# Patient Record
Sex: FEMALE | Race: WHITE | Employment: FULL TIME
[De-identification: names, ages, dates, MRNs, and addresses within clinical notes are randomized per-mention and may not be internally consistent; named-entity substitution may affect disease eponyms.]

---

## 2017-01-09 ENCOUNTER — OFFICE VISIT (OUTPATIENT)
Dept: INTERNAL MEDICINE | Facility: CLINIC | Age: 47
End: 2017-01-09

## 2017-01-09 VITALS
BODY MASS INDEX: 33.46 KG/M2 | SYSTOLIC BLOOD PRESSURE: 126 MMHG | WEIGHT: 196 LBS | HEIGHT: 64 IN | DIASTOLIC BLOOD PRESSURE: 78 MMHG

## 2017-01-09 DIAGNOSIS — I72.0 CAROTID ARTERY ANEURYSM (HCC): ICD-10-CM

## 2017-01-09 DIAGNOSIS — R93.7 ABNORMAL X-RAY OF CERVICAL SPINE: ICD-10-CM

## 2017-01-09 DIAGNOSIS — M54.2 NECK PAIN: Primary | ICD-10-CM

## 2017-01-09 PROCEDURE — G8599 NO ASA/ANTIPLAT THER USE RNG: HCPCS | Performed by: INTERNAL MEDICINE

## 2017-01-09 PROCEDURE — G8427 DOCREV CUR MEDS BY ELIG CLIN: HCPCS | Performed by: INTERNAL MEDICINE

## 2017-01-09 PROCEDURE — 4004F PT TOBACCO SCREEN RCVD TLK: CPT | Performed by: INTERNAL MEDICINE

## 2017-01-09 PROCEDURE — 99214 OFFICE O/P EST MOD 30 MIN: CPT | Performed by: INTERNAL MEDICINE

## 2017-01-09 PROCEDURE — G8484 FLU IMMUNIZE NO ADMIN: HCPCS | Performed by: INTERNAL MEDICINE

## 2017-01-09 PROCEDURE — G8419 CALC BMI OUT NRM PARAM NOF/U: HCPCS | Performed by: INTERNAL MEDICINE

## 2017-01-09 RX ORDER — CLOTRIMAZOLE AND BETAMETHASONE DIPROPIONATE 10; .64 MG/G; MG/G
CREAM TOPICAL
Qty: 30 G | Refills: 1 | Status: SHIPPED | OUTPATIENT
Start: 2017-01-09 | End: 2017-02-23 | Stop reason: ALTCHOICE

## 2017-01-09 RX ORDER — TOPIRAMATE 100 MG/1
TABLET, FILM COATED ORAL
Refills: 2 | COMMUNITY
Start: 2016-12-29 | End: 2017-03-20 | Stop reason: DRUGHIGH

## 2017-01-09 RX ORDER — TOPIRAMATE 25 MG/1
25 TABLET ORAL 2 TIMES DAILY
COMMUNITY
Start: 2017-01-09 | End: 2017-03-20 | Stop reason: DRUGHIGH

## 2017-02-03 DIAGNOSIS — M48.02 FORAMINAL STENOSIS OF CERVICAL REGION: Primary | ICD-10-CM

## 2017-02-03 DIAGNOSIS — M50.30 BULGING OF CERVICAL INTERVERTEBRAL DISC: ICD-10-CM

## 2017-02-04 ENCOUNTER — HOSPITAL ENCOUNTER (EMERGENCY)
Age: 47
Discharge: HOME | End: 2017-02-04
Attending: EMERGENCY MEDICINE
Payer: MEDICARE

## 2017-02-04 VITALS
HEIGHT: 64 IN | WEIGHT: 196 LBS | TEMPERATURE: 98.1 F | RESPIRATION RATE: 16 BRPM | BODY MASS INDEX: 33.46 KG/M2 | SYSTOLIC BLOOD PRESSURE: 114 MMHG | OXYGEN SATURATION: 93 % | DIASTOLIC BLOOD PRESSURE: 58 MMHG | HEART RATE: 83 BPM

## 2017-02-04 DIAGNOSIS — M54.2 NECK PAIN: ICD-10-CM

## 2017-02-04 DIAGNOSIS — R51.9 NONINTRACTABLE HEADACHE, UNSPECIFIED CHRONICITY PATTERN, UNSPECIFIED HEADACHE TYPE: Primary | ICD-10-CM

## 2017-02-04 PROCEDURE — 6360000002 HC RX W HCPCS: Performed by: NURSE PRACTITIONER

## 2017-02-04 PROCEDURE — 99283 EMERGENCY DEPT VISIT LOW MDM: CPT

## 2017-02-04 PROCEDURE — 96375 TX/PRO/DX INJ NEW DRUG ADDON: CPT

## 2017-02-04 PROCEDURE — 96374 THER/PROPH/DIAG INJ IV PUSH: CPT

## 2017-02-04 PROCEDURE — 6370000000 HC RX 637 (ALT 250 FOR IP): Performed by: NURSE PRACTITIONER

## 2017-02-04 PROCEDURE — 2580000003 HC RX 258: Performed by: NURSE PRACTITIONER

## 2017-02-04 RX ORDER — 0.9 % SODIUM CHLORIDE 0.9 %
1000 INTRAVENOUS SOLUTION INTRAVENOUS ONCE
Status: COMPLETED | OUTPATIENT
Start: 2017-02-04 | End: 2017-02-04

## 2017-02-04 RX ORDER — FENTANYL CITRATE 50 UG/ML
50 INJECTION, SOLUTION INTRAMUSCULAR; INTRAVENOUS ONCE
Status: COMPLETED | OUTPATIENT
Start: 2017-02-04 | End: 2017-02-04

## 2017-02-04 RX ORDER — KETOROLAC TROMETHAMINE 30 MG/ML
15 INJECTION, SOLUTION INTRAMUSCULAR; INTRAVENOUS ONCE
Status: DISCONTINUED | OUTPATIENT
Start: 2017-02-04 | End: 2017-02-04

## 2017-02-04 RX ORDER — DIPHENHYDRAMINE HYDROCHLORIDE 50 MG/ML
25 INJECTION INTRAMUSCULAR; INTRAVENOUS ONCE
Status: COMPLETED | OUTPATIENT
Start: 2017-02-04 | End: 2017-02-04

## 2017-02-04 RX ORDER — CYCLOBENZAPRINE HCL 10 MG
10 TABLET ORAL 3 TIMES DAILY PRN
Qty: 15 TABLET | Refills: 0 | Status: SHIPPED | OUTPATIENT
Start: 2017-02-04 | End: 2017-03-20 | Stop reason: ALTCHOICE

## 2017-02-04 RX ORDER — METOCLOPRAMIDE HYDROCHLORIDE 5 MG/ML
10 INJECTION INTRAMUSCULAR; INTRAVENOUS ONCE
Status: COMPLETED | OUTPATIENT
Start: 2017-02-04 | End: 2017-02-04

## 2017-02-04 RX ORDER — DIAZEPAM 5 MG/1
5 TABLET ORAL ONCE
Status: COMPLETED | OUTPATIENT
Start: 2017-02-04 | End: 2017-02-04

## 2017-02-04 RX ADMIN — DIAZEPAM 5 MG: 5 TABLET ORAL at 15:58

## 2017-02-04 RX ADMIN — DIPHENHYDRAMINE HYDROCHLORIDE 25 MG: 50 INJECTION, SOLUTION INTRAMUSCULAR; INTRAVENOUS at 16:03

## 2017-02-04 RX ADMIN — FENTANYL CITRATE 50 MCG: 50 INJECTION, SOLUTION INTRAMUSCULAR; INTRAVENOUS at 16:21

## 2017-02-04 RX ADMIN — METOCLOPRAMIDE 10 MG: 5 INJECTION, SOLUTION INTRAMUSCULAR; INTRAVENOUS at 16:00

## 2017-02-04 RX ADMIN — SODIUM CHLORIDE 1000 ML: 9 INJECTION, SOLUTION INTRAVENOUS at 15:43

## 2017-02-04 ASSESSMENT — ENCOUNTER SYMPTOMS
BACK PAIN: 0
COUGH: 0
SHORTNESS OF BREATH: 0
TROUBLE SWALLOWING: 0
NAUSEA: 0
VOMITING: 0

## 2017-02-04 ASSESSMENT — PAIN DESCRIPTION - FREQUENCY: FREQUENCY: CONTINUOUS

## 2017-02-04 ASSESSMENT — PAIN DESCRIPTION - ONSET: ONSET: AWAKENED FROM SLEEP

## 2017-02-04 ASSESSMENT — PAIN DESCRIPTION - DESCRIPTORS: DESCRIPTORS: CONSTANT;THROBBING

## 2017-02-04 ASSESSMENT — PAIN SCALES - GENERAL
PAINLEVEL_OUTOF10: 10
PAINLEVEL_OUTOF10: 0
PAINLEVEL_OUTOF10: 4

## 2017-02-04 ASSESSMENT — PAIN DESCRIPTION - PAIN TYPE: TYPE: ACUTE PAIN

## 2017-02-04 ASSESSMENT — PAIN DESCRIPTION - LOCATION: LOCATION: HEAD;NECK

## 2017-02-06 ENCOUNTER — TELEPHONE (OUTPATIENT)
Dept: INTERNAL MEDICINE | Facility: CLINIC | Age: 47
End: 2017-02-06

## 2017-02-06 DIAGNOSIS — M50.30 BULGING OF CERVICAL INTERVERTEBRAL DISC: ICD-10-CM

## 2017-02-06 DIAGNOSIS — M48.02 FORAMINAL STENOSIS OF CERVICAL REGION: Primary | ICD-10-CM

## 2017-02-09 DIAGNOSIS — E03.9 HYPOTHYROIDISM: ICD-10-CM

## 2017-02-09 RX ORDER — ROPINIROLE 1 MG/1
TABLET, FILM COATED ORAL
Qty: 30 TABLET | Refills: 5 | Status: SHIPPED | OUTPATIENT
Start: 2017-02-09 | End: 2017-08-06 | Stop reason: SDUPTHER

## 2017-02-10 RX ORDER — LEVOTHYROXINE SODIUM 0.12 MG/1
TABLET ORAL
Qty: 60 TABLET | Refills: 11 | Status: SHIPPED | OUTPATIENT
Start: 2017-02-10 | End: 2018-02-13 | Stop reason: SDUPTHER

## 2017-02-13 ENCOUNTER — INITIAL CONSULT (OUTPATIENT)
Dept: NEUROSURGERY | Facility: CLINIC | Age: 47
End: 2017-02-13

## 2017-02-13 VITALS
DIASTOLIC BLOOD PRESSURE: 80 MMHG | HEART RATE: 79 BPM | HEIGHT: 64 IN | SYSTOLIC BLOOD PRESSURE: 114 MMHG | BODY MASS INDEX: 33.8 KG/M2 | WEIGHT: 198 LBS

## 2017-02-13 DIAGNOSIS — M54.12 CERVICAL RADICULOPATHY: Primary | ICD-10-CM

## 2017-02-13 DIAGNOSIS — R51.9 FREQUENT HEADACHES: ICD-10-CM

## 2017-02-13 PROCEDURE — G8484 FLU IMMUNIZE NO ADMIN: HCPCS | Performed by: NEUROLOGICAL SURGERY

## 2017-02-13 PROCEDURE — 99203 OFFICE O/P NEW LOW 30 MIN: CPT | Performed by: NEUROLOGICAL SURGERY

## 2017-02-13 PROCEDURE — 4004F PT TOBACCO SCREEN RCVD TLK: CPT | Performed by: NEUROLOGICAL SURGERY

## 2017-02-13 PROCEDURE — G8417 CALC BMI ABV UP PARAM F/U: HCPCS | Performed by: NEUROLOGICAL SURGERY

## 2017-02-13 PROCEDURE — G8599 NO ASA/ANTIPLAT THER USE RNG: HCPCS | Performed by: NEUROLOGICAL SURGERY

## 2017-02-13 PROCEDURE — G8427 DOCREV CUR MEDS BY ELIG CLIN: HCPCS | Performed by: NEUROLOGICAL SURGERY

## 2017-02-13 RX ORDER — METHYLPREDNISOLONE 4 MG/1
TABLET ORAL
Qty: 1 KIT | Refills: 0 | Status: SHIPPED | OUTPATIENT
Start: 2017-02-13 | End: 2017-02-19

## 2017-02-13 ASSESSMENT — VISUAL ACUITY: VA_NORMAL: 1

## 2017-02-22 ENCOUNTER — HOSPITAL ENCOUNTER (EMERGENCY)
Age: 47
Discharge: HOME | End: 2017-02-22
Attending: EMERGENCY MEDICINE
Payer: MEDICARE

## 2017-02-22 ENCOUNTER — TELEPHONE (OUTPATIENT)
Dept: INTERNAL MEDICINE | Facility: CLINIC | Age: 47
End: 2017-02-22

## 2017-02-22 VITALS
HEIGHT: 64 IN | DIASTOLIC BLOOD PRESSURE: 59 MMHG | BODY MASS INDEX: 31.92 KG/M2 | SYSTOLIC BLOOD PRESSURE: 104 MMHG | WEIGHT: 187 LBS | TEMPERATURE: 97.9 F | RESPIRATION RATE: 16 BRPM | HEART RATE: 75 BPM | OXYGEN SATURATION: 94 %

## 2017-02-22 DIAGNOSIS — M54.2 NECK PAIN: Primary | ICD-10-CM

## 2017-02-22 DIAGNOSIS — R51.9 HEADACHE, UNSPECIFIED HEADACHE TYPE: ICD-10-CM

## 2017-02-22 LAB
ABSOLUTE EOS #: 0.3 K/UL (ref 0–0.4)
ABSOLUTE LYMPH #: 2.7 K/UL (ref 1–4.8)
ABSOLUTE MONO #: 0.5 K/UL (ref 0.1–1.3)
ANION GAP SERPL CALCULATED.3IONS-SCNC: 15 MMOL/L (ref 9–17)
BASOPHILS # BLD: 1 % (ref 0–2)
BASOPHILS ABSOLUTE: 0.1 K/UL (ref 0–0.2)
BUN BLDV-MCNC: 16 MG/DL (ref 6–20)
BUN/CREAT BLD: ABNORMAL (ref 9–20)
CALCIUM SERPL-MCNC: 9.8 MG/DL (ref 8.6–10.4)
CHLORIDE BLD-SCNC: 103 MMOL/L (ref 98–107)
CO2: 20 MMOL/L (ref 20–31)
CREAT SERPL-MCNC: 0.81 MG/DL (ref 0.5–0.9)
DIFFERENTIAL TYPE: ABNORMAL
EOSINOPHILS RELATIVE PERCENT: 4 % (ref 0–4)
GFR AFRICAN AMERICAN: >60 ML/MIN
GFR NON-AFRICAN AMERICAN: >60 ML/MIN
GFR SERPL CREATININE-BSD FRML MDRD: ABNORMAL ML/MIN/{1.73_M2}
GFR SERPL CREATININE-BSD FRML MDRD: ABNORMAL ML/MIN/{1.73_M2}
GLUCOSE BLD-MCNC: 111 MG/DL (ref 70–99)
HCT VFR BLD CALC: 46.6 % (ref 36–46)
HEMOGLOBIN: 15.9 G/DL (ref 12–16)
LYMPHOCYTES # BLD: 31 % (ref 24–44)
MAGNESIUM: 2 MG/DL (ref 1.6–2.6)
MCH RBC QN AUTO: 30.7 PG (ref 26–34)
MCHC RBC AUTO-ENTMCNC: 34.1 G/DL (ref 31–37)
MCV RBC AUTO: 89.9 FL (ref 80–100)
MONOCYTES # BLD: 6 % (ref 1–7)
PDW BLD-RTO: 13 % (ref 11.5–14.9)
PLATELET # BLD: 193 K/UL (ref 150–450)
PLATELET ESTIMATE: ABNORMAL
PMV BLD AUTO: 8.6 FL (ref 6–12)
POTASSIUM SERPL-SCNC: 4.4 MMOL/L (ref 3.7–5.3)
RBC # BLD: 5.18 M/UL (ref 4–5.2)
RBC # BLD: ABNORMAL 10*6/UL
SEG NEUTROPHILS: 58 % (ref 36–66)
SEGMENTED NEUTROPHILS ABSOLUTE COUNT: 5.1 K/UL (ref 1.3–9.1)
SODIUM BLD-SCNC: 138 MMOL/L (ref 135–144)
WBC # BLD: 8.8 K/UL (ref 3.5–11)
WBC # BLD: ABNORMAL 10*3/UL

## 2017-02-22 PROCEDURE — 93005 ELECTROCARDIOGRAM TRACING: CPT

## 2017-02-22 PROCEDURE — 80048 BASIC METABOLIC PNL TOTAL CA: CPT

## 2017-02-22 PROCEDURE — 96375 TX/PRO/DX INJ NEW DRUG ADDON: CPT

## 2017-02-22 PROCEDURE — 36415 COLL VENOUS BLD VENIPUNCTURE: CPT

## 2017-02-22 PROCEDURE — 6360000002 HC RX W HCPCS: Performed by: EMERGENCY MEDICINE

## 2017-02-22 PROCEDURE — 96374 THER/PROPH/DIAG INJ IV PUSH: CPT

## 2017-02-22 PROCEDURE — 99284 EMERGENCY DEPT VISIT MOD MDM: CPT

## 2017-02-22 PROCEDURE — 2580000003 HC RX 258: Performed by: EMERGENCY MEDICINE

## 2017-02-22 PROCEDURE — 83735 ASSAY OF MAGNESIUM: CPT

## 2017-02-22 PROCEDURE — 85025 COMPLETE CBC W/AUTO DIFF WBC: CPT

## 2017-02-22 RX ORDER — DIAZEPAM 5 MG/1
5 TABLET ORAL EVERY 8 HOURS PRN
Qty: 6 TABLET | Refills: 0 | Status: SHIPPED | OUTPATIENT
Start: 2017-02-22 | End: 2017-03-04

## 2017-02-22 RX ORDER — HYDROCODONE BITARTRATE AND ACETAMINOPHEN 5; 325 MG/1; MG/1
1 TABLET ORAL EVERY 6 HOURS PRN
Qty: 10 TABLET | Refills: 0 | Status: SHIPPED | OUTPATIENT
Start: 2017-02-22 | End: 2017-03-01

## 2017-02-22 RX ORDER — FENTANYL CITRATE 50 UG/ML
50 INJECTION, SOLUTION INTRAMUSCULAR; INTRAVENOUS ONCE
Status: COMPLETED | OUTPATIENT
Start: 2017-02-22 | End: 2017-02-22

## 2017-02-22 RX ORDER — DIAZEPAM 5 MG/ML
5 INJECTION, SOLUTION INTRAMUSCULAR; INTRAVENOUS ONCE
Status: COMPLETED | OUTPATIENT
Start: 2017-02-22 | End: 2017-02-22

## 2017-02-22 RX ORDER — 0.9 % SODIUM CHLORIDE 0.9 %
1000 INTRAVENOUS SOLUTION INTRAVENOUS ONCE
Status: COMPLETED | OUTPATIENT
Start: 2017-02-22 | End: 2017-02-22

## 2017-02-22 RX ORDER — SUMATRIPTAN 100 MG/1
100 TABLET, FILM COATED ORAL PRN
COMMUNITY
End: 2017-02-23 | Stop reason: ALTCHOICE

## 2017-02-22 RX ADMIN — DIAZEPAM 5 MG: 5 INJECTION, SOLUTION INTRAMUSCULAR; INTRAVENOUS at 15:09

## 2017-02-22 RX ADMIN — SODIUM CHLORIDE 1000 ML: 9 INJECTION, SOLUTION INTRAVENOUS at 15:09

## 2017-02-22 RX ADMIN — FENTANYL CITRATE 50 MCG: 50 INJECTION INTRAMUSCULAR; INTRAVENOUS at 15:09

## 2017-02-22 ASSESSMENT — PAIN SCALES - GENERAL
PAINLEVEL_OUTOF10: 8
PAINLEVEL_OUTOF10: 10
PAINLEVEL_OUTOF10: 10

## 2017-02-22 ASSESSMENT — ENCOUNTER SYMPTOMS
ABDOMINAL PAIN: 0
GASTROINTESTINAL NEGATIVE: 1
BOWEL INCONTINENCE: 0
COUGH: 0
VISUAL CHANGE: 0
EYES NEGATIVE: 1
RESPIRATORY NEGATIVE: 1
SHORTNESS OF BREATH: 0

## 2017-02-22 ASSESSMENT — PAIN DESCRIPTION - LOCATION
LOCATION: HEAD;NECK
LOCATION: HEAD;NECK

## 2017-02-22 ASSESSMENT — PAIN DESCRIPTION - PAIN TYPE: TYPE: ACUTE PAIN

## 2017-02-23 ENCOUNTER — CARE COORDINATION (OUTPATIENT)
Dept: CARE COORDINATION | Facility: CLINIC | Age: 47
End: 2017-02-23

## 2017-02-23 ENCOUNTER — TELEPHONE (OUTPATIENT)
Dept: NEUROSURGERY | Facility: CLINIC | Age: 47
End: 2017-02-23

## 2017-02-23 RX ORDER — IBUPROFEN 200 MG
600 TABLET ORAL EVERY 6 HOURS PRN
COMMUNITY
End: 2017-03-20 | Stop reason: ALTCHOICE

## 2017-02-24 LAB
EKG ATRIAL RATE: 78 BPM
EKG P AXIS: 52 DEGREES
EKG P-R INTERVAL: 150 MS
EKG Q-T INTERVAL: 396 MS
EKG QRS DURATION: 82 MS
EKG QTC CALCULATION (BAZETT): 451 MS
EKG R AXIS: 30 DEGREES
EKG T AXIS: 39 DEGREES
EKG VENTRICULAR RATE: 78 BPM

## 2017-03-10 DIAGNOSIS — E13.8 OTHER SPECIFIED DIABETES MELLITUS WITH UNSPECIFIED COMPLICATIONS (HCC): ICD-10-CM

## 2017-03-20 ENCOUNTER — HOSPITAL ENCOUNTER (OUTPATIENT)
Dept: PAIN MANAGEMENT | Age: 47
Discharge: HOME | End: 2017-03-20
Payer: MEDICARE

## 2017-03-20 VITALS
DIASTOLIC BLOOD PRESSURE: 72 MMHG | RESPIRATION RATE: 16 BRPM | OXYGEN SATURATION: 96 % | TEMPERATURE: 98.4 F | WEIGHT: 187 LBS | HEART RATE: 81 BPM | HEIGHT: 64 IN | BODY MASS INDEX: 31.92 KG/M2 | SYSTOLIC BLOOD PRESSURE: 113 MMHG

## 2017-03-20 DIAGNOSIS — M48.02 DEGENERATIVE CERVICAL SPINAL STENOSIS: ICD-10-CM

## 2017-03-20 DIAGNOSIS — M54.12 CERVICAL RADICULOPATHY: ICD-10-CM

## 2017-03-20 DIAGNOSIS — M47.812 OSTEOARTHRITIS OF CERVICAL SPINE, UNSPECIFIED SPINAL OSTEOARTHRITIS COMPLICATION STATUS: Primary | ICD-10-CM

## 2017-03-20 DIAGNOSIS — M47.812 ARTHROPATHY OF CERVICAL FACET JOINT: ICD-10-CM

## 2017-03-20 PROCEDURE — 99204 OFFICE O/P NEW MOD 45 MIN: CPT | Performed by: PAIN MEDICINE

## 2017-03-20 PROCEDURE — G0463 HOSPITAL OUTPT CLINIC VISIT: HCPCS

## 2017-03-20 PROCEDURE — 80307 DRUG TEST PRSMV CHEM ANLYZR: CPT

## 2017-03-20 RX ORDER — DIAZEPAM 5 MG/1
5 TABLET ORAL EVERY 8 HOURS PRN
COMMUNITY
End: 2017-05-09

## 2017-03-20 RX ORDER — TOPIRAMATE 50 MG/1
50 TABLET, FILM COATED ORAL 2 TIMES DAILY
COMMUNITY
End: 2017-07-24 | Stop reason: SDUPTHER

## 2017-03-20 RX ORDER — HYDROCODONE BITARTRATE AND ACETAMINOPHEN 5; 325 MG/1; MG/1
1 TABLET ORAL EVERY 6 HOURS PRN
COMMUNITY
End: 2017-05-15 | Stop reason: SDUPTHER

## 2017-03-20 ASSESSMENT — PAIN DESCRIPTION - FREQUENCY: FREQUENCY: CONTINUOUS

## 2017-03-20 ASSESSMENT — ENCOUNTER SYMPTOMS
HEARTBURN: 1
HEMOPTYSIS: 0
COUGH: 0
ABDOMINAL PAIN: 0
EYES NEGATIVE: 1
PHOTOPHOBIA: 0
DIARRHEA: 0
SPUTUM PRODUCTION: 0
BLURRED VISION: 0
CONSTIPATION: 0
VOMITING: 1
NAUSEA: 0
RESPIRATORY NEGATIVE: 1

## 2017-03-20 ASSESSMENT — PAIN DESCRIPTION - ONSET: ONSET: ON-GOING

## 2017-03-20 ASSESSMENT — PAIN DESCRIPTION - PROGRESSION: CLINICAL_PROGRESSION: GRADUALLY WORSENING

## 2017-03-20 ASSESSMENT — PAIN DESCRIPTION - DESCRIPTORS: DESCRIPTORS: CONSTANT;THROBBING;POUNDING

## 2017-03-20 ASSESSMENT — PAIN DESCRIPTION - DIRECTION: RADIATING_TOWARDS: LEFT ARM

## 2017-03-20 ASSESSMENT — PAIN SCALES - GENERAL: PAINLEVEL_OUTOF10: 4

## 2017-03-20 ASSESSMENT — PAIN DESCRIPTION - ORIENTATION: ORIENTATION: UPPER

## 2017-03-20 ASSESSMENT — PAIN DESCRIPTION - PAIN TYPE: TYPE: CHRONIC PAIN

## 2017-03-20 ASSESSMENT — PAIN DESCRIPTION - LOCATION: LOCATION: NECK;ARM;HEAD

## 2017-03-21 ENCOUNTER — HOSPITAL ENCOUNTER (OUTPATIENT)
Dept: PAIN MANAGEMENT | Age: 47
Discharge: HOME | End: 2017-03-21
Payer: MEDICARE

## 2017-03-21 ENCOUNTER — HOSPITAL ENCOUNTER (OUTPATIENT)
Dept: GENERAL RADIOLOGY | Age: 47
Discharge: HOME | End: 2017-03-21
Payer: MEDICARE

## 2017-03-21 VITALS
HEART RATE: 70 BPM | OXYGEN SATURATION: 96 % | DIASTOLIC BLOOD PRESSURE: 68 MMHG | TEMPERATURE: 97.9 F | BODY MASS INDEX: 31.92 KG/M2 | HEIGHT: 64 IN | RESPIRATION RATE: 16 BRPM | SYSTOLIC BLOOD PRESSURE: 105 MMHG | WEIGHT: 187 LBS

## 2017-03-21 DIAGNOSIS — M47.812 OSTEOARTHRITIS OF CERVICAL SPINE, UNSPECIFIED SPINAL OSTEOARTHRITIS COMPLICATION STATUS: Primary | ICD-10-CM

## 2017-03-21 DIAGNOSIS — M47.812 ARTHROPATHY OF CERVICAL FACET JOINT: ICD-10-CM

## 2017-03-21 DIAGNOSIS — M48.02 DEGENERATIVE CERVICAL SPINAL STENOSIS: ICD-10-CM

## 2017-03-21 PROCEDURE — 64492 INJ PARAVERT F JNT C/T 3 LEV: CPT

## 2017-03-21 PROCEDURE — 6360000002 HC RX W HCPCS: Performed by: PAIN MEDICINE

## 2017-03-21 PROCEDURE — 6360000004 HC RX CONTRAST MEDICATION

## 2017-03-21 PROCEDURE — 6360000002 HC RX W HCPCS

## 2017-03-21 PROCEDURE — 64491 INJ PARAVERT F JNT C/T 2 LEV: CPT

## 2017-03-21 PROCEDURE — 64490 INJ PARAVERT F JNT C/T 1 LEV: CPT | Performed by: PAIN MEDICINE

## 2017-03-21 PROCEDURE — 64490 INJ PARAVERT F JNT C/T 1 LEV: CPT

## 2017-03-21 PROCEDURE — 3209999900 FLUORO FOR SURGICAL PROCEDURES

## 2017-03-21 PROCEDURE — 64492 INJ PARAVERT F JNT C/T 3 LEV: CPT | Performed by: PAIN MEDICINE

## 2017-03-21 PROCEDURE — 2500000003 HC RX 250 WO HCPCS

## 2017-03-21 PROCEDURE — 64491 INJ PARAVERT F JNT C/T 2 LEV: CPT | Performed by: PAIN MEDICINE

## 2017-03-21 RX ORDER — MIDAZOLAM HYDROCHLORIDE 1 MG/ML
INJECTION INTRAMUSCULAR; INTRAVENOUS
Status: COMPLETED | OUTPATIENT
Start: 2017-03-21 | End: 2017-03-21

## 2017-03-21 RX ADMIN — MIDAZOLAM 2 MG: 1 INJECTION INTRAMUSCULAR; INTRAVENOUS at 09:31

## 2017-03-21 ASSESSMENT — PAIN - FUNCTIONAL ASSESSMENT
PAIN_FUNCTIONAL_ASSESSMENT: 0-10

## 2017-03-22 ENCOUNTER — TELEPHONE (OUTPATIENT)
Dept: PAIN MANAGEMENT | Age: 47
End: 2017-03-22

## 2017-03-24 LAB
6-ACETYLMORPHINE, UR: NOT DETECTED
7-AMINOCLONAZEPAM, URINE: PRESENT
ALPHA-OH-ALPRAZ, URINE: NOT DETECTED
ALPRAZOLAM, URINE: NOT DETECTED
AMPHETAMINES, URINE: NOT DETECTED
BARBITURATES, URINE: NOT DETECTED
BENZOYLECGONINE, UR: NOT DETECTED
BUPRENORPHINE URINE: NOT DETECTED
CARISOPRODOL, UR: NOT DETECTED
CLONAZEPAM, URINE: NOT DETECTED
CODEINE, URINE: NOT DETECTED
CREATININE URINE: 154.5 MG/DL (ref 20–400)
DIAZEPAM, URINE: NOT DETECTED
DRUGS EXPECTED, UR: NORMAL
EER HI RES INTERP UR: NORMAL
ETHYL GLUCURONIDE UR: NOT DETECTED
FENTANYL URINE: NOT DETECTED
HYDROCODONE, URINE: NOT DETECTED
HYDROMORPHONE, URINE: NOT DETECTED
LORAZEPAM, URINE: NOT DETECTED
MARIJUANA METAB, UR: NOT DETECTED
MDA, UR: NOT DETECTED
MDEA, EVE, UR: NOT DETECTED
MDMA URINE: NOT DETECTED
MEPERIDINE METAB, UR: NOT DETECTED
METHADONE, URINE: NOT DETECTED
METHAMPHETAMINE, URINE: NOT DETECTED
METHYLPHENIDATE: NOT DETECTED
MIDAZOLAM, URINE: NOT DETECTED
MORPHINE URINE: NOT DETECTED
NORBUPRENORPHINE, URINE: NOT DETECTED
NORDIAZEPAM, URINE: NOT DETECTED
NORFENTANYL, URINE: NOT DETECTED
NORHYDROCODONE, URINE: NOT DETECTED
NOROXYCODONE, URINE: NOT DETECTED
NOROXYMORPHONE, URINE: NOT DETECTED
OXAZEPAM, URINE: NOT DETECTED
OXYCODONE URINE: NOT DETECTED
OXYMORPHONE, URINE: NOT DETECTED
PAIN MANAGEMENT DRUG PANEL INTERP, URINE: NORMAL
PAIN MGT DRUG PANEL, HI RES, UR: NORMAL
PCP,URINE: NOT DETECTED
PHENTERMINE, UR: NOT DETECTED
PROPOXYPHENE, URINE: NOT DETECTED
TAPENTADOL, URINE: NOT DETECTED
TAPENTADOL-O-SULFATE, URINE: NOT DETECTED
TEMAZEPAM, URINE: NOT DETECTED
TRAMADOL, URINE: NOT DETECTED
ZOLPIDEM, URINE: NOT DETECTED

## 2017-03-29 ENCOUNTER — HOSPITAL ENCOUNTER (OUTPATIENT)
Dept: NEUROLOGY | Age: 47
Discharge: HOME | End: 2017-03-29
Payer: MEDICARE

## 2017-03-29 DIAGNOSIS — M54.12 CERVICAL RADICULOPATHY: ICD-10-CM

## 2017-03-29 PROCEDURE — 95908 NRV CNDJ TST 3-4 STUDIES: CPT | Performed by: PHYSICAL MEDICINE & REHABILITATION

## 2017-03-29 PROCEDURE — 95886 MUSC TEST DONE W/N TEST COMP: CPT | Performed by: PHYSICAL MEDICINE & REHABILITATION

## 2017-04-01 DIAGNOSIS — G62.9 NEUROPATHY: ICD-10-CM

## 2017-04-04 RX ORDER — GABAPENTIN 300 MG/1
CAPSULE ORAL
Qty: 180 CAPSULE | Refills: 3 | Status: SHIPPED | OUTPATIENT
Start: 2017-04-04 | End: 2017-10-16

## 2017-04-10 DIAGNOSIS — I15.9 SECONDARY HYPERTENSION, UNSPECIFIED: ICD-10-CM

## 2017-04-10 RX ORDER — METOPROLOL TARTRATE 50 MG/1
TABLET, FILM COATED ORAL
Qty: 60 TABLET | Refills: 11 | OUTPATIENT
Start: 2017-04-10

## 2017-04-12 ENCOUNTER — HOSPITAL ENCOUNTER (OUTPATIENT)
Dept: PAIN MANAGEMENT | Age: 47
Discharge: HOME | End: 2017-04-12
Payer: MEDICARE

## 2017-04-12 VITALS
HEART RATE: 74 BPM | WEIGHT: 187 LBS | OXYGEN SATURATION: 98 % | SYSTOLIC BLOOD PRESSURE: 112 MMHG | RESPIRATION RATE: 16 BRPM | HEIGHT: 64 IN | DIASTOLIC BLOOD PRESSURE: 74 MMHG | TEMPERATURE: 98.5 F | BODY MASS INDEX: 31.92 KG/M2

## 2017-04-12 DIAGNOSIS — M47.812 OSTEOARTHRITIS OF CERVICAL SPINE, UNSPECIFIED SPINAL OSTEOARTHRITIS COMPLICATION STATUS: Primary | ICD-10-CM

## 2017-04-12 DIAGNOSIS — M48.02 DEGENERATIVE CERVICAL SPINAL STENOSIS: ICD-10-CM

## 2017-04-12 DIAGNOSIS — M54.12 CERVICAL RADICULOPATHY: ICD-10-CM

## 2017-04-12 DIAGNOSIS — M47.812 ARTHROPATHY OF CERVICAL FACET JOINT: ICD-10-CM

## 2017-04-12 DIAGNOSIS — M79.18 MYOFACIAL MUSCLE PAIN: ICD-10-CM

## 2017-04-12 PROCEDURE — 99213 OFFICE O/P EST LOW 20 MIN: CPT

## 2017-04-12 PROCEDURE — 99214 OFFICE O/P EST MOD 30 MIN: CPT | Performed by: PAIN MEDICINE

## 2017-04-12 RX ORDER — TIZANIDINE 4 MG/1
4 TABLET ORAL NIGHTLY PRN
Qty: 30 TABLET | Refills: 2 | Status: SHIPPED | OUTPATIENT
Start: 2017-04-12 | End: 2017-06-13 | Stop reason: SDUPTHER

## 2017-04-12 RX ORDER — HYDROCODONE BITARTRATE AND ACETAMINOPHEN 5; 325 MG/1; MG/1
1 TABLET ORAL EVERY 8 HOURS PRN
Qty: 30 TABLET | Refills: 0 | Status: SHIPPED | OUTPATIENT
Start: 2017-04-12 | End: 2017-05-16 | Stop reason: ALTCHOICE

## 2017-04-12 RX ORDER — TOPIRAMATE 100 MG/1
TABLET, FILM COATED ORAL
Refills: 2 | COMMUNITY
Start: 2017-04-06 | End: 2017-04-12

## 2017-04-12 RX ORDER — IODINE/SODIUM IODIDE 2 %
TINCTURE TOPICAL
Refills: 0 | COMMUNITY
Start: 2017-01-07 | End: 2017-05-09

## 2017-04-12 ASSESSMENT — PAIN DESCRIPTION - ORIENTATION: ORIENTATION: LEFT

## 2017-04-12 ASSESSMENT — ENCOUNTER SYMPTOMS
COUGH: 0
PHOTOPHOBIA: 0
VOMITING: 0
BLURRED VISION: 0
HEARTBURN: 0
NAUSEA: 0

## 2017-04-12 ASSESSMENT — PAIN DESCRIPTION - PROGRESSION: CLINICAL_PROGRESSION: GRADUALLY IMPROVING

## 2017-04-12 ASSESSMENT — PAIN DESCRIPTION - PAIN TYPE: TYPE: CHRONIC PAIN

## 2017-04-12 ASSESSMENT — PAIN DESCRIPTION - FREQUENCY: FREQUENCY: CONTINUOUS

## 2017-04-12 ASSESSMENT — PAIN DESCRIPTION - LOCATION: LOCATION: NECK;SHOULDER

## 2017-04-12 ASSESSMENT — PAIN DESCRIPTION - DESCRIPTORS: DESCRIPTORS: ACHING;CONSTANT;THROBBING

## 2017-04-12 ASSESSMENT — PAIN SCALES - GENERAL: PAINLEVEL_OUTOF10: 6

## 2017-04-12 ASSESSMENT — PAIN DESCRIPTION - ONSET: ONSET: ON-GOING

## 2017-04-12 ASSESSMENT — PAIN DESCRIPTION - DIRECTION: RADIATING_TOWARDS: LEFT SHOULDER

## 2017-04-13 ASSESSMENT — ENCOUNTER SYMPTOMS: SHORTNESS OF BREATH: 0

## 2017-04-21 ENCOUNTER — OFFICE VISIT (OUTPATIENT)
Dept: OBGYN CLINIC | Age: 47
End: 2017-04-21
Payer: MEDICARE

## 2017-04-21 VITALS
HEART RATE: 68 BPM | DIASTOLIC BLOOD PRESSURE: 74 MMHG | WEIGHT: 200 LBS | SYSTOLIC BLOOD PRESSURE: 113 MMHG | RESPIRATION RATE: 18 BRPM | BODY MASS INDEX: 34.33 KG/M2

## 2017-04-21 DIAGNOSIS — N76.0 ACUTE VAGINITIS: Primary | ICD-10-CM

## 2017-04-21 DIAGNOSIS — R10.2 PELVIC PAIN IN FEMALE: ICD-10-CM

## 2017-04-21 DIAGNOSIS — N95.2 ATROPHIC VAGINITIS: ICD-10-CM

## 2017-04-21 DIAGNOSIS — Z90.710 STATUS POST HYSTERECTOMY: ICD-10-CM

## 2017-04-21 PROCEDURE — 99213 OFFICE O/P EST LOW 20 MIN: CPT | Performed by: SPECIALIST

## 2017-04-21 PROCEDURE — G8427 DOCREV CUR MEDS BY ELIG CLIN: HCPCS | Performed by: SPECIALIST

## 2017-04-21 PROCEDURE — 4004F PT TOBACCO SCREEN RCVD TLK: CPT | Performed by: SPECIALIST

## 2017-04-21 PROCEDURE — G8417 CALC BMI ABV UP PARAM F/U: HCPCS | Performed by: SPECIALIST

## 2017-04-21 PROCEDURE — G8598 ASA/ANTIPLAT THER USED: HCPCS | Performed by: SPECIALIST

## 2017-04-21 RX ORDER — METRONIDAZOLE 500 MG/1
500 TABLET ORAL 2 TIMES DAILY
Qty: 14 TABLET | Refills: 0 | Status: SHIPPED | OUTPATIENT
Start: 2017-04-21 | End: 2017-04-28

## 2017-04-21 RX ORDER — FLUCONAZOLE 100 MG/1
100 TABLET ORAL DAILY
Qty: 7 TABLET | Refills: 0 | Status: SHIPPED | OUTPATIENT
Start: 2017-04-21 | End: 2017-04-28

## 2017-04-21 ASSESSMENT — ENCOUNTER SYMPTOMS
VOMITING: 0
CONSTIPATION: 0
EYE PAIN: 0
ABDOMINAL PAIN: 1
ABDOMINAL DISTENTION: 0
DIARRHEA: 0
COUGH: 0
NAUSEA: 0
APNEA: 0

## 2017-05-04 ENCOUNTER — CARE COORDINATION (OUTPATIENT)
Dept: CARE COORDINATION | Age: 47
End: 2017-05-04

## 2017-05-09 ENCOUNTER — HOSPITAL ENCOUNTER (EMERGENCY)
Age: 47
Discharge: HOME | End: 2017-05-09
Attending: EMERGENCY MEDICINE
Payer: MEDICARE

## 2017-05-09 VITALS
DIASTOLIC BLOOD PRESSURE: 77 MMHG | SYSTOLIC BLOOD PRESSURE: 119 MMHG | HEART RATE: 88 BPM | BODY MASS INDEX: 33.63 KG/M2 | TEMPERATURE: 98.1 F | RESPIRATION RATE: 14 BRPM | HEIGHT: 64 IN | WEIGHT: 197 LBS | OXYGEN SATURATION: 94 %

## 2017-05-09 DIAGNOSIS — G43.819 OTHER MIGRAINE WITHOUT STATUS MIGRAINOSUS, INTRACTABLE: Primary | ICD-10-CM

## 2017-05-09 PROCEDURE — 99283 EMERGENCY DEPT VISIT LOW MDM: CPT

## 2017-05-09 PROCEDURE — 6360000002 HC RX W HCPCS: Performed by: EMERGENCY MEDICINE

## 2017-05-09 PROCEDURE — 2580000003 HC RX 258: Performed by: EMERGENCY MEDICINE

## 2017-05-09 PROCEDURE — 96374 THER/PROPH/DIAG INJ IV PUSH: CPT

## 2017-05-09 PROCEDURE — 96375 TX/PRO/DX INJ NEW DRUG ADDON: CPT

## 2017-05-09 RX ORDER — KETOROLAC TROMETHAMINE 30 MG/ML
30 INJECTION, SOLUTION INTRAMUSCULAR; INTRAVENOUS ONCE
Status: COMPLETED | OUTPATIENT
Start: 2017-05-09 | End: 2017-05-09

## 2017-05-09 RX ORDER — 0.9 % SODIUM CHLORIDE 0.9 %
1000 INTRAVENOUS SOLUTION INTRAVENOUS ONCE
Status: COMPLETED | OUTPATIENT
Start: 2017-05-09 | End: 2017-05-09

## 2017-05-09 RX ORDER — DIPHENHYDRAMINE HYDROCHLORIDE 50 MG/ML
25 INJECTION INTRAMUSCULAR; INTRAVENOUS ONCE
Status: COMPLETED | OUTPATIENT
Start: 2017-05-09 | End: 2017-05-09

## 2017-05-09 RX ORDER — DEXAMETHASONE SODIUM PHOSPHATE 4 MG/ML
10 INJECTION, SOLUTION INTRA-ARTICULAR; INTRALESIONAL; INTRAMUSCULAR; INTRAVENOUS; SOFT TISSUE ONCE
Status: COMPLETED | OUTPATIENT
Start: 2017-05-09 | End: 2017-05-09

## 2017-05-09 RX ADMIN — SODIUM CHLORIDE 1000 ML: 9 INJECTION, SOLUTION INTRAVENOUS at 15:02

## 2017-05-09 RX ADMIN — PROCHLORPERAZINE EDISYLATE 10 MG: 5 INJECTION INTRAMUSCULAR; INTRAVENOUS at 15:10

## 2017-05-09 RX ADMIN — DIPHENHYDRAMINE HYDROCHLORIDE 25 MG: 50 INJECTION, SOLUTION INTRAMUSCULAR; INTRAVENOUS at 15:13

## 2017-05-09 RX ADMIN — DEXAMETHASONE SODIUM PHOSPHATE 10 MG: 4 INJECTION, SOLUTION INTRAMUSCULAR; INTRAVENOUS at 15:03

## 2017-05-09 RX ADMIN — KETOROLAC TROMETHAMINE 30 MG: 30 INJECTION, SOLUTION INTRAMUSCULAR at 15:08

## 2017-05-09 ASSESSMENT — ENCOUNTER SYMPTOMS
BACK PAIN: 0
VOMITING: 1
PHOTOPHOBIA: 1
ABDOMINAL PAIN: 0
COUGH: 0
EYE PAIN: 0
NAUSEA: 1
RHINORRHEA: 0
SHORTNESS OF BREATH: 0
EYE REDNESS: 0

## 2017-05-09 ASSESSMENT — PAIN DESCRIPTION - DESCRIPTORS: DESCRIPTORS: ACHING

## 2017-05-09 ASSESSMENT — PAIN DESCRIPTION - PAIN TYPE: TYPE: ACUTE PAIN

## 2017-05-09 ASSESSMENT — PAIN SCALES - GENERAL
PAINLEVEL_OUTOF10: 6
PAINLEVEL_OUTOF10: 10
PAINLEVEL_OUTOF10: 10

## 2017-05-09 ASSESSMENT — PAIN DESCRIPTION - LOCATION: LOCATION: HEAD

## 2017-05-09 ASSESSMENT — PAIN DESCRIPTION - FREQUENCY: FREQUENCY: CONTINUOUS

## 2017-05-10 ENCOUNTER — CARE COORDINATION (OUTPATIENT)
Dept: CARE COORDINATION | Age: 47
End: 2017-05-10

## 2017-05-11 ENCOUNTER — TELEPHONE (OUTPATIENT)
Dept: PAIN MANAGEMENT | Age: 47
End: 2017-05-11

## 2017-05-15 ENCOUNTER — HOSPITAL ENCOUNTER (OUTPATIENT)
Dept: PAIN MANAGEMENT | Age: 47
Discharge: HOME | End: 2017-05-15
Payer: MEDICARE

## 2017-05-15 VITALS
OXYGEN SATURATION: 96 % | TEMPERATURE: 98.2 F | HEIGHT: 64 IN | WEIGHT: 187 LBS | HEART RATE: 73 BPM | BODY MASS INDEX: 31.92 KG/M2 | RESPIRATION RATE: 15 BRPM | SYSTOLIC BLOOD PRESSURE: 110 MMHG | DIASTOLIC BLOOD PRESSURE: 78 MMHG

## 2017-05-15 DIAGNOSIS — G44.209 TRIGGER POINT WITH TENSION HEADACHE: Primary | ICD-10-CM

## 2017-05-15 DIAGNOSIS — Z51.81 ENCOUNTER FOR MEDICATION MONITORING: ICD-10-CM

## 2017-05-15 DIAGNOSIS — M48.02 STENOSIS, CERVICAL SPINE: ICD-10-CM

## 2017-05-15 DIAGNOSIS — M47.812 ARTHROPATHY OF CERVICAL FACET JOINT: ICD-10-CM

## 2017-05-15 DIAGNOSIS — M47.812 OSTEOARTHRITIS OF CERVICAL SPINE, UNSPECIFIED SPINAL OSTEOARTHRITIS COMPLICATION STATUS: ICD-10-CM

## 2017-05-15 DIAGNOSIS — M48.02 DEGENERATIVE CERVICAL SPINAL STENOSIS: ICD-10-CM

## 2017-05-15 DIAGNOSIS — M79.18 MYOFACIAL MUSCLE PAIN: ICD-10-CM

## 2017-05-15 PROCEDURE — 99213 OFFICE O/P EST LOW 20 MIN: CPT

## 2017-05-15 PROCEDURE — 99214 OFFICE O/P EST MOD 30 MIN: CPT | Performed by: PAIN MEDICINE

## 2017-05-15 RX ORDER — OXYCODONE HYDROCHLORIDE AND ACETAMINOPHEN 5; 325 MG/1; MG/1
1 TABLET ORAL EVERY 8 HOURS PRN
Qty: 30 TABLET | Refills: 0 | Status: SHIPPED | OUTPATIENT
Start: 2017-05-15 | End: 2017-07-26

## 2017-05-15 ASSESSMENT — PAIN DESCRIPTION - ONSET: ONSET: ON-GOING

## 2017-05-15 ASSESSMENT — ENCOUNTER SYMPTOMS
DOUBLE VISION: 0
VOMITING: 1
NAUSEA: 1
BACK PAIN: 1
COUGH: 0
RESPIRATORY NEGATIVE: 1
CONSTIPATION: 0
PHOTOPHOBIA: 1
BLURRED VISION: 0
HEARTBURN: 0
SHORTNESS OF BREATH: 0

## 2017-05-15 ASSESSMENT — PAIN DESCRIPTION - DESCRIPTORS: DESCRIPTORS: CONSTANT;THROBBING;STABBING

## 2017-05-15 ASSESSMENT — PAIN DESCRIPTION - PROGRESSION: CLINICAL_PROGRESSION: GRADUALLY WORSENING

## 2017-05-15 ASSESSMENT — PAIN DESCRIPTION - FREQUENCY: FREQUENCY: CONTINUOUS

## 2017-05-15 ASSESSMENT — PAIN SCALES - GENERAL: PAINLEVEL_OUTOF10: 7

## 2017-05-15 ASSESSMENT — PAIN DESCRIPTION - LOCATION: LOCATION: NECK;HEAD

## 2017-05-15 ASSESSMENT — PAIN DESCRIPTION - PAIN TYPE: TYPE: CHRONIC PAIN

## 2017-05-16 ENCOUNTER — HOSPITAL ENCOUNTER (OUTPATIENT)
Age: 47
Setting detail: SPECIMEN
Discharge: HOME | End: 2017-05-16
Payer: MEDICARE

## 2017-05-16 ENCOUNTER — OFFICE VISIT (OUTPATIENT)
Dept: INTERNAL MEDICINE CLINIC | Age: 47
End: 2017-05-16
Payer: MEDICARE

## 2017-05-16 VITALS
BODY MASS INDEX: 34.49 KG/M2 | HEIGHT: 64 IN | WEIGHT: 202 LBS | DIASTOLIC BLOOD PRESSURE: 76 MMHG | SYSTOLIC BLOOD PRESSURE: 118 MMHG

## 2017-05-16 DIAGNOSIS — E11.9 TYPE 2 DIABETES MELLITUS WITHOUT COMPLICATION, UNSPECIFIED LONG TERM INSULIN USE STATUS: Primary | ICD-10-CM

## 2017-05-16 DIAGNOSIS — G56.03 BILATERAL CARPAL TUNNEL SYNDROME: ICD-10-CM

## 2017-05-16 DIAGNOSIS — R51.9 NONINTRACTABLE HEADACHE, UNSPECIFIED CHRONICITY PATTERN, UNSPECIFIED HEADACHE TYPE: ICD-10-CM

## 2017-05-16 LAB
CREATININE URINE: 179.1 MG/DL (ref 28–217)
HBA1C MFR BLD: 6 %
MICROALBUMIN/CREAT 24H UR: <12 MG/L
MICROALBUMIN/CREAT UR-RTO: 7 MCG/MG CREAT

## 2017-05-16 PROCEDURE — G8417 CALC BMI ABV UP PARAM F/U: HCPCS | Performed by: INTERNAL MEDICINE

## 2017-05-16 PROCEDURE — 3044F HG A1C LEVEL LT 7.0%: CPT | Performed by: INTERNAL MEDICINE

## 2017-05-16 PROCEDURE — G8427 DOCREV CUR MEDS BY ELIG CLIN: HCPCS | Performed by: INTERNAL MEDICINE

## 2017-05-16 PROCEDURE — 99213 OFFICE O/P EST LOW 20 MIN: CPT | Performed by: INTERNAL MEDICINE

## 2017-05-16 PROCEDURE — 4004F PT TOBACCO SCREEN RCVD TLK: CPT | Performed by: INTERNAL MEDICINE

## 2017-05-16 PROCEDURE — G8598 ASA/ANTIPLAT THER USED: HCPCS | Performed by: INTERNAL MEDICINE

## 2017-05-16 PROCEDURE — 83036 HEMOGLOBIN GLYCOSYLATED A1C: CPT | Performed by: INTERNAL MEDICINE

## 2017-05-22 ENCOUNTER — HOSPITAL ENCOUNTER (OUTPATIENT)
Dept: MRI IMAGING | Age: 47
Discharge: HOME | End: 2017-05-22
Payer: MEDICARE

## 2017-05-22 ENCOUNTER — TELEPHONE (OUTPATIENT)
Dept: INTERNAL MEDICINE CLINIC | Age: 47
End: 2017-05-22

## 2017-05-22 DIAGNOSIS — E11.9 TYPE 2 DIABETES MELLITUS WITHOUT COMPLICATION, UNSPECIFIED LONG TERM INSULIN USE STATUS: ICD-10-CM

## 2017-05-22 PROCEDURE — 70551 MRI BRAIN STEM W/O DYE: CPT

## 2017-05-23 ENCOUNTER — HOSPITAL ENCOUNTER (OUTPATIENT)
Dept: PAIN MANAGEMENT | Age: 47
Discharge: HOME | End: 2017-05-23
Payer: MEDICARE

## 2017-05-23 ENCOUNTER — HOSPITAL ENCOUNTER (OUTPATIENT)
Dept: GENERAL RADIOLOGY | Age: 47
Discharge: HOME | End: 2017-05-23
Payer: MEDICARE

## 2017-05-23 VITALS
DIASTOLIC BLOOD PRESSURE: 76 MMHG | BODY MASS INDEX: 34.49 KG/M2 | TEMPERATURE: 97.9 F | OXYGEN SATURATION: 96 % | HEART RATE: 77 BPM | HEIGHT: 64 IN | SYSTOLIC BLOOD PRESSURE: 132 MMHG | RESPIRATION RATE: 18 BRPM | WEIGHT: 202 LBS

## 2017-05-23 DIAGNOSIS — S13.4XXS ATLANTO-AXIAL JOINT SPRAIN, SEQUELA: ICD-10-CM

## 2017-05-23 DIAGNOSIS — S13.4XXS SPRAIN OF ATLANTO-OCCIPITAL JOINT, SEQUELA: ICD-10-CM

## 2017-05-23 DIAGNOSIS — M47.812 OSTEOARTHRITIS OF CERVICAL SPINE, UNSPECIFIED SPINAL OSTEOARTHRITIS COMPLICATION STATUS: Primary | ICD-10-CM

## 2017-05-23 DIAGNOSIS — M47.812 ARTHROPATHY OF CERVICAL FACET JOINT: ICD-10-CM

## 2017-05-23 PROCEDURE — 6360000002 HC RX W HCPCS: Performed by: PAIN MEDICINE

## 2017-05-23 PROCEDURE — 64490 INJ PARAVERT F JNT C/T 1 LEV: CPT | Performed by: PAIN MEDICINE

## 2017-05-23 PROCEDURE — 64492 INJ PARAVERT F JNT C/T 3 LEV: CPT

## 2017-05-23 PROCEDURE — 3209999900 FLUORO FOR SURGICAL PROCEDURES

## 2017-05-23 PROCEDURE — 64491 INJ PARAVERT F JNT C/T 2 LEV: CPT | Performed by: PAIN MEDICINE

## 2017-05-23 PROCEDURE — 64490 INJ PARAVERT F JNT C/T 1 LEV: CPT

## 2017-05-23 PROCEDURE — 64405 NJX AA&/STRD GR OCPL NRV: CPT

## 2017-05-23 PROCEDURE — 64492 INJ PARAVERT F JNT C/T 3 LEV: CPT | Performed by: PAIN MEDICINE

## 2017-05-23 PROCEDURE — 2500000003 HC RX 250 WO HCPCS

## 2017-05-23 PROCEDURE — 64491 INJ PARAVERT F JNT C/T 2 LEV: CPT

## 2017-05-23 PROCEDURE — 6360000004 HC RX CONTRAST MEDICATION

## 2017-05-23 PROCEDURE — 6360000002 HC RX W HCPCS

## 2017-05-23 RX ORDER — FENTANYL CITRATE 50 UG/ML
INJECTION, SOLUTION INTRAMUSCULAR; INTRAVENOUS
Status: COMPLETED | OUTPATIENT
Start: 2017-05-23 | End: 2017-05-23

## 2017-05-23 RX ORDER — MIDAZOLAM HYDROCHLORIDE 1 MG/ML
INJECTION INTRAMUSCULAR; INTRAVENOUS
Status: COMPLETED | OUTPATIENT
Start: 2017-05-23 | End: 2017-05-23

## 2017-05-23 RX ADMIN — FENTANYL CITRATE 50 MCG: 50 INJECTION INTRAMUSCULAR; INTRAVENOUS at 08:32

## 2017-05-23 RX ADMIN — MIDAZOLAM 0.5 MG: 1 INJECTION INTRAMUSCULAR; INTRAVENOUS at 08:49

## 2017-05-23 RX ADMIN — MIDAZOLAM 2 MG: 1 INJECTION INTRAMUSCULAR; INTRAVENOUS at 08:28

## 2017-05-23 ASSESSMENT — PAIN DESCRIPTION - DESCRIPTORS: DESCRIPTORS: CONSTANT;THROBBING;STABBING

## 2017-05-23 ASSESSMENT — PAIN - FUNCTIONAL ASSESSMENT
PAIN_FUNCTIONAL_ASSESSMENT: 0-10

## 2017-06-13 ENCOUNTER — HOSPITAL ENCOUNTER (OUTPATIENT)
Dept: PAIN MANAGEMENT | Age: 47
Discharge: HOME | End: 2017-06-13
Payer: MEDICARE

## 2017-06-13 VITALS
SYSTOLIC BLOOD PRESSURE: 104 MMHG | DIASTOLIC BLOOD PRESSURE: 70 MMHG | HEART RATE: 73 BPM | WEIGHT: 198 LBS | BODY MASS INDEX: 33.8 KG/M2 | TEMPERATURE: 98.5 F | HEIGHT: 64 IN | RESPIRATION RATE: 16 BRPM | OXYGEN SATURATION: 95 %

## 2017-06-13 DIAGNOSIS — S13.4XXS ATLANTO-AXIAL JOINT SPRAIN, SEQUELA: ICD-10-CM

## 2017-06-13 DIAGNOSIS — S13.4XXS SPRAIN OF ATLANTO-OCCIPITAL JOINT, SEQUELA: ICD-10-CM

## 2017-06-13 DIAGNOSIS — G44.209 TRIGGER POINT WITH TENSION HEADACHE: ICD-10-CM

## 2017-06-13 DIAGNOSIS — M54.12 CERVICAL RADICULOPATHY: ICD-10-CM

## 2017-06-13 DIAGNOSIS — Z51.81 ENCOUNTER FOR MEDICATION MONITORING: ICD-10-CM

## 2017-06-13 DIAGNOSIS — M48.02 STENOSIS, CERVICAL SPINE: ICD-10-CM

## 2017-06-13 DIAGNOSIS — M79.18 MYOFACIAL MUSCLE PAIN: ICD-10-CM

## 2017-06-13 DIAGNOSIS — M47.812 OSTEOARTHRITIS OF CERVICAL SPINE, UNSPECIFIED SPINAL OSTEOARTHRITIS COMPLICATION STATUS: Primary | ICD-10-CM

## 2017-06-13 DIAGNOSIS — M47.812 ARTHROPATHY OF CERVICAL FACET JOINT: ICD-10-CM

## 2017-06-13 DIAGNOSIS — M48.02 DEGENERATIVE CERVICAL SPINAL STENOSIS: ICD-10-CM

## 2017-06-13 PROCEDURE — 99213 OFFICE O/P EST LOW 20 MIN: CPT

## 2017-06-13 PROCEDURE — 99213 OFFICE O/P EST LOW 20 MIN: CPT | Performed by: PAIN MEDICINE

## 2017-06-13 RX ORDER — TIZANIDINE 4 MG/1
4 TABLET ORAL EVERY 12 HOURS PRN
Qty: 60 TABLET | Refills: 1 | Status: SHIPPED | OUTPATIENT
Start: 2017-06-13 | End: 2017-09-15 | Stop reason: SDUPTHER

## 2017-06-13 ASSESSMENT — PAIN DESCRIPTION - FREQUENCY: FREQUENCY: INTERMITTENT

## 2017-06-13 ASSESSMENT — PAIN DESCRIPTION - ORIENTATION: ORIENTATION: RIGHT;LEFT;POSTERIOR

## 2017-06-13 ASSESSMENT — PAIN DESCRIPTION - PROGRESSION: CLINICAL_PROGRESSION: GRADUALLY IMPROVING

## 2017-06-13 ASSESSMENT — PAIN DESCRIPTION - PAIN TYPE: TYPE: CHRONIC PAIN

## 2017-06-13 ASSESSMENT — PAIN SCALES - GENERAL: PAINLEVEL_OUTOF10: 0

## 2017-06-13 ASSESSMENT — ENCOUNTER SYMPTOMS
EYES NEGATIVE: 1
HEARTBURN: 1
RESPIRATORY NEGATIVE: 1

## 2017-06-13 ASSESSMENT — PAIN DESCRIPTION - LOCATION: LOCATION: HEAD;NECK;SHOULDER

## 2017-06-13 ASSESSMENT — PAIN DESCRIPTION - ONSET: ONSET: ON-GOING

## 2017-06-30 ENCOUNTER — CARE COORDINATION (OUTPATIENT)
Dept: CARE COORDINATION | Age: 47
End: 2017-06-30

## 2017-07-22 ENCOUNTER — HOSPITAL ENCOUNTER (EMERGENCY)
Age: 47
Discharge: HOME | End: 2017-07-22
Attending: EMERGENCY MEDICINE
Payer: MEDICARE

## 2017-07-22 ENCOUNTER — APPOINTMENT (OUTPATIENT)
Dept: CT IMAGING | Age: 47
End: 2017-07-22
Payer: MEDICARE

## 2017-07-22 VITALS
HEART RATE: 78 BPM | BODY MASS INDEX: 33.63 KG/M2 | RESPIRATION RATE: 16 BRPM | TEMPERATURE: 98.1 F | HEIGHT: 64 IN | OXYGEN SATURATION: 92 % | DIASTOLIC BLOOD PRESSURE: 80 MMHG | SYSTOLIC BLOOD PRESSURE: 117 MMHG | WEIGHT: 197 LBS

## 2017-07-22 DIAGNOSIS — G44.209 TENSION HEADACHE: Primary | ICD-10-CM

## 2017-07-22 DIAGNOSIS — R11.2 NAUSEA AND VOMITING, INTRACTABILITY OF VOMITING NOT SPECIFIED, UNSPECIFIED VOMITING TYPE: ICD-10-CM

## 2017-07-22 DIAGNOSIS — R19.7 DIARRHEA, UNSPECIFIED TYPE: ICD-10-CM

## 2017-07-22 LAB
ABSOLUTE EOS #: 0.2 K/UL (ref 0–0.4)
ABSOLUTE LYMPH #: 2 K/UL (ref 1–4.8)
ABSOLUTE MONO #: 0.5 K/UL (ref 0.1–1.3)
ALBUMIN SERPL-MCNC: 4.3 G/DL (ref 3.5–5.2)
ALBUMIN/GLOBULIN RATIO: ABNORMAL (ref 1–2.5)
ALP BLD-CCNC: 107 U/L (ref 35–104)
ALT SERPL-CCNC: 20 U/L (ref 5–33)
ANION GAP SERPL CALCULATED.3IONS-SCNC: 16 MMOL/L (ref 9–17)
AST SERPL-CCNC: 18 U/L
BASOPHILS # BLD: 1 %
BASOPHILS ABSOLUTE: 0.1 K/UL (ref 0–0.2)
BILIRUB SERPL-MCNC: 0.32 MG/DL (ref 0.3–1.2)
BILIRUBIN DIRECT: 0.09 MG/DL
BILIRUBIN, INDIRECT: 0.23 MG/DL (ref 0–1)
BUN BLDV-MCNC: 15 MG/DL (ref 6–20)
BUN/CREAT BLD: ABNORMAL (ref 9–20)
CALCIUM SERPL-MCNC: 9.1 MG/DL (ref 8.6–10.4)
CHLORIDE BLD-SCNC: 106 MMOL/L (ref 98–107)
CO2: 20 MMOL/L (ref 20–31)
CREAT SERPL-MCNC: 0.66 MG/DL (ref 0.5–0.9)
DIFFERENTIAL TYPE: NORMAL
EOSINOPHILS RELATIVE PERCENT: 2 %
GFR AFRICAN AMERICAN: >60 ML/MIN
GFR NON-AFRICAN AMERICAN: >60 ML/MIN
GFR SERPL CREATININE-BSD FRML MDRD: ABNORMAL ML/MIN/{1.73_M2}
GFR SERPL CREATININE-BSD FRML MDRD: ABNORMAL ML/MIN/{1.73_M2}
GLOBULIN: ABNORMAL G/DL (ref 1.5–3.8)
GLUCOSE BLD-MCNC: 115 MG/DL (ref 70–99)
HCT VFR BLD CALC: 44.6 % (ref 36–46)
HEMOGLOBIN: 15.3 G/DL (ref 12–16)
LIPASE: 28 U/L (ref 13–60)
LYMPHOCYTES # BLD: 26 %
MCH RBC QN AUTO: 31.1 PG (ref 26–34)
MCHC RBC AUTO-ENTMCNC: 34.2 G/DL (ref 31–37)
MCV RBC AUTO: 90.8 FL (ref 80–100)
MONOCYTES # BLD: 7 %
PDW BLD-RTO: 12.9 % (ref 11.5–14.9)
PLATELET # BLD: 170 K/UL (ref 150–450)
PLATELET ESTIMATE: NORMAL
PMV BLD AUTO: 9 FL (ref 6–12)
POTASSIUM SERPL-SCNC: 3.6 MMOL/L (ref 3.7–5.3)
RBC # BLD: 4.91 M/UL (ref 4–5.2)
RBC # BLD: NORMAL 10*6/UL
SEG NEUTROPHILS: 64 %
SEGMENTED NEUTROPHILS ABSOLUTE COUNT: 5 K/UL (ref 1.3–9.1)
SODIUM BLD-SCNC: 142 MMOL/L (ref 135–144)
TOTAL PROTEIN: 7.8 G/DL (ref 6.4–8.3)
WBC # BLD: 7.8 K/UL (ref 3.5–11)
WBC # BLD: NORMAL 10*3/UL

## 2017-07-22 PROCEDURE — 96375 TX/PRO/DX INJ NEW DRUG ADDON: CPT

## 2017-07-22 PROCEDURE — 99284 EMERGENCY DEPT VISIT MOD MDM: CPT

## 2017-07-22 PROCEDURE — 36415 COLL VENOUS BLD VENIPUNCTURE: CPT

## 2017-07-22 PROCEDURE — 85025 COMPLETE CBC W/AUTO DIFF WBC: CPT

## 2017-07-22 PROCEDURE — 83690 ASSAY OF LIPASE: CPT

## 2017-07-22 PROCEDURE — 6360000002 HC RX W HCPCS: Performed by: EMERGENCY MEDICINE

## 2017-07-22 PROCEDURE — 2580000003 HC RX 258: Performed by: EMERGENCY MEDICINE

## 2017-07-22 PROCEDURE — 96372 THER/PROPH/DIAG INJ SC/IM: CPT

## 2017-07-22 PROCEDURE — 80076 HEPATIC FUNCTION PANEL: CPT

## 2017-07-22 PROCEDURE — 70450 CT HEAD/BRAIN W/O DYE: CPT

## 2017-07-22 PROCEDURE — 96374 THER/PROPH/DIAG INJ IV PUSH: CPT

## 2017-07-22 PROCEDURE — 80048 BASIC METABOLIC PNL TOTAL CA: CPT

## 2017-07-22 RX ORDER — 0.9 % SODIUM CHLORIDE 0.9 %
1000 INTRAVENOUS SOLUTION INTRAVENOUS ONCE
Status: COMPLETED | OUTPATIENT
Start: 2017-07-22 | End: 2017-07-22

## 2017-07-22 RX ORDER — ONDANSETRON 2 MG/ML
4 INJECTION INTRAMUSCULAR; INTRAVENOUS ONCE
Status: COMPLETED | OUTPATIENT
Start: 2017-07-22 | End: 2017-07-22

## 2017-07-22 RX ORDER — METOCLOPRAMIDE HYDROCHLORIDE 5 MG/ML
10 INJECTION INTRAMUSCULAR; INTRAVENOUS ONCE
Status: COMPLETED | OUTPATIENT
Start: 2017-07-22 | End: 2017-07-22

## 2017-07-22 RX ORDER — ORPHENADRINE CITRATE 30 MG/ML
60 INJECTION INTRAMUSCULAR; INTRAVENOUS ONCE
Status: COMPLETED | OUTPATIENT
Start: 2017-07-22 | End: 2017-07-22

## 2017-07-22 RX ORDER — DIPHENHYDRAMINE HYDROCHLORIDE 50 MG/ML
50 INJECTION INTRAMUSCULAR; INTRAVENOUS ONCE
Status: COMPLETED | OUTPATIENT
Start: 2017-07-22 | End: 2017-07-22

## 2017-07-22 RX ADMIN — HYDROMORPHONE HYDROCHLORIDE 1 MG: 1 INJECTION, SOLUTION INTRAMUSCULAR; INTRAVENOUS; SUBCUTANEOUS at 03:38

## 2017-07-22 RX ADMIN — METOCLOPRAMIDE 10 MG: 5 INJECTION, SOLUTION INTRAMUSCULAR; INTRAVENOUS at 05:10

## 2017-07-22 RX ADMIN — DIPHENHYDRAMINE HYDROCHLORIDE 50 MG: 50 INJECTION, SOLUTION INTRAMUSCULAR; INTRAVENOUS at 05:10

## 2017-07-22 RX ADMIN — ORPHENADRINE CITRATE 60 MG: 30 INJECTION INTRAMUSCULAR; INTRAVENOUS at 03:31

## 2017-07-22 RX ADMIN — SODIUM CHLORIDE 1000 ML: 9 INJECTION, SOLUTION INTRAVENOUS at 03:31

## 2017-07-22 RX ADMIN — ONDANSETRON 4 MG: 2 INJECTION INTRAMUSCULAR; INTRAVENOUS at 04:06

## 2017-07-22 ASSESSMENT — PAIN SCALES - GENERAL
PAINLEVEL_OUTOF10: 7
PAINLEVEL_OUTOF10: 8
PAINLEVEL_OUTOF10: 8

## 2017-07-22 ASSESSMENT — PAIN DESCRIPTION - PROGRESSION: CLINICAL_PROGRESSION: GRADUALLY IMPROVING

## 2017-07-22 ASSESSMENT — ENCOUNTER SYMPTOMS
COUGH: 0
DIARRHEA: 0
VOMITING: 0
NAUSEA: 0
RHINORRHEA: 0
EYE DISCHARGE: 0
EYE REDNESS: 0
SORE THROAT: 0
COLOR CHANGE: 0
SHORTNESS OF BREATH: 0

## 2017-07-22 ASSESSMENT — PAIN DESCRIPTION - DESCRIPTORS: DESCRIPTORS: THROBBING

## 2017-07-22 ASSESSMENT — PAIN DESCRIPTION - LOCATION: LOCATION: HEAD;BACK;NECK

## 2017-07-22 ASSESSMENT — PAIN DESCRIPTION - FREQUENCY: FREQUENCY: CONTINUOUS

## 2017-07-24 ENCOUNTER — OFFICE VISIT (OUTPATIENT)
Dept: INTERNAL MEDICINE CLINIC | Age: 47
End: 2017-07-24
Payer: MEDICARE

## 2017-07-24 ENCOUNTER — CARE COORDINATION (OUTPATIENT)
Dept: INTERNAL MEDICINE CLINIC | Age: 47
End: 2017-07-24

## 2017-07-24 VITALS
WEIGHT: 199 LBS | BODY MASS INDEX: 33.97 KG/M2 | HEIGHT: 64 IN | SYSTOLIC BLOOD PRESSURE: 130 MMHG | DIASTOLIC BLOOD PRESSURE: 80 MMHG | HEART RATE: 86 BPM

## 2017-07-24 DIAGNOSIS — G44.209 TENSION-TYPE HEADACHE, NOT INTRACTABLE, UNSPECIFIED CHRONICITY PATTERN: Primary | ICD-10-CM

## 2017-07-24 DIAGNOSIS — N39.0 URINARY TRACT INFECTION WITHOUT HEMATURIA, SITE UNSPECIFIED: ICD-10-CM

## 2017-07-24 PROCEDURE — 99213 OFFICE O/P EST LOW 20 MIN: CPT | Performed by: INTERNAL MEDICINE

## 2017-07-24 RX ORDER — TOPIRAMATE 25 MG/1
TABLET ORAL
Refills: 0 | COMMUNITY
Start: 2017-06-15 | End: 2017-08-29

## 2017-07-24 RX ORDER — AMITRIPTYLINE HYDROCHLORIDE 25 MG/1
25 TABLET, FILM COATED ORAL NIGHTLY
Qty: 30 TABLET | Refills: 3 | Status: SHIPPED | OUTPATIENT
Start: 2017-07-24 | End: 2017-07-25

## 2017-07-26 ENCOUNTER — HOSPITAL ENCOUNTER (OUTPATIENT)
Dept: PAIN MANAGEMENT | Age: 47
Discharge: HOME | End: 2017-07-26
Payer: MEDICARE

## 2017-07-26 VITALS
RESPIRATION RATE: 16 BRPM | HEART RATE: 73 BPM | WEIGHT: 197 LBS | SYSTOLIC BLOOD PRESSURE: 133 MMHG | DIASTOLIC BLOOD PRESSURE: 76 MMHG | TEMPERATURE: 98 F | HEIGHT: 64 IN | BODY MASS INDEX: 33.63 KG/M2 | OXYGEN SATURATION: 96 %

## 2017-07-26 DIAGNOSIS — M47.812 OSTEOARTHRITIS OF CERVICAL SPINE, UNSPECIFIED SPINAL OSTEOARTHRITIS COMPLICATION STATUS: Primary | ICD-10-CM

## 2017-07-26 DIAGNOSIS — S13.4XXS ATLANTO-AXIAL JOINT SPRAIN, SEQUELA: ICD-10-CM

## 2017-07-26 DIAGNOSIS — M54.12 CERVICAL RADICULOPATHY: ICD-10-CM

## 2017-07-26 DIAGNOSIS — Z51.81 ENCOUNTER FOR MEDICATION MONITORING: ICD-10-CM

## 2017-07-26 DIAGNOSIS — G44.209 TRIGGER POINT WITH TENSION HEADACHE: ICD-10-CM

## 2017-07-26 DIAGNOSIS — M48.02 STENOSIS, CERVICAL SPINE: ICD-10-CM

## 2017-07-26 DIAGNOSIS — S13.4XXS SPRAIN OF ATLANTO-OCCIPITAL JOINT, SEQUELA: ICD-10-CM

## 2017-07-26 DIAGNOSIS — M47.812 ARTHROPATHY OF CERVICAL FACET JOINT: ICD-10-CM

## 2017-07-26 DIAGNOSIS — M48.02 DEGENERATIVE CERVICAL SPINAL STENOSIS: ICD-10-CM

## 2017-07-26 DIAGNOSIS — M79.18 MYOFACIAL MUSCLE PAIN: ICD-10-CM

## 2017-07-26 PROCEDURE — 99213 OFFICE O/P EST LOW 20 MIN: CPT

## 2017-07-26 PROCEDURE — 99213 OFFICE O/P EST LOW 20 MIN: CPT | Performed by: NURSE PRACTITIONER

## 2017-07-26 PROCEDURE — G0463 HOSPITAL OUTPT CLINIC VISIT: HCPCS

## 2017-07-26 ASSESSMENT — ENCOUNTER SYMPTOMS
CHOKING: 0
SHORTNESS OF BREATH: 0
CHEST TIGHTNESS: 0
DIARRHEA: 0
BLOOD IN STOOL: 0
COLOR CHANGE: 0
NAUSEA: 1
ABDOMINAL DISTENTION: 0
EYE ITCHING: 0
EYE PAIN: 0
EYES NEGATIVE: 1
ABDOMINAL PAIN: 0
COUGH: 0
EYE REDNESS: 0
CONSTIPATION: 0
APNEA: 0
BACK PAIN: 0
EYE DISCHARGE: 0

## 2017-08-03 ENCOUNTER — HOSPITAL ENCOUNTER (EMERGENCY)
Age: 47
Discharge: HOME | End: 2017-08-04
Attending: EMERGENCY MEDICINE
Payer: MEDICARE

## 2017-08-03 ENCOUNTER — APPOINTMENT (OUTPATIENT)
Dept: GENERAL RADIOLOGY | Age: 47
End: 2017-08-03
Payer: MEDICARE

## 2017-08-03 ENCOUNTER — APPOINTMENT (OUTPATIENT)
Dept: CT IMAGING | Age: 47
End: 2017-08-03
Payer: MEDICARE

## 2017-08-03 DIAGNOSIS — R07.9 CHEST PAIN, UNSPECIFIED TYPE: ICD-10-CM

## 2017-08-03 DIAGNOSIS — R10.84 GENERALIZED ABDOMINAL PAIN: Primary | ICD-10-CM

## 2017-08-03 LAB
-: ABNORMAL
ABSOLUTE EOS #: 0.2 K/UL (ref 0–0.4)
ABSOLUTE LYMPH #: 2.4 K/UL (ref 1–4.8)
ABSOLUTE MONO #: 0.4 K/UL (ref 0.1–1.3)
ALBUMIN SERPL-MCNC: 4 G/DL (ref 3.5–5.2)
ALBUMIN/GLOBULIN RATIO: ABNORMAL (ref 1–2.5)
ALP BLD-CCNC: 91 U/L (ref 35–104)
ALT SERPL-CCNC: 11 U/L (ref 5–33)
AMORPHOUS: ABNORMAL
ANION GAP SERPL CALCULATED.3IONS-SCNC: 13 MMOL/L (ref 9–17)
AST SERPL-CCNC: 16 U/L
BACTERIA: ABNORMAL
BASOPHILS # BLD: 1 %
BASOPHILS ABSOLUTE: 0 K/UL (ref 0–0.2)
BILIRUB SERPL-MCNC: 0.26 MG/DL (ref 0.3–1.2)
BILIRUBIN DIRECT: <0.08 MG/DL
BILIRUBIN URINE: NEGATIVE
BILIRUBIN, INDIRECT: ABNORMAL MG/DL (ref 0–1)
BUN BLDV-MCNC: 19 MG/DL (ref 6–20)
BUN/CREAT BLD: ABNORMAL (ref 9–20)
CALCIUM SERPL-MCNC: 9.1 MG/DL (ref 8.6–10.4)
CASTS UA: ABNORMAL /LPF
CHLORIDE BLD-SCNC: 104 MMOL/L (ref 98–107)
CO2: 22 MMOL/L (ref 20–31)
COLOR: YELLOW
COMMENT UA: ABNORMAL
CREAT SERPL-MCNC: 0.64 MG/DL (ref 0.5–0.9)
CRYSTALS, UA: ABNORMAL /HPF
DIFFERENTIAL TYPE: NORMAL
EOSINOPHILS RELATIVE PERCENT: 3 %
EPITHELIAL CELLS UA: ABNORMAL /HPF
GFR AFRICAN AMERICAN: >60 ML/MIN
GFR NON-AFRICAN AMERICAN: >60 ML/MIN
GFR SERPL CREATININE-BSD FRML MDRD: ABNORMAL ML/MIN/{1.73_M2}
GFR SERPL CREATININE-BSD FRML MDRD: ABNORMAL ML/MIN/{1.73_M2}
GLOBULIN: ABNORMAL G/DL (ref 1.5–3.8)
GLUCOSE BLD-MCNC: 100 MG/DL (ref 70–99)
GLUCOSE URINE: NEGATIVE
HCT VFR BLD CALC: 39.5 % (ref 36–46)
HEMOGLOBIN: 13.5 G/DL (ref 12–16)
KETONES, URINE: NEGATIVE
LACTIC ACID: 0.6 MMOL/L (ref 0.5–2.2)
LEUKOCYTE ESTERASE, URINE: NEGATIVE
LIPASE: 47 U/L (ref 13–60)
LYMPHOCYTES # BLD: 36 %
MCH RBC QN AUTO: 31 PG (ref 26–34)
MCHC RBC AUTO-ENTMCNC: 34.1 G/DL (ref 31–37)
MCV RBC AUTO: 90.8 FL (ref 80–100)
MONOCYTES # BLD: 6 %
MUCUS: ABNORMAL
NITRITE, URINE: NEGATIVE
OTHER OBSERVATIONS UA: ABNORMAL
PDW BLD-RTO: 13.1 % (ref 11.5–14.9)
PH UA: 5.5 (ref 5–8)
PLATELET # BLD: 177 K/UL (ref 150–450)
PLATELET ESTIMATE: NORMAL
PMV BLD AUTO: 9 FL (ref 6–12)
POTASSIUM SERPL-SCNC: 4 MMOL/L (ref 3.7–5.3)
PROTEIN UA: NEGATIVE
RBC # BLD: 4.35 M/UL (ref 4–5.2)
RBC # BLD: NORMAL 10*6/UL
RBC UA: ABNORMAL /HPF
RENAL EPITHELIAL, UA: ABNORMAL /HPF
SEG NEUTROPHILS: 54 %
SEGMENTED NEUTROPHILS ABSOLUTE COUNT: 3.6 K/UL (ref 1.3–9.1)
SODIUM BLD-SCNC: 139 MMOL/L (ref 135–144)
SPECIFIC GRAVITY UA: 1.03 (ref 1–1.03)
TOTAL PROTEIN: 7.1 G/DL (ref 6.4–8.3)
TRICHOMONAS: ABNORMAL
TROPONIN INTERP: NORMAL
TROPONIN T: <0.03 NG/ML
TURBIDITY: CLEAR
URINE HGB: ABNORMAL
UROBILINOGEN, URINE: NORMAL
WBC # BLD: 6.7 K/UL (ref 3.5–11)
WBC # BLD: NORMAL 10*3/UL
WBC UA: ABNORMAL /HPF
YEAST: ABNORMAL

## 2017-08-03 PROCEDURE — 96374 THER/PROPH/DIAG INJ IV PUSH: CPT

## 2017-08-03 PROCEDURE — 83690 ASSAY OF LIPASE: CPT

## 2017-08-03 PROCEDURE — 96375 TX/PRO/DX INJ NEW DRUG ADDON: CPT

## 2017-08-03 PROCEDURE — 80076 HEPATIC FUNCTION PANEL: CPT

## 2017-08-03 PROCEDURE — 84484 ASSAY OF TROPONIN QUANT: CPT

## 2017-08-03 PROCEDURE — 93005 ELECTROCARDIOGRAM TRACING: CPT

## 2017-08-03 PROCEDURE — 87086 URINE CULTURE/COLONY COUNT: CPT

## 2017-08-03 PROCEDURE — 2580000003 HC RX 258: Performed by: EMERGENCY MEDICINE

## 2017-08-03 PROCEDURE — 83605 ASSAY OF LACTIC ACID: CPT

## 2017-08-03 PROCEDURE — 71020 XR CHEST STANDARD TWO VW: CPT

## 2017-08-03 PROCEDURE — 85025 COMPLETE CBC W/AUTO DIFF WBC: CPT

## 2017-08-03 PROCEDURE — 6360000002 HC RX W HCPCS: Performed by: EMERGENCY MEDICINE

## 2017-08-03 PROCEDURE — 36415 COLL VENOUS BLD VENIPUNCTURE: CPT

## 2017-08-03 PROCEDURE — 99285 EMERGENCY DEPT VISIT HI MDM: CPT

## 2017-08-03 PROCEDURE — 80048 BASIC METABOLIC PNL TOTAL CA: CPT

## 2017-08-03 PROCEDURE — 81001 URINALYSIS AUTO W/SCOPE: CPT

## 2017-08-03 RX ORDER — 0.9 % SODIUM CHLORIDE 0.9 %
1000 INTRAVENOUS SOLUTION INTRAVENOUS ONCE
Status: COMPLETED | OUTPATIENT
Start: 2017-08-03 | End: 2017-08-04

## 2017-08-03 RX ORDER — SODIUM CHLORIDE 0.9 % (FLUSH) 0.9 %
10 SYRINGE (ML) INJECTION PRN
Status: DISCONTINUED | OUTPATIENT
Start: 2017-08-03 | End: 2017-08-04 | Stop reason: HOSPADM

## 2017-08-03 RX ORDER — FENTANYL CITRATE 50 UG/ML
50 INJECTION, SOLUTION INTRAMUSCULAR; INTRAVENOUS ONCE
Status: COMPLETED | OUTPATIENT
Start: 2017-08-03 | End: 2017-08-03

## 2017-08-03 RX ORDER — ONDANSETRON 2 MG/ML
4 INJECTION INTRAMUSCULAR; INTRAVENOUS ONCE
Status: COMPLETED | OUTPATIENT
Start: 2017-08-03 | End: 2017-08-03

## 2017-08-03 RX ORDER — 0.9 % SODIUM CHLORIDE 0.9 %
100 INTRAVENOUS SOLUTION INTRAVENOUS ONCE
Status: COMPLETED | OUTPATIENT
Start: 2017-08-03 | End: 2017-08-04

## 2017-08-03 RX ADMIN — ONDANSETRON 4 MG: 2 INJECTION INTRAMUSCULAR; INTRAVENOUS at 22:07

## 2017-08-03 RX ADMIN — FENTANYL CITRATE 50 MCG: 50 INJECTION INTRAMUSCULAR; INTRAVENOUS at 22:15

## 2017-08-03 RX ADMIN — SODIUM CHLORIDE 1000 ML: 9 INJECTION, SOLUTION INTRAVENOUS at 22:07

## 2017-08-03 ASSESSMENT — PAIN DESCRIPTION - ORIENTATION: ORIENTATION: LEFT;RIGHT

## 2017-08-03 ASSESSMENT — PAIN SCALES - GENERAL
PAINLEVEL_OUTOF10: 7
PAINLEVEL_OUTOF10: 8

## 2017-08-03 ASSESSMENT — PAIN DESCRIPTION - DESCRIPTORS: DESCRIPTORS: STABBING

## 2017-08-03 ASSESSMENT — PAIN DESCRIPTION - LOCATION: LOCATION: ABDOMEN

## 2017-08-03 ASSESSMENT — PAIN DESCRIPTION - FREQUENCY: FREQUENCY: CONTINUOUS

## 2017-08-04 ENCOUNTER — APPOINTMENT (OUTPATIENT)
Dept: CT IMAGING | Age: 47
End: 2017-08-04
Payer: MEDICARE

## 2017-08-04 VITALS
OXYGEN SATURATION: 95 % | HEART RATE: 71 BPM | TEMPERATURE: 98.1 F | WEIGHT: 197 LBS | SYSTOLIC BLOOD PRESSURE: 123 MMHG | RESPIRATION RATE: 18 BRPM | BODY MASS INDEX: 33.81 KG/M2 | DIASTOLIC BLOOD PRESSURE: 82 MMHG

## 2017-08-04 LAB
CULTURE: NORMAL
CULTURE: NORMAL
Lab: NORMAL
SPECIMEN DESCRIPTION: NORMAL
SPECIMEN DESCRIPTION: NORMAL
STATUS: NORMAL
TROPONIN INTERP: NORMAL
TROPONIN T: <0.03 NG/ML

## 2017-08-04 PROCEDURE — 6360000004 HC RX CONTRAST MEDICATION: Performed by: EMERGENCY MEDICINE

## 2017-08-04 PROCEDURE — 96376 TX/PRO/DX INJ SAME DRUG ADON: CPT

## 2017-08-04 PROCEDURE — 2580000003 HC RX 258: Performed by: EMERGENCY MEDICINE

## 2017-08-04 PROCEDURE — 74177 CT ABD & PELVIS W/CONTRAST: CPT

## 2017-08-04 PROCEDURE — 6360000002 HC RX W HCPCS: Performed by: EMERGENCY MEDICINE

## 2017-08-04 RX ORDER — ONDANSETRON 2 MG/ML
4 INJECTION INTRAMUSCULAR; INTRAVENOUS ONCE
Status: COMPLETED | OUTPATIENT
Start: 2017-08-04 | End: 2017-08-04

## 2017-08-04 RX ORDER — FENTANYL CITRATE 50 UG/ML
50 INJECTION, SOLUTION INTRAMUSCULAR; INTRAVENOUS ONCE
Status: COMPLETED | OUTPATIENT
Start: 2017-08-04 | End: 2017-08-04

## 2017-08-04 RX ADMIN — FENTANYL CITRATE 50 MCG: 50 INJECTION INTRAMUSCULAR; INTRAVENOUS at 00:31

## 2017-08-04 RX ADMIN — SODIUM CHLORIDE 100 ML: 9 INJECTION, SOLUTION INTRAVENOUS at 01:00

## 2017-08-04 RX ADMIN — ONDANSETRON 4 MG: 2 INJECTION INTRAMUSCULAR; INTRAVENOUS at 00:31

## 2017-08-04 RX ADMIN — IOVERSOL 130 ML: 741 INJECTION INTRA-ARTERIAL; INTRAVENOUS at 01:00

## 2017-08-04 RX ADMIN — Medication 10 ML: at 01:00

## 2017-08-04 RX ADMIN — IOHEXOL 50 ML: 240 INJECTION, SOLUTION INTRATHECAL; INTRAVASCULAR; INTRAVENOUS; ORAL at 01:00

## 2017-08-04 ASSESSMENT — PAIN SCALES - GENERAL
PAINLEVEL_OUTOF10: 8
PAINLEVEL_OUTOF10: 2

## 2017-08-04 ASSESSMENT — PAIN DESCRIPTION - PROGRESSION: CLINICAL_PROGRESSION: RAPIDLY IMPROVING

## 2017-08-04 ASSESSMENT — PAIN DESCRIPTION - LOCATION: LOCATION: ABDOMEN

## 2017-08-05 ASSESSMENT — ENCOUNTER SYMPTOMS
BACK PAIN: 0
SHORTNESS OF BREATH: 1
DIARRHEA: 1
ABDOMINAL PAIN: 1
RHINORRHEA: 0
NAUSEA: 1
BLOOD IN STOOL: 0
CHEST TIGHTNESS: 0
VOMITING: 0
CONSTIPATION: 0
SORE THROAT: 0

## 2017-08-07 ENCOUNTER — CARE COORDINATION (OUTPATIENT)
Dept: INTERNAL MEDICINE CLINIC | Age: 47
End: 2017-08-07

## 2017-08-07 ENCOUNTER — CARE COORDINATION (OUTPATIENT)
Dept: CARE COORDINATION | Age: 47
End: 2017-08-07

## 2017-08-07 RX ORDER — ROPINIROLE 1 MG/1
TABLET, FILM COATED ORAL
Qty: 30 TABLET | Refills: 6 | Status: SHIPPED | OUTPATIENT
Start: 2017-08-07 | End: 2018-04-15 | Stop reason: SDUPTHER

## 2017-08-08 ENCOUNTER — TELEPHONE (OUTPATIENT)
Dept: INTERNAL MEDICINE CLINIC | Age: 47
End: 2017-08-08

## 2017-08-09 DIAGNOSIS — R10.84 GENERALIZED ABDOMINAL PAIN: ICD-10-CM

## 2017-08-09 DIAGNOSIS — K92.1 BLOOD IN STOOL: Primary | ICD-10-CM

## 2017-08-14 ENCOUNTER — HOSPITAL ENCOUNTER (OUTPATIENT)
Dept: PAIN MANAGEMENT | Age: 47
Discharge: HOME | End: 2017-08-14
Payer: MEDICARE

## 2017-08-14 ENCOUNTER — HOSPITAL ENCOUNTER (OUTPATIENT)
Dept: GENERAL RADIOLOGY | Age: 47
Discharge: HOME | End: 2017-08-14
Payer: MEDICARE

## 2017-08-14 VITALS
TEMPERATURE: 98.7 F | RESPIRATION RATE: 18 BRPM | HEIGHT: 64 IN | DIASTOLIC BLOOD PRESSURE: 77 MMHG | HEART RATE: 77 BPM | SYSTOLIC BLOOD PRESSURE: 118 MMHG | OXYGEN SATURATION: 98 % | BODY MASS INDEX: 33.63 KG/M2 | WEIGHT: 197 LBS

## 2017-08-14 DIAGNOSIS — S13.4XXS ATLANTO-AXIAL JOINT SPRAIN, SEQUELA: ICD-10-CM

## 2017-08-14 DIAGNOSIS — S13.4XXS SPRAIN OF ATLANTO-OCCIPITAL JOINT, SEQUELA: ICD-10-CM

## 2017-08-14 DIAGNOSIS — M47.812 ARTHROPATHY OF CERVICAL FACET JOINT: ICD-10-CM

## 2017-08-14 DIAGNOSIS — R52 PAIN: ICD-10-CM

## 2017-08-14 DIAGNOSIS — M47.812 OSTEOARTHRITIS OF CERVICAL SPINE, UNSPECIFIED SPINAL OSTEOARTHRITIS COMPLICATION STATUS: Primary | ICD-10-CM

## 2017-08-14 PROCEDURE — 64490 INJ PARAVERT F JNT C/T 1 LEV: CPT | Performed by: PAIN MEDICINE

## 2017-08-14 PROCEDURE — 64491 INJ PARAVERT F JNT C/T 2 LEV: CPT | Performed by: PAIN MEDICINE

## 2017-08-14 PROCEDURE — 6360000002 HC RX W HCPCS

## 2017-08-14 PROCEDURE — 6360000004 HC RX CONTRAST MEDICATION

## 2017-08-14 PROCEDURE — 64492 INJ PARAVERT F JNT C/T 3 LEV: CPT

## 2017-08-14 PROCEDURE — 3209999900 FLUORO FOR SURGICAL PROCEDURES

## 2017-08-14 PROCEDURE — 6360000002 HC RX W HCPCS: Performed by: PAIN MEDICINE

## 2017-08-14 PROCEDURE — 64490 INJ PARAVERT F JNT C/T 1 LEV: CPT

## 2017-08-14 PROCEDURE — 64491 INJ PARAVERT F JNT C/T 2 LEV: CPT

## 2017-08-14 PROCEDURE — 2500000003 HC RX 250 WO HCPCS

## 2017-08-14 PROCEDURE — 64492 INJ PARAVERT F JNT C/T 3 LEV: CPT | Performed by: PAIN MEDICINE

## 2017-08-14 RX ORDER — MIDAZOLAM HYDROCHLORIDE 1 MG/ML
INJECTION INTRAMUSCULAR; INTRAVENOUS
Status: COMPLETED | OUTPATIENT
Start: 2017-08-14 | End: 2017-08-14

## 2017-08-14 RX ADMIN — MIDAZOLAM 2 MG: 1 INJECTION INTRAMUSCULAR; INTRAVENOUS at 08:34

## 2017-08-14 ASSESSMENT — PAIN - FUNCTIONAL ASSESSMENT
PAIN_FUNCTIONAL_ASSESSMENT: 0-10

## 2017-08-14 ASSESSMENT — PAIN DESCRIPTION - DESCRIPTORS: DESCRIPTORS: ACHING;CONSTANT;DULL;JABBING;NAGGING

## 2017-08-16 ENCOUNTER — TELEPHONE (OUTPATIENT)
Dept: PAIN MANAGEMENT | Age: 47
End: 2017-08-16

## 2017-08-23 LAB
EKG ATRIAL RATE: 76 BPM
EKG P AXIS: 70 DEGREES
EKG P-R INTERVAL: 168 MS
EKG Q-T INTERVAL: 412 MS
EKG QRS DURATION: 82 MS
EKG QTC CALCULATION (BAZETT): 463 MS
EKG R AXIS: 65 DEGREES
EKG T AXIS: 68 DEGREES
EKG VENTRICULAR RATE: 76 BPM

## 2017-08-26 ENCOUNTER — HOSPITAL ENCOUNTER (EMERGENCY)
Age: 47
Discharge: HOME | End: 2017-08-26
Attending: EMERGENCY MEDICINE
Payer: MEDICARE

## 2017-08-26 VITALS
HEART RATE: 69 BPM | TEMPERATURE: 97.7 F | HEIGHT: 64 IN | OXYGEN SATURATION: 96 % | WEIGHT: 197 LBS | BODY MASS INDEX: 33.63 KG/M2 | SYSTOLIC BLOOD PRESSURE: 118 MMHG | DIASTOLIC BLOOD PRESSURE: 65 MMHG | RESPIRATION RATE: 16 BRPM

## 2017-08-26 VITALS
HEART RATE: 86 BPM | TEMPERATURE: 98 F | OXYGEN SATURATION: 93 % | HEIGHT: 64 IN | RESPIRATION RATE: 16 BRPM | BODY MASS INDEX: 33.63 KG/M2 | SYSTOLIC BLOOD PRESSURE: 90 MMHG | DIASTOLIC BLOOD PRESSURE: 64 MMHG | WEIGHT: 197 LBS

## 2017-08-26 DIAGNOSIS — M54.2 NECK PAIN: Primary | ICD-10-CM

## 2017-08-26 DIAGNOSIS — G44.209 ACUTE NON INTRACTABLE TENSION-TYPE HEADACHE: Primary | ICD-10-CM

## 2017-08-26 DIAGNOSIS — R51.9 NONINTRACTABLE HEADACHE, UNSPECIFIED CHRONICITY PATTERN, UNSPECIFIED HEADACHE TYPE: ICD-10-CM

## 2017-08-26 PROCEDURE — 99284 EMERGENCY DEPT VISIT MOD MDM: CPT

## 2017-08-26 PROCEDURE — 2580000003 HC RX 258: Performed by: EMERGENCY MEDICINE

## 2017-08-26 PROCEDURE — 96374 THER/PROPH/DIAG INJ IV PUSH: CPT

## 2017-08-26 PROCEDURE — 99283 EMERGENCY DEPT VISIT LOW MDM: CPT

## 2017-08-26 PROCEDURE — 96375 TX/PRO/DX INJ NEW DRUG ADDON: CPT

## 2017-08-26 PROCEDURE — 6360000002 HC RX W HCPCS: Performed by: EMERGENCY MEDICINE

## 2017-08-26 RX ORDER — KETOROLAC TROMETHAMINE 30 MG/ML
30 INJECTION, SOLUTION INTRAMUSCULAR; INTRAVENOUS ONCE
Status: COMPLETED | OUTPATIENT
Start: 2017-08-26 | End: 2017-08-26

## 2017-08-26 RX ORDER — 0.9 % SODIUM CHLORIDE 0.9 %
1000 INTRAVENOUS SOLUTION INTRAVENOUS ONCE
Status: COMPLETED | OUTPATIENT
Start: 2017-08-26 | End: 2017-08-26

## 2017-08-26 RX ORDER — DIPHENHYDRAMINE HYDROCHLORIDE 50 MG/ML
25 INJECTION INTRAMUSCULAR; INTRAVENOUS ONCE
Status: COMPLETED | OUTPATIENT
Start: 2017-08-26 | End: 2017-08-26

## 2017-08-26 RX ORDER — METOCLOPRAMIDE HYDROCHLORIDE 5 MG/ML
10 INJECTION INTRAMUSCULAR; INTRAVENOUS ONCE
Status: COMPLETED | OUTPATIENT
Start: 2017-08-26 | End: 2017-08-26

## 2017-08-26 RX ORDER — ONDANSETRON 2 MG/ML
4 INJECTION INTRAMUSCULAR; INTRAVENOUS ONCE
Status: COMPLETED | OUTPATIENT
Start: 2017-08-26 | End: 2017-08-26

## 2017-08-26 RX ORDER — DEXAMETHASONE SODIUM PHOSPHATE 4 MG/ML
10 INJECTION, SOLUTION INTRA-ARTICULAR; INTRALESIONAL; INTRAMUSCULAR; INTRAVENOUS; SOFT TISSUE ONCE
Status: COMPLETED | OUTPATIENT
Start: 2017-08-26 | End: 2017-08-26

## 2017-08-26 RX ADMIN — HYDROMORPHONE HYDROCHLORIDE 1 MG: 1 INJECTION, SOLUTION INTRAMUSCULAR; INTRAVENOUS; SUBCUTANEOUS at 22:20

## 2017-08-26 RX ADMIN — SODIUM CHLORIDE 1000 ML: 9 INJECTION, SOLUTION INTRAVENOUS at 01:04

## 2017-08-26 RX ADMIN — ONDANSETRON 4 MG: 2 INJECTION INTRAMUSCULAR; INTRAVENOUS at 01:12

## 2017-08-26 RX ADMIN — DEXAMETHASONE SODIUM PHOSPHATE 10 MG: 4 INJECTION, SOLUTION INTRAMUSCULAR; INTRAVENOUS at 20:23

## 2017-08-26 RX ADMIN — METOCLOPRAMIDE 10 MG: 5 INJECTION, SOLUTION INTRAMUSCULAR; INTRAVENOUS at 20:23

## 2017-08-26 RX ADMIN — KETOROLAC TROMETHAMINE 30 MG: 30 INJECTION, SOLUTION INTRAMUSCULAR at 20:23

## 2017-08-26 RX ADMIN — SODIUM CHLORIDE 1000 ML: 9 INJECTION, SOLUTION INTRAVENOUS at 20:29

## 2017-08-26 RX ADMIN — DIPHENHYDRAMINE HYDROCHLORIDE 25 MG: 50 INJECTION, SOLUTION INTRAMUSCULAR; INTRAVENOUS at 20:24

## 2017-08-26 RX ADMIN — HYDROMORPHONE HYDROCHLORIDE 1 MG: 1 INJECTION, SOLUTION INTRAMUSCULAR; INTRAVENOUS; SUBCUTANEOUS at 02:29

## 2017-08-26 RX ADMIN — KETOROLAC TROMETHAMINE 30 MG: 30 INJECTION, SOLUTION INTRAMUSCULAR at 01:12

## 2017-08-26 ASSESSMENT — ENCOUNTER SYMPTOMS
EYE REDNESS: 0
NAUSEA: 0
RHINORRHEA: 0
VOMITING: 1
FACIAL SWELLING: 0
EYE DISCHARGE: 0
COLOR CHANGE: 0
EYE PAIN: 0
VOMITING: 0
WHEEZING: 0
ABDOMINAL PAIN: 0
SHORTNESS OF BREATH: 0
TROUBLE SWALLOWING: 0
SINUS PRESSURE: 0
SORE THROAT: 0
COUGH: 0
BLOOD IN STOOL: 0
EYE PAIN: 0
SORE THROAT: 0
DIARRHEA: 0
SHORTNESS OF BREATH: 0
ABDOMINAL PAIN: 0
CONSTIPATION: 0
COUGH: 0
NAUSEA: 1
CHEST TIGHTNESS: 0
EYE DISCHARGE: 0
DIARRHEA: 0
BACK PAIN: 0

## 2017-08-26 ASSESSMENT — PAIN SCALES - GENERAL
PAINLEVEL_OUTOF10: 6
PAINLEVEL_OUTOF10: 8
PAINLEVEL_OUTOF10: 10
PAINLEVEL_OUTOF10: 10
PAINLEVEL_OUTOF10: 6
PAINLEVEL_OUTOF10: 9
PAINLEVEL_OUTOF10: 10
PAINLEVEL_OUTOF10: 10

## 2017-08-26 ASSESSMENT — PAIN DESCRIPTION - DESCRIPTORS
DESCRIPTORS: ACHING

## 2017-08-26 ASSESSMENT — PAIN DESCRIPTION - LOCATION
LOCATION: HEAD;NECK
LOCATION: HEAD
LOCATION: HEAD;NECK

## 2017-08-26 ASSESSMENT — PAIN DESCRIPTION - FREQUENCY
FREQUENCY: CONTINUOUS

## 2017-08-28 ENCOUNTER — CARE COORDINATION (OUTPATIENT)
Dept: INTERNAL MEDICINE CLINIC | Age: 47
End: 2017-08-28

## 2017-08-29 ENCOUNTER — CARE COORDINATION (OUTPATIENT)
Dept: CARE COORDINATION | Age: 47
End: 2017-08-29

## 2017-08-29 ENCOUNTER — HOSPITAL ENCOUNTER (OUTPATIENT)
Dept: PAIN MANAGEMENT | Age: 47
Discharge: HOME | End: 2017-08-29
Payer: MEDICARE

## 2017-08-29 VITALS
SYSTOLIC BLOOD PRESSURE: 130 MMHG | BODY MASS INDEX: 33.63 KG/M2 | HEART RATE: 68 BPM | HEIGHT: 64 IN | RESPIRATION RATE: 16 BRPM | WEIGHT: 197 LBS | DIASTOLIC BLOOD PRESSURE: 77 MMHG | TEMPERATURE: 98.9 F

## 2017-08-29 DIAGNOSIS — S13.4XXS SPRAIN OF ATLANTO-OCCIPITAL JOINT, SEQUELA: ICD-10-CM

## 2017-08-29 DIAGNOSIS — Z51.81 ENCOUNTER FOR MEDICATION MONITORING: ICD-10-CM

## 2017-08-29 DIAGNOSIS — M54.12 CERVICAL RADICULOPATHY: ICD-10-CM

## 2017-08-29 DIAGNOSIS — S13.4XXS ATLANTO-AXIAL JOINT SPRAIN, SEQUELA: ICD-10-CM

## 2017-08-29 DIAGNOSIS — M48.02 DEGENERATIVE CERVICAL SPINAL STENOSIS: ICD-10-CM

## 2017-08-29 DIAGNOSIS — M47.812 OSTEOARTHRITIS OF CERVICAL SPINE, UNSPECIFIED SPINAL OSTEOARTHRITIS COMPLICATION STATUS: Primary | ICD-10-CM

## 2017-08-29 DIAGNOSIS — G44.209 TRIGGER POINT WITH TENSION HEADACHE: ICD-10-CM

## 2017-08-29 DIAGNOSIS — Z90.722 S/P BILATERAL OOPHORECTOMY: ICD-10-CM

## 2017-08-29 DIAGNOSIS — M47.812 ARTHROPATHY OF CERVICAL FACET JOINT: ICD-10-CM

## 2017-08-29 DIAGNOSIS — M48.02 STENOSIS, CERVICAL SPINE: ICD-10-CM

## 2017-08-29 DIAGNOSIS — M79.18 MYOFACIAL MUSCLE PAIN: ICD-10-CM

## 2017-08-29 PROCEDURE — 99213 OFFICE O/P EST LOW 20 MIN: CPT | Performed by: NURSE PRACTITIONER

## 2017-08-29 PROCEDURE — 99214 OFFICE O/P EST MOD 30 MIN: CPT

## 2017-08-29 RX ORDER — CLONAZEPAM 0.5 MG/1
TABLET ORAL
COMMUNITY
Start: 2017-08-21 | End: 2017-10-03 | Stop reason: SDUPTHER

## 2017-08-29 RX ORDER — METOPROLOL TARTRATE 50 MG/1
50 TABLET, FILM COATED ORAL 2 TIMES DAILY
COMMUNITY
Start: 2017-08-21

## 2017-08-29 RX ORDER — SERTRALINE HYDROCHLORIDE 100 MG/1
TABLET, FILM COATED ORAL
Refills: 2 | COMMUNITY
Start: 2017-08-07 | End: 2019-02-25

## 2017-08-29 RX ORDER — TOPIRAMATE 25 MG/1
TABLET ORAL
Refills: 2 | COMMUNITY
Start: 2017-08-07 | End: 2018-01-02

## 2017-08-29 RX ORDER — OXYCODONE AND ACETAMINOPHEN 7.5; 325 MG/1; MG/1
1 TABLET ORAL EVERY 8 HOURS PRN
Qty: 90 TABLET | Refills: 0 | Status: SHIPPED | OUTPATIENT
Start: 2017-08-31 | End: 2017-09-29 | Stop reason: SDUPTHER

## 2017-08-29 ASSESSMENT — ENCOUNTER SYMPTOMS
GASTROINTESTINAL NEGATIVE: 1
EYES NEGATIVE: 1
RESPIRATORY NEGATIVE: 1

## 2017-09-15 RX ORDER — TIZANIDINE 4 MG/1
4 TABLET ORAL EVERY 12 HOURS PRN
Qty: 60 TABLET | Refills: 1 | Status: SHIPPED | OUTPATIENT
Start: 2017-09-15 | End: 2018-05-02 | Stop reason: SDUPTHER

## 2017-09-29 ENCOUNTER — HOSPITAL ENCOUNTER (EMERGENCY)
Age: 47
Discharge: HOME | End: 2017-09-29
Attending: EMERGENCY MEDICINE
Payer: MEDICARE

## 2017-09-29 ENCOUNTER — APPOINTMENT (OUTPATIENT)
Dept: GENERAL RADIOLOGY | Age: 47
End: 2017-09-29
Payer: MEDICARE

## 2017-09-29 VITALS
RESPIRATION RATE: 16 BRPM | WEIGHT: 187 LBS | BODY MASS INDEX: 32.1 KG/M2 | SYSTOLIC BLOOD PRESSURE: 128 MMHG | DIASTOLIC BLOOD PRESSURE: 54 MMHG | TEMPERATURE: 98.4 F | OXYGEN SATURATION: 93 % | HEART RATE: 70 BPM

## 2017-09-29 DIAGNOSIS — H81.10 BENIGN PAROXYSMAL POSITIONAL VERTIGO, UNSPECIFIED LATERALITY: Primary | ICD-10-CM

## 2017-09-29 LAB
ABSOLUTE EOS #: 0.2 K/UL (ref 0–0.4)
ABSOLUTE LYMPH #: 2.2 K/UL (ref 1–4.8)
ABSOLUTE MONO #: 0.4 K/UL (ref 0.1–1.3)
ANION GAP SERPL CALCULATED.3IONS-SCNC: 14 MMOL/L (ref 9–17)
BASOPHILS # BLD: 1 %
BASOPHILS ABSOLUTE: 0.1 K/UL (ref 0–0.2)
BUN BLDV-MCNC: 15 MG/DL (ref 6–20)
BUN/CREAT BLD: ABNORMAL (ref 9–20)
CALCIUM SERPL-MCNC: 9.4 MG/DL (ref 8.6–10.4)
CHLORIDE BLD-SCNC: 106 MMOL/L (ref 98–107)
CO2: 23 MMOL/L (ref 20–31)
CREAT SERPL-MCNC: 0.72 MG/DL (ref 0.5–0.9)
DIFFERENTIAL TYPE: NORMAL
EOSINOPHILS RELATIVE PERCENT: 2 %
GFR AFRICAN AMERICAN: >60 ML/MIN
GFR NON-AFRICAN AMERICAN: >60 ML/MIN
GFR SERPL CREATININE-BSD FRML MDRD: ABNORMAL ML/MIN/{1.73_M2}
GFR SERPL CREATININE-BSD FRML MDRD: ABNORMAL ML/MIN/{1.73_M2}
GLUCOSE BLD-MCNC: 110 MG/DL (ref 70–99)
HCT VFR BLD CALC: 44 % (ref 36–46)
HEMOGLOBIN: 14.8 G/DL (ref 12–16)
LYMPHOCYTES # BLD: 31 %
MCH RBC QN AUTO: 31.7 PG (ref 26–34)
MCHC RBC AUTO-ENTMCNC: 33.7 G/DL (ref 31–37)
MCV RBC AUTO: 94.1 FL (ref 80–100)
MONOCYTES # BLD: 6 %
PDW BLD-RTO: 13.1 % (ref 11.5–14.9)
PLATELET # BLD: 161 K/UL (ref 150–450)
PLATELET ESTIMATE: NORMAL
PMV BLD AUTO: 8.9 FL (ref 6–12)
POTASSIUM SERPL-SCNC: 4.1 MMOL/L (ref 3.7–5.3)
RBC # BLD: 4.67 M/UL (ref 4–5.2)
RBC # BLD: NORMAL 10*6/UL
SEG NEUTROPHILS: 60 %
SEGMENTED NEUTROPHILS ABSOLUTE COUNT: 4.2 K/UL (ref 1.3–9.1)
SODIUM BLD-SCNC: 143 MMOL/L (ref 135–144)
TROPONIN INTERP: NORMAL
TROPONIN INTERP: NORMAL
TROPONIN T: <0.03 NG/ML
TROPONIN T: <0.03 NG/ML
WBC # BLD: 7 K/UL (ref 3.5–11)
WBC # BLD: NORMAL 10*3/UL

## 2017-09-29 PROCEDURE — 96374 THER/PROPH/DIAG INJ IV PUSH: CPT

## 2017-09-29 PROCEDURE — 84484 ASSAY OF TROPONIN QUANT: CPT

## 2017-09-29 PROCEDURE — 6360000002 HC RX W HCPCS: Performed by: EMERGENCY MEDICINE

## 2017-09-29 PROCEDURE — 36415 COLL VENOUS BLD VENIPUNCTURE: CPT

## 2017-09-29 PROCEDURE — 71020 XR CHEST STANDARD TWO VW: CPT

## 2017-09-29 PROCEDURE — 85025 COMPLETE CBC W/AUTO DIFF WBC: CPT

## 2017-09-29 PROCEDURE — 94762 N-INVAS EAR/PLS OXIMTRY CONT: CPT

## 2017-09-29 PROCEDURE — 99285 EMERGENCY DEPT VISIT HI MDM: CPT

## 2017-09-29 PROCEDURE — 80048 BASIC METABOLIC PNL TOTAL CA: CPT

## 2017-09-29 PROCEDURE — 96375 TX/PRO/DX INJ NEW DRUG ADDON: CPT

## 2017-09-29 PROCEDURE — 93005 ELECTROCARDIOGRAM TRACING: CPT

## 2017-09-29 PROCEDURE — 6370000000 HC RX 637 (ALT 250 FOR IP): Performed by: EMERGENCY MEDICINE

## 2017-09-29 RX ORDER — MECLIZINE HYDROCHLORIDE 25 MG/1
50 TABLET ORAL ONCE
Status: COMPLETED | OUTPATIENT
Start: 2017-09-29 | End: 2017-09-29

## 2017-09-29 RX ORDER — METOCLOPRAMIDE HYDROCHLORIDE 5 MG/ML
10 INJECTION INTRAMUSCULAR; INTRAVENOUS ONCE
Status: COMPLETED | OUTPATIENT
Start: 2017-09-29 | End: 2017-09-29

## 2017-09-29 RX ORDER — LORAZEPAM 2 MG/ML
1 INJECTION INTRAMUSCULAR ONCE
Status: COMPLETED | OUTPATIENT
Start: 2017-09-29 | End: 2017-09-29

## 2017-09-29 RX ORDER — MECLIZINE HYDROCHLORIDE 25 MG/1
50 TABLET ORAL 3 TIMES DAILY PRN
Qty: 21 TABLET | Refills: 0 | Status: SHIPPED | OUTPATIENT
Start: 2017-09-29 | End: 2017-10-09

## 2017-09-29 RX ADMIN — METOCLOPRAMIDE 10 MG: 5 INJECTION, SOLUTION INTRAMUSCULAR; INTRAVENOUS at 17:18

## 2017-09-29 RX ADMIN — MECLIZINE HYDROCHLORIDE 50 MG: 25 TABLET ORAL at 17:16

## 2017-09-29 RX ADMIN — LORAZEPAM 1 MG: 2 INJECTION, SOLUTION INTRAMUSCULAR; INTRAVENOUS at 15:45

## 2017-09-29 ASSESSMENT — PAIN SCALES - GENERAL: PAINLEVEL_OUTOF10: 5

## 2017-09-29 ASSESSMENT — ENCOUNTER SYMPTOMS
VOMITING: 0
COLOR CHANGE: 0
WHEEZING: 0
SHORTNESS OF BREATH: 0
NAUSEA: 0
ABDOMINAL PAIN: 0
COUGH: 0

## 2017-09-29 ASSESSMENT — PAIN DESCRIPTION - LOCATION: LOCATION: CHEST

## 2017-09-29 ASSESSMENT — PAIN SCALES - WONG BAKER: WONGBAKER_NUMERICALRESPONSE: 4

## 2017-09-29 ASSESSMENT — PAIN DESCRIPTION - PAIN TYPE: TYPE: ACUTE PAIN

## 2017-09-29 ASSESSMENT — HEART SCORE: ECG: 0

## 2017-09-29 NOTE — ED AVS SNAPSHOT
After Visit Summary  (Discharge Instructions)    Medication List for Home    Based on the information you provided to us as well as any changes during this visit, the following is your updated medication list.  Compare this with your prescription bottles at home. If you have any questions or concerns, contact your primary care physician's office. Daily Medication List (This medication list can be shared with any Healthcare provider who is helping you manage your medications)      There are NEW medications for you. START taking them after you leave the hospital     meclizine 25 MG tablet   Commonly known as:  ANTIVERT   Take 2 tablets by mouth 3 times daily as needed for Dizziness         ASK your doctor about these medications if you have questions     atorvastatin 40 MG tablet   Commonly known as:  LIPITOR   TK 1 T PO ONCE A DAY       * clonazePAM 0.5 MG tablet   Commonly known as:  KLONOPIN   TK 1 T PO  TID       * clonazePAM 0.5 MG tablet   Commonly known as:  KLONOPIN       gabapentin 300 MG capsule   Commonly known as:  NEURONTIN   TAKE 1 CAPSULE BY MOUTH TWICE DAILY       glucose blood VI test strips strip   Commonly known as:  WILLIAM CONTOUR TEST   1 each by In Vitro route 2 times daily. As needed. Lancets Misc   Testing bid       levothyroxine 125 MCG tablet   Commonly known as:  SYNTHROID   TAKE 1 TABLET BY MOUTH TWICE DAILY       metoprolol tartrate 50 MG tablet   Commonly known as:  LOPRESSOR       NEXIUM 40 MG delayed release capsule   Generic drug:  esomeprazole   TAKE 1 CAPSULE BY MOUTH EVERY MORNING BEFORE BREAKFAST       oxyCODONE-acetaminophen 7.5-325 MG per tablet   Commonly known as:  PERCOCET   Take 1 tablet by mouth every 8 hours as needed for Pain .  Earliest Fill Date: 9/30/17   Start taking on:  9/30/2017       rOPINIRole 1 MG tablet   Commonly known as:  REQUIP   TAKE 1 TABLET BY MOUTH EVERY NIGHT       sertraline 100 MG tablet   Commonly known as:  ZOLOFT TK 2 TS PO QAM       tiZANidine 4 MG tablet   Commonly known as:  ZANAFLEX   Take 1 tablet by mouth every 12 hours as needed (muscle spasm)       topiramate 100 MG tablet   Commonly known as:  TOPAMAX   TK 1 T PO HS       * Notice: This list has 2 medication(s) that are the same as other medications prescribed for you. Read the directions carefully, and ask your doctor or other care provider to review them with you. Where to Get Your Medications      You can get these medications from any pharmacy     Bring a paper prescription for each of these medications     meclizine 25 MG tablet               Allergies as of 2017        Reactions    Morphine Itching    Sulfa Antibiotics Hives    Adhesive Tape Rash      Immunizations as of 2017     No immunizations on file. After Visit Summary    This summary was created for you. Thank you for entrusting your care to us. The following information includes details about your hospital/visit stay along with steps you should take to help with your recovery once you leave the hospital.  In this packet, you will find information about the topics listed below:    · Instructions about your medications including a list of your home medications  · A summary of your hospital visit  · Follow-up appointments once you have left the hospital  · Your care plan at home      You may receive a survey regarding the care you received during your stay. Your input is valuable to us. We encourage you to complete and return your survey in the envelope provided. We hope you will choose us in the future for your healthcare needs.           Patient Information     Patient Name MENDEZ Hughes 1970      Care Provided at:     Name Address Phone       Luke Mccormick [address] [phone]            Your Visit    Here you will find information about your visit, including the reason for your visit. Please take this sheet with you when you visit your doctor or other health care provider in the future. It will help determine the best possible medical care for you at that time. If you have any questions once you leave the hospital, please call the department phone number listed below. Diagnoses this visit     Your diagnosis was BENIGN PAROXYSMAL POSITIONAL VERTIGO, UNSPECIFIED LATERALITY. Visit Information     Date of Visit Department Dept Phone    9/29/2017 [facility] [phone]      You were seen by     You were seen by Andriy Lane MD and Ari Jiang MD.       Follow-up Appointments    Below is a list of your follow-up and future appointments. This may not be a complete list as you may have made appointments directly with providers that we are not aware of or your providers may have made some for you. Please call your providers to confirm appointments. It is important to keep your appointments. Please bring your current insurance card, photo ID, co-pay, and all medication bottles to your appointment. If self-pay, payment is expected at the time of service. Follow-up Information     Follow up with Tino La MD. Schedule an appointment as soon as possible for a visit in 2 days. Specialty:  Internal Medicine    Why:  If symptoms worsen    Contact information:    [facility] [place]  [phone]        Preventive Care        Date Due    HIV screening is recommended for all people regardless of risk factors  aged 15-65 years at least once (lifetime) who have never been HIV tested.  9/20/1986    Pneumococcal Vaccine - Pneumovax for adults aged 19-64 years with: chronic heart disease, chronic lung disease, diabetes mellitus, alcoholism, chronic liver disease, or cigarette smoking. (1 of 1 - PPSV23) 9/20/1990    Diabetic Foot Exam 1/27/2017    Pap Smear 10/13/2017 Children exposed to secondhand smoke are at an increased risk of:  Sudden Infant Death Syndrome (SIDS), acute respiratory infections, inflammation of the middle ear, and severe asthma. Over a longer time, it causes heart disease and lung cancer. There is no safe level of exposure to secondhand smoke. MyChart Signup     Our records indicate that you have an active [employer] account. You can view your After Visit Summary by going to [email or web address] and logging in with your [employer] username and password. If you don't have a [employer] username and password but a parent or guardian has access to your record, the parent or guardian should login with their own C[employer] username and password and access your record to view the After Visit Summary. Additional Information  If you have questions, please contact the physician practice where you receive care. Remember, [employer] is NOT to be used for urgent needs. For medical emergencies, dial 911. For questions regarding your MyChart account call [phone]. If you have a clinical question, please call your doctor's office. View your information online  ? Review your current list of  medications, immunization, and allergies. ? Review your future test results online . ? Review your discharge instructions provided by your caregivers at discharge    Certain functionality such as prescription refills, scheduling appointments or sending messages to your provider are not activated if your provider does not use [employer] in his/her office    For questions regarding your MyChart account call [phone]. If you have a clinical question, please call your doctor's office. The information on all pages of the After Visit Summary has been reviewed with me, the patient and/or responsible adult, by my health care provider(s).  I had the opportunity to ask questions regarding this · Sit up. If this causes vertigo, wait for it to stop. · Repeat the procedure on the other side. · Repeat this 10 times. Do these exercises 2 times a day until the vertigo is gone. When should you call for help? Call 911 anytime you think you may need emergency care. For example, call if:  · You passed out (lost consciousness). · You have symptoms of a stroke. These may include:  ¨ Sudden numbness, tingling, weakness, or loss of movement in your face, arm, or leg, especially on only one side of your body. ¨ Sudden vision changes. ¨ Sudden trouble speaking. ¨ Sudden confusion or trouble understanding simple statements. ¨ Sudden problems with walking or balance. ¨ A sudden, severe headache that is different from past headaches. Call your doctor now or seek immediate medical care if:  · Vertigo occurs with a fever, a headache, or ringing in your ears. · You have new or increased nausea and vomiting. Watch closely for changes in your health, and be sure to contact your doctor if:  · Vertigo gets worse or happens more often. · Vertigo has not gotten better after 2 weeks. Where can you learn more? Go to [email or web address] and sign in to your [employer] account. Enter O859 in the Jibbigo box to learn more about \"Vertigo: Care Instructions. \"     If you do not have an account, please click on the \"Sign Up Now\" link. Current as of: July 29, 2016  Content Version: 11.3  © 8991-3270 [employer]. Care instructions adapted under license by [facility] [employer] [facility] ([facility]).  If you have questions about a medical condition or this instruction, always ask your healthcare professional. [place] [address] any warranty or liability for your use of this information.

## 2017-09-29 NOTE — ED PROVIDER NOTES
tablet TAKE 1 TABLET BY MOUTH EVERY NIGHT 8/7/17  Yes Davis Monaco MD   gabapentin (NEURONTIN) 300 MG capsule TAKE 1 CAPSULE BY MOUTH TWICE DAILY 4/4/17  Yes Lashell Worrell MD   levothyroxine (SYNTHROID) 125 MCG tablet TAKE 1 TABLET BY MOUTH TWICE DAILY 2/10/17  Yes Davis Monaco MD   NEXIUM 40 MG delayed release capsule TAKE 1 CAPSULE BY MOUTH EVERY MORNING BEFORE BREAKFAST 12/29/16  Yes Lashell Worrell MD   atorvastatin (LIPITOR) 40 MG tablet TK 1 T PO ONCE A DAY 10/14/16  Yes Historical Provider, MD   clonazePAM (KLONOPIN) 0.5 MG tablet TK 1 T PO  TID 10/10/16  Yes Historical Provider, MD   glucose blood VI test strips (WILLIAM CONTOUR TEST) strip 1 each by In Vitro route 2 times daily. As needed. 1/29/15  Yes Marissa Espinosa MD   Lancets MISC Testing bid 1/29/15  Yes Marissa Espinosa MD       REVIEW OF SYSTEMS    (2-9 systems for level 4, 10 or more for level 5)      Review of Systems   Constitutional: Negative for chills and fever. Respiratory: Negative for cough, shortness of breath and wheezing. Cardiovascular: Positive for chest pain. Negative for palpitations and leg swelling. Gastrointestinal: Negative for abdominal pain, nausea and vomiting. Skin: Negative for color change and rash. Neurological: Positive for dizziness and headaches.        + Ambulatory difficulty   Psychiatric/Behavioral: Negative for agitation and confusion. PHYSICAL EXAM   (up to 7 for level 4, 8 or more for level 5)      INITIAL VITALS:   /71  Pulse 62  Temp 97.8 °F (36.6 °C) (Oral)   Resp 16  Wt 187 lb (84.8 kg)  LMP 11/01/2010  SpO2 91%  BMI 32.1 kg/m2    Physical Exam   Constitutional: She is oriented to person, place, and time. She appears well-developed and well-nourished. HENT:   Head: Normocephalic and atraumatic. Eyes: Conjunctivae and EOM are normal.   Neck: Normal range of motion. Cardiovascular: Normal rate, regular rhythm and normal heart sounds.   Exam reveals no gallop and no Discharge home  Score 4 - 6 = 20.3%  MACE over next 6 wks = Obs admit  Score 7 - 10 = 72.7%  MACE over next 6 wks = Early invasive Rx    DIAGNOSTIC RESULTS / EMERGENCY DEPARTMENT COURSE / MDM     LABS:  Results for orders placed or performed during the hospital encounter of 09/29/17   CBC Auto Differential   Result Value Ref Range    WBC 7.0 3.5 - 11.0 k/uL    RBC 4.67 4.0 - 5.2 m/uL    Hemoglobin 14.8 12.0 - 16.0 g/dL    Hematocrit 44.0 36 - 46 %    MCV 94.1 80 - 100 fL    MCH 31.7 26 - 34 pg    MCHC 33.7 31 - 37 g/dL    RDW 13.1 11.5 - 14.9 %    Platelets 857 176 - 974 k/uL    MPV 8.9 6.0 - 12.0 fL    Differential Type NOT REPORTED     Seg Neutrophils 60 %    Lymphocytes 31 %    Monocytes 6 %    Eosinophils % 2 %    Basophils 1 %    Segs Absolute 4.20 1.3 - 9.1 k/uL    Absolute Lymph # 2.20 1.0 - 4.8 k/uL    Absolute Mono # 0.40 0.1 - 1.3 k/uL    Absolute Eos # 0.20 0.0 - 0.4 k/uL    Basophils # 0.10 0.0 - 0.2 k/uL    WBC Morphology NOT REPORTED     RBC Morphology NOT REPORTED     Platelet Estimate NOT REPORTED    Basic Metabolic Panel   Result Value Ref Range    Glucose 110 (H) 70 - 99 mg/dL    BUN 15 6 - 20 mg/dL    CREATININE 0.72 0.50 - 0.90 mg/dL    Bun/Cre Ratio NOT REPORTED 9 - 20    Calcium 9.4 8.6 - 10.4 mg/dL    Sodium 143 135 - 144 mmol/L    Potassium 4.1 3.7 - 5.3 mmol/L    Chloride 106 98 - 107 mmol/L    CO2 23 20 - 31 mmol/L    Anion Gap 14 9 - 17 mmol/L    GFR Non-African American >60 >60 mL/min    GFR African American >60 >60 mL/min    GFR Comment          GFR Staging NOT REPORTED    Troponin   Result Value Ref Range    Troponin T <0.03 <0.03 ng/mL    Troponin Interp         Troponin   Result Value Ref Range    Troponin T <0.03 <0.03 ng/mL    Troponin Interp             RADIOLOGY:  Xr Chest Standard (2 Vw)    Result Date: 9/29/2017  EXAMINATION: TWO VIEWS OF THE CHEST 9/29/2017 3:52 pm COMPARISON: None.  HISTORY: ORDERING SYSTEM PROVIDED HISTORY: pressure like left sided chest pain TECHNOLOGIST PROVIDED HISTORY: Reason for exam:->pressure like left sided chest pain Ordering Physician Provided Reason for Exam: cp Acuity: Acute Type of Exam: Initial FINDINGS: The lungs are without acute focal process. There is no effusion or pneumothorax. The cardiomediastinal silhouette is without acute process. The osseous structures are without acute process. No acute process. EKG  EKG Interpretation    Interpreted by me    Rhythm: normal sinus   Rate: normal  Axis: normal  Ectopy: none  Conduction: normal  ST Segments: no acute change  T Waves: no acute change  Q Waves: none    Clinical Impression: no acute changes and normal EKG    All EKG's are interpreted by the Emergency Department Physician who either signs or Co-signs this chart in the absence of a cardiologist.    EMERGENCY DEPARTMENT COURSE:  Patient presented emergency department for evaluation of chest pain, dizziness, and headache. On initial evaluation, vitals are unremarkable. Lungs were clear to auscultation bilaterally. Heart was regular rate and rhythm with no MRG. No focal neuro deficits were noted. HINTS examination was suggestive of left sided peripheral vertigo. We'll treat headache, give Ativan for anxiety, and Antivert for vertigo. We'll also obtain cardiac workup. Cardiac workup was unremarkable. Patient's vertigo improved with meclizine and her headache improved with Reglan. Patient says she has had headaches in the past and this feels similar to previous headaches. Patient was reevaluated after her second troponin drawn and says that she feels back to normal and like to be discharged home. Second troponin was negative. We'll discharge patient home with prescription for meclizine for vertigo. She decided to come back to emergency department if she developed any weakness, numbness, tingling, or any strokelike symptoms. She's also told to come back to emergency department if she developed any further chest pain.   Patient

## 2017-09-29 NOTE — ED NOTES
Pt states she thought she was developing a headache but instead awoke with intolerable vertigo     Cheyenne Candelaria RN  09/29/17 [id number]

## 2017-10-02 ENCOUNTER — CARE COORDINATION (OUTPATIENT)
Dept: CARE COORDINATION | Age: 47
End: 2017-10-02

## 2017-10-03 LAB
EKG ATRIAL RATE: 60 BPM
EKG P AXIS: 50 DEGREES
EKG P-R INTERVAL: 154 MS
EKG Q-T INTERVAL: 452 MS
EKG QRS DURATION: 84 MS
EKG QTC CALCULATION (BAZETT): 452 MS
EKG R AXIS: 19 DEGREES
EKG T AXIS: 49 DEGREES
EKG VENTRICULAR RATE: 60 BPM

## 2017-10-03 NOTE — CARE COORDINATION
Ambulatory Care Coordination Note  10/3/2017  CM Risk Score: 6  Chayo Mortality Risk Score:      ACC: Natalie Zurita RN    Summary Note: Called and spoke to patient who had gone to the ED 9/29/17 for c/o vertigo and was started on antivert. She has not had to take any since then and has had no more dizziness. She said her blood sugars are between 100-120's. Her last A1c was 6.0 down form 6.9. Discussed with her about her number of ED visits this year. She has had 9 visits for neck pain and migraine's. She is following with [facility]. Encouraged her to call the office if during office hours for appointment and that we do have same day appointments. She verbalized understanding. RNCC inquired if she has followed with her neuro and she said the one she use to see does not take her insurance. Encouraged her to get a new referral from pcp. Scheduled f/u appt with pcp for 10/10/17. Diabetes Assessment    Meal Planning:  None   How often do you test your blood sugar?:  No Testing   Do you have barriers with adherence to non-pharmacologic self-management interventions?  (Nutrition/Exercise/Self-Monitoring):  No   Have you ever had to go to the ED for symptoms of low blood sugar?:  No              Lab Results   Component Value Date    LABA1C 6.0 05/16/2017    LABA1C 6.9 (H) 01/26/2015    LABA1C 6.2 (H) 12/26/2013     Lab Results   Component Value Date    LABMICR 7 05/16/2017    [address] 91 10/26/2016    CREATININE 0.72 09/29/2017       Care Coordination Interventions    Program Enrollment:  Complex Care   Referral from Primary Care Provider:  Yes   Suggested Interventions and Community Resources   No Identified Needs   Disease Specific Clinic:  Completed (Comment: [facility].)   Zone Management Tools:  Completed (Comment: DM zone management.)             Goals Addressed             Most Recent     Conditions and Symptoms   On track (10/2/2017)             I will schedule office visits, as Provider, MD   glucose blood VI test strips (WILLIAM CONTOUR TEST) strip 1 each by In Vitro route 2 times daily. As needed. 1/29/15  Yes Rico Jimenez MD   Lancets MISC Testing bid 1/29/15  Yes Rico Jimenez MD       Future Appointments  Date Time Provider Nisha Santos   10/10/2017 10:30 AM Kaur Shell MD [address] will reach out to patient in 2-3 weeks for follow up. As a reminder, RNCC explained the importance of first calling patient's PCP for an appointment instead going the Emergency Dept especially Monday- Friday during office hours for non-emergency concerns/complaints. Writer advised patient to speak to [facility] or [address] [place] [address] to assist them with the issue and attempt to get a same day/sick call appt before they decide to go to the ER. Writer also stressed that they can call [facility] for any help regarding appointments,medications and questions/concerns with their health management, patient verbalized understanding. [facility] contact information given to patient.      Chandan Almeida RN  Ambulatory Care Coordinator

## 2017-10-10 ENCOUNTER — CARE COORDINATION (OUTPATIENT)
Dept: CARE COORDINATION | Age: 47
End: 2017-10-10

## 2017-10-10 ENCOUNTER — OFFICE VISIT (OUTPATIENT)
Dept: INTERNAL MEDICINE CLINIC | Age: 47
End: 2017-10-10
Payer: MEDICARE

## 2017-10-10 VITALS
DIASTOLIC BLOOD PRESSURE: 84 MMHG | HEIGHT: 64 IN | BODY MASS INDEX: 31.92 KG/M2 | WEIGHT: 186.95 LBS | SYSTOLIC BLOOD PRESSURE: 136 MMHG

## 2017-10-10 DIAGNOSIS — G44.229 CHRONIC TENSION-TYPE HEADACHE, NOT INTRACTABLE: Primary | ICD-10-CM

## 2017-10-10 PROCEDURE — G8417 CALC BMI ABV UP PARAM F/U: HCPCS | Performed by: INTERNAL MEDICINE

## 2017-10-10 PROCEDURE — G8598 ASA/ANTIPLAT THER USED: HCPCS | Performed by: INTERNAL MEDICINE

## 2017-10-10 PROCEDURE — G8484 FLU IMMUNIZE NO ADMIN: HCPCS | Performed by: INTERNAL MEDICINE

## 2017-10-10 PROCEDURE — 99212 OFFICE O/P EST SF 10 MIN: CPT | Performed by: INTERNAL MEDICINE

## 2017-10-10 PROCEDURE — 4004F PT TOBACCO SCREEN RCVD TLK: CPT | Performed by: INTERNAL MEDICINE

## 2017-10-10 PROCEDURE — G8427 DOCREV CUR MEDS BY ELIG CLIN: HCPCS | Performed by: INTERNAL MEDICINE

## 2017-10-10 NOTE — PROGRESS NOTES
Subjective:      Patient ID: Linda Neal is a 56 y.o. female. Chronic Disease Visit Information    BP Readings from Last 3 Encounters:   09/29/17 (!) 128/54   08/29/17 130/77   08/26/17 118/65          Hemoglobin A1C (%)   Date Value   05/16/2017 6.0   01/26/2015 6.9 (H)   12/26/2013 6.2 (H)     Microalb/Crt. Ratio (mcg/mg creat)   Date Value   05/16/2017 7     LDL Cholesterol (mg/dL)   Date Value   01/26/2015 147 (H)     LDL Calculated (mg/dL)   Date Value   10/26/2016 91     HDL (mg/dL)   Date Value   10/26/2016 42     BUN (mg/dL)   Date Value   09/29/2017 15     CREATININE (mg/dL)   Date Value   09/29/2017 0.72     Glucose (mg/dL)   Date Value   09/29/2017 110 (H)   04/07/2017 99            Have you changed or started any medications since your last visit including any over-the-counter medicines, vitamins, or herbal medicines? no   Are you having any side effects from any of your medications? -  no  Have you stopped taking any of your medications? Is so, why? -  no    Have you seen any other physician or provider since your last visit? Yes - Records Obtained  Have you had any other diagnostic tests since your last visit? Yes - Records Obtained  Have you been seen in the emergency room and/or had an admission to a hospital since we last saw you? Yes - Records Obtained  Have you had your annual diabetic retinal (eye) exam? No  Have you had your routine dental cleaning in the past 6 months? yes -     Have you activated your [employer] account? If not, what are your barriers?  Yes     Patient Care Team:  Dragan Hudson MD as PCP - General (Internal Medicine)  Neelima Basurto (Inactive) as PCP - Family Medicine  Dragan Hudson MD as PCP - S Attributed Provider  Koki Hood MD as Consulting Physician (Gastroenterology)  Chris Amador MD as Consulting Physician (Obstetrics & Gynecology)  Myles Diaz RN as Care Coordinator         Medical History Review  Past Medical, Family, and Social History reviewed and does contribute to the patient presenting condition    Health Maintenance   Topic Date Due    HIV screen  09/20/1986    Pneumococcal med risk (1 of 1 - PPSV23) 09/20/1990    Diabetic foot exam  01/27/2017    Cervical cancer screen  10/13/2017    DTaP/Tdap/Td vaccine (1 - Tdap) 10/19/2017 (Originally 9/20/1990)    Flu vaccine (1) 10/19/2017 (Originally 9/1/2017)    Lipid screen  10/26/2017    Diabetic hemoglobin A1C test  05/16/2018    Diabetic microalbuminuria test  05/16/2018    Diabetic retinal exam  08/15/2018       HPI    Chief Complaint   Patient presents with    Headache     pt went to [address] [place] [address] ER on 9/29/17 for headaches and dizziness. Here to f/u. Pt ER visit with headache     MRI brain 5/17 negative     Headache   Frontal   Almost daily     Review of Systems  Past Medical History:   Diagnosis Date    Anxiety     Fatty liver     GERD (gastroesophageal reflux disease)     Headache     History of bronchitis     Hyperlipidemia     Hypothyroidism     Kidney stone     LFT elevation     MVP (mitral valve prolapse)     Obesity     Umbilical hernia      Social History   Substance Use Topics    Smoking status: Current Some Day Smoker     Packs/day: 0.50     Years: 20.00     Types: Cigarettes    Smokeless tobacco: Never Used      Comment: 1/2 pack every 2 weeks    Alcohol use No       ROS  [facility] no chest pain no sob no orthopnea  Resp no cough   General no weight loss no fatigue no fever      Objective:   Physical Exam    Blood pressure 136/84, height 5' 4.02\" (1.626 m), weight 186 lb 15.2 oz (84.8 kg), last menstrual period 11/01/2010, not currently breastfeeding.   General Appearance NAD conversant  Neck FROM supple no JVD  Lungs normal effort Clear to auscultation  Cardio regular rhythm no murmur  Abdomen Soft nontender no HSM  Ext no edema no cyanosis no clubbing   Skin no rashes no ulcers  Neuro no focal deficits   Musculoskeletal no spinal tenderness no kyphosis

## 2017-10-10 NOTE — CARE COORDINATION
RNCC met with patient in exam room while her for f/u with pcp after 3 ED visit's for neck pain and headaches. She has been going to the [facility] and has epidurals and state they only help for short time. She has also done PT and experienced more pain from the exercises. So she wants to ask Dr. Anahi Martines about [facility] or [facility]. She wants a new referral to a neuro since hers doesn't take her insurance anymore. Future Appointments  Date Time Provider Nisha Santos   4/10/2018 9:45 AM Lisa Santizo MD [facility] will reach out to patient in 2-3 weeks for follow up. As a reminder, RNCC explained the importance of first calling patient's PCP for an appointment instead going the Emergency Dept especially Monday- Friday during office hours for non-emergency concerns/complaints. Writer advised patient to speak to [facility] or [address] [place] [address] to assist them with the issue and attempt to get a same day/sick call appt before they decide to go to the ER. Writer also stressed that they can call [facility] for any help regarding appointments,medications and questions/concerns with their health management, patient verbalized understanding. [facility] contact information given to patient.      Hermila Jesus RN  Ambulatory Care Coordinator

## 2017-10-16 ENCOUNTER — HOSPITAL ENCOUNTER (EMERGENCY)
Age: 47
Discharge: HOME | End: 2017-10-16
Attending: EMERGENCY MEDICINE
Payer: MEDICARE

## 2017-10-16 ENCOUNTER — TELEPHONE (OUTPATIENT)
Dept: INTERNAL MEDICINE CLINIC | Age: 47
End: 2017-10-16

## 2017-10-16 VITALS
HEIGHT: 64 IN | WEIGHT: 186 LBS | HEART RATE: 72 BPM | DIASTOLIC BLOOD PRESSURE: 80 MMHG | RESPIRATION RATE: 16 BRPM | OXYGEN SATURATION: 95 % | TEMPERATURE: 98.4 F | SYSTOLIC BLOOD PRESSURE: 122 MMHG | BODY MASS INDEX: 31.76 KG/M2

## 2017-10-16 DIAGNOSIS — G25.81 RLS (RESTLESS LEGS SYNDROME): ICD-10-CM

## 2017-10-16 DIAGNOSIS — G62.9 NEUROPATHY: ICD-10-CM

## 2017-10-16 DIAGNOSIS — M54.2 CHRONIC NECK PAIN: Primary | ICD-10-CM

## 2017-10-16 DIAGNOSIS — M47.812 ARTHROPATHY OF CERVICAL FACET JOINT: Primary | ICD-10-CM

## 2017-10-16 DIAGNOSIS — M47.812 OSTEOARTHRITIS OF CERVICAL SPINE, UNSPECIFIED SPINAL OSTEOARTHRITIS COMPLICATION STATUS: ICD-10-CM

## 2017-10-16 DIAGNOSIS — M54.12 CERVICAL RADICULOPATHY: ICD-10-CM

## 2017-10-16 DIAGNOSIS — G89.29 CHRONIC NECK PAIN: Primary | ICD-10-CM

## 2017-10-16 DIAGNOSIS — M48.02 DEGENERATIVE CERVICAL SPINAL STENOSIS: ICD-10-CM

## 2017-10-16 PROCEDURE — 96372 THER/PROPH/DIAG INJ SC/IM: CPT

## 2017-10-16 PROCEDURE — 6360000002 HC RX W HCPCS: Performed by: EMERGENCY MEDICINE

## 2017-10-16 PROCEDURE — 6370000000 HC RX 637 (ALT 250 FOR IP): Performed by: EMERGENCY MEDICINE

## 2017-10-16 PROCEDURE — 99283 EMERGENCY DEPT VISIT LOW MDM: CPT

## 2017-10-16 RX ORDER — KETOROLAC TROMETHAMINE 30 MG/ML
30 INJECTION, SOLUTION INTRAMUSCULAR; INTRAVENOUS ONCE
Status: COMPLETED | OUTPATIENT
Start: 2017-10-16 | End: 2017-10-16

## 2017-10-16 RX ORDER — GABAPENTIN 300 MG/1
300 CAPSULE ORAL 3 TIMES DAILY
Qty: 60 CAPSULE | Refills: 0 | Status: SHIPPED | OUTPATIENT
Start: 2017-10-16 | End: 2018-08-20 | Stop reason: DRUGHIGH

## 2017-10-16 RX ORDER — GABAPENTIN 300 MG/1
300 CAPSULE ORAL ONCE
Status: COMPLETED | OUTPATIENT
Start: 2017-10-16 | End: 2017-10-16

## 2017-10-16 RX ORDER — TIZANIDINE 4 MG/1
4 TABLET ORAL EVERY 6 HOURS PRN
COMMUNITY
End: 2017-10-27 | Stop reason: SDUPTHER

## 2017-10-16 RX ADMIN — GABAPENTIN 300 MG: 300 CAPSULE ORAL at 16:40

## 2017-10-16 RX ADMIN — KETOROLAC TROMETHAMINE 30 MG: 30 INJECTION, SOLUTION INTRAMUSCULAR at 16:40

## 2017-10-16 ASSESSMENT — PAIN SCALES - GENERAL
PAINLEVEL_OUTOF10: 9

## 2017-10-16 ASSESSMENT — ENCOUNTER SYMPTOMS
NAUSEA: 1
SHORTNESS OF BREATH: 0
VOMITING: 1

## 2017-10-16 ASSESSMENT — PAIN DESCRIPTION - PAIN TYPE
TYPE: CHRONIC PAIN
TYPE: CHRONIC PAIN

## 2017-10-16 ASSESSMENT — PAIN DESCRIPTION - LOCATION: LOCATION: HEAD;NECK

## 2017-10-16 NOTE — ED PROVIDER NOTES
[address]  eMERGENCY dEPARTMENT eNCOUnter      Pt Name: Erica Perez  MRN: [id number]  [place] 1970  Date of evaluation: 10/16/17      CHIEF COMPLAINT       Chief Complaint   Patient presents with    Headache     chronic    Neck Pain     chronic    Emesis     HISTORY OF PRESENT ILLNESS   HPI 56 y.o. female presents with complains of neck pain. The patient states that she's been having chronic neck pain and she's been following with pain management. She is been getting facet steroid injections every 3 months, she's been taking Percocet Neurontin and Zanaflex with only mild relief. She states that it's getting to the point that she can't take it anymore. Her pain causes her to feel and nauseated she'll throw up and states that she can't function or take care of her kids because the pain is too severe. She denies any new injury she denies any fevers or chills she denies any numbness or weakness in her arms. Pain is sharp bilateral but worse on the left side and radiates to the back of her head. Review of Systems   Constitutional: Negative for chills and fever. HENT: Negative for congestion. Eyes: Negative for visual disturbance. Respiratory: Negative for shortness of breath. Cardiovascular: Negative for chest pain. Gastrointestinal: Positive for nausea and vomiting. Genitourinary: Negative for dysuria. Musculoskeletal: Positive for myalgias, neck pain and neck stiffness. Negative for joint swelling. Skin: Negative for rash. Neurological: Negative for weakness, numbness and headaches. Hematological: Negative for adenopathy.        PAST MEDICAL HISTORY     Past Medical History:   Diagnosis Date    Anxiety     Fatty liver     GERD (gastroesophageal reflux disease)     Headache     History of bronchitis     Hyperlipidemia     Hypothyroidism     Kidney stone     LFT elevation     MVP (mitral valve prolapse)     Obesity     Umbilical hernia        SURGICAL HISTORY treatment plan, warning precautions for prompt ED return and importance of close OP FU. EMERGENCY DEPARTMENT COURSE:   Vitals:    Vitals:    10/16/17 1509   BP: 122/80   Pulse: 72   Resp: 16   Temp: 98.4 °F (36.9 °C)   TempSrc: Oral   SpO2: 95%   Weight: 186 lb (84.4 kg)   Height: 5' 4\" (1.626 m)       The patient was given the following medications while in the emergency department:  Orders Placed This Encounter   Medications    ketorolac (TORADOL) injection 30 mg    gabapentin (NEURONTIN) capsule 300 mg    gabapentin (NEURONTIN) 300 MG capsule     Sig: Take 1 capsule by mouth 3 times daily     Dispense:  60 capsule     Refill:  0       Procedures     FINAL IMPRESSION      1. Chronic neck pain    2.  Neuropathy [facility])          DISPOSITION/PLAN   DISPOSITION Decision to Discharge    PATIENT REFERRED TO:  Your Pain Specialist    In 2 days      Anuradha Jacques MD  [facility] [address] [facility] [address]  [phone]    In 1 week      [facility] ED  [facility] [address]  [address] [facility]  [phone]    If symptoms worsen      DISCHARGE MEDICATIONS:  Discharge Medication List as of 10/16/2017  4:34 PM            John Herrera MD  Attending Emergency Physician                     John Herrera MD  10/16/17 [id number]

## 2017-10-16 NOTE — TELEPHONE ENCOUNTER
Patient called and would like a referral to [facility] for degenerative cervical spinal stenosis, cervical radiculopathy, sprain of atlanto-occipital joint. Fax to Clark Duffy at [facility] [phone].       Carolyne Couch RN  Ambulatory Care Coordinator

## 2017-10-17 ENCOUNTER — CARE COORDINATION (OUTPATIENT)
Dept: CARE COORDINATION | Age: 47
End: 2017-10-17

## 2017-10-17 NOTE — CARE COORDINATION
Attempted to reach patient for ed follow up, detailed message left with instructions to return call to care coordination at [phone]

## 2017-10-27 ENCOUNTER — HOSPITAL ENCOUNTER (OUTPATIENT)
Dept: PAIN MANAGEMENT | Age: 47
Discharge: HOME | End: 2017-10-27
Payer: MEDICARE

## 2017-10-27 VITALS
RESPIRATION RATE: 16 BRPM | HEIGHT: 64 IN | DIASTOLIC BLOOD PRESSURE: 64 MMHG | HEART RATE: 77 BPM | SYSTOLIC BLOOD PRESSURE: 112 MMHG | WEIGHT: 182 LBS | TEMPERATURE: 98.4 F | BODY MASS INDEX: 31.07 KG/M2 | OXYGEN SATURATION: 95 %

## 2017-10-27 DIAGNOSIS — M54.12 CERVICAL RADICULOPATHY: Primary | ICD-10-CM

## 2017-10-27 DIAGNOSIS — M47.812 ARTHROPATHY OF CERVICAL FACET JOINT: ICD-10-CM

## 2017-10-27 DIAGNOSIS — M48.02 STENOSIS, CERVICAL SPINE: ICD-10-CM

## 2017-10-27 DIAGNOSIS — S13.4XXS ATLANTO-AXIAL JOINT SPRAIN, SEQUELA: ICD-10-CM

## 2017-10-27 DIAGNOSIS — M47.812 OSTEOARTHRITIS OF CERVICAL SPINE, UNSPECIFIED SPINAL OSTEOARTHRITIS COMPLICATION STATUS: ICD-10-CM

## 2017-10-27 DIAGNOSIS — M48.02 DEGENERATIVE CERVICAL SPINAL STENOSIS: ICD-10-CM

## 2017-10-27 DIAGNOSIS — S13.4XXS SPRAIN OF ATLANTO-OCCIPITAL JOINT, SEQUELA: ICD-10-CM

## 2017-10-27 DIAGNOSIS — M79.18 MYOFACIAL MUSCLE PAIN: ICD-10-CM

## 2017-10-27 DIAGNOSIS — Z51.81 ENCOUNTER FOR MEDICATION MONITORING: ICD-10-CM

## 2017-10-27 PROCEDURE — 99213 OFFICE O/P EST LOW 20 MIN: CPT

## 2017-10-27 PROCEDURE — 99214 OFFICE O/P EST MOD 30 MIN: CPT | Performed by: PAIN MEDICINE

## 2017-10-27 RX ORDER — OXYCODONE AND ACETAMINOPHEN 7.5; 325 MG/1; MG/1
1 TABLET ORAL EVERY 8 HOURS PRN
Qty: 90 TABLET | Refills: 0 | Status: CANCELLED | OUTPATIENT
Start: 2017-10-28

## 2017-10-27 RX ORDER — OXYCODONE AND ACETAMINOPHEN 7.5; 325 MG/1; MG/1
1 TABLET ORAL EVERY 8 HOURS PRN
Qty: 90 TABLET | Refills: 0 | Status: SHIPPED | OUTPATIENT
Start: 2017-10-27 | End: 2017-12-12 | Stop reason: SDUPTHER

## 2017-10-27 RX ORDER — TIZANIDINE 4 MG/1
4 TABLET ORAL 2 TIMES DAILY PRN
Qty: 60 TABLET | Refills: 2 | Status: SHIPPED | OUTPATIENT
Start: 2017-10-27 | End: 2018-01-16 | Stop reason: SDUPTHER

## 2017-10-27 ASSESSMENT — ENCOUNTER SYMPTOMS
SHORTNESS OF BREATH: 0
SORE THROAT: 0
RESPIRATORY NEGATIVE: 1
EYES NEGATIVE: 1
GASTROINTESTINAL NEGATIVE: 1

## 2017-10-27 ASSESSMENT — PAIN DESCRIPTION - ONSET: ONSET: ON-GOING

## 2017-10-27 ASSESSMENT — PAIN DESCRIPTION - FREQUENCY: FREQUENCY: CONTINUOUS

## 2017-10-27 ASSESSMENT — PAIN DESCRIPTION - PAIN TYPE: TYPE: CHRONIC PAIN

## 2017-10-27 ASSESSMENT — PAIN DESCRIPTION - PROGRESSION: CLINICAL_PROGRESSION: GRADUALLY WORSENING

## 2017-10-27 ASSESSMENT — PAIN DESCRIPTION - DESCRIPTORS: DESCRIPTORS: ACHING;THROBBING;STABBING

## 2017-10-27 ASSESSMENT — PAIN DESCRIPTION - ORIENTATION: ORIENTATION: RIGHT;LEFT

## 2017-10-27 ASSESSMENT — PAIN SCALES - GENERAL: PAINLEVEL_OUTOF10: 10

## 2017-10-27 ASSESSMENT — PAIN DESCRIPTION - LOCATION: LOCATION: NECK;HEAD

## 2017-10-27 NOTE — PROGRESS NOTES
Throbbing, Stabbing  Pain Frequency: Continuous  Pain Onset: On-going  Clinical Progression: Gradually worsening  Effect of Pain on Daily Activities: pain increases with head movement, lying flat  Patient's Stated Pain Goal:  (pain at 2 with head movement)  Pain Intervention(s): Medication (see eMar), Rest, Repositioned                    ADVERSE MEDICATION EFFECTS:   Constipation: no  Bowel Regimen: No:   Diet: common adult  Appetite:  Not normal  Sedation:  no  Urinary Retention: no    FOCUSED PAIN SCALE:  Highest : 10  Lowest :5  Average: Range-5  When and What  was your last procedure:    Cervical facet joint injection 8-2017  Was your procedure effective:  no    ACTIVITY/SOCIAL/EMOTIONAL:  Sleep Pattern: 4 hours per night. nightime awakenings  Energy Level:  Tired/Fatigued  Currently attending Physical Therapy:  No  Home Exercises: daily home exercises  Mobility: no current mobility problem   Currently seeing a Psychiatrist or Psychologist:  Yes  Emotional Issues: anxiety/ nervousness, depression- denies suicidal thoughts, frustration   Mood: flat affect     ABERRANT BEHAVIORS SINCE LAST VISIT:  Have you ever been treated in another Pain Clinic no  Refills for prescriptions appropriate: yes  Lost rx/pills: no  Taking more medication than prescribed:  no  Are you receiving PAIN medications from  other doctors: no  Last Urine/Serum Drug Screen :3-2017  Was Serum/UDS as anticipated?  negative  Brought pill bottles in :yes   Was Pill count appropriate? :yes   Are currently pregnant? not applicable  Recent ER visits: Yes.  [facility] for neck and head aches x 3             Past Medical History      Diagnosis Date    Anxiety     Fatty liver     GERD (gastroesophageal reflux disease)     Headache     History of bronchitis     Hyperlipidemia     Hypothyroidism     Kidney stone     LFT elevation     MVP (mitral valve prolapse)     Obesity     Umbilical hernia        Surgical History  Past Surgical History: Procedure Laterality Date    APPENDECTOMY       SECTION      2 pfannenstiel, 1 vertical    CHOLECYSTECTOMY      COLONOSCOPY      Dr Frankie Trejo  14    COLONOSCOPY  2014    biopsy & sigmoid spasms, pathology negative    HERNIA REPAIR      x 5    HYSTERECTOMY      2010    CO EXPLORATORY OF ABDOMEN  10/21/2014    Laparotomy-lysis of adhesions, bso     UPPER GASTROINTESTINAL ENDOSCOPY  2010    mild chronic inactive gastritis       Medications  Current Outpatient Prescriptions   Medication Sig Dispense Refill    oxyCODONE-acetaminophen (PERCOCET) 7.5-325 MG per tablet Take 1 tablet by mouth every 8 hours as needed for Pain . 90 tablet 0    tiZANidine (ZANAFLEX) 4 MG tablet Take 1 tablet by mouth 2 times daily as needed (pain) 60 tablet 2    gabapentin (NEURONTIN) 300 MG capsule Take 1 capsule by mouth 3 times daily 60 capsule 0    sertraline (ZOLOFT) 100 MG tablet TK 2 TS PO QAM  2    topiramate (TOPAMAX) 100 MG tablet TK 1 T PO HS  2    metoprolol tartrate (LOPRESSOR) 50 MG tablet Take 50 mg by mouth 2 times daily       rOPINIRole (REQUIP) 1 MG tablet TAKE 1 TABLET BY MOUTH EVERY NIGHT 30 tablet 6    levothyroxine (SYNTHROID) 125 MCG tablet TAKE 1 TABLET BY MOUTH TWICE DAILY 60 tablet 11    NEXIUM 40 MG delayed release capsule TAKE 1 CAPSULE BY MOUTH EVERY MORNING BEFORE BREAKFAST 90 capsule 3    atorvastatin (LIPITOR) 40 MG tablet TK 1 T PO ONCE A DAY  5    clonazePAM (KLONOPIN) 0.5 MG tablet TK 1 T PO  TID  1    glucose blood VI test strips (WILLIAM CONTOUR TEST) strip 1 each by In Vitro route 2 times daily. As needed. 200 each 3    Lancets MISC Testing bid 200 each 3     No current facility-administered medications for this encounter.         Allergies  Morphine; Sulfa antibiotics; and Adhesive tape    Family History  family history includes Breast Cancer in her maternal grandmother; Cancer in her mother; Diabetes in her father; Heart Disease in her father; Bilateral  Palpation:  Spinous process:         mild tenderness   Paraspinal muscles:   Right side--  moderate tenderness                                       Left side ---  moderate tenderness   Trigger point - Absent  Bilateral  Palpable masses:  absent  Movements of the cervical spine as indicated above are: diminished range with pain   Facet loading---  : Right side--    Pain-Moderate                                   Left side----   Pain  Moderate  Cervical traction test :   Right side---:  Negative   Left side-----:   Positive  Spurling's Test   Right side---:  Positive  Left side-----:  Positive            Nurses Notes and Vital Signs reviewed.     DATA  Labs:  3/24/2017 10:52 PM - Tyree Hatfield Incoming Lab Results From [facility]     Component Results     Component Value Ref Range & Units Status Collected Lab   Pain Management Drug Panel Interp, Urine Inconsistent   Final 03/20/2017  1:00 PM [facility]   (NOTE)   ________________________________________________________________   DRUGS EXPECTED:   [address] (HYDROCODONE) [2-3 WEEKS AGO]   VALIUM (DIAZEPAM) [2-3 WEEKS AGO]   ________________________________________________________________   CONSISTENT with medications provided:   NORCO (HYDROCODONE) : based on the absence of hydrocodone and   metabolites   VALIUM (DIAZEPAM) : based on the absence of diazepam and   metabolites   ________________________________________________________________   INCONSISTENT with medications provided:   7-Aminoclonazepam   ________________________________________________________________   Drugs Not Included in this Assay:   Acetaminophen      Imaging:  Radiology Images and Reports reviewed where indicated and necessary  FINDINGS:   BONES/ALIGNMENT: Mild dextroconvex cervicothoracic spinal curvature.       SPINAL CORD: No abnormal cord signal is seen.       SOFT TISSUES: No paraspinal mass identified.       C2-C3: Slight central disc contour prominence to indicate a small disc   protrusion with only minimal central spinal stenosis.       C3-C4: Left paramedian disc contour prominence and associated uncovertebral   joint hypertrophy.       Resulting mild left lateral recess stenosis.  Moderate left foraminal   stenosis.       C4-C5: Disc bulge.  Mild central spinal stenosis.  Mild left foraminal   stenosis.  Slight flattening of the ventral cord contour.       C5-C6: Decreased disc space height.  Diffuse disc bulge or broad-based disc   protrusion more prominent on the left.  Mild central spinal stenosis. Asymmetric mild left lateral recess stenosis.  Mild to moderate left   foraminal stenosis.  Minimal flattening of the ventral cord contour.       C6-C7: Slight disc bulge.  Minimal central spinal stenosis.       C7-T1: There is no significant disc protrusion, spinal canal stenosis or   neural foraminal narrowing.           Impression   1. Disc bulges or disc protrusions with associated mild central spinal   stenosis as detailed above at C2-3 to C6-7 levels, worst at C4-5 and C5-6.   2. Multilevel left-sided foraminal stenosis as detailed above. ASSESSMENT    Danielle Dodd is a 56 y.o. female with     1. Cervical radiculopathy    2. Osteoarthritis of cervical spine, unspecified spinal osteoarthritis complication status    3. Stenosis, cervical spine    4. Sprain of atlanto-occipital joint, sequela    5. Arthropathy of cervical facet joint    6. Atlanto-axial joint sprain, sequela    7. Degenerative cervical spinal stenosis    8. Myofacial muscle pain--triggerpoint lt. trapezius    9.  Encounter for medication monitoring      Patient Active Problem List   Diagnosis    Hyperlipidemia    MVP (mitral valve prolapse)    Anxiety    Hypothyroidism    Allergic rhinitis    Obesity    Abdominal pain    Elevated liver enzymes    GERD (gastroesophageal reflux disease)    Ovarian mass    S/P bilateral oophorectomy & KRUPA 10/21/14    Pain emptying bladder    Urinary urgency    Insomnia    RLS (restless legs syndrome)    Pancreatitis    Eye swelling, left    Diabetes mellitus (HCC)    Type 2 diabetes mellitus without complication (HCC)    Osteoarthritis of cervical spine    Degenerative cervical spinal stenosis    Trigger point with tension headache    Arthropathy of cervical facet joint    Atlanto-axial joint sprain    Cervical radiculopathy    Sprain of atlanto-occipital joint    Encounter for medication monitoring    Myofacial muscle pain       PLAN    We will continue current pain medications  Current medications are being tolerated without any Adverse side effects. Orders Placed This Encounter   Medications    oxyCODONE-acetaminophen (PERCOCET) 7.5-325 MG per tablet     Sig: Take 1 tablet by mouth every 8 hours as needed for Pain . Dispense:  90 tablet     Refill:  0    tiZANidine (ZANAFLEX) 4 MG tablet     Sig: Take 1 tablet by mouth 2 times daily as needed (pain)     Dispense:  60 tablet     Refill:  2     Urine drug screens have been appropriate. No aberrant activity noted. Analgesia is achieved. Activities of daily living are possible because of medications. Safe use of medications explained to patient. Controlled Substances Monitoring:     Attestation: The Prescription Monitoring Report for this patient was reviewed today. (Loyda Negrete MD)  Documentation: Possible medication side effects, risk of tolerance and/or dependence, and alternative treatments discussed., No signs of potential drug abuse or diversion identified., Existing medication contract. (Loyda Negrete MD)    Counselling/Preventive measures for pain  Control:    [x]  Spine strengthening exercises are discussed with patient in detail. [x] Ill effects of being on chronic pain medications such as sleep disturbances, hormonal changes, withdrawal symptoms,  chronic opioid dependence and tolerance were discussed with patient.  I had asked the patient to minimize medication use and utilize patient at length. I also discussed with her the treatment options. Patient reports she is going to see a neurosurgeon at [facility] the next couple of weeks. Would like to wait for the assessment of the neurosurgeon before we plan on any further interventions. She did undergo facet joint injections in August which did not give her any pain relief. She does exhibit some radicular symptoms on the left side. I did discuss with her about cervical epidural steroid injections. We will wait for the surgeons assessment before planning any further treatment. Decision Making Process : Patient's health history and referral records thoroughly reviewed before focused physical examination and discussion with patient. Over 50% of today's visit is spent on examining the patient and counseling. Level of complexity of date to be reviewed is Moderate. The chart date reviewed include the following: Imaging Reports. Summary of Care. Time spent reviewing with patient the below reports:   Medication safety, Treatment options. Level of diagnosis and management options of this case is multiple: involving the following management options: Interventions as needed, medication management as appropriate, future visits, activity modification, heat/ice as needed, Urine drug screen as required. [x]The patient's questions were answered to the best of my abilities. This note was created using voice recognition software. There may be inaccuracies of transcription  that are inadvertently overlooked prior to the signature. There is any questions about the transcription please contact me. Return in  4 weeks  with Italia Chun M.D.  for further plan of treatment.     Electronically signed by Immanuel Romero MD on 10/28/2017 at 4:47 AM

## 2017-10-28 ASSESSMENT — ENCOUNTER SYMPTOMS
NAUSEA: 0
DOUBLE VISION: 0
BLURRED VISION: 0
ORTHOPNEA: 0
PHOTOPHOBIA: 0
CONSTIPATION: 0
COUGH: 0
VOMITING: 0
HEARTBURN: 0

## 2017-12-02 ENCOUNTER — HOSPITAL ENCOUNTER (INPATIENT)
Age: 47
LOS: 2 days | Discharge: HOME | DRG: 391 | End: 2017-12-05
Attending: EMERGENCY MEDICINE | Admitting: INTERNAL MEDICINE
Payer: MEDICARE

## 2017-12-02 ENCOUNTER — APPOINTMENT (OUTPATIENT)
Dept: CT IMAGING | Age: 47
DRG: 391 | End: 2017-12-02
Payer: MEDICARE

## 2017-12-02 DIAGNOSIS — K52.9 COLITIS: Primary | ICD-10-CM

## 2017-12-02 LAB
ABSOLUTE EOS #: 0.1 K/UL (ref 0–0.4)
ABSOLUTE IMMATURE GRANULOCYTE: ABNORMAL K/UL (ref 0–0.3)
ABSOLUTE LYMPH #: 0.6 K/UL (ref 1–4.8)
ABSOLUTE MONO #: 0.4 K/UL (ref 0.1–1.3)
ALBUMIN SERPL-MCNC: 4.1 G/DL (ref 3.5–5.2)
ALBUMIN/GLOBULIN RATIO: NORMAL (ref 1–2.5)
ALP BLD-CCNC: 96 U/L (ref 35–104)
ALT SERPL-CCNC: 18 U/L (ref 5–33)
ANION GAP SERPL CALCULATED.3IONS-SCNC: 18 MMOL/L (ref 9–17)
AST SERPL-CCNC: 20 U/L
BASOPHILS # BLD: 0 % (ref 0–2)
BASOPHILS ABSOLUTE: 0 K/UL (ref 0–0.2)
BILIRUB SERPL-MCNC: 0.45 MG/DL (ref 0.3–1.2)
BILIRUBIN DIRECT: 0.1 MG/DL
BILIRUBIN, INDIRECT: 0.35 MG/DL (ref 0–1)
BUN BLDV-MCNC: 21 MG/DL (ref 6–20)
BUN/CREAT BLD: ABNORMAL (ref 9–20)
CALCIUM SERPL-MCNC: 9.5 MG/DL (ref 8.6–10.4)
CHLORIDE BLD-SCNC: 106 MMOL/L (ref 98–107)
CO2: 17 MMOL/L (ref 20–31)
CREAT SERPL-MCNC: 0.78 MG/DL (ref 0.5–0.9)
DIFFERENTIAL TYPE: ABNORMAL
EOSINOPHILS RELATIVE PERCENT: 2 % (ref 0–4)
GFR AFRICAN AMERICAN: >60 ML/MIN
GFR NON-AFRICAN AMERICAN: >60 ML/MIN
GFR SERPL CREATININE-BSD FRML MDRD: ABNORMAL ML/MIN/{1.73_M2}
GFR SERPL CREATININE-BSD FRML MDRD: ABNORMAL ML/MIN/{1.73_M2}
GLOBULIN: NORMAL G/DL (ref 1.5–3.8)
GLUCOSE BLD-MCNC: 133 MG/DL (ref 70–99)
HCT VFR BLD CALC: 46.3 % (ref 36–46)
HEMOGLOBIN: 16 G/DL (ref 12–16)
IMMATURE GRANULOCYTES: ABNORMAL %
LACTIC ACID: 1.1 MMOL/L (ref 0.5–2.2)
LIPASE: 18 U/L (ref 13–60)
LYMPHOCYTES # BLD: 8 % (ref 24–44)
MCH RBC QN AUTO: 31.5 PG (ref 26–34)
MCHC RBC AUTO-ENTMCNC: 34.5 G/DL (ref 31–37)
MCV RBC AUTO: 91.2 FL (ref 80–100)
MONOCYTES # BLD: 5 % (ref 1–7)
PDW BLD-RTO: 12.8 % (ref 11.5–14.9)
PLATELET # BLD: 144 K/UL (ref 150–450)
PLATELET ESTIMATE: ABNORMAL
PMV BLD AUTO: 8.9 FL (ref 6–12)
POTASSIUM SERPL-SCNC: 3.3 MMOL/L (ref 3.7–5.3)
RBC # BLD: 5.08 M/UL (ref 4–5.2)
RBC # BLD: ABNORMAL 10*6/UL
SEG NEUTROPHILS: 85 % (ref 36–66)
SEGMENTED NEUTROPHILS ABSOLUTE COUNT: 6.2 K/UL (ref 1.3–9.1)
SODIUM BLD-SCNC: 141 MMOL/L (ref 135–144)
TOTAL PROTEIN: 7.3 G/DL (ref 6.4–8.3)
WBC # BLD: 7.4 K/UL (ref 3.5–11)
WBC # BLD: ABNORMAL 10*3/UL

## 2017-12-02 PROCEDURE — 80048 BASIC METABOLIC PNL TOTAL CA: CPT

## 2017-12-02 PROCEDURE — 83690 ASSAY OF LIPASE: CPT

## 2017-12-02 PROCEDURE — 99285 EMERGENCY DEPT VISIT HI MDM: CPT

## 2017-12-02 PROCEDURE — 36415 COLL VENOUS BLD VENIPUNCTURE: CPT

## 2017-12-02 PROCEDURE — 6360000002 HC RX W HCPCS: Performed by: EMERGENCY MEDICINE

## 2017-12-02 PROCEDURE — 83605 ASSAY OF LACTIC ACID: CPT

## 2017-12-02 PROCEDURE — 2580000003 HC RX 258: Performed by: EMERGENCY MEDICINE

## 2017-12-02 PROCEDURE — 74177 CT ABD & PELVIS W/CONTRAST: CPT

## 2017-12-02 PROCEDURE — 81001 URINALYSIS AUTO W/SCOPE: CPT

## 2017-12-02 PROCEDURE — 96375 TX/PRO/DX INJ NEW DRUG ADDON: CPT

## 2017-12-02 PROCEDURE — 80076 HEPATIC FUNCTION PANEL: CPT

## 2017-12-02 PROCEDURE — 85025 COMPLETE CBC W/AUTO DIFF WBC: CPT

## 2017-12-02 PROCEDURE — 96374 THER/PROPH/DIAG INJ IV PUSH: CPT

## 2017-12-02 PROCEDURE — 6360000004 HC RX CONTRAST MEDICATION: Performed by: EMERGENCY MEDICINE

## 2017-12-02 PROCEDURE — 80307 DRUG TEST PRSMV CHEM ANLYZR: CPT

## 2017-12-02 RX ORDER — FENTANYL CITRATE 50 UG/ML
25 INJECTION, SOLUTION INTRAMUSCULAR; INTRAVENOUS ONCE
Status: COMPLETED | OUTPATIENT
Start: 2017-12-03 | End: 2017-12-02

## 2017-12-02 RX ORDER — 0.9 % SODIUM CHLORIDE 0.9 %
1000 INTRAVENOUS SOLUTION INTRAVENOUS ONCE
Status: COMPLETED | OUTPATIENT
Start: 2017-12-02 | End: 2017-12-03

## 2017-12-02 RX ORDER — FENTANYL CITRATE 50 UG/ML
50 INJECTION, SOLUTION INTRAMUSCULAR; INTRAVENOUS ONCE
Status: COMPLETED | OUTPATIENT
Start: 2017-12-02 | End: 2017-12-02

## 2017-12-02 RX ORDER — 0.9 % SODIUM CHLORIDE 0.9 %
100 INTRAVENOUS SOLUTION INTRAVENOUS ONCE
Status: COMPLETED | OUTPATIENT
Start: 2017-12-02 | End: 2017-12-02

## 2017-12-02 RX ORDER — 0.9 % SODIUM CHLORIDE 0.9 %
1000 INTRAVENOUS SOLUTION INTRAVENOUS ONCE
Status: DISCONTINUED | OUTPATIENT
Start: 2017-12-03 | End: 2017-12-05 | Stop reason: HOSPADM

## 2017-12-02 RX ORDER — ONDANSETRON 2 MG/ML
4 INJECTION INTRAMUSCULAR; INTRAVENOUS ONCE
Status: COMPLETED | OUTPATIENT
Start: 2017-12-02 | End: 2017-12-02

## 2017-12-02 RX ORDER — 0.9 % SODIUM CHLORIDE 0.9 %
1000 INTRAVENOUS SOLUTION INTRAVENOUS ONCE
Status: COMPLETED | OUTPATIENT
Start: 2017-12-02 | End: 2017-12-02

## 2017-12-02 RX ORDER — SODIUM CHLORIDE 0.9 % (FLUSH) 0.9 %
10 SYRINGE (ML) INJECTION PRN
Status: DISCONTINUED | OUTPATIENT
Start: 2017-12-02 | End: 2017-12-05 | Stop reason: HOSPADM

## 2017-12-02 RX ADMIN — ONDANSETRON 4 MG: 2 INJECTION INTRAMUSCULAR; INTRAVENOUS at 22:54

## 2017-12-02 RX ADMIN — SODIUM CHLORIDE 1000 ML: 9 INJECTION, SOLUTION INTRAVENOUS at 23:54

## 2017-12-02 RX ADMIN — FENTANYL CITRATE 50 MCG: 50 INJECTION, SOLUTION INTRAMUSCULAR; INTRAVENOUS at 22:54

## 2017-12-02 RX ADMIN — IOPAMIDOL 100 ML: 755 INJECTION, SOLUTION INTRAVENOUS at 23:21

## 2017-12-02 RX ADMIN — SODIUM CHLORIDE 100 ML: 9 INJECTION, SOLUTION INTRAVENOUS at 23:21

## 2017-12-02 RX ADMIN — Medication 10 ML: at 23:21

## 2017-12-02 RX ADMIN — SODIUM CHLORIDE 1000 ML: 9 INJECTION, SOLUTION INTRAVENOUS at 22:54

## 2017-12-02 RX ADMIN — FENTANYL CITRATE 25 MCG: 50 INJECTION, SOLUTION INTRAMUSCULAR; INTRAVENOUS at 23:51

## 2017-12-02 ASSESSMENT — PAIN SCALES - GENERAL
PAINLEVEL_OUTOF10: 10
PAINLEVEL_OUTOF10: 10
PAINLEVEL_OUTOF10: 9

## 2017-12-02 ASSESSMENT — PAIN DESCRIPTION - DESCRIPTORS: DESCRIPTORS: SHARP

## 2017-12-02 ASSESSMENT — PAIN DESCRIPTION - PAIN TYPE: TYPE: ACUTE PAIN

## 2017-12-02 ASSESSMENT — PAIN DESCRIPTION - FREQUENCY: FREQUENCY: INTERMITTENT

## 2017-12-02 ASSESSMENT — PAIN DESCRIPTION - LOCATION: LOCATION: ABDOMEN

## 2017-12-03 PROBLEM — K52.9 COLITIS: Status: ACTIVE | Noted: 2017-12-03

## 2017-12-03 LAB
-: ABNORMAL
ABSOLUTE EOS #: 0.1 K/UL (ref 0–0.4)
ABSOLUTE IMMATURE GRANULOCYTE: ABNORMAL K/UL (ref 0–0.3)
ABSOLUTE LYMPH #: 0.7 K/UL (ref 1–4.8)
ABSOLUTE MONO #: 0.3 K/UL (ref 0.1–1.3)
AMORPHOUS: ABNORMAL
AMPHETAMINE SCREEN URINE: NEGATIVE
ANION GAP SERPL CALCULATED.3IONS-SCNC: 12 MMOL/L (ref 9–17)
BACTERIA: ABNORMAL
BARBITURATE SCREEN URINE: NEGATIVE
BASOPHILS # BLD: 1 % (ref 0–2)
BASOPHILS ABSOLUTE: 0 K/UL (ref 0–0.2)
BENZODIAZEPINE SCREEN, URINE: NEGATIVE
BILIRUBIN URINE: NEGATIVE
BUN BLDV-MCNC: 16 MG/DL (ref 6–20)
BUN/CREAT BLD: ABNORMAL (ref 9–20)
BUPRENORPHINE URINE: ABNORMAL
C DIFFICILE TOXINS, PCR: NORMAL
CALCIUM SERPL-MCNC: 8.2 MG/DL (ref 8.6–10.4)
CANNABINOID SCREEN URINE: NEGATIVE
CASTS UA: ABNORMAL /LPF
CHLORIDE BLD-SCNC: 111 MMOL/L (ref 98–107)
CO2: 20 MMOL/L (ref 20–31)
COCAINE METABOLITE, URINE: NEGATIVE
COLOR: YELLOW
COMMENT UA: ABNORMAL
CREAT SERPL-MCNC: 0.58 MG/DL (ref 0.5–0.9)
CRYSTALS, UA: ABNORMAL /HPF
DIFFERENTIAL TYPE: ABNORMAL
EOSINOPHILS RELATIVE PERCENT: 3 % (ref 0–4)
EPITHELIAL CELLS UA: ABNORMAL /HPF
GFR AFRICAN AMERICAN: >60 ML/MIN
GFR NON-AFRICAN AMERICAN: >60 ML/MIN
GFR SERPL CREATININE-BSD FRML MDRD: ABNORMAL ML/MIN/{1.73_M2}
GFR SERPL CREATININE-BSD FRML MDRD: ABNORMAL ML/MIN/{1.73_M2}
GLUCOSE BLD-MCNC: 97 MG/DL (ref 70–99)
GLUCOSE URINE: NEGATIVE
HCT VFR BLD CALC: 39.1 % (ref 36–46)
HEMOGLOBIN: 13.1 G/DL (ref 12–16)
IMMATURE GRANULOCYTES: ABNORMAL %
KETONES, URINE: NEGATIVE
LACTIC ACID: 0.7 MMOL/L (ref 0.5–2.2)
LEUKOCYTE ESTERASE, URINE: NEGATIVE
LYMPHOCYTES # BLD: 17 % (ref 24–44)
MCH RBC QN AUTO: 30.8 PG (ref 26–34)
MCHC RBC AUTO-ENTMCNC: 33.4 G/DL (ref 31–37)
MCV RBC AUTO: 92.1 FL (ref 80–100)
MDMA URINE: ABNORMAL
METHADONE SCREEN, URINE: NEGATIVE
METHAMPHETAMINE, URINE: ABNORMAL
MONOCYTES # BLD: 8 % (ref 1–7)
MUCUS: ABNORMAL
NITRITE, URINE: NEGATIVE
OPIATES, URINE: NEGATIVE
OTHER OBSERVATIONS UA: ABNORMAL
OXYCODONE SCREEN URINE: POSITIVE
PDW BLD-RTO: 12.4 % (ref 11.5–14.9)
PH UA: 5.5 (ref 5–8)
PHENCYCLIDINE, URINE: NEGATIVE
PLATELET # BLD: 110 K/UL (ref 150–450)
PLATELET ESTIMATE: ABNORMAL
PMV BLD AUTO: 8.3 FL (ref 6–12)
POTASSIUM SERPL-SCNC: 3.8 MMOL/L (ref 3.7–5.3)
PROPOXYPHENE, URINE: ABNORMAL
PROTEIN UA: NEGATIVE
RBC # BLD: 4.25 M/UL (ref 4–5.2)
RBC # BLD: ABNORMAL 10*6/UL
RBC UA: ABNORMAL /HPF
RENAL EPITHELIAL, UA: ABNORMAL /HPF
SEG NEUTROPHILS: 71 % (ref 36–66)
SEGMENTED NEUTROPHILS ABSOLUTE COUNT: 3 K/UL (ref 1.3–9.1)
SODIUM BLD-SCNC: 143 MMOL/L (ref 135–144)
SPECIFIC GRAVITY UA: 1.08 (ref 1–1.03)
SPECIMEN DESCRIPTION: NORMAL
TEST INFORMATION: ABNORMAL
TRICHOMONAS: ABNORMAL
TRICYCLIC ANTIDEPRESSANTS, UR: ABNORMAL
TURBIDITY: CLEAR
URINE HGB: ABNORMAL
UROBILINOGEN, URINE: NORMAL
WBC # BLD: 4.2 K/UL (ref 3.5–11)
WBC # BLD: ABNORMAL 10*3/UL
WBC UA: ABNORMAL /HPF
YEAST: ABNORMAL

## 2017-12-03 PROCEDURE — 2500000003 HC RX 250 WO HCPCS: Performed by: EMERGENCY MEDICINE

## 2017-12-03 PROCEDURE — 85025 COMPLETE CBC W/AUTO DIFF WBC: CPT

## 2017-12-03 PROCEDURE — 6360000002 HC RX W HCPCS: Performed by: INTERNAL MEDICINE

## 2017-12-03 PROCEDURE — 99223 1ST HOSP IP/OBS HIGH 75: CPT | Performed by: INTERNAL MEDICINE

## 2017-12-03 PROCEDURE — 2500000003 HC RX 250 WO HCPCS: Performed by: INTERNAL MEDICINE

## 2017-12-03 PROCEDURE — 96376 TX/PRO/DX INJ SAME DRUG ADON: CPT

## 2017-12-03 PROCEDURE — 1200000000 HC SEMI PRIVATE

## 2017-12-03 PROCEDURE — 2580000003 HC RX 258: Performed by: INTERNAL MEDICINE

## 2017-12-03 PROCEDURE — 83605 ASSAY OF LACTIC ACID: CPT

## 2017-12-03 PROCEDURE — 6370000000 HC RX 637 (ALT 250 FOR IP): Performed by: INTERNAL MEDICINE

## 2017-12-03 PROCEDURE — 99222 1ST HOSP IP/OBS MODERATE 55: CPT | Performed by: INTERNAL MEDICINE

## 2017-12-03 PROCEDURE — 6360000002 HC RX W HCPCS: Performed by: EMERGENCY MEDICINE

## 2017-12-03 PROCEDURE — 36415 COLL VENOUS BLD VENIPUNCTURE: CPT

## 2017-12-03 PROCEDURE — 87493 C DIFF AMPLIFIED PROBE: CPT

## 2017-12-03 PROCEDURE — 80048 BASIC METABOLIC PNL TOTAL CA: CPT

## 2017-12-03 RX ORDER — ONDANSETRON 2 MG/ML
4 INJECTION INTRAMUSCULAR; INTRAVENOUS EVERY 8 HOURS PRN
Status: DISCONTINUED | OUTPATIENT
Start: 2017-12-03 | End: 2017-12-05 | Stop reason: HOSPADM

## 2017-12-03 RX ORDER — HYDROMORPHONE HCL 110MG/55ML
1 PATIENT CONTROLLED ANALGESIA SYRINGE INTRAVENOUS EVERY 4 HOURS PRN
Status: DISCONTINUED | OUTPATIENT
Start: 2017-12-03 | End: 2017-12-05 | Stop reason: HOSPADM

## 2017-12-03 RX ORDER — TIZANIDINE 4 MG/1
4 TABLET ORAL 2 TIMES DAILY PRN
Status: DISCONTINUED | OUTPATIENT
Start: 2017-12-03 | End: 2017-12-05 | Stop reason: HOSPADM

## 2017-12-03 RX ORDER — HYDROMORPHONE HCL 110MG/55ML
1 PATIENT CONTROLLED ANALGESIA SYRINGE INTRAVENOUS ONCE
Status: COMPLETED | OUTPATIENT
Start: 2017-12-03 | End: 2017-12-03

## 2017-12-03 RX ORDER — CIPROFLOXACIN 2 MG/ML
400 INJECTION, SOLUTION INTRAVENOUS ONCE
Status: COMPLETED | OUTPATIENT
Start: 2017-12-03 | End: 2017-12-03

## 2017-12-03 RX ORDER — SERTRALINE HYDROCHLORIDE 100 MG/1
100 TABLET, FILM COATED ORAL DAILY
Status: DISCONTINUED | OUTPATIENT
Start: 2017-12-03 | End: 2017-12-05 | Stop reason: HOSPADM

## 2017-12-03 RX ORDER — POTASSIUM CHLORIDE 7.45 MG/ML
10 INJECTION INTRAVENOUS PRN
Status: DISCONTINUED | OUTPATIENT
Start: 2017-12-03 | End: 2017-12-05 | Stop reason: HOSPADM

## 2017-12-03 RX ORDER — ATORVASTATIN CALCIUM 40 MG/1
40 TABLET, FILM COATED ORAL DAILY
Status: DISCONTINUED | OUTPATIENT
Start: 2017-12-03 | End: 2017-12-05 | Stop reason: HOSPADM

## 2017-12-03 RX ORDER — FENTANYL CITRATE 50 UG/ML
25 INJECTION, SOLUTION INTRAMUSCULAR; INTRAVENOUS
Status: DISCONTINUED | OUTPATIENT
Start: 2017-12-03 | End: 2017-12-05 | Stop reason: HOSPADM

## 2017-12-03 RX ORDER — TOPIRAMATE 25 MG/1
25 TABLET ORAL 2 TIMES DAILY
Status: DISCONTINUED | OUTPATIENT
Start: 2017-12-03 | End: 2017-12-05 | Stop reason: HOSPADM

## 2017-12-03 RX ORDER — GABAPENTIN 300 MG/1
300 CAPSULE ORAL 3 TIMES DAILY
Status: DISCONTINUED | OUTPATIENT
Start: 2017-12-03 | End: 2017-12-05 | Stop reason: HOSPADM

## 2017-12-03 RX ORDER — LEVOTHYROXINE SODIUM 0.12 MG/1
125 TABLET ORAL 2 TIMES DAILY
Status: DISCONTINUED | OUTPATIENT
Start: 2017-12-03 | End: 2017-12-05 | Stop reason: HOSPADM

## 2017-12-03 RX ORDER — SODIUM CHLORIDE 9 MG/ML
INJECTION, SOLUTION INTRAVENOUS CONTINUOUS
Status: DISCONTINUED | OUTPATIENT
Start: 2017-12-03 | End: 2017-12-05 | Stop reason: HOSPADM

## 2017-12-03 RX ORDER — ACETAMINOPHEN 325 MG/1
650 TABLET ORAL EVERY 4 HOURS PRN
Status: DISCONTINUED | OUTPATIENT
Start: 2017-12-03 | End: 2017-12-05 | Stop reason: HOSPADM

## 2017-12-03 RX ORDER — ROPINIROLE 1 MG/1
1 TABLET, FILM COATED ORAL NIGHTLY
Status: DISCONTINUED | OUTPATIENT
Start: 2017-12-03 | End: 2017-12-05 | Stop reason: HOSPADM

## 2017-12-03 RX ORDER — PROMETHAZINE HYDROCHLORIDE 25 MG/ML
12.5 INJECTION, SOLUTION INTRAMUSCULAR; INTRAVENOUS ONCE
Status: COMPLETED | OUTPATIENT
Start: 2017-12-03 | End: 2017-12-03

## 2017-12-03 RX ORDER — SODIUM CHLORIDE 0.9 % (FLUSH) 0.9 %
10 SYRINGE (ML) INJECTION EVERY 12 HOURS SCHEDULED
Status: DISCONTINUED | OUTPATIENT
Start: 2017-12-03 | End: 2017-12-05 | Stop reason: HOSPADM

## 2017-12-03 RX ORDER — METOPROLOL TARTRATE 50 MG/1
50 TABLET, FILM COATED ORAL 2 TIMES DAILY
Status: DISCONTINUED | OUTPATIENT
Start: 2017-12-03 | End: 2017-12-05 | Stop reason: HOSPADM

## 2017-12-03 RX ORDER — PANTOPRAZOLE SODIUM 40 MG/1
40 TABLET, DELAYED RELEASE ORAL
Status: DISCONTINUED | OUTPATIENT
Start: 2017-12-03 | End: 2017-12-05 | Stop reason: HOSPADM

## 2017-12-03 RX ORDER — SODIUM CHLORIDE 0.9 % (FLUSH) 0.9 %
10 SYRINGE (ML) INJECTION PRN
Status: DISCONTINUED | OUTPATIENT
Start: 2017-12-03 | End: 2017-12-05 | Stop reason: HOSPADM

## 2017-12-03 RX ORDER — CIPROFLOXACIN 2 MG/ML
400 INJECTION, SOLUTION INTRAVENOUS EVERY 12 HOURS
Status: DISCONTINUED | OUTPATIENT
Start: 2017-12-03 | End: 2017-12-05

## 2017-12-03 RX ORDER — CLONAZEPAM 0.5 MG/1
0.5 TABLET ORAL 3 TIMES DAILY
Status: DISCONTINUED | OUTPATIENT
Start: 2017-12-03 | End: 2017-12-05 | Stop reason: HOSPADM

## 2017-12-03 RX ORDER — FENTANYL CITRATE 50 UG/ML
50 INJECTION, SOLUTION INTRAMUSCULAR; INTRAVENOUS
Status: DISCONTINUED | OUTPATIENT
Start: 2017-12-03 | End: 2017-12-05 | Stop reason: HOSPADM

## 2017-12-03 RX ADMIN — ATORVASTATIN CALCIUM 40 MG: 40 TABLET, FILM COATED ORAL at 08:36

## 2017-12-03 RX ADMIN — METRONIDAZOLE 500 MG: 500 INJECTION, SOLUTION INTRAVENOUS at 16:22

## 2017-12-03 RX ADMIN — HYDROMORPHONE HYDROCHLORIDE 1 MG: 2 INJECTION, SOLUTION INTRAMUSCULAR; INTRAVENOUS; SUBCUTANEOUS at 20:00

## 2017-12-03 RX ADMIN — HYDROMORPHONE HYDROCHLORIDE 1 MG: 2 INJECTION, SOLUTION INTRAMUSCULAR; INTRAVENOUS; SUBCUTANEOUS at 14:59

## 2017-12-03 RX ADMIN — ROPINIROLE HYDROCHLORIDE 1 MG: 1 TABLET, FILM COATED ORAL at 20:07

## 2017-12-03 RX ADMIN — ONDANSETRON 4 MG: 2 INJECTION INTRAMUSCULAR; INTRAVENOUS at 04:19

## 2017-12-03 RX ADMIN — ENOXAPARIN SODIUM 40 MG: 40 INJECTION SUBCUTANEOUS at 14:59

## 2017-12-03 RX ADMIN — ONDANSETRON 4 MG: 2 INJECTION INTRAMUSCULAR; INTRAVENOUS at 14:06

## 2017-12-03 RX ADMIN — GABAPENTIN 300 MG: 300 CAPSULE ORAL at 20:00

## 2017-12-03 RX ADMIN — CIPROFLOXACIN 400 MG: 2 INJECTION, SOLUTION INTRAVENOUS at 20:02

## 2017-12-03 RX ADMIN — CIPROFLOXACIN 400 MG: 2 INJECTION, SOLUTION INTRAVENOUS at 00:46

## 2017-12-03 RX ADMIN — CIPROFLOXACIN 400 MG: 2 INJECTION, SOLUTION INTRAVENOUS at 08:36

## 2017-12-03 RX ADMIN — LEVOTHYROXINE SODIUM 125 MCG: 125 TABLET ORAL at 20:07

## 2017-12-03 RX ADMIN — FENTANYL CITRATE 50 MCG: 50 INJECTION INTRAMUSCULAR; INTRAVENOUS at 04:19

## 2017-12-03 RX ADMIN — GABAPENTIN 300 MG: 300 CAPSULE ORAL at 08:36

## 2017-12-03 RX ADMIN — SERTRALINE 100 MG: 100 TABLET, FILM COATED ORAL at 08:36

## 2017-12-03 RX ADMIN — PANTOPRAZOLE SODIUM 40 MG: 40 TABLET, DELAYED RELEASE ORAL at 08:36

## 2017-12-03 RX ADMIN — SODIUM CHLORIDE: 9 INJECTION, SOLUTION INTRAVENOUS at 01:57

## 2017-12-03 RX ADMIN — CLONAZEPAM 0.5 MG: 0.5 TABLET ORAL at 20:00

## 2017-12-03 RX ADMIN — METRONIDAZOLE 500 MG: 500 INJECTION, SOLUTION INTRAVENOUS at 10:29

## 2017-12-03 RX ADMIN — TOPIRAMATE 25 MG: 25 TABLET, FILM COATED ORAL at 08:36

## 2017-12-03 RX ADMIN — METOPROLOL TARTRATE 50 MG: 50 TABLET ORAL at 08:36

## 2017-12-03 RX ADMIN — LEVOTHYROXINE SODIUM 125 MCG: 125 TABLET ORAL at 09:41

## 2017-12-03 RX ADMIN — TOPIRAMATE 25 MG: 25 TABLET, FILM COATED ORAL at 20:07

## 2017-12-03 RX ADMIN — CLONAZEPAM 0.5 MG: 0.5 TABLET ORAL at 08:36

## 2017-12-03 RX ADMIN — METRONIDAZOLE 500 MG: 500 INJECTION, SOLUTION INTRAVENOUS at 00:25

## 2017-12-03 RX ADMIN — FENTANYL CITRATE 50 MCG: 50 INJECTION INTRAMUSCULAR; INTRAVENOUS at 08:36

## 2017-12-03 RX ADMIN — HYDROMORPHONE HYDROCHLORIDE 1 MG: 2 INJECTION, SOLUTION INTRAMUSCULAR; INTRAVENOUS; SUBCUTANEOUS at 00:28

## 2017-12-03 RX ADMIN — PROMETHAZINE HYDROCHLORIDE 12.5 MG: 25 INJECTION INTRAMUSCULAR; INTRAVENOUS at 00:46

## 2017-12-03 ASSESSMENT — ENCOUNTER SYMPTOMS
COUGH: 0
VOMITING: 1
DIARRHEA: 1
NAUSEA: 1
SHORTNESS OF BREATH: 0
ABDOMINAL PAIN: 1
SORE THROAT: 0

## 2017-12-03 ASSESSMENT — PAIN SCALES - GENERAL
PAINLEVEL_OUTOF10: 10
PAINLEVEL_OUTOF10: 9
PAINLEVEL_OUTOF10: 8
PAINLEVEL_OUTOF10: 3
PAINLEVEL_OUTOF10: 10
PAINLEVEL_OUTOF10: 5
PAINLEVEL_OUTOF10: 8
PAINLEVEL_OUTOF10: 9
PAINLEVEL_OUTOF10: 7
PAINLEVEL_OUTOF10: 9

## 2017-12-03 ASSESSMENT — PAIN DESCRIPTION - PAIN TYPE: TYPE: ACUTE PAIN

## 2017-12-03 ASSESSMENT — PAIN DESCRIPTION - LOCATION: LOCATION: ABDOMEN

## 2017-12-03 NOTE — ED NOTES
Pt presented to the ED c/o of N/V, diarrhea and abdominal pain. Pt states she ate [employer] chicken for dinner yesterday 12/1/17 around 1900 and began feeling stomach pain around midnight. Pt states her she felt like her stomach was gurgling and cramping and she was experiencing chills. Pt states this morning when she woke up, she started having N/V and diarrhea. Pt states at first her vomit was just undigested food and then it became dry heaves. Pt states her diarrhea is watery. Pt denies blood in vomit and stool. Pt states the abdominal pain has gotten worse, and states she took a percocet for the pain at home. Pt alert and oriented, PMS intact.      Kayla Davidson RN  12/02/17 5340

## 2017-12-03 NOTE — CONSULTS
Gastroenterology Consult Note      Patient: Brooklyn Yousif  : 1970  Acct#:  [id number]     Date:  12/3/2017    Subjective:       History of Present Illness  Patient is a 56 y.o.  female admitted with Colitis [K52.9] who is seen in consult for abd pain     Patient presenting with diffuse abdominal pain, started after eating [employer] dinner, the pain was significant started at midnight, there was a lot of gurgling and cramping, he was associate with some chills associated with some vomiting finally she has nothing to vomiting when she started dry heaving  And became very nauseated, also he was associate with Diarrhea, no blood no black stool, watery stool times  Workup in the emergency room showed colitis on the CAT scan  CBC is normal with a normal hemoglobin  Liver enzymes are normal with normal lipase  Past Medical History:   Diagnosis Date    Anxiety     Fatty liver     GERD (gastroesophageal reflux disease)     Headache     History of bronchitis     Hyperlipidemia     Hypothyroidism     Kidney stone     LFT elevation     MVP (mitral valve prolapse)     Obesity     Umbilical hernia       Past Surgical History:   Procedure Laterality Date    APPENDECTOMY       SECTION      2 pfannenstiel, 1 vertical    CHOLECYSTECTOMY      COLONOSCOPY      Dr Aminata Still  14    COLONOSCOPY  2014    biopsy & sigmoid spasms, pathology negative    HERNIA REPAIR      x 5    HYSTERECTOMY      2010    ND EXPLORATORY OF ABDOMEN  10/21/2014    Laparotomy-lysis of adhesions, bso     UPPER GASTROINTESTINAL ENDOSCOPY  2010    mild chronic inactive gastritis      Past Endoscopic History >5 years ago     Admission Meds  No current facility-administered medications on file prior to encounter.       Current Outpatient Prescriptions on File Prior to Encounter   Medication Sig Dispense Refill    oxyCODONE-acetaminophen (PERCOCET) 7.5-325 MG per tablet Take 1 tablet by mouth every 8 hours as needed for Pain . 90 tablet 0    tiZANidine (ZANAFLEX) 4 MG tablet Take 1 tablet by mouth 2 times daily as needed (pain) 60 tablet 2    gabapentin (NEURONTIN) 300 MG capsule Take 1 capsule by mouth 3 times daily 60 capsule 0    sertraline (ZOLOFT) 100 MG tablet TK 2 TS PO QAM  2    topiramate (TOPAMAX) 25 MG tablet TK 1 T PO BID  2    metoprolol tartrate (LOPRESSOR) 50 MG tablet Take 50 mg by mouth 2 times daily       rOPINIRole (REQUIP) 1 MG tablet TAKE 1 TABLET BY MOUTH EVERY NIGHT 30 tablet 6    levothyroxine (SYNTHROID) 125 MCG tablet TAKE 1 TABLET BY MOUTH TWICE DAILY 60 tablet 11    NEXIUM 40 MG delayed release capsule TAKE 1 CAPSULE BY MOUTH EVERY MORNING BEFORE BREAKFAST 90 capsule 3    atorvastatin (LIPITOR) 40 MG tablet TK 1 T PO ONCE A DAY  5    clonazePAM (KLONOPIN) 0.5 MG tablet TK 1 T PO  TID  1    glucose blood VI test strips (WILLIAM CONTOUR TEST) strip 1 each by In Vitro route 2 times daily. As needed. 200 each 3    Lancets MISC Testing bid 200 each 3       Patient   Does Use ASA, NSAID Yes  Allergies  Allergies   Allergen Reactions    Morphine Itching    Sulfa Antibiotics Hives    Adhesive Tape Rash        Social   Social History   Substance Use Topics    Smoking status: Current Some Day Smoker     Packs/day: 0.50     Years: 20.00     Types: Cigarettes    Smokeless tobacco: Never Used      Comment: 1/2 pack every 2 weeks    Alcohol use No        PSYCH HISTORY:  Depression No  Anxiety No  Suicide No       Family History   Problem Relation Age of Onset    Cancer Mother      vaginal    Heart Disease Father     Diabetes Father     Other Father      colon resection for colon polyps    Breast Cancer Maternal Grandmother     Stroke Maternal Grandfather       No family history of colon cancer, Crohn's disease, or ulcerative colitis.      Review of Systems  Constitutional: negative  Eyes: negative  Ears, nose, mouth, throat, and face:

## 2017-12-03 NOTE — H&P
[facility]    HISTORY AND PHYSICAL EXAMINATION            Date:   12/3/2017  Patient name:  Erica Calvillo  Date of admission:  2017 10:15 PM  MRN:   [id number]  YOB: 1970    CHIEF COMPLAINT:   abdo pain  History Obtained From:  Patient and chart review. HISTORY OF PRESENT ILLNESS:      The patient is a 56 y.o.  female who is admitted to the hospital for  Admitted with abdo pain  Diarrhea  No vomging  For two day  No agravating or releving factors      Past Medical History:   has a past medical history of Anxiety; Fatty liver; GERD (gastroesophageal reflux disease); Headache; History of bronchitis; Hyperlipidemia; Hypothyroidism; Kidney stone; LFT elevation; MVP (mitral valve prolapse); Obesity; and Umbilical hernia. Past Surgical History:   has a past surgical history that includes Upper gastrointestinal endoscopy (2010); hernia repair; Cholecystectomy; Hysterectomy; Appendectomy;  section; Colonoscopy (); Colonoscopy (14); Colonoscopy (2014); and exploratory of abdomen (10/21/2014). Home Medications:    Prior to Admission medications    Medication Sig Start Date End Date Taking? Authorizing Provider   oxyCODONE-acetaminophen (PERCOCET) 7.5-325 MG per tablet Take 1 tablet by mouth every 8 hours as needed for Pain .  10/27/17  Yes Mason Curling, MD   tiZANidine (ZANAFLEX) 4 MG tablet Take 1 tablet by mouth 2 times daily as needed (pain) 10/27/17  Yes Mason Curling, MD   gabapentin (NEURONTIN) 300 MG capsule Take 1 capsule by mouth 3 times daily 10/16/17  Yes Lizette Meredith MD   sertraline (ZOLOFT) 100 MG tablet TK 2 TS PO QAM 17  Yes Historical Provider, MD   topiramate (TOPAMAX) 25 MG tablet TK 1 T PO BID 17  Yes Historical Provider, MD   metoprolol tartrate (LOPRESSOR) 50 MG tablet Take 50 mg by mouth 2 times daily  17  Yes Historical Provider, MD change in muscle strength, numbness/tingling, change in gait, balance, coordination,   · Psychiatric: negative for change in mood, affect, memory, mentation, behavior. · Endocrine: negative for temperature intolerance, excessive thirst, fluid intake, or urination, tremor. · Hematologic/Lymphatic: negative for abnormal bruising or bleeding, blood clots, swollen lymph nodes. · Allergic/Immunologic: negative for nasal congestion, pruritis, hives. PHYSICAL EXAM:    /69   Pulse 72   Temp 98.5 °F (36.9 °C) (Oral)   Resp 16   Ht 5' 4\" (1.626 m)   Wt 199 lb 1.2 oz (90.3 kg)   LMP 11/01/2010   SpO2 95%   BMI 34.17 kg/m²      · General appearance: well nourished  · HEENT: Head: Normocephalic, no lesions, without obvious abnormality. · Lungs: clear to auscultation bilaterally  · Heart: regular rate and rhythm, S1, S2 normal, no murmur, click, rub or gallop  · Abdomen:morbid obese  · abdo pain tender no gaurdin gno ridity  ·   · Extremities: extremities normal, atraumatic, no cyanosis or edema  · Neurological: Gait normal. Reflexes normal and symmetric. Sensation grossly normal  · Skin - no rash, no lump   · Eye no icterus no redness  · Psych-normal affect   · NEURO-no limb weakness  No facial droop  · Lymphatic system-no lymphadenopathy no splenomegaly       DIAGNOSTICS:    Laboratory Testing:  CBC:   Recent Labs      12/03/17   0825   WBC  4.2   HGB  13.1   PLT  110*     BMP:    Recent Labs      12/02/17   2247  12/03/17   0825   NA  141  143   K  3.3*  3.8   CL  106  111*   CO2  17*  20   BUN  21*  16   CREATININE  0.78  0.58   GLUCOSE  133*  97     S. Calcium:  Recent Labs      12/03/17   0825   CALCIUM  8.2*     S. Ionized Calcium:No results for input(s): IONCA in the last 72 hours. S. Magnesium:No results for input(s): MG in the last 72 hours. S. Phosphorus:No results for input(s): PHOS in the last 72 hours. S. Glucose:No results for input(s): POCGLU in the last 72 hours.   Glycosylated hemoglobin A1C: No results for input(s): LABA1C in the last 72 hours. INR: No results for input(s): INR in the last 72 hours. Hepatic functions:   Recent Labs      12/02/17   2247   ALKPHOS  96   ALT  18   AST  20   PROT  7.3   BILITOT  0.45   BILIDIR  0.10   LABALBU  4.1     Pancreatic functions:  Recent Labs      12/03/17   0825   LACTA  0.7     S. Lactic Acid:   Recent Labs      12/03/17   0825   LACTA  0.7     Cardiac enzymes:No results for input(s): CKTOTAL, CKMB, CKMBINDEX, TROPONINI in the last 72 hours. BNP:No results for input(s): BNP in the last 72 hours. Lipid profile: No results for input(s): CHOL, TRIG, HDL, LDLCALC in the last 72 hours. Invalid input(s): LDL  Blood Gases: No results found for: PH, PCO2, PO2, HCO3, O2SAT  Thyroid functions:   Lab Results   Component Value Date    TSH 3.54 04/22/2015        Imaging/Diagonstics:      CXR: No acute cardiopulmonary findings.       ASSESSMENT:    Patient Active Problem List   Diagnosis    Hyperlipidemia    MVP (mitral valve prolapse)    Anxiety    Hypothyroidism    Allergic rhinitis    Obesity    Abdominal pain    Elevated liver enzymes    GERD (gastroesophageal reflux disease)    Ovarian mass    S/P bilateral oophorectomy & KRUPA 10/21/14    Pain emptying bladder    Urinary urgency    Insomnia    RLS (restless legs syndrome)    Pancreatitis    Eye swelling, left    Diabetes mellitus (HCC)    Type 2 diabetes mellitus without complication (HCC)    Osteoarthritis of cervical spine    Degenerative cervical spinal stenosis    Trigger point with tension headache    Arthropathy of cervical facet joint    Atlanto-axial joint sprain    Cervical radiculopathy    Sprain of atlanto-occipital joint    Encounter for medication monitoring    Myofacial muscle pain    Colitis       PLAN:  abdo pain colitis  c diff pending  Stool cx  Iv cipro and flagyl  Pain controll  Iv fulids        MD [facility]  [address]

## 2017-12-03 NOTE — CARE COORDINATION
CASE MANAGEMENT NOTE:    Admission Date:  12/2/2017 Yvette Cano is a 56 y.o.  female    Admitted for : Colitis [K52.9]    Met with:  Patient    PCP:  DR [address]:  [employer]      Current Residence/ Living Arrangements:  independently at home with children in 2 level home, 15 steps             Current Services PTA:  No    Is patient agreeable to VNS: No    Freedom of choice provided: Yes         VNS chosen:  No    DME:  none    Home Oxygen: No    Nebulizer: No    Supplier: N/A    Potential Assistance Needed: No    SNF needed: No    Pharmacy:  [address]       Does Patient want to use MEDS to BEDS? No    Family Members/Caregivers that pt would like involved in their care:    No    If yes, list name here:      Transportation Provider:  Patient and Family                      Discharge Plan:  Return home, pt is here with    Nausea; Emesis; Diarrhea; and Abdominal Pain  Has GI consult             Readmission Risk              Readmission Risk:        12.75       Age 72 or Greater:  0    Admitted from SNF or Requires Paid or Family Care:  0    Currently has CHF,COPD,ARF,CRI,or is on dialysis:  0    Takes more than 5 Prescription Medications:  4    Takes Digoxin,Insulin,Anticoagulants,Narcotics or ASA/Plavix:  [facility] in Past 12 Months:  0    On Disability:  3    Patient Considers own Health:  3.75          Electronically signed by:  Pebbles Zepeda RN on 12/3/2017 at 10:54 AM

## 2017-12-03 NOTE — ED PROVIDER NOTES
[facility]  Emergency Department Encounter  Emergency Medicine Resident     Pt Name: Thomas Rodriguez  MRN: [id number]  [place] 1970  Date of evaluation: 17  PCP:  Eleanor Chavez MD    [address] [place] [address]       Chief Complaint   Patient presents with    Nausea    Emesis    Diarrhea    Abdominal Pain       HISTORY OF PRESENT ILLNESS  (Location/Symptom, Timing/Onset, Context/Setting, Quality, Duration, Modifying Factors, Severity.)      Thomas Rodriguez is a 56 y.o. female who presents with Acute onset of nausea, vomiting, diarrhea and abdominal pain. States that symptoms started after eating [employer] chicken last night. States that upon waking up this morning, she had intractable nausea, vomiting and diarrhea. Cannot quantify how many times she vomited and went to the bathroom. Is unsure if there is any blood in her emesis or diarrhea. States that she hasn't been able to tolerate anything by mouth. Denies any fever, chills, chest pain or shortness of breath. States that her pain is diffuse across her abdomen. Rates a 10 out of 10 in severity. States that it's been constant since onset. Denies any alleviating or exacerbating factors. PAST MEDICAL / SURGICAL / SOCIAL / FAMILY HISTORY      has a past medical history of Anxiety; Fatty liver; GERD (gastroesophageal reflux disease); Headache; History of bronchitis; Hyperlipidemia; Hypothyroidism; Kidney stone; LFT elevation; MVP (mitral valve prolapse); Obesity; and Umbilical hernia. has a past surgical history that includes Upper gastrointestinal endoscopy (2010); hernia repair; Cholecystectomy; Hysterectomy; Appendectomy;  section; Colonoscopy (); Colonoscopy (14); Colonoscopy (2014); and exploratory of abdomen (10/21/2014).     Social History     Social History    Marital status:      Spouse name: N/A    Number of children: N/A    Years of education: N/A     Occupational History    Provider, MD   clonazePAM (KLONOPIN) 0.5 MG tablet TK 1 T PO  TID 10/10/16  Yes Historical Provider, MD   glucose blood VI test strips (WILLIAM CONTOUR TEST) strip 1 each by In Vitro route 2 times daily. As needed. 1/29/15   Albina Alexander MD   Lancets MISC Testing bid 1/29/15   Albina Alexander MD       REVIEW OF SYSTEMS    (2-9 systems for level 4, 10 or more for level 5)      Review of Systems   Constitutional: Negative for chills and fever. HENT: Negative for sore throat. Eyes: Negative for visual disturbance. Respiratory: Negative for cough and shortness of breath. Cardiovascular: Negative for chest pain. Gastrointestinal: Positive for abdominal pain, diarrhea, nausea and vomiting. Genitourinary: Negative for difficulty urinating. Musculoskeletal: Negative for arthralgias and myalgias. Skin: Negative for wound. Neurological: Negative for weakness, numbness and headaches. Psychiatric/Behavioral: Negative for behavioral problems. PHYSICAL EXAM   (up to 7 for level 4, 8 or more for level 5)      INITIAL VITALS:   /69   Pulse 72   Temp 98.5 °F (36.9 °C) (Oral)   Resp 16   Ht 5' 4\" (1.626 m)   Wt 199 lb 1.2 oz (90.3 kg)   LMP 11/01/2010   SpO2 95%   BMI 34.17 kg/m²     Physical Exam   Constitutional: She is oriented to person, place, and time. She appears well-developed and well-nourished. No distress. HENT:   Head: Normocephalic and atraumatic. Mouth/Throat: Oropharynx is clear and moist.   Eyes: EOM are normal. Pupils are equal, round, and reactive to light. Neck: Normal range of motion. Cardiovascular: Regular rhythm. Tachycardia present. Pulmonary/Chest: Effort normal. She has no wheezes. She has no rales. Abdominal: Soft. There is tenderness. There is no rebound and no guarding. Exquisite tenderness to palpation diffusely; patient does demonstrate some voluntary guarding; no rigidity felt   Musculoskeletal: Normal range of motion.    Neurological: She Sodium 141 135 - 144 mmol/L    Potassium 3.3 (L) 3.7 - 5.3 mmol/L    Chloride 106 98 - 107 mmol/L    CO2 17 (L) 20 - 31 mmol/L    Anion Gap 18 (H) 9 - 17 mmol/L    GFR Non-African American >60 >60 mL/min    GFR African American >60 >60 mL/min    GFR Comment          GFR Staging NOT REPORTED    Lactic Acid   Result Value Ref Range    Lactic Acid 1.1 0.5 - 2.2 mmol/L   Hepatic Function Panel   Result Value Ref Range    Alb 4.1 3.5 - 5.2 g/dL    Alkaline Phosphatase 96 35 - 104 U/L    ALT 18 5 - 33 U/L    AST 20 <32 U/L    Total Bilirubin 0.45 0.3 - 1.2 mg/dL    Bilirubin, Direct 0.10 <0.31 mg/dL    Bilirubin, Indirect 0.35 0.00 - 1.00 mg/dL    Total Protein 7.3 6.4 - 8.3 g/dL    Globulin NOT REPORTED 1.5 - 3.8 g/dL    Albumin/Globulin Ratio NOT REPORTED 1.0 - 2.5   Lipase   Result Value Ref Range    Lipase 18 13 - 60 U/L   Urinalysis with Microscopic   Result Value Ref Range    Color, UA YELLOW YEL    Turbidity UA CLEAR CLEAR    Glucose, Ur NEGATIVE NEG    Bilirubin Urine NEGATIVE NEG    Ketones, Urine NEGATIVE NEG    Specific Gravity, UA 1.078 (H) 1.000 - 1.030    Urine Hgb TRACE (A) NEG    pH, UA 5.5 5.0 - 8.0    Protein, UA NEGATIVE NEG    Urobilinogen, Urine Normal NORM    Nitrite, Urine NEGATIVE NEG    Leukocyte Esterase, Urine NEGATIVE NEG    Urinalysis Comments NOT REPORTED     -          WBC, UA 2 TO 5 /HPF    RBC, UA 2 TO 5 /HPF    Casts UA NOT REPORTED /LPF    Crystals UA NOT REPORTED NONE /HPF    Epithelial Cells UA 5 TO 10 /HPF    Renal Epithelial, Urine NOT REPORTED 0 /HPF    Bacteria, UA FEW (A) NONE    Mucus, UA 1+ (A) NONE    Trichomonas, UA NOT REPORTED NONE    Amorphous, UA NOT REPORTED NONE    Other Observations UA NOT REPORTED NREQ    Yeast, UA NOT REPORTED NONE   Urine Drug Screen   Result Value Ref Range    Amphetamine Screen, Ur NEGATIVE NEG    Barbiturate Screen, Ur NEGATIVE NEG    Benzodiazepine Screen, Urine NEGATIVE NEG    Cocaine Metabolite, Urine NEGATIVE NEG    Methadone Screen, Urine NEGATIVE NEG    Opiates, Urine NEGATIVE NEG    Phencyclidine, Urine NEGATIVE NEG    Propoxyphene, Urine NOT REPORTED NEG    Cannabinoid Scrn, Ur NEGATIVE NEG    Oxycodone Screen, Ur POSITIVE (A) NEG    Methamphetamine, Urine NOT REPORTED NEG    Tricyclic Antidepressants, Ur NOT REPORTED NEG    MDMA URINE NOT REPORTED NEG    Buprenorphine Urine NOT REPORTED NEG    Test Information       Assay provides medical screening only.   The absence of expected drug(s) and/or   Lactic Acid   Result Value Ref Range    Lactic Acid 0.7 0.5 - 2.2 mmol/L   CBC with DIFF   Result Value Ref Range    WBC 4.2 3.5 - 11.0 k/uL    RBC 4.25 4.0 - 5.2 m/uL    Hemoglobin 13.1 12.0 - 16.0 g/dL    Hematocrit 39.1 36 - 46 %    MCV 92.1 80 - 100 fL    MCH 30.8 26 - 34 pg    MCHC 33.4 31 - 37 g/dL    RDW 12.4 11.5 - 14.9 %    Platelets 633 (L) 108 - 450 k/uL    MPV 8.3 6.0 - 12.0 fL    Differential Type NOT REPORTED     Immature Granulocytes NOT REPORTED 0 %    Absolute Immature Granulocyte NOT REPORTED 0.00 - 0.30 k/uL    WBC Morphology NOT REPORTED     RBC Morphology NOT REPORTED     Platelet Estimate NOT REPORTED     Seg Neutrophils 71 (H) 36 - 66 %    Lymphocytes 17 (L) 24 - 44 %    Monocytes 8 (H) 1 - 7 %    Eosinophils % 3 0 - 4 %    Basophils 1 0 - 2 %    Segs Absolute 3.00 1.3 - 9.1 k/uL    Absolute Lymph # 0.70 (L) 1.0 - 4.8 k/uL    Absolute Mono # 0.30 0.1 - 1.3 k/uL    Absolute Eos # 0.10 0.0 - 0.4 k/uL    Basophils # 0.00 0.0 - 0.2 k/uL   Basic Metabolic Prof   Result Value Ref Range    Glucose 97 70 - 99 mg/dL    BUN 16 6 - 20 mg/dL    CREATININE 0.58 0.50 - 0.90 mg/dL    Bun/Cre Ratio NOT REPORTED 9 - 20    Calcium 8.2 (L) 8.6 - 10.4 mg/dL    Sodium 143 135 - 144 mmol/L    Potassium 3.8 3.7 - 5.3 mmol/L    Chloride 111 (H) 98 - 107 mmol/L    CO2 20 20 - 31 mmol/L    Anion Gap 12 9 - 17 mmol/L    GFR Non-African American >60 >60 mL/min    GFR African American >60 >60 mL/min    GFR Comment          GFR Staging NOT REPORTED a voice recognition program.  Efforts were made to edit the dictations but occasionally words are mis-transcribed.)         Юлия Brambila MD  Resident  12/03/17 [id number]

## 2017-12-04 ENCOUNTER — CARE COORDINATION (OUTPATIENT)
Dept: CARE COORDINATION | Age: 47
End: 2017-12-04

## 2017-12-04 LAB
ANION GAP SERPL CALCULATED.3IONS-SCNC: 10 MMOL/L (ref 9–17)
BUN BLDV-MCNC: 11 MG/DL (ref 6–20)
BUN/CREAT BLD: ABNORMAL (ref 9–20)
CALCIUM SERPL-MCNC: 8.9 MG/DL (ref 8.6–10.4)
CHLORIDE BLD-SCNC: 112 MMOL/L (ref 98–107)
CO2: 21 MMOL/L (ref 20–31)
CREAT SERPL-MCNC: 0.67 MG/DL (ref 0.5–0.9)
GFR AFRICAN AMERICAN: >60 ML/MIN
GFR NON-AFRICAN AMERICAN: >60 ML/MIN
GFR SERPL CREATININE-BSD FRML MDRD: ABNORMAL ML/MIN/{1.73_M2}
GFR SERPL CREATININE-BSD FRML MDRD: ABNORMAL ML/MIN/{1.73_M2}
GLUCOSE BLD-MCNC: 117 MG/DL (ref 70–99)
POTASSIUM SERPL-SCNC: 4 MMOL/L (ref 3.7–5.3)
SODIUM BLD-SCNC: 143 MMOL/L (ref 135–144)

## 2017-12-04 PROCEDURE — 1200000000 HC SEMI PRIVATE

## 2017-12-04 PROCEDURE — 2500000003 HC RX 250 WO HCPCS: Performed by: INTERNAL MEDICINE

## 2017-12-04 PROCEDURE — 99233 SBSQ HOSP IP/OBS HIGH 50: CPT | Performed by: INTERNAL MEDICINE

## 2017-12-04 PROCEDURE — 6370000000 HC RX 637 (ALT 250 FOR IP): Performed by: INTERNAL MEDICINE

## 2017-12-04 PROCEDURE — 36415 COLL VENOUS BLD VENIPUNCTURE: CPT

## 2017-12-04 PROCEDURE — 2580000003 HC RX 258: Performed by: INTERNAL MEDICINE

## 2017-12-04 PROCEDURE — 80048 BASIC METABOLIC PNL TOTAL CA: CPT

## 2017-12-04 PROCEDURE — 99232 SBSQ HOSP IP/OBS MODERATE 35: CPT | Performed by: INTERNAL MEDICINE

## 2017-12-04 PROCEDURE — 6360000002 HC RX W HCPCS: Performed by: INTERNAL MEDICINE

## 2017-12-04 RX ORDER — METRONIDAZOLE 500 MG/1
500 TABLET ORAL EVERY 8 HOURS SCHEDULED
Status: DISCONTINUED | OUTPATIENT
Start: 2017-12-04 | End: 2017-12-05 | Stop reason: HOSPADM

## 2017-12-04 RX ADMIN — LEVOTHYROXINE SODIUM 125 MCG: 125 TABLET ORAL at 20:26

## 2017-12-04 RX ADMIN — HYDROMORPHONE HYDROCHLORIDE 1 MG: 2 INJECTION, SOLUTION INTRAMUSCULAR; INTRAVENOUS; SUBCUTANEOUS at 11:44

## 2017-12-04 RX ADMIN — LEVOTHYROXINE SODIUM 125 MCG: 125 TABLET ORAL at 10:31

## 2017-12-04 RX ADMIN — CLONAZEPAM 0.5 MG: 0.5 TABLET ORAL at 20:24

## 2017-12-04 RX ADMIN — TOPIRAMATE 25 MG: 25 TABLET, FILM COATED ORAL at 20:26

## 2017-12-04 RX ADMIN — HYDROMORPHONE HYDROCHLORIDE 1 MG: 2 INJECTION, SOLUTION INTRAMUSCULAR; INTRAVENOUS; SUBCUTANEOUS at 01:05

## 2017-12-04 RX ADMIN — GABAPENTIN 300 MG: 300 CAPSULE ORAL at 14:31

## 2017-12-04 RX ADMIN — ENOXAPARIN SODIUM 40 MG: 40 INJECTION SUBCUTANEOUS at 08:41

## 2017-12-04 RX ADMIN — METRONIDAZOLE 500 MG: 500 TABLET ORAL at 15:08

## 2017-12-04 RX ADMIN — HYDROMORPHONE HYDROCHLORIDE 1 MG: 2 INJECTION, SOLUTION INTRAMUSCULAR; INTRAVENOUS; SUBCUTANEOUS at 15:50

## 2017-12-04 RX ADMIN — CLONAZEPAM 0.5 MG: 0.5 TABLET ORAL at 08:41

## 2017-12-04 RX ADMIN — ONDANSETRON 4 MG: 2 INJECTION INTRAMUSCULAR; INTRAVENOUS at 17:48

## 2017-12-04 RX ADMIN — SODIUM CHLORIDE: 9 INJECTION, SOLUTION INTRAVENOUS at 00:48

## 2017-12-04 RX ADMIN — SERTRALINE 100 MG: 100 TABLET, FILM COATED ORAL at 08:41

## 2017-12-04 RX ADMIN — METOPROLOL TARTRATE 50 MG: 50 TABLET ORAL at 08:41

## 2017-12-04 RX ADMIN — ATORVASTATIN CALCIUM 40 MG: 40 TABLET, FILM COATED ORAL at 08:41

## 2017-12-04 RX ADMIN — ROPINIROLE HYDROCHLORIDE 1 MG: 1 TABLET, FILM COATED ORAL at 20:26

## 2017-12-04 RX ADMIN — HYDROMORPHONE HYDROCHLORIDE 1 MG: 2 INJECTION, SOLUTION INTRAMUSCULAR; INTRAVENOUS; SUBCUTANEOUS at 07:02

## 2017-12-04 RX ADMIN — METOPROLOL TARTRATE 50 MG: 50 TABLET ORAL at 20:27

## 2017-12-04 RX ADMIN — GABAPENTIN 300 MG: 300 CAPSULE ORAL at 20:25

## 2017-12-04 RX ADMIN — CIPROFLOXACIN 400 MG: 2 INJECTION, SOLUTION INTRAVENOUS at 20:25

## 2017-12-04 RX ADMIN — CIPROFLOXACIN 400 MG: 2 INJECTION, SOLUTION INTRAVENOUS at 09:04

## 2017-12-04 RX ADMIN — CLONAZEPAM 0.5 MG: 0.5 TABLET ORAL at 14:31

## 2017-12-04 RX ADMIN — METRONIDAZOLE 500 MG: 500 TABLET ORAL at 20:24

## 2017-12-04 RX ADMIN — METRONIDAZOLE 500 MG: 500 INJECTION, SOLUTION INTRAVENOUS at 01:05

## 2017-12-04 RX ADMIN — TIZANIDINE 4 MG: 4 TABLET ORAL at 00:48

## 2017-12-04 RX ADMIN — HYDROMORPHONE HYDROCHLORIDE 1 MG: 2 INJECTION, SOLUTION INTRAMUSCULAR; INTRAVENOUS; SUBCUTANEOUS at 20:23

## 2017-12-04 RX ADMIN — PANTOPRAZOLE SODIUM 40 MG: 40 TABLET, DELAYED RELEASE ORAL at 09:04

## 2017-12-04 RX ADMIN — METRONIDAZOLE 500 MG: 500 INJECTION, SOLUTION INTRAVENOUS at 08:01

## 2017-12-04 RX ADMIN — GABAPENTIN 300 MG: 300 CAPSULE ORAL at 08:41

## 2017-12-04 RX ADMIN — TOPIRAMATE 25 MG: 25 TABLET, FILM COATED ORAL at 10:31

## 2017-12-04 RX ADMIN — SODIUM CHLORIDE: 9 INJECTION, SOLUTION INTRAVENOUS at 17:08

## 2017-12-04 ASSESSMENT — PAIN SCALES - GENERAL
PAINLEVEL_OUTOF10: 7
PAINLEVEL_OUTOF10: 4
PAINLEVEL_OUTOF10: 3
PAINLEVEL_OUTOF10: 8
PAINLEVEL_OUTOF10: 9
PAINLEVEL_OUTOF10: 9
PAINLEVEL_OUTOF10: 5
PAINLEVEL_OUTOF10: 3
PAINLEVEL_OUTOF10: 7

## 2017-12-04 ASSESSMENT — PAIN DESCRIPTION - LOCATION
LOCATION: ABDOMEN
LOCATION: ABDOMEN

## 2017-12-04 ASSESSMENT — PAIN DESCRIPTION - ORIENTATION
ORIENTATION: MID
ORIENTATION: MID

## 2017-12-04 ASSESSMENT — PAIN DESCRIPTION - PAIN TYPE
TYPE: ACUTE PAIN
TYPE: ACUTE PAIN

## 2017-12-04 ASSESSMENT — PAIN DESCRIPTION - DESCRIPTORS: DESCRIPTORS: ACHING

## 2017-12-04 NOTE — PROGRESS NOTES
Gastroenterology Consult Note      Patient: Sharon Pascual  : 1970  Acct#:  [id number]     Date:  2017    Subjective:       History of Present Illness  Patient is a 56 y.o.  female admitted with Colitis [K52.9] who is seen in consult for abd pain     Doing better but not completely  Still with some pain  Tolerating liquid  On cirpo/flagyl   c diff negative   Stool culture negative so far  CBC is normal  Liver enzymes are normal       Past Medical History:   Diagnosis Date    Anxiety     Fatty liver     GERD (gastroesophageal reflux disease)     Headache     History of bronchitis     Hyperlipidemia     Hypothyroidism     Kidney stone     LFT elevation     MVP (mitral valve prolapse)     Obesity     Umbilical hernia       Past Surgical History:   Procedure Laterality Date    APPENDECTOMY       SECTION      2 pfannenstiel, 1 vertical    CHOLECYSTECTOMY      COLONOSCOPY      Dr Frankie Trejo  14    COLONOSCOPY  2014    biopsy & sigmoid spasms, pathology negative    HERNIA REPAIR      x 5    HYSTERECTOMY          [facility] EXPLOR[facility] OF ABDOMEN  10/21/2014    Laparotomy-lysis of adhesions, bso     UPPER GASTROINTESTINAL ENDOSCOPY  2010    mild chronic inactive gastritis      Past Endoscopic History >5 years ago     Admission Meds  No current facility-administered medications on file prior to encounter. Current Outpatient Prescriptions on File Prior to Encounter   Medication Sig Dispense Refill    oxyCODONE-acetaminophen (PERCOCET) 7.5-325 MG per tablet Take 1 tablet by mouth every 8 hours as needed for Pain .  90 tablet 0    tiZANidine (ZANAFLEX) 4 MG tablet Take 1 tablet by mouth 2 times daily as needed (pain) 60 tablet 2    gabapentin (NEURONTIN) 300 MG capsule Take 1 capsule by mouth 3 times daily 60 capsule 0    sertraline (ZOLOFT) 100 MG tablet TK 2 TS PO QAM  2    topiramate (TOPAMAX) 25 MG tablet TK 1 T PO BID  2    metoprolol tartrate (LOPRESSOR) 50 MG tablet Take 50 mg by mouth 2 times daily       rOPINIRole (REQUIP) 1 MG tablet TAKE 1 TABLET BY MOUTH EVERY NIGHT 30 tablet 6    levothyroxine (SYNTHROID) 125 MCG tablet TAKE 1 TABLET BY MOUTH TWICE DAILY 60 tablet 11    NEXIUM 40 MG delayed release capsule TAKE 1 CAPSULE BY MOUTH EVERY MORNING BEFORE BREAKFAST 90 capsule 3    atorvastatin (LIPITOR) 40 MG tablet TK 1 T PO ONCE A DAY  5    clonazePAM (KLONOPIN) 0.5 MG tablet TK 1 T PO  TID  1    glucose blood VI test strips (WILLIAM CONTOUR TEST) strip 1 each by In Vitro route 2 times daily. As needed. 200 each 3    Lancets MISC Testing bid 200 each 3       Patient   Does Use ASA, NSAID Yes  Allergies  Allergies   Allergen Reactions    Morphine Itching    Sulfa Antibiotics Hives    Adhesive Tape Rash        Social   Social History   Substance Use Topics    Smoking status: Current Some Day Smoker     Packs/day: 0.50     Years: 20.00     Types: Cigarettes    Smokeless tobacco: Never Used      Comment: 1/2 pack every 2 weeks    Alcohol use No        PSYCH HISTORY:  Depression No  Anxiety No  Suicide No       Family History   Problem Relation Age of Onset    Cancer Mother      vaginal    Heart Disease Father     Diabetes Father     Other Father      colon resection for colon polyps    Breast Cancer Maternal Grandmother     Stroke Maternal Grandfather       No family history of colon cancer, Crohn's disease, or ulcerative colitis. Review of Systems  Constitutional: negative  Eyes: negative  Ears, nose, mouth, throat, and face: negative  Respiratory: negative  Cardiovascular: negative  Gastrointestinal: negative  Genitourinary:negative  Integument/breast: negative  Hematologic/lymphatic: negative  Musculoskeletal:negative  Endocrine: negative           Physical Exam  Blood pressure (!) 94/57, pulse 70, temperature 98.6 °F (37 °C), temperature source Oral, resp.  rate 20, height 5' 4\" (1.626 m), weight 199 lb 1.2 oz (90.3 kg), last menstrual period 11/01/2010, SpO2 91 %, not currently breastfeeding. General Appearance: alert and oriented to person, place and time, well-developed and well-nourished, in no acute distress  Skin: warm and dry, no rash or erythema  Head: normocephalic and atraumatic  Eyes: pupils equal, round, and reactive to light, extraocular eye movements intact, conjunctivae normal  ENT: hearing grossly normal bilaterally  Neck: neck supple and non tender without mass, no thyromegaly or thyroid nodules, no cervical lymphadenopathy   Pulmonary/Chest: clear to auscultation bilaterally- no wheezes, rales or rhonchi, normal air movement, no respiratory distress  Cardiovascular: normal rate, regular rhythm, normal S1 and S2, no murmurs, rubs, clicks or gallops, distal pulses intact, no carotid bruits  Abdomen: soft, non-tender, non-distended, normal bowel sounds, no masses or organomegaly  Extremities: no cyanosis, clubbing or edema  Musculoskeletal: normal range of motion, no joint swelling, deformity or tenderness  Neurologic: no cranial nerve deficit and muscle strength normal    Data Review:    Recent Labs      12/02/17 2247 12/03/17   0825   WBC  7.4  4.2   HGB  16.0  13.1   HCT  46.3*  39.1   MCV  91.2  92.1   PLT  144*  110*     Recent Labs      12/02/17 2247 12/03/17   0825  12/04/17   0825   NA  141  143  143   K  3.3*  3.8  4.0   CL  106  111*  112*   CO2  17*  20  21   BUN  21*  16  11   CREATININE  0.78  0.58  0.67     Recent Labs      12/02/17 2247   AST  20   ALT  18   BILIDIR  0.10   BILITOT  0.45   ALKPHOS  96     Recent Labs      12/02/17   2247   LIPASE  18     No results for input(s): PROTIME, INR in the last 72 hours. No results for input(s): PTT in the last 72 hours. No results for input(s): OCCULTBLD in the last 72 hours.   CEA:  No results found for: CEA  Ca 125:  No results found for:   Ca 19-9:  No results found for:   Ca 15-3:  No results found for:   AFP:  No components found for: AFAFP  Beta HCG:  No components found for: BHCG  Neuron Specific Enolase:  No results found for: NSE  Imaging Studies:                           All appropriate imaging studies and reports reviewed: Yes  Impression   1. Borderline wall thickening of the sigmoid colon suspicious for mild   colitis.  No acute findings are identified in the abdomen and pelvis.                        Assessment:     Active Problems:    Colitis    Colitis   NSAIDs use       Recommendations:   Avoid NSAIDs   Stool studiesAre negative so far    continue antibiotics Antibiotics, Cipro/Flagyl    continue IV fluids  Advance diet as tolerated will start soft food and see   Colonoscopy in couple 3 weeks after the colitis is cooled down                      Thank you for allowing me to participate in the care of your patient. Please feel free to contact me with any questions or concerns.      Moe Delaney MD

## 2017-12-04 NOTE — CARE COORDINATION
ONGOING DISCHARGE PLAN:    Spoke with patient regarding discharge plan and patient confirms that plan is still home with no discharge needs. Patient declines VNS. C Diff sample came back negative. Patient currently on IV cipro and flagyl, IVF at 100ml/hour, and patient on a bariatric full liquid diet. Will continue to follow for additional discharge needs.     Electronically signed by Filippo Parra RN on 12/4/2017 at 8:52 AM

## 2017-12-04 NOTE — DISCHARGE INSTR - DIET

## 2017-12-04 NOTE — CARE COORDINATION
[facility] attempted to contact patient for Ambulatory Care Coordination, BRANDI with contact info [phone]. Will try outreach to patient again later today or tomorrow.       Lori Goodpasture, RN  Ambulatory Care Coordinator

## 2017-12-04 NOTE — PLAN OF CARE
Problem: Pain:  Goal: Pain level will decrease  Pain level will decrease   Outcome: Met This Shift  Patient's pain has been well controlled throughout the entire shift, please see MAR. Problem: Falls - Risk of  Goal: Absence of falls  Outcome: Met This Shift  The patient remained free from falls this shift, call light within reach, bed in locked and lowest position. Side rails up x2. Continue to monitor closely.

## 2017-12-04 NOTE — PROGRESS NOTES
tablet TAKE 1 TABLET BY MOUTH EVERY NIGHT 8/7/17  Yes Dorie Soler MD   levothyroxine (SYNTHROID) 125 MCG tablet TAKE 1 TABLET BY MOUTH TWICE DAILY 2/10/17  Yes Dorie Soler MD   NEXIUM 40 MG delayed release capsule TAKE 1 CAPSULE BY MOUTH EVERY MORNING BEFORE BREAKFAST 12/29/16  Yes Anika Frances MD   atorvastatin (LIPITOR) 40 MG tablet TK 1 T PO ONCE A DAY 10/14/16  Yes Historical Provider, MD   clonazePAM (KLONOPIN) 0.5 MG tablet TK 1 T PO  TID 10/10/16  Yes Historical Provider, MD   glucose blood VI test strips (WILLIAM CONTOUR TEST) strip 1 each by In Vitro route 2 times daily. As needed. 1/29/15   Eliz Azar MD   Lancets MISC Testing bid 1/29/15   Eliz Azar MD       ALLERGIES      Morphine; Sulfa antibiotics; and Adhesive tape    SOCIAL HISTORY       reports that she has been smoking Cigarettes. She has a 10.00 pack-year smoking history. She has never used smokeless tobacco. She reports that she does not drink alcohol or use drugs. FAMILY HISTORY      family history includes Breast Cancer in her maternal grandmother; Cancer in her mother; Diabetes in her father; Heart Disease in her father; Other in her father; Stroke in her maternal grandfather. REVIEW OF SYSTEMS      · Constitutional: Negative for weight loss  · Eyes: Negative for visual changes, diplopia, scleral icterus. · ENT: Negative for Headaches, hearing loss, vertigo, mouth sores, sore throat. · Cardiovascular: Negative for lightheadedness/orthostatic symptoms ,chest pain, dyspnea on exertion, palpitations or loss of consciousness. · Respiratory: Negative for cough or wheezing, sputum production, hemoptysis, pleuritic pain. · Gastrointestinalpos for nausea/vomiting, change in bowel habits, abdominal pain, dysphagia/appetite loss, hematemesis, blood in stools. · Genitourinary:Negative for change in bladder habits, dysuria, trouble voiding, hematuria.   · Musculoskeletal: Negative for gait disturbance, weakness, MG in the last 72 hours. S. Phosphorus:No results for input(s): PHOS in the last 72 hours. S. Glucose:No results for input(s): POCGLU in the last 72 hours. Glycosylated hemoglobin A1C: No results for input(s): LABA1C in the last 72 hours. INR: No results for input(s): INR in the last 72 hours. Hepatic functions:   Recent Labs      12/02/17   2247   ALKPHOS  96   ALT  18   AST  20   PROT  7.3   BILITOT  0.45   BILIDIR  0.10   LABALBU  4.1     Pancreatic functions:  Recent Labs      12/03/17   0825   LACTA  0.7     S. Lactic Acid:   Recent Labs      12/03/17   0825   LACTA  0.7     Cardiac enzymes:No results for input(s): CKTOTAL, CKMB, CKMBINDEX, TROPONINI in the last 72 hours. BNP:No results for input(s): BNP in the last 72 hours. Lipid profile: No results for input(s): CHOL, TRIG, HDL, LDLCALC in the last 72 hours. Invalid input(s): LDL  Blood Gases: No results found for: PH, PCO2, PO2, HCO3, O2SAT  Thyroid functions:   Lab Results   Component Value Date    TSH 3.54 04/22/2015        Imaging/Diagonstics:      CXR: No acute cardiopulmonary findings.     ASSESSMENT     Patient Active Problem List   Diagnosis    Hyperlipidemia    MVP (mitral valve prolapse)    Anxiety    Hypothyroidism    Allergic rhinitis    Obesity    Abdominal pain    Elevated liver enzymes    GERD (gastroesophageal reflux disease)    Ovarian mass    S/P bilateral oophorectomy & KRUPA 10/21/14    Pain emptying bladder    Urinary urgency    Insomnia    RLS (restless legs syndrome)    Pancreatitis    Eye swelling, left    Diabetes mellitus (HCC)    Type 2 diabetes mellitus without complication (HCC)    Osteoarthritis of cervical spine    Degenerative cervical spinal stenosis    Trigger point with tension headache    Arthropathy of cervical facet joint    Atlanto-axial joint sprain    Cervical radiculopathy    Sprain of atlanto-occipital joint    Encounter for medication monitoring    Myofacial muscle pain   

## 2017-12-05 VITALS
WEIGHT: 199.08 LBS | HEIGHT: 64 IN | HEART RATE: 62 BPM | DIASTOLIC BLOOD PRESSURE: 51 MMHG | OXYGEN SATURATION: 93 % | TEMPERATURE: 98.6 F | SYSTOLIC BLOOD PRESSURE: 89 MMHG | RESPIRATION RATE: 16 BRPM | BODY MASS INDEX: 33.99 KG/M2

## 2017-12-05 LAB
ANION GAP SERPL CALCULATED.3IONS-SCNC: 10 MMOL/L (ref 9–17)
BUN BLDV-MCNC: 8 MG/DL (ref 6–20)
BUN/CREAT BLD: ABNORMAL (ref 9–20)
CALCIUM SERPL-MCNC: 8.7 MG/DL (ref 8.6–10.4)
CHLORIDE BLD-SCNC: 113 MMOL/L (ref 98–107)
CO2: 21 MMOL/L (ref 20–31)
CREAT SERPL-MCNC: 0.64 MG/DL (ref 0.5–0.9)
GFR AFRICAN AMERICAN: >60 ML/MIN
GFR NON-AFRICAN AMERICAN: >60 ML/MIN
GFR SERPL CREATININE-BSD FRML MDRD: ABNORMAL ML/MIN/{1.73_M2}
GFR SERPL CREATININE-BSD FRML MDRD: ABNORMAL ML/MIN/{1.73_M2}
GLUCOSE BLD-MCNC: 108 MG/DL (ref 70–99)
POTASSIUM SERPL-SCNC: 3.7 MMOL/L (ref 3.7–5.3)
SODIUM BLD-SCNC: 144 MMOL/L (ref 135–144)

## 2017-12-05 PROCEDURE — 36415 COLL VENOUS BLD VENIPUNCTURE: CPT

## 2017-12-05 PROCEDURE — 99239 HOSP IP/OBS DSCHRG MGMT >30: CPT | Performed by: INTERNAL MEDICINE

## 2017-12-05 PROCEDURE — 6370000000 HC RX 637 (ALT 250 FOR IP): Performed by: INTERNAL MEDICINE

## 2017-12-05 PROCEDURE — 6360000002 HC RX W HCPCS: Performed by: INTERNAL MEDICINE

## 2017-12-05 PROCEDURE — 80048 BASIC METABOLIC PNL TOTAL CA: CPT

## 2017-12-05 PROCEDURE — 99232 SBSQ HOSP IP/OBS MODERATE 35: CPT | Performed by: INTERNAL MEDICINE

## 2017-12-05 RX ORDER — LACTOBACILLUS RHAMNOSUS GG 10B CELL
1 CAPSULE ORAL 2 TIMES DAILY
Status: DISCONTINUED | OUTPATIENT
Start: 2017-12-05 | End: 2017-12-05 | Stop reason: HOSPADM

## 2017-12-05 RX ORDER — METRONIDAZOLE 500 MG/1
500 TABLET ORAL EVERY 8 HOURS SCHEDULED
Qty: 15 TABLET | Refills: 0 | Status: SHIPPED | OUTPATIENT
Start: 2017-12-05 | End: 2017-12-10

## 2017-12-05 RX ORDER — LACTOBACILLUS RHAMNOSUS GG 10B CELL
1 CAPSULE ORAL 2 TIMES DAILY
Qty: 30 CAPSULE | Refills: 3 | Status: ON HOLD | OUTPATIENT
Start: 2017-12-05 | End: 2018-02-12

## 2017-12-05 RX ORDER — HYDROCODONE BITARTRATE AND ACETAMINOPHEN 5; 325 MG/1; MG/1
1 TABLET ORAL EVERY 8 HOURS PRN
Qty: 10 TABLET | Refills: 0 | Status: CANCELLED | OUTPATIENT
Start: 2017-12-05 | End: 2017-12-12

## 2017-12-05 RX ORDER — CIPROFLOXACIN 500 MG/1
500 TABLET, FILM COATED ORAL EVERY 12 HOURS SCHEDULED
Qty: 10 TABLET | Refills: 0 | Status: SHIPPED | OUTPATIENT
Start: 2017-12-05 | End: 2017-12-10

## 2017-12-05 RX ORDER — CIPROFLOXACIN 500 MG/1
500 TABLET, FILM COATED ORAL EVERY 12 HOURS SCHEDULED
Status: DISCONTINUED | OUTPATIENT
Start: 2017-12-05 | End: 2017-12-05 | Stop reason: HOSPADM

## 2017-12-05 RX ADMIN — SERTRALINE 100 MG: 100 TABLET, FILM COATED ORAL at 08:06

## 2017-12-05 RX ADMIN — ATORVASTATIN CALCIUM 40 MG: 40 TABLET, FILM COATED ORAL at 08:10

## 2017-12-05 RX ADMIN — LEVOTHYROXINE SODIUM 125 MCG: 125 TABLET ORAL at 08:09

## 2017-12-05 RX ADMIN — ACETAMINOPHEN 650 MG: 325 TABLET, FILM COATED ORAL at 13:49

## 2017-12-05 RX ADMIN — METRONIDAZOLE 500 MG: 500 TABLET ORAL at 06:19

## 2017-12-05 RX ADMIN — TIZANIDINE 4 MG: 4 TABLET ORAL at 03:01

## 2017-12-05 RX ADMIN — PANTOPRAZOLE SODIUM 40 MG: 40 TABLET, DELAYED RELEASE ORAL at 06:19

## 2017-12-05 RX ADMIN — ENOXAPARIN SODIUM 40 MG: 40 INJECTION SUBCUTANEOUS at 08:06

## 2017-12-05 RX ADMIN — TOPIRAMATE 25 MG: 25 TABLET, FILM COATED ORAL at 08:09

## 2017-12-05 RX ADMIN — METRONIDAZOLE 500 MG: 500 TABLET ORAL at 13:43

## 2017-12-05 RX ADMIN — HYDROMORPHONE HYDROCHLORIDE 1 MG: 2 INJECTION, SOLUTION INTRAMUSCULAR; INTRAVENOUS; SUBCUTANEOUS at 06:36

## 2017-12-05 RX ADMIN — ONDANSETRON 4 MG: 2 INJECTION INTRAMUSCULAR; INTRAVENOUS at 03:01

## 2017-12-05 RX ADMIN — CLONAZEPAM 0.5 MG: 0.5 TABLET ORAL at 13:43

## 2017-12-05 RX ADMIN — GABAPENTIN 300 MG: 300 CAPSULE ORAL at 08:06

## 2017-12-05 RX ADMIN — HYDROMORPHONE HYDROCHLORIDE 1 MG: 2 INJECTION, SOLUTION INTRAMUSCULAR; INTRAVENOUS; SUBCUTANEOUS at 02:57

## 2017-12-05 RX ADMIN — Medication 1 CAPSULE: at 08:24

## 2017-12-05 RX ADMIN — CIPROFLOXACIN 400 MG: 2 INJECTION, SOLUTION INTRAVENOUS at 08:06

## 2017-12-05 RX ADMIN — GABAPENTIN 300 MG: 300 CAPSULE ORAL at 13:43

## 2017-12-05 RX ADMIN — CLONAZEPAM 0.5 MG: 0.5 TABLET ORAL at 08:06

## 2017-12-05 ASSESSMENT — PAIN DESCRIPTION - PAIN TYPE
TYPE: ACUTE PAIN

## 2017-12-05 ASSESSMENT — PAIN DESCRIPTION - FREQUENCY
FREQUENCY: INTERMITTENT
FREQUENCY: CONTINUOUS
FREQUENCY: CONTINUOUS

## 2017-12-05 ASSESSMENT — PAIN DESCRIPTION - LOCATION
LOCATION: HEAD;THROAT
LOCATION: ABDOMEN
LOCATION: HEAD

## 2017-12-05 ASSESSMENT — PAIN DESCRIPTION - DESCRIPTORS
DESCRIPTORS: ACHING;CRAMPING
DESCRIPTORS: DISCOMFORT
DESCRIPTORS: DISCOMFORT

## 2017-12-05 ASSESSMENT — PAIN SCALES - GENERAL
PAINLEVEL_OUTOF10: 0
PAINLEVEL_OUTOF10: 7
PAINLEVEL_OUTOF10: 0
PAINLEVEL_OUTOF10: 6
PAINLEVEL_OUTOF10: 7
PAINLEVEL_OUTOF10: 5

## 2017-12-05 ASSESSMENT — PAIN DESCRIPTION - ONSET
ONSET: GRADUAL
ONSET: GRADUAL

## 2017-12-05 NOTE — FLOWSHEET NOTE
12/04/17 1919   Encounter Summary   Services provided to: Patient and family together   Referral/Consult From: [facility]   Support System Parent   Continue Visiting (12/4/17)   Complexity of Encounter Low   Length of Encounter 15 minutes   Spiritual Assessment Completed Yes   Routine   Type Initial   Assessment Approachable; Anxious; Hopeful;Coping   Intervention Active listening;Nurtured hope;Prayer;Sustaining presence/ Ministry of presence; Discussed illness/injury and it's impact; Discussed belief system/P[employer]ecostal practices/aiden   Outcome Expressed gratitude;Engaged in conversation;Coping; Hopeful;Receptive

## 2017-12-05 NOTE — PROGRESS NOTES
[address]    PROGRESS NOTE             Date:   2017  Patient name:  Erica Calvillo  Date of admission:  2017 10:15 PM  MRN:   [id number]  YOB: 1970    CHIEF COMPLAINT     History Obtained From:  Patient and chart review. HISTORY OF PRESENT ILLNESS      The patient is a 56 y.o.  female who is admitted to the hospital for collitis     diarrhea bettrer   tolerating full liquids   abd pain better   no fever       still has diffuse abd pain   little better   nausea and diarrhea with po intake  PAST MEDICAL HISTORY       has a past medical history of Anxiety; Fatty liver; GERD (gastroesophageal reflux disease); Headache; History of bronchitis; Hyperlipidemia; Hypothyroidism; Kidney stone; LFT elevation; MVP (mitral valve prolapse); Obesity; and Umbilical hernia. PAST SURGICAL HISTORY       has a past surgical history that includes Upper gastrointestinal endoscopy (2010); hernia repair; Cholecystectomy; Hysterectomy; Appendectomy;  section; Colonoscopy (); Colonoscopy (14); Colonoscopy (2014); and exploratory of abdomen (10/21/2014). HOME MEDICATIONS        Prior to Admission medications    Medication Sig Start Date End Date Taking? Authorizing Provider   oxyCODONE-acetaminophen (PERCOCET) 7.5-325 MG per tablet Take 1 tablet by mouth every 8 hours as needed for Pain .  10/27/17  Yes Mason Curling, MD   tiZANidine (ZANAFLEX) 4 MG tablet Take 1 tablet by mouth 2 times daily as needed (pain) 10/27/17  Yes Mason Curling, MD   gabapentin (NEURONTIN) 300 MG capsule Take 1 capsule by mouth 3 times daily 10/16/17  Yes Lizette Meredith MD   sertraline (ZOLOFT) 100 MG tablet TK 2 TS PO QAM 17  Yes Historical Provider, MD   topiramate (TOPAMAX) 25 MG tablet TK 1 T PO BID 17  Yes Historical Provider, MD   metoprolol tartrate (LOPRESSOR) 50 MG tablet Take 50 mg by mouth 2 times daily  8/21/17  Yes Historical Provider, MD   rOPINIRole (REQUIP) 1 MG tablet TAKE 1 TABLET BY MOUTH EVERY NIGHT 8/7/17  Yes Mauri Ramos MD   levothyroxine (SYNTHROID) 125 MCG tablet TAKE 1 TABLET BY MOUTH TWICE DAILY 2/10/17  Yes Mauri Ramos MD   NEXIUM 40 MG delayed release capsule TAKE 1 CAPSULE BY MOUTH EVERY MORNING BEFORE BREAKFAST 12/29/16  Yes Tino La MD   atorvastatin (LIPITOR) 40 MG tablet TK 1 T PO ONCE A DAY 10/14/16  Yes Historical Provider, MD   clonazePAM (KLONOPIN) 0.5 MG tablet TK 1 T PO  TID 10/10/16  Yes Historical Provider, MD   glucose blood VI test strips (WILLIAM CONTOUR TEST) strip 1 each by In Vitro route 2 times daily. As needed. 1/29/15   Monse Santillan MD   Lancets MISC Testing bid 1/29/15   Monse Santillan MD       ALLERGIES      Morphine; Sulfa antibiotics; and Adhesive tape    SOCIAL HISTORY       reports that she has been smoking Cigarettes. She has a 10.00 pack-year smoking history. She has never used smokeless tobacco. She reports that she does not drink alcohol or use drugs. FAMILY HISTORY      family history includes Breast Cancer in her maternal grandmother; Cancer in her mother; Diabetes in her father; Heart Disease in her father; Other in her father; Stroke in her maternal grandfather. REVIEW OF SYSTEMS      · Constitutional: Negative for weight loss  · Eyes: Negative for visual changes, diplopia, scleral icterus. · ENT: Negative for Headaches, hearing loss, vertigo, mouth sores, sore throat. · Cardiovascular: Negative for lightheadedness/orthostatic symptoms ,chest pain, dyspnea on exertion, palpitations or loss of consciousness. · Respiratory: Negative for cough or wheezing, sputum production, hemoptysis, pleuritic pain. · Gastrointestinalpos for nausea/vomiting, change in bowel habits, abdominal pain, dysphagia/appetite loss, hematemesis, blood in stools.   · Genitourinary:Negative for change in bladder habits, dysuria, trouble voiding, hematuria. · Musculoskeletal: Negative for gait disturbance, weakness, joint complaints. · Integumentary: Negative for rash, pruritis. · Neurological: Negative for headache, dizziness, change in muscle strength, numbness/tingling, change in gait, balance, coordination,   · Psychiatric: negative for change in mood, affect, memory, mentation, behavior. · Endocrine: negative for temperature intolerance, excessive thirst, fluid intake, or urination, tremor. · Hematologic/Lymphatic: negative for abnormal bruising or bleeding, blood clots, swollen lymph nodes. · Allergic/Immunologic: negative for nasal congestion, pruritis, hives. PHYSICAL EXAM      /64   Pulse 64   Temp 98.1 °F (36.7 °C) (Oral)   Resp 16   Ht 5' 4\" (1.626 m)   Wt 199 lb 1.2 oz (90.3 kg)   LMP 11/01/2010   SpO2 94%   BMI 34.17 kg/m²      · General appearance: well nourished  · HEENT: Head: Normocephalic, no lesions, without obvious abnormality. · Lungs: clear to auscultation bilaterally  · Heart: regular rate and rhythm, S1, S2 normal, no murmur, click, rub or gallop  · Abdomen:   ·  diffuse tenderness bowel sounds normal; no masses,  no organomegaly  · Extremities: extremities normal, atraumatic, no cyanosis or edema  · Neurological: Gait normal. Reflexes normal and symmetric. Sensation grossly normal  · Skin - no rash, no lump   · Eye no icterus no redness  · Psych-normal affect   · NEURO-no limb weakness  No facial droop  · Lymphatic system-no lymphadenopathy no splenomegaly     DIAGNOSTICS      Laboratory Testing:  CBC:   Recent Labs      12/03/17   0825   WBC  4.2   HGB  13.1   PLT  110*     BMP:    Recent Labs      12/02/17   2247  12/03/17   0825  12/04/17   0825   NA  141  143  143   K  3.3*  3.8  4.0   CL  106  111*  112*   CO2  17*  20  21   BUN  21*  16  11   CREATININE  0.78  0.58  0.67   GLUCOSE  133*  97  117*     S. Calcium:  Recent Labs      12/04/17   0825   CALCIUM  8.9     S.  Ionized Calcium:No results

## 2017-12-05 NOTE — PROGRESS NOTES
Gastroenterology Consult Note      Patient: Erica Perez  : 1970  Acct#:  [id number]     Date:  2017    Subjective:       History of Present Illness  Patient is a 56 y.o.  female admitted with Colitis [K52.9] who is seen in consult for abd pain      still have some pain but she said is mildly better   stool studies are negative   tolerated soft diet   no fever no chills  On cirpo/flagyl   c diff negative   CBC is normal  Liver enzymes are normal       Past Medical History:   Diagnosis Date    Anxiety     Fatty liver     GERD (gastroesophageal reflux disease)     Headache     History of bronchitis     Hyperlipidemia     Hypothyroidism     Kidney stone     LFT elevation     MVP (mitral valve prolapse)     Obesity     Umbilical hernia       Past Surgical History:   Procedure Laterality Date    APPENDECTOMY       SECTION      2 pfannenstiel, 1 vertical    CHOLECYSTECTOMY      COLONOSCOPY      Dr Sergio Lewis COLONOSCOPY  14    COLONOSCOPY  2014    biopsy & sigmoid spasms, pathology negative    HERNIA REPAIR      x 5    HYSTERECTOMY      2010    RI EXPLORATORY OF ABDOMEN  10/21/2014    Laparotomy-lysis of adhesions, bso     UPPER GASTROINTESTINAL ENDOSCOPY  2010    mild chronic inactive gastritis      Past Endoscopic History >5 years ago     Admission Meds  No current facility-administered medications on file prior to encounter. Current Outpatient Prescriptions on File Prior to Encounter   Medication Sig Dispense Refill    oxyCODONE-acetaminophen (PERCOCET) 7.5-325 MG per tablet Take 1 tablet by mouth every 8 hours as needed for Pain .  90 tablet 0    tiZANidine (ZANAFLEX) 4 MG tablet Take 1 tablet by mouth 2 times daily as needed (pain) 60 tablet 2    gabapentin (NEURONTIN) 300 MG capsule Take 1 capsule by mouth 3 times daily 60 capsule 0    sertraline (ZOLOFT) 100 MG tablet TK 2 TS PO QAM  2    topiramate (TOPAMAX) 25 MG tablet m), weight 199 lb 1.2 oz (90.3 kg), last menstrual period 11/01/2010, SpO2 91 %, not currently breastfeeding. General Appearance: alert and oriented to person, place and time, well-developed and well-nourished, in no acute distress  Skin: warm and dry, no rash or erythema  Head: normocephalic and atraumatic  Eyes: pupils equal, round, and reactive to light, extraocular eye movements intact, conjunctivae normal  ENT: hearing grossly normal bilaterally  Neck: neck supple and non tender without mass, no thyromegaly or thyroid nodules, no cervical lymphadenopathy   Pulmonary/Chest: clear to auscultation bilaterally- no wheezes, rales or rhonchi, normal air movement, no respiratory distress  Cardiovascular: normal rate, regular rhythm, normal S1 and S2, no murmurs, rubs, clicks or gallops, distal pulses intact, no carotid bruits  Abdomen: soft, non-tender, non-distended, normal bowel sounds, no masses or organomegaly  Extremities: no cyanosis, clubbing or edema  Musculoskeletal: normal range of motion, no joint swelling, deformity or tenderness  Neurologic: no cranial nerve deficit and muscle strength normal    Data Review:    Recent Labs      12/02/17 2247  12/03/17   0825   WBC  7.4  4.2   HGB  16.0  13.1   HCT  46.3*  39.1   MCV  91.2  92.1   PLT  144*  110*     Recent Labs      12/03/17   0825  12/04/17   0825  12/05/17   0646   NA  143  143  144   K  3.8  4.0  3.7   CL  111*  112*  113*   CO2  20  21  21   BUN  16  11  8   CREATININE  0.58  0.67  0.64     Recent Labs      12/02/17   2247   AST  20   ALT  18   BILIDIR  0.10   BILITOT  0.45   ALKPHOS  96     Recent Labs      12/02/17   2247   LIPASE  18     No results for input(s): PROTIME, INR in the last 72 hours. No results for input(s): PTT in the last 72 hours. No results for input(s): OCCULTBLD in the last 72 hours.   CEA:  No results found for: CEA  Ca 125:  No results found for:   Ca 19-9:  No results found for:   Ca 15-3:  No results

## 2017-12-05 NOTE — DISCHARGE SUMMARY
(TOPAMAX) 25 MG tablet  TK 1 T PO BID                 DISPOSITION AND FOLLOW-UP     Disposition:m home    Condition: Stable     Diet:  Regular diet     Activity: As tolerated     Follow-up:   with Dragan Hudson MD,    Discharge time spent on pt and paperworki more than [address], [address] [place] [address].    Phone [phone]   Fax: [phone]  Answering Service: [phone]

## 2017-12-06 ENCOUNTER — CARE COORDINATION (OUTPATIENT)
Dept: CASE MANAGEMENT | Age: 47
End: 2017-12-06

## 2017-12-06 ENCOUNTER — TELEPHONE (OUTPATIENT)
Dept: PHARMACY | Facility: CLINIC | Age: 47
End: 2017-12-06

## 2017-12-07 ENCOUNTER — HOSPITAL ENCOUNTER (EMERGENCY)
Age: 47
Discharge: HOME | End: 2017-12-07
Attending: EMERGENCY MEDICINE
Payer: MEDICARE

## 2017-12-07 VITALS
RESPIRATION RATE: 17 BRPM | DIASTOLIC BLOOD PRESSURE: 84 MMHG | WEIGHT: 199 LBS | SYSTOLIC BLOOD PRESSURE: 131 MMHG | OXYGEN SATURATION: 98 % | HEART RATE: 68 BPM | HEIGHT: 64 IN | TEMPERATURE: 98.7 F | BODY MASS INDEX: 33.97 KG/M2

## 2017-12-07 DIAGNOSIS — K52.9 COLITIS: Primary | ICD-10-CM

## 2017-12-07 DIAGNOSIS — R74.01 TRANSAMINITIS: ICD-10-CM

## 2017-12-07 DIAGNOSIS — R11.2 NAUSEA AND VOMITING, INTRACTABILITY OF VOMITING NOT SPECIFIED, UNSPECIFIED VOMITING TYPE: ICD-10-CM

## 2017-12-07 LAB
ABSOLUTE EOS #: 0.2 K/UL (ref 0–0.4)
ABSOLUTE IMMATURE GRANULOCYTE: ABNORMAL K/UL (ref 0–0.3)
ABSOLUTE LYMPH #: 1.7 K/UL (ref 1–4.8)
ABSOLUTE MONO #: 0.4 K/UL (ref 0.1–1.3)
ALBUMIN SERPL-MCNC: 3.7 G/DL (ref 3.5–5.2)
ALBUMIN/GLOBULIN RATIO: ABNORMAL (ref 1–2.5)
ALP BLD-CCNC: 123 U/L (ref 35–104)
ALT SERPL-CCNC: 64 U/L (ref 5–33)
ANION GAP SERPL CALCULATED.3IONS-SCNC: 13 MMOL/L (ref 9–17)
AST SERPL-CCNC: 94 U/L
BASOPHILS # BLD: 1 % (ref 0–2)
BASOPHILS ABSOLUTE: 0 K/UL (ref 0–0.2)
BILIRUB SERPL-MCNC: 0.29 MG/DL (ref 0.3–1.2)
BILIRUBIN URINE: NEGATIVE
BUN BLDV-MCNC: 6 MG/DL (ref 6–20)
BUN/CREAT BLD: ABNORMAL (ref 9–20)
CALCIUM SERPL-MCNC: 8.9 MG/DL (ref 8.6–10.4)
CHLORIDE BLD-SCNC: 111 MMOL/L (ref 98–107)
CO2: 20 MMOL/L (ref 20–31)
COLOR: YELLOW
COMMENT UA: NORMAL
CREAT SERPL-MCNC: 0.52 MG/DL (ref 0.5–0.9)
DIFFERENTIAL TYPE: ABNORMAL
EOSINOPHILS RELATIVE PERCENT: 4 % (ref 0–4)
GFR AFRICAN AMERICAN: >60 ML/MIN
GFR NON-AFRICAN AMERICAN: >60 ML/MIN
GFR SERPL CREATININE-BSD FRML MDRD: ABNORMAL ML/MIN/{1.73_M2}
GFR SERPL CREATININE-BSD FRML MDRD: ABNORMAL ML/MIN/{1.73_M2}
GLUCOSE BLD-MCNC: 102 MG/DL (ref 70–99)
GLUCOSE URINE: NEGATIVE
HCT VFR BLD CALC: 39.6 % (ref 36–46)
HEMOGLOBIN: 13.3 G/DL (ref 12–16)
IMMATURE GRANULOCYTES: ABNORMAL %
KETONES, URINE: NEGATIVE
LEUKOCYTE ESTERASE, URINE: NEGATIVE
LIPASE: 50 U/L (ref 13–60)
LYMPHOCYTES # BLD: 31 % (ref 24–44)
MCH RBC QN AUTO: 30.5 PG (ref 26–34)
MCHC RBC AUTO-ENTMCNC: 33.7 G/DL (ref 31–37)
MCV RBC AUTO: 90.6 FL (ref 80–100)
MONOCYTES # BLD: 7 % (ref 1–7)
NITRITE, URINE: NEGATIVE
PDW BLD-RTO: 12.7 % (ref 11.5–14.9)
PH UA: 7.5 (ref 5–8)
PLATELET # BLD: 139 K/UL (ref 150–450)
PLATELET ESTIMATE: ABNORMAL
PMV BLD AUTO: 8.8 FL (ref 6–12)
POTASSIUM SERPL-SCNC: 3.5 MMOL/L (ref 3.7–5.3)
PROTEIN UA: NEGATIVE
RBC # BLD: 4.37 M/UL (ref 4–5.2)
RBC # BLD: ABNORMAL 10*6/UL
SEG NEUTROPHILS: 57 % (ref 36–66)
SEGMENTED NEUTROPHILS ABSOLUTE COUNT: 3.2 K/UL (ref 1.3–9.1)
SODIUM BLD-SCNC: 144 MMOL/L (ref 135–144)
SPECIFIC GRAVITY UA: 1.01 (ref 1–1.03)
TOTAL PROTEIN: 6.3 G/DL (ref 6.4–8.3)
TURBIDITY: CLEAR
URINE HGB: NEGATIVE
UROBILINOGEN, URINE: NORMAL
WBC # BLD: 5.6 K/UL (ref 3.5–11)
WBC # BLD: ABNORMAL 10*3/UL

## 2017-12-07 PROCEDURE — 80053 COMPREHEN METABOLIC PANEL: CPT

## 2017-12-07 PROCEDURE — 6360000002 HC RX W HCPCS: Performed by: EMERGENCY MEDICINE

## 2017-12-07 PROCEDURE — 96374 THER/PROPH/DIAG INJ IV PUSH: CPT

## 2017-12-07 PROCEDURE — 36415 COLL VENOUS BLD VENIPUNCTURE: CPT

## 2017-12-07 PROCEDURE — 83690 ASSAY OF LIPASE: CPT

## 2017-12-07 PROCEDURE — 85025 COMPLETE CBC W/AUTO DIFF WBC: CPT

## 2017-12-07 PROCEDURE — 99284 EMERGENCY DEPT VISIT MOD MDM: CPT

## 2017-12-07 PROCEDURE — 81003 URINALYSIS AUTO W/O SCOPE: CPT

## 2017-12-07 PROCEDURE — 96375 TX/PRO/DX INJ NEW DRUG ADDON: CPT

## 2017-12-07 PROCEDURE — 2580000003 HC RX 258: Performed by: EMERGENCY MEDICINE

## 2017-12-07 RX ORDER — HYDROMORPHONE HCL 110MG/55ML
1 PATIENT CONTROLLED ANALGESIA SYRINGE INTRAVENOUS ONCE
Status: COMPLETED | OUTPATIENT
Start: 2017-12-07 | End: 2017-12-07

## 2017-12-07 RX ORDER — ONDANSETRON 4 MG/1
4 TABLET, FILM COATED ORAL EVERY 8 HOURS PRN
Qty: 10 TABLET | Refills: 0 | Status: SHIPPED | OUTPATIENT
Start: 2017-12-07 | End: 2018-04-03 | Stop reason: SDUPTHER

## 2017-12-07 RX ORDER — ONDANSETRON 4 MG/1
4 TABLET, FILM COATED ORAL ONCE
Status: COMPLETED | OUTPATIENT
Start: 2017-12-07 | End: 2017-12-07

## 2017-12-07 RX ORDER — ONDANSETRON 2 MG/ML
4 INJECTION INTRAMUSCULAR; INTRAVENOUS ONCE
Status: COMPLETED | OUTPATIENT
Start: 2017-12-07 | End: 2017-12-07

## 2017-12-07 RX ORDER — 0.9 % SODIUM CHLORIDE 0.9 %
1000 INTRAVENOUS SOLUTION INTRAVENOUS ONCE
Status: COMPLETED | OUTPATIENT
Start: 2017-12-07 | End: 2017-12-07

## 2017-12-07 RX ADMIN — ONDANSETRON 4 MG: 2 INJECTION INTRAMUSCULAR; INTRAVENOUS at 16:28

## 2017-12-07 RX ADMIN — ONDANSETRON HYDROCHLORIDE 4 MG: 4 TABLET, FILM COATED ORAL at 18:02

## 2017-12-07 RX ADMIN — SODIUM CHLORIDE 1000 ML: 9 INJECTION, SOLUTION INTRAVENOUS at 16:22

## 2017-12-07 RX ADMIN — HYDROMORPHONE HYDROCHLORIDE 1 MG: 2 INJECTION, SOLUTION INTRAMUSCULAR; INTRAVENOUS; SUBCUTANEOUS at 16:47

## 2017-12-07 ASSESSMENT — PAIN SCALES - GENERAL
PAINLEVEL_OUTOF10: 8
PAINLEVEL_OUTOF10: 0
PAINLEVEL_OUTOF10: 9

## 2017-12-07 ASSESSMENT — PAIN DESCRIPTION - PAIN TYPE: TYPE: ACUTE PAIN

## 2017-12-07 ASSESSMENT — PAIN DESCRIPTION - DESCRIPTORS: DESCRIPTORS: STABBING

## 2017-12-07 ASSESSMENT — PAIN DESCRIPTION - LOCATION: LOCATION: ABDOMEN

## 2017-12-07 NOTE — ED NOTES
Pt states she has no pain , but would like a pain injection prior to d/c because she is scared the pain will return PT states  she has percocet at home from pain management but it dont work .  Dr Joey Arias in to speak with pt      Benita Mirza RN  12/07/17 0092

## 2017-12-07 NOTE — ED PROVIDER NOTES
[address] ED  eMERGENCY dEPARTMENT eNCOUnter   Attending Attestation     Pt Name: Suri De La Cruz  MRN: [id number]  [place] 1970  Date of evaluation: 12/7/17       Suri De La Cruz is a 56 y.o. female who presents with Abdominal Pain; Emesis; and Diarrhea      MDM:   Diffuse abdominal pain. Recent diagnosis colitis. Had to leave a day early from the hospital on antibiotics. Doesn't have any antiemetics at home. We'll give IV fluids and antiemetics check some basic labs and reassess. Reviewed her past medical records. Vitals:   Vitals:    12/07/17 1517   BP: 131/84   Pulse: 68   Resp: 17   Temp: 98.7 °F (37.1 °C)   TempSrc: Oral   SpO2: 98%   Weight: 199 lb (90.3 kg)   Height: 5' 4\" (1.626 m)         I personally evaluated and examined the patient in conjunction with the resident and agree with the assessment, treatment plan, and disposition of the patient as recorded by the resident. I performed a history and physical examination of the patient and discussed management with the resident. I reviewed the residents note and agree with the documented findings and plan of care. Any areas of disagreement are noted on the chart. I was personally present for the key portions of any procedures. I have documented in the chart those procedures where I was not present during the key portions. I have personally reviewed all images and agree with the resident's interpretation. I have reviewed the emergency nurses triage note. I agree with the chief complaint, past medical history, past surgical history, allergies, medications, social and family history as documented unless otherwise noted.     Gabriella Quiroz MD  Attending Emergency Physician            Gabriella Quiroz MD  12/07/17 4178

## 2017-12-07 NOTE — ED PROVIDER NOTES
[address]  Emergency Department Encounter  Emergency Medicine Resident     Pt Name: Lindalou Crigler  MRN: [id number]  [place] 1970  Date of evaluation: 17  PCP:  Rylie Hartmann MD    [address] [place] [address]       Chief Complaint   Patient presents with    Abdominal Pain    Emesis    Diarrhea       HISTORY OF PRESENT ILLNESS  (Location/Symptom, Timing/Onset, Context/Setting, Quality, Duration, Modifying Factors, Severity.)      Lindalou Crigler is a 56 y.o. female who presents with Diffuse abdominal pain for the past 5 days, sharp, moderate intensity, nonradiating. Patient was diagnosed with colitis on  and was admitted to the hospital for nausea and vomiting control, pain control, IV Cipro and Flagyl. Patient states that she had to leave the hospital earlier take care of social issues and was not discharged with any antinausea medicines. Patient states the past 2 days she has had persistent amount of nausea, vomiting, diarrhea. Patient states the pain she feels is diffuse rhonchi abdomen and is the same exact pain that she fell on  when she had a CT abdomen that showed colitis. Patient has a history of abdominal surgeries and bowel obstruction in past.  Patient denies any urinary symptoms. Patient states she feels dehydrated. Patient denies chest pain, shortness of breath, fever, chills, weakness, numbness. PAST MEDICAL / SURGICAL / SOCIAL / FAMILY HISTORY      has a past medical history of Anxiety; Fatty liver; GERD (gastroesophageal reflux disease); Headache; History of bronchitis; Hyperlipidemia; Hypothyroidism; Kidney stone; LFT elevation; MVP (mitral valve prolapse); Obesity; and Umbilical hernia. has a past surgical history that includes Upper gastrointestinal endoscopy (2010); hernia repair; Cholecystectomy; Hysterectomy; Appendectomy;  section; Colonoscopy (); Colonoscopy (14);  Colonoscopy (2014); and exploratory of abdomen (10/21/2014). Social History     Social History    Marital status:      Spouse name: N/A    Number of children: N/A    Years of education: N/A     Occupational History    Homemaker      Social History Main Topics    Smoking status: Current Some Day Smoker     Packs/day: 0.50     Years: 20.00     Types: Cigarettes    Smokeless tobacco: Never Used      Comment: 1/2 pack every 2 weeks    Alcohol use No    Drug use: No    Sexual activity: Not Currently     Other Topics Concern    Not on file     Social History Narrative    No narrative on file       Family History   Problem Relation Age of Onset    Cancer Mother      vaginal    Heart Disease Father     Diabetes Father     Other Father      colon resection for colon polyps    Breast Cancer Maternal Grandmother     Stroke Maternal Grandfather        Allergies:  Morphine; Sulfa antibiotics; and Adhesive tape    Home Medications:  Prior to Admission medications    Medication Sig Start Date End Date Taking? Authorizing Provider   ondansetron (ZOFRAN) 4 MG tablet Take 1 tablet by mouth every 8 hours as needed for Nausea 12/7/17  Yes Alray Cockayne, DO   lactobacillus (CULTURELLE) capsule Take 1 capsule by mouth 2 times daily 12/5/17   Aga Villeda MD   ciprofloxacin (CIPRO) 500 MG tablet Take 1 tablet by mouth every 12 hours for 5 days 12/5/17 12/10/17  Karlos Obregon MD   metroNIDAZOLE (FLAGYL) 500 MG tablet Take 1 tablet by mouth every 8 hours for 5 days 12/5/17 12/10/17  Aga Villeda MD   oxyCODONE-acetaminophen (PERCOCET) 7.5-325 MG per tablet Take 1 tablet by mouth every 8 hours as needed for Pain .  10/27/17   Immanuel Romero MD   tiZANidine (ZANAFLEX) 4 MG tablet Take 1 tablet by mouth 2 times daily as needed (pain) 10/27/17   Immanuel Romero MD   gabapentin (NEURONTIN) 300 MG capsule Take 1 capsule by mouth 3 times daily 10/16/17   Landon Rosado MD   sertraline (ZOLOFT) 100 MG tablet TK 2 TS PO QAM 8/7/17 supple, symmetric   BACK: symmetric   LUNGS: clear to auscultation bilaterally   CARDIOVASCULAR: regular rate and rhythm, no murmurs, rubs or gallops   ABDOMEN: soft, Mild tenderness to palpation left upper quadrant, left lower quadrant, right lower quadrant, non-distended   : deferred     NEUROLOGIC:  MAEx4, no focal sensory or motor deficits   MUSCULOSKELETAL: no clubbing, cyanosis or edema   SKIN: no exposed rash     DIFFERENTIAL  DIAGNOSIS     PLAN (LABS / IMAGING / EKG):  Orders Placed This Encounter   Procedures    CBC Auto Differential    Comprehensive Metabolic Panel    Lipase    UA W/REFLEX CULTURE       MEDICATIONS ORDERED:  Orders Placed This Encounter   Medications    0.9 % sodium chloride bolus    ondansetron (ZOFRAN) injection 4 mg    HYDROmorphone (DILAUDID) injection 1 mg    ondansetron (ZOFRAN) tablet 4 mg    ondansetron (ZOFRAN) 4 MG tablet     Sig: Take 1 tablet by mouth every 8 hours as needed for Nausea     Dispense:  10 tablet     Refill:  0       DDX: Colitis, obstruction, gastroenteritis, gastritis    DIAGNOSTIC RESULTS / EMERGENCY DEPARTMENT COURSE / MDM     LABS:  Results for orders placed or performed during the hospital encounter of 12/07/17   CBC Auto Differential   Result Value Ref Range    WBC 5.6 3.5 - 11.0 k/uL    RBC 4.37 4.0 - 5.2 m/uL    Hemoglobin 13.3 12.0 - 16.0 g/dL    Hematocrit 39.6 36 - 46 %    MCV 90.6 80 - 100 fL    MCH 30.5 26 - 34 pg    MCHC 33.7 31 - 37 g/dL    RDW 12.7 11.5 - 14.9 %    Platelets 282 (L) 272 - 450 k/uL    MPV 8.8 6.0 - 12.0 fL    Differential Type NOT REPORTED     Immature Granulocytes NOT REPORTED 0 %    Absolute Immature Granulocyte NOT REPORTED 0.00 - 0.30 k/uL    WBC Morphology NOT REPORTED     RBC Morphology NOT REPORTED     Platelet Estimate NOT REPORTED     Seg Neutrophils 57 36 - 66 %    Lymphocytes 31 24 - 44 %    Monocytes 7 1 - 7 %    Eosinophils % 4 0 - 4 %    Basophils 1 0 - 2 %    Segs Absolute 3.20 1.3 - 9.1 k/uL    Absolute to as low as reasonably achievable. COMPARISON: 08/04/2017 HISTORY: ORDERING SYSTEM PROVIDED HISTORY: abd pain TECHNOLOGIST PROVIDED HISTORY: IV Only Contrast Ordering Physician Provided Reason for Exam: GENERALIZED ABDOMEN PAIN, VOMITING AND DIARRHEA X1 DAY Acuity: Acute Type of Exam: Initial FINDINGS: Lower Chest:  Visualized portion of the lower chest demonstrates no acute abnormality. Organs: Liver, spleen, pancreas and adrenal glands are within normal limits. Gallbladder is absent. No enlarged lymph nodes identified in the abdomen and pelvis. GI/Bowel: Borderline wall thickening of the sigmoid colon. No bowel obstruction. Appendix is not well seen. No inflammatory changes in the right lower quadrant. No free air. Pelvis: No acute findings in the pelvis. Uterus is absent. Peritoneum/Retroperitoneum: No suspicious renal lesions. No evidence of obstructive uropathy. No abdominal aortic aneurysm. Bones/Soft Tissues: No acute osseous findings identified. 1. Borderline wall thickening of the sigmoid colon suspicious for mild colitis. EKG  None indicated    All EKG's are interpreted by the Emergency Department Physician who either signs or Co-signs this chart in the absence of a cardiologist.    EMERGENCY DEPARTMENT COURSE:  56 y.o. female with diagnosis of colitis. Patient also has chronic pain syndrome and some heavy amount opioids. Patient nausea and vomiting resolved after Zofran. Patient given shot of Dilaudid as well due to abdominal pain greatly improved her symptoms. Patient states she feels well enough to be discharged home. Patient informed of transaminitis and need to follow up with her PCP for reevaluation and blood draw. Patient agrees, understands, given opportunity to ask questions about this diagnosis. Plan to discharge home. Patient is agreeable to treatment plan.     PLAN/MEDS:  Orders Placed This Encounter   Procedures    CBC Auto Differential    Comprehensive Metabolic Panel  Lipase    UA W/REFLEX CULTURE       Orders Placed This Encounter   Medications    0.9 % sodium chloride bolus    ondansetron (ZOFRAN) injection 4 mg    HYDROmorphone (DILAUDID) injection 1 mg    ondansetron (ZOFRAN) tablet 4 mg    ondansetron (ZOFRAN) 4 MG tablet     Sig: Take 1 tablet by mouth every 8 hours as needed for Nausea     Dispense:  10 tablet     Refill:  0         PROCEDURES:  None    CONSULTS:  None    CRITICAL CARE:  None    FINAL IMPRESSION      1. Colitis    2. Transaminitis    3. Nausea and vomiting, intractability of vomiting not specified, unspecified vomiting type          DISPOSITION / PLAN     DISPOSITION Decision to Discharge    PATIENT REFERRED TO:  Todd Farnsworth MD  [facility] [address]  [address] [id number] [phone]    Call in 1 day  for re-evaluation, please have PCP evaluate transaminitis      DISCHARGE MEDICATIONS:  Discharge Medication List as of 12/7/2017  5:29 PM      START taking these medications    Details   ondansetron (ZOFRAN) 4 MG tablet Take 1 tablet by mouth every 8 hours as needed for Nausea, Disp-10 tablet, R-0Print             Pancho Membreno DO  Emergency Medicine Resident    (Please note that portions of this note were completed with a voice recognition program.  Efforts were made to edit the dictations but occasionally words are mis-transcribed.  Whenever words are used in this note in any gender, they shall be construed as though they were used in the gender appropriate to the circumstances; and whenever words are used in this note in the singular or plural form, they shall be construed as though they were used in the form appropriate to the circumstances.))       Pancho Membreno DO  Resident  12/07/17 [id number]

## 2017-12-08 ENCOUNTER — CARE COORDINATION (OUTPATIENT)
Dept: CASE MANAGEMENT | Age: 47
End: 2017-12-08

## 2017-12-08 ENCOUNTER — CARE COORDINATION (OUTPATIENT)
Dept: CARE COORDINATION | Age: 47
End: 2017-12-08

## 2017-12-11 ENCOUNTER — CARE COORDINATION (OUTPATIENT)
Dept: CARE COORDINATION | Age: 47
End: 2017-12-11

## 2017-12-11 NOTE — CARE COORDINATION
Ambulatory Care Coordination Note  12/13/2017  CM Risk Score: 9  Chayo Mortality Risk Score:      ACC: Cole Kenny, RN    Summary Note: RNCC called and spoke to patient who states she is still having some pain in her abdomen. She is taking her medications per order and following a soft diet. Assessment completed and offered support for treatment and medication adherence and management - discussed with patient and provided education of medications and plan of care that she is to follow for her GI issues. RNCC will reach out to patient in 2-3 weeks for follow up. Patient encouraged to contact [facility] for any questions or concerns regarding his health care, medications, or treatment plan, patient verbalized understanding. RNCC instructed patient that any concerns or issues will be addressed with patient's PCP and any new orders or instruction will be relayed to patient via [facility] or Intelligent Apps (mytaxi) once received. [facility] contact information given to patient. Scheduled TCM appt with pcp. Care Coordination Interventions    Program Enrollment:  Complex Care  Referral from Primary Care Provider:  Yes  Suggested Interventions and Community Resources  No Identified Needs  Disease Specific Clinic:  Completed (Comment: [facility].)  Physical Therapy:  Completed  Zone Management Tools:  Completed (Comment: DM zone management.)         Goals Addressed             Most Recent     Conditions and Symptoms   On track (12/11/2017)             I will schedule office visits, as directed by my provider. I will keep my appointment or reschedule if I have to cancel. I will notify my provider of any barriers to my plan of care. I will follow my Zone Management tool to seek urgent or emergent care. I will notify my provider of any symptoms that indicate a worsening of my condition. Barriers: lack of education  Plan for overcoming my barriers: will try to schedule future appointments while in the office.   Confidence: MARTITA BanuelosCZ PAINMGT None   4/10/2018 9:45 AM Tino La MD [address] [facility]

## 2017-12-12 ENCOUNTER — HOSPITAL ENCOUNTER (OUTPATIENT)
Dept: PAIN MANAGEMENT | Age: 47
Discharge: HOME | End: 2017-12-12
Payer: MEDICARE

## 2017-12-12 VITALS
HEIGHT: 64 IN | HEART RATE: 73 BPM | DIASTOLIC BLOOD PRESSURE: 72 MMHG | RESPIRATION RATE: 16 BRPM | WEIGHT: 187 LBS | BODY MASS INDEX: 31.92 KG/M2 | SYSTOLIC BLOOD PRESSURE: 112 MMHG

## 2017-12-12 DIAGNOSIS — M54.12 CERVICAL RADICULOPATHY: ICD-10-CM

## 2017-12-12 DIAGNOSIS — Z51.81 ENCOUNTER FOR MEDICATION MONITORING: ICD-10-CM

## 2017-12-12 DIAGNOSIS — M47.812 ARTHROPATHY OF CERVICAL FACET JOINT: Primary | ICD-10-CM

## 2017-12-12 PROCEDURE — 99213 OFFICE O/P EST LOW 20 MIN: CPT

## 2017-12-12 PROCEDURE — 99213 OFFICE O/P EST LOW 20 MIN: CPT | Performed by: NURSE PRACTITIONER

## 2017-12-12 RX ORDER — OXYCODONE AND ACETAMINOPHEN 7.5; 325 MG/1; MG/1
1 TABLET ORAL EVERY 8 HOURS PRN
Qty: 90 TABLET | Refills: 0 | Status: SHIPPED | OUTPATIENT
Start: 2017-12-18 | End: 2018-01-16 | Stop reason: SDUPTHER

## 2017-12-12 ASSESSMENT — ENCOUNTER SYMPTOMS
CONSTIPATION: 0
BACK PAIN: 1
SHORTNESS OF BREATH: 0
COUGH: 0

## 2017-12-12 NOTE — PROGRESS NOTES
Patient is here today to review medication contract. Chief Complaint:  Neck pain    PMH    Pt c/o neck pain for over 2 years. Pain is causing headaches and numbness and tingling down her left arm to wrist. She has tried PT and steroid injections in the neck from her PCP that did not helped. She had a left cervical facet injeciton in August that only gave about 20% relief for 1 month. She does follow with neurology Dr. Maurilio Sheppard for her headaches and has had ER visits where she receives dilaudid and Toradol with relief. She also had recent hospitalization and ER visit for colitis. She was unable to see neurosurgeon as planned d/t insurance reasons, will try again after the 1st with new insurance. HPI:   Neck Pain    This is a chronic problem. The current episode started more than 1 year ago. The problem occurs constantly. The problem has been gradually worsening. The pain is associated with nothing. The pain is present in the left side, occipital region and midline. The quality of the pain is described as aching, burning and shooting. The pain is at a severity of 5/10. The pain is mild. The symptoms are aggravated by position. Pertinent negatives include no chest pain or fever. She has tried acetaminophen, home exercises, heat, NSAIDs and oral narcotics for the symptoms. The treatment provided mild relief. Patient denies any new neurological symptoms. No bowel or bladder incontinence, no weakness, and no falling. Pill count: last fill oct 27 with 19 tabs left prescription adjusted appropriately    Morphine equivalent dose as reported on OARRS:33.75    Controlled Substances Monitoring:     Attestation: The Prescription Monitoring Report for this patient was reviewed today.  MARTITA Lopez)  Documentation: Possible medication side effects, risk of tolerance and/or dependence, and alternative treatments discussed., Obtaining appropriate analgesic effect of treatment., No signs of potential drug abuse PO BID, Disp: , Rfl: 2    metoprolol tartrate (LOPRESSOR) 50 MG tablet, Take 50 mg by mouth 2 times daily , Disp: , Rfl:     rOPINIRole (REQUIP) 1 MG tablet, TAKE 1 TABLET BY MOUTH EVERY NIGHT, Disp: 30 tablet, Rfl: 6    levothyroxine (SYNTHROID) 125 MCG tablet, TAKE 1 TABLET BY MOUTH TWICE DAILY, Disp: 60 tablet, Rfl: 11    NEXIUM 40 MG delayed release capsule, TAKE 1 CAPSULE BY MOUTH EVERY MORNING BEFORE BREAKFAST, Disp: 90 capsule, Rfl: 3    atorvastatin (LIPITOR) 40 MG tablet, TK 1 T PO ONCE A DAY, Disp: , Rfl: 5    clonazePAM (KLONOPIN) 0.5 MG tablet, TK 1 T PO  TID, Disp: , Rfl: 1    glucose blood VI test strips (WILLIAM CONTOUR TEST) strip, 1 each by In Vitro route 2 times daily. As needed. , Disp: 200 each, Rfl: 3    Lancets MISC, Testing bid, Disp: 200 each, Rfl: 3    Family History   Problem Relation Age of Onset    Cancer Mother      vaginal    Heart Disease Father     Diabetes Father     Other Father      colon resection for colon polyps    Breast Cancer Maternal Grandmother     Stroke Maternal Grandfather        Social History     Social History    Marital status:      Spouse name: N/A    Number of children: N/A    Years of education: N/A     Occupational History    Homemaker      Social History Main Topics    Smoking status: Current Some Day Smoker     Packs/day: 0.50     Years: 20.00     Types: Cigarettes    Smokeless tobacco: Never Used      Comment: 1/2 pack every 2 weeks    Alcohol use No    Drug use: No    Sexual activity: Not Currently     Other Topics Concern    Not on file     Social History Narrative    No narrative on file       Review of Systems:  Review of Systems   Constitution: Negative for chills and fever. Cardiovascular: Negative for chest pain. Respiratory: Negative for cough and shortness of breath. Musculoskeletal: Positive for back pain and neck pain. Gastrointestinal: Negative for constipation.          Physical Exam:  /72   Pulse 73 Resp 16   Ht 5' 4\" (1.626 m)   Wt 187 lb (84.8 kg)   LMP 11/01/2010   BMI 32.10 kg/m²     Physical Exam   Constitutional: She is oriented to person, place, and time and well-developed, well-nourished, and in no distress. Cardiovascular: Normal rate. Pulmonary/Chest: Effort normal.   Musculoskeletal: Normal range of motion. Cervical back: She exhibits tenderness. She exhibits normal range of motion. Back:    Positive Spurlings on left side   Neurological: She is alert and oriented to person, place, and time. Gait normal.   Skin: Skin is warm and dry. Psychiatric: Affect normal.       Record/Diagnostics Review:    Last dau March  and was appropriate    Cervical MRI 2/2017:  FINDINGS:   BONES/ALIGNMENT: Mild dextroconvex cervicothoracic spinal curvature.       SPINAL CORD: No abnormal cord signal is seen.       SOFT TISSUES: No paraspinal mass identified.       C2-C3: Slight central disc contour prominence to indicate a small disc   protrusion with only minimal central spinal stenosis.       C3-C4: Left paramedian disc contour prominence and associated uncovertebral   joint hypertrophy.       Resulting mild left lateral recess stenosis.  Moderate left foraminal   stenosis.       C4-C5: Disc bulge.  Mild central spinal stenosis.  Mild left foraminal   stenosis.  Slight flattening of the ventral cord contour.       C5-C6: Decreased disc space height.  Diffuse disc bulge or broad-based disc   protrusion more prominent on the left.  Mild central spinal stenosis. Asymmetric mild left lateral recess stenosis.  Mild to moderate left   foraminal stenosis.  Minimal flattening of the ventral cord contour.       C6-C7: Slight disc bulge.  Minimal central spinal stenosis.       C7-T1: There is no significant disc protrusion, spinal canal stenosis or   neural foraminal narrowing.           Impression   1.  Disc bulges or disc protrusions with associated mild central spinal   stenosis as detailed above at C2-3 to C6-7 levels, worst at C4-5 and C5-6.   2. Multilevel left-sided foraminal stenosis as detailed above.           Assessment:        Treatment Plan:  DISCUSSION: Treatment options discussed with patient and all questions answered to patient's satisfaction. TREATMENT OPTIONS:     Continue opioid therapy. Script written for [facility] requirements met. Satisfactory pain management plan. Medication helps with personal goals and self care needs. Patient is stable on current regimen of meds and medication is effectively managing pain, we will continue current medications without changes. As always, we encourage daily stretching and strengthening exercises, and recommend minimizing use of pain medications unless patient cannot get through daily activities due to pain.   Pt plans to follow up with NS after the new year  Follow up appointment made for 4 weeks

## 2017-12-16 ENCOUNTER — CARE COORDINATION (OUTPATIENT)
Dept: CASE MANAGEMENT | Age: 47
End: 2017-12-16

## 2017-12-18 ENCOUNTER — CARE COORDINATION (OUTPATIENT)
Dept: CASE MANAGEMENT | Age: 47
End: 2017-12-18

## 2017-12-18 ENCOUNTER — TELEPHONE (OUTPATIENT)
Dept: INTERNAL MEDICINE CLINIC | Age: 47
End: 2017-12-18

## 2017-12-18 NOTE — TELEPHONE ENCOUNTER
Spoke w/ pt regarding no show appt, sanna w/ Dr. Hung Jefferson on Tues 12/19/17. No show ltr mailed.

## 2017-12-21 RX ORDER — ATORVASTATIN CALCIUM 40 MG/1
TABLET, FILM COATED ORAL
Qty: 30 TABLET | Refills: 6 | Status: SHIPPED | OUTPATIENT
Start: 2017-12-21 | End: 2018-04-01 | Stop reason: SDUPTHER

## 2018-01-02 ENCOUNTER — OFFICE VISIT (OUTPATIENT)
Dept: INTERNAL MEDICINE CLINIC | Age: 48
End: 2018-01-02
Payer: COMMERCIAL

## 2018-01-02 VITALS
WEIGHT: 186.95 LBS | DIASTOLIC BLOOD PRESSURE: 76 MMHG | SYSTOLIC BLOOD PRESSURE: 122 MMHG | BODY MASS INDEX: 31.92 KG/M2 | HEIGHT: 64 IN

## 2018-01-02 DIAGNOSIS — E11.9 TYPE 2 DIABETES MELLITUS WITHOUT COMPLICATION, UNSPECIFIED LONG TERM INSULIN USE STATUS: Primary | ICD-10-CM

## 2018-01-02 DIAGNOSIS — Z13.220 SCREENING FOR HYPERLIPIDEMIA: ICD-10-CM

## 2018-01-02 DIAGNOSIS — K21.9 GASTROESOPHAGEAL REFLUX DISEASE WITHOUT ESOPHAGITIS: ICD-10-CM

## 2018-01-02 DIAGNOSIS — K52.9 COLITIS: ICD-10-CM

## 2018-01-02 PROCEDURE — G8427 DOCREV CUR MEDS BY ELIG CLIN: HCPCS | Performed by: INTERNAL MEDICINE

## 2018-01-02 PROCEDURE — 99213 OFFICE O/P EST LOW 20 MIN: CPT | Performed by: INTERNAL MEDICINE

## 2018-01-02 PROCEDURE — G8484 FLU IMMUNIZE NO ADMIN: HCPCS | Performed by: INTERNAL MEDICINE

## 2018-01-02 PROCEDURE — 1111F DSCHRG MED/CURRENT MED MERGE: CPT | Performed by: INTERNAL MEDICINE

## 2018-01-02 PROCEDURE — 4004F PT TOBACCO SCREEN RCVD TLK: CPT | Performed by: INTERNAL MEDICINE

## 2018-01-02 PROCEDURE — G8417 CALC BMI ABV UP PARAM F/U: HCPCS | Performed by: INTERNAL MEDICINE

## 2018-01-02 PROCEDURE — 3046F HEMOGLOBIN A1C LEVEL >9.0%: CPT | Performed by: INTERNAL MEDICINE

## 2018-01-02 RX ORDER — ESOMEPRAZOLE MAGNESIUM 40 MG/1
CAPSULE, DELAYED RELEASE ORAL
Qty: 90 CAPSULE | Refills: 3 | Status: SHIPPED | OUTPATIENT
Start: 2018-01-02 | End: 2018-12-26 | Stop reason: SDUPTHER

## 2018-01-02 RX ORDER — TOPIRAMATE 100 MG/1
1 TABLET, FILM COATED ORAL 2 TIMES DAILY
Refills: 0 | COMMUNITY
Start: 2017-12-17 | End: 2018-04-03 | Stop reason: ALTCHOICE

## 2018-01-02 NOTE — PROGRESS NOTES
Medical, Family, and Social History reviewed and does contribute to the patient presenting condition    Health Maintenance   Topic Date Due    HIV screen  09/20/1986    TSH testing  10/21/2016    Diabetic foot exam  01/27/2017    Cervical cancer screen  10/13/2017    Lipid screen  10/26/2017    DTaP/Tdap/Td vaccine (1 - Tdap) 01/02/2019 (Originally 9/20/1990)    Flu vaccine (1) 01/02/2019 (Originally 9/1/2017)    Pneumococcal med risk (1 of 1 - PPSV23) 01/03/2019 (Originally 9/20/1990)    A1C test (Diabetic or Prediabetic)  05/16/2018    Diabetic microalbuminuria test  05/16/2018    Diabetic retinal exam  08/15/2018       HPI    Chief Complaint   Patient presents with   [address] from [address] 12/2/17 - 12/5/17 - Abd pain, Colitis.  Other     [address] gap     Pt admitted for colitis improved   No more sympts   Wants referral to GI     DM well controlled   Diet controlled hba1c 7     Review of Systems   Past Medical History:   Diagnosis Date    Anxiety     Fatty liver     GERD (gastroesophageal reflux disease)     Headache     History of bronchitis     Hyperlipidemia     Hypothyroidism     Kidney stone     LFT elevation     MVP (mitral valve prolapse)     Obesity     Umbilical hernia      Social History   Substance Use Topics    Smoking status: Current Some Day Smoker     Packs/day: 0.50     Years: 20.00     Types: Cigarettes    Smokeless tobacco: Never Used      Comment: 1/2 pack every 2 weeks    Alcohol use No       ROS  [facility] no chest pain no sob no orthopnea  Resp no cough   General no weight loss no fatigue no fever        Objective:   Physical Exam    Blood pressure 122/76, height 5' 4.02\" (1.626 m), weight 186 lb 15.2 oz (84.8 kg), last menstrual period 11/01/2010, not currently breastfeeding.   General Appearance NAD conversant  Neck FROM supple no JVD  Lungs normal effort Clear to auscultation  Cardio regular rhythm no murmur  Abdomen Soft nontender no HSM  Ext no

## 2018-01-03 ENCOUNTER — CARE COORDINATION (OUTPATIENT)
Dept: CARE COORDINATION | Age: 48
End: 2018-01-03

## 2018-01-09 ENCOUNTER — CARE COORDINATION (OUTPATIENT)
Dept: CARE COORDINATION | Age: 48
End: 2018-01-09

## 2018-01-09 NOTE — CARE COORDINATION
Completed (Comment: DM zone management.)         Goals Addressed             Most Recent     Conditions and Symptoms   On track (1/9/2018)             I will schedule office visits, as directed by my provider. I will keep my appointment or reschedule if I have to cancel. I will notify my provider of any barriers to my plan of care. I will follow my Zone Management tool to seek urgent or emergent care. I will notify my provider of any symptoms that indicate a worsening of my condition. Barriers: lack of education  Plan for overcoming my barriers: will try to schedule future appointments while in the office. Confidence: 6/10  Anticipated Goal Completion Date: 10/20/17              Prior to Admission medications    Medication Sig Start Date End Date Taking? Authorizing Provider   topiramate (TOPAMAX) 100 MG tablet Take 1 tablet by mouth nightly 12/17/17   Historical Provider, MD   esomeprazole (NEXIUM) 40 MG delayed release capsule TAKE 1 CAPSULE BY MOUTH EVERY MORNING BEFORE BREAKFAST 1/2/18   Rosa Jiang MD   atorvastatin (LIPITOR) 40 MG tablet TAKE 1 TABLET BY MOUTH DAILY 12/21/17   Shekhar Dimas MD   oxyCODONE-acetaminophen (PERCOCET) 7.5-325 MG per tablet Take 1 tablet by mouth every 8 hours as needed for Pain .  Earliest Fill Date: 12/18/17 12/18/17   MARTITA Posadas   ondansetron [facility]) 4 MG tablet Take 1 tablet by mouth every 8 hours as needed for Nausea 12/7/17   Roetta Prader, DO   lactobacillus (CULTURELLE) capsule Take 1 capsule by mouth 2 times daily 12/5/17   Claudia Vargas MD   tiZANidine (ZANAFLEX) 4 MG tablet Take 1 tablet by mouth 2 times daily as needed (pain) 10/27/17   Daphney Adams MD   gabapentin (NEURONTIN) 300 MG capsule Take 1 capsule by mouth 3 times daily 10/16/17   Jyoti Dsouza MD   sertraline (ZOLOFT) 100 MG tablet TK 2 TS PO QAM 8/7/17   Historical Provider, MD   metoprolol tartrate (LOPRESSOR) 50 MG tablet Take 50 mg by mouth 2 times daily 8/21/17   Historical Provider, MD   rOPINIRole (REQUIP) 1 MG tablet TAKE 1 TABLET BY MOUTH EVERY NIGHT 8/7/17   Serafin Guerin MD   levothyroxine (SYNTHROID) 125 MCG tablet TAKE 1 TABLET BY MOUTH TWICE DAILY 2/10/17   Serafin Guerin MD   clonazePAM (KLONOPIN) 0.5 MG tablet TK 1 T PO  TID 10/10/16   Historical Provider, MD   glucose blood VI test strips (WILLIAM CONTOUR TEST) strip 1 each by In Vitro route 2 times daily. As needed.  1/29/15   Archana Hudson MD   Lancets MISC Testing bid 1/29/15   Archana Hudson MD       Future Appointments  Date Time Provider Nisha Danielle   1/10/2018 4:00 PM Kendrick Shanks MD [facility]   1/17/2018 9:20 AM MARTITA JoCZ PAINMGT None   4/10/2018 9:45 AM Phuc Jeffries MD [address] [facility]

## 2018-01-16 ENCOUNTER — HOSPITAL ENCOUNTER (OUTPATIENT)
Dept: PAIN MANAGEMENT | Age: 48
Discharge: HOME | End: 2018-01-16
Payer: COMMERCIAL

## 2018-01-16 VITALS
DIASTOLIC BLOOD PRESSURE: 92 MMHG | RESPIRATION RATE: 16 BRPM | HEIGHT: 64 IN | WEIGHT: 186 LBS | BODY MASS INDEX: 31.76 KG/M2 | HEART RATE: 90 BPM | SYSTOLIC BLOOD PRESSURE: 113 MMHG

## 2018-01-16 DIAGNOSIS — M47.812 OSTEOARTHRITIS OF CERVICAL SPINE, UNSPECIFIED SPINAL OSTEOARTHRITIS COMPLICATION STATUS: ICD-10-CM

## 2018-01-16 DIAGNOSIS — M54.12 CERVICAL RADICULOPATHY: ICD-10-CM

## 2018-01-16 DIAGNOSIS — Z51.81 ENCOUNTER FOR MEDICATION MONITORING: Primary | ICD-10-CM

## 2018-01-16 DIAGNOSIS — M48.02 DEGENERATIVE CERVICAL SPINAL STENOSIS: ICD-10-CM

## 2018-01-16 DIAGNOSIS — M47.812 ARTHROPATHY OF CERVICAL FACET JOINT: ICD-10-CM

## 2018-01-16 PROCEDURE — 99213 OFFICE O/P EST LOW 20 MIN: CPT

## 2018-01-16 PROCEDURE — 99214 OFFICE O/P EST MOD 30 MIN: CPT | Performed by: NURSE PRACTITIONER

## 2018-01-16 PROCEDURE — 80307 DRUG TEST PRSMV CHEM ANLYZR: CPT

## 2018-01-16 RX ORDER — TIZANIDINE 4 MG/1
4 TABLET ORAL 2 TIMES DAILY PRN
Qty: 60 TABLET | Refills: 2 | Status: SHIPPED | OUTPATIENT
Start: 2018-01-16 | End: 2018-02-15

## 2018-01-16 RX ORDER — OXYCODONE AND ACETAMINOPHEN 7.5; 325 MG/1; MG/1
1 TABLET ORAL EVERY 8 HOURS PRN
Qty: 90 TABLET | Refills: 0 | Status: SHIPPED | OUTPATIENT
Start: 2018-01-26 | End: 2018-02-23 | Stop reason: SDUPTHER

## 2018-01-16 ASSESSMENT — ENCOUNTER SYMPTOMS
BACK PAIN: 1
SHORTNESS OF BREATH: 0
CONSTIPATION: 0
COUGH: 0

## 2018-01-16 NOTE — PROGRESS NOTES
Patient is here today to review medication contract. Chief Complaint:  Neck pain    PMH    Pt c/o neck pain for over 2 years. Pain is causing headaches and numbness and tingling down her left arm to wrist. She has tried PT and steroid injections in the neck from her PCP that did not helped. She had a left cervical facet injeciton in August that only gave about 20% relief for 1 month. She does follow with neurology Dr. Jm Johnson for her headaches and has had ER visits where she receives dilaudid and Toradol with relief. She was unable to see neurosurgeon as planned d/t insurance reasons, will try again with new insurance. HPI:   Neck Pain    This is a chronic problem. The problem occurs constantly. The problem has been gradually worsening. The pain is associated with nothing. The pain is present in the left side and right side. The quality of the pain is described as aching, burning and cramping. The pain is at a severity of 6/10. Nothing aggravates the symptoms. The pain is same all the time. Stiffness is present all day. Associated symptoms include headaches, numbness, tingling and weakness. Pertinent negatives include no chest pain or fever. She has tried acetaminophen, bed rest, heat, muscle relaxants, NSAIDs and oral narcotics for the symptoms. The treatment provided mild relief. Patient denies any new neurological symptoms. No bowel or bladder incontinence, no weakness, and no falling. Pill count: appropriate pt has 25 extra prescription adjusted appropriately    Morphine equivalent dose as reported onOARRS: 33.75    Controlled Substances Monitoring:     Attestation: The Prescription Monitoring Report for this patient was reviewed today. MARTITA Maciel)  Documentation: Possible medication side effects, risk of tolerance and/or dependence, and alternative treatments discussed. , Random urine drug screen sent today., Obtaining appropriate analgesic effect of treatment., No signs of potential

## 2018-01-20 LAB
6-ACETYLMORPHINE, UR: NOT DETECTED
7-AMINOCLONAZEPAM, URINE: PRESENT
ALPHA-OH-ALPRAZ, URINE: NOT DETECTED
ALPRAZOLAM, URINE: NOT DETECTED
AMPHETAMINES, URINE: NOT DETECTED
BARBITURATES, URINE: NOT DETECTED
BENZOYLECGONINE, UR: NOT DETECTED
BUPRENORPHINE URINE: NOT DETECTED
CARISOPRODOL, UR: NOT DETECTED
CLONAZEPAM, URINE: NOT DETECTED
CODEINE, URINE: NOT DETECTED
CREATININE URINE: 199.8 MG/DL (ref 20–400)
DIAZEPAM, URINE: NOT DETECTED
DRUGS EXPECTED, UR: NORMAL
EER HI RES INTERP UR: NORMAL
ETHYL GLUCURONIDE UR: NOT DETECTED
FENTANYL URINE: NOT DETECTED
HYDROCODONE, URINE: NOT DETECTED
HYDROMORPHONE, URINE: NOT DETECTED
LORAZEPAM, URINE: NOT DETECTED
MARIJUANA METAB, UR: NOT DETECTED
MDA, UR: NOT DETECTED
MDEA, EVE, UR: NOT DETECTED
MDMA URINE: NOT DETECTED
MEPERIDINE METAB, UR: NOT DETECTED
METHADONE, URINE: NOT DETECTED
METHAMPHETAMINE, URINE: NOT DETECTED
METHYLPHENIDATE: NOT DETECTED
MIDAZOLAM, URINE: NOT DETECTED
MORPHINE URINE: NOT DETECTED
NORBUPRENORPHINE, URINE: NOT DETECTED
NORDIAZEPAM, URINE: NOT DETECTED
NORFENTANYL, URINE: NOT DETECTED
NORHYDROCODONE, URINE: NOT DETECTED
NOROXYCODONE, URINE: PRESENT
NOROXYMORPHONE, URINE: NOT DETECTED
OXAZEPAM, URINE: NOT DETECTED
OXYCODONE URINE: NOT DETECTED
OXYMORPHONE, URINE: NOT DETECTED
PAIN MANAGEMENT DRUG PANEL INTERP, URINE: NORMAL
PAIN MGT DRUG PANEL, HI RES, UR: NORMAL
PCP,URINE: NOT DETECTED
PHENTERMINE, UR: NOT DETECTED
PROPOXYPHENE, URINE: NOT DETECTED
TAPENTADOL, URINE: NOT DETECTED
TAPENTADOL-O-SULFATE, URINE: NOT DETECTED
TEMAZEPAM, URINE: NOT DETECTED
TRAMADOL, URINE: NOT DETECTED
ZOLPIDEM, URINE: NOT DETECTED

## 2018-02-09 ENCOUNTER — ANESTHESIA EVENT (OUTPATIENT)
Dept: OPERATING ROOM | Age: 48
End: 2018-02-09
Payer: COMMERCIAL

## 2018-02-12 ENCOUNTER — HOSPITAL ENCOUNTER (OUTPATIENT)
Age: 48
Setting detail: OUTPATIENT SURGERY
Discharge: HOME | End: 2018-02-12
Attending: SURGERY | Admitting: SURGERY
Payer: COMMERCIAL

## 2018-02-12 ENCOUNTER — ANESTHESIA (OUTPATIENT)
Dept: OPERATING ROOM | Age: 48
End: 2018-02-12
Payer: COMMERCIAL

## 2018-02-12 VITALS
WEIGHT: 180 LBS | HEIGHT: 64 IN | OXYGEN SATURATION: 99 % | SYSTOLIC BLOOD PRESSURE: 93 MMHG | BODY MASS INDEX: 30.73 KG/M2 | TEMPERATURE: 98.2 F | HEART RATE: 68 BPM | RESPIRATION RATE: 16 BRPM | DIASTOLIC BLOOD PRESSURE: 74 MMHG

## 2018-02-12 VITALS
RESPIRATION RATE: 19 BRPM | DIASTOLIC BLOOD PRESSURE: 65 MMHG | SYSTOLIC BLOOD PRESSURE: 109 MMHG | OXYGEN SATURATION: 93 %

## 2018-02-12 PROCEDURE — 3609010300 HC COLONOSCOPY W/BIOPSY SINGLE/MULTIPLE: Performed by: SURGERY

## 2018-02-12 PROCEDURE — 2580000003 HC RX 258: Performed by: NURSE ANESTHETIST, CERTIFIED REGISTERED

## 2018-02-12 PROCEDURE — 7100000010 HC PHASE II RECOVERY - FIRST 15 MIN: Performed by: SURGERY

## 2018-02-12 PROCEDURE — 3700000000 HC ANESTHESIA ATTENDED CARE: Performed by: SURGERY

## 2018-02-12 PROCEDURE — 2580000003 HC RX 258: Performed by: ANESTHESIOLOGY

## 2018-02-12 PROCEDURE — 6360000002 HC RX W HCPCS: Performed by: NURSE ANESTHETIST, CERTIFIED REGISTERED

## 2018-02-12 PROCEDURE — 7100000011 HC PHASE II RECOVERY - ADDTL 15 MIN: Performed by: SURGERY

## 2018-02-12 PROCEDURE — 88305 TISSUE EXAM BY PATHOLOGIST: CPT

## 2018-02-12 PROCEDURE — 3700000001 HC ADD 15 MINUTES (ANESTHESIA): Performed by: SURGERY

## 2018-02-12 RX ORDER — SODIUM CHLORIDE, SODIUM LACTATE, POTASSIUM CHLORIDE, CALCIUM CHLORIDE 600; 310; 30; 20 MG/100ML; MG/100ML; MG/100ML; MG/100ML
INJECTION, SOLUTION INTRAVENOUS CONTINUOUS PRN
Status: DISCONTINUED | OUTPATIENT
Start: 2018-02-12 | End: 2018-02-12 | Stop reason: SDUPTHER

## 2018-02-12 RX ORDER — SODIUM CHLORIDE 0.9 % (FLUSH) 0.9 %
10 SYRINGE (ML) INJECTION EVERY 12 HOURS SCHEDULED
Status: DISCONTINUED | OUTPATIENT
Start: 2018-02-12 | End: 2018-02-12 | Stop reason: HOSPADM

## 2018-02-12 RX ORDER — SODIUM CHLORIDE 0.9 % (FLUSH) 0.9 %
10 SYRINGE (ML) INJECTION PRN
Status: DISCONTINUED | OUTPATIENT
Start: 2018-02-12 | End: 2018-02-12 | Stop reason: HOSPADM

## 2018-02-12 RX ORDER — PROPOFOL 10 MG/ML
INJECTION, EMULSION INTRAVENOUS PRN
Status: DISCONTINUED | OUTPATIENT
Start: 2018-02-12 | End: 2018-02-12 | Stop reason: SDUPTHER

## 2018-02-12 RX ORDER — SODIUM CHLORIDE 9 MG/ML
INJECTION, SOLUTION INTRAVENOUS CONTINUOUS
Status: DISCONTINUED | OUTPATIENT
Start: 2018-02-12 | End: 2018-02-12

## 2018-02-12 RX ORDER — MIDAZOLAM HYDROCHLORIDE 1 MG/ML
INJECTION INTRAMUSCULAR; INTRAVENOUS PRN
Status: DISCONTINUED | OUTPATIENT
Start: 2018-02-12 | End: 2018-02-12 | Stop reason: SDUPTHER

## 2018-02-12 RX ORDER — LIDOCAINE HYDROCHLORIDE 10 MG/ML
1 INJECTION, SOLUTION EPIDURAL; INFILTRATION; INTRACAUDAL; PERINEURAL
Status: DISCONTINUED | OUTPATIENT
Start: 2018-02-12 | End: 2018-02-12 | Stop reason: HOSPADM

## 2018-02-12 RX ORDER — ONDANSETRON 2 MG/ML
INJECTION INTRAMUSCULAR; INTRAVENOUS PRN
Status: DISCONTINUED | OUTPATIENT
Start: 2018-02-12 | End: 2018-02-12 | Stop reason: SDUPTHER

## 2018-02-12 RX ORDER — SODIUM CHLORIDE, SODIUM LACTATE, POTASSIUM CHLORIDE, CALCIUM CHLORIDE 600; 310; 30; 20 MG/100ML; MG/100ML; MG/100ML; MG/100ML
INJECTION, SOLUTION INTRAVENOUS CONTINUOUS
Status: DISCONTINUED | OUTPATIENT
Start: 2018-02-12 | End: 2018-02-12 | Stop reason: HOSPADM

## 2018-02-12 RX ADMIN — PROPOFOL 20 MG: 10 INJECTION, EMULSION INTRAVENOUS at 07:41

## 2018-02-12 RX ADMIN — PROPOFOL 20 MG: 10 INJECTION, EMULSION INTRAVENOUS at 07:57

## 2018-02-12 RX ADMIN — PROPOFOL 50 MG: 10 INJECTION, EMULSION INTRAVENOUS at 08:03

## 2018-02-12 RX ADMIN — PROPOFOL 50 MG: 10 INJECTION, EMULSION INTRAVENOUS at 07:45

## 2018-02-12 RX ADMIN — ONDANSETRON 4 MG: 2 INJECTION, SOLUTION INTRAMUSCULAR; INTRAVENOUS at 07:31

## 2018-02-12 RX ADMIN — PROPOFOL 40 MG: 10 INJECTION, EMULSION INTRAVENOUS at 07:36

## 2018-02-12 RX ADMIN — PROPOFOL 20 MG: 10 INJECTION, EMULSION INTRAVENOUS at 07:59

## 2018-02-12 RX ADMIN — PROPOFOL 50 MG: 10 INJECTION, EMULSION INTRAVENOUS at 07:50

## 2018-02-12 RX ADMIN — PROPOFOL 120 MG: 10 INJECTION, EMULSION INTRAVENOUS at 07:32

## 2018-02-12 RX ADMIN — SODIUM CHLORIDE, POTASSIUM CHLORIDE, SODIUM LACTATE AND CALCIUM CHLORIDE: 600; 310; 30; 20 INJECTION, SOLUTION INTRAVENOUS at 06:33

## 2018-02-12 RX ADMIN — PROPOFOL 50 MG: 10 INJECTION, EMULSION INTRAVENOUS at 07:53

## 2018-02-12 RX ADMIN — PROPOFOL 20 MG: 10 INJECTION, EMULSION INTRAVENOUS at 08:01

## 2018-02-12 RX ADMIN — PROPOFOL 50 MG: 10 INJECTION, EMULSION INTRAVENOUS at 07:55

## 2018-02-12 RX ADMIN — PROPOFOL 20 MG: 10 INJECTION, EMULSION INTRAVENOUS at 07:34

## 2018-02-12 RX ADMIN — SODIUM CHLORIDE, POTASSIUM CHLORIDE, SODIUM LACTATE AND CALCIUM CHLORIDE: 600; 310; 30; 20 INJECTION, SOLUTION INTRAVENOUS at 07:28

## 2018-02-12 RX ADMIN — PROPOFOL 20 MG: 10 INJECTION, EMULSION INTRAVENOUS at 07:43

## 2018-02-12 RX ADMIN — PROPOFOL 20 MG: 10 INJECTION, EMULSION INTRAVENOUS at 07:38

## 2018-02-12 RX ADMIN — MIDAZOLAM HYDROCHLORIDE 2 MG: 1 INJECTION, SOLUTION INTRAMUSCULAR; INTRAVENOUS at 07:31

## 2018-02-12 RX ADMIN — PROPOFOL 50 MG: 10 INJECTION, EMULSION INTRAVENOUS at 07:47

## 2018-02-12 ASSESSMENT — PAIN DESCRIPTION - FREQUENCY
FREQUENCY: INTERMITTENT
FREQUENCY: INTERMITTENT

## 2018-02-12 ASSESSMENT — PULMONARY FUNCTION TESTS
PIF_VALUE: 0

## 2018-02-12 ASSESSMENT — PAIN DESCRIPTION - DESCRIPTORS
DESCRIPTORS: STABBING
DESCRIPTORS: CRAMPING
DESCRIPTORS: CRAMPING

## 2018-02-12 ASSESSMENT — PAIN DESCRIPTION - LOCATION: LOCATION: ABDOMEN

## 2018-02-12 ASSESSMENT — PAIN - FUNCTIONAL ASSESSMENT: PAIN_FUNCTIONAL_ASSESSMENT: 0-10

## 2018-02-12 ASSESSMENT — PAIN DESCRIPTION - PAIN TYPE: TYPE: ACUTE PAIN

## 2018-02-12 NOTE — ANESTHESIA PRE PROCEDURE
  SECTION      2 pfannenstiel, 1 vertical    CHOLECYSTECTOMY      COLONOSCOPY  2009    Dr Mike Tenorio  14    COLONOSCOPY  2014    biopsy & sigmoid spasms, pathology negative    HERNIA REPAIR      x 5    HYSTERECTOMY      2010    MI EXPLORATORY OF ABDOMEN  10/21/2014    Laparotomy-lysis of adhesions, bso     UPPER GASTROINTESTINAL ENDOSCOPY  2010    mild chronic inactive gastritis       Social History:    Social History   Substance Use Topics    Smoking status: Current Some Day Smoker     Packs/day: 0.50     Years: 20.00     Types: Cigarettes    Smokeless tobacco: Never Used      Comment: 1/2 pack every 2 weeks    Alcohol use No                                Ready to quit: Not Answered  Counseling given: Not Answered      Vital Signs (Current):   Vitals:    18 0619 18 0619   BP:  104/74   Pulse:  86   Resp:  16   Temp:  98.2 °F (36.8 °C)   TempSrc:  Oral   SpO2:  94%   Weight: 180 lb (81.6 kg)    Height: 5' 4\" (1.626 m)                                               BP Readings from Last 3 Encounters:   18 104/74   18 (!) 113/92   18 122/76       NPO Status: Time of last liquid consumption: 2330                        Time of last solid consumption:                         Date of last liquid consumption: 18                        Date of last solid food consumption: 18    BMI:   Wt Readings from Last 3 Encounters:   18 180 lb (81.6 kg)   18 186 lb (84.4 kg)   18 186 lb 15.2 oz (84.8 kg)     Body mass index is 30.9 kg/m².     CBC:   Lab Results   Component Value Date    WBC 5.6 2017    RBC 4.37 2017    RBC 4.40 2012    HGB 13.3 2017    HCT 39.6 2017    MCV 90.6 2017    RDW 12.7 2017     2017     2012       CMP:   Lab Results   Component Value Date     2017    K 3.5 2017     2017    CO2 20 2017    BUN 6 12/07/2017    CREATININE 0.52 12/07/2017    GFRAA >60 12/07/2017    LABGLOM >60 12/07/2017    GLUCOSE 102 12/07/2017    GLUCOSE 99 04/07/2017    PROT 6.3 12/07/2017    PROT 7.0 04/07/2017    CALCIUM 8.9 12/07/2017    BILITOT 0.29 12/07/2017    ALKPHOS 123 12/07/2017    AST 94 12/07/2017    ALT 64 12/07/2017       POC Tests: No results for input(s): POCGLU, POCNA, POCK, POCCL, POCBUN, POCHEMO, POCHCT in the last 72 hours. Coags:   Lab Results   Component Value Date    PROTIME 10.0 01/17/2015    INR 1.0 01/17/2015    APTT 22.9 01/17/2015       HCG (If Applicable):   Lab Results   Component Value Date    PREGTESTUR NEGATIVE 05/18/2012        ABGs: No results found for: PHART, PO2ART, OXE2LYT, KFG4VYB, BEART, H3AMWTCO     Type & Screen (If Applicable):  No results found for: [facility]    Anesthesia Evaluation  Patient summary reviewed and Nursing notes reviewed no history of anesthetic complications:   Airway: Mallampati: II  TM distance: >3 FB   Neck ROM: full  Mouth opening: > = 3 FB Dental:    (+) upper dentures and lower dentures      Pulmonary:normal exam        (-) COPD and asthma                           Cardiovascular:  Exercise tolerance: no interval change,   (+) hyperlipidemia    (-) pacemaker, hypertension, past MI and CAD      Rhythm: regular  Rate: normal           Beta Blocker:  Not on Beta Blocker         Neuro/Psych:   (+) headaches:,    (-) seizures, TIA and CVA           GI/Hepatic/Renal:   (+) GERD:,           Endo/Other:    (+) Type II DM, , hypothyroidism::., .                 Abdominal:           Vascular:                                        Anesthesia Plan      MAC and general     ASA 3       Induction: intravenous. Anesthetic plan and risks discussed with patient. Plan discussed with CRNA.     Attending anesthesiologist reviewed and agrees with Pre Eval content              Richard Moore DO   2/12/2018

## 2018-02-12 NOTE — H&P
GI History and Physical    Pt Name: Adriana Runner  MRN: [id number]  YOB: 1970  Date of evaluation: 2018  Primary Care Physician: Catrachita Rojo MD    SUBJECTIVE:   History of Chief Complaint: This is Adriana Runner a 56 y.o. female who presents today for a colonoscopy by Dr Leila López for diarrhea and abdominal pain. The patient completed the prep as directed until watery clear yellow. Bowel movements occur more frequently than before  No family history of colon cancer or polyps. Previous colonoscopy 14. The patient continues to have constant abdominal pain that is intermittently sharp in the right upper side and accompanied by nausea She is taking some of her son's Zofran Denies history of ulcers, hiatal hernia, acid reflux/GERD, bloody tarry stools, constipation, fever, chills, night sweats, or unexplained weight loss. Past Medical History    has a past medical history of Anxiety; Fatty liver; GERD (gastroesophageal reflux disease); Headache; History of bronchitis; Hyperlipidemia; Hypothyroidism; Kidney stone; LFT elevation; MVP (mitral valve prolapse); Obesity; and Umbilical hernia. Past Surgical History   has a past surgical history that includes Upper gastrointestinal endoscopy (2010); hernia repair; Cholecystectomy; Hysterectomy; Appendectomy;  section; Colonoscopy (); Colonoscopy (14); Colonoscopy (2014); and exploratory of abdomen (10/21/2014). Medications  Prior to Admission medications    Medication Sig Start Date End Date Taking? Authorizing Provider   oxyCODONE-acetaminophen (PERCOCET) 7.5-325 MG per tablet Take 1 tablet by mouth every 8 hours as needed for Pain for up to 30 days.  18 Yes MARTITA Christianson   topiramate (TOPAMAX) 100 MG tablet Take 1 tablet by mouth nightly 17  Yes Historical Provider, MD   esomeprazole ([facility]) 40 MG delayed release capsule TAKE 1 CAPSULE BY MOUTH EVERY MORNING BEFORE BREAKFAST 18  Yes Nyla Villalba MD   atorvastatin (LIPITOR) 40 MG tablet TAKE 1 TABLET BY MOUTH DAILY 12/21/17  Yes Laquita Claudio MD   ondansetron (ZOFRAN) 4 MG tablet Take 1 tablet by mouth every 8 hours as needed for Nausea 12/7/17  Yes Kurt Hunter DO   gabapentin (NEURONTIN) 300 MG capsule Take 1 capsule by mouth 3 times daily 10/16/17  Yes Serg Belcher MD   sertraline (ZOLOFT) 100 MG tablet TK 2 TS PO QAM 8/7/17  Yes Historical Provider, MD   metoprolol tartrate (LOPRESSOR) 50 MG tablet Take 50 mg by mouth 2 times daily  8/21/17  Yes Historical Provider, MD   rOPINIRole (REQUIP) 1 MG tablet TAKE 1 TABLET BY MOUTH EVERY NIGHT 8/7/17  Yes Yared Avendano MD   clonazePAM (KLONOPIN) 0.5 MG tablet TK 1 T PO  TID 10/10/16  Yes Historical Provider, MD   tiZANidine (ZANAFLEX) 4 MG tablet Take 1 tablet by mouth 2 times daily as needed (pain) 1/16/18 2/15/18  MARTITA Lay   levothyroxine (SYNTHROID) 125 MCG tablet TAKE 1 TABLET BY MOUTH TWICE DAILY 2/10/17   Yared Avendano MD     Allergies  is allergic to morphine; sulfa antibiotics; and adhesive tape. Family History  family history includes Breast Cancer in her maternal grandmother; Cancer in her mother; Diabetes in her father; Heart Disease in her father; Other in her father; Stroke in her maternal grandfather. Social History   reports that she has been smoking Cigarettes. She has a 10.00 pack-year smoking history. She has never used smokeless tobacco.   reports that she does not drink alcohol. reports that she does not use drugs. ROS: Pertinent findings in the HPI above. A comprehensive review of systems was positive for Cervical spine pain  Under the care of pain management for injections. Last [facility] 12/2017 with moderate relief. She denies exertional chest pain, dyspnea and neurologic symptoms or behavioral/psych issues. Patient's last menstrual period was 11/01/2010.   [id number]      OBJECTIVE:   VITALS:  height is 5' 4\" (1.626 m) and weight is 180

## 2018-02-13 ENCOUNTER — CARE COORDINATION (OUTPATIENT)
Dept: CARE COORDINATION | Age: 48
End: 2018-02-13

## 2018-02-13 DIAGNOSIS — E03.9 HYPOTHYROIDISM: ICD-10-CM

## 2018-02-13 LAB — SURGICAL PATHOLOGY REPORT: NORMAL

## 2018-02-13 RX ORDER — LEVOTHYROXINE SODIUM 0.12 MG/1
TABLET ORAL
Qty: 60 TABLET | Refills: 0 | Status: SHIPPED | OUTPATIENT
Start: 2018-02-13 | End: 2018-03-20 | Stop reason: SDUPTHER

## 2018-02-15 NOTE — CARE COORDINATION
Attempted to contact patient today. Left voice message requesting patient call this CC Nurse at [phone].

## 2018-02-21 ENCOUNTER — CARE COORDINATION (OUTPATIENT)
Dept: CARE COORDINATION | Age: 48
End: 2018-02-21

## 2018-02-23 ENCOUNTER — HOSPITAL ENCOUNTER (OUTPATIENT)
Dept: PAIN MANAGEMENT | Age: 48
Discharge: HOME | End: 2018-02-23
Payer: MEDICAID

## 2018-02-23 DIAGNOSIS — M47.812 OSTEOARTHRITIS OF CERVICAL SPINE, UNSPECIFIED SPINAL OSTEOARTHRITIS COMPLICATION STATUS: ICD-10-CM

## 2018-02-23 DIAGNOSIS — M48.02 DEGENERATIVE CERVICAL SPINAL STENOSIS: ICD-10-CM

## 2018-02-23 DIAGNOSIS — Z51.81 ENCOUNTER FOR MEDICATION MONITORING: ICD-10-CM

## 2018-02-23 RX ORDER — OXYCODONE AND ACETAMINOPHEN 7.5; 325 MG/1; MG/1
1 TABLET ORAL EVERY 8 HOURS PRN
Qty: 90 TABLET | Refills: 0 | Status: SHIPPED | OUTPATIENT
Start: 2018-02-25 | End: 2018-03-27

## 2018-03-02 ENCOUNTER — CARE COORDINATION (OUTPATIENT)
Dept: CARE COORDINATION | Age: 48
End: 2018-03-02

## 2018-03-02 NOTE — CARE COORDINATION
Ambulatory Care Coordination Note  3/2/2018  CM Risk Score: 5  Chayo Mortality Risk Score:      ACC: Shirin Kelly RN    Summary Note:  Phone call to patient. Patient states she did follow up with the surgeon regarding her pain after the surgery. Patient was diagnosed with UTI and treated with Cipro. Symptoms are now resolved. Patient states she has noticed her hands and arms are going \"numb\" on a daily basis. Patient does have Degenerative disc disease, but this is a new symptom. Patient goes to pain management and has procedures every 3 months to decrease the pain. 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 This nurse instructed patient to make appointment with Dr. Marielena Schaefer to evaluate the new symptoms. Patient agrees to call for an appointment today. Patient goes to [facility] for counseling and prescriptions for Clonipin and Zoloft. Patient missed appointment and states it is getting harder to go to all of her medical appointments. Patient would like Dr. Marielena Schaefer to start prescribing the medications, as he prescribed them in the past before she decided to go to [facility]. Patient states she will discuss with Dr. Marielena Schaefer at appointment. Patient states she has enough medication left for 1-2 weeks. Patient reports she is testing blood sugars several days per week when she wakes up and they range . Lab Results   Component Value Date    LABA1C 6.0 05/16/2017     Lab Results   Component Value Date     01/26/2015         CC plan: Will follow up with patient in 1-2 weeks to assess new symptoms of numbness. Diabetes Assessment    Meal Planning:  None   How often do you test your blood sugar?:  No Testing   Do you have barriers with adherence to non-pharmacologic self-management interventions?  (Nutrition/Exercise/Self-Monitoring):  No   Have you ever had to go to the ED for symptoms of low blood sugar?:

## 2018-03-06 ENCOUNTER — OFFICE VISIT (OUTPATIENT)
Dept: INTERNAL MEDICINE CLINIC | Age: 48
End: 2018-03-06
Payer: COMMERCIAL

## 2018-03-06 ENCOUNTER — CARE COORDINATION (OUTPATIENT)
Dept: CARE COORDINATION | Age: 48
End: 2018-03-06

## 2018-03-06 VITALS
DIASTOLIC BLOOD PRESSURE: 66 MMHG | BODY MASS INDEX: 31.41 KG/M2 | SYSTOLIC BLOOD PRESSURE: 114 MMHG | HEIGHT: 64 IN | WEIGHT: 184 LBS

## 2018-03-06 DIAGNOSIS — E03.9 HYPOTHYROIDISM, UNSPECIFIED TYPE: ICD-10-CM

## 2018-03-06 DIAGNOSIS — J06.9 ACUTE URI: ICD-10-CM

## 2018-03-06 DIAGNOSIS — E78.5 HYPERLIPIDEMIA, UNSPECIFIED HYPERLIPIDEMIA TYPE: Primary | ICD-10-CM

## 2018-03-06 PROCEDURE — 99213 OFFICE O/P EST LOW 20 MIN: CPT | Performed by: INTERNAL MEDICINE

## 2018-03-06 PROCEDURE — G8484 FLU IMMUNIZE NO ADMIN: HCPCS | Performed by: INTERNAL MEDICINE

## 2018-03-06 PROCEDURE — G8427 DOCREV CUR MEDS BY ELIG CLIN: HCPCS | Performed by: INTERNAL MEDICINE

## 2018-03-06 PROCEDURE — G8417 CALC BMI ABV UP PARAM F/U: HCPCS | Performed by: INTERNAL MEDICINE

## 2018-03-06 PROCEDURE — 4004F PT TOBACCO SCREEN RCVD TLK: CPT | Performed by: INTERNAL MEDICINE

## 2018-03-06 RX ORDER — AZITHROMYCIN 250 MG/1
TABLET, FILM COATED ORAL
Qty: 1 PACKET | Refills: 0 | Status: SHIPPED | OUTPATIENT
Start: 2018-03-06 | End: 2018-04-03 | Stop reason: ALTCHOICE

## 2018-03-06 RX ORDER — TOPIRAMATE 25 MG/1
25 TABLET ORAL 2 TIMES DAILY
COMMUNITY
Start: 2018-02-27

## 2018-03-06 RX ORDER — BENZONATATE 100 MG/1
100 CAPSULE ORAL 3 TIMES DAILY PRN
Qty: 21 CAPSULE | Refills: 0 | Status: SHIPPED | OUTPATIENT
Start: 2018-03-06 | End: 2018-03-13

## 2018-03-06 ASSESSMENT — PATIENT HEALTH QUESTIONNAIRE - PHQ9
SUM OF ALL RESPONSES TO PHQ QUESTIONS 1-9: 0
2. FEELING DOWN, DEPRESSED OR HOPELESS: 0
1. LITTLE INTEREST OR PLEASURE IN DOING THINGS: 0
SUM OF ALL RESPONSES TO PHQ9 QUESTIONS 1 & 2: 0

## 2018-03-06 NOTE — CARE COORDINATION
Met with patient briefly before her follow up with PCP. Introduced self and care coordination. Patient agreed to follow up calls.

## 2018-03-06 NOTE — PROGRESS NOTES
the patient presenting condition    Health Maintenance   Topic Date Due    HIV screen  09/20/1986    TSH testing  10/21/2016    Diabetic foot exam  01/27/2017    Cervical cancer screen  10/13/2017    Lipid screen  10/26/2017    DTaP/Tdap/Td vaccine (1 - Tdap) 01/02/2019 (Originally 9/20/1990)    Flu vaccine (1) 01/02/2019 (Originally 9/1/2017)    Pneumococcal med risk (1 of 1 - PPSV23) 01/03/2019 (Originally 9/20/1990)    A1C test (Diabetic or Prediabetic)  05/16/2018    Diabetic microalbuminuria test  05/16/2018    Diabetic retinal exam  08/15/2018       HPI    Chief Complaint   Patient presents with    Numbness     both hands x 2 weeks     Pharyngitis     head and nasal congestion, body aches x 2 days.  Discuss Medications     pt would like to have all meds prescribed by you. States she does not want to go to [facility] any longer. Her last visit there was in September, 2017. Wants her Zoloft and Clonazepam prescribed by pcp.           HPI   1) Location/Symptom cough   2) Timing/Onset: 3 days   3) Context/Setting: sorethroat   4) Quality:sharp pain  5) Duration:cont    6) Modifying Factors: body pains   7) Severity: moderate           Review of Systems  Past Medical History:   Diagnosis Date    Anxiety     Fatty liver     GERD (gastroesophageal reflux disease)     Headache     History of bronchitis     Hyperlipidemia     Hypothyroidism     Kidney stone     LFT elevation     MVP (mitral valve prolapse)     Obesity     Umbilical hernia      Social History   Substance Use Topics    Smoking status: Current Some Day Smoker     Packs/day: 0.50     Years: 20.00     Types: Cigarettes    Smokeless tobacco: Never Used      Comment: 1/2 pack every 2 weeks    Alcohol use No       ROS  [facility] no chest pain no sob no orthopnea  Resp no cough   General no weight loss no fatigue no fever      Objective:   Physical Exam    Blood pressure 114/66, height 5' 4.02\" (1.626 m), weight 184 lb (83.5 kg), last

## 2018-03-07 ENCOUNTER — TELEPHONE (OUTPATIENT)
Dept: INTERNAL MEDICINE CLINIC | Age: 48
End: 2018-03-07

## 2018-03-07 NOTE — TELEPHONE ENCOUNTER
Pharmacy called & stated Dr. Quang Rodriguez rx'd azithromycin (ZITHROMAX) 250 MG tablet, however pt is on Digoxin and taking zithromax will increase digoxin levels. Is there a different atbx Dr. Quang Rodriguez wants to rx instead?     Allergies   Allergen Reactions    Morphine Itching    Sulfa Antibiotics Hives    Adhesive Tape Rash

## 2018-03-08 ENCOUNTER — HOSPITAL ENCOUNTER (EMERGENCY)
Age: 48
Discharge: HOME | End: 2018-03-09
Attending: EMERGENCY MEDICINE
Payer: MEDICARE

## 2018-03-08 ENCOUNTER — APPOINTMENT (OUTPATIENT)
Dept: CT IMAGING | Age: 48
End: 2018-03-08
Payer: MEDICARE

## 2018-03-08 DIAGNOSIS — R10.9 ABDOMINAL PAIN, UNSPECIFIED ABDOMINAL LOCATION: Primary | ICD-10-CM

## 2018-03-08 DIAGNOSIS — R11.0 NAUSEA: ICD-10-CM

## 2018-03-08 LAB
-: ABNORMAL
ABSOLUTE EOS #: 0.3 K/UL (ref 0–0.4)
ABSOLUTE IMMATURE GRANULOCYTE: NORMAL K/UL (ref 0–0.3)
ABSOLUTE LYMPH #: 3 K/UL (ref 1–4.8)
ABSOLUTE MONO #: 0.5 K/UL (ref 0.1–1.3)
ALBUMIN SERPL-MCNC: 4.1 G/DL (ref 3.5–5.2)
ALBUMIN/GLOBULIN RATIO: ABNORMAL (ref 1–2.5)
ALP BLD-CCNC: 111 U/L (ref 35–104)
ALT SERPL-CCNC: 14 U/L (ref 5–33)
AMORPHOUS: ABNORMAL
ANION GAP SERPL CALCULATED.3IONS-SCNC: 12 MMOL/L (ref 9–17)
AST SERPL-CCNC: 14 U/L
BACTERIA: ABNORMAL
BASOPHILS # BLD: 1 % (ref 0–2)
BASOPHILS ABSOLUTE: 0.1 K/UL (ref 0–0.2)
BILIRUB SERPL-MCNC: 0.16 MG/DL (ref 0.3–1.2)
BILIRUBIN URINE: NEGATIVE
BUN BLDV-MCNC: 20 MG/DL (ref 6–20)
BUN/CREAT BLD: ABNORMAL (ref 9–20)
CALCIUM SERPL-MCNC: 9.1 MG/DL (ref 8.6–10.4)
CASTS UA: ABNORMAL /LPF
CHLORIDE BLD-SCNC: 107 MMOL/L (ref 98–107)
CO2: 22 MMOL/L (ref 20–31)
COLOR: YELLOW
COMMENT UA: ABNORMAL
CREAT SERPL-MCNC: 0.63 MG/DL (ref 0.5–0.9)
CRYSTALS, UA: ABNORMAL /HPF
DIFFERENTIAL TYPE: NORMAL
EOSINOPHILS RELATIVE PERCENT: 4 % (ref 0–4)
EPITHELIAL CELLS UA: ABNORMAL /HPF
GFR AFRICAN AMERICAN: >60 ML/MIN
GFR NON-AFRICAN AMERICAN: >60 ML/MIN
GFR SERPL CREATININE-BSD FRML MDRD: ABNORMAL ML/MIN/{1.73_M2}
GFR SERPL CREATININE-BSD FRML MDRD: ABNORMAL ML/MIN/{1.73_M2}
GLUCOSE BLD-MCNC: 97 MG/DL (ref 70–99)
GLUCOSE URINE: NEGATIVE
HCT VFR BLD CALC: 42.8 % (ref 36–46)
HEMOGLOBIN: 14.8 G/DL (ref 12–16)
IMMATURE GRANULOCYTES: NORMAL %
KETONES, URINE: NEGATIVE
LACTIC ACID: 1 MMOL/L (ref 0.5–2.2)
LEUKOCYTE ESTERASE, URINE: ABNORMAL
LIPASE: 52 U/L (ref 13–60)
LYMPHOCYTES # BLD: 39 % (ref 24–44)
MCH RBC QN AUTO: 31.5 PG (ref 26–34)
MCHC RBC AUTO-ENTMCNC: 34.5 G/DL (ref 31–37)
MCV RBC AUTO: 91.3 FL (ref 80–100)
MONOCYTES # BLD: 7 % (ref 1–7)
MUCUS: ABNORMAL
NITRITE, URINE: NEGATIVE
NRBC AUTOMATED: NORMAL PER 100 WBC
OTHER OBSERVATIONS UA: ABNORMAL
PDW BLD-RTO: 12.4 % (ref 11.5–14.9)
PH UA: 6.5 (ref 5–8)
PLATELET # BLD: 176 K/UL (ref 150–450)
PLATELET ESTIMATE: NORMAL
PMV BLD AUTO: 8.9 FL (ref 6–12)
POTASSIUM SERPL-SCNC: 3.7 MMOL/L (ref 3.7–5.3)
PROTEIN UA: NEGATIVE
RBC # BLD: 4.69 M/UL (ref 4–5.2)
RBC # BLD: NORMAL 10*6/UL
RBC UA: ABNORMAL /HPF
RENAL EPITHELIAL, UA: ABNORMAL /HPF
SEG NEUTROPHILS: 49 % (ref 36–66)
SEGMENTED NEUTROPHILS ABSOLUTE COUNT: 3.7 K/UL (ref 1.3–9.1)
SODIUM BLD-SCNC: 141 MMOL/L (ref 135–144)
SPECIFIC GRAVITY UA: 1.03 (ref 1–1.03)
TOTAL PROTEIN: 7 G/DL (ref 6.4–8.3)
TRICHOMONAS: ABNORMAL
TURBIDITY: ABNORMAL
URINE HGB: NEGATIVE
UROBILINOGEN, URINE: NORMAL
WBC # BLD: 7.5 K/UL (ref 3.5–11)
WBC # BLD: NORMAL 10*3/UL
WBC UA: ABNORMAL /HPF
YEAST: ABNORMAL

## 2018-03-08 PROCEDURE — 2580000003 HC RX 258: Performed by: EMERGENCY MEDICINE

## 2018-03-08 PROCEDURE — 6360000004 HC RX CONTRAST MEDICATION: Performed by: EMERGENCY MEDICINE

## 2018-03-08 PROCEDURE — 87086 URINE CULTURE/COLONY COUNT: CPT

## 2018-03-08 PROCEDURE — 74177 CT ABD & PELVIS W/CONTRAST: CPT

## 2018-03-08 PROCEDURE — 83690 ASSAY OF LIPASE: CPT

## 2018-03-08 PROCEDURE — 96374 THER/PROPH/DIAG INJ IV PUSH: CPT

## 2018-03-08 PROCEDURE — 83605 ASSAY OF LACTIC ACID: CPT

## 2018-03-08 PROCEDURE — 81001 URINALYSIS AUTO W/SCOPE: CPT

## 2018-03-08 PROCEDURE — 80053 COMPREHEN METABOLIC PANEL: CPT

## 2018-03-08 PROCEDURE — 85025 COMPLETE CBC W/AUTO DIFF WBC: CPT

## 2018-03-08 PROCEDURE — 99284 EMERGENCY DEPT VISIT MOD MDM: CPT

## 2018-03-08 PROCEDURE — 96375 TX/PRO/DX INJ NEW DRUG ADDON: CPT

## 2018-03-08 PROCEDURE — 6360000002 HC RX W HCPCS: Performed by: EMERGENCY MEDICINE

## 2018-03-08 PROCEDURE — 36415 COLL VENOUS BLD VENIPUNCTURE: CPT

## 2018-03-08 RX ORDER — SODIUM CHLORIDE 0.9 % (FLUSH) 0.9 %
10 SYRINGE (ML) INJECTION PRN
Status: DISCONTINUED | OUTPATIENT
Start: 2018-03-08 | End: 2018-03-09 | Stop reason: HOSPADM

## 2018-03-08 RX ORDER — 0.9 % SODIUM CHLORIDE 0.9 %
1000 INTRAVENOUS SOLUTION INTRAVENOUS ONCE
Status: COMPLETED | OUTPATIENT
Start: 2018-03-08 | End: 2018-03-08

## 2018-03-08 RX ORDER — 0.9 % SODIUM CHLORIDE 0.9 %
100 INTRAVENOUS SOLUTION INTRAVENOUS ONCE
Status: COMPLETED | OUTPATIENT
Start: 2018-03-08 | End: 2018-03-08

## 2018-03-08 RX ORDER — ONDANSETRON 2 MG/ML
4 INJECTION INTRAMUSCULAR; INTRAVENOUS ONCE
Status: COMPLETED | OUTPATIENT
Start: 2018-03-08 | End: 2018-03-08

## 2018-03-08 RX ORDER — FENTANYL CITRATE 50 UG/ML
100 INJECTION, SOLUTION INTRAMUSCULAR; INTRAVENOUS ONCE
Status: COMPLETED | OUTPATIENT
Start: 2018-03-08 | End: 2018-03-08

## 2018-03-08 RX ADMIN — FENTANYL CITRATE 100 MCG: 50 INJECTION INTRAMUSCULAR; INTRAVENOUS at 22:23

## 2018-03-08 RX ADMIN — Medication 10 ML: at 23:30

## 2018-03-08 RX ADMIN — IOPAMIDOL 100 ML: 755 INJECTION, SOLUTION INTRAVENOUS at 23:30

## 2018-03-08 RX ADMIN — SODIUM CHLORIDE 1000 ML: 9 INJECTION, SOLUTION INTRAVENOUS at 22:22

## 2018-03-08 RX ADMIN — ONDANSETRON 4 MG: 2 INJECTION INTRAMUSCULAR; INTRAVENOUS at 22:22

## 2018-03-08 RX ADMIN — SODIUM CHLORIDE 100 ML: 9 INJECTION, SOLUTION INTRAVENOUS at 23:29

## 2018-03-08 ASSESSMENT — PAIN DESCRIPTION - PAIN TYPE: TYPE: ACUTE PAIN

## 2018-03-08 ASSESSMENT — PAIN DESCRIPTION - DESCRIPTORS: DESCRIPTORS: STABBING

## 2018-03-08 ASSESSMENT — PAIN SCALES - GENERAL
PAINLEVEL_OUTOF10: 8
PAINLEVEL_OUTOF10: 4

## 2018-03-08 ASSESSMENT — PAIN DESCRIPTION - FREQUENCY: FREQUENCY: CONTINUOUS

## 2018-03-08 ASSESSMENT — PAIN DESCRIPTION - LOCATION: LOCATION: ABDOMEN

## 2018-03-09 VITALS
HEART RATE: 88 BPM | SYSTOLIC BLOOD PRESSURE: 111 MMHG | WEIGHT: 180 LBS | RESPIRATION RATE: 16 BRPM | OXYGEN SATURATION: 95 % | HEIGHT: 64 IN | DIASTOLIC BLOOD PRESSURE: 62 MMHG | TEMPERATURE: 97.9 F | BODY MASS INDEX: 30.73 KG/M2

## 2018-03-09 LAB
CULTURE: NORMAL
CULTURE: NORMAL
Lab: NORMAL
SPECIMEN DESCRIPTION: NORMAL
SPECIMEN DESCRIPTION: NORMAL
STATUS: NORMAL

## 2018-03-09 RX ORDER — ONDANSETRON 4 MG/1
4 TABLET, ORALLY DISINTEGRATING ORAL EVERY 8 HOURS PRN
Qty: 5 TABLET | Refills: 0 | Status: SHIPPED | OUTPATIENT
Start: 2018-03-09 | End: 2018-06-25 | Stop reason: ALTCHOICE

## 2018-03-09 ASSESSMENT — ENCOUNTER SYMPTOMS
ABDOMINAL PAIN: 1
COUGH: 0
CONSTIPATION: 0
VOMITING: 1
DIARRHEA: 1
RHINORRHEA: 0
SHORTNESS OF BREATH: 0
NAUSEA: 1
EYE PAIN: 0

## 2018-03-09 NOTE — ED PROVIDER NOTES
[address]  Emergency Department Encounter  Emergency Medicine Resident     Pt Name: Adriana Runner  MRN: [id number]  [place] 1970  Date of evaluation: 3/8/18  PCP:  Catrachita Rojo MD    [address] [place] [address]       Chief Complaint   Patient presents with    Abdominal Pain    Diarrhea    Emesis       HISTORY OF PRESENT ILLNESS  (Location/Symptom, Timing/Onset, Context/Setting, Quality, Duration, Modifying Factors, Severity.)      Adriana Runner is a 56 y.o. female who presents with abdominal pain, diarrhea, and multiple episodes of nonbloody emesis. Patient states that symptoms started last night with some abdominal pain before going to bed. Patient localizes abdominal pain to mainly epigastric region and left side of her abdomen. Patient states the pain is sharp and constant. Patient states that pain better when laying flat thing up. Patient admits to multiple episodes of nonbloody diarrhea as well as emesis. Patient was started on Zithromax 2 days ago by her PCP for what she states was a head cold\". Patient does have history of colitis and was admitted late last year. Patient colonoscopy done last month which she's states was told was normal.  Patient denies any chest pain, shortness of breath, or difficulty urinating. Patient states that in total she has had at least 6 episodes of nonbloody emesis and diarrhea today. Patient states she has not eaten anything all day and has been able tolerate only small amounts of water. PAST MEDICAL / SURGICAL / SOCIAL / FAMILY HISTORY      has a past medical history of Anxiety; Fatty liver; GERD (gastroesophageal reflux disease); Headache; History of bronchitis; Hyperlipidemia; Hypothyroidism; Kidney stone; LFT elevation; MVP (mitral valve prolapse); Obesity; and Umbilical hernia. has a past surgical history that includes Upper gastrointestinal endoscopy (2010); hernia repair; Cholecystectomy; Hysterectomy; Appendectomy;   section; Colonoscopy (2009); Colonoscopy (4/23/14); Colonoscopy (04/23/2014); pr exploratory of abdomen (10/21/2014); and Colonoscopy (N/A, 2/12/2018). Social History     Social History    Marital status:      Spouse name: N/A    Number of children: N/A    Years of education: N/A     Occupational History    Homemaker      Social History Main Topics    Smoking status: Current Some Day Smoker     Packs/day: 0.50     Years: 20.00     Types: Cigarettes    Smokeless tobacco: Never Used      Comment: 1/2 pack every 2 weeks    Alcohol use No    Drug use: No    Sexual activity: Not Currently     Other Topics Concern    Not on file     Social History Narrative    No narrative on file       Family History   Problem Relation Age of Onset    Cancer Mother      vaginal    Heart Disease Father     Diabetes Father     Other Father      colon resection for colon polyps    Breast Cancer Maternal Grandmother     Stroke Maternal Grandfather        Allergies:  Morphine; Sulfa antibiotics; and Adhesive tape    Home Medications:  Prior to Admission medications    Medication Sig Start Date End Date Taking? Authorizing Provider   ondansetron (ZOFRAN ODT) 4 MG disintegrating tablet Take 1 tablet by mouth every 8 hours as needed for Nausea 3/9/18  Yes Jone Mulligan DO   topiramate (TOPAMAX) 25 MG tablet 25 mg 2 times daily  2/27/18  Yes Historical Provider, MD   azithromycin (ZITHROMAX) 250 MG tablet Take 2 tabs (500 mg) on Day 1, and take 1 tab (250 mg) on days 2 through 5. 3/6/18  Yes Marie Farnsworth MD   benzonatate (TESSALON PERLES) 100 MG capsule Take 1 capsule by mouth 3 times daily as needed for Cough 3/6/18 3/13/18 Yes Marie Farnsworth MD   oxyCODONE-acetaminophen (PERCOCET) 7.5-325 MG per tablet Take 1 tablet by mouth every 8 hours as needed for Pain for up to 30 days.  Earliest Fill Date: 2/25/18 2/25/18 3/27/18 Yes MARTITA Combs   levothyroxine (SYNTHROID) 125 MCG tablet TAKE 1 TABLET BY MOUTH Ht 5' 4\" (1.626 m)   Wt 180 lb (81.6 kg)   LMP 11/01/2010   SpO2 95%   BMI 30.90 kg/m²     Physical Exam   Constitutional: She is oriented to person, place, and time. She appears well-developed and well-nourished. Mild distress due to pain   HENT:   Head: Normocephalic and atraumatic. Mouth/Throat: Oropharynx is clear and moist.   Eyes: Conjunctivae and EOM are normal.   Neck: Neck supple. Cardiovascular: Normal rate, regular rhythm, normal heart sounds and intact distal pulses. Exam reveals no gallop and no friction rub. No murmur heard. Pulmonary/Chest: Effort normal and breath sounds normal. No respiratory distress. She has no wheezes. She has no rales. Abdominal: Soft. Bowel sounds are normal. She exhibits no distension. There is tenderness. There is guarding. There is no rigidity, no rebound and no tenderness at McBurney's point. Left upper quadrant and left lower quadrant very  tender to palpation. Patient with guarding in these areas as well. No right upper quadrant pain. Negative McBurney's. Musculoskeletal: She exhibits no edema, tenderness or deformity. Neurological: She is alert and oriented to person, place, and time. Skin: Skin is warm and dry. No rash noted. She is not diaphoretic.        DIFFERENTIAL  DIAGNOSIS     PLAN (LABS / IMAGING / EKG):  Orders Placed This Encounter   Procedures    Urine culture clean catch    CT ABDOMEN PELVIS W IV CONTRAST    CBC Auto Differential    Comprehensive Metabolic Panel    LIPASE    Lactic Acid    UA W/REFLEX CULTURE    Microscopic Urinalysis    Insert peripheral IV       MEDICATIONS ORDERED:  Orders Placed This Encounter   Medications    fentaNYL (SUBLIMAZE) injection 100 mcg    ondansetron (ZOFRAN) injection 4 mg    0.9 % sodium chloride bolus    iopamidol (ISOVUE-370) 76 % injection 100 mL    0.9 % sodium chloride bolus    sodium chloride flush 0.9 % injection 10 mL    ondansetron (ZOFRAN ODT) 4 MG disintegrating mL/min    GFR African American >60 >60 mL/min    GFR Comment          GFR Staging NOT REPORTED    LIPASE   Result Value Ref Range    Lipase 52 13 - 60 U/L   Lactic Acid   Result Value Ref Range    Lactic Acid 1.0 0.5 - 2.2 mmol/L   UA W/REFLEX CULTURE   Result Value Ref Range    Color, UA YELLOW YEL    Turbidity UA CLOUDY (A) CLEAR    Glucose, Ur NEGATIVE NEG    Bilirubin Urine NEGATIVE NEG    Ketones, Urine NEGATIVE NEG    Specific [place], UA 1.028 1.000 - 1.030    Urine Hgb NEGATIVE NEG    pH, UA 6.5 5.0 - 8.0    Protein, UA NEGATIVE NEG    Urobilinogen, Urine Normal NORM    Nitrite, Urine NEGATIVE NEG    Leukocyte Esterase, Urine SMALL (A) NEG    Urinalysis Comments NOT REPORTED    Microscopic Urinalysis   Result Value Ref Range    -          WBC, UA 2 TO 5 /HPF    RBC, UA 2 TO 5 /HPF    Casts UA NOT REPORTED /LPF    Crystals UA NOT REPORTED NONE /HPF    Epithelial Cells UA 10 TO 20 /HPF    Renal Epithelial, Urine NOT REPORTED 0 /HPF    Bacteria, UA FEW (A) NONE    Mucus, UA NOT REPORTED NONE    Trichomonas, UA NOT REPORTED NONE    Amorphous, UA NOT REPORTED NONE    Other Observations UA NOT REPORTED NREQ    Yeast, UA NOT REPORTED NONE       IMPRESSION: Recent evaluated by myself and attending physician. Patient appears in mild distress due to pain. Vital signs normal vital arrival.  Patient afebrile. Patient tender to palpation epigastric region and left upper quadrant with some guarding. No rigidity or rebound tenderness. Negative Sharp's and McBurney's point. Patient with abdominal scar consistent with previous surgeries. Heart rate normal for regular rhythm. Lungs clear to auscultation bilaterally. Patient appears well-hydrated. We'll get abdominal labs, urinalysis, and CT abdomen pelvis is patient was very tender to palpation epigastric region.     RADIOLOGY:  Ct Abdomen Pelvis W Iv Contrast    Result Date: 3/8/2018  EXAMINATION: CT OF THE ABDOMEN AND PELVIS WITH CONTRAST 3/8/2018 11:31 pm TECHNIQUE: CT of the abdomen and pelvis was performed with the administration of intravenous contrast. Multiplanar reformatted images are provided for review. Dose modulation, iterative reconstruction, and/or weight based adjustment of the mA/kV was utilized to reduce the radiation dose to as low as reasonably achievable. COMPARISON: 12/02/2017 HISTORY: ORDERING SYSTEM PROVIDED HISTORY: abdominal pain TECHNOLOGIST PROVIDED HISTORY: IV contrast only Ordering Physician Provided Reason for Exam: R SIDED ABDOMINAL PAIN X1  DAY Acuity: Acute Type of Exam: Initial FINDINGS: Lower Chest: Mild chronic lingular atelectasis. There is minimal dependent atelectasis. The heart size is normal. Organs: The liver, spleen, pancreas, adrenal glands and kidneys are normal. The gallbladder is surgically absent. GI/Bowel: There is evidence of a midline ventral hernia repair with mesh. There are several small bowel loops directly adjacent to this hernia repair. No evidence of bowel obstruction. The appendix is surgically absent. Mild sigmoid colon diverticulosis. Pelvis: Urinary bladder is empty and therefore not well visualized. The uterus is surgically absent. Peritoneum/Retroperitoneum: There is no adenopathy, free air or free fluid. Bones/Soft Tissues: No acute bone or soft tissue abnormality. Mild sigmoid colon diverticulosis. No evidence of diverticulitis. Evidence of prior mesh ventral hernia repair. There are several small bowel loops directly adjacent to this hernia repair and possibly adhesed with no evidence of bowel obstruction. EKG  None    All EKG's are interpreted by the Emergency Department Physician who either signs or Co-signs this chart in the absence of a cardiologist.    EMERGENCY DEPARTMENT COURSE:  Labs reviewed. No significant abnormalities. CT abdomen pelvis negative for obstruction. This shows some adhesions with no signs of obstruction.   Did also show diverticulosis but no signs of DO  03/09/18 [id number]

## 2018-03-09 NOTE — ED PROVIDER NOTES
[address]  eMERGENCY dEPARTMENT eNCOUnter   Attending Attestation     Pt Name: Kp Larry  MRN: [id number]  [place] 1970  Date of evaluation: 3/8/18       Kp Larry is a 56 y.o. female who presents with Abdominal Pain; Diarrhea; and Emesis      History:   Patient's been having left-sided abdominal pain since last night. Patient states associated with nausea vomiting and diarrhea. Patient states she has a history colitis and this feels very similar. Patient denies radiation of the pain. Patient denies fevers. Exam: Vitals:   Vitals:    03/08/18 2155   BP: 136/88   Pulse: 92   Resp: 16   Temp: 97.9 °F (36.6 °C)   TempSrc: Oral   SpO2: 97%   Weight: 180 lb (81.6 kg)   Height: 5' 4\" (1.626 m)     Abdomen is soft nondistended with severe tenderness to palpation epigastrium left upper quadrant and rebound on the left lower quadrant. I performed a history and physical examination of the patient and discussed management with the resident. I reviewed the residents note and agree with the documented findings and plan of care. Any areas of disagreement are noted on the chart. I was personally present for the key portions of any procedures. I have documented in the chart those procedures where I was not present during the key portions. I have personally reviewed all images and agree with the resident's interpretation. I have reviewed the emergency nurses triage note. I agree with the chief complaint, past medical history, past surgical history, allergies, medications, social and family history as documented unless otherwise noted below. Documentation of the HPI, Physical Exam and Medical Decision Making performed by medical students or scribes is based on my personal performance of the HPI, PE and MDM. I personally evaluated and examined the patient in conjunction with the APC and agree with the assessment, treatment plan, and disposition of the patient as recorded by the APC.  Additional

## 2018-03-09 NOTE — ED NOTES
Pt presents in ED c/o abdominal pain, nausea, vomiting, and diarrhea. Pt reported she woke up with excruciating pain last night and reported she has had multiple episodes of emesis throughout the day. Pt reported she has had loss of appetite but has been trying to eat ice chips throughout the day. Pt reported she has had cholecystectomy, appendectomy, hernia repairs, and a hysterectomy. Pt is eupneic, A&OX4, and PWD. Call light in reach.          Giselle Roldan RN  03/08/18 9130

## 2018-03-19 ENCOUNTER — CARE COORDINATION (OUTPATIENT)
Dept: CARE COORDINATION | Age: 48
End: 2018-03-19

## 2018-03-19 NOTE — CARE COORDINATION
10/10/16   Historical Provider, MD       Future Appointments  Date Time Provider Nisha Santos   4/10/2018 9:45 AM Skyler Vega MD [facility]

## 2018-03-20 DIAGNOSIS — E03.9 HYPOTHYROIDISM: ICD-10-CM

## 2018-03-20 RX ORDER — LEVOTHYROXINE SODIUM 0.12 MG/1
TABLET ORAL
Qty: 60 TABLET | Refills: 11 | Status: SHIPPED | OUTPATIENT
Start: 2018-03-20 | End: 2019-03-17 | Stop reason: SDUPTHER

## 2018-03-26 ENCOUNTER — TELEPHONE (OUTPATIENT)
Dept: PAIN MANAGEMENT | Age: 48
End: 2018-03-26

## 2018-03-26 ENCOUNTER — CARE COORDINATION (OUTPATIENT)
Dept: CARE COORDINATION | Age: 48
End: 2018-03-26

## 2018-04-03 ENCOUNTER — HOSPITAL ENCOUNTER (OUTPATIENT)
Dept: PAIN MANAGEMENT | Age: 48
Discharge: HOME | End: 2018-04-03
Payer: COMMERCIAL

## 2018-04-03 VITALS
TEMPERATURE: 98.3 F | OXYGEN SATURATION: 97 % | BODY MASS INDEX: 30.73 KG/M2 | RESPIRATION RATE: 16 BRPM | HEIGHT: 64 IN | DIASTOLIC BLOOD PRESSURE: 65 MMHG | HEART RATE: 69 BPM | WEIGHT: 180 LBS | SYSTOLIC BLOOD PRESSURE: 126 MMHG

## 2018-04-03 DIAGNOSIS — M47.812 OSTEOARTHRITIS OF CERVICAL SPINE, UNSPECIFIED SPINAL OSTEOARTHRITIS COMPLICATION STATUS: Primary | ICD-10-CM

## 2018-04-03 DIAGNOSIS — S13.4XXS ATLANTO-AXIAL JOINT SPRAIN, SEQUELA: ICD-10-CM

## 2018-04-03 DIAGNOSIS — Z51.81 ENCOUNTER FOR MEDICATION MONITORING: ICD-10-CM

## 2018-04-03 DIAGNOSIS — M48.02 STENOSIS, CERVICAL SPINE: ICD-10-CM

## 2018-04-03 DIAGNOSIS — M47.812 ARTHROPATHY OF CERVICAL FACET JOINT: ICD-10-CM

## 2018-04-03 DIAGNOSIS — S13.4XXS SPRAIN OF ATLANTO-OCCIPITAL JOINT, SEQUELA: ICD-10-CM

## 2018-04-03 DIAGNOSIS — M48.02 DEGENERATIVE CERVICAL SPINAL STENOSIS: ICD-10-CM

## 2018-04-03 DIAGNOSIS — M54.12 CERVICAL RADICULOPATHY: ICD-10-CM

## 2018-04-03 PROCEDURE — 99213 OFFICE O/P EST LOW 20 MIN: CPT

## 2018-04-03 PROCEDURE — 99213 OFFICE O/P EST LOW 20 MIN: CPT | Performed by: NURSE PRACTITIONER

## 2018-04-03 RX ORDER — OXYCODONE AND ACETAMINOPHEN 10; 325 MG/1; MG/1
1 TABLET ORAL 2 TIMES DAILY PRN
Qty: 60 TABLET | Refills: 0 | Status: SHIPPED | OUTPATIENT
Start: 2018-04-03 | End: 2018-05-02 | Stop reason: SDUPTHER

## 2018-04-03 RX ORDER — ATORVASTATIN CALCIUM 40 MG/1
TABLET, FILM COATED ORAL
Qty: 90 TABLET | Refills: 3 | Status: SHIPPED | OUTPATIENT
Start: 2018-04-03 | End: 2019-05-01 | Stop reason: SDUPTHER

## 2018-04-03 RX ORDER — TIZANIDINE 4 MG/1
TABLET ORAL
Refills: 2 | COMMUNITY
Start: 2018-03-19 | End: 2018-05-22 | Stop reason: SDUPTHER

## 2018-04-03 RX ORDER — OXYCODONE AND ACETAMINOPHEN 7.5; 325 MG/1; MG/1
1 TABLET ORAL 2 TIMES DAILY PRN
Qty: 60 TABLET | Refills: 0 | Status: SHIPPED | OUTPATIENT
Start: 2018-04-08 | End: 2018-04-03

## 2018-04-03 ASSESSMENT — ENCOUNTER SYMPTOMS
NAUSEA: 1
RESPIRATORY NEGATIVE: 1

## 2018-04-08 ENCOUNTER — HOSPITAL ENCOUNTER (EMERGENCY)
Age: 48
Discharge: HOME | End: 2018-04-08
Attending: EMERGENCY MEDICINE
Payer: COMMERCIAL

## 2018-04-08 ENCOUNTER — APPOINTMENT (OUTPATIENT)
Dept: CT IMAGING | Age: 48
End: 2018-04-08
Payer: COMMERCIAL

## 2018-04-08 ENCOUNTER — APPOINTMENT (OUTPATIENT)
Dept: GENERAL RADIOLOGY | Age: 48
End: 2018-04-08
Payer: COMMERCIAL

## 2018-04-08 VITALS
SYSTOLIC BLOOD PRESSURE: 112 MMHG | RESPIRATION RATE: 14 BRPM | BODY MASS INDEX: 30.73 KG/M2 | TEMPERATURE: 98 F | HEIGHT: 64 IN | HEART RATE: 78 BPM | WEIGHT: 180 LBS | DIASTOLIC BLOOD PRESSURE: 65 MMHG | OXYGEN SATURATION: 95 %

## 2018-04-08 DIAGNOSIS — R51.9 HEADACHE, UNSPECIFIED HEADACHE TYPE: ICD-10-CM

## 2018-04-08 DIAGNOSIS — R07.9 CHEST PAIN, UNSPECIFIED TYPE: ICD-10-CM

## 2018-04-08 DIAGNOSIS — G89.29 CHRONIC NECK PAIN: Primary | ICD-10-CM

## 2018-04-08 DIAGNOSIS — M54.2 CHRONIC NECK PAIN: Primary | ICD-10-CM

## 2018-04-08 DIAGNOSIS — F17.200 SMOKING ADDICTION: ICD-10-CM

## 2018-04-08 LAB
ABSOLUTE EOS #: 0.3 K/UL (ref 0–0.4)
ABSOLUTE IMMATURE GRANULOCYTE: NORMAL K/UL (ref 0–0.3)
ABSOLUTE LYMPH #: 3 K/UL (ref 1–4.8)
ABSOLUTE MONO #: 0.5 K/UL (ref 0.1–1.3)
ALBUMIN SERPL-MCNC: 4.1 G/DL (ref 3.5–5.2)
ALBUMIN/GLOBULIN RATIO: ABNORMAL (ref 1–2.5)
ALP BLD-CCNC: 102 U/L (ref 35–104)
ALT SERPL-CCNC: 14 U/L (ref 5–33)
ANION GAP SERPL CALCULATED.3IONS-SCNC: 12 MMOL/L (ref 9–17)
AST SERPL-CCNC: 15 U/L
BASOPHILS # BLD: 1 % (ref 0–2)
BASOPHILS ABSOLUTE: 0.1 K/UL (ref 0–0.2)
BILIRUB SERPL-MCNC: 0.23 MG/DL (ref 0.3–1.2)
BILIRUBIN URINE: NEGATIVE
BUN BLDV-MCNC: 17 MG/DL (ref 6–20)
BUN/CREAT BLD: ABNORMAL (ref 9–20)
CALCIUM SERPL-MCNC: 9 MG/DL (ref 8.6–10.4)
CHLORIDE BLD-SCNC: 107 MMOL/L (ref 98–107)
CO2: 19 MMOL/L (ref 20–31)
COLOR: YELLOW
COMMENT UA: NORMAL
CREAT SERPL-MCNC: 0.61 MG/DL (ref 0.5–0.9)
DIFFERENTIAL TYPE: NORMAL
EKG ATRIAL RATE: 72 BPM
EKG P AXIS: 52 DEGREES
EKG P-R INTERVAL: 142 MS
EKG Q-T INTERVAL: 422 MS
EKG QRS DURATION: 82 MS
EKG QTC CALCULATION (BAZETT): 462 MS
EKG R AXIS: 28 DEGREES
EKG T AXIS: 49 DEGREES
EKG VENTRICULAR RATE: 72 BPM
EOSINOPHILS RELATIVE PERCENT: 3 % (ref 0–4)
GFR AFRICAN AMERICAN: >60 ML/MIN
GFR NON-AFRICAN AMERICAN: >60 ML/MIN
GFR SERPL CREATININE-BSD FRML MDRD: ABNORMAL ML/MIN/{1.73_M2}
GFR SERPL CREATININE-BSD FRML MDRD: ABNORMAL ML/MIN/{1.73_M2}
GLUCOSE BLD-MCNC: 110 MG/DL (ref 70–99)
GLUCOSE URINE: NEGATIVE
HCT VFR BLD CALC: 41.7 % (ref 36–46)
HEMOGLOBIN: 14.1 G/DL (ref 12–16)
IMMATURE GRANULOCYTES: NORMAL %
KETONES, URINE: NEGATIVE
LEUKOCYTE ESTERASE, URINE: NEGATIVE
LIPASE: 42 U/L (ref 13–60)
LYMPHOCYTES # BLD: 37 % (ref 24–44)
MCH RBC QN AUTO: 30.7 PG (ref 26–34)
MCHC RBC AUTO-ENTMCNC: 33.9 G/DL (ref 31–37)
MCV RBC AUTO: 90.5 FL (ref 80–100)
MONOCYTES # BLD: 6 % (ref 1–7)
NITRITE, URINE: NEGATIVE
NRBC AUTOMATED: NORMAL PER 100 WBC
PDW BLD-RTO: 12.3 % (ref 11.5–14.9)
PH UA: 7 (ref 5–8)
PLATELET # BLD: 175 K/UL (ref 150–450)
PLATELET ESTIMATE: NORMAL
PMV BLD AUTO: 8.7 FL (ref 6–12)
POTASSIUM SERPL-SCNC: 4 MMOL/L (ref 3.7–5.3)
PROTEIN UA: NEGATIVE
RBC # BLD: 4.61 M/UL (ref 4–5.2)
RBC # BLD: NORMAL 10*6/UL
SEG NEUTROPHILS: 53 % (ref 36–66)
SEGMENTED NEUTROPHILS ABSOLUTE COUNT: 4.2 K/UL (ref 1.3–9.1)
SODIUM BLD-SCNC: 138 MMOL/L (ref 135–144)
SPECIFIC GRAVITY UA: 1.02 (ref 1–1.03)
TOTAL PROTEIN: 7 G/DL (ref 6.4–8.3)
TROPONIN INTERP: NORMAL
TROPONIN INTERP: NORMAL
TROPONIN T: <0.03 NG/ML
TROPONIN T: <0.03 NG/ML
TURBIDITY: CLEAR
URINE HGB: NEGATIVE
UROBILINOGEN, URINE: NORMAL
WBC # BLD: 8.1 K/UL (ref 3.5–11)
WBC # BLD: NORMAL 10*3/UL

## 2018-04-08 PROCEDURE — 93005 ELECTROCARDIOGRAM TRACING: CPT

## 2018-04-08 PROCEDURE — 70498 CT ANGIOGRAPHY NECK: CPT

## 2018-04-08 PROCEDURE — 6360000004 HC RX CONTRAST MEDICATION: Performed by: EMERGENCY MEDICINE

## 2018-04-08 PROCEDURE — 71046 X-RAY EXAM CHEST 2 VIEWS: CPT

## 2018-04-08 PROCEDURE — 99285 EMERGENCY DEPT VISIT HI MDM: CPT

## 2018-04-08 PROCEDURE — 70496 CT ANGIOGRAPHY HEAD: CPT

## 2018-04-08 PROCEDURE — 80053 COMPREHEN METABOLIC PANEL: CPT

## 2018-04-08 PROCEDURE — 96374 THER/PROPH/DIAG INJ IV PUSH: CPT

## 2018-04-08 PROCEDURE — 81003 URINALYSIS AUTO W/O SCOPE: CPT

## 2018-04-08 PROCEDURE — 96375 TX/PRO/DX INJ NEW DRUG ADDON: CPT

## 2018-04-08 PROCEDURE — 6360000002 HC RX W HCPCS: Performed by: EMERGENCY MEDICINE

## 2018-04-08 PROCEDURE — 85025 COMPLETE CBC W/AUTO DIFF WBC: CPT

## 2018-04-08 PROCEDURE — 36415 COLL VENOUS BLD VENIPUNCTURE: CPT

## 2018-04-08 PROCEDURE — 2580000003 HC RX 258: Performed by: EMERGENCY MEDICINE

## 2018-04-08 PROCEDURE — 83690 ASSAY OF LIPASE: CPT

## 2018-04-08 PROCEDURE — 84484 ASSAY OF TROPONIN QUANT: CPT

## 2018-04-08 PROCEDURE — 70450 CT HEAD/BRAIN W/O DYE: CPT

## 2018-04-08 RX ORDER — SODIUM CHLORIDE 0.9 % (FLUSH) 0.9 %
10 SYRINGE (ML) INJECTION PRN
Status: DISCONTINUED | OUTPATIENT
Start: 2018-04-08 | End: 2018-04-08 | Stop reason: HOSPADM

## 2018-04-08 RX ORDER — 0.9 % SODIUM CHLORIDE 0.9 %
1000 INTRAVENOUS SOLUTION INTRAVENOUS ONCE
Status: COMPLETED | OUTPATIENT
Start: 2018-04-08 | End: 2018-04-08

## 2018-04-08 RX ORDER — PROMETHAZINE HYDROCHLORIDE 25 MG/ML
12.5 INJECTION, SOLUTION INTRAMUSCULAR; INTRAVENOUS ONCE
Status: DISCONTINUED | OUTPATIENT
Start: 2018-04-08 | End: 2018-04-08

## 2018-04-08 RX ORDER — DIPHENHYDRAMINE HYDROCHLORIDE 50 MG/ML
25 INJECTION INTRAMUSCULAR; INTRAVENOUS ONCE
Status: COMPLETED | OUTPATIENT
Start: 2018-04-08 | End: 2018-04-08

## 2018-04-08 RX ORDER — KETOROLAC TROMETHAMINE 30 MG/ML
30 INJECTION, SOLUTION INTRAMUSCULAR; INTRAVENOUS ONCE
Status: COMPLETED | OUTPATIENT
Start: 2018-04-08 | End: 2018-04-08

## 2018-04-08 RX ORDER — 0.9 % SODIUM CHLORIDE 0.9 %
100 INTRAVENOUS SOLUTION INTRAVENOUS ONCE
Status: COMPLETED | OUTPATIENT
Start: 2018-04-08 | End: 2018-04-08

## 2018-04-08 RX ORDER — FENTANYL CITRATE 50 UG/ML
75 INJECTION, SOLUTION INTRAMUSCULAR; INTRAVENOUS ONCE
Status: COMPLETED | OUTPATIENT
Start: 2018-04-08 | End: 2018-04-08

## 2018-04-08 RX ORDER — NAPROXEN SODIUM 550 MG/1
550 TABLET ORAL 2 TIMES DAILY WITH MEALS
Qty: 30 TABLET | Refills: 0 | Status: SHIPPED | OUTPATIENT
Start: 2018-04-08 | End: 2018-06-25 | Stop reason: ALTCHOICE

## 2018-04-08 RX ORDER — DEXAMETHASONE SODIUM PHOSPHATE 4 MG/ML
10 INJECTION, SOLUTION INTRA-ARTICULAR; INTRALESIONAL; INTRAMUSCULAR; INTRAVENOUS; SOFT TISSUE ONCE
Status: COMPLETED | OUTPATIENT
Start: 2018-04-08 | End: 2018-04-08

## 2018-04-08 RX ORDER — PROMETHAZINE HYDROCHLORIDE 25 MG/ML
25 INJECTION, SOLUTION INTRAMUSCULAR; INTRAVENOUS ONCE
Status: COMPLETED | OUTPATIENT
Start: 2018-04-08 | End: 2018-04-08

## 2018-04-08 RX ADMIN — FENTANYL CITRATE 75 MCG: 50 INJECTION INTRAMUSCULAR; INTRAVENOUS at 18:23

## 2018-04-08 RX ADMIN — DIPHENHYDRAMINE HYDROCHLORIDE 25 MG: 50 INJECTION, SOLUTION INTRAMUSCULAR; INTRAVENOUS at 16:59

## 2018-04-08 RX ADMIN — DEXAMETHASONE SODIUM PHOSPHATE 10 MG: 4 INJECTION, SOLUTION INTRAMUSCULAR; INTRAVENOUS at 16:59

## 2018-04-08 RX ADMIN — IOPAMIDOL 100 ML: 755 INJECTION, SOLUTION INTRAVENOUS at 17:38

## 2018-04-08 RX ADMIN — KETOROLAC TROMETHAMINE 30 MG: 30 INJECTION, SOLUTION INTRAMUSCULAR at 19:30

## 2018-04-08 RX ADMIN — SODIUM CHLORIDE 100 ML: 9 INJECTION, SOLUTION INTRAVENOUS at 17:38

## 2018-04-08 RX ADMIN — Medication 10 ML: at 17:38

## 2018-04-08 RX ADMIN — SODIUM CHLORIDE 1000 ML: 9 INJECTION, SOLUTION INTRAVENOUS at 16:59

## 2018-04-08 RX ADMIN — PROMETHAZINE HYDROCHLORIDE 25 MG: 25 INJECTION INTRAMUSCULAR; INTRAVENOUS at 17:06

## 2018-04-08 ASSESSMENT — PAIN DESCRIPTION - LOCATION: LOCATION: HEAD

## 2018-04-08 ASSESSMENT — VISUAL ACUITY
OS: 20/30
OU: 20/25
OD: 20/40

## 2018-04-08 ASSESSMENT — PAIN DESCRIPTION - PAIN TYPE: TYPE: ACUTE PAIN

## 2018-04-08 ASSESSMENT — ENCOUNTER SYMPTOMS
NAUSEA: 0
DIARRHEA: 0
COUGH: 0
BACK PAIN: 0
SORE THROAT: 0
EYE PAIN: 0
SHORTNESS OF BREATH: 0
ABDOMINAL PAIN: 0
VOMITING: 0

## 2018-04-08 ASSESSMENT — PAIN DESCRIPTION - PROGRESSION: CLINICAL_PROGRESSION: GRADUALLY IMPROVING

## 2018-04-08 ASSESSMENT — PAIN SCALES - GENERAL
PAINLEVEL_OUTOF10: 5
PAINLEVEL_OUTOF10: 10
PAINLEVEL_OUTOF10: 8

## 2018-04-11 ENCOUNTER — CARE COORDINATION (OUTPATIENT)
Dept: CARE COORDINATION | Age: 48
End: 2018-04-11

## 2018-04-18 RX ORDER — ROPINIROLE 1 MG/1
TABLET, FILM COATED ORAL
Qty: 30 TABLET | Refills: 0 | Status: SHIPPED | OUTPATIENT
Start: 2018-04-18 | End: 2018-04-18 | Stop reason: SDUPTHER

## 2018-04-19 DIAGNOSIS — G62.9 NEUROPATHY: ICD-10-CM

## 2018-04-19 RX ORDER — GABAPENTIN 300 MG/1
CAPSULE ORAL
Qty: 180 CAPSULE | Refills: 0 | OUTPATIENT
Start: 2018-04-19 | End: 2018-05-19

## 2018-04-20 RX ORDER — ROPINIROLE 1 MG/1
TABLET, FILM COATED ORAL
Qty: 90 TABLET | Refills: 1 | Status: SHIPPED | OUTPATIENT
Start: 2018-04-20 | End: 2018-05-22 | Stop reason: SDUPTHER

## 2018-04-27 ENCOUNTER — CARE COORDINATION (OUTPATIENT)
Dept: CARE COORDINATION | Age: 48
End: 2018-04-27

## 2018-05-02 ENCOUNTER — HOSPITAL ENCOUNTER (OUTPATIENT)
Dept: PAIN MANAGEMENT | Age: 48
Discharge: HOME | End: 2018-05-02
Payer: MEDICARE

## 2018-05-02 VITALS
OXYGEN SATURATION: 96 % | HEIGHT: 64 IN | BODY MASS INDEX: 31.41 KG/M2 | SYSTOLIC BLOOD PRESSURE: 114 MMHG | RESPIRATION RATE: 15 BRPM | HEART RATE: 97 BPM | TEMPERATURE: 98.3 F | WEIGHT: 184 LBS | DIASTOLIC BLOOD PRESSURE: 70 MMHG

## 2018-05-02 DIAGNOSIS — S13.4XXS ATLANTO-AXIAL JOINT SPRAIN, SEQUELA: ICD-10-CM

## 2018-05-02 DIAGNOSIS — M47.812 OSTEOARTHRITIS OF CERVICAL SPINE, UNSPECIFIED SPINAL OSTEOARTHRITIS COMPLICATION STATUS: ICD-10-CM

## 2018-05-02 DIAGNOSIS — M48.02 DEGENERATIVE CERVICAL SPINAL STENOSIS: ICD-10-CM

## 2018-05-02 DIAGNOSIS — Z51.81 ENCOUNTER FOR MEDICATION MONITORING: ICD-10-CM

## 2018-05-02 DIAGNOSIS — M47.812 ARTHROPATHY OF CERVICAL FACET JOINT: ICD-10-CM

## 2018-05-02 DIAGNOSIS — M54.12 CERVICAL RADICULOPATHY: Primary | ICD-10-CM

## 2018-05-02 DIAGNOSIS — M48.02 STENOSIS, CERVICAL SPINE: ICD-10-CM

## 2018-05-02 PROCEDURE — 99213 OFFICE O/P EST LOW 20 MIN: CPT

## 2018-05-02 PROCEDURE — 99214 OFFICE O/P EST MOD 30 MIN: CPT | Performed by: PAIN MEDICINE

## 2018-05-02 RX ORDER — OXYCODONE AND ACETAMINOPHEN 10; 325 MG/1; MG/1
1 TABLET ORAL 2 TIMES DAILY PRN
Qty: 60 TABLET | Refills: 0 | Status: SHIPPED | OUTPATIENT
Start: 2018-05-12 | End: 2018-06-13 | Stop reason: SDUPTHER

## 2018-05-02 RX ORDER — SODIUM CHLORIDE, SODIUM LACTATE, POTASSIUM CHLORIDE, CALCIUM CHLORIDE 600; 310; 30; 20 MG/100ML; MG/100ML; MG/100ML; MG/100ML
75 INJECTION, SOLUTION INTRAVENOUS CONTINUOUS
Status: CANCELLED | OUTPATIENT
Start: 2018-05-02

## 2018-05-02 RX ORDER — TIZANIDINE 4 MG/1
4 TABLET ORAL EVERY 12 HOURS PRN
Qty: 60 TABLET | Refills: 1 | Status: SHIPPED | OUTPATIENT
Start: 2018-05-02 | End: 2018-06-01

## 2018-05-02 ASSESSMENT — ENCOUNTER SYMPTOMS
RESPIRATORY NEGATIVE: 1
HEARTBURN: 0
SINUS PAIN: 0
EYES NEGATIVE: 1
PHOTOPHOBIA: 0
GASTROINTESTINAL NEGATIVE: 1
NAUSEA: 0
SORE THROAT: 0
WHEEZING: 0
COUGH: 0
BLURRED VISION: 0
VOMITING: 0

## 2018-05-02 ASSESSMENT — PAIN DESCRIPTION - LOCATION: LOCATION: NECK;SHOULDER;ARM;HAND

## 2018-05-02 ASSESSMENT — PAIN SCALES - GENERAL: PAINLEVEL_OUTOF10: 6

## 2018-05-02 ASSESSMENT — PAIN DESCRIPTION - ONSET: ONSET: ON-GOING

## 2018-05-02 ASSESSMENT — PAIN DESCRIPTION - FREQUENCY: FREQUENCY: CONTINUOUS

## 2018-05-02 ASSESSMENT — PAIN DESCRIPTION - PROGRESSION: CLINICAL_PROGRESSION: GRADUALLY WORSENING

## 2018-05-02 ASSESSMENT — PAIN DESCRIPTION - ORIENTATION: ORIENTATION: LEFT;RIGHT

## 2018-05-11 ENCOUNTER — CARE COORDINATION (OUTPATIENT)
Dept: CARE COORDINATION | Age: 48
End: 2018-05-11

## 2018-05-22 ENCOUNTER — HOSPITAL ENCOUNTER (OUTPATIENT)
Dept: GENERAL RADIOLOGY | Age: 48
Discharge: HOME | End: 2018-05-24
Payer: MEDICARE

## 2018-05-22 ENCOUNTER — HOSPITAL ENCOUNTER (OUTPATIENT)
Dept: PAIN MANAGEMENT | Age: 48
Discharge: HOME | End: 2018-05-22
Payer: MEDICARE

## 2018-05-22 VITALS
WEIGHT: 184 LBS | TEMPERATURE: 98.3 F | DIASTOLIC BLOOD PRESSURE: 57 MMHG | SYSTOLIC BLOOD PRESSURE: 97 MMHG | BODY MASS INDEX: 31.41 KG/M2 | OXYGEN SATURATION: 98 % | RESPIRATION RATE: 18 BRPM | HEIGHT: 64 IN | HEART RATE: 76 BPM

## 2018-05-22 DIAGNOSIS — M54.12 CERVICAL RADICULOPATHY: ICD-10-CM

## 2018-05-22 DIAGNOSIS — M47.812 OSTEOARTHRITIS OF CERVICAL SPINE, UNSPECIFIED SPINAL OSTEOARTHRITIS COMPLICATION STATUS: ICD-10-CM

## 2018-05-22 DIAGNOSIS — M48.02 STENOSIS, CERVICAL SPINE: ICD-10-CM

## 2018-05-22 DIAGNOSIS — M54.12 CERVICAL RADICULOPATHY: Primary | ICD-10-CM

## 2018-05-22 DIAGNOSIS — M48.02 DEGENERATIVE CERVICAL SPINAL STENOSIS: ICD-10-CM

## 2018-05-22 PROCEDURE — 62321 NJX INTERLAMINAR CRV/THRC: CPT | Performed by: PAIN MEDICINE

## 2018-05-22 PROCEDURE — 6360000002 HC RX W HCPCS

## 2018-05-22 PROCEDURE — 6360000002 HC RX W HCPCS: Performed by: PAIN MEDICINE

## 2018-05-22 PROCEDURE — 6360000004 HC RX CONTRAST MEDICATION

## 2018-05-22 PROCEDURE — 3209999900 FLUORO FOR SURGICAL PROCEDURES

## 2018-05-22 PROCEDURE — 62325 NJX INTERLAMINAR CRV/THRC: CPT

## 2018-05-22 PROCEDURE — 2580000003 HC RX 258: Performed by: PAIN MEDICINE

## 2018-05-22 RX ORDER — SODIUM CHLORIDE, SODIUM LACTATE, POTASSIUM CHLORIDE, CALCIUM CHLORIDE 600; 310; 30; 20 MG/100ML; MG/100ML; MG/100ML; MG/100ML
75 INJECTION, SOLUTION INTRAVENOUS CONTINUOUS
Status: DISCONTINUED | OUTPATIENT
Start: 2018-05-22 | End: 2018-05-23 | Stop reason: HOSPADM

## 2018-05-22 RX ORDER — MIDAZOLAM HYDROCHLORIDE 1 MG/ML
INJECTION INTRAMUSCULAR; INTRAVENOUS
Status: COMPLETED | OUTPATIENT
Start: 2018-05-22 | End: 2018-05-22

## 2018-05-22 RX ADMIN — SODIUM CHLORIDE, POTASSIUM CHLORIDE, SODIUM LACTATE AND CALCIUM CHLORIDE 75 ML/HR: 600; 310; 30; 20 INJECTION, SOLUTION INTRAVENOUS at 09:04

## 2018-05-22 RX ADMIN — MIDAZOLAM 2 MG: 1 INJECTION INTRAMUSCULAR; INTRAVENOUS at 09:15

## 2018-05-22 ASSESSMENT — PAIN SCALES - GENERAL
PAINLEVEL_OUTOF10: 10

## 2018-05-22 ASSESSMENT — PAIN DESCRIPTION - ORIENTATION: ORIENTATION: RIGHT;LEFT

## 2018-05-22 ASSESSMENT — PAIN DESCRIPTION - PROGRESSION: CLINICAL_PROGRESSION: GRADUALLY WORSENING

## 2018-05-22 ASSESSMENT — PAIN DESCRIPTION - PAIN TYPE: TYPE: CHRONIC PAIN

## 2018-05-22 ASSESSMENT — PAIN DESCRIPTION - FREQUENCY: FREQUENCY: CONTINUOUS

## 2018-05-22 ASSESSMENT — PAIN DESCRIPTION - ONSET: ONSET: ON-GOING

## 2018-05-23 RX ORDER — ROPINIROLE 1 MG/1
1 TABLET, FILM COATED ORAL NIGHTLY
Qty: 90 TABLET | Refills: 1 | Status: SHIPPED | OUTPATIENT
Start: 2018-05-23 | End: 2018-05-23 | Stop reason: SDUPTHER

## 2018-05-23 RX ORDER — ROPINIROLE 1 MG/1
TABLET, FILM COATED ORAL
Qty: 30 TABLET | Refills: 11 | Status: SHIPPED | OUTPATIENT
Start: 2018-05-23 | End: 2018-11-21

## 2018-06-13 ENCOUNTER — HOSPITAL ENCOUNTER (OUTPATIENT)
Dept: PAIN MANAGEMENT | Age: 48
Discharge: HOME | End: 2018-06-13
Payer: MEDICARE

## 2018-06-13 VITALS
TEMPERATURE: 98.4 F | RESPIRATION RATE: 16 BRPM | HEIGHT: 64 IN | HEART RATE: 71 BPM | WEIGHT: 184 LBS | OXYGEN SATURATION: 97 % | BODY MASS INDEX: 31.41 KG/M2 | DIASTOLIC BLOOD PRESSURE: 78 MMHG | SYSTOLIC BLOOD PRESSURE: 113 MMHG

## 2018-06-13 DIAGNOSIS — M48.02 STENOSIS, CERVICAL SPINE: ICD-10-CM

## 2018-06-13 DIAGNOSIS — S13.4XXS SPRAIN OF ATLANTO-OCCIPITAL JOINT, SEQUELA: ICD-10-CM

## 2018-06-13 DIAGNOSIS — M48.02 DEGENERATIVE CERVICAL SPINAL STENOSIS: ICD-10-CM

## 2018-06-13 DIAGNOSIS — M54.12 CERVICAL RADICULOPATHY: Primary | ICD-10-CM

## 2018-06-13 DIAGNOSIS — M79.18 MYOFACIAL MUSCLE PAIN: ICD-10-CM

## 2018-06-13 DIAGNOSIS — S13.4XXS ATLANTO-AXIAL JOINT SPRAIN, SEQUELA: ICD-10-CM

## 2018-06-13 DIAGNOSIS — M47.812 OSTEOARTHRITIS OF CERVICAL SPINE, UNSPECIFIED SPINAL OSTEOARTHRITIS COMPLICATION STATUS: ICD-10-CM

## 2018-06-13 DIAGNOSIS — Z90.722 S/P BILATERAL OOPHORECTOMY: ICD-10-CM

## 2018-06-13 DIAGNOSIS — M47.812 ARTHROPATHY OF CERVICAL FACET JOINT: ICD-10-CM

## 2018-06-13 DIAGNOSIS — Z51.81 ENCOUNTER FOR MEDICATION MONITORING: ICD-10-CM

## 2018-06-13 PROCEDURE — 99213 OFFICE O/P EST LOW 20 MIN: CPT | Performed by: NURSE PRACTITIONER

## 2018-06-13 PROCEDURE — 99213 OFFICE O/P EST LOW 20 MIN: CPT

## 2018-06-13 RX ORDER — OXYCODONE AND ACETAMINOPHEN 10; 325 MG/1; MG/1
1 TABLET ORAL SEE ADMIN INSTRUCTIONS
Qty: 70 TABLET | Refills: 0 | Status: SHIPPED | OUTPATIENT
Start: 2018-06-15 | End: 2018-07-11 | Stop reason: SDUPTHER

## 2018-06-13 RX ORDER — TIZANIDINE 4 MG/1
TABLET ORAL
Refills: 1 | COMMUNITY
Start: 2018-06-03 | End: 2018-07-11 | Stop reason: SDUPTHER

## 2018-06-13 ASSESSMENT — ENCOUNTER SYMPTOMS
RESPIRATORY NEGATIVE: 1
NAUSEA: 1

## 2018-06-14 ENCOUNTER — CARE COORDINATION (OUTPATIENT)
Dept: CARE COORDINATION | Age: 48
End: 2018-06-14

## 2018-06-25 ENCOUNTER — HOSPITAL ENCOUNTER (EMERGENCY)
Age: 48
Discharge: HOME | End: 2018-06-25
Attending: EMERGENCY MEDICINE
Payer: MEDICARE

## 2018-06-25 ENCOUNTER — APPOINTMENT (OUTPATIENT)
Dept: CT IMAGING | Age: 48
End: 2018-06-25
Payer: MEDICARE

## 2018-06-25 VITALS
TEMPERATURE: 98.3 F | HEIGHT: 64 IN | BODY MASS INDEX: 31.07 KG/M2 | SYSTOLIC BLOOD PRESSURE: 106 MMHG | WEIGHT: 182 LBS | RESPIRATION RATE: 18 BRPM | OXYGEN SATURATION: 93 % | DIASTOLIC BLOOD PRESSURE: 58 MMHG | HEART RATE: 82 BPM

## 2018-06-25 DIAGNOSIS — M54.2 NECK PAIN: ICD-10-CM

## 2018-06-25 DIAGNOSIS — S09.90XA CLOSED HEAD INJURY, INITIAL ENCOUNTER: Primary | ICD-10-CM

## 2018-06-25 DIAGNOSIS — S06.0X0A CONCUSSION WITHOUT LOSS OF CONSCIOUSNESS, INITIAL ENCOUNTER: ICD-10-CM

## 2018-06-25 PROCEDURE — 99283 EMERGENCY DEPT VISIT LOW MDM: CPT

## 2018-06-25 PROCEDURE — 96374 THER/PROPH/DIAG INJ IV PUSH: CPT

## 2018-06-25 PROCEDURE — 70450 CT HEAD/BRAIN W/O DYE: CPT

## 2018-06-25 PROCEDURE — 72125 CT NECK SPINE W/O DYE: CPT

## 2018-06-25 PROCEDURE — 6360000002 HC RX W HCPCS: Performed by: EMERGENCY MEDICINE

## 2018-06-25 RX ORDER — KETOROLAC TROMETHAMINE 30 MG/ML
60 INJECTION, SOLUTION INTRAMUSCULAR; INTRAVENOUS ONCE
Status: COMPLETED | OUTPATIENT
Start: 2018-06-25 | End: 2018-06-25

## 2018-06-25 RX ORDER — IBUPROFEN 600 MG/1
600 TABLET ORAL EVERY 6 HOURS PRN
Qty: 30 TABLET | Refills: 0 | Status: SHIPPED | OUTPATIENT
Start: 2018-06-25 | End: 2018-07-02 | Stop reason: SDUPTHER

## 2018-06-25 RX ADMIN — KETOROLAC TROMETHAMINE 60 MG: 30 INJECTION, SOLUTION INTRAMUSCULAR at 14:56

## 2018-06-25 ASSESSMENT — PAIN DESCRIPTION - LOCATION: LOCATION: HEAD

## 2018-06-25 ASSESSMENT — ENCOUNTER SYMPTOMS
EYE PAIN: 0
EYE REDNESS: 0
COUGH: 0
SHORTNESS OF BREATH: 0
BACK PAIN: 0
RHINORRHEA: 0
VOMITING: 0
NAUSEA: 0
ABDOMINAL PAIN: 0

## 2018-06-25 ASSESSMENT — PAIN DESCRIPTION - PAIN TYPE: TYPE: ACUTE PAIN

## 2018-06-25 ASSESSMENT — PAIN DESCRIPTION - DESCRIPTORS: DESCRIPTORS: THROBBING

## 2018-06-25 ASSESSMENT — PAIN SCALES - GENERAL
PAINLEVEL_OUTOF10: 10
PAINLEVEL_OUTOF10: 10

## 2018-07-03 RX ORDER — IBUPROFEN 600 MG/1
600 TABLET ORAL EVERY 6 HOURS PRN
Qty: 30 TABLET | Refills: 0 | Status: SHIPPED | OUTPATIENT
Start: 2018-07-03 | End: 2018-07-07 | Stop reason: SDUPTHER

## 2018-07-10 RX ORDER — IBUPROFEN 600 MG/1
600 TABLET ORAL EVERY 6 HOURS PRN
Qty: 30 TABLET | Refills: 0 | Status: SHIPPED | OUTPATIENT
Start: 2018-07-10 | End: 2018-07-17 | Stop reason: SDUPTHER

## 2018-07-11 ENCOUNTER — HOSPITAL ENCOUNTER (OUTPATIENT)
Dept: PAIN MANAGEMENT | Age: 48
Discharge: HOME | End: 2018-07-11
Payer: MEDICARE

## 2018-07-11 VITALS
RESPIRATION RATE: 16 BRPM | WEIGHT: 182 LBS | OXYGEN SATURATION: 98 % | SYSTOLIC BLOOD PRESSURE: 110 MMHG | TEMPERATURE: 98 F | HEIGHT: 64 IN | DIASTOLIC BLOOD PRESSURE: 55 MMHG | HEART RATE: 73 BPM | BODY MASS INDEX: 31.07 KG/M2

## 2018-07-11 DIAGNOSIS — M48.02 DEGENERATIVE CERVICAL SPINAL STENOSIS: ICD-10-CM

## 2018-07-11 DIAGNOSIS — M79.18 MYOFACIAL MUSCLE PAIN: ICD-10-CM

## 2018-07-11 DIAGNOSIS — M54.12 CERVICAL RADICULOPATHY: Primary | ICD-10-CM

## 2018-07-11 DIAGNOSIS — M47.812 ARTHROPATHY OF CERVICAL FACET JOINT: ICD-10-CM

## 2018-07-11 DIAGNOSIS — S13.4XXS SPRAIN OF ATLANTO-OCCIPITAL JOINT, SEQUELA: ICD-10-CM

## 2018-07-11 DIAGNOSIS — M48.02 STENOSIS, CERVICAL SPINE: ICD-10-CM

## 2018-07-11 DIAGNOSIS — M47.812 OSTEOARTHRITIS OF CERVICAL SPINE, UNSPECIFIED SPINAL OSTEOARTHRITIS COMPLICATION STATUS: ICD-10-CM

## 2018-07-11 DIAGNOSIS — S13.4XXS ATLANTO-AXIAL JOINT SPRAIN, SEQUELA: ICD-10-CM

## 2018-07-11 DIAGNOSIS — Z51.81 ENCOUNTER FOR MEDICATION MONITORING: ICD-10-CM

## 2018-07-11 PROCEDURE — 99213 OFFICE O/P EST LOW 20 MIN: CPT

## 2018-07-11 PROCEDURE — 99213 OFFICE O/P EST LOW 20 MIN: CPT | Performed by: NURSE PRACTITIONER

## 2018-07-11 RX ORDER — OXYCODONE AND ACETAMINOPHEN 10; 325 MG/1; MG/1
1 TABLET ORAL SEE ADMIN INSTRUCTIONS
Qty: 70 TABLET | Refills: 0 | Status: SHIPPED | OUTPATIENT
Start: 2018-07-15 | End: 2018-08-20 | Stop reason: SDUPTHER

## 2018-07-11 RX ORDER — TIZANIDINE 4 MG/1
TABLET ORAL
Qty: 60 TABLET | Refills: 1 | Status: SHIPPED | OUTPATIENT
Start: 2018-07-11 | End: 2018-08-20 | Stop reason: SDUPTHER

## 2018-07-11 ASSESSMENT — ENCOUNTER SYMPTOMS
NAUSEA: 1
RESPIRATORY NEGATIVE: 1

## 2018-07-11 NOTE — PROGRESS NOTES
CNP)  Review of OARRS does not show any aberrant prescription behavior. Medication is helping the patient stay active. Patient denies any side effects and reports adequate analgesia. No sign of misuse/abuse. When was the last UDS:   1-16-18          Was the UDS appropriate:yes      Record/Diagnostics Review:      As above, I did review the imaging    1/22/2018  1:31 PM - Tyree Hatfield Incoming Lab Results From [facility]     Component Results     Component Value Ref Range & Units Status Collected Lab   Pain Management Drug Panel Interp, Urine Inconsistent   Final 01/16/2018  1:45 PM [facility]   (NOTE)   ________________________________________________________________   DRUGS EXPECTED:   PERCOCET (OXYCODONE) [1/14/18]   ________________________________________________________________   CONSISTENT with medications provided:   PERCOCET (OXYCODONE) : based on noroxycodone   ________________________________________________________________   INCONSISTENT with medications provided:   7-Aminoclonazepam   ________________________________________________________________   Drugs Not Included in this Assay:   Acetaminophen   ________________________________________________________________   INTERPRETIVE INFORMATION: Pain Mgt Chaudhary, Mass Spec/EMIT, Ur,                            Interp   Interpretation depends on accuracy and completeness of patient   medication information submitted by client.     6-Acetylmorphine, Ur Not Detected   Final 01/16/2018  1:45 PM [facility]   7-Aminoclonazepam, Urine Present   Final 01/16/2018  1:45 PM [facility]   Alpha-OH-Alpraz, Urine Not Detected   Final 01/16/2018  1:45 PM [facility]   Alprazolam, Urine Not Detected   Final 01/16/2018  1:45 PM [facility]   Amphetamines, urine Not Detected   Final 01/16/2018  1:45 PM [facility]   Barbiturates, Ur Not Detected   Final 01/16/2018  1:45 PM [facility]   Benzoylecgonine, Ur Not Detected   Final 01/16/2018  1:45 PM [facility]   Buprenorphine Urine Not Detected   Final 01/16/2018  1:45 PM [facility] Smokeless tobacco: Never Used      Comment: 1/2 pack every month    Alcohol use No    Drug use: No    Sexual activity: Not Currently     Other Topics Concern    Not on file     Social History Narrative    No narrative on file       Review of Systems:  Review of Systems   Constitution: Positive for weakness. HENT: Negative. Eyes:        Glasses   Cardiovascular: Negative. Respiratory: Negative. Skin: Negative. Musculoskeletal: Positive for neck pain. Gastrointestinal: Positive for nausea. Nausea with headaches   Genitourinary: Negative. Neurological: Positive for dizziness, headaches and numbness. Psychiatric/Behavioral: Negative. Physical Exam:  BP (!) 110/55   Pulse 73   Temp 98 °F (36.7 °C) (Oral)   Resp 16   Ht 5' 4\" (1.626 m)   Wt 182 lb (82.6 kg)   LMP 11/01/2010   SpO2 98%   BMI 31.24 kg/m²     Physical Exam   Constitutional: She is oriented to person, place, and time. HENT:   Head: Normocephalic. Tenderness occipital area   Eyes: EOM are normal. Pupils are equal, round, and reactive to light. Neck: Neck supple. Pulmonary/Chest: Effort normal.   Musculoskeletal:        Cervical back: She exhibits decreased range of motion and tenderness. Back:    Neurological: She is alert and oriented to person, place, and time. Gait normal.   Reflex Scores:       Tricep reflexes are 2+ on the right side and 2+ on the left side. Bicep reflexes are 2+ on the left side. Brachioradialis reflexes are 2+ on the right side and 2+ on the left side. Skin: Skin is warm, dry and intact.    Psychiatric: Affect and judgment normal.         Assessment:    Problem List Items Addressed This Visit     Osteoarthritis of cervical spine    Relevant Medications    oxyCODONE-acetaminophen (PERCOCET)  MG per tablet (Start on 7/15/2018)    tiZANidine (ZANAFLEX) 4 MG tablet    Stenosis, cervical spine    Relevant Medications    oxyCODONE-acetaminophen (PERCOCET)

## 2018-07-13 ENCOUNTER — CARE COORDINATION (OUTPATIENT)
Dept: CARE COORDINATION | Age: 48
End: 2018-07-13

## 2018-07-13 NOTE — CARE COORDINATION
Attempting to reach patient for a follow up care coordination call regarding any needs, questions or concerns.   Mayte Membreno, [address]  Unable to accept messages

## 2018-07-17 RX ORDER — IBUPROFEN 600 MG/1
600 TABLET ORAL EVERY 6 HOURS PRN
Qty: 30 TABLET | Refills: 0 | Status: SHIPPED | OUTPATIENT
Start: 2018-07-17 | End: 2018-07-22 | Stop reason: SDUPTHER

## 2018-07-24 RX ORDER — IBUPROFEN 600 MG/1
600 TABLET ORAL EVERY 6 HOURS PRN
Qty: 30 TABLET | Refills: 0 | Status: SHIPPED | OUTPATIENT
Start: 2018-07-24 | End: 2018-07-28 | Stop reason: SDUPTHER

## 2018-07-26 ENCOUNTER — APPOINTMENT (OUTPATIENT)
Dept: GENERAL RADIOLOGY | Age: 48
End: 2018-07-26
Payer: MEDICARE

## 2018-07-26 ENCOUNTER — APPOINTMENT (OUTPATIENT)
Dept: CT IMAGING | Age: 48
End: 2018-07-26
Payer: MEDICARE

## 2018-07-26 ENCOUNTER — HOSPITAL ENCOUNTER (EMERGENCY)
Age: 48
Discharge: HOME | End: 2018-07-26
Attending: EMERGENCY MEDICINE
Payer: MEDICARE

## 2018-07-26 VITALS
SYSTOLIC BLOOD PRESSURE: 108 MMHG | BODY MASS INDEX: 31.07 KG/M2 | DIASTOLIC BLOOD PRESSURE: 65 MMHG | TEMPERATURE: 98 F | OXYGEN SATURATION: 93 % | RESPIRATION RATE: 18 BRPM | HEIGHT: 64 IN | HEART RATE: 72 BPM | WEIGHT: 182 LBS

## 2018-07-26 DIAGNOSIS — R07.9 CHEST PAIN, UNSPECIFIED TYPE: Primary | ICD-10-CM

## 2018-07-26 DIAGNOSIS — R10.84 GENERALIZED ABDOMINAL PAIN: ICD-10-CM

## 2018-07-26 LAB
-: ABNORMAL
ABSOLUTE EOS #: 0.2 K/UL (ref 0–0.4)
ABSOLUTE IMMATURE GRANULOCYTE: NORMAL K/UL (ref 0–0.3)
ABSOLUTE LYMPH #: 2.5 K/UL (ref 1–4.8)
ABSOLUTE MONO #: 0.4 K/UL (ref 0.1–1.3)
ALBUMIN SERPL-MCNC: 4.1 G/DL (ref 3.5–5.2)
ALBUMIN/GLOBULIN RATIO: ABNORMAL (ref 1–2.5)
ALP BLD-CCNC: 106 U/L (ref 35–104)
ALT SERPL-CCNC: 12 U/L (ref 5–33)
AMORPHOUS: ABNORMAL
ANION GAP SERPL CALCULATED.3IONS-SCNC: 13 MMOL/L (ref 9–17)
AST SERPL-CCNC: 13 U/L
BACTERIA: ABNORMAL
BASOPHILS # BLD: 1 % (ref 0–2)
BASOPHILS ABSOLUTE: 0.1 K/UL (ref 0–0.2)
BILIRUB SERPL-MCNC: 0.26 MG/DL (ref 0.3–1.2)
BILIRUBIN URINE: ABNORMAL
BUN BLDV-MCNC: 15 MG/DL (ref 6–20)
BUN/CREAT BLD: ABNORMAL (ref 9–20)
CALCIUM SERPL-MCNC: 9.3 MG/DL (ref 8.6–10.4)
CASTS UA: ABNORMAL /LPF
CHLORIDE BLD-SCNC: 106 MMOL/L (ref 98–107)
CO2: 21 MMOL/L (ref 20–31)
COLOR: ABNORMAL
COMMENT UA: ABNORMAL
CREAT SERPL-MCNC: 0.74 MG/DL (ref 0.5–0.9)
CRYSTALS, UA: ABNORMAL /HPF
DIFFERENTIAL TYPE: NORMAL
EKG ATRIAL RATE: 69 BPM
EKG P AXIS: 51 DEGREES
EKG P-R INTERVAL: 146 MS
EKG Q-T INTERVAL: 428 MS
EKG QRS DURATION: 84 MS
EKG QTC CALCULATION (BAZETT): 458 MS
EKG R AXIS: 62 DEGREES
EKG T AXIS: 55 DEGREES
EKG VENTRICULAR RATE: 69 BPM
EOSINOPHILS RELATIVE PERCENT: 2 % (ref 0–4)
EPITHELIAL CELLS UA: ABNORMAL /HPF
GFR AFRICAN AMERICAN: >60 ML/MIN
GFR NON-AFRICAN AMERICAN: >60 ML/MIN
GFR SERPL CREATININE-BSD FRML MDRD: ABNORMAL ML/MIN/{1.73_M2}
GFR SERPL CREATININE-BSD FRML MDRD: ABNORMAL ML/MIN/{1.73_M2}
GLUCOSE BLD-MCNC: 108 MG/DL (ref 70–99)
GLUCOSE URINE: NEGATIVE
HCT VFR BLD CALC: 42.8 % (ref 36–46)
HEMOGLOBIN: 14.6 G/DL (ref 12–16)
IMMATURE GRANULOCYTES: NORMAL %
KETONES, URINE: ABNORMAL
LEUKOCYTE ESTERASE, URINE: ABNORMAL
LIPASE: 34 U/L (ref 13–60)
LYMPHOCYTES # BLD: 32 % (ref 24–44)
MCH RBC QN AUTO: 30.9 PG (ref 26–34)
MCHC RBC AUTO-ENTMCNC: 34 G/DL (ref 31–37)
MCV RBC AUTO: 90.8 FL (ref 80–100)
MONOCYTES # BLD: 6 % (ref 1–7)
MUCUS: ABNORMAL
MYOGLOBIN: <21 NG/ML (ref 25–58)
NITRITE, URINE: NEGATIVE
NRBC AUTOMATED: NORMAL PER 100 WBC
OTHER OBSERVATIONS UA: ABNORMAL
PDW BLD-RTO: 12.5 % (ref 11.5–14.9)
PH UA: 5.5 (ref 5–8)
PLATELET # BLD: 180 K/UL (ref 150–450)
PLATELET ESTIMATE: NORMAL
PMV BLD AUTO: 8.7 FL (ref 6–12)
POTASSIUM SERPL-SCNC: 4.3 MMOL/L (ref 3.7–5.3)
PROTEIN UA: NEGATIVE
RBC # BLD: 4.72 M/UL (ref 4–5.2)
RBC # BLD: NORMAL 10*6/UL
RBC UA: ABNORMAL /HPF
RENAL EPITHELIAL, UA: ABNORMAL /HPF
SEG NEUTROPHILS: 59 % (ref 36–66)
SEGMENTED NEUTROPHILS ABSOLUTE COUNT: 4.6 K/UL (ref 1.3–9.1)
SODIUM BLD-SCNC: 140 MMOL/L (ref 135–144)
SPECIFIC GRAVITY UA: 1.03 (ref 1–1.03)
TOTAL PROTEIN: 7.3 G/DL (ref 6.4–8.3)
TRICHOMONAS: ABNORMAL
TROPONIN INTERP: ABNORMAL
TROPONIN T: <0.03 NG/ML
TURBIDITY: ABNORMAL
URINE HGB: NEGATIVE
UROBILINOGEN, URINE: NORMAL
WBC # BLD: 7.9 K/UL (ref 3.5–11)
WBC # BLD: NORMAL 10*3/UL
WBC UA: ABNORMAL /HPF
YEAST: ABNORMAL

## 2018-07-26 PROCEDURE — 83690 ASSAY OF LIPASE: CPT

## 2018-07-26 PROCEDURE — 93005 ELECTROCARDIOGRAM TRACING: CPT

## 2018-07-26 PROCEDURE — 2580000003 HC RX 258: Performed by: EMERGENCY MEDICINE

## 2018-07-26 PROCEDURE — 83874 ASSAY OF MYOGLOBIN: CPT

## 2018-07-26 PROCEDURE — 74177 CT ABD & PELVIS W/CONTRAST: CPT

## 2018-07-26 PROCEDURE — 96375 TX/PRO/DX INJ NEW DRUG ADDON: CPT

## 2018-07-26 PROCEDURE — 81001 URINALYSIS AUTO W/SCOPE: CPT

## 2018-07-26 PROCEDURE — 80053 COMPREHEN METABOLIC PANEL: CPT

## 2018-07-26 PROCEDURE — 84484 ASSAY OF TROPONIN QUANT: CPT

## 2018-07-26 PROCEDURE — 99285 EMERGENCY DEPT VISIT HI MDM: CPT

## 2018-07-26 PROCEDURE — 71046 X-RAY EXAM CHEST 2 VIEWS: CPT

## 2018-07-26 PROCEDURE — 96374 THER/PROPH/DIAG INJ IV PUSH: CPT

## 2018-07-26 PROCEDURE — 36415 COLL VENOUS BLD VENIPUNCTURE: CPT

## 2018-07-26 PROCEDURE — 6360000002 HC RX W HCPCS: Performed by: EMERGENCY MEDICINE

## 2018-07-26 PROCEDURE — 6360000004 HC RX CONTRAST MEDICATION: Performed by: EMERGENCY MEDICINE

## 2018-07-26 PROCEDURE — 85025 COMPLETE CBC W/AUTO DIFF WBC: CPT

## 2018-07-26 RX ORDER — ONDANSETRON 2 MG/ML
4 INJECTION INTRAMUSCULAR; INTRAVENOUS ONCE
Status: COMPLETED | OUTPATIENT
Start: 2018-07-26 | End: 2018-07-26

## 2018-07-26 RX ORDER — SODIUM CHLORIDE 0.9 % (FLUSH) 0.9 %
10 SYRINGE (ML) INJECTION PRN
Status: DISCONTINUED | OUTPATIENT
Start: 2018-07-26 | End: 2018-07-26 | Stop reason: HOSPADM

## 2018-07-26 RX ORDER — KETOROLAC TROMETHAMINE 30 MG/ML
30 INJECTION, SOLUTION INTRAMUSCULAR; INTRAVENOUS ONCE
Status: COMPLETED | OUTPATIENT
Start: 2018-07-26 | End: 2018-07-26

## 2018-07-26 RX ORDER — 0.9 % SODIUM CHLORIDE 0.9 %
80 INTRAVENOUS SOLUTION INTRAVENOUS ONCE
Status: COMPLETED | OUTPATIENT
Start: 2018-07-26 | End: 2018-07-26

## 2018-07-26 RX ADMIN — IOPAMIDOL 75 ML: 755 INJECTION, SOLUTION INTRAVENOUS at 13:43

## 2018-07-26 RX ADMIN — KETOROLAC TROMETHAMINE 30 MG: 30 INJECTION, SOLUTION INTRAMUSCULAR at 12:43

## 2018-07-26 RX ADMIN — Medication 10 ML: at 13:43

## 2018-07-26 RX ADMIN — SODIUM CHLORIDE 80 ML: 9 INJECTION, SOLUTION INTRAVENOUS at 13:41

## 2018-07-26 RX ADMIN — ONDANSETRON 4 MG: 2 INJECTION INTRAMUSCULAR; INTRAVENOUS at 12:49

## 2018-07-26 ASSESSMENT — ENCOUNTER SYMPTOMS
COUGH: 0
EYE PAIN: 0
SHORTNESS OF BREATH: 0
RHINORRHEA: 0
NAUSEA: 1
VOMITING: 0
BACK PAIN: 0
EYE REDNESS: 0
ABDOMINAL PAIN: 1

## 2018-07-26 ASSESSMENT — PAIN SCALES - GENERAL
PAINLEVEL_OUTOF10: 5
PAINLEVEL_OUTOF10: 8

## 2018-07-26 NOTE — ED PROVIDER NOTES
 COLONOSCOPY      Dr Ozzie Barkley  14    COLONOSCOPY  2014    biopsy & sigmoid spasms, pathology negative    COLONOSCOPY N/A 2018    COLONOSCOPY WITH BIOPSY performed by Preeti George MD at [facility] 84      x 5    HYSTERECTOMY          [place] EXPLORATORY OF ABDOMEN  10/21/2014    Laparotomy-lysis of adhesions, bso     UPPER GASTROINTESTINAL ENDOSCOPY  2010    mild chronic inactive gastritis       CURRENT MEDICATIONS       Previous Medications    ATORVASTATIN (LIPITOR) 40 MG TABLET    TAKE 1 TABLET BY MOUTH DAILY    CLONAZEPAM (KLONOPIN) 0.5 MG TABLET    TK 1 T PO  TID prn    ESOMEPRAZOLE (NEXIUM) 40 MG DELAYED RELEASE CAPSULE    TAKE 1 CAPSULE BY MOUTH EVERY MORNING BEFORE BREAKFAST    GABAPENTIN (NEURONTIN) 300 MG CAPSULE    Take 1 capsule by mouth 3 times daily    IBUPROFEN (ADVIL;MOTRIN) 600 MG TABLET    TAKE 1 TABLET BY MOUTH EVERY 6 HOURS AS NEEDED FOR PAIN    LEVOTHYROXINE (SYNTHROID) 125 MCG TABLET    TAKE 1 TABLET BY MOUTH TWICE DAILY    METOPROLOL TARTRATE (LOPRESSOR) 50 MG TABLET    Take 50 mg by mouth 2 times daily     OXYCODONE-ACETAMINOPHEN (PERCOCET)  MG PER TABLET    Take 1 tablet by mouth See Admin Instructions for 30 days. 1 tab 2-3 times a day as needed.     ROPINIROLE (REQUIP) 1 MG TABLET    TAKE 1 TABLET BY MOUTH EVERY NIGHT    SERTRALINE (ZOLOFT) 100 MG TABLET    once daily    TIZANIDINE (ZANAFLEX) 4 MG TABLET    TK 1 T PO Q 12 H PRF MUSCLE SPASM    TOPIRAMATE (TOPAMAX) 25 MG TABLET    25 mg 2 times daily        ALLERGIES     Morphine; Sulfa antibiotics; and Adhesive tape    FAMILY HISTORY       Family Status   Relation Status    Mother     Father Alive    MGM     MGF     PGM     PGF       Family History   Problem Relation Age of Onset    Cancer Mother         vaginal    Heart Disease Father     Diabetes Father     Other Father         colon resection for colon polyps    Breast LIPASE   ICTOTEST, URINE           EMERGENCY DEPARTMENT COURSE:   Vitals:    Vitals:    07/26/18 1153 07/26/18 1300   BP: (!) 107/59 (!) 103/57   Pulse: 71 80   Resp: 14 20   Temp: 98 °F (36.7 °C)    TempSrc: Oral    SpO2: 96%    Weight: 182 lb (82.6 kg)    Height: 5' 4\" (1.626 m)      -------------------------  BP: (!) 103/57, Temp: 98 °F (36.7 °C), Pulse: 80, Resp: 20      FINAL IMPRESSION      1. Chest pain, unspecified type    2.  Generalized abdominal pain          DISPOSITION/PLAN    DISPOSITION Decision To Discharge 07/26/2018 02:06:28 PM      PATIENT REFERRED TO:  Shoaib Sibley MD  [address] [place] [address]  [phone]    Call in 1 day        DISCHARGE MEDICATIONS:  New Prescriptions    No medications on file       (Please note that portions of this note were completed with a voice recognition program.  Efforts were made to edit the dictations but occasionally words are mis-transcribed.)    Kathy Ugalde MD, [facility], [facility]  Attending Emergency Physician                     Kathy Ugalde MD  07/26/18 3985

## 2018-07-27 ENCOUNTER — HOSPITAL ENCOUNTER (EMERGENCY)
Age: 48
Discharge: HOME | End: 2018-07-27
Attending: EMERGENCY MEDICINE
Payer: MEDICARE

## 2018-07-27 ENCOUNTER — APPOINTMENT (OUTPATIENT)
Dept: CT IMAGING | Age: 48
End: 2018-07-27
Payer: MEDICARE

## 2018-07-27 VITALS
TEMPERATURE: 97.6 F | OXYGEN SATURATION: 95 % | SYSTOLIC BLOOD PRESSURE: 95 MMHG | RESPIRATION RATE: 18 BRPM | HEART RATE: 66 BPM | WEIGHT: 182 LBS | DIASTOLIC BLOOD PRESSURE: 54 MMHG | BODY MASS INDEX: 31.07 KG/M2 | HEIGHT: 64 IN

## 2018-07-27 DIAGNOSIS — R10.84 GENERALIZED ABDOMINAL PAIN: Primary | ICD-10-CM

## 2018-07-27 LAB
ABSOLUTE EOS #: 0.2 K/UL (ref 0–0.4)
ABSOLUTE IMMATURE GRANULOCYTE: NORMAL K/UL (ref 0–0.3)
ABSOLUTE LYMPH #: 2.9 K/UL (ref 1–4.8)
ABSOLUTE MONO #: 0.4 K/UL (ref 0.1–1.3)
ALBUMIN SERPL-MCNC: 3.8 G/DL (ref 3.5–5.2)
ALBUMIN/GLOBULIN RATIO: ABNORMAL (ref 1–2.5)
ALP BLD-CCNC: 91 U/L (ref 35–104)
ALT SERPL-CCNC: 12 U/L (ref 5–33)
AMYLASE: 99 U/L (ref 28–100)
ANION GAP SERPL CALCULATED.3IONS-SCNC: 14 MMOL/L (ref 9–17)
AST SERPL-CCNC: 13 U/L
BASOPHILS # BLD: 1 % (ref 0–2)
BASOPHILS ABSOLUTE: 0.1 K/UL (ref 0–0.2)
BILIRUB SERPL-MCNC: 0.29 MG/DL (ref 0.3–1.2)
BUN BLDV-MCNC: 20 MG/DL (ref 6–20)
BUN/CREAT BLD: ABNORMAL (ref 9–20)
CALCIUM SERPL-MCNC: 9.3 MG/DL (ref 8.6–10.4)
CHLORIDE BLD-SCNC: 104 MMOL/L (ref 98–107)
CO2: 22 MMOL/L (ref 20–31)
CREAT SERPL-MCNC: 0.94 MG/DL (ref 0.5–0.9)
DIFFERENTIAL TYPE: NORMAL
EOSINOPHILS RELATIVE PERCENT: 3 % (ref 0–4)
GFR AFRICAN AMERICAN: >60 ML/MIN
GFR NON-AFRICAN AMERICAN: >60 ML/MIN
GFR SERPL CREATININE-BSD FRML MDRD: ABNORMAL ML/MIN/{1.73_M2}
GFR SERPL CREATININE-BSD FRML MDRD: ABNORMAL ML/MIN/{1.73_M2}
GLUCOSE BLD-MCNC: 118 MG/DL (ref 70–99)
HCT VFR BLD CALC: 41.1 % (ref 36–46)
HEMOGLOBIN: 14.4 G/DL (ref 12–16)
IMMATURE GRANULOCYTES: NORMAL %
LIPASE: 35 U/L (ref 13–60)
LYMPHOCYTES # BLD: 39 % (ref 24–44)
MCH RBC QN AUTO: 31.5 PG (ref 26–34)
MCHC RBC AUTO-ENTMCNC: 35 G/DL (ref 31–37)
MCV RBC AUTO: 90 FL (ref 80–100)
MONOCYTES # BLD: 6 % (ref 1–7)
NRBC AUTOMATED: NORMAL PER 100 WBC
PDW BLD-RTO: 13.1 % (ref 11.5–14.9)
PLATELET # BLD: 173 K/UL (ref 150–450)
PLATELET ESTIMATE: NORMAL
PMV BLD AUTO: 9.4 FL (ref 6–12)
POTASSIUM SERPL-SCNC: 4.1 MMOL/L (ref 3.7–5.3)
RBC # BLD: 4.57 M/UL (ref 4–5.2)
RBC # BLD: NORMAL 10*6/UL
SEG NEUTROPHILS: 51 % (ref 36–66)
SEGMENTED NEUTROPHILS ABSOLUTE COUNT: 3.9 K/UL (ref 1.3–9.1)
SODIUM BLD-SCNC: 140 MMOL/L (ref 135–144)
TOTAL PROTEIN: 6.6 G/DL (ref 6.4–8.3)
WBC # BLD: 7.5 K/UL (ref 3.5–11)
WBC # BLD: NORMAL 10*3/UL

## 2018-07-27 PROCEDURE — 6360000002 HC RX W HCPCS: Performed by: EMERGENCY MEDICINE

## 2018-07-27 PROCEDURE — 96376 TX/PRO/DX INJ SAME DRUG ADON: CPT

## 2018-07-27 PROCEDURE — 36415 COLL VENOUS BLD VENIPUNCTURE: CPT

## 2018-07-27 PROCEDURE — 82150 ASSAY OF AMYLASE: CPT

## 2018-07-27 PROCEDURE — 6360000004 HC RX CONTRAST MEDICATION: Performed by: EMERGENCY MEDICINE

## 2018-07-27 PROCEDURE — 83690 ASSAY OF LIPASE: CPT

## 2018-07-27 PROCEDURE — 74174 CTA ABD&PLVS W/CONTRAST: CPT

## 2018-07-27 PROCEDURE — 96375 TX/PRO/DX INJ NEW DRUG ADDON: CPT

## 2018-07-27 PROCEDURE — 80053 COMPREHEN METABOLIC PANEL: CPT

## 2018-07-27 PROCEDURE — 2580000003 HC RX 258: Performed by: EMERGENCY MEDICINE

## 2018-07-27 PROCEDURE — 99284 EMERGENCY DEPT VISIT MOD MDM: CPT

## 2018-07-27 PROCEDURE — 96374 THER/PROPH/DIAG INJ IV PUSH: CPT

## 2018-07-27 PROCEDURE — 85025 COMPLETE CBC W/AUTO DIFF WBC: CPT

## 2018-07-27 RX ORDER — SODIUM CHLORIDE 0.9 % (FLUSH) 0.9 %
10 SYRINGE (ML) INJECTION PRN
Status: DISCONTINUED | OUTPATIENT
Start: 2018-07-27 | End: 2018-07-27 | Stop reason: HOSPADM

## 2018-07-27 RX ORDER — ONDANSETRON 4 MG/1
4 TABLET, ORALLY DISINTEGRATING ORAL EVERY 8 HOURS PRN
Qty: 20 TABLET | Refills: 0 | Status: SHIPPED | OUTPATIENT
Start: 2018-07-27 | End: 2019-03-11

## 2018-07-27 RX ORDER — FENTANYL CITRATE 50 UG/ML
50 INJECTION, SOLUTION INTRAMUSCULAR; INTRAVENOUS ONCE
Status: COMPLETED | OUTPATIENT
Start: 2018-07-27 | End: 2018-07-27

## 2018-07-27 RX ORDER — 0.9 % SODIUM CHLORIDE 0.9 %
80 INTRAVENOUS SOLUTION INTRAVENOUS ONCE
Status: COMPLETED | OUTPATIENT
Start: 2018-07-27 | End: 2018-07-27

## 2018-07-27 RX ORDER — ONDANSETRON 2 MG/ML
4 INJECTION INTRAMUSCULAR; INTRAVENOUS ONCE
Status: COMPLETED | OUTPATIENT
Start: 2018-07-27 | End: 2018-07-27

## 2018-07-27 RX ADMIN — FENTANYL CITRATE 50 MCG: 50 INJECTION, SOLUTION INTRAMUSCULAR; INTRAVENOUS at 05:39

## 2018-07-27 RX ADMIN — ONDANSETRON 4 MG: 2 INJECTION, SOLUTION INTRAMUSCULAR; INTRAVENOUS at 02:33

## 2018-07-27 RX ADMIN — IOPAMIDOL 100 ML: 755 INJECTION, SOLUTION INTRAVENOUS at 05:04

## 2018-07-27 RX ADMIN — FENTANYL CITRATE 50 MCG: 50 INJECTION, SOLUTION INTRAMUSCULAR; INTRAVENOUS at 02:36

## 2018-07-27 RX ADMIN — Medication 10 ML: at 05:04

## 2018-07-27 RX ADMIN — SODIUM CHLORIDE 80 ML: 9 INJECTION, SOLUTION INTRAVENOUS at 05:04

## 2018-07-27 RX ADMIN — ONDANSETRON 4 MG: 2 INJECTION INTRAMUSCULAR; INTRAVENOUS at 05:37

## 2018-07-27 ASSESSMENT — PAIN DESCRIPTION - LOCATION
LOCATION: ABDOMEN
LOCATION: ABDOMEN

## 2018-07-27 ASSESSMENT — PAIN SCALES - GENERAL
PAINLEVEL_OUTOF10: 8
PAINLEVEL_OUTOF10: 10
PAINLEVEL_OUTOF10: 4
PAINLEVEL_OUTOF10: 10
PAINLEVEL_OUTOF10: 8

## 2018-07-27 ASSESSMENT — PAIN DESCRIPTION - ORIENTATION
ORIENTATION: MID
ORIENTATION: RIGHT;LEFT

## 2018-07-27 ASSESSMENT — PAIN DESCRIPTION - PAIN TYPE
TYPE: ACUTE PAIN

## 2018-07-27 ASSESSMENT — PAIN DESCRIPTION - DESCRIPTORS: DESCRIPTORS: CONSTANT

## 2018-07-27 NOTE — ED PROVIDER NOTES
[facility] ED  [place] [address]  Phone: [phone]        Pt Name: Andrew Polk  MRN: [id number]  [place] 1970  Date of evaluation: 7/27/18      CHIEF COMPLAINT       Chief Complaint   Patient presents with    Abdominal Pain       HISTORY OF PRESENT ILLNESS  (Location/Symptom, Timing/Onset, Context/Setting, Quality, Duration, Modifying Factors, Severity.)    Andrew Polk is a 56 y.o. female who presents Complaining of diffuse upper abdominal pain. She relates it is more on the left side than anywhere else. She does some she was here couple days ago for the same symptoms and did have picture done as well as some labs. She was told that it didn't look like there is anything acute. But she was also told to return for similar symptoms or worsening of symptoms. She relates that she does go to pain management and is taking the medications they've given her but nothing seems to be helping this particular pain. She tells me that she does feel nauseous with it. She has had no fever or chills. She does relate that she feels better when she curls her legs up towards her chest.  Otherwise nothing really helps the pain. She doesn't think of anything in particular that she's eaten that would flare the symptoms. She denies any urinary complaints. She denies any constipation or diarrhea. REVIEW OF SYSTEMS    (2-9 systems for level 4, 10 or more for level 5)     Review of Systems   Constitutional: Negative for activity change, appetite change, chills, fatigue and fever. HENT: Negative for congestion, ear pain and sore throat. Eyes: Negative for pain, discharge and redness. Respiratory: Negative for cough, shortness of breath, wheezing and stridor. Cardiovascular: Negative for chest pain. Gastrointestinal: Positive for abdominal pain and nausea. Negative for constipation, diarrhea and vomiting. Genitourinary: Negative for decreased urine volume and difficulty urinating. BREAKFAST, Disp-90 capsule, R-3Normal      gabapentin (NEURONTIN) 300 MG capsule Take 1 capsule by mouth 3 times daily, Disp-60 capsule, R-0Print      sertraline (ZOLOFT) 100 MG tablet once daily, R-2Historical Med      metoprolol tartrate (LOPRESSOR) 50 MG tablet Take 50 mg by mouth 2 times daily Historical Med      clonazePAM (KLONOPIN) 0.5 MG tablet TK 1 T PO  TID prn, R-1Historical Med             ALLERGIES     is allergic to morphine; sulfa antibiotics; and adhesive tape. FAMILY HISTORY     indicated that her mother is . She indicated that her father is alive. She indicated that her maternal grandmother is . She indicated that her maternal grandfather is . She indicated that her paternal grandmother is . She indicated that her paternal grandfather is . family history includes Breast Cancer in her maternal grandmother; Cancer in her mother; Diabetes in her father; Heart Disease in her father; Other in her father; Stroke in her maternal grandfather. SOCIAL HISTORY      reports that she has been smoking Cigarettes. She has a 10.00 pack-year smoking history. She has never used smokeless tobacco. She reports that she does not drink alcohol or use drugs. PHYSICAL EXAM    (up to 7 for level 4, 8 or more for level 5)   INITIAL VITALS:  height is 5' 4\" (1.626 m) and weight is 82.6 kg (182 lb). Her oral temperature is 97.6 °F (36.4 °C). Her blood pressure is 95/54 (abnormal) and her pulse is 66. Her respiration is 18 and oxygen saturation is 95%. Physical Exam   Constitutional: She is oriented to person, place, and time. She appears well-developed and well-nourished. She appears distressed. HENT:   Head: Normocephalic and atraumatic. Right Ear: External ear normal.   Left Ear: External ear normal.   Nose: Nose normal.   Eyes: Conjunctivae and EOM are normal. Pupils are equal, round, and reactive to light. Right eye exhibits no discharge.  Left eye exhibits no discharge. Neck: Normal range of motion. Neck supple. Cardiovascular: Normal rate, regular rhythm and normal heart sounds. No murmur heard. Pulmonary/Chest: Effort normal and breath sounds normal. No respiratory distress. She has no wheezes. She has no rales. Abdominal: Soft. Bowel sounds are normal. She exhibits no distension and no mass. There is tenderness. There is no rebound and no guarding. Musculoskeletal: Normal range of motion. She exhibits no edema or tenderness. Neurological: She is alert and oriented to person, place, and time. No cranial nerve deficit. She exhibits normal muscle tone. Skin: Skin is warm. No rash noted. She is not diaphoretic. Psychiatric: She has a normal mood and affect. Her behavior is normal.       DIFFERENTIAL DIAGNOSIS/ MDM:     I did discuss with her that I would go ahead and get some lab work again today and give her something for pain and nausea. I will also go ahead and get a CTA to rule out mesenteric ischemia since she did just have a regular CT 2 days ago that was negative. She is comfortable with this plan of care verbalizes understanding. I have answered any questions she has at this time.     DIAGNOSTIC RESULTS     EKG: All EKG's are interpreted by the Emergency Department Physician who either signs or Co-signs this chart in the absence of a cardiologist.    None    RADIOLOGY:   Non-plain film images such as CT, Ultrasound and MRI are read by the radiologist. Marilia Shanks radiographic images are visualized and the radiologist interpretations are reviewed as follows:     Interpretation per the Radiologist below, if available at the time of this note:    Xr Chest Standard (2 Vw)    Result Date: 7/26/2018  EXAMINATION: TWO VIEWS OF THE CHEST 7/26/2018 11:53 am COMPARISON: 04/08/2018 HISTORY: ORDERING SYSTEM PROVIDED HISTORY: Chest Pain TECHNOLOGIST PROVIDED HISTORY: Reason for exam:->Chest Pain Ordering Physician Provided Reason for Exam: Mid chest pain with anterior abdominal wall. No bowel obstruction is evident. Hysterectomy and cholecystectomy. Cta Abdomen Pelvis W Contrast    Result Date: 7/27/2018  EXAMINATION: CTA OF THE ABDOMEN AND PELVIS WITH CONTRAST 7/27/2018 5:06 am: TECHNIQUE: CTA of the abdomen and pelvis was performed with the administration of intravenous contrast. Multiplanar reformatted images are provided for review. MIP images are provided for review. Dose modulation, iterative reconstruction, and/or weight based adjustment of the mA/kV was utilized to reduce the radiation dose to as low as reasonably achievable. 3D reconstructed images were performed on a separate workstation and provided for review. COMPARISON: 07/26/2018 HISTORY: ORDERING SYSTEM PROVIDED HISTORY: abdominal pain TECHNOLOGIST PROVIDED HISTORY: Ordering Physician Provided Reason for Exam: MID ABD PAIN Acuity: Acute Type of Exam: Subsequent/Follow-up FINDINGS: CTA ABDOMEN: There are atelectatic changes in the lung bases. The thoracic aorta is normal in caliber and enhancement. The major branches are widely patent. Arterial phase enhanced parenchymal attenuation pattern of the liver, spleen, adrenal glands, pancreas and kidneys is unremarkable. The bowel is normal in caliber throughout. Again seen is midline abdominal wall hernia repair with immediately adjacent loops of bowel and protruding knuckle of bowel without evidence for resulting obstruction. CTA PELVIS: Iliac vasculature is widely patent. Pelvic organs are unremarkable. Normal abdominal iliac vasculature. As described on recent prior study, there are bowel loops immediately adjacent or adherent to the midline where there has been hernia repair but there is no evidence of obstruction.      LABS:  Results for orders placed or performed during the hospital encounter of 07/27/18   CBC Auto Differential   Result Value Ref Range    WBC 7.5 3.5 - 11.0 k/uL    RBC 4.57 4.0 - 5.2 m/uL    Hemoglobin 14.4 12.0 - 16.0 g/dL

## 2018-07-27 NOTE — LETTER
[facility] ED  [place] [address]  Phone: [phone]               July 27, 2018    Patient: Jun Garcia   YOB: 1970   Date of Visit: 7/27/2018       To Whom It May Concern:    Jocelyne Scruggs was seen and treated in our emergency department on 7/27/2018. She may return to work on 7/28/2018.       Sincerely,       Richard Zurita RN         Signature:__________________________________

## 2018-07-28 ASSESSMENT — ENCOUNTER SYMPTOMS
EYE PAIN: 0
SORE THROAT: 0
NAUSEA: 1
COLOR CHANGE: 0
VOMITING: 0
ABDOMINAL PAIN: 1
COUGH: 0
DIARRHEA: 0
EYE REDNESS: 0
EYE DISCHARGE: 0
WHEEZING: 0
CONSTIPATION: 0
SHORTNESS OF BREATH: 0
STRIDOR: 0

## 2018-07-30 ENCOUNTER — CARE COORDINATION (OUTPATIENT)
Dept: CARE COORDINATION | Age: 48
End: 2018-07-30

## 2018-07-31 DIAGNOSIS — G62.9 NEUROPATHY: ICD-10-CM

## 2018-07-31 RX ORDER — ATORVASTATIN CALCIUM 40 MG/1
TABLET, FILM COATED ORAL
Qty: 30 TABLET | Refills: 11 | Status: SHIPPED | OUTPATIENT
Start: 2018-07-31 | End: 2018-08-20 | Stop reason: SDUPTHER

## 2018-07-31 RX ORDER — IBUPROFEN 600 MG/1
600 TABLET ORAL EVERY 6 HOURS PRN
Qty: 30 TABLET | Refills: 1 | Status: SHIPPED | OUTPATIENT
Start: 2018-07-31 | End: 2018-08-14 | Stop reason: SDUPTHER

## 2018-08-03 RX ORDER — GABAPENTIN 300 MG/1
CAPSULE ORAL
Qty: 180 CAPSULE | Refills: 0 | Status: SHIPPED | OUTPATIENT
Start: 2018-08-03 | End: 2018-08-20 | Stop reason: DRUGHIGH

## 2018-08-13 ENCOUNTER — CARE COORDINATION (OUTPATIENT)
Dept: CARE COORDINATION | Age: 48
End: 2018-08-13

## 2018-08-13 NOTE — CARE COORDINATION
Attempting to reach patient for a follow up care coordination call regarding any needs, questions or concerns. Patient went to ED on 7.26.18 and 7.27.18 for abdominal pain would like to follow up on any needs and schedule a follow up with PCP.    VM full, could not leave a message

## 2018-08-15 RX ORDER — IBUPROFEN 600 MG/1
600 TABLET ORAL EVERY 6 HOURS PRN
Qty: 30 TABLET | Refills: 0 | Status: SHIPPED | OUTPATIENT
Start: 2018-08-15 | End: 2018-08-19 | Stop reason: SDUPTHER

## 2018-08-15 RX ORDER — NAPROXEN SODIUM 550 MG/1
TABLET ORAL
Qty: 180 TABLET | Refills: 0 | Status: SHIPPED | OUTPATIENT
Start: 2018-08-15 | End: 2018-08-20 | Stop reason: ALTCHOICE

## 2018-08-20 ENCOUNTER — HOSPITAL ENCOUNTER (OUTPATIENT)
Dept: PAIN MANAGEMENT | Age: 48
Discharge: HOME | End: 2018-08-20
Payer: MEDICARE

## 2018-08-20 DIAGNOSIS — S13.4XXS ATLANTO-AXIAL JOINT SPRAIN, SEQUELA: ICD-10-CM

## 2018-08-20 DIAGNOSIS — M48.02 STENOSIS, CERVICAL SPINE: ICD-10-CM

## 2018-08-20 DIAGNOSIS — Z51.81 ENCOUNTER FOR MEDICATION MONITORING: Primary | ICD-10-CM

## 2018-08-20 DIAGNOSIS — M47.812 OSTEOARTHRITIS OF CERVICAL SPINE, UNSPECIFIED SPINAL OSTEOARTHRITIS COMPLICATION STATUS: ICD-10-CM

## 2018-08-20 DIAGNOSIS — M48.02 DEGENERATIVE CERVICAL SPINAL STENOSIS: ICD-10-CM

## 2018-08-20 DIAGNOSIS — M54.12 CERVICAL RADICULOPATHY: ICD-10-CM

## 2018-08-20 DIAGNOSIS — M47.812 ARTHROPATHY OF CERVICAL FACET JOINT: ICD-10-CM

## 2018-08-20 PROCEDURE — 99213 OFFICE O/P EST LOW 20 MIN: CPT | Performed by: NURSE PRACTITIONER

## 2018-08-20 PROCEDURE — 99213 OFFICE O/P EST LOW 20 MIN: CPT

## 2018-08-20 RX ORDER — OXYCODONE AND ACETAMINOPHEN 10; 325 MG/1; MG/1
1 TABLET ORAL SEE ADMIN INSTRUCTIONS
Qty: 70 TABLET | Refills: 0 | Status: SHIPPED | OUTPATIENT
Start: 2018-08-25 | End: 2018-09-18 | Stop reason: SDUPTHER

## 2018-08-20 RX ORDER — TIZANIDINE 4 MG/1
TABLET ORAL
Qty: 60 TABLET | Refills: 1 | Status: SHIPPED | OUTPATIENT
Start: 2018-08-25 | End: 2018-09-25

## 2018-08-20 RX ORDER — GABAPENTIN 300 MG/1
300 CAPSULE ORAL NIGHTLY
COMMUNITY
End: 2019-04-02

## 2018-08-20 RX ORDER — IBUPROFEN 600 MG/1
600 TABLET ORAL EVERY 6 HOURS PRN
Qty: 30 TABLET | Refills: 0 | Status: SHIPPED | OUTPATIENT
Start: 2018-08-20 | End: 2018-08-29 | Stop reason: SDUPTHER

## 2018-08-20 ASSESSMENT — ENCOUNTER SYMPTOMS
CONSTIPATION: 0
SHORTNESS OF BREATH: 0
COUGH: 0

## 2018-08-20 NOTE — PROGRESS NOTES
 Kidney stone     LFT elevation     MVP (mitral valve prolapse)     Obesity     Umbilical hernia        Past Surgical History:   Procedure Laterality Date    APPENDECTOMY       SECTION      2 pfannenstiel, 1 vertical    CHOLECYSTECTOMY      COLONOSCOPY      Dr Christopher Loera  14    COLONOSCOPY  2014    biopsy & sigmoid spasms, pathology negative    COLONOSCOPY N/A 2018    COLONOSCOPY WITH BIOPSY [facility] by Zuhair Stoner MD [facility] 84      x 5    HYSTERECTOMY          [place] EXPLORATORY OF ABDOMEN  10/21/2014    Laparotomy-lysis of adhesions, bso     UPPER GASTROINTESTINAL ENDOSCOPY  2010    mild chronic inactive gastritis       Allergies   Allergen Reactions    Morphine Itching    Sulfa Antibiotics Hives    Adhesive Tape Rash         Current Outpatient Prescriptions:     gabapentin (NEURONTIN) 300 MG capsule, Take 300 mg by mouth nightly. ., Disp: , Rfl:     ibuprofen (ADVIL;MOTRIN) 600 MG tablet, TAKE 1 TABLET BY MOUTH EVERY 6 HOURS AS NEEDED FOR PAIN, Disp: 30 tablet, Rfl: 0    ondansetron (ZOFRAN ODT) 4 MG disintegrating tablet, Take 1 tablet by mouth every 8 hours as needed for Nausea, Disp: 20 tablet, Rfl: 0    tiZANidine (ZANAFLEX) 4 MG tablet, TK 1 T PO Q 12 H PRF MUSCLE SPASM, Disp: 60 tablet, Rfl: 1    rOPINIRole (REQUIP) 1 MG tablet, TAKE 1 TABLET BY MOUTH EVERY NIGHT, Disp: 30 tablet, Rfl: 11    atorvastatin (LIPITOR) 40 MG tablet, TAKE 1 TABLET BY MOUTH DAILY, Disp: 90 tablet, Rfl: 3    levothyroxine (SYNTHROID) 125 MCG tablet, TAKE 1 TABLET BY MOUTH TWICE DAILY, Disp: 60 tablet, Rfl: 11    topiramate (TOPAMAX) 25 MG tablet, 25 mg 2 times daily , Disp: , Rfl:     esomeprazole (NEXIUM) 40 MG delayed release capsule, TAKE 1 CAPSULE BY MOUTH EVERY MORNING BEFORE BREAKFAST, Disp: 90 capsule, Rfl: 3    sertraline (ZOLOFT) 100 MG tablet, once daily, Disp: , Rfl: 2    metoprolol tartrate (LOPRESSOR) 50 MG tablet, Take Contract requirements met. Continue opioid therapy.  Script written for percocet  She would like Dr. Garrett Pineda to review her MRI with her at next visit  Follow up appointment made for 4 weeks

## 2018-08-30 RX ORDER — IBUPROFEN 600 MG/1
600 TABLET ORAL EVERY 6 HOURS PRN
Qty: 30 TABLET | Refills: 0 | Status: SHIPPED | OUTPATIENT
Start: 2018-08-30 | End: 2018-09-03 | Stop reason: SDUPTHER

## 2018-09-05 RX ORDER — IBUPROFEN 600 MG/1
600 TABLET ORAL EVERY 6 HOURS PRN
Qty: 30 TABLET | Refills: 0 | Status: SHIPPED | OUTPATIENT
Start: 2018-09-05 | End: 2018-09-26

## 2018-09-11 ENCOUNTER — CARE COORDINATION (OUTPATIENT)
Dept: CARE COORDINATION | Age: 48
End: 2018-09-11

## 2018-09-18 ENCOUNTER — HOSPITAL ENCOUNTER (OUTPATIENT)
Dept: PAIN MANAGEMENT | Age: 48
Discharge: HOME | End: 2018-09-18
Payer: MEDICARE

## 2018-09-18 VITALS
BODY MASS INDEX: 31.07 KG/M2 | HEIGHT: 64 IN | HEART RATE: 91 BPM | TEMPERATURE: 98.1 F | WEIGHT: 182 LBS | SYSTOLIC BLOOD PRESSURE: 107 MMHG | RESPIRATION RATE: 16 BRPM | OXYGEN SATURATION: 98 % | DIASTOLIC BLOOD PRESSURE: 67 MMHG

## 2018-09-18 DIAGNOSIS — M47.812 OSTEOARTHRITIS OF CERVICAL SPINE, UNSPECIFIED SPINAL OSTEOARTHRITIS COMPLICATION STATUS: ICD-10-CM

## 2018-09-18 DIAGNOSIS — S13.4XXS ATLANTO-AXIAL JOINT SPRAIN, SEQUELA: ICD-10-CM

## 2018-09-18 DIAGNOSIS — M47.812 ARTHROPATHY OF CERVICAL FACET JOINT: ICD-10-CM

## 2018-09-18 DIAGNOSIS — M47.812 SPONDYLOSIS OF CERVICAL REGION WITHOUT MYELOPATHY OR RADICULOPATHY: Primary | ICD-10-CM

## 2018-09-18 DIAGNOSIS — M48.02 DEGENERATIVE CERVICAL SPINAL STENOSIS: ICD-10-CM

## 2018-09-18 DIAGNOSIS — Z51.81 ENCOUNTER FOR MEDICATION MONITORING: ICD-10-CM

## 2018-09-18 DIAGNOSIS — M54.12 CERVICAL RADICULOPATHY: ICD-10-CM

## 2018-09-18 PROCEDURE — 99213 OFFICE O/P EST LOW 20 MIN: CPT

## 2018-09-18 PROCEDURE — 99214 OFFICE O/P EST MOD 30 MIN: CPT | Performed by: PAIN MEDICINE

## 2018-09-18 RX ORDER — OXYCODONE AND ACETAMINOPHEN 10; 325 MG/1; MG/1
1 TABLET ORAL EVERY 8 HOURS PRN
Qty: 90 TABLET | Refills: 0 | Status: SHIPPED | OUTPATIENT
Start: 2018-09-18 | End: 2018-10-16 | Stop reason: SDUPTHER

## 2018-09-18 RX ORDER — HYDROXYZINE PAMOATE 25 MG/1
25 CAPSULE ORAL 4 TIMES DAILY PRN
Qty: 60 CAPSULE | Refills: 0 | Status: SHIPPED | OUTPATIENT
Start: 2018-09-18 | End: 2018-10-03

## 2018-09-18 ASSESSMENT — PAIN SCALES - GENERAL: PAINLEVEL_OUTOF10: 7

## 2018-09-18 ASSESSMENT — ENCOUNTER SYMPTOMS
RESPIRATORY NEGATIVE: 1
GASTROINTESTINAL NEGATIVE: 1
EYES NEGATIVE: 1

## 2018-09-18 ASSESSMENT — PAIN DESCRIPTION - PAIN TYPE: TYPE: CHRONIC PAIN

## 2018-09-18 ASSESSMENT — PAIN DESCRIPTION - ONSET: ONSET: ON-GOING

## 2018-09-18 ASSESSMENT — PAIN DESCRIPTION - LOCATION: LOCATION: NECK;SHOULDER;ARM;HAND

## 2018-09-18 ASSESSMENT — PAIN DESCRIPTION - FREQUENCY: FREQUENCY: CONTINUOUS

## 2018-09-18 ASSESSMENT — PAIN DESCRIPTION - PROGRESSION: CLINICAL_PROGRESSION: GRADUALLY WORSENING

## 2018-09-18 ASSESSMENT — PAIN DESCRIPTION - ORIENTATION: ORIENTATION: RIGHT;LEFT;UPPER

## 2018-09-18 NOTE — PROGRESS NOTES
present in the midline, left side and right side (Pain is worse on the left side). The quality of the pain is described as aching (Throbbing). The pain is at a severity of 7/10 (7-10). The pain is severe. The symptoms are aggravated by bending (Neck movements). The pain is same all the time. Stiffness is present in the morning. Associated symptoms include headaches, numbness, tingling and weakness. Pertinent negatives include no chest pain, fever or photophobia. Associated symptoms comments: Especially in the left hand. Patient relates current medications are helping the pain. Patient reports taking pain medications as prescribed, denies obtaining medications from different sources and denies use of illegal drugs. Patient denies side effects from medications like nausea, vomiting, constipation or drowsiness. Patient reports current activities of daily living ar possible due to medications and would like to continue them. OARRS compliant?  yes  Concern for prescription abuse?no    Current Pain Assessment  Pain Assessment  Pain Assessment: 0-10  Pain Level: 7  Pain Type: Chronic pain  Pain Location: Neck, Shoulder, Arm, Hand  Pain Orientation: Right, Left, Upper  Pain Radiating Towards: down both arms into hands  Pain Descriptors: Aching, Constant, Numbness, Radiating, Throbbing  Pain Frequency: Continuous  Pain Onset: On-going  Clinical Progression: Gradually worsening  Effect of Pain on Daily Activities: Pain increases with head movement and lying on left side  Patient's Stated Pain Goal: 2 (To have pain at 2 with head movement and lying on left side)  Pain Intervention(s): Medication (see eMar), Rest, Repositioned, Heat applied, Environmental changes                    ADVERSE MEDICATION EFFECTS:   Constipation: no  Bowel Regimen: No:   Diet: common adult  Appetite:  ok  Sedation:  no  Urinary Retention: no    FOCUSED PAIN SCALE:  Highest : 10  Lowest :7  Average: Range-7  When and What  was your last procedure:  18 MELVIN   Was your procedure effective:  Yes but minimal    ACTIVITY/SOCIAL/EMOTIONAL:  Sleep Pattern: 5 hours per night. nightime awakenings  Energy Level:  Tired/Fatigued  Currently attending Physical Therapy:  Starting PT tomorrow  Home Exercises: intermittently none and -  Mobility: no current mobility problem   Currently seeing a Psychiatrist or Psychologist:  No  Emotional Issues: normal   Mood: appropriate     ABERRANT BEHAVIORS SINCE LAST VISIT:  Have you ever been treated in another Pain Clinic not applicable  Refills for prescriptions appropriate: yes  Lost rx/pills: no  Taking more medication than prescribed:  no  Are you receiving PAIN medications from  other doctors: no  Last Urine/Serum Drug Screen :18  Was Serum/UDS as anticipated?   yes  Brought pill bottles in :yes   Was Pill count appropriate? :yes   Are currently pregnant? not applicable  Recent ER visits: Yes 2018 fell and hit head             Past Medical History      Diagnosis Date    Anxiety     CAD (coronary artery disease)     Mitral valve prolapse    Fatty liver     GERD (gastroesophageal reflux disease)     Headache     History of bronchitis     Hyperlipidemia     Hypothyroidism     Kidney stone     LFT elevation     MVP (mitral valve prolapse)     Obesity     Umbilical hernia        Surgical History  Past Surgical History:   Procedure Laterality Date    APPENDECTOMY       SECTION      2 pfannenstiel, 1 vertical    CHOLECYSTECTOMY      COLONOSCOPY      Dr Scot Garcia  14    COLONOSCOPY  2014    biopsy & sigmoid spasms, pathology negative    COLONOSCOPY N/A 2018    COLONOSCOPY WITH BIOPSY performed by Maninder Maher MD at [facility] 84      x 5    HYSTERECTOMY      2010    AR EXPLORATORY OF ABDOMEN  10/21/2014    Laparotomy-lysis of adhesions, bso     UPPER GASTROINTESTINAL ENDOSCOPY  2010    mild chronic inactive gastritis 20. 00     Types: Cigarettes    Smokeless tobacco: Never Used      Comment: 1/2 pack every month    Alcohol use No    Drug use: No    Sexual activity: Not Currently     Other Topics Concern    None     Social History Narrative    None      reports that she does not use drugs. REVIEW OF SYSTEMS:  Review of Systems   Constitutional: Negative for chills and fever. Both hands   HENT: Negative. Negative for hearing loss and nosebleeds. Eyes: Negative. Negative for blurred vision and photophobia. Respiratory: Negative. Negative for cough and shortness of breath. Cardiovascular: Negative for chest pain, palpitations and leg swelling. Gastrointestinal: Negative. Negative for constipation, heartburn, nausea and vomiting. Genitourinary: Negative. Negative for dysuria and frequency. Musculoskeletal: Positive for neck pain. Skin: Negative. Negative for itching and rash. Neurological: Positive for tingling, weakness, numbness and headaches. Endo/Heme/Allergies: Negative. Psychiatric/Behavioral: Negative for depression and suicidal ideas. The patient has insomnia. GENERAL PHYSICAL EXAM:  Vitals: /67   Pulse 91   Temp 98.1 °F (36.7 °C) (Oral)   Resp 16   Ht 5' 4\" (1.626 m)   Wt 182 lb (82.6 kg)   LMP 11/01/2010   SpO2 98%   BMI 31.24 kg/m² , Body mass index is 31.24 kg/m². Physical Exam   Constitutional: She is oriented to person, place, and time. She appears well-developed and well-nourished. HENT:   Head: Normocephalic and atraumatic. Eyes: Pupils are equal, round, and reactive to light. Conjunctivae and EOM are normal.   Neck: Neck supple. No tracheal deviation present. No thyromegaly present. Cardiovascular: Normal rate and regular rhythm. Pulmonary/Chest: Effort normal. No respiratory distress. Abdominal: She exhibits no distension. Musculoskeletal: Normal range of motion.    Neurological: She is alert and oriented to person, place, and time. She has normal strength. She displays no atrophy, no tremor and normal reflexes. No cranial nerve deficit or sensory deficit. She exhibits normal muscle tone. She displays a negative Romberg sign. Coordination normal.   Reflex Scores:       Tricep reflexes are 1+ on the right side and 1+ on the left side. Bicep reflexes are 1+ on the right side and 1+ on the left side. Brachioradialis reflexes are 1+ on the right side and 1+ on the left side. Skin: Skin is warm and dry. Psychiatric: She has a normal mood and affect. Her speech is normal and behavior is normal. Judgment and thought content normal.    Back Exam     Tenderness   The patient is experiencing tenderness in the cervical.    Range of Motion   Back extension: Painful and restricted. Back flexion: Painful and restricted. Back lateral bend right: Painful and restricted. Back lateral bend left: Painful and restricted. Back rotation right: Painful and restricted. Back rotation left: Painful and restricted. Other   Erythema: no back redness  Scars: absent    Comments:  Examination of the cervical spine revealed a loss of cervical lordosis. The spinous processes are in the midline. There is slight increase in the thoracic kyphosis. Palpation revealed paraspinal tenderness especially over the facet joints. It is worse on the left compared to the right. There is a spasm in the paraspinal muscles. Facet loading is positive bilaterally worse on the left compared to the right. Spurling's maneuver produced pain bilaterally. Cervical traction test is positive especially on the left side            Nurses Notes and Vital Signs reviewed.     DATA  Labs:  Benzodiazepine Screen, Urine   Date Value Ref Range Status   12/03/2017 NEGATIVE NEG Final     Comment:           (Positive cutoff 200 ng/mL)                      Imaging:  Radiology Images and Reports reviewed where indicated and necessary     COMPARISON: Prior cervical spine MRI We discussed the same at today's visit and have not been to implement it, as the patient's pain is not under control with current medications. [x]  Patient is counseled about  ill effects of smoking for 10 minutes -Patient is strongly urged to quit smoking   Following health benefits were discussed:  People who stop smoking greatly reduce their risk for disease and premature death. Lowered risk for lung cancer and many other types of cancer. Reduced risk for coronary heart disease, stroke, and peripheral vascular disease. Reduced coronary heart disease risk within 1 to 2 years of quitting. Reduced respiratory symptoms, such as coughing, wheezing, and shortness of breath. The rate of decline in lung function is slower among people who quit smoking than among those who continue to smoke. Reduced risk of developing chronic obstructive pulmonary disease (COPD), one of the leading causes of death in the [place]. Reduced risk for infertility in women of reproductive age. Women who stop smoking during pregnancy also reduce their risk of having a low birth weight baby. Reduced risk of bone and joint damage. Methods to Quit Smoking  The majority of cigarette smokers quit without using evidence-based   Counseling and medication are both effective for treating tobacco dependence, and using them together is more effective than using either one alone. Patient is advised to follow up with his physician for further management. Patient is counseled on importance of exercise and,core strengthening. Some  specific exercises to strengthen the abdominal muscles and low back muscles Were discussed. Also aquatic (water) physical therapy and benefits were explained to patient. including \" Water supports the body and minimizes the effect of gravity, making it easier for patients to start an exercise program.\"   The following important principles were discussed with patient:  1.  Limit Bed Rest--Studies show that people

## 2018-09-19 ASSESSMENT — ENCOUNTER SYMPTOMS
NAUSEA: 0
HEARTBURN: 0
PHOTOPHOBIA: 0
BLURRED VISION: 0
SHORTNESS OF BREATH: 0
COUGH: 0
CONSTIPATION: 0
VOMITING: 0

## 2018-09-25 RX ORDER — TIZANIDINE 4 MG/1
TABLET ORAL
Qty: 60 TABLET | Refills: 0 | Status: SHIPPED | OUTPATIENT
Start: 2018-09-25 | End: 2018-12-12 | Stop reason: SDUPTHER

## 2018-09-26 ENCOUNTER — HOSPITAL ENCOUNTER (EMERGENCY)
Age: 48
Discharge: HOME | End: 2018-09-26
Attending: EMERGENCY MEDICINE
Payer: MEDICARE

## 2018-09-26 ENCOUNTER — APPOINTMENT (OUTPATIENT)
Dept: GENERAL RADIOLOGY | Age: 48
End: 2018-09-26
Payer: MEDICARE

## 2018-09-26 VITALS
HEIGHT: 64 IN | WEIGHT: 172 LBS | OXYGEN SATURATION: 95 % | BODY MASS INDEX: 29.37 KG/M2 | TEMPERATURE: 99.1 F | RESPIRATION RATE: 16 BRPM | SYSTOLIC BLOOD PRESSURE: 114 MMHG | DIASTOLIC BLOOD PRESSURE: 71 MMHG | HEART RATE: 78 BPM

## 2018-09-26 DIAGNOSIS — J06.9 ACUTE UPPER RESPIRATORY INFECTION: Primary | ICD-10-CM

## 2018-09-26 DIAGNOSIS — N39.0 URINARY TRACT INFECTION WITHOUT HEMATURIA, SITE UNSPECIFIED: ICD-10-CM

## 2018-09-26 LAB
-: ABNORMAL
ABSOLUTE EOS #: 0.2 K/UL (ref 0–0.4)
ABSOLUTE IMMATURE GRANULOCYTE: NORMAL K/UL (ref 0–0.3)
ABSOLUTE LYMPH #: 2.2 K/UL (ref 1–4.8)
ABSOLUTE MONO #: 0.4 K/UL (ref 0.1–1.3)
ALBUMIN SERPL-MCNC: 4 G/DL (ref 3.5–5.2)
ALBUMIN/GLOBULIN RATIO: ABNORMAL (ref 1–2.5)
ALP BLD-CCNC: 91 U/L (ref 35–104)
ALT SERPL-CCNC: 14 U/L (ref 5–33)
AMORPHOUS: ABNORMAL
ANION GAP SERPL CALCULATED.3IONS-SCNC: 13 MMOL/L (ref 9–17)
AST SERPL-CCNC: 15 U/L
BACTERIA: ABNORMAL
BASOPHILS # BLD: 1 % (ref 0–2)
BASOPHILS ABSOLUTE: 0.1 K/UL (ref 0–0.2)
BILIRUB SERPL-MCNC: 0.28 MG/DL (ref 0.3–1.2)
BILIRUBIN URINE: NEGATIVE
BUN BLDV-MCNC: 15 MG/DL (ref 6–20)
BUN/CREAT BLD: ABNORMAL (ref 9–20)
C-REACTIVE PROTEIN: 0.5 MG/L (ref 0–5)
CALCIUM SERPL-MCNC: 9.5 MG/DL (ref 8.6–10.4)
CASTS UA: ABNORMAL /LPF
CHLORIDE BLD-SCNC: 107 MMOL/L (ref 98–107)
CO2: 21 MMOL/L (ref 20–31)
COLOR: YELLOW
COMMENT UA: ABNORMAL
CREAT SERPL-MCNC: 0.73 MG/DL (ref 0.5–0.9)
CRYSTALS, UA: ABNORMAL /HPF
DIFFERENTIAL TYPE: NORMAL
EOSINOPHILS RELATIVE PERCENT: 2 % (ref 0–4)
EPITHELIAL CELLS UA: ABNORMAL /HPF
GFR AFRICAN AMERICAN: >60 ML/MIN
GFR NON-AFRICAN AMERICAN: >60 ML/MIN
GFR SERPL CREATININE-BSD FRML MDRD: ABNORMAL ML/MIN/{1.73_M2}
GFR SERPL CREATININE-BSD FRML MDRD: ABNORMAL ML/MIN/{1.73_M2}
GLUCOSE BLD-MCNC: 106 MG/DL (ref 70–99)
GLUCOSE URINE: NEGATIVE
HCT VFR BLD CALC: 43.6 % (ref 36–46)
HEMOGLOBIN: 14.8 G/DL (ref 12–16)
IMMATURE GRANULOCYTES: NORMAL %
KETONES, URINE: NEGATIVE
LEUKOCYTE ESTERASE, URINE: ABNORMAL
LYMPHOCYTES # BLD: 32 % (ref 24–44)
MCH RBC QN AUTO: 30.7 PG (ref 26–34)
MCHC RBC AUTO-ENTMCNC: 33.9 G/DL (ref 31–37)
MCV RBC AUTO: 90.6 FL (ref 80–100)
MONOCYTES # BLD: 6 % (ref 1–7)
MUCUS: ABNORMAL
NITRITE, URINE: NEGATIVE
NRBC AUTOMATED: NORMAL PER 100 WBC
OTHER OBSERVATIONS UA: ABNORMAL
PDW BLD-RTO: 12.4 % (ref 11.5–14.9)
PH UA: 6 (ref 5–8)
PLATELET # BLD: 161 K/UL (ref 150–450)
PLATELET ESTIMATE: NORMAL
PMV BLD AUTO: 8 FL (ref 6–12)
POTASSIUM SERPL-SCNC: 4.3 MMOL/L (ref 3.7–5.3)
PROTEIN UA: NEGATIVE
RBC # BLD: 4.82 M/UL (ref 4–5.2)
RBC # BLD: NORMAL 10*6/UL
RBC UA: ABNORMAL /HPF
RENAL EPITHELIAL, UA: ABNORMAL /HPF
SEG NEUTROPHILS: 59 % (ref 36–66)
SEGMENTED NEUTROPHILS ABSOLUTE COUNT: 4 K/UL (ref 1.3–9.1)
SODIUM BLD-SCNC: 141 MMOL/L (ref 135–144)
SPECIFIC GRAVITY UA: 1.02 (ref 1–1.03)
TOTAL PROTEIN: 7.1 G/DL (ref 6.4–8.3)
TRICHOMONAS: ABNORMAL
TURBIDITY: ABNORMAL
URINE HGB: NEGATIVE
UROBILINOGEN, URINE: NORMAL
WBC # BLD: 6.8 K/UL (ref 3.5–11)
WBC # BLD: NORMAL 10*3/UL
WBC UA: ABNORMAL /HPF
YEAST: ABNORMAL

## 2018-09-26 PROCEDURE — 71046 X-RAY EXAM CHEST 2 VIEWS: CPT

## 2018-09-26 PROCEDURE — 2580000003 HC RX 258: Performed by: EMERGENCY MEDICINE

## 2018-09-26 PROCEDURE — 80053 COMPREHEN METABOLIC PANEL: CPT

## 2018-09-26 PROCEDURE — 36415 COLL VENOUS BLD VENIPUNCTURE: CPT

## 2018-09-26 PROCEDURE — 86140 C-REACTIVE PROTEIN: CPT

## 2018-09-26 PROCEDURE — 87086 URINE CULTURE/COLONY COUNT: CPT

## 2018-09-26 PROCEDURE — 81001 URINALYSIS AUTO W/SCOPE: CPT

## 2018-09-26 PROCEDURE — 99284 EMERGENCY DEPT VISIT MOD MDM: CPT

## 2018-09-26 PROCEDURE — 85025 COMPLETE CBC W/AUTO DIFF WBC: CPT

## 2018-09-26 PROCEDURE — 96374 THER/PROPH/DIAG INJ IV PUSH: CPT

## 2018-09-26 PROCEDURE — 6370000000 HC RX 637 (ALT 250 FOR IP): Performed by: EMERGENCY MEDICINE

## 2018-09-26 PROCEDURE — 6360000002 HC RX W HCPCS: Performed by: EMERGENCY MEDICINE

## 2018-09-26 RX ORDER — IBUPROFEN 600 MG/1
600 TABLET ORAL EVERY 6 HOURS PRN
Qty: 30 TABLET | Refills: 0 | Status: SHIPPED | OUTPATIENT
Start: 2018-09-26 | End: 2019-01-03

## 2018-09-26 RX ORDER — KETOROLAC TROMETHAMINE 30 MG/ML
30 INJECTION, SOLUTION INTRAMUSCULAR; INTRAVENOUS ONCE
Status: COMPLETED | OUTPATIENT
Start: 2018-09-26 | End: 2018-09-26

## 2018-09-26 RX ORDER — CEPHALEXIN 250 MG/1
500 CAPSULE ORAL ONCE
Status: COMPLETED | OUTPATIENT
Start: 2018-09-26 | End: 2018-09-26

## 2018-09-26 RX ORDER — 0.9 % SODIUM CHLORIDE 0.9 %
1000 INTRAVENOUS SOLUTION INTRAVENOUS ONCE
Status: COMPLETED | OUTPATIENT
Start: 2018-09-26 | End: 2018-09-26

## 2018-09-26 RX ORDER — CEPHALEXIN 500 MG/1
500 CAPSULE ORAL 3 TIMES DAILY
Qty: 21 CAPSULE | Refills: 0 | Status: SHIPPED | OUTPATIENT
Start: 2018-09-26 | End: 2018-11-14 | Stop reason: ALTCHOICE

## 2018-09-26 RX ADMIN — KETOROLAC TROMETHAMINE 30 MG: 30 INJECTION, SOLUTION INTRAMUSCULAR at 12:59

## 2018-09-26 RX ADMIN — CEPHALEXIN 500 MG: 250 CAPSULE ORAL at 14:25

## 2018-09-26 RX ADMIN — SODIUM CHLORIDE 1000 ML: 9 INJECTION, SOLUTION INTRAVENOUS at 12:59

## 2018-09-26 ASSESSMENT — PAIN DESCRIPTION - DESCRIPTORS: DESCRIPTORS: SHARP

## 2018-09-26 ASSESSMENT — ENCOUNTER SYMPTOMS
VOMITING: 0
EYE REDNESS: 0
ABDOMINAL PAIN: 0
RHINORRHEA: 1
NAUSEA: 1
EYE PAIN: 0
SHORTNESS OF BREATH: 0
COUGH: 1
BACK PAIN: 0

## 2018-09-26 ASSESSMENT — PAIN DESCRIPTION - LOCATION: LOCATION: HEAD;NECK

## 2018-09-26 ASSESSMENT — PAIN SCALES - GENERAL
PAINLEVEL_OUTOF10: 8
PAINLEVEL_OUTOF10: 8

## 2018-09-26 ASSESSMENT — PAIN DESCRIPTION - PAIN TYPE: TYPE: ACUTE PAIN

## 2018-09-26 NOTE — ED PROVIDER NOTES
[address]  eMERGENCY dEPARTMENT eNCOUnter      Pt Name: Kaiden Del Toro  MRN: [id number]  [place] 1970  Date of evaluation: 9/26/2018  PCP:    Anna Malik MD      CHIEF COMPLAINT       Chief Complaint   Patient presents with    Fever    Emesis    Nausea    Rash         HISTORY OF PRESENT ILLNESS      Kaiden Del Toro is a 53 y.o. female who presents For evaluation for not feeling well. Patient states started 4 days ago. Started with a cough and congestion and fevers. No shortness of breath. Cough is nonproductive. She is a headache and neck pain as well. Some nausea without vomiting. No abdominal pain. No urine changes. She denies over-the-counter medication including her prescribed medications including ibuprofen and Percocet without significant relief. Family family states she's been acting fine effusion      REVIEW OF SYSTEMS         Review of Systems   Constitutional: Positive for chills, fatigue and fever. HENT: Positive for congestion and rhinorrhea. Eyes: Negative for pain and redness. Respiratory: Positive for cough. Negative for shortness of breath. Cardiovascular: Negative for chest pain and palpitations. Gastrointestinal: Positive for nausea. Negative for abdominal pain and vomiting. Genitourinary: Negative for dysuria, flank pain and urgency. Musculoskeletal: Positive for neck pain. Negative for back pain, myalgias and neck stiffness. Skin: Negative for rash. Neurological: Negative for light-headedness and headaches. Hematological: Does not bruise/bleed easily.          PAST MEDICAL HISTORY     Past Medical History:   Diagnosis Date    Anxiety     CAD (coronary artery disease)     Mitral valve prolapse    Fatty liver     GERD (gastroesophageal reflux disease)     Headache     History of bronchitis     Hyperlipidemia     Hypothyroidism     Kidney stone     LFT elevation     MVP (mitral valve prolapse)     Obesity     Umbilical hernia        SURGICAL HISTORY       Past Surgical History:   Procedure Laterality Date    APPENDECTOMY       SECTION      2 pfannenstiel, 1 vertical    CHOLECYSTECTOMY      COLONOSCOPY      Dr Christiano Garza  14    COLONOSCOPY  2014    biopsy & sigmoid spasms, pathology negative    COLONOSCOPY N/A 2018    COLONOSCOPY WITH BIOPSY performed by Sarah Reed MD at [facility] 84      x 5    HYSTERECTOMY          ID EXPLORATORY OF ABDOMEN  10/21/2014    Laparotomy-lysis of adhesions, bso     UPPER GASTROINTESTINAL ENDOSCOPY  2010    mild chronic inactive gastritis       CURRENT MEDICATIONS       Previous Medications    ATORVASTATIN (LIPITOR) 40 MG TABLET    TAKE 1 TABLET BY MOUTH DAILY    CLONAZEPAM (KLONOPIN) 0.5 MG TABLET    TK 1 T PO  TID prn    ESOMEPRAZOLE (NEXIUM) 40 MG DELAYED RELEASE CAPSULE    TAKE 1 CAPSULE BY MOUTH EVERY MORNING BEFORE BREAKFAST    GABAPENTIN (NEURONTIN) 300 MG CAPSULE    Take 300 mg by mouth nightly. Swetha Quick HYDROXYZINE (VISTARIL) 25 MG CAPSULE    Take 1 capsule by mouth 4 times daily as needed for Itching    LEVOTHYROXINE (SYNTHROID) 125 MCG TABLET    TAKE 1 TABLET BY MOUTH TWICE DAILY    METOPROLOL TARTRATE (LOPRESSOR) 50 MG TABLET    Take 50 mg by mouth 2 times daily     ONDANSETRON (ZOFRAN ODT) 4 MG DISINTEGRATING TABLET    Take 1 tablet by mouth every 8 hours as needed for Nausea    OXYCODONE-ACETAMINOPHEN (PERCOCET)  MG PER TABLET    Take 1 tablet by mouth every 8 hours as needed for Pain for up to 30 days. 1 tab 2-3 times a day as needed.  Earliest Fill Date: 18    ROPINIROLE (REQUIP) 1 MG TABLET    TAKE 1 TABLET BY MOUTH EVERY NIGHT    SERTRALINE (ZOLOFT) 100 MG TABLET    once daily    TIZANIDINE (ZANAFLEX) 4 MG TABLET    TAKE 1 TABLET BY MOUTH EVERY 12 HOURS AS NEEDED FOR MUSCLE SPASM    TOPIRAMATE (TOPAMAX) 25 MG TABLET    25 mg 2 times daily        ALLERGIES     Morphine; Sulfa antibiotics; and Adhesive tape    FAMILY HISTORY       Family Status   Relation Status    Mother     Father Alive    MGM     MGF     PGM     PGF       Family History   Problem Relation Age of Onset    Cancer Mother         vaginal    Heart Disease Father     Diabetes Father     Other Father         colon resection for colon polyps    Breast Cancer Maternal Grandmother     Stroke Maternal Grandfather        SOCIAL HISTORY       Social History     Social History    Marital status:      Spouse name: N/A    Number of children: N/A    Years of education: N/A     Occupational History    Homemaker      Social History Main Topics    Smoking status: Current Some Day Smoker     Packs/day: 0.50     Years: 20.00     Types: Cigarettes    Smokeless tobacco: Never Used      Comment: 1/2 pack every month    Alcohol use No    Drug use: No    Sexual activity: Not Currently     Other Topics Concern    None     Social History Narrative    None       PHYSICAL EXAM     INITIAL VITALS:  height is 5' 4\" (1.626 m) and weight is 172 lb (78 kg). Her oral temperature is 99.1 °F (37.3 °C). Her blood pressure is 114/71 and her pulse is 78. Her respiration is 16 and oxygen saturation is 95%. Physical Exam   Constitutional: She is oriented to person, place, and time. She appears well-developed and well-nourished. HENT:   Head: Normocephalic and atraumatic. Nose: Nose normal.   Mouth/Throat: Oropharynx is clear and moist.   Eyes: Pupils are equal, round, and reactive to light. Conjunctivae and EOM are normal.   Neck: Normal range of motion. Neck supple. Bilateral trapezius tenderness   Cardiovascular: Normal rate, regular rhythm, normal heart sounds and intact distal pulses. Pulmonary/Chest: Effort normal and breath sounds normal.   Abdominal: Soft. Bowel sounds are normal.   Musculoskeletal: Normal range of motion. Neurological: She is alert and oriented to person, place, and time. Skin: Skin is warm and dry. Nursing note and vitals reviewed. DIFFERENTIAL DIAGNOSIS/ MDM:     Patient here with cough congestion like overall likely a viral process. Patient with stable vital signs. Workup pending    1469: Patient continues stable condition. Workup thus far shows a mild UTI however I think the overlying issue is a URI. Vital signs and labs are stable. Patient stable for discharge with instructions and medications given    DIAGNOSTIC RESULTS       RADIOLOGY:   I directly visualized the following  images and reviewed the radiologist interpretations:  XR CHEST STANDARD (2 VW)   Final Result   Stable negative chest.                LABS:  Labs Reviewed   COMPREHENSIVE METABOLIC PANEL - Abnormal; Notable for the following:        Result Value    Glucose 106 (*)     Total Bilirubin 0.28 (*)     All other components within normal limits   URINALYSIS - Abnormal; Notable for the following:     Turbidity UA CLOUDY (*)     Leukocyte Esterase, Urine SMALL (*)     All other components within normal limits   MICROSCOPIC URINALYSIS - Abnormal; Notable for the following:     Bacteria, UA MODERATE (*)     All other components within normal limits   URINE CULTURE   URINE CULTURE CLEAN CATCH   CBC WITH AUTO DIFFERENTIAL   C-REACTIVE PROTEIN           EMERGENCY DEPARTMENT COURSE:   Vitals:    Vitals:    09/26/18 1138 09/26/18 1139   BP: 114/71    Pulse: (!) 40 78   Resp: 16    Temp: 99.1 °F (37.3 °C)    TempSrc: Oral    SpO2: 95%    Weight: 172 lb (78 kg)    Height: 5' 4\" (1.626 m)      -------------------------  BP: 114/71, Temp: 99.1 °F (37.3 °C), Pulse: 78, Resp: 16      FINAL IMPRESSION      1. Acute upper respiratory infection    2.  Urinary tract infection without hematuria, site unspecified          DISPOSITION/PLAN    DISPOSITION Decision To Discharge 09/26/2018 02:18:10 PM      PAT[facility]:  [facility]  [address] [facility] [address] [phone]    Call in 1

## 2018-09-27 ENCOUNTER — CARE COORDINATION (OUTPATIENT)
Dept: CARE COORDINATION | Age: 48
End: 2018-09-27

## 2018-09-27 LAB
CULTURE: NORMAL
Lab: NORMAL
SPECIMEN DESCRIPTION: NORMAL
STATUS: NORMAL

## 2018-09-27 NOTE — CARE COORDINATION
Attempting to reach patient for a follow up care coordination call. Left detailed message for the patient to return my call with any questions or concerns.   Follow up call from ED visit on 9.26.18 for AUREA

## 2018-10-02 ENCOUNTER — HOSPITAL ENCOUNTER (OUTPATIENT)
Dept: PAIN MANAGEMENT | Age: 48
Discharge: HOME | End: 2018-10-02
Payer: MEDICARE

## 2018-10-02 ENCOUNTER — HOSPITAL ENCOUNTER (OUTPATIENT)
Dept: GENERAL RADIOLOGY | Age: 48
Discharge: HOME | End: 2018-10-04
Payer: MEDICARE

## 2018-10-02 VITALS
TEMPERATURE: 98.3 F | BODY MASS INDEX: 29.37 KG/M2 | DIASTOLIC BLOOD PRESSURE: 73 MMHG | HEART RATE: 67 BPM | HEIGHT: 64 IN | RESPIRATION RATE: 16 BRPM | WEIGHT: 172 LBS | SYSTOLIC BLOOD PRESSURE: 115 MMHG | OXYGEN SATURATION: 96 %

## 2018-10-02 DIAGNOSIS — M47.812 OSTEOARTHRITIS OF CERVICAL SPINE, UNSPECIFIED SPINAL OSTEOARTHRITIS COMPLICATION STATUS: ICD-10-CM

## 2018-10-02 DIAGNOSIS — M47.812 SPONDYLOSIS OF CERVICAL REGION WITHOUT MYELOPATHY OR RADICULOPATHY: Primary | ICD-10-CM

## 2018-10-02 DIAGNOSIS — M47.812 ARTHROPATHY OF CERVICAL FACET JOINT: ICD-10-CM

## 2018-10-02 DIAGNOSIS — M48.02 DEGENERATIVE CERVICAL SPINAL STENOSIS: ICD-10-CM

## 2018-10-02 DIAGNOSIS — M47.812 SPONDYLOSIS OF CERVICAL REGION WITHOUT MYELOPATHY OR RADICULOPATHY: ICD-10-CM

## 2018-10-02 PROCEDURE — 64490 INJ PARAVERT F JNT C/T 1 LEV: CPT

## 2018-10-02 PROCEDURE — 3209999900 FLUORO FOR SURGICAL PROCEDURES

## 2018-10-02 PROCEDURE — 6360000002 HC RX W HCPCS

## 2018-10-02 PROCEDURE — 6360000004 HC RX CONTRAST MEDICATION

## 2018-10-02 PROCEDURE — 64492 INJ PARAVERT F JNT C/T 3 LEV: CPT | Performed by: PAIN MEDICINE

## 2018-10-02 PROCEDURE — 64490 INJ PARAVERT F JNT C/T 1 LEV: CPT | Performed by: PAIN MEDICINE

## 2018-10-02 PROCEDURE — 6360000002 HC RX W HCPCS: Performed by: PAIN MEDICINE

## 2018-10-02 PROCEDURE — 2500000003 HC RX 250 WO HCPCS

## 2018-10-02 PROCEDURE — 64491 INJ PARAVERT F JNT C/T 2 LEV: CPT | Performed by: PAIN MEDICINE

## 2018-10-02 PROCEDURE — 64492 INJ PARAVERT F JNT C/T 3 LEV: CPT

## 2018-10-02 PROCEDURE — 64491 INJ PARAVERT F JNT C/T 2 LEV: CPT

## 2018-10-02 RX ORDER — FENTANYL CITRATE 50 UG/ML
INJECTION, SOLUTION INTRAMUSCULAR; INTRAVENOUS
Status: COMPLETED | OUTPATIENT
Start: 2018-10-02 | End: 2018-10-02

## 2018-10-02 RX ORDER — MIDAZOLAM HYDROCHLORIDE 1 MG/ML
INJECTION INTRAMUSCULAR; INTRAVENOUS
Status: COMPLETED | OUTPATIENT
Start: 2018-10-02 | End: 2018-10-02

## 2018-10-02 RX ADMIN — FENTANYL CITRATE 50 MCG: 50 INJECTION, SOLUTION INTRAMUSCULAR; INTRAVENOUS at 09:57

## 2018-10-02 RX ADMIN — MIDAZOLAM 2 MG: 1 INJECTION INTRAMUSCULAR; INTRAVENOUS at 09:50

## 2018-10-02 ASSESSMENT — PAIN DESCRIPTION - PROGRESSION: CLINICAL_PROGRESSION: GRADUALLY WORSENING

## 2018-10-02 ASSESSMENT — PAIN DESCRIPTION - LOCATION: LOCATION: NECK

## 2018-10-02 ASSESSMENT — PAIN - FUNCTIONAL ASSESSMENT
PAIN_FUNCTIONAL_ASSESSMENT: 0-10
PAIN_FUNCTIONAL_ASSESSMENT: 0-10

## 2018-10-02 ASSESSMENT — PAIN DESCRIPTION - ORIENTATION: ORIENTATION: LEFT

## 2018-10-02 ASSESSMENT — PAIN DESCRIPTION - DESCRIPTORS: DESCRIPTORS: SHARP

## 2018-10-02 ASSESSMENT — PAIN DESCRIPTION - ONSET: ONSET: ON-GOING

## 2018-10-02 ASSESSMENT — PAIN DESCRIPTION - FREQUENCY: FREQUENCY: CONTINUOUS

## 2018-10-02 ASSESSMENT — PAIN SCALES - GENERAL: PAINLEVEL_OUTOF10: 7

## 2018-10-02 ASSESSMENT — PAIN DESCRIPTION - PAIN TYPE: TYPE: CHRONIC PAIN

## 2018-10-02 NOTE — PROCEDURES
Pre-Procedure Note    Patient Name: Oswaldo Neumann   YOB: 1970  Room/Bed: Room/bed info not found  Medical Record Number: [id number]  Date: 10/2/2018       Indication:    1. Spondylosis of cervical region without myelopathy or radiculopathy    2. Osteoarthritis of cervical spine, unspecified spinal osteoarthritis complication status    3. Arthropathy of cervical facet joint    4. Degenerative cervical spinal stenosis        Consent: On file. Vital Signs:   Vitals:    10/02/18 1020   BP: 111/77   Pulse: 67   Resp: 12   Temp:    SpO2: 95%       Past Medical History:   has a past medical history of Anxiety; CAD (coronary artery disease); Fatty liver; GERD (gastroesophageal reflux disease); Headache; History of bronchitis; Hyperlipidemia; Hypothyroidism; Kidney stone; LFT elevation; MVP (mitral valve prolapse); Obesity; and Umbilical hernia. Past Surgical History:   has a past surgical history that includes Upper gastrointestinal endoscopy (2010); hernia repair; Cholecystectomy; Hysterectomy; Appendectomy;  section; Colonoscopy (); Colonoscopy (14); Colonoscopy (2014); pr exploratory of abdomen (10/21/2014); and Colonoscopy (N/A, 2018). Pre-Sedation Documentation and Exam:   Vital signs have been reviewed (see flow sheet for vitals). Mallampati Airway Assessment:  normal    ASA Classification:  Class 3 - A patient with severe systemic disease that limits activity but is not incapacitating    Sedation/ Anesthesia Plan:   intravenous sedation  as needed. Medications Planned:   midazolam (Versed) / Fentanyl  Intravenously  as needed. Patient is an appropriate candidate for plan of sedation: yes  Patient's History and Physical examination was reviewed and there is no change. Electronically signed by Ashtyn Encinas MD on 10/2/2018 at 10:21 AM    Preoperative Diagnosis:    1. Degenerative cervical disc disease. 2.  Cervical spondylosis.   3.  Facet joint

## 2018-10-02 NOTE — PROGRESS NOTES
Discharge criteria met. Patient alert and oriented x3  Post procedure dressing dry and intact. Sensory and motor function intact as per pre-procedure. Instructions and follow up reviewed with pt at patient at discharge.   Patient discharged ambulatory @ 10:37am    Patient discharged per wheelchair accompanied by sister

## 2018-10-03 ENCOUNTER — CARE COORDINATION (OUTPATIENT)
Dept: CARE COORDINATION | Age: 48
End: 2018-10-03

## 2018-10-16 ENCOUNTER — HOSPITAL ENCOUNTER (OUTPATIENT)
Dept: PAIN MANAGEMENT | Age: 48
Discharge: HOME | End: 2018-10-16
Payer: MEDICARE

## 2018-10-16 VITALS
TEMPERATURE: 99.1 F | HEART RATE: 72 BPM | OXYGEN SATURATION: 95 % | SYSTOLIC BLOOD PRESSURE: 114 MMHG | DIASTOLIC BLOOD PRESSURE: 75 MMHG | RESPIRATION RATE: 16 BRPM

## 2018-10-16 DIAGNOSIS — M54.12 CERVICAL RADICULOPATHY: ICD-10-CM

## 2018-10-16 DIAGNOSIS — S13.4XXS ATLANTO-AXIAL JOINT SPRAIN, SEQUELA: ICD-10-CM

## 2018-10-16 DIAGNOSIS — S13.4XXS SPRAIN OF ATLANTO-OCCIPITAL JOINT, SEQUELA: ICD-10-CM

## 2018-10-16 DIAGNOSIS — M48.02 DEGENERATIVE CERVICAL SPINAL STENOSIS: ICD-10-CM

## 2018-10-16 DIAGNOSIS — M47.812 ARTHROPATHY OF CERVICAL FACET JOINT: ICD-10-CM

## 2018-10-16 DIAGNOSIS — M48.02 STENOSIS, CERVICAL SPINE: ICD-10-CM

## 2018-10-16 DIAGNOSIS — M47.812 SPONDYLOSIS OF CERVICAL REGION WITHOUT MYELOPATHY OR RADICULOPATHY: ICD-10-CM

## 2018-10-16 DIAGNOSIS — M47.812 OSTEOARTHRITIS OF CERVICAL SPINE, UNSPECIFIED SPINAL OSTEOARTHRITIS COMPLICATION STATUS: Primary | ICD-10-CM

## 2018-10-16 DIAGNOSIS — Z51.81 ENCOUNTER FOR MEDICATION MONITORING: ICD-10-CM

## 2018-10-16 DIAGNOSIS — G44.209 TRIGGER POINT WITH TENSION HEADACHE: ICD-10-CM

## 2018-10-16 PROCEDURE — 99213 OFFICE O/P EST LOW 20 MIN: CPT | Performed by: NURSE PRACTITIONER

## 2018-10-16 PROCEDURE — 99213 OFFICE O/P EST LOW 20 MIN: CPT

## 2018-10-16 RX ORDER — OXYCODONE AND ACETAMINOPHEN 10; 325 MG/1; MG/1
1 TABLET ORAL EVERY 8 HOURS PRN
Qty: 90 TABLET | Refills: 0 | Status: SHIPPED | OUTPATIENT
Start: 2018-10-18 | End: 2018-11-14 | Stop reason: SDUPTHER

## 2018-10-16 ASSESSMENT — ENCOUNTER SYMPTOMS
COUGH: 1
SORE THROAT: 1
GASTROINTESTINAL NEGATIVE: 1
SHORTNESS OF BREATH: 1
EYES NEGATIVE: 1

## 2018-10-16 NOTE — PROGRESS NOTES
Diazepam, Urine Not Detected   Final 01/16/2018  1:45 PM [facility]   Ethyl Glucuronide Ur Not Detected   Final 01/16/2018  1:45 PM [facility]   Fentanyl, Ur Not Detected   Final 01/16/2018  1:45 PM [facility]   Hydrocodone, Urine Not Detected   Final 01/16/2018  1:45 PM [facility]   Hydromorphone, Urine Not Detected   Final 01/16/2018  1:45 PM [facility]   Lorazepam, Urine Not Detected   Final 01/16/2018  1:45 PM [facility]   Marijuana Metab, Ur Not Detected   Final 01/16/2018  1:45 PM [facility]   MDEA, AISHA, Ur Not Detected   Final 01/16/2018  1:45 PM [facility]   MDMA, Urine Not Detected   Final 01/16/2018  1:45 PM [facility]   Meperidine Metab, Ur Not Detected   Final 01/16/2018  1:45 PM [facility]   Methadone, Urine Not Detected   Final 01/16/2018  1:45 PM [facility]   Methamphetamine, Urine Not Detected   Final 01/16/2018  1:45 PM [facility]   Methylphenidate Not Detected   Final 01/16/2018  1:45 PM [facility]   Midazolam, Urine Not Detected   Final 01/16/2018  1:45 PM [facility]   Morphine Urine Not Detected   Final 01/16/2018  1:45 PM [facility]   Norbuprenorphine, Urine Not Detected   Final 01/16/2018  1:45 PM [facility]   Nordiazepam, Urine Not Detected   Final 01/16/2018  1:45 PM [facility]   Norfentanyl, Urine Not Detected   Final 01/16/2018  1:45 PM [facility]   NORHYDROCODONE, URINE Not Detected   Final 01/16/2018  1:45 PM [facility]   Noroxycodone, Urine Present   Final 01/16/2018  1:45 PM [facility]   NOROXYMORPHONE, URINE Not Detected   Final 01/16/2018  1:45 PM [facility]   Oxazepam, Urine Not Detected   Final 01/16/2018  1:45 PM [facility]   Oxycodone Urine Not Detected   Final 01/16/2018  1:45 PM [facility]   Oxymorphone, Urine Not Detected   Final 01/16/2018  1:45 PM [facility]   PCP, Urine Not Detected   Final 01/16/2018  1:45 PM [facility]   Phentermine, Ur Not Detected   Final 01/16/2018  1:45 PM [facility]   Propoxyphene, Urine Not Detected   Final 01/16/2018  1:45 PM [facility]   Tapentadol-O-Sulfate, Urine Not Detected   Final 01/16/2018  1:45 PM [facility]   Tapentadol, Urine Not Detected   Final 01/16/2018  1:45 PM [facility]   Temazepam, Urine Not Note   Final 2018  1:45 PM [facility]   (NOTE)   Access [facility] Enhanced Report using either link below:   -Direct access: [email or web address]   -Enter Username, Password: [email or web address]   Username: [email or web address]   Password: [email or web address]   Performed by [address] [phone]   www. Chanell Kwon MD, Lab. Director   Performed at [address] [place] [address]  [address] [phone]    Testing Performed By           Past Medical History:   Diagnosis Date    Anxiety     CAD (coronary artery disease)     Mitral valve prolapse    Fatty liver     GERD (gastroesophageal reflux disease)     Headache     History of bronchitis     Hyperlipidemia     Hypothyroidism     Kidney stone     LFT elevation     MVP (mitral valve prolapse)     Obesity     Umbilical hernia        Past Surgical History:   Procedure Laterality Date    APPENDECTOMY       SECTION      2 pfannenstiel, 1 vertical    CHOLECYSTECTOMY      COLONOSCOPY      Dr Annabelle Talbot COLONOSCOPY  14    COLONOSCOPY  2014    biopsy & sigmoid spasms, pathology negative    COLONOSCOPY N/A 2018    COLONOSCOPY WITH BIOPSY performed by Sagar Glasgow MD at [facility] 84      x 5    HYSTERECTOMY          OR EXPLORATORY OF ABDOMEN  10/21/2014    Laparotomy-lysis of adhesions, bso     UPPER GASTROINTESTINAL ENDOSCOPY  2010    mild chronic inactive gastritis       Allergies   Allergen Reactions    Morphine Itching    Sulfa Antibiotics Hives    Adhesive Tape Rash         Current Outpatient Prescriptions:     [START ON 10/18/2018] oxyCODONE-acetaminophen (PERCOCET)  MG per tablet, Take 1 tablet by mouth every 8 hours as needed for Pain for up to 30 days. 1 tab 2-3 times a day as needed. , Disp: 90 tablet, Rfl: 0    cephALEXin (KEFLEX) 500 MG capsule, Take 1 capsule by mouth 3 times daily, Disp: 21

## 2018-10-25 ENCOUNTER — CARE COORDINATION (OUTPATIENT)
Dept: CARE COORDINATION | Age: 48
End: 2018-10-25

## 2018-10-31 DIAGNOSIS — G62.9 NEUROPATHY: ICD-10-CM

## 2018-11-01 RX ORDER — GABAPENTIN 300 MG/1
CAPSULE ORAL
Qty: 180 CAPSULE | Refills: 0 | Status: SHIPPED | OUTPATIENT
Start: 2018-11-01 | End: 2018-11-14 | Stop reason: SDUPTHER

## 2018-11-14 ENCOUNTER — HOSPITAL ENCOUNTER (OUTPATIENT)
Dept: PAIN MANAGEMENT | Age: 48
Discharge: HOME | End: 2018-11-14
Payer: MEDICARE

## 2018-11-14 VITALS
HEART RATE: 77 BPM | OXYGEN SATURATION: 97 % | BODY MASS INDEX: 29.37 KG/M2 | TEMPERATURE: 98.2 F | DIASTOLIC BLOOD PRESSURE: 72 MMHG | SYSTOLIC BLOOD PRESSURE: 113 MMHG | HEIGHT: 64 IN | WEIGHT: 172 LBS | RESPIRATION RATE: 16 BRPM

## 2018-11-14 DIAGNOSIS — S13.4XXS ATLANTO-AXIAL JOINT SPRAIN, SEQUELA: Primary | ICD-10-CM

## 2018-11-14 DIAGNOSIS — M47.812 OSTEOARTHRITIS OF CERVICAL SPINE, UNSPECIFIED SPINAL OSTEOARTHRITIS COMPLICATION STATUS: ICD-10-CM

## 2018-11-14 DIAGNOSIS — M48.02 STENOSIS, CERVICAL SPINE: ICD-10-CM

## 2018-11-14 DIAGNOSIS — M47.812 SPONDYLOSIS OF CERVICAL REGION WITHOUT MYELOPATHY OR RADICULOPATHY: ICD-10-CM

## 2018-11-14 DIAGNOSIS — Z51.81 ENCOUNTER FOR MEDICATION MONITORING: ICD-10-CM

## 2018-11-14 DIAGNOSIS — M48.02 DEGENERATIVE CERVICAL SPINAL STENOSIS: ICD-10-CM

## 2018-11-14 DIAGNOSIS — M47.812 ARTHROPATHY OF CERVICAL FACET JOINT: ICD-10-CM

## 2018-11-14 DIAGNOSIS — S13.4XXS SPRAIN OF ATLANTO-OCCIPITAL JOINT, SEQUELA: ICD-10-CM

## 2018-11-14 DIAGNOSIS — M54.12 CERVICAL RADICULOPATHY: ICD-10-CM

## 2018-11-14 PROCEDURE — 99213 OFFICE O/P EST LOW 20 MIN: CPT | Performed by: NURSE PRACTITIONER

## 2018-11-14 PROCEDURE — 99213 OFFICE O/P EST LOW 20 MIN: CPT

## 2018-11-14 RX ORDER — OXYCODONE AND ACETAMINOPHEN 10; 325 MG/1; MG/1
1 TABLET ORAL EVERY 8 HOURS PRN
Qty: 90 TABLET | Refills: 0 | Status: SHIPPED | OUTPATIENT
Start: 2018-11-17 | End: 2018-12-12 | Stop reason: SDUPTHER

## 2018-11-14 ASSESSMENT — ENCOUNTER SYMPTOMS
GASTROINTESTINAL NEGATIVE: 1
RESPIRATORY NEGATIVE: 1

## 2018-11-14 NOTE — PROGRESS NOTES
[facility]   Phentermine, Ur Not Detected    Final 01/16/2018  1:45 PM [facility]   Propoxyphene, Urine Not Detected    Final 01/16/2018  1:45 PM [facility]   Tapentadol-O-Sulfate, Urine Not Detected    Final 01/16/2018  1:45 PM [facility]   Tapentadol, Urine Not Detected    Final 01/16/2018  1:45 PM [facility]   Temazepam, Urine Not Detected    Final 01/16/2018  1:45 PM [facility]   Tramadol, Urine Not Detected    Final 01/16/2018  1:45 PM [facility]   Zolpidem, Urine Not Detected    Final 01/16/2018  1:45 PM [facility]   Drugs Expected, Ur     Final 01/16/2018  1:45  [facility] [facility]   PERCOCET [id number] BEDTIME    Creatinine, Ur 199.8  20.0 - 400.0 mg/dL Final 01/16/2018  1:45 PM [facility]   Pain Mgt Drug Panel, Hi Res, Ur See Below    Final 01/16/2018  1:45 PM [facility]   (NOTE)   Methodology: Qualitative Enzyme Immunoassay and Qualitative Liquid   Chromatography-Time of Flight-Mass Spectrometry or Tandem Mass   Spectrometry, Quantitative Spectrophotometry   The absence of expected drug(s) and/or drug metabolite(s) may   indicate non-compliance, inappropriate timing of specimen   collection relative to drug administration, poor drug absorption,   diluted/adulterated urine, or limitations of testing. The   concentration must be greater than or equal to the cutoff to be   reported as present.  If specific drug concentrations are   required, contact the laboratory within two weeks of specimen   collection to request quantification by a second analytical   technique. Interpretive questions should be directed to the   laboratory. Results based on immunoassay detection that do not match clinical   expectations should be   interpreted with caution. Confirmatory testing by mass   spectrometry for immunoassay-based results is available, if   ordered within two weeks of specimen collection. Additional   charges apply. For medical purposes only; not valid for forensic use.    This test was developed and its performance characteristics   determined by [facility] Prescriptions:     [START ON 11/17/2018] oxyCODONE-acetaminophen (PERCOCET)  MG per tablet, Take 1 tablet by mouth every 8 hours as needed for Pain for up to 30 days. 1 tab 2-3 times a day as needed. , Disp: 90 tablet, Rfl: 0    gabapentin (NEURONTIN) 300 MG capsule, Take 300 mg by mouth nightly. ., Disp: , Rfl:     rOPINIRole (REQUIP) 1 MG tablet, TAKE 1 TABLET BY MOUTH EVERY NIGHT, Disp: 30 tablet, Rfl: 11    atorvastatin (LIPITOR) 40 MG tablet, TAKE 1 TABLET BY MOUTH DAILY, Disp: 90 tablet, Rfl: 3    levothyroxine (SYNTHROID) 125 MCG tablet, TAKE 1 TABLET BY MOUTH TWICE DAILY, Disp: 60 tablet, Rfl: 11    topiramate (TOPAMAX) 25 MG tablet, 25 mg 2 times daily , Disp: , Rfl:     esomeprazole (NEXIUM) 40 MG delayed release capsule, TAKE 1 CAPSULE BY MOUTH EVERY MORNING BEFORE BREAKFAST, Disp: 90 capsule, Rfl: 3    sertraline (ZOLOFT) 100 MG tablet, once daily, Disp: , Rfl: 2    metoprolol tartrate (LOPRESSOR) 50 MG tablet, Take 50 mg by mouth 2 times daily , Disp: , Rfl:     clonazePAM (KLONOPIN) 0.5 MG tablet, TK 1 T PO  TID prn, Disp: , Rfl: 1    ibuprofen (ADVIL;MOTRIN) 600 MG tablet, Take 1 tablet by mouth every 6 hours as needed for Pain, Disp: 30 tablet, Rfl: 0    tiZANidine (ZANAFLEX) 4 MG tablet, TAKE 1 TABLET BY MOUTH EVERY 12 HOURS AS NEEDED FOR MUSCLE SPASM, Disp: 60 tablet, Rfl: 0    ondansetron (ZOFRAN ODT) 4 MG disintegrating tablet, Take 1 tablet by mouth every 8 hours as needed for Nausea, Disp: 20 tablet, Rfl: 0    Family History   Problem Relation Age of Onset    Cancer Mother         vaginal    Heart Disease Father     Diabetes Father     Other Father         colon resection for colon polyps    Breast Cancer Maternal Grandmother     Stroke Maternal Grandfather        Social History     Social History    Marital status:      Spouse name: N/A    Number of children: N/A    Years of education: N/A     Occupational History    Homemaker      Social History Main Topics  Smoking status: Current Some Day Smoker     Packs/day: 0.50     Years: 20.00     Types: Cigarettes    Smokeless tobacco: Never Used      Comment: 1/2 pack every month    Alcohol use No    Drug use: No    Sexual activity: Not Currently     Other Topics Concern    Not on file     Social History Narrative    No narrative on file       Review of Systems:  Review of Systems   Constitution: Positive for weakness. HENT: Negative. Eyes:        Glasses   Cardiovascular: Negative. Respiratory: Negative. Endocrine: Negative. Hematologic/Lymphatic: Negative. Skin: Negative. Musculoskeletal: Positive for neck pain. Gastrointestinal: Negative. Genitourinary: Negative. Neurological: Positive for headaches and numbness. Psychiatric/Behavioral: Positive for depression. Anaisonkevin         Physical Exam:  /72   Pulse 77   Temp 98.2 °F (36.8 °C) (Oral)   Resp 16   Ht 5' 4\" (1.626 m)   Wt 172 lb (78 kg)   LMP 11/01/2010   SpO2 97%   BMI 29.52 kg/m²     Physical Exam   Constitutional: She appears well-developed. HENT:   Head: Normocephalic. Neck: Neck supple. Pulmonary/Chest: Effort normal.   Musculoskeletal:        Cervical back: She exhibits decreased range of motion and tenderness. Tender cervical paraspinal muscles   Neurological: She is alert. Reflex Scores:       Tricep reflexes are 1+ on the right side and 1+ on the left side. Bicep reflexes are 1+ on the right side and 1+ on the left side. Brachioradialis reflexes are 1+ on the right side and 1+ on the left side. Left hand grasp weaker than left   Skin: Skin is warm, dry and intact. Psychiatric: Her speech is normal and behavior is normal. Judgment and thought content normal. Cognition and memory are normal. She exhibits a depressed mood.          Assessment:        Problem List Items Addressed This Visit     Osteoarthritis of cervical spine    Relevant Medications discussions to decrease pain medications as tolerated. Counseled patient on effects their pain medication and /or their medical condition mayhave on their  ability to drive or operate machinery. Instructed not to drive or operate machinery if drowsy     I also discussed with the patient regarding the dangers of combining narcotic pain medication with tranquilizers, alcohol or illegal drugs or taking the medication any way other thanprescribed. The dangers were discussed  including respiratory depression and death. Patient was told to tell  all  physicians regarding the medications he is getting from pain clinic. Patient is warned not to take any unprescribed medications over-the-countermedications that can depress breathing . Patient is advised to talk to the pharmacist or physicians if planning to take any over-the-counter medications before  takeing them. Patient is strongly advised to avoidtranquilizers or  relaxants, illegal drugs  or any medications that can depress breathing  Patient is also advised to tell us if there is any changes in their medications from other physicians.         TREATMENT OPTIONS:     Return in 4 weeks  Medication Agreement Requirements Met  Continue Opioid therapy  Script written for percocet  Follow up appointment made

## 2018-11-19 ENCOUNTER — TELEPHONE (OUTPATIENT)
Dept: INTERNAL MEDICINE CLINIC | Age: 48
End: 2018-11-19

## 2018-11-19 DIAGNOSIS — N63.0 BREAST LUMP: Primary | ICD-10-CM

## 2018-11-19 DIAGNOSIS — N64.9 DISORDER OF BREAST: ICD-10-CM

## 2018-11-21 RX ORDER — ROPINIROLE 1 MG/1
1 TABLET, FILM COATED ORAL NIGHTLY
Qty: 90 TABLET | Refills: 0 | Status: SHIPPED | OUTPATIENT
Start: 2018-11-21 | End: 2019-07-24

## 2018-12-03 ENCOUNTER — CARE COORDINATION (OUTPATIENT)
Dept: CARE COORDINATION | Age: 48
End: 2018-12-03

## 2018-12-11 ENCOUNTER — APPOINTMENT (OUTPATIENT)
Dept: GENERAL RADIOLOGY | Age: 48
End: 2018-12-11
Payer: MEDICARE

## 2018-12-11 ENCOUNTER — HOSPITAL ENCOUNTER (OUTPATIENT)
Age: 48
Setting detail: OBSERVATION
Discharge: LEFT BEFORE DISCHARGE | End: 2018-12-11
Attending: EMERGENCY MEDICINE
Payer: MEDICARE

## 2018-12-11 VITALS
HEART RATE: 80 BPM | DIASTOLIC BLOOD PRESSURE: 67 MMHG | SYSTOLIC BLOOD PRESSURE: 103 MMHG | TEMPERATURE: 97.9 F | BODY MASS INDEX: 30.39 KG/M2 | WEIGHT: 178 LBS | RESPIRATION RATE: 18 BRPM | OXYGEN SATURATION: 95 % | HEIGHT: 64 IN

## 2018-12-11 DIAGNOSIS — R07.9 CHEST PAIN, UNSPECIFIED TYPE: Primary | ICD-10-CM

## 2018-12-11 DIAGNOSIS — R51.9 NONINTRACTABLE HEADACHE, UNSPECIFIED CHRONICITY PATTERN, UNSPECIFIED HEADACHE TYPE: ICD-10-CM

## 2018-12-11 LAB
ABSOLUTE EOS #: 0.2 K/UL (ref 0–0.4)
ABSOLUTE IMMATURE GRANULOCYTE: NORMAL K/UL (ref 0–0.3)
ABSOLUTE LYMPH #: 2.2 K/UL (ref 1–4.8)
ABSOLUTE MONO #: 0.4 K/UL (ref 0.1–1.3)
ALBUMIN SERPL-MCNC: 4.3 G/DL (ref 3.5–5.2)
ALBUMIN/GLOBULIN RATIO: ABNORMAL (ref 1–2.5)
ALP BLD-CCNC: 94 U/L (ref 35–104)
ALT SERPL-CCNC: 13 U/L (ref 5–33)
ANION GAP SERPL CALCULATED.3IONS-SCNC: 10 MMOL/L (ref 9–17)
AST SERPL-CCNC: 13 U/L
BASOPHILS # BLD: 1 % (ref 0–2)
BASOPHILS ABSOLUTE: 0.1 K/UL (ref 0–0.2)
BILIRUB SERPL-MCNC: 0.32 MG/DL (ref 0.3–1.2)
BUN BLDV-MCNC: 17 MG/DL (ref 6–20)
BUN/CREAT BLD: ABNORMAL (ref 9–20)
CALCIUM SERPL-MCNC: 9.5 MG/DL (ref 8.6–10.4)
CHLORIDE BLD-SCNC: 111 MMOL/L (ref 98–107)
CO2: 21 MMOL/L (ref 20–31)
CREAT SERPL-MCNC: 0.55 MG/DL (ref 0.5–0.9)
DIFFERENTIAL TYPE: NORMAL
EKG ATRIAL RATE: 76 BPM
EKG P AXIS: 61 DEGREES
EKG P-R INTERVAL: 156 MS
EKG Q-T INTERVAL: 400 MS
EKG QRS DURATION: 80 MS
EKG QTC CALCULATION (BAZETT): 450 MS
EKG R AXIS: 48 DEGREES
EKG T AXIS: 57 DEGREES
EKG VENTRICULAR RATE: 76 BPM
EOSINOPHILS RELATIVE PERCENT: 3 % (ref 0–4)
GFR AFRICAN AMERICAN: >60 ML/MIN
GFR NON-AFRICAN AMERICAN: >60 ML/MIN
GFR SERPL CREATININE-BSD FRML MDRD: ABNORMAL ML/MIN/{1.73_M2}
GFR SERPL CREATININE-BSD FRML MDRD: ABNORMAL ML/MIN/{1.73_M2}
GLUCOSE BLD-MCNC: 113 MG/DL (ref 70–99)
HCT VFR BLD CALC: 42.8 % (ref 36–46)
HEMOGLOBIN: 14.8 G/DL (ref 12–16)
IMMATURE GRANULOCYTES: NORMAL %
LIPASE: 25 U/L (ref 13–60)
LYMPHOCYTES # BLD: 30 % (ref 24–44)
MCH RBC QN AUTO: 31.5 PG (ref 26–34)
MCHC RBC AUTO-ENTMCNC: 34.6 G/DL (ref 31–37)
MCV RBC AUTO: 91.2 FL (ref 80–100)
MONOCYTES # BLD: 6 % (ref 1–7)
NRBC AUTOMATED: NORMAL PER 100 WBC
PDW BLD-RTO: 13.4 % (ref 11.5–14.9)
PLATELET # BLD: 161 K/UL (ref 150–450)
PLATELET ESTIMATE: NORMAL
PMV BLD AUTO: 8.6 FL (ref 6–12)
POTASSIUM SERPL-SCNC: 4.1 MMOL/L (ref 3.7–5.3)
RBC # BLD: 4.69 M/UL (ref 4–5.2)
RBC # BLD: NORMAL 10*6/UL
SEG NEUTROPHILS: 60 % (ref 36–66)
SEGMENTED NEUTROPHILS ABSOLUTE COUNT: 4.2 K/UL (ref 1.3–9.1)
SODIUM BLD-SCNC: 142 MMOL/L (ref 135–144)
TOTAL PROTEIN: 7.2 G/DL (ref 6.4–8.3)
TROPONIN INTERP: NORMAL
TROPONIN INTERP: NORMAL
TROPONIN T: <0.03 NG/ML
TROPONIN T: <0.03 NG/ML
WBC # BLD: 7.1 K/UL (ref 3.5–11)
WBC # BLD: NORMAL 10*3/UL

## 2018-12-11 PROCEDURE — 84484 ASSAY OF TROPONIN QUANT: CPT

## 2018-12-11 PROCEDURE — 96375 TX/PRO/DX INJ NEW DRUG ADDON: CPT

## 2018-12-11 PROCEDURE — 93005 ELECTROCARDIOGRAM TRACING: CPT

## 2018-12-11 PROCEDURE — 71046 X-RAY EXAM CHEST 2 VIEWS: CPT

## 2018-12-11 PROCEDURE — 83690 ASSAY OF LIPASE: CPT

## 2018-12-11 PROCEDURE — 36415 COLL VENOUS BLD VENIPUNCTURE: CPT

## 2018-12-11 PROCEDURE — 99285 EMERGENCY DEPT VISIT HI MDM: CPT

## 2018-12-11 PROCEDURE — 6360000002 HC RX W HCPCS: Performed by: EMERGENCY MEDICINE

## 2018-12-11 PROCEDURE — 96376 TX/PRO/DX INJ SAME DRUG ADON: CPT

## 2018-12-11 PROCEDURE — 6370000000 HC RX 637 (ALT 250 FOR IP): Performed by: EMERGENCY MEDICINE

## 2018-12-11 PROCEDURE — 80053 COMPREHEN METABOLIC PANEL: CPT

## 2018-12-11 PROCEDURE — G0378 HOSPITAL OBSERVATION PER HR: HCPCS

## 2018-12-11 PROCEDURE — 85025 COMPLETE CBC W/AUTO DIFF WBC: CPT

## 2018-12-11 PROCEDURE — 96374 THER/PROPH/DIAG INJ IV PUSH: CPT

## 2018-12-11 RX ORDER — ASPIRIN 81 MG/1
324 TABLET, CHEWABLE ORAL ONCE
Status: COMPLETED | OUTPATIENT
Start: 2018-12-11 | End: 2018-12-11

## 2018-12-11 RX ORDER — SODIUM CHLORIDE 0.9 % (FLUSH) 0.9 %
10 SYRINGE (ML) INJECTION EVERY 12 HOURS SCHEDULED
Status: CANCELLED | OUTPATIENT
Start: 2018-12-11

## 2018-12-11 RX ORDER — ACETAMINOPHEN 325 MG/1
650 TABLET ORAL EVERY 4 HOURS PRN
Status: CANCELLED | OUTPATIENT
Start: 2018-12-11

## 2018-12-11 RX ORDER — DIPHENHYDRAMINE HYDROCHLORIDE 50 MG/ML
12.5 INJECTION INTRAMUSCULAR; INTRAVENOUS ONCE
Status: COMPLETED | OUTPATIENT
Start: 2018-12-11 | End: 2018-12-11

## 2018-12-11 RX ORDER — SODIUM CHLORIDE 0.9 % (FLUSH) 0.9 %
10 SYRINGE (ML) INJECTION PRN
Status: CANCELLED | OUTPATIENT
Start: 2018-12-11

## 2018-12-11 RX ORDER — ACETAMINOPHEN 325 MG/1
650 TABLET ORAL ONCE
Status: COMPLETED | OUTPATIENT
Start: 2018-12-11 | End: 2018-12-11

## 2018-12-11 RX ORDER — ONDANSETRON 2 MG/ML
4 INJECTION INTRAMUSCULAR; INTRAVENOUS ONCE
Status: COMPLETED | OUTPATIENT
Start: 2018-12-11 | End: 2018-12-11

## 2018-12-11 RX ADMIN — DIPHENHYDRAMINE HYDROCHLORIDE 12.5 MG: 50 INJECTION, SOLUTION INTRAMUSCULAR; INTRAVENOUS at 18:33

## 2018-12-11 RX ADMIN — PROCHLORPERAZINE EDISYLATE 10 MG: 5 INJECTION INTRAMUSCULAR; INTRAVENOUS at 18:33

## 2018-12-11 RX ADMIN — ASPIRIN 81 MG 324 MG: 81 TABLET ORAL at 18:33

## 2018-12-11 RX ADMIN — ACETAMINOPHEN 650 MG: 325 TABLET, FILM COATED ORAL at 20:23

## 2018-12-11 RX ADMIN — DIPHENHYDRAMINE HYDROCHLORIDE 12.5 MG: 50 INJECTION, SOLUTION INTRAMUSCULAR; INTRAVENOUS at 18:58

## 2018-12-11 RX ADMIN — ONDANSETRON 4 MG: 2 INJECTION INTRAMUSCULAR; INTRAVENOUS at 18:33

## 2018-12-11 ASSESSMENT — ENCOUNTER SYMPTOMS
SHORTNESS OF BREATH: 1
COUGH: 0
CHEST TIGHTNESS: 0
DIARRHEA: 0
ABDOMINAL PAIN: 1
VOMITING: 0
PHOTOPHOBIA: 0
NAUSEA: 1
BACK PAIN: 0

## 2018-12-11 ASSESSMENT — PAIN SCALES - GENERAL
PAINLEVEL_OUTOF10: 6
PAINLEVEL_OUTOF10: 8
PAINLEVEL_OUTOF10: 2
PAINLEVEL_OUTOF10: 8

## 2018-12-11 ASSESSMENT — PAIN DESCRIPTION - LOCATION: LOCATION: CHEST;ABDOMEN

## 2018-12-11 ASSESSMENT — HEART SCORE: ECG: 1

## 2018-12-11 NOTE — LETTER
[facility] ED  [address] [id number]  Phone: [phone]    Patient: Bakari English  YOB: 1970  Date: 12/11/2018 Time: 8:30 PM    Leaving the [address] [place] [address] Advice    Chart #:[id number]    This will certify that I, the undersigned,    ______________________________________________________________________    A patient in the above named medical center, having requested discharge and removal from the medical center against the advice of my attending physician(s), hereby release the Emergency Department, its physicians, officers and employees, severally and individually, from any and all liability of any nature whatsoever for any injury or harm or complication of any kind that may result directly or indirectly, by reason of my terminating my stay as a patient from [facility], and hereby waive any and all rights of action I may now have or later acquire as a result of my voluntary departure from [facility] and the termination of my stay as a patient therein. This release is made with the full knowledge of the danger that may result from the action which I am taking.       Date:_______________________                         ___________________________                                                                                    Patient/Legal Representative    Witness:        ____________________________                          ___________________________  Nurse                                                                        Physician

## 2018-12-11 NOTE — ED PROVIDER NOTES
Fatty liver; GERD (gastroesophageal reflux disease); Headache; History of bronchitis; Hyperlipidemia; Hypothyroidism; Kidney stone; LFT elevation; MVP (mitral valve prolapse); Obesity; and Umbilical hernia. has a past surgical history that includes Upper gastrointestinal endoscopy (2010); hernia repair; Cholecystectomy; Hysterectomy; Appendectomy;  section; Colonoscopy (); Colonoscopy (14); Colonoscopy (2014); pr exploratory of abdomen (10/21/2014); and Colonoscopy (N/A, 2018). Social History     Social History    Marital status:      Spouse name: N/A    Number of children: N/A    Years of education: N/A     Occupational History    Homemaker      Social History Main Topics    Smoking status: Current Some Day Smoker     Packs/day: 0.50     Years: 20.00     Types: Cigarettes    Smokeless tobacco: Never Used      Comment: 1/2 pack every month    Alcohol use No    Drug use: No    Sexual activity: Not Currently     Other Topics Concern    Not on file     Social History Narrative    No narrative on file       Patient advised to stop smoking or to avoid tobacco use. Family History   Problem Relation Age of Onset   [facility] Cancer Mother         vaginal    Heart Disease Father     Diabetes Father     Other Father         colon resection for colon polyps    Breast Cancer Maternal Grandmother     Stroke Maternal Grandfather         Allergies:  Morphine; Sulfa antibiotics; and Adhesive tape    HomeMedications:  Prior to Admission medications    Medication Sig Start Date End Date Taking? Authorizing Provider   rOPINIRole (REQUIP) 1 MG tablet TAKE 1 TABLET BY MOUTH NIGHTLY 18   Latrice Velasco MD   oxyCODONE-acetaminophen (PERCOCET)  MG per tablet Take 1 tablet by mouth every 8 hours as needed for Pain for up to 30 days. 1 tab 2-3 times a day as needed.  18  [place] Persons, APRN - CNP   ibuprofen (ADVIL;MOTRIN) 600 MG tablet Take 1 tablet by Standard (2 Vw)    Result Date: 12/11/2018  EXAMINATION: TWO VIEWS OF THE CHEST 12/11/2018 6:42 pm COMPARISON: Chest radiographs 09/26/2018, 07/26/2018 HISTORY: ORDERING SYSTEM PROVIDED HISTORY: durga blevins TECHNOLOGIST PROVIDED HISTORY: durga blevins Ordering Physician Provided Reason for Exam: chest pain Acuity: Acute Type of Exam: Initial FINDINGS: No pneumothorax, pleural effusion or focal airspace consolidation. Punctate calcified granulomas present. Normal heart size and mediastinal contours. Intact skeleton. No acute cardiopulmonary findings. EKG  EKG Interpretation    Interpreted by emergency department physician    Rhythm: normal sinus   Rate: normal  Axis: normal  Ectopy: none  Conduction: , QRS 80,   ST Segments: no acute change  T Waves: no acute change  Q Waves: nonspecific    Clinical Impression: non-specific EKG with no acute changes when compared with previous from 7/26/2018    Nicky Wyman      All EKG's are interpreted by the [facility] Physician who either signs or Co-signs this chart in the absence of a cardiologist.    [address] [place] [address]:  Patient seen and evaluated by myself and attending. 73-year-old female with a history of cardiac disease presenting with chest pain, headache and abdominal pain. Patient's EKG unchanged from previous, troponin negative, chest x-ray stable. Toward values at Baseline. Neuro exam nonfocal.  Patient admitted to internal medicine for chest pain rule out. Prior to being admitted the patient spoke with my attending and stated that she wished to leave A. She stated that she would follow-up with her cardiologist and PCP as soon as possible. Please see attending note for details regarding this discussion. Discussed results and plan with patient and patient isagreeable with plan. Patient understood and agreed. Patient had no further questions.     PROCEDURES:  None    CONSULTS:  IP CONSULT TO INTERNAL MEDICINE      FINAL

## 2018-12-12 ENCOUNTER — HOSPITAL ENCOUNTER (OUTPATIENT)
Dept: PAIN MANAGEMENT | Age: 48
Discharge: HOME | End: 2018-12-12
Payer: MEDICARE

## 2018-12-12 VITALS
DIASTOLIC BLOOD PRESSURE: 73 MMHG | HEIGHT: 64 IN | HEART RATE: 87 BPM | OXYGEN SATURATION: 97 % | SYSTOLIC BLOOD PRESSURE: 112 MMHG | WEIGHT: 178 LBS | RESPIRATION RATE: 16 BRPM | TEMPERATURE: 98.7 F | BODY MASS INDEX: 30.39 KG/M2

## 2018-12-12 DIAGNOSIS — M47.812 OSTEOARTHRITIS OF CERVICAL SPINE, UNSPECIFIED SPINAL OSTEOARTHRITIS COMPLICATION STATUS: ICD-10-CM

## 2018-12-12 DIAGNOSIS — Z51.81 ENCOUNTER FOR MEDICATION MONITORING: ICD-10-CM

## 2018-12-12 DIAGNOSIS — M48.02 DEGENERATIVE CERVICAL SPINAL STENOSIS: ICD-10-CM

## 2018-12-12 DIAGNOSIS — S13.4XXS ATLANTO-AXIAL JOINT SPRAIN, SEQUELA: Primary | ICD-10-CM

## 2018-12-12 DIAGNOSIS — M47.812 SPONDYLOSIS OF CERVICAL REGION WITHOUT MYELOPATHY OR RADICULOPATHY: ICD-10-CM

## 2018-12-12 DIAGNOSIS — M48.02 STENOSIS, CERVICAL SPINE: ICD-10-CM

## 2018-12-12 DIAGNOSIS — S13.4XXS SPRAIN OF ATLANTO-OCCIPITAL JOINT, SEQUELA: ICD-10-CM

## 2018-12-12 DIAGNOSIS — M47.812 ARTHROPATHY OF CERVICAL FACET JOINT: ICD-10-CM

## 2018-12-12 DIAGNOSIS — M54.12 CERVICAL RADICULOPATHY: ICD-10-CM

## 2018-12-12 PROCEDURE — 99213 OFFICE O/P EST LOW 20 MIN: CPT | Performed by: NURSE PRACTITIONER

## 2018-12-12 PROCEDURE — 99213 OFFICE O/P EST LOW 20 MIN: CPT

## 2018-12-12 RX ORDER — TIZANIDINE 4 MG/1
TABLET ORAL
Qty: 60 TABLET | Refills: 0 | Status: SHIPPED | OUTPATIENT
Start: 2018-12-12 | End: 2019-01-29 | Stop reason: SDUPTHER

## 2018-12-12 RX ORDER — OXYCODONE AND ACETAMINOPHEN 10; 325 MG/1; MG/1
1 TABLET ORAL EVERY 8 HOURS PRN
Qty: 90 TABLET | Refills: 0 | Status: SHIPPED | OUTPATIENT
Start: 2018-12-17 | End: 2019-01-16

## 2018-12-12 ASSESSMENT — ENCOUNTER SYMPTOMS
GASTROINTESTINAL NEGATIVE: 1
RESPIRATORY NEGATIVE: 1

## 2018-12-12 NOTE — PROGRESS NOTES
01/16/2018  1:45 PM [facility]   Oxycodone Urine Not Detected    Final 01/16/2018  1:45 PM [facility]   Oxymorphone, Urine Not Detected    Final 01/16/2018  1:45 PM [facility]   PCP, Urine Not Detected    Final 01/16/2018  1:45 PM [facility]   Phentermine, Ur Not Detected    Final 01/16/2018  1:45 PM [facility]   Propoxyphene, Urine Not Detected    Final 01/16/2018  1:45 PM [facility]   Tapentadol-O-Sulfate, Urine Not Detected    Final 01/16/2018  1:45 PM [facility]   Tapentadol, Urine Not Detected    Final 01/16/2018  1:45 PM [facility]   Temazepam, Urine Not Detected    Final 01/16/2018  1:45 PM [facility]   Tramadol, Urine Not Detected    Final 01/16/2018  1:45 PM [facility]   Zolpidem, Urine Not Detected    Final 01/16/2018  1:45 PM [facility]   Drugs Expected, Ur     Final 01/16/2018  1:45  [address] [facility]   PERCOCET [id number] BEDTIME    Creatinine, Ur 199.8  20.0 - 400.0 mg/dL Final 01/16/2018  1:45 PM [facility]   Pain Mgt Drug Panel, Hi Res, Ur See Below    Final 01/16/2018  1:45 PM [facility]   (NOTE)   Methodology: Qualitative Enzyme Immunoassay and Qualitative Liquid   Chromatography-Time of Flight-Mass Spectrometry or Tandem Mass   Spectrometry, Quantitative Spectrophotometry   The absence of expected drug(s) and/or drug metabolite(s) may   indicate non-compliance, inappropriate timing of specimen   collection relative to drug administration, poor drug absorption,   diluted/adulterated urine, or limitations of testing. The   concentration must be greater than or equal to the cutoff to be   reported as present.  If specific drug concentrations are   required, contact the laboratory within two weeks of specimen   collection to request quantification by a second analytical   technique. Interpretive questions should be directed to the   laboratory. Results based on immunoassay detection that do not match clinical   expectations should be   interpreted with caution.  Confirmatory testing by mass   spectrometry for immunoassay-based results is available, if   ordered Grandmother     Stroke Maternal Grandfather        Social History     Social History    Marital status:      Spouse name: N/A    Number of children: N/A    Years of education: N/A     Occupational History    Homemaker      Social History Main Topics    Smoking status: Current Some Day Smoker     Packs/day: 0.50     Years: 20.00     Types: Cigarettes    Smokeless tobacco: Never Used      Comment: 1/2 pack every month    Alcohol use No    Drug use: No    Sexual activity: Not Currently     Other Topics Concern    Not on file     Social History Narrative    No narrative on file       Review of Systems:  Review of Systems   Constitution: Positive for weakness. HENT: Negative. Eyes:        Glasses   Cardiovascular: Positive for chest pain. Chest pain 12-11-18 seen in ED   Respiratory: Negative. Endocrine: Negative. Skin: Negative. Musculoskeletal: Positive for joint pain and neck pain. Shoulders   Gastrointestinal: Negative. Genitourinary: Negative. Neurological: Positive for headaches and numbness. Psychiatric/Behavioral: Positive for depression. Managing         Physical Exam:  /73   Pulse 87   Temp 98.7 °F (37.1 °C) (Oral)   Resp 16   Ht 5' 4\" (1.626 m)   Wt 178 lb (80.7 kg)   LMP 11/01/2010   SpO2 97%   BMI 30.55 kg/m²     Physical Exam   Constitutional: She is oriented to person, place, and time. She appears well-developed. HENT:   Head: Normocephalic. Neck: Neck supple. Pulmonary/Chest: Effort normal.   Musculoskeletal:        Cervical back: She exhibits decreased range of motion and tenderness. Back:    Tender left cervical facet joints   Neurological: She is alert and oriented to person, place, and time. A sensory deficit is present. Reflex Scores:       Tricep reflexes are 2+ on the right side and 2+ on the left side. Bicep reflexes are 2+ on the right side and 2+ on the left side.        Brachioradialis reflexes are

## 2018-12-13 ENCOUNTER — TELEPHONE (OUTPATIENT)
Dept: INTERNAL MEDICINE CLINIC | Age: 48
End: 2018-12-13

## 2018-12-13 ENCOUNTER — CARE COORDINATION (OUTPATIENT)
Dept: CARE COORDINATION | Age: 48
End: 2018-12-13

## 2018-12-19 ENCOUNTER — CARE COORDINATION (OUTPATIENT)
Dept: CARE COORDINATION | Age: 48
End: 2018-12-19

## 2018-12-26 DIAGNOSIS — K21.9 GASTROESOPHAGEAL REFLUX DISEASE WITHOUT ESOPHAGITIS: ICD-10-CM

## 2018-12-28 ENCOUNTER — TELEPHONE (OUTPATIENT)
Dept: INTERNAL MEDICINE CLINIC | Age: 48
End: 2018-12-28

## 2018-12-28 RX ORDER — ESOMEPRAZOLE MAGNESIUM 40 MG/1
CAPSULE, DELAYED RELEASE ORAL
Qty: 90 CAPSULE | Refills: 3 | Status: SHIPPED | OUTPATIENT
Start: 2018-12-28 | End: 2020-03-23

## 2019-01-03 ENCOUNTER — OFFICE VISIT (OUTPATIENT)
Dept: INTERNAL MEDICINE CLINIC | Age: 49
End: 2019-01-03
Payer: MEDICARE

## 2019-01-03 VITALS
BODY MASS INDEX: 32.1 KG/M2 | DIASTOLIC BLOOD PRESSURE: 80 MMHG | HEIGHT: 64 IN | SYSTOLIC BLOOD PRESSURE: 120 MMHG | WEIGHT: 188 LBS

## 2019-01-03 DIAGNOSIS — Z13.220 SCREENING FOR HYPERLIPIDEMIA: Primary | ICD-10-CM

## 2019-01-03 DIAGNOSIS — E11.9 TYPE 2 DIABETES MELLITUS WITHOUT COMPLICATION, UNSPECIFIED WHETHER LONG TERM INSULIN USE (HCC): ICD-10-CM

## 2019-01-03 DIAGNOSIS — K21.9 GASTROESOPHAGEAL REFLUX DISEASE WITHOUT ESOPHAGITIS: ICD-10-CM

## 2019-01-03 DIAGNOSIS — R10.84 GENERALIZED ABDOMINAL PAIN: ICD-10-CM

## 2019-01-03 DIAGNOSIS — E78.5 HYPERLIPIDEMIA, UNSPECIFIED HYPERLIPIDEMIA TYPE: ICD-10-CM

## 2019-01-03 PROCEDURE — 99214 OFFICE O/P EST MOD 30 MIN: CPT | Performed by: INTERNAL MEDICINE

## 2019-01-03 ASSESSMENT — ENCOUNTER SYMPTOMS
RHINORRHEA: 0
SHORTNESS OF BREATH: 0
ANAL BLEEDING: 0
SINUS PRESSURE: 0
WHEEZING: 0
COLOR CHANGE: 0
CHOKING: 0
CONSTIPATION: 0
EYE ITCHING: 0
TROUBLE SWALLOWING: 0
COUGH: 0
ABDOMINAL PAIN: 1
RECTAL PAIN: 0
BACK PAIN: 0
DIARRHEA: 0
PHOTOPHOBIA: 0
EYE REDNESS: 0
STRIDOR: 0
APNEA: 0
SORE THROAT: 0
FACIAL SWELLING: 0
BLOOD IN STOOL: 0
EYE DISCHARGE: 0
ABDOMINAL DISTENTION: 0
EYE PAIN: 0
CHEST TIGHTNESS: 0
VOMITING: 0
NAUSEA: 0
VOICE CHANGE: 0

## 2019-01-08 ENCOUNTER — HOSPITAL ENCOUNTER (OUTPATIENT)
Dept: GENERAL RADIOLOGY | Age: 49
Discharge: HOME | End: 2019-01-10
Payer: MEDICARE

## 2019-01-08 ENCOUNTER — HOSPITAL ENCOUNTER (OUTPATIENT)
Dept: PAIN MANAGEMENT | Age: 49
Discharge: HOME | End: 2019-01-08
Payer: MEDICARE

## 2019-01-08 VITALS
DIASTOLIC BLOOD PRESSURE: 76 MMHG | BODY MASS INDEX: 32.1 KG/M2 | HEART RATE: 75 BPM | HEIGHT: 64 IN | RESPIRATION RATE: 20 BRPM | SYSTOLIC BLOOD PRESSURE: 101 MMHG | OXYGEN SATURATION: 97 % | WEIGHT: 188 LBS | TEMPERATURE: 97.7 F

## 2019-01-08 DIAGNOSIS — M48.02 DEGENERATIVE CERVICAL SPINAL STENOSIS: ICD-10-CM

## 2019-01-08 DIAGNOSIS — M47.812 SPONDYLOSIS OF CERVICAL REGION WITHOUT MYELOPATHY OR RADICULOPATHY: Primary | ICD-10-CM

## 2019-01-08 DIAGNOSIS — M47.812 ARTHROPATHY OF CERVICAL FACET JOINT: ICD-10-CM

## 2019-01-08 DIAGNOSIS — M47.812 OSTEOARTHRITIS OF CERVICAL SPINE, UNSPECIFIED SPINAL OSTEOARTHRITIS COMPLICATION STATUS: ICD-10-CM

## 2019-01-08 DIAGNOSIS — M47.812 SPONDYLOSIS OF CERVICAL REGION WITHOUT MYELOPATHY OR RADICULOPATHY: ICD-10-CM

## 2019-01-08 PROCEDURE — 6360000002 HC RX W HCPCS: Performed by: PAIN MEDICINE

## 2019-01-08 PROCEDURE — 64490 INJ PARAVERT F JNT C/T 1 LEV: CPT | Performed by: PAIN MEDICINE

## 2019-01-08 PROCEDURE — 6360000004 HC RX CONTRAST MEDICATION

## 2019-01-08 PROCEDURE — 3209999900 FLUORO FOR SURGICAL PROCEDURES

## 2019-01-08 PROCEDURE — 64491 INJ PARAVERT F JNT C/T 2 LEV: CPT

## 2019-01-08 PROCEDURE — 99152 MOD SED SAME PHYS/QHP 5/>YRS: CPT | Performed by: PAIN MEDICINE

## 2019-01-08 PROCEDURE — 64492 INJ PARAVERT F JNT C/T 3 LEV: CPT | Performed by: PAIN MEDICINE

## 2019-01-08 PROCEDURE — 6360000002 HC RX W HCPCS

## 2019-01-08 PROCEDURE — 64491 INJ PARAVERT F JNT C/T 2 LEV: CPT | Performed by: PAIN MEDICINE

## 2019-01-08 PROCEDURE — 2500000003 HC RX 250 WO HCPCS

## 2019-01-08 PROCEDURE — 64492 INJ PARAVERT F JNT C/T 3 LEV: CPT

## 2019-01-08 PROCEDURE — 64490 INJ PARAVERT F JNT C/T 1 LEV: CPT

## 2019-01-08 RX ORDER — FENTANYL CITRATE 50 UG/ML
INJECTION, SOLUTION INTRAMUSCULAR; INTRAVENOUS
Status: COMPLETED | OUTPATIENT
Start: 2019-01-08 | End: 2019-01-08

## 2019-01-08 RX ORDER — MIDAZOLAM HYDROCHLORIDE 1 MG/ML
INJECTION INTRAMUSCULAR; INTRAVENOUS
Status: COMPLETED | OUTPATIENT
Start: 2019-01-08 | End: 2019-01-08

## 2019-01-08 RX ADMIN — FENTANYL CITRATE 50 MCG: 50 INJECTION, SOLUTION INTRAMUSCULAR; INTRAVENOUS at 08:23

## 2019-01-08 RX ADMIN — MIDAZOLAM 2 MG: 1 INJECTION INTRAMUSCULAR; INTRAVENOUS at 08:15

## 2019-01-08 ASSESSMENT — PAIN DESCRIPTION - PAIN TYPE: TYPE: CHRONIC PAIN

## 2019-01-08 ASSESSMENT — PAIN DESCRIPTION - LOCATION: LOCATION: NECK

## 2019-01-08 ASSESSMENT — PAIN DESCRIPTION - FREQUENCY: FREQUENCY: INTERMITTENT

## 2019-01-08 ASSESSMENT — PAIN DESCRIPTION - PROGRESSION: CLINICAL_PROGRESSION: NOT CHANGED

## 2019-01-08 ASSESSMENT — PAIN DESCRIPTION - ONSET: ONSET: ON-GOING

## 2019-01-08 ASSESSMENT — PAIN SCALES - GENERAL: PAINLEVEL_OUTOF10: 5

## 2019-01-08 ASSESSMENT — PAIN - FUNCTIONAL ASSESSMENT
PAIN_FUNCTIONAL_ASSESSMENT: 0-10
PAIN_FUNCTIONAL_ASSESSMENT: 0-10

## 2019-01-08 ASSESSMENT — PAIN DESCRIPTION - DIRECTION: RADIATING_TOWARDS: LEFT SHOULDER AND ARM

## 2019-01-08 ASSESSMENT — PAIN DESCRIPTION - ORIENTATION: ORIENTATION: LEFT

## 2019-01-08 ASSESSMENT — PAIN DESCRIPTION - DESCRIPTORS: DESCRIPTORS: SHARP

## 2019-01-09 ENCOUNTER — TELEPHONE (OUTPATIENT)
Dept: PAIN MANAGEMENT | Age: 49
End: 2019-01-09

## 2019-01-10 ENCOUNTER — TELEPHONE (OUTPATIENT)
Dept: INTERNAL MEDICINE CLINIC | Age: 49
End: 2019-01-10

## 2019-01-12 ENCOUNTER — HOSPITAL ENCOUNTER (OUTPATIENT)
Age: 49
Setting detail: SPECIMEN
Discharge: HOME | End: 2019-01-12
Payer: MEDICARE

## 2019-01-12 ENCOUNTER — HOSPITAL ENCOUNTER (OUTPATIENT)
Dept: MRI IMAGING | Age: 49
Discharge: HOME | End: 2019-01-14
Payer: MEDICARE

## 2019-01-12 DIAGNOSIS — Z13.220 SCREENING FOR HYPERLIPIDEMIA: ICD-10-CM

## 2019-01-12 DIAGNOSIS — R10.84 GENERALIZED ABDOMINAL PAIN: ICD-10-CM

## 2019-01-12 LAB
CHOLESTEROL/HDL RATIO: 3.4
CHOLESTEROL: 158 MG/DL
CREATININE URINE: 136.1 MG/DL (ref 28–217)
HDLC SERPL-MCNC: 47 MG/DL
LDL CHOLESTEROL: 84 MG/DL (ref 0–130)
MICROALBUMIN/CREAT 24H UR: 25 MG/L
MICROALBUMIN/CREAT UR-RTO: 18 MCG/MG CREAT
TRIGL SERPL-MCNC: 133 MG/DL
VLDLC SERPL CALC-MCNC: NORMAL MG/DL (ref 1–30)

## 2019-01-12 PROCEDURE — 80061 LIPID PANEL: CPT

## 2019-01-12 PROCEDURE — 6360000004 HC RX CONTRAST MEDICATION: Performed by: INTERNAL MEDICINE

## 2019-01-12 PROCEDURE — C8902 MRA W/O FOL W/CONT, ABD: HCPCS

## 2019-01-12 PROCEDURE — 36415 COLL VENOUS BLD VENIPUNCTURE: CPT

## 2019-01-12 PROCEDURE — 83036 HEMOGLOBIN GLYCOSYLATED A1C: CPT

## 2019-01-12 PROCEDURE — A9579 GAD-BASE MR CONTRAST NOS,1ML: HCPCS | Performed by: INTERNAL MEDICINE

## 2019-01-12 PROCEDURE — 82043 UR ALBUMIN QUANTITATIVE: CPT

## 2019-01-12 PROCEDURE — 82570 ASSAY OF URINE CREATININE: CPT

## 2019-01-12 PROCEDURE — 2580000003 HC RX 258: Performed by: INTERNAL MEDICINE

## 2019-01-12 RX ORDER — 0.9 % SODIUM CHLORIDE 0.9 %
50 INTRAVENOUS SOLUTION INTRAVENOUS ONCE
Status: COMPLETED | OUTPATIENT
Start: 2019-01-12 | End: 2019-01-12

## 2019-01-12 RX ORDER — SODIUM CHLORIDE 0.9 % (FLUSH) 0.9 %
10 SYRINGE (ML) INJECTION PRN
Status: DISCONTINUED | OUTPATIENT
Start: 2019-01-12 | End: 2019-01-15 | Stop reason: HOSPADM

## 2019-01-12 RX ADMIN — GADOTERIDOL 20 ML: 279.3 INJECTION, SOLUTION INTRAVENOUS at 09:23

## 2019-01-12 RX ADMIN — Medication 10 ML: at 09:23

## 2019-01-12 RX ADMIN — SODIUM CHLORIDE 50 ML: 9 INJECTION, SOLUTION INTRAVENOUS at 09:23

## 2019-01-13 LAB
ESTIMATED AVERAGE GLUCOSE: 114 MG/DL
HBA1C MFR BLD: 5.6 % (ref 4–6)

## 2019-01-15 ENCOUNTER — CARE COORDINATION (OUTPATIENT)
Dept: CARE COORDINATION | Age: 49
End: 2019-01-15

## 2019-01-21 ENCOUNTER — TELEPHONE (OUTPATIENT)
Dept: INTERNAL MEDICINE CLINIC | Age: 49
End: 2019-01-21

## 2019-01-21 DIAGNOSIS — S13.4XXS ATLANTO-AXIAL JOINT SPRAIN, SEQUELA: ICD-10-CM

## 2019-01-21 DIAGNOSIS — M47.812 OSTEOARTHRITIS OF CERVICAL SPINE, UNSPECIFIED SPINAL OSTEOARTHRITIS COMPLICATION STATUS: ICD-10-CM

## 2019-01-21 DIAGNOSIS — M47.812 ARTHROPATHY OF CERVICAL FACET JOINT: ICD-10-CM

## 2019-01-21 DIAGNOSIS — Z51.81 ENCOUNTER FOR MEDICATION MONITORING: ICD-10-CM

## 2019-01-21 DIAGNOSIS — M48.02 DEGENERATIVE CERVICAL SPINAL STENOSIS: ICD-10-CM

## 2019-01-21 DIAGNOSIS — M54.12 CERVICAL RADICULOPATHY: ICD-10-CM

## 2019-01-21 RX ORDER — OXYCODONE AND ACETAMINOPHEN 10; 325 MG/1; MG/1
1 TABLET ORAL EVERY 8 HOURS PRN
Qty: 90 TABLET | Refills: 0 | Status: SHIPPED | OUTPATIENT
Start: 2019-01-21 | End: 2019-01-29 | Stop reason: SDUPTHER

## 2019-01-29 ENCOUNTER — HOSPITAL ENCOUNTER (OUTPATIENT)
Dept: PAIN MANAGEMENT | Age: 49
Discharge: HOME | End: 2019-01-29
Payer: MEDICARE

## 2019-01-29 DIAGNOSIS — M54.12 CERVICAL RADICULOPATHY: ICD-10-CM

## 2019-01-29 DIAGNOSIS — M47.812 ARTHROPATHY OF CERVICAL FACET JOINT: ICD-10-CM

## 2019-01-29 DIAGNOSIS — M48.02 DEGENERATIVE CERVICAL SPINAL STENOSIS: ICD-10-CM

## 2019-01-29 DIAGNOSIS — M47.812 OSTEOARTHRITIS OF CERVICAL SPINE, UNSPECIFIED SPINAL OSTEOARTHRITIS COMPLICATION STATUS: ICD-10-CM

## 2019-01-29 DIAGNOSIS — S13.4XXS ATLANTO-AXIAL JOINT SPRAIN, SEQUELA: ICD-10-CM

## 2019-01-29 DIAGNOSIS — M48.02 STENOSIS, CERVICAL SPINE: Primary | ICD-10-CM

## 2019-01-29 DIAGNOSIS — Z51.81 ENCOUNTER FOR MEDICATION MONITORING: ICD-10-CM

## 2019-01-29 PROCEDURE — 99213 OFFICE O/P EST LOW 20 MIN: CPT | Performed by: NURSE PRACTITIONER

## 2019-01-29 PROCEDURE — 99213 OFFICE O/P EST LOW 20 MIN: CPT

## 2019-01-29 RX ORDER — OXYCODONE AND ACETAMINOPHEN 10; 325 MG/1; MG/1
1 TABLET ORAL EVERY 8 HOURS PRN
Qty: 90 TABLET | Refills: 0 | Status: SHIPPED | OUTPATIENT
Start: 2019-01-29 | End: 2019-02-25 | Stop reason: SDUPTHER

## 2019-01-29 RX ORDER — TIZANIDINE 4 MG/1
TABLET ORAL
Qty: 60 TABLET | Refills: 0 | Status: SHIPPED | OUTPATIENT
Start: 2019-01-29 | End: 2019-02-25 | Stop reason: SDUPTHER

## 2019-01-29 ASSESSMENT — ENCOUNTER SYMPTOMS
SHORTNESS OF BREATH: 0
COUGH: 0
CONSTIPATION: 0

## 2019-01-31 DIAGNOSIS — G62.9 NEUROPATHY: ICD-10-CM

## 2019-01-31 RX ORDER — GABAPENTIN 300 MG/1
CAPSULE ORAL
Qty: 180 CAPSULE | Refills: 0 | Status: SHIPPED | OUTPATIENT
Start: 2019-01-31 | End: 2019-02-25 | Stop reason: SDUPTHER

## 2019-02-18 ENCOUNTER — CARE COORDINATION (OUTPATIENT)
Dept: CARE COORDINATION | Age: 49
End: 2019-02-18

## 2019-02-25 ENCOUNTER — HOSPITAL ENCOUNTER (OUTPATIENT)
Dept: PAIN MANAGEMENT | Age: 49
Discharge: HOME | End: 2019-02-25
Payer: MEDICARE

## 2019-02-25 ENCOUNTER — HOSPITAL ENCOUNTER (EMERGENCY)
Age: 49
Discharge: HOME | End: 2019-02-25
Attending: EMERGENCY MEDICINE
Payer: MEDICARE

## 2019-02-25 VITALS
RESPIRATION RATE: 16 BRPM | WEIGHT: 188 LBS | SYSTOLIC BLOOD PRESSURE: 111 MMHG | DIASTOLIC BLOOD PRESSURE: 74 MMHG | BODY MASS INDEX: 32.1 KG/M2 | HEART RATE: 86 BPM | OXYGEN SATURATION: 96 % | HEIGHT: 64 IN

## 2019-02-25 VITALS
RESPIRATION RATE: 16 BRPM | SYSTOLIC BLOOD PRESSURE: 110 MMHG | HEIGHT: 64 IN | WEIGHT: 178 LBS | BODY MASS INDEX: 30.39 KG/M2 | TEMPERATURE: 98.5 F | OXYGEN SATURATION: 90 % | DIASTOLIC BLOOD PRESSURE: 61 MMHG | HEART RATE: 85 BPM

## 2019-02-25 DIAGNOSIS — G44.209 TRIGGER POINT WITH TENSION HEADACHE: ICD-10-CM

## 2019-02-25 DIAGNOSIS — S13.4XXS SPRAIN OF ATLANTO-OCCIPITAL JOINT, SEQUELA: ICD-10-CM

## 2019-02-25 DIAGNOSIS — M47.812 ARTHROPATHY OF CERVICAL FACET JOINT: ICD-10-CM

## 2019-02-25 DIAGNOSIS — R19.7 NAUSEA VOMITING AND DIARRHEA: Primary | ICD-10-CM

## 2019-02-25 DIAGNOSIS — S13.4XXS ATLANTO-AXIAL JOINT SPRAIN, SEQUELA: Primary | ICD-10-CM

## 2019-02-25 DIAGNOSIS — M47.812 OSTEOARTHRITIS OF CERVICAL SPINE, UNSPECIFIED SPINAL OSTEOARTHRITIS COMPLICATION STATUS: ICD-10-CM

## 2019-02-25 DIAGNOSIS — M48.02 STENOSIS, CERVICAL SPINE: ICD-10-CM

## 2019-02-25 DIAGNOSIS — R11.2 NAUSEA VOMITING AND DIARRHEA: Primary | ICD-10-CM

## 2019-02-25 DIAGNOSIS — Z90.722 S/P BILATERAL OOPHORECTOMY: ICD-10-CM

## 2019-02-25 DIAGNOSIS — M54.2 NECK PAIN: ICD-10-CM

## 2019-02-25 DIAGNOSIS — M48.02 DEGENERATIVE CERVICAL SPINAL STENOSIS: ICD-10-CM

## 2019-02-25 DIAGNOSIS — M47.812 SPONDYLOSIS OF CERVICAL REGION WITHOUT MYELOPATHY OR RADICULOPATHY: ICD-10-CM

## 2019-02-25 DIAGNOSIS — M54.12 CERVICAL RADICULOPATHY: ICD-10-CM

## 2019-02-25 DIAGNOSIS — Z51.81 ENCOUNTER FOR MEDICATION MONITORING: ICD-10-CM

## 2019-02-25 LAB
-: ABNORMAL
AMORPHOUS: ABNORMAL
AMPHETAMINE SCREEN URINE: NEGATIVE
ANION GAP SERPL CALCULATED.3IONS-SCNC: 13 MMOL/L (ref 9–17)
BACTERIA: ABNORMAL
BARBITURATE SCREEN URINE: NEGATIVE
BENZODIAZEPINE SCREEN, URINE: POSITIVE
BILIRUBIN URINE: NEGATIVE
BUN BLDV-MCNC: 22 MG/DL (ref 6–20)
BUN/CREAT BLD: ABNORMAL (ref 9–20)
BUPRENORPHINE URINE: ABNORMAL
C DIFF AG + TOXIN: NORMAL
CALCIUM SERPL-MCNC: 9.3 MG/DL (ref 8.6–10.4)
CANNABINOID SCREEN URINE: NEGATIVE
CASTS UA: ABNORMAL /LPF
CHLORIDE BLD-SCNC: 108 MMOL/L (ref 98–107)
CO2: 20 MMOL/L (ref 20–31)
COCAINE METABOLITE, URINE: NEGATIVE
COLOR: YELLOW
COMMENT UA: ABNORMAL
CREAT SERPL-MCNC: 0.61 MG/DL (ref 0.5–0.9)
CRYSTALS, UA: ABNORMAL /HPF
EPITHELIAL CELLS UA: ABNORMAL /HPF
GFR AFRICAN AMERICAN: >60 ML/MIN
GFR NON-AFRICAN AMERICAN: >60 ML/MIN
GFR SERPL CREATININE-BSD FRML MDRD: ABNORMAL ML/MIN/{1.73_M2}
GFR SERPL CREATININE-BSD FRML MDRD: ABNORMAL ML/MIN/{1.73_M2}
GLUCOSE BLD-MCNC: 95 MG/DL (ref 70–99)
GLUCOSE URINE: NEGATIVE
HCT VFR BLD CALC: 44.2 % (ref 36–46)
HEMOGLOBIN: 14.9 G/DL (ref 12–16)
KETONES, URINE: NEGATIVE
LEUKOCYTE ESTERASE, URINE: ABNORMAL
LIPASE: 38 U/L (ref 13–60)
MCH RBC QN AUTO: 30.7 PG (ref 26–34)
MCHC RBC AUTO-ENTMCNC: 33.7 G/DL (ref 31–37)
MCV RBC AUTO: 91.2 FL (ref 80–100)
MDMA URINE: ABNORMAL
METHADONE SCREEN, URINE: NEGATIVE
METHAMPHETAMINE, URINE: ABNORMAL
MUCUS: ABNORMAL
NITRITE, URINE: NEGATIVE
NRBC AUTOMATED: NORMAL PER 100 WBC
OPIATES, URINE: NEGATIVE
OTHER OBSERVATIONS UA: ABNORMAL
OXYCODONE SCREEN URINE: NEGATIVE
PDW BLD-RTO: 12.8 % (ref 11.5–14.9)
PH UA: 5 (ref 5–8)
PHENCYCLIDINE, URINE: NEGATIVE
PLATELET # BLD: 173 K/UL (ref 150–450)
PMV BLD AUTO: 8.7 FL (ref 6–12)
POTASSIUM SERPL-SCNC: 3.9 MMOL/L (ref 3.7–5.3)
PROPOXYPHENE, URINE: ABNORMAL
PROTEIN UA: NEGATIVE
RBC # BLD: 4.85 M/UL (ref 4–5.2)
RBC UA: ABNORMAL /HPF
RENAL EPITHELIAL, UA: ABNORMAL /HPF
SODIUM BLD-SCNC: 141 MMOL/L (ref 135–144)
SPECIFIC GRAVITY UA: 1.02 (ref 1–1.03)
SPECIMEN DESCRIPTION: NORMAL
TEST INFORMATION: ABNORMAL
TRICHOMONAS: ABNORMAL
TRICYCLIC ANTIDEPRESSANTS, UR: ABNORMAL
TURBIDITY: ABNORMAL
URINE HGB: ABNORMAL
UROBILINOGEN, URINE: NORMAL
WBC # BLD: 9.3 K/UL (ref 3.5–11)
WBC UA: ABNORMAL /HPF
YEAST: ABNORMAL

## 2019-02-25 PROCEDURE — 87449 NOS EACH ORGANISM AG IA: CPT

## 2019-02-25 PROCEDURE — 2580000003 HC RX 258: Performed by: EMERGENCY MEDICINE

## 2019-02-25 PROCEDURE — 99213 OFFICE O/P EST LOW 20 MIN: CPT | Performed by: NURSE PRACTITIONER

## 2019-02-25 PROCEDURE — 87505 NFCT AGENT DETECTION GI: CPT

## 2019-02-25 PROCEDURE — 85027 COMPLETE CBC AUTOMATED: CPT

## 2019-02-25 PROCEDURE — 6360000002 HC RX W HCPCS: Performed by: EMERGENCY MEDICINE

## 2019-02-25 PROCEDURE — 83690 ASSAY OF LIPASE: CPT

## 2019-02-25 PROCEDURE — 99283 EMERGENCY DEPT VISIT LOW MDM: CPT

## 2019-02-25 PROCEDURE — 87086 URINE CULTURE/COLONY COUNT: CPT

## 2019-02-25 PROCEDURE — 36415 COLL VENOUS BLD VENIPUNCTURE: CPT

## 2019-02-25 PROCEDURE — 80307 DRUG TEST PRSMV CHEM ANLYZR: CPT

## 2019-02-25 PROCEDURE — 96372 THER/PROPH/DIAG INJ SC/IM: CPT

## 2019-02-25 PROCEDURE — 80048 BASIC METABOLIC PNL TOTAL CA: CPT

## 2019-02-25 PROCEDURE — 96375 TX/PRO/DX INJ NEW DRUG ADDON: CPT

## 2019-02-25 PROCEDURE — 96374 THER/PROPH/DIAG INJ IV PUSH: CPT

## 2019-02-25 PROCEDURE — 99213 OFFICE O/P EST LOW 20 MIN: CPT

## 2019-02-25 PROCEDURE — 87324 CLOSTRIDIUM AG IA: CPT

## 2019-02-25 PROCEDURE — 81001 URINALYSIS AUTO W/SCOPE: CPT

## 2019-02-25 RX ORDER — ONDANSETRON 2 MG/ML
4 INJECTION INTRAMUSCULAR; INTRAVENOUS ONCE
Status: COMPLETED | OUTPATIENT
Start: 2019-02-25 | End: 2019-02-25

## 2019-02-25 RX ORDER — CLONAZEPAM 0.5 MG/1
0.5 TABLET ORAL 3 TIMES DAILY PRN
Status: ON HOLD | COMMUNITY
End: 2020-12-28 | Stop reason: SDUPTHER

## 2019-02-25 RX ORDER — MORPHINE SULFATE 4 MG/ML
4 INJECTION, SOLUTION INTRAMUSCULAR; INTRAVENOUS ONCE
Status: COMPLETED | OUTPATIENT
Start: 2019-02-25 | End: 2019-02-25

## 2019-02-25 RX ORDER — 0.9 % SODIUM CHLORIDE 0.9 %
1000 INTRAVENOUS SOLUTION INTRAVENOUS ONCE
Status: COMPLETED | OUTPATIENT
Start: 2019-02-25 | End: 2019-02-25

## 2019-02-25 RX ORDER — TIZANIDINE 4 MG/1
TABLET ORAL
Qty: 60 TABLET | Refills: 0 | Status: SHIPPED | OUTPATIENT
Start: 2019-02-28 | End: 2019-03-30 | Stop reason: SDUPTHER

## 2019-02-25 RX ORDER — SERTRALINE HYDROCHLORIDE 100 MG/1
100 TABLET, FILM COATED ORAL DAILY
COMMUNITY
End: 2021-09-09 | Stop reason: SDUPTHER

## 2019-02-25 RX ORDER — OXYCODONE AND ACETAMINOPHEN 10; 325 MG/1; MG/1
1 TABLET ORAL EVERY 8 HOURS PRN
Qty: 90 TABLET | Refills: 0 | Status: SHIPPED | OUTPATIENT
Start: 2019-03-03 | End: 2019-04-02 | Stop reason: SDUPTHER

## 2019-02-25 RX ORDER — LORAZEPAM 2 MG/ML
1 INJECTION INTRAMUSCULAR ONCE
Status: COMPLETED | OUTPATIENT
Start: 2019-02-25 | End: 2019-02-25

## 2019-02-25 RX ORDER — DIPHENHYDRAMINE HYDROCHLORIDE 50 MG/ML
50 INJECTION INTRAMUSCULAR; INTRAVENOUS ONCE
Status: COMPLETED | OUTPATIENT
Start: 2019-02-25 | End: 2019-02-25

## 2019-02-25 RX ADMIN — SODIUM CHLORIDE 1000 ML: 9 INJECTION, SOLUTION INTRAVENOUS at 18:00

## 2019-02-25 RX ADMIN — DIPHENHYDRAMINE HYDROCHLORIDE 50 MG: 50 INJECTION INTRAMUSCULAR; INTRAVENOUS at 19:37

## 2019-02-25 RX ADMIN — MORPHINE SULFATE 4 MG: 4 INJECTION, SOLUTION INTRAMUSCULAR; INTRAVENOUS at 19:37

## 2019-02-25 RX ADMIN — LORAZEPAM 1 MG: 2 INJECTION INTRAMUSCULAR; INTRAVENOUS at 19:37

## 2019-02-25 RX ADMIN — ONDANSETRON 4 MG: 2 INJECTION INTRAMUSCULAR; INTRAVENOUS at 19:36

## 2019-02-25 ASSESSMENT — ENCOUNTER SYMPTOMS
EYES NEGATIVE: 1
DIARRHEA: 0
SINUS PRESSURE: 0
SORE THROAT: 0
NAUSEA: 0
CHEST TIGHTNESS: 0
FACIAL SWELLING: 0
COLOR CHANGE: 0
NAUSEA: 1
COUGH: 0
SHORTNESS OF BREATH: 0
VOMITING: 0
CONSTIPATION: 0

## 2019-02-25 ASSESSMENT — PAIN DESCRIPTION - FREQUENCY: FREQUENCY: CONTINUOUS

## 2019-02-25 ASSESSMENT — PAIN DESCRIPTION - LOCATION
LOCATION_2: ABDOMEN
LOCATION: NECK

## 2019-02-25 ASSESSMENT — PAIN DESCRIPTION - INTENSITY: RATING_2: 7

## 2019-02-25 ASSESSMENT — PAIN SCALES - GENERAL
PAINLEVEL_OUTOF10: 10
PAINLEVEL_OUTOF10: 7
PAINLEVEL_OUTOF10: 9

## 2019-02-25 ASSESSMENT — PAIN DESCRIPTION - ONSET
ONSET: ON-GOING
ONSET_2: ON-GOING

## 2019-02-25 ASSESSMENT — PAIN DESCRIPTION - DESCRIPTORS: DESCRIPTORS_2: ACHING;SHARP;SORE

## 2019-02-25 ASSESSMENT — PAIN DESCRIPTION - PROGRESSION
CLINICAL_PROGRESSION_2: GRADUALLY WORSENING
CLINICAL_PROGRESSION: GRADUALLY WORSENING

## 2019-02-25 ASSESSMENT — PAIN DESCRIPTION - PAIN TYPE: TYPE: ACUTE PAIN

## 2019-02-25 ASSESSMENT — PAIN DESCRIPTION - DURATION: DURATION_2: CONTINUOUS

## 2019-02-25 ASSESSMENT — PAIN DESCRIPTION - ORIENTATION: ORIENTATION: RIGHT

## 2019-02-26 ENCOUNTER — TELEPHONE (OUTPATIENT)
Dept: INTERNAL MEDICINE CLINIC | Age: 49
End: 2019-02-26

## 2019-02-26 ENCOUNTER — CARE COORDINATION (OUTPATIENT)
Dept: CARE COORDINATION | Age: 49
End: 2019-02-26

## 2019-02-26 LAB
CAMPYLOBACTER PCR: NORMAL
CULTURE: NORMAL
E COLI ENTEROTOXIGENIC PCR: NORMAL
Lab: NORMAL
PLESIOMONAS SHIGELLOIDES PCR: NORMAL
SALMONELLA PCR: NORMAL
SHIGATOXIN GENE PCR: NORMAL
SHIGELLA SP PCR: NORMAL
SPECIMEN DESCRIPTION: NORMAL
SPECIMEN DESCRIPTION: NORMAL
VIBRIO PCR: NORMAL
YERSINIA ENTEROCOLITICA PCR: NORMAL

## 2019-03-05 ENCOUNTER — HOSPITAL ENCOUNTER (EMERGENCY)
Age: 49
Discharge: HOME | End: 2019-03-05
Attending: EMERGENCY MEDICINE
Payer: MEDICARE

## 2019-03-05 VITALS
HEART RATE: 92 BPM | SYSTOLIC BLOOD PRESSURE: 114 MMHG | TEMPERATURE: 99.6 F | OXYGEN SATURATION: 94 % | RESPIRATION RATE: 16 BRPM | WEIGHT: 177 LBS | BODY MASS INDEX: 30.22 KG/M2 | DIASTOLIC BLOOD PRESSURE: 65 MMHG | HEIGHT: 64 IN

## 2019-03-05 DIAGNOSIS — R11.2 NON-INTRACTABLE VOMITING WITH NAUSEA, UNSPECIFIED VOMITING TYPE: Primary | ICD-10-CM

## 2019-03-05 DIAGNOSIS — M54.2 NECK PAIN: ICD-10-CM

## 2019-03-05 LAB
ABSOLUTE EOS #: 0.2 K/UL (ref 0–0.4)
ABSOLUTE IMMATURE GRANULOCYTE: ABNORMAL K/UL (ref 0–0.3)
ABSOLUTE LYMPH #: 2.2 K/UL (ref 1–4.8)
ABSOLUTE MONO #: 0.7 K/UL (ref 0.1–1.3)
ANION GAP SERPL CALCULATED.3IONS-SCNC: 10 MMOL/L (ref 9–17)
BASOPHILS # BLD: 1 % (ref 0–2)
BASOPHILS ABSOLUTE: 0.1 K/UL (ref 0–0.2)
BILIRUBIN URINE: NEGATIVE
BUN BLDV-MCNC: 19 MG/DL (ref 6–20)
BUN/CREAT BLD: ABNORMAL (ref 9–20)
CALCIUM SERPL-MCNC: 9.1 MG/DL (ref 8.6–10.4)
CHLORIDE BLD-SCNC: 109 MMOL/L (ref 98–107)
CO2: 20 MMOL/L (ref 20–31)
COLOR: YELLOW
COMMENT UA: NORMAL
CREAT SERPL-MCNC: 0.63 MG/DL (ref 0.5–0.9)
DIFFERENTIAL TYPE: ABNORMAL
EOSINOPHILS RELATIVE PERCENT: 2 % (ref 0–4)
GFR AFRICAN AMERICAN: >60 ML/MIN
GFR NON-AFRICAN AMERICAN: >60 ML/MIN
GFR SERPL CREATININE-BSD FRML MDRD: ABNORMAL ML/MIN/{1.73_M2}
GFR SERPL CREATININE-BSD FRML MDRD: ABNORMAL ML/MIN/{1.73_M2}
GLUCOSE BLD-MCNC: 124 MG/DL (ref 70–99)
GLUCOSE URINE: NEGATIVE
HCT VFR BLD CALC: 41.6 % (ref 36–46)
HEMOGLOBIN: 14.1 G/DL (ref 12–16)
IMMATURE GRANULOCYTES: ABNORMAL %
KETONES, URINE: NEGATIVE
LEUKOCYTE ESTERASE, URINE: NEGATIVE
LYMPHOCYTES # BLD: 23 % (ref 24–44)
MCH RBC QN AUTO: 30.9 PG (ref 26–34)
MCHC RBC AUTO-ENTMCNC: 33.8 G/DL (ref 31–37)
MCV RBC AUTO: 91.4 FL (ref 80–100)
MONOCYTES # BLD: 7 % (ref 1–7)
NITRITE, URINE: NEGATIVE
NRBC AUTOMATED: ABNORMAL PER 100 WBC
PDW BLD-RTO: 13.2 % (ref 11.5–14.9)
PH UA: 5.5 (ref 5–8)
PLATELET # BLD: 158 K/UL (ref 150–450)
PLATELET ESTIMATE: ABNORMAL
PMV BLD AUTO: 8.6 FL (ref 6–12)
POTASSIUM SERPL-SCNC: 3.6 MMOL/L (ref 3.7–5.3)
PROTEIN UA: NEGATIVE
RBC # BLD: 4.55 M/UL (ref 4–5.2)
RBC # BLD: ABNORMAL 10*6/UL
SEG NEUTROPHILS: 67 % (ref 36–66)
SEGMENTED NEUTROPHILS ABSOLUTE COUNT: 6.4 K/UL (ref 1.3–9.1)
SODIUM BLD-SCNC: 139 MMOL/L (ref 135–144)
SPECIFIC GRAVITY UA: 1.02 (ref 1–1.03)
TURBIDITY: CLEAR
URINE HGB: NEGATIVE
UROBILINOGEN, URINE: NORMAL
WBC # BLD: 9.5 K/UL (ref 3.5–11)
WBC # BLD: ABNORMAL 10*3/UL

## 2019-03-05 PROCEDURE — 2580000003 HC RX 258: Performed by: STUDENT IN AN ORGANIZED HEALTH CARE EDUCATION/TRAINING PROGRAM

## 2019-03-05 PROCEDURE — 85025 COMPLETE CBC W/AUTO DIFF WBC: CPT

## 2019-03-05 PROCEDURE — 80048 BASIC METABOLIC PNL TOTAL CA: CPT

## 2019-03-05 PROCEDURE — 6360000002 HC RX W HCPCS: Performed by: STUDENT IN AN ORGANIZED HEALTH CARE EDUCATION/TRAINING PROGRAM

## 2019-03-05 PROCEDURE — 96374 THER/PROPH/DIAG INJ IV PUSH: CPT

## 2019-03-05 PROCEDURE — 6370000000 HC RX 637 (ALT 250 FOR IP): Performed by: STUDENT IN AN ORGANIZED HEALTH CARE EDUCATION/TRAINING PROGRAM

## 2019-03-05 PROCEDURE — 81003 URINALYSIS AUTO W/O SCOPE: CPT

## 2019-03-05 PROCEDURE — 99283 EMERGENCY DEPT VISIT LOW MDM: CPT

## 2019-03-05 PROCEDURE — 96375 TX/PRO/DX INJ NEW DRUG ADDON: CPT

## 2019-03-05 PROCEDURE — 36415 COLL VENOUS BLD VENIPUNCTURE: CPT

## 2019-03-05 RX ORDER — MORPHINE SULFATE 4 MG/ML
4 INJECTION, SOLUTION INTRAMUSCULAR; INTRAVENOUS ONCE
Status: COMPLETED | OUTPATIENT
Start: 2019-03-05 | End: 2019-03-05

## 2019-03-05 RX ORDER — 0.9 % SODIUM CHLORIDE 0.9 %
1000 INTRAVENOUS SOLUTION INTRAVENOUS ONCE
Status: COMPLETED | OUTPATIENT
Start: 2019-03-05 | End: 2019-03-05

## 2019-03-05 RX ORDER — DIPHENHYDRAMINE HYDROCHLORIDE 50 MG/ML
25 INJECTION INTRAMUSCULAR; INTRAVENOUS ONCE
Status: COMPLETED | OUTPATIENT
Start: 2019-03-05 | End: 2019-03-05

## 2019-03-05 RX ORDER — ONDANSETRON 2 MG/ML
4 INJECTION INTRAMUSCULAR; INTRAVENOUS ONCE
Status: COMPLETED | OUTPATIENT
Start: 2019-03-05 | End: 2019-03-05

## 2019-03-05 RX ORDER — DIAZEPAM 5 MG/1
5 TABLET ORAL ONCE
Status: COMPLETED | OUTPATIENT
Start: 2019-03-05 | End: 2019-03-05

## 2019-03-05 RX ADMIN — ONDANSETRON 4 MG: 2 INJECTION INTRAMUSCULAR; INTRAVENOUS at 19:04

## 2019-03-05 RX ADMIN — DIPHENHYDRAMINE HYDROCHLORIDE 25 MG: 50 INJECTION INTRAMUSCULAR; INTRAVENOUS at 19:03

## 2019-03-05 RX ADMIN — DIAZEPAM 5 MG: 5 TABLET ORAL at 19:03

## 2019-03-05 RX ADMIN — MORPHINE SULFATE 4 MG: 4 INJECTION INTRAVENOUS at 19:04

## 2019-03-05 RX ADMIN — SODIUM CHLORIDE 1000 ML: 9 INJECTION, SOLUTION INTRAVENOUS at 19:02

## 2019-03-05 ASSESSMENT — PAIN SCALES - GENERAL
PAINLEVEL_OUTOF10: 8
PAINLEVEL_OUTOF10: 9

## 2019-03-05 ASSESSMENT — ENCOUNTER SYMPTOMS
RHINORRHEA: 0
ABDOMINAL PAIN: 0
EYE REDNESS: 0
VOMITING: 0
COUGH: 0
DIARRHEA: 0
SHORTNESS OF BREATH: 0
NAUSEA: 0
BACK PAIN: 1
EYE ITCHING: 0

## 2019-03-06 ENCOUNTER — CARE COORDINATION (OUTPATIENT)
Dept: CARE COORDINATION | Age: 49
End: 2019-03-06

## 2019-03-07 ENCOUNTER — TELEPHONE (OUTPATIENT)
Dept: INTERNAL MEDICINE CLINIC | Age: 49
End: 2019-03-07

## 2019-03-11 ENCOUNTER — HOSPITAL ENCOUNTER (OUTPATIENT)
Dept: GENERAL RADIOLOGY | Age: 49
Discharge: HOME | End: 2019-03-13
Payer: MEDICARE

## 2019-03-11 ENCOUNTER — HOSPITAL ENCOUNTER (OUTPATIENT)
Dept: PAIN MANAGEMENT | Age: 49
Discharge: HOME | End: 2019-03-11
Payer: MEDICARE

## 2019-03-11 VITALS
HEIGHT: 64 IN | HEART RATE: 69 BPM | BODY MASS INDEX: 30.22 KG/M2 | RESPIRATION RATE: 16 BRPM | OXYGEN SATURATION: 95 % | WEIGHT: 177 LBS | DIASTOLIC BLOOD PRESSURE: 81 MMHG | TEMPERATURE: 97.7 F | SYSTOLIC BLOOD PRESSURE: 128 MMHG

## 2019-03-11 DIAGNOSIS — M47.812 ARTHROPATHY OF CERVICAL FACET JOINT: ICD-10-CM

## 2019-03-11 DIAGNOSIS — M48.02 DEGENERATIVE CERVICAL SPINAL STENOSIS: ICD-10-CM

## 2019-03-11 DIAGNOSIS — M47.812 OSTEOARTHRITIS OF CERVICAL SPINE, UNSPECIFIED SPINAL OSTEOARTHRITIS COMPLICATION STATUS: Primary | ICD-10-CM

## 2019-03-11 DIAGNOSIS — R52 PAIN: ICD-10-CM

## 2019-03-11 DIAGNOSIS — Z51.81 ENCOUNTER FOR MEDICATION MONITORING: ICD-10-CM

## 2019-03-11 DIAGNOSIS — M54.12 CERVICAL RADICULOPATHY: ICD-10-CM

## 2019-03-11 DIAGNOSIS — S13.4XXS ATLANTO-AXIAL JOINT SPRAIN, SEQUELA: ICD-10-CM

## 2019-03-11 PROCEDURE — 2500000003 HC RX 250 WO HCPCS

## 2019-03-11 PROCEDURE — 64490 INJ PARAVERT F JNT C/T 1 LEV: CPT

## 2019-03-11 PROCEDURE — 64491 INJ PARAVERT F JNT C/T 2 LEV: CPT

## 2019-03-11 PROCEDURE — 64492 INJ PARAVERT F JNT C/T 3 LEV: CPT

## 2019-03-11 PROCEDURE — 6360000002 HC RX W HCPCS

## 2019-03-11 PROCEDURE — 6360000002 HC RX W HCPCS: Performed by: ANESTHESIOLOGY

## 2019-03-11 PROCEDURE — 64492 INJ PARAVERT F JNT C/T 3 LEV: CPT | Performed by: ANESTHESIOLOGY

## 2019-03-11 PROCEDURE — 64491 INJ PARAVERT F JNT C/T 2 LEV: CPT | Performed by: ANESTHESIOLOGY

## 2019-03-11 PROCEDURE — 3209999900 FLUORO FOR SURGICAL PROCEDURES

## 2019-03-11 PROCEDURE — 64490 INJ PARAVERT F JNT C/T 1 LEV: CPT | Performed by: ANESTHESIOLOGY

## 2019-03-11 PROCEDURE — 99152 MOD SED SAME PHYS/QHP 5/>YRS: CPT | Performed by: ANESTHESIOLOGY

## 2019-03-11 RX ORDER — MIDAZOLAM HYDROCHLORIDE 1 MG/ML
INJECTION INTRAMUSCULAR; INTRAVENOUS
Status: COMPLETED | OUTPATIENT
Start: 2019-03-11 | End: 2019-03-11

## 2019-03-11 RX ORDER — FENTANYL CITRATE 50 UG/ML
INJECTION, SOLUTION INTRAMUSCULAR; INTRAVENOUS
Status: COMPLETED | OUTPATIENT
Start: 2019-03-11 | End: 2019-03-11

## 2019-03-11 RX ORDER — OXYCODONE AND ACETAMINOPHEN 10; 325 MG/1; MG/1
1 TABLET ORAL EVERY 8 HOURS PRN
Qty: 90 TABLET | Refills: 0 | Status: CANCELLED | OUTPATIENT
Start: 2019-03-11 | End: 2019-04-10

## 2019-03-11 RX ADMIN — MIDAZOLAM 2 MG: 1 INJECTION INTRAMUSCULAR; INTRAVENOUS at 08:11

## 2019-03-11 RX ADMIN — FENTANYL CITRATE 100 MCG: 50 INJECTION INTRAMUSCULAR; INTRAVENOUS at 08:12

## 2019-03-11 ASSESSMENT — PAIN - FUNCTIONAL ASSESSMENT
PAIN_FUNCTIONAL_ASSESSMENT: 0-10
PAIN_FUNCTIONAL_ASSESSMENT: PREVENTS OR INTERFERES SOME ACTIVE ACTIVITIES AND ADLS

## 2019-03-11 ASSESSMENT — PAIN DESCRIPTION - ORIENTATION: ORIENTATION: RIGHT;LEFT

## 2019-03-11 ASSESSMENT — PAIN DESCRIPTION - PROGRESSION: CLINICAL_PROGRESSION: GRADUALLY WORSENING

## 2019-03-11 ASSESSMENT — PAIN DESCRIPTION - DESCRIPTORS: DESCRIPTORS: ACHING;SHARP;SHOOTING;SPASM

## 2019-03-11 ASSESSMENT — PAIN DESCRIPTION - FREQUENCY: FREQUENCY: CONTINUOUS

## 2019-03-11 ASSESSMENT — PAIN DESCRIPTION - ONSET: ONSET: ON-GOING

## 2019-03-11 ASSESSMENT — PAIN DESCRIPTION - PAIN TYPE: TYPE: CHRONIC PAIN

## 2019-03-11 ASSESSMENT — PAIN SCALES - GENERAL: PAINLEVEL_OUTOF10: 8

## 2019-03-11 ASSESSMENT — PAIN DESCRIPTION - LOCATION: LOCATION: NECK

## 2019-03-13 ENCOUNTER — CARE COORDINATION (OUTPATIENT)
Dept: CARE COORDINATION | Age: 49
End: 2019-03-13

## 2019-03-13 ENCOUNTER — HOSPITAL ENCOUNTER (EMERGENCY)
Age: 49
Discharge: HOME | End: 2019-03-13
Attending: EMERGENCY MEDICINE
Payer: MEDICARE

## 2019-03-13 VITALS
HEART RATE: 87 BPM | SYSTOLIC BLOOD PRESSURE: 121 MMHG | RESPIRATION RATE: 16 BRPM | OXYGEN SATURATION: 97 % | DIASTOLIC BLOOD PRESSURE: 74 MMHG | WEIGHT: 177 LBS | BODY MASS INDEX: 30.22 KG/M2 | HEIGHT: 64 IN | TEMPERATURE: 98.5 F

## 2019-03-13 DIAGNOSIS — M62.838 TRAPEZIUS MUSCLE SPASM: Primary | ICD-10-CM

## 2019-03-13 DIAGNOSIS — G44.209 TENSION HEADACHE: ICD-10-CM

## 2019-03-13 PROCEDURE — 96375 TX/PRO/DX INJ NEW DRUG ADDON: CPT

## 2019-03-13 PROCEDURE — 99283 EMERGENCY DEPT VISIT LOW MDM: CPT

## 2019-03-13 PROCEDURE — 96374 THER/PROPH/DIAG INJ IV PUSH: CPT

## 2019-03-13 PROCEDURE — 6360000002 HC RX W HCPCS: Performed by: EMERGENCY MEDICINE

## 2019-03-13 RX ORDER — LORAZEPAM 2 MG/ML
1 INJECTION INTRAMUSCULAR ONCE
Status: COMPLETED | OUTPATIENT
Start: 2019-03-13 | End: 2019-03-13

## 2019-03-13 RX ORDER — DIAZEPAM 5 MG/ML
5 INJECTION, SOLUTION INTRAMUSCULAR; INTRAVENOUS ONCE
Status: DISCONTINUED | OUTPATIENT
Start: 2019-03-13 | End: 2019-03-13

## 2019-03-13 RX ORDER — METOCLOPRAMIDE HYDROCHLORIDE 5 MG/ML
10 INJECTION INTRAMUSCULAR; INTRAVENOUS ONCE
Status: COMPLETED | OUTPATIENT
Start: 2019-03-13 | End: 2019-03-13

## 2019-03-13 RX ORDER — KETOROLAC TROMETHAMINE 30 MG/ML
30 INJECTION, SOLUTION INTRAMUSCULAR; INTRAVENOUS ONCE
Status: COMPLETED | OUTPATIENT
Start: 2019-03-13 | End: 2019-03-13

## 2019-03-13 RX ADMIN — KETOROLAC TROMETHAMINE 30 MG: 30 INJECTION, SOLUTION INTRAMUSCULAR; INTRAVENOUS at 22:06

## 2019-03-13 RX ADMIN — LORAZEPAM 1 MG: 2 INJECTION INTRAMUSCULAR; INTRAVENOUS at 22:07

## 2019-03-13 RX ADMIN — METOCLOPRAMIDE 10 MG: 5 INJECTION, SOLUTION INTRAMUSCULAR; INTRAVENOUS at 22:06

## 2019-03-13 ASSESSMENT — PAIN SCALES - GENERAL
PAINLEVEL_OUTOF10: 8
PAINLEVEL_OUTOF10: 8

## 2019-03-13 ASSESSMENT — PAIN DESCRIPTION - ORIENTATION: ORIENTATION: RIGHT

## 2019-03-13 ASSESSMENT — PAIN DESCRIPTION - DIRECTION: RADIATING_TOWARDS: JAW

## 2019-03-13 ASSESSMENT — PAIN DESCRIPTION - PAIN TYPE: TYPE: ACUTE PAIN

## 2019-03-13 ASSESSMENT — PAIN DESCRIPTION - LOCATION: LOCATION: NECK

## 2019-03-17 DIAGNOSIS — E03.9 HYPOTHYROIDISM: ICD-10-CM

## 2019-03-19 RX ORDER — LEVOTHYROXINE SODIUM 0.12 MG/1
TABLET ORAL
Qty: 60 TABLET | Refills: 3 | Status: SHIPPED | OUTPATIENT
Start: 2019-03-19 | End: 2019-07-19 | Stop reason: SDUPTHER

## 2019-04-01 RX ORDER — TIZANIDINE 4 MG/1
TABLET ORAL
Qty: 60 TABLET | Refills: 0 | Status: SHIPPED | OUTPATIENT
Start: 2019-04-01 | End: 2019-04-30 | Stop reason: SDUPTHER

## 2019-04-02 ENCOUNTER — HOSPITAL ENCOUNTER (OUTPATIENT)
Dept: PAIN MANAGEMENT | Age: 49
Discharge: HOME | End: 2019-04-02
Payer: MEDICARE

## 2019-04-02 VITALS
RESPIRATION RATE: 16 BRPM | DIASTOLIC BLOOD PRESSURE: 70 MMHG | OXYGEN SATURATION: 96 % | WEIGHT: 177 LBS | HEIGHT: 64 IN | BODY MASS INDEX: 30.22 KG/M2 | TEMPERATURE: 98 F | SYSTOLIC BLOOD PRESSURE: 120 MMHG | HEART RATE: 77 BPM

## 2019-04-02 DIAGNOSIS — S13.4XXS SPRAIN OF ATLANTO-OCCIPITAL JOINT, SEQUELA: ICD-10-CM

## 2019-04-02 DIAGNOSIS — M48.02 STENOSIS, CERVICAL SPINE: Primary | ICD-10-CM

## 2019-04-02 DIAGNOSIS — Z51.81 ENCOUNTER FOR MEDICATION MONITORING: ICD-10-CM

## 2019-04-02 DIAGNOSIS — M47.812 OSTEOARTHRITIS OF CERVICAL SPINE, UNSPECIFIED SPINAL OSTEOARTHRITIS COMPLICATION STATUS: ICD-10-CM

## 2019-04-02 DIAGNOSIS — M54.12 CERVICAL RADICULOPATHY: ICD-10-CM

## 2019-04-02 DIAGNOSIS — M47.812 ARTHROPATHY OF CERVICAL FACET JOINT: ICD-10-CM

## 2019-04-02 DIAGNOSIS — M47.812 SPONDYLOSIS OF CERVICAL REGION WITHOUT MYELOPATHY OR RADICULOPATHY: ICD-10-CM

## 2019-04-02 DIAGNOSIS — K52.9 COLITIS: ICD-10-CM

## 2019-04-02 DIAGNOSIS — M48.02 DEGENERATIVE CERVICAL SPINAL STENOSIS: ICD-10-CM

## 2019-04-02 DIAGNOSIS — S13.4XXS ATLANTO-AXIAL JOINT SPRAIN, SEQUELA: ICD-10-CM

## 2019-04-02 PROCEDURE — 99213 OFFICE O/P EST LOW 20 MIN: CPT | Performed by: NURSE PRACTITIONER

## 2019-04-02 PROCEDURE — 99213 OFFICE O/P EST LOW 20 MIN: CPT

## 2019-04-02 RX ORDER — PREGABALIN 50 MG/1
50 CAPSULE ORAL 3 TIMES DAILY
Qty: 90 CAPSULE | Refills: 0 | Status: SHIPPED | OUTPATIENT
Start: 2019-04-02 | End: 2019-05-30

## 2019-04-02 RX ORDER — OXYCODONE AND ACETAMINOPHEN 10; 325 MG/1; MG/1
1 TABLET ORAL EVERY 8 HOURS PRN
Qty: 100 TABLET | Refills: 0 | Status: SHIPPED | OUTPATIENT
Start: 2019-04-02 | End: 2019-04-30 | Stop reason: SDUPTHER

## 2019-04-02 ASSESSMENT — ENCOUNTER SYMPTOMS
GASTROINTESTINAL NEGATIVE: 1
RESPIRATORY NEGATIVE: 1

## 2019-04-02 NOTE — PROGRESS NOTES
[facility] 89 PROGRESS NOTE      Patient here today to review Medication Agreement    Chief Complaint: neck pain      HPI: She c/o neck pain , she has more numbness and pain left arm  She still has headaches along with the neck pain. She had   Cervical facet joint injections at the levels of C2-3, C3-4, C4-5, C5-6, C6-7, C7-T1 on the Left side on 1/8/19 and reports 60% relief. She is not sleeping well due tot he pain. She had 1 ED visit for headache, neck pain. Neck Pain    This is a chronic problem. The current episode started more than 1 year ago. The problem occurs constantly. The problem has been gradually worsening. The pain is associated with nothing. The pain is present in the left side (down left arm). Quality: sharp. The pain is at a severity of 9/10. The pain is severe. Exacerbated by: laying on sides. The pain is same all the time. Stiffness is present all day. Associated symptoms include headaches, numbness and weakness. Associated symptoms comments: Left hand. She has tried heat, oral narcotics and muscle relaxants (pillow for neck) for the symptoms. The treatment provided mild relief. Patient denies any new neurological symptoms. No bowel or bladder incontinence, no weakness, and no falling. Treatment goals:  Functional status: reduce pain by 50%    Aberrancy:   Any alcoholic beverages   no    Any illegal drugs no        Analgesia:pain 9      Adverse  Effects :BM daily    ADL;s :not active      Pill count: appropriate    Morphine equivalent dose as reported on OARRS:45  Attestation: The Prescription Monitoring Report for this patient was reviewed today. (MARTITA Castellon CNP)  Chronic Pain Routine Monitoring: Obtaining appropriate analgesic effect of treatment., No signs of potential drug abuse or diversion identified: otherwise, see note documentation MARTITA Castellon - CNP)  Review ofOARRS does not show any aberrant prescription behavior.  Medication is helping the patient stay active. Patient denies any side effects and reports adequate analgesia. No sign of misuse/abuse. When was thelast UDS:   1-16-18          Was the UDS appropriate:yes        Record/Diagnostics Review:       As above, I did review the imaging     1/22/2018  1:31 PM - Tyree Hatfield Incoming Lab Results From [facility]      Component Results      Component Value Ref Range & Units Status Collected Lab   Pain Management Drug Panel Interp, Urine Inconsistent    Final 01/16/2018  1:45 PM BILL   (NOTE)   ________________________________________________________________   DRUGS EXPECTED:   PERCOCET (OXYCODONE) [1/14/18]   ________________________________________________________________   CONSISTENT with medications provided:   PERCOCET (OXYCODONE) : based on noroxycodone   ________________________________________________________________   INCONSISTENT with medications provided:   7-Aminoclonazepam   ________________________________________________________________   Drugs Not Included in this Assay:   Acetaminophen   ________________________________________________________________   INTERPRETIVE INFORMATION: Pain Mgt Chaudhary, Mass Spec/EMIT, Ur,                            Interp   Interpretation depends on accuracy and completeness of patient   medication information submitted by client.     6-Acetylmorphine, Ur Not Detected    Final 01/16/2018  1:45 PM [facility]   7-Aminoclonazepam, Urine Present    Final 01/16/2018  1:45 PM [facility]   Alpha-OH-Alpraz, Urine Not Detected    Final 01/16/2018  1:45 PM [facility]   Alprazolam, Urine Not Detected    Final 01/16/2018  1:45 PM [facility]   Amphetamines, urine Not Detected    Final 01/16/2018  1:45 PM [facility]   Barbiturates, Ur Not Detected    Final 01/16/2018  1:45 PM [facility]   Benzoylecgonine, Ur Not Detected    Final 01/16/2018  1:45 PM [facility]   Buprenorphine Urine Not Detected    Final 01/16/2018  1:45 PM [facility]   Carisoprodol, Ur Not Detected    Final 01/16/2018  1:45 PM [facility]   (NOTE) The carisoprodol immunoassay has cross-reactivity to carisoprodol   and meprobamate.     Clonazepam, Urine Not Detected    Final 01/16/2018  1:45 PM [facility]   Codeine, Urine Not Detected    Final 01/16/2018  1:45 PM [facility]   MDA, Ur Not Detected    Final 01/16/2018  1:45 PM [facility]   Diazepam, Urine Not Detected    Final 01/16/2018  1:45 PM [facility]   Ethyl Glucuronide Ur Not Detected    Final 01/16/2018  1:45 PM [facility]   Fentanyl, Ur Not Detected    Final 01/16/2018  1:45 PM [facility]   Hydrocodone, Urine Not Detected    Final 01/16/2018  1:45 PM [facility]   Hydromorphone, Urine Not Detected    Final 01/16/2018  1:45 PM [facility]   Lorazepam, Urine Not Detected    Final 01/16/2018  1:45 PM [facility]   Marijuana Metab, Ur Not Detected    Final 01/16/2018  1:45 PM [facility]   MDEA, AISHA, Ur Not Detected    Final 01/16/2018  1:45 PM [facility]   MDMA, Urine Not Detected    Final 01/16/2018  1:45 PM [facility]   Meperidine Metab, Ur Not Detected    Final 01/16/2018  1:45 PM [facility]   Methadone, Urine Not Detected    Final 01/16/2018  1:45 PM [facility]   Methamphetamine, Urine Not Detected    Final 01/16/2018  1:45 PM [facility]   Methylphenidate Not Detected    Final 01/16/2018  1:45 PM [facility]   Midazolam, Urine Not Detected    Final 01/16/2018  1:45 PM [facility]   Morphine Urine Not Detected    Final 01/16/2018  1:45 PM [facility]   Norbuprenorphine, Urine Not Detected    Final 01/16/2018  1:45 PM [facility]   Nordiazepam, Urine Not Detected    Final 01/16/2018  1:45 PM [facility]   Norfentanyl, Urine Not Detected    Final 01/16/2018  1:45 PM [facility]   NORHYDROCODONE, URINE Not Detected    Final 01/16/2018  1:45 PM [facility]   Noroxycodone, Urine Present    Final 01/16/2018  1:45 PM [facility]   NOROXYMORPHONE, URINE Not Detected    Final 01/16/2018  1:45 PM [facility]   Oxazepam, Urine Not Detected    Final 01/16/2018  1:45 PM [facility]   Oxycodone Urine Not Detected    Final 01/16/2018  1:45 PM [facility]   Oxymorphone, Urine Not Detected    Final 01/16/2018  1:45 PM [facility]   PCP, Urine Not Detected    Final 01/16/2018  1:45 PM [facility] Phentermine, Ur Not Detected    Final 01/16/2018  1:45 PM [facility]   Propoxyphene, Urine Not Detected    Final 01/16/2018  1:45 PM [facility]   Tapentadol-O-Sulfate, Urine Not Detected    Final 01/16/2018  1:45 PM [facility]   Tapentadol, Urine Not Detected    Final 01/16/2018  1:45 PM [facility]   Temazepam, Urine Not Detected    Final 01/16/2018  1:45 PM [facility]   Tramadol, Urine Not Detected    Final 01/16/2018  1:45 PM [facility]   Zolpidem, Urine Not Detected    Final 01/16/2018  1:45 PM [facility]   Drugs Expected, Ur     Final 01/16/2018  1:45  [facility]   PERCOCET [id number] BEDTIME    Creatinine, Ur 199.8  20.0 - 400.0 mg/dL Final 01/16/2018  1:45 PM [facility]   Pain Mgt Drug Panel, Hi Res, Ur See Below    Final 01/16/2018  1:45 PM [facility]   (NOTE)   Methodology: Qualitative Enzyme Immunoassay and Qualitative Liquid   Chromatography-Time of Flight-Mass Spectrometry or Tandem Mass   Spectrometry, Quantitative Spectrophotometry   The absence of expected drug(s) and/or drug metabolite(s) may   indicate non-compliance, inappropriate timing of specimen   collection relative to drug administration, poor drug absorption,   diluted/adulterated urine, or limitations of testing. The   concentration must be greater than or equal to the cutoff to be   reported as present.  If specific drug concentrations are   required, contact the laboratory within two weeks of specimen   collection to request quantification by a second analytical   technique. Interpretive questions should be directed to the   laboratory. Results based on immunoassay detection that do not match clinical   expectations should be   interpreted with caution. Confirmatory testing by mass   spectrometry for immunoassay-based results is available, if   ordered within two weeks of specimen collection. Additional   charges apply. For medical purposes only; not valid for forensic use. This test was developed and its performance characteristics   determined by [employer]. Current Outpatient Medications:     oxyCODONE-acetaminophen (PERCOCET)  MG per tablet, Take 1 tablet by mouth every 8 hours as needed for Pain for up to 30 days. May occasionally take an extra tab  For severe pain, Disp: 100 tablet, Rfl: 0    pregabalin (LYRICA) 50 MG capsule, Take 1 capsule by mouth 3 times daily for 30 days. , Disp: 90 capsule, Rfl: 0    levothyroxine (SYNTHROID) 125 MCG tablet, TAKE 1 TABLET BY MOUTH TWICE DAILY, Disp: 60 tablet, Rfl: 3    sertraline (ZOLOFT) 100 MG tablet, Take 100 mg by mouth daily, Disp: , Rfl:     rOPINIRole (REQUIP) 1 MG tablet, TAKE 1 TABLET BY MOUTH NIGHTLY, Disp: 90 tablet, Rfl: 0    atorvastatin (LIPITOR) 40 MG tablet, TAKE 1 TABLET BY MOUTH DAILY, Disp: 90 tablet, Rfl: 3    topiramate (TOPAMAX) 25 MG tablet, 25 mg 2 times daily , Disp: , Rfl:     metoprolol tartrate (LOPRESSOR) 50 MG tablet, Take 50 mg by mouth 2 times daily , Disp: , Rfl:     tiZANidine (ZANAFLEX) 4 MG tablet, TAKE 1 TABLET BY MOUTH EVERY 12 HOURS AS NEEDED FOR MUSCLE SPASMS, Disp: 60 tablet, Rfl: 0    Ibuprofen (ADVIL PO), Take by mouth, Disp: , Rfl:     clonazePAM (KLONOPIN) 0.5 MG tablet, Take 0.5 mg by mouth 3 times daily as needed. ., Disp: , Rfl:     esomeprazole (NEXIUM) 40 MG delayed release capsule, TAKE 1 CAPSULE BY MOUTH EVERY MORNING BEFORE BREAKFAST, Disp: 90 capsule, Rfl: 3    Family History   Problem Relation Age of Onset    Cancer Mother         vaginal    Heart Disease Father     Diabetes Father     Other Father         colon resection for colon polyps    Breast Cancer Maternal Grandmother     Stroke Maternal Grandfather        Social History     Socioeconomic History    Marital status:      Spouse name: Not on file    Number of children: Not on file    Years of education: Not on file    Highest education level: Not on file   Occupational History    Occupation: Homemaker   Social Needs    Financial resource strain: Not on file   Pending sale to [facility] insecurity:     Worry: Not on file     Inability: Not on file    Transportation needs:     Medical: Not on file     Non-medical: Not on file   Tobacco Use    Smoking status: Current Some Day Smoker     Packs/day: 0.50     Years: 20.00     Pack years: 10.00     Types: Cigarettes    Smokeless tobacco: Never Used    Tobacco comment: 1/2 pack every month   Substance and Sexual Activity    Alcohol use: No     Alcohol/week: 0.0 oz    Drug use: No    Sexual activity: Not Currently   Lifestyle    Physical activity:     Days per week: Not on file     Minutes per session: Not on file    Stress: Not on file   Relationships    Social connections:     Talks on phone: Not on file     Gets together: Not on file     Attends Church service: Not on file     Active member of club or organization: Not on file     Attends meetings of clubs or organizations: Not on file     Relationship status: Not on file    Intimate partner violence:     Fear of current or ex partner: Not on file     Emotionally abused: Not on file     Physically abused: Not on file     Forced sexual activity: Not on file   Other Topics Concern    Not on file   Social History Narrative    Not on file       Review of Systems:  Review of Systems   Constitution: Negative. HENT: Negative. Eyes:        Glasses   Cardiovascular: Negative. Respiratory: Negative. Endocrine: Negative. Skin: Negative. Musculoskeletal: Positive for neck pain. Gastrointestinal: Negative. Genitourinary: Negative. Neurological: Positive for headaches, numbness and weakness. Psychiatric/Behavioral: Positive for depression. Managing         Physical Exam:  /70   Pulse 77   Temp 98 °F (36.7 °C) (Tympanic)   Resp 16   Ht 5' 4\" (1.626 m)   Wt 177 lb (80.3 kg)   LMP 11/01/2010   SpO2 96%   BMI 30.38 kg/m²     Physical Exam   HENT:   Head: Normocephalic. Neck: Neck supple.    Pulmonary/Chest: Effort normal.   Musculoskeletal:        Cervical back: She exhibits decreased range of motion and tenderness. Back:    Tender over cervical facet joints on the left   Neurological: She is alert. A sensory deficit is present. Reflex Scores:       Tricep reflexes are 2+ on the right side and 2+ on the left side. Bicep reflexes are 2+ on the right side and 2+ on the left side. Brachioradialis reflexes are 2+ on the right side and 2+ on the left side. Left hand grasp weaker than right   Skin:        Psychiatric: Her speech is normal and behavior is normal. Judgment and thought content normal. Cognition and memory are normal. She exhibits a depressed mood.          Assessment:    Problem List Items Addressed This Visit     Stenosis, cervical spine - Primary    Relevant Medications    oxyCODONE-acetaminophen (PERCOCET)  MG per tablet    pregabalin (LYRICA) 50 MG capsule    Sprain of atlanto-occipital joint, sequela    Osteoarthritis of cervical spine (Chronic)    Relevant Medications    oxyCODONE-acetaminophen (PERCOCET)  MG per tablet    pregabalin (LYRICA) 50 MG capsule    Encounter for medication monitoring    Relevant Medications    oxyCODONE-acetaminophen (PERCOCET)  MG per tablet    Colitis    Cervical radiculopathy    Relevant Medications    oxyCODONE-acetaminophen (PERCOCET)  MG per tablet    pregabalin (LYRICA) 50 MG capsule    Atlanto-axial joint sprain, sequela    Relevant Medications    oxyCODONE-acetaminophen (PERCOCET)  MG per tablet    Arthropathy of cervical facet joint    Relevant Medications    oxyCODONE-acetaminophen (PERCOCET)  MG per tablet    Other Relevant Orders    MA INJ DX/THER AGNT PARAVERT FACET JOINT, CERV/THORAC, 1ST LEVEL      Other Visit Diagnoses     Degenerative cervical spinal stenosis        Relevant Medications    oxyCODONE-acetaminophen (PERCOCET)  MG per tablet    pregabalin (LYRICA) 50 MG capsule              Treatment Plan:  DISCUSSION: Treatment options discussed withpatient and all questions answered to patient's satisfaction. Possible side effects, risk of tolerance and or dependence and alternative treatments discussed    Obtaining appropriate analgesic effect of treatment   No signs of potential drug abuse or diversion identified    [x] Ill effects of being on chronic pain medications such as sleep disturbances, respiratory depression, hormonal changes, withdrawal symptoms, chronic opioid dependence and tolerance as well as risk of taking opioids with Benzodiazepines and taking opioids along with alcohol,  werediscussed with patient. I had asked the patient to minimize medication use and utilize pain medications only for uncontrolled rest pain or pain with exertional activities. I advised patient not to self-escalate painmedications without consulting with us. At each of patient's future visits we will try to taper pain medications, while adjusting the adjunct medications, and re-evaluating for Physical Therapy to improve spinal andjoint strength. We will continue to have discussions to decrease pain medications as tolerated. Counseled patient on effects their pain medication and /or their medical condition mayhave on their  ability to drive or operate machinery. Instructed not to drive or operate machinery if drowsy     I also discussed with the patient regarding the dangers of combining narcotic pain medication with tranquilizers, alcohol or illegal drugs or taking the medication any way other than prescribed. The dangers were discussed  including respiratory depression and death. Patient was told to tell  all  physicians regarding the medications he is getting from pain clinic. Patient is warned not to take any unprescribed medications over-the-countermedications that can depress breathing . Patient is advised to talk to the pharmacist or physicians if planning to take any over-the-counter medications before  takeing them.  Patient is strongly advised to avoid tranquilizers or  relaxants, illegal drugs  or any medications that can depress breathing  Patient is also advised to tell us if there is any changes in their medications from other physicians.     Will discontinue gabapentin as it is not helping and start on lyrica 50 mg tid,  Will increase percocet so she can have 10 extra tabs a month for when she has flare ups  She is trying to avoid cervicals urgery    Will schedule for left cervical facet injection  C2-T1as pain has increased, pain a 9, last injection 1/2019 she obtained 60% relief    TREATMENT OPTIONS:     Return ASAP  Left cervical facet injection  Medication Agreement Requirements Met  Continue Opioid therapy  Script written for percocet, lyrica  Follow up appointment made

## 2019-04-22 ENCOUNTER — HOSPITAL ENCOUNTER (OUTPATIENT)
Dept: PAIN MANAGEMENT | Age: 49
Discharge: HOME | End: 2019-04-22
Payer: MEDICARE

## 2019-04-22 DIAGNOSIS — M47.812 OSTEOARTHRITIS OF CERVICAL SPINE, UNSPECIFIED SPINAL OSTEOARTHRITIS COMPLICATION STATUS: ICD-10-CM

## 2019-04-22 DIAGNOSIS — M47.812 ARTHROPATHY OF CERVICAL FACET JOINT: ICD-10-CM

## 2019-04-22 DIAGNOSIS — S13.4XXS ATLANTO-AXIAL JOINT SPRAIN, SEQUELA: ICD-10-CM

## 2019-04-22 DIAGNOSIS — M48.02 DEGENERATIVE CERVICAL SPINAL STENOSIS: ICD-10-CM

## 2019-04-22 DIAGNOSIS — S13.4XXS SPRAIN OF ATLANTO-OCCIPITAL JOINT, SEQUELA: ICD-10-CM

## 2019-04-22 DIAGNOSIS — M47.812 SPONDYLOSIS OF CERVICAL REGION WITHOUT MYELOPATHY OR RADICULOPATHY: Primary | ICD-10-CM

## 2019-04-30 ENCOUNTER — HOSPITAL ENCOUNTER (OUTPATIENT)
Dept: GENERAL RADIOLOGY | Age: 49
Discharge: HOME | End: 2019-05-02
Payer: MEDICARE

## 2019-04-30 ENCOUNTER — HOSPITAL ENCOUNTER (OUTPATIENT)
Dept: PAIN MANAGEMENT | Age: 49
Discharge: HOME | End: 2019-04-30
Payer: MEDICARE

## 2019-04-30 VITALS
OXYGEN SATURATION: 99 % | RESPIRATION RATE: 18 BRPM | TEMPERATURE: 98.6 F | HEART RATE: 74 BPM | BODY MASS INDEX: 30.22 KG/M2 | SYSTOLIC BLOOD PRESSURE: 101 MMHG | HEIGHT: 64 IN | DIASTOLIC BLOOD PRESSURE: 69 MMHG | WEIGHT: 177 LBS

## 2019-04-30 DIAGNOSIS — M47.812 SPONDYLOSIS OF CERVICAL REGION WITHOUT MYELOPATHY OR RADICULOPATHY: Primary | ICD-10-CM

## 2019-04-30 DIAGNOSIS — M48.02 DEGENERATIVE CERVICAL SPINAL STENOSIS: ICD-10-CM

## 2019-04-30 DIAGNOSIS — M47.812 ARTHROPATHY OF CERVICAL FACET JOINT: ICD-10-CM

## 2019-04-30 DIAGNOSIS — M47.812 SPONDYLOSIS OF CERVICAL REGION WITHOUT MYELOPATHY OR RADICULOPATHY: ICD-10-CM

## 2019-04-30 DIAGNOSIS — S13.4XXS SPRAIN OF ATLANTO-OCCIPITAL JOINT, SEQUELA: ICD-10-CM

## 2019-04-30 DIAGNOSIS — M54.12 CERVICAL RADICULOPATHY: ICD-10-CM

## 2019-04-30 DIAGNOSIS — Z51.81 ENCOUNTER FOR MEDICATION MONITORING: ICD-10-CM

## 2019-04-30 DIAGNOSIS — M47.812 OSTEOARTHRITIS OF CERVICAL SPINE, UNSPECIFIED SPINAL OSTEOARTHRITIS COMPLICATION STATUS: ICD-10-CM

## 2019-04-30 DIAGNOSIS — S13.4XXS ATLANTO-AXIAL JOINT SPRAIN, SEQUELA: ICD-10-CM

## 2019-04-30 PROCEDURE — 64491 INJ PARAVERT F JNT C/T 2 LEV: CPT

## 2019-04-30 PROCEDURE — 64490 INJ PARAVERT F JNT C/T 1 LEV: CPT | Performed by: PAIN MEDICINE

## 2019-04-30 PROCEDURE — 6360000004 HC RX CONTRAST MEDICATION: Performed by: PAIN MEDICINE

## 2019-04-30 PROCEDURE — 64492 INJ PARAVERT F JNT C/T 3 LEV: CPT

## 2019-04-30 PROCEDURE — 64490 INJ PARAVERT F JNT C/T 1 LEV: CPT

## 2019-04-30 PROCEDURE — 99152 MOD SED SAME PHYS/QHP 5/>YRS: CPT | Performed by: PAIN MEDICINE

## 2019-04-30 PROCEDURE — 3209999900 FLUORO FOR SURGICAL PROCEDURES

## 2019-04-30 PROCEDURE — 2500000003 HC RX 250 WO HCPCS: Performed by: PAIN MEDICINE

## 2019-04-30 PROCEDURE — 64491 INJ PARAVERT F JNT C/T 2 LEV: CPT | Performed by: PAIN MEDICINE

## 2019-04-30 PROCEDURE — 64492 INJ PARAVERT F JNT C/T 3 LEV: CPT | Performed by: PAIN MEDICINE

## 2019-04-30 PROCEDURE — 6360000002 HC RX W HCPCS: Performed by: PAIN MEDICINE

## 2019-04-30 RX ORDER — FENTANYL CITRATE 50 UG/ML
INJECTION, SOLUTION INTRAMUSCULAR; INTRAVENOUS
Status: COMPLETED | OUTPATIENT
Start: 2019-04-30 | End: 2019-04-30

## 2019-04-30 RX ORDER — OXYCODONE AND ACETAMINOPHEN 10; 325 MG/1; MG/1
1 TABLET ORAL EVERY 8 HOURS PRN
Qty: 90 TABLET | Refills: 0 | Status: SHIPPED | OUTPATIENT
Start: 2019-05-03 | End: 2019-06-11 | Stop reason: SDUPTHER

## 2019-04-30 RX ORDER — MIDAZOLAM HYDROCHLORIDE 1 MG/ML
INJECTION INTRAMUSCULAR; INTRAVENOUS
Status: COMPLETED | OUTPATIENT
Start: 2019-04-30 | End: 2019-04-30

## 2019-04-30 RX ORDER — TIZANIDINE 4 MG/1
TABLET ORAL
Qty: 60 TABLET | Refills: 3 | Status: SHIPPED | OUTPATIENT
Start: 2019-04-30 | End: 2019-08-27 | Stop reason: SDUPTHER

## 2019-04-30 RX ORDER — BUPIVACAINE HYDROCHLORIDE 5 MG/ML
INJECTION, SOLUTION EPIDURAL; INTRACAUDAL
Status: COMPLETED | OUTPATIENT
Start: 2019-04-30 | End: 2019-04-30

## 2019-04-30 RX ORDER — TRIAMCINOLONE ACETONIDE 40 MG/ML
INJECTION, SUSPENSION INTRA-ARTICULAR; INTRAMUSCULAR
Status: COMPLETED | OUTPATIENT
Start: 2019-04-30 | End: 2019-04-30

## 2019-04-30 RX ADMIN — MIDAZOLAM 2 MG: 1 INJECTION INTRAMUSCULAR; INTRAVENOUS at 08:15

## 2019-04-30 RX ADMIN — BUPIVACAINE HYDROCHLORIDE 12 ML: 5 INJECTION, SOLUTION EPIDURAL; INTRACAUDAL; PERINEURAL at 08:34

## 2019-04-30 RX ADMIN — TRIAMCINOLONE ACETONIDE 80 MG: 40 INJECTION, SUSPENSION INTRA-ARTICULAR; INTRAMUSCULAR at 08:34

## 2019-04-30 RX ADMIN — Medication 25 MCG: at 08:24

## 2019-04-30 RX ADMIN — Medication 50 MCG: at 08:20

## 2019-04-30 RX ADMIN — IOHEXOL 3 ML: 180 INJECTION INTRAVENOUS at 08:33

## 2019-04-30 ASSESSMENT — PAIN DESCRIPTION - LOCATION: LOCATION: NECK;SHOULDER

## 2019-04-30 ASSESSMENT — PAIN DESCRIPTION - FREQUENCY: FREQUENCY: CONTINUOUS

## 2019-04-30 ASSESSMENT — PAIN DESCRIPTION - ONSET: ONSET: ON-GOING

## 2019-04-30 ASSESSMENT — PAIN DESCRIPTION - ORIENTATION: ORIENTATION: LEFT;RIGHT

## 2019-04-30 ASSESSMENT — PAIN DESCRIPTION - PAIN TYPE: TYPE: CHRONIC PAIN

## 2019-04-30 ASSESSMENT — PAIN DESCRIPTION - PROGRESSION: CLINICAL_PROGRESSION: GRADUALLY WORSENING

## 2019-04-30 ASSESSMENT — PAIN - FUNCTIONAL ASSESSMENT
PAIN_FUNCTIONAL_ASSESSMENT: PREVENTS OR INTERFERES WITH MANY ACTIVE NOT PASSIVE ACTIVITIES
PAIN_FUNCTIONAL_ASSESSMENT: 0-10

## 2019-04-30 ASSESSMENT — PAIN SCALES - GENERAL: PAINLEVEL_OUTOF10: 8

## 2019-04-30 NOTE — PROCEDURES
Pre-Procedure Note    Patient Name: Ferdinand Correia   YOB: 1970  Room/Bed: Room/bed info not found  Medical Record Number: [id number]  Date: 2019       Indication:    1. Spondylosis of cervical region without myelopathy or radiculopathy    2. Atlanto-axial joint sprain, sequela    3. Sprain of atlanto-occipital joint, sequela    4. Osteoarthritis of cervical spine, unspecified spinal osteoarthritis complication status    5. Arthropathy of cervical facet joint    6. Degenerative cervical spinal stenosis    7. Encounter for medication monitoring    8. Cervical radiculopathy        Consent: On file. Vital Signs:   Vitals:    19 0854   BP: 101/69   Pulse: 74   Resp: 18   Temp:    SpO2: 99%       Past Medical History:   has a past medical history of Anxiety, CAD (coronary artery disease), Fatty liver, GERD (gastroesophageal reflux disease), Headache, History of bronchitis, Hyperlipidemia, Hypothyroidism, Kidney stone, LFT elevation, MVP (mitral valve prolapse), Obesity, and Umbilical hernia. Past Surgical History:   has a past surgical history that includes Upper gastrointestinal endoscopy (2010); hernia repair; Cholecystectomy; Hysterectomy; Appendectomy;  section; Colonoscopy (); Colonoscopy (14); Colonoscopy (2014); pr exploratory of abdomen (10/21/2014); and Colonoscopy (N/A, 2018). Pre-Sedation Documentation and Exam:   Vital signs have been reviewed (see flow sheet for vitals). Mallampati Airway Assessment:  normal    ASA Classification:  Class 3 - A patient with severe systemic disease that limits activity but is not incapacitating    Sedation/ Anesthesia Plan:   intravenous sedation  as needed. Medications Planned:   midazolam (Versed) / Fentanyl  Intravenously  as needed. Patient is an appropriate candidate for plan of sedation: yes  Patient's History and Physical examination was reviewed and there is no change.   Electronically signed by Bernard Nunez MD on 4/30/2019 at 9:01 AM    Preoperative Diagnosis:    1. Degenerative cervical disc disease. 2.  Cervical spondylosis. 3.  Facet joint arthropathy. Postoperative Diagnosis:   1. Degenerative cervical disc disease. 2.  Cervical spondylosis. 3.  Facet joint arthropathy. Procedure Performed:  Cervical facet joint injections at the levels of C2-3, C3-4, C4-5, C5-6, C6-7, C7-T1 on the Left side with fluoroscopic guidance, with IV sedation    Indication for the Procedure: The patient had a history of chronic cervical pain that is not responding well to the conservative treatment. Patient's pain is mostly axial in nature. Pain is interfering with the activities of daily living. Physical examination revealed facet tenderness and facet loading is positive. We decided to try cervical facet joint injection for diagnostic as well as for therapeutic purposes. The procedure and its risks were discussed with the patient and an informed consent was obtained. .Current Pain Assessment  Pain Assessment  Pain Assessment: 0-10  Pain Level: 0  Patient's Stated Pain Goal: 4  Pain Type: Chronic pain  Pain Location: Neck, Shoulder  Pain Orientation: Left, Right  Pain Radiating Towards: Neck pain to back of head, arms bilat. upper back  Pain Descriptors: Aching, Sharp, Shooting, Spasm, Numbness  Pain Frequency: Continuous  Pain Onset: On-going  Clinical Progression: Gradually worsening  Effect of Pain on Daily Activities: pain increases w/head movement & lifting  Functional Pain Assessment: Prevents or interferes with many active not passive activities  Non-Pharmaceutical Pain Intervention(s): Heat applied, Repositioned  Response to Pain Intervention: (1/2019 left cervical facet helped 60%)  POSS Score (Patient Ctrl Analgesia): 1   Procedure:  After starting an IV, the patient was sedated with 2 mg of Midazolam and 75 mcg of Fentanyl intravenously by the RN under my direct supervision.  Patient's vital signs including BP, EKG and SaO2 were monitored by RN and they remained stable during the procedure. A meaningful communication was kept up with the patient throughout   the procedure. The patient is placed in prone position. Skin over the back was prepped and draped in sterile manner. Under fluoroscopy the facet joints were identified and palpated, and the following joints were found to be tender:  C2-3, C3-4, C4-5, C5-6, C6-7, C7-T1 on Left side. Hence we decided to inject these joints in the following way. Using fluoroscopy the facet joints were identified and by adjusting angle of the fluoroscopy the view of the joint space was optimized. The skin and deep tissues over the joint space were anesthetized with 1mL of 0.5% Marcaine. The #22-gauge, 3-1/2 inch spinal needle was introduced through the skin wheal under fluoroscopic guidance such that the tip of the needle lies in the joint space. This was confirmed by injecting about 0.5 mL of Omnipaque-180 through the needle and observing the spread of the contrast along the joint space. Then after negative aspiration a mixture of 0.5% Marcaine with triamcinolone was injected into the joint space. This was done at the levels of C2-3, C3-4, C4-5, C5-6, C6-7, C7-T1 on the Left side. A total of 80 milligrams of triamcinolone and 6 mL of 0.5% Marcaine was used and was divided in equal amounts among the joints. After removing the needles Band-Aids were placed over the needle insertion sites. Patient's vital signs remained stable and tolerated the procedure well. Patient was discharged home in stable condition and will be followed in the pain clinic in the next few weeks for further planning.   Electronically signed by Brian Cisse MD on 4/30/2019 at 9:01 AM

## 2019-04-30 NOTE — PROGRESS NOTES
Discharge criteria met. Patient alert and oriented x3  Post procedure dressing dry and intact. Sensory and motor function intact as per pre-procedure. Instructions and follow up reviewed with pt at patient at discharge. Patient discharged by wheelchair with volunteer and father @09:10am/ Steady gait. Percocet script given to pt.

## 2019-05-01 ENCOUNTER — TELEPHONE (OUTPATIENT)
Dept: PAIN MANAGEMENT | Age: 49
End: 2019-05-01

## 2019-05-01 RX ORDER — ATORVASTATIN CALCIUM 40 MG/1
40 TABLET, FILM COATED ORAL DAILY
Qty: 90 TABLET | Refills: 3 | Status: SHIPPED | OUTPATIENT
Start: 2019-05-01 | End: 2020-05-04

## 2019-05-01 NOTE — TELEPHONE ENCOUNTER
Medication: atorvastatin   Last visit: 1/3/2019  Next visit: 5/2/2019  Last refill: 4/3/18  Pharmacy: [facility]

## 2019-05-07 DIAGNOSIS — G62.9 NEUROPATHY: ICD-10-CM

## 2019-05-08 RX ORDER — GABAPENTIN 300 MG/1
300 CAPSULE ORAL 2 TIMES DAILY
Qty: 180 CAPSULE | Refills: 0 | Status: SHIPPED | OUTPATIENT
Start: 2019-05-08 | End: 2019-08-07 | Stop reason: SDUPTHER

## 2019-05-12 ENCOUNTER — HOSPITAL ENCOUNTER (EMERGENCY)
Age: 49
Discharge: HOME | End: 2019-05-12
Attending: EMERGENCY MEDICINE
Payer: MEDICARE

## 2019-05-12 ENCOUNTER — APPOINTMENT (OUTPATIENT)
Dept: CT IMAGING | Age: 49
End: 2019-05-12
Payer: MEDICARE

## 2019-05-12 VITALS
RESPIRATION RATE: 16 BRPM | WEIGHT: 187 LBS | BODY MASS INDEX: 31.92 KG/M2 | OXYGEN SATURATION: 94 % | HEIGHT: 64 IN | TEMPERATURE: 97.6 F | DIASTOLIC BLOOD PRESSURE: 67 MMHG | SYSTOLIC BLOOD PRESSURE: 118 MMHG | HEART RATE: 87 BPM

## 2019-05-12 DIAGNOSIS — K11.20 PAROTIDITIS: Primary | ICD-10-CM

## 2019-05-12 LAB
ABSOLUTE EOS #: 0.2 K/UL (ref 0–0.4)
ABSOLUTE IMMATURE GRANULOCYTE: ABNORMAL K/UL (ref 0–0.3)
ABSOLUTE LYMPH #: 3.2 K/UL (ref 1–4.8)
ABSOLUTE MONO #: 0.6 K/UL (ref 0.1–1.3)
ANION GAP SERPL CALCULATED.3IONS-SCNC: 15 MMOL/L (ref 9–17)
BASOPHILS # BLD: 1 % (ref 0–2)
BASOPHILS ABSOLUTE: 0.1 K/UL (ref 0–0.2)
BUN BLDV-MCNC: 21 MG/DL (ref 6–20)
BUN/CREAT BLD: ABNORMAL (ref 9–20)
CALCIUM SERPL-MCNC: 9.4 MG/DL (ref 8.6–10.4)
CHLORIDE BLD-SCNC: 110 MMOL/L (ref 98–107)
CO2: 19 MMOL/L (ref 20–31)
CREAT SERPL-MCNC: 0.86 MG/DL (ref 0.5–0.9)
DIFFERENTIAL TYPE: ABNORMAL
EOSINOPHILS RELATIVE PERCENT: 2 % (ref 0–4)
GFR AFRICAN AMERICAN: >60 ML/MIN
GFR NON-AFRICAN AMERICAN: >60 ML/MIN
GFR SERPL CREATININE-BSD FRML MDRD: ABNORMAL ML/MIN/{1.73_M2}
GFR SERPL CREATININE-BSD FRML MDRD: ABNORMAL ML/MIN/{1.73_M2}
GLUCOSE BLD-MCNC: 122 MG/DL (ref 70–99)
HCT VFR BLD CALC: 47 % (ref 36–46)
HEMOGLOBIN: 15.6 G/DL (ref 12–16)
IMMATURE GRANULOCYTES: ABNORMAL %
LYMPHOCYTES # BLD: 29 % (ref 24–44)
MCH RBC QN AUTO: 30.7 PG (ref 26–34)
MCHC RBC AUTO-ENTMCNC: 33.1 G/DL (ref 31–37)
MCV RBC AUTO: 92.7 FL (ref 80–100)
MONOCYTES # BLD: 5 % (ref 1–7)
NRBC AUTOMATED: ABNORMAL PER 100 WBC
PDW BLD-RTO: 13.2 % (ref 11.5–14.9)
PLATELET # BLD: 193 K/UL (ref 150–450)
PLATELET ESTIMATE: ABNORMAL
PMV BLD AUTO: 8.6 FL (ref 6–12)
POTASSIUM SERPL-SCNC: 3.7 MMOL/L (ref 3.7–5.3)
RBC # BLD: 5.06 M/UL (ref 4–5.2)
RBC # BLD: ABNORMAL 10*6/UL
SEG NEUTROPHILS: 63 % (ref 36–66)
SEGMENTED NEUTROPHILS ABSOLUTE COUNT: 7.1 K/UL (ref 1.3–9.1)
SODIUM BLD-SCNC: 144 MMOL/L (ref 135–144)
WBC # BLD: 11.1 K/UL (ref 3.5–11)
WBC # BLD: ABNORMAL 10*3/UL

## 2019-05-12 PROCEDURE — 85025 COMPLETE CBC W/AUTO DIFF WBC: CPT

## 2019-05-12 PROCEDURE — 99284 EMERGENCY DEPT VISIT MOD MDM: CPT

## 2019-05-12 PROCEDURE — 6360000004 HC RX CONTRAST MEDICATION: Performed by: PHYSICIAN ASSISTANT

## 2019-05-12 PROCEDURE — 6360000002 HC RX W HCPCS: Performed by: PHYSICIAN ASSISTANT

## 2019-05-12 PROCEDURE — 96374 THER/PROPH/DIAG INJ IV PUSH: CPT

## 2019-05-12 PROCEDURE — 80048 BASIC METABOLIC PNL TOTAL CA: CPT

## 2019-05-12 PROCEDURE — 6370000000 HC RX 637 (ALT 250 FOR IP): Performed by: PHYSICIAN ASSISTANT

## 2019-05-12 PROCEDURE — 2580000003 HC RX 258: Performed by: PHYSICIAN ASSISTANT

## 2019-05-12 PROCEDURE — 36415 COLL VENOUS BLD VENIPUNCTURE: CPT

## 2019-05-12 PROCEDURE — 70491 CT SOFT TISSUE NECK W/DYE: CPT

## 2019-05-12 RX ORDER — 0.9 % SODIUM CHLORIDE 0.9 %
80 INTRAVENOUS SOLUTION INTRAVENOUS ONCE
Status: COMPLETED | OUTPATIENT
Start: 2019-05-12 | End: 2019-05-12

## 2019-05-12 RX ORDER — SODIUM CHLORIDE 0.9 % (FLUSH) 0.9 %
10 SYRINGE (ML) INJECTION PRN
Status: DISCONTINUED | OUTPATIENT
Start: 2019-05-12 | End: 2019-05-13 | Stop reason: HOSPADM

## 2019-05-12 RX ORDER — CEPHALEXIN 500 MG/1
500 CAPSULE ORAL 4 TIMES DAILY
Qty: 28 CAPSULE | Refills: 0 | Status: SHIPPED | OUTPATIENT
Start: 2019-05-12 | End: 2019-05-19

## 2019-05-12 RX ORDER — ONDANSETRON 4 MG/1
4 TABLET, ORALLY DISINTEGRATING ORAL ONCE
Status: COMPLETED | OUTPATIENT
Start: 2019-05-12 | End: 2019-05-12

## 2019-05-12 RX ORDER — HYDROCODONE BITARTRATE AND ACETAMINOPHEN 5; 325 MG/1; MG/1
1 TABLET ORAL EVERY 6 HOURS PRN
Qty: 10 TABLET | Refills: 0 | Status: SHIPPED | OUTPATIENT
Start: 2019-05-12 | End: 2019-06-11 | Stop reason: SDUPTHER

## 2019-05-12 RX ADMIN — HYDROMORPHONE HYDROCHLORIDE 0.5 MG: 1 INJECTION, SOLUTION INTRAMUSCULAR; INTRAVENOUS; SUBCUTANEOUS at 20:50

## 2019-05-12 RX ADMIN — Medication 10 ML: at 21:34

## 2019-05-12 RX ADMIN — IOVERSOL 75 ML: 741 INJECTION INTRA-ARTERIAL; INTRAVENOUS at 21:34

## 2019-05-12 RX ADMIN — ONDANSETRON 4 MG: 4 TABLET, ORALLY DISINTEGRATING ORAL at 22:18

## 2019-05-12 RX ADMIN — SODIUM CHLORIDE 80 ML: 9 INJECTION, SOLUTION INTRAVENOUS at 21:34

## 2019-05-12 ASSESSMENT — PAIN SCALES - GENERAL
PAINLEVEL_OUTOF10: 10
PAINLEVEL_OUTOF10: 10

## 2019-05-12 ASSESSMENT — PAIN DESCRIPTION - LOCATION: LOCATION: NECK

## 2019-05-12 ASSESSMENT — PAIN DESCRIPTION - ORIENTATION: ORIENTATION: RIGHT

## 2019-05-12 ASSESSMENT — PAIN DESCRIPTION - PAIN TYPE: TYPE: ACUTE PAIN

## 2019-05-12 ASSESSMENT — PAIN DESCRIPTION - DESCRIPTORS: DESCRIPTORS: TENDER

## 2019-05-12 NOTE — LETTER
[facility] ED  [address] [id number]  Phone: [phone]         May 12, 2019     Patient: Dariana Arboleda   YOB: 1970   Date of Visit: 5/12/2019       To Whom It May Concern:    Philip Juarez was seen and treated in our emergency department on 5/12/2019. The following work duties are recommended.     She may return to work on 5/15/19    Treatment and work recommendations given by the emergency department are initial emergency measures only, and follow up should be arranged as soon as possible      Sincerely,    Brandon Lundberg RN        Signature:__________________________________

## 2019-05-13 NOTE — ED PROVIDER NOTES
eMERGENCY dEPARTMENT eNCOUnter   Independent Attestation     Pt Name: Nisha Betts  MRN: [id number]  [place] 1970  Date of evaluation: 5/13/19     Nisha Betts is a 53 y.o. female with CC: Lymphadenopathy (neck)      Based on the medical record the care appears appropriate. I was personally available for consultation in the Emergency Department.     Lori Montague MD  Attending Emergency Physician                    Sara Yin MD  05/13/19 [id number]

## 2019-05-13 NOTE — ED PROVIDER NOTES
[address] [facility] ED  eMERGENCY dEPARTMENT eNCOUnter      Pt Name: John Song  MRN: [id number]  [place] 1970  Date of evaluation: 2019  Provider: BERTRAM Hines    CHIEF COMPLAINT       Chief Complaint   Patient presents with    Lymphadenopathy     neck           HISTORY OF PRESENT ILLNESS  (Location/Symptom, Timing/Onset, Context/Setting, Quality, Duration, Modifying Factors, Severity.)   John Song is a 53 y.o. female who presents to the emergency department complain Winter the right side of her face and cheek. Patient states he has a history of several gland stones but had surgery for this in the past approximately 10 years ago. She states that she recently developed swelling to the right area that reminded her of the swelling that she used to get when she had salivary gland stones. Denies any fevers or chills or recent illness. Denies any trouble breathing       Nursing Notes were reviewed. REVIEW OF SYSTEMS    (2-9 systems for level 4, 10 or more for level 5)     Review of Systems   Right cheek swelling    Except as noted above the remainder of the review of systems was reviewed and negative.        PAST MEDICAL HISTORY     Past Medical History:   Diagnosis Date    Anxiety     CAD (coronary artery disease)     Mitral valve prolapse    Fatty liver     GERD (gastroesophageal reflux disease)     Headache     History of bronchitis     Hyperlipidemia     Hypothyroidism     Kidney stone     LFT elevation     MVP (mitral valve prolapse)     Obesity     Umbilical hernia      None otherwise stated in nurses notes    SURGICAL HISTORY       Past Surgical History:   Procedure Laterality Date    APPENDECTOMY       SECTION      2 pfannenstiel, 1 vertical    CHOLECYSTECTOMY      COLONOSCOPY      Dr Kamla Larson COLONOSCOPY  14    COLONOSCOPY  2014    biopsy & sigmoid spasms, pathology negative    COLONOSCOPY N/A 2018    COLONOSCOPY WITH BIOPSY performed by Bob Villegas MD at [facility] [address]      x 5    HYSTERECTOMY      2010    [place] EXPLORATORY OF ABDOMEN  10/21/2014    Laparotomy-lysis of adhesions, bso     UPPER GASTROINTESTINAL ENDOSCOPY  2/17/2010    mild chronic inactive gastritis     None otherwise stated in nurses notes    [address]       Discharge Medication List as of 5/12/2019 10:16 PM      CONTINUE these medications which have NOT CHANGED    Details   gabapentin (NEURONTIN) 300 MG capsule Take 1 capsule by mouth 2 times daily for 28 days. , Disp-180 capsule, R-0Normal      atorvastatin (LIPITOR) 40 MG tablet Take 1 tablet by mouth daily, Disp-90 tablet, R-3**Patient requests 90 days supply**Normal      oxyCODONE-acetaminophen (PERCOCET)  MG per tablet Take 1 tablet by mouth every 8 hours as needed for Pain for up to 30 days. May occasionally take an extra tab  For severe pain, Disp-90 tablet, R-0Print      tiZANidine (ZANAFLEX) 4 MG tablet TAKE 1 TABLET BY MOUTH EVERY 12 HOURS AS NEEDED FOR MUSCLE SPASMS, Disp-60 tablet, R-3Normal      pregabalin (LYRICA) 50 MG capsule Take 1 capsule by mouth 3 times daily for 30 days. , Disp-90 capsule, R-0Normal      levothyroxine (SYNTHROID) 125 MCG tablet TAKE 1 TABLET BY MOUTH TWICE DAILY, Disp-60 tablet, R-3Normal      Ibuprofen (ADVIL PO) Take by mouthHistorical Med      clonazePAM (KLONOPIN) 0.5 MG tablet Take 0.5 mg by mouth 3 times daily as needed. Paul Zamudio Historical Med      sertraline (ZOLOFT) 100 MG tablet Take 100 mg by mouth dailyHistorical Med      esomeprazole (NEXIUM) 40 MG delayed release capsule TAKE 1 CAPSULE BY MOUTH EVERY MORNING BEFORE BREAKFAST, Disp-90 capsule, R-3Normal      rOPINIRole (REQUIP) 1 MG tablet TAKE 1 TABLET BY MOUTH NIGHTLY, Disp-90 tablet, R-0Normal      topiramate (TOPAMAX) 25 MG tablet 25 mg 2 times daily Historical Med      metoprolol tartrate (LOPRESSOR) 50 MG tablet Take 50 mg by mouth 2 times daily Historical Med             ALLERGIES Compazine [prochlorperazine]; Morphine; Sulfa antibiotics; and Adhesive tape    FAMILY HISTORY           Problem Relation Age of Onset    Cancer Mother         vaginal    Heart Disease Father     Diabetes Father     Other Father         colon resection for colon polyps    Breast Cancer Maternal Grandmother     Stroke Maternal Grandfather      Family Status   Relation Name Status    Mother      Father  Alive    MGM      MGF      PGM      PGF        None otherwise stated in nurses notes    SOCIAL HISTORY      reports that she has been smoking cigarettes. She has a 10.00 pack-year smoking history. She has never used smokeless tobacco. She reports that she does not drink alcohol or use drugs. lives at home with others     PHYSICAL EXAM    (up to 7 for level 4, 8 or more for level 5)     ED Triage Vitals [19]   BP Temp Temp Source Pulse Resp SpO2 Height Weight   134/84 97.6 °F (36.4 °C) Oral 80 15 95 % 5' 4\" (1.626 m) 187 lb (84.8 kg)       Physical Exam   Nursing note and vitals reviewed. Constitutional: Oriented to person, place, and time and well-developed, well-nourished. Head: Normocephalic and atraumatic. Ear: External ears normal.   Nose: Nose normal and midline. Eyes: Conjunctivae and EOM are normal. Pupils are equal, round, and reactive to light. Neck: Normal range of motion. Neck supple. Throat: Posterior pharynx is without erythema or exudates, airway is patent, no swelling  Cardiovascular: Normal rate, regular rhythm, normal heart sounds and intact distal pulses. Pulmonary/Chest: Effort normal and breath sounds normal. No respiratory distress. No wheezes. No rales. No chest tenderness. Abdominal: Soft. Bowel sounds are normal. No distension and no mass. There is no tenderness. There is no rebound and no guarding. Musculoskeletal: Normal range of motion. Neurological: Alert and oriented to person, place, and time.  GCS score is 15.   Skin: Skin is warm and dry. No rash noted. No erythema. No pallor. Psychiatric: Mood, memory, affect and judgment normal.           DIAGNOSTIC RESULTS     EKG: All EKG's are interpreted by the Emergency Department Physician who either signs or Co-signs this chart in the absence of a cardiologist.        RADIOLOGY:   All plain film, CT, MRI, and formal ultrasound images (except ED bedside ultrasound) are read by the radiologist  CT SOFT TISSUE NECK W CONTRAST   Final Result   Very slight soft tissue edema in the inferior right parotid space. No   evidence of sialolith. Ct Soft Tissue Neck W Contrast    Result Date: 5/12/2019  EXAMINATION: CT OF THE NECK SOFT TISSUE WITH CONTRAST  5/12/2019 TECHNIQUE: CT of the neck was performed with the administration of intravenous contrast. Multiplanar reformatted images are provided for review. Dose modulation, iterative reconstruction, and/or weight based adjustment of the mA/kV was utilized to reduce the radiation dose to as low as reasonably achievable. COMPARISON: Cervical spine CT 06/25/2018, CT soft tissue neck 02/10/2012 HISTORY: ORDERING SYSTEM PROVIDED HISTORY: right side facial swelling. hx of salvary gland stones. TECHNOLOGIST PROVIDED HISTORY: Ordering Physician Provided Reason for Exam: right side facial swelling. hx of salvary gland stones. Acuity: Acute Type of Exam: Initial FINDINGS: PHARYNX/LARYNX:  The palatine tonsils are normal in appearance. The tongue is normal in appearance. The valleculae, epiglottis, aryepiglottic folds and pyriform sinuses appear unremarkable. The true and false vocal cords are normal in appearance. No mass or abscess is seen. SALIVARY GLANDS/THYROID:  The parotid and submandibular glands appear unremarkable. No sialolith visualized. Thyroid gland is absent. LYMPH NODES:  No cervical or supraclavicular lymphadenopathy is seen.  SOFT TISSUES:  There is slight soft tissue edema inferior to the right parotid gland and slight thickening of the platysma muscle. No fluid collection. BRAIN/ORBITS/SINUSES:  The visualized portion of the intracranial contents appear unremarkable. The visualized portion of the orbits, paranasal sinuses and mastoid air cells demonstrate no acute abnormality. LUNG APICES/SUPERIOR MEDIASTINUM:  No focal consolidation is seen within the visualized lung apices. No superior mediastinal lymphadenopathy or mass. The visualized portion of the trachea appears unremarkable. BONES:  No aggressive appearing lytic or blastic bony lesion. Very slight soft tissue edema in the inferior right parotid space. No evidence of sialolith. Fluoro For Surgical Procedures    Result Date: 4/30/2019  Radiology exam is complete. No Radiologist dictation. Please follow up with ordering provider. LABS:  Labs Reviewed   CBC WITH AUTO DIFFERENTIAL - Abnormal; Notable for the following components:       Result Value    WBC 11.1 (*)     Hematocrit 47.0 (*)     All other components within normal limits   BASIC METABOLIC PANEL - Abnormal; Notable for the following components:    Glucose 122 (*)     BUN 21 (*)     Chloride 110 (*)     CO2 19 (*)     All other components within normal limits       All other labs were within normal range or not returned as of this dictation. EMERGENCY DEPARTMENT COURSE and DIFFERENTIAL DIAGNOSIS/MDM:   Vitals:    Vitals:    05/12/19 2036 05/12/19 2226   BP: 134/84 118/67   Pulse: 80 87   Resp: 15 16   Temp: 97.6 °F (36.4 °C)    TempSrc: Oral    SpO2: 95% 94%   Weight: 187 lb (84.8 kg)    Height: 5' 4\" (1.626 m)        Will give antibiotics, instructed on warm compresses and following up with ENT provided.   Patient instructed to return to the emergency room if symptoms worsen, return, or any other concern right away which is agreed by the patient    ED MEDS:  Orders Placed This Encounter   Medications    HYDROmorphone (DILAUDID) injection 0.5 mg    ioversol (OPTIRAY) 74 % medications    Details   cephALEXin (KEFLEX) 500 MG capsule Take 1 capsule by mouth 4 times daily for 7 days, Disp-28 capsule, R-0Print      HYDROcodone-acetaminophen (NORCO) 5-325 MG per tablet Take 1 tablet by mouth every 6 hours as needed for Pain for up to 3 days. , Disp-10 tablet, R-0Print             Follow-up:  Chiqui Lion MD  [address]  [phone]    Schedule an appointment as soon as possible for a visit       [facility] ED  [address]  [address] [facility]  [phone]    For worsening symptoms, or any other concern        Final Impression:   1.  Parotiditis               (Please note that portions of this note were completed with a voice recognition program.  Efforts were made to edit the dictations but occasionally words are mis-transcribed.)      (Please note that portions of this note were completed with a voice recognition program.  Efforts were made to edit the dictations but occasionally words are mis-transcribed.)    BERTRAM Robert PA  05/13/19 0011

## 2019-05-27 ENCOUNTER — APPOINTMENT (OUTPATIENT)
Dept: GENERAL RADIOLOGY | Age: 49
End: 2019-05-27
Payer: MEDICARE

## 2019-05-27 ENCOUNTER — HOSPITAL ENCOUNTER (EMERGENCY)
Age: 49
Discharge: HOME | End: 2019-05-27
Attending: EMERGENCY MEDICINE
Payer: MEDICARE

## 2019-05-27 VITALS
WEIGHT: 180 LBS | SYSTOLIC BLOOD PRESSURE: 112 MMHG | BODY MASS INDEX: 30.73 KG/M2 | TEMPERATURE: 98.1 F | HEIGHT: 64 IN | RESPIRATION RATE: 18 BRPM | DIASTOLIC BLOOD PRESSURE: 69 MMHG | OXYGEN SATURATION: 98 % | HEART RATE: 72 BPM

## 2019-05-27 DIAGNOSIS — J40 BRONCHITIS: Primary | ICD-10-CM

## 2019-05-27 LAB
DIRECT EXAM: NORMAL
DIRECT EXAM: NORMAL
Lab: NORMAL
Lab: NORMAL
SPECIMEN DESCRIPTION: NORMAL
SPECIMEN DESCRIPTION: NORMAL

## 2019-05-27 PROCEDURE — 6360000002 HC RX W HCPCS: Performed by: PHYSICIAN ASSISTANT

## 2019-05-27 PROCEDURE — 87804 INFLUENZA ASSAY W/OPTIC: CPT

## 2019-05-27 PROCEDURE — 96372 THER/PROPH/DIAG INJ SC/IM: CPT

## 2019-05-27 PROCEDURE — 99283 EMERGENCY DEPT VISIT LOW MDM: CPT

## 2019-05-27 PROCEDURE — 87880 STREP A ASSAY W/OPTIC: CPT

## 2019-05-27 PROCEDURE — 71046 X-RAY EXAM CHEST 2 VIEWS: CPT

## 2019-05-27 RX ORDER — ORPHENADRINE CITRATE 30 MG/ML
60 INJECTION INTRAMUSCULAR; INTRAVENOUS ONCE
Status: COMPLETED | OUTPATIENT
Start: 2019-05-27 | End: 2019-05-27

## 2019-05-27 RX ORDER — BENZONATATE 100 MG/1
100 CAPSULE ORAL 3 TIMES DAILY PRN
Qty: 30 CAPSULE | Refills: 0 | Status: SHIPPED | OUTPATIENT
Start: 2019-05-27 | End: 2019-05-30

## 2019-05-27 RX ORDER — AZITHROMYCIN 250 MG/1
TABLET, FILM COATED ORAL
Qty: 1 PACKET | Refills: 0 | Status: SHIPPED | OUTPATIENT
Start: 2019-05-27 | End: 2019-06-03

## 2019-05-27 RX ORDER — PREDNISONE 20 MG/1
20 TABLET ORAL DAILY
Qty: 5 TABLET | Refills: 0 | Status: SHIPPED | OUTPATIENT
Start: 2019-05-27 | End: 2019-06-01

## 2019-05-27 RX ORDER — KETOROLAC TROMETHAMINE 30 MG/ML
30 INJECTION, SOLUTION INTRAMUSCULAR; INTRAVENOUS ONCE
Status: COMPLETED | OUTPATIENT
Start: 2019-05-27 | End: 2019-05-27

## 2019-05-27 RX ADMIN — ORPHENADRINE CITRATE 60 MG: 30 INJECTION INTRAMUSCULAR; INTRAVENOUS at 10:51

## 2019-05-27 RX ADMIN — KETOROLAC TROMETHAMINE 30 MG: 30 INJECTION, SOLUTION INTRAMUSCULAR; INTRAVENOUS at 10:50

## 2019-05-27 ASSESSMENT — PAIN DESCRIPTION - DESCRIPTORS: DESCRIPTORS: TIGHTNESS

## 2019-05-27 ASSESSMENT — ENCOUNTER SYMPTOMS
COUGH: 1
WHEEZING: 0
RHINORRHEA: 1
SORE THROAT: 1

## 2019-05-27 ASSESSMENT — PAIN SCALES - GENERAL: PAINLEVEL_OUTOF10: 6

## 2019-05-27 ASSESSMENT — PAIN DESCRIPTION - LOCATION: LOCATION: CHEST

## 2019-05-27 NOTE — ED PROVIDER NOTES
[address]  eMERGENCY dEPARTMENT eNCOUnter   Independent Attestation     Pt Name: Jennifer Langford  MRN: [id number]  [place] 1970  Date of evaluation: 5/27/19       Jennifer Langford is a 53 y.o. female who presents with Cough; Fever; and Pharyngitis        Based on the medical record, the care appears appropriate. I was personally available for consultation in the Emergency Department.     Jane Bowman MD  Attending Emergency  Physician                  Jane Bowman MD  05/27/19 1287

## 2019-05-27 NOTE — ED PROVIDER NOTES
COLONOSCOPY  04/23/2014    biopsy & sigmoid spasms, pathology negative    COLONOSCOPY N/A 2/12/2018    COLONOSCOPY WITH BIOPSY performed by Erwin Olmstead MD at [facility]      x 5    HYSTERECTOMY      2010    [place] EXPLORATORY OF ABDOMEN  10/21/2014    Laparotomy-lysis of adhesions, bso     UPPER GASTROINTESTINAL ENDOSCOPY  2/17/2010    mild chronic inactive gastritis       CURRENT MEDICATIONS       Discharge Medication List as of 5/27/2019 11:41 AM      CONTINUE these medications which have NOT CHANGED    Details   gabapentin (NEURONTIN) 300 MG capsule Take 1 capsule by mouth 2 times daily for 28 days. , Disp-180 capsule, R-0Normal      atorvastatin (LIPITOR) 40 MG tablet Take 1 tablet by mouth daily, Disp-90 tablet, R-3**Patient requests 90 days supply**Normal      oxyCODONE-acetaminophen (PERCOCET)  MG per tablet Take 1 tablet by mouth every 8 hours as needed for Pain for up to 30 days. May occasionally take an extra tab  For severe pain, Disp-90 tablet, R-0Print      tiZANidine (ZANAFLEX) 4 MG tablet TAKE 1 TABLET BY MOUTH EVERY 12 HOURS AS NEEDED FOR MUSCLE SPASMS, Disp-60 tablet, R-3Normal      pregabalin (LYRICA) 50 MG capsule Take 1 capsule by mouth 3 times daily for 30 days. , Disp-90 capsule, R-0Normal      levothyroxine (SYNTHROID) 125 MCG tablet TAKE 1 TABLET BY MOUTH TWICE DAILY, Disp-60 tablet, R-3Normal      Ibuprofen (ADVIL PO) Take by mouthHistorical Med      clonazePAM (KLONOPIN) 0.5 MG tablet Take 0.5 mg by mouth 3 times daily as needed. Ara Villasenor Historical Med      sertraline (ZOLOFT) 100 MG tablet Take 100 mg by mouth dailyHistorical Med      esomeprazole (NEXIUM) 40 MG delayed release capsule TAKE 1 CAPSULE BY MOUTH EVERY MORNING BEFORE BREAKFAST, Disp-90 capsule, R-3Normal      rOPINIRole (REQUIP) 1 MG tablet TAKE 1 TABLET BY MOUTH NIGHTLY, Disp-90 tablet, R-0Normal      topiramate (TOPAMAX) 25 MG tablet 25 mg 2 times daily Historical Med      metoprolol tartrate (LOPRESSOR) 50 approx one week. Had coworker dxd with pneumonia. Rapid strep and influenza are negative. Reexamined patient she is resting comfortably on the stretcher is drinking a [employer] a piece of chocolate cake she is in no distress. Updated on weight for chest x-ray results. Discussed all results STING with patient. Increase fluids cool mist med fire room at night we'll treat for bronchitis with low-dose oral steroid Zithromax and something for the cough. Recommend close follow-up with her primary care provider in one to 2 days for recheck. Continue current home medications return for any worsening of symptoms any other concerns. DIAGNOSTIC RESULTS     EKG: All EKG's are interpreted by the Emergency Department Physician who either signs or Co-signs this chart in the absence of acardiologist.        RADIOLOGY:Allplain film, CT, MRI, and formal ultrasound images (except ED bedside ultrasound) are read by the radiologist and the images and interpretations are directly viewed by the emergency physician. LABS:All lab results were reviewed by myself, and all abnormals are listed below. Labs Reviewed   STREP SCREEN GROUP A THROAT   RAPID INFLUENZA A/B ANTIGENS         EMERGENCY DEPARTMENT COURSE:   Vitals:    Vitals:    05/27/19 1012   BP: 112/69   Pulse: 72   Resp: 18   Temp: 98.1 °F (36.7 °C)   TempSrc: Oral   SpO2: 98%   Weight: 180 lb (81.6 kg)   Height: 5' 4\" (1.626 m)       The patient was given the following medications while in the emergency department:  Orders Placed This Encounter   Medications    ketorolac (TORADOL) injection 30 mg    orphenadrine (NORFLEX) injection 60 mg    azithromycin (ZITHROMAX) 250 MG tablet     Sig: Take 2 tablets (500 mg) on Day 1, followed by 1 tablet (250 mg) once daily on Days 2 through 5.      Dispense:  1 packet     Refill:  0    predniSONE (DELTASONE) 20 MG tablet     Sig: Take 1 tablet by mouth daily for 5 days     Dispense:  5 tablet     Refill:  0    benzonatate (TESSALON PERLES) 100 MG capsule     Sig: Take 1 capsule by mouth 3 times daily as needed for Cough     Dispense:  30 capsule     Refill:  0       -------------------------      CRITICAL CARE    CONSULTS:  None    PROCEDURES:  Procedures    FINAL IMPRESSION      1.  Bronchitis          DISPOSITION/PLAN   DISPOSITION Decision To Discharge 05/27/2019 11:39:58 AM      PATIENT REFERREDTO:  Hussein Salazar MD  [place] [phone]    Schedule an appointment as soon as possible for a visit in 2 days      [facility] [place]  [facility] [address]  [address] [facility] [id number] [phone]    If symptoms worsen      DISCHARGEMEDICATIONS:  Discharge Medication List as of 5/27/2019 11:41 AM      START taking these medications    Details   azithromycin (ZITHROMAX) 250 MG tablet Take 2 tablets (500 mg) on Day 1, followed by 1 tablet (250 mg) once daily on Days 2 through 5., Disp-1 packet, R-0Print      predniSONE (DELTASONE) 20 MG tablet Take 1 tablet by mouth daily for 5 days, Disp-5 tablet, R-0Print      benzonatate (TESSALON PERLES) 100 MG capsule Take 1 capsule by mouth 3 times daily as needed for Cough, Disp-30 capsule, R-0Print             (Please note that portions of this note were completed with a voice recognition program.  Efforts were made to edit thedictations but occasionally words are mis-transcribed.)    MARIJA Bello PA-C  05/27/19 7386

## 2019-05-27 NOTE — LETTER
[facility] ED  [address] [id number]  Phone: [phone]             May 27, 2019    Patient: Toni Thompson   YOB: 1970   Date of Visit: 5/27/2019       To Whom It May Concern:    Kimberley Thurman was seen and treated in our emergency department on 5/27/2019. Please excuse off work 05/27/2019 and 05/28/2019. She may return to work 05/29/2019.       Sincerely,             Signature:__________________________________

## 2019-05-28 RX ORDER — ROPINIROLE 1 MG/1
TABLET, FILM COATED ORAL
Qty: 30 TABLET | Refills: 2 | Status: SHIPPED | OUTPATIENT
Start: 2019-05-28 | End: 2019-05-30 | Stop reason: SDUPTHER

## 2019-05-30 ENCOUNTER — OFFICE VISIT (OUTPATIENT)
Dept: INTERNAL MEDICINE CLINIC | Age: 49
End: 2019-05-30
Payer: MEDICARE

## 2019-05-30 VITALS
TEMPERATURE: 98.5 F | SYSTOLIC BLOOD PRESSURE: 110 MMHG | BODY MASS INDEX: 31.76 KG/M2 | OXYGEN SATURATION: 96 % | HEART RATE: 77 BPM | WEIGHT: 186 LBS | DIASTOLIC BLOOD PRESSURE: 78 MMHG | HEIGHT: 64 IN

## 2019-05-30 DIAGNOSIS — R06.2 WHEEZING: ICD-10-CM

## 2019-05-30 DIAGNOSIS — J40 BRONCHITIS: ICD-10-CM

## 2019-05-30 DIAGNOSIS — R05.9 COUGH: Primary | ICD-10-CM

## 2019-05-30 DIAGNOSIS — F17.200 CURRENT SMOKER: ICD-10-CM

## 2019-05-30 PROCEDURE — 99213 OFFICE O/P EST LOW 20 MIN: CPT | Performed by: NURSE PRACTITIONER

## 2019-05-30 RX ORDER — GUAIFENESIN/DEXTROMETHORPHAN 100-10MG/5
5 SYRUP ORAL 4 TIMES DAILY PRN
Qty: 240 ML | Refills: 1 | Status: CANCELLED | OUTPATIENT
Start: 2019-05-30 | End: 2019-06-09

## 2019-05-30 RX ORDER — ALBUTEROL SULFATE 90 UG/1
2 AEROSOL, METERED RESPIRATORY (INHALATION) EVERY 4 HOURS PRN
Qty: 1 INHALER | Refills: 0 | Status: SHIPPED | OUTPATIENT
Start: 2019-05-30 | End: 2019-05-30 | Stop reason: SDUPTHER

## 2019-05-30 RX ORDER — GUAIFENESIN AND CODEINE PHOSPHATE 100; 10 MG/5ML; MG/5ML
5-10 SOLUTION ORAL 4 TIMES DAILY PRN
Qty: 120 ML | Refills: 0 | Status: SHIPPED | OUTPATIENT
Start: 2019-05-30 | End: 2019-06-02

## 2019-05-30 ASSESSMENT — ENCOUNTER SYMPTOMS
EYE PAIN: 0
BACK PAIN: 1
SHORTNESS OF BREATH: 1
COUGH: 1
RHINORRHEA: 0
EYE DISCHARGE: 0
SORE THROAT: 1
CHEST TIGHTNESS: 1

## 2019-05-30 ASSESSMENT — PATIENT HEALTH QUESTIONNAIRE - PHQ9
2. FEELING DOWN, DEPRESSED OR HOPELESS: 0
SUM OF ALL RESPONSES TO PHQ QUESTIONS 1-9: 0
SUM OF ALL RESPONSES TO PHQ QUESTIONS 1-9: 0
1. LITTLE INTEREST OR PLEASURE IN DOING THINGS: 0
SUM OF ALL RESPONSES TO PHQ9 QUESTIONS 1 & 2: 0

## 2019-05-30 NOTE — LETTER
[facility]  [address] [place] [address]  Phone: [phone]  Fax: MARTITA Park CNP        May 30, 2019     Patient: Juan Pablo Guidry   YOB: 1970   Date of Visit: 5/30/2019       To Whom it May Concern:    Marii Cheema was seen in my clinic on 5/30/2019. She may return to work on 6/3/19. If you have any questions or concerns, please don't hesitate to call.     Sincerely,           MARTITA Greene CNP

## 2019-05-30 NOTE — PROGRESS NOTES
Visit Information    Have you changed or started any medications since your last visit including any over-the-counter medicines, vitamins, or herbal medicines? no   Have you stopped taking any of your medications? Is so, why? -  no  Are you having any side effects from any of your medications? - no    Have you seen any other physician or provider since your last visit? yes   Have you had any other diagnostic tests since your last visit? yes - chest xray   Have you been seen in the emergency room and/or had an admission in a hospital since we last saw you?  yes - [facility]   Have you had your routine dental cleaning in the past 6 months?  no     Do you have an active MyChart account? If no, what is the barrier?   Yes    Patient Care Team:  Alexey Martinez MD as PCP - General (Internal Medicine)  Magalie Lozano MD as Consulting Physician (Gastroenterology)  Zulay Moulton MD as Consulting Physician (Obstetrics & Gynecology)  Lona Mills MD as Consulting Physician (Gastroenterology)    Medical History Review  Past Medical, Family, and Social History reviewed and does contribute to the patient presenting condition    Health Maintenance   Topic Date Due    Pneumococcal 0-64 years Vaccine (1 of 1 - PPSV23) 09/20/1977    HIV screen  09/20/1986    Hepatitis B Vaccine (1 of 3 - Risk 3-dose series) 09/20/1990    DTaP/Tdap/Td vaccine (1 - Tdap) 09/20/1990    TSH testing  10/21/2016    Diabetic foot exam  01/27/2017    Cervical cancer screen  10/13/2017    Diabetic retinal exam  08/15/2018    Flu vaccine (Season Ended) 09/01/2019    A1C test (Diabetic or Prediabetic)  01/12/2020    Diabetic microalbuminuria test  01/12/2020    Lipid screen  01/12/2020           [facility]  [place] [id number] [facility]  [address] [address]  Dept: [phone]  Dept Fax: [phone]    Office Progress/Follow Up Note  Date of patient's visit: 5/30/2019   Patient's Name:  Librado King  Date of Birth: 1970            Patient Care Team:  Saúl Joseph MD as PCP - General (Internal Medicine)  Rodrick Fuentes MD as Consulting Physician (Gastroenterology)  Fabrizio Gregory MD as Consulting Physician (Obstetrics & Gynecology)  Ailyn Quiros MD as Consulting Physician (Gastroenterology)    REASON FOR VISIT: Cough (ER f/u, pt still coughing up thick yellow/green phlegm per pt. sx present for 2 weeks. )        HISTORY OF PRESENT ILLNESS:      History was obtained from the patient. Venessa Sanchez is a 53 y.o. is here for a follow up from  ER visit for bronchitis. Chest x-ray was negative. She is completing a course of oral steroids, Z-Omari, and was also given Tessalon Perles. She reports that she is still feeling poorly, with cough productive of yellow-green sputum, fatigue, irritated throat, and congestion. She has tried Robitussin cough syrup and Tessalon Perles, and neither are helping with the cough. She sees pain management for chronic back pain, hasn't order for Percocet, which she takes as needed, and states that she does not need it every day. She is a current smoker, trying to cut back, and states that she hasn't been smoking much since her illness began.     Patient Active Problem List   Diagnosis    Hyperlipidemia    MVP (mitral valve prolapse)    Anxiety    Hypothyroidism    Allergic rhinitis    Obesity    Abdominal pain    Elevated liver enzymes    GERD (gastroesophageal reflux disease)    Ovarian mass    S/P bilateral oophorectomy & KRUPA 10/21/14    Pain emptying bladder    Urinary urgency    Insomnia    RLS (restless legs syndrome)    Pancreatitis    Eye swelling, left    Type 2 diabetes mellitus without complication (HCC)    Osteoarthritis of cervical spine    Stenosis, cervical spine    Trigger point with tension headache    Arthropathy of cervical facet joint    Atlanto-axial joint sprain, sequela    Cervical radiculopathy    Sprain of atlanto-occipital joint, sequela    Encounter for medication monitoring    Myofascial muscle pain    Colitis    Chest pain       Allergies   Allergen Reactions    Compazine [Prochlorperazine]      Jittery, restless    Morphine Itching    Sulfa Antibiotics Hives    Adhesive Tape Rash         MEDICATIONS:     Current Outpatient Medications   Medication Sig Dispense Refill    albuterol sulfate  (90 Base) MCG/ACT inhaler Inhale 2 puffs into the lungs every 4 hours as needed for Wheezing or Shortness of Breath 1 Inhaler 0    guaiFENesin-codeine (CHERATUSSIN AC) 100-10 MG/5ML syrup Take 5-10 mLs by mouth 4 times daily as needed for Cough for up to 3 days. 120 mL 0    azithromycin (ZITHROMAX) 250 MG tablet Take 2 tablets (500 mg) on Day 1, followed by 1 tablet (250 mg) once daily on Days 2 through 5. 1 packet 0    predniSONE (DELTASONE) 20 MG tablet Take 1 tablet by mouth daily for 5 days 5 tablet 0    gabapentin (NEURONTIN) 300 MG capsule Take 1 capsule by mouth 2 times daily for 28 days. 180 capsule 0    atorvastatin (LIPITOR) 40 MG tablet Take 1 tablet by mouth daily 90 tablet 3    oxyCODONE-acetaminophen (PERCOCET)  MG per tablet Take 1 tablet by mouth every 8 hours as needed for Pain for up to 30 days. May occasionally take an extra tab  For severe pain 90 tablet 0    tiZANidine (ZANAFLEX) 4 MG tablet TAKE 1 TABLET BY MOUTH EVERY 12 HOURS AS NEEDED FOR MUSCLE SPASMS 60 tablet 3    levothyroxine (SYNTHROID) 125 MCG tablet TAKE 1 TABLET BY MOUTH TWICE DAILY 60 tablet 3    clonazePAM (KLONOPIN) 0.5 MG tablet Take 0.5 mg by mouth 3 times daily as needed. Candie Naomi sertraline (ZOLOFT) 100 MG tablet Take 100 mg by mouth daily      esomeprazole (NEXIUM) 40 MG delayed release capsule TAKE 1 CAPSULE BY MOUTH EVERY MORNING BEFORE BREAKFAST 90 capsule 3    rOPINIRole (REQUIP) 1 MG tablet TAKE 1 TABLET BY MOUTH NIGHTLY 90 tablet 0    topiramate (TOPAMAX) 25 MG tablet 25 mg 2 times daily       metoprolol tartrate (LOPRESSOR) 50 MG tablet Take 50 mg by mouth 2 times daily        No current facility-administered medications for this visit. SOCIAL HISTORY    Reviewed and updated. Miky Cooper  reports that she has been smoking cigarettes. She has a 10.00 pack-year smoking history. She has never used smokeless tobacco.    FAMILY HISTORY:    Reviewed and updated. family history includes Breast Cancer in her maternal grandmother; Cancer in her mother; Diabetes in her father; Heart Disease in her father; Other in her father; Stroke in her maternal grandfather. REVIEW OF SYSTEMS:      Review of Systems   Constitutional: Positive for chills, fatigue and fever. HENT: Positive for congestion and sore throat. Negative for ear pain and rhinorrhea. Eyes: Negative for pain and discharge. Respiratory: Positive for cough, chest tightness and shortness of breath. Cardiovascular: Negative for chest pain and palpitations. Gastrointestinal:        No new GI symptoms   Musculoskeletal: Positive for back pain and myalgias. Psychiatric/Behavioral: Positive for sleep disturbance. The patient is nervous/anxious. PHYSICAL EXAM:      Vitals:    05/30/19 1156   BP: 110/78   Pulse: 77   Temp: 98.5 °F (36.9 °C)   TempSrc: Oral   SpO2: 96%   Weight: 186 lb (84.4 kg)   Height: 5' 4.02\" (1.626 m)     BP Readings from Last 3 Encounters:   05/30/19 110/78   05/27/19 112/69   05/12/19 118/67        Physical Exam   Constitutional: She appears well-developed and well-nourished. Non-toxic appearance. HENT:   Head: Normocephalic and atraumatic. Right Ear: Tympanic membrane and external ear normal.   Left Ear: Tympanic membrane and external ear normal.   Nose: Mucosal edema present. No rhinorrhea or sinus tenderness. Right sinus exhibits no maxillary sinus tenderness and no frontal sinus tenderness. Left sinus exhibits no maxillary sinus tenderness and no frontal sinus tenderness.    Mouth/Throat: Posterior oropharyngeal erythema present. No oropharyngeal exudate or posterior oropharyngeal edema. Eyes: Conjunctivae are normal. Right eye exhibits no discharge. Left eye exhibits no discharge. Neck: Normal range of motion. Cardiovascular: Normal rate, regular rhythm and normal heart sounds. No murmur heard. Pulmonary/Chest: Effort normal and breath sounds normal. She has no wheezes. She has no rales. Abdominal: Soft. Bowel sounds are normal. There is no tenderness. Lymphadenopathy:     She has no cervical adenopathy. Psychiatric: She has a normal mood and affect. Her behavior is normal.        LABORATORY FINDINGS:    CBC:   Lab Results   Component Value Date    WBC 11.1 05/12/2019    HGB 15.6 05/12/2019     05/12/2019     06/05/2012     BMP:   Lab Results   Component Value Date     05/12/2019    K 3.7 05/12/2019     05/12/2019    CO2 19 05/12/2019    BUN 21 05/12/2019    CREATININE 0.86 05/12/2019    GLUCOSE 122 05/12/2019    GLUCOSE 119 12/18/2018     HEMOGLOBIN A1C:   Lab Results   Component Value Date    LABA1C 5.6 01/12/2019     FASTING LIPID PANEL:   Lab Results   Component Value Date    CHOL 158 01/12/2019    HDL 47 01/12/2019    LDLCHOLESTEROL 84 01/12/2019    TRIG 133 01/12/2019       ASSESSMENT AND PLAN:      Visit Diagnoses and Associated Orders     Cough    -  Primary    Uncontrolled with robitussin, tessalon perle. Switch to cheratussin AC-patient is not to take Percocet while using cough syrup.    guaiFENesin-codeine (CHERATUSSIN AC) 100-10 MG/5ML syrup [06223]           Bronchitis        Unchanged, complete Zpack, oral steroid, continue rest, fluids. Call if symptoms persist or do not resolve. Wheezing        Add Albuterol prn, stop smoking.     albuterol sulfate  (90 Base) MCG/ACT inhaler [[id number]]           Current smoker        Stressed importance of complete smoking cessation                FOLLOW UP AND INSTRUCTIONS:     Return if symptoms worsen or fail to improve. · Regina Milan received counseling on the following healthy behaviors: nutrition, medication adherence and tobacco cessation. · Discussed use, benefit, and side effects of prescribed medications. Barriers to medication compliance addressed. All patient questions answered. Pt voiced understanding. · Patient given educational materials - see patient instructions    MARTITA Keyes - CNP    5/30/2019, 12:56 PM    Please note that this chart was generated using voice recognition Dragon dictation software. Although every effort was made to ensure the accuracy of this automatedtranscription, some errors in transcription may have occurred.

## 2019-05-30 NOTE — PATIENT INSTRUCTIONS
good.  When should you call for help? Call 911 anytime you think you may need emergency care. For example, call if:    · You have severe trouble breathing.    Call your doctor now or seek immediate medical care if:    · You have new or worse trouble breathing.     · You cough up dark brown or bloody mucus (sputum).     · You have a new or higher fever.     · You have a new rash.    Watch closely for changes in your health, and be sure to contact your doctor if:    · You cough more deeply or more often, especially if you notice more mucus or a change in the color of your mucus.     · You are not getting better as expected. Where can you learn more? Go to [email or web address]. org and sign in to your [employer] account. Enter H333 in the Advanced Personalized Diagnostics box to learn more about \"Bronchitis: Care Instructions. \"     If you do not have an account, please click on the \"Sign Up Now\" link. Current as of: September 5, 2018  Content Version: 12.0  © 0651-8372 Healthwise, Incorporated. Care instructions adapted under license by [facility] ([facility]). If you have questions about a medical condition or this instruction, always ask your healthcare professional. [place] [address] any warranty or liability for your use of this information.

## 2019-06-02 ENCOUNTER — APPOINTMENT (OUTPATIENT)
Dept: GENERAL RADIOLOGY | Age: 49
End: 2019-06-02
Payer: MEDICARE

## 2019-06-02 ENCOUNTER — HOSPITAL ENCOUNTER (EMERGENCY)
Age: 49
Discharge: HOME | End: 2019-06-02
Attending: EMERGENCY MEDICINE
Payer: MEDICARE

## 2019-06-02 ENCOUNTER — APPOINTMENT (OUTPATIENT)
Dept: CT IMAGING | Age: 49
End: 2019-06-02
Payer: MEDICARE

## 2019-06-02 VITALS
HEIGHT: 64 IN | OXYGEN SATURATION: 97 % | SYSTOLIC BLOOD PRESSURE: 116 MMHG | DIASTOLIC BLOOD PRESSURE: 76 MMHG | RESPIRATION RATE: 15 BRPM | WEIGHT: 187 LBS | HEART RATE: 89 BPM | BODY MASS INDEX: 31.92 KG/M2 | TEMPERATURE: 98.1 F

## 2019-06-02 DIAGNOSIS — K43.9 VENTRAL HERNIA WITHOUT OBSTRUCTION OR GANGRENE: ICD-10-CM

## 2019-06-02 DIAGNOSIS — R10.9 ABDOMINAL PAIN, UNSPECIFIED ABDOMINAL LOCATION: Primary | ICD-10-CM

## 2019-06-02 LAB
ABSOLUTE EOS #: 0.2 K/UL (ref 0–0.4)
ABSOLUTE IMMATURE GRANULOCYTE: NORMAL K/UL (ref 0–0.3)
ABSOLUTE LYMPH #: 2 K/UL (ref 1–4.8)
ABSOLUTE MONO #: 0.5 K/UL (ref 0.1–1.3)
ALBUMIN SERPL-MCNC: 3.8 G/DL (ref 3.5–5.2)
ALBUMIN/GLOBULIN RATIO: ABNORMAL (ref 1–2.5)
ALP BLD-CCNC: 100 U/L (ref 35–104)
ALT SERPL-CCNC: 14 U/L (ref 5–33)
ANION GAP SERPL CALCULATED.3IONS-SCNC: 12 MMOL/L (ref 9–17)
AST SERPL-CCNC: 15 U/L
BASOPHILS # BLD: 1 % (ref 0–2)
BASOPHILS ABSOLUTE: 0 K/UL (ref 0–0.2)
BILIRUB SERPL-MCNC: 0.2 MG/DL (ref 0.3–1.2)
BUN BLDV-MCNC: 20 MG/DL (ref 6–20)
BUN/CREAT BLD: ABNORMAL (ref 9–20)
CALCIUM SERPL-MCNC: 9 MG/DL (ref 8.6–10.4)
CHLORIDE BLD-SCNC: 112 MMOL/L (ref 98–107)
CO2: 18 MMOL/L (ref 20–31)
CREAT SERPL-MCNC: 0.63 MG/DL (ref 0.5–0.9)
DIFFERENTIAL TYPE: NORMAL
EOSINOPHILS RELATIVE PERCENT: 2 % (ref 0–4)
GFR AFRICAN AMERICAN: >60 ML/MIN
GFR NON-AFRICAN AMERICAN: >60 ML/MIN
GFR SERPL CREATININE-BSD FRML MDRD: ABNORMAL ML/MIN/{1.73_M2}
GFR SERPL CREATININE-BSD FRML MDRD: ABNORMAL ML/MIN/{1.73_M2}
GLUCOSE BLD-MCNC: 106 MG/DL (ref 70–99)
HCT VFR BLD CALC: 44 % (ref 36–46)
HEMOGLOBIN: 14.6 G/DL (ref 12–16)
IMMATURE GRANULOCYTES: NORMAL %
LIPASE: 30 U/L (ref 13–60)
LYMPHOCYTES # BLD: 24 % (ref 24–44)
MCH RBC QN AUTO: 30.7 PG (ref 26–34)
MCHC RBC AUTO-ENTMCNC: 33.2 G/DL (ref 31–37)
MCV RBC AUTO: 92.5 FL (ref 80–100)
MONOCYTES # BLD: 7 % (ref 1–7)
NRBC AUTOMATED: NORMAL PER 100 WBC
PDW BLD-RTO: 12.7 % (ref 11.5–14.9)
PLATELET # BLD: 151 K/UL (ref 150–450)
PLATELET ESTIMATE: NORMAL
PMV BLD AUTO: 8.1 FL (ref 6–12)
POTASSIUM SERPL-SCNC: 4.1 MMOL/L (ref 3.7–5.3)
RBC # BLD: 4.76 M/UL (ref 4–5.2)
RBC # BLD: NORMAL 10*6/UL
SEG NEUTROPHILS: 66 % (ref 36–66)
SEGMENTED NEUTROPHILS ABSOLUTE COUNT: 5.4 K/UL (ref 1.3–9.1)
SODIUM BLD-SCNC: 142 MMOL/L (ref 135–144)
TOTAL PROTEIN: 6.9 G/DL (ref 6.4–8.3)
WBC # BLD: 8.2 K/UL (ref 3.5–11)
WBC # BLD: NORMAL 10*3/UL

## 2019-06-02 PROCEDURE — 83690 ASSAY OF LIPASE: CPT

## 2019-06-02 PROCEDURE — 6360000002 HC RX W HCPCS: Performed by: EMERGENCY MEDICINE

## 2019-06-02 PROCEDURE — 74177 CT ABD & PELVIS W/CONTRAST: CPT

## 2019-06-02 PROCEDURE — 99284 EMERGENCY DEPT VISIT MOD MDM: CPT

## 2019-06-02 PROCEDURE — 36415 COLL VENOUS BLD VENIPUNCTURE: CPT

## 2019-06-02 PROCEDURE — 6360000004 HC RX CONTRAST MEDICATION: Performed by: EMERGENCY MEDICINE

## 2019-06-02 PROCEDURE — 2580000003 HC RX 258: Performed by: EMERGENCY MEDICINE

## 2019-06-02 PROCEDURE — 85025 COMPLETE CBC W/AUTO DIFF WBC: CPT

## 2019-06-02 PROCEDURE — 71045 X-RAY EXAM CHEST 1 VIEW: CPT

## 2019-06-02 PROCEDURE — 96375 TX/PRO/DX INJ NEW DRUG ADDON: CPT

## 2019-06-02 PROCEDURE — 80053 COMPREHEN METABOLIC PANEL: CPT

## 2019-06-02 PROCEDURE — 96374 THER/PROPH/DIAG INJ IV PUSH: CPT

## 2019-06-02 RX ORDER — 0.9 % SODIUM CHLORIDE 0.9 %
1000 INTRAVENOUS SOLUTION INTRAVENOUS ONCE
Status: COMPLETED | OUTPATIENT
Start: 2019-06-02 | End: 2019-06-02

## 2019-06-02 RX ORDER — 0.9 % SODIUM CHLORIDE 0.9 %
80 INTRAVENOUS SOLUTION INTRAVENOUS ONCE
Status: COMPLETED | OUTPATIENT
Start: 2019-06-02 | End: 2019-06-02

## 2019-06-02 RX ORDER — SODIUM CHLORIDE 0.9 % (FLUSH) 0.9 %
10 SYRINGE (ML) INJECTION PRN
Status: DISCONTINUED | OUTPATIENT
Start: 2019-06-02 | End: 2019-06-02 | Stop reason: HOSPADM

## 2019-06-02 RX ORDER — KETOROLAC TROMETHAMINE 30 MG/ML
30 INJECTION, SOLUTION INTRAMUSCULAR; INTRAVENOUS ONCE
Status: COMPLETED | OUTPATIENT
Start: 2019-06-02 | End: 2019-06-02

## 2019-06-02 RX ORDER — FENTANYL CITRATE 50 UG/ML
50 INJECTION, SOLUTION INTRAMUSCULAR; INTRAVENOUS ONCE
Status: DISCONTINUED | OUTPATIENT
Start: 2019-06-02 | End: 2019-06-02 | Stop reason: HOSPADM

## 2019-06-02 RX ORDER — ONDANSETRON 2 MG/ML
4 INJECTION INTRAMUSCULAR; INTRAVENOUS ONCE
Status: COMPLETED | OUTPATIENT
Start: 2019-06-02 | End: 2019-06-02

## 2019-06-02 RX ADMIN — Medication 10 ML: at 12:21

## 2019-06-02 RX ADMIN — SODIUM CHLORIDE 80 ML: 9 INJECTION, SOLUTION INTRAVENOUS at 12:20

## 2019-06-02 RX ADMIN — ONDANSETRON 4 MG: 2 INJECTION INTRAMUSCULAR; INTRAVENOUS at 11:43

## 2019-06-02 RX ADMIN — SODIUM CHLORIDE 1000 ML: 9 INJECTION, SOLUTION INTRAVENOUS at 11:42

## 2019-06-02 RX ADMIN — KETOROLAC TROMETHAMINE 30 MG: 30 INJECTION, SOLUTION INTRAMUSCULAR; INTRAVENOUS at 11:44

## 2019-06-02 RX ADMIN — IOVERSOL 75 ML: 741 INJECTION INTRA-ARTERIAL; INTRAVENOUS at 12:20

## 2019-06-02 ASSESSMENT — ENCOUNTER SYMPTOMS
COUGH: 1
ABDOMINAL PAIN: 1
DIARRHEA: 1
EYES NEGATIVE: 1
VOMITING: 1
NAUSEA: 1
BACK PAIN: 0

## 2019-06-02 ASSESSMENT — PAIN SCALES - GENERAL
PAINLEVEL_OUTOF10: 8
PAINLEVEL_OUTOF10: 8
PAINLEVEL_OUTOF10: 3

## 2019-06-02 ASSESSMENT — PAIN DESCRIPTION - LOCATION: LOCATION: ABDOMEN

## 2019-06-02 ASSESSMENT — PAIN DESCRIPTION - PAIN TYPE: TYPE: ACUTE PAIN

## 2019-06-02 NOTE — ED PROVIDER NOTES
EMERGENCY DEPARTMENT ENCOUNTER    Pt Name: Jasmin Vickers  MRN: [id number]  [place] 1970  Date of evaluation: 6/2/19  CHIEF COMPLAINT       Chief Complaint   Patient presents with    Abdominal Pain     x 3 days    Nausea    Emesis    Diarrhea     HISTORY OF PRESENT ILLNESS   The pt presents for evaluation of abd pain, vomiting diarrhea and cough. The cough has been ongoing, but n/v/d started about 3 days ago. The pain is epigastric. The pain is sharp, worse with coughing or vomiting. Pt denies any chest pain or shortness of breath. No fever. The history is provided by the patient. Abdominal Pain   Pain location:  Epigastric  Pain quality: sharp    Pain radiates to:  Does not radiate  Pain severity:  Moderate  Onset quality:  Gradual  Duration:  3 days  Timing:  Constant  Progression:  Worsening  Chronicity:  New  Relieved by:  Nothing  Worsened by:  Nothing  Ineffective treatments:  None tried  Associated symptoms: cough, diarrhea, nausea and vomiting    Associated symptoms: no chest pain, no chills and no fever        REVIEW OF SYSTEMS     Review of Systems   Constitutional: Negative. Negative for chills and fever. HENT: Positive for congestion. Eyes: Negative. Respiratory: Positive for cough. Cardiovascular: Negative. Negative for chest pain. Gastrointestinal: Positive for abdominal pain, diarrhea, nausea and vomiting. Genitourinary: Negative. Musculoskeletal: Negative. Negative for back pain. Skin: Negative. Negative for rash. Neurological: Negative. Negative for headaches. All other systems reviewed and are negative.     PASTMEDICAL HISTORY     Past Medical History:   Diagnosis Date    Anxiety     CAD (coronary artery disease)     Mitral valve prolapse    Fatty liver     GERD (gastroesophageal reflux disease)     Headache     History of bronchitis     Hyperlipidemia     Hypothyroidism     Kidney stone     LFT elevation     MVP (mitral valve prolapse)     Obesity     Umbilical hernia      SURGICAL HISTORY       Past Surgical History:   Procedure Laterality Date    APPENDECTOMY       SECTION      2 pfannenstiel, 1 vertical    CHOLECYSTECTOMY      COLONOSCOPY      Dr Nassar Mis  14    COLONOSCOPY  2014    biopsy & sigmoid spasms, pathology negative    COLONOSCOPY N/A 2018    COLONOSCOPY WITH BIOPSY performed by Noe Shay MD at [address]      x 5    HYSTERECTOMY          OK EXPLORATORY OF ABDOMEN  10/21/2014    Laparotomy-lysis of adhesions, bso     UPPER GASTROINTESTINAL ENDOSCOPY  2010    mild chronic inactive gastritis     CURRENT MEDICATIONS       Discharge Medication List as of 2019  1:03 PM      CONTINUE these medications which have NOT CHANGED    Details   albuterol sulfate  (90 Base) MCG/ACT inhaler Inhale 2 puffs into the lungs every 4 hours as needed for Wheezing or Shortness of Breath, Disp-1 Inhaler, R-0Normal      guaiFENesin-codeine (CHERATUSSIN AC) 100-10 MG/5ML syrup Take 5-10 mLs by mouth 4 times daily as needed for Cough for up to 3 days. , Disp-120 mL, R-0Normal      azithromycin (ZITHROMAX) 250 MG tablet Take 2 tablets (500 mg) on Day 1, followed by 1 tablet (250 mg) once daily on Days 2 through 5., Disp-1 packet, R-0Print      gabapentin (NEURONTIN) 300 MG capsule Take 1 capsule by mouth 2 times daily for 28 days. , Disp-180 capsule, R-0Normal      atorvastatin (LIPITOR) 40 MG tablet Take 1 tablet by mouth daily, Disp-90 tablet, R-3**Patient requests 90 days supply**Normal      oxyCODONE-acetaminophen (PERCOCET)  MG per tablet Take 1 tablet by mouth every 8 hours as needed for Pain for up to 30 days.  May occasionally take an extra tab  For severe pain, Disp-90 tablet, R-0Print      tiZANidine (ZANAFLEX) 4 MG tablet TAKE 1 TABLET BY MOUTH EVERY 12 HOURS AS NEEDED FOR MUSCLE SPASMS, Disp-60 tablet, R-3Normal      levothyroxine (SYNTHROID) 125 MCG tablet TAKE 1 TABLET BY MOUTH TWICE DAILY, Disp-60 tablet, R-3Normal      clonazePAM (KLONOPIN) 0.5 MG tablet Take 0.5 mg by mouth 3 times daily as needed. Apple Margarita Historical Med      sertraline (ZOLOFT) 100 MG tablet Take 100 mg by mouth dailyHistorical Med      esomeprazole (NEXIUM) 40 MG delayed release capsule TAKE 1 CAPSULE BY MOUTH EVERY MORNING BEFORE BREAKFAST, Disp-90 capsule, R-3Normal      rOPINIRole (REQUIP) 1 MG tablet TAKE 1 TABLET BY MOUTH NIGHTLY, Disp-90 tablet, R-0Normal      topiramate (TOPAMAX) 25 MG tablet 25 mg 2 times daily Historical Med      metoprolol tartrate (LOPRESSOR) 50 MG tablet Take 50 mg by mouth 2 times daily Historical Med           ALLERGIES     is allergic to compazine [prochlorperazine]; morphine; sulfa antibiotics; and adhesive tape. FAMILY HISTORY     indicated that her mother is . She indicated that her father is alive. She indicated that her maternal grandmother is . She indicated that her maternal grandfather is . She indicated that her paternal grandmother is . She indicated that her paternal grandfather is . SOCIAL HISTORY       Social History     Tobacco Use    Smoking status: Current Some Day Smoker     Packs/day: 0.50     Years: 20.00     Pack years: 10.00     Types: Cigarettes    Smokeless tobacco: Never Used    Tobacco comment: 1/2 pack every month   Substance Use Topics    Alcohol use: No     Alcohol/week: 0.0 oz    Drug use: No     PHYSICAL EXAM     INITIAL VITALS: /76   Pulse 89   Temp 98.1 °F (36.7 °C)   Resp 15   Ht 5' 4\" (1.626 m)   Wt 187 lb (84.8 kg)   LMP 2010   SpO2 97%   BMI 32.10 kg/m²    Physical Exam   Constitutional: She is oriented to person, place, and time. No distress. HENT:   Head: Normocephalic and atraumatic. Eyes: Conjunctivae are normal.   Neck: Normal range of motion. Neck supple. Cardiovascular: Normal rate, regular rhythm and normal heart sounds.    No murmur

## 2019-06-03 ENCOUNTER — OFFICE VISIT (OUTPATIENT)
Dept: INTERNAL MEDICINE CLINIC | Age: 49
End: 2019-06-03
Payer: MEDICARE

## 2019-06-03 ENCOUNTER — TELEPHONE (OUTPATIENT)
Dept: INTERNAL MEDICINE CLINIC | Age: 49
End: 2019-06-03

## 2019-06-03 VITALS
WEIGHT: 189 LBS | TEMPERATURE: 98.2 F | BODY MASS INDEX: 32.27 KG/M2 | OXYGEN SATURATION: 97 % | DIASTOLIC BLOOD PRESSURE: 82 MMHG | HEART RATE: 70 BPM | HEIGHT: 64 IN | SYSTOLIC BLOOD PRESSURE: 110 MMHG

## 2019-06-03 DIAGNOSIS — R11.2 NON-INTRACTABLE VOMITING WITH NAUSEA, UNSPECIFIED VOMITING TYPE: ICD-10-CM

## 2019-06-03 DIAGNOSIS — R10.13 EPIGASTRIC PAIN: ICD-10-CM

## 2019-06-03 DIAGNOSIS — K21.9 GASTROESOPHAGEAL REFLUX DISEASE WITHOUT ESOPHAGITIS: ICD-10-CM

## 2019-06-03 DIAGNOSIS — K43.9 VENTRAL HERNIA WITHOUT OBSTRUCTION OR GANGRENE: ICD-10-CM

## 2019-06-03 DIAGNOSIS — R10.13 EPIGASTRIC PAIN: Primary | ICD-10-CM

## 2019-06-03 PROCEDURE — 99214 OFFICE O/P EST MOD 30 MIN: CPT | Performed by: PHYSICIAN ASSISTANT

## 2019-06-03 RX ORDER — RANITIDINE 150 MG/1
TABLET ORAL
Qty: 180 TABLET | Refills: 0
Start: 2019-06-03 | End: 2019-06-11

## 2019-06-03 RX ORDER — ALBUTEROL SULFATE 90 UG/1
2 AEROSOL, METERED RESPIRATORY (INHALATION) EVERY 4 HOURS PRN
Qty: 90 G | Refills: 0 | Status: SHIPPED | OUTPATIENT
Start: 2019-06-03 | End: 2019-06-12 | Stop reason: SDUPTHER

## 2019-06-03 RX ORDER — RANITIDINE 150 MG/1
TABLET ORAL
Qty: 180 TABLET | Refills: 0 | Status: SHIPPED | OUTPATIENT
Start: 2019-06-03 | End: 2019-06-03 | Stop reason: SDUPTHER

## 2019-06-03 RX ORDER — RANITIDINE 150 MG/1
150 TABLET ORAL 2 TIMES DAILY PRN
Qty: 60 TABLET | Refills: 0 | Status: SHIPPED | OUTPATIENT
Start: 2019-06-03 | End: 2019-06-03 | Stop reason: SDUPTHER

## 2019-06-03 RX ORDER — ONDANSETRON 4 MG/1
4 TABLET, FILM COATED ORAL DAILY PRN
Qty: 30 TABLET | Refills: 0 | Status: SHIPPED | OUTPATIENT
Start: 2019-06-03 | End: 2019-12-04

## 2019-06-03 ASSESSMENT — ENCOUNTER SYMPTOMS
SORE THROAT: 0
EYE PAIN: 0
BACK PAIN: 0
VOICE CHANGE: 0
CONSTIPATION: 1
CHEST TIGHTNESS: 0
EYE ITCHING: 0
COLOR CHANGE: 0
EYE DISCHARGE: 0
SHORTNESS OF BREATH: 0
SINUS PAIN: 0
NAUSEA: 1
RHINORRHEA: 0
COUGH: 0
TROUBLE SWALLOWING: 0
ABDOMINAL PAIN: 1
BLOOD IN STOOL: 0
DIARRHEA: 1
VOMITING: 1

## 2019-06-03 NOTE — PROGRESS NOTES
Visit Information    Have you changed or started any medications since your last visit including any over-the-counter medicines, vitamins, or herbal medicines? no   Are you having any side effects from any of your medications? -  no  Have you stopped taking any of your medications? Is so, why? -  no    Have you seen any other physician or provider since your last visit? No  Have you had any other diagnostic tests since your last visit? No  Have you been seen in the emergency room and/or had an admission to a hospital since we last saw you? No  Have you had your routine dental cleaning in the past 6 months? no    Have you activated your [employer] account? If not, what are your barriers?  Yes     Patient Care Team:  Triny Cramer MD as PCP - General (Internal Medicine)  Triny Cramer MD as PCP [facility]  Myron Dey MD as Consulting Physician (Gastroenterology)  Al Watt MD as Consulting Physician (Obstetrics & Gynecology)  Haylee Rowland MD as Consulting Physician (Gastroenterology)    Medical History Review  Past Medical, Family, and Social History reviewed and does not contribute to the patient presenting condition    Health Maintenance   Topic Date Due    Pneumococcal 0-64 years Vaccine (1 of 1 - PPSV23) 09/20/1977    HIV screen  09/20/1986    Hepatitis B Vaccine (1 of 3 - Risk 3-dose series) 09/20/1990    DTaP/Tdap/Td vaccine (1 - Tdap) 09/20/1990    TSH testing  10/21/2016    Diabetic foot exam  01/27/2017    Cervical cancer screen  10/13/2017    Diabetic retinal exam  08/15/2018    Flu vaccine (Season Ended) 09/01/2019    A1C test (Diabetic or Prediabetic)  01/12/2020    Diabetic microalbuminuria test  01/12/2020    Lipid screen  01/12/2020     Chief Complaint   Patient presents with    Follow[facility]Up from [facility] ED for abdominal pain still very sore and vomiting

## 2019-06-03 NOTE — PROGRESS NOTES
[facility] [address] [facility]  [address] [facility]  [address] [address]  Dept: [phone]  Dept Fax: [phone]    OfficeProgress/Follow Up Note  Date of patient's visit: 6/3/2019  Patient's Name:  Tanya Barnett YOB: 1970            Patient Care Team:  Clinton Downs MD as PCP - General (Internal Medicine)  Clinton Downs MD as PCP - [facility]  Ami Solorio MD as Consulting Physician (Gastroenterology)  Lisa Andrea MD as Consulting Physician (Obstetrics & Gynecology)  Iris Valadez MD as Consulting Physician (Gastroenterology)    REASON FOR VISIT:  Post hospital/ED visit    HISTORY OF PRESENT ILLNESS:      Chief Complaint   Patient presents with    Follow-Up from [facility] ED for abdominal pain still very sore and vomiting     History was obtained from the patient. Tanya Barnett is a 53 y.o. female here today for above. Emergency room follow up. Was seen at [facility] 6/2/19 for abdominal pain. Symptoms have been present for the past three days. Pain is located in epigastrium, described as sharp no radiation. Pain is worse with coughing, worse with lifting. Associated nausea and vomiting, alternating constipation/diarrhea. No blood per rectum or dark stools. No fever or chills. Workup with normal liver enzyme, lipase normal, hgb and wbc normal.  CT abdomen and pelvis with tiny superior abdominal ventral hernia fat containing without strangulation.     Pmhx of GERD, on Nexium. Occasional NSAID use.      Health Maintenance Due   Topic Date Due    Pneumococcal 0-64 years Vaccine (1 of 1 - PPSV23) 09/20/1977    HIV screen  09/20/1986    Hepatitis B Vaccine (1 of 3 - Risk 3-dose series) 09/20/1990    DTaP/Tdap/Td vaccine (1 - Tdap) 09/20/1990    TSH testing  10/21/2016    Diabetic foot exam  01/27/2017    Cervical cancer screen  10/13/2017    Diabetic retinal exam  08/15/2018     MEDICATIONS:      Current Outpatient Medications   Medication Sig Dispense Refill    ondansetron (ZOFRAN) 4 MG tablet Take 1 tablet by mouth daily as needed for Nausea or Vomiting 30 tablet 0    ranitidine (ZANTAC) 150 MG tablet TAKE 1 TABLET BY MOUTH TWICE DAILY AS NEEDED FOR HEARTBURN 180 tablet 0    gabapentin (NEURONTIN) 300 MG capsule Take 1 capsule by mouth 2 times daily for 28 days. 180 capsule 0    atorvastatin (LIPITOR) 40 MG tablet Take 1 tablet by mouth daily 90 tablet 3    tiZANidine (ZANAFLEX) 4 MG tablet TAKE 1 TABLET BY MOUTH EVERY 12 HOURS AS NEEDED FOR MUSCLE SPASMS 60 tablet 3    levothyroxine (SYNTHROID) 125 MCG tablet TAKE 1 TABLET BY MOUTH TWICE DAILY 60 tablet 3    clonazePAM (KLONOPIN) 0.5 MG tablet Take 0.5 mg by mouth 3 times daily as needed. Jose Ross sertraline (ZOLOFT) 100 MG tablet Take 100 mg by mouth daily      esomeprazole (NEXIUM) 40 MG delayed release capsule TAKE 1 CAPSULE BY MOUTH EVERY MORNING BEFORE BREAKFAST 90 capsule 3    rOPINIRole (REQUIP) 1 MG tablet TAKE 1 TABLET BY MOUTH NIGHTLY 90 tablet 0    topiramate (TOPAMAX) 25 MG tablet 25 mg 2 times daily       metoprolol tartrate (LOPRESSOR) 50 MG tablet Take 50 mg by mouth 2 times daily       albuterol sulfate  (90 Base) MCG/ACT inhaler INHALE 2 PUFFS INTO THE LUNGS EVERY 4 HOURS AS NEEDED FOR WHEEZING OR SHORTNESS OF BREATH 90 g 0     No current facility-administered medications for this visit. ALLERGIES:      Allergies   Allergen Reactions    Compazine [Prochlorperazine]      Jittery, restless    Morphine Itching    Sulfa Antibiotics Hives    Adhesive Tape Rash       SOCIAL HISTORY    Reviewed and no change from previous record. Edwardo Skiff  reports that she has been smoking cigarettes. She has a 10.00 pack-year smoking history. She has never used smokeless tobacco.    FAMILY HISTORY:    Reviewed and No change from previous visit    REVIEW OF SYSTEMS:    Review of Systems   Constitutional: Negative for chills, fatigue and fever.    HENT: Negative for congestion, ear discharge, ear pain, hearing loss, rhinorrhea, sinus pain, sore throat, trouble swallowing and voice change. Eyes: Negative for pain, discharge, itching and visual disturbance. Respiratory: Negative for cough, chest tightness and shortness of breath. Cardiovascular: Negative for chest pain, palpitations and leg swelling. Gastrointestinal: Positive for abdominal pain, constipation, diarrhea, nausea and vomiting. Negative for blood in stool. Genitourinary: Negative for difficulty urinating, dysuria, flank pain, frequency, hematuria and urgency. Musculoskeletal: Negative for arthralgias, back pain, neck pain and neck stiffness. Skin: Negative for color change, pallor and rash. Neurological: Negative for dizziness, weakness, light-headedness, numbness and headaches. Hematological: Negative for adenopathy.      PHYSICAL EXAM:      Vitals:    06/03/19 1351   BP: 110/82   Pulse: 70   Temp: 98.2 °F (36.8 °C)   TempSrc: Oral   SpO2: 97%   Weight: 189 lb (85.7 kg)   Height: 5' 4.02\" (1.626 m)     BP Readings from Last 3 Encounters:   06/03/19 110/82   06/02/19 116/76   05/30/19 110/78      General - alert, well appearing, and in no distress  Skin - normal coloration and turgor, no rashes  Eyes - pupils equal and reactive, extraocular eye movements intact  Mouth - mucous membranes are moist  Neck - supple, no significant adenopathy, no palpable masses   Lymphatics - no palpable lymphadenopathy, no hepatosplenomegaly  Chest - clear to auscultation, no wheezes, rales or rhonchi, symmetric air entry  Heart - normal rate, rhythm is regular, no murmurs, rubs, clicks or gallops  Abdomen - inspection unremarkable, normoactive bowel sounds x 4, abdomen is non distended, soft on palpation, reports localized tenderness to epigastrium, no guarding or rigidity, no palpable masses or organomegaly appreciated   Back - full range of motion, no tenderness, palpable spasm or pain on DOSE:   Estimated biologic radiation dose for this procedure:768.56 mGy/cm2.       Impression   1.  Tiny superior abdominal ventral hernia fat containing without   strangulation.  More inferiorly, there is a ventral hernia hernia repair with   adherent bowel loops, unchanged from prior study without acute findings.       2.  No bowel obstruction, urinary obstruction, or appendicitis.       3.  Colonic diverticulosis without acute diverticulitis.         LABORATORY FINDINGS:    CBC:  Lab Results   Component Value Date    WBC 8.2 06/02/2019    HGB 14.6 06/02/2019     06/02/2019     06/05/2012     BMP:    Lab Results   Component Value Date     06/02/2019    K 4.1 06/02/2019     06/02/2019    CO2 18 06/02/2019    BUN 20 06/02/2019    CREATININE 0.63 06/02/2019    GLUCOSE 106 06/02/2019    GLUCOSE 119 12/18/2018     HEMOGLOBIN A1C:   Lab Results   Component Value Date    LABA1C 5.6 01/12/2019     FASTING LIPID PANEL:  Lab Results   Component Value Date    CHOL 158 01/12/2019    HDL 47 01/12/2019    TRIG 133 01/12/2019     ASSESSMENT AND PLAN:      1. Epigastric pain  - unspecified etiology, workup fairly unremarkable, continue ppi, will add h2  - low suspicion pain originating from tiny hernia, will get general surg consult if worsening or no improvement  - ranitidine (ZANTAC) 150 MG tablet; take 1 tab BID prn for heartburn  Dispense: 180 tablet; Refill: 0    2. Ventral hernia without obstruction or gangrene  - low suspicion pain originating from tiny hernia, will get general surg consult if worsening or no improvement  - ranitidine (ZANTAC) 150 MG tablet; take 1 tab BID prn for heartburn  Dispense: 180 tablet; Refill: 0    3. Gastroesophageal reflux disease without esophagitis  - ranitidine (ZANTAC) 150 MG tablet; take 1 tab BID prn for heartburn,  Dispense: 180 tablet; Refill: 0    4. Non-intractable vomiting with nausea, unspecified vomiting type  - ondansetron (ZOFRAN) 4 MG tablet;  Take 1 tablet by mouth daily as needed for Nausea or Vomiting  Dispense: 30 tablet; Refill: 0    FOLLOW UP AND INSTRUCTIONS:   Return in about 2 weeks (around 6/17/2019), or earlier if symptoms worsening. Patient educated on signs and symptoms which require more emergent evaluation and treatment, instructed to present to emergency room should these develop, patient understands and agrees with plan. Chuck Mirlaineywayne received counseling on the following healthy behaviors: nutrition, exercise and medication adherence    Discussed use, benefit, and side effects of prescribed medications. Barriers to medication compliance addressed. All patient questions answered. Patient voiced understanding. Patient given educational materials - see patient instructions    Moses Emanuel PA-C   [facility]  6/3/2019, 5:05 PM    Please note that this chart was generated using voice recognition Dragon dictation software. Although everyeffort was made to ensure the accuracy of this automated transcription, some errors in transcription may have occurred.

## 2019-06-11 ENCOUNTER — HOSPITAL ENCOUNTER (OUTPATIENT)
Dept: PAIN MANAGEMENT | Age: 49
Discharge: HOME | End: 2019-06-11
Payer: MEDICARE

## 2019-06-11 VITALS
RESPIRATION RATE: 16 BRPM | HEART RATE: 78 BPM | BODY MASS INDEX: 32.27 KG/M2 | WEIGHT: 189 LBS | DIASTOLIC BLOOD PRESSURE: 74 MMHG | HEIGHT: 64 IN | OXYGEN SATURATION: 96 % | TEMPERATURE: 98.3 F | SYSTOLIC BLOOD PRESSURE: 111 MMHG

## 2019-06-11 DIAGNOSIS — Z51.81 ENCOUNTER FOR MEDICATION MONITORING: ICD-10-CM

## 2019-06-11 DIAGNOSIS — M54.12 CERVICAL RADICULOPATHY: ICD-10-CM

## 2019-06-11 DIAGNOSIS — M48.02 DEGENERATIVE CERVICAL SPINAL STENOSIS: ICD-10-CM

## 2019-06-11 DIAGNOSIS — K11.20 PAROTIDITIS: ICD-10-CM

## 2019-06-11 DIAGNOSIS — M47.812 ARTHROPATHY OF CERVICAL FACET JOINT: ICD-10-CM

## 2019-06-11 DIAGNOSIS — M48.02 STENOSIS, CERVICAL SPINE: Primary | ICD-10-CM

## 2019-06-11 DIAGNOSIS — S13.4XXS ATLANTO-AXIAL JOINT SPRAIN, SEQUELA: ICD-10-CM

## 2019-06-11 DIAGNOSIS — M47.812 OSTEOARTHRITIS OF CERVICAL SPINE, UNSPECIFIED SPINAL OSTEOARTHRITIS COMPLICATION STATUS: ICD-10-CM

## 2019-06-11 PROCEDURE — 99213 OFFICE O/P EST LOW 20 MIN: CPT

## 2019-06-11 PROCEDURE — 99213 OFFICE O/P EST LOW 20 MIN: CPT | Performed by: NURSE PRACTITIONER

## 2019-06-11 RX ORDER — OXYCODONE AND ACETAMINOPHEN 10; 325 MG/1; MG/1
1 TABLET ORAL EVERY 8 HOURS PRN
Qty: 90 TABLET | Refills: 0 | Status: SHIPPED | OUTPATIENT
Start: 2019-06-11 | End: 2019-07-09 | Stop reason: SDUPTHER

## 2019-06-11 RX ORDER — HYDROCODONE BITARTRATE AND ACETAMINOPHEN 5; 325 MG/1; MG/1
1 TABLET ORAL EVERY 6 HOURS PRN
Qty: 10 TABLET | Refills: 0 | Status: SHIPPED | OUTPATIENT
Start: 2019-06-11 | End: 2019-06-11

## 2019-06-11 ASSESSMENT — ENCOUNTER SYMPTOMS
EYES NEGATIVE: 1
ABDOMINAL PAIN: 1
SHORTNESS OF BREATH: 1
COUGH: 1

## 2019-06-11 NOTE — PROGRESS NOTES
UDS:   1-16-18          Was the UDS appropriate:yes        Record/Diagnostics Review:       As above, I did review the imaging     1/22/2018  1:31 PM - Tyree Hatfield Incoming Lab Results From [facility]      Component Results      Component Value Ref Range & Units Status Collected Lab   Pain Management Drug Panel Interp, Urine Inconsistent    Final 01/16/2018  1:45 PM [facility]   (NOTE)   ________________________________________________________________   DRUGS EXPECTED:   PERCOCET (OXYCODONE) [1/14/18]   ________________________________________________________________   CONSISTENT with medications provided:   PERCOCET (OXYCODONE) : based on noroxycodone   ________________________________________________________________   INCONSISTENT with medications provided:   7-Aminoclonazepam   ________________________________________________________________   Drugs Not Included in this Assay:   Acetaminophen   ________________________________________________________________   INTERPRETIVE INFORMATION: Pain Mgt Chaudhary, Mass Spec/EMIT, Ur,                            Interp   Interpretation depends on accuracy and completeness of patient   medication information submitted by client. 6-Acetylmorphine, Ur Not Detected    Final 01/16/2018  1:45 PM [facility]   7-Aminoclonazepam, Urine Present    Final 01/16/2018  1:45 PM [facility]   Alpha-OH-Alpraz, Urine Not Detected    Final 01/16/2018  1:45 PM [facility]   Alprazolam, Urine Not Detected    Final 01/16/2018  1:45 PM [facility]   Amphetamines, urine Not Detected    Final 01/16/2018  1:45 PM [facility]   Barbiturates, Ur Not Detected    Final 01/16/2018  1:45 PM [facility]   Benzoylecgonine, Ur Not Detected    Final 01/16/2018  1:45 PM [facility]   Buprenorphine Urine Not Detected    Final 01/16/2018  1:45 PM [facility]   Carisoprodol, Ur Not Detected    Final 01/16/2018  1:45 PM [facility]   (NOTE)   The carisoprodol immunoassay has cross-reactivity to carisoprodol   and meprobamate.     Clonazepam, Urine Not Detected    Final 01/16/2018  1:45 PM [facility]   Codeine, Urine Not Detected    Final 01/16/2018  1:45 PM [facility]   MDA, Ur Not Detected    Final 01/16/2018  1:45 PM [facility]   Diazepam, Urine Not Detected    Final 01/16/2018  1:45 PM [facility]   Ethyl Glucuronide Ur Not Detected    Final 01/16/2018  1:45 PM [facility]   Fentanyl, Ur Not Detected    Final 01/16/2018  1:45 PM [facility]   Hydrocodone, Urine Not Detected    Final 01/16/2018  1:45 PM [facility]   Hydromorphone, Urine Not Detected    Final 01/16/2018  1:45 PM [facility]   Lorazepam, Urine Not Detected    Final 01/16/2018  1:45 PM [facility]   Marijuana Metab, Ur Not Detected    Final 01/16/2018  1:45 PM [facility]   MDEA, AISHA, Ur Not Detected    Final 01/16/2018  1:45 PM [facility]   MDMA, Urine Not Detected    Final 01/16/2018  1:45 PM [facility]   Meperidine Metab, Ur Not Detected    Final 01/16/2018  1:45 PM [facility]   Methadone, Urine Not Detected    Final 01/16/2018  1:45 PM [facility]   Methamphetamine, Urine Not Detected    Final 01/16/2018  1:45 PM [facility]   Methylphenidate Not Detected    Final 01/16/2018  1:45 PM [facility]   Midazolam, Urine Not Detected    Final 01/16/2018  1:45 PM [facility]   Morphine Urine Not Detected    Final 01/16/2018  1:45 PM [facility]   Norbuprenorphine, Urine Not Detected    Final 01/16/2018  1:45 PM [facility]   Nordiazepam, Urine Not Detected    Final 01/16/2018  1:45 PM [facility]   Norfentanyl, Urine Not Detected    Final 01/16/2018  1:45 PM [facility]   NORHYDROCODONE, URINE Not Detected    Final 01/16/2018  1:45 PM [facility]   Noroxycodone, Urine Present    Final 01/16/2018  1:45 PM [facility]   NOROXYMORPHONE, URINE Not Detected    Final 01/16/2018  1:45 PM [facility]   Oxazepam, Urine Not Detected    Final 01/16/2018  1:45 PM [facility]   Oxycodone Urine Not Detected    Final 01/16/2018  1:45 PM [facility]   Oxymorphone, Urine Not Detected    Final 01/16/2018  1:45 PM [facility]   PCP, Urine Not Detected    Final 01/16/2018  1:45 PM [facility]   Phentermine, Ur Not Detected    Final 01/16/2018  1:45 PM [facility]   Propoxyphene, Urine Not Detected    Final 01/16/2018  1:45 PM [facility] Tapentadol-O-Sulfate, Urine Not Detected    Final 01/16/2018  1:45 PM [facility]   Tapentadol, Urine Not Detected    Final 01/16/2018  1:45 PM [facility]   Temazepam, Urine Not Detected    Final 01/16/2018  1:45 PM [facility]   Tramadol, Urine Not Detected    Final 01/16/2018  1:45 PM [facility]   Zolpidem, Urine Not Detected    Final 01/16/2018  1:45 PM [facility]   Drugs Expected, Ur     Final 01/16/2018  1:45 PM [facility] [id number] BEDTIME    Creatinine, Ur 199.8  20.0 - 400.0 mg/dL Final 01/16/2018  1:45 PM [facility]   Pain Mgt Drug Panel, Hi Res, Ur See Below    Final 01/16/2018  1:45 PM [facility]   (NOTE)   Methodology: Qualitative Enzyme Immunoassay and Qualitative Liquid   Chromatography-Time of Flight-Mass Spectrometry or Tandem Mass   Spectrometry, Quantitative Spectrophotometry   The absence of expected drug(s) and/or drug metabolite(s) may   indicate non-compliance, inappropriate timing of specimen   collection relative to drug administration, poor drug absorption,   diluted/adulterated urine, or limitations of testing. The   concentration must be greater than or equal to the cutoff to be   reported as present.  If specific drug concentrations are   required, contact the laboratory within two weeks of specimen   collection to request quantification by a second analytical   technique. Interpretive questions should be directed to the   laboratory. Results based on immunoassay detection that do not match clinical   expectations should be   interpreted with caution. Confirmatory testing by mass   spectrometry for immunoassay-based results is available, if   ordered within two weeks of specimen collection. Additional   charges apply. For medical purposes only; not valid for forensic use. This test was developed and its performance characteristics   determined by Allie Dawn. The U.S.  Food and Drug   Administration has not approved or cleared this test; however, FDA   clearance or approval is not currently oxyCODONE-acetaminophen (PERCOCET)  MG per tablet, Take 1 tablet by mouth every 8 hours as needed for Pain for up to 30 days. May occasionally take an extra tab  For severe pain, Disp: 90 tablet, Rfl: 0    gabapentin (NEURONTIN) 300 MG capsule, Take 1 capsule by mouth 2 times daily for 28 days. , Disp: 180 capsule, Rfl: 0    atorvastatin (LIPITOR) 40 MG tablet, Take 1 tablet by mouth daily, Disp: 90 tablet, Rfl: 3    levothyroxine (SYNTHROID) 125 MCG tablet, TAKE 1 TABLET BY MOUTH TWICE DAILY, Disp: 60 tablet, Rfl: 3    sertraline (ZOLOFT) 100 MG tablet, Take 100 mg by mouth daily, Disp: , Rfl:     esomeprazole (NEXIUM) 40 MG delayed release capsule, TAKE 1 CAPSULE BY MOUTH EVERY MORNING BEFORE BREAKFAST, Disp: 90 capsule, Rfl: 3    rOPINIRole (REQUIP) 1 MG tablet, TAKE 1 TABLET BY MOUTH NIGHTLY, Disp: 90 tablet, Rfl: 0    topiramate (TOPAMAX) 25 MG tablet, 25 mg 2 times daily , Disp: , Rfl:     metoprolol tartrate (LOPRESSOR) 50 MG tablet, Take 50 mg by mouth 2 times daily , Disp: , Rfl:     albuterol sulfate  (90 Base) MCG/ACT inhaler, INHALE 2 PUFFS INTO THE LUNGS EVERY 4 HOURS AS NEEDED FOR WHEEZING OR SHORTNESS OF BREATH, Disp: 90 g, Rfl: 0    ondansetron (ZOFRAN) 4 MG tablet, Take 1 tablet by mouth daily as needed for Nausea or Vomiting, Disp: 30 tablet, Rfl: 0    tiZANidine (ZANAFLEX) 4 MG tablet, TAKE 1 TABLET BY MOUTH EVERY 12 HOURS AS NEEDED FOR MUSCLE SPASMS, Disp: 60 tablet, Rfl: 3    clonazePAM (KLONOPIN) 0.5 MG tablet, Take 0.5 mg by mouth 3 times daily as needed. ., Disp: , Rfl:     Family History   Problem Relation Age of Onset   [facility] Cancer Mother         vaginal    Heart Disease Father     Diabetes Father     Other Father         colon resection for colon polyps    Breast Cancer Maternal Grandmother     Stroke Maternal Grandfather        Social History     Socioeconomic History    Marital status:      Spouse name: Not on file    Number of children: Not on file    their medications from other physicians.         TREATMENT OPTIONS:     Return in 4 weeks  Medication Agreement Requirements Met  Continue Opioid therapy  Script written for percocet  Follow up appointment made

## 2019-06-12 DIAGNOSIS — R06.2 WHEEZING: ICD-10-CM

## 2019-06-12 RX ORDER — ALBUTEROL SULFATE 90 UG/1
2 AEROSOL, METERED RESPIRATORY (INHALATION) EVERY 4 HOURS PRN
Qty: 42.5 G | Refills: 0 | Status: SHIPPED | OUTPATIENT
Start: 2019-06-12 | End: 2019-07-09 | Stop reason: SDUPTHER

## 2019-06-12 NOTE — TELEPHONE ENCOUNTER
Health Maintenance   Topic Date Due    Pneumococcal 0-64 years Vaccine (1 of 1 - PPSV23) 09/20/1977    HIV screen  09/20/1986    Hepatitis B Vaccine (1 of 3 - Risk 3-dose series) 09/20/1990    DTaP/Tdap/Td vaccine (1 - Tdap) 09/20/1990    TSH testing  10/21/2016    Diabetic foot exam  01/27/2017    Cervical cancer screen  10/13/2017    Diabetic retinal exam  08/15/2018    Flu vaccine (Season Ended) 09/01/2019    A1C test (Diabetic or Prediabetic)  01/12/2020    Diabetic microalbuminuria test  01/12/2020    Lipid screen  01/12/2020             (applicable per patient's age: Cancer Screenings, Depression Screening, Fall Risk Screening, Immunizations)    Hemoglobin A1C (%)   Date Value   01/12/2019 5.6   05/16/2017 6.0   01/26/2015 6.9 (H)     Microalb/Crt.  Ratio (mcg/mg creat)   Date Value   01/12/2019 18     LDL Cholesterol (mg/dL)   Date Value   01/12/2019 84     LDL Calculated (mg/dL)   Date Value   10/26/2016 91     AST (IU/L)   Date Value   06/03/2019 13     ALT (IU/L)   Date Value   06/03/2019 12     BUN (mg/dL)   Date Value   06/03/2019 19      (goal A1C is < 7)   (goal LDL is <100) need 30-50% reduction from baseline     BP Readings from Last 3 Encounters:   06/11/19 111/74   06/03/19 110/82   06/02/19 116/76    (goal /80)      All Future Testing planned in [facility]:  Lab Frequency Next Occurrence       Next Visit Date:  Future Appointments   Date Time Provider Nisha Santos   7/9/2019 10:40 AM Primo De León, APRN - CNP [facility] None            Patient Active Problem List:     Hyperlipidemia     MVP (mitral valve prolapse)     Anxiety     Hypothyroidism     Allergic rhinitis     Obesity     Abdominal pain     Elevated liver enzymes     GERD (gastroesophageal reflux disease)     Ovarian mass     S/P bilateral oophorectomy & KRUPA 10/21/14     Pain emptying bladder     Urinary urgency     Insomnia     RLS (restless legs syndrome)     Pancreatitis     Eye swelling, left     Type 2 diabetes mellitus without complication (HCC)     Osteoarthritis of cervical spine     Stenosis, cervical spine     Trigger point with tension headache     Arthropathy of cervical facet joint     Atlanto-axial joint sprain, sequela     Cervical radiculopathy     Sprain of atlanto-occipital joint, sequela     Encounter for medication monitoring     Myofascial muscle pain     Colitis     Chest pain

## 2019-07-06 ENCOUNTER — APPOINTMENT (OUTPATIENT)
Dept: CT IMAGING | Age: 49
End: 2019-07-06
Payer: COMMERCIAL

## 2019-07-06 ENCOUNTER — HOSPITAL ENCOUNTER (EMERGENCY)
Age: 49
Discharge: HOME | End: 2019-07-06
Attending: EMERGENCY MEDICINE
Payer: COMMERCIAL

## 2019-07-06 ENCOUNTER — APPOINTMENT (OUTPATIENT)
Dept: GENERAL RADIOLOGY | Age: 49
End: 2019-07-06
Payer: COMMERCIAL

## 2019-07-06 VITALS
RESPIRATION RATE: 16 BRPM | OXYGEN SATURATION: 97 % | TEMPERATURE: 98.4 F | WEIGHT: 187 LBS | BODY MASS INDEX: 31.92 KG/M2 | SYSTOLIC BLOOD PRESSURE: 127 MMHG | DIASTOLIC BLOOD PRESSURE: 73 MMHG | HEART RATE: 78 BPM | HEIGHT: 64 IN

## 2019-07-06 DIAGNOSIS — R07.89 CHEST WALL PAIN: Primary | ICD-10-CM

## 2019-07-06 LAB
ABSOLUTE EOS #: 0.2 K/UL (ref 0–0.4)
ABSOLUTE IMMATURE GRANULOCYTE: NORMAL K/UL (ref 0–0.3)
ABSOLUTE LYMPH #: 2.4 K/UL (ref 1–4.8)
ABSOLUTE MONO #: 0.5 K/UL (ref 0.1–1.3)
ALBUMIN SERPL-MCNC: 4 G/DL (ref 3.5–5.2)
ALBUMIN/GLOBULIN RATIO: ABNORMAL (ref 1–2.5)
ALP BLD-CCNC: 94 U/L (ref 35–104)
ALT SERPL-CCNC: 12 U/L (ref 5–33)
ANION GAP SERPL CALCULATED.3IONS-SCNC: 13 MMOL/L (ref 9–17)
AST SERPL-CCNC: 13 U/L
BASOPHILS # BLD: 1 % (ref 0–2)
BASOPHILS ABSOLUTE: 0.1 K/UL (ref 0–0.2)
BILIRUB SERPL-MCNC: 0.36 MG/DL (ref 0.3–1.2)
BUN BLDV-MCNC: 15 MG/DL (ref 6–20)
BUN/CREAT BLD: ABNORMAL (ref 9–20)
CALCIUM SERPL-MCNC: 9.4 MG/DL (ref 8.6–10.4)
CHLORIDE BLD-SCNC: 106 MMOL/L (ref 98–107)
CO2: 21 MMOL/L (ref 20–31)
CREAT SERPL-MCNC: 0.66 MG/DL (ref 0.5–0.9)
D-DIMER QUANTITATIVE: 0.87 MG/L FEU (ref 0–0.59)
DIFFERENTIAL TYPE: NORMAL
EOSINOPHILS RELATIVE PERCENT: 2 % (ref 0–4)
GFR AFRICAN AMERICAN: >60 ML/MIN
GFR NON-AFRICAN AMERICAN: >60 ML/MIN
GFR SERPL CREATININE-BSD FRML MDRD: ABNORMAL ML/MIN/{1.73_M2}
GFR SERPL CREATININE-BSD FRML MDRD: ABNORMAL ML/MIN/{1.73_M2}
GLUCOSE BLD-MCNC: 112 MG/DL (ref 70–99)
HCT VFR BLD CALC: 43.7 % (ref 36–46)
HEMOGLOBIN: 14.8 G/DL (ref 12–16)
IMMATURE GRANULOCYTES: NORMAL %
LYMPHOCYTES # BLD: 28 % (ref 24–44)
MCH RBC QN AUTO: 30.9 PG (ref 26–34)
MCHC RBC AUTO-ENTMCNC: 33.9 G/DL (ref 31–37)
MCV RBC AUTO: 91.2 FL (ref 80–100)
MONOCYTES # BLD: 6 % (ref 1–7)
NRBC AUTOMATED: NORMAL PER 100 WBC
PDW BLD-RTO: 12.9 % (ref 11.5–14.9)
PLATELET # BLD: 174 K/UL (ref 150–450)
PLATELET ESTIMATE: NORMAL
PMV BLD AUTO: 8.8 FL (ref 6–12)
POTASSIUM SERPL-SCNC: 3.7 MMOL/L (ref 3.7–5.3)
RBC # BLD: 4.79 M/UL (ref 4–5.2)
RBC # BLD: NORMAL 10*6/UL
SEG NEUTROPHILS: 63 % (ref 36–66)
SEGMENTED NEUTROPHILS ABSOLUTE COUNT: 5.6 K/UL (ref 1.3–9.1)
SODIUM BLD-SCNC: 140 MMOL/L (ref 135–144)
THYROXINE, FREE: 1.81 NG/DL (ref 0.93–1.7)
TOTAL PROTEIN: 7 G/DL (ref 6.4–8.3)
TROPONIN INTERP: NORMAL
TROPONIN INTERP: NORMAL
TROPONIN T: NORMAL NG/ML
TROPONIN T: NORMAL NG/ML
TROPONIN, HIGH SENSITIVITY: 10 NG/L (ref 0–14)
TROPONIN, HIGH SENSITIVITY: <6 NG/L (ref 0–14)
TSH SERPL DL<=0.05 MIU/L-ACNC: <0.01 MIU/L (ref 0.3–5)
WBC # BLD: 8.8 K/UL (ref 3.5–11)
WBC # BLD: NORMAL 10*3/UL

## 2019-07-06 PROCEDURE — 99285 EMERGENCY DEPT VISIT HI MDM: CPT

## 2019-07-06 PROCEDURE — 6360000002 HC RX W HCPCS: Performed by: EMERGENCY MEDICINE

## 2019-07-06 PROCEDURE — 84484 ASSAY OF TROPONIN QUANT: CPT

## 2019-07-06 PROCEDURE — 71046 X-RAY EXAM CHEST 2 VIEWS: CPT

## 2019-07-06 PROCEDURE — 85379 FIBRIN DEGRADATION QUANT: CPT

## 2019-07-06 PROCEDURE — 80053 COMPREHEN METABOLIC PANEL: CPT

## 2019-07-06 PROCEDURE — 2580000003 HC RX 258: Performed by: EMERGENCY MEDICINE

## 2019-07-06 PROCEDURE — 84443 ASSAY THYROID STIM HORMONE: CPT

## 2019-07-06 PROCEDURE — 96375 TX/PRO/DX INJ NEW DRUG ADDON: CPT

## 2019-07-06 PROCEDURE — 96374 THER/PROPH/DIAG INJ IV PUSH: CPT

## 2019-07-06 PROCEDURE — 6360000004 HC RX CONTRAST MEDICATION: Performed by: EMERGENCY MEDICINE

## 2019-07-06 PROCEDURE — 71260 CT THORAX DX C+: CPT

## 2019-07-06 PROCEDURE — 85025 COMPLETE CBC W/AUTO DIFF WBC: CPT

## 2019-07-06 PROCEDURE — 36415 COLL VENOUS BLD VENIPUNCTURE: CPT

## 2019-07-06 PROCEDURE — 84439 ASSAY OF FREE THYROXINE: CPT

## 2019-07-06 RX ORDER — SODIUM CHLORIDE 0.9 % (FLUSH) 0.9 %
10 SYRINGE (ML) INJECTION PRN
Status: DISCONTINUED | OUTPATIENT
Start: 2019-07-06 | End: 2019-07-06 | Stop reason: HOSPADM

## 2019-07-06 RX ORDER — 0.9 % SODIUM CHLORIDE 0.9 %
80 INTRAVENOUS SOLUTION INTRAVENOUS ONCE
Status: COMPLETED | OUTPATIENT
Start: 2019-07-06 | End: 2019-07-06

## 2019-07-06 RX ORDER — ORPHENADRINE CITRATE 30 MG/ML
60 INJECTION INTRAMUSCULAR; INTRAVENOUS ONCE
Status: COMPLETED | OUTPATIENT
Start: 2019-07-06 | End: 2019-07-06

## 2019-07-06 RX ORDER — KETOROLAC TROMETHAMINE 30 MG/ML
30 INJECTION, SOLUTION INTRAMUSCULAR; INTRAVENOUS ONCE
Status: COMPLETED | OUTPATIENT
Start: 2019-07-06 | End: 2019-07-06

## 2019-07-06 RX ORDER — ONDANSETRON 2 MG/ML
INJECTION INTRAMUSCULAR; INTRAVENOUS
Status: DISCONTINUED
Start: 2019-07-06 | End: 2019-07-06 | Stop reason: HOSPADM

## 2019-07-06 RX ORDER — ONDANSETRON 2 MG/ML
4 INJECTION INTRAMUSCULAR; INTRAVENOUS ONCE
Status: COMPLETED | OUTPATIENT
Start: 2019-07-06 | End: 2019-07-06

## 2019-07-06 RX ORDER — FENTANYL CITRATE 50 UG/ML
50 INJECTION, SOLUTION INTRAMUSCULAR; INTRAVENOUS ONCE
Status: COMPLETED | OUTPATIENT
Start: 2019-07-06 | End: 2019-07-06

## 2019-07-06 RX ADMIN — IOVERSOL 75 ML: 741 INJECTION INTRA-ARTERIAL; INTRAVENOUS at 15:12

## 2019-07-06 RX ADMIN — SODIUM CHLORIDE 80 ML: 9 INJECTION, SOLUTION INTRAVENOUS at 15:12

## 2019-07-06 RX ADMIN — FENTANYL CITRATE 50 MCG: 50 INJECTION, SOLUTION INTRAMUSCULAR; INTRAVENOUS at 14:05

## 2019-07-06 RX ADMIN — KETOROLAC TROMETHAMINE 30 MG: 30 INJECTION, SOLUTION INTRAMUSCULAR; INTRAVENOUS at 13:40

## 2019-07-06 RX ADMIN — ORPHENADRINE CITRATE 60 MG: 30 INJECTION INTRAMUSCULAR; INTRAVENOUS at 13:41

## 2019-07-06 RX ADMIN — Medication 10 ML: at 15:12

## 2019-07-06 RX ADMIN — ONDANSETRON 4 MG: 2 INJECTION INTRAMUSCULAR; INTRAVENOUS at 13:39

## 2019-07-06 ASSESSMENT — ENCOUNTER SYMPTOMS
WHEEZING: 0
NAUSEA: 0
COLOR CHANGE: 0
SINUS PAIN: 0
COUGH: 1
VOMITING: 0
BACK PAIN: 0
ABDOMINAL PAIN: 0
DIARRHEA: 0
RHINORRHEA: 0
SINUS PRESSURE: 0
CONSTIPATION: 0
SHORTNESS OF BREATH: 1
EYE REDNESS: 0

## 2019-07-06 ASSESSMENT — PAIN DESCRIPTION - DESCRIPTORS: DESCRIPTORS: TIGHTNESS;PRESSURE

## 2019-07-06 ASSESSMENT — PAIN DESCRIPTION - LOCATION: LOCATION: CHEST

## 2019-07-06 ASSESSMENT — PAIN DESCRIPTION - FREQUENCY: FREQUENCY: CONTINUOUS

## 2019-07-06 ASSESSMENT — PAIN SCALES - GENERAL
PAINLEVEL_OUTOF10: 9
PAINLEVEL_OUTOF10: 8
PAINLEVEL_OUTOF10: 9

## 2019-07-06 ASSESSMENT — HEART SCORE: ECG: 0

## 2019-07-06 ASSESSMENT — PAIN DESCRIPTION - PAIN TYPE: TYPE: ACUTE PAIN

## 2019-07-06 ASSESSMENT — PAIN DESCRIPTION - ORIENTATION: ORIENTATION: MID

## 2019-07-06 NOTE — ED PROVIDER NOTES
[facility] ED  Emergency Department Encounter  Emergency Medicine Attending Note     Pt Name: Doroteo Villegas  MRN: [id number]  [place] 1970   Date of evaluation: 7/6/2019  PCP:  Kyle Paul MD    [address] [place] [address]       Chief Complaint   Patient presents with    Chest Pain    Shortness of Breath     cant catch breath    Cough       HISTORY OF PRESENT ILLNESS  (Location/Symptom, Timing/Onset, Context/Setting, Quality, Duration, Modifying Factors, Severity.)      Doroteo Villegas is a 53 y.o. female who presents with chest pain shortness of breath. Patient says that for several days she has had a sensation in the middle of her chest where it is uncomfortable to push on her chest wall, and she feels like she cannot catch her breath but sometimes she takes a deep breath and it is painful. Also has had a nonproductive cough. States that she has a history of chronic neck pain and her neck pain has been worsening secondary to the coughing. It has caused her to have a mild headache. The neck pain and headache are not new for her. No history of asthma or COPD. She is reported history of coronary disease, but no stents placed. States that she had some narrowing. Have a history of hyperlipidemia. No hypertension. She had no fever or chills. No history of DVTs or PEs. No recent travel or immobilization. No recent hospitalization. Patient also states that she has been having some black tarry stools. She had 2 episodes of this yesterday and the diarrhea. But is now improved. No abdominal pain. PAST MEDICAL / SURGICAL / SOCIAL / FAMILY HISTORY     Past Medical History:  has a past medical history of Anxiety, CAD (coronary artery disease), Fatty liver, GERD (gastroesophageal reflux disease), Headache, History of bronchitis, Hyperlipidemia, Hypothyroidism, Kidney stone, LFT elevation, MVP (mitral valve prolapse), Obesity, and Umbilical hernia.      Past Surgical History:  has a past surgical history that includes Upper gastrointestinal endoscopy (2010); hernia repair; Cholecystectomy; Hysterectomy; Appendectomy;  section; Colonoscopy (); Colonoscopy (14); Colonoscopy (2014); pr exploratory of abdomen (10/21/2014); and Colonoscopy (N/A, 2018). Allergies:  Compazine [prochlorperazine]; Morphine; Sulfa antibiotics; and Adhesive tape     Home Meds:   Prior to Visit Medications    Medication Sig Taking? Authorizing Provider   albuterol sulfate  (90 Base) MCG/ACT inhaler INHALE 2 PUFFS INTO THE LUNGS EVERY 4 HOURS AS NEEDED FOR WHEEZING OR SHORTNESS OF BREATH  MARTITA Tong - CNP   albuterol sulfate  (90 Base) MCG/ACT inhaler INHALE 2 PUFFS INTO THE LUNGS EVERY 4 HOURS AS NEEDED FOR WHEEZING OR SHORTNESS OF BREATH  Mary Silver MD   guaiFENesin-Codeine (VIRTUSSIN A/C PO) Take by mouth prn  Historical Provider, MD   oxyCODONE-acetaminophen (PERCOCET)  MG per tablet Take 1 tablet by mouth every 8 hours as needed for Pain for up to 30 days. May occasionally take an extra tab  For severe pain  MARTITA Henderson CNP   ondansetron (ZOFRAN) 4 MG tablet Take 1 tablet by mouth daily as needed for Nausea or Vomiting  Kyle Flores PA-C   gabapentin (NEURONTIN) 300 MG capsule Take 1 capsule by mouth 2 times daily for 28 days. Mary Silver MD   atorvastatin (LIPITOR) 40 MG tablet Take 1 tablet by mouth daily  Mary Silver MD   tiZANidine (ZANAFLEX) 4 MG tablet TAKE 1 TABLET BY MOUTH EVERY 12 HOURS AS NEEDED FOR MUSCLE SPASMS  Geneva Lennon MD   levothyroxine (SYNTHROID) 125 MCG tablet TAKE 1 TABLET BY MOUTH TWICE DAILY  Mary Silver MD   clonazePAM (KLONOPIN) 0.5 MG tablet Take 0.5 mg by mouth 3 times daily as needed. Kinza Taylor   Historical Provider, MD   sertraline (ZOLOFT) 100 MG tablet Take 100 mg by mouth daily  Historical Provider, MD   esomeprazole (NEXIUM) 40 MG delayed release capsule TAKE 1 CAPSULE BY MOUTH Ale Canada MD   rOPINIRole (REQUIP) 1 MG tablet TAKE 1 TABLET BY MOUTH NIGHTLY  Amauri Fuentes MD   topiramate (TOPAMAX) 25 MG tablet 25 mg 2 times daily   Historical Provider, MD   metoprolol tartrate (LOPRESSOR) 50 MG tablet Take 50 mg by mouth 2 times daily   Historical Provider, MD     Please note that medications prescribed at discharge will auto-populate into this medication list when note is refreshed. Please look at prescription date andprescriber to clarify. Family History:family history includes Breast Cancer in her maternal grandmother; Cancer in her mother; Diabetes in her father; Heart Disease in her father; Other in her father; Stroke in her maternal grandfather. Social History: She reports that she has been smoking cigarettes. She has a 10.00 pack-year smoking history. She has never used smokeless tobacco. She reports that she does not drink alcohol or use drugs. She reports that she does not currently engage in sexual activity. REVIEW OF SYSTEMS    (2-9 systems for level 4, 10 or more for level 5)      Review of Systems   Constitutional: Negative for chills, diaphoresis, fatigue and fever. HENT: Negative for congestion, rhinorrhea, sinus pressure and sinus pain. Eyes: Negative for redness and visual disturbance. Respiratory: Positive for cough and shortness of breath. Negative for wheezing. Cardiovascular: Positive for chest pain. Negative for palpitations and leg swelling. Gastrointestinal: Negative for abdominal pain, constipation, diarrhea, nausea and vomiting. Genitourinary: Negative for difficulty urinating, dysuria, frequency and hematuria. Musculoskeletal: Positive for neck pain. Negative for arthralgias, back pain, myalgias and neck stiffness. Skin: Negative for color change, pallor and rash. Neurological: Positive for headaches. Negative for dizziness, syncope, weakness, light-headedness and numbness. Hematological: Negative for adenopathy. Does not bruise/bleed easily. PHYSICAL EXAM   (up to 7 for level 4, 8 or more for level 5)      Initial Vitals   ED Triage Vitals [07/06/19 1251]   BP Temp Temp Source Pulse Resp SpO2 Height Weight   135/88 98.4 °F (36.9 °C) Oral 76 17 96 % 5' 4\" (1.626 m) 187 lb (84.8 kg)       Physical Exam   Constitutional: She is oriented to person, place, and time. She appears well-developed and well-nourished. No distress. HENT:   Head: Normocephalic and atraumatic. Mouth/Throat: Oropharynx is clear and moist.   Eyes: Conjunctivae and EOM are normal. No scleral icterus. Neck: Normal range of motion. Neck supple. Cardiovascular: Normal rate, regular rhythm and normal heart sounds. Exam reveals no gallop and no friction rub. No murmur heard. Pulmonary/Chest: Effort normal and breath sounds normal. No respiratory distress. She has no wheezes. She has no rales. She exhibits tenderness (midsternal ). Abdominal: Soft. She exhibits no distension. There is no tenderness. There is no rebound and no guarding. Genitourinary:   Genitourinary Comments: Exam performed with nursing chaperone in the room. There is no external or internal hemorrhoids. No anal fissures. No gross blood. Guaiac negative   Musculoskeletal: Normal range of motion. She exhibits no deformity. Neurological: She is alert and oriented to person, place, and time. Coordination normal.   Skin: Skin is warm. No rash noted. She is not diaphoretic. No erythema. No pallor. Psychiatric: She has a normal mood and affect. Her behavior is normal. Judgment and thought content normal.   Nursing note and vitals reviewed. DIFFERENTIAL DIAGNOSIS/IMPRESSION     DDX: musculoskeletal pain, costochondritis, ACS, PE, pnuemonia     Impression: 53 y.o. female who presents with chest pain for several days. And cough. Patient also has chronic neck pain that is acutely worsened secondary to the cough.   Patient has normal in caliber. Mediastinum: No evidence of mediastinal lymphadenopathy. The heart and pericardium demonstrate no acute abnormality. There is no acute abnormality of the thoracic aorta. Lungs/pleura: Mild collected along the posterior aspect of both lungs. No focal consolidation or pulmonary edema. No evidence of pleural effusion or pneumothorax. Upper Abdomen: Limited images of the upper abdomen are unremarkable. Soft Tissues/Bones: No acute bone or soft tissue abnormality. No evidence of pulmonary embolism. Mild atelectasis along the posterior aspect of both lungs. BEDSIDE ULTRASOUND:  Not clinically indicated at this time. ED COURSE      ED Medication Orders (From admission, onward)    Start Ordered     Status Ordering Provider    07/06/19 1515 07/06/19 1511  0.9 % sodium chloride bolus  ONCE      Last MAR action:  Stopped - by MINA CARDENAS on 07/06/19 at 1454 [facility] [address]    07/06/19 1511 07/06/19 1511  ioversol (OPTIRAY) 74 % injection 75 mL  IMG ONCE PRN      Last MAR action:  Given - by MINA CARDENAS on 07/06/19 at 1512 [facility] EST[place] JONN    07/06/19 1415 07/06/19 1402  fentaNYL (SUBLIMAZE) injection 50 mcg  ONCE      Last MAR action:  Given - by Nasima Rogel on 07/06/19 at 111 [facility] ESTELLE JONN    07/06/19 1345 07/06/19 1336  ondansetron (ZOFRAN) injection 4 mg  ONCE      Last MAR action:  [facility]    07/06/19 1330 07/06/19 1319  ketorolac (TORADOL) injection 30 mg  ONCE      Last MAR action:  Given - by Nasima Rogel on 07/06/19 at 1340 [facility] EST[place]LE E    07/06/19 1330 07/06/19 1319  orphenadrine (NORFLEX) injection 60 mg  ONCE      Last MAR action:  Given - by Nasima Rogel on 07/06/19 at 1800 [facility]          EMERGENCYDEPARTMENT COURSE:  ED Course as of Jul 06 2052   Sat Jul 06, 2019   1402 D-dimer positive. Will get CT scan of the chest.  Patient still having significant neck pain.   We will give one-time

## 2019-07-08 ENCOUNTER — TELEPHONE (OUTPATIENT)
Dept: INTERNAL MEDICINE CLINIC | Age: 49
End: 2019-07-08

## 2019-07-09 ENCOUNTER — HOSPITAL ENCOUNTER (OUTPATIENT)
Dept: PAIN MANAGEMENT | Age: 49
Discharge: HOME | End: 2019-07-09
Payer: COMMERCIAL

## 2019-07-09 VITALS
WEIGHT: 187 LBS | TEMPERATURE: 98.6 F | RESPIRATION RATE: 16 BRPM | BODY MASS INDEX: 31.92 KG/M2 | HEIGHT: 64 IN | OXYGEN SATURATION: 96 % | HEART RATE: 72 BPM | DIASTOLIC BLOOD PRESSURE: 79 MMHG | SYSTOLIC BLOOD PRESSURE: 124 MMHG

## 2019-07-09 DIAGNOSIS — Z51.81 ENCOUNTER FOR MEDICATION MONITORING: ICD-10-CM

## 2019-07-09 DIAGNOSIS — S13.4XXS SPRAIN OF ATLANTO-OCCIPITAL JOINT, SEQUELA: ICD-10-CM

## 2019-07-09 DIAGNOSIS — M48.02 DEGENERATIVE CERVICAL SPINAL STENOSIS: ICD-10-CM

## 2019-07-09 DIAGNOSIS — M54.12 CERVICAL RADICULOPATHY: ICD-10-CM

## 2019-07-09 DIAGNOSIS — M47.812 ARTHROPATHY OF CERVICAL FACET JOINT: ICD-10-CM

## 2019-07-09 DIAGNOSIS — S13.4XXS ATLANTO-AXIAL JOINT SPRAIN, SEQUELA: ICD-10-CM

## 2019-07-09 DIAGNOSIS — M47.812 OSTEOARTHRITIS OF CERVICAL SPINE, UNSPECIFIED SPINAL OSTEOARTHRITIS COMPLICATION STATUS: ICD-10-CM

## 2019-07-09 DIAGNOSIS — M48.02 STENOSIS, CERVICAL SPINE: Primary | ICD-10-CM

## 2019-07-09 LAB
EKG ATRIAL RATE: 85 BPM
EKG P AXIS: 45 DEGREES
EKG P-R INTERVAL: 150 MS
EKG Q-T INTERVAL: 404 MS
EKG QRS DURATION: 76 MS
EKG QTC CALCULATION (BAZETT): 480 MS
EKG R AXIS: 30 DEGREES
EKG T AXIS: 51 DEGREES
EKG VENTRICULAR RATE: 85 BPM

## 2019-07-09 PROCEDURE — 80307 DRUG TEST PRSMV CHEM ANLYZR: CPT

## 2019-07-09 PROCEDURE — 99213 OFFICE O/P EST LOW 20 MIN: CPT

## 2019-07-09 PROCEDURE — 99213 OFFICE O/P EST LOW 20 MIN: CPT | Performed by: NURSE PRACTITIONER

## 2019-07-09 RX ORDER — OXYCODONE AND ACETAMINOPHEN 10; 325 MG/1; MG/1
1 TABLET ORAL EVERY 8 HOURS PRN
Qty: 90 TABLET | Refills: 0 | Status: SHIPPED | OUTPATIENT
Start: 2019-07-11 | End: 2019-08-10

## 2019-07-09 ASSESSMENT — ENCOUNTER SYMPTOMS
RESPIRATORY NEGATIVE: 1
NAUSEA: 1

## 2019-07-09 NOTE — PROGRESS NOTES
MARTITA Banuelos - CNP)  Review ofOARRS does not show any aberrant prescription behavior. Medication is helping the patient stay active. Patient denies any side effects and reports adequate analgesia. No sign of misuse/abuse.               When was thelast UDS:   1-16-18          Was the UDS appropriate:yes        Record/Diagnostics Review:       As above, I did review the imaging     1/22/2018  1:31 PM - Liu, Stephypn Incoming Lab Results From [facility]      Component Results      Component Value Ref Range & Units Status Collected Lab   Pain Management Drug Panel Interp, Urine Inconsistent    Final 01/16/2018  1:45 PM [facility]   (NOTE)   ________________________________________________________________   DRUGS EXPECTED:   PERCOCET (OXYCODONE) [1/14/18]   ________________________________________________________________   CONSISTENT with medications provided:   PERCOCET (OXYCODONE) : based on noroxycodone   ________________________________________________________________   INCONSISTENT with medications provided:   7-Aminoclonazepam   ________________________________________________________________   Drugs Not Included in this Assay:   Acetaminophen   ________________________________________________________________   INTERPRETIVE INFORMATION: Pain Mgt Chaudhary, Mass Spec/EMIT, Ur,                            Interp   Interpretation depends on accuracy and completeness of patient   medication information submitted by client.     6-Acetylmorphine, Ur Not Detected    Final 01/16/2018  1:45 PM [facility]   7-Aminoclonazepam, Urine Present    Final 01/16/2018  1:45 PM [facility]   Alpha-OH-Alpraz, Urine Not Detected    Final 01/16/2018  1:45 PM [facility]   Alprazolam, Urine Not Detected    Final 01/16/2018  1:45 PM [facility]   Amphetamines, urine Not Detected    Final 01/16/2018  1:45 PM [facility]   Barbiturates, Ur Not Detected    Final 01/16/2018  1:45 PM [facility]   Benzoylecgonine, Ur Not Detected    Final 01/16/2018  1:45 PM [facility]   Buprenorphine Urine Not Detected    Final 01/16/2018  1:45 PM [facility]   Carisoprodol, Ur Not Detected    Final 01/16/2018  1:45 PM [facility]   (NOTE)   The carisoprodol immunoassay has cross-reactivity to carisoprodol   and meprobamate.     Clonazepam, Urine Not Detected    Final 01/16/2018  1:45 PM [facility]   Codeine, Urine Not Detected    Final 01/16/2018  1:45 PM [facility]   MDA, Ur Not Detected    Final 01/16/2018  1:45 PM [facility]   Diazepam, Urine Not Detected    Final 01/16/2018  1:45 PM [facility]   Ethyl Glucuronide Ur Not Detected    Final 01/16/2018  1:45 PM [facility]   Fentanyl, Ur Not Detected    Final 01/16/2018  1:45 PM [facility]   Hydrocodone, Urine Not Detected    Final 01/16/2018  1:45 PM [facility]   Hydromorphone, Urine Not Detected    Final 01/16/2018  1:45 PM [facility]   Lorazepam, Urine Not Detected    Final 01/16/2018  1:45 PM [facility]   Marijuana Metab, Ur Not Detected    Final 01/16/2018  1:45 PM [facility]   MDEA, AISHA, Ur Not Detected    Final 01/16/2018  1:45 PM [facility]   MDMA, Urine Not Detected    Final 01/16/2018  1:45 PM [facility]   Meperidine Metab, Ur Not Detected    Final 01/16/2018  1:45 PM [facility]   Methadone, Urine Not Detected    Final 01/16/2018  1:45 PM [facility]   Methamphetamine, Urine Not Detected    Final 01/16/2018  1:45 PM [facility]   Methylphenidate Not Detected    Final 01/16/2018  1:45 PM [facility]   Midazolam, Urine Not Detected    Final 01/16/2018  1:45 PM [facility]   Morphine Urine Not Detected    Final 01/16/2018  1:45 PM [facility]   Norbuprenorphine, Urine Not Detected    Final 01/16/2018  1:45 PM [facility]   Nordiazepam, Urine Not Detected    Final 01/16/2018  1:45 PM [facility]   Norfentanyl, Urine Not Detected    Final 01/16/2018  1:45 PM [facility]   NORHYDROCODONE, URINE Not Detected    Final 01/16/2018  1:45 PM [facility]   Noroxycodone, Urine Present    Final 01/16/2018  1:45 PM [facility]   NOROXYMORPHONE, URINE Not Detected    Final 01/16/2018  1:45 PM [facility]   Oxazepam, Urine Not Detected    Final 01/16/2018  1:45 PM [facility]   Oxycodone Urine Not Detected    Final 01/16/2018  1:45 PM [facility]   Oxymorphone, Urine Not Detected    Final 01/16/2018  1:45 PM [facility]   PCP, Urine Not Detected    Final 01/16/2018  1:45 PM [facility]   Phentermine, Ur Not Detected    Final 01/16/2018  1:45 PM [facility]   Propoxyphene, Urine Not Detected    Final 01/16/2018  1:45 PM [facility]   Tapentadol-O-Sulfate, Urine Not Detected    Final 01/16/2018  1:45 PM [facility]   Tapentadol, Urine Not Detected    Final 01/16/2018  1:45 PM [facility]   Temazepam, Urine Not Detected    Final 01/16/2018  1:45 PM [facility]   Tramadol, Urine Not Detected    Final 01/16/2018  1:45 PM [facility]   Zolpidem, Urine Not Detected    Final 01/16/2018  1:45 PM [facility]   Drugs Expected, Ur     Final 01/16/2018  1:45  [facility] [address] [facility]   PERCOCET [id number] BEDTIME    Creatinine, Ur 199.8  20.0 - 400.0 mg/dL Final 01/16/2018  1:45 PM [facility]   Pain Mgt Drug Panel, Hi Res, Ur See Below    Final 01/16/2018  1:45 PM [facility]   (NOTE)   Methodology: Qualitative Enzyme Immunoassay and Qualitative Liquid   Chromatography-Time of Flight-Mass Spectrometry or Tandem Mass   Spectrometry, Quantitative Spectrophotometry   The absence of expected drug(s) and/or drug metabolite(s) may   indicate non-compliance, inappropriate timing of specimen   collection relative to drug administration, poor drug absorption,   diluted/adulterated urine, or limitations of testing. The   concentration must be greater than or equal to the cutoff to be   reported as present.  If specific drug concentrations are   required, contact the laboratory within two weeks of specimen   collection to request quantification by a second analytical   technique. Interpretive questions should be directed to the   laboratory. Results based on immunoassay detection that do not match clinical   expectations should be   interpreted with caution. Confirmatory testing by mass   spectrometry for immunoassay-based results is available, if   ordered within two weeks of specimen collection. Additional   charges apply.    For medical purposes only; not valid for Positive for nausea. Genitourinary: Negative. Neurological: Positive for headaches and numbness. Psychiatric/Behavioral: Negative. Physical Exam:  /79   Pulse 72   Temp 98.6 °F (37 °C) (Oral)   Resp 16   Ht 5' 4\" (1.626 m)   Wt 187 lb (84.8 kg)   LMP 11/01/2010   SpO2 96%   BMI 32.10 kg/m²     Physical Exam   Constitutional: She appears well-developed. HENT:   Head:       Neck: Neck supple. Pulmonary/Chest: Effort normal.   Musculoskeletal:        Cervical back: She exhibits decreased range of motion and tenderness. Tender left cervical facet joints   Neurological: She is alert. She has normal strength. Reflex Scores:       Tricep reflexes are 2+ on the right side and 2+ on the left side. Bicep reflexes are 2+ on the right side and 2+ on the left side. Brachioradialis reflexes are 2+ on the right side and 2+ on the left side. Skin: Skin is warm, dry and intact. Psychiatric: Her speech is normal and behavior is normal. Judgment and thought content normal. Cognition and memory are normal. She exhibits a depressed mood.          Assessment:    Problem List Items Addressed This Visit     Sprain of atlanto-occipital joint, sequela    Osteoarthritis of cervical spine (Chronic)    Relevant Medications    oxyCODONE-acetaminophen (PERCOCET)  MG per tablet (Start on 7/11/2019)    Encounter for medication monitoring    Relevant Medications    oxyCODONE-acetaminophen (PERCOCET)  MG per tablet (Start on 7/11/2019)    Degenerative cervical spinal stenosis - Primary    Relevant Medications    oxyCODONE-acetaminophen (PERCOCET)  MG per tablet (Start on 7/11/2019)    Cervical radiculopathy    Relevant Medications    oxyCODONE-acetaminophen (PERCOCET)  MG per tablet (Start on 7/11/2019)    Atlanto-axial joint sprain, sequela    Relevant Medications    oxyCODONE-acetaminophen (PERCOCET)  MG per tablet (Start on 7/11/2019)    Arthropathy of cervical

## 2019-07-12 LAB
6-ACETYLMORPHINE, UR: NOT DETECTED
7-AMINOCLONAZEPAM, URINE: PRESENT
ALPHA-OH-ALPRAZ, URINE: NOT DETECTED
ALPRAZOLAM, URINE: NOT DETECTED
AMPHETAMINES, URINE: NOT DETECTED
BARBITURATES, URINE: NOT DETECTED
BENZOYLECGONINE, UR: NOT DETECTED
BUPRENORPHINE URINE: NOT DETECTED
CARISOPRODOL, UR: NOT DETECTED
CLONAZEPAM, URINE: NOT DETECTED
CODEINE, URINE: NOT DETECTED
CREATININE URINE: 278.1 MG/DL (ref 20–400)
DIAZEPAM, URINE: NOT DETECTED
DRUGS EXPECTED, UR: NORMAL
EER HI RES INTERP UR: NORMAL
ETHYL GLUCURONIDE UR: NOT DETECTED
FENTANYL URINE: NOT DETECTED
HYDROCODONE, URINE: NOT DETECTED
HYDROMORPHONE, URINE: NOT DETECTED
LORAZEPAM, URINE: NOT DETECTED
MARIJUANA METAB, UR: NOT DETECTED
MDA, UR: NOT DETECTED
MDEA, EVE, UR: NOT DETECTED
MDMA URINE: NOT DETECTED
MEPERIDINE METAB, UR: NOT DETECTED
METHADONE, URINE: NOT DETECTED
METHAMPHETAMINE, URINE: NOT DETECTED
METHYLPHENIDATE: NOT DETECTED
MIDAZOLAM, URINE: NOT DETECTED
MORPHINE URINE: NOT DETECTED
NORBUPRENORPHINE, URINE: NOT DETECTED
NORDIAZEPAM, URINE: NOT DETECTED
NORFENTANYL, URINE: PRESENT
NORHYDROCODONE, URINE: NOT DETECTED
NOROXYCODONE, URINE: NOT DETECTED
NOROXYMORPHONE, URINE: NOT DETECTED
OXAZEPAM, URINE: NOT DETECTED
OXYCODONE URINE: NOT DETECTED
OXYMORPHONE, URINE: NOT DETECTED
PAIN MANAGEMENT DRUG PANEL INTERP, URINE: NORMAL
PAIN MGT DRUG PANEL, HI RES, UR: NORMAL
PCP,URINE: NOT DETECTED
PHENTERMINE, UR: NOT DETECTED
PROPOXYPHENE, URINE: NOT DETECTED
TAPENTADOL, URINE: NOT DETECTED
TAPENTADOL-O-SULFATE, URINE: NOT DETECTED
TEMAZEPAM, URINE: NOT DETECTED
TRAMADOL, URINE: NOT DETECTED
ZOLPIDEM, URINE: NOT DETECTED

## 2019-07-19 DIAGNOSIS — E03.9 HYPOTHYROIDISM: ICD-10-CM

## 2019-07-19 RX ORDER — LEVOTHYROXINE SODIUM 0.12 MG/1
TABLET ORAL
Qty: 60 TABLET | Refills: 6 | Status: SHIPPED | OUTPATIENT
Start: 2019-07-19 | End: 2019-07-24

## 2019-07-24 ENCOUNTER — OFFICE VISIT (OUTPATIENT)
Dept: INTERNAL MEDICINE CLINIC | Age: 49
End: 2019-07-24
Payer: COMMERCIAL

## 2019-07-24 VITALS
SYSTOLIC BLOOD PRESSURE: 128 MMHG | BODY MASS INDEX: 31.58 KG/M2 | WEIGHT: 185 LBS | DIASTOLIC BLOOD PRESSURE: 76 MMHG | HEIGHT: 64 IN

## 2019-07-24 DIAGNOSIS — E03.9 HYPOTHYROIDISM, UNSPECIFIED TYPE: ICD-10-CM

## 2019-07-24 DIAGNOSIS — E03.9 HYPOTHYROIDISM: ICD-10-CM

## 2019-07-24 DIAGNOSIS — E11.9 TYPE 2 DIABETES MELLITUS WITHOUT COMPLICATION, UNSPECIFIED WHETHER LONG TERM INSULIN USE (HCC): Primary | ICD-10-CM

## 2019-07-24 LAB — HBA1C MFR BLD: 5.6 %

## 2019-07-24 PROCEDURE — 83036 HEMOGLOBIN GLYCOSYLATED A1C: CPT | Performed by: INTERNAL MEDICINE

## 2019-07-24 PROCEDURE — 99213 OFFICE O/P EST LOW 20 MIN: CPT | Performed by: INTERNAL MEDICINE

## 2019-07-24 RX ORDER — NICOTINE 21 MG/24HR
1 PATCH, TRANSDERMAL 24 HOURS TRANSDERMAL EVERY 24 HOURS
Qty: 30 PATCH | Refills: 3 | Status: SHIPPED | OUTPATIENT
Start: 2019-07-24 | End: 2021-02-23

## 2019-07-24 RX ORDER — ROPINIROLE 2 MG/1
2 TABLET, FILM COATED ORAL NIGHTLY
Qty: 30 TABLET | Refills: 1 | Status: SHIPPED | OUTPATIENT
Start: 2019-07-24 | End: 2019-07-24 | Stop reason: SDUPTHER

## 2019-07-24 RX ORDER — LEVOTHYROXINE SODIUM 0.2 MG/1
TABLET ORAL
Qty: 30 TABLET | Refills: 0 | Status: SHIPPED | OUTPATIENT
Start: 2019-07-24 | End: 2019-07-24 | Stop reason: SDUPTHER

## 2019-07-24 RX ORDER — ROPINIROLE 2 MG/1
TABLET, FILM COATED ORAL
Qty: 90 TABLET | Refills: 1 | Status: SHIPPED | OUTPATIENT
Start: 2019-07-24 | End: 2020-02-20

## 2019-07-24 RX ORDER — LEVOTHYROXINE SODIUM 0.2 MG/1
TABLET ORAL
Qty: 90 TABLET | Refills: 3 | Status: SHIPPED | OUTPATIENT
Start: 2019-07-24 | End: 2019-08-13

## 2019-07-24 NOTE — PROGRESS NOTES
Subjective:      Patient ID: Harvey Rodriguez is a 53 y.o. female. Chronic Disease Visit Information    BP Readings from Last 3 Encounters:   08/13/19 139/84   07/24/19 128/76   07/09/19 124/79          Hemoglobin A1C (%)   Date Value   07/24/2019 5.6   01/12/2019 5.6   05/16/2017 6.0     Microalb/Crt. Ratio (mcg/mg creat)   Date Value   01/12/2019 18     LDL Cholesterol (mg/dL)   Date Value   01/12/2019 84     LDL Calculated (mg/dL)   Date Value   10/26/2016 91     HDL (mg/dL)   Date Value   01/12/2019 47     BUN (mg/dL)   Date Value   07/06/2019 15     CREATININE (mg/dL)   Date Value   07/06/2019 0.66     Glucose (mg/dL)   Date Value   07/06/2019 112 (H)   06/03/2019 107 (H)            Have you changed or started any medications since your last visit including any over-the-counter medicines, vitamins, or herbal medicines? no   Are you having any side effects from any of your medications? -  no  Have you stopped taking any of your medications? Is so, why? -  no    Have you seen any other physician or provider since your last visit? Yes - Records Obtained  Have you had any other diagnostic tests since your last visit? Yes - Records Obtained  Have you been seen in the emergency room and/or had an admission to a hospital since we last saw you? Yes - Records Obtained  Have you had your annual diabetic retinal (eye) exam? No  Have you had your routine dental cleaning in the past 6 months? yes -     Have you activated your [employer] account? If not, what are your barriers?  Yes     Patient Care Team:  Brennan Juarez MD as PCP - General (Internal Medicine)  Brennan Juarez MD as PCP - [facility] Provider  Jody Caballero MD as Consulting Physician (Gastroenterology)  Paula Romero MD as Consulting Physician (Obstetrics & Gynecology)  Katrin Abebe MD as Consulting Physician (Gastroenterology)     Chief Complaint   Patient presents with    Neck Pain     pt went to [facility] ED on 7/6/19 for eval. pt Alcohol use: No     Alcohol/week: 0.0 standard drinks       ROS  CVS no chest pain no sob no orthopnea  Resp no cough   General no weight loss no fatigue no fever      Blood pressure 128/76, height 5' 4.02\" (1.626 m), weight 185 lb (83.9 kg), last menstrual period 11/01/2010, not currently breastfeeding. General Appearance NAD conversant  Neck FROM supple no JVD  Lungs normal effort Clear to auscultation  Cardio regular rhythm no murmur  Abdomen Soft nontender no HSM  Ext no edema no cyanosis no clubbing   Skin no rashes no ulcers  Neuro no focal deficits   Musculoskeletal no spinal tenderness no kyphosis     Objective:   Physical Exam    Assessment:       Diagnosis Orders   1. Type 2 diabetes mellitus without complication, unspecified whether long term insulin use (HCC)  Glycosylated HgB    POCT glycosylated hemoglobin (Hb A1C)   2.  Hypothyroidism  DISCONTINUED: levothyroxine (SYNTHROID) 200 MCG tablet           Plan:    DM hba1c is at goal hba1c is 5.6 hypothyroid tsh is less than .01 dose decreased to levothyroxine 200 mcg HTN well controlled           Emilee Marlow MD

## 2019-07-29 DIAGNOSIS — R06.2 WHEEZING: ICD-10-CM

## 2019-07-29 RX ORDER — ALBUTEROL SULFATE 90 UG/1
2 AEROSOL, METERED RESPIRATORY (INHALATION) EVERY 4 HOURS PRN
Qty: 25.5 G | Refills: 3 | Status: SHIPPED | OUTPATIENT
Start: 2019-07-29 | End: 2019-07-29 | Stop reason: SDUPTHER

## 2019-07-30 RX ORDER — ALBUTEROL SULFATE 90 UG/1
2 AEROSOL, METERED RESPIRATORY (INHALATION) EVERY 4 HOURS PRN
Qty: 42.5 G | Refills: 3 | Status: SHIPPED | OUTPATIENT
Start: 2019-07-30 | End: 2019-08-13 | Stop reason: SDUPTHER

## 2019-08-07 DIAGNOSIS — G62.9 NEUROPATHY: ICD-10-CM

## 2019-08-07 RX ORDER — GABAPENTIN 300 MG/1
CAPSULE ORAL
Qty: 180 CAPSULE | Refills: 0 | Status: SHIPPED | OUTPATIENT
Start: 2019-08-07 | End: 2019-11-08 | Stop reason: SDUPTHER

## 2019-08-13 ENCOUNTER — HOSPITAL ENCOUNTER (OUTPATIENT)
Dept: PAIN MANAGEMENT | Age: 49
Discharge: HOME | End: 2019-08-13
Payer: COMMERCIAL

## 2019-08-13 VITALS
DIASTOLIC BLOOD PRESSURE: 84 MMHG | SYSTOLIC BLOOD PRESSURE: 139 MMHG | WEIGHT: 185 LBS | HEIGHT: 64 IN | RESPIRATION RATE: 16 BRPM | BODY MASS INDEX: 31.58 KG/M2 | OXYGEN SATURATION: 96 % | HEART RATE: 71 BPM | TEMPERATURE: 98.7 F

## 2019-08-13 DIAGNOSIS — M47.22 OSTEOARTHRITIS OF SPINE WITH RADICULOPATHY, CERVICAL REGION: Chronic | ICD-10-CM

## 2019-08-13 DIAGNOSIS — S13.4XXS ATLANTO-AXIAL JOINT SPRAIN, SEQUELA: ICD-10-CM

## 2019-08-13 DIAGNOSIS — M47.812 OSTEOARTHRITIS OF CERVICAL SPINE, UNSPECIFIED SPINAL OSTEOARTHRITIS COMPLICATION STATUS: ICD-10-CM

## 2019-08-13 DIAGNOSIS — M54.12 CERVICAL RADICULOPATHY: ICD-10-CM

## 2019-08-13 DIAGNOSIS — S13.4XXS SPRAIN OF ATLANTO-OCCIPITAL JOINT, SEQUELA: ICD-10-CM

## 2019-08-13 DIAGNOSIS — M47.812 ARTHROPATHY OF CERVICAL FACET JOINT: ICD-10-CM

## 2019-08-13 DIAGNOSIS — M48.02 DEGENERATIVE CERVICAL SPINAL STENOSIS: Primary | ICD-10-CM

## 2019-08-13 DIAGNOSIS — Z51.81 ENCOUNTER FOR MEDICATION MONITORING: ICD-10-CM

## 2019-08-13 PROCEDURE — 99213 OFFICE O/P EST LOW 20 MIN: CPT | Performed by: NURSE PRACTITIONER

## 2019-08-13 PROCEDURE — 99214 OFFICE O/P EST MOD 30 MIN: CPT

## 2019-08-13 RX ORDER — OXYCODONE AND ACETAMINOPHEN 10; 325 MG/1; MG/1
1 TABLET ORAL EVERY 8 HOURS PRN
Qty: 45 TABLET | Refills: 0 | Status: SHIPPED | OUTPATIENT
Start: 2019-08-13 | End: 2019-08-26 | Stop reason: SDUPTHER

## 2019-08-13 RX ORDER — LEVOTHYROXINE SODIUM 0.2 MG/1
TABLET ORAL
COMMUNITY
End: 2019-10-08

## 2019-08-13 ASSESSMENT — ENCOUNTER SYMPTOMS
GASTROINTESTINAL NEGATIVE: 1
RESPIRATORY NEGATIVE: 1

## 2019-08-13 NOTE — PROGRESS NOTES
drug abuse or diversion identified., Obtaining appropriate analgesic effect of treatment., Assessed functional status. Kelsie Gillis, APRN - CNP)  Review of[place]ARRS does not show any aberrant prescription behavior. Medication is helping the patient stay active. Patient denies any side effects and reports adequate analgesia. No sign of misuse/abuse. When was thelast UDS:    7-9-19         Was the UDS appropriate: she had gotten fentanyl IV in ED on 7-6-19      Record/Diagnostics Review:      As above, I did review the imaging    7/12/2019  1:28 PM - Liu, Stephypn Incoming Lab Results From [email or web address]     Component Value Ref Range & Units Status Collected Lab   Pain Management Drug Panel Interp, Urine Inconsistent   Final 07/09/2019 11:00 AM [facility]   (NOTE)   ________________________________________________________________   DRUGS EXPECTED:   OXYCODONE [7/7/19]   ________________________________________________________________   CONSISTENT with medications provided:   OXYCODONE : based on the absence of oxycodone and metabolites   ________________________________________________________________   INCONSISTENT with medications provided:   Norfentanyl   7-Aminoclonazepam   ________________________________________________________________   INTERPRETIVE INFORMATION: Pain Mgt Chaudhary, Mass Spec/EMIT, Ur,                            Interp   Interpretation depends on accuracy and completeness of patient   medication information submitted by client.     6-Acetylmorphine, Ur Not Detected   Final 07/09/2019 11:00 AM [facility]   7-Aminoclonazepam, Urine Present   Final 07/09/2019 11:00 AM [facility]   Alpha-OH-Alpraz, Urine Not Detected   Final 07/09/2019 11:00 AM [facility]   Alprazolam, Urine Not Detected   Final 07/09/2019 11:00 AM [facility]   Amphetamines, urine Not Detected   Final 07/09/2019 11:00 AM [facility]   Barbiturates, Ur Not Detected   Final 07/09/2019 11:00 AM [facility]   Benzoylecgonine, Ur Not Detected   Final 07/09/2019 11:00 AM [facility]   Buprenorphine Urine Not Detected   Final 07/09/2019 11:00 AM [facility]   Carisoprodol, Ur Not Detected   Final 07/09/2019 11:00 AM [facility]   (NOTE)   The carisoprodol immunoassay has cross-reactivity to carisoprodol   and meprobamate.     Clonazepam, Urine Not Detected   Final 07/09/2019 11:00 AM [facility]   Codeine, Urine Not Detected   Final 07/09/2019 11:00 AM [facility]   MDA, Ur Not Detected   Final 07/09/2019 11:00 AM [facility]   Diazepam, Urine Not Detected   Final 07/09/2019 11:00 AM [facility]   Ethyl Glucuronide Ur Not Detected   Final 07/09/2019 11:00 AM [facility]   Fentanyl, Ur Not Detected   Final 07/09/2019 11:00 AM [facility]   Hydrocodone, Urine Not Detected   Final 07/09/2019 11:00 AM [facility]   Hydromorphone, Urine Not Detected   Final 07/09/2019 11:00 AM [facility]   Lorazepam, Urine Not Detected   Final 07/09/2019 11:00 AM [facility]   Marijuana Metab, Ur Not Detected   Final 07/09/2019 11:00 AM [facility]   MDEA, AISHA, Ur Not Detected   Final 07/09/2019 11:00 AM [facility]   MDMA, Urine Not Detected   Final 07/09/2019 11:00 AM [facility]   Meperidine Metab, Ur Not Detected   Final 07/09/2019 11:00 AM [facility]   Methadone, Urine Not Detected   Final 07/09/2019 11:00 AM [facility]   Methamphetamine, Urine Not Detected   Final 07/09/2019 11:00 AM [facility]   Methylphenidate Not Detected   Final 07/09/2019 11:00 AM [facility]   Midazolam, Urine Not Detected   Final 07/09/2019 11:00 AM [facility]   Morphine Urine Not Detected   Final 07/09/2019 11:00 AM [facility]   Norbuprenorphine, Urine Not Detected   Final 07/09/2019 11:00 AM [facility]   Nordiazepam, Urine Not Detected   Final 07/09/2019 11:00 AM [facility]   Norfentanyl, Urine Present   Final 07/09/2019 11:00 AM [facility]   NORHYDROCODONE, URINE Not Detected   Final 07/09/2019 11:00 AM [facility]   Noroxycodone, Urine Not Detected   Final 07/09/2019 11:00 AM [facility]   NOROXYMORPHONE, URINE Not Detected   Final 07/09/2019 11:00 AM [facility]   Oxazepam, Urine Not Detected   Final 07/09/2019 11:00 AM [facility]   Oxycodone Urine Not Detected   Final 07/09/2019 11:00 AM [facility]   Oxymorphone, Urine Not Outpatient Medications:     oxyCODONE ER (XTAMPZA ER) 9 MG C12A, Take 9 mg by mouth every 12 hours for 15 days. , Disp: 30 each, Rfl: 0    oxyCODONE-acetaminophen (PERCOCET)  MG per tablet, Take 1 tablet by mouth every 8 hours as needed for Pain for up to 15 days. 1 tab 2-3 times a day as needed, Disp: 45 tablet, Rfl: 0    gabapentin (NEURONTIN) 300 MG capsule, TAKE 1 CAPSULE BY MOUTH TWICE DAILY, Disp: 180 capsule, Rfl: 0    nicotine (NICODERM CQ) 14 MG/24HR, Place 1 patch onto the skin every 24 hours, Disp: 30 patch, Rfl: 3    rOPINIRole (REQUIP) 2 MG tablet, TAKE 1 TABLET BY MOUTH EVERY NIGHT, Disp: 90 tablet, Rfl: 1    atorvastatin (LIPITOR) 40 MG tablet, Take 1 tablet by mouth daily, Disp: 90 tablet, Rfl: 3    sertraline (ZOLOFT) 100 MG tablet, Take 100 mg by mouth daily, Disp: , Rfl:     esomeprazole (NEXIUM) 40 MG delayed release capsule, TAKE 1 CAPSULE BY MOUTH EVERY MORNING BEFORE BREAKFAST, Disp: 90 capsule, Rfl: 3    topiramate (TOPAMAX) 25 MG tablet, 25 mg 2 times daily , Disp: , Rfl:     metoprolol tartrate (LOPRESSOR) 50 MG tablet, Take 50 mg by mouth 2 times daily , Disp: , Rfl:     levothyroxine (SYNTHROID) 200 MCG tablet, levothyroxine 200 mcg tablet, Disp: , Rfl:     albuterol sulfate  (90 Base) MCG/ACT inhaler, INHALE 2 PUFFS INTO THE LUNGS EVERY 4 HOURS AS NEEDED FOR WHEEZING OR SHORTNESS OF BREATH, Disp: 18 g, Rfl: 3    ondansetron (ZOFRAN) 4 MG tablet, Take 1 tablet by mouth daily as needed for Nausea or Vomiting, Disp: 30 tablet, Rfl: 0    tiZANidine (ZANAFLEX) 4 MG tablet, TAKE 1 TABLET BY MOUTH EVERY 12 HOURS AS NEEDED FOR MUSCLE SPASMS, Disp: 60 tablet, Rfl: 3    clonazePAM (KLONOPIN) 0.5 MG tablet, Take 0.5 mg by mouth 3 times daily as needed. ., Disp: , Rfl:     Family History   Problem Relation Age of Onset   [facility] Cancer Mother         vaginal    Heart Disease Father     Diabetes Father     Other Father         colon resection for colon polyps    Breast Cancer Maternal Grandmother     Stroke Maternal Grandfather        Social History     Socioeconomic History    Marital status:      Spouse name: Not on file    Number of children: Not on file    Years of education: Not on file    Highest education level: Not on file   Occupational History    Occupation: Homemaker   Social Needs    Financial resource strain: Not on file    Food insecurity:     Worry: Not on file     Inability: Not on file    Transportation needs:     Medical: Not on file     Non-medical: Not on file   Tobacco Use    Smoking status: Current Some Day Smoker     Packs/day: 0.50     Years: 20.00     Pack years: 10.00     Types: Cigarettes    Smokeless tobacco: Never Used    Tobacco comment: 1/2 pack every month   Substance and Sexual Activity    Alcohol use: No     Alcohol/week: 0.0 standard drinks    Drug use: No    Sexual activity: Not Currently   Lifestyle    Physical activity:     Days per week: Not on file     Minutes per session: Not on file    Stress: Not on file   Relationships    Social connections:     Talks on phone: Not on file     Gets together: Not on file     Attends Protestant service: Not on file     Active member of club or organization: Not on file     Attends meetings of clubs or organizations: Not on file     Relationship status: Not on file    Intimate partner violence:     Fear of current or ex partner: Not on file     Emotionally abused: Not on file     Physically abused: Not on file     Forced sexual activity: Not on file   Other Topics Concern    Not on file   Social History Narrative    Not on file       Review of Systems:  Review of Systems   Constitution: Negative. Eyes:        Glasses   Cardiovascular: Negative. Respiratory: Negative. Endocrine: Negative. Skin: Negative. Musculoskeletal: Positive for joint pain and neck pain. Shoulder   Gastrointestinal: Negative. Genitourinary: Negative.     Neurological: Positive for headaches,

## 2019-08-21 DIAGNOSIS — M47.812 ARTHROPATHY OF CERVICAL FACET JOINT: ICD-10-CM

## 2019-08-21 DIAGNOSIS — S13.4XXS ATLANTO-AXIAL JOINT SPRAIN, SEQUELA: ICD-10-CM

## 2019-08-21 DIAGNOSIS — M54.12 CERVICAL RADICULOPATHY: ICD-10-CM

## 2019-08-21 DIAGNOSIS — M47.812 OSTEOARTHRITIS OF CERVICAL SPINE, UNSPECIFIED SPINAL OSTEOARTHRITIS COMPLICATION STATUS: ICD-10-CM

## 2019-08-21 DIAGNOSIS — Z51.81 ENCOUNTER FOR MEDICATION MONITORING: ICD-10-CM

## 2019-08-21 DIAGNOSIS — M48.02 DEGENERATIVE CERVICAL SPINAL STENOSIS: ICD-10-CM

## 2019-08-22 ENCOUNTER — TELEPHONE (OUTPATIENT)
Dept: PAIN MANAGEMENT | Age: 49
End: 2019-08-22

## 2019-08-26 ENCOUNTER — HOSPITAL ENCOUNTER (OUTPATIENT)
Dept: PAIN MANAGEMENT | Age: 49
Discharge: HOME | End: 2019-08-26
Payer: COMMERCIAL

## 2019-08-26 VITALS
DIASTOLIC BLOOD PRESSURE: 64 MMHG | WEIGHT: 185 LBS | BODY MASS INDEX: 31.58 KG/M2 | SYSTOLIC BLOOD PRESSURE: 123 MMHG | HEIGHT: 64 IN | OXYGEN SATURATION: 96 % | TEMPERATURE: 98.4 F | RESPIRATION RATE: 16 BRPM | HEART RATE: 69 BPM

## 2019-08-26 DIAGNOSIS — S13.4XXS SPRAIN OF ATLANTO-OCCIPITAL JOINT, SEQUELA: ICD-10-CM

## 2019-08-26 DIAGNOSIS — M47.22 OSTEOARTHRITIS OF SPINE WITH RADICULOPATHY, CERVICAL REGION: ICD-10-CM

## 2019-08-26 DIAGNOSIS — Z51.81 ENCOUNTER FOR MEDICATION MONITORING: ICD-10-CM

## 2019-08-26 DIAGNOSIS — S13.4XXS ATLANTO-AXIAL JOINT SPRAIN, SEQUELA: ICD-10-CM

## 2019-08-26 DIAGNOSIS — M47.812 ARTHROPATHY OF CERVICAL FACET JOINT: ICD-10-CM

## 2019-08-26 DIAGNOSIS — M47.812 OSTEOARTHRITIS OF CERVICAL SPINE, UNSPECIFIED SPINAL OSTEOARTHRITIS COMPLICATION STATUS: ICD-10-CM

## 2019-08-26 DIAGNOSIS — M54.12 CERVICAL RADICULOPATHY: ICD-10-CM

## 2019-08-26 DIAGNOSIS — M48.02 DEGENERATIVE CERVICAL SPINAL STENOSIS: Primary | ICD-10-CM

## 2019-08-26 PROCEDURE — 99213 OFFICE O/P EST LOW 20 MIN: CPT

## 2019-08-26 PROCEDURE — 99213 OFFICE O/P EST LOW 20 MIN: CPT | Performed by: NURSE PRACTITIONER

## 2019-08-26 RX ORDER — OXYCODONE AND ACETAMINOPHEN 10; 325 MG/1; MG/1
1 TABLET ORAL EVERY 8 HOURS PRN
Qty: 90 TABLET | Refills: 0 | Status: SHIPPED | OUTPATIENT
Start: 2019-08-28 | End: 2019-10-08 | Stop reason: SDUPTHER

## 2019-08-26 RX ORDER — MORPHINE SULFATE 15 MG/1
15 TABLET, FILM COATED, EXTENDED RELEASE ORAL EVERY 12 HOURS
Qty: 14 TABLET | Refills: 0 | Status: SHIPPED | OUTPATIENT
Start: 2019-08-26 | End: 2019-09-10 | Stop reason: SDUPTHER

## 2019-08-26 ASSESSMENT — ENCOUNTER SYMPTOMS
RESPIRATORY NEGATIVE: 1
NAUSEA: 1

## 2019-08-26 NOTE — PROGRESS NOTES
11:00 AM [facility]   Oxazepam, Urine Not Detected   Final 07/09/2019 11:00 AM [facility]   Oxycodone Urine Not Detected   Final 07/09/2019 11:00 AM [facility]   Oxymorphone, Urine Not Detected   Final 07/09/2019 11:00 AM [facility]   PCP, Urine Not Detected   Final 07/09/2019 11:00 AM [facility]   Phentermine, Ur Not Detected   Final 07/09/2019 11:00 AM [facility]   Propoxyphene, Urine Not Detected   Final 07/09/2019 11:00 AM [facility]   Tapentadol-O-Sulfate, Urine Not Detected   Final 07/09/2019 11:00 AM [facility]   Tapentadol, Urine Not Detected   Final 07/09/2019 11:00 AM [facility]   Temazepam, Urine Not Detected   Final 07/09/2019 11:00 AM [facility]   Tramadol, Urine Not Detected   Final 07/09/2019 11:00 AM [facility]   Zolpidem, Urine Not Detected   Final 07/09/2019 11:00 AM [facility]   Drugs Expected, Ur   Final 07/09/2019 11:00  [address] [facility]   OXYCODONE ON 7/7/19    Creatinine, Ur 278.1  20.0 - 400.0 mg/dL Final 07/09/2019 11:00 AM [facility]   Pain Mgt Drug Panel, Hi Res, Ur See Below   Final 07/09/2019 11:00 AM [facility]   (NOTE)   Methodology: Qualitative Enzyme Immunoassay and Qualitative Liquid   Chromatography-Time of Flight-Mass Spectrometry or Tandem Mass   Spectrometry, Quantitative Spectrophotometry   The absence of expected drug(s) and/or drug metabolite(s) may   indicate non-compliance, inappropriate timing of specimen   collection relative to drug administration, poor drug absorption,   diluted/adulterated urine, or limitations of testing. The   concentration must be greater than or equal to the cutoff to be   reported as present.  If specific drug concentrations are   required, contact the laboratory within two weeks of specimen   collection to request quantification by a second analytical   technique. Interpretive questions should be directed to the   laboratory. Results based on immunoassay detection that do not match clinical   expectations should be   interpreted with caution.  Confirmatory testing by mass   spectrometry for immunoassay-based

## 2019-08-27 RX ORDER — TIZANIDINE 4 MG/1
TABLET ORAL
Qty: 60 TABLET | Refills: 1 | Status: SHIPPED | OUTPATIENT
Start: 2019-08-28 | End: 2019-11-05 | Stop reason: SDUPTHER

## 2019-09-05 DIAGNOSIS — R10.13 EPIGASTRIC PAIN: ICD-10-CM

## 2019-09-05 RX ORDER — RANITIDINE 150 MG/1
TABLET ORAL
Qty: 180 TABLET | Refills: 0 | Status: SHIPPED | OUTPATIENT
Start: 2019-09-05 | End: 2019-10-08

## 2019-09-10 DIAGNOSIS — S13.4XXS SPRAIN OF ATLANTO-OCCIPITAL JOINT, SEQUELA: ICD-10-CM

## 2019-09-10 DIAGNOSIS — M47.22 OSTEOARTHRITIS OF SPINE WITH RADICULOPATHY, CERVICAL REGION: ICD-10-CM

## 2019-09-10 DIAGNOSIS — M47.812 ARTHROPATHY OF CERVICAL FACET JOINT: ICD-10-CM

## 2019-09-10 DIAGNOSIS — M48.02 DEGENERATIVE CERVICAL SPINAL STENOSIS: ICD-10-CM

## 2019-09-10 RX ORDER — MORPHINE SULFATE 15 MG/1
15 TABLET, FILM COATED, EXTENDED RELEASE ORAL EVERY 12 HOURS
Qty: 32 TABLET | Refills: 0 | Status: SHIPPED | OUTPATIENT
Start: 2019-09-10 | End: 2019-10-08 | Stop reason: SDUPTHER

## 2019-09-29 ENCOUNTER — HOSPITAL ENCOUNTER (EMERGENCY)
Age: 49
Discharge: HOME | End: 2019-09-30
Attending: EMERGENCY MEDICINE
Payer: COMMERCIAL

## 2019-09-29 ENCOUNTER — APPOINTMENT (OUTPATIENT)
Dept: GENERAL RADIOLOGY | Age: 49
End: 2019-09-29
Payer: COMMERCIAL

## 2019-09-29 VITALS
SYSTOLIC BLOOD PRESSURE: 135 MMHG | DIASTOLIC BLOOD PRESSURE: 60 MMHG | HEART RATE: 86 BPM | OXYGEN SATURATION: 99 % | BODY MASS INDEX: 31.07 KG/M2 | WEIGHT: 182 LBS | HEIGHT: 64 IN | RESPIRATION RATE: 17 BRPM | TEMPERATURE: 99.5 F

## 2019-09-29 DIAGNOSIS — R06.2 WHEEZING: ICD-10-CM

## 2019-09-29 DIAGNOSIS — G43.001 MIGRAINE WITHOUT AURA AND WITH STATUS MIGRAINOSUS, NOT INTRACTABLE: ICD-10-CM

## 2019-09-29 DIAGNOSIS — R05.9 COUGH: Primary | ICD-10-CM

## 2019-09-29 LAB
ABSOLUTE EOS #: 0.2 K/UL (ref 0–0.4)
ABSOLUTE IMMATURE GRANULOCYTE: ABNORMAL K/UL (ref 0–0.3)
ABSOLUTE LYMPH #: 1.7 K/UL (ref 1–4.8)
ABSOLUTE MONO #: 0.6 K/UL (ref 0.1–1.3)
BASOPHILS # BLD: 1 % (ref 0–2)
BASOPHILS ABSOLUTE: 0.1 K/UL (ref 0–0.2)
DIFFERENTIAL TYPE: ABNORMAL
EOSINOPHILS RELATIVE PERCENT: 3 % (ref 0–4)
HCT VFR BLD CALC: 43.1 % (ref 36–46)
HEMOGLOBIN: 14.5 G/DL (ref 12–16)
IMMATURE GRANULOCYTES: ABNORMAL %
LYMPHOCYTES # BLD: 27 % (ref 24–44)
MCH RBC QN AUTO: 30.4 PG (ref 26–34)
MCHC RBC AUTO-ENTMCNC: 33.7 G/DL (ref 31–37)
MCV RBC AUTO: 90.2 FL (ref 80–100)
MONOCYTES # BLD: 9 % (ref 1–7)
NRBC AUTOMATED: ABNORMAL PER 100 WBC
PDW BLD-RTO: 12.7 % (ref 11.5–14.9)
PLATELET # BLD: 151 K/UL (ref 150–450)
PLATELET ESTIMATE: ABNORMAL
PMV BLD AUTO: 8.7 FL (ref 6–12)
RBC # BLD: 4.77 M/UL (ref 4–5.2)
RBC # BLD: ABNORMAL 10*6/UL
SEG NEUTROPHILS: 60 % (ref 36–66)
SEGMENTED NEUTROPHILS ABSOLUTE COUNT: 3.7 K/UL (ref 1.3–9.1)
WBC # BLD: 6.2 K/UL (ref 3.5–11)
WBC # BLD: ABNORMAL 10*3/UL

## 2019-09-29 PROCEDURE — 2580000003 HC RX 258: Performed by: STUDENT IN AN ORGANIZED HEALTH CARE EDUCATION/TRAINING PROGRAM

## 2019-09-29 PROCEDURE — 94640 AIRWAY INHALATION TREATMENT: CPT

## 2019-09-29 PROCEDURE — 80048 BASIC METABOLIC PNL TOTAL CA: CPT

## 2019-09-29 PROCEDURE — 71046 X-RAY EXAM CHEST 2 VIEWS: CPT

## 2019-09-29 PROCEDURE — 96375 TX/PRO/DX INJ NEW DRUG ADDON: CPT

## 2019-09-29 PROCEDURE — 6360000002 HC RX W HCPCS: Performed by: STUDENT IN AN ORGANIZED HEALTH CARE EDUCATION/TRAINING PROGRAM

## 2019-09-29 PROCEDURE — 36415 COLL VENOUS BLD VENIPUNCTURE: CPT

## 2019-09-29 PROCEDURE — 6370000000 HC RX 637 (ALT 250 FOR IP): Performed by: STUDENT IN AN ORGANIZED HEALTH CARE EDUCATION/TRAINING PROGRAM

## 2019-09-29 PROCEDURE — 6370000000 HC RX 637 (ALT 250 FOR IP)

## 2019-09-29 PROCEDURE — 99283 EMERGENCY DEPT VISIT LOW MDM: CPT

## 2019-09-29 PROCEDURE — 96374 THER/PROPH/DIAG INJ IV PUSH: CPT

## 2019-09-29 PROCEDURE — 85025 COMPLETE CBC W/AUTO DIFF WBC: CPT

## 2019-09-29 RX ORDER — DIPHENHYDRAMINE HYDROCHLORIDE 50 MG/ML
25 INJECTION INTRAMUSCULAR; INTRAVENOUS ONCE
Status: COMPLETED | OUTPATIENT
Start: 2019-09-29 | End: 2019-09-29

## 2019-09-29 RX ORDER — PROMETHAZINE HYDROCHLORIDE 25 MG/ML
12.5 INJECTION, SOLUTION INTRAMUSCULAR; INTRAVENOUS ONCE
Status: DISCONTINUED | OUTPATIENT
Start: 2019-09-29 | End: 2019-09-29 | Stop reason: SDUPTHER

## 2019-09-29 RX ORDER — METHYLPREDNISOLONE SODIUM SUCCINATE 125 MG/2ML
125 INJECTION, POWDER, LYOPHILIZED, FOR SOLUTION INTRAMUSCULAR; INTRAVENOUS ONCE
Status: COMPLETED | OUTPATIENT
Start: 2019-09-29 | End: 2019-09-29

## 2019-09-29 RX ORDER — ALBUTEROL SULFATE 90 UG/1
2 AEROSOL, METERED RESPIRATORY (INHALATION)
Status: DISCONTINUED | OUTPATIENT
Start: 2019-09-29 | End: 2019-09-30 | Stop reason: HOSPADM

## 2019-09-29 RX ORDER — ALBUTEROL SULFATE 2.5 MG/3ML
5 SOLUTION RESPIRATORY (INHALATION)
Status: DISCONTINUED | OUTPATIENT
Start: 2019-09-29 | End: 2019-09-30 | Stop reason: HOSPADM

## 2019-09-29 RX ORDER — IPRATROPIUM BROMIDE AND ALBUTEROL SULFATE 2.5; .5 MG/3ML; MG/3ML
1 SOLUTION RESPIRATORY (INHALATION)
Status: DISCONTINUED | OUTPATIENT
Start: 2019-09-30 | End: 2019-09-30 | Stop reason: HOSPADM

## 2019-09-29 RX ORDER — IPRATROPIUM BROMIDE AND ALBUTEROL SULFATE 2.5; .5 MG/3ML; MG/3ML
SOLUTION RESPIRATORY (INHALATION)
Status: COMPLETED
Start: 2019-09-29 | End: 2019-09-29

## 2019-09-29 RX ADMIN — IPRATROPIUM BROMIDE AND ALBUTEROL SULFATE 1 AMPULE: .5; 3 SOLUTION RESPIRATORY (INHALATION) at 23:36

## 2019-09-29 RX ADMIN — METHYLPREDNISOLONE SODIUM SUCCINATE 125 MG: 125 INJECTION, POWDER, FOR SOLUTION INTRAMUSCULAR; INTRAVENOUS at 23:36

## 2019-09-29 RX ADMIN — IPRATROPIUM BROMIDE AND ALBUTEROL SULFATE 1 AMPULE: .5; 3 SOLUTION RESPIRATORY (INHALATION) at 23:42

## 2019-09-29 RX ADMIN — IPRATROPIUM BROMIDE AND ALBUTEROL SULFATE 3 ML: .5; 3 SOLUTION RESPIRATORY (INHALATION) at 23:30

## 2019-09-29 RX ADMIN — PROMETHAZINE HYDROCHLORIDE 12.5 MG: 25 INJECTION INTRAMUSCULAR; INTRAVENOUS at 23:36

## 2019-09-29 RX ADMIN — Medication 1 LOZENGE: at 23:58

## 2019-09-29 RX ADMIN — DIPHENHYDRAMINE HYDROCHLORIDE 25 MG: 50 INJECTION, SOLUTION INTRAMUSCULAR; INTRAVENOUS at 23:36

## 2019-09-29 RX ADMIN — IPRATROPIUM BROMIDE AND ALBUTEROL SULFATE 1 AMPULE: .5; 3 SOLUTION RESPIRATORY (INHALATION) at 23:41

## 2019-09-29 ASSESSMENT — PAIN SCALES - GENERAL: PAINLEVEL_OUTOF10: 8

## 2019-09-30 ENCOUNTER — OFFICE VISIT (OUTPATIENT)
Dept: INTERNAL MEDICINE CLINIC | Age: 49
End: 2019-09-30
Payer: COMMERCIAL

## 2019-09-30 ENCOUNTER — TELEPHONE (OUTPATIENT)
Dept: INTERNAL MEDICINE CLINIC | Age: 49
End: 2019-09-30

## 2019-09-30 VITALS
HEIGHT: 64 IN | DIASTOLIC BLOOD PRESSURE: 76 MMHG | BODY MASS INDEX: 31.09 KG/M2 | SYSTOLIC BLOOD PRESSURE: 126 MMHG | WEIGHT: 182.1 LBS

## 2019-09-30 DIAGNOSIS — J20.9 ACUTE BRONCHITIS, UNSPECIFIED ORGANISM: Primary | ICD-10-CM

## 2019-09-30 LAB
ANION GAP SERPL CALCULATED.3IONS-SCNC: 13 MMOL/L (ref 9–17)
BUN BLDV-MCNC: 12 MG/DL (ref 6–20)
BUN/CREAT BLD: ABNORMAL (ref 9–20)
CALCIUM SERPL-MCNC: 9.2 MG/DL (ref 8.6–10.4)
CHLORIDE BLD-SCNC: 110 MMOL/L (ref 98–107)
CO2: 19 MMOL/L (ref 20–31)
CREAT SERPL-MCNC: 0.74 MG/DL (ref 0.5–0.9)
GFR AFRICAN AMERICAN: >60 ML/MIN
GFR NON-AFRICAN AMERICAN: >60 ML/MIN
GFR SERPL CREATININE-BSD FRML MDRD: ABNORMAL ML/MIN/{1.73_M2}
GFR SERPL CREATININE-BSD FRML MDRD: ABNORMAL ML/MIN/{1.73_M2}
GLUCOSE BLD-MCNC: 99 MG/DL (ref 70–99)
POTASSIUM SERPL-SCNC: 3.6 MMOL/L (ref 3.7–5.3)
SODIUM BLD-SCNC: 142 MMOL/L (ref 135–144)

## 2019-09-30 PROCEDURE — 6370000000 HC RX 637 (ALT 250 FOR IP): Performed by: STUDENT IN AN ORGANIZED HEALTH CARE EDUCATION/TRAINING PROGRAM

## 2019-09-30 PROCEDURE — 99214 OFFICE O/P EST MOD 30 MIN: CPT | Performed by: INTERNAL MEDICINE

## 2019-09-30 PROCEDURE — 96376 TX/PRO/DX INJ SAME DRUG ADON: CPT

## 2019-09-30 PROCEDURE — 6360000002 HC RX W HCPCS: Performed by: STUDENT IN AN ORGANIZED HEALTH CARE EDUCATION/TRAINING PROGRAM

## 2019-09-30 RX ORDER — IPRATROPIUM BROMIDE AND ALBUTEROL SULFATE 2.5; .5 MG/3ML; MG/3ML
1 SOLUTION RESPIRATORY (INHALATION) EVERY 4 HOURS
Qty: 360 ML | Refills: 1 | Status: SHIPPED | OUTPATIENT
Start: 2019-09-30 | End: 2019-09-30 | Stop reason: SDUPTHER

## 2019-09-30 RX ORDER — DIPHENHYDRAMINE HYDROCHLORIDE 50 MG/ML
12.5 INJECTION INTRAMUSCULAR; INTRAVENOUS ONCE
Status: COMPLETED | OUTPATIENT
Start: 2019-09-30 | End: 2019-09-30

## 2019-09-30 RX ORDER — AZITHROMYCIN 250 MG/1
500 TABLET, FILM COATED ORAL ONCE
Status: COMPLETED | OUTPATIENT
Start: 2019-09-30 | End: 2019-09-30

## 2019-09-30 RX ORDER — PREDNISONE 10 MG/1
TABLET ORAL
Qty: 20 TABLET | Refills: 0 | Status: SHIPPED | OUTPATIENT
Start: 2019-09-30 | End: 2019-10-08 | Stop reason: ALTCHOICE

## 2019-09-30 RX ORDER — ALBUTEROL SULFATE 90 UG/1
1-2 AEROSOL, METERED RESPIRATORY (INHALATION) EVERY 4 HOURS PRN
Qty: 1 INHALER | Refills: 0 | Status: SHIPPED | OUTPATIENT
Start: 2019-09-30 | End: 2020-05-04

## 2019-09-30 RX ORDER — LEVOTHYROXINE SODIUM 0.12 MG/1
TABLET ORAL
Refills: 6 | COMMUNITY
Start: 2019-09-19 | End: 2020-07-30

## 2019-09-30 RX ORDER — AZITHROMYCIN 250 MG/1
TABLET, FILM COATED ORAL
Qty: 1 PACKET | Refills: 0 | Status: SHIPPED | OUTPATIENT
Start: 2019-09-30 | End: 2019-10-04 | Stop reason: ALTCHOICE

## 2019-09-30 RX ORDER — GUAIFENESIN 600 MG/1
600 TABLET, EXTENDED RELEASE ORAL 2 TIMES DAILY
Qty: 30 TABLET | Refills: 0 | Status: SHIPPED | OUTPATIENT
Start: 2019-09-30 | End: 2019-10-15

## 2019-09-30 RX ADMIN — DIPHENHYDRAMINE HYDROCHLORIDE 12.5 MG: 50 INJECTION, SOLUTION INTRAMUSCULAR; INTRAVENOUS at 00:22

## 2019-09-30 RX ADMIN — AZITHROMYCIN 500 MG: 250 TABLET, FILM COATED ORAL at 00:22

## 2019-09-30 ASSESSMENT — ENCOUNTER SYMPTOMS
STRIDOR: 0
SHORTNESS OF BREATH: 0
CHEST TIGHTNESS: 1
NAUSEA: 0
COUGH: 1
APNEA: 0
FACIAL SWELLING: 0
WHEEZING: 1
ANAL BLEEDING: 0
CHOKING: 0
ABDOMINAL PAIN: 0

## 2019-10-01 ENCOUNTER — TELEPHONE (OUTPATIENT)
Dept: INTERNAL MEDICINE CLINIC | Age: 49
End: 2019-10-01

## 2019-10-01 RX ORDER — IPRATROPIUM BROMIDE AND ALBUTEROL SULFATE 2.5; .5 MG/3ML; MG/3ML
SOLUTION RESPIRATORY (INHALATION)
Qty: 1620 ML | Refills: 1 | Status: SHIPPED | OUTPATIENT
Start: 2019-10-01 | End: 2019-11-06 | Stop reason: SDUPTHER

## 2019-10-02 ENCOUNTER — TELEPHONE (OUTPATIENT)
Dept: INTERNAL MEDICINE CLINIC | Age: 49
End: 2019-10-02

## 2019-10-02 DIAGNOSIS — R05.9 COUGH: ICD-10-CM

## 2019-10-02 DIAGNOSIS — J40 BRONCHITIS: ICD-10-CM

## 2019-10-02 DIAGNOSIS — R06.2 WHEEZING: Primary | ICD-10-CM

## 2019-10-04 ENCOUNTER — OFFICE VISIT (OUTPATIENT)
Dept: INTERNAL MEDICINE CLINIC | Age: 49
End: 2019-10-04
Payer: COMMERCIAL

## 2019-10-04 ENCOUNTER — HOSPITAL ENCOUNTER (OUTPATIENT)
Dept: GENERAL RADIOLOGY | Facility: CLINIC | Age: 49
Discharge: HOME | End: 2019-10-06
Payer: COMMERCIAL

## 2019-10-04 ENCOUNTER — TELEPHONE (OUTPATIENT)
Dept: INTERNAL MEDICINE CLINIC | Age: 49
End: 2019-10-04

## 2019-10-04 ENCOUNTER — HOSPITAL ENCOUNTER (OUTPATIENT)
Facility: CLINIC | Age: 49
Discharge: HOME | End: 2019-10-06
Payer: COMMERCIAL

## 2019-10-04 VITALS
OXYGEN SATURATION: 95 % | TEMPERATURE: 99.1 F | HEART RATE: 76 BPM | BODY MASS INDEX: 31.92 KG/M2 | DIASTOLIC BLOOD PRESSURE: 63 MMHG | SYSTOLIC BLOOD PRESSURE: 100 MMHG | WEIGHT: 187 LBS | HEIGHT: 64 IN

## 2019-10-04 DIAGNOSIS — J40 BRONCHITIS: Primary | ICD-10-CM

## 2019-10-04 DIAGNOSIS — J40 BRONCHITIS: ICD-10-CM

## 2019-10-04 PROCEDURE — 99213 OFFICE O/P EST LOW 20 MIN: CPT | Performed by: INTERNAL MEDICINE

## 2019-10-04 PROCEDURE — 71046 X-RAY EXAM CHEST 2 VIEWS: CPT

## 2019-10-04 RX ORDER — LEVOFLOXACIN 500 MG/1
500 TABLET, FILM COATED ORAL DAILY
Qty: 7 TABLET | Refills: 0 | Status: SHIPPED | OUTPATIENT
Start: 2019-10-04 | End: 2019-10-11

## 2019-10-04 RX ORDER — GUAIFENESIN AND CODEINE PHOSPHATE 100; 10 MG/5ML; MG/5ML
5 SOLUTION ORAL 2 TIMES DAILY PRN
Qty: 1 BOTTLE | Refills: 0 | Status: SHIPPED | OUTPATIENT
Start: 2019-10-04 | End: 2019-10-09 | Stop reason: ALTCHOICE

## 2019-10-08 ENCOUNTER — HOSPITAL ENCOUNTER (OUTPATIENT)
Dept: PAIN MANAGEMENT | Age: 49
Discharge: HOME | End: 2019-10-08
Payer: COMMERCIAL

## 2019-10-08 VITALS
DIASTOLIC BLOOD PRESSURE: 79 MMHG | HEART RATE: 77 BPM | OXYGEN SATURATION: 97 % | WEIGHT: 182 LBS | BODY MASS INDEX: 31.07 KG/M2 | SYSTOLIC BLOOD PRESSURE: 116 MMHG | HEIGHT: 64 IN | RESPIRATION RATE: 16 BRPM | TEMPERATURE: 98.1 F

## 2019-10-08 DIAGNOSIS — M54.12 CERVICAL RADICULOPATHY: ICD-10-CM

## 2019-10-08 DIAGNOSIS — M48.02 DEGENERATIVE CERVICAL SPINAL STENOSIS: Primary | ICD-10-CM

## 2019-10-08 DIAGNOSIS — M47.812 OSTEOARTHRITIS OF CERVICAL SPINE, UNSPECIFIED SPINAL OSTEOARTHRITIS COMPLICATION STATUS: ICD-10-CM

## 2019-10-08 DIAGNOSIS — S13.4XXS ATLANTO-AXIAL JOINT SPRAIN, SEQUELA: ICD-10-CM

## 2019-10-08 DIAGNOSIS — Z51.81 ENCOUNTER FOR MEDICATION MONITORING: ICD-10-CM

## 2019-10-08 DIAGNOSIS — M47.22 OSTEOARTHRITIS OF SPINE WITH RADICULOPATHY, CERVICAL REGION: ICD-10-CM

## 2019-10-08 DIAGNOSIS — S13.4XXS SPRAIN OF ATLANTO-OCCIPITAL JOINT, SEQUELA: ICD-10-CM

## 2019-10-08 DIAGNOSIS — M47.812 ARTHROPATHY OF CERVICAL FACET JOINT: ICD-10-CM

## 2019-10-08 PROCEDURE — 99213 OFFICE O/P EST LOW 20 MIN: CPT

## 2019-10-08 PROCEDURE — 99213 OFFICE O/P EST LOW 20 MIN: CPT | Performed by: NURSE PRACTITIONER

## 2019-10-08 RX ORDER — MORPHINE SULFATE 15 MG/1
15 TABLET, FILM COATED, EXTENDED RELEASE ORAL NIGHTLY
Qty: 30 TABLET | Refills: 0 | Status: SHIPPED | OUTPATIENT
Start: 2019-10-08 | End: 2019-11-05 | Stop reason: SDUPTHER

## 2019-10-08 RX ORDER — OXYCODONE AND ACETAMINOPHEN 10; 325 MG/1; MG/1
1 TABLET ORAL EVERY 8 HOURS PRN
Qty: 90 TABLET | Refills: 0 | Status: SHIPPED | OUTPATIENT
Start: 2019-10-08 | End: 2019-11-05 | Stop reason: SDUPTHER

## 2019-10-08 ASSESSMENT — ENCOUNTER SYMPTOMS
GASTROINTESTINAL NEGATIVE: 1
COUGH: 1
SHORTNESS OF BREATH: 1

## 2019-10-09 ENCOUNTER — OFFICE VISIT (OUTPATIENT)
Dept: INTERNAL MEDICINE CLINIC | Age: 49
End: 2019-10-09
Payer: COMMERCIAL

## 2019-10-09 VITALS
HEART RATE: 68 BPM | TEMPERATURE: 98.3 F | OXYGEN SATURATION: 95 % | WEIGHT: 186 LBS | HEIGHT: 64 IN | SYSTOLIC BLOOD PRESSURE: 102 MMHG | BODY MASS INDEX: 31.76 KG/M2 | DIASTOLIC BLOOD PRESSURE: 65 MMHG

## 2019-10-09 DIAGNOSIS — J40 BRONCHITIS: Primary | ICD-10-CM

## 2019-10-09 PROCEDURE — 99213 OFFICE O/P EST LOW 20 MIN: CPT | Performed by: PHYSICIAN ASSISTANT

## 2019-10-09 RX ORDER — PREDNISONE 20 MG/1
20 TABLET ORAL 2 TIMES DAILY
Qty: 10 TABLET | Refills: 0 | Status: SHIPPED | OUTPATIENT
Start: 2019-10-09 | End: 2019-10-14

## 2019-10-09 ASSESSMENT — ENCOUNTER SYMPTOMS
WHEEZING: 0
EYE REDNESS: 0
COLOR CHANGE: 0
SHORTNESS OF BREATH: 0
SORE THROAT: 0
CHEST TIGHTNESS: 0
COUGH: 1
NAUSEA: 0
EYE PAIN: 0
SINUS PRESSURE: 0
ABDOMINAL PAIN: 0
VOMITING: 0

## 2019-10-10 ASSESSMENT — ENCOUNTER SYMPTOMS
ABDOMINAL PAIN: 0
ABDOMINAL DISTENTION: 0
EYE DISCHARGE: 0
CHEST TIGHTNESS: 0
EYE PAIN: 0
RHINORRHEA: 1
CHOKING: 0
APNEA: 0
DIARRHEA: 0
SHORTNESS OF BREATH: 0
COUGH: 1
BLOOD IN STOOL: 0
BACK PAIN: 0
EYE ITCHING: 0
WHEEZING: 1
CONSTIPATION: 0
COLOR CHANGE: 0
EYE REDNESS: 0

## 2019-11-05 ENCOUNTER — HOSPITAL ENCOUNTER (OUTPATIENT)
Dept: GENERAL RADIOLOGY | Age: 49
Discharge: HOME | End: 2019-11-07
Payer: COMMERCIAL

## 2019-11-05 ENCOUNTER — HOSPITAL ENCOUNTER (OUTPATIENT)
Dept: PAIN MANAGEMENT | Age: 49
Discharge: HOME | End: 2019-11-05
Payer: COMMERCIAL

## 2019-11-05 VITALS
SYSTOLIC BLOOD PRESSURE: 130 MMHG | DIASTOLIC BLOOD PRESSURE: 76 MMHG | HEART RATE: 88 BPM | WEIGHT: 186 LBS | BODY MASS INDEX: 31.76 KG/M2 | OXYGEN SATURATION: 96 % | HEIGHT: 64 IN | RESPIRATION RATE: 18 BRPM | TEMPERATURE: 98.3 F

## 2019-11-05 DIAGNOSIS — M47.812 OSTEOARTHRITIS OF CERVICAL SPINE, UNSPECIFIED SPINAL OSTEOARTHRITIS COMPLICATION STATUS: ICD-10-CM

## 2019-11-05 DIAGNOSIS — M47.812 ARTHROPATHY OF CERVICAL FACET JOINT: Primary | ICD-10-CM

## 2019-11-05 DIAGNOSIS — M48.02 DEGENERATIVE CERVICAL SPINAL STENOSIS: ICD-10-CM

## 2019-11-05 DIAGNOSIS — M47.812 ARTHROPATHY OF CERVICAL FACET JOINT: ICD-10-CM

## 2019-11-05 DIAGNOSIS — Z51.81 ENCOUNTER FOR MEDICATION MONITORING: ICD-10-CM

## 2019-11-05 DIAGNOSIS — S13.4XXS ATLANTO-AXIAL JOINT SPRAIN, SEQUELA: ICD-10-CM

## 2019-11-05 DIAGNOSIS — M47.22 OSTEOARTHRITIS OF SPINE WITH RADICULOPATHY, CERVICAL REGION: ICD-10-CM

## 2019-11-05 DIAGNOSIS — M54.12 CERVICAL RADICULOPATHY: ICD-10-CM

## 2019-11-05 DIAGNOSIS — S13.4XXS SPRAIN OF ATLANTO-OCCIPITAL JOINT, SEQUELA: ICD-10-CM

## 2019-11-05 PROCEDURE — 64490 INJ PARAVERT F JNT C/T 1 LEV: CPT | Performed by: PAIN MEDICINE

## 2019-11-05 PROCEDURE — 2500000003 HC RX 250 WO HCPCS: Performed by: PAIN MEDICINE

## 2019-11-05 PROCEDURE — 64491 INJ PARAVERT F JNT C/T 2 LEV: CPT

## 2019-11-05 PROCEDURE — 64492 INJ PARAVERT F JNT C/T 3 LEV: CPT | Performed by: PAIN MEDICINE

## 2019-11-05 PROCEDURE — 6360000004 HC RX CONTRAST MEDICATION: Performed by: PAIN MEDICINE

## 2019-11-05 PROCEDURE — 64492 INJ PARAVERT F JNT C/T 3 LEV: CPT

## 2019-11-05 PROCEDURE — 64491 INJ PARAVERT F JNT C/T 2 LEV: CPT | Performed by: PAIN MEDICINE

## 2019-11-05 PROCEDURE — 6360000002 HC RX W HCPCS: Performed by: PAIN MEDICINE

## 2019-11-05 PROCEDURE — 3209999900 FLUORO FOR SURGICAL PROCEDURES

## 2019-11-05 PROCEDURE — 64490 INJ PARAVERT F JNT C/T 1 LEV: CPT

## 2019-11-05 RX ORDER — TRIAMCINOLONE ACETONIDE 40 MG/ML
INJECTION, SUSPENSION INTRA-ARTICULAR; INTRAMUSCULAR
Status: COMPLETED | OUTPATIENT
Start: 2019-11-05 | End: 2019-11-05

## 2019-11-05 RX ORDER — MORPHINE SULFATE 15 MG/1
15 TABLET, FILM COATED, EXTENDED RELEASE ORAL NIGHTLY
Qty: 30 TABLET | Refills: 0 | Status: SHIPPED | OUTPATIENT
Start: 2019-11-08 | End: 2019-12-04 | Stop reason: SDUPTHER

## 2019-11-05 RX ORDER — FENTANYL CITRATE 50 UG/ML
INJECTION, SOLUTION INTRAMUSCULAR; INTRAVENOUS
Status: COMPLETED | OUTPATIENT
Start: 2019-11-05 | End: 2019-11-05

## 2019-11-05 RX ORDER — TIZANIDINE 4 MG/1
4 TABLET ORAL EVERY 8 HOURS PRN
Qty: 90 TABLET | Refills: 3 | Status: SHIPPED | OUTPATIENT
Start: 2019-11-05 | End: 2020-03-12 | Stop reason: SDUPTHER

## 2019-11-05 RX ORDER — BUPIVACAINE HYDROCHLORIDE 5 MG/ML
INJECTION, SOLUTION EPIDURAL; INTRACAUDAL
Status: COMPLETED | OUTPATIENT
Start: 2019-11-05 | End: 2019-11-05

## 2019-11-05 RX ORDER — MIDAZOLAM HYDROCHLORIDE 1 MG/ML
INJECTION INTRAMUSCULAR; INTRAVENOUS
Status: COMPLETED | OUTPATIENT
Start: 2019-11-05 | End: 2019-11-05

## 2019-11-05 RX ORDER — OXYCODONE AND ACETAMINOPHEN 10; 325 MG/1; MG/1
1 TABLET ORAL EVERY 8 HOURS PRN
Qty: 90 TABLET | Refills: 0 | Status: SHIPPED | OUTPATIENT
Start: 2019-11-08 | End: 2019-12-04 | Stop reason: SDUPTHER

## 2019-11-05 RX ADMIN — TRIAMCINOLONE ACETONIDE 80 MG: 40 INJECTION, SUSPENSION INTRA-ARTICULAR; INTRAMUSCULAR at 09:45

## 2019-11-05 RX ADMIN — BUPIVACAINE HYDROCHLORIDE 12 ML: 5 INJECTION, SOLUTION EPIDURAL; INTRACAUDAL; PERINEURAL at 09:45

## 2019-11-05 RX ADMIN — Medication 50 MCG: at 09:29

## 2019-11-05 RX ADMIN — Medication 25 MCG: at 09:33

## 2019-11-05 RX ADMIN — MIDAZOLAM 2 MG: 1 INJECTION INTRAMUSCULAR; INTRAVENOUS at 09:28

## 2019-11-05 RX ADMIN — IOHEXOL 3 ML: 180 INJECTION INTRAVENOUS at 09:44

## 2019-11-05 RX ADMIN — Medication 25 MCG: at 09:40

## 2019-11-05 ASSESSMENT — PAIN DESCRIPTION - ONSET: ONSET: ON-GOING

## 2019-11-05 ASSESSMENT — PAIN SCALES - GENERAL
PAINLEVEL_OUTOF10: 5
PAINLEVEL_OUTOF10: 5
PAINLEVEL_OUTOF10: 7

## 2019-11-05 ASSESSMENT — PAIN DESCRIPTION - LOCATION: LOCATION: SHOULDER;NECK

## 2019-11-05 ASSESSMENT — PAIN DESCRIPTION - ORIENTATION: ORIENTATION: RIGHT;LEFT

## 2019-11-05 ASSESSMENT — PAIN DESCRIPTION - FREQUENCY: FREQUENCY: CONTINUOUS

## 2019-11-05 ASSESSMENT — PAIN DESCRIPTION - PAIN TYPE: TYPE: CHRONIC PAIN

## 2019-11-05 ASSESSMENT — PAIN - FUNCTIONAL ASSESSMENT: PAIN_FUNCTIONAL_ASSESSMENT: PREVENTS OR INTERFERES SOME ACTIVE ACTIVITIES AND ADLS

## 2019-11-05 ASSESSMENT — PAIN DESCRIPTION - PROGRESSION: CLINICAL_PROGRESSION: GRADUALLY WORSENING

## 2019-11-06 RX ORDER — IPRATROPIUM BROMIDE AND ALBUTEROL SULFATE 2.5; .5 MG/3ML; MG/3ML
1 SOLUTION RESPIRATORY (INHALATION) EVERY 4 HOURS PRN
Qty: 1620 ML | Refills: 0 | Status: SHIPPED | OUTPATIENT
Start: 2019-11-06 | End: 2020-02-07

## 2019-11-08 DIAGNOSIS — G62.9 NEUROPATHY: ICD-10-CM

## 2019-11-08 RX ORDER — GABAPENTIN 300 MG/1
300 CAPSULE ORAL 2 TIMES DAILY
Qty: 60 CAPSULE | Refills: 0 | Status: SHIPPED | OUTPATIENT
Start: 2019-11-08 | End: 2019-12-05 | Stop reason: SDUPTHER

## 2019-11-22 RX ORDER — ROPINIROLE 1 MG/1
TABLET, FILM COATED ORAL
Qty: 30 TABLET | Refills: 0 | Status: SHIPPED | OUTPATIENT
Start: 2019-11-22 | End: 2019-11-22 | Stop reason: SDUPTHER

## 2019-11-22 RX ORDER — ROPINIROLE 1 MG/1
TABLET, FILM COATED ORAL
Qty: 30 TABLET | Refills: 0 | Status: SHIPPED | OUTPATIENT
Start: 2019-11-22 | End: 2019-12-04

## 2019-11-28 ENCOUNTER — HOSPITAL ENCOUNTER (EMERGENCY)
Age: 49
Discharge: HOME | End: 2019-11-29
Attending: EMERGENCY MEDICINE
Payer: COMMERCIAL

## 2019-11-28 ENCOUNTER — APPOINTMENT (OUTPATIENT)
Dept: CT IMAGING | Age: 49
End: 2019-11-28
Payer: COMMERCIAL

## 2019-11-28 VITALS
WEIGHT: 182 LBS | BODY MASS INDEX: 31.07 KG/M2 | HEIGHT: 64 IN | RESPIRATION RATE: 16 BRPM | DIASTOLIC BLOOD PRESSURE: 68 MMHG | OXYGEN SATURATION: 96 % | SYSTOLIC BLOOD PRESSURE: 111 MMHG | HEART RATE: 81 BPM

## 2019-11-28 DIAGNOSIS — R10.32 LEFT LOWER QUADRANT ABDOMINAL PAIN: Primary | ICD-10-CM

## 2019-11-28 LAB
-: NORMAL
ABSOLUTE EOS #: 0.3 K/UL (ref 0–0.4)
ABSOLUTE IMMATURE GRANULOCYTE: NORMAL K/UL (ref 0–0.3)
ABSOLUTE LYMPH #: 2.8 K/UL (ref 1–4.8)
ABSOLUTE MONO #: 0.5 K/UL (ref 0.1–1.3)
ALBUMIN SERPL-MCNC: 4.1 G/DL (ref 3.5–5.2)
ALBUMIN/GLOBULIN RATIO: ABNORMAL (ref 1–2.5)
ALP BLD-CCNC: 110 U/L (ref 35–104)
ALT SERPL-CCNC: 13 U/L (ref 5–33)
AMORPHOUS: NORMAL
ANION GAP SERPL CALCULATED.3IONS-SCNC: 14 MMOL/L (ref 9–17)
AST SERPL-CCNC: 14 U/L
BACTERIA: NORMAL
BASOPHILS # BLD: 1 % (ref 0–2)
BASOPHILS ABSOLUTE: 0.1 K/UL (ref 0–0.2)
BILIRUB SERPL-MCNC: 0.16 MG/DL (ref 0.3–1.2)
BILIRUBIN URINE: ABNORMAL
BUN BLDV-MCNC: 24 MG/DL (ref 6–20)
BUN/CREAT BLD: ABNORMAL (ref 9–20)
CALCIUM SERPL-MCNC: 9.1 MG/DL (ref 8.6–10.4)
CASTS UA: NORMAL /LPF
CHLORIDE BLD-SCNC: 106 MMOL/L (ref 98–107)
CO2: 20 MMOL/L (ref 20–31)
COLOR: YELLOW
COMMENT UA: ABNORMAL
CREAT SERPL-MCNC: 1.2 MG/DL (ref 0.5–0.9)
CRYSTALS, UA: NORMAL /HPF
DIFFERENTIAL TYPE: NORMAL
EOSINOPHILS RELATIVE PERCENT: 4 % (ref 0–4)
EPITHELIAL CELLS UA: NORMAL /HPF
GFR AFRICAN AMERICAN: 58 ML/MIN
GFR NON-AFRICAN AMERICAN: 48 ML/MIN
GFR SERPL CREATININE-BSD FRML MDRD: ABNORMAL ML/MIN/{1.73_M2}
GFR SERPL CREATININE-BSD FRML MDRD: ABNORMAL ML/MIN/{1.73_M2}
GLUCOSE BLD-MCNC: 120 MG/DL (ref 70–99)
GLUCOSE URINE: NEGATIVE
HCT VFR BLD CALC: 42.3 % (ref 36–46)
HEMOGLOBIN: 14.8 G/DL (ref 12–16)
IMMATURE GRANULOCYTES: NORMAL %
KETONES, URINE: NEGATIVE
LEUKOCYTE ESTERASE, URINE: NEGATIVE
LYMPHOCYTES # BLD: 35 % (ref 24–44)
MCH RBC QN AUTO: 32.1 PG (ref 26–34)
MCHC RBC AUTO-ENTMCNC: 35 G/DL (ref 31–37)
MCV RBC AUTO: 91.7 FL (ref 80–100)
MONOCYTES # BLD: 6 % (ref 1–7)
MUCUS: NORMAL
NITRITE, URINE: NEGATIVE
NRBC AUTOMATED: NORMAL PER 100 WBC
OTHER OBSERVATIONS UA: NORMAL
PDW BLD-RTO: 13.7 % (ref 11.5–14.9)
PH UA: 5.5 (ref 5–8)
PLATELET # BLD: 157 K/UL (ref 150–450)
PLATELET ESTIMATE: NORMAL
PMV BLD AUTO: 8.5 FL (ref 6–12)
POTASSIUM SERPL-SCNC: 3.9 MMOL/L (ref 3.7–5.3)
PROTEIN UA: NEGATIVE
RBC # BLD: 4.61 M/UL (ref 4–5.2)
RBC # BLD: NORMAL 10*6/UL
RBC UA: NORMAL /HPF
RENAL EPITHELIAL, UA: NORMAL /HPF
SEG NEUTROPHILS: 54 % (ref 36–66)
SEGMENTED NEUTROPHILS ABSOLUTE COUNT: 4.4 K/UL (ref 1.3–9.1)
SODIUM BLD-SCNC: 140 MMOL/L (ref 135–144)
SPECIFIC GRAVITY UA: 1.03 (ref 1–1.03)
TOTAL PROTEIN: 7.1 G/DL (ref 6.4–8.3)
TRICHOMONAS: NORMAL
TURBIDITY: CLEAR
URINE HGB: ABNORMAL
UROBILINOGEN, URINE: NORMAL
WBC # BLD: 8.1 K/UL (ref 3.5–11)
WBC # BLD: NORMAL 10*3/UL
WBC UA: NORMAL /HPF
YEAST: NORMAL

## 2019-11-28 PROCEDURE — 6360000002 HC RX W HCPCS: Performed by: EMERGENCY MEDICINE

## 2019-11-28 PROCEDURE — 6360000004 HC RX CONTRAST MEDICATION: Performed by: EMERGENCY MEDICINE

## 2019-11-28 PROCEDURE — 99284 EMERGENCY DEPT VISIT MOD MDM: CPT

## 2019-11-28 PROCEDURE — 81001 URINALYSIS AUTO W/SCOPE: CPT

## 2019-11-28 PROCEDURE — 74177 CT ABD & PELVIS W/CONTRAST: CPT

## 2019-11-28 PROCEDURE — 85025 COMPLETE CBC W/AUTO DIFF WBC: CPT

## 2019-11-28 PROCEDURE — 80053 COMPREHEN METABOLIC PANEL: CPT

## 2019-11-28 PROCEDURE — 36415 COLL VENOUS BLD VENIPUNCTURE: CPT

## 2019-11-28 PROCEDURE — 96374 THER/PROPH/DIAG INJ IV PUSH: CPT

## 2019-11-28 PROCEDURE — 2580000003 HC RX 258: Performed by: EMERGENCY MEDICINE

## 2019-11-28 RX ORDER — FENTANYL CITRATE 50 UG/ML
100 INJECTION, SOLUTION INTRAMUSCULAR; INTRAVENOUS ONCE
Status: COMPLETED | OUTPATIENT
Start: 2019-11-28 | End: 2019-11-28

## 2019-11-28 RX ORDER — 0.9 % SODIUM CHLORIDE 0.9 %
1000 INTRAVENOUS SOLUTION INTRAVENOUS ONCE
Status: COMPLETED | OUTPATIENT
Start: 2019-11-28 | End: 2019-11-29

## 2019-11-28 RX ORDER — SODIUM CHLORIDE 0.9 % (FLUSH) 0.9 %
10 SYRINGE (ML) INJECTION PRN
Status: DISCONTINUED | OUTPATIENT
Start: 2019-11-28 | End: 2019-11-29 | Stop reason: HOSPADM

## 2019-11-28 RX ORDER — DICYCLOMINE HYDROCHLORIDE 10 MG/1
10 CAPSULE ORAL EVERY 6 HOURS PRN
Qty: 20 CAPSULE | Refills: 0 | Status: SHIPPED | OUTPATIENT
Start: 2019-11-28 | End: 2020-01-14

## 2019-11-28 RX ORDER — ONDANSETRON 4 MG/1
4 TABLET, ORALLY DISINTEGRATING ORAL EVERY 8 HOURS PRN
Qty: 20 TABLET | Refills: 0 | Status: SHIPPED | OUTPATIENT
Start: 2019-11-28 | End: 2020-03-04

## 2019-11-28 RX ORDER — 0.9 % SODIUM CHLORIDE 0.9 %
80 INTRAVENOUS SOLUTION INTRAVENOUS ONCE
Status: COMPLETED | OUTPATIENT
Start: 2019-11-28 | End: 2019-11-28

## 2019-11-28 RX ADMIN — Medication 10 ML: at 23:43

## 2019-11-28 RX ADMIN — SODIUM CHLORIDE 1000 ML: 9 INJECTION, SOLUTION INTRAVENOUS at 22:55

## 2019-11-28 RX ADMIN — IOPAMIDOL 75 ML: 755 INJECTION, SOLUTION INTRAVENOUS at 23:43

## 2019-11-28 RX ADMIN — FENTANYL CITRATE 100 MCG: 50 INJECTION, SOLUTION INTRAMUSCULAR; INTRAVENOUS at 22:55

## 2019-11-28 RX ADMIN — SODIUM CHLORIDE 80 ML: 9 INJECTION, SOLUTION INTRAVENOUS at 23:39

## 2019-11-28 ASSESSMENT — PAIN SCALES - GENERAL
PAINLEVEL_OUTOF10: 8
PAINLEVEL_OUTOF10: 8

## 2019-11-28 ASSESSMENT — ENCOUNTER SYMPTOMS
COUGH: 0
VOMITING: 0
BACK PAIN: 0
NAUSEA: 1
ABDOMINAL PAIN: 1
DIARRHEA: 1
CONSTIPATION: 1

## 2019-11-28 ASSESSMENT — PAIN DESCRIPTION - LOCATION: LOCATION: ABDOMEN;BACK

## 2019-11-29 ENCOUNTER — TELEPHONE (OUTPATIENT)
Dept: INTERNAL MEDICINE CLINIC | Age: 49
End: 2019-11-29

## 2019-11-29 PROCEDURE — 6370000000 HC RX 637 (ALT 250 FOR IP): Performed by: EMERGENCY MEDICINE

## 2019-11-29 RX ORDER — DIPHENHYDRAMINE HCL 25 MG
50 TABLET ORAL ONCE
Status: COMPLETED | OUTPATIENT
Start: 2019-11-29 | End: 2019-11-29

## 2019-11-29 RX ADMIN — DIPHENHYDRAMINE HCL 50 MG: 25 TABLET ORAL at 00:26

## 2019-11-29 ASSESSMENT — PAIN SCALES - GENERAL: PAINLEVEL_OUTOF10: 0

## 2019-11-29 ASSESSMENT — PAIN SCALES - WONG BAKER: WONGBAKER_NUMERICALRESPONSE: 0

## 2019-12-03 ENCOUNTER — TELEPHONE (OUTPATIENT)
Dept: INTERNAL MEDICINE CLINIC | Age: 49
End: 2019-12-03

## 2019-12-04 ENCOUNTER — TELEPHONE (OUTPATIENT)
Dept: PAIN MANAGEMENT | Age: 49
End: 2019-12-04

## 2019-12-04 ENCOUNTER — HOSPITAL ENCOUNTER (OUTPATIENT)
Dept: PAIN MANAGEMENT | Age: 49
Discharge: HOME | End: 2019-12-04
Payer: COMMERCIAL

## 2019-12-04 VITALS
DIASTOLIC BLOOD PRESSURE: 79 MMHG | SYSTOLIC BLOOD PRESSURE: 118 MMHG | BODY MASS INDEX: 31.07 KG/M2 | WEIGHT: 182 LBS | OXYGEN SATURATION: 95 % | RESPIRATION RATE: 16 BRPM | HEIGHT: 64 IN | HEART RATE: 72 BPM

## 2019-12-04 DIAGNOSIS — M79.18 MYOFASCIAL MUSCLE PAIN: ICD-10-CM

## 2019-12-04 DIAGNOSIS — Z51.81 ENCOUNTER FOR MEDICATION MONITORING: ICD-10-CM

## 2019-12-04 DIAGNOSIS — M47.812 ARTHROPATHY OF CERVICAL FACET JOINT: ICD-10-CM

## 2019-12-04 DIAGNOSIS — K52.9 COLITIS: ICD-10-CM

## 2019-12-04 DIAGNOSIS — S13.4XXS ATLANTO-AXIAL JOINT SPRAIN, SEQUELA: ICD-10-CM

## 2019-12-04 DIAGNOSIS — M47.812 OSTEOARTHRITIS OF CERVICAL SPINE, UNSPECIFIED SPINAL OSTEOARTHRITIS COMPLICATION STATUS: ICD-10-CM

## 2019-12-04 DIAGNOSIS — M47.812 OSTEOARTHRITIS OF CERVICAL SPINE, UNSPECIFIED SPINAL OSTEOARTHRITIS COMPLICATION STATUS: Primary | Chronic | ICD-10-CM

## 2019-12-04 DIAGNOSIS — S13.4XXS SPRAIN OF ATLANTO-OCCIPITAL JOINT, SEQUELA: ICD-10-CM

## 2019-12-04 DIAGNOSIS — G44.209 TRIGGER POINT WITH TENSION HEADACHE: ICD-10-CM

## 2019-12-04 DIAGNOSIS — M47.22 OSTEOARTHRITIS OF SPINE WITH RADICULOPATHY, CERVICAL REGION: ICD-10-CM

## 2019-12-04 DIAGNOSIS — M54.12 CERVICAL RADICULOPATHY: ICD-10-CM

## 2019-12-04 DIAGNOSIS — M48.02 DEGENERATIVE CERVICAL SPINAL STENOSIS: ICD-10-CM

## 2019-12-04 PROCEDURE — 99213 OFFICE O/P EST LOW 20 MIN: CPT | Performed by: NURSE PRACTITIONER

## 2019-12-04 PROCEDURE — 99213 OFFICE O/P EST LOW 20 MIN: CPT

## 2019-12-04 RX ORDER — MORPHINE SULFATE 15 MG/1
15 TABLET, FILM COATED, EXTENDED RELEASE ORAL NIGHTLY
Qty: 30 TABLET | Refills: 0 | Status: SHIPPED | OUTPATIENT
Start: 2019-12-08 | End: 2020-01-07

## 2019-12-04 RX ORDER — KETOROLAC TROMETHAMINE 10 MG/1
10 TABLET, FILM COATED ORAL EVERY 6 HOURS PRN
Qty: 20 TABLET | Refills: 0 | Status: SHIPPED | OUTPATIENT
Start: 2019-12-04 | End: 2020-01-14

## 2019-12-04 RX ORDER — OXYCODONE AND ACETAMINOPHEN 10; 325 MG/1; MG/1
1 TABLET ORAL EVERY 8 HOURS PRN
Qty: 100 TABLET | Refills: 0 | Status: SHIPPED | OUTPATIENT
Start: 2019-12-08 | End: 2020-01-07

## 2019-12-04 ASSESSMENT — ENCOUNTER SYMPTOMS
VOMITING: 1
NAUSEA: 1
RESPIRATORY NEGATIVE: 1

## 2019-12-05 DIAGNOSIS — G62.9 NEUROPATHY: ICD-10-CM

## 2019-12-05 RX ORDER — GABAPENTIN 300 MG/1
CAPSULE ORAL
Qty: 60 CAPSULE | Refills: 0 | Status: SHIPPED | OUTPATIENT
Start: 2019-12-05 | End: 2020-08-10

## 2019-12-26 RX ORDER — ROPINIROLE 1 MG/1
TABLET, FILM COATED ORAL
Qty: 30 TABLET | Refills: 0 | Status: SHIPPED | OUTPATIENT
Start: 2019-12-26 | End: 2020-01-14

## 2020-01-06 ENCOUNTER — HOSPITAL ENCOUNTER (OUTPATIENT)
Dept: PAIN MANAGEMENT | Age: 50
Discharge: HOME | End: 2020-01-06

## 2020-01-06 NOTE — DISCHARGE INSTR - COC
E66.9    Abdominal pain R10.9    Elevated liver enzymes R74.8    GERD (gastroesophageal reflux disease) K21.9    Ovarian mass N83.8    S/P bilateral oophorectomy & KRUPA 10/21/14 Z90.722    Pain emptying bladder R30.9    Urinary urgency R39.15    Insomnia G47.00    RLS (restless legs syndrome) G25.81    Pancreatitis K85.90    Eye swelling, left H57.89    Type 2 diabetes mellitus without complication (HCC) I18.8    Osteoarthritis of cervical spine M47.812    Degenerative cervical spinal stenosis M48.02    Trigger point with tension headache G44.209    Arthropathy of cervical facet joint M47.812    Atlanto-axial joint sprain, sequela S13. 4XXS    Cervical radiculopathy M54.12    Sprain of atlanto-occipital joint, sequela S13. 4XXS    Encounter for medication monitoring Z51.81    Myofascial muscle pain M79.18    Colitis K52.9    Chest pain R07.9       Isolation/Infection:   Isolation          No Isolation        Patient Infection Status     None to display          Nurse Assessment:  Last Vital Signs: LMP 2010     Last documented pain score (0-10 scale):    Last Weight:   Wt Readings from Last 1 Encounters:   19 182 lb (82.6 kg)     Mental Status:  {IP PT MENTAL STATUS:63749}    IV Access:  { [facility] IV ACCESS:775304773}    Nursing Mobility/ADLs:  Walking   {CHP DME JXWO:192732422}  Transfer  {CHP DME ONWR:107553935}  Bathing  {P DME YGOY:050423937}  Dressing  {P DME ZCEZ:376824323}  Toileting  {P DME GQZE:166245465}  Feeding  {P DME YYMR:000285859}  Med Admin  {P DME UEAV:646079947}  Med Delivery   { [facility] MED Delivery:363376221}    Wound Care Documentation and Therapy:        Elimination:  Continence:   · Bowel: {YES / EW:22415}  · Bladder: {YES / H}  Urinary Catheter: {Urinary Catheter:730891702}   Colostomy/Ileostomy/Ileal Conduit: {YES / MY:29899}       Date of Last BM: ***  No intake or output data in the 24 hours ending 20 0649  No intake/output data recorded.     Safety Concerns:     [address] [facility] Safety Concerns:760970743}    Impairments/Disabilities:      [address] [facility] Impairments/Disabilities:628349988}    Nutrition Therapy:  Current Nutrition Therapy:   [address] [facility] Diet List:304873982}    Routes of Feeding: {CHP DME Other Feedings:423144644}  Liquids: {Slp liquid thickness:22510}  Daily Fluid Restriction: {CHP DME Yes amt example:663994672}  Last Modified Barium Swallow with Video (Video Swallowing Test): {Done Not Done SHAY:120676404}    Treatments at the Time of Hospital Discharge:   Respiratory Treatments: ***  Oxygen Therapy:  {Therapy; copd oxygen:65672}  Ventilator:    {[facility] Vent TWEN:550555060}    Rehab Therapies: {THERAPEUTIC INTERVENTION:4660084865}  Weight Bearing Status/Restrictions: [address]  Weight Bearin}  Other Medical Equipment (for information only, NOT a DME order):  {EQUIPMENT:778925844}  Other Treatments: ***    Patient's personal belongings (please select all that are sent with patient):  {P DME Belongings:720240343}    RN SIGNATURE:  {Esignature:714437023}    CASE MANAGEMENT/SOCIAL WORK SECTION    Inpatient Status Date: ***    Readmission Risk Assessment Score:  Readmission Risk              Risk of Unplanned Readmission:        0           Discharging to Facility/ Agency   · Name:   · Address:  · Phone:  · Fax:    Dialysis Facility (if applicable)   · Name:  · Address:  · Dialysis Schedule:  · Phone:  · Fax:    / signature: {Esignature:994679596}    PHYSICIAN SECTION    Prognosis: {Prognosis:3011134069}    Condition at Discharge: [address] Patient Condition:943264998}    Rehab Potential (if transferring to Rehab): {Prognosis:3132035772}    Recommended Labs or Other Treatments After Discharge: ***    Physician Certification: I certify the above information and transfer of Macarena Mace  is necessary for the continuing treatment of the diagnosis listed and that she requires {Admit to Appropriate Level of Care:96757} for

## 2020-01-14 ENCOUNTER — HOSPITAL ENCOUNTER (OUTPATIENT)
Dept: PAIN MANAGEMENT | Age: 50
Discharge: HOME | End: 2020-01-14
Payer: COMMERCIAL

## 2020-01-14 VITALS
OXYGEN SATURATION: 95 % | RESPIRATION RATE: 20 BRPM | BODY MASS INDEX: 31.07 KG/M2 | HEART RATE: 80 BPM | SYSTOLIC BLOOD PRESSURE: 120 MMHG | DIASTOLIC BLOOD PRESSURE: 77 MMHG | HEIGHT: 64 IN | TEMPERATURE: 98.5 F | WEIGHT: 182 LBS

## 2020-01-14 PROCEDURE — 99213 OFFICE O/P EST LOW 20 MIN: CPT | Performed by: NURSE PRACTITIONER

## 2020-01-14 PROCEDURE — 99213 OFFICE O/P EST LOW 20 MIN: CPT

## 2020-01-14 RX ORDER — IBUPROFEN 200 MG
200 TABLET ORAL PRN
COMMUNITY
End: 2020-02-28 | Stop reason: ALTCHOICE

## 2020-01-14 RX ORDER — MORPHINE SULFATE 15 MG/1
15 TABLET, FILM COATED, EXTENDED RELEASE ORAL NIGHTLY
Qty: 30 TABLET | Refills: 0 | Status: SHIPPED | OUTPATIENT
Start: 2020-01-21 | End: 2020-02-11 | Stop reason: SDUPTHER

## 2020-01-14 RX ORDER — OXYCODONE AND ACETAMINOPHEN 10; 325 MG/1; MG/1
1 TABLET ORAL EVERY 8 HOURS PRN
Qty: 90 TABLET | Refills: 0 | Status: SHIPPED | OUTPATIENT
Start: 2020-01-14 | End: 2020-02-11 | Stop reason: SDUPTHER

## 2020-01-14 ASSESSMENT — ENCOUNTER SYMPTOMS: NAUSEA: 1

## 2020-01-14 NOTE — PROGRESS NOTES
[facility] PROGRESS NOTE      Patient here today to review Medication Agreement    Chief Complaint:  Neck pain    HPI: She c/o neck pain which has not changed. She states it is difficult to turn her neck to the left. She had left cervical facet injection 11-5-2019  C2-T1 with no relief. She had a cervical facet injection left side 4/2019 with 70% relief. She had cervical epidural steroid injection C5, C6 on 5-22-18. She obtained 50% relief. The pain is left sided radiating to left shoulder, She has numbness left arm and hand and weakness. She still has headaches which is fairly constant. She is waiting approval to start PT. Sweta Hazard She is sleeping better. She has had no ED visits. Neck Pain    This is a chronic problem. The current episode started more than 1 year ago. The problem occurs constantly. The problem has been unchanged. The pain is associated with nothing. The pain is present in the left side (left side). Quality: sharp. The pain is at a severity of 5/10. The pain is moderate. Exacerbated by: turning neck to the left, heavy lifting. Associated symptoms include headaches, numbness and weakness. Associated symptoms comments: Numbness and weakness left arm and hand. She has tried heat (pillow for neck, ) for the symptoms. The treatment provided mild relief. Patient denies any new neurological symptoms. No bowel or bladder incontinence, no weakness, and no falling. Treatment goals:  Functional status:  Reduce pain 30%        Aberrancy:   Any alcoholic beverages   no    Any illegal drugs  no         Analgesia:pain  5     Adverse  Effects : none  BM daily     ADL;s : activity limited      Pill count: appropriate    Morphine equivalent dose as reported on [place]:  65  Periodic Controlled Substance Monitoring: Possible medication side effects, risk of tolerance/dependence & alternative treatments discussed., No signs of potential drug abuse or diversion identified. , Assessed functional status., Obtaining appropriate analgesic effect of treatment. Eliz Cardona, APRN - CNP)  Review of[place] does not show any aberrant prescription behavior. Medication is helping the patient stay active. Patient denies any side effects and reports adequate analgesia. No sign of misuse/abuse. When was thelast UDS:   7-9-2019            Was the UDS appropriate:      Record/Diagnostics Review:      As above, I did review the imaging    7/12/2019  1:28 PM - Liu, [facility] Incoming Lab Results From [facility]     Component Value Ref Range & Units Status Collected Lab   Pain Management Drug Panel Interp, Urine Inconsistent   Final 07/09/2019 11:00 AM [facility]   (NOTE)   ________________________________________________________________   DRUGS EXPECTED:   OXYCODONE [7/7/19]   ________________________________________________________________   CONSISTENT with medications provided:   OXYCODONE : based on the absence of oxycodone and metabolites   ________________________________________________________________   INCONSISTENT with medications provided:   Norfentanyl   7-Aminoclonazepam   ________________________________________________________________   INTERPRETIVE INFORMATION: Pain Mgt Chaudhary, [place] Spec/EMIT, Ur,                            Interp   Interpretation depends on accuracy and completeness of patient   medication information submitted by client.     6-Acetylmorphine, Ur Not Detected   Final 07/09/2019 11:00 AM [facility]   7-Aminoclonazepam, Urine Present   Final 07/09/2019 11:00 AM [facility]   Alpha-OH-Alpraz, Urine Not Detected   Final 07/09/2019 11:00 AM [facility]   Alprazolam, Urine Not Detected   Final 07/09/2019 11:00 AM [facility]   Amphetamines, urine Not Detected   Final 07/09/2019 11:00 AM [facility]   Barbiturates, Ur Not Detected   Final 07/09/2019 11:00 AM [facility]   Benzoylecgonine, Ur Not Detected   Final 07/09/2019 11:00 AM [facility]   Buprenorphine Urine Not Detected   Final 07/09/2019 11:00 AM [facility]   Carisoprodol, Ur Not Detected Rfl:     [START ON 1/21/2020] morphine (MS CONTIN) 15 MG extended release tablet, Take 1 tablet by mouth nightly for 30 days. , Disp: 30 tablet, Rfl: 0    oxyCODONE-acetaminophen (PERCOCET)  MG per tablet, Take 1 tablet by mouth every 8 hours as needed for Pain for up to 30 days. , Disp: 90 tablet, Rfl: 0    gabapentin (NEURONTIN) 300 MG capsule, TAKE 1 CAPSULE BY MOUTH TWICE DAILY.  CONTACT DOCTOR OFFICE FOR FURTHER REFILLS, Disp: 60 capsule, Rfl: 0    levothyroxine (SYNTHROID) 125 MCG tablet, TK 1 T PO BID, Disp: , Rfl: 6    nicotine (NICODERM CQ) 14 MG/24HR, Place 1 patch onto the skin every 24 hours, Disp: 30 patch, Rfl: 3    rOPINIRole (REQUIP) 2 MG tablet, TAKE 1 TABLET BY MOUTH EVERY NIGHT, Disp: 90 tablet, Rfl: 1    atorvastatin (LIPITOR) 40 MG tablet, Take 1 tablet by mouth daily, Disp: 90 tablet, Rfl: 3    sertraline (ZOLOFT) 100 MG tablet, Take 100 mg by mouth daily, Disp: , Rfl:     esomeprazole (NEXIUM) 40 MG delayed release capsule, TAKE 1 CAPSULE BY MOUTH EVERY MORNING BEFORE BREAKFAST, Disp: 90 capsule, Rfl: 3    topiramate (TOPAMAX) 25 MG tablet, 25 mg 2 times daily , Disp: , Rfl:     metoprolol tartrate (LOPRESSOR) 50 MG tablet, Take 50 mg by mouth 2 times daily , Disp: , Rfl:     ondansetron (ZOFRAN ODT) 4 MG disintegrating tablet, Take 1 tablet by mouth every 8 hours as needed for Nausea, Disp: 20 tablet, Rfl: 0    ipratropium-albuterol (DUONEB) 0.5-2.5 (3) MG/3ML SOLN nebulizer solution, Inhale 3 mLs into the lungs every 4 hours as needed for Shortness of Breath Patient must contact office for any further refills, Disp: 1620 mL, Rfl: 0    tiZANidine (ZANAFLEX) 4 MG tablet, Take 1 tablet by mouth every 8 hours as needed (spasms), Disp: 90 tablet, Rfl: 3    albuterol sulfate HFA (PROVENTIL HFA) 108 (90 Base) MCG/ACT inhaler, Inhale 1-2 puffs into the lungs every 4 hours as needed for Wheezing or Shortness of Breath (Space out to every 6 hours as symptoms improve) Space out to every 6 hours as symptoms improve., Disp: 1 Inhaler, Rfl: 0    clonazePAM (KLONOPIN) 0.5 MG tablet, Take 0.5 mg by mouth 3 times daily as needed. ., Disp: , Rfl:     Family History   Problem Relation Age of Onset   Sukh Francisco Cancer Mother         vaginal    Heart Disease Father     Diabetes Father     Other Father         colon resection for colon polyps    Breast Cancer Maternal Grandmother     Stroke Maternal Grandfather        Social History     Socioeconomic History    Marital status:      Spouse name: Not on file    Number of children: Not on file    Years of education: Not on file    Highest education level: Not on file   Occupational History    Occupation: Homemaker   Social Needs    Financial resource strain: Not on file    Food insecurity:     Worry: Not on file     Inability: Not on file    Transportation needs:     Medical: Not on file     Non-medical: Not on file   Tobacco Use    Smoking status: Former Smoker     Packs/day: 0.50     Years: 20.00     Pack years: 10.00     Types: Cigarettes    Smokeless tobacco: Never Used    Tobacco comment: quit 8/2019 occasionl cigarette on nicoderm 1/2020   Substance and Sexual Activity    Alcohol use: No     Alcohol/week: 0.0 standard drinks    Drug use: No    Sexual activity: Not Currently   Lifestyle    Physical activity:     Days per week: Not on file     Minutes per session: Not on file    Stress: Not on file   Relationships    Social connections:     Talks on phone: Not on file     Gets together: Not on file     Attends Gnosticist service: Not on file     Active member of club or organization: Not on file     Attends meetings of clubs or organizations: Not on file     Relationship status: Not on file    Intimate partner violence:     Fear of current or ex partner: Not on file     Emotionally abused: Not on file     Physically abused: Not on file     Forced sexual activity: Not on file   Other Topics Concern    Not on file   Social History 1/21/2020)    oxyCODONE-acetaminophen (PERCOCET)  MG per tablet    Myofascial muscle pain    Encounter for medication monitoring    Relevant Medications    oxyCODONE-acetaminophen (PERCOCET)  MG per tablet    Degenerative cervical spinal stenosis - Primary    Relevant Medications    morphine (MS CONTIN) 15 MG extended release tablet (Start on 1/21/2020)    oxyCODONE-acetaminophen (PERCOCET)  MG per tablet    Cervical radiculopathy    Relevant Medications    oxyCODONE-acetaminophen (PERCOCET)  MG per tablet    Atlanto-axial joint sprain, sequela    Relevant Medications    oxyCODONE-acetaminophen (PERCOCET)  MG per tablet    Arthropathy of cervical facet joint    Relevant Medications    morphine (MS CONTIN) 15 MG extended release tablet (Start on 1/21/2020)    oxyCODONE-acetaminophen (PERCOCET)  MG per tablet            Treatment Plan:  DISCUSSION: Treatment options discussed withpatient and all questions answered to patient's satisfaction. Possible side effects, risk of tolerance and or dependence and alternative treatments discussed    Obtaining appropriate analgesic effect of treatment   No signs of potential drug abuse or diversion identified    [x] Ill effects of being on chronic pain medications such as sleep disturbances, respiratory depression, hormonal changes, withdrawal symptoms, chronic opioid dependence and tolerance as well as risk of taking opioids with Benzodiazepines and taking opioids along with alcohol,  werediscussed with patient. I had asked the patient to minimize medication use and utilize pain medications only for uncontrolled rest pain or pain with exertional activities. I advised patient not to self-escalate painmedications without consulting with us. At each of patient's future visits we will try to taper pain medications, while adjusting the adjunct medications, and re-evaluating for Physical Therapy to improve spinal andjoint strength.  We will continue to have discussions to decrease pain medications as tolerated. Counseled patient on effects their pain medication and /or their medical condition mayhave on their  ability to drive or operate machinery. Instructed not to drive or operate machinery if drowsy     I also discussed with the patient regarding the dangers of combining narcotic pain medication with tranquilizers, alcohol or illegal drugs or taking the medication any way other than prescribed. The dangers were discussed  including respiratory depression and death. Patient was told to tell  all  physicians regarding the medications he is getting from pain clinic. Patient is warned not to take any unprescribed medications over-the-countermedications that can depress breathing . Patient is advised to talk to the pharmacist or physicians if planning to take any over-the-counter medications before  takeing them. Patient is strongly advised to avoid tranquilizers or  relaxants, illegal drugs  or any medications that can depress breathing  Patient is also advised to tell us if there is any changes in their medications from other physicians. 1. Discussed starting PT and if no relief, if she wants to see neurosurgeon, we can give her a referral  2.  Discussed trying aspercreme with lidocaine for skin sensitiivity neck    TREATMENT OPTIONS:     Return in 4 weeks  Medication Agreement Requirements Met  Continue Opioid therapy  Script written for  Ms contintamar  Follow up appointment made

## 2020-01-14 NOTE — DISCHARGE INSTR - COC
{GREATER/LESS:253202786} 30 days.      Update Admission H&P: {CHP DME Changes in AKSGY:009644140}    PHYSICIAN SIGNATURE:  {Esignature:[id number]}

## 2020-02-07 RX ORDER — IPRATROPIUM BROMIDE AND ALBUTEROL SULFATE 2.5; .5 MG/3ML; MG/3ML
SOLUTION RESPIRATORY (INHALATION)
Qty: 3 ML | Refills: 3 | Status: SHIPPED | OUTPATIENT
Start: 2020-02-07 | End: 2020-03-02

## 2020-02-07 RX ORDER — IPRATROPIUM BROMIDE AND ALBUTEROL SULFATE 2.5; .5 MG/3ML; MG/3ML
SOLUTION RESPIRATORY (INHALATION)
Qty: 1 VIAL | Refills: 3 | Status: SHIPPED | OUTPATIENT
Start: 2020-02-07 | End: 2020-02-07

## 2020-02-11 ENCOUNTER — HOSPITAL ENCOUNTER (OUTPATIENT)
Dept: PAIN MANAGEMENT | Age: 50
Discharge: HOME | End: 2020-02-11
Payer: COMMERCIAL

## 2020-02-11 VITALS
RESPIRATION RATE: 18 BRPM | BODY MASS INDEX: 31.07 KG/M2 | HEIGHT: 64 IN | WEIGHT: 182 LBS | DIASTOLIC BLOOD PRESSURE: 79 MMHG | SYSTOLIC BLOOD PRESSURE: 119 MMHG | HEART RATE: 66 BPM

## 2020-02-11 PROCEDURE — 99214 OFFICE O/P EST MOD 30 MIN: CPT

## 2020-02-11 PROCEDURE — 99213 OFFICE O/P EST LOW 20 MIN: CPT | Performed by: NURSE PRACTITIONER

## 2020-02-11 RX ORDER — OXYCODONE AND ACETAMINOPHEN 10; 325 MG/1; MG/1
1 TABLET ORAL EVERY 8 HOURS PRN
Qty: 90 TABLET | Refills: 0 | Status: SHIPPED | OUTPATIENT
Start: 2020-02-14 | End: 2020-03-12 | Stop reason: SDUPTHER

## 2020-02-11 RX ORDER — MORPHINE SULFATE 15 MG/1
15 TABLET, FILM COATED, EXTENDED RELEASE ORAL NIGHTLY
Qty: 30 TABLET | Refills: 0 | Status: SHIPPED | OUTPATIENT
Start: 2020-02-20 | End: 2020-03-12 | Stop reason: SDUPTHER

## 2020-02-11 ASSESSMENT — ENCOUNTER SYMPTOMS
SHORTNESS OF BREATH: 0
BACK PAIN: 1
CONSTIPATION: 0
COUGH: 0

## 2020-02-11 NOTE — PROGRESS NOTES
Patient is here today to review medication contract. Chief Complaint:  Neck pain    PMH     Pt c/o neck pain for over 2 years. Pain is causing headaches and numbness and tingling down her left arm to wrist. She has tried PT and steroid injections in the neck from her PCP that did not helped. She had a left cervical facet injection in Nov that actually worsened her pain. Pt waiting to start PT - had to send appeal.  plan is if there is no relief give NS consult, recent fall due left leg \"going numb, fall asleep and gave out\" lasted about 5 min. HPI:     Neck Pain    This is a chronic problem. The current episode started more than 1 year ago. The problem occurs intermittently. The problem has been gradually worsening. The pain is associated with nothing. The pain is present in the left side, midline and right side (left arm numbness to fingers). The pain is at a severity of 5/10. Nothing aggravates the symptoms. Stiffness is present all day. Associated symptoms include headaches and numbness. Pertinent negatives include no chest pain or fever. She has tried acetaminophen and oral narcotics for the symptoms. The treatment provided mild relief. Patient denies any new neurological symptoms. No bowel or bladder incontinence, no weakness, and no falling. Pill count: appropriate 1 extra day of percocet prescription adjusted appropriately    Morphine equivalent: 63    Periodic Controlled Substance Monitoring: Possible medication side effects, risk of tolerance/dependence & alternative treatments discussed., No signs of potential drug abuse or diversion identified. , Assessed functional status., Obtaining appropriate analgesic effect of treatment.  Lee Davidson, MARTITA - CNP)      Past Medical History:   Diagnosis Date    Anxiety     CAD (coronary artery disease)     Mitral valve prolapse    Fatty liver     GERD (gastroesophageal reflux disease)     Headache     History of bronchitis     Hyperlipidemia Cigarettes    Smokeless tobacco: Never Used    Tobacco comment: quit 8/2019 occasionl cigarette on nicoderm 1/2020   Substance and Sexual Activity    Alcohol use: No     Alcohol/week: 0.0 standard drinks    Drug use: No    Sexual activity: Not Currently   Lifestyle    Physical activity:     Days per week: Not on file     Minutes per session: Not on file    Stress: Not on file   Relationships    Social connections:     Talks on phone: Not on file     Gets together: Not on file     Attends Adventism service: Not on file     Active member of club or organization: Not on file     Attends meetings of clubs or organizations: Not on file     Relationship status: Not on file    Intimate partner violence:     Fear of current or ex partner: Not on file     Emotionally abused: Not on file     Physically abused: Not on file     Forced sexual activity: Not on file   Other Topics Concern    Not on file   Social History Narrative    Not on file       Review of Systems:  Review of Systems   Constitution: Negative for chills and fever. Cardiovascular: Negative for chest pain. Respiratory: Negative for cough and shortness of breath. Musculoskeletal: Positive for arthritis, back pain, neck pain and stiffness. Gastrointestinal: Negative for constipation. Neurological: Positive for headaches and numbness. Physical Exam:  /79   Pulse 66   Resp 18   Ht 5' 4\" (1.626 m)   Wt 182 lb (82.6 kg)   LMP 11/01/2010   BMI 31.24 kg/m²     Physical Exam  Cardiovascular:      Rate and Rhythm: Normal rate. Pulmonary:      Effort: Pulmonary effort is normal.   Musculoskeletal:      Cervical back: She exhibits decreased range of motion and tenderness. Skin:     General: Skin is warm and dry. Neurological:      Mental Status: She is alert and oriented to person, place, and time.          Record/Diagnostics Review:    Last mychalu July  and was appropriate     Assessment:  Problem List Items Addressed This Visit Osteoarthritis of cervical spine (Chronic)    Degenerative cervical spinal stenosis - Primary    Cervical radiculopathy    Encounter for medication monitoring             Treatment Plan:  Patient relates current medications are helping the pain. Patient reports taking pain medications as prescribed, denies obtaining medications from different sources and denies use of illegal drugs. Patient denies side effects from medications like nausea, vomiting, constipation or drowsiness. Patient reports current activities of daily living are possible due to medications and would like to continue them. As always, we encourage daily stretching and strengthening exercises, and recommend minimizing use of pain medications unless patient cannot get through daily activities due to pain. Contract requirements met. Continue opioid therapy.  Script written for [facility] percocet  Pt waiting to start PT  If no relief or worsening of s/sx consider  MRI to further evaluate pathology and guide treatment plan  Follow up appointment made for 4 weeks

## 2020-02-20 RX ORDER — ROPINIROLE 2 MG/1
TABLET, FILM COATED ORAL
Qty: 90 TABLET | Refills: 1 | Status: SHIPPED | OUTPATIENT
Start: 2020-02-20 | End: 2020-08-14 | Stop reason: SDUPTHER

## 2020-02-28 ENCOUNTER — HOSPITAL ENCOUNTER (EMERGENCY)
Age: 50
Discharge: HOME | End: 2020-02-28
Attending: EMERGENCY MEDICINE
Payer: COMMERCIAL

## 2020-02-28 VITALS
TEMPERATURE: 98.5 F | BODY MASS INDEX: 31.16 KG/M2 | HEART RATE: 64 BPM | RESPIRATION RATE: 16 BRPM | SYSTOLIC BLOOD PRESSURE: 114 MMHG | OXYGEN SATURATION: 95 % | WEIGHT: 187 LBS | DIASTOLIC BLOOD PRESSURE: 71 MMHG | HEIGHT: 65 IN

## 2020-02-28 PROCEDURE — 99283 EMERGENCY DEPT VISIT LOW MDM: CPT

## 2020-02-28 PROCEDURE — 6370000000 HC RX 637 (ALT 250 FOR IP): Performed by: PHYSICIAN ASSISTANT

## 2020-02-28 RX ORDER — IBUPROFEN 800 MG/1
800 TABLET ORAL ONCE
Status: COMPLETED | OUTPATIENT
Start: 2020-02-28 | End: 2020-02-28

## 2020-02-28 RX ORDER — IBUPROFEN 600 MG/1
600 TABLET ORAL EVERY 6 HOURS PRN
Qty: 28 TABLET | Refills: 0 | Status: SHIPPED | OUTPATIENT
Start: 2020-02-28 | End: 2020-12-26

## 2020-02-28 RX ORDER — CYCLOBENZAPRINE HCL 10 MG
10 TABLET ORAL ONCE
Status: COMPLETED | OUTPATIENT
Start: 2020-02-28 | End: 2020-02-28

## 2020-02-28 RX ORDER — CYCLOBENZAPRINE HCL 10 MG
10 TABLET ORAL 3 TIMES DAILY PRN
Qty: 21 TABLET | Refills: 0 | Status: SHIPPED | OUTPATIENT
Start: 2020-02-28 | End: 2020-03-06

## 2020-02-28 RX ADMIN — CYCLOBENZAPRINE 10 MG: 10 TABLET, FILM COATED ORAL at 21:31

## 2020-02-28 RX ADMIN — IBUPROFEN 800 MG: 800 TABLET, FILM COATED ORAL at 21:31

## 2020-02-28 ASSESSMENT — PAIN SCALES - GENERAL
PAINLEVEL_OUTOF10: 7
PAINLEVEL_OUTOF10: 7
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
## 2020-03-02 RX ORDER — IPRATROPIUM BROMIDE AND ALBUTEROL SULFATE 2.5; .5 MG/3ML; MG/3ML
SOLUTION RESPIRATORY (INHALATION)
Qty: 90 ML | Refills: 0 | Status: ON HOLD | OUTPATIENT
Start: 2020-03-02 | End: 2020-12-27

## 2020-03-04 ENCOUNTER — APPOINTMENT (OUTPATIENT)
Dept: CT IMAGING | Age: 50
End: 2020-03-04
Payer: COMMERCIAL

## 2020-03-04 ENCOUNTER — HOSPITAL ENCOUNTER (EMERGENCY)
Age: 50
Discharge: HOME | End: 2020-03-04
Attending: EMERGENCY MEDICINE
Payer: COMMERCIAL

## 2020-03-04 VITALS
BODY MASS INDEX: 31.92 KG/M2 | TEMPERATURE: 98.6 F | WEIGHT: 187 LBS | HEART RATE: 86 BPM | SYSTOLIC BLOOD PRESSURE: 111 MMHG | RESPIRATION RATE: 15 BRPM | HEIGHT: 64 IN | DIASTOLIC BLOOD PRESSURE: 73 MMHG | OXYGEN SATURATION: 93 %

## 2020-03-04 LAB
ABSOLUTE EOS #: 0.2 K/UL (ref 0–0.4)
ABSOLUTE IMMATURE GRANULOCYTE: ABNORMAL K/UL (ref 0–0.3)
ABSOLUTE LYMPH #: 1.9 K/UL (ref 1–4.8)
ABSOLUTE MONO #: 0.5 K/UL (ref 0.1–1.3)
ALBUMIN SERPL-MCNC: 3.9 G/DL (ref 3.5–5.2)
ALBUMIN/GLOBULIN RATIO: ABNORMAL (ref 1–2.5)
ALP BLD-CCNC: 114 U/L (ref 35–104)
ALT SERPL-CCNC: 36 U/L (ref 5–33)
ANION GAP SERPL CALCULATED.3IONS-SCNC: 13 MMOL/L (ref 9–17)
AST SERPL-CCNC: 29 U/L
BASOPHILS # BLD: 1 % (ref 0–2)
BASOPHILS ABSOLUTE: 0 K/UL (ref 0–0.2)
BILIRUB SERPL-MCNC: 0.27 MG/DL (ref 0.3–1.2)
BUN BLDV-MCNC: 17 MG/DL (ref 6–20)
BUN/CREAT BLD: ABNORMAL (ref 9–20)
CALCIUM SERPL-MCNC: 8.9 MG/DL (ref 8.6–10.4)
CHLORIDE BLD-SCNC: 107 MMOL/L (ref 98–107)
CO2: 19 MMOL/L (ref 20–31)
CREAT SERPL-MCNC: 0.71 MG/DL (ref 0.5–0.9)
DIFFERENTIAL TYPE: ABNORMAL
EOSINOPHILS RELATIVE PERCENT: 4 % (ref 0–4)
GFR AFRICAN AMERICAN: >60 ML/MIN
GFR NON-AFRICAN AMERICAN: >60 ML/MIN
GFR SERPL CREATININE-BSD FRML MDRD: ABNORMAL ML/MIN/{1.73_M2}
GFR SERPL CREATININE-BSD FRML MDRD: ABNORMAL ML/MIN/{1.73_M2}
GLUCOSE BLD-MCNC: 107 MG/DL (ref 70–99)
HCT VFR BLD CALC: 41.4 % (ref 36–46)
HEMOGLOBIN: 13.9 G/DL (ref 12–16)
IMMATURE GRANULOCYTES: ABNORMAL %
LIPASE: 66 U/L (ref 13–60)
LYMPHOCYTES # BLD: 37 % (ref 24–44)
MCH RBC QN AUTO: 30.5 PG (ref 26–34)
MCHC RBC AUTO-ENTMCNC: 33.6 G/DL (ref 31–37)
MCV RBC AUTO: 90.8 FL (ref 80–100)
MONOCYTES # BLD: 10 % (ref 1–7)
NRBC AUTOMATED: ABNORMAL PER 100 WBC
PDW BLD-RTO: 12.8 % (ref 11.5–14.9)
PLATELET # BLD: 147 K/UL (ref 150–450)
PLATELET ESTIMATE: ABNORMAL
PMV BLD AUTO: 8.7 FL (ref 6–12)
POTASSIUM SERPL-SCNC: 3.4 MMOL/L (ref 3.7–5.3)
RBC # BLD: 4.56 M/UL (ref 4–5.2)
RBC # BLD: ABNORMAL 10*6/UL
SEG NEUTROPHILS: 48 % (ref 36–66)
SEGMENTED NEUTROPHILS ABSOLUTE COUNT: 2.5 K/UL (ref 1.3–9.1)
SODIUM BLD-SCNC: 139 MMOL/L (ref 135–144)
TOTAL PROTEIN: 6.5 G/DL (ref 6.4–8.3)
WBC # BLD: 5.1 K/UL (ref 3.5–11)
WBC # BLD: ABNORMAL 10*3/UL

## 2020-03-04 PROCEDURE — 83690 ASSAY OF LIPASE: CPT

## 2020-03-04 PROCEDURE — 36415 COLL VENOUS BLD VENIPUNCTURE: CPT

## 2020-03-04 PROCEDURE — 80053 COMPREHEN METABOLIC PANEL: CPT

## 2020-03-04 PROCEDURE — 6360000004 HC RX CONTRAST MEDICATION: Performed by: EMERGENCY MEDICINE

## 2020-03-04 PROCEDURE — 96375 TX/PRO/DX INJ NEW DRUG ADDON: CPT

## 2020-03-04 PROCEDURE — 2580000003 HC RX 258: Performed by: EMERGENCY MEDICINE

## 2020-03-04 PROCEDURE — 85025 COMPLETE CBC W/AUTO DIFF WBC: CPT

## 2020-03-04 PROCEDURE — 96374 THER/PROPH/DIAG INJ IV PUSH: CPT

## 2020-03-04 PROCEDURE — 74177 CT ABD & PELVIS W/CONTRAST: CPT

## 2020-03-04 PROCEDURE — 99284 EMERGENCY DEPT VISIT MOD MDM: CPT

## 2020-03-04 PROCEDURE — 6360000002 HC RX W HCPCS: Performed by: EMERGENCY MEDICINE

## 2020-03-04 RX ORDER — ONDANSETRON 4 MG/1
4 TABLET, ORALLY DISINTEGRATING ORAL EVERY 8 HOURS PRN
Qty: 10 TABLET | Refills: 0 | Status: SHIPPED | OUTPATIENT
Start: 2020-03-04 | End: 2021-11-27

## 2020-03-04 RX ORDER — ONDANSETRON 2 MG/ML
4 INJECTION INTRAMUSCULAR; INTRAVENOUS ONCE
Status: COMPLETED | OUTPATIENT
Start: 2020-03-04 | End: 2020-03-04

## 2020-03-04 RX ORDER — SODIUM CHLORIDE 0.9 % (FLUSH) 0.9 %
10 SYRINGE (ML) INJECTION PRN
Status: DISCONTINUED | OUTPATIENT
Start: 2020-03-04 | End: 2020-03-04 | Stop reason: HOSPADM

## 2020-03-04 RX ORDER — 0.9 % SODIUM CHLORIDE 0.9 %
80 INTRAVENOUS SOLUTION INTRAVENOUS ONCE
Status: COMPLETED | OUTPATIENT
Start: 2020-03-04 | End: 2020-03-04

## 2020-03-04 RX ORDER — 0.9 % SODIUM CHLORIDE 0.9 %
1000 INTRAVENOUS SOLUTION INTRAVENOUS ONCE
Status: COMPLETED | OUTPATIENT
Start: 2020-03-04 | End: 2020-03-04

## 2020-03-04 RX ORDER — MORPHINE SULFATE 4 MG/ML
4 INJECTION, SOLUTION INTRAMUSCULAR; INTRAVENOUS ONCE
Status: COMPLETED | OUTPATIENT
Start: 2020-03-04 | End: 2020-03-04

## 2020-03-04 RX ADMIN — ONDANSETRON 4 MG: 2 INJECTION INTRAMUSCULAR; INTRAVENOUS at 19:07

## 2020-03-04 RX ADMIN — SODIUM CHLORIDE 80 ML: 9 INJECTION, SOLUTION INTRAVENOUS at 20:00

## 2020-03-04 RX ADMIN — IOPAMIDOL 75 ML: 755 INJECTION, SOLUTION INTRAVENOUS at 20:00

## 2020-03-04 RX ADMIN — MORPHINE SULFATE 4 MG: 4 INJECTION, SOLUTION INTRAMUSCULAR; INTRAVENOUS at 19:24

## 2020-03-04 RX ADMIN — SODIUM CHLORIDE 1000 ML: 9 INJECTION, SOLUTION INTRAVENOUS at 19:07

## 2020-03-04 RX ADMIN — Medication 10 ML: at 20:01

## 2020-03-04 ASSESSMENT — PAIN SCALES - GENERAL
PAINLEVEL_OUTOF10: 7
PAINLEVEL_OUTOF10: 3
PAINLEVEL_OUTOF10: 5

## 2020-03-04 ASSESSMENT — PAIN DESCRIPTION - LOCATION: LOCATION: ABDOMEN

## 2020-03-04 ASSESSMENT — ENCOUNTER SYMPTOMS
NAUSEA: 1
RESPIRATORY NEGATIVE: 1
EYES NEGATIVE: 1
VOMITING: 1
DIARRHEA: 1
ABDOMINAL PAIN: 1
COUGH: 0
BACK PAIN: 0
SHORTNESS OF BREATH: 0

## 2020-03-04 ASSESSMENT — PAIN DESCRIPTION - PAIN TYPE: TYPE: ACUTE PAIN

## 2020-03-05 ENCOUNTER — TELEPHONE (OUTPATIENT)
Dept: INTERNAL MEDICINE CLINIC | Age: 50
End: 2020-03-05
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
## 2020-03-12 ENCOUNTER — HOSPITAL ENCOUNTER (OUTPATIENT)
Dept: PAIN MANAGEMENT | Age: 50
Discharge: HOME | End: 2020-03-12
Payer: COMMERCIAL

## 2020-03-12 VITALS
OXYGEN SATURATION: 95 % | BODY MASS INDEX: 31.92 KG/M2 | WEIGHT: 187 LBS | DIASTOLIC BLOOD PRESSURE: 74 MMHG | HEART RATE: 67 BPM | RESPIRATION RATE: 16 BRPM | HEIGHT: 64 IN | SYSTOLIC BLOOD PRESSURE: 122 MMHG | TEMPERATURE: 98.4 F

## 2020-03-12 PROCEDURE — 99213 OFFICE O/P EST LOW 20 MIN: CPT | Performed by: NURSE PRACTITIONER

## 2020-03-12 PROCEDURE — 99214 OFFICE O/P EST MOD 30 MIN: CPT

## 2020-03-12 RX ORDER — OXYCODONE AND ACETAMINOPHEN 10; 325 MG/1; MG/1
1 TABLET ORAL EVERY 6 HOURS PRN
Qty: 120 TABLET | Refills: 0 | Status: SHIPPED | OUTPATIENT
Start: 2020-03-14 | End: 2020-04-09 | Stop reason: SDUPTHER

## 2020-03-12 RX ORDER — TIZANIDINE 4 MG/1
4 TABLET ORAL EVERY 8 HOURS PRN
Qty: 90 TABLET | Refills: 0 | Status: SHIPPED | OUTPATIENT
Start: 2020-03-12 | End: 2020-04-08 | Stop reason: SDUPTHER

## 2020-03-12 RX ORDER — MORPHINE SULFATE 15 MG/1
15 TABLET, FILM COATED, EXTENDED RELEASE ORAL NIGHTLY
Qty: 30 TABLET | Refills: 0 | Status: SHIPPED | OUTPATIENT
Start: 2020-03-21 | End: 2020-04-09 | Stop reason: SDUPTHER

## 2020-03-12 ASSESSMENT — ENCOUNTER SYMPTOMS
GASTROINTESTINAL NEGATIVE: 1
RESPIRATORY NEGATIVE: 1

## 2020-03-12 NOTE — PROGRESS NOTES
Assessed functional status., Obtaining appropriate analgesic effect of treatment. Alejandra Stockton, MARTITA - CNP)  Review ofOARRS does not show any aberrant prescription behavior. Medication is helping the patient stay active. Patient denies any side effects and reports adequate analgesia. No sign of misuse/abuse. When was thelast UDS:   7-9-2019         Was the UDS appropriate:  yes  Record/Diagnostics Review:      As above, I did review the imaging    When was thelast UDS: 7-9-2019 Was the UDS appropriate:   Record/Diagnostics Review:   As above, I did review the imaging   7/12/2019 1:28 PM - Liu, M[facility]pn Incoming Lab Results From [facility]   Component Value Ref Range & Units Status Collected Lab   Pain Management Drug Panel Interp, Urine Inconsistent   Final 07/09/2019 11:00 AM [facility]   (NOTE)   ________________________________________________________________   DRUGS EXPECTED:   OXYCODONE [7/7/19]   ________________________________________________________________   CONSISTENT with medications provided:   OXYCODONE : based on the absence of oxycodone and metabolites   ________________________________________________________________   INCONSISTENT with medications provided:   Norfentanyl   7-Aminoclonazepam   ________________________________________________________________   INTERPRETIVE INFORMATION: Pain Mgt Chaudhary, Mass Spec/EMIT, Ur,   Interp   Interpretation depends on accuracy and completeness of patient   medication information submitted by client.     6-Acetylmorphine, Ur Not Detected   Final 07/09/2019 11:00 AM [facility]   7-Aminoclonazepam, Urine Present   Final 07/09/2019 11:00 AM [facility]   Alpha-OH-Alpraz, Urine Not Detected   Final 07/09/2019 11:00 AM [facility]   Alprazolam, Urine Not Detected   Final 07/09/2019 11:00 AM [facility]   Amphetamines, urine Not Detected   Final 07/09/2019 11:00 AM [facility]   Barbiturates, Ur Not Detected   Final 07/09/2019 11:00 AM [facility]   Benzoylecgonine, Ur Not Detected   Final 07/09/2019 11:00 AM [facility] Buprenorphine Urine Not Detected   Final 07/09/2019 11:00 AM [facility]   Carisoprodol, Ur Not Detected   Final 07/09/2019 11:00 AM [facility]   (NOTE)   The carisoprodol immunoassay has cross-reactivity to carisoprodol   and meprobamate.     Clonazepam, Urine Not Detected   Final 07/09/2019 11:00 AM [facility]   Codeine, Urine Not Detected   Final 07/09/2019 11:00 AM [facility]   MDA, Ur Not Detected   Final 07/09/2019 11:00 AM [facility]   Diazepam, Urine Not Detected   Final 07/09/2019 11:00 AM [facility]   Ethyl Glucuronide Ur Not Detected   Final 07/09/2019 11:00 AM [facility]   Fentanyl, Ur Not Detected   Final 07/09/2019 11:00 AM [facility]   Hydrocodone, Urine Not Detected   Final 07/09/2019 11:00 AM [facility]   Hydromorphone, Urine Not Detected   Final 07/09/2019 11:00 AM [facility]   Lorazepam, Urine Not Detected   Final 07/09/2019 11:00 AM [facility]   Marijuana Metab, Ur Not Detected   Final 07/09/2019 11:00 AM [facility]   MDEA, AISHA, Ur Not Detected   Final 07/09/2019 11:00 AM [facility]   MDMA, Urine Not Detected   Final 07/09/2019 11:00 AM [facility]   Meperidine Metab, Ur Not Detected   Final 07/09/2019 11:00 AM [facility]   Methadone, Urine Not Detected   Final 07/09/2019 11:00 AM [facility]   Methamphetamine, Urine Not Detected   Final 07/09/2019 11:00 AM [facility]   Methylphenidate Not Detected   Final 07/09/2019 11:00 AM [facility]   Midazolam, Urine Not Detected   Final 07/09/2019 11:00 AM [facility]   Morphine Urine Not Detected   Final 07/09/2019 11:00 AM [facility]   Norbuprenorphine, Urine Not Detected   Final 07/09/2019 11:00 AM [facility]   Nordiazepam, Urine Not Detected   Final 07/09/2019 11:00 AM [facility]   Norfentanyl, Urine Present   Final 07/09/2019 11:00 AM [facility]   NORHYDROCODONE, URINE Not Detected   Final 07/09/2019 11:00 AM [facility]   Noroxycodone, Urine Not Detected   Final 07/09/2019 11:00 AM [facility]   NOROXYMORPHONE, URINE Not Detected   Final 07/09/2019 11:00 AM [facility]   Oxazepam, Urine Not Detected   Final 07/09/2019 11:00 AM [facility]   Oxycodone Urine Not Detected   Final 07/09/2019 11:00 AM [facility]   Oxymorphone, Urine Not Detected   Final 07/09/2019 11:00 AM [facility]   PCP, Urine Not Detected   Final 07/09/2019 11:00 AM [facility]   Phentermine, Ur Not Detected   Final 07/09/2019 11:00 AM [facility]   Propoxyphene, Urine Not Detected   Final 07/09/2019 11:00 AM [facility]   Tapentadol-O-Sulfate, Urine Not Detected   Final 07/09/2019 11:00 AM [facility]   Tapentadol, Urine Not Detected   Final 07/09/2019 11:00 AM [facility]   Temazepam, Urine Not Detected   Final 07/09/2019 11:00 AM [facility]   Tramadol, Urine Not Detected   Final 07/09/2019 11:00 AM [facility]   Zolpidem, Urine Not Detected   Final 07/09/2019 11:00 AM [facility]   Drugs Expected, Ur   Final 07/09/2019 11:00  [address] [facility]   OXYCODONE ON 7/7/19    Creatinine, Ur 278.1  20.0 - 400.0 mg/dL Final 07/09/2019 11:00 AM [facility]   Pain Mgt Drug Panel, Hi Res, Ur See Below   Final 07/09/2019 11:00 AM [facility]   (NOTE)   Methodology: Qualitative Enzyme Immunoassay and Qualitative Liquid   Chromatography-Time of Flight-Mass Spectrometry or Tandem Mass   Spectrometry, Quantitative Spectrophotometry   The absence of expected drug(s) and/or drug metabolite(s) may   indicate non-compliance, inappropriate timing of specimen   collection relative to drug administration, poor drug absorption,   diluted/adulterated urine, or limitations of testing. The   concentration must be greater than or equal to the cutoff to be   reported as present. If specific drug concentrations are   required, contact the laboratory within two weeks of specimen   collection to request quantification by a second analytical   technique. Interpretive questions should be directed to the   laboratory. Results based on immunoassay detection that do not match clinical   expectations should be   interpreted with caution. Confirmatory testing by mass   spectrometry for immunoassay-based results is available, if   ordered within two weeks of specimen collection. Additional   charges apply. For medical purposes only; not valid for forensic use. Medications:     [START ON 3/21/2020] morphine (MS CONTIN) 15 MG extended release tablet, Take 1 tablet by mouth nightly for 30 days. , Disp: 30 tablet, Rfl: 0    [START ON 3/14/2020] oxyCODONE-acetaminophen (PERCOCET)  MG per tablet, Take 1 tablet by mouth every 6 hours as needed for Pain for up to 30 days. , Disp: 120 tablet, Rfl: 0    tiZANidine (ZANAFLEX) 4 MG tablet, Take 1 tablet by mouth every 8 hours as needed (spasms), Disp: 90 tablet, Rfl: 0    rOPINIRole (REQUIP) 2 MG tablet, TAKE 1 TABLET BY MOUTH EVERY NIGHT, Disp: 90 tablet, Rfl: 1    gabapentin (NEURONTIN) 300 MG capsule, TAKE 1 CAPSULE BY MOUTH TWICE DAILY.  CONTACT DOCTOR OFFICE FOR FURTHER REFILLS, Disp: 60 capsule, Rfl: 0    levothyroxine (SYNTHROID) 125 MCG tablet, TK 1 T PO BID, Disp: , Rfl: 6    nicotine (NICODERM CQ) 14 MG/24HR, Place 1 patch onto the skin every 24 hours, Disp: 30 patch, Rfl: 3    sertraline (ZOLOFT) 100 MG tablet, Take 100 mg by mouth daily, Disp: , Rfl:     esomeprazole (NEXIUM) 40 MG delayed release capsule, TAKE 1 CAPSULE BY MOUTH EVERY MORNING BEFORE BREAKFAST, Disp: 90 capsule, Rfl: 3    topiramate (TOPAMAX) 25 MG tablet, 25 mg 2 times daily , Disp: , Rfl:     metoprolol tartrate (LOPRESSOR) 50 MG tablet, Take 50 mg by mouth 2 times daily , Disp: , Rfl:     ondansetron (ZOFRAN ODT) 4 MG disintegrating tablet, Take 1 tablet by mouth every 8 hours as needed for Nausea or Vomiting, Disp: 10 tablet, Rfl: 0    ipratropium-albuterol (DUONEB) 0.5-2.5 (3) MG/3ML SOLN nebulizer solution, INHALE CONTENTS OF 1 VIAL(3ML) VIA NEBULIZER EVERY 4 HOURS AS NEEDED FOR SHORTNESS OF BREATH, Disp: 90 mL, Rfl: 0    ibuprofen (ADVIL;MOTRIN) 600 MG tablet, Take 1 tablet by mouth every 6 hours as needed for Pain, Disp: 28 tablet, Rfl: 0    albuterol sulfate HFA (PROVENTIL HFA) 108 (90 Base) MCG/ACT inhaler, Inhale 1-2 puffs into the lungs every 4 hours as needed for Wheezing or Shortness of Breath (Space out to every 6 hours as symptoms improve) Space out to every 6 hours as symptoms improve., Disp: 1 Inhaler, Rfl: 0    atorvastatin (LIPITOR) 40 MG tablet, Take 1 tablet by mouth daily, Disp: 90 tablet, Rfl: 3    clonazePAM (KLONOPIN) 0.5 MG tablet, Take 0.5 mg by mouth 3 times daily as needed. ., Disp: , Rfl:     Family History   Problem Relation Age of Onset   [facility] Cancer Mother         vaginal    Heart Disease Father     Diabetes Father     Other Father         colon resection for colon polyps    Breast Cancer Maternal Grandmother     Stroke Maternal Grandfather        Social History     Socioeconomic History    Marital status:      Spouse name: Not on file    Number of children: Not on file    Years of education: Not on file    Highest education level: Not on file   Occupational History    Occupation: Homemaker   Social Needs    Financial resource strain: Not on file    Food insecurity     Worry: Not on file     Inability: Not on file   [employer] needs     Medical: Not on file     Non-medical: Not on file   Tobacco Use    Smoking status: Former Smoker     Packs/day: 0.50     Years: 20.00     Pack years: 10.00     Types: Cigarettes    Smokeless tobacco: Never Used    Tobacco comment: quit 8/2019 occasionl cigarette on nicoderm 1/2020   Substance and Sexual Activity    Alcohol use: No     Alcohol/week: 0.0 standard drinks    Drug use: No    Sexual activity: Not Currently   Lifestyle    Physical activity     Days per week: Not on file     Minutes per session: Not on file    Stress: Not on file   Relationships    Social connections     Talks on phone: Not on file     Gets together: Not on file     Attends Orthodox service: Not on file     Active member of club or organization: Not on file     Attends meetings of clubs or organizations: Not on file     Relationship status: Not on file    Intimate partner violence     Fear of current or ex partner: Not on file     Emotionally abused: Not on file Physically abused: Not on file     Forced sexual activity: Not on file   Other Topics Concern    Not on file   Social History Narrative    Not on file       Travel Screen    Have you been in contact with someone who was sick      Yes ----------   no -x---------    unable to assess ---------    Do you have any of the following symptoms:      Abdominal pain ------  Bruising or bleeding ------ Cough  ----    Joint pain -------- Muscle pain -------  Rash  ------    Vomiting -------- Weakness  -------  None of these _____    Diarrhea -------  Red eye -------   Unable to assess ----------      Traveled outside the [address] [place] [address],[address] in the last month   yes---- no---x-      Location-----------------------------------  start  date  ----------- -----  end date  -        Review of Systems:  Review of Systems   Constitution: Negative. HENT: Negative. Eyes:        Glasses   Cardiovascular: Negative. Respiratory: Negative. Endocrine: Negative. Hematologic/Lymphatic: Negative. Skin: Negative. Musculoskeletal: Positive for joint pain and neck pain. Shoulders   Gastrointestinal: Negative. Genitourinary: Negative. Neurological: Positive for headaches, numbness and weakness. Nausea with headaches   Psychiatric/Behavioral: Positive for depression. Managing         Physical Exam:  /74   Pulse 67   Temp 98.4 °F (36.9 °C) (Oral)   Resp 16   Ht 5' 4\" (1.626 m)   Wt 187 lb (84.8 kg)   LMP 11/01/2010   SpO2 95%   BMI 32.10 kg/m²     Physical Exam  HENT:      Head: Normocephalic. Pulmonary:      Effort: Pulmonary effort is normal.   Musculoskeletal:      Cervical back: She exhibits decreased range of motion and tenderness. Skin:     General: Skin is warm and dry. Neurological:      Mental Status: She is alert. Sensory: Sensation is intact. Motor: Weakness present.       Deep Tendon Reflexes:      Reflex Scores:       Tricep reflexes are 2+ on the right side and 2+ on the left side. Bicep reflexes are 2+ on the right side and 2+ on the left side. Brachioradialis reflexes are 2+ on the right side and 2+ on the left side. Comments: Left handd grasp weaker than right   Psychiatric:         Attention and Perception: Attention normal.         Mood and Affect: Mood is depressed. Speech: Speech normal.         Behavior: Behavior normal.         Thought Content:  Thought content normal.         Cognition and Memory: Cognition normal.         Judgment: Judgment normal.           Assessment:      Problem List Items Addressed This Visit     Sprain of atlanto-occipital joint, sequela - Primary    Osteoarthritis of cervical spine (Chronic)    Relevant Medications    morphine (MS CONTIN) 15 MG extended release tablet (Start on 3/21/2020)    oxyCODONE-acetaminophen (PERCOCET)  MG per tablet (Start on 3/14/2020)    tiZANidine (ZANAFLEX) 4 MG tablet    Other Relevant Orders    Julita Roque DO, Neurosurgery, [facility]    Encounter for medication monitoring    Relevant Medications    oxyCODONE-acetaminophen (PERCOCET)  MG per tablet (Start on 3/14/2020)    Degenerative cervical spinal stenosis    Relevant Medications    morphine (MS CONTIN) 15 MG extended release tablet (Start on 3/21/2020)    oxyCODONE-acetaminophen (PERCOCET)  MG per tablet (Start on 3/14/2020)    Other Relevant Orders    Julita Roque DO, Neurosurgery, [facility]    MRI CERVICAL SPINE WO CONTRAST    Cervical radiculopathy    Relevant Medications    oxyCODONE-acetaminophen (PERCOCET)  MG per tablet (Start on 3/14/2020)    tiZANidine (ZANAFLEX) 4 MG tablet    Other Relevant Orders    MRI CERVICAL SPINE WO CONTRAST    Arthropathy of cervical facet joint    Relevant Medications    morphine (MS CONTIN) 15 MG extended release tablet (Start on 3/21/2020)    oxyCODONE-acetaminophen (PERCOCET)  MG per tablet (Start on 3/14/2020)    Other Relevant Orders    MRI CERVICAL SPINE WO CONTRAST            Treatment Plan:  DISCUSSION: Treatment options discussed withpatient and all questions answered to patient's satisfaction. Possible side effects, risk of tolerance and or dependence and alternative treatments discussed    Obtaining appropriate analgesic effect of treatment   No signs of potential drug abuse or diversion identified    [x] Ill effects of being on chronic pain medications such as sleep disturbances, respiratory depression, hormonal changes, withdrawal symptoms, chronic opioid dependence and tolerance as well as risk of taking opioids with Benzodiazepines and taking opioids along with alcohol,  werediscussed with patient. I had asked the patient to minimize medication use and utilize pain medications only for uncontrolled rest pain or pain with exertional activities. I advised patient not to self-escalate painmedications without consulting with us. At each of patient's future visits we will try to taper pain medications, while adjusting the adjunct medications, and re-evaluating for Physical Therapy to improve spinal andjoint strength. We will continue to have discussions to decrease pain medications as tolerated. Counseled patient on effects their pain medication and /or their medical condition mayhave on their  ability to drive or operate machinery. Instructed not to drive or operate machinery if drowsy     I also discussed with the patient regarding the dangers of combining narcotic pain medication with tranquilizers, alcohol or illegal drugs or taking the medication any way other than prescribed. The dangers were discussed  including respiratory depression and death. Patient was told to tell  all  physicians regarding the medications he is getting from pain clinic. Patient is warned not to take any unprescribed medications over-the-countermedications that can depress breathing .  Patient is advised to talk to the pharmacist or physicians if planning to

## 2020-03-23 RX ORDER — ESOMEPRAZOLE MAGNESIUM 40 MG/1
CAPSULE, DELAYED RELEASE ORAL
Qty: 90 CAPSULE | Refills: 2 | Status: SHIPPED | OUTPATIENT
Start: 2020-03-23 | End: 2020-12-18

## 2020-04-08 RX ORDER — TIZANIDINE 4 MG/1
4 TABLET ORAL EVERY 8 HOURS PRN
Qty: 90 TABLET | Refills: 0 | Status: SHIPPED | OUTPATIENT
Start: 2020-04-11 | End: 2020-05-10 | Stop reason: SDUPTHER

## 2020-04-09 RX ORDER — MORPHINE SULFATE 15 MG/1
15 TABLET, FILM COATED, EXTENDED RELEASE ORAL NIGHTLY
Qty: 30 TABLET | Refills: 0 | Status: SHIPPED | OUTPATIENT
Start: 2020-04-22 | End: 2020-05-13 | Stop reason: SDUPTHER

## 2020-04-09 RX ORDER — OXYCODONE AND ACETAMINOPHEN 10; 325 MG/1; MG/1
1 TABLET ORAL EVERY 6 HOURS PRN
Qty: 120 TABLET | Refills: 0 | Status: SHIPPED | OUTPATIENT
Start: 2020-04-13 | End: 2020-05-13 | Stop reason: SDUPTHER

## 2020-04-20 ENCOUNTER — APPOINTMENT (OUTPATIENT)
Dept: CT IMAGING | Age: 50
End: 2020-04-20
Payer: COMMERCIAL

## 2020-04-20 ENCOUNTER — HOSPITAL ENCOUNTER (EMERGENCY)
Age: 50
Discharge: HOME | End: 2020-04-20
Attending: EMERGENCY MEDICINE
Payer: COMMERCIAL

## 2020-04-20 VITALS
DIASTOLIC BLOOD PRESSURE: 78 MMHG | HEART RATE: 76 BPM | RESPIRATION RATE: 18 BRPM | TEMPERATURE: 98.8 F | SYSTOLIC BLOOD PRESSURE: 121 MMHG | BODY MASS INDEX: 31.92 KG/M2 | HEIGHT: 64 IN | WEIGHT: 187 LBS | OXYGEN SATURATION: 94 %

## 2020-04-20 LAB
-: ABNORMAL
ABSOLUTE EOS #: 0.2 K/UL (ref 0–0.4)
ABSOLUTE IMMATURE GRANULOCYTE: NORMAL K/UL (ref 0–0.3)
ABSOLUTE LYMPH #: 2.4 K/UL (ref 1–4.8)
ABSOLUTE MONO #: 0.5 K/UL (ref 0.1–1.3)
ALBUMIN SERPL-MCNC: 4.2 G/DL (ref 3.5–5.2)
ALBUMIN/GLOBULIN RATIO: ABNORMAL (ref 1–2.5)
ALP BLD-CCNC: 95 U/L (ref 35–104)
ALT SERPL-CCNC: 13 U/L (ref 5–33)
AMORPHOUS: ABNORMAL
ANION GAP SERPL CALCULATED.3IONS-SCNC: 12 MMOL/L (ref 9–17)
AST SERPL-CCNC: 16 U/L
BACTERIA: ABNORMAL
BASOPHILS # BLD: 1 % (ref 0–2)
BASOPHILS ABSOLUTE: 0.1 K/UL (ref 0–0.2)
BILIRUB SERPL-MCNC: 0.31 MG/DL (ref 0.3–1.2)
BILIRUBIN URINE: ABNORMAL
BUN BLDV-MCNC: 19 MG/DL (ref 6–20)
BUN/CREAT BLD: ABNORMAL (ref 9–20)
C-REACTIVE PROTEIN: 1.7 MG/L (ref 0–5)
CALCIUM SERPL-MCNC: 9.3 MG/DL (ref 8.6–10.4)
CASTS UA: ABNORMAL /LPF
CHLORIDE BLD-SCNC: 108 MMOL/L (ref 98–107)
CO2: 18 MMOL/L (ref 20–31)
COLOR: ABNORMAL
COMMENT UA: ABNORMAL
CREAT SERPL-MCNC: 0.74 MG/DL (ref 0.5–0.9)
CRYSTALS, UA: ABNORMAL /HPF
DIFFERENTIAL TYPE: NORMAL
EOSINOPHILS RELATIVE PERCENT: 2 % (ref 0–4)
EPITHELIAL CELLS UA: ABNORMAL /HPF
GFR AFRICAN AMERICAN: >60 ML/MIN
GFR NON-AFRICAN AMERICAN: >60 ML/MIN
GFR SERPL CREATININE-BSD FRML MDRD: ABNORMAL ML/MIN/{1.73_M2}
GFR SERPL CREATININE-BSD FRML MDRD: ABNORMAL ML/MIN/{1.73_M2}
GLUCOSE BLD-MCNC: 111 MG/DL (ref 70–99)
GLUCOSE URINE: NEGATIVE
HCT VFR BLD CALC: 44.3 % (ref 36–46)
HEMOGLOBIN: 14.8 G/DL (ref 12–16)
IMMATURE GRANULOCYTES: NORMAL %
KETONES, URINE: NEGATIVE
LACTATE DEHYDROGENASE: 177 U/L (ref 135–214)
LACTIC ACID, WHOLE BLOOD: NORMAL MMOL/L (ref 0.7–2.1)
LACTIC ACID: 1 MMOL/L (ref 0.5–2.2)
LEUKOCYTE ESTERASE, URINE: NEGATIVE
LIPASE: 30 U/L (ref 13–60)
LYMPHOCYTES # BLD: 33 % (ref 24–44)
MAGNESIUM: 2.3 MG/DL (ref 1.6–2.6)
MCH RBC QN AUTO: 30.9 PG (ref 26–34)
MCHC RBC AUTO-ENTMCNC: 33.4 G/DL (ref 31–37)
MCV RBC AUTO: 92.5 FL (ref 80–100)
MONOCYTES # BLD: 7 % (ref 1–7)
MUCUS: ABNORMAL
NITRITE, URINE: NEGATIVE
NRBC AUTOMATED: NORMAL PER 100 WBC
OTHER OBSERVATIONS UA: ABNORMAL
PDW BLD-RTO: 13.2 % (ref 11.5–14.9)
PH UA: 5.5 (ref 5–8)
PLATELET # BLD: 177 K/UL (ref 150–450)
PLATELET ESTIMATE: NORMAL
PMV BLD AUTO: 8.2 FL (ref 6–12)
POTASSIUM SERPL-SCNC: 4 MMOL/L (ref 3.7–5.3)
PROTEIN UA: NEGATIVE
RBC # BLD: 4.79 M/UL (ref 4–5.2)
RBC # BLD: NORMAL 10*6/UL
RBC UA: ABNORMAL /HPF
RENAL EPITHELIAL, UA: ABNORMAL /HPF
SEG NEUTROPHILS: 57 % (ref 36–66)
SEGMENTED NEUTROPHILS ABSOLUTE COUNT: 4.3 K/UL (ref 1.3–9.1)
SODIUM BLD-SCNC: 138 MMOL/L (ref 135–144)
SPECIFIC GRAVITY UA: 1.03 (ref 1–1.03)
TOTAL PROTEIN: 7.1 G/DL (ref 6.4–8.3)
TRICHOMONAS: ABNORMAL
TURBIDITY: CLEAR
URINE HGB: NEGATIVE
UROBILINOGEN, URINE: NORMAL
WBC # BLD: 7.4 K/UL (ref 3.5–11)
WBC # BLD: NORMAL 10*3/UL
WBC UA: ABNORMAL /HPF
YEAST: ABNORMAL

## 2020-04-20 PROCEDURE — 2580000003 HC RX 258: Performed by: EMERGENCY MEDICINE

## 2020-04-20 PROCEDURE — 36415 COLL VENOUS BLD VENIPUNCTURE: CPT

## 2020-04-20 PROCEDURE — 96374 THER/PROPH/DIAG INJ IV PUSH: CPT

## 2020-04-20 PROCEDURE — 6360000002 HC RX W HCPCS: Performed by: EMERGENCY MEDICINE

## 2020-04-20 PROCEDURE — 85025 COMPLETE CBC W/AUTO DIFF WBC: CPT

## 2020-04-20 PROCEDURE — 86140 C-REACTIVE PROTEIN: CPT

## 2020-04-20 PROCEDURE — 81001 URINALYSIS AUTO W/SCOPE: CPT

## 2020-04-20 PROCEDURE — 83615 LACTATE (LD) (LDH) ENZYME: CPT

## 2020-04-20 PROCEDURE — 83605 ASSAY OF LACTIC ACID: CPT

## 2020-04-20 PROCEDURE — 83690 ASSAY OF LIPASE: CPT

## 2020-04-20 PROCEDURE — 6360000004 HC RX CONTRAST MEDICATION: Performed by: EMERGENCY MEDICINE

## 2020-04-20 PROCEDURE — 99285 EMERGENCY DEPT VISIT HI MDM: CPT

## 2020-04-20 PROCEDURE — 80053 COMPREHEN METABOLIC PANEL: CPT

## 2020-04-20 PROCEDURE — 83735 ASSAY OF MAGNESIUM: CPT

## 2020-04-20 PROCEDURE — 74177 CT ABD & PELVIS W/CONTRAST: CPT

## 2020-04-20 RX ORDER — SODIUM CHLORIDE 0.9 % (FLUSH) 0.9 %
10 SYRINGE (ML) INJECTION ONCE
Status: COMPLETED | OUTPATIENT
Start: 2020-04-20 | End: 2020-04-20

## 2020-04-20 RX ORDER — 0.9 % SODIUM CHLORIDE 0.9 %
80 INTRAVENOUS SOLUTION INTRAVENOUS ONCE
Status: COMPLETED | OUTPATIENT
Start: 2020-04-20 | End: 2020-04-20

## 2020-04-20 RX ORDER — MORPHINE SULFATE 4 MG/ML
4 INJECTION, SOLUTION INTRAMUSCULAR; INTRAVENOUS ONCE
Status: COMPLETED | OUTPATIENT
Start: 2020-04-20 | End: 2020-04-20

## 2020-04-20 RX ORDER — 0.9 % SODIUM CHLORIDE 0.9 %
1000 INTRAVENOUS SOLUTION INTRAVENOUS ONCE
Status: COMPLETED | OUTPATIENT
Start: 2020-04-20 | End: 2020-04-20

## 2020-04-20 RX ADMIN — Medication 10 ML: at 21:06

## 2020-04-20 RX ADMIN — SODIUM CHLORIDE 1000 ML: 9 INJECTION, SOLUTION INTRAVENOUS at 20:02

## 2020-04-20 RX ADMIN — SODIUM CHLORIDE 80 ML: 9 INJECTION, SOLUTION INTRAVENOUS at 21:06

## 2020-04-20 RX ADMIN — IOVERSOL 75 ML: 741 INJECTION INTRA-ARTERIAL; INTRAVENOUS at 21:06

## 2020-04-20 RX ADMIN — MORPHINE SULFATE 4 MG: 4 INJECTION, SOLUTION INTRAMUSCULAR; INTRAVENOUS at 20:02

## 2020-04-20 ASSESSMENT — ENCOUNTER SYMPTOMS
NAUSEA: 1
COUGH: 0
BACK PAIN: 0
CONSTIPATION: 0
BLOOD IN STOOL: 1
RECTAL PAIN: 1
COLOR CHANGE: 0
ABDOMINAL DISTENTION: 1
SORE THROAT: 0
ABDOMINAL PAIN: 0
DIARRHEA: 0
TROUBLE SWALLOWING: 0
VOMITING: 1
SHORTNESS OF BREATH: 0

## 2020-04-20 ASSESSMENT — PAIN SCALES - GENERAL
PAINLEVEL_OUTOF10: 9
PAINLEVEL_OUTOF10: 9

## 2020-04-21 NOTE — ED PROVIDER NOTES
[address] ED  eMERGENCY dEPARTMENT eNCOUnter    Pt Name: Nakul Schaefer  MRN: [id number]  YOB: 1970  Date of evaluation:4/20/20  PCP: MARTITA Suarez CNP    CHIEF COMPLAINT       Chief Complaint   Patient presents with    Abdominal Pain    Rectal Bleeding    Emesis    Chills       HISTORY OF PRESENT ILLNESS    Nakul Schaefer is a 51 y.o. female who presents with a chief complaint of abdominal pain and rectal pain. Patient states for the past week she is been having difficulty passing bowel movements. States she has been straining very hard. She reports several episodes of nausea and vomiting as well. No fevers. No cough. No chest pain or difficulty breathing. She thought she noticed some bright red blood in her stool initially as well. She does not know if she has any hemorrhoids. Symptoms are acute. Symptoms are moderate. Nothing make symptoms better or worse. Patient has no other complaints at this time. REVIEW OF SYSTEMS       Review of Systems   Constitutional: Negative for chills, fatigue and fever. HENT: Negative for congestion, ear pain, sore throat and trouble swallowing. Eyes: Negative for visual disturbance. Respiratory: Negative for cough and shortness of breath. Cardiovascular: Negative for chest pain, palpitations and leg swelling. Gastrointestinal: Positive for abdominal distention, blood in stool, nausea, rectal pain and vomiting. Negative for abdominal pain, constipation and diarrhea. Genitourinary: Negative for dysuria and flank pain. Musculoskeletal: Negative for arthralgias, back pain, myalgias and neck pain. Skin: Negative for color change, rash and wound. Neurological: Negative for dizziness, weakness, light-headedness, numbness and headaches. Psychiatric/Behavioral: Negative for confusion. All other systems reviewed and are negative. Negativein 10 essential Systems except as mentioned above and in the HPI.         PAST MEDICAL ondansetron (ZOFRAN ODT) 4 MG disintegrating tablet Take 1 tablet by mouth every 8 hours as needed for Nausea or Vomiting  Qty: 10 tablet, Refills: 0      ipratropium-albuterol (DUONEB) 0.5-2.5 (3) MG/3ML SOLN nebulizer solution INHALE CONTENTS OF 1 VIAL(3ML) VIA NEBULIZER EVERY 4 HOURS AS NEEDED FOR SHORTNESS OF BREATH  Qty: 90 mL, Refills: 0      ibuprofen (ADVIL;MOTRIN) 600 MG tablet Take 1 tablet by mouth every 6 hours as needed for Pain  Qty: 28 tablet, Refills: 0      rOPINIRole (REQUIP) 2 MG tablet TAKE 1 TABLET BY MOUTH EVERY NIGHT  Qty: 90 tablet, Refills: 1      gabapentin (NEURONTIN) 300 MG capsule TAKE 1 CAPSULE BY MOUTH TWICE DAILY. CONTACT DOCTOR OFFICE FOR FURTHER REFILLS  Qty: 60 capsule, Refills: 0    Associated Diagnoses: Neuropathy      albuterol sulfate HFA (PROVENTIL HFA) 108 (90 Base) MCG/ACT inhaler Inhale 1-2 puffs into the lungs every 4 hours as needed for Wheezing or Shortness of Breath (Space out to every 6 hours as symptoms improve) Space out to every 6 hours as symptoms improve. Qty: 1 Inhaler, Refills: 0      levothyroxine (SYNTHROID) 125 MCG tablet TK 1 T PO BID  Refills: 6    Associated Diagnoses: Acute bronchitis, unspecified organism      nicotine (NICODERM CQ) 14 MG/24HR Place 1 patch onto the skin every 24 hours  Qty: 30 patch, Refills: 3      atorvastatin (LIPITOR) 40 MG tablet Take 1 tablet by mouth daily  Qty: 90 tablet, Refills: 3    Comments: **Patient requests 90 days supply**      clonazePAM (KLONOPIN) 0.5 MG tablet Take 0.5 mg by mouth 3 times daily as needed. .      sertraline (ZOLOFT) 100 MG tablet Take 100 mg by mouth daily      topiramate (TOPAMAX) 25 MG tablet 25 mg 2 times daily       metoprolol tartrate (LOPRESSOR) 50 MG tablet Take 50 mg by mouth 2 times daily              ALLERGIES     is allergic to compazine [prochlorperazine]; sulfa antibiotics; and adhesive tape. FAMILY HISTORY     She indicated that her mother is .  She indicated that her father is alive. She indicated that her maternal grandmother is . She indicated that her maternal grandfather is . She indicated that her paternal grandmother is . She indicated that her paternal grandfather is . family history includes Breast Cancer in her maternal grandmother; Cancer in her mother; Diabetes in her father; Heart Disease in her father; Other in her father; Stroke in her maternal grandfather. SOCIAL HISTORY      reports that she has quit smoking. Her smoking use included cigarettes. She has a 10.00 pack-year smoking history. She has never used smokeless tobacco. She reports that she does not drink alcohol or use drugs. PHYSICAL EXAM     INITIAL VITALS:  height is 5' 4\" (1.626 m) and weight is 187 lb (84.8 kg). Her oral temperature is 98.8 °F (37.1 °C). Her blood pressure is 121/78 and her pulse is 76. Her respiration is 18 and oxygen saturation is 94%. Physical Exam  Vitals signs and nursing note reviewed. Constitutional:       General: She is not in acute distress. HENT:      Head: Normocephalic and atraumatic. Eyes:      Conjunctiva/sclera: Conjunctivae normal.      Pupils: Pupils are equal, round, and reactive to light. Neck:      Musculoskeletal: Neck supple. Cardiovascular:      Rate and Rhythm: Normal rate and regular rhythm. Heart sounds: Normal heart sounds. No murmur. Pulmonary:      Effort: Pulmonary effort is normal. No respiratory distress. Breath sounds: Normal breath sounds. Abdominal:      General: Bowel sounds are normal. There is no distension. Palpations: Abdomen is soft. Tenderness: There is abdominal tenderness. Genitourinary:     Rectum: Guaiac result negative. Tenderness present. No mass, anal fissure, external hemorrhoid or internal hemorrhoid. Musculoskeletal:         General: No tenderness. Lymphadenopathy:      Cervical: No cervical adenopathy. Skin:     General: Skin is warm and dry.

## 2020-05-04 RX ORDER — ALBUTEROL SULFATE 90 UG/1
AEROSOL, METERED RESPIRATORY (INHALATION)
Qty: 25.5 G | Refills: 0 | Status: SHIPPED | OUTPATIENT
Start: 2020-05-04 | End: 2020-07-13

## 2020-05-04 RX ORDER — ATORVASTATIN CALCIUM 40 MG/1
TABLET, FILM COATED ORAL
Qty: 90 TABLET | Refills: 3 | Status: ON HOLD | OUTPATIENT
Start: 2020-05-04 | End: 2020-12-28 | Stop reason: HOSPADM

## 2020-05-10 DIAGNOSIS — M54.12 CERVICAL RADICULOPATHY: ICD-10-CM

## 2020-05-10 DIAGNOSIS — M47.812 ARTHROPATHY OF CERVICAL FACET JOINT: ICD-10-CM

## 2020-05-10 DIAGNOSIS — M48.02 DEGENERATIVE CERVICAL SPINAL STENOSIS: ICD-10-CM

## 2020-05-10 DIAGNOSIS — Z51.81 ENCOUNTER FOR MEDICATION MONITORING: ICD-10-CM

## 2020-05-10 RX ORDER — TIZANIDINE 4 MG/1
4 TABLET ORAL EVERY 8 HOURS PRN
Qty: 90 TABLET | Refills: 0 | Status: SHIPPED | OUTPATIENT
Start: 2020-05-10 | End: 2020-06-17 | Stop reason: SDUPTHER

## 2020-05-13 ENCOUNTER — HOSPITAL ENCOUNTER (OUTPATIENT)
Dept: PAIN MANAGEMENT | Age: 50
Discharge: HOME | End: 2020-05-13
Payer: COMMERCIAL

## 2020-05-13 PROCEDURE — 99213 OFFICE O/P EST LOW 20 MIN: CPT | Performed by: NURSE PRACTITIONER

## 2020-05-13 PROCEDURE — 99213 OFFICE O/P EST LOW 20 MIN: CPT

## 2020-05-13 RX ORDER — OXYCODONE AND ACETAMINOPHEN 10; 325 MG/1; MG/1
1 TABLET ORAL EVERY 6 HOURS PRN
Qty: 120 TABLET | Refills: 0 | Status: SHIPPED | OUTPATIENT
Start: 2020-05-13 | End: 2020-06-11 | Stop reason: SDUPTHER

## 2020-05-13 RX ORDER — MORPHINE SULFATE 15 MG/1
15 TABLET, FILM COATED, EXTENDED RELEASE ORAL NIGHTLY
Qty: 30 TABLET | Refills: 0 | Status: SHIPPED | OUTPATIENT
Start: 2020-05-25 | End: 2020-06-11 | Stop reason: SDUPTHER

## 2020-05-13 ASSESSMENT — ENCOUNTER SYMPTOMS
NAUSEA: 1
RESPIRATORY NEGATIVE: 1

## 2020-05-13 NOTE — PROGRESS NOTES
Laboratories. The U.S. Food and Drug   Administration has not approved or cleared this test; however, FDA   clearance or approval is not currently required for clinical use. The results are not intended to be used as the sole means for   clinical diagnosis or patient management decisions. EER Hi Res Interp Ur See Note   Final 2019 11:00 AM BILL   (NOTE)   Access [facility] Enhanced Report using either link below:   -Direct access: [email or web address]   -Enter Username, Password: [email or web address]   Username: [email or web address]   Password: [email or web address]   Performed by [place] [address], [address] [facility] [phone]   www. Le Collins MD, Lab.  Director          Past Medical History:   Diagnosis Date    Anxiety     CAD (coronary artery disease)     Mitral valve prolapse    Fatty liver     GERD (gastroesophageal reflux disease)     Headache     History of bronchitis     Hyperlipidemia     Hypothyroidism     Kidney stone     LFT elevation     MVP (mitral valve prolapse)     Obesity     Umbilical hernia        Past Surgical History:   Procedure Laterality Date    APPENDECTOMY       SECTION      2 pfannenstiel, 1 vertical    CHOLECYSTECTOMY      COLONOSCOPY      Dr Arpan Barros COLONOSCOPY  14    COLONOSCOPY  2014    biopsy & sigmoid spasms, pathology negative    COLONOSCOPY N/A 2018    COLONOSCOPY WITH BIOPSY performed by Franky Figueredo MD at [address] [place] [address]      x 5    HYSTERECTOMY          [place] EXPLORATORY OF ABDOMEN  10/21/2014    Laparotomy-lysis of adhesions, bso     UPPER GASTROINTESTINAL ENDOSCOPY  2010    mild chronic inactive gastritis       Allergies   Allergen Reactions    Compazine [Prochlorperazine]      Jittery, restless    Sulfa Antibiotics Hives    Adhesive Tape Rash         Current Outpatient Medications:     atorvastatin (LIPITOR) 40 MG tablet, TAKE 1 TABLET BY MOUTH EVERY DAY, mouth 3 times daily as needed. ., Disp: , Rfl:     Family History   Problem Relation Age of Onset   [facility] Mother         vaginal    Heart Disease Father     Diabetes Father     Other Father         colon resection for colon polyps    Breast Cancer Maternal Grandmother     Stroke Maternal Grandfather        Social History     Socioeconomic History    Marital status:      Spouse name: Not on file    Number of children: Not on file    Years of education: Not on file    Highest education level: Not on file   Occupational History    Occupation: Homemaker   Social Needs    Financial resource strain: Not on file    Food insecurity     Worry: Not on file     Inability: Not on file   [employer] needs     Medical: Not on file     Non-medical: Not on file   Tobacco Use    Smoking status: Former Smoker     Packs/day: 0.25     Years: 20.00     Pack years: 5.00     Types: Cigarettes    Smokeless tobacco: Never Used    Tobacco comment: quit 8/2019 occasionl cigarette on nicoderm 1/2020   Substance and Sexual Activity    Alcohol use: No     Alcohol/week: 0.0 standard drinks    Drug use: No    Sexual activity: Not Currently   Lifestyle    Physical activity     Days per week: Not on file     Minutes per session: Not on file    Stress: Not on file   Relationships    Social connections     Talks on phone: Not on file     Gets together: Not on file     Attends Latter day service: Not on file     Active member of club or organization: Not on file     Attends meetings of clubs or organizations: Not on file     Relationship status: Not on file    Intimate partner violence     Fear of current or ex partner: Not on file     Emotionally abused: Not on file     Physically abused: Not on file     Forced sexual activity: Not on file   Other Topics Concern    Not on file   Social History Narrative    Not on file         Review of Systems:  Review of Systems   Constitution: Negative. HENT: Negative. painmedications without consulting with us. At each of patient's future visits we will try to taper pain medications, while adjusting the adjunct medications, and re-evaluating for Physical Therapy to improve spinal andjoint strength. We will continue to have discussions to decrease pain medications as tolerated. Counseled patient on effects their pain medication and /or their medical condition mayhave on their  ability to drive or operate machinery. Instructed not to drive or operate machinery if drowsy     I also discussed with the patient regarding the dangers of combining narcotic pain medication with tranquilizers, alcohol or illegal drugs or taking the medication any way other than prescribed. The dangers were discussed  including respiratory depression and death. Patient was told to tell  all  physicians regarding the medications he is getting from pain clinic. Patient is warned not to take any unprescribed medications over-the-countermedications that can depress breathing . Patient is advised to talk to the pharmacist or physicians if planning to take any over-the-counter medications before  takeing them. Patient is strongly advised to avoid tranquilizers or  relaxants, illegal drugs  or any medications that can depress breathing  Patient is also advised to tell us if there is any changes in their medications from other physicians.     Location:  patient  at home      , provider working from [facility]  Pain clinic    TREATMENT OPTIONS:       Medication Agreement Requirements Met  Continue Opioid therapy  Script written for  Ms contin,  Percocet  Follow up appointment made

## 2020-06-07 ASSESSMENT — ENCOUNTER SYMPTOMS: PHOTOPHOBIA: 0

## 2020-06-07 NOTE — PROGRESS NOTES
Remi Halsted is a 51 y.o. female evaluated on 6/11/2020. Modality of virtual service provided -via  telephone   Consent:  Patient and/or health care decision maker is aware that that patient may receive a bill for this telephone service, depending on one's insurance coverage, and has provided verbal consent to proceed: Yes    Patient identification was verified at the start of the visit: Yes    Chief complaint: Remi Halsted is 51 y.o.,  female, with chief complaint of neck pain  . Patient has different dates on her medications     Patient is a 60-year-old female with a chief complaint of pain involving the cervical region of longstanding duration. She had undergone cervical facet joint injections with about 85% improvement in the pain in November 2019. Patient reports her pain is getting worse again. She is also complaining of tingling and numbness involving the left arm. She reports her physician ordered an MRI of the cervical spine which she is going to have next week. .  She reports was seen by neurosurgeon in the past.  Patient continues to smoke. Neck Pain    This is a chronic problem. The current episode started more than 1 year ago. The problem occurs constantly. The problem has been gradually worsening. Associated with: Had a recent fall which made her pain worse. The pain is present in the midline, left side and right side (Pain is worse on the left side). The quality of the pain is described as aching (Throbbing). The pain is at a severity of 8/10 (7-10). The pain is severe. The symptoms are aggravated by bending (Neck movements). The pain is same all the time. Stiffness is present in the morning. Associated symptoms include headaches, numbness, tingling and weakness. Pertinent negatives include no chest pain, fever or photophobia. Associated symptoms comments: Especially in the left hand.       Alleviating factors:heat and rest   Lifestyle changes experienced with pain: Wakes from sleep  Mood changes,none. Physical therapy did not help the pain. Are you under psychological counseling at present: No  Goals for treatment include:  Decrease in pain  Enjoy daily and recreational activities, return to previous status. Patient relates current medications are helping the pain. Patient reports taking pain medications as prescribed, denies obtaining medications from different sources and denies use of illegal drugs. Patient denies side effects from medications like nausea, vomiting, constipation or drowsiness. Patient reports current activities of daily living ar possible due to medications and would like to continue them.       ADVERSE MEDICATION EFFECTS:   Nausea and vomiting: no   Constipation: no-Undercontrol-: yes  Dizziness/drowsy/sleepy--no  Urinary Retention: no    ACTIVITY/SOCIAL/EMOTIONAL:  Sleep Pattern: 6 hours per night. nightime awakenings  Home Exercises: - none  Activity:decreased  Emotional Issues: normal.   Currently seeing a Psychiatrist or Psychologist:  No      ABERRANT BEHAVIORS SINCE LAST VISIT  Lost rx/pills:------------------------------------------ no  Taking  medication as prescribed: ----------- yes  Urine Drug Screen ---------------------------------  yes  Recent ER visits: -------------------------------------No  Pill count is appropriate: ---------------------------not applicable   Refills for prescriptions appropriate:---------- yes      Past Medical History:   Diagnosis Date    Anxiety     CAD (coronary artery disease)     Mitral valve prolapse    Fatty liver     GERD (gastroesophageal reflux disease)     Headache     History of bronchitis     Hyperlipidemia     Hypothyroidism     Kidney stone     LFT elevation     MVP (mitral valve prolapse)     Obesity     Umbilical hernia        Past Surgical History:   Procedure Laterality Date    APPENDECTOMY       SECTION      2 pfannenstiel, 1 vertical    CHOLECYSTECTOMY      COLONOSCOPY   Physically abused: None     Forced sexual activity: None   Other Topics Concern    None   Social History Narrative    None       Allergies   Allergen Reactions    Compazine [Prochlorperazine]      Jittery, restless    Sulfa Antibiotics Hives    Adhesive Tape Rash       Current Outpatient Medications on File Prior to Encounter   Medication Sig Dispense Refill    tiZANidine (ZANAFLEX) 4 MG tablet Take 1 tablet by mouth every 8 hours as needed (spasms) 90 tablet 0    albuterol sulfate  (90 Base) MCG/ACT inhaler INHALE 2 PUFFS INTO THE LUNGS EVERY 4 HOURS AS NEEDED FOR WHEEZING OR SHORTNESS OF BREATH 25.5 g 0    atorvastatin (LIPITOR) 40 MG tablet TAKE 1 TABLET BY MOUTH EVERY DAY 90 tablet 3    esomeprazole (NEXIUM) 40 MG delayed release capsule TAKE 1 CAPSULE BY MOUTH EVERY MORNING BEFORE BREAKFAST 90 capsule 2    ondansetron (ZOFRAN ODT) 4 MG disintegrating tablet Take 1 tablet by mouth every 8 hours as needed for Nausea or Vomiting 10 tablet 0    ipratropium-albuterol (DUONEB) 0.5-2.5 (3) MG/3ML SOLN nebulizer solution INHALE CONTENTS OF 1 VIAL(3ML) VIA NEBULIZER EVERY 4 HOURS AS NEEDED FOR SHORTNESS OF BREATH 90 mL 0    ibuprofen (ADVIL;MOTRIN) 600 MG tablet Take 1 tablet by mouth every 6 hours as needed for Pain 28 tablet 0    rOPINIRole (REQUIP) 2 MG tablet TAKE 1 TABLET BY MOUTH EVERY NIGHT 90 tablet 1    gabapentin (NEURONTIN) 300 MG capsule TAKE 1 CAPSULE BY MOUTH TWICE DAILY. CONTACT DOCTOR OFFICE FOR FURTHER REFILLS 60 capsule 0    levothyroxine (SYNTHROID) 125 MCG tablet TK 1 T PO BID  6    nicotine (NICODERM CQ) 14 MG/24HR Place 1 patch onto the skin every 24 hours 30 patch 3    clonazePAM (KLONOPIN) 0.5 MG tablet Take 0.5 mg by mouth 3 times daily as needed. Vicente Hughes sertraline (ZOLOFT) 100 MG tablet Take 100 mg by mouth daily      topiramate (TOPAMAX) 25 MG tablet 25 mg 2 times daily       metoprolol tartrate (LOPRESSOR) 50 MG tablet Take 50 mg by mouth 2 times daily        No current facility-administered medications on file prior to encounter. Review of Systems   Constitutional: Negative for activity change, appetite change and fever. HENT: Negative for congestion and ear pain. Eyes: Negative for photophobia and pain. Respiratory: Negative for cough, choking and shortness of breath. Cardiovascular: Negative for chest pain and palpitations. Gastrointestinal: Negative for abdominal pain, constipation, nausea and vomiting. Endocrine: Negative. Negative for cold intolerance and polyuria. Genitourinary: Negative for dysuria and hematuria. Musculoskeletal: Positive for neck pain and neck stiffness. Negative for back pain. Skin: Negative for pallor and rash. Allergic/Immunologic: Negative. Neurological: Positive for tingling, weakness, numbness and headaches. Hematological: Does not bruise/bleed easily. Psychiatric/Behavioral: Negative for self-injury, sleep disturbance and suicidal ideas. The patient is not nervous/anxious. Physical Exam  Skin:         Neurological:      Mental Status: She is alert and oriented to person, place, and time. Psychiatric:         Mood and Affect: Mood normal.         Thought Content:  Thought content normal.          DATA:  LAB.:  4/20/2020  8:07 PM - Liu, pn Incoming Lab Results From [facility]     Component Value Ref Range & Units Status Collected Lab   WBC 7.4  3.5 - 11.0 k/uL Final 04/20/2020  7:55  [facility]   RBC 4.79  4.0 - 5.2 m/uL Final 04/20/2020  7:55  [facility]   Hemoglobin 14.8  12.0 - 16.0 g/dL Final 04/20/2020  7:55  [facility]   Hematocrit 44.3  36 - 46 % Final 04/20/2020  7:55  [facility]   MCV 92.5  80 - 100 fL Final 04/20/2020  7:55  [facility]   [facility] 30.9  26 - 34 pg Final 04/20/2020  7:55  [facility]   MCHC 33.4  31 - 37 g/dL Final 04/20/2020  7:55  [facility]   RDW 13.2  11.5 - 14.9 % Final 04/20/2020  7:55  [facility]   Platelets 911  908 - 450 k/uL Final 04/20/2020  7:55  [facility]   MPV 8.2  6.0 - 12.0 fL Final 04/20/2020  7:55  [facility]   [facility] Automated NOT REPORTED  per 100 WBC Final 04/20/2020  7:55  [facility]   Differential Type NOT REPORTED   Final 04/20/2020  7:55  [facility]   Seg Neutrophils 57  36 - 66 % Final 04/20/2020  7:55  [facility]   Lymphocytes 33  24 - 44 % Final 04/20/2020  7:55  [facility]   Monocytes 7  1 - 7 % Final 04/20/2020  7:55  [facility]   Eosinophils % 2  0 - 4 % Final 04/20/2020  7:55  [facility]   Basophils 1  0 - 2 % Final 04/20/2020  7:55  [facility]   Immature Granulocytes NOT REPORTED  0 % Final 04/20/2020  7:55  [facility]   Segs Absolute 4.30  1.3 - 9.1 k/uL Final 04/20/2020  7:55  [facility]   Absolute Lymph # 2.40  1.0 - 4.8 k/uL Final 04/20/2020  7:55  [facility]   Absolute Mono # 0.50  0.1 - 1.3 k/uL Final 04/20/2020  7:55  [facility]   Absolute Eos # 0.20  0.0 - 0.4 k/uL Final 04/20/2020  7:55  [facility]   Basophils Absolute 0.10  0.0 - 0.2 k/uL Final 04/20/2020  7:55  [facility]   Absolute Immature Granulocyte NOT REPORTED  0.00 - 0.30 k/uL Final 04/20/2020  7:55  [facility]   WBC Morphology NOT REPORTED   Final 04/20/2020  7:55  [facility]   [facility] Morphology NOT REPORTED   Final 04/20/2020  7:55  [facility]   Platelet Estimate NOT REPORTED   Final 04/20/2020  7:55  [facility]       X-Ray reports:  MRI CERVICAL SPINE WO CONTRAST  7/18/2018 11:35 AM EDT   SIGNS AND SYMPTOMS: M54.12 Radiculopathy, cervical region I10 TECHNOLOGIST COMMENTS: pt c/o headaches and neck pain hx DDD   QUESTION FOR THE RADIOLOGIST: ,  , ========== , Ordering Provider - Shara Hernandez MD , Rendering Provider - Shara Hernandez MD , ==========   PROTOCOL: The following pulse sequences were utilized when imaging the cervical spine: sagittal T2, sagittal T1, sagittal STIR, axial T2, and axial T2* disc. COMPARISON: Prior cervical spine MRI from outside institution dated February 2017   FINDINGS: The bones of the cervical spine are in anatomic alignment with straightening and loss of the normal lordosis possibly from muscle spasm. There is preservation of vertebral body heights and intervertebral disc spaces revealed mild disc desiccation at C3-4, C4-5 and C5-6 with minimal loss of height. The marrow signals within normal limits. The cord is normal in signal. No epidural or paraspinous fluid collection is appreciated. The visualized paraspinous soft tissues are within normal limits. The prevertebral soft tissues are within normal limits. 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 At C2-C3: There is a normal disc, central canal, and neural foramen. At C3-C4: Minimal central spondylotic disc bulge with eccentric component extending to the subarticular zone on the left side with effacement of the anterior thecal sac but no significant canal stenosis or cord compression. There is mild left neural foramen narrowing. At C4-C5: Small central spondylotic disc bulge effacing the anterior thecal sac but not associated with significant canal stenosis or cord compression. Neural foramina appear unremarkable. At C5-C6: Broad based central disc bulge effacing the anterior thecal sac with left subarticular component leading to moderate left neural foramen narrowing. No significant canal stenosis or cord compression. At C6-C7: There is a normal disc, central canal, and neural foramen. of Care. Time spent reviewing with patient the below reports:   Medication safety, Treatment options. Level of diagnosis and management options of this case is multiple: involving the following management options: Interventions as needed, medication management as appropriate, future visits, activity modification, heat/ice as needed, Urine drug screen as required. [x]The patient's questions were answered to the best of my abilities. This note was created using voice recognition software. There may be inaccuracies of transcription  that are inadvertently overlooked prior to the signature. There is any questions about the transcription please contact me. Return in  2-4 weeks  with physician   for further plan of treatment. Due to the COVID-19 pandemic and the appropriate interventions by Keith Duran, our non-urgent pain management patients will not be seen in the office at this time for their protection and the protection of our staff. To offer continuity of care, their prescriptions will be escribed this month after a careful chart review and review of their OARRS report  Pursuant to the emergency declaration under the [address]  and [place] [address] waiver authority and the [employer] Act, this Virtual Visit was conducted, with patient's consent, to reduce the patient's risk of exposure to COVID-19 and provide continuity of care for an established patient. Services were provided through a video synchronous discussion virtually to substitute for in-person appointment. \"  Documentation:  I communicated with the patient and/or health care decision maker about plan of care  Details of this discussion including any medical advice provided:   Total Time: minutes: 21-30 minutes    I affirm this is a Patient Initiated Episode with an Established Patient who has not had a related appointment within my department in the past 7 days or scheduled within the next 24 hours.     Electronically signed by Peter Puente MD on 6/12/2020 at 5:19 AM

## 2020-06-09 ENCOUNTER — TELEMEDICINE (OUTPATIENT)
Dept: INTERNAL MEDICINE CLINIC | Age: 50
End: 2020-06-09
Payer: COMMERCIAL

## 2020-06-09 PROCEDURE — 99214 OFFICE O/P EST MOD 30 MIN: CPT | Performed by: NURSE PRACTITIONER

## 2020-06-09 ASSESSMENT — ENCOUNTER SYMPTOMS
COUGH: 1
VOMITING: 0
BACK PAIN: 1
ANAL BLEEDING: 0
DIARRHEA: 1
NAUSEA: 1
ABDOMINAL PAIN: 1
CONSTIPATION: 0
SHORTNESS OF BREATH: 0
WHEEZING: 0

## 2020-06-09 NOTE — PROGRESS NOTES
(KLONOPIN) 0.5 MG tablet Take 0.5 mg by mouth 3 times daily as needed. Mariangel Andrew   Historical Provider, MD   sertraline (ZOLOFT) 100 MG tablet Take 100 mg by mouth daily  Historical Provider, MD   topiramate (TOPAMAX) 25 MG tablet 25 mg 2 times daily   Historical Provider, MD   metoprolol tartrate (LOPRESSOR) 50 MG tablet Take 50 mg by mouth 2 times daily   Historical Provider, MD       Social History     Tobacco Use    Smoking status: Former Smoker     Packs/day: 0.25     Years: 20.00     Pack years: 5.00     Types: Cigarettes    Smokeless tobacco: Never Used    Tobacco comment: quit 2019 occasionl cigarette on nicoderm 2020   Substance Use Topics    Alcohol use: No     Alcohol/week: 0.0 standard drinks    Drug use: No        Allergies   Allergen Reactions    Compazine [Prochlorperazine]      Jittery, restless    Sulfa Antibiotics Hives    Adhesive Tape Rash   ,   Past Medical History:   Diagnosis Date    Anxiety     CAD (coronary artery disease)     Mitral valve prolapse    Fatty liver     GERD (gastroesophageal reflux disease)     Headache     History of bronchitis     Hyperlipidemia     Hypothyroidism     Kidney stone     LFT elevation     MVP (mitral valve prolapse)     Obesity     Umbilical hernia    ,   Past Surgical History:   Procedure Laterality Date    APPENDECTOMY       SECTION      2 pfannenstiel, 1 vertical    CHOLECYSTECTOMY      COLONOSCOPY      Dr Blanca Chowdhury COLONOSCOPY  14    COLONOSCOPY  2014    biopsy & sigmoid spasms, pathology negative    COLONOSCOPY N/A 2018    COLONOSCOPY WITH BIOPSY performed by Titi Luna MD at [facility]      x 5    HYSTERECTOMY          [place] EXPLORATORY OF ABDOMEN  10/21/2014    Laparotomy-lysis of adhesions, bso     UPPER GASTROINTESTINAL ENDOSCOPY  2010    mild chronic inactive gastritis   ,   Social History     Tobacco Use    Smoking status: Former Smoker     Packs/day: 0.25     Years: Diagnoses and Associated Orders     Diarrhea, unspecified type    -  Primary    Check labs, maintain hydration, Imodium prn. Consider GI referral.    Comprehensive Metabolic Panel [[id number] Custom]   - Future Order         Hyperlipidemia, unspecified hyperlipidemia type        Lipid Panel [[id number] Custom]   - Future Order         Hypothyroidism, unspecified type        Check TSH, on Synthroid. TSH [[id number] Custom]   - Future Order         Type 2 diabetes mellitus without complication, without long-term current use of insulin (HCC)        Diet controlled, due for A1C    CBC [[id number] Custom]   - Future Order    Hemoglobin A1C [[id number] Custom]   - Future Order    Microalbumin, Ur [id number] Custom]      Urinalysis [[id number] Custom]   - Future Order         Restless legs        Check labs, mag. On requip. Maintain hydration, regular exercise. Magnesium [id number] Custom]   - Future Order               Return in about 1 week (around 6/16/2020) for diarrhea, review labs. Michele Tang is a 51 y.o. female being evaluated by a Virtual Visit (video visit) encounter to address concerns as mentioned above. A caregiver was present when appropriate. Due to this being a TeleHealth encounter (During DNWXG-08 public health emergency), evaluation of the following organ systems was limited: Vitals/Constitutional/EENT/Resp/CV/GI//MS/Neuro/Skin/Heme-Lymph-Imm. Pursuant to the emergency declaration under the [address] [place] [address], [address] [place] [address] authority and the [employer] Act, this Virtual Visit was conducted with patient's (and/or legal guardian's) consent, to reduce the patient's risk of exposure to COVID-19 and provide necessary medical care. The patient (and/or legal guardian) has also been advised to contact this office for worsening conditions or problems, and seek emergency medical treatment and/or call 911 if deemed necessary.      Patient identification was

## 2020-06-11 ENCOUNTER — HOSPITAL ENCOUNTER (OUTPATIENT)
Dept: PAIN MANAGEMENT | Age: 50
Discharge: HOME | End: 2020-06-11
Payer: COMMERCIAL

## 2020-06-11 PROCEDURE — 99443 PR PHYS/QHP TELEPHONE EVALUATION 21-30 MIN: CPT | Performed by: PAIN MEDICINE

## 2020-06-11 PROCEDURE — 99213 OFFICE O/P EST LOW 20 MIN: CPT

## 2020-06-11 RX ORDER — OXYCODONE AND ACETAMINOPHEN 10; 325 MG/1; MG/1
1 TABLET ORAL EVERY 6 HOURS PRN
Qty: 120 TABLET | Refills: 0 | Status: SHIPPED | OUTPATIENT
Start: 2020-06-13 | End: 2020-07-08 | Stop reason: SDUPTHER

## 2020-06-11 RX ORDER — MORPHINE SULFATE 15 MG/1
15 TABLET, FILM COATED, EXTENDED RELEASE ORAL NIGHTLY
Qty: 30 TABLET | Refills: 0 | Status: SHIPPED | OUTPATIENT
Start: 2020-06-27 | End: 2020-07-27

## 2020-06-12 ENCOUNTER — HOSPITAL ENCOUNTER (OUTPATIENT)
Dept: MRI IMAGING | Age: 50
Discharge: HOME | End: 2020-06-14
Payer: COMMERCIAL

## 2020-06-12 PROCEDURE — 72141 MRI NECK SPINE W/O DYE: CPT

## 2020-06-12 ASSESSMENT — ENCOUNTER SYMPTOMS
VOMITING: 0
ABDOMINAL PAIN: 0
CHOKING: 0
ALLERGIC/IMMUNOLOGIC NEGATIVE: 1
CONSTIPATION: 0
COUGH: 0
SHORTNESS OF BREATH: 0
NAUSEA: 0
BACK PAIN: 0
EYE PAIN: 0

## 2020-06-17 RX ORDER — TIZANIDINE 4 MG/1
4 TABLET ORAL EVERY 8 HOURS PRN
Qty: 90 TABLET | Refills: 3 | Status: SHIPPED | OUTPATIENT
Start: 2020-06-17 | End: 2020-10-09 | Stop reason: SDUPTHER

## 2020-07-08 ENCOUNTER — HOSPITAL ENCOUNTER (OUTPATIENT)
Dept: PAIN MANAGEMENT | Age: 50
Discharge: HOME | End: 2020-07-08
Payer: COMMERCIAL

## 2020-07-08 PROCEDURE — 99213 OFFICE O/P EST LOW 20 MIN: CPT

## 2020-07-08 PROCEDURE — 99214 OFFICE O/P EST MOD 30 MIN: CPT | Performed by: NURSE PRACTITIONER

## 2020-07-08 RX ORDER — OXYCODONE AND ACETAMINOPHEN 10; 325 MG/1; MG/1
1 TABLET ORAL EVERY 6 HOURS PRN
Qty: 120 TABLET | Refills: 0 | Status: SHIPPED | OUTPATIENT
Start: 2020-07-13 | End: 2020-08-10 | Stop reason: SDUPTHER

## 2020-07-08 ASSESSMENT — ENCOUNTER SYMPTOMS
SHORTNESS OF BREATH: 0
COUGH: 0
CONSTIPATION: 0

## 2020-07-08 NOTE — PROGRESS NOTES
Patient completed a video visit today to review medication contract. Chief Complaint: neck pain    PMH:  Patient complains of left-sided neck pain that radiates down the left arm to fingers. MRI with Mild multilevel spinal canal stenosis and mild-to-moderate neural foraminal narrowing from C3-4 to C5-6. She has never had neck surgery. She had PT a year ago and continues to do exercises at home with benefit. She would like another cervical facet injection but insurance is denying due to less than 80% relief with first injection. She is on a high dose of opiates - percocet 10 mg QID and MSER 15 mg that she takes PRN. She is requesting pain medication \"to help me sleep\". MED of 75 is discussed and risks of respiratory depression and death with higher doses or additional opiates is explained. Discussed a POC to find alternative therapies for the pain such as acupuncture and injections. She is agreeable to trying this. She will see NS next month for surgical opinion. Neck Pain    This is a chronic problem. The current episode started more than 1 year ago. The problem occurs constantly. The problem has been unchanged. The pain is present in the left side (into left shoulder and down arm - numbness in left arm). The quality of the pain is described as stabbing and shooting. The pain is at a severity of 6/10. The pain is moderate. The symptoms are aggravated by position and twisting (laying on her sides). Associated symptoms include numbness and weakness. Pertinent negatives include no chest pain or fever. Associated symptoms comments: Weakened  on left per patinet. She has tried oral narcotics, heat, bed rest and home exercises for the symptoms. The treatment provided mild relief. Patient denies any new neurological symptoms. No bowel or bladder incontinence, no weakness, and no falling.     Pill count: appropriate MSER due 7/29 percocet due 7/13 - takes the morphine PRN    Morphine equivalent: EVERY 4 HOURS AS NEEDED FOR WHEEZING OR SHORTNESS OF BREATH, Disp: 25.5 g, Rfl: 0    atorvastatin (LIPITOR) 40 MG tablet, TAKE 1 TABLET BY MOUTH EVERY DAY, Disp: 90 tablet, Rfl: 3    esomeprazole (NEXIUM) 40 MG delayed release capsule, TAKE 1 CAPSULE BY MOUTH EVERY MORNING BEFORE BREAKFAST, Disp: 90 capsule, Rfl: 2    ondansetron (ZOFRAN ODT) 4 MG disintegrating tablet, Take 1 tablet by mouth every 8 hours as needed for Nausea or Vomiting, Disp: 10 tablet, Rfl: 0    ipratropium-albuterol (DUONEB) 0.5-2.5 (3) MG/3ML SOLN nebulizer solution, INHALE CONTENTS OF 1 VIAL(3ML) VIA NEBULIZER EVERY 4 HOURS AS NEEDED FOR SHORTNESS OF BREATH, Disp: 90 mL, Rfl: 0    ibuprofen (ADVIL;MOTRIN) 600 MG tablet, Take 1 tablet by mouth every 6 hours as needed for Pain, Disp: 28 tablet, Rfl: 0    rOPINIRole (REQUIP) 2 MG tablet, TAKE 1 TABLET BY MOUTH EVERY NIGHT, Disp: 90 tablet, Rfl: 1    gabapentin (NEURONTIN) 300 MG capsule, TAKE 1 CAPSULE BY MOUTH TWICE DAILY. CONTACT DOCTOR OFFICE FOR FURTHER REFILLS, Disp: 60 capsule, Rfl: 0    levothyroxine (SYNTHROID) 125 MCG tablet, TK 1 T PO BID, Disp: , Rfl: 6    nicotine (NICODERM CQ) 14 MG/24HR, Place 1 patch onto the skin every 24 hours, Disp: 30 patch, Rfl: 3    clonazePAM (KLONOPIN) 0.5 MG tablet, Take 0.5 mg by mouth 3 times daily as needed. ., Disp: , Rfl:     sertraline (ZOLOFT) 100 MG tablet, Take 100 mg by mouth daily, Disp: , Rfl:     topiramate (TOPAMAX) 25 MG tablet, 25 mg 2 times daily , Disp: , Rfl:     metoprolol tartrate (LOPRESSOR) 50 MG tablet, Take 50 mg by mouth 2 times daily , Disp: , Rfl:     Family History   Problem Relation Age of Onset    Cancer Mother         vaginal    Heart Disease Father     Diabetes Father     Other Father         colon resection for colon polyps    Breast Cancer Maternal Grandmother     Stroke Maternal Grandfather        Social History     Socioeconomic History    Marital status:      Spouse name: Not on file    Number of children: Not on file    Years of education: Not on file    Highest education level: Not on file   Occupational History    Occupation: Homemaker   Social Needs    Financial resource strain: Not on file    Food insecurity     Worry: Not on file     Inability: Not on file   [employer] needs     Medical: Not on file     Non-medical: Not on file   Tobacco Use    Smoking status: Former Smoker     Packs/day: 0.25     Years: 20.00     Pack years: 5.00     Types: Cigarettes    Smokeless tobacco: Never Used    Tobacco comment: quit 8/2019 occasionl cigarette on nicoderm 1/2020   Substance and Sexual Activity    Alcohol use: No     Alcohol/week: 0.0 standard drinks    Drug use: No    Sexual activity: Not Currently   Lifestyle    Physical activity     Days per week: Not on file     Minutes per session: Not on file    Stress: Not on file   Relationships    Social connections     Talks on phone: Not on file     Gets together: Not on file     Attends Church service: Not on file     Active member of club or organization: Not on file     Attends meetings of clubs or organizations: Not on file     Relationship status: Not on file    Intimate partner violence     Fear of current or ex partner: Not on file     Emotionally abused: Not on file     Physically abused: Not on file     Forced sexual activity: Not on file   Other Topics Concern    Not on file   Social History Narrative    Not on file       Review of Systems:  Review of Systems   Constitution: Negative for chills and fever. Cardiovascular: Negative for chest pain and palpitations. Respiratory: Negative for cough and shortness of breath. Musculoskeletal: Positive for neck pain. Gastrointestinal: Negative for constipation. Neurological: Positive for numbness and weakness. Negative for disturbances in coordination and loss of balance. Physical Exam:  [facility] 11/01/2010     Physical Exam  HENT:      Head: Normocephalic. Pulmonary:      Effort: Pulmonary effort is normal.   Neurological:      Mental Status: She is alert. Psychiatric:         Mood and Affect: Mood normal.         Behavior: Behavior normal.         Record/Diagnostics Review:    Last mychal 7/2019 and was appropriate     Assessment:  Problem List Items Addressed This Visit     Degenerative cervical spinal stenosis    Relevant Medications    oxyCODONE-acetaminophen (PERCOCET)  MG per tablet (Start on 7/13/2020)    Other Relevant Orders    Pain Management Drug Screen    Arthropathy of cervical facet joint    Relevant Medications    oxyCODONE-acetaminophen (PERCOCET)  MG per tablet (Start on 7/13/2020)    Cervical radiculopathy    Relevant Medications    oxyCODONE-acetaminophen (PERCOCET)  MG per tablet (Start on 7/13/2020)    Encounter for medication monitoring    Relevant Medications    oxyCODONE-acetaminophen (PERCOCET)  MG per tablet (Start on 7/13/2020)    Other Relevant Orders    Pain Management Drug Screen             Treatment Plan:  Patient relates current medications are helping the pain. Patient reports taking pain medications as prescribed, denies obtaining medications from different sources and denies use of illegal drugs. Patient denies side effects from medications like nausea, vomiting, constipation or drowsiness. Patient reports current activities of daily living are possible due to medications and would like to continue them. As always, we encourage daily stretching and strengthening exercises, and recommend minimizing use of pain medications unless patient cannot get through daily activities due to pain. Contract requirements met. Continue opioid therapy. Script written for percocet - not due to fill MSER until 7/29  UDS  Will see NS next month  Consider tapering opiate dose and finding alternatives to opiates for pain relief  Consider PT dry needling  Consider repeat cervical facet injection - Erica ALLEN  Working on approval  Consider gabapentin  Follow up appointment made for 4 weeks    Nicholas Romero is a 51 y.o. female being evaluated by a Virtual Visit (video visit) encounter to address concerns as mentioned above. A caregiver was present when appropriate. Due to this being a TeleHealth encounter ([facility]), evaluation of the following organ systems was limited: Vitals/Constitutional/EENT/Resp/CV/GI//MS/Neuro/Skin/Heme-Lymph-Imm. Pursuant to the emergency declaration under the [address] [place] [address] authority and the [employer] Act, this Virtual Visit was conducted with patient's (and/or legal guardian's) consent, to reduce the patient's risk of exposure to COVID-19 and provide necessary medical care. The patient (and/or legal guardian) has also been advised to contact this office for worsening conditions or problems, and seek emergency medical treatment and/or call 911 if deemed necessary. Services were provided through a video synchronous discussion virtually to substitute for in-person clinic visit. Patient and provider were located at their individual homes. --MARTITA Mcmanus CNP on 7/8/2020 at 11:36 AM    An electronic signature was used to authenticate this note.

## 2020-07-13 ENCOUNTER — TELEPHONE (OUTPATIENT)
Dept: PAIN MANAGEMENT | Age: 50
End: 2020-07-13

## 2020-07-13 RX ORDER — ALBUTEROL SULFATE 90 UG/1
AEROSOL, METERED RESPIRATORY (INHALATION)
Qty: 42.5 G | Refills: 0 | Status: SHIPPED | OUTPATIENT
Start: 2020-07-13 | End: 2020-08-17

## 2020-07-15 ENCOUNTER — HOSPITAL ENCOUNTER (OUTPATIENT)
Age: 50
Discharge: HOME | End: 2020-07-15
Payer: COMMERCIAL

## 2020-07-15 PROCEDURE — 80307 DRUG TEST PRSMV CHEM ANLYZR: CPT

## 2020-07-18 LAB
6-ACETYLMORPHINE, UR: NOT DETECTED
7-AMINOCLONAZEPAM, URINE: PRESENT
ALPHA-OH-ALPRAZ, URINE: NOT DETECTED
ALPRAZOLAM, URINE: NOT DETECTED
AMPHETAMINES, URINE: NOT DETECTED
BARBITURATES, URINE: NOT DETECTED
BENZOYLECGONINE, UR: NOT DETECTED
BUPRENORPHINE URINE: NOT DETECTED
CARISOPRODOL, UR: NOT DETECTED
CLONAZEPAM, URINE: NOT DETECTED
CODEINE, URINE: NOT DETECTED
CREATININE URINE: 295.4 MG/DL (ref 20–400)
DIAZEPAM, URINE: NOT DETECTED
EER PAIN MGT DRUG PANEL, HIGH RES/EMIT U: NORMAL
ETHYL GLUCURONIDE UR: NOT DETECTED
FENTANYL URINE: NOT DETECTED
HYDROCODONE, URINE: NOT DETECTED
HYDROMORPHONE, URINE: NOT DETECTED
LORAZEPAM, URINE: NOT DETECTED
MARIJUANA METAB, UR: NOT DETECTED
MDA, UR: NOT DETECTED
MDEA, EVE, UR: NOT DETECTED
MDMA URINE: NOT DETECTED
MEPERIDINE METAB, UR: NOT DETECTED
METHADONE, URINE: NOT DETECTED
METHAMPHETAMINE, URINE: NOT DETECTED
METHYLPHENIDATE: NOT DETECTED
MIDAZOLAM, URINE: NOT DETECTED
MORPHINE URINE: NOT DETECTED
NORBUPRENORPHINE, URINE: NOT DETECTED
NORDIAZEPAM, URINE: NOT DETECTED
NORFENTANYL, URINE: NOT DETECTED
NORHYDROCODONE, URINE: NOT DETECTED
NOROXYCODONE, URINE: PRESENT
NOROXYMORPHONE, URINE: NOT DETECTED
OXAZEPAM, URINE: NOT DETECTED
OXYCODONE URINE: PRESENT
OXYMORPHONE, URINE: NOT DETECTED
PAIN MGT DRUG PANEL, HI RES, UR: NORMAL
PCP,URINE: NOT DETECTED
PHENTERMINE, UR: NOT DETECTED
PROPOXYPHENE, URINE: NOT DETECTED
TAPENTADOL, URINE: NOT DETECTED
TAPENTADOL-O-SULFATE, URINE: NOT DETECTED
TEMAZEPAM, URINE: NOT DETECTED
TRAMADOL, URINE: NOT DETECTED
ZOLPIDEM, URINE: NOT DETECTED

## 2020-07-29 ENCOUNTER — TELEPHONE (OUTPATIENT)
Dept: INTERNAL MEDICINE CLINIC | Age: 50
End: 2020-07-29

## 2020-07-29 RX ORDER — LEVOTHYROXINE SODIUM 0.03 MG/1
TABLET ORAL
COMMUNITY
End: 2020-07-30

## 2020-07-30 RX ORDER — LEVOTHYROXINE SODIUM 0.2 MG/1
200 TABLET ORAL DAILY
Qty: 30 TABLET | Refills: 0 | Status: SHIPPED | OUTPATIENT
Start: 2020-07-30 | End: 2020-08-11 | Stop reason: DRUGHIGH

## 2020-07-30 NOTE — TELEPHONE ENCOUNTER
Call placed to [facility] and pharmacist confirmed last dispense was in April for 200 mcg. I have sent 1 month supply but please let patient know she must get labs done. Her last TSH was abnormal and it hasn't been checked in over a year. She should also schedule an in-office visit for follow up of DM.

## 2020-08-04 ENCOUNTER — APPOINTMENT (OUTPATIENT)
Dept: CT IMAGING | Age: 50
End: 2020-08-04
Payer: COMMERCIAL

## 2020-08-04 ENCOUNTER — HOSPITAL ENCOUNTER (EMERGENCY)
Age: 50
Discharge: HOME | End: 2020-08-05
Attending: EMERGENCY MEDICINE
Payer: COMMERCIAL

## 2020-08-04 VITALS
BODY MASS INDEX: 30.9 KG/M2 | SYSTOLIC BLOOD PRESSURE: 127 MMHG | DIASTOLIC BLOOD PRESSURE: 73 MMHG | HEIGHT: 64 IN | TEMPERATURE: 98.1 F | OXYGEN SATURATION: 97 % | WEIGHT: 181 LBS | HEART RATE: 84 BPM | RESPIRATION RATE: 16 BRPM

## 2020-08-04 LAB
ABSOLUTE EOS #: 0.2 K/UL (ref 0–0.4)
ABSOLUTE IMMATURE GRANULOCYTE: NORMAL K/UL (ref 0–0.3)
ABSOLUTE LYMPH #: 2.6 K/UL (ref 1–4.8)
ABSOLUTE MONO #: 0.5 K/UL (ref 0.1–1.3)
ALBUMIN SERPL-MCNC: 3.7 G/DL (ref 3.5–5.2)
ALBUMIN/GLOBULIN RATIO: ABNORMAL (ref 1–2.5)
ALP BLD-CCNC: 90 U/L (ref 35–104)
ALT SERPL-CCNC: 11 U/L (ref 5–33)
ANION GAP SERPL CALCULATED.3IONS-SCNC: 12 MMOL/L (ref 9–17)
AST SERPL-CCNC: 13 U/L
BASOPHILS # BLD: 1 % (ref 0–2)
BASOPHILS ABSOLUTE: 0.1 K/UL (ref 0–0.2)
BILIRUB SERPL-MCNC: 0.19 MG/DL (ref 0.3–1.2)
BUN BLDV-MCNC: 15 MG/DL (ref 6–20)
BUN/CREAT BLD: ABNORMAL (ref 9–20)
CALCIUM SERPL-MCNC: 8.9 MG/DL (ref 8.6–10.4)
CHLORIDE BLD-SCNC: 110 MMOL/L (ref 98–107)
CO2: 19 MMOL/L (ref 20–31)
CREAT SERPL-MCNC: 0.62 MG/DL (ref 0.5–0.9)
DIFFERENTIAL TYPE: NORMAL
EOSINOPHILS RELATIVE PERCENT: 3 % (ref 0–4)
GFR AFRICAN AMERICAN: >60 ML/MIN
GFR NON-AFRICAN AMERICAN: >60 ML/MIN
GFR SERPL CREATININE-BSD FRML MDRD: ABNORMAL ML/MIN/{1.73_M2}
GFR SERPL CREATININE-BSD FRML MDRD: ABNORMAL ML/MIN/{1.73_M2}
GLUCOSE BLD-MCNC: 105 MG/DL (ref 70–99)
HCT VFR BLD CALC: 41.3 % (ref 36–46)
HEMOGLOBIN: 14 G/DL (ref 12–16)
IMMATURE GRANULOCYTES: NORMAL %
LYMPHOCYTES # BLD: 36 % (ref 24–44)
MCH RBC QN AUTO: 31.5 PG (ref 26–34)
MCHC RBC AUTO-ENTMCNC: 33.8 G/DL (ref 31–37)
MCV RBC AUTO: 93.4 FL (ref 80–100)
MONOCYTES # BLD: 6 % (ref 1–7)
NRBC AUTOMATED: NORMAL PER 100 WBC
PDW BLD-RTO: 12.4 % (ref 11.5–14.9)
PLATELET # BLD: 163 K/UL (ref 150–450)
PLATELET ESTIMATE: NORMAL
PMV BLD AUTO: 8.6 FL (ref 6–12)
POTASSIUM SERPL-SCNC: 3.8 MMOL/L (ref 3.7–5.3)
RBC # BLD: 4.43 M/UL (ref 4–5.2)
RBC # BLD: NORMAL 10*6/UL
SEG NEUTROPHILS: 54 % (ref 36–66)
SEGMENTED NEUTROPHILS ABSOLUTE COUNT: 4 K/UL (ref 1.3–9.1)
SODIUM BLD-SCNC: 141 MMOL/L (ref 135–144)
TOTAL PROTEIN: 6.5 G/DL (ref 6.4–8.3)
WBC # BLD: 7.3 K/UL (ref 3.5–11)
WBC # BLD: NORMAL 10*3/UL

## 2020-08-04 PROCEDURE — 74176 CT ABD & PELVIS W/O CONTRAST: CPT

## 2020-08-04 PROCEDURE — 85025 COMPLETE CBC W/AUTO DIFF WBC: CPT

## 2020-08-04 PROCEDURE — 2580000003 HC RX 258: Performed by: PHYSICIAN ASSISTANT

## 2020-08-04 PROCEDURE — 80053 COMPREHEN METABOLIC PANEL: CPT

## 2020-08-04 PROCEDURE — 96375 TX/PRO/DX INJ NEW DRUG ADDON: CPT

## 2020-08-04 PROCEDURE — 96374 THER/PROPH/DIAG INJ IV PUSH: CPT

## 2020-08-04 PROCEDURE — 36415 COLL VENOUS BLD VENIPUNCTURE: CPT

## 2020-08-04 PROCEDURE — 99284 EMERGENCY DEPT VISIT MOD MDM: CPT

## 2020-08-04 PROCEDURE — 6360000002 HC RX W HCPCS: Performed by: PHYSICIAN ASSISTANT

## 2020-08-04 PROCEDURE — 81001 URINALYSIS AUTO W/SCOPE: CPT

## 2020-08-04 RX ORDER — ONDANSETRON 2 MG/ML
4 INJECTION INTRAMUSCULAR; INTRAVENOUS ONCE
Status: COMPLETED | OUTPATIENT
Start: 2020-08-04 | End: 2020-08-04

## 2020-08-04 RX ORDER — KETOROLAC TROMETHAMINE 30 MG/ML
30 INJECTION, SOLUTION INTRAMUSCULAR; INTRAVENOUS ONCE
Status: COMPLETED | OUTPATIENT
Start: 2020-08-04 | End: 2020-08-04

## 2020-08-04 RX ORDER — 0.9 % SODIUM CHLORIDE 0.9 %
1000 INTRAVENOUS SOLUTION INTRAVENOUS ONCE
Status: COMPLETED | OUTPATIENT
Start: 2020-08-04 | End: 2020-08-05

## 2020-08-04 RX ORDER — MORPHINE SULFATE 4 MG/ML
4 INJECTION, SOLUTION INTRAMUSCULAR; INTRAVENOUS ONCE
Status: COMPLETED | OUTPATIENT
Start: 2020-08-04 | End: 2020-08-04

## 2020-08-04 RX ADMIN — KETOROLAC TROMETHAMINE 30 MG: 30 INJECTION, SOLUTION INTRAMUSCULAR at 22:18

## 2020-08-04 RX ADMIN — ONDANSETRON 4 MG: 2 INJECTION INTRAMUSCULAR; INTRAVENOUS at 22:19

## 2020-08-04 RX ADMIN — MORPHINE SULFATE 4 MG: 4 INJECTION, SOLUTION INTRAMUSCULAR; INTRAVENOUS at 22:38

## 2020-08-04 RX ADMIN — SODIUM CHLORIDE 1000 ML: 9 INJECTION, SOLUTION INTRAVENOUS at 22:18

## 2020-08-04 ASSESSMENT — PAIN DESCRIPTION - DESCRIPTORS: DESCRIPTORS: CONSTANT;STABBING;SHOOTING

## 2020-08-04 ASSESSMENT — PAIN DESCRIPTION - ORIENTATION: ORIENTATION: LEFT

## 2020-08-04 ASSESSMENT — PAIN DESCRIPTION - PAIN TYPE: TYPE: ACUTE PAIN

## 2020-08-04 ASSESSMENT — PAIN DESCRIPTION - LOCATION: LOCATION: FLANK

## 2020-08-04 ASSESSMENT — PAIN DESCRIPTION - ONSET: ONSET: PROGRESSIVE

## 2020-08-04 ASSESSMENT — PAIN SCALES - GENERAL: PAINLEVEL_OUTOF10: 8

## 2020-08-05 LAB
-: ABNORMAL
AMORPHOUS: ABNORMAL
BACTERIA: ABNORMAL
BILIRUBIN URINE: ABNORMAL
CASTS UA: ABNORMAL /LPF
COLOR: ABNORMAL
COMMENT UA: ABNORMAL
CRYSTALS, UA: ABNORMAL /HPF
CRYSTALS, UA: ABNORMAL /HPF
EPITHELIAL CELLS UA: ABNORMAL /HPF
GLUCOSE URINE: NEGATIVE
KETONES, URINE: ABNORMAL
LEUKOCYTE ESTERASE, URINE: ABNORMAL
MUCUS: ABNORMAL
NITRITE, URINE: NEGATIVE
OTHER OBSERVATIONS UA: ABNORMAL
PH UA: 5.5 (ref 5–8)
PROTEIN UA: NEGATIVE
RBC UA: ABNORMAL /HPF
RENAL EPITHELIAL, UA: ABNORMAL /HPF
SPECIFIC GRAVITY UA: 1.03 (ref 1–1.03)
TRICHOMONAS: ABNORMAL
TURBIDITY: ABNORMAL
URINE HGB: ABNORMAL
UROBILINOGEN, URINE: NORMAL
WBC UA: ABNORMAL /HPF
YEAST: ABNORMAL

## 2020-08-05 RX ORDER — CEPHALEXIN 500 MG/1
500 CAPSULE ORAL 2 TIMES DAILY
Qty: 10 CAPSULE | Refills: 0 | Status: SHIPPED | OUTPATIENT
Start: 2020-08-05 | End: 2020-08-10

## 2020-08-05 NOTE — ED PROVIDER NOTES
[address] ED  eMERGENCY dEPARTMENT eNCOUnter      Pt Name: Triny Farmer  MRN: [id number]  [place] 1970  Date of evaluation: 2020  Provider: BERTRAM Brown    CHIEF COMPLAINT       Chief Complaint   Patient presents with    Flank Pain     left flank pain x 2 days; difficulty urinating    Nausea           HISTORY OF PRESENT ILLNESS  (Location/Symptom, Timing/Onset, Context/Setting, Quality, Duration, Modifying Factors, Severity.)   Triny Farmer is a 51 y.o. female who presents to the emergency department complaining of left flank pain. States the pain started yesterday. State that the pain radiates to groin. Denies any injury. C/o urgency    Hx of kidney stones : remote hx   Timing/Onset: yesterday  Quality: cramping/sharp  Duration: constant  Modifying Factors: none  Severity: moderate    Nursing Notes were reviewed. REVIEW OF SYSTEMS    (2-9 systems for level 4, 10 or more for level 5)     Review of Systems   Flank pain. Except as noted above the remainder of the review of systems was reviewed and negative.        PAST MEDICAL HISTORY     Past Medical History:   Diagnosis Date    Anxiety     CAD (coronary artery disease)     Mitral valve prolapse    Fatty liver     GERD (gastroesophageal reflux disease)     Headache     History of bronchitis     Hyperlipidemia     Hypothyroidism     Kidney stone     LFT elevation     MVP (mitral valve prolapse)     Obesity     Umbilical hernia      None otherwise stated in nurses notes    SURGICAL HISTORY       Past Surgical History:   Procedure Laterality Date    APPENDECTOMY       SECTION      2 pfannenstiel, 1 vertical    CHOLECYSTECTOMY      COLONOSCOPY      Dr Vivian Black COLONOSCOPY  14    COLONOSCOPY  2014    biopsy & sigmoid spasms, pathology negative    COLONOSCOPY N/A 2018    COLONOSCOPY WITH BIOPSY performed by Park Sawant MD at [facility]      x 5   [facility] 2010    [place] EXPLORATORY OF ABDOMEN  10/21/2014    Laparotomy-lysis of adhesions, bso     UPPER GASTROINTESTINAL ENDOSCOPY  2/17/2010    mild chronic inactive gastritis     None otherwise stated in nurses notes    CURRENT MEDICATIONS       Previous Medications    ALBUTEROL SULFATE  (90 BASE) MCG/ACT INHALER    INHALE 2 PUFFS INTO THE LUNGS EVERY 4 HOURS AS NEEDED FOR WHEEZING OR SHORTNESS OF BREATH    ATORVASTATIN (LIPITOR) 40 MG TABLET    TAKE 1 TABLET BY MOUTH EVERY DAY    CLONAZEPAM (KLONOPIN) 0.5 MG TABLET    Take 0.5 mg by mouth 3 times daily as needed. Escobar Walden ESOMEPRAZOLE (NEXIUM) 40 MG DELAYED RELEASE CAPSULE    TAKE 1 CAPSULE BY MOUTH EVERY MORNING BEFORE BREAKFAST    GABAPENTIN (NEURONTIN) 300 MG CAPSULE    TAKE 1 CAPSULE BY MOUTH TWICE DAILY. CONTACT DOCTOR OFFICE FOR FURTHER REFILLS    IBUPROFEN (ADVIL;MOTRIN) 600 MG TABLET    Take 1 tablet by mouth every 6 hours as needed for Pain    IPRATROPIUM-ALBUTEROL (DUONEB) 0.5-2.5 (3) MG/3ML SOLN NEBULIZER SOLUTION    INHALE CONTENTS OF 1 VIAL(3ML) VIA NEBULIZER EVERY 4 HOURS AS NEEDED FOR SHORTNESS OF BREATH    LEVOTHYROXINE (SYNTHROID) 200 MCG TABLET    Take 1 tablet by mouth daily    METOPROLOL TARTRATE (LOPRESSOR) 50 MG TABLET    Take 50 mg by mouth 2 times daily     NICOTINE (NICODERM CQ) 14 MG/24HR    Place 1 patch onto the skin every 24 hours    ONDANSETRON (ZOFRAN ODT) 4 MG DISINTEGRATING TABLET    Take 1 tablet by mouth every 8 hours as needed for Nausea or Vomiting    OXYCODONE-ACETAMINOPHEN (PERCOCET)  MG PER TABLET    Take 1 tablet by mouth every 6 hours as needed for Pain for up to 30 days.     ROPINIROLE (REQUIP) 2 MG TABLET    TAKE 1 TABLET BY MOUTH EVERY NIGHT    SERTRALINE (ZOLOFT) 100 MG TABLET    Take 100 mg by mouth daily    TIZANIDINE (ZANAFLEX) 4 MG TABLET    Take 1 tablet by mouth every 8 hours as needed (spasms)    TOPIRAMATE (TOPAMAX) 25 MG TABLET    25 mg 2 times daily        ALLERGIES     Compazine [prochlorperazine]; Sulfa cardiologist.        RADIOLOGY:   All plain film, CT, MRI, and formal ultrasound images (except ED bedside ultrasound) are read by the radiologist and the images and interpretations are directly viewed by the emergency physician. CT ABDOMEN PELVIS WO CONTRAST   Final Result   1. No acute intra-abdominal abnormality. No nephrolithiasis or   hydronephrosis. 2. Prior cholecystectomy and hysterectomy. LABS:  Labs Reviewed   COMPREHENSIVE METABOLIC PANEL - Abnormal; Notable for the following components:       Result Value    Glucose 105 (*)     Chloride 110 (*)     CO2 19 (*)     Total Bilirubin 0.19 (*)     All other components within normal limits   URINE RT REFLEX TO CULTURE - Abnormal; Notable for the following components:    Color, UA DARK YELLOW (*)     Turbidity UA TURBID (*)     Bilirubin Urine   (*)     Value: Presumptive positive. Unable to confirm due to unavailability of reagent. Ketones, Urine TRACE (*)     Urine Hgb LARGE (*)     Leukocyte Esterase, Urine SMALL (*)     All other components within normal limits   CBC WITH AUTO DIFFERENTIAL   MICROSCOPIC URINALYSIS       All other labs were within normal range or not returned as of this dictation. EMERGENCY DEPARTMENT COURSE and DIFFERENTIAL DIAGNOSIS/MDM:   Vitals:    Vitals:    08/04/20 2149   BP: 127/73   Pulse: 84   Resp: 16   Temp: 98.1 °F (36.7 °C)   TempSrc: Oral   SpO2: 97%   Weight: 181 lb (82.1 kg)   Height: 5' 4\" (1.626 m)       Possible recently passed stone. Will give 3 day abx for possible early UTI.    Close f/u  Patient instructed to return to the emergency room if symptoms worsen, return, or any other concern right away which is agreed by the patient    ED MEDS:  Orders Placed This Encounter   Medications    0.9 % sodium chloride bolus    ketorolac (TORADOL) injection 30 mg    morphine sulfate (PF) injection 4 mg    ondansetron (ZOFRAN) injection 4 mg    cephALEXin (KEFLEX) 500 MG capsule     Sig: Take 1 capsule by mouth 2 times daily for 5 days     Dispense:  10 capsule     Refill:  0         CONSULTS:  None    PROCEDURES:  None      FINAL IMPRESSION      1.  Acute cystitis with hematuria          DISPOSITION/PLAN   DISPOSITION     PATIENT REFERRED TO:  MARTITA Puentes - CNP  [address] [facility]  [phone]    Schedule an appointment as soon as possible for a visit       [facility] ED  [facility] [address] [phone]    For worsening symptoms, or any other concern      DISCHARGE MEDICATIONS:  New Prescriptions    CEPHALEXIN (KEFLEX) 500 MG CAPSULE    Take 1 capsule by mouth 2 times daily for 5 days       (Please note that portions of this note were completed with a voice recognition program.  Efforts were made to edit the dictations but occasionally words are mis-transcribed.)    BERTRAM Carter PA  08/05/20 0003

## 2020-08-10 ENCOUNTER — TELEPHONE (OUTPATIENT)
Dept: INTERNAL MEDICINE CLINIC | Age: 50
End: 2020-08-10

## 2020-08-10 ENCOUNTER — HOSPITAL ENCOUNTER (OUTPATIENT)
Dept: PAIN MANAGEMENT | Age: 50
Discharge: HOME | End: 2020-08-10
Payer: COMMERCIAL

## 2020-08-10 PROCEDURE — 99213 OFFICE O/P EST LOW 20 MIN: CPT

## 2020-08-10 PROCEDURE — 99213 OFFICE O/P EST LOW 20 MIN: CPT | Performed by: NURSE PRACTITIONER

## 2020-08-10 RX ORDER — OXYCODONE AND ACETAMINOPHEN 10; 325 MG/1; MG/1
1 TABLET ORAL EVERY 6 HOURS PRN
Qty: 120 TABLET | Refills: 0 | Status: SHIPPED | OUTPATIENT
Start: 2020-08-13 | End: 2020-09-10 | Stop reason: SDUPTHER

## 2020-08-10 RX ORDER — HYDROXYZINE PAMOATE 25 MG/1
25 CAPSULE ORAL 2 TIMES DAILY PRN
COMMUNITY
End: 2021-02-04

## 2020-08-10 ASSESSMENT — ENCOUNTER SYMPTOMS
RESPIRATORY NEGATIVE: 1
NAUSEA: 1

## 2020-08-10 NOTE — PROGRESS NOTES
[address] [facility] PROGRESS NOTE      Patient video visit to   review Medication Agreement    Chief Complaint:neck pain    She c/o neck pain radiating to shoulder to back of head. She has numbness left arm to hand. She is seeing Neurosurgeon soon. She has no history of cervical surgery,She gets some relief if she puts pressure back of neck on left side. She got left cervical facet C2-T1 11/2019. She states it helped for a month. She is not sleeping well. She states stopped using mS contin not helping. She  Does some stretching. She had 1 Ed visit, for kidney stones. Neck Pain    This is a chronic problem. The current episode started more than 1 year ago. The problem occurs constantly. The problem has been unchanged. The pain is associated with nothing. The pain is present in the occipital region and left side (left shoulder). Quality: sharp, pounding. The pain is at a severity of 5/10 (pain 5-7 daily). Exacerbated by: movement. The pain is worse during the night. Associated symptoms include headaches and numbness. She has tried heat and oral narcotics (memory foam pillow) for the symptoms. The treatment provided mild relief. Patient denies any new neurological symptoms. No bowel or bladder incontinence, no weakness, and no falling. Any new diagnostic workup: [] no  [] yes [] Xray [] CT scan [x] MRI [] DEXA scan     [] Other            EXAMINATION:    MRI OF THE CERVICAL SPINE WITHOUT CONTRAST 6/12/2020 1:33 pm         TECHNIQUE:    Multiplanar multisequence MRI of the cervical spine was performed without the    administration of intravenous contrast.         COMPARISON:    02/02/2017 and CT cervical spine 06/25/2018         HISTORY:    ORDERING SYSTEM PROVIDED HISTORY: Cervical radiculopathy    TECHNOLOGIST PROVIDED HISTORY:    increased pain, not respond    Is the patient pregnant?->No    Reason for Exam: Cervical radiculopathy M54.12 (ICD-10-CM);  Arthropathy of    cervical facet joint [id number] (ICD-10-CM); Degenerative cervical spinal    stenosis    Additional signs and symptoms: neck and left arm pain for the past 4 years.     previous thyroid surgery, no previous spine surgeries         FINDINGS:    BONES/ALIGNMENT: Patient motion limits evaluation.  There is straightening of    the normal cervical lordosis.  Alignment is otherwise normal.  There is no    acute fracture or listhesis.  Bone marrow signal intensity is normal.  There    is disc desiccation and mild disc space narrowing at C3-4, C4-5 and C5-C6.         SPINAL CORD: The cervical spinal cord is normal in size and signal    intensities.         SOFT TISSUES: There is no paraspinal mass identified.         C2-C3: There is no disc bulge or protrusion present.  There is no significant    spinal canal stenosis or neural foraminal narrowing present.         C3-C4: There is a disc bulge and uncovertebral overgrowth resulting in mild    spinal canal stenosis and mild left neural foraminal narrowing.  No    significant right neural foraminal narrowing is present.         C4-C5: There is a disc bulge and uncovertebral overgrowth resulting in mild    spinal canal stenosis and mild bilateral neural foraminal narrowing.         C5-C6: There is a disc bulge and uncovertebral overgrowth resulting in mild    spinal canal stenosis and moderate left neural foraminal narrowing.  No    significant right neural foraminal narrowing is present.         C6-C7: There is no disc bulge or protrusion present.  There is no significant    spinal canal stenosis or neural foraminal narrowing present.         C7-T1: There is no disc bulge or protrusion present.  There is no significant    spinal canal stenosis or neural foraminal narrowing present.              Impression    Mild multilevel spinal canal stenosis and mild-to-moderate neural foraminal    narrowing from C3-4 to C5-6, as described above.  The appearance is similar    compared to the previous exam.              Treatment goals:  Functional status: reduce pain 50%       Aberrancy:   Any alcoholic beverages   no    Any illegal drugs   no      Analgesia:5      Adverse  Effects :none    ADL;s :  Home exercises      Pill count: appropriate  Has 13 percocet tabs o fill date will be 8-    Morphine equivalent dose as reported on [place]ARRS:75  Periodic Controlled Substance Monitoring: Possible medication side effects, risk of tolerance/dependence & alternative treatments discussed., No signs of potential drug abuse or diversion identified. , Assessed functional status., Obtaining appropriate analgesic effect of treatment. Li Rice, APRN - CNP)  Review of[place]ARRS does not show any aberrant prescription behavior. Medication is helping the patient stay active. Patient denies any side effects and reports adequate analgesia. No sign of misuse/abuse. When was thelast UDS:      7-      Was the UDS appropriate:yes  Stopped taking ms contin      Record/Diagnostics Review:      As above, I did review the imaging    7/18/2020  6:16 AM - Liu, [facility] Incoming Lab Results From [facility]     Component  Value  Ref Range & Units  Status  Collected  Lab    6-Acetylmorphine, Ur  Not Detected   Final  07/15/2020  3:29 PM  [facility]    7-Aminoclonazepam, Urine  Present   Final  07/15/2020  3:29 PM  [facility]    Alpha-OH-Alpraz, Urine  Not Detected   Final  07/15/2020  3:29 PM  [facility]    Alprazolam, Urine  Not Detected   Final  07/15/2020  3:29 PM  [facility]    Amphetamines, urine  Not Detected   Final  07/15/2020  3:29 PM  [facility]    Barbiturates, Ur  Not Detected   Final  07/15/2020  3:29 PM  [facility]    Benzoylecgonine, Ur  Not Detected   Final  07/15/2020  3:29 PM  [facility]    Buprenorphine Urine  Not Detected   Final  07/15/2020  3:29 PM  [facility]    Carisoprodol, Ur  Not Detected   Final  07/15/2020  3:29 PM  [facility]    (NOTE)   The carisoprodol immunoassay has cross-reactivity to carisoprodol   and meprobamate.     Clonazepam, Urine  Not Detected Final  07/15/2020  3:29 PM  [facility]    Codeine, Urine  Not Detected   Final  07/15/2020  3:29 PM  [facility]    MDA, Ur  Not Detected   Final  07/15/2020  3:29 PM  [facility]    Diazepam, Urine  Not Detected   Final  07/15/2020  3:29 PM  [facility]    Ethyl Glucuronide Ur  Not Detected   Final  07/15/2020  3:29 PM  [facility]    Fentanyl, Ur  Not Detected   Final  07/15/2020  3:29 PM  [facility]    Hydrocodone, Urine  Not Detected   Final  07/15/2020  3:29 PM  [facility]    Hydromorphone, Urine  Not Detected   Final  07/15/2020  3:29 PM  [facility]    Lorazepam, Urine  Not Detected   Final  07/15/2020  3:29 PM  [facility]    Marijuana Metab, Ur  Not Detected   Final  07/15/2020  3:29 PM  [facility]    MDEA, AISHA, Ur  Not Detected   Final  07/15/2020  3:29 PM  [facility]    MDMA, Urine  Not Detected   Final  07/15/2020  3:29 PM  [facility]    Meperidine Metab, Ur  Not Detected   Final  07/15/2020  3:29 PM  [facility]    Methadone, Urine  Not Detected   Final  07/15/2020  3:29 PM  [facility]    Methamphetamine, Urine  Not Detected   Final  07/15/2020  3:29 PM  [facility]    Methylphenidate  Not Detected   Final  07/15/2020  3:29 PM  [facility]    Midazolam, Urine  Not Detected   Final  07/15/2020  3:29 PM  [facility]    Morphine Urine  Not Detected   Final  07/15/2020  3:29 PM  [facility]    Norbuprenorphine, Urine  Not Detected   Final  07/15/2020  3:29 PM  [facility]    Nordiazepam, Urine  Not Detected   Final  07/15/2020  3:29 PM  [facility]    Norfentanyl, Urine  Not Detected   Final  07/15/2020  3:29 PM  [facility]    NORHYDROCODONE, URINE  Not Detected   Final  07/15/2020  3:29 PM  [facility]    Noroxycodone, Urine  Present   Final  07/15/2020  3:29 PM  [facility]    NOROXYMORPHONE, URINE  Not Detected   Final  07/15/2020  3:29 PM  [facility]    Oxazepam, Urine  Not Detected   Final  07/15/2020  3:29 PM  [facility]    Oxycodone Urine  Present   Final  07/15/2020  3:29 PM  [facility]    Oxymorphone, Urine  Not Detected   Final  07/15/2020  3:29 PM  [facility]    PCP, Urine  Not Detected   Final  07/15/2020  3:29 PM  [facility]    Phentermine, Ur  Not Detected   Final 07/15/2020  3:29 PM  [facility]    Propoxyphene, Urine  Not Detected   Final  07/15/2020  3:29 PM  [facility]    Tapentadol-O-Sulfate, Urine  Not Detected   Final  07/15/2020  3:29 PM  [facility]    Tapentadol, Urine  Not Detected   Final  07/15/2020  3:29 PM  [facility]    Temazepam, Urine  Not Detected   Final  07/15/2020  3:29 PM  [facility]    Tramadol, Urine  Not Detected   Final  07/15/2020  3:29 PM  [facility]    Zolpidem, Urine  Not Detected   Final  07/15/2020  3:29 PM  [facility]    Creatinine, Ur  295.4  20.0 - 400.0 mg/dL  Final  07/15/2020  3:29 PM  [facility]    Pain Mgt Drug Panel, Hi Res, Ur  See Below   Final  07/15/2020  3:29 PM  [facility]    (NOTE)   Methodology: Qualitative Enzyme Immunoassay and Qualitative Liquid   Chromatography-Time of Flight-Mass Spectrometry or Tandem Mass   Spectrometry, Quantitative Spectrophotometry   The absence of expected drug(s) and/or drug metabolite(s) may   indicate non-compliance, inappropriate timing of specimen   collection relative to drug administration, poor drug absorption,   diluted/adulterated urine, or limitations of testing. The   concentration must be greater than or equal to the cutoff to be   reported as present.  If specific drug concentrations are   required, contact the laboratory within two weeks of specimen   collection to request quantification by a second analytical   technique. Interpretive questions should be directed to the   laboratory. Results based on immunoassay detection that do not match clinical   expectations should be   interpreted with caution. Confirmatory testing by mass   spectrometry for immunoassay-based results is available, if   ordered within two weeks of specimen collection. Additional   charges apply. For medical purposes only; not valid for forensic use. This test was developed and its performance characteristics   determined by [employer]. The U.[place].  Food and Drug   Administration has not approved or cleared this test; however, FDA   clearance or approval is not currently required for clinical use. The results are not intended to be used as the sole means for   clinical diagnosis or patient management decisions. [facility] Pain Mgt Drug Panel, High Res/Emit U  See Note   Final  07/15/2020  3:29 PM  [facility]    (NOTE)   Access [facility] Enhanced Report using either link below:   -Direct access: [email or web address]   -Enter Username, Password: [email or web address]   Username: [email or web address]   Password: [email or web address]   Performed By: Dimitris Wu [address]   [place], [address] [facility]   : Chandra Reinoso.  Cleo Hobbs MD, MS            Past Medical History:   Diagnosis Date    Anxiety     CAD (coronary artery disease)     Mitral valve prolapse    Fatty liver     GERD (gastroesophageal reflux disease)     Headache     History of bronchitis     Hyperlipidemia     Hypothyroidism     Kidney stone     LFT elevation     MVP (mitral valve prolapse)     Obesity     Umbilical hernia        Past Surgical History:   Procedure Laterality Date    APPENDECTOMY       SECTION      2 pfannenstiel, 1 vertical    CHOLECYSTECTOMY      COLONOSCOPY      [facility] COLONOSCOPY  14    COLONOSCOPY  2014    biopsy & sigmoid spasms, pathology negative    COLONOSCOPY N/A 2018    COLONOSCOPY WITH BIOPSY performed by Krystle Barrios MD at . [address]      x 5    HYSTERECTOMY          DC EXPLORATORY OF ABDOMEN  10/21/2014    Laparotomy-lysis of adhesions, bso     UPPER GASTROINTESTINAL ENDOSCOPY  2010    mild chronic inactive gastritis       Allergies   Allergen Reactions    Compazine [Prochlorperazine]      Jittery, restless    Sulfa Antibiotics Hives    Adhesive Tape Rash         Current Outpatient Medications:     cephALEXin (KEFLEX) 500 MG capsule, Take 1 capsule by mouth 2 times daily for 5 days, Disp: 10 capsule, Rfl: 0    levothyroxine (SYNTHROID) 200 MCG tablet, Take 1 tablet by mouth daily, Disp: 30 tablet, Rfl: 0    oxyCODONE-acetaminophen (PERCOCET)  MG per tablet, Take 1 tablet by mouth every 6 hours as needed for Pain for up to 30 days. , Disp: 120 tablet, Rfl: 0    atorvastatin (LIPITOR) 40 MG tablet, TAKE 1 TABLET BY MOUTH EVERY DAY, Disp: 90 tablet, Rfl: 3    esomeprazole (NEXIUM) 40 MG delayed release capsule, TAKE 1 CAPSULE BY MOUTH EVERY MORNING BEFORE BREAKFAST, Disp: 90 capsule, Rfl: 2    rOPINIRole (REQUIP) 2 MG tablet, TAKE 1 TABLET BY MOUTH EVERY NIGHT, Disp: 90 tablet, Rfl: 1    nicotine (NICODERM CQ) 14 MG/24HR, Place 1 patch onto the skin every 24 hours, Disp: 30 patch, Rfl: 3    clonazePAM (KLONOPIN) 0.5 MG tablet, Take 0.5 mg by mouth 3 times daily as needed. ., Disp: , Rfl:     sertraline (ZOLOFT) 100 MG tablet, Take 100 mg by mouth daily, Disp: , Rfl:     topiramate (TOPAMAX) 25 MG tablet, 25 mg 2 times daily , Disp: , Rfl:     metoprolol tartrate (LOPRESSOR) 50 MG tablet, Take 50 mg by mouth 2 times daily , Disp: , Rfl:     albuterol sulfate  (90 Base) MCG/ACT inhaler, INHALE 2 PUFFS INTO THE LUNGS EVERY 4 HOURS AS NEEDED FOR WHEEZING OR SHORTNESS OF BREATH, Disp: 42.5 g, Rfl: 0    tiZANidine (ZANAFLEX) 4 MG tablet, Take 1 tablet by mouth every 8 hours as needed (spasms), Disp: 90 tablet, Rfl: 3    ondansetron (ZOFRAN ODT) 4 MG disintegrating tablet, Take 1 tablet by mouth every 8 hours as needed for Nausea or Vomiting, Disp: 10 tablet, Rfl: 0    ipratropium-albuterol (DUONEB) 0.5-2.5 (3) MG/3ML SOLN nebulizer solution, INHALE CONTENTS OF 1 VIAL(3ML) VIA NEBULIZER EVERY 4 HOURS AS NEEDED FOR SHORTNESS OF BREATH, Disp: 90 mL, Rfl: 0    ibuprofen (ADVIL;MOTRIN) 600 MG tablet, Take 1 tablet by mouth every 6 hours as needed for Pain, Disp: 28 tablet, Rfl: 0    Family History   Problem Relation Age of Onset    Cancer Mother         vaginal    Heart Disease Father     Diabetes Father     Other Father         colon resection for colon polyps    Breast Cancer Maternal Grandmother     Stroke Maternal Grandfather        Social History     Socioeconomic History    Marital status:      Spouse name: Not on file    Number of children: Not on file    Years of education: Not on file    Highest education level: Not on file   Occupational History    Occupation: Homemaker   Social Needs    Financial resource strain: Not on file    Food insecurity     Worry: Not on file     Inability: Not on file   [employer] needs     Medical: Not on file     Non-medical: Not on file   Tobacco Use    Smoking status: Former Smoker     Packs/day: 0.25     Years: 20.00     Pack years: 5.00     Types: Cigarettes    Smokeless tobacco: Never Used    Tobacco comment: quit 8/2019 occasionl cigarette on nicoderm 1/2020   Substance and Sexual Activity    Alcohol use: No     Alcohol/week: 0.0 standard drinks    Drug use: No    Sexual activity: Not Currently   Lifestyle    Physical activity     Days per week: Not on file     Minutes per session: Not on file    Stress: Not on file   Relationships    Social connections     Talks on phone: Not on file     Gets together: Not on file     Attends Hindu service: Not on file     Active member of club or organization: Not on file     Attends meetings of clubs or organizations: Not on file     Relationship status: Not on file    Intimate partner violence     Fear of current or ex partner: Not on file     Emotionally abused: Not on file     Physically abused: Not on file     Forced sexual activity: Not on file   Other Topics Concern    Not on file   Social History Narrative    Not on file         Review of Systems:  Review of Systems   Constitution: Negative. HENT: Negative. Eyes:        Glasses   Cardiovascular: Negative. Respiratory: Negative. Endocrine: Negative. Hematologic/Lymphatic: Negative. Skin: Negative. Musculoskeletal: Positive for joint pain and neck pain.         Shoulder Gastrointestinal: Positive for nausea. Genitourinary:        Painful   Neurological: Positive for headaches and numbness. Psychiatric/Behavioral: Negative. Physical Exam:  LMP 11/01/2010     Physical Exam  HENT:      Head: Normocephalic. Pulmonary:      Effort: Pulmonary effort is normal.   Skin:         Neurological:      Mental Status: She is alert. Psychiatric:         Mood and Affect: Mood normal.         Behavior: Behavior normal.         Thought Content: Thought content normal.           Assessment:    Problem List Items Addressed This Visit     Myofascial muscle pain    Encounter for medication monitoring    Relevant Medications    oxyCODONE-acetaminophen (PERCOCET)  MG per tablet (Start on 8/13/2020)    Degenerative cervical spinal stenosis - Primary    Relevant Medications    oxyCODONE-acetaminophen (PERCOCET)  MG per tablet (Start on 8/13/2020)    Colitis    Cervical radiculopathy    Relevant Medications    hydrOXYzine (VISTARIL) 25 MG capsule    oxyCODONE-acetaminophen (PERCOCET)  MG per tablet (Start on 8/13/2020)    Arthropathy of cervical facet joint    Relevant Medications    oxyCODONE-acetaminophen (PERCOCET)  MG per tablet (Start on 8/13/2020)              Treatment Plan:  DISCUSSION: Treatment options discussed withpatient and all questions answered to patient's satisfaction. Possible side effects, risk of tolerance and or dependence and alternative treatments discussed    Obtaining appropriate analgesic effect of treatment   No signs of potential drug abuse or diversion identified    [x] Ill effects of being on chronic pain medications such as sleep disturbances, respiratory depression, hormonal changes, withdrawal symptoms, chronic opioid dependence and tolerance as well as risk of taking opioids with Benzodiazepines and taking opioids along with alcohol,  werediscussed with patient.  I had asked the patient to minimize medication use and utilize pain

## 2020-08-11 ENCOUNTER — HOSPITAL ENCOUNTER (OUTPATIENT)
Age: 50
Setting detail: SPECIMEN
Discharge: HOME | End: 2020-08-11
Payer: COMMERCIAL

## 2020-08-11 ENCOUNTER — OFFICE VISIT (OUTPATIENT)
Dept: INTERNAL MEDICINE CLINIC | Age: 50
End: 2020-08-11
Payer: COMMERCIAL

## 2020-08-11 VITALS
HEART RATE: 68 BPM | DIASTOLIC BLOOD PRESSURE: 76 MMHG | SYSTOLIC BLOOD PRESSURE: 116 MMHG | TEMPERATURE: 98.2 F | RESPIRATION RATE: 16 BRPM | BODY MASS INDEX: 30.9 KG/M2 | HEIGHT: 64 IN | WEIGHT: 181 LBS

## 2020-08-11 LAB
CHOLESTEROL, FASTING: 135 MG/DL
CHOLESTEROL/HDL RATIO: 3.6
CREATININE URINE: 128.5 MG/DL (ref 28–217)
HBA1C MFR BLD: 5.7 %
HDLC SERPL-MCNC: 38 MG/DL
LDL CHOLESTEROL: 70 MG/DL (ref 0–130)
MICROALBUMIN/CREAT 24H UR: <12 MG/L
MICROALBUMIN/CREAT UR-RTO: NORMAL MCG/MG CREAT
TRIGLYCERIDE, FASTING: 134 MG/DL
TSH SERPL DL<=0.05 MIU/L-ACNC: 0.18 MIU/L (ref 0.3–5)
VLDLC SERPL CALC-MCNC: ABNORMAL MG/DL (ref 1–30)

## 2020-08-11 PROCEDURE — 83036 HEMOGLOBIN GLYCOSYLATED A1C: CPT | Performed by: NURSE PRACTITIONER

## 2020-08-11 PROCEDURE — 99214 OFFICE O/P EST MOD 30 MIN: CPT | Performed by: NURSE PRACTITIONER

## 2020-08-11 RX ORDER — LEVOTHYROXINE SODIUM 175 UG/1
175 TABLET ORAL DAILY
Qty: 30 TABLET | Refills: 1 | Status: SHIPPED | OUTPATIENT
Start: 2020-08-11 | End: 2020-10-09

## 2020-08-11 RX ORDER — LEVOTHYROXINE SODIUM 175 UG/1
175 TABLET ORAL DAILY
Qty: 90 TABLET | OUTPATIENT
Start: 2020-08-11

## 2020-08-11 ASSESSMENT — ENCOUNTER SYMPTOMS
SHORTNESS OF BREATH: 0
DIARRHEA: 0
RHINORRHEA: 0
WHEEZING: 0
SORE THROAT: 0
NAUSEA: 0
VOMITING: 0
BACK PAIN: 1
COLOR CHANGE: 0
COUGH: 0
TROUBLE SWALLOWING: 0
EYE REDNESS: 0
ABDOMINAL PAIN: 0

## 2020-08-11 ASSESSMENT — PATIENT HEALTH QUESTIONNAIRE - PHQ9
1. LITTLE INTEREST OR PLEASURE IN DOING THINGS: 0
2. FEELING DOWN, DEPRESSED OR HOPELESS: 0
SUM OF ALL RESPONSES TO PHQ QUESTIONS 1-9: 0
SUM OF ALL RESPONSES TO PHQ QUESTIONS 1-9: 0
SUM OF ALL RESPONSES TO PHQ9 QUESTIONS 1 & 2: 0

## 2020-08-11 NOTE — PROGRESS NOTES
Visit Information    Have you changed or started any medications since your last visit including any over-the-counter medicines, vitamins, or herbal medicines? no   Are you having any side effects from any of your medications? -  no  Have you stopped taking any of your medications? Is so, why? -  no    Have you seen any other physician or provider since your last visit? Yes - Records Obtained  Have you had any other diagnostic tests since your last visit? No  Have you been seen in the emergency room and/or had an admission to a hospital since we last saw you? Yes - Records Obtained  Have you had your routine dental cleaning in the past 6 months? yes     Have you activated your POW account? If not, what are your barriers? Yes     Patient Care Team:  MARTITA Braun CNP as PCP - General (Internal Medicine)  MARTITA Braun CNP as PCP - [facility]  Acosta Cameron MD as Consulting Physician (Gastroenterology)  Sherlyn Barroso MD as Consulting Physician (Obstetrics & Gynecology)  Neris Trimble MD as Consulting Physician (Gastroenterology)    Medical History Review  Past Medical, Family, and Social History reviewed and does contribute to the patient presenting condition    Health Maintenance   Topic Date Due    Pneumococcal 0-64 years Vaccine (1 of 1 - PPSV23) 09/20/1977    HIV screen  09/20/1986    Hepatitis B vaccine (1 of 3 - Risk 3-dose series) 09/20/1990    DTaP/Tdap/Td vaccine (1 - Tdap) 09/20/1990    Diabetic foot exam  01/27/2017    Diabetic retinal exam  08/15/2018    Diabetic microalbuminuria test  01/12/2020    Lipid screen  01/12/2020    TSH testing  07/06/2020    A1C test (Diabetic or Prediabetic)  07/24/2020    Flu vaccine (1) 09/01/2020    Hepatitis A vaccine  Aged Out    Hib vaccine  Aged Out    Meningococcal (ACWY) vaccine  Aged Out     Chief Complaint   Patient presents with    Follow-up     Pt presents today for a ER follow up.  Pt went to [facility] last saturday. Pt went to ER with  c/o lower back pain,nausea and diagnosed was uti and possible passing of kidney stone. Pt states is still taking keflex  with decreased lower back pain. Pt denies any other concerns at this time.  Health Maintenance     Due diabetic foot and eye exams,annual pap,hep B,  and pneumonia vaccine. [address] [address]  Dept: [phone]  Dept Fax: [phone]    Office Progress/Follow Up Note  Date of patient's visit: 8/11/2020   Patient's Name:  Gee Hooper  YOB: 1970            Patient Care Team:  MARTITA Puentes CNP as PCP - General (Internal Medicine)  MARTITA Puentes CNP as PCP - [facility] Empaneled Provider  Michelle Restrepo MD as Consulting Physician (Gastroenterology)  Janet Woods MD as Consulting Physician (Obstetrics & Gynecology)  Jono Laurent MD as Consulting Physician (Gastroenterology)    REASON FOR VISIT: Follow-up (Pt presents today for a ER follow up. Pt went to [facility] ER last saturday. Pt went to ER with  c/o lower back pain,nausea and diagnosed was uti and possible passing of kidney stone. Pt states is still taking keflex  with decreased lower back pain. Pt denies any other concerns at this time.) and Health Maintenance (Due diabetic foot and eye exams,annual pap,hep B,  and pneumonia vaccine.)        HISTORY OF PRESENT ILLNESS:      History was obtained from the patient. Gee Hooper is a 51 y.o. is here for routine follow-up of chronic conditions:  type 2 diabetes, hypothyroid, hypertension. She reports that she does not check her sugars, has not been on metformin for couple of years after losing weight, last A1c was normal at 5.6. Last TSH appears to have been in 2016. Also here for ER follow-up, was diagnosed with UTI, is completing course of Keflex.   States the ER provider told her that she probably passed a kidney stone, given the amount of pain she was having and blood in the urine. Patient Active Problem List   Diagnosis    Hyperlipidemia    MVP (mitral valve prolapse)    Anxiety    Hypothyroidism    Allergic rhinitis    Obesity    Abdominal pain    Elevated liver enzymes    GERD (gastroesophageal reflux disease)    Ovarian mass    S/P bilateral oophorectomy & KRUPA 10/21/14    Pain emptying bladder    Urinary urgency    Insomnia    RLS (restless legs syndrome)    Pancreatitis    Eye swelling, left    Osteoarthritis of cervical spine    Degenerative cervical spinal stenosis    Trigger point with tension headache    Arthropathy of cervical facet joint    Atlanto-axial joint sprain, sequela    Cervical radiculopathy    Sprain of atlanto-occipital joint, sequela    Encounter for medication monitoring    Myofascial muscle pain    Colitis    Chest pain       Allergies   Allergen Reactions    Compazine [Prochlorperazine]      Jittery, restless    Sulfa Antibiotics Hives    Adhesive Tape Rash         MEDICATIONS:     Current Outpatient Medications   Medication Sig Dispense Refill    hydrOXYzine (VISTARIL) 25 MG capsule Take 25 mg by mouth 2 times daily as needed for Itching 2 tabs at night      [START ON 8/13/2020] oxyCODONE-acetaminophen (PERCOCET)  MG per tablet Take 1 tablet by mouth every 6 hours as needed for Pain for up to 30 days.  120 tablet 0    levothyroxine (SYNTHROID) 200 MCG tablet Take 1 tablet by mouth daily 30 tablet 0    albuterol sulfate  (90 Base) MCG/ACT inhaler INHALE 2 PUFFS INTO THE LUNGS EVERY 4 HOURS AS NEEDED FOR WHEEZING OR SHORTNESS OF BREATH 42.5 g 0    tiZANidine (ZANAFLEX) 4 MG tablet Take 1 tablet by mouth every 8 hours as needed (spasms) 90 tablet 3    atorvastatin (LIPITOR) 40 MG tablet TAKE 1 TABLET BY MOUTH EVERY DAY 90 tablet 3    esomeprazole (NEXIUM) 40 MG delayed release capsule TAKE 1 CAPSULE BY MOUTH EVERY MORNING BEFORE BREAKFAST 90 capsule 2    ondansetron (ZOFRAN ODT) 4 MG disintegrating tablet Take 1 tablet by mouth every 8 hours as needed for Nausea or Vomiting 10 tablet 0    ipratropium-albuterol (DUONEB) 0.5-2.5 (3) MG/3ML SOLN nebulizer solution INHALE CONTENTS OF 1 VIAL(3ML) VIA NEBULIZER EVERY 4 HOURS AS NEEDED FOR SHORTNESS OF BREATH 90 mL 0    rOPINIRole (REQUIP) 2 MG tablet TAKE 1 TABLET BY MOUTH EVERY NIGHT 90 tablet 1    clonazePAM (KLONOPIN) 0.5 MG tablet Take 0.5 mg by mouth 3 times daily as needed. Leon Kurt sertraline (ZOLOFT) 100 MG tablet Take 100 mg by mouth daily      topiramate (TOPAMAX) 25 MG tablet 25 mg 2 times daily       metoprolol tartrate (LOPRESSOR) 50 MG tablet Take 50 mg by mouth 2 times daily       ibuprofen (ADVIL;MOTRIN) 600 MG tablet Take 1 tablet by mouth every 6 hours as needed for Pain 28 tablet 0    nicotine (NICODERM CQ) 14 MG/24HR Place 1 patch onto the skin every 24 hours 30 patch 3     No current facility-administered medications for this visit. SOCIAL HISTORY    Reviewed and updated. Burton Pak  reports that she has quit smoking. Her smoking use included cigarettes. She has a 5.00 pack-year smoking history. She has never used smokeless tobacco.    FAMILY HISTORY:    Reviewed and updated. family history includes Breast Cancer in her maternal grandmother; Cancer in her mother; Diabetes in her father; Heart Disease in her father; Other in her father; Stroke in her maternal grandfather. REVIEW OF SYSTEMS:      Review of Systems   Constitutional: Positive for fatigue. Negative for chills and fever. HENT: Negative for congestion, ear pain, rhinorrhea, sore throat and trouble swallowing. Eyes: Negative for redness and visual disturbance. Respiratory: Negative for cough, shortness of breath and wheezing. Cardiovascular: Negative for chest pain, palpitations and leg swelling. Gastrointestinal: Negative for abdominal pain, diarrhea, nausea and vomiting.         States she has had discharge from her tenderness. Comments: No erythema, no abrasion or injury to umbilicus, no drainage or odor   Musculoskeletal:      Thoracic back: She exhibits tenderness. Lumbar back: She exhibits tenderness. Right foot: Normal range of motion. No deformity. Left foot: Normal range of motion. No deformity. Feet:      Right foot:      Protective Sensation: 6 sites tested. 6 sites sensed. Skin integrity: No ulcer, blister, callus, dry skin or fissure. Toenail Condition: Right toenails are normal.      Left foot:      Protective Sensation: 6 sites tested. 6 sites sensed. Skin integrity: No ulcer, blister, callus, dry skin or fissure. Toenail Condition: Left toenails are normal.   Skin:     General: Skin is warm and dry. Findings: No erythema. Neurological:      Mental Status: She is alert. Mental status is at baseline. Gait: Gait normal.   Psychiatric:         Mood and Affect: Mood normal.         Behavior: Behavior normal.          LABORATORY FINDINGS:    CBC:   Lab Results   Component Value Date    WBC 7.3 08/04/2020    HGB 14.0 08/04/2020     08/04/2020     06/05/2012     BMP:   Lab Results   Component Value Date     08/04/2020    K 3.8 08/04/2020     08/04/2020    CO2 19 08/04/2020    BUN 15 08/04/2020    CREATININE 0.62 08/04/2020    GLUCOSE 105 08/04/2020    GLUCOSE 107 06/03/2019     HEMOGLOBIN A1C:   Lab Results   Component Value Date    LABA1C 5.7 08/11/2020     FASTING LIPID PANEL:   Lab Results   Component Value Date    CHOL 158 01/12/2019    HDL 47 01/12/2019    LDLCHOLESTEROL 84 01/12/2019    TRIG 133 01/12/2019       ASSESSMENT AND PLAN:      Visit Diagnoses and Associated Orders     Prediabetes    -  Primary    Stable, A1C 5.7, continue healthy diet and regular exercise.           Type 2 diabetes mellitus without complication, without long-term current use of insulin ([address]        Patient with history of type 2 DM, not on any diabetic meds for several years, current A1C 5.7    POCT glycosylated hemoglobin (Hb A1C) [13168 Custom]       DIABETES FOOT EXAM [HM7 Custom]           Hypothyroidism, unspecified type        Due for TSH, patient to go get labs after visit. On Synthroid         Hypertension goal BP (blood pressure) < 130/80        Controlled, continue toprol, DASH diet         Acute cystitis with hematuria        Completing course of Keflex, symptoms improved                FOLLOW UP AND INSTRUCTIONS:     Return in about 3 months (around 11/11/2020) for chronic conditions, or sooner if needed. · Nora Nataliia received counseling on the following healthy behaviors: nutrition, exercise, medication adherence and tobacco cessation  · Refuses pneumovac    · Discussed use, benefit, and side effects of prescribed medications. Barriers to medication compliance addressed. All patient questions answered. Pt voiced understanding. · Patient to get previously ordered labs done today after visit. March Quivers, MARTITA - FELICE    8/11/2020, 11:58 AM    Please note that this chart was generated using voice recognition Dragon dictation software. Although every effort was made to ensure the accuracy of this automatedtranscription, some errors in transcription may have occurred.

## 2020-08-12 ENCOUNTER — TELEPHONE (OUTPATIENT)
Dept: INTERNAL MEDICINE CLINIC | Age: 50
End: 2020-08-12

## 2020-08-14 RX ORDER — ROPINIROLE 2 MG/1
TABLET, FILM COATED ORAL
Qty: 90 TABLET | Refills: 3 | Status: SHIPPED | OUTPATIENT
Start: 2020-08-14 | End: 2021-07-29

## 2020-08-14 NOTE — TELEPHONE ENCOUNTER
Medication: Ropinirole  Last visit: 8/11/20  Next visit: 11/17/2020  Last refill: 2/20/20  Pharmacy: [id number]

## 2020-09-10 ENCOUNTER — HOSPITAL ENCOUNTER (OUTPATIENT)
Dept: PAIN MANAGEMENT | Age: 50
Discharge: HOME | End: 2020-09-10
Payer: COMMERCIAL

## 2020-09-10 PROCEDURE — 99213 OFFICE O/P EST LOW 20 MIN: CPT

## 2020-09-10 PROCEDURE — 99213 OFFICE O/P EST LOW 20 MIN: CPT | Performed by: NURSE PRACTITIONER

## 2020-09-10 RX ORDER — OXYCODONE AND ACETAMINOPHEN 10; 325 MG/1; MG/1
1 TABLET ORAL EVERY 6 HOURS PRN
Qty: 120 TABLET | Refills: 0 | Status: SHIPPED | OUTPATIENT
Start: 2020-09-12 | End: 2020-10-09 | Stop reason: SDUPTHER

## 2020-09-10 ASSESSMENT — ENCOUNTER SYMPTOMS: NAUSEA: 1

## 2020-09-10 NOTE — PROGRESS NOTES
[address] [facility] PROGRESS NOTE      Patient  Video visit to  review Medication Agreement    Chief Complaint:  Neck pain    She c/o pain left side of neck, into her left shoulder. The pain  Is unchanged. She is seeing Neurosurgeon  on 9-. She had left cervical facet injection C2-T1 on  which helped for 1 month. She has headaches every day. Her sleep is interrupted due to pain. She is working from home. She states she is doing neck exercises. She is on n icoderm patch and only occasionally has a cigarette. No ED visits. Neck Pain    This is a chronic problem. The problem occurs constantly. The problem has been unchanged. The pain is associated with nothing. The pain is present in the left side (left side, left shoulkder, back of head). The pain is at a severity of 7/10. The pain is moderate. Exacerbated by: turning head. Associated symptoms include headaches, numbness and weakness. Associated symptoms comments: Left arm and hand. She has tried heat for the symptoms. The treatment provided mild relief. Patient denies any new neurological symptoms. No bowel or bladder incontinence, no weakness, and no falling. Any new diagnostic workup: [x] no  [] yes [] Xray [] CT scan [] MRI [] DEXA scan     [] Other          Narrative    EXAMINATION:    MRI OF THE CERVICAL SPINE WITHOUT CONTRAST 6/12/2020 1:33 pm         TECHNIQUE:    Multiplanar multisequence MRI of the cervical spine was performed without the    administration of intravenous contrast.         COMPARISON:    02/02/2017 and CT cervical spine 06/25/2018         HISTORY:    ORDERING SYSTEM PROVIDED HISTORY: Cervical radiculopathy    TECHNOLOGIST PROVIDED HISTORY:    increased pain, not respond    Is the patient pregnant?->No    Reason for Exam: Cervical radiculopathy M54.12 (ICD-10-CM); Arthropathy of    cervical facet joint M47.812 (ICD-10-CM);  Degenerative cervical spinal    stenosis    Additional signs and symptoms: neck and left arm pain for the past 4 years.     previous thyroid surgery, no previous spine surgeries         FINDINGS:    BONES/ALIGNMENT: Patient motion limits evaluation.  There is straightening of    the normal cervical lordosis.  Alignment is otherwise normal.  There is no    acute fracture or listhesis.  Bone marrow signal intensity is normal.  There    is disc desiccation and mild disc space narrowing at C3-4, C4-5 and C5-C6.         SPINAL CORD: The cervical spinal cord is normal in size and signal    intensities.         SOFT TISSUES: There is no paraspinal mass identified.         C2-C3: There is no disc bulge or protrusion present.  There is no significant    spinal canal stenosis or neural foraminal narrowing present.         C3-C4: There is a disc bulge and uncovertebral overgrowth resulting in mild    spinal canal stenosis and mild left neural foraminal narrowing.  No    significant right neural foraminal narrowing is present.         C4-C5: There is a disc bulge and uncovertebral overgrowth resulting in mild    spinal canal stenosis and mild bilateral neural foraminal narrowing.         C5-C6: There is a disc bulge and uncovertebral overgrowth resulting in mild    spinal canal stenosis and moderate left neural foraminal narrowing.  No    significant right neural foraminal narrowing is present.         C6-C7: There is no disc bulge or protrusion present.  There is no significant    spinal canal stenosis or neural foraminal narrowing present.         C7-T1: There is no disc bulge or protrusion present.  There is no significant    spinal canal stenosis or neural foraminal narrowing present.              Impression    Mild multilevel spinal canal stenosis and mild-to-moderate neural foraminal    narrowing from C3-4 to C5-6, as described above.  The appearance is similar    compared to the previous exam.                Treatment goals:  Functional status: reduce pain by 50%      Aberrancy:   Any alcoholic beverages       Any illegal drugs         Analgesia:7    Adverse  Effects :none    ADL;s :neck exercises        Pill count: appropriate fill date 9-    Morphine equivalent dose as reported on [place]ARRS:60  Periodic Controlled Substance Monitoring: Possible medication side effects, risk of tolerance/dependence & alternative treatments discussed., No signs of potential drug abuse or diversion identified. , Assessed functional status., Obtaining appropriate analgesic effect of treatment. Haley Virgenegroom, APRN - CNP)  Review of[place] does not show any aberrant prescription behavior. Medication is helping the patient stay active. Patient denies any side effects and reports adequate analgesia. No sign of misuse/abuse. When was thelast UDS:   7-          Was the UDS yes appropriate:      Record/Diagnostics Review:      As above, I did review the imaging    7/18/2020  6:16 AM - Tyree Hatfield Incoming Lab Results From [facility]     Component  Value  Ref Range & Units  Status  Collected  Lab    6-Acetylmorphine, Ur  Not Detected   Final  07/15/2020  3:29 PM  [facility]    7-Aminoclonazepam, Urine  Present   Final  07/15/2020  3:29 PM  [facility]    Alpha-OH-Alpraz, Urine  Not Detected   Final  07/15/2020  3:29 PM  [facility]    Alprazolam, Urine  Not Detected   Final  07/15/2020  3:29 PM  [facility]    Amphetamines, urine  Not Detected   Final  07/15/2020  3:29 PM  [facility]    Barbiturates, Ur  Not Detected   Final  07/15/2020  3:29 PM  [facility]    Benzoylecgonine, Ur  Not Detected   Final  07/15/2020  3:29 PM  [facility]    Buprenorphine Urine  Not Detected   Final  07/15/2020  3:29 PM  [facility]    Carisoprodol, Ur  Not Detected   Final  07/15/2020  3:29 PM  [facility]    (NOTE)   The carisoprodol immunoassay has cross-reactivity to carisoprodol   and meprobamate.     Clonazepam, Urine  Not Detected   Final  07/15/2020  3:29 PM  [facility]    Codeine, Urine  Not Detected   Final  07/15/2020  3:29 PM  [facility]    MDA, Ur  Not Detected   Final  07/15/2020  3:29 PM  [facility] Diazepam, Urine  Not Detected   Final  07/15/2020  3:29 PM  [facility]    Ethyl Glucuronide Ur  Not Detected   Final  07/15/2020  3:29 PM  [facility]    Fentanyl, Ur  Not Detected   Final  07/15/2020  3:29 PM  [facility]    Hydrocodone, Urine  Not Detected   Final  07/15/2020  3:29 PM  [facility]    Hydromorphone, Urine  Not Detected   Final  07/15/2020  3:29 PM  [facility]    Lorazepam, Urine  Not Detected   Final  07/15/2020  3:29 PM  [facility]    Marijuana Metab, Ur  Not Detected   Final  07/15/2020  3:29 PM  [facility]    MDEA, AISHA, Ur  Not Detected   Final  07/15/2020  3:29 PM  [facility]    MDMA, Urine  Not Detected   Final  07/15/2020  3:29 PM  [facility]    Meperidine Metab, Ur  Not Detected   Final  07/15/2020  3:29 PM  [facility]    Methadone, Urine  Not Detected   Final  07/15/2020  3:29 PM  [facility]    Methamphetamine, Urine  Not Detected   Final  07/15/2020  3:29 PM  [facility]    Methylphenidate  Not Detected   Final  07/15/2020  3:29 PM  [facility]    Midazolam, Urine  Not Detected   Final  07/15/2020  3:29 PM  [facility]    Morphine Urine  Not Detected   Final  07/15/2020  3:29 PM  [facility]    Norbuprenorphine, Urine  Not Detected   Final  07/15/2020  3:29 PM  [facility]    Nordiazepam, Urine  Not Detected   Final  07/15/2020  3:29 PM  [facility]    Norfentanyl, Urine  Not Detected   Final  07/15/2020  3:29 PM  [facility]    NORHYDROCODONE, URINE  Not Detected   Final  07/15/2020  3:29 PM  [facility]    Noroxycodone, Urine  Present   Final  07/15/2020  3:29 PM  [facility]    NOROXYMORPHONE, URINE  Not Detected   Final  07/15/2020  3:29 PM  [facility]    Oxazepam, Urine  Not Detected   Final  07/15/2020  3:29 PM  [facility]    Oxycodone Urine  Present   Final  07/15/2020  3:29 PM  [facility]    Oxymorphone, Urine  Not Detected   Final  07/15/2020  3:29 PM  [facility]    PCP, Urine  Not Detected   Final  07/15/2020  3:29 PM  [facility]    Phentermine, Ur  Not Detected   Final  07/15/2020  3:29 PM  [facility]    Propoxyphene, Urine  Not Detected   Final  07/15/2020  3:29 PM  [facility]    Tapentadol-O-Sulfate, Urine  Not Detected   Final  07/15/2020  3:29 PM  [facility]    Tapentadol, Urine  Not Detected   Final  07/15/2020  3:29 PM  [facility]    Temazepam, Urine  Not Detected   Final  07/15/2020  3:29 PM  [facility]    Tramadol, Urine  Not Detected   Final  07/15/2020  3:29 PM  [facility]    Zolpidem, Urine  Not Detected   Final  07/15/2020  3:29 PM  [facility]    Creatinine, Ur  295.4  20.0 - 400.0 mg/dL  Final  07/15/2020  3:29 PM  [facility]    Pain Mgt Drug Panel, Hi Res, Ur  See Below   Final  07/15/2020  3:29 PM  [facility]    (NOTE)   Methodology: Qualitative Enzyme Immunoassay and Qualitative Liquid   Chromatography-Time of Flight-Mass Spectrometry or Tandem Mass   Spectrometry, Quantitative Spectrophotometry   The absence of expected drug(s) and/or drug metabolite(s) may   indicate non-compliance, inappropriate timing of specimen   collection relative to drug administration, poor drug absorption,   diluted/adulterated urine, or limitations of testing. The   concentration must be greater than or equal to the cutoff to be   reported as present.  If specific drug concentrations are   required, contact the laboratory within two weeks of specimen   collection to request quantification by a second analytical   technique. Interpretive questions should be directed to the   laboratory. Results based on immunoassay detection that do not match clinical   expectations should be   interpreted with caution. Confirmatory testing by mass   spectrometry for immunoassay-based results is available, if   ordered within two weeks of specimen collection. Additional   charges apply. For medical purposes only; not valid for forensic use.    This test was developed and its performance characteristics            Past Medical History:   Diagnosis Date    Anxiety     CAD (coronary artery disease)     Mitral valve prolapse    Fatty liver     GERD (gastroesophageal reflux disease)     Headache     History of bronchitis     Hyperlipidemia     Hypothyroidism     Kidney stone     LFT elevation     MVP (mitral valve BY MOUTH INTO THE LUNGS EVERY 4 HOURS AS NEEDED FOR WHEEZING OR SHORTNESS OF BREATH, Disp: 1 Inhaler, Rfl: 3    hydrOXYzine (VISTARIL) 25 MG capsule, Take 25 mg by mouth 2 times daily as needed for Itching 2 tabs at night, Disp: , Rfl:     tiZANidine (ZANAFLEX) 4 MG tablet, Take 1 tablet by mouth every 8 hours as needed (spasms), Disp: 90 tablet, Rfl: 3    ondansetron (ZOFRAN ODT) 4 MG disintegrating tablet, Take 1 tablet by mouth every 8 hours as needed for Nausea or Vomiting, Disp: 10 tablet, Rfl: 0    ipratropium-albuterol (DUONEB) 0.5-2.5 (3) MG/3ML SOLN nebulizer solution, INHALE CONTENTS OF 1 VIAL(3ML) VIA NEBULIZER EVERY 4 HOURS AS NEEDED FOR SHORTNESS OF BREATH, Disp: 90 mL, Rfl: 0    ibuprofen (ADVIL;MOTRIN) 600 MG tablet, Take 1 tablet by mouth every 6 hours as needed for Pain, Disp: 28 tablet, Rfl: 0    clonazePAM (KLONOPIN) 0.5 MG tablet, Take 0.5 mg by mouth 3 times daily as needed. ., Disp: , Rfl:     Family History   Problem Relation Age of Onset   [facility] Cancer Mother         vaginal    Heart Disease Father     Diabetes Father     Other Father         colon resection for colon polyps    Breast Cancer Maternal Grandmother     Stroke Maternal Grandfather        Social History     Socioeconomic History    Marital status:      Spouse name: Not on file    Number of children: Not on file    Years of education: Not on file    Highest education level: Not on file   Occupational History    Occupation: Homemaker   Social Needs    Financial resource strain: Not on file    Food insecurity     Worry: Not on file     Inability: Not on file   [employer] needs     Medical: Not on file     Non-medical: Not on file   Tobacco Use    Smoking status: Former Smoker     Packs/day: 0.25     Years: 20.00     Pack years: 5.00     Types: Cigarettes    Smokeless tobacco: Never Used    Tobacco comment: quit 8/2019 occasionl cigarette on nicoderm 1/2020   Substance and Sexual Activity    Alcohol use: No     Alcohol/week: 0.0 standard drinks    Drug use: No    Sexual activity: Not Currently   Lifestyle    Physical activity     Days per week: Not on file     Minutes per session: Not on file    Stress: Not on file   Relationships    Social connections     Talks on phone: Not on file     Gets together: Not on file     Attends Church service: Not on file     Active member of club or organization: Not on file     Attends meetings of clubs or organizations: Not on file     Relationship status: Not on file    Intimate partner violence     Fear of current or ex partner: Not on file     Emotionally abused: Not on file     Physically abused: Not on file     Forced sexual activity: Not on file   Other Topics Concern    Not on file   Social History Narrative    Not on file         Review of Systems:  Review of Systems   Constitution: Negative. HENT: Negative. Eyes:        Glasses   Cardiovascular: Negative. Respiratory:        Smokes   Endocrine: Negative. Hematologic/Lymphatic: Negative. Skin: Negative. Musculoskeletal: Positive for neck pain. Gastrointestinal: Positive for nausea. Genitourinary: Negative. Neurological: Positive for headaches, numbness and weakness. Psychiatric/Behavioral: Negative. Physical Exam:  [facility] 11/01/2010     Physical Exam  HENT:      Head: Normocephalic. Pulmonary:      Effort: Pulmonary effort is normal.   Skin:         Neurological:      Mental Status: She is alert and oriented to person, place, and time. Psychiatric:         Mood and Affect: Mood normal.         Behavior: Behavior normal.         Thought Content:  Thought content normal.           Assessment:    Problem List Items Addressed This Visit     Sprain of atlanto-occipital joint, sequela - Primary    Degenerative cervical spinal stenosis    Cervical radiculopathy    Atlanto-axial joint sprain, sequela    Arthropathy of cervical facet joint            Treatment Plan:  DISCUSSION: Treatment options discussed withpatient and all questions answered to patient's satisfaction. Possible side effects, risk of tolerance and or dependence and alternative treatments discussed    Obtaining appropriate analgesic effect of treatment   No signs of potential drug abuse or diversion identified    [x] Ill effects of being on chronic pain medications such as sleep disturbances, respiratory depression, hormonal changes, withdrawal symptoms, chronic opioid dependence and tolerance as well as risk of taking opioids with Benzodiazepines and taking opioids along with alcohol,  werediscussed with patient. I had asked the patient to minimize medication use and utilize pain medications only for uncontrolled rest pain or pain with exertional activities. I advised patient not to self-escalate painmedications without consulting with us. At each of patient's future visits we will try to taper pain medications, while adjusting the adjunct medications, and re-evaluating for Physical Therapy to improve spinal andjoint strength. We will continue to have discussions to decrease pain medications as tolerated. Counseled patient on effects their pain medication and /or their medical condition mayhave on their  ability to drive or operate machinery. Instructed not to drive or operate machinery if drowsy     I also discussed with the patient regarding the dangers of combining narcotic pain medication with tranquilizers, alcohol or illegal drugs or taking the medication any way other than prescribed. The dangers were discussed  including respiratory depression and death. Patient was told to tell  all  physicians regarding the medications he is getting from pain clinic. Patient is warned not to take any unprescribed medications over-the-countermedications that can depress breathing . Patient is advised to talk to the pharmacist or physicians if planning to take any over-the-counter medications before  takeing them.  Patient

## 2020-10-06 ENCOUNTER — OFFICE VISIT (OUTPATIENT)
Dept: NEUROSURGERY | Age: 50
End: 2020-10-06
Payer: COMMERCIAL

## 2020-10-06 VITALS
SYSTOLIC BLOOD PRESSURE: 115 MMHG | HEIGHT: 64 IN | WEIGHT: 181 LBS | DIASTOLIC BLOOD PRESSURE: 73 MMHG | BODY MASS INDEX: 30.9 KG/M2 | OXYGEN SATURATION: 95 % | TEMPERATURE: 97.7 F | HEART RATE: 70 BPM

## 2020-10-06 PROCEDURE — 99203 OFFICE O/P NEW LOW 30 MIN: CPT | Performed by: NURSE PRACTITIONER

## 2020-10-06 NOTE — PROGRESS NOTES
Skylar Romero  [facility] [place] [facility] [address]  [id number]  Dept: [phone]    Patient:  Jazmine Calvert  YOB: 1970  Date: 10/6/20    The patient is a 53 y.o. female who presents today for consult of the following problems:     Chief Complaint   Patient presents with    New Patient     Degenerative Cervical spinal stenosis Osteoarthritis of spine radiculopathy cervical region           HPI:     Jazmine Calvert is a 53 y.o. female on whom neurosurgical consultation was requested by Mary Grace Nam CNP for management of neck pain and arm symptoms. Patient has had neck pain for the last 4 years. Pain is 5/10 on average. Primarily located to the posterior aspect of her head, with radiation into her head. Will have occasional radiation from her left side neck, down front of arm and into all fingers of left hand. Does report decreased  strength of left hand at times. Intermittent numbness and tingling of all fingers on left hand. Minimal to no symptoms in right hand. Does not notice any gait instability. No saddle anesthesia. No loss of bowel or bladder function. Has completed several rounds of multimodal physical therapy most recently approximately 1 year ago. Has also been following with pain management, has received numerous rounds of injections, predominantly left-sided facet blocks, reports varying responses. Nocturnal Awakening: No  Reason: headaches    MYELOPATHY    Frequently dropping things: No  Difficulty with buttoning clothes, using zipper, putting on watch OR jewelry: No  Changes in handwriting: No  Numbness or tingling: Yes    LIMITATIONS    Pain significantly limiting on a daily basis   Daily pharmacologic pain control include: percocet; tizanidine  Neck : Arm pain: all neck pain    MANAGEMENT     Prior Surgery: No  Prior to 1yr ago: In the last year:    Physical Therapy: Yes   Chiropractic Interventions: No   Injections:  Yes tablet TAKE 1 TABLET BY MOUTH EVERY NIGHT 90 tablet 3    levothyroxine (SYNTHROID) 175 MCG tablet Take 1 tablet by mouth daily 30 tablet 1    tiZANidine (ZANAFLEX) 4 MG tablet Take 1 tablet by mouth every 8 hours as needed (spasms) 90 tablet 3    atorvastatin (LIPITOR) 40 MG tablet TAKE 1 TABLET BY MOUTH EVERY DAY 90 tablet 3    esomeprazole (NEXIUM) 40 MG delayed release capsule TAKE 1 CAPSULE BY MOUTH EVERY MORNING BEFORE BREAKFAST 90 capsule 2    ondansetron (ZOFRAN ODT) 4 MG disintegrating tablet Take 1 tablet by mouth every 8 hours as needed for Nausea or Vomiting 10 tablet 0    ibuprofen (ADVIL;MOTRIN) 600 MG tablet Take 1 tablet by mouth every 6 hours as needed for Pain 28 tablet 0    clonazePAM (KLONOPIN) 0.5 MG tablet Take 0.5 mg by mouth 3 times daily as needed. Edwar Urbano sertraline (ZOLOFT) 100 MG tablet Take 100 mg by mouth daily      topiramate (TOPAMAX) 25 MG tablet 25 mg 2 times daily       metoprolol tartrate (LOPRESSOR) 50 MG tablet Take 50 mg by mouth 2 times daily       hydrOXYzine (VISTARIL) 25 MG capsule Take 25 mg by mouth 2 times daily as needed for Itching 2 tabs at night      ipratropium-albuterol (DUONEB) 0.5-2.5 (3) MG/3ML SOLN nebulizer solution INHALE CONTENTS OF 1 VIAL(3ML) VIA NEBULIZER EVERY 4 HOURS AS NEEDED FOR SHORTNESS OF BREATH (Patient not taking: Reported on 10/6/2020) 90 mL 0    nicotine (NICODERM CQ) 14 MG/24HR Place 1 patch onto the skin every 24 hours 30 patch 3     No current facility-administered medications on file prior to visit.       Social History     Tobacco Use    Smoking status: Former Smoker     Packs/day: 0.25     Years: 20.00     Pack years: 5.00     Types: Cigarettes    Smokeless tobacco: Never Used    Tobacco comment: quit 8/2019 occasionl cigarette on nicoderm 1/2020   Substance Use Topics    Alcohol use: No     Alcohol/week: 0.0 standard drinks    Drug use: No       Allergies   Allergen Reactions    Compazine [Prochlorperazine]      Ari restless    Sulfa Antibiotics Hives    Adhesive Tape Rash       Review of Systems  Constitutional: Negative for activity change and appetite change. HENT: Negative for ear pain and facial swelling. Eyes: Negative for discharge and itching. Respiratory: Negative for choking and chest tightness. Cardiovascular: Negative for chest pain and leg swelling. Gastrointestinal: Negative for nausea and abdominal pain. Endocrine: Negative for cold intolerance and heat intolerance. Genitourinary: Negative for frequency and flank pain. Musculoskeletal: Negative for myalgias and joint swelling. Skin: Negative for rash and wound. Allergic/Immunologic: Negative for environmental allergies and food allergies. Hematological: Negative for adenopathy. Does not bruise/bleed easily. Psychiatric/Behavioral: Negative for self-injury. The patient is not nervous/anxious. Physical Exam:      /73 (Site: Left Upper Arm, Position: Sitting, Cuff Size: Medium Adult)   Pulse 70   Temp 97.7 °F (36.5 °C) (Temporal)   Ht 5' 4\" (1.626 m)   Wt 181 lb (82.1 kg)   LMP 11/01/2010   SpO2 95%   BMI 31.07 kg/m²   Estimated body mass index is 31.07 kg/m² as calculated from the following:    Height as of this encounter: 5' 4\" (1.626 m). Weight as of this encounter: 181 lb (82.1 kg). General:  Loretta Alexander is a 53y.o. year old female who appears her stated age. HEENT: Normocephalic atraumatic. Neck supple. Chest: regular rate; pulses equal  Abdomen: Soft nontender nondistended.    Ext: DP and PT pulses 2+, good cap refill  Neuro    Mentation  Appropriate affect  Registration intact  Orientation intact  3 item recall intact  Judgement intact to situation    Cranial Nerves:   Pupils equal and reactive to light  Extraocular motion intact  Face and shrug symmetric  Tongue midline  No dysarthria  v1-3 sensation symmetric, masseter tone symmetric  Hearing symmetric and intact    Sensation: Intact  Ring finger split: Negative    Motor  L deltoid 5/5; R deltoid 5/5  L biceps 5/5; R biceps 5/5  L triceps 5/5; R triceps 5/5  L wrist extension 5/5; R wrist extension 5/5  L intrinsics 5/5; R intrinsics 5/5     L iliopsoas 5/5 , R iliopsoas 5/5  L quadriceps 5/5; R quadriceps 5/5  L Dorsiflexion 5/5; R dorsiflexion 5/5  L Plantarflexion 5/5; R plantarflexion 5/5  L EHL 5/5; R EHL 5/5    Reflexes  L Brachioradialis 2+/4; R brachioradialis 2+/4  L Biceps 2+/4; R Biceps 2+/4  L Triceps 2+/4; R Triceps 2+/4  L Patellar 2+/4: R Patellar 2+/4  L Achilles 2+/4; R Achilles 2+/4    hoffmans L: neg  hoffmans R: neg  Clonus L: neg  Clonus R: neg  Babinski L: neg  Babinski R: neg    Spurlings: No  Lhermittes: No    Tinels at elbow: No  Tinels at wrist: No  Phalens: No  Ok sign: No  Froments: No  Benedictine Hand: No    Atrophy of thenar: No  Atrophy of hypothenar: No    Studies Review:     MRI cervical spine 6/12/2020:  FINDINGS:    BONES/ALIGNMENT: Patient motion limits evaluation.  There is straightening of    the normal cervical lordosis.  Alignment is otherwise normal.  There is no    acute fracture or listhesis.  Bone marrow signal intensity is normal.  There    is disc desiccation and mild disc space narrowing at C3-4, C4-5 and C5-C6.         SPINAL CORD: The cervical spinal cord is normal in size and signal    intensities.         SOFT TISSUES: There is no paraspinal mass identified.         C2-C3: There is no disc bulge or protrusion present.  There is no significant    spinal canal stenosis or neural foraminal narrowing present.         C3-C4: There is a disc bulge and uncovertebral overgrowth resulting in mild    spinal canal stenosis and mild left neural foraminal narrowing.  No    significant right neural foraminal narrowing is present.         C4-C5: There is a disc bulge and uncovertebral overgrowth resulting in mild    spinal canal stenosis and mild bilateral neural foraminal narrowing.         C5-C6: There is a disc bulge and uncovertebral overgrowth resulting in mild    spinal canal stenosis and moderate left neural foraminal narrowing.  No    significant right neural foraminal narrowing is present.         C6-C7: There is no disc bulge or protrusion present.  There is no significant    spinal canal stenosis or neural foraminal narrowing present.         C7-T1: There is no disc bulge or protrusion present.  There is no significant    spinal canal stenosis or neural foraminal narrowing present. Assessment and Plan:      1. Cervical spondylosis    2. Recurrent occipital headache          Plan: Patient presents with approximately 4-year history of occipital headaches, neck pain and intermittent numbness and tingling to left hand. Has completed multimodal physical therapy. Has been following with pain specialist, has received multiple rounds of left-sided facet blocks with varying degrees of improvement. Did recently complete MRI that shows mild multilevel central stenosis and varying degrees of bilateral neural foraminal narrowing. Patient does have left upper extremity involvement, however it does not follow clear dermatomal pattern. MRI of limited quality due to artifact. Will obtain flexion-extension imaging as well as scoliosis x-rays as she does have some curvature on MRI. The dominance of symptoms are in the form of occipital headaches, may benefit from neurology evaluation and possibly occipital blocks to see if this improves symptoms. We will see the patient back in approximately 2 to 3 weeks after obtaining x-rays. Does also have older MRIs that we will obtain for comparison. Followup: Return in about 3 weeks (around 10/27/2020), or if symptoms worsen or fail to improve. Prescriptions Ordered:  No orders of the defined types were placed in this encounter.        Orders Placed:  Orders Placed This Encounter   Procedures    XR SPINE ENTIRE (2-3 VIEWS)     Standing Status:   Future     Standing Expiration Date:   10/6/2021     Order Specific Question:   Reason for exam:     Answer:   scoliosis    XR CERVICAL SPINE FLEXION AND EXTENSION     Standing Status:   Future     Standing Expiration Date:   1/4/2021     Order Specific Question:   Reason for exam:     Answer:   evaluate for instability        Electronically signed by MARTITA Vale CNP on 10/6/2020 at 9:35 AM    Please note that this chart was generated using voice recognition Dragon dictation software. Although every effort was made to ensure the accuracy of this automated transcription, some errors in transcription may have occurred.

## 2020-10-09 ENCOUNTER — HOSPITAL ENCOUNTER (OUTPATIENT)
Dept: PAIN MANAGEMENT | Age: 50
Discharge: HOME | End: 2020-10-09
Payer: COMMERCIAL

## 2020-10-09 PROCEDURE — 99213 OFFICE O/P EST LOW 20 MIN: CPT | Performed by: NURSE PRACTITIONER

## 2020-10-09 PROCEDURE — 99213 OFFICE O/P EST LOW 20 MIN: CPT

## 2020-10-09 RX ORDER — OXYCODONE AND ACETAMINOPHEN 10; 325 MG/1; MG/1
1 TABLET ORAL EVERY 6 HOURS PRN
Qty: 120 TABLET | Refills: 0 | Status: SHIPPED | OUTPATIENT
Start: 2020-10-12 | End: 2020-11-10 | Stop reason: SDUPTHER

## 2020-10-09 RX ORDER — TIZANIDINE 4 MG/1
4 TABLET ORAL EVERY 8 HOURS PRN
Qty: 90 TABLET | Refills: 1 | Status: SHIPPED | OUTPATIENT
Start: 2020-10-09 | End: 2020-12-09

## 2020-10-09 ASSESSMENT — ENCOUNTER SYMPTOMS
GASTROINTESTINAL NEGATIVE: 1
RESPIRATORY NEGATIVE: 1

## 2020-10-09 NOTE — PROGRESS NOTES
[address] [facility] PROGRESS NOTE      Patient video visit to review Medication Agreement    Chief Complaint:  Neck pain    She c/o neck pain radiating down left arm. Her pain has increased. She c/o headaches occipital area, she wakes up a lot more. During the night due to headaches. She saw Neurosurgeon and x-rays were ordered. She has follow up appt. She had left cervical facet injection C2-T1  with relief for 1 month. .She has no history of cervical surgery. She works outside the home. She does some walking. No Ed visits. Neck Pain    This is a chronic problem. The current episode started more than 1 month ago. The problem has been gradually worsening. The pain is present in the midline (left armm, back of head). Quality: pounding. The pain is at a severity of 6/10. The pain is moderate. Exacerbated by: movement. The pain is worse during the night. Associated symptoms include headaches and numbness. Associated symptoms comments: Numbness and weakness left arm. She has tried heat, home exercises and oral narcotics for the symptoms. The treatment provided mild relief. Patient denies any new neurological symptoms. No bowel or bladder incontinence, no weakness, and no falling. Any new diagnostic workup: [x] no  [] yes [] Xray [] CT scan [] MRI [] DEXA scan     [] Other                    Treatment goals:  Functional status: reduce pain 30%      Aberrancy:   Any alcoholic beverages    no   Any illegal drugs   no      Analgesia:6    Adverse  Effects : none    ADL;s :exercises        Pill count: appropriate fill date 10- has # 11 percocet tabs left    Morphine equivalent dose as reported on OARRS:60  Periodic Controlled Substance Monitoring: Possible medication side effects, risk of tolerance/dependence & alternative treatments discussed., No signs of potential drug abuse or diversion identified. , Assessed functional status., Obtaining appropriate analgesic effect of treatment. Delbert Palacio, MARTITA - CNP)  Review of[place]ARRS does not show any aberrant prescription behavior. Medication is helping the patient stay active. Patient denies any side effects and reports adequate analgesia. No sign of misuse/abuse. When was thelast UDS:  7-           Was the UDS appropriate:yes no metabolites  7/18/2020  6:16 AM - Liu, Mhpn Incoming Lab Results From [facility]     Component  Value  Ref Range & Units  Status  Collected  Lab    6-Acetylmorphine, Ur  Not Detected   Final  07/15/2020  3:29 PM  [facility]    7-Aminoclonazepam, Urine  Present   Final  07/15/2020  3:29 PM  [facility]    Alpha-OH-Alpraz, Urine  Not Detected   Final  07/15/2020  3:29 PM  [facility]    Alprazolam, Urine  Not Detected   Final  07/15/2020  3:29 PM  [facility]    Amphetamines, urine  Not Detected   Final  07/15/2020  3:29 PM  [facility]    Barbiturates, Ur  Not Detected   Final  07/15/2020  3:29 PM  [facility]    Benzoylecgonine, Ur  Not Detected   Final  07/15/2020  3:29 PM  [facility]    Buprenorphine Urine  Not Detected   Final  07/15/2020  3:29 PM  [facility]    Carisoprodol, Ur  Not Detected   Final  07/15/2020  3:29 PM  [facility]    (NOTE)   The carisoprodol immunoassay has cross-reactivity to carisoprodol   and meprobamate.     Clonazepam, Urine  Not Detected   Final  07/15/2020  3:29 PM  [facility]    Codeine, Urine  Not Detected   Final  07/15/2020  3:29 PM  [facility]    MDA, Ur  Not Detected   Final  07/15/2020  3:29 PM  [facility]    Diazepam, Urine  Not Detected   Final  07/15/2020  3:29 PM  [facility]    Ethyl Glucuronide Ur  Not Detected   Final  07/15/2020  3:29 PM  [facility]    Fentanyl, Ur  Not Detected   Final  07/15/2020  3:29 PM  [facility]    Hydrocodone, Urine  Not Detected   Final  07/15/2020  3:29 PM  [facility]    Hydromorphone, Urine  Not Detected   Final  07/15/2020  3:29 PM  [facility]    Lorazepam, Urine  Not Detected   Final  07/15/2020  3:29 PM  [facility]    Marijuana Metab, Ur  Not Detected   Final  07/15/2020  3:29 PM  [facility]    MDEA, AISHA, Ur  Not Detected   Final  07/15/2020  3:29 PM  [facility] MDMA, Urine  Not Detected   Final  07/15/2020  3:29 PM  [facility]    Meperidine Metab, Ur  Not Detected   Final  07/15/2020  3:29 PM  [facility]    Methadone, Urine  Not Detected   Final  07/15/2020  3:29 PM  [facility]    Methamphetamine, Urine  Not Detected   Final  07/15/2020  3:29 PM  [facility]    Methylphenidate  Not Detected   Final  07/15/2020  3:29 PM  [facility]    Midazolam, Urine  Not Detected   Final  07/15/2020  3:29 PM  [facility]    Morphine Urine  Not Detected   Final  07/15/2020  3:29 PM  [facility]    Norbuprenorphine, Urine  Not Detected   Final  07/15/2020  3:29 PM  [facility]    Nordiazepam, Urine  Not Detected   Final  07/15/2020  3:29 PM  [facility]    Norfentanyl, Urine  Not Detected   Final  07/15/2020  3:29 PM  [facility]    NORHYDROCODONE, URINE  Not Detected   Final  07/15/2020  3:29 PM  [facility]    Noroxycodone, Urine  Present   Final  07/15/2020  3:29 PM  [facility]    NOROXYMORPHONE, URINE  Not Detected   Final  07/15/2020  3:29 PM  [facility]    Oxazepam, Urine  Not Detected   Final  07/15/2020  3:29 PM  [facility]    Oxycodone Urine  Present   Final  07/15/2020  3:29 PM  [facility]    Oxymorphone, Urine  Not Detected   Final  07/15/2020  3:29 PM  [facility]    PCP, Urine  Not Detected   Final  07/15/2020  3:29 PM  [facility]    Phentermine, Ur  Not Detected   Final  07/15/2020  3:29 PM  [facility]    Propoxyphene, Urine  Not Detected   Final  07/15/2020  3:29 PM  [facility]    Tapentadol-O-Sulfate, Urine  Not Detected   Final  07/15/2020  3:29 PM  [facility]    Tapentadol, Urine  Not Detected   Final  07/15/2020  3:29 PM  [facility]    Temazepam, Urine  Not Detected   Final  07/15/2020  3:29 PM  [facility]    Tramadol, Urine  Not Detected   Final  07/15/2020  3:29 PM  [facility]    Zolpidem, Urine  Not Detected   Final  07/15/2020  3:29 PM  [facility]    Creatinine, Ur  295.4  20.0 - 400.0 mg/dL  Final  07/15/2020  3:29 PM  [facility]    Pain Mgt Drug Panel, Hi Res, Ur  See Below   Final  07/15/2020  3:29 PM  [facility]    (NOTE)   Methodology: Qualitative Enzyme Immunoassay and Qualitative Liquid   Chromatography-Time of Flight-Mass Spectrometry or Tandem Mass   Spectrometry, Quantitative Spectrophotometry   The absence of expected drug(s) and/or drug metabolite(s) may   indicate non-compliance, inappropriate timing of specimen   collection relative to drug administration, poor drug absorption,   diluted/adulterated urine, or limitations of testing. The   concentration must be greater than or equal to the cutoff to be   reported as present.  If specific drug concentrations are   required, contact the laboratory within two weeks of specimen   collection to request quantification by a second analytical   technique. Interpretive questions should be directed to the   laboratory. Results based on immunoassay detection that do not match clinical   expectations should be   interpreted with caution. Confirmatory testing by mass   spectrometry for immunoassay-based results is available, if   ordered within two weeks of specimen collection. Additional   charges apply. For medical purposes only; not valid for forensic use. This test was developed and its performance characteristics   determined by [facility]. The U.S. Food and Drug   Administration has not approved or cleared this test; however, FDA   clearance or approval is not currently required for clinical use. The results are not intended to be used as the sole means for   clinical diagnosis or patient management decisions. [facility] Pain Mgt Drug Panel, High Res/Emit U  See Note   Final  07/15/2020  3:29 PM  [facility]    (NOTE)   Access [facility] Enhanced Report using either link below:   -Direct access: [email or web address]   -Enter Username, Password: [email or web address]   Username:  [email or web address]   Password: [email or web address]   Performed By: [facility]   [address]              Past Medical History:   Diagnosis Date    Anxiety     CAD (coronary artery disease)     Mitral valve prolapse    Fatty liver     GERD (gastroesophageal reflux disease)     Headache     History of bronchitis     Hyperlipidemia     Hypothyroidism     Kidney stone     LFT elevation     MVP (mitral valve prolapse)     Obesity     [facility] [id number]    Umbilical hernia        Past Surgical History:   Procedure Laterality Date    APPENDECTOMY       SECTION      2 pfannenstiel, 1 vertical    CHOLECYSTECTOMY      COLONOSCOPY      Dr Waldemar Noonan  14    COLONOSCOPY  2014    biopsy & sigmoid spasms, pathology negative    COLONOSCOPY N/A 2018    COLONOSCOPY WITH BIOPSY performed by Dana Feldman MD at . [address]      x 5    HYSTERECTOMY, TOTAL ABDOMINAL          [place] EXPLORATORY OF ABDOMEN  10/21/2014    Laparotomy-lysis of adhesions, bso     UPPER GASTROINTESTINAL ENDOSCOPY  2010    mild chronic inactive gastritis       Allergies   Allergen Reactions    Compazine [Prochlorperazine]      Jittery, restless    Sulfa Antibiotics Hives    Adhesive Tape Rash         Current Outpatient Medications:     oxyCODONE-acetaminophen (PERCOCET)  MG per tablet, Take 1 tablet by mouth every 6 hours as needed for Pain for up to 30 days. , Disp: 120 tablet, Rfl: 0    rOPINIRole (REQUIP) 2 MG tablet, TAKE 1 TABLET BY MOUTH EVERY NIGHT, Disp: 90 tablet, Rfl: 3    levothyroxine (SYNTHROID) 175 MCG tablet, Take 1 tablet by mouth daily, Disp: 30 tablet, Rfl: 1    esomeprazole (NEXIUM) 40 MG delayed release capsule, TAKE 1 CAPSULE BY MOUTH EVERY MORNING BEFORE BREAKFAST, Disp: 90 capsule, Rfl: 2    nicotine (NICODERM CQ) 14 MG/24HR, Place 1 patch onto the skin every 24 hours, Disp: 30 patch, Rfl: 3    sertraline (ZOLOFT) 100 MG tablet, Take 100 mg by mouth daily, Disp: , Rfl:     topiramate (TOPAMAX) 25 MG tablet, 25 mg 2 times daily , Disp: , Rfl:     metoprolol tartrate (LOPRESSOR) 50 MG tablet, Take 50 mg by mouth 2 times daily , Disp: , Rfl:     albuterol sulfate  (90 Base) MCG/ACT inhaler, INHALE 2 PUFFS BY MOUTH INTO THE LUNGS EVERY 4 HOURS AS NEEDED FOR WHEEZING OR SHORTNESS OF BREATH, Disp: 1 Inhaler, Rfl: 3    hydrOXYzine (VISTARIL) 25 MG capsule, Take 25 mg by mouth 2 times daily as needed for Itching 2 tabs at night, Disp: , Rfl:     tiZANidine (ZANAFLEX) 4 MG tablet, Take 1 tablet by mouth every 8 hours as needed (spasms), Disp: 90 tablet, Rfl: 3    atorvastatin (LIPITOR) 40 MG tablet, TAKE 1 TABLET BY MOUTH EVERY DAY, Disp: 90 tablet, Rfl: 3    ondansetron (ZOFRAN ODT) 4 MG disintegrating tablet, Take 1 tablet by mouth every 8 hours as needed for Nausea or Vomiting, Disp: 10 tablet, Rfl: 0    ipratropium-albuterol (DUONEB) 0.5-2.5 (3) MG/3ML SOLN nebulizer solution, INHALE CONTENTS OF 1 VIAL(3ML) VIA NEBULIZER EVERY 4 HOURS AS NEEDED FOR SHORTNESS OF BREATH (Patient not taking: Reported on 10/6/2020), Disp: 90 mL, Rfl: 0    ibuprofen (ADVIL;MOTRIN) 600 MG tablet, Take 1 tablet by mouth every 6 hours as needed for Pain, Disp: 28 tablet, Rfl: 0    clonazePAM (KLONOPIN) 0.5 MG tablet, Take 0.5 mg by mouth 3 times daily as needed. ., Disp: , Rfl:     Family History   Problem Relation Age of Onset   [facility] Cancer Mother         vaginal    Heart Disease Father     Diabetes Father     Other Father         colon resection for colon polyps    Breast Cancer Maternal Grandmother     Stroke Maternal Grandfather        Social History     Socioeconomic History    Marital status:      Spouse name: Not on file    Number of children: Not on file    Years of education: Not on file    Highest education level: Not on file   Occupational History    Occupation: Homemaker   Social Needs    Financial resource strain: Not on file    Food insecurity     Worry: Not on file     Inability: Not on file   [employer] needs     Medical: Not on file     Non-medical: Not on file   Tobacco Use    Smoking status: Former Smoker     Packs/day: 0.25     Years: 20.00     Pack years: 5.00     Types: Cigarettes    Smokeless tobacco: Never Used    Tobacco comment: quit 8/2019 occasionl cigarette on nicoderm 1/2020   Substance and Sexual Activity    Alcohol use: No     Alcohol/week: 0.0 standard drinks    Drug use: No    Sexual activity: Not Currently   Lifestyle    Physical activity     Days per week: Not on file     Minutes per session: Not on file    Stress: Not on file   Relationships    Social connections     Talks on phone: Not on file     Gets together: Not on file     Attends Church service: Not on file     Active member of club or organization: Not on file     Attends meetings of clubs or organizations: Not on file     Relationship status: Not on file    Intimate partner violence     Fear of current or ex partner: Not on file     Emotionally abused: Not on file     Physically abused: Not on file     Forced sexual activity: Not on file   Other Topics Concern    Not on file   Social History Narrative    Not on file         Review of Systems:  Review of Systems   Constitution: Negative. HENT: Negative. Eyes:        Glasses   Cardiovascular: Negative. Respiratory: Negative. Endocrine: Negative. Hematologic/Lymphatic: Negative. Skin: Negative. Musculoskeletal: Positive for neck pain. Gastrointestinal: Negative. Genitourinary: Negative. Neurological: Positive for headaches and numbness. Psychiatric/Behavioral: Positive for depression. Managing goes to Bush[facility] Curry         Physical Exam:  LMP 11/01/2010     Physical Exam  HENT:      Head: Normocephalic. Pulmonary:      Effort: Pulmonary effort is normal.   Skin:         Neurological:      Mental Status: She is alert and oriented to person, place, and time.    Psychiatric:         Mood and Affect: Mood normal.           Assessment:    Problem List Items Addressed This Visit     Osteoarthritis of cervical spine (Chronic)    Relevant Medications    oxyCODONE-acetaminophen (PERCOCET)  MG per tablet (Start on 10/12/2020)    tiZANidine (ZANAFLEX) 4 MG tablet    Other Relevant Orders    DRUG SCREEN, PAIN    Encounter for medication monitoring - Primary    Relevant Medications    oxyCODONE-acetaminophen (PERCOCET)  MG per tablet (Start on 10/12/2020)    Other Relevant Orders    DRUG SCREEN, PAIN    Degenerative cervical spinal stenosis    Relevant Medications    oxyCODONE-acetaminophen (PERCOCET)  MG per tablet (Start on 10/12/2020)    Cervical radiculopathy    Relevant Medications    oxyCODONE-acetaminophen (PERCOCET)  MG per tablet (Start on 10/12/2020)    tiZANidine (ZANAFLEX) 4 MG tablet    Other Relevant Orders    DRUG SCREEN, PAIN    Arthropathy of cervical facet joint    Relevant Medications    oxyCODONE-acetaminophen (PERCOCET)  MG per tablet (Start on 10/12/2020)              Treatment Plan:  DISCUSSION: Treatment options discussed withpatient and all questions answered to patient's satisfaction. Possible side effects, risk of tolerance and or dependence and alternative treatments discussed    Obtaining appropriate analgesic effect of treatment   No signs of potential drug abuse or diversion identified    [x] Ill effects of being on chronic pain medications such as sleep disturbances, respiratory depression, hormonal changes, withdrawal symptoms, chronic opioid dependence and tolerance as well as risk of taking opioids with Benzodiazepines and taking opioids along with alcohol,  werediscussed with patient. I had asked the patient to minimize medication use and utilize pain medications only for uncontrolled rest pain or pain with exertional activities. I advised patient not to self-escalate painmedications without consulting with us. At each of patient's future visits we will try to taper pain medications, while adjusting the adjunct medications, and re-evaluating for Physical Therapy to improve spinal andjoint strength.  We will continue to have discussions to decrease pain medications as tolerated. Counseled patient on effects their pain medication and /or their medical condition mayhave on their  ability to drive or operate machinery. Instructed not to drive or operate machinery if drowsy     I also discussed with the patient regarding the dangers of combining narcotic pain medication with tranquilizers, alcohol or illegal drugs or taking the medication any way other than prescribed. The dangers were discussed  including respiratory depression and death. Patient was told to tell  all  physicians regarding the medications he is getting from pain clinic. Patient is warned not to take any unprescribed medications over-the-countermedications that can depress breathing . Patient is advised to talk to the pharmacist or physicians if planning to take any over-the-counter medications before  takeing them. Patient is strongly advised to avoid tranquilizers or  relaxants, illegal drugs  or any medications that can depress breathing  Patient is also advised to tell us if there is any changes in their medications from other physicians. 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  Discussed occipital nerve block for her c/o headaches      TREATMENT OPTIONS:       Medication Agreement Requirements Met  Continue Opioid therapy  Script written for perocet  Follow up appointment made

## 2020-10-22 ENCOUNTER — APPOINTMENT (OUTPATIENT)
Dept: CT IMAGING | Age: 50
End: 2020-10-22
Payer: COMMERCIAL

## 2020-10-22 ENCOUNTER — HOSPITAL ENCOUNTER (EMERGENCY)
Age: 50
Discharge: HOME | End: 2020-10-22
Attending: STUDENT IN AN ORGANIZED HEALTH CARE EDUCATION/TRAINING PROGRAM
Payer: COMMERCIAL

## 2020-10-22 VITALS
BODY MASS INDEX: 30.39 KG/M2 | RESPIRATION RATE: 16 BRPM | HEIGHT: 64 IN | OXYGEN SATURATION: 95 % | HEART RATE: 71 BPM | DIASTOLIC BLOOD PRESSURE: 86 MMHG | TEMPERATURE: 99.3 F | SYSTOLIC BLOOD PRESSURE: 107 MMHG | WEIGHT: 178 LBS

## 2020-10-22 LAB
-: ABNORMAL
ABSOLUTE EOS #: 0.2 K/UL (ref 0–0.4)
ABSOLUTE IMMATURE GRANULOCYTE: NORMAL K/UL (ref 0–0.3)
ABSOLUTE LYMPH #: 2.6 K/UL (ref 1–4.8)
ABSOLUTE MONO #: 0.5 K/UL (ref 0.1–1.3)
ALBUMIN SERPL-MCNC: 4.1 G/DL (ref 3.5–5.2)
ALBUMIN/GLOBULIN RATIO: ABNORMAL (ref 1–2.5)
ALP BLD-CCNC: 98 U/L (ref 35–104)
ALT SERPL-CCNC: 11 U/L (ref 5–33)
AMORPHOUS: ABNORMAL
ANION GAP SERPL CALCULATED.3IONS-SCNC: 11 MMOL/L (ref 9–17)
AST SERPL-CCNC: 14 U/L
BACTERIA: ABNORMAL
BASOPHILS # BLD: 1 % (ref 0–2)
BASOPHILS ABSOLUTE: 0.1 K/UL (ref 0–0.2)
BILIRUB SERPL-MCNC: 0.22 MG/DL (ref 0.3–1.2)
BILIRUBIN URINE: NEGATIVE
BUN BLDV-MCNC: 17 MG/DL (ref 6–20)
BUN/CREAT BLD: ABNORMAL (ref 9–20)
CALCIUM SERPL-MCNC: 9.3 MG/DL (ref 8.6–10.4)
CASTS UA: ABNORMAL /LPF
CHLORIDE BLD-SCNC: 108 MMOL/L (ref 98–107)
CO2: 21 MMOL/L (ref 20–31)
COLOR: YELLOW
COMMENT UA: ABNORMAL
CREAT SERPL-MCNC: 0.91 MG/DL (ref 0.5–0.9)
CRYSTALS, UA: ABNORMAL /HPF
DIFFERENTIAL TYPE: NORMAL
EOSINOPHILS RELATIVE PERCENT: 2 % (ref 0–4)
EPITHELIAL CELLS UA: ABNORMAL /HPF
GFR AFRICAN AMERICAN: >60 ML/MIN
GFR NON-AFRICAN AMERICAN: >60 ML/MIN
GFR SERPL CREATININE-BSD FRML MDRD: ABNORMAL ML/MIN/{1.73_M2}
GFR SERPL CREATININE-BSD FRML MDRD: ABNORMAL ML/MIN/{1.73_M2}
GLUCOSE BLD-MCNC: 106 MG/DL (ref 70–99)
GLUCOSE URINE: NEGATIVE
HCT VFR BLD CALC: 43.2 % (ref 36–46)
HEMOGLOBIN: 14.6 G/DL (ref 12–16)
IMMATURE GRANULOCYTES: NORMAL %
KETONES, URINE: NEGATIVE
LACTIC ACID: 1 MMOL/L (ref 0.5–2.2)
LEUKOCYTE ESTERASE, URINE: NEGATIVE
LIPASE: 26 U/L (ref 13–60)
LYMPHOCYTES # BLD: 32 % (ref 24–44)
MCH RBC QN AUTO: 31.4 PG (ref 26–34)
MCHC RBC AUTO-ENTMCNC: 33.7 G/DL (ref 31–37)
MCV RBC AUTO: 93.1 FL (ref 80–100)
MONOCYTES # BLD: 6 % (ref 1–7)
MUCUS: ABNORMAL
NITRITE, URINE: NEGATIVE
NRBC AUTOMATED: NORMAL PER 100 WBC
OTHER OBSERVATIONS UA: ABNORMAL
PDW BLD-RTO: 12.8 % (ref 11.5–14.9)
PH UA: 5.5 (ref 5–8)
PLATELET # BLD: 165 K/UL (ref 150–450)
PLATELET ESTIMATE: NORMAL
PMV BLD AUTO: 8.2 FL (ref 6–12)
POTASSIUM SERPL-SCNC: 4.2 MMOL/L (ref 3.7–5.3)
PROTEIN UA: NEGATIVE
RBC # BLD: 4.64 M/UL (ref 4–5.2)
RBC # BLD: NORMAL 10*6/UL
RBC UA: ABNORMAL /HPF
RENAL EPITHELIAL, UA: ABNORMAL /HPF
SEG NEUTROPHILS: 59 % (ref 36–66)
SEGMENTED NEUTROPHILS ABSOLUTE COUNT: 4.8 K/UL (ref 1.3–9.1)
SODIUM BLD-SCNC: 140 MMOL/L (ref 135–144)
SPECIFIC GRAVITY UA: 1.02 (ref 1–1.03)
TOTAL PROTEIN: 6.9 G/DL (ref 6.4–8.3)
TRICHOMONAS: ABNORMAL
TURBIDITY: ABNORMAL
URINE HGB: ABNORMAL
UROBILINOGEN, URINE: NORMAL
WBC # BLD: 8.1 K/UL (ref 3.5–11)
WBC # BLD: NORMAL 10*3/UL
WBC UA: ABNORMAL /HPF
YEAST: ABNORMAL

## 2020-10-22 PROCEDURE — 80053 COMPREHEN METABOLIC PANEL: CPT

## 2020-10-22 PROCEDURE — 6360000002 HC RX W HCPCS: Performed by: PHYSICIAN ASSISTANT

## 2020-10-22 PROCEDURE — 2580000003 HC RX 258: Performed by: PHYSICIAN ASSISTANT

## 2020-10-22 PROCEDURE — 83605 ASSAY OF LACTIC ACID: CPT

## 2020-10-22 PROCEDURE — 85025 COMPLETE CBC W/AUTO DIFF WBC: CPT

## 2020-10-22 PROCEDURE — 96374 THER/PROPH/DIAG INJ IV PUSH: CPT

## 2020-10-22 PROCEDURE — 6360000004 HC RX CONTRAST MEDICATION: Performed by: PHYSICIAN ASSISTANT

## 2020-10-22 PROCEDURE — 36415 COLL VENOUS BLD VENIPUNCTURE: CPT

## 2020-10-22 PROCEDURE — 81001 URINALYSIS AUTO W/SCOPE: CPT

## 2020-10-22 PROCEDURE — 99284 EMERGENCY DEPT VISIT MOD MDM: CPT

## 2020-10-22 PROCEDURE — 74177 CT ABD & PELVIS W/CONTRAST: CPT

## 2020-10-22 PROCEDURE — 96375 TX/PRO/DX INJ NEW DRUG ADDON: CPT

## 2020-10-22 PROCEDURE — 83690 ASSAY OF LIPASE: CPT

## 2020-10-22 RX ORDER — ONDANSETRON 4 MG/1
4 TABLET, ORALLY DISINTEGRATING ORAL 3 TIMES DAILY PRN
Qty: 12 TABLET | Refills: 0 | Status: SHIPPED | OUTPATIENT
Start: 2020-10-22 | End: 2020-11-10 | Stop reason: SDUPTHER

## 2020-10-22 RX ORDER — 0.9 % SODIUM CHLORIDE 0.9 %
80 INTRAVENOUS SOLUTION INTRAVENOUS ONCE
Status: COMPLETED | OUTPATIENT
Start: 2020-10-22 | End: 2020-10-22

## 2020-10-22 RX ORDER — ONDANSETRON 2 MG/ML
4 INJECTION INTRAMUSCULAR; INTRAVENOUS ONCE
Status: COMPLETED | OUTPATIENT
Start: 2020-10-22 | End: 2020-10-22

## 2020-10-22 RX ORDER — SODIUM CHLORIDE 0.9 % (FLUSH) 0.9 %
10 SYRINGE (ML) INJECTION PRN
Status: DISCONTINUED | OUTPATIENT
Start: 2020-10-22 | End: 2020-10-22 | Stop reason: HOSPADM

## 2020-10-22 RX ORDER — MORPHINE SULFATE 4 MG/ML
4 INJECTION, SOLUTION INTRAMUSCULAR; INTRAVENOUS ONCE
Status: COMPLETED | OUTPATIENT
Start: 2020-10-22 | End: 2020-10-22

## 2020-10-22 RX ORDER — KETOROLAC TROMETHAMINE 30 MG/ML
30 INJECTION, SOLUTION INTRAMUSCULAR; INTRAVENOUS ONCE
Status: COMPLETED | OUTPATIENT
Start: 2020-10-22 | End: 2020-10-22

## 2020-10-22 RX ORDER — 0.9 % SODIUM CHLORIDE 0.9 %
1000 INTRAVENOUS SOLUTION INTRAVENOUS ONCE
Status: COMPLETED | OUTPATIENT
Start: 2020-10-22 | End: 2020-10-22

## 2020-10-22 RX ADMIN — SODIUM CHLORIDE 1000 ML: 9 INJECTION, SOLUTION INTRAVENOUS at 11:24

## 2020-10-22 RX ADMIN — Medication 10 ML: at 12:10

## 2020-10-22 RX ADMIN — IOVERSOL 75 ML: 741 INJECTION INTRA-ARTERIAL; INTRAVENOUS at 12:10

## 2020-10-22 RX ADMIN — MORPHINE SULFATE 4 MG: 4 INJECTION, SOLUTION INTRAMUSCULAR; INTRAVENOUS at 11:24

## 2020-10-22 RX ADMIN — ONDANSETRON 4 MG: 2 INJECTION INTRAMUSCULAR; INTRAVENOUS at 11:24

## 2020-10-22 RX ADMIN — KETOROLAC TROMETHAMINE 30 MG: 30 INJECTION, SOLUTION INTRAMUSCULAR; INTRAVENOUS at 12:24

## 2020-10-22 RX ADMIN — SODIUM CHLORIDE 80 ML: 9 INJECTION, SOLUTION INTRAVENOUS at 12:10

## 2020-10-22 ASSESSMENT — PAIN SCALES - GENERAL
PAINLEVEL_OUTOF10: 8

## 2020-10-22 NOTE — ED NOTES
Mode of arrival (squad #, walk in, police, etc) : Pt. Walked into ED        Chief complaint(s): Abdominal pain/ back pain        Arrival Note (brief scenario, treatment PTA, etc).: Pt. Arrived to ED alert and oriented times 4 pt. States she has had back pain and abdominal pain for a few days now. Pt. States the pain is so bad it is making her nauseated. Pt. Skin is pink warm and dry. Respirations are 18 and unlabored. Pt. Is resting on cot in room. No signs of distress at this time. C= \"Have you ever felt that you should Cut down on your drinking? \"  No  A= \"Have people Annoyed you by criticizing your drinking? \"  No  G= \"Have you ever felt bad or Guilty about your drinking? \"  No  E= \"Have you ever had a drink as an Eye-opener first thing in the morning to steady your nerves or to help a hangover? \"  No      Deferred []      Reason for deferring: N/A    *If yes to two or more: probable alcohol abuse. Rangel Burrell RN  10/22/20 [id number]

## 2020-10-22 NOTE — ED PROVIDER NOTES
[address]  eMERGENCY dEPARTMENT eNCOUnter      Pt Name: Rani Burkett  MRN: [id number]  [place] 1970  Date of evaluation: 10/22/2020  Provider: Vincenzo Escalera PA-C    CHIEF COMPLAINT       Chief Complaint   Patient presents with    Abdominal Pain    Back Pain    Emesis     since last night            HISTORY OF PRESENT ILLNESS  (Location/Symptom, Timing/Onset, Context/Setting, Quality, Duration, Modifying Factors, Severity.)   Rani Burkett is a 53 y.o. female who presents to the emergency department presents with complaints of te pain, back pain, emesis. Patient states symptoms have been ongoing for a week and a half. Patient reports emesis began last night. Patient states pain started in her left lower quadrant and is radiating to her flank area. Patient states she has a pressure in her lower back. Reports pain is constant but will fluctuate in severity. Admits to associated nausea, vomiting. Denies any fever, chills, chest pain, shortness of breath, diarrhea, constipation, bloody stools, dysuria, hematuria, urgency, frequency, numbness, tingling. Patient does take Percocet at home for neck back pain. States the medication has not been helping. Patient has had a full hysterectomy. She did not drive here. No other complaints. Nursing Notes were reviewed. REVIEW OF SYSTEMS    (2-9 systems for level 4, 10 or more for level 5)     Review of Systems   Abd pain  Back pain  Nausea  Emesis        Except as noted above the remainder of the review of systems was reviewed and negative.        PAST MEDICAL HISTORY     Past Medical History:   Diagnosis Date    Anxiety     CAD (coronary artery disease)     Mitral valve prolapse    Fatty liver     GERD (gastroesophageal reflux disease)     Headache     History of bronchitis     Hyperlipidemia     Hypothyroidism     Kidney stone     LFT elevation     MVP (mitral valve prolapse)     Obesity     [facility]     Umbilical hernia      None otherwise stated in nurses notes    SURGICAL HISTORY       Past Surgical History:   Procedure Laterality Date    APPENDECTOMY       SECTION      2 pfannenstiel, 1 vertical    CHOLECYSTECTOMY      COLONOSCOPY      Dr Angel Lloyd  14    COLONOSCOPY  2014    biopsy & sigmoid spasms, pathology negative    COLONOSCOPY N/A 2018    COLONOSCOPY WITH BIOPSY performed by Daniel Salazar MD at [facility] 84      x 5    HYSTERECTOMY, TOTAL ABDOMINAL          [place] EXPLORATORY OF ABDOMEN  10/21/2014    Laparotomy-lysis of adhesions, bso     UPPER GASTROINTESTINAL ENDOSCOPY  2010    mild chronic inactive gastritis     None otherwise stated in nurses notes    CURRENT MEDICATIONS       Previous Medications    ALBUTEROL SULFATE  (90 BASE) MCG/ACT INHALER    INHALE 2 PUFFS BY MOUTH INTO THE LUNGS EVERY 4 HOURS AS NEEDED FOR WHEEZING OR SHORTNESS OF BREATH    ATORVASTATIN (LIPITOR) 40 MG TABLET    TAKE 1 TABLET BY MOUTH EVERY DAY    CLONAZEPAM (KLONOPIN) 0.5 MG TABLET    Take 0.5 mg by mouth 3 times daily as needed. Arabella Calabrese     ESOMEPRAZOLE (NEXIUM) 40 MG DELAYED RELEASE CAPSULE    TAKE 1 CAPSULE BY MOUTH EVERY MORNING BEFORE BREAKFAST    HYDROXYZINE (VISTARIL) 25 MG CAPSULE    Take 25 mg by mouth 2 times daily as needed for Itching 2 tabs at night    IBUPROFEN (ADVIL;MOTRIN) 600 MG TABLET    Take 1 tablet by mouth every 6 hours as needed for Pain    IPRATROPIUM-ALBUTEROL (DUONEB) 0.5-2.5 (3) MG/3ML SOLN NEBULIZER SOLUTION    INHALE CONTENTS OF 1 VIAL(3ML) VIA NEBULIZER EVERY 4 HOURS AS NEEDED FOR SHORTNESS OF BREATH    LEVOTHYROXINE (SYNTHROID) 175 MCG TABLET    TAKE 1 TABLET BY MOUTH DAILY    METOPROLOL TARTRATE (LOPRESSOR) 50 MG TABLET    Take 50 mg by mouth 2 times daily     NICOTINE (NICODERM CQ) 14 MG/24HR    Place 1 patch onto the skin every 24 hours    ONDANSETRON (ZOFRAN ODT) 4 MG Total Bilirubin 0.22 (*)     All other components within normal limits   MICROSCOPIC URINALYSIS - Abnormal; Notable for the following components:    Bacteria, UA FEW (*)     Mucus, UA 2+ (*)     All other components within normal limits   CBC WITH AUTO DIFFERENTIAL   LIPASE   LACTIC ACID       All other labs were within normal range or not returned as of this dictation. EMERGENCY DEPARTMENT COURSE and DIFFERENTIAL DIAGNOSIS/MDM:   Vitals:    Vitals:    10/22/20 1045 10/22/20 1230   BP: 107/86    Pulse: 71    Resp: 18 16   Temp: 99.3 °F (37.4 °C)    TempSrc: Oral    SpO2: 95%    Weight: 178 lb (80.7 kg)    Height: 5' 4\" (1.626 m)          Patient instructed to return to the emergency room if symptoms worsen, return, or any other concern right away which is agreed by the patient    ED MEDS:  Orders Placed This Encounter   Medications    0.9 % sodium chloride bolus    ondansetron (ZOFRAN) injection 4 mg    morphine sulfate (PF) injection 4 mg    ioversol (OPTIRAY) 74 % injection 75 mL    sodium chloride flush 0.9 % injection 10 mL    0.9 % sodium chloride bolus    ketorolac (TORADOL) injection 30 mg    ondansetron (ZOFRAN-ODT) 4 MG disintegrating tablet     Sig: Place 1 tablet under the tongue 3 times daily as needed for Nausea or Vomiting     Dispense:  12 tablet     Refill:  0         CONSULTS:  None    PROCEDURES:  None      FINAL IMPRESSION      1. Abdominal pain, left lower quadrant    2. Left flank pain    3. Non-intractable vomiting with nausea, unspecified vomiting type    4.  Hematuria, unspecified type          DISPOSITION/PLAN   DISPOSITION Decision To Discharge    PATIENT REFERRED TO:      Schedule an appointment as soon as possible for a visit      MARTITA Del Toro - CNP  [address] ED  [place] [address]  [phone]          DISCHARGE MEDICATIONS:  New Prescriptions    ONDANSETRON (ZOFRAN-ODT) 4 MG DISINTEGRATING TABLET    Place 1 tablet under the tongue 3 times daily as needed for Nausea or Vomiting         Summation      Patient Course:      Left lower quadrant abdominal pain and left flank pain x1-1/2 weeks. Pain is pretty constant, fluctuates in intensity. Patient reports associated emesis that began yesterday. She denies any fevers, chest pain, shortness of breath. No urinary symptoms. No change in bowel movements. No bloody stools. Exam patient has pain in the left lower quadrant and left flank. Abdomen is soft, nondistended. Bowel sounds are normal.    Patient has had a full hysterectomy, appendectomy, cholecystectomy. Suspect possible kidney stone. We will get urinalysis, basic labs, lactic, CT abdomen pelvis with contrast.  Will provide fluids, Zofran, morphine. Urinalysis shows small hemoglobin. There is no signs of UTI. No leukocytosis on CBC. Lactic is 1. CT scan shows no acute intra-abdominal pathology. There is no perforation, obstruction, abscess formation, diverticulitis. At this time I do suspect possible small kidney stone. Patient pain has improved with medication. No emesis while in the department. She is well-appearing. Vitals are stable. We will discharge patient home with Zofran. She does take Percocet at home. Recommend close follow-up with PCP. If symptoms change or worsen, she develops fever, chest pain, difficulty urinating or having a bowel movement, bloody stools she should return to the ED immediately. Patient is agreeable with discharge. Discussed results and plan with the pt. They expressed appropriate understanding. Pt given close follow up, supportive care instructions and strict return instructions at the bedside.         ED Medications administered this visit:    Medications   sodium chloride flush 0.9 % injection 10 mL (10 mLs Intravenous Given 10/22/20 1210)   0.9 % sodium chloride bolus (1,000 mLs Intravenous New Bag 10/22/20 1124)

## 2020-10-29 ENCOUNTER — TELEPHONE (OUTPATIENT)
Dept: NEUROSURGERY | Age: 50
End: 2020-10-29

## 2020-10-29 NOTE — LETTER
[facility] NEUROSURGERY   [address] [address]  Phone: [phone]  Fax: [phone]       10/29/2020    Jamesifeanyi Lubin [address] [place] [address]. [address]  [address]    Dear Rani Burkett,    You recently missed a scheduled appointment with Amy Villanueva CNP on 10/29/2020. Your health and follow-up medical care are important to us. Please call our office as soon as possible so that we may reschedule your appointment. If you have already rescheduled your appointment, please disregard this letter. This is the first scheduled appointment that you have missed within the last twelve months. If you miss 2 more appointment, you may be dismissed from the practice. For future appointments that you are unable to keep, please call the office at [phone] at least 24 hours in advance to cancel and reschedule.      Sincerely,      ISIS Aguilar

## 2020-11-02 ENCOUNTER — TELEPHONE (OUTPATIENT)
Dept: NEUROSURGERY | Age: 50
End: 2020-11-02

## 2020-11-02 NOTE — LETTER
[facility] NEUROSURGERY   [facility] [address]  Phone: [phone]  Fax: [phone]       11/2/2020    Jamesifeanyi Lubin [address] [place] [address]. [facility] [address]    Dear Brian Dela Cruz,    You recently missed a scheduled appointment with Clyde Green CNP on 10/29/2020  Your health and follow-up medical care are important to us. Please call our office as soon as possible so that we may reschedule your appointment. If you have already rescheduled your appointment, please disregard this letter. This is the first scheduled appointment that you have missed within the last twelve months. If you miss 2 more appointment, you may be dismissed from the practice. For future appointments that you are unable to keep, please call the office at [phone] at least 24 hours in advance to cancel and reschedule.      Sincerely,      ISIS Emmanuel

## 2020-11-08 ENCOUNTER — APPOINTMENT (OUTPATIENT)
Dept: GENERAL RADIOLOGY | Age: 50
End: 2020-11-08
Payer: COMMERCIAL

## 2020-11-08 ENCOUNTER — HOSPITAL ENCOUNTER (EMERGENCY)
Age: 50
Discharge: HOME | End: 2020-11-08
Attending: STUDENT IN AN ORGANIZED HEALTH CARE EDUCATION/TRAINING PROGRAM
Payer: COMMERCIAL

## 2020-11-08 VITALS
WEIGHT: 175 LBS | BODY MASS INDEX: 29.88 KG/M2 | RESPIRATION RATE: 18 BRPM | HEIGHT: 64 IN | HEART RATE: 71 BPM | OXYGEN SATURATION: 95 % | SYSTOLIC BLOOD PRESSURE: 113 MMHG | DIASTOLIC BLOOD PRESSURE: 79 MMHG | TEMPERATURE: 98.5 F

## 2020-11-08 LAB
ABSOLUTE EOS #: 0.3 K/UL (ref 0–0.4)
ABSOLUTE IMMATURE GRANULOCYTE: NORMAL K/UL (ref 0–0.3)
ABSOLUTE LYMPH #: 2.3 K/UL (ref 1–4.8)
ABSOLUTE MONO #: 0.6 K/UL (ref 0.1–1.3)
ALBUMIN SERPL-MCNC: 4.2 G/DL (ref 3.5–5.2)
ALBUMIN/GLOBULIN RATIO: ABNORMAL (ref 1–2.5)
ALP BLD-CCNC: 92 U/L (ref 35–104)
ALT SERPL-CCNC: 11 U/L (ref 5–33)
ANION GAP SERPL CALCULATED.3IONS-SCNC: 11 MMOL/L (ref 9–17)
AST SERPL-CCNC: 15 U/L
BASOPHILS # BLD: 1 % (ref 0–2)
BASOPHILS ABSOLUTE: 0.1 K/UL (ref 0–0.2)
BILIRUB SERPL-MCNC: 0.22 MG/DL (ref 0.3–1.2)
BNP INTERPRETATION: NORMAL
BUN BLDV-MCNC: 17 MG/DL (ref 6–20)
BUN/CREAT BLD: ABNORMAL (ref 9–20)
CALCIUM SERPL-MCNC: 9.5 MG/DL (ref 8.6–10.4)
CHLORIDE BLD-SCNC: 107 MMOL/L (ref 98–107)
CO2: 22 MMOL/L (ref 20–31)
CREAT SERPL-MCNC: 0.67 MG/DL (ref 0.5–0.9)
DIFFERENTIAL TYPE: NORMAL
EOSINOPHILS RELATIVE PERCENT: 3 % (ref 0–4)
GFR AFRICAN AMERICAN: >60 ML/MIN
GFR NON-AFRICAN AMERICAN: >60 ML/MIN
GFR SERPL CREATININE-BSD FRML MDRD: ABNORMAL ML/MIN/{1.73_M2}
GFR SERPL CREATININE-BSD FRML MDRD: ABNORMAL ML/MIN/{1.73_M2}
GLUCOSE BLD-MCNC: 100 MG/DL (ref 70–99)
HCT VFR BLD CALC: 42.7 % (ref 36–46)
HEMOGLOBIN: 14.6 G/DL (ref 12–16)
IMMATURE GRANULOCYTES: NORMAL %
LIPASE: 44 U/L (ref 13–60)
LYMPHOCYTES # BLD: 25 % (ref 24–44)
MCH RBC QN AUTO: 31.7 PG (ref 26–34)
MCHC RBC AUTO-ENTMCNC: 34.3 G/DL (ref 31–37)
MCV RBC AUTO: 92.4 FL (ref 80–100)
MONOCYTES # BLD: 6 % (ref 1–7)
NRBC AUTOMATED: NORMAL PER 100 WBC
PDW BLD-RTO: 13 % (ref 11.5–14.9)
PLATELET # BLD: 156 K/UL (ref 150–450)
PLATELET ESTIMATE: NORMAL
PMV BLD AUTO: 8.8 FL (ref 6–12)
POTASSIUM SERPL-SCNC: 4.2 MMOL/L (ref 3.7–5.3)
PRO-BNP: 90 PG/ML
RBC # BLD: 4.62 M/UL (ref 4–5.2)
RBC # BLD: NORMAL 10*6/UL
SEG NEUTROPHILS: 65 % (ref 36–66)
SEGMENTED NEUTROPHILS ABSOLUTE COUNT: 6.3 K/UL (ref 1.3–9.1)
SODIUM BLD-SCNC: 140 MMOL/L (ref 135–144)
TOTAL PROTEIN: 7 G/DL (ref 6.4–8.3)
TROPONIN INTERP: NORMAL
TROPONIN INTERP: NORMAL
TROPONIN T: NORMAL NG/ML
TROPONIN T: NORMAL NG/ML
TROPONIN, HIGH SENSITIVITY: <6 NG/L (ref 0–14)
TROPONIN, HIGH SENSITIVITY: <6 NG/L (ref 0–14)
WBC # BLD: 9.5 K/UL (ref 3.5–11)
WBC # BLD: NORMAL 10*3/UL

## 2020-11-08 PROCEDURE — 99285 EMERGENCY DEPT VISIT HI MDM: CPT

## 2020-11-08 PROCEDURE — 80053 COMPREHEN METABOLIC PANEL: CPT

## 2020-11-08 PROCEDURE — 6370000000 HC RX 637 (ALT 250 FOR IP): Performed by: STUDENT IN AN ORGANIZED HEALTH CARE EDUCATION/TRAINING PROGRAM

## 2020-11-08 PROCEDURE — 85025 COMPLETE CBC W/AUTO DIFF WBC: CPT

## 2020-11-08 PROCEDURE — 93005 ELECTROCARDIOGRAM TRACING: CPT | Performed by: STUDENT IN AN ORGANIZED HEALTH CARE EDUCATION/TRAINING PROGRAM

## 2020-11-08 PROCEDURE — 83880 ASSAY OF NATRIURETIC PEPTIDE: CPT

## 2020-11-08 PROCEDURE — 71045 X-RAY EXAM CHEST 1 VIEW: CPT

## 2020-11-08 PROCEDURE — 6360000002 HC RX W HCPCS: Performed by: STUDENT IN AN ORGANIZED HEALTH CARE EDUCATION/TRAINING PROGRAM

## 2020-11-08 PROCEDURE — 83690 ASSAY OF LIPASE: CPT

## 2020-11-08 PROCEDURE — 36415 COLL VENOUS BLD VENIPUNCTURE: CPT

## 2020-11-08 PROCEDURE — 96374 THER/PROPH/DIAG INJ IV PUSH: CPT

## 2020-11-08 PROCEDURE — 84484 ASSAY OF TROPONIN QUANT: CPT

## 2020-11-08 RX ORDER — LIDOCAINE HYDROCHLORIDE 20 MG/ML
15 SOLUTION OROPHARYNGEAL ONCE
Status: COMPLETED | OUTPATIENT
Start: 2020-11-08 | End: 2020-11-08

## 2020-11-08 RX ORDER — MAGNESIUM HYDROXIDE/ALUMINUM HYDROXICE/SIMETHICONE 120; 1200; 1200 MG/30ML; MG/30ML; MG/30ML
30 SUSPENSION ORAL ONCE
Status: COMPLETED | OUTPATIENT
Start: 2020-11-08 | End: 2020-11-08

## 2020-11-08 RX ORDER — NITROGLYCERIN 0.4 MG/1
0.4 TABLET SUBLINGUAL EVERY 5 MIN PRN
Status: DISCONTINUED | OUTPATIENT
Start: 2020-11-08 | End: 2020-11-08 | Stop reason: HOSPADM

## 2020-11-08 RX ORDER — MORPHINE SULFATE 4 MG/ML
4 INJECTION, SOLUTION INTRAMUSCULAR; INTRAVENOUS ONCE
Status: COMPLETED | OUTPATIENT
Start: 2020-11-08 | End: 2020-11-08

## 2020-11-08 RX ADMIN — MORPHINE SULFATE 4 MG: 4 INJECTION, SOLUTION INTRAMUSCULAR; INTRAVENOUS at 17:02

## 2020-11-08 RX ADMIN — LIDOCAINE HYDROCHLORIDE 15 ML: 20 SOLUTION ORAL; TOPICAL at 18:28

## 2020-11-08 RX ADMIN — ALUMINUM HYDROXIDE, MAGNESIUM HYDROXIDE, AND SIMETHICONE 30 ML: 200; 200; 20 SUSPENSION ORAL at 18:28

## 2020-11-08 RX ADMIN — NITROGLYCERIN 0.4 MG: 0.4 TABLET SUBLINGUAL at 17:01

## 2020-11-08 ASSESSMENT — PAIN DESCRIPTION - LOCATION: LOCATION: CHEST

## 2020-11-08 ASSESSMENT — ENCOUNTER SYMPTOMS
ABDOMINAL PAIN: 0
SHORTNESS OF BREATH: 0
VOMITING: 0
COLOR CHANGE: 0
DIARRHEA: 0
FACIAL SWELLING: 0
PHOTOPHOBIA: 0
EYE ITCHING: 0
NAUSEA: 0
RHINORRHEA: 0
COUGH: 0

## 2020-11-08 ASSESSMENT — PAIN DESCRIPTION - PAIN TYPE: TYPE: ACUTE PAIN

## 2020-11-08 ASSESSMENT — PAIN DESCRIPTION - ONSET: ONSET: SUDDEN

## 2020-11-08 ASSESSMENT — PAIN SCALES - GENERAL
PAINLEVEL_OUTOF10: 8
PAINLEVEL_OUTOF10: 8

## 2020-11-08 ASSESSMENT — PAIN DESCRIPTION - ORIENTATION: ORIENTATION: ANTERIOR

## 2020-11-08 ASSESSMENT — PAIN DESCRIPTION - DESCRIPTORS: DESCRIPTORS: BURNING

## 2020-11-08 NOTE — ED PROVIDER NOTES
EMERGENCY DEPARTMENT ENCOUNTER    Pt Name: Samantha Pickard  MRN: [id number]  [place] 1970  Date of evaluation: 11/8/20  CHIEF COMPLAINT       Chief Complaint   Patient presents with    Chest Pain     sudden onset of \"burning\" chest pain that \"shoots\" into her jaw. HISTORY OF PRESENT ILLNESS   HPI  50-year-old female history of CAD, GERD, hyperlipidemia, tobacco smoking, diabetes, obesity presents with chief complaint of chest pain. Patient states she woke up around 730 this morning with a sharp substernal chest pressure. This pain has been present since onset. She tried taking some antacids with no relief. Described as moderate in severity. No other associated symptoms. Pain does radiate up to her right neck. No shortness of breath. No cough or fever. No nausea or diaphoresis. No history of similar symptoms. No other recent illness. REVIEW OF SYSTEMS     Review of Systems   Constitutional: Negative for chills and fatigue. HENT: Negative for facial swelling, postnasal drip and rhinorrhea. Eyes: Negative for photophobia and itching. Respiratory: Negative for cough and shortness of breath. Cardiovascular: Positive for chest pain. Negative for leg swelling. Gastrointestinal: Negative for abdominal pain, diarrhea, nausea and vomiting. Genitourinary: Negative for dysuria, flank pain and hematuria. Musculoskeletal: Negative for arthralgias and joint swelling. Skin: Negative for color change and rash. Neurological: Negative for dizziness, numbness and headaches.      PASTMEDICAL HISTORY     Past Medical History:   Diagnosis Date    Anxiety     CAD (coronary artery disease)     Mitral valve prolapse    Fatty liver     GERD (gastroesophageal reflux disease)     Headache     History of bronchitis     Hyperlipidemia     Hypothyroidism     Kidney stone     LFT elevation     MVP (mitral valve prolapse)     Obesity     Type 2 diabetes mellitus without complication ([facility]     Umbilical hernia      Past Problem List  Patient Active Problem List   Diagnosis Code    Hyperlipidemia E78.5    MVP (mitral valve prolapse) I34.1    Anxiety F41.9    Hypothyroidism E03.9    Allergic rhinitis J30.9    Obesity E66.9    Abdominal pain R10.9    Elevated liver enzymes R74.8    GERD (gastroesophageal reflux disease) K21.9    Ovarian mass N83.8    S/P bilateral oophorectomy & KRUPA 10/21/14 Z90.722    Pain emptying bladder R30.9    Urinary urgency R39.15    Insomnia G47.00    RLS (restless legs syndrome) G25.81    Pancreatitis K85.90    Eye swelling, left H57.89    Osteoarthritis of cervical spine M47.812    Degenerative cervical spinal stenosis M48.02    Trigger point with tension headache G44.209    Arthropathy of cervical facet joint M47.812    Atlanto-axial joint sprain, sequela S13. 4XXS    Cervical radiculopathy M54.12    Sprain of atlanto-occipital joint, sequela S13. 4XXS    Encounter for medication monitoring Z51.81    Myofascial muscle pain M79.18    Colitis K52.9    Chest pain R07.9     SURGICAL HISTORY       Past Surgical History:   Procedure Laterality Date    APPENDECTOMY       SECTION      2 pfannenstiel, 1 vertical    CHOLECYSTECTOMY      COLONOSCOPY      Dr Scot Arvizu  14    COLONOSCOPY  2014    biopsy & sigmoid spasms, pathology negative    COLONOSCOPY N/A 2018    COLONOSCOPY WITH BIOPSY performed by John Colón MD at [facility] 84      x 5    HYSTERECTOMY, TOTAL ABDOMINAL          [place] EXPLORATORY OF ABDOMEN  10/21/2014    Laparotomy-lysis of adhesions, bso     UPPER GASTROINTESTINAL ENDOSCOPY  2010    mild chronic inactive gastritis     CURRENT MEDICATIONS       Discharge Medication List as of 2020  7:17 PM      CONTINUE these medications which have NOT CHANGED    Details   !! ondansetron (ZOFRAN-ODT) 4 MG disintegrating tablet Place 1 tablet under the tongue 3 times daily as needed for Nausea or Vomiting, Disp-12 tablet,R-0Print      levothyroxine (SYNTHROID) 175 MCG tablet TAKE 1 TABLET BY MOUTH DAILY, Disp-30 tablet,R-0Normal      oxyCODONE-acetaminophen (PERCOCET)  MG per tablet Take 1 tablet by mouth every 6 hours as needed for Pain for up to 30 days. , Disp-120 tablet,R-0Normal      tiZANidine (ZANAFLEX) 4 MG tablet Take 1 tablet by mouth every 8 hours as needed (spasms), Disp-90 tablet,R-1Normal      albuterol sulfate  (90 Base) MCG/ACT inhaler INHALE 2 PUFFS BY MOUTH INTO THE LUNGS EVERY 4 HOURS AS NEEDED FOR WHEEZING OR SHORTNESS OF BREATH, Disp-1 Inhaler,R-3Normal      rOPINIRole (REQUIP) 2 MG tablet TAKE 1 TABLET BY MOUTH EVERY NIGHT, Disp-90 tablet,R-3Normal      hydrOXYzine (VISTARIL) 25 MG capsule Take 25 mg by mouth 2 times daily as needed for Itching 2 tabs at nightHistorical Med      atorvastatin (LIPITOR) 40 MG tablet TAKE 1 TABLET BY MOUTH EVERY DAY, Disp-90 tablet, R-3Normal      esomeprazole (NEXIUM) 40 MG delayed release capsule TAKE 1 CAPSULE BY MOUTH EVERY MORNING BEFORE BREAKFAST, Disp-90 capsule, R-2Normal      !! ondansetron (ZOFRAN ODT) 4 MG disintegrating tablet Take 1 tablet by mouth every 8 hours as needed for Nausea or Vomiting, Disp-10 tablet, R-0Print      ipratropium-albuterol (DUONEB) 0.5-2.5 (3) MG/3ML SOLN nebulizer solution INHALE CONTENTS OF 1 VIAL(3ML) VIA NEBULIZER EVERY 4 HOURS AS NEEDED FOR SHORTNESS OF BREATH, Disp-90 mL, R-0Normal      ibuprofen (ADVIL;MOTRIN) 600 MG tablet Take 1 tablet by mouth every 6 hours as needed for Pain, Disp-28 tablet,R-0Print      nicotine (NICODERM CQ) 14 MG/24HR Place 1 patch onto the skin every 24 hours, Disp-30 patch,R-3Normal      clonazePAM (KLONOPIN) 0.5 MG tablet Take 0.5 mg by mouth 3 times daily as needed. Felicia Reva Historical Med      sertraline (ZOLOFT) 100 MG tablet Take 100 mg by mouth dailyHistorical Med      topiramate (TOPAMAX) 25 MG tablet 25 mg 2 times daily Historical Med metoprolol tartrate (LOPRESSOR) 50 MG tablet Take 50 mg by mouth 2 times daily Historical Med       !! - Potential duplicate medications found. Please discuss with provider. ALLERGIES     is allergic to compazine [prochlorperazine]; sulfa antibiotics; and adhesive tape. FAMILY HISTORY     She indicated that her mother is . She indicated that her father is alive. She indicated that her maternal grandmother is . She indicated that her maternal grandfather is . She indicated that her paternal grandmother is . She indicated that her paternal grandfather is . SOCIAL HISTORY       Social History     Tobacco Use    Smoking status: Former Smoker     Packs/day: 0.25     Years: 33.00     Pack years: 8.25     Types: Cigarettes    Smokeless tobacco: Never Used    Tobacco comment: pt smoked 1 ppd x 33 years, now 0.25 ppd   Substance Use Topics    Alcohol use: No     Alcohol/week: 0.0 standard drinks    Drug use: No     PHYSICAL EXAM     INITIAL VITALS: /79   Pulse 71   Temp 98.5 °F (36.9 °C) (Oral)   Resp 18   Ht 5' 4\" (1.626 m)   Wt 175 lb (79.4 kg)   LMP 2010   SpO2 95%   BMI 30.04 kg/m²    Physical Exam  Constitutional:       Appearance: She is normal weight. HENT:      Head: Normocephalic and atraumatic. Eyes:      Extraocular Movements: Extraocular movements intact. Pupils: Pupils are equal, round, and reactive to light. Neck:      Musculoskeletal: Normal range of motion and neck supple. Cardiovascular:      Rate and Rhythm: Normal rate and regular rhythm. Pulmonary:      Effort: Pulmonary effort is normal.      Breath sounds: Normal breath sounds. Abdominal:      General: Abdomen is flat. There is no distension. Palpations: There is no mass. Musculoskeletal: Normal range of motion. General: No swelling. Skin:     General: Skin is warm and dry. Neurological:      General: No focal deficit present.       Mental Status: She is alert. Mental status is at baseline. MEDICAL DECISION MAKIN-year-old female presents for evaluation of chest pain. She notes a remote history of nonobstructive CAD but I do not see any information in our EMR on cardiac cath. States her last cardiac cath was about 9 years ago. Will need a cardiac work-up. PERC negative. Troponins negative x2. Pain improved with GI cocktail. Patient was a borderline heart score at 3. Discussed possible admission with patient but she did not want to be admitted to the hospital.  She states that she does have a cardiologist she follows with and will call him tomorrow to establish plan for outpatient follow-up. Advised to return with any worsening symptoms. Patient agreeable. DisCharge. CRITICAL CARE:       PROCEDURES:    Procedures    DIAGNOSTIC RESULTS   EKG:All EKG's are interpreted by the Emergency Department Physician who either signs or Co-signs this chart in the absence of a cardiologist.    Sinus rhythm rate of 66 normal axis normal intervals no concerning ST or T wave changes    RADIOLOGY:All plain film, CT, MRI, and formal ultrasound images (except ED bedside ultrasound) are read by the radiologist, see reports below, unless otherwisenoted in MDM or here. XR CHEST PORTABLE   Final Result   Negative portable chest.           LABS: All lab results were reviewed by myself, and all abnormals are listed below.   Labs Reviewed   COMPREHENSIVE METABOLIC PANEL W/ REFLEX TO MG FOR LOW K - Abnormal; Notable for the following components:       Result Value    Glucose 100 (*)     Total Bilirubin 0.22 (*)     All other components within normal limits   CBC WITH AUTO DIFFERENTIAL   LIPASE   TROPONIN   BRAIN NATRIURETIC PEPTIDE   TROPONIN   URINALYSIS       EMERGENCY DEPARTMENTCOURSE:         Vitals:    Vitals:    20 1730 20 1745 20 1800 20 1815   BP: 114/75 94/75 110/80 113/79   Pulse: 68 70 69 71   Resp: 16 19 19 18 Temp:       TempSrc:       SpO2: 96% 95% 95% 95%   Weight:       Height:           The patient was given the following medications while in the emergency department:  Orders Placed This Encounter   Medications    nitroGLYCERIN (NITROSTAT) SL tablet 0.4 mg    morphine sulfate (PF) injection 4 mg    lidocaine viscous hcl (XYLOCAINE) 2 % solution 15 mL    aluminum & magnesium hydroxide-simethicone (MAALOX) 200-200-20 MG/5ML suspension 30 mL     CONSULTS:  None    FINAL IMPRESSION      1.  Chest pain, unspecified type          DISPOSITION/PLAN   DISPOSITION Decision To Discharge 11/08/2020 07:17:22 PM      PATIENT REFERRED TO:  MARTITA Smart CNP  [place] [id number]  [phone]    Call in 1 day      DISCHARGE MEDICATIONS:  Discharge Medication List as of 11/8/2020  7:17 PM        Kelby Dejesus MD  Attending Emergency Physician                    Kelby Dejesus MD  11/08/20 7530

## 2020-11-09 LAB
EKG ATRIAL RATE: 66 BPM
EKG P AXIS: 60 DEGREES
EKG P-R INTERVAL: 142 MS
EKG Q-T INTERVAL: 426 MS
EKG QRS DURATION: 82 MS
EKG QTC CALCULATION (BAZETT): 446 MS
EKG R AXIS: 34 DEGREES
EKG T AXIS: 58 DEGREES
EKG VENTRICULAR RATE: 66 BPM

## 2020-11-09 RX ORDER — LEVOTHYROXINE SODIUM 175 UG/1
175 TABLET ORAL DAILY
Qty: 30 TABLET | Refills: 0 | Status: SHIPPED | OUTPATIENT
Start: 2020-11-09 | End: 2020-11-11

## 2020-11-10 ENCOUNTER — HOSPITAL ENCOUNTER (OUTPATIENT)
Dept: PAIN MANAGEMENT | Age: 50
Discharge: HOME | End: 2020-11-10
Payer: COMMERCIAL

## 2020-11-10 PROCEDURE — 99213 OFFICE O/P EST LOW 20 MIN: CPT

## 2020-11-10 PROCEDURE — 99213 OFFICE O/P EST LOW 20 MIN: CPT | Performed by: NURSE PRACTITIONER

## 2020-11-10 RX ORDER — OXYCODONE AND ACETAMINOPHEN 10; 325 MG/1; MG/1
1 TABLET ORAL EVERY 6 HOURS PRN
Qty: 120 TABLET | Refills: 0 | Status: SHIPPED | OUTPATIENT
Start: 2020-11-11 | End: 2020-12-09 | Stop reason: SDUPTHER

## 2020-11-10 ASSESSMENT — ENCOUNTER SYMPTOMS
RESPIRATORY NEGATIVE: 1
GASTROINTESTINAL NEGATIVE: 1

## 2020-11-10 NOTE — PROGRESS NOTES
[address] [facility] PROGRESS NOTE      Patient video visit to   review Medication Agreement    Chief Complaint: neck pain      She c/o neck pain which she states is intense. She has headaches associated with the  Neck pain. She saw Neurosurgeon and x-rays were ordered. and she has follow up appt. She had left cervical facet injection C2-T1 11/2019 with 1 month relief. She does not sleep well. She is working from home. She had 1 ED visit for chest pain and has appt with her Cardiologist.She states had pneumonia vaccine but does not get the flu vaccine. Neck Pain    This is a chronic problem. The problem occurs constantly. The problem has been unchanged. The pain is present in the midline (back of head,left shoulder and arm). The pain is at a severity of 7/10. The pain is moderate. Exacerbated by: holding something in left hand for awhile, movement of neck to the left. The pain is same all the time. Associated symptoms include chest pain, headaches, numbness and tingling. Associated symptoms comments: Numbness and tingling left hand. She has tried heat (putting pressure on left side occipital area) for the symptoms. Patient denies any new neurological symptoms. No bowel or bladder incontinence, no weakness, and no falling. Any new diagnostic workup: [x] no  [] yes [] Xray [] CT scan [x] MRI [] DEXA scan     [] Other            EXAMINATION:    MRI OF THE CERVICAL SPINE WITHOUT CONTRAST 6/12/2020 1:33 pm         TECHNIQUE:    Multiplanar multisequence MRI of the cervical spine was performed without the    administration of intravenous contrast.         COMPARISON:    02/02/2017 and CT cervical spine 06/25/2018         HISTORY:    ORDERING SYSTEM PROVIDED HISTORY: Cervical radiculopathy    TECHNOLOGIST PROVIDED HISTORY:    increased pain, not respond    Is the patient pregnant?->No    Reason for Exam: Cervical radiculopathy M54.12 (ICD-10-CM);  Arthropathy of    cervical facet joint M47.812 (ICD-10-CM); Degenerative cervical spinal    stenosis    Additional signs and symptoms: neck and left arm pain for the past 4 years.     previous thyroid surgery, no previous spine surgeries         FINDINGS:    BONES/ALIGNMENT: Patient motion limits evaluation.  There is straightening of    the normal cervical lordosis.  Alignment is otherwise normal.  There is no    acute fracture or listhesis.  Bone marrow signal intensity is normal.  There    is disc desiccation and mild disc space narrowing at C3-4, C4-5 and C5-C6.         SPINAL CORD: The cervical spinal cord is normal in size and signal    intensities.         SOFT TISSUES: There is no paraspinal mass identified.         C2-C3: There is no disc bulge or protrusion present.  There is no significant    spinal canal stenosis or neural foraminal narrowing present.         C3-C4: There is a disc bulge and uncovertebral overgrowth resulting in mild    spinal canal stenosis and mild left neural foraminal narrowing.  No    significant right neural foraminal narrowing is present.         C4-C5: There is a disc bulge and uncovertebral overgrowth resulting in mild    spinal canal stenosis and mild bilateral neural foraminal narrowing.         C5-C6: There is a disc bulge and uncovertebral overgrowth resulting in mild    spinal canal stenosis and moderate left neural foraminal narrowing.  No    significant right neural foraminal narrowing is present.         C6-C7: There is no disc bulge or protrusion present.  There is no significant    spinal canal stenosis or neural foraminal narrowing present.         C7-T1: There is no disc bulge or protrusion present.  There is no significant    spinal canal stenosis or neural foraminal narrowing present.              Impression    Mild multilevel spinal canal stenosis and mild-to-moderate neural foraminal    narrowing from C3-4 to C5-6, as described above.  The appearance is similar    compared to the previous exam.           Treatment goals:  Functional status: reduce pain by 50%    Aberrancy:   Any alcoholic beverages no      Any illegal drugs  no       Analgesia:7      Adverse  Effects :    ADL;s :neck stretching, working from home        Pill count: appropriate fill 11-    Morphine equivalent dose as reported on OARRS: 60  Periodic Controlled Substance Monitoring: Possible medication side effects, risk of tolerance/dependence & alternative treatments discussed., No signs of potential drug abuse or diversion identified. , Assessed functional status., Obtaining appropriate analgesic effect of treatment. MARTITA Rivera - CNP)  Review ofOARRS does not show any aberrant prescription behavior. Medication is helping the patient stay active. Patient denies any side effects and reports adequate analgesia. No sign of misuse/abuse. When was thelast UDS:    7-         Was the UDS appropriate:yes      Record/Diagnostics Review:      As above, I did review the imaging    7/18/2020  6:16 AM - [facility] Incoming Lab Results From [employer]     Component  Value  Ref Range & Units  Status  Collected  Lab    6-Acetylmorphine, Ur  Not Detected   Final  07/15/2020  3:29 PM  [facility]    7-Aminoclonazepam, Urine  Present   Final  07/15/2020  3:29 PM  [facility]    Alpha-OH-Alpraz, Urine  Not Detected   Final  07/15/2020  3:29 PM  [facility]    Alprazolam, Urine  Not Detected   Final  07/15/2020  3:29 PM  [facility]    Amphetamines, urine  Not Detected   Final  07/15/2020  3:29 PM  [facility]    Barbiturates, Ur  Not Detected   Final  07/15/2020  3:29 PM  [facility]    Benzoylecgonine, Ur  Not Detected   Final  07/15/2020  3:29 PM  [facility]    Buprenorphine Urine  Not Detected   Final  07/15/2020  3:29 PM  [facility]    Carisoprodol, Ur  Not Detected   Final  07/15/2020  3:29 PM  [facility]    (NOTE)   The carisoprodol immunoassay has cross-reactivity to carisoprodol   and meprobamate.     Clonazepam, Urine  Not Detected   Final  07/15/2020  3:29 PM  [facility]    Codeine, Urine Not Detected   Final  07/15/2020  3:29 PM  [facility]    MDA, Ur  Not Detected   Final  07/15/2020  3:29 PM  [facility]    Diazepam, Urine  Not Detected   Final  07/15/2020  3:29 PM  [facility]    Ethyl Glucuronide Ur  Not Detected   Final  07/15/2020  3:29 PM  [facility]    Fentanyl, Ur  Not Detected   Final  07/15/2020  3:29 PM  [facility]    Hydrocodone, Urine  Not Detected   Final  07/15/2020  3:29 PM  [facility]    Hydromorphone, Urine  Not Detected   Final  07/15/2020  3:29 PM  [facility]    Lorazepam, Urine  Not Detected   Final  07/15/2020  3:29 PM  [facility]    Marijuana Metab, Ur  Not Detected   Final  07/15/2020  3:29 PM  [facility]    MDEA, AISHA, Ur  Not Detected   Final  07/15/2020  3:29 PM  [facility]    MDMA, Urine  Not Detected   Final  07/15/2020  3:29 PM  [facility]    Meperidine Metab, Ur  Not Detected   Final  07/15/2020  3:29 PM  [facility]    Methadone, Urine  Not Detected   Final  07/15/2020  3:29 PM  [facility]    Methamphetamine, Urine  Not Detected   Final  07/15/2020  3:29 PM  [facility]    Methylphenidate  Not Detected   Final  07/15/2020  3:29 PM  [facility]    Midazolam, Urine  Not Detected   Final  07/15/2020  3:29 PM  [facility]    Morphine Urine  Not Detected   Final  07/15/2020  3:29 PM  [facility]    Norbuprenorphine, Urine  Not Detected   Final  07/15/2020  3:29 PM  [facility]    Nordiazepam, Urine  Not Detected   Final  07/15/2020  3:29 PM  [facility]    Norfentanyl, Urine  Not Detected   Final  07/15/2020  3:29 PM  [facility]    NORHYDROCODONE, URINE  Not Detected   Final  07/15/2020  3:29 PM  [facility]    Noroxycodone, Urine  Present   Final  07/15/2020  3:29 PM  [facility]    NOROXYMORPHONE, URINE  Not Detected   Final  07/15/2020  3:29 PM  [facility]    Oxazepam, Urine  Not Detected   Final  07/15/2020  3:29 PM  [facility]    Oxycodone Urine  Present   Final  07/15/2020  3:29 PM  [facility]    Oxymorphone, Urine  Not Detected   Final  07/15/2020  3:29 PM  [facility]    PCP, Urine  Not Detected   Final  07/15/2020  3:29 PM  [facility]    Phentermine, Ur  Not Detected   Final  07/15/2020  3:29 PM  [facility]    Propoxyphene, Urine  Not Detected   Final  07/15/2020  3:29 PM  [facility]    Tapentadol-O-Sulfate, Urine  Not Detected   Final  07/15/2020  3:29 PM  [facility]    Tapentadol, Urine  Not Detected   Final  07/15/2020  3:29 PM  [facility]    Temazepam, Urine  Not Detected   Final  07/15/2020  3:29 PM  [facility]    Tramadol, Urine  Not Detected   Final  07/15/2020  3:29 PM  [facility]    Zolpidem, Urine  Not Detected   Final  07/15/2020  3:29 PM  [facility]    Creatinine, Ur  295.4  20.0 - 400.0 mg/dL  Final  07/15/2020  3:29 PM  [facility]    Pain Mgt Drug Panel, Hi Res, Ur  See Below   Final  07/15/2020  3:29 PM  [facility]    (NOTE)   Methodology: Qualitative Enzyme Immunoassay and Qualitative Liquid   Chromatography-Time of Flight-Mass Spectrometry or Tandem Mass   Spectrometry, Quantitative Spectrophotometry   The absence of expected drug(s) and/or drug metabolite(s) may   indicate non-compliance, inappropriate timing of specimen   collection relative to drug administration, poor drug absorption,   diluted/adulterated urine, or limitations of testing. The   concentration must be greater than or equal to the cutoff to be   reported as present.  If specific drug concentrations are   required, contact the laboratory within two weeks of specimen   collection to request quantification by a second analytical   technique. Interpretive questions should be directed to the   laboratory. Results based on immunoassay detection that do not match clinical   expectations should be   interpreted with caution. Confirmatory testing by mass   spectrometry for immunoassay-based results is available, if   ordered within two weeks of specimen collection. Additional   charges apply. For medical purposes only; not valid for forensic use. This test was developed and its performance characteristics   determined by [employer]. The U.S. Food and Drug   Administration has not approved or cleared this test; however, FDA   clearance or approval is not currently required for clinical use.    The results are not intended to be used as the sole means for   clinical diagnosis or patient management decisions. [facility] Pain Mgt Drug Panel, High Res/Emit U  See Note   Final  07/15/2020  3:29 PM  [facility]    (NOTE)   Access [facility] Enhanced Report using either link below:   -Direct access: [email or web address]   -Enter Username, Password: [email or web address]   Username: [email or web address]   Password: [email or web address]   Performed By: Dimitris Wu [address]   [place], [address] [facility]   : Gustavo Law.  Sandra Carlisle MD, Lu[address] Ramirez [address]    Testing Performed By     [facility] Morales  Name  Director  Address  Valid Date Range    [address]   [facility] [place] [address]  17 [address]33-Present    Lab and Collection     Pain Management Drug Screen - 7/15/2020   Result History     Pain Management Drug Screen on 2020    Result Information     Status: Final result (Collected: 7/15/2020 15:29)  Provider Status: Reviewed    All Reviewers List     MARTITA Santoyo CNP on  54:33    Click to Print Result    View [employer] Info     Pain Management Drug Screen (Order #[id number])           Past Medical History:   Diagnosis Date    Anxiety     CAD (coronary artery disease)     Mitral valve prolapse    Fatty liver     GERD (gastroesophageal reflux disease)     Headache     History of bronchitis     Hyperlipidemia     Hypothyroidism     Kidney stone     LFT elevation     MVP (mitral valve prolapse)     Obesity     Type 2 diabetes mellitus without complication ([facility] [address]     Umbilical hernia        Past Surgical History:   Procedure Laterality Date    APPENDECTOMY       SECTION      2 pfannenstiel, 1 vertical    CHOLECYSTECTOMY      COLONOSCOPY      Dr Meagan Gustafson  14    COLONOSCOPY  2014    biopsy & sigmoid spasms, pathology negative    COLONOSCOPY N/A 2018    COLONOSCOPY WITH BIOPSY performed by Lu Barber Ova MD Darrion at [facility]      x 5    HYSTERECTOMY, TOTAL ABDOMINAL      2010    OR EXPLORATORY OF ABDOMEN  10/21/2014    Laparotomy-lysis of adhesions, bso     UPPER GASTROINTESTINAL ENDOSCOPY  2/17/2010    mild chronic inactive gastritis       Allergies   Allergen Reactions    Compazine [Prochlorperazine]      Jittery, restless    Sulfa Antibiotics Hives    Adhesive Tape Rash         Current Outpatient Medications:     levothyroxine (SYNTHROID) 175 MCG tablet, TAKE 1 TABLET BY MOUTH DAILY, Disp: 30 tablet, Rfl: 0    oxyCODONE-acetaminophen (PERCOCET)  MG per tablet, Take 1 tablet by mouth every 6 hours as needed for Pain for up to 30 days. , Disp: 120 tablet, Rfl: 0    rOPINIRole (REQUIP) 2 MG tablet, TAKE 1 TABLET BY MOUTH EVERY NIGHT, Disp: 90 tablet, Rfl: 3    atorvastatin (LIPITOR) 40 MG tablet, TAKE 1 TABLET BY MOUTH EVERY DAY, Disp: 90 tablet, Rfl: 3    esomeprazole (NEXIUM) 40 MG delayed release capsule, TAKE 1 CAPSULE BY MOUTH EVERY MORNING BEFORE BREAKFAST, Disp: 90 capsule, Rfl: 2    nicotine (NICODERM CQ) 14 MG/24HR, Place 1 patch onto the skin every 24 hours, Disp: 30 patch, Rfl: 3    sertraline (ZOLOFT) 100 MG tablet, Take 100 mg by mouth daily, Disp: , Rfl:     topiramate (TOPAMAX) 25 MG tablet, 25 mg 2 times daily , Disp: , Rfl:     metoprolol tartrate (LOPRESSOR) 50 MG tablet, Take 50 mg by mouth 2 times daily , Disp: , Rfl:     tiZANidine (ZANAFLEX) 4 MG tablet, Take 1 tablet by mouth every 8 hours as needed (spasms), Disp: 90 tablet, Rfl: 1    albuterol sulfate  (90 Base) MCG/ACT inhaler, INHALE 2 PUFFS BY MOUTH INTO THE LUNGS EVERY 4 HOURS AS NEEDED FOR WHEEZING OR SHORTNESS OF BREATH, Disp: 1 Inhaler, Rfl: 3    hydrOXYzine (VISTARIL) 25 MG capsule, Take 25 mg by mouth 2 times daily as needed for Itching 2 tabs at night, Disp: , Rfl:     ondansetron (ZOFRAN ODT) 4 MG disintegrating tablet, Take 1 tablet by mouth every 8 hours as needed for Nausea or Vomiting, Disp: 10 tablet, Rfl: 0    ipratropium-albuterol (DUONEB) 0.5-2.5 (3) MG/3ML SOLN nebulizer solution, INHALE CONTENTS OF 1 VIAL(3ML) VIA NEBULIZER EVERY 4 HOURS AS NEEDED FOR SHORTNESS OF BREATH, Disp: 90 mL, Rfl: 0    ibuprofen (ADVIL;MOTRIN) 600 MG tablet, Take 1 tablet by mouth every 6 hours as needed for Pain, Disp: 28 tablet, Rfl: 0    clonazePAM (KLONOPIN) 0.5 MG tablet, Take 0.5 mg by mouth 3 times daily as needed. ., Disp: , Rfl:     Family History   Problem Relation Age of Onset   [facility] Cancer Mother         vaginal    Heart Disease Father     Diabetes Father     Other Father         colon resection for colon polyps    Breast Cancer Maternal Grandmother     Stroke Maternal Grandfather        Social History     Socioeconomic History    Marital status:      Spouse name: Not on file    Number of children: Not on file    Years of education: Not on file    Highest education level: Not on file   Occupational History    Occupation: Homemaker   Social Needs    Financial resource strain: Not on file    Food insecurity     Worry: Not on file     Inability: Not on file    Transportation needs     Medical: Not on file     Non-medical: Not on file   Tobacco Use    Smoking status: Former Smoker     Packs/day: 0.25     Years: 33.00     Pack years: 8.25     Types: Cigarettes    Smokeless tobacco: Never Used    Tobacco comment: quit on nicoderm patch   Substance and Sexual Activity    Alcohol use: No     Alcohol/week: 0.0 standard drinks    Drug use: No    Sexual activity: Not Currently   Lifestyle    Physical activity     Days per week: Not on file     Minutes per session: Not on file    Stress: Not on file   Relationships    Social connections     Talks on phone: Not on file     Gets together: Not on file     Attends Shinto service: Not on file     Active member of club or organization: Not on file     Attends meetings of clubs or organizations: Not on file     Relationship status: Not on file    Intimate partner violence     Fear of current or ex partner: Not on file     Emotionally abused: Not on file     Physically abused: Not on file     Forced sexual activity: Not on file   Other Topics Concern    Not on file   Social History Narrative    Not on file         Review of Systems:  Review of Systems   Constitution: Negative. HENT: Negative. Eyes:        Glasses   Cardiovascular: Positive for chest pain. Respiratory: Negative. Endocrine: Negative. Hematologic/Lymphatic: Negative. Skin: Negative. Musculoskeletal: Positive for joint pain and neck pain. Gastrointestinal: Negative. Genitourinary: Negative. Neurological: Positive for headaches, numbness and tingling. Psychiatric/Behavioral: The patient is nervous/anxious. Physical Exam:  [facility] 11/01/2010     Physical Exam  HENT:      Head: Normocephalic. Pulmonary:      Effort: Pulmonary effort is normal.   Skin:         Neurological:      Mental Status: She is alert and oriented to person, place, and time. Psychiatric:         Mood and Affect: Mood normal.         Behavior: Behavior normal.         Thought Content: Thought content normal.           Assessment:            Treatment Plan:  DISCUSSION: Treatment options discussed withpatient and all questions answered to patient's satisfaction. Possible side effects, risk of tolerance and or dependence and alternative treatments discussed    Obtaining appropriate analgesic effect of treatment   No signs of potential drug abuse or diversion identified    [x] Ill effects of being on chronic pain medications such as sleep disturbances, respiratory depression, hormonal changes, withdrawal symptoms, chronic opioid dependence and tolerance as well as risk of taking opioids with Benzodiazepines and taking opioids along with alcohol,  werediscussed with patient.  I had asked the patient to minimize medication use and utilize pain medications only for uncontrolled rest pain or pain with exertional activities. I advised patient not to self-escalate painmedications without consulting with us. At each of patient's future visits we will try to taper pain medications, while adjusting the adjunct medications, and re-evaluating for Physical Therapy to improve spinal andjoint strength. We will continue to have discussions to decrease pain medications as tolerated. Counseled patient on effects their pain medication and /or their medical condition mayhave on their  ability to drive or operate machinery. Instructed not to drive or operate machinery if drowsy     I also discussed with the patient regarding the dangers of combining narcotic pain medication with tranquilizers, alcohol or illegal drugs or taking the medication any way other than prescribed. The dangers were discussed  including respiratory depression and death. Patient was told to tell  all  physicians regarding the medications he is getting from pain clinic. Patient is warned not to take any unprescribed medications over-the-countermedications that can depress breathing . Patient is advised to talk to the pharmacist or physicians if planning to take any over-the-counter medications before  takeing them. Patient is strongly advised to avoid tranquilizers or  relaxants, illegal drugs  or any medications that can depress breathing  Patient is also advised to tell us if there is any changes in their medications from other physicians.     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

## 2020-11-11 RX ORDER — LEVOTHYROXINE SODIUM 175 UG/1
175 TABLET ORAL DAILY
Qty: 90 TABLET | Refills: 3 | Status: SHIPPED | OUTPATIENT
Start: 2020-11-11 | End: 2020-12-08

## 2020-12-07 ASSESSMENT — ENCOUNTER SYMPTOMS
CONSTIPATION: 0
VOMITING: 0
ABDOMINAL PAIN: 0
NAUSEA: 0
SHORTNESS OF BREATH: 0
PHOTOPHOBIA: 0
COUGH: 0
BACK PAIN: 0
CHOKING: 0
EYE PAIN: 0
ALLERGIC/IMMUNOLOGIC NEGATIVE: 1

## 2020-12-07 NOTE — PROGRESS NOTES
Eryn Cohn is a 53 y.o. female evaluated on 12/9/2020. Modality of virtual service provided -via Video+audio   Consent:  Patient and/or health care decision maker is aware that that patient may receive a bill for this telephone service, depending on one's insurance coverage, and has provided verbal consent to proceed: Yes    Patient identification was verified at the start of the visit: Yes    Chief complaint: Eryn Cohn is 53 y.o.,  female, with   chief complaint of neck pain . Patient is complaining of pain involving the cervical region. She reports her pain is getting worse and her head is getting too heavy for the neck to support. She reports she is being [facility] neuro[facility] department for possible surgery. She reports she has getting further testing. Patient reports medications are helping the pain. She previously had undergone cervical facet joint injections with some relief in the pain but the pain has since then returned. Neck Pain    This is a chronic problem. The current episode started more than 1 year ago. The problem occurs constantly. The problem has been gradually worsening. Associated with: Had a recent fall which made her pain worse. The pain is present in the midline, left side and right side (Pain is worse on the left side). The quality of the pain is described as aching (Throbbing in the back of the head). The pain is at a severity of 8/10 (5-10). The pain is severe. The symptoms are aggravated by bending and twisting (Neck movements/turning to the left). The pain is same all the time. Stiffness is present in the morning and all day. Associated symptoms include headaches, numbness, tingling and weakness. Pertinent negatives include no chest pain, fever or photophobia. Associated symptoms comments: Especially in the left hand.       Alleviating factors:heat pressure over the base of the head at the point  Lifestyle changes experienced with pain: Wakes from sleep, Prevents or limits ADLs, Increases w/activity, Increases w/prolonged sitting/standing/walking  Mood changes,none  Patient currently employed. Physical therapy did not help the pain. Are you under psychological counseling at present: Yes  Goals for treatment include:  Decrease in pain  Enjoy daily and recreational activities, return to previous status. Patient relates current medications are helping the pain. Patient reports taking pain medications as prescribed, denies obtaining medications from different sources and denies use of illegal drugs. Patient denies side effects from medications like nausea, vomiting, constipation or drowsiness. Patient reports current activities of daily living ar possible due to medications and would like to continue them. ACTIVITY/SOCIAL/EMOTIONAL:  Sleep Pattern: 5 hours per night. nightime awakenings  Home Exercises: daily Neck stretches and strengthening  Activity:not significantly changed  Emotional Issues: anxiety/ nervousness.    Currently seeing a Psychiatrist or Psychologist:  Yes     ADVERSE MEDICATION EFFECTS:   Nausea and vomiting: no   Constipation: no-Undercontrol-: yes  Dizziness/drowsy/sleepy--no  Urinary Retention: no    ABERRANT BEHAVIORS SINCE LAST VISIT  Lost rx/pills:------------------------------------------ no  Taking  medication as prescribed: ----------- yes  Urine Drug Screen ---------------------------------  yes  Recent ER visits: -------------------------------------Yes  Pill count is appropriate: ---------------------------yes   Refills for prescriptions appropriate:---------- yes      Past Medical History:   Diagnosis Date    Anxiety     CAD (coronary artery disease)     Mitral valve prolapse    Fatty liver     GERD (gastroesophageal reflux disease)     Headache     History of bronchitis     Hyperlipidemia     Hypothyroidism     Kidney stone     LFT elevation     MVP (mitral valve prolapse)     Obesity     [id number]    Umbilical hernia        Past Surgical History:   Procedure Laterality Date    APPENDECTOMY       SECTION      2 pfannenstiel, 1 vertical    CHOLECYSTECTOMY      COLONOSCOPY      Dr Scot Arvizu  14    COLONOSCOPY  2014    biopsy & sigmoid spasms, pathology negative    COLONOSCOPY N/A 2018    COLONOSCOPY WITH BIOPSY performed by John Colón MD at [facility] [address]      x 5    HYSTERECTOMY, TOTAL ABDOMINAL          NE EXPLORATORY OF ABDOMEN  10/21/2014    Laparotomy-lysis of adhesions, bso     UPPER GASTROINTESTINAL ENDOSCOPY  2010    mild chronic inactive gastritis       Family History   Problem Relation Age of Onset   Luis Medal Cancer Mother         vaginal    Heart Disease Father     Diabetes Father     Other Father         colon resection for colon polyps    Breast Cancer Maternal Grandmother     Stroke Maternal Grandfather        Social History     Socioeconomic History    Marital status:      Spouse name: None    Number of children: None    Years of education: None    Highest education level: None   Occupational History    Occupation: Homemaker   Social Needs    Financial resource strain: None    Food insecurity     Worry: None     Inability: None    Transportation needs     Medical: None     Non-medical: None   Tobacco Use    Smoking status: Former Smoker     Packs/day: 0.25     Years: 33.00     Pack years: 8.25     Types: Cigarettes    Smokeless tobacco: Never Used    Tobacco comment: quit on nicoderm patch   Substance and Sexual Activity    Alcohol use: No     Alcohol/week: 0.0 standard drinks    Drug use: No    Sexual activity: Not Currently   Lifestyle    Physical activity     Days per week: None     Minutes per session: None    Stress: None   Relationships    Social connections     Talks on phone: None     Gets together: None     Attends [employer] service: None Active member of club or organization: None     Attends meetings of clubs or organizations: None     Relationship status: None    Intimate partner violence     Fear of current or ex partner: None     Emotionally abused: None     Physically abused: None     Forced sexual activity: None   Other Topics Concern    None   Social History Narrative    None       Allergies   Allergen Reactions    Compazine [Prochlorperazine]      Jittery, restless    Sulfa Antibiotics Hives    Adhesive Tape Rash       Current Outpatient Medications on File Prior to Encounter   Medication Sig Dispense Refill    tiZANidine (ZANAFLEX) 4 MG tablet TAKE 1 TABLET BY MOUTH EVERY 8 HOURS AS NEEDED FOR SPASMS 90 tablet 1    levothyroxine (SYNTHROID) 175 MCG tablet TAKE 1 TABLET BY MOUTH DAILY 90 tablet 0    mirtazapine (REMERON) 15 MG tablet Take 15 mg by mouth daily      albuterol sulfate  (90 Base) MCG/ACT inhaler INHALE 2 PUFFS BY MOUTH INTO THE LUNGS EVERY 4 HOURS AS NEEDED FOR WHEEZING OR SHORTNESS OF BREATH 1 Inhaler 3    rOPINIRole (REQUIP) 2 MG tablet TAKE 1 TABLET BY MOUTH EVERY NIGHT 90 tablet 3    hydrOXYzine (VISTARIL) 25 MG capsule Take 25 mg by mouth 2 times daily as needed for Itching 2 tabs at night      atorvastatin (LIPITOR) 40 MG tablet TAKE 1 TABLET BY MOUTH EVERY DAY 90 tablet 3    esomeprazole (NEXIUM) 40 MG delayed release capsule TAKE 1 CAPSULE BY MOUTH EVERY MORNING BEFORE BREAKFAST 90 capsule 2    ondansetron (ZOFRAN ODT) 4 MG disintegrating tablet Take 1 tablet by mouth every 8 hours as needed for Nausea or Vomiting 10 tablet 0    ipratropium-albuterol (DUONEB) 0.5-2.5 (3) MG/3ML SOLN nebulizer solution INHALE CONTENTS OF 1 VIAL(3ML) VIA NEBULIZER EVERY 4 HOURS AS NEEDED FOR SHORTNESS OF BREATH 90 mL 0    ibuprofen (ADVIL;MOTRIN) 600 MG tablet Take 1 tablet by mouth every 6 hours as needed for Pain 28 tablet 0    nicotine (NICODERM CQ) 14 MG/24HR Place 1 patch onto the skin every 24 hours 30 patch 3    clonazePAM (KLONOPIN) 0.5 MG tablet Take 0.5 mg by mouth 3 times daily as needed. Louanna Sonny sertraline (ZOLOFT) 100 MG tablet Take 100 mg by mouth daily      topiramate (TOPAMAX) 25 MG tablet 25 mg 2 times daily       metoprolol tartrate (LOPRESSOR) 50 MG tablet Take 50 mg by mouth 2 times daily        No current facility-administered medications on file prior to encounter. Review of Systems   Constitutional: Negative for activity change, appetite change and fever. HENT: Negative for congestion, ear pain and sinus pain. Eyes: Negative for photophobia, pain and visual disturbance. Respiratory: Negative for cough, choking and shortness of breath. Cardiovascular: Negative for chest pain and palpitations. Gastrointestinal: Negative for abdominal pain, constipation, nausea and vomiting. Endocrine: Negative. Negative for cold intolerance and polyuria. Genitourinary: Negative for dysuria and hematuria. Musculoskeletal: Positive for neck pain and neck stiffness. Negative for back pain. Skin: Negative for pallor and rash. Allergic/Immunologic: Negative. Negative for immunocompromised state. Neurological: Positive for tingling, weakness, numbness and headaches. Hematological: Does not bruise/bleed easily. Psychiatric/Behavioral: Negative for self-injury, sleep disturbance and suicidal ideas. The patient is not nervous/anxious. Physical Exam  Skin:         Neurological:      Mental Status: She is alert and oriented to person, place, and time.    Psychiatric:         Mood and Affect: Mood normal.          DATA:  LAB.:  7/12/2019  1:28 PM - Liu, [facility] Incoming Lab Results From [facility]     Component  Value  Ref Range & Units  Status  Collected  Lab    Pain Management Drug Panel Interp, Urine  Inconsistent   Final  07/09/2019 11:00 AM  [facility]    (NOTE)   ________________________________________________________________   DRUGS EXPECTED:   OXYCODONE [7/7/19] ________________________________________________________________   CONSISTENT with medications provided:   OXYCODONE : based on the absence of oxycodone and metabolites   ________________________________________________________________   INCONSISTENT with medications provided:   Norfentanyl   7-Aminoclonazepam   ________________________________________________________________   11/8/2020  3:13 PM - Liu, [facility] Incoming Lab Results From [facility]     Component  Value  Ref Range & Units  Status  Collected  Lab    Glucose  100High    70 - 99 mg/dL  Final  11/08/2020  2:25 PM  [address] Lab    BUN  17  6 - 20 mg/dL  Final  11/08/2020  2:25 PM  [address] Lab    CREATININE  0.67  0.50 - 0.90 mg/dL  Final  11/08/2020  2:25 PM  [address] Lab    Bun/Cre Ratio  NOT REPORTED  9 - 20  Final  11/08/2020  2:25 PM  [address] Lab    Calcium  9.5  8.6 - 10.4 mg/dL  Final  11/08/2020  2:25 PM  [address] Lab    Sodium  140  135 - 144 mmol/L  Final  11/08/2020  2:25 PM  [address] Lab    Potassium  4.2  3.7 - 5.3 mmol/L  Final  11/08/2020  2:25 PM  [address] Lab    Chloride  107  98 - 107 mmol/L  Final  11/08/2020  2:25 PM  [address] Lab    CO2  22  20 - 31 mmol/L  Final  11/08/2020  2:25 PM  [address] Lab    Anion Gap  11  9 - 17 mmol/L  Final  11/08/2020  2:25 PM  [address] Lab    Alkaline Phosphatase  92  35 - 104 U/L  Final  11/08/2020  2:25 PM  [address] Lab    ALT  11  5 - 33 U/L  Final  11/08/2020  2:25 PM  [address] Lab    AST  15  <32 U/L  Final  11/08/2020  2:25 PM  [address] Lab    Total Bilirubin  0.22Low    0.3 - 1.2 mg/dL  Final  11/08/2020  2:25 PM  [address] Lab    Total Protein  7.0  6.4 - 8.3 g/dL  Final  11/08/2020  2:25 PM  [address] Lab    Alb  4.2  3.5 - 5.2 g/dL  Final  11/08/2020  2:25 PM  - [facility]    Albumin/Globulin Ratio  NOT REPORTED  1.0 - 2.5  Final  11/08/2020  2:25 PM  [address] Lab    GFR Non-African American  >60  >60 mL/min  Final  11/08/2020  2:25 PM  [address] Lab    GFR African American  >60  >60 mL/min  Final  11/08/2020  2:25 PM  [address] Lab    GFR Comment         Final            X-Ray reports:  MRI OF THE CERVICAL SPINE WITHOUT CONTRAST 6/12/2020 1:33 pm        TECHNIQUE:    Multiplanar multisequence MRI of the cervical spine was performed without the    administration of intravenous contrast.        COMPARISON:    02/02/2017 and CT cervical spine 06/25/2018        HISTORY:    ORDERING SYSTEM PROVIDED HISTORY: Cervical radiculopathy    TECHNOLOGIST PROVIDED HISTORY:    increased pain, not respond    Is the patient pregnant?->No    Reason for Exam: Cervical radiculopathy M54.12 (ICD-10-CM); Arthropathy of    cervical facet joint M47.812 (ICD-10-CM); Degenerative cervical spinal    stenosis    Additional signs and symptoms: neck and left arm pain for the past 4 years. previous thyroid surgery, no previous spine surgeries        FINDINGS:    BONES/ALIGNMENT: Patient motion limits evaluation. There is straightening of    the normal cervical lordosis. Alignment is otherwise normal.  There is no    acute fracture or listhesis. Bone marrow signal intensity is normal.  There    is disc desiccation and mild disc space narrowing at C3-4, C4-5 and C5-C6. SPINAL CORD: The cervical spinal cord is normal in size and signal    intensities. SOFT TISSUES: There is no paraspinal mass identified. C2-C3: There is no disc bulge or protrusion present. There is no significant    spinal canal stenosis or neural foraminal narrowing present. C3-C4: There is a disc bulge and uncovertebral overgrowth resulting in mild    spinal canal stenosis and mild left neural foraminal narrowing.   No    significant right neural foraminal narrowing is present. C4-C5: There is a disc bulge and uncovertebral overgrowth resulting in mild    spinal canal stenosis and mild bilateral neural foraminal narrowing. C5-C6: There is a disc bulge and uncovertebral overgrowth resulting in mild    spinal canal stenosis and moderate left neural foraminal narrowing. No    significant right neural foraminal narrowing is present. C6-C7: There is no disc bulge or protrusion present. There is no significant    spinal canal stenosis or neural foraminal narrowing present. C7-T1: There is no disc bulge or protrusion present. There is no significant    spinal canal stenosis or neural foraminal narrowing present. Impression    Mild multilevel spinal canal stenosis and mild-to-moderate neural foraminal    narrowing from C3-4 to C5-6, as described above. The appearance is similar    compared to the previous exam.      MRI CERVICAL SPINE WO CONTRAST  7/18/2018 11:35 AM EDT   SIGNS AND SYMPTOMS: M54.12 Radiculopathy, cervical region I10   TECHNOLOGIST COMMENTS: pt c/o headaches and neck pain hx DDD   QUESTION FOR THE RADIOLOGIST: ,  , ========== , Ordering Provider - Trang Stanton MD , Rendering Provider - Trang Stanton MD , ==========   PROTOCOL: The following pulse sequences were utilized when imaging the cervical spine: sagittal T2, sagittal T1, sagittal STIR, axial T2, and axial T2* disc. COMPARISON: Prior cervical spine MRI from outside institution dated February 2017   FINDINGS: The bones of the cervical spine are in anatomic alignment with straightening and loss of the normal lordosis possibly from muscle spasm. There is preservation of vertebral body heights and intervertebral disc spaces revealed mild disc desiccation at C3-4, C4-5 and C5-6 with minimal loss of height. The marrow signals within normal limits. The cord is normal in signal. No epidural or paraspinous fluid collection is appreciated.  The visualized paraspinous soft tissues are within normal limits. The prevertebral soft tissues are within normal limits. 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 At C2-C3: There is a normal disc, central canal, and neural foramen. At C3-C4: Minimal central spondylotic disc bulge with eccentric component extending to the subarticular zone on the left side with effacement of the anterior thecal sac but no significant canal stenosis or cord compression. There is mild left neural foramen narrowing. At C4-C5: Small central spondylotic disc bulge effacing the anterior thecal sac but not associated with significant canal stenosis or cord compression. Neural foramina appear unremarkable. At C5-C6: Broad based central disc bulge effacing the anterior thecal sac with left subarticular component leading to moderate left neural foramen narrowing. No significant canal stenosis or cord compression. At C6-C7: There is a normal disc, central canal, and neural foramen. At C7-T1: There is a normal disc, central canal, and neural foramen. 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 Straightening and loss of the normal lordosis likely from muscle spasm with mild cervical spondylosis seen from C3 to C6 level as described above   Electronically signed by:Viet Rodarte. Transcribed by: Tarun, User Resident: Electronically Signed by: Nicolas Varner @ 07/19/2018 09   SPINAL CORD: No abnormal cord signal is seen. SOFT TISSUES: No paraspinal mass identified. C2-C3: Slight central disc contour prominence to indicate a small disc   protrusion with only minimal central spinal stenosis. C3-C4: Left paramedian disc contour prominence and associated uncovertebral   joint hypertrophy. Resulting mild left lateral recess stenosis.   Moderate left foraminal   stenosis. C4-C5: Disc bulge. Mild central spinal stenosis. Mild left foraminal   stenosis. Slight flattening of the ventral cord contour. C5-C6: Decreased disc space height. Diffuse disc bulge or broad-based disc   protrusion more prominent on the left. Mild central spinal stenosis. Asymmetric mild left lateral recess stenosis. Mild to moderate left   foraminal stenosis. Minimal flattening of the ventral cord contour. C6-C7: Slight disc bulge. Minimal central spinal stenosis. C7-T1: There is no significant disc protrusion, spinal canal stenosis or   neural foraminal narrowing. Impression   1. Disc bulges or disc protrusions with associated mild central spinal   stenosis as detailed above at C2-3 to C6-7 levels, worst at C4-5 and C5-6.   2. Multilevel left-sided foraminal stenosis as detailed above. Clinical  impression:  1. Degenerative cervical spinal stenosis    2. Osteoarthritis of cervical spine, unspecified spinal osteoarthritis complication status    3. Arthropathy of cervical facet joint    4. Atlanto-axial joint sprain, sequela    5. Cervical radiculopathy    6. Sprain of atlanto-occipital joint, sequela    7. Myofascial muscle pain    8. Encounter for medication monitoring        Plan of care: We will continue current pain medications  Current medications are being tolerated without any Adverse side effects. Orders Placed This Encounter   Medications    oxyCODONE-acetaminophen (PERCOCET)  MG per tablet     Sig: Take 1 tablet by mouth every 6 hours as needed for Pain for up to 30 days. Dispense:  120 tablet     Refill:  0     Reduce doses taken as pain becomes manageable     Urine drug screens have been appropriate. No aberrant activity noted. Analgesia is achieved. Activities of daily living are possible because of medications. Safe use of medications explained to patient.     PDMP Monitoring:    Last PDMP Sachin Webster as Reviewed [facility]):  Review User Review Instant Review Result   Sherine Kumar 12/7/2020  5:07 AM Reviewed PDMP [1]     Counselling/Preventive measures for pain  Control:    [x]  Spine strengthening exercises are discussed with patient in detail. [x] Ill effects of being on chronic pain medications such as sleep disturbances, hormonal changes, withdrawal symptoms,  chronic opioid dependence and tolerance were discussed with patient. I had asked the patient to minimize medication use and utilize pain medications only for uncontrolled rest pain or pain with exertional activities. I advised patient not to self escalate pain medications without consulting with us. At each of patient's future visits we will try to taper pain medications, while adjusting the adjunct medications, and re-evaluating for Physical Therapy to improve spinal and joint strength. We will continue to have discussions to decrease pain medications as tolerated. I also discussed with the patient regarding the dangers of combining narcotic pain medication with tranquilizers, alcohol or illegal drugs or taking the medication any other than prescribed. The dangers including the respiratory depression and death. Patient was told to tell  to all  physicians regarding the medications he is getting from pain clinic. Patient is warned not to take any unprescribed medications over-the-counter medications that can depress breathing . Patient is advised to talk to the pharmacist or physicians if planning to take any over-the-counter medications before  takeing them. Patient is strongly advised to avoid tranquilizers or  Relaxants for any medications that can depress breathing or recreational drugs. Patient is also advised to tell us if there is any changes in his medications from other physicians. We discussed the same at today's visit and have not been to implement it, as the patient's pain is not under control with current medications.      Orders Placed This Encounter   Procedures    DRUG SCREEN, PAIN     Standing Status:   Future     Standing Expiration Date:   12/9/2021   Urine Screen was ordered and patient is advised to get it done within 24 hours at [facility]  Patient is being followed by neurosurgeon and will evaluate for their evaluation before planning any further interventions. Decision Making Process : Patient's health history and referral records thoroughly reviewed before focused physical examination and discussion with patient. I have spent 20 mins. Over 50% of today's visit is spent on examining the patient and counseling and coordinating the care. Level of complexity of date to be reviewed is Moderate. The chart date reviewed include the following: Imaging Reports. Summary of Care. Time spent reviewing with patient the below reports:   Medication safety, Treatment options. Level of diagnosis and management options of this case is multiple: involving the following management options: Interventions as needed, medication management as appropriate, future visits, activity modification, heat/ice as needed, Urine drug screen as required. [x]The patient's questions were answered to the best of my abilities. This note was created using voice recognition software. There may be inaccuracies of transcription  that are inadvertently overlooked prior to the signature. There is any questions about the transcription please contact me. Return in  4 weeks  with physician / CNP  for further plan of treatment. Due to the COVID-19 pandemic and the appropriate interventions by Keith Duran, our non-urgent pain management patients will not be seen in the office at this time for their protection and the protection of our staff.  To offer continuity of care, their prescriptions will be escribed this month after a careful chart review and review of their OARRS report  Pursuant to the emergency declaration under the [facility] [address] waiver authority and the [employer] Act, this Virtual Visit was conducted, with patient's consent, to reduce the patient's risk of exposure to COVID-19 and provide continuity of care for an established patient. Services were provided through a video synchronous discussion virtually to substitute for in-person appointment. \"  Documentation:  I communicated with the patient and/or health care decision maker about plan of care  Details of this discussion including any medical advice provided: Total Time: minutes: 21-30 minutes    I affirm this is a Patient Initiated Episode with an Established Patient who has not had a related appointment within my department in the past 7 days or scheduled within the next 24 hours.     Electronically signed by Robin Rao MD on 12/9/2020 at 8:36 AM

## 2020-12-08 RX ORDER — MIRTAZAPINE 15 MG/1
15 TABLET, FILM COATED ORAL DAILY
COMMUNITY
Start: 2020-11-10 | End: 2021-09-08

## 2020-12-08 RX ORDER — LEVOTHYROXINE SODIUM 175 UG/1
175 TABLET ORAL DAILY
Qty: 90 TABLET | Refills: 3 | Status: CANCELLED | OUTPATIENT
Start: 2020-12-08

## 2020-12-09 ENCOUNTER — HOSPITAL ENCOUNTER (OUTPATIENT)
Dept: PAIN MANAGEMENT | Age: 50
Discharge: HOME | End: 2020-12-09
Payer: COMMERCIAL

## 2020-12-09 PROCEDURE — 99213 OFFICE O/P EST LOW 20 MIN: CPT

## 2020-12-09 PROCEDURE — 99214 OFFICE O/P EST MOD 30 MIN: CPT | Performed by: PAIN MEDICINE

## 2020-12-09 RX ORDER — TIZANIDINE 4 MG/1
TABLET ORAL
Qty: 90 TABLET | Refills: 1 | Status: SHIPPED | OUTPATIENT
Start: 2020-12-09 | End: 2021-04-16 | Stop reason: SDUPTHER

## 2020-12-09 RX ORDER — OXYCODONE AND ACETAMINOPHEN 10; 325 MG/1; MG/1
1 TABLET ORAL EVERY 6 HOURS PRN
Qty: 120 TABLET | Refills: 0 | Status: SHIPPED | OUTPATIENT
Start: 2020-12-09 | End: 2021-01-08 | Stop reason: SDUPTHER

## 2020-12-09 ASSESSMENT — ENCOUNTER SYMPTOMS: SINUS PAIN: 0

## 2020-12-26 ENCOUNTER — HOSPITAL ENCOUNTER (EMERGENCY)
Age: 50
Discharge: HOME | End: 2020-12-26
Attending: EMERGENCY MEDICINE
Payer: COMMERCIAL

## 2020-12-26 VITALS
BODY MASS INDEX: 30.04 KG/M2 | OXYGEN SATURATION: 94 % | TEMPERATURE: 97.6 F | SYSTOLIC BLOOD PRESSURE: 108 MMHG | DIASTOLIC BLOOD PRESSURE: 79 MMHG | WEIGHT: 175 LBS | HEART RATE: 65 BPM

## 2020-12-26 LAB
ABSOLUTE EOS #: 0.23 K/UL (ref 0–0.44)
ABSOLUTE IMMATURE GRANULOCYTE: <0.03 K/UL (ref 0–0.3)
ABSOLUTE LYMPH #: 2.46 K/UL (ref 1.1–3.7)
ABSOLUTE MONO #: 0.48 K/UL (ref 0.1–1.2)
ANION GAP SERPL CALCULATED.3IONS-SCNC: 14 MMOL/L (ref 9–17)
BASOPHILS # BLD: 1 % (ref 0–2)
BASOPHILS ABSOLUTE: 0.06 K/UL (ref 0–0.2)
BNP INTERPRETATION: NORMAL
BUN BLDV-MCNC: 18 MG/DL (ref 6–20)
BUN/CREAT BLD: ABNORMAL (ref 9–20)
CALCIUM SERPL-MCNC: 9 MG/DL (ref 8.6–10.4)
CHLORIDE BLD-SCNC: 110 MMOL/L (ref 98–107)
CO2: 18 MMOL/L (ref 20–31)
CREAT SERPL-MCNC: 0.69 MG/DL (ref 0.5–0.9)
DIFFERENTIAL TYPE: NORMAL
EOSINOPHILS RELATIVE PERCENT: 3 % (ref 1–4)
GFR AFRICAN AMERICAN: >60 ML/MIN
GFR NON-AFRICAN AMERICAN: >60 ML/MIN
GFR SERPL CREATININE-BSD FRML MDRD: ABNORMAL ML/MIN/{1.73_M2}
GFR SERPL CREATININE-BSD FRML MDRD: ABNORMAL ML/MIN/{1.73_M2}
GLUCOSE BLD-MCNC: 116 MG/DL (ref 70–99)
HCT VFR BLD CALC: 43.2 % (ref 36.3–47.1)
HEMOGLOBIN: 14.1 G/DL (ref 11.9–15.1)
IMMATURE GRANULOCYTES: 0 %
LYMPHOCYTES # BLD: 34 % (ref 24–43)
MAGNESIUM: 2 MG/DL (ref 1.6–2.6)
MCH RBC QN AUTO: 31 PG (ref 25.2–33.5)
MCHC RBC AUTO-ENTMCNC: 32.6 G/DL (ref 28.4–34.8)
MCV RBC AUTO: 94.9 FL (ref 82.6–102.9)
MONOCYTES # BLD: 7 % (ref 3–12)
NRBC AUTOMATED: 0 PER 100 WBC
PDW BLD-RTO: 12.4 % (ref 11.8–14.4)
PLATELET # BLD: 169 K/UL (ref 138–453)
PLATELET ESTIMATE: NORMAL
PMV BLD AUTO: 10.5 FL (ref 8.1–13.5)
POTASSIUM SERPL-SCNC: 3.9 MMOL/L (ref 3.7–5.3)
PRO-BNP: 121 PG/ML
RBC # BLD: 4.55 M/UL (ref 3.95–5.11)
RBC # BLD: NORMAL 10*6/UL
SEG NEUTROPHILS: 55 % (ref 36–65)
SEGMENTED NEUTROPHILS ABSOLUTE COUNT: 4.03 K/UL (ref 1.5–8.1)
SODIUM BLD-SCNC: 142 MMOL/L (ref 135–144)
TROPONIN INTERP: NORMAL
TROPONIN T: NORMAL NG/ML
TROPONIN, HIGH SENSITIVITY: <6 NG/L (ref 0–14)
WBC # BLD: 7.3 K/UL (ref 3.5–11.3)
WBC # BLD: NORMAL 10*3/UL

## 2020-12-26 PROCEDURE — 99284 EMERGENCY DEPT VISIT MOD MDM: CPT

## 2020-12-26 PROCEDURE — 85025 COMPLETE CBC W/AUTO DIFF WBC: CPT

## 2020-12-26 PROCEDURE — 83880 ASSAY OF NATRIURETIC PEPTIDE: CPT

## 2020-12-26 PROCEDURE — 80048 BASIC METABOLIC PNL TOTAL CA: CPT

## 2020-12-26 PROCEDURE — 83735 ASSAY OF MAGNESIUM: CPT

## 2020-12-26 PROCEDURE — 84484 ASSAY OF TROPONIN QUANT: CPT

## 2020-12-26 PROCEDURE — 93005 ELECTROCARDIOGRAM TRACING: CPT

## 2020-12-26 ASSESSMENT — ENCOUNTER SYMPTOMS
EYES NEGATIVE: 1
GASTROINTESTINAL NEGATIVE: 1
SHORTNESS OF BREATH: 0
CHEST TIGHTNESS: 0
COUGH: 0
APNEA: 0
WHEEZING: 0

## 2020-12-26 ASSESSMENT — PAIN SCALES - GENERAL: PAINLEVEL_OUTOF10: 6

## 2020-12-26 ASSESSMENT — PAIN DESCRIPTION - DESCRIPTORS: DESCRIPTORS: HEADACHE

## 2020-12-26 NOTE — ED PROVIDER NOTES
[facility] ED     Emergency Department     Faculty Attestation    I performed a history and physical examination of the patient and discussed management with the resident. I reviewed the residents note and agree with the documented findings and plan of care. Any areas of disagreement are noted on the chart. I was personally present for the key portions of any procedures. I have documented in the chart those procedures where I was not present during the key portions. I have reviewed the emergency nurses triage note. I agree with the chief complaint, past medical history, past surgical history, allergies, medications, social and family history as documented unless otherwise noted below. For Physician Assistant/ Nurse Practitioner cases/documentation I have personally evaluated this patient and have completed at least one if not all key elements of the E/M (history, physical exam, and MDM). Additional findings are as noted. This patient was evaluated in the Emergency Department for symptoms described in the history of present illness. He/she was evaluated in the context of the global COVID-19 pandemic, which necessitated consideration that the patient might be at risk for infection with the SARS-CoV-2 virus that causes COVID-19. Institutional protocols and algorithms that pertain to the evaluation of patients at risk for COVID-19 are in a state of rapid change based on information released by regulatory bodies including the CDC and federal and state organizations. These policies and algorithms were followed during the patient's care in the ED. Patient here with intermittent palpitations for the last 3 days. History of palpitations states these are more severe. No shortness of breath with this mild lightheadedness but no syncope. No nausea vomiting. Is not painful just on she notices it. However given 3 days wanted to be checked out. On exam well-appearing nontoxic.   Strong for extremity pulses. Chest nontender abdomen soft nontender no respiratory distress speaking full sentences. Will check EKG, labs, possible discharge    EKG interpretation: Sinus rhythm 74. Normal intervals normal axis no acute ST or T changes. Normal EKG    Critical Care     none    Zane Schmitz MD, Elysia Lopez  Attending Emergency  Physician             Zane Schmitz MD  12/26/20 1128    5:40 PM EST  I spoke with Dr. Marguerite Barrios at this time, reviewed patient's complaint and workup, agrees with discharge and will have patient call office on Monday. I reviewed findings with patient she is agreeable discharge voiced understanding reasons return.        Zane Schmitz MD  12/26/20 9566

## 2020-12-26 NOTE — ED PROVIDER NOTES
[facility] ED  Emergency Department Encounter  Emergency Medicine Resident     Pt Name: Loretta Alexander  FF  [place] 1970  Date of evaluation: 20  PCP:  MARTITA Robison CNP    CHIEF COMPLAINT       Chief Complaint   Patient presents with    Palpitations       HISTORY OF PRESENT ILLNESS  (Location/Symptom, Timing/Onset, Context/Setting, Quality, Duration, ModifyingFactors, Severity.)      Loretta Alexander is a 53 y.o. female with PMH of coronary artery disease, hyperlipidemia, hypothyroidism and palpitations presents emerge department for 3 days of intermittent chest palpitations and a \"nitro\" headache. Patient states that she is been experiencing symptoms for longer than 3 days within the last 3 days have been greatly increasing. Patient denies any other symptoms and states that her \"palpitations\" are intermittent. Patient denies any sick contacts, chest pain, shortness of breath, changes in her mental status, abdominal pain, difficulty ambulation or any other constitutional symptoms. PAST MEDICAL / SURGICAL / SOCIAL /FAMILY HISTORY      has a past medical history of Anxiety, CAD (coronary artery disease), Fatty liver, GERD (gastroesophageal reflux disease), Headache, History of bronchitis, Hyperlipidemia, Hypothyroidism, Kidney stone, LFT elevation, MVP (mitral valve prolapse), Obesity, Type 2 diabetes mellitus without complication ([address] and Umbilical hernia. No other pertinent PMH on review with patient/guardian. has a past surgical history that includes Upper gastrointestinal endoscopy (2010); hernia repair; Cholecystectomy; Appendectomy;  section; Colonoscopy (); Colonoscopy (14); Colonoscopy (2014); pr exploratory of abdomen (10/21/2014); Colonoscopy (N/A, 2018); and Hysterectomy, total abdominal.  No other pertinent PSH on review with patient/guardian.   Social History     Socioeconomic History    Marital status:      Spouse name: Not on file    Number of children: Not on file    Years of education: Not on file    Highest education level: Not on file   Occupational History    Occupation: Homemaker   Social Needs    Financial resource strain: Not on file    Food insecurity     Worry: Not on file     Inability: Not on file   [employer] needs     Medical: Not on file     Non-medical: Not on file   Tobacco Use    Smoking status: Former Smoker     Packs/day: 0.25     Years: 33.00     Pack years: 8.25     Types: Cigarettes    Smokeless tobacco: Never Used    Tobacco comment: quit on nicoderm patch   Substance and Sexual Activity    Alcohol use: No     Alcohol/week: 0.0 standard drinks    Drug use: No    Sexual activity: Not Currently   Lifestyle    Physical activity     Days per week: Not on file     Minutes per session: Not on file    Stress: Not on file   Relationships    Social connections     Talks on phone: Not on file     Gets together: Not on file     Attends Zoroastrianism service: Not on file     Active member of club or organization: Not on file     Attends meetings of clubs or organizations: Not on file     Relationship status: Not on file    Intimate partner violence     Fear of current or ex partner: Not on file     Emotionally abused: Not on file     Physically abused: Not on file     Forced sexual activity: Not on file   Other Topics Concern    Not on file   Social History Narrative    Not on file       I counseled the patient against using tobacco products. Family History   Problem Relation Age of Onset   Maria M Me Cancer Mother         vaginal    Heart Disease Father     Diabetes Father     Other Father         colon resection for colon polyps    Breast Cancer Maternal Grandmother     Stroke Maternal Grandfather      No other pertinent FamHx on review with patient/guardian.     Allergies:  Compazine [prochlorperazine], Sulfa antibiotics, and Adhesive tape    Home Medications:  Prior to Admission medications    Medication Sig Start Date End Date Taking? Authorizing Provider   oxyCODONE-acetaminophen (PERCOCET)  MG per tablet Take 1 tablet by mouth every 6 hours as needed for Pain for up to 30 days. 12/9/20 1/8/21 Yes Mckinley Santillan MD   esomeprazole ([address]) 40 MG delayed release capsule TAKE 1 CAPSULE BY MOUTH EVERY MORNING BEFORE BREAKFAST 12/18/20   MARTITA Norwood CNP   tiZANidine (ZANAFLEX) 4 MG tablet TAKE 1 TABLET BY MOUTH EVERY 8 HOURS AS NEEDED FOR SPASMS 12/9/20   Mckinley Santillan MD   levothyroxine (SYNTHROID) 175 MCG tablet TAKE 1 TABLET BY MOUTH DAILY 12/8/20   MARTITA Norwood CNP   mirtazapine (REMERON) 15 MG tablet Take 15 mg by mouth daily 11/10/20   Historical Provider, MD   albuterol sulfate  (90 Base) MCG/ACT inhaler INHALE 2 PUFFS BY MOUTH INTO THE LUNGS EVERY 4 HOURS AS NEEDED FOR WHEEZING OR SHORTNESS OF BREATH 8/17/20   MARTITA Norwood CNP   rOPINIRole (REQUIP) 2 MG tablet TAKE 1 TABLET BY MOUTH EVERY NIGHT 8/14/20   MARTITA Norwood CNP   hydrOXYzine (VISTARIL) 25 MG capsule Take 25 mg by mouth 2 times daily as needed for Itching 2 tabs at night    Historical Provider, MD   atorvastatin (LIPITOR) 40 MG tablet TAKE 1 TABLET BY MOUTH EVERY DAY 5/4/20   Corby Gonzalez PA-C   ondansetron (ZOFRAN ODT) 4 MG disintegrating tablet Take 1 tablet by mouth every 8 hours as needed for Nausea or Vomiting 3/4/20   Madie Crowe MD   ipratropium-albuterol (DUONEB) 0.5-2.5 (3) MG/3ML SOLN nebulizer solution INHALE CONTENTS OF 1 VIAL(3ML) VIA NEBULIZER EVERY 4 HOURS AS NEEDED FOR SHORTNESS OF BREATH 3/2/20   Edie Crow MD   nicotine (NICODERM CQ) 14 MG/24HR Place 1 patch onto the skin every 24 hours 7/24/19 11/10/20  Corazon Dominguez MD   clonazePAM (KLONOPIN) 0.5 MG tablet Take 0.5 mg by mouth 3 times daily as needed. Cali Alcocer     Historical Provider, MD   sertraline (ZOLOFT) 100 MG tablet Take 100 mg by mouth daily    Historical Provider, MD   topiramate (TOPAMAX) 25 MG tablet 25 mg 2 times daily  2/27/18   Historical Provider, MD   metoprolol tartrate (LOPRESSOR) 50 MG tablet Take 50 mg by mouth 2 times daily  8/21/17   Historical Provider, MD       REVIEW OF SYSTEMS    (2-9 systems for level 4, 10 ormore for level 5)      Review of Systems   Constitutional: Negative for activity change, appetite change, diaphoresis, fatigue and fever. HENT: Negative. Eyes: Negative. Respiratory: Negative for apnea, cough, chest tightness, shortness of breath and wheezing. Cardiovascular: Positive for palpitations. Negative for chest pain and leg swelling. Gastrointestinal: Negative. Genitourinary: Negative. Musculoskeletal: Negative. Skin: Negative. Neurological: Negative. Psychiatric/Behavioral: Negative. PHYSICAL EXAM   (up to 7 for level 4, 8 or more for level 5)      INITIAL VITALS:   BP (!) 118/57   Pulse 62   Temp 97.6 °F (36.4 °C) (Temporal)   Wt 175 lb (79.4 kg)   LMP 11/01/2010   SpO2 94%   BMI 30.04 kg/m²     Physical Exam  Constitutional:       Appearance: She is normal weight. HENT:      Head: Normocephalic and atraumatic. Nose: Nose normal.      Mouth/Throat:      Mouth: Mucous membranes are moist.      Pharynx: Oropharynx is clear. Eyes:      Extraocular Movements: Extraocular movements intact. Pupils: Pupils are equal, round, and reactive to light. Neck:      Musculoskeletal: Normal range of motion and neck supple. Cardiovascular:      Rate and Rhythm: Normal rate and regular rhythm. Pulses: Normal pulses. Heart sounds: Normal heart sounds. Pulmonary:      Effort: Pulmonary effort is normal.      Breath sounds: Normal breath sounds. Abdominal:      General: Abdomen is flat. Palpations: Abdomen is soft. Musculoskeletal: Normal range of motion. Skin:     General: Skin is warm and dry. Capillary Refill: Capillary refill takes less than 2 seconds. Neurological:      General: No focal deficit present. Mental Status: She is alert. Mental status is at baseline. Psychiatric:         Mood and Affect: Mood normal.         DIFFERENTIAL  DIAGNOSIS     PLAN (LABS / IMAGING / EKG):  Orders Placed This Encounter   Procedures    CBC Auto Differential    BASIC METABOLIC PANEL    MAGNESIUM    Troponin    Brain Natriuretic Peptide    Inpatient consult to Cardiology    EKG 12 Lead       MEDICATIONS ORDERED:  No orders of the defined types were placed in this encounter.           DIAGNOSTIC RESULTS / EMERGENCY DEPARTMENT COURSE / MDM     LABS:  Results for orders placed or performed during the hospital encounter of 12/26/20   CBC Auto Differential   Result Value Ref Range    WBC 7.3 3.5 - 11.3 k/uL    RBC 4.55 3.95 - 5.11 m/uL    Hemoglobin 14.1 11.9 - 15.1 g/dL    Hematocrit 43.2 36.3 - 47.1 %    MCV 94.9 82.6 - 102.9 fL    MCH 31.0 25.2 - 33.5 pg    MCHC 32.6 28.4 - 34.8 g/dL    RDW 12.4 11.8 - 14.4 %    Platelets 437 232 - 575 k/uL    MPV 10.5 8.1 - 13.5 fL    NRBC Automated 0.0 0.0 per 100 WBC    Differential Type NOT REPORTED     Seg Neutrophils 55 36 - 65 %    Lymphocytes 34 24 - 43 %    Monocytes 7 3 - 12 %    Eosinophils % 3 1 - 4 %    Basophils 1 0 - 2 %    Immature Granulocytes 0 0 %    Segs Absolute 4.03 1.50 - 8.10 k/uL    Absolute Lymph # 2.46 1.10 - 3.70 k/uL    Absolute Mono # 0.48 0.10 - 1.20 k/uL    Absolute Eos # 0.23 0.00 - 0.44 k/uL    Basophils Absolute 0.06 0.00 - 0.20 k/uL    Absolute Immature Granulocyte <0.03 0.00 - 0.30 k/uL    WBC Morphology NOT REPORTED     RBC Morphology NOT REPORTED     Platelet Estimate NOT REPORTED    BASIC METABOLIC PANEL   Result Value Ref Range    Glucose 116 (H) 70 - 99 mg/dL    BUN 18 6 - 20 mg/dL    CREATININE 0.69 0.50 - 0.90 mg/dL    Bun/Cre Ratio NOT REPORTED 9 - 20    Calcium 9.0 8.6 - 10.4 mg/dL    Sodium 142 135 - 144 mmol/L    Potassium 3.9 3.7 - 5.3 mmol/L    Chloride 110 (H) 98 - 107 mmol/L    CO2 18 (L) 20 - 31 mmol/L    Anion Gap 14 9 - 17 mmol/L    GFR Non-African American >60 >60 mL/min    GFR African American >60 >60 mL/min    GFR Comment          GFR Staging NOT REPORTED    MAGNESIUM   Result Value Ref Range    Magnesium 2.0 1.6 - 2.6 mg/dL   Troponin   Result Value Ref Range    Troponin, High Sensitivity <6 0 - 14 ng/L    Troponin T NOT REPORTED <0.03 ng/mL    Troponin Interp NOT REPORTED    Brain Natriuretic Peptide   Result Value Ref Range    Pro- <300 pg/mL    BNP Interpretation Pro-BNP Reference Range:          IMPRESSION/MDM/ED COURSE:  53 y.o. female presented with irregular heart rate/palpitations. Patient had a cardiac work-up which was nonconcerning for any irregularities. Patient did state that she had an event of her palpitations while on the monitor which no alarms were set off. EKG was unremarkable and showed no acute changes from her last EKG. Lab work-up negative for any acute findings. Consult with Dr. Claude Spearing cardiologist whom the patient sees. After conversation with Dr. Rebecca Jesus patient was told that she can follow-up in his office in 3 days. Patient was grateful for the work-up and requested afterwards be discharged           Patient/Guardian requesting discharge. Patient/Guardian was given written and verbal instructions prior to discharge. Patient/Guardian understood and agreed. Patient/Guardian had no further questions. RADIOLOGY:  No orders to display         EKG  Normal sinus rhythm  No axis elections  Normal EKG    All EKG's are interpreted by the Emergency Department Physician who either signs or Co-signs this chart in the absence of a cardiologist.        CONSULTS:  [address]      1.  Palpitations          DISPOSITION / PLAN     DISPOSITION Decision To Discharge 12/26/2020 05:41:36 PM      PATIENT REFERREDTO:  Huang Lara MD  [facility], [address] [facility]  [phone]    In 3 days        DISCHARGE MEDICATIONS:  New Prescriptions    No medications on file       Lopez Romero MD  PGY 1  Resident Physician Emergency Medicine  12/26/20 5:45 PM        (Please note that portions of this note were completed with a voice recognition program.Efforts were made to edit the dictations but occasionally words are mis-transcribed.)        Lopez Romero MD  Resident  12/26/20 4311

## 2020-12-26 NOTE — ED NOTES
Patient to ED accompanied by her daughter, she reports that she has been experiencing \"extra beats\" from her heart for the last two to three days. The patient states that she also has a headache that is similar in feeling to when she has taken nitro in the past.  The patient denies Cp or SOB, states she take metoprolol for irregular heart beat for approx twenty years and sees a cardiologist.  The patient is alert and oriented x4, placed in gown and on full cardiac monitor upon arrival.  Dr Ling Turk at bedside.      Jacob Harris RN  12/26/20 2194

## 2020-12-27 ENCOUNTER — APPOINTMENT (OUTPATIENT)
Dept: CT IMAGING | Age: 50
End: 2020-12-27
Payer: COMMERCIAL

## 2020-12-27 ENCOUNTER — HOSPITAL ENCOUNTER (OUTPATIENT)
Age: 50
Setting detail: OBSERVATION
Discharge: HOME | End: 2020-12-28
Attending: EMERGENCY MEDICINE | Admitting: INTERNAL MEDICINE
Payer: COMMERCIAL

## 2020-12-27 PROBLEM — I20.0 UNSTABLE ANGINA (HCC): Status: ACTIVE | Noted: 2020-12-27

## 2020-12-27 LAB
ABSOLUTE EOS #: 0.2 K/UL (ref 0–0.4)
ABSOLUTE IMMATURE GRANULOCYTE: NORMAL K/UL (ref 0–0.3)
ABSOLUTE LYMPH #: 3 K/UL (ref 1–4.8)
ABSOLUTE MONO #: 0.5 K/UL (ref 0.1–1.3)
ANION GAP SERPL CALCULATED.3IONS-SCNC: 9 MMOL/L (ref 9–17)
BASOPHILS # BLD: 1 % (ref 0–2)
BASOPHILS ABSOLUTE: 0.1 K/UL (ref 0–0.2)
BUN BLDV-MCNC: 22 MG/DL (ref 6–20)
BUN/CREAT BLD: ABNORMAL (ref 9–20)
CALCIUM SERPL-MCNC: 9.4 MG/DL (ref 8.6–10.4)
CHLORIDE BLD-SCNC: 108 MMOL/L (ref 98–107)
CO2: 24 MMOL/L (ref 20–31)
CREAT SERPL-MCNC: 0.8 MG/DL (ref 0.5–0.9)
DIFFERENTIAL TYPE: NORMAL
EOSINOPHILS RELATIVE PERCENT: 3 % (ref 0–4)
GFR AFRICAN AMERICAN: >60 ML/MIN
GFR NON-AFRICAN AMERICAN: >60 ML/MIN
GFR SERPL CREATININE-BSD FRML MDRD: ABNORMAL ML/MIN/{1.73_M2}
GFR SERPL CREATININE-BSD FRML MDRD: ABNORMAL ML/MIN/{1.73_M2}
GLUCOSE BLD-MCNC: 109 MG/DL (ref 70–99)
HCT VFR BLD CALC: 43.6 % (ref 36–46)
HEMOGLOBIN: 14.3 G/DL (ref 12–16)
IMMATURE GRANULOCYTES: NORMAL %
LYMPHOCYTES # BLD: 37 % (ref 24–44)
MCH RBC QN AUTO: 31.1 PG (ref 26–34)
MCHC RBC AUTO-ENTMCNC: 32.9 G/DL (ref 31–37)
MCV RBC AUTO: 94.4 FL (ref 80–100)
MONOCYTES # BLD: 7 % (ref 1–7)
MYOGLOBIN: <21 NG/ML (ref 25–58)
MYOGLOBIN: <21 NG/ML (ref 25–58)
NRBC AUTOMATED: NORMAL PER 100 WBC
PDW BLD-RTO: 13.2 % (ref 11.5–14.9)
PLATELET # BLD: 173 K/UL (ref 150–450)
PLATELET ESTIMATE: NORMAL
PMV BLD AUTO: 8.5 FL (ref 6–12)
POTASSIUM SERPL-SCNC: 4.2 MMOL/L (ref 3.7–5.3)
RBC # BLD: 4.61 M/UL (ref 4–5.2)
RBC # BLD: NORMAL 10*6/UL
SEG NEUTROPHILS: 52 % (ref 36–66)
SEGMENTED NEUTROPHILS ABSOLUTE COUNT: 4.2 K/UL (ref 1.3–9.1)
SODIUM BLD-SCNC: 141 MMOL/L (ref 135–144)
TROPONIN INTERP: ABNORMAL
TROPONIN INTERP: ABNORMAL
TROPONIN T: ABNORMAL NG/ML
TROPONIN T: ABNORMAL NG/ML
TROPONIN, HIGH SENSITIVITY: <6 NG/L (ref 0–14)
TROPONIN, HIGH SENSITIVITY: <6 NG/L (ref 0–14)
WBC # BLD: 8 K/UL (ref 3.5–11)
WBC # BLD: NORMAL 10*3/UL

## 2020-12-27 PROCEDURE — 80048 BASIC METABOLIC PNL TOTAL CA: CPT

## 2020-12-27 PROCEDURE — 6370000000 HC RX 637 (ALT 250 FOR IP): Performed by: STUDENT IN AN ORGANIZED HEALTH CARE EDUCATION/TRAINING PROGRAM

## 2020-12-27 PROCEDURE — G0378 HOSPITAL OBSERVATION PER HR: HCPCS

## 2020-12-27 PROCEDURE — 6360000002 HC RX W HCPCS: Performed by: STUDENT IN AN ORGANIZED HEALTH CARE EDUCATION/TRAINING PROGRAM

## 2020-12-27 PROCEDURE — 85025 COMPLETE CBC W/AUTO DIFF WBC: CPT

## 2020-12-27 PROCEDURE — 70450 CT HEAD/BRAIN W/O DYE: CPT

## 2020-12-27 PROCEDURE — 99285 EMERGENCY DEPT VISIT HI MDM: CPT

## 2020-12-27 PROCEDURE — 93005 ELECTROCARDIOGRAM TRACING: CPT

## 2020-12-27 PROCEDURE — 84484 ASSAY OF TROPONIN QUANT: CPT

## 2020-12-27 PROCEDURE — 99223 1ST HOSP IP/OBS HIGH 75: CPT | Performed by: INTERNAL MEDICINE

## 2020-12-27 PROCEDURE — 36415 COLL VENOUS BLD VENIPUNCTURE: CPT

## 2020-12-27 PROCEDURE — 83874 ASSAY OF MYOGLOBIN: CPT

## 2020-12-27 PROCEDURE — 96372 THER/PROPH/DIAG INJ SC/IM: CPT

## 2020-12-27 PROCEDURE — 6370000000 HC RX 637 (ALT 250 FOR IP): Performed by: EMERGENCY MEDICINE

## 2020-12-27 PROCEDURE — 2580000003 HC RX 258: Performed by: STUDENT IN AN ORGANIZED HEALTH CARE EDUCATION/TRAINING PROGRAM

## 2020-12-27 RX ORDER — ACETAMINOPHEN 325 MG/1
650 TABLET ORAL EVERY 6 HOURS PRN
Status: DISCONTINUED | OUTPATIENT
Start: 2020-12-27 | End: 2020-12-28 | Stop reason: HOSPADM

## 2020-12-27 RX ORDER — ATORVASTATIN CALCIUM 40 MG/1
40 TABLET, FILM COATED ORAL DAILY
Status: DISCONTINUED | OUTPATIENT
Start: 2020-12-27 | End: 2020-12-28 | Stop reason: HOSPADM

## 2020-12-27 RX ORDER — MIRTAZAPINE 15 MG/1
15 TABLET, FILM COATED ORAL DAILY
Status: DISCONTINUED | OUTPATIENT
Start: 2020-12-27 | End: 2020-12-28 | Stop reason: HOSPADM

## 2020-12-27 RX ORDER — SODIUM CHLORIDE 0.9 % (FLUSH) 0.9 %
10 SYRINGE (ML) INJECTION PRN
Status: DISCONTINUED | OUTPATIENT
Start: 2020-12-27 | End: 2020-12-28 | Stop reason: HOSPADM

## 2020-12-27 RX ORDER — TIZANIDINE 4 MG/1
4 TABLET ORAL EVERY 8 HOURS PRN
Status: DISCONTINUED | OUTPATIENT
Start: 2020-12-27 | End: 2020-12-28 | Stop reason: HOSPADM

## 2020-12-27 RX ORDER — ASPIRIN 81 MG/1
81 TABLET, CHEWABLE ORAL DAILY
Status: DISCONTINUED | OUTPATIENT
Start: 2020-12-28 | End: 2020-12-28 | Stop reason: HOSPADM

## 2020-12-27 RX ORDER — POLYETHYLENE GLYCOL 3350 17 G/17G
17 POWDER, FOR SOLUTION ORAL DAILY PRN
Status: DISCONTINUED | OUTPATIENT
Start: 2020-12-27 | End: 2020-12-28 | Stop reason: HOSPADM

## 2020-12-27 RX ORDER — SODIUM CHLORIDE 0.9 % (FLUSH) 0.9 %
10 SYRINGE (ML) INJECTION EVERY 12 HOURS SCHEDULED
Status: DISCONTINUED | OUTPATIENT
Start: 2020-12-27 | End: 2020-12-28 | Stop reason: HOSPADM

## 2020-12-27 RX ORDER — OXYCODONE HYDROCHLORIDE AND ACETAMINOPHEN 5; 325 MG/1; MG/1
2 TABLET ORAL NIGHTLY PRN
Status: DISCONTINUED | OUTPATIENT
Start: 2020-12-27 | End: 2020-12-28 | Stop reason: HOSPADM

## 2020-12-27 RX ORDER — CLONAZEPAM 1 MG/1
0.5 TABLET ORAL 2 TIMES DAILY PRN
Status: DISCONTINUED | OUTPATIENT
Start: 2020-12-27 | End: 2020-12-28 | Stop reason: HOSPADM

## 2020-12-27 RX ORDER — ASPIRIN 81 MG/1
324 TABLET, CHEWABLE ORAL ONCE
Status: COMPLETED | OUTPATIENT
Start: 2020-12-27 | End: 2020-12-27

## 2020-12-27 RX ORDER — PANTOPRAZOLE SODIUM 40 MG/1
40 TABLET, DELAYED RELEASE ORAL ONCE
Status: DISCONTINUED | OUTPATIENT
Start: 2020-12-27 | End: 2020-12-28 | Stop reason: HOSPADM

## 2020-12-27 RX ORDER — METOPROLOL TARTRATE 50 MG/1
50 TABLET, FILM COATED ORAL 2 TIMES DAILY
Status: DISCONTINUED | OUTPATIENT
Start: 2020-12-27 | End: 2020-12-28 | Stop reason: HOSPADM

## 2020-12-27 RX ORDER — PANTOPRAZOLE SODIUM 40 MG/1
40 TABLET, DELAYED RELEASE ORAL
Refills: 1 | Status: DISCONTINUED | OUTPATIENT
Start: 2020-12-28 | End: 2020-12-28 | Stop reason: HOSPADM

## 2020-12-27 RX ORDER — SERTRALINE HYDROCHLORIDE 100 MG/1
100 TABLET, FILM COATED ORAL DAILY
Status: DISCONTINUED | OUTPATIENT
Start: 2020-12-27 | End: 2020-12-28 | Stop reason: HOSPADM

## 2020-12-27 RX ORDER — LEVOTHYROXINE SODIUM 0.05 MG/1
175 TABLET ORAL DAILY
Status: DISCONTINUED | OUTPATIENT
Start: 2020-12-27 | End: 2020-12-28 | Stop reason: HOSPADM

## 2020-12-27 RX ORDER — ACETAMINOPHEN 650 MG/1
650 SUPPOSITORY RECTAL EVERY 6 HOURS PRN
Status: DISCONTINUED | OUTPATIENT
Start: 2020-12-27 | End: 2020-12-28 | Stop reason: HOSPADM

## 2020-12-27 RX ORDER — NITROGLYCERIN 0.4 MG/1
0.4 TABLET SUBLINGUAL EVERY 5 MIN PRN
Status: DISCONTINUED | OUTPATIENT
Start: 2020-12-27 | End: 2020-12-28 | Stop reason: HOSPADM

## 2020-12-27 RX ADMIN — ACETAMINOPHEN 650 MG: 325 TABLET, FILM COATED ORAL at 18:56

## 2020-12-27 RX ADMIN — ENOXAPARIN SODIUM 40 MG: 40 INJECTION SUBCUTANEOUS at 21:03

## 2020-12-27 RX ADMIN — OXYCODONE HYDROCHLORIDE AND ACETAMINOPHEN 2 TABLET: 5; 325 TABLET ORAL at 21:03

## 2020-12-27 RX ADMIN — ASPIRIN 324 MG: 81 TABLET, CHEWABLE ORAL at 15:10

## 2020-12-27 RX ADMIN — NITROGLYCERIN 0.4 MG: 0.4 TABLET SUBLINGUAL at 15:11

## 2020-12-27 RX ADMIN — Medication 10 ML: at 21:06

## 2020-12-27 ASSESSMENT — ENCOUNTER SYMPTOMS
DIARRHEA: 0
COUGH: 0
FACIAL SWELLING: 0
EYE DISCHARGE: 0
SHORTNESS OF BREATH: 1
COLOR CHANGE: 0
SINUS PRESSURE: 0
SORE THROAT: 0
VOMITING: 0
NAUSEA: 0
ABDOMINAL PAIN: 1
CHEST TIGHTNESS: 0
TROUBLE SWALLOWING: 0
RHINORRHEA: 0
EYE REDNESS: 0
BACK PAIN: 0
EYE PAIN: 0
ABDOMINAL PAIN: 0
CONSTIPATION: 0
CHEST TIGHTNESS: 1
BLOOD IN STOOL: 0
CHOKING: 0
BACK PAIN: 1
WHEEZING: 0
ABDOMINAL DISTENTION: 0

## 2020-12-27 ASSESSMENT — PAIN DESCRIPTION - LOCATION: LOCATION: CHEST

## 2020-12-27 ASSESSMENT — PAIN DESCRIPTION - FREQUENCY: FREQUENCY: INTERMITTENT

## 2020-12-27 ASSESSMENT — PAIN DESCRIPTION - PROGRESSION
CLINICAL_PROGRESSION: NOT CHANGED

## 2020-12-27 ASSESSMENT — PAIN DESCRIPTION - PAIN TYPE: TYPE: ACUTE PAIN

## 2020-12-27 ASSESSMENT — PAIN DESCRIPTION - DESCRIPTORS: DESCRIPTORS: PRESSURE

## 2020-12-27 ASSESSMENT — PAIN SCALES - GENERAL
PAINLEVEL_OUTOF10: 4
PAINLEVEL_OUTOF10: 7
PAINLEVEL_OUTOF10: 0
PAINLEVEL_OUTOF10: 6
PAINLEVEL_OUTOF10: 6

## 2020-12-27 NOTE — ED PROVIDER NOTES
Musculoskeletal: Negative for arthralgias, back pain, gait problem, joint swelling, myalgias and neck pain. Skin: Negative for color change, pallor, rash and wound. Neurological: Positive for headaches. Negative for dizziness, tremors, seizures, syncope, speech difficulty, weakness and numbness. Psychiatric/Behavioral: Negative for confusion, decreased concentration, hallucinations, self-injury, sleep disturbance and suicidal ideas. PAST MEDICAL HISTORY     Past Medical History:   Diagnosis Date    Anxiety     CAD (coronary artery disease)     Mitral valve prolapse    Fatty liver     GERD (gastroesophageal reflux disease)     Headache     History of bronchitis     Hyperlipidemia     Hypothyroidism     Kidney stone     LFT elevation     MVP (mitral valve prolapse)     Obesity     Type 2 diabetes mellitus without complication ([facility] [address]    Umbilical hernia        SURGICAL HISTORY       Past Surgical History:   Procedure Laterality Date    APPENDECTOMY       SECTION      2 pfannenstiel, 1 vertical    CHOLECYSTECTOMY      COLONOSCOPY      Dr Barber Has COLONOSCOPY  14    COLONOSCOPY  2014    biopsy & sigmoid spasms, pathology negative    COLONOSCOPY N/A 2018    COLONOSCOPY WITH BIOPSY performed by Marcin Hernandez MD at [address] [place] [address] ([facility])      x 5    HYSTERECTOMY, TOTAL ABDOMINAL          NV EXPLORATORY OF ABDOMEN  10/21/2014    Laparotomy-lysis of adhesions, bso     UPPER GASTROINTESTINAL ENDOSCOPY  2010    mild chronic inactive gastritis       CURRENT MEDICATIONS       Previous Medications    ALBUTEROL SULFATE  (90 BASE) MCG/ACT INHALER    INHALE 2 PUFFS BY MOUTH INTO THE LUNGS EVERY 4 HOURS AS NEEDED FOR WHEEZING OR SHORTNESS OF BREATH    ATORVASTATIN (LIPITOR) 40 MG TABLET    TAKE 1 TABLET BY MOUTH EVERY DAY    CLONAZEPAM (KLONOPIN) 0.5 MG TABLET    Take 0.5 mg by mouth 3 times daily as needed. Jess Chung     ESOMEPRAZOLE (NEXIUM) 40 Head: Normocephalic and atraumatic. Eyes:      General: No scleral icterus. Right eye: No discharge. Left eye: No discharge. Conjunctiva/sclera: Conjunctivae normal.      Pupils: Pupils are equal, round, and reactive to light. Cardiovascular:      Rate and Rhythm: Normal rate and regular rhythm. Heart sounds: Normal heart sounds. No murmur. No friction rub. No gallop. Pulmonary:      Effort: Pulmonary effort is normal. No respiratory distress. Breath sounds: Normal breath sounds. No wheezing or rales. Chest:      Chest wall: No tenderness. Abdominal:      General: Bowel sounds are normal. There is no distension. Palpations: Abdomen is soft. There is no mass. Tenderness: There is no abdominal tenderness. There is no guarding or rebound. Musculoskeletal: Normal range of motion. General: No tenderness. Skin:     General: Skin is warm and dry. Coloration: Skin is not pale. Findings: No erythema or rash. Neurological:      Mental Status: She is alert and oriented to person, place, and time. Cranial Nerves: No cranial nerve deficit. Sensory: No sensory deficit. Motor: No abnormal muscle tone. Coordination: Coordination normal.      Deep Tendon Reflexes: Reflexes normal.   Psychiatric:         Behavior: Behavior normal.         Thought Content: Thought content normal.         Judgment: Judgment normal.         MEDICAL DECISION MAKING:     I am concerned about this patient's chest pain after the palpitations that she does need to get a work-up. I think this could be cardiac in nature and therefore thinking she should be admitted since she has had no recent stress test or cardiac cath.   We will discuss it with her cardiologist.    DIAGNOSTIC RESULTS     EKG: All EKG's are interpreted by the Emergency Department Physician who either signs or Co-signs this chart in the absence of a cardiologist.    EKG Interpretation    Interpreted by me    Rhythm: normal sinus   Rate: normal  Axis: normal  Ectopy: none  Conduction: normal  ST Segments: no acute change  T Waves: no acute change  Q Waves: none    Clinical Impression: no acute changes and normal EKG    RADIOLOGY:All plain film, CT, MRI, and formal ultrasound images (except ED bedside ultrasound)are read by the radiologist and interpretations are directly viewed by the emergency physician. No results found. LABS: All lab results were reviewed bymyself, and all abnormals are listed below. Labs Reviewed   BASIC METABOLIC PANEL - Abnormal; Notable for the following components:       Result Value    Glucose 109 (*)     BUN 22 (*)     Chloride 108 (*)     All other components within normal limits   TROP/MYOGLOBIN - Abnormal; Notable for the following components:    Myoglobin <21 (*)     All other components within normal limits   CBC WITH AUTO DIFFERENTIAL   TROP/MYOGLOBIN         EMERGENCY DEPARTMENT COURSE:   Vitals:    Vitals:    12/27/20 1436 12/27/20 1503 12/27/20 1512 12/27/20 1524   BP: 99/76 99/83 105/70 93/62   Pulse: 79 72     Resp: 20 18     Temp: 97.8 °F (36.6 °C)      TempSrc: Oral      SpO2: 96% 97%         The patient was given the following medications while in the emergency department:     Orders Placed This Encounter   Medications    aspirin chewable tablet 324 mg    nitroGLYCERIN (NITROSTAT) SL tablet 0.4 mg       -------------------------  3:50 PM EST  Patient was reevaluated still having some chest pain but the nitroglycerin dropped her blood pressure so we will go to hold for now. Spoke with Dr. Kelly Rushing for the PCP who agrees to admit the patient and request cardiology consultation. I spoke with Dr. Skylar Parra who is the patient's cardiology who does not want anything given at this time but is okay with her being admitted for further work-up.     CRITICAL CARE:   None    CONSULTS:  IP CONSULT TO PRIMARY CARE PROVIDER  IP CONSULT TO CARDIOLOGY    PROCEDURES:  None    FINAL IMPRESSION      1. Chest pain, unspecified type          DISPOSITION/PLAN   DISPOSITION Admitted 12/27/2020 03:40:28 PM      PATIENT REFERRED TO:  No follow-up provider specified.     DISCHARGE MEDICATIONS:  New Prescriptions    No medications on file       (Please note that portions of this note were completed with a voice recognition program.  Efforts were made to edit the dictations but occasionally words are mis-transcribed.)    Parks Duverney, MD  Attending Terrence Taveras MD  12/27/20 9583

## 2020-12-27 NOTE — H&P
Headache     History of bronchitis     Hyperlipidemia     Hypothyroidism     Kidney stone     LFT elevation     MVP (mitral valve prolapse)     Obesity     [facility] 3470    Umbilical hernia         Past SurgicalHistory:     Past Surgical History:   Procedure Laterality Date    APPENDECTOMY       SECTION      2 pfannenstiel, 1 vertical    CHOLECYSTECTOMY      COLONOSCOPY      Dr Zoraida Peña  14    COLONOSCOPY  2014    biopsy & sigmoid spasms, pathology negative    COLONOSCOPY N/A 2018    COLONOSCOPY WITH BIOPSY performed by Abraham Armando MD at [facility] 84      x 5    HYSTERECTOMY, TOTAL ABDOMINAL          UT EXPLORATORY OF ABDOMEN  10/21/2014    Laparotomy-lysis of adhesions, bso     UPPER GASTROINTESTINAL ENDOSCOPY  2010    mild chronic inactive gastritis        Medications Prior to Admission:        Prior to Admission medications    Medication Sig Start Date End Date Taking? Authorizing Provider   esomeprazole (NEXIUM) 40 MG delayed release capsule TAKE 1 CAPSULE BY MOUTH EVERY MORNING BEFORE BREAKFAST 20   MARTITA Rae - CNP   tiZANidine (ZANAFLEX) 4 MG tablet TAKE 1 TABLET BY MOUTH EVERY 8 HOURS AS NEEDED FOR SPASMS 20   Holly Aaron MD   oxyCODONE-acetaminophen (PERCOCET)  MG per tablet Take 1 tablet by mouth every 6 hours as needed for Pain for up to 30 days.  20  Holly Aaron MD   levothyroxine (SYNTHROID) 175 MCG tablet TAKE 1 TABLET BY MOUTH DAILY 20   MARTITA Rae - CNP   mirtazapine (REMERON) 15 MG tablet Take 15 mg by mouth daily 11/10/20   Historical Provider, MD   rOPINIRole (REQUIP) 2 MG tablet TAKE 1 TABLET BY MOUTH EVERY NIGHT 20   MARTITA Rae - CNP   hydrOXYzine (VISTARIL) 25 MG capsule Take 25 mg by mouth 2 times daily as needed for Itching 2 tabs at night    Historical Provider, MD Positive for arthralgias and back pain. Skin: Negative. Neurological: Positive for headaches. Negative for weakness and light-headedness. Psychiatric/Behavioral: Negative. Physical Exam:   /73   Pulse 61   Temp 98.6 °F (37 °C)   Resp 16   LMP 2010   SpO2 97%   Temp (24hrs), Av.2 °F (36.8 °C), Min:97.8 °F (36.6 °C), Max:98.6 °F (37 °C)    No results for input(s): POCGLU in the last 72 hours. No intake or output data in the 24 hours ending 206    Physical Exam  Vitals signs and nursing note reviewed. Constitutional:       General: She is not in acute distress. Appearance: Normal appearance. She is not ill-appearing. HENT:      Head: Atraumatic. Eyes:      Conjunctiva/sclera: Conjunctivae normal.   Cardiovascular:      Rate and Rhythm: Normal rate and regular rhythm. Pulses: Normal pulses. Heart sounds: Normal heart sounds. Pulmonary:      Effort: Pulmonary effort is normal. No respiratory distress. Breath sounds: Normal breath sounds. Abdominal:      General: Abdomen is flat. Bowel sounds are normal. There is no distension. Palpations: Abdomen is soft. Tenderness: There is abdominal tenderness. Skin:     General: Skin is warm and dry. Capillary Refill: Capillary refill takes less than 2 seconds. Coloration: Skin is not pale. Neurological:      Mental Status: She is alert and oriented to person, place, and time.    Psychiatric:         Mood and Affect: Mood normal.         Investigations:     Laboratory Testing:  Recent Results (from the past 24 hour(s))   Basic Metabolic Panel    Collection Time: 20  2:35 PM   Result Value Ref Range    Glucose 109 (H) 70 - 99 mg/dL    BUN 22 (H) 6 - 20 mg/dL    CREATININE 0.80 0.50 - 0.90 mg/dL    Bun/Cre Ratio NOT REPORTED 9 - 20    Calcium 9.4 8.6 - 10.4 mg/dL    Sodium 141 135 - 144 mmol/L    Potassium 4.2 3.7 - 5.3 mmol/L    Chloride 108 (H) 98 - 107 mmol/L    CO2 24 20 - 31 mmol/L    Anion Gap 9 9 - 17 mmol/L    GFR Non-African American >60 >60 mL/min    GFR African American >60 >60 mL/min    GFR Comment          GFR Staging NOT REPORTED    CBC Auto Differential    Collection Time: 12/27/20  2:35 PM   Result Value Ref Range    WBC 8.0 3.5 - 11.0 k/uL    RBC 4.61 4.0 - 5.2 m/uL    Hemoglobin 14.3 12.0 - 16.0 g/dL    Hematocrit 43.6 36 - 46 %    MCV 94.4 80 - 100 fL    MCH 31.1 26 - 34 pg    MCHC 32.9 31 - 37 g/dL    RDW 13.2 11.5 - 14.9 %    Platelets 765 755 - 241 k/uL    MPV 8.5 6.0 - 12.0 fL    NRBC Automated NOT REPORTED per 100 WBC    Differential Type NOT REPORTED     Seg Neutrophils 52 36 - 66 %    Lymphocytes 37 24 - 44 %    Monocytes 7 1 - 7 %    Eosinophils % 3 0 - 4 %    Basophils 1 0 - 2 %    Immature Granulocytes NOT REPORTED 0 %    Segs Absolute 4.20 1.3 - 9.1 k/uL    Absolute Lymph # 3.00 1.0 - 4.8 k/uL    Absolute Mono # 0.50 0.1 - 1.3 k/uL    Absolute Eos # 0.20 0.0 - 0.4 k/uL    Basophils Absolute 0.10 0.0 - 0.2 k/uL    Absolute Immature Granulocyte NOT REPORTED 0.00 - 0.30 k/uL    WBC Morphology NOT REPORTED     RBC Morphology NOT REPORTED     Platelet Estimate NOT REPORTED    TROP/MYOGLOBIN    Collection Time: 12/27/20  2:35 PM   Result Value Ref Range    Troponin, High Sensitivity <6 0 - 14 ng/L    Troponin T NOT REPORTED <0.03 ng/mL    Troponin Interp NOT REPORTED     Myoglobin <21 (L) 25 - 58 ng/mL   TROP/MYOGLOBIN    Collection Time: 12/27/20  4:40 PM   Result Value Ref Range    Troponin, High Sensitivity <6 0 - 14 ng/L    Troponin T NOT REPORTED <0.03 ng/mL    Troponin Interp NOT REPORTED     Myoglobin <21 (L) 25 - 58 ng/mL   EKG 12 Lead    Collection Time: 12/27/20  4:49 PM   Result Value Ref Range    Ventricular Rate 60 BPM    Atrial Rate 60 BPM    P-R Interval 172 ms    QRS Duration 84 ms    Q-T Interval 468 ms    QTc Calculation (Bazett) 468 ms    P Axis 36 degrees    R Axis 28 degrees    T Axis 42 degrees       Imaging/Diagnostics:  No results

## 2020-12-27 NOTE — PROGRESS NOTES
Patient seen and examined in emergency room  Discussed with family medicine resident  Complaining of intermittent palpitation, going on since 23rd of this month  Noticed chest pressure starting today morning at 9, associated with pain in left arm  Pain is intermittent nature  Also complaining of headache  EKG is normal sinus rhythm, troponins are negative  Patient follows with cardiology Dr. Shanae Jerez  Requesting cardiology consult  CT head without contrast of head  Patient has hypothyroidism, last TSH was low, as per patient her Synthroid was reduced per primary care physician  We will repeat TSH  Refusing consumption of excessive caffeine, energy drinks  Telemetry bed  May need Holter monitoring  Full H&P to follow

## 2020-12-28 ENCOUNTER — APPOINTMENT (OUTPATIENT)
Dept: NUCLEAR MEDICINE | Age: 50
End: 2020-12-28
Payer: COMMERCIAL

## 2020-12-28 VITALS
BODY MASS INDEX: 32.82 KG/M2 | RESPIRATION RATE: 17 BRPM | DIASTOLIC BLOOD PRESSURE: 72 MMHG | WEIGHT: 178.35 LBS | OXYGEN SATURATION: 96 % | HEIGHT: 62 IN | TEMPERATURE: 98.1 F | HEART RATE: 65 BPM | SYSTOLIC BLOOD PRESSURE: 114 MMHG

## 2020-12-28 LAB
ABSOLUTE EOS #: 0.06 K/UL (ref 0–0.4)
ABSOLUTE IMMATURE GRANULOCYTE: ABNORMAL K/UL (ref 0–0.3)
ABSOLUTE LYMPH #: 1.83 K/UL (ref 1–4.8)
ABSOLUTE MONO #: 0.49 K/UL (ref 0.1–1.3)
ANION GAP SERPL CALCULATED.3IONS-SCNC: 9 MMOL/L (ref 9–17)
BASOPHILS # BLD: 0 % (ref 0–2)
BASOPHILS ABSOLUTE: 0 K/UL (ref 0–0.2)
BUN BLDV-MCNC: 23 MG/DL (ref 6–20)
BUN/CREAT BLD: ABNORMAL (ref 9–20)
CALCIUM SERPL-MCNC: 9.2 MG/DL (ref 8.6–10.4)
CHLORIDE BLD-SCNC: 109 MMOL/L (ref 98–107)
CO2: 22 MMOL/L (ref 20–31)
CREAT SERPL-MCNC: 0.88 MG/DL (ref 0.5–0.9)
DIFFERENTIAL TYPE: ABNORMAL
EOSINOPHILS RELATIVE PERCENT: 1 % (ref 0–4)
GFR AFRICAN AMERICAN: >60 ML/MIN
GFR NON-AFRICAN AMERICAN: >60 ML/MIN
GFR SERPL CREATININE-BSD FRML MDRD: ABNORMAL ML/MIN/{1.73_M2}
GFR SERPL CREATININE-BSD FRML MDRD: ABNORMAL ML/MIN/{1.73_M2}
GLUCOSE BLD-MCNC: 94 MG/DL (ref 70–99)
HCT VFR BLD CALC: 39.3 % (ref 36–46)
HEMOGLOBIN: 13.1 G/DL (ref 12–16)
IMMATURE GRANULOCYTES: ABNORMAL %
LIPASE: 27 U/L (ref 13–60)
LV EF: 68 %
LVEF MODALITY: NORMAL
LYMPHOCYTES # BLD: 30 % (ref 24–44)
MAGNESIUM: 2.2 MG/DL (ref 1.6–2.6)
MCH RBC QN AUTO: 31.3 PG (ref 26–34)
MCHC RBC AUTO-ENTMCNC: 33.3 G/DL (ref 31–37)
MCV RBC AUTO: 94.1 FL (ref 80–100)
MONOCYTES # BLD: 8 % (ref 1–7)
MORPHOLOGY: NORMAL
NRBC AUTOMATED: ABNORMAL PER 100 WBC
PDW BLD-RTO: 12.8 % (ref 11.5–14.9)
PHOSPHORUS: 4.7 MG/DL (ref 2.6–4.5)
PLATELET # BLD: 139 K/UL (ref 150–450)
PLATELET ESTIMATE: ABNORMAL
PMV BLD AUTO: 7.9 FL (ref 6–12)
POTASSIUM SERPL-SCNC: 4.2 MMOL/L (ref 3.7–5.3)
RBC # BLD: 4.18 M/UL (ref 4–5.2)
RBC # BLD: ABNORMAL 10*6/UL
SEG NEUTROPHILS: 61 % (ref 36–66)
SEGMENTED NEUTROPHILS ABSOLUTE COUNT: 3.72 K/UL (ref 1.3–9.1)
SODIUM BLD-SCNC: 140 MMOL/L (ref 135–144)
TSH SERPL DL<=0.05 MIU/L-ACNC: 0.55 MIU/L (ref 0.3–5)
WBC # BLD: 6.1 K/UL (ref 3.5–11)
WBC # BLD: ABNORMAL 10*3/UL

## 2020-12-28 PROCEDURE — 84100 ASSAY OF PHOSPHORUS: CPT

## 2020-12-28 PROCEDURE — 2580000003 HC RX 258: Performed by: STUDENT IN AN ORGANIZED HEALTH CARE EDUCATION/TRAINING PROGRAM

## 2020-12-28 PROCEDURE — 84443 ASSAY THYROID STIM HORMONE: CPT

## 2020-12-28 PROCEDURE — 83735 ASSAY OF MAGNESIUM: CPT

## 2020-12-28 PROCEDURE — 80048 BASIC METABOLIC PNL TOTAL CA: CPT

## 2020-12-28 PROCEDURE — 85025 COMPLETE CBC W/AUTO DIFF WBC: CPT

## 2020-12-28 PROCEDURE — 6370000000 HC RX 637 (ALT 250 FOR IP): Performed by: STUDENT IN AN ORGANIZED HEALTH CARE EDUCATION/TRAINING PROGRAM

## 2020-12-28 PROCEDURE — 3430000000 HC RX DIAGNOSTIC RADIOPHARMACEUTICAL: Performed by: STUDENT IN AN ORGANIZED HEALTH CARE EDUCATION/TRAINING PROGRAM

## 2020-12-28 PROCEDURE — 96372 THER/PROPH/DIAG INJ SC/IM: CPT

## 2020-12-28 PROCEDURE — 6360000002 HC RX W HCPCS: Performed by: STUDENT IN AN ORGANIZED HEALTH CARE EDUCATION/TRAINING PROGRAM

## 2020-12-28 PROCEDURE — 78452 HT MUSCLE IMAGE SPECT MULT: CPT

## 2020-12-28 PROCEDURE — G0378 HOSPITAL OBSERVATION PER HR: HCPCS

## 2020-12-28 PROCEDURE — 83690 ASSAY OF LIPASE: CPT

## 2020-12-28 PROCEDURE — 36415 COLL VENOUS BLD VENIPUNCTURE: CPT

## 2020-12-28 PROCEDURE — 99239 HOSP IP/OBS DSCHRG MGMT >30: CPT | Performed by: INTERNAL MEDICINE

## 2020-12-28 PROCEDURE — 93017 CV STRESS TEST TRACING ONLY: CPT

## 2020-12-28 PROCEDURE — A9500 TC99M SESTAMIBI: HCPCS | Performed by: STUDENT IN AN ORGANIZED HEALTH CARE EDUCATION/TRAINING PROGRAM

## 2020-12-28 RX ORDER — AMINOPHYLLINE DIHYDRATE 25 MG/ML
50 INJECTION, SOLUTION INTRAVENOUS PRN
Status: ACTIVE | OUTPATIENT
Start: 2020-12-28 | End: 2020-12-28

## 2020-12-28 RX ORDER — ATROPINE SULFATE 0.1 MG/ML
0.5 INJECTION INTRAVENOUS EVERY 5 MIN PRN
Status: ACTIVE | OUTPATIENT
Start: 2020-12-28 | End: 2020-12-28

## 2020-12-28 RX ORDER — SODIUM CHLORIDE 9 MG/ML
500 INJECTION, SOLUTION INTRAVENOUS CONTINUOUS PRN
Status: ACTIVE | OUTPATIENT
Start: 2020-12-28 | End: 2020-12-28

## 2020-12-28 RX ORDER — SODIUM CHLORIDE 0.9 % (FLUSH) 0.9 %
10 SYRINGE (ML) INJECTION PRN
Status: DISCONTINUED | OUTPATIENT
Start: 2020-12-28 | End: 2020-12-28 | Stop reason: SDUPTHER

## 2020-12-28 RX ORDER — ALBUTEROL SULFATE 2.5 MG/3ML
2.5 SOLUTION RESPIRATORY (INHALATION) EVERY 6 HOURS PRN
Status: DISCONTINUED | OUTPATIENT
Start: 2020-12-28 | End: 2020-12-28 | Stop reason: HOSPADM

## 2020-12-28 RX ORDER — CLONAZEPAM 0.5 MG/1
0.5 TABLET ORAL 2 TIMES DAILY PRN
Qty: 60 TABLET | Refills: 1 | Status: SHIPPED | OUTPATIENT
Start: 2020-12-28 | End: 2022-02-18

## 2020-12-28 RX ORDER — NITROGLYCERIN 0.4 MG/1
0.4 TABLET SUBLINGUAL EVERY 5 MIN PRN
Status: DISCONTINUED | OUTPATIENT
Start: 2020-12-28 | End: 2020-12-28 | Stop reason: SDUPTHER

## 2020-12-28 RX ORDER — IBUPROFEN 600 MG/1
600 TABLET ORAL ONCE
Status: COMPLETED | OUTPATIENT
Start: 2020-12-28 | End: 2020-12-28

## 2020-12-28 RX ORDER — METOPROLOL TARTRATE 5 MG/5ML
5 INJECTION INTRAVENOUS EVERY 5 MIN PRN
Status: ACTIVE | OUTPATIENT
Start: 2020-12-28 | End: 2020-12-28

## 2020-12-28 RX ADMIN — ASPIRIN 81 MG: 81 TABLET, CHEWABLE ORAL at 14:02

## 2020-12-28 RX ADMIN — TETRAKIS(2-METHOXYISOBUTYLISOCYANIDE)COPPER(I) TETRAFLUOROBORATE 9.6 MILLICURIE: 1 INJECTION, POWDER, LYOPHILIZED, FOR SOLUTION INTRAVENOUS at 07:19

## 2020-12-28 RX ADMIN — Medication 10 ML: at 08:50

## 2020-12-28 RX ADMIN — TETRAKIS(2-METHOXYISOBUTYLISOCYANIDE)COPPER(I) TETRAFLUOROBORATE 37.9 MILLICURIE: 1 INJECTION, POWDER, LYOPHILIZED, FOR SOLUTION INTRAVENOUS at 10:07

## 2020-12-28 RX ADMIN — Medication 10 ML: at 07:19

## 2020-12-28 RX ADMIN — ENOXAPARIN SODIUM 40 MG: 40 INJECTION SUBCUTANEOUS at 14:04

## 2020-12-28 RX ADMIN — IBUPROFEN 600 MG: 600 TABLET, FILM COATED ORAL at 14:06

## 2020-12-28 RX ADMIN — Medication 10 ML: at 08:00

## 2020-12-28 RX ADMIN — ATORVASTATIN CALCIUM 40 MG: 40 TABLET, FILM COATED ORAL at 14:02

## 2020-12-28 ASSESSMENT — PAIN SCALES - GENERAL
PAINLEVEL_OUTOF10: 0
PAINLEVEL_OUTOF10: 3
PAINLEVEL_OUTOF10: 5

## 2020-12-28 ASSESSMENT — PAIN DESCRIPTION - PROGRESSION

## 2020-12-28 ASSESSMENT — PAIN DESCRIPTION - LOCATION: LOCATION: CHEST

## 2020-12-28 NOTE — FLOWSHEET NOTE
Writer spoke with Dr. Claudette Salle in regards to cardiac stress test results. Stated that patient is good to go home and follow up in office in a couple of weeks.

## 2020-12-28 NOTE — PROGRESS NOTES
Physical Therapy  DATE: 2020    NAME: Raina Frye  MRN: [id number]   : 1970    Patient not seen this date for Physical Therapy due to:  [] Blood transfusion in progress  [] Hemodialysis  [] Patient Declined  [] Spine Precautions   [] Strict Bedrest  [] Surgery/ Procedure  [] Testing      [x] Other 2020 at 857- pt out of room at stress test per nurse. Will check status in p.m. [] PT is being discontinued at this time. Patient independent. No further needs. [] PT is being discontinued at this time due to declining physical/ medical status. Therapy is not appropriate at this time.     Francia Fraser, PT

## 2020-12-28 NOTE — PLAN OF CARE
Problem: Falls - Risk of:  Goal: Will remain free from falls  Description: Will remain free from falls  12/28/2020 1545 by Jacob Poe RN  Outcome: Completed  12/28/2020 0511 by Ofelia House RN  Outcome: Met This Shift  Goal: Absence of physical injury  Description: Absence of physical injury  12/28/2020 1545 by Jacob Poe RN  Outcome: Completed  12/28/2020 0511 by Ofelia House RN  Outcome: Met This Shift     Problem: Pain:  Goal: Pain level will decrease  Description: Pain level will decrease  Outcome: Completed  Goal: Control of acute pain  Description: Control of acute pain  Outcome: Completed  Goal: Control of chronic pain  Description: Control of chronic pain  Outcome: Completed

## 2020-12-28 NOTE — PROGRESS NOTES
[address] [facility]    PROGRESS NOTE             12/28/2020    10:42 AM    Name:   Vishal Urena  MRN:     [id number]     Acct:      [de-identified]   Room:   210/2107-01   Day:  0  Admit Date:  12/27/2020  2:32 PM    PCP:  MARTITA Elias CNP  Code Status:  Full Code    Subjective:     C/C:   Chief Complaint   Patient presents with    Chest Pain    Palpitations    Arm Pain     left arm     Interval History Status: Could not assess. Patient off the floor for cardiac stress test  Will assess her when she is back to her room  No acute or worsening complaints overnight as per nurse    Review of Systems:     Review of Systems   Unable to perform ROS: Other   Patient TU for cardiac stress test      Medications: Allergies:     Allergies   Allergen Reactions    Compazine [Prochlorperazine]      Jittery, restless    Sulfa Antibiotics Hives    Adhesive Tape Rash       Current Meds:   Scheduled Meds:    sodium chloride flush  10 mL Intravenous 2 times per day    enoxaparin  40 mg Subcutaneous Daily    atorvastatin  40 mg Oral Daily    pantoprazole  40 mg Oral QAM AC    levothyroxine  175 mcg Oral Daily    [Held by provider] metoprolol tartrate  50 mg Oral BID    mirtazapine  15 mg Oral Daily    sertraline  100 mg Oral Daily    pantoprazole  40 mg Oral Once    aspirin  81 mg Oral Daily    influenza virus vaccine  0.5 mL Intramuscular Prior to discharge     Continuous Infusions:    sodium chloride       PRN Meds: sodium chloride flush, sodium chloride, atropine, nitroGLYCERIN, metoprolol, aminophylline, albuterol, sodium chloride flush, nitroGLYCERIN, sodium chloride flush, polyethylene glycol, acetaminophen **OR** acetaminophen, clonazePAM, oxyCODONE-acetaminophen, tiZANidine, regadenoson    Data:     Past Medical History:   has a past medical history of Anxiety, CAD (coronary artery disease), Fatty liver, GERD (gastroesophageal reflux disease), Headache, History of bronchitis, Hyperlipidemia, Hypothyroidism, Kidney stone, LFT elevation, MVP (mitral valve prolapse), Obesity, Type 2 diabetes mellitus without complication ([address] and Umbilical hernia. Social History:   reports that she has quit smoking. Her smoking use included cigarettes. She has a 8.25 pack-year smoking history. She has never used smokeless tobacco. She reports that she does not drink alcohol or use drugs. Family History:   Family History   Problem Relation Age of Onset   [place] Cancer Mother         vaginal    Heart Disease Father     Diabetes Father     Other Father         colon resection for colon polyps    Breast Cancer Maternal Grandmother     Stroke Maternal Grandfather        Vitals:  BP (!) 141/73   Pulse 66   Temp 98.4 °F (36.9 °C) (Oral)   Resp 18   Ht 5' 2\" (1.575 m)   Wt 178 lb 5.6 oz (80.9 kg)   LMP 2010   SpO2 93%   BMI 32.62 kg/m²   Temp (24hrs), Av.3 °F (36.8 °C), Min:97.8 °F (36.6 °C), Max:98.6 °F (37 °C)    No results for input(s): POCGLU in the last 72 hours. I/O(24Hr): No intake or output data in the 24 hours ending 20 1042    Labs:    [unfilled]    Lab Results   Component Value Date/Time    SPECIAL NOT REPORTED 2019 10:30 AM    SPECIAL NOT REPORTED 2019 10:30 AM     Lab Results   Component Value Date/Time    CULTURE NO SIGNIFICANT GROWTH 2019 07:30 PM       [unfilled]    Radiology:    Ct Head Wo Contrast    Result Date: 2020  EXAMINATION: CT OF THE HEAD WITHOUT CONTRAST  2020 5:40 pm TECHNIQUE: CT of the head was performed without the administration of intravenous contrast. Dose modulation, iterative reconstruction, and/or weight based adjustment of the mA/kV was utilized to reduce the radiation dose to as low as reasonably achievable.  COMPARISON: 2018 HISTORY: ORDERING SYSTEM PROVIDED HISTORY: HA TECHNOLOGIST PROVIDED HISTORY: POOLE Is the patient pregnant?->No Reason for Exam: headache Acuity: Acute Type of Exam: Initial FINDINGS: BRAIN/VENTRICLES: There is no acute intracranial hemorrhage, mass effect or midline shift. No abnormal extra-axial fluid collection. The gray-white differentiation is maintained without evidence of an acute infarct. There is no evidence of hydrocephalus. Vascular calcifications. ORBITS: The visualized portion of the orbits demonstrate no acute abnormality. SINUSES: Mild ethmoid sinus mucosal thickening. SOFT TISSUES/SKULL:  No acute abnormality of the visualized skull or soft tissues. No acute intracranial abnormality. Physical Examination:        Physical Exam  Vitals signs and nursing note reviewed. Physical exam could not be performed because patient not in the room. Off the floor for cardiac stress testing.       Assessment:        Primary Problem  Unstable angina [facility])    Active Hospital Problems    Diagnosis Date Noted    Unstable angina ([facility] [I20.0] 12/27/2020    Osteoarthritis of cervical spine [M47.812]     GERD (gastroesophageal reflux disease) [K21.9] 04/17/2014    Hyperlipidemia [E78.5]     Anxiety [F41.9]     Hypothyroidism [E03.9]        Plan:        Unstable angina  EKG NSR, repeat EKGs also normal, no ischemic changes, troponin normal  We will continue aspirin 81 mg for now  Cardiology consulted  Nitroglycerin/acetaminophen for chest pain  Phosphorus, magnesium, TSH, lipase are all normal  Patient is undergoing cardiac stress testing this morning     Hyperlipidemia   Lipitor 40 mg daily     Hypothyroidism  Synthroid 175 MCG daily     GERD   Protonix 40 mg daily a.m.     Anxiety  Klonopin 0.5 mg twice daily as needed  Zoloft 100 mg daily         Musculoskeletal pain: Percocet nightly as needed for severe pain   DVT prophylaxis: Lovenox 40 mg daily  Prophylaxis: Protonix 40 daily    Plan will be discussed with the attending, Dr Homa Cerna MD  PGY I Family Medicine Resident  12/28/2020 10:42 AM     Attestation and add on       I have discussed the care of Lissy Meadows , including pertinent history and exam findings,      12/28/20    with the resident. I have seen and examined the patient and the key elements of all parts of the encounter have been performed by me . I agree with the assessment, plan and orders as documented by the resident. Principal Problem:    Unstable angina (HCC)  Active Problems:    Hyperlipidemia    Anxiety    Hypothyroidism    GERD (gastroesophageal reflux disease)    Osteoarthritis of cervical spine  Resolved Problems:    * No resolved hospital problems. *        stress test inprogrwess     ---- ;        Medications: Allergies: Allergies   Allergen Reactions    Compazine [Prochlorperazine]      Jittery, restless    Sulfa Antibiotics Hives    Adhesive Tape Rash       Current Meds:   Scheduled Meds:    sodium chloride flush  10 mL Intravenous 2 times per day    enoxaparin  40 mg Subcutaneous Daily    atorvastatin  40 mg Oral Daily    pantoprazole  40 mg Oral QAM AC    levothyroxine  175 mcg Oral Daily    metoprolol tartrate  50 mg Oral BID    mirtazapine  15 mg Oral Daily    sertraline  100 mg Oral Daily    pantoprazole  40 mg Oral Once    aspirin  81 mg Oral Daily    influenza virus vaccine  0.5 mL Intramuscular Prior to discharge     Continuous Infusions:   PRN Meds: albuterol, nitroGLYCERIN, sodium chloride flush, polyethylene glycol, acetaminophen **OR** acetaminophen, clonazePAM, oxyCODONE-acetaminophen, tiZANidine, regadenoson        [facility] [address], [address] [place] [address].    Phone [phone]   Fax: [phone]  Answering Service: [phone]

## 2020-12-28 NOTE — PROGRESS NOTES
[address]   OCCUPATIONAL THERAPY MISSED TREATMENT NOTE   INPATIENT   Date: 20  Patient Name: Leyla Meyers       Room:   MRN: [id number]   Account #: [de-identified]    : 1970  (53 y.o.)  Gender: female                 REASON FOR MISSED TREATMENT:  Patient unable to participate   -   Testing - Pt at stress test at 932. Will re-attempt as able.      Joyce Fuentes, OT

## 2020-12-28 NOTE — DISCHARGE SUMMARY
results, he recommended he will see the patient outpatient. Patient has history of GERD, takes Pepcid 40 mg daily. Has history of pancreatitis. Has anxiety with palpitations, takes metoprolol for it. Also has history of cervical spinal degenerative changes, takes Percocet for it. PPIs did not relieve the pain, lipase, TSH also negative. Significant therapeutic interventions: NM cardiac stress testing, CT head    Significant Diagnostic Studies:   Labs / Micro:  CBC:   Lab Results   Component Value Date    WBC 6.1 12/28/2020    RBC 4.18 12/28/2020    RBC 4.36 06/03/2019    HGB 13.1 12/28/2020    HCT 39.3 12/28/2020    MCV 94.1 12/28/2020    MCH 31.3 12/28/2020    MCHC 33.3 12/28/2020    RDW 12.8 12/28/2020     12/28/2020     06/05/2012     BMP:    Lab Results   Component Value Date    GLUCOSE 94 12/28/2020    GLUCOSE 107 06/03/2019     12/28/2020    K 4.2 12/28/2020     12/28/2020    CO2 22 12/28/2020    ANIONGAP 9 12/28/2020    BUN 23 12/28/2020    CREATININE 0.88 12/28/2020    BUNCRER NOT REPORTED 12/28/2020    CALCIUM 9.2 12/28/2020    LABGLOM >60 12/28/2020    GFRAA >60 12/28/2020    GFR      12/28/2020    GFR NOT REPORTED 12/28/2020     TSH:    Lab Results   Component Value Date    TSH 0.55 12/28/2020     Radiology:    Ct Head Wo Contrast    Result Date: 12/27/2020  EXAMINATION: CT OF THE HEAD WITHOUT CONTRAST  12/27/2020 5:40 pm TECHNIQUE: CT of the head was performed without the administration of intravenous contrast. Dose modulation, iterative reconstruction, and/or weight based adjustment of the mA/kV was utilized to reduce the radiation dose to as low as reasonably achievable. COMPARISON: 06/25/2018 HISTORY: ORDERING SYSTEM PROVIDED HISTORY: HA TECHNOLOGIST PROVIDED HISTORY: POOLE Is the patient pregnant?->No Reason for Exam: headache Acuity: Acute Type of Exam: Initial FINDINGS: BRAIN/VENTRICLES: There is no acute intracranial hemorrhage, mass effect or midline shift.   No abnormal extra-axial fluid collection. The gray-white differentiation is maintained without evidence of an acute infarct. There is no evidence of hydrocephalus. Vascular calcifications. ORBITS: The visualized portion of the orbits demonstrate no acute abnormality. SINUSES: Mild ethmoid sinus mucosal thickening. SOFT TISSUES/SKULL:  No acute abnormality of the visualized skull or soft tissues. No acute intracranial abnormality. Nm Cardiac Stress Test Nuclear Imaging    Result Date: 12/28/2020  EXAMINATION: MYOCARDIAL PERFUSION IMAGING 12/28/2020 7:20 am TECHNIQUE: For the rest study, 9.6 mCi of Tc 99 labeled sestamibi were injected. SPECT images were acquired. Under cardiology supervision, 0.4mg [facility] was infused. After pharmacologic stress, 37.9 mCi of Tc 99 labeled sestamibi were injected. SPECT images with ECG gating were acquired. COMPARISON: 08/21/2013 HISTORY: ORDERING SYSTEM PROVIDED HISTORY: Chest pain TECHNOLOGIST PROVIDED HISTORY: Reason for Exam: Chest pain Procedure Type->Rx CHest pain Is the patient pregnant?->No Reason for Exam: chest pain Acuity: Unknown Type of Exam: Unknown FINDINGS: Images interpreted utilizing [employer] system and [employer]. There is normal distribution of radiotracer throughout the myocardium without evidence for a significant reversible or fixed perfusion defect. Perfusion scores are visually adjusted to account for artifact. Summed stress score:  0 Summed rest score:  0 Summed reversibility score:  0 Function: End diastolic volume:  71DM Left ventricular ejection fraction:  68% Wall motion abnormalities:  None. TID score:  1.08 (scores greater than 1.39 are considered elevated for Lexiscan stress with Tc99m)     1. No discrete perfusion abnormality to suggest myocardial ischemia/infarction. 2. No wall motion abnormality. Calculated ejection fraction of 68%.  3. Risk stratification: Low risk Notes concerning risk stratification: Risk stratification incorporates both clinical history and some testing results. Final risk determination is the responsibility of the ordering provider as other patient information and test results may increase or decrease the risk assessment reported for this examination. Risk stratification criteria are adapted from \"Noninvasive Risk Stratification\" criteria from Reina Melo. Al, ACC/AATS/AHA/ASE/ASNC/SCAI/SCCT/STS 2017 Appropriate Use Criteria For Coronary Revascularization in Patients With Stable Ischemic Heart Disease [facility] Volume 69, Issue 17, May 2017 High risk (>3% annual death or MI) 1. Severe resting LV dysfunction (LVEF <35%) not readily explained by non coronary causes 2. Resting perfusion abnormalities greater than 10% of the myocardium in patients without prior history or evidence of MI3. Stress-induced perfusion abnormalities encumbering greater than or equal to 10% myocardium or stress segmental scores indicating multiple vascular territories with abnormalities 4. Stress-induced LV dilatation (TID ratio greater than 1.19 for exercise and greater than 1.39 for regadenoson) Intermediate risk (1% to 3% annual death or MI) 1. Mild/moderate resting LV dysfunction (LVEF 35% to 49%) not readily explained by non coronary causes. 2. Resting perfusion abnormalities in 5%-9.9% of the myocardium in patients without a history or prior evidence of MI 3. Stress-induced perfusion abnormality encumbering 5%-9.9% of the myocardium or stress segmental scores indicating 1 vascular territory with abnormalities but without LV dilation 4. Small wall motion abnormality involving 1-2 segments and only 1 coronary bed. Low Risk (Less than 1% annual death or MI) 1. Normal or small myocardial perfusion defect at rest or with stress encumbering less than 5% of the myocardium.          Consultations:    Consults:     Final Specialist Recommendations/Findings:   IP CONSULT TO PRIMARY CARE PROVIDER  IP CONSULT TO CARDIOLOGY  IP CONSULT TO SOCIAL WORK The patient was seen and examined on day of discharge and this discharge summary is in conjunction with any daily progress note from day of discharge. Discharge plan:       Disposition: Home    Physician Follow Up:     Luisa Fortune MD  [facility], [address] [place] [address] [address] [facility]  [phone]    In 2 weeks  For follow up with your cardiologist     Bal Shelley, APRN - CNP  [address] [place] [address]  [phone]    Schedule an appointment as soon as possible for a visit in 2 weeks  For hospital follow up        Requiring Further Evaluation/Follow Up POST HOSPITALIZATION/Incidental Findings:     Diet: regular diet    Activity: As tolerated    Instructions to Patient:     Discharge Medications:      Medication List      CHANGE how you take these medications    clonazePAM 0.5 MG tablet  Commonly known as: KLONOPIN  Take 1 tablet by mouth 2 times daily as needed for Anxiety for up to 30 days. What changed:   · when to take this  · reasons to take this        CONTINUE taking these medications    esomeprazole 40 MG delayed release capsule  Commonly known as: NEXIUM  TAKE 1 CAPSULE BY MOUTH EVERY MORNING BEFORE BREAKFAST     levothyroxine 175 MCG tablet  Commonly known as: SYNTHROID  TAKE 1 TABLET BY MOUTH DAILY     metoprolol tartrate 50 MG tablet  Commonly known as: LOPRESSOR     mirtazapine 15 MG tablet  Commonly known as: REMERON     nicotine 14 MG/24HR  Commonly known as: Nicoderm CQ  Place 1 patch onto the skin every 24 hours     ondansetron 4 MG disintegrating tablet  Commonly known as: Zofran ODT  Take 1 tablet by mouth every 8 hours as needed for Nausea or Vomiting     oxyCODONE-acetaminophen  MG per tablet  Commonly known as: Percocet  Take 1 tablet by mouth every 6 hours as needed for Pain for up to 30 days.      rOPINIRole 2 MG tablet  Commonly known as: REQUIP  TAKE 1 TABLET BY MOUTH EVERY NIGHT     sertraline 100 MG tablet  Commonly known as: ZOLOFT     tiZANidine 4 MG tablet  Commonly known as: ZANAFLEX  TAKE 1 TABLET BY MOUTH EVERY 8 HOURS AS NEEDED FOR SPASMS     topiramate 25 MG tablet  Commonly known as: TOPAMAX     Vistaril 25 MG capsule  Generic drug: hydrOXYzine        STOP taking these medications    albuterol sulfate  (90 Base) MCG/ACT inhaler     atorvastatin 40 MG tablet  Commonly known as: LIPITOR     ipratropium-albuterol 0.5-2.5 (3) MG/3ML Soln nebulizer solution  Commonly known as: DUONEB           Where to Get Your Medications      You can get these medications from any pharmacy    Bring a paper prescription for each of these medications  · clonazePAM 0.5 MG tablet         Time Spent on discharge is  32 mins in patient examination, evaluation, counseling as well as medication reconciliation, prescriptions for required medications, discharge plan and follow up. Electronically signed by   Vandy Burkitt, MD  12/28/2020  5:59 PM      Thank you Dr. Carito Mathias, MARTITA - CNP for the opportunity to be involved in this patient's care. Attending Physician Statement  I have reviewed and edited the discharge summary of  Courtney Soler AS NEEDED  ,   including pertinent history and exam findings. I have personally seen the patient and participated in discharge planning and evaluation . I have reviewed the key elements of all parts of the discharge summary . I agree with the information and plans as documented by the resident. Time spent on discharge planning ;          [] less than 30 minutes . [x]   more  than 30 minutes . Electronically signed by Jessika Daley MD on 12/29/2020 .

## 2020-12-28 NOTE — FLOWSHEET NOTE
Discharge instructions reviewed with patient at this time, all questions answered. IV removed, all belongings with patient. Taken downstairs to meet ride via wheelchair.

## 2020-12-31 NOTE — PROCEDURES
[address]                    [address] [facility]                              CARDIAC STRESS TEST    PATIENT NAME: Masha Wilhelm                      :        1970  MED REC NO:   [id number]                              ROOM:       2107  ACCOUNT NO:   [de-identified]                           ADMIT DATE: 2020  PROVIDER:     Sejal Cornejo    DATE OF STUDY:  2020    ORDERING PROVIDER:  Chema Barrios MD    INTERPRETING PHYSICIAN:  KALPESH Longo MD    LEXISCAN STRESS TEST    INDICATION:  CHEST PAIN    INTERPRETATION:  Resting heart rate:  66 bpm.  Resting blood pressure:  141/73 mmhg. Resting EKG:  Normal sinus rhythm at 66 bpm.  Stress heart response:  Normal response. Blood pressure response:  Appropriate. Stress EKGs:  No changes seen. Mild chest pressure during LEXISCAN. No ischemic Ekg changes. IMPRESSION:  NEGATIVE LEXISCAN STRESS TEST. THE NUCLEAR SCAN TO FOLLOW.       Heber Villasenor    D: 2020 9:16:00       T: 2020 9:19:54     AS/KISHORE  Job#: [id number]     Doc#: Unknown    CC:    (Retain this field even if not dictated or not decipherable)

## 2021-01-02 LAB
EKG ATRIAL RATE: 60 BPM
EKG ATRIAL RATE: 70 BPM
EKG P AXIS: 36 DEGREES
EKG P AXIS: 57 DEGREES
EKG P-R INTERVAL: 160 MS
EKG P-R INTERVAL: 172 MS
EKG Q-T INTERVAL: 424 MS
EKG Q-T INTERVAL: 468 MS
EKG QRS DURATION: 82 MS
EKG QRS DURATION: 84 MS
EKG QTC CALCULATION (BAZETT): 457 MS
EKG QTC CALCULATION (BAZETT): 468 MS
EKG R AXIS: 28 DEGREES
EKG R AXIS: 37 DEGREES
EKG T AXIS: 42 DEGREES
EKG T AXIS: 54 DEGREES
EKG VENTRICULAR RATE: 60 BPM
EKG VENTRICULAR RATE: 70 BPM

## 2021-01-04 LAB
EKG ATRIAL RATE: 74 BPM
EKG P AXIS: 61 DEGREES
EKG P-R INTERVAL: 148 MS
EKG Q-T INTERVAL: 414 MS
EKG QRS DURATION: 84 MS
EKG QTC CALCULATION (BAZETT): 459 MS
EKG R AXIS: 25 DEGREES
EKG T AXIS: 44 DEGREES
EKG VENTRICULAR RATE: 74 BPM

## 2021-01-08 ENCOUNTER — HOSPITAL ENCOUNTER (OUTPATIENT)
Dept: PAIN MANAGEMENT | Age: 51
Discharge: HOME | End: 2021-01-08
Payer: COMMERCIAL

## 2021-01-08 DIAGNOSIS — Z51.81 ENCOUNTER FOR MEDICATION MONITORING: ICD-10-CM

## 2021-01-08 DIAGNOSIS — M54.12 CERVICAL RADICULOPATHY: ICD-10-CM

## 2021-01-08 DIAGNOSIS — M47.812 ARTHROPATHY OF CERVICAL FACET JOINT: ICD-10-CM

## 2021-01-08 DIAGNOSIS — M48.02 DEGENERATIVE CERVICAL SPINAL STENOSIS: ICD-10-CM

## 2021-01-08 PROCEDURE — 99213 OFFICE O/P EST LOW 20 MIN: CPT | Performed by: NURSE PRACTITIONER

## 2021-01-08 PROCEDURE — 99213 OFFICE O/P EST LOW 20 MIN: CPT

## 2021-01-08 RX ORDER — OXYCODONE AND ACETAMINOPHEN 10; 325 MG/1; MG/1
1 TABLET ORAL EVERY 6 HOURS PRN
Qty: 120 TABLET | Refills: 0 | Status: SHIPPED | OUTPATIENT
Start: 2021-01-08 | End: 2021-02-04 | Stop reason: SDUPTHER

## 2021-01-08 ASSESSMENT — ENCOUNTER SYMPTOMS
CONSTIPATION: 0
COUGH: 0
SHORTNESS OF BREATH: 0

## 2021-01-08 NOTE — PROGRESS NOTES
Patient completed a video visit to review medication contract. Chief Complaint: neck pain    [facility]  Patient complains of left-sided neck pain that radiates down the left arm to fingers. MRI with Mild multilevel spinal canal stenosis and mild-to-moderate neural foraminal narrowing from C3-4 to C5-6. She has never had neck surgery. She had PT a year ago and continues to do exercises at home with benefit. She would like another cervical facet injection but insurance is denying due to less than 80% relief with first injection. She is on a high dose of opiates - percocet 10 mg QID and MSER 15 mg that she takes PRN. She is requesting pain medication \"to help me sleep\". MED of 75 is discussed and risks of respiratory depression and death with higher doses or additional opiates is explained. Discussed a POC to find alternative therapies for the pain such as acupuncture and injections. She will see NS at [facility] for possible surgery. Neck Pain   This is a chronic problem. The current episode started more than 1 year ago. The problem occurs constantly. The problem has been unchanged. The pain is present in the left side, midline, right side and occipital region. The pain is at a severity of 7/10. The pain is moderate. The symptoms are aggravated by position and twisting. Associated symptoms include headaches. Pertinent negatives include no chest pain or fever. She has tried bed rest and oral narcotics for the symptoms. The treatment provided moderate relief. Patient denies any new neurological symptoms. No bowel or bladder incontinence, no weakness, and no falling.     Pill count: appropriate due today    Morphine equivalent: 60 Periodic Controlled Substance Monitoring: Possible medication side effects, risk of tolerance/dependence & alternative treatments discussed., No signs of potential drug abuse or diversion identified., Obtaining appropriate analgesic effect of treatment. Luis Posada, APRN - CNP)      Past Medical History:   Diagnosis Date    Anxiety     CAD (coronary artery disease)     Mitral valve prolapse    Fatty liver     GERD (gastroesophageal reflux disease)     Headache     History of bronchitis     Hyperlipidemia     Hypothyroidism     Kidney stone     LFT elevation     MVP (mitral valve prolapse)     Obesity     Type 2 diabetes mellitus without complication ([facility] [address]     Umbilical hernia        Past Surgical History:   Procedure Laterality Date    APPENDECTOMY       SECTION      2 pfannenstiel, 1 vertical    CHOLECYSTECTOMY      COLONOSCOPY      Dr Eva Pena COLONOSCOPY  14    COLONOSCOPY  2014    biopsy & sigmoid spasms, pathology negative    COLONOSCOPY N/A 2018    COLONOSCOPY WITH BIOPSY performed by Myra Diego MD at [address] [place] [address] ([facility])      x 5    HYSTERECTOMY, TOTAL ABDOMINAL          [place] EXPLORATORY OF ABDOMEN  10/21/2014    Laparotomy-lysis of adhesions, bso     UPPER GASTROINTESTINAL ENDOSCOPY  2010    mild chronic inactive gastritis       Allergies   Allergen Reactions    Compazine [Prochlorperazine]      Jittery, restless    Sulfa Antibiotics Hives    Adhesive Tape Rash         Current Outpatient Medications:     clonazePAM (KLONOPIN) 0.5 MG tablet, Take 1 tablet by mouth 2 times daily as needed for Anxiety for up to 30 days. , Disp: 60 tablet, Rfl: 1    esomeprazole (NEXIUM) 40 MG delayed release capsule, TAKE 1 CAPSULE BY MOUTH EVERY MORNING BEFORE BREAKFAST, Disp: 90 capsule, Rfl: 1    tiZANidine (ZANAFLEX) 4 MG tablet, TAKE 1 TABLET BY MOUTH EVERY 8 HOURS AS NEEDED FOR SPASMS, Disp: 90 tablet, Rfl: 1   oxyCODONE-acetaminophen (PERCOCET)  MG per tablet, Take 1 tablet by mouth every 6 hours as needed for Pain for up to 30 days. , Disp: 120 tablet, Rfl: 0    levothyroxine (SYNTHROID) 175 MCG tablet, TAKE 1 TABLET BY MOUTH DAILY, Disp: 90 tablet, Rfl: 0    mirtazapine (REMERON) 15 MG tablet, Take 15 mg by mouth daily, Disp: , Rfl:     rOPINIRole (REQUIP) 2 MG tablet, TAKE 1 TABLET BY MOUTH EVERY NIGHT, Disp: 90 tablet, Rfl: 3    hydrOXYzine (VISTARIL) 25 MG capsule, Take 25 mg by mouth 2 times daily as needed for Itching 2 tabs at night, Disp: , Rfl:     ondansetron (ZOFRAN ODT) 4 MG disintegrating tablet, Take 1 tablet by mouth every 8 hours as needed for Nausea or Vomiting, Disp: 10 tablet, Rfl: 0    nicotine (NICODERM CQ) 14 MG/24HR, Place 1 patch onto the skin every 24 hours, Disp: 30 patch, Rfl: 3    sertraline (ZOLOFT) 100 MG tablet, Take 100 mg by mouth daily, Disp: , Rfl:     topiramate (TOPAMAX) 25 MG tablet, 25 mg 2 times daily , Disp: , Rfl:     metoprolol tartrate (LOPRESSOR) 50 MG tablet, Take 50 mg by mouth 2 times daily , Disp: , Rfl:     Family History   Problem Relation Age of Onset    Cancer Mother         vaginal    Heart Disease Father     Diabetes Father     Other Father         colon resection for colon polyps    Breast Cancer Maternal Grandmother     Stroke Maternal Grandfather        Social History     Socioeconomic History    Marital status:      Spouse name: Not on file    Number of children: Not on file    Years of education: Not on file    Highest education level: Not on file   Occupational History    Occupation: Homemaker   Social Needs    Financial resource strain: Not on file    Food insecurity     Worry: Not on file     Inability: Not on file   [employer] needs     Medical: Not on file     Non-medical: Not on file   Tobacco Use    Smoking status: Former Smoker     Packs/day: 0.25     Years: 33.00     Pack years: 8.25     Types: Cigarettes  Smokeless tobacco: Never Used    Tobacco comment: quit on nicoderm patch   Substance and Sexual Activity    Alcohol use: No     Alcohol/week: 0.0 standard drinks    Drug use: No    Sexual activity: Not Currently   Lifestyle    Physical activity     Days per week: Not on file     Minutes per session: Not on file    Stress: Not on file   Relationships    Social connections     Talks on phone: Not on file     Gets together: Not on file     Attends Yazidi service: Not on file     Active member of club or organization: Not on file     Attends meetings of clubs or organizations: Not on file     Relationship status: Not on file    Intimate partner violence     Fear of current or ex partner: Not on file     Emotionally abused: Not on file     Physically abused: Not on file     Forced sexual activity: Not on file   Other Topics Concern    Not on file   Social History Narrative    Not on file       Review of Systems:  Review of Systems   Constitution: Negative for chills and fever. Cardiovascular: Negative for chest pain and palpitations. Respiratory: Negative for cough and shortness of breath. Musculoskeletal: Positive for neck pain. Gastrointestinal: Negative for constipation. Neurological: Positive for headaches. Negative for disturbances in coordination and loss of balance. Physical Exam:  [facility] 11/01/2010     Physical Exam  HENT:      Head: Normocephalic. Pulmonary:      Effort: Pulmonary effort is normal.   Neurological:      Mental Status: She is alert.    Psychiatric:         Mood and Affect: Mood normal.         Behavior: Behavior normal.         Record/Diagnostics Review:    Last mychal 7/2020 and was appropriate     Assessment:  Problem List Items Addressed This Visit     Degenerative cervical spinal stenosis    Relevant Medications    oxyCODONE-acetaminophen (PERCOCET)  MG per tablet    Arthropathy of cervical facet joint    Relevant Medications Renuka Zuniga is a 53 y.o. female being evaluated by a Virtual Visit (video visit) encounter to address concerns as mentioned above. A caregiver was present when appropriate. Due to this being a TeleHealth encounter (During [facility]-25 public health emergency), evaluation of the following organ systems was limited: Vitals/Constitutional/EENT/Resp/CV/GI//MS/Neuro/Skin/Heme-Lymph-Imm. Pursuant to the emergency declaration under the [address] [place] [address] and the [employer] and [employer] Act, this Virtual Visit was conducted with patient's (and/or legal guardian's) consent, to reduce the patient's risk of exposure to COVID-19 and provide necessary medical care. The patient (and/or legal guardian) has also been advised to contact this office for worsening conditions or problems, and seek emergency medical treatment and/or call 911 if deemed necessary. Patient identification was verified at the start of the visit: Yes    Total time spent for this encounter: Not billed by time    Services were provided through a video synchronous discussion virtually to substitute for in-person clinic visit. Patient and provider were located at their individual homes. --MARTITA Sagastume CNP on 1/8/2021 at 9:47 AM    An electronic signature was used to authenticate this note.

## 2021-02-03 ENCOUNTER — HOSPITAL ENCOUNTER (OUTPATIENT)
Age: 51
Discharge: HOME | End: 2021-02-03
Payer: COMMERCIAL

## 2021-02-03 DIAGNOSIS — M48.02 DEGENERATIVE CERVICAL SPINAL STENOSIS: ICD-10-CM

## 2021-02-03 DIAGNOSIS — Z51.81 ENCOUNTER FOR MEDICATION MONITORING: ICD-10-CM

## 2021-02-03 PROCEDURE — 80307 DRUG TEST PRSMV CHEM ANLYZR: CPT

## 2021-02-04 ENCOUNTER — TELEPHONE (OUTPATIENT)
Dept: PAIN MANAGEMENT | Age: 51
End: 2021-02-04

## 2021-02-04 ENCOUNTER — HOSPITAL ENCOUNTER (OUTPATIENT)
Dept: PAIN MANAGEMENT | Age: 51
Discharge: HOME | End: 2021-02-04
Payer: COMMERCIAL

## 2021-02-04 DIAGNOSIS — M79.18 MYOFASCIAL MUSCLE PAIN: Primary | ICD-10-CM

## 2021-02-04 DIAGNOSIS — M47.812 ARTHROPATHY OF CERVICAL FACET JOINT: ICD-10-CM

## 2021-02-04 DIAGNOSIS — M54.12 CERVICAL RADICULOPATHY: ICD-10-CM

## 2021-02-04 DIAGNOSIS — Z51.81 ENCOUNTER FOR MEDICATION MONITORING: ICD-10-CM

## 2021-02-04 DIAGNOSIS — M48.02 DEGENERATIVE CERVICAL SPINAL STENOSIS: ICD-10-CM

## 2021-02-04 PROCEDURE — 99213 OFFICE O/P EST LOW 20 MIN: CPT | Performed by: NURSE PRACTITIONER

## 2021-02-04 PROCEDURE — 99213 OFFICE O/P EST LOW 20 MIN: CPT

## 2021-02-04 RX ORDER — OXYCODONE AND ACETAMINOPHEN 10; 325 MG/1; MG/1
1 TABLET ORAL EVERY 6 HOURS PRN
Qty: 120 TABLET | Refills: 0 | Status: SHIPPED | OUTPATIENT
Start: 2021-02-07 | End: 2021-03-05 | Stop reason: SDUPTHER

## 2021-02-04 RX ORDER — GABAPENTIN 600 MG/1
TABLET ORAL
COMMUNITY
End: 2021-02-04

## 2021-02-04 RX ORDER — CLONAZEPAM 1 MG/1
TABLET ORAL
COMMUNITY
Start: 2021-01-23 | End: 2021-02-04

## 2021-02-04 RX ORDER — ALBUTEROL SULFATE 90 UG/1
AEROSOL, METERED RESPIRATORY (INHALATION)
COMMUNITY
Start: 2021-01-30 | End: 2021-04-23

## 2021-02-04 ASSESSMENT — ENCOUNTER SYMPTOMS
GASTROINTESTINAL NEGATIVE: 1
RESPIRATORY NEGATIVE: 1

## 2021-02-04 NOTE — PROGRESS NOTES
[address] [address] PROGRESS NOTE      Patient  completed [x]  video visit   []   phone call:      Minutes :   [x]    to  review Medication Agreement    []  Follow up after procedure   [x]  Discuss treatment options    Location:  Provider:  working from    [x]    home    []   [facility] - KADEEM MONTEJO , patient at  home   Chief Complaint:  Neck pain    She c/o neck pain radiating to left shoulder, The pain has increased,She c/o numbness in her left hand, which started 2 weeks ago. She states saw Neurosurgeon  Who did not recommend surgery at this time. She would like to get injection again. She had left cervical  Facet injection C2-T1 in . Her sleep is off and on. No Ed visits. Neck Pain   This is a chronic problem. The problem occurs constantly. The problem has been gradually worsening. The pain is associated with nothing. The pain is present in the midline (left shoulder). Quality: sharp. The pain is at a severity of 5/10. The pain is moderate. Exacerbated by: turning neck  The pain is same all the time. Associated symptoms include headaches, numbness and weakness. Associated symptoms comments: Left hand. She has tried heat and oral narcotics for the symptoms.          Treatment goals:  Functional status: reduce pain 30%       Aberrancy:   Any alcoholic beverages     no       Any illegal drugs   no      Analgesia:    5                 Adverse  Effects :none      ADL;s :stretching  Data:    When was thelast UDS:    7-         Was the UDS appropriate:yes      Record/Diagnostics Review:      As above, I did review the imaging         7/18/2020  6:16 AM - Liu, Mhpn Incoming Lab Results From [facility] [employer]    Component Value Ref Range & Units Status Collected Lab   6-Acetylmorphine, Ur Not Detected   Final 07/15/2020  3:29 PM [facility]   7-Aminoclonazepam, Urine Present   Final 07/15/2020  3:29 PM [facility]   Alpha-OH-Alpraz, Urine Not Detected   Final 07/15/2020  3:29 PM [facility] Alprazolam, Urine Not Detected   Final 07/15/2020  3:29 PM [facility]   Amphetamines, urine Not Detected   Final 07/15/2020  3:29 PM [facility]   Barbiturates, Ur Not Detected   Final 07/15/2020  3:29 PM [facility]   Benzoylecgonine, Ur Not Detected   Final 07/15/2020  3:29 PM [facility]   Buprenorphine Urine Not Detected   Final 07/15/2020  3:29 PM [facility]   Carisoprodol, Ur Not Detected   Final 07/15/2020  3:29 PM [facility]   (NOTE)   The carisoprodol immunoassay has cross-reactivity to carisoprodol   and meprobamate.     Clonazepam, Urine Not Detected   Final 07/15/2020  3:29 PM [facility]   Codeine, Urine Not Detected   Final 07/15/2020  3:29 PM [facility]   MDA, Ur Not Detected   Final 07/15/2020  3:29 PM [facility]   Diazepam, Urine Not Detected   Final 07/15/2020  3:29 PM [facility]   Ethyl Glucuronide Ur Not Detected   Final 07/15/2020  3:29 PM [facility]   Fentanyl, Ur Not Detected   Final 07/15/2020  3:29 PM [facility]   Hydrocodone, Urine Not Detected   Final 07/15/2020  3:29 PM [facility]   Hydromorphone, Urine Not Detected   Final 07/15/2020  3:29 PM [facility]   Lorazepam, Urine Not Detected   Final 07/15/2020  3:29 PM [facility]   Marijuana Metab, Ur Not Detected   Final 07/15/2020  3:29 PM [facility]   MDEA, AISHA, Ur Not Detected   Final 07/15/2020  3:29 PM [facility]   MDMA, Urine Not Detected   Final 07/15/2020  3:29 PM [facility]   Meperidine Metab, Ur Not Detected   Final 07/15/2020  3:29 PM [facility]   Methadone, Urine Not Detected   Final 07/15/2020  3:29 PM [facility]   Methamphetamine, Urine Not Detected   Final 07/15/2020  3:29 PM [facility]   Methylphenidate Not Detected   Final 07/15/2020  3:29 PM [facility]   Midazolam, Urine Not Detected   Final 07/15/2020  3:29 PM [facility]   Morphine Urine Not Detected   Final 07/15/2020  3:29 PM [facility]   Norbuprenorphine, Urine Not Detected   Final 07/15/2020  3:29 PM [facility]   Nordiazepam, Urine Not Detected   Final 07/15/2020  3:29 PM [facility]   Norfentanyl, Urine Not Detected   Final 07/15/2020  3:29 PM [facility]   NORHYDROCODONE, URINE Not Detected   Final 07/15/2020  3:29 PM [facility] Noroxycodone, Urine Present   Final 07/15/2020  3:29 PM [facility]   NOROXYMORPHONE, URINE Not Detected   Final 07/15/2020  3:29 PM [facility]   Oxazepam, Urine Not Detected   Final 07/15/2020  3:29 PM [facility]   Oxycodone Urine Present   Final 07/15/2020  3:29 PM [facility]   Oxymorphone, Urine Not Detected   Final 07/15/2020  3:29 PM [facility]   PCP, Urine Not Detected   Final 07/15/2020  3:29 PM [facility]   Phentermine, Ur Not Detected   Final 07/15/2020  3:29 PM [facility]   Propoxyphene, Urine Not Detected   Final 07/15/2020  3:29 PM [facility]   Tapentadol-O-Sulfate, Urine Not Detected   Final 07/15/2020  3:29 PM [facility]   Tapentadol, Urine Not Detected   Final 07/15/2020  3:29 PM [facility]   Temazepam, Urine Not Detected   Final 07/15/2020  3:29 PM [facility]   Tramadol, Urine Not Detected   Final 07/15/2020  3:29 PM [facility]   Zolpidem, Urine Not Detected   Final 07/15/2020  3:29 PM [facility]   Creatinine, Ur 295.4  20.0 - 400.0 mg/dL Final 07/15/2020  3:29 PM [facility]   Pain Mgt Drug Panel, Hi Res, Ur See Below   Final 07/15/2020  3:29 PM [facility]   (NOTE)   Methodology: Qualitative Enzyme Immunoassay and Qualitative Liquid   Chromatography-Time of Flight-Mass Spectrometry or Tandem Mass   Spectrometry, Quantitative Spectrophotometry   The absence of expected drug(s) and/or drug metabolite(s) may   indicate non-compliance, inappropriate timing of specimen   collection relative to drug administration, poor drug absorption,   diluted/adulterated urine, or limitations of testing. The   concentration must be greater than or equal to the cutoff to be   reported as present.  If specific drug concentrations are   required, contact the laboratory within two weeks of specimen   collection to request quantification by a second analytical   technique. Interpretive questions should be directed to the   laboratory. Results based on immunoassay detection that do not match clinical   expectations should be   interpreted with caution.  Confirmatory testing by mass spectrometry for immunoassay-based results is available, if   ordered within two weeks of specimen collection. Additional   charges apply. For medical purposes only; not valid for forensic use. This test was developed and its performance characteristics   determined by [facility]. The U.S. Food and Drug   Administration has not approved or cleared this test; however, FDA   clearance or approval is not currently required for clinical use. The results are not intended to be used as the sole means for   clinical diagnosis or patient management decisions. [facility] Pain Mgt Drug Panel, High Res/Emit U See Note   Final 07/15/2020  3:29 PM [facility]   (NOTE)   Access [facility] Enhanced Report using either link below:   -Direct access: [email or web address]   -Enter Username, Password: [email or web address]   Username: [email or web address]   Password: [email or web address]   Performed By: [facility] Robles[address]   : Shanika Crews. Kinjal Chiu MD, MS    Testing Performed By                Pill count: appropriate  fill date :2-72021    Morphine equivalent dose as reported on OARRS:60  Periodic Controlled Substance Monitoring: Possible medication side effects, risk of tolerance/dependence & alternative treatments discussed., No signs of potential drug abuse or diversion identified. , Assessed functional status., Obtaining appropriate analgesic effect of treatment. Kulwinder Tineo, APRN - CNP)  Review of[place]ARRS does not show any aberrant prescription behavior. Medication is helping the patient stay active. Patient denies any side effects and reports adequate analgesia. No sign of misuse/abuse.             Past Medical History:   Diagnosis Date    Anxiety     CAD (coronary artery disease)     Mitral valve prolapse    Fatty liver     GERD (gastroesophageal reflux disease)     Headache     History of bronchitis     Hyperlipidemia     Hypothyroidism     Kidney stone  LFT elevation     MVP (mitral valve prolapse)     Obesity     Type 2 diabetes mellitus without complication ([facility] [address]     Umbilical hernia        Past Surgical History:   Procedure Laterality Date    APPENDECTOMY       SECTION      2 pfannenstiel, 1 vertical    CHOLECYSTECTOMY      COLONOSCOPY      Dr Román Orosco  14    COLONOSCOPY  2014    biopsy & sigmoid spasms, pathology negative    COLONOSCOPY N/A 2018    COLONOSCOPY WITH BIOPSY performed by Axel Munoz MD at [facility]      x 5    HYSTERECTOMY, TOTAL ABDOMINAL          FL EXPLORATORY OF ABDOMEN  10/21/2014    Laparotomy-lysis of adhesions, bso     UPPER GASTROINTESTINAL ENDOSCOPY  2010    mild chronic inactive gastritis       Allergies   Allergen Reactions    Compazine [Prochlorperazine]      Jittery, restless    Sulfa Antibiotics Hives    Adhesive Tape Rash         Current Outpatient Medications:     oxyCODONE-acetaminophen (PERCOCET)  MG per tablet, Take 1 tablet by mouth every 6 hours as needed for Pain for up to 30 days. , Disp: 120 tablet, Rfl: 0    clonazePAM (KLONOPIN) 0.5 MG tablet, Take 1 tablet by mouth 2 times daily as needed for Anxiety for up to 30 days. , Disp: 60 tablet, Rfl: 1    esomeprazole (NEXIUM) 40 MG delayed release capsule, TAKE 1 CAPSULE BY MOUTH EVERY MORNING BEFORE BREAKFAST, Disp: 90 capsule, Rfl: 1    levothyroxine (SYNTHROID) 175 MCG tablet, TAKE 1 TABLET BY MOUTH DAILY, Disp: 90 tablet, Rfl: 0    mirtazapine (REMERON) 15 MG tablet, Take 15 mg by mouth daily, Disp: , Rfl:     rOPINIRole (REQUIP) 2 MG tablet, TAKE 1 TABLET BY MOUTH EVERY NIGHT, Disp: 90 tablet, Rfl: 3    nicotine (NICODERM CQ) 14 MG/24HR, Place 1 patch onto the skin every 24 hours, Disp: 30 patch, Rfl: 3    sertraline (ZOLOFT) 100 MG tablet, Take 100 mg by mouth daily, Disp: , Rfl:     topiramate (TOPAMAX) 25 MG tablet, 25 mg 2 times daily , Disp: , Rfl:   metoprolol tartrate (LOPRESSOR) 50 MG tablet, Take 50 mg by mouth 2 times daily , Disp: , Rfl:     albuterol sulfate  (90 Base) MCG/ACT inhaler, INHALE 2 PUFFS INTO THE LUNGS EVERY 4 HOURS AS NEEDED FOR SHORTNESS OF BREATH, Disp: , Rfl:     tiZANidine (ZANAFLEX) 4 MG tablet, TAKE 1 TABLET BY MOUTH EVERY 8 HOURS AS NEEDED FOR SPASMS, Disp: 90 tablet, Rfl: 1    ondansetron (ZOFRAN ODT) 4 MG disintegrating tablet, Take 1 tablet by mouth every 8 hours as needed for Nausea or Vomiting, Disp: 10 tablet, Rfl: 0    Family History   Problem Relation Age of Onset    Cancer Mother         vaginal    Heart Disease Father     Diabetes Father     Other Father         colon resection for colon polyps    Breast Cancer Maternal Grandmother     Stroke Maternal Grandfather        Social History     Socioeconomic History    Marital status:      Spouse name: Not on file    Number of children: Not on file    Years of education: Not on file    Highest education level: Not on file   Occupational History    Occupation: Homemaker   Social Needs    Financial resource strain: Not on file    Food insecurity     Worry: Not on file     Inability: Not on file    Transportation needs     Medical: Not on file     Non-medical: Not on file   Tobacco Use    Smoking status: Former Smoker     Packs/day: 0.25     Years: 33.00     Pack years: 8.25     Types: Cigarettes    Smokeless tobacco: Never Used    Tobacco comment: quit on nicoderm patch   Substance and Sexual Activity    Alcohol use: No     Alcohol/week: 0.0 standard drinks    Drug use: No    Sexual activity: Not Currently   Lifestyle    Physical activity     Days per week: Not on file     Minutes per session: Not on file    Stress: Not on file   Relationships    Social connections     Talks on phone: Not on file     Gets together: Not on file     Attends Restorationism service: Not on file     Active member of club or organization: Not on file Attends meetings of clubs or organizations: Not on file     Relationship status: Not on file    Intimate partner violence     Fear of current or ex partner: Not on file     Emotionally abused: Not on file     Physically abused: Not on file     Forced sexual activity: Not on file   Other Topics Concern    Not on file   Social History Narrative    Not on file         Review of Systems:  Review of Systems   Constitution: Negative. HENT: Negative. Eyes:        Glasses   Cardiovascular: Negative. Respiratory: Negative. Endocrine: Negative. Hematologic/Lymphatic: Negative. Skin: Negative. Musculoskeletal: Positive for joint pain and neck pain. Gastrointestinal: Negative. Genitourinary: Negative. Neurological: Positive for headaches, numbness and weakness. Psychiatric/Behavioral: Positive for depression. Managing         Physical Exam:  [facility] 11/01/2010     Physical Exam  HENT:      Head: Normocephalic. Pulmonary:      Effort: Pulmonary effort is normal.   Skin:         Neurological:      Mental Status: She is alert and oriented to person, place, and time. Psychiatric:         Mood and Affect: Mood normal.         Thought Content: Thought content normal.           Assessment:      Problem List Items Addressed This Visit     Myofascial muscle pain - Primary    Encounter for medication monitoring    Degenerative cervical spinal stenosis    Cervical radiculopathy            Treatment Plan:  DISCUSSION: Treatment options discussed withpatient and all questions answered to patient's satisfaction.      Possible side effects, risk of tolerance and or dependence and alternative treatments discussed    Obtaining appropriate analgesic effect of treatment   No signs of potential drug abuse or diversion identified [x] Ill effects of being on chronic pain medications such as sleep disturbances, respiratory depression, hormonal changes, withdrawal symptoms, chronic opioid dependence and tolerance as well as risk of taking opioids with Benzodiazepines and taking opioids along with alcohol,  werediscussed with patient. I had asked the patient to minimize medication use and utilize pain medications only for uncontrolled rest pain or pain with exertional activities. I advised patient not to self-escalate painmedications without consulting with us. At each of patient's future visits we will try to taper pain medications, while adjusting the adjunct medications, and re-evaluating for Physical Therapy to improve spinal andjoint strength. We will continue to have discussions to decrease pain medications as tolerated. Counseled patient on effects their pain medication and /or their medical condition mayhave on their  ability to drive or operate machinery. Instructed not to drive or operate machinery if drowsy     I also discussed with the patient regarding the dangers of combining narcotic pain medication with tranquilizers, alcohol or illegal drugs or taking the medication any way other than prescribed. The dangers were discussed  including respiratory depression and death. Patient was told to tell  all  physicians regarding the medications he is getting from pain clinic. Patient is warned not to take any unprescribed medications over-the-countermedications that can depress breathing . Patient is advised to talk to the pharmacist or physicians if planning to take any over-the-counter medications before  takeing them. Patient is strongly advised to avoid tranquilizers or  relaxants, illegal drugs  or any medications that can depress breathing  Patient is also advised to tell us if there is any changes in their medications from other physicians. The patient was counseled about the risks of tati Covid-19 during their procedure period and any recovery window from their procedure. The patient was made aware that tati Covid-19 may worsen their prognosis for recovering from their procedure and lend to a higher morbidity and/or mortality risk. All material risks, benefits, and reasonable alternatives including postponing the procedure were discussed. The patient (DOES   wish) to proceed with their procedure at this time.     1.schedule left cervical facet injection C2-T1, pain axial, last injection , pain is increasing, limited ROM neck  Given pre-procedure instructions    TREATMENT OPTIONS:     Left cervical facet injection  Medication Agreement Requirements Met  Continue Opioid therapy  Script written for  Percocet  Follow up appointment made

## 2021-02-04 NOTE — TELEPHONE ENCOUNTER
Patient called for an injection appointment. Cervical Facet Injection        Chart reviewed along with medication list. Pre procedure instructions given. Covid-19 screening questions asked. Information given where to be dropped off, a person to remain in the car and which door to enter in. Temp must be taken. Patient advised to avoid vaccines 2 weeks prior to Injection. Patient acknowledges an understanding.

## 2021-02-07 LAB
6-ACETYLMORPHINE, UR: NOT DETECTED
7-AMINOCLONAZEPAM, URINE: PRESENT
ALPHA-OH-ALPRAZ, URINE: NOT DETECTED
ALPRAZOLAM, URINE: NOT DETECTED
AMPHETAMINES, URINE: NOT DETECTED
BARBITURATES, URINE: NOT DETECTED
BENZOYLECGONINE, UR: NOT DETECTED
BUPRENORPHINE URINE: NOT DETECTED
CARISOPRODOL, UR: NOT DETECTED
CLONAZEPAM, URINE: NOT DETECTED
CODEINE, URINE: NOT DETECTED
CREATININE URINE: 204.3 MG/DL (ref 20–400)
DIAZEPAM, URINE: NOT DETECTED
DRUGS EXPECTED, UR: NORMAL
EER HI RES INTERP UR: NORMAL
ETHYL GLUCURONIDE UR: NOT DETECTED
FENTANYL URINE: NOT DETECTED
HYDROCODONE, URINE: NOT DETECTED
HYDROMORPHONE, URINE: NOT DETECTED
LORAZEPAM, URINE: NOT DETECTED
MARIJUANA METAB, UR: NOT DETECTED
MDA, UR: NOT DETECTED
MDEA, EVE, UR: NOT DETECTED
MDMA URINE: NOT DETECTED
MEPERIDINE METAB, UR: NOT DETECTED
METHADONE, URINE: NOT DETECTED
METHAMPHETAMINE, URINE: NOT DETECTED
METHYLPHENIDATE: NOT DETECTED
MIDAZOLAM, URINE: NOT DETECTED
MORPHINE URINE: NOT DETECTED
NORBUPRENORPHINE, URINE: NOT DETECTED
NORDIAZEPAM, URINE: NOT DETECTED
NORFENTANYL, URINE: NOT DETECTED
NORHYDROCODONE, URINE: NOT DETECTED
NOROXYCODONE, URINE: PRESENT
NOROXYMORPHONE, URINE: NOT DETECTED
OXAZEPAM, URINE: NOT DETECTED
OXYCODONE URINE: PRESENT
OXYMORPHONE, URINE: NOT DETECTED
PAIN MANAGEMENT DRUG PANEL INTERP, URINE: NORMAL
PAIN MGT DRUG PANEL, HI RES, UR: NORMAL
PCP,URINE: NOT DETECTED
PHENTERMINE, UR: NOT DETECTED
PROPOXYPHENE, URINE: NOT DETECTED
TAPENTADOL, URINE: NOT DETECTED
TAPENTADOL-O-SULFATE, URINE: NOT DETECTED
TEMAZEPAM, URINE: NOT DETECTED
TRAMADOL, URINE: NOT DETECTED
ZOLPIDEM, URINE: NOT DETECTED

## 2021-02-08 ENCOUNTER — CLINICAL DOCUMENTATION (OUTPATIENT)
Dept: PAIN MANAGEMENT | Age: 51
End: 2021-02-08

## 2021-02-12 ENCOUNTER — TELEPHONE (OUTPATIENT)
Dept: PAIN MANAGEMENT | Age: 51
End: 2021-02-12

## 2021-02-12 NOTE — TELEPHONE ENCOUNTER
I called the patient and advised per precert, injection scheduled for the 18th has been denied by her insurance. Reasoning \"lack of medical necessity\". Patient states the numbness is getting worse and is travelling down her arm. I looked in her chart and saw an xray of the c~spine was ordered in Oct 2020. I suggested she may want to go and get that done. I am not sure who ordered the xray (us or another MD). I told her the orders are usually good in the system x 6 months or more. I also suggested pending the result of the xray; she may want to see a neurologist.  Patient states she will get the xray done and will keep her appointment on the 5th with this office.

## 2021-02-13 ENCOUNTER — APPOINTMENT (OUTPATIENT)
Dept: CT IMAGING | Age: 51
End: 2021-02-13
Payer: COMMERCIAL

## 2021-02-13 ENCOUNTER — HOSPITAL ENCOUNTER (EMERGENCY)
Age: 51
Discharge: HOME | End: 2021-02-13
Attending: EMERGENCY MEDICINE
Payer: COMMERCIAL

## 2021-02-13 VITALS
WEIGHT: 175 LBS | HEART RATE: 102 BPM | OXYGEN SATURATION: 90 % | TEMPERATURE: 98.4 F | DIASTOLIC BLOOD PRESSURE: 71 MMHG | RESPIRATION RATE: 17 BRPM | HEIGHT: 64 IN | BODY MASS INDEX: 29.88 KG/M2 | SYSTOLIC BLOOD PRESSURE: 104 MMHG

## 2021-02-13 DIAGNOSIS — M54.12 CERVICAL RADICULOPATHY: Primary | ICD-10-CM

## 2021-02-13 PROCEDURE — 6360000002 HC RX W HCPCS: Performed by: EMERGENCY MEDICINE

## 2021-02-13 PROCEDURE — 99285 EMERGENCY DEPT VISIT HI MDM: CPT

## 2021-02-13 PROCEDURE — 6370000000 HC RX 637 (ALT 250 FOR IP): Performed by: EMERGENCY MEDICINE

## 2021-02-13 PROCEDURE — 72125 CT NECK SPINE W/O DYE: CPT

## 2021-02-13 PROCEDURE — 96374 THER/PROPH/DIAG INJ IV PUSH: CPT

## 2021-02-13 RX ORDER — PREDNISONE 10 MG/1
TABLET ORAL
Qty: 20 TABLET | Refills: 0 | Status: SHIPPED | OUTPATIENT
Start: 2021-02-14 | End: 2021-02-18

## 2021-02-13 RX ORDER — METHOCARBAMOL 500 MG/1
1000 TABLET, FILM COATED ORAL ONCE
Status: COMPLETED | OUTPATIENT
Start: 2021-02-13 | End: 2021-02-13

## 2021-02-13 RX ORDER — MORPHINE SULFATE 10 MG/ML
6 INJECTION, SOLUTION INTRAMUSCULAR; INTRAVENOUS ONCE
Status: COMPLETED | OUTPATIENT
Start: 2021-02-13 | End: 2021-02-13

## 2021-02-13 RX ADMIN — MORPHINE SULFATE 6 MG: 10 INJECTION, SOLUTION INTRAMUSCULAR; INTRAVENOUS at 21:30

## 2021-02-13 RX ADMIN — METHOCARBAMOL 1000 MG: 500 TABLET ORAL at 21:34

## 2021-02-13 RX ADMIN — PREDNISONE 50 MG: 20 TABLET ORAL at 21:34

## 2021-02-13 ASSESSMENT — PAIN SCALES - GENERAL
PAINLEVEL_OUTOF10: 10
PAINLEVEL_OUTOF10: 10

## 2021-02-13 ASSESSMENT — PAIN DESCRIPTION - ORIENTATION: ORIENTATION: LEFT

## 2021-02-13 ASSESSMENT — PAIN DESCRIPTION - PAIN TYPE: TYPE: ACUTE PAIN

## 2021-02-14 ASSESSMENT — ENCOUNTER SYMPTOMS
COUGH: 0
ABDOMINAL PAIN: 0

## 2021-02-14 NOTE — ED PROVIDER NOTES
[address]  EMERGENCY DEPARTMENT ENCOUNTER      Pt Name: Russel Taylor  MRN: [id number]  [place] 1970  Date of evaluation: 21      CHIEF COMPLAINT     Arm pain    HISTORY OF PRESENT ILLNESS   HPI 53 y.o. female presents with c/o left arm pain. Symptoms started 1 week ago. Pain is described as sharp in character, severe in severity (rating it 10 / 10). The pain is located primarily in the left trapezius muscle with radiation down her arm. The pain has been constant in course. The patient tried percocet, zanaflex, and klonopin prior to arrival with no relief of symptoms. The patient has a history of degenerative disease in her cervical spine. Last MRI was in 2020  Showing Mild multilevel spinal canal stenosis and mild-to-moderate neural foraminal  narrowing from C3-4 to C5-6, as described above.  The appearance is similarcompared to the previous exam.    REVIEW OF SYSTEMS       Review of Systems   Constitutional: Negative for fever. HENT: Negative for congestion. Eyes: Negative for visual disturbance. Respiratory: Negative for cough. Cardiovascular: Negative for chest pain. Gastrointestinal: Negative for abdominal pain. Musculoskeletal: Positive for neck pain. Skin: Negative for rash. Neurological: Positive for numbness. Negative for weakness and headaches.        PAST MEDICAL HISTORY     Past Medical History:   Diagnosis Date    Anxiety     CAD (coronary artery disease)     Mitral valve prolapse    Fatty liver     GERD (gastroesophageal reflux disease)     Headache     History of bronchitis     Hyperlipidemia     Hypothyroidism     Kidney stone     LFT elevation     MVP (mitral valve prolapse)     Obesity     Type 2 diabetes mellitus without complication ([facility] [address]     Umbilical hernia        SURGICAL HISTORY       Past Surgical History:   Procedure Laterality Date    APPENDECTOMY       SECTION      2 pfannenstiel, 1 vertical    CHOLECYSTECTOMY      COLONOSCOPY  2009    Dr Sam Mansfield  4/23/14    COLONOSCOPY  04/23/2014    biopsy & sigmoid spasms, pathology negative    COLONOSCOPY N/A 2/12/2018    COLONOSCOPY WITH BIOPSY performed by Tip Bowman MD at [facility]      x 5    HYSTERECTOMY, TOTAL ABDOMINAL      2010    [place] EXPLORATORY OF ABDOMEN  10/21/2014    Laparotomy-lysis of adhesions, bso     UPPER GASTROINTESTINAL ENDOSCOPY  2/17/2010    mild chronic inactive gastritis       CURRENT MEDICATIONS       Discharge Medication List as of 2/13/2021 10:16 PM      CONTINUE these medications which have NOT CHANGED    Details   albuterol sulfate  (90 Base) MCG/ACT inhaler INHALE 2 PUFFS INTO THE LUNGS EVERY 4 HOURS AS NEEDED FOR SHORTNESS OF BREATHHistorical Med      oxyCODONE-acetaminophen (PERCOCET)  MG per tablet Take 1 tablet by mouth every 6 hours as needed for Pain for up to 30 days. , Disp-120 tablet, R-0Normal      clonazePAM (KLONOPIN) 0.5 MG tablet Take 1 tablet by mouth 2 times daily as needed for Anxiety for up to 30 days. , Disp-60 tablet, R-1Normal      esomeprazole (NEXIUM) 40 MG delayed release capsule TAKE 1 CAPSULE BY MOUTH EVERY MORNING BEFORE BREAKFAST, Disp-90 capsule, R-1Normal      tiZANidine (ZANAFLEX) 4 MG tablet TAKE 1 TABLET BY MOUTH EVERY 8 HOURS AS NEEDED FOR SPASMS, Disp-90 tablet,R-1Normal      levothyroxine (SYNTHROID) 175 MCG tablet TAKE 1 TABLET BY MOUTH DAILY, Disp-90 tablet,R-0**Patient requests 90 days supply**Normal      mirtazapine (REMERON) 15 MG tablet Take 15 mg by mouth dailyHistorical Med      rOPINIRole (REQUIP) 2 MG tablet TAKE 1 TABLET BY MOUTH EVERY NIGHT, Disp-90 tablet,R-3Normal      ondansetron (ZOFRAN ODT) 4 MG disintegrating tablet Take 1 tablet by mouth every 8 hours as needed for Nausea or Vomiting, Disp-10 tablet, R-0Print      nicotine (NICODERM CQ) 14 MG/24HR Place 1 patch onto the skin every 24 hours, Disp-30 patch, R-3Normal      sertraline (ZOLOFT) 100 MG tablet Take 100 mg by mouth dailyHistorical Med      topiramate (TOPAMAX) 25 MG tablet 25 mg 2 times daily Historical Med      metoprolol tartrate (LOPRESSOR) 50 MG tablet Take 50 mg by mouth 2 times daily Historical Med             ALLERGIES     is allergic to compazine [prochlorperazine]; sulfa antibiotics; and adhesive tape. FAMILY HISTORY     She indicated that her mother is . She indicated that her father is alive. She indicated that her maternal grandmother is . She indicated that her maternal grandfather is . She indicated that her paternal grandmother is . She indicated that her paternal grandfather is . SOCIAL HISTORY      reports that she has quit smoking. Her smoking use included cigarettes. She has a 8.25 pack-year smoking history. She has never used smokeless tobacco. She reports that she does not drink alcohol or use drugs. PHYSICAL EXAM     INITIAL VITALS: /71   Pulse 102   Temp 98.4 °F (36.9 °C) (Oral)   Resp 17   Ht 5' 4\" (1.626 m)   Wt 175 lb (79.4 kg)   LMP 2010   SpO2 90%   BMI 30.04 kg/m²   Gen: Appears uncomfortabl  Head: Normocephalic, atraumatic  Eye: Pupils equal round reactive to light, no conjunctivitis  ENT: MMM  Neck: L. Trapezius TTP. No midline ttp  Heart: Regular rate and rhythm no murmurs  Lungs: Clear to auscultation bilaterally, no respiratory distress  Chest wall: No crepitus, no tenderness palpation  Abdomen: Soft, nontender, nondistended, with no peritoneal signs  Neurologic: Patient is alert and oriented x3, sensation intact over the median, radial and ulnar dermatomes. Extremities: no edema, pt has appropriate  strength, appropriate finger opposition, appropriate flex/ext at wrist  She has full ROM at elbow, but strength testing limited secondary to pain in her upper arm. ROM at shoulder limited secondary to pain. Radial pulse strong bilaterally.   Capillary refill normal.   Skin: No rash, erythema or warmth. MEDICAL DECISION MAKING:     MDM  53 y.o. female presenting with arm pain, clinically most likely a cervical radiculopathy. Strength and sensation intact. We'll obtain a ct scan to look for any acute abnormalities. Providing symptomatic treatment. Emergency Department course: On reassessment pt is feeling much better. Ct scan demonstrates degenerative changes, but no acute abnormalities. D/w pt the results, treatment plan, warning precautions for prompt ED return and importance of close OP FU, she verbalizes understanding and agrees with the treatment plan. DIAGNOSTIC RESULTS   RADIOLOGY:All plain film, CT, MRI, and formal ultrasound images (except ED bedside ultrasound) are read by the radiologist and the images and interpretations are directly viewed by the emergency physician. CT CERVICAL SPINE WO CONTRAST   Final Result   Mild cervical spondylosis and degenerative disc disease. Evidence of paracervical spasm. No acute bony abnormalities are noted      . EMERGENCY DEPARTMENT COURSE:   Vitals:    Vitals:    02/13/21 2020 02/13/21 2145 02/13/21 2200   BP: 118/68 131/75 104/71   Pulse: 102     Resp: 17     Temp: 98.4 °F (36.9 °C)     TempSrc: Oral     SpO2: 98% 93% 90%   Weight: 175 lb (79.4 kg)     Height: 5' 4\" (1.626 m)         The patient was given the following medications while in the emergency department:  Orders Placed This Encounter   Medications    predniSONE (DELTASONE) tablet 50 mg    morphine (PF) injection 6 mg    methocarbamol (ROBAXIN) tablet 1,000 mg    predniSONE (DELTASONE) 10 MG tablet     Sig: Take 4 tablets by mouth once daily for 5 days     Dispense:  20 tablet     Refill:  0     -------------------------  CRITICAL CARE:   CONSULTS: None  PROCEDURES: Procedures     FINAL IMPRESSION      1.  Cervical radiculopathy          DISPOSITION/PLAN   DISPOSITION Decision To Discharge 02/13/2021 10:14:56 PM      PATIENT REFERRED TO:  Yuko Martinez, APRN - CNP  [address] [address] [phone]    In 2 days      [facility] ED  [facility] [address]  [address] [facility]  [phone]    If symptoms worsen      DISCHARGE MEDICATIONS:  Discharge Medication List as of 2/13/2021 10:16 PM      START taking these medications    Details   predniSONE (DELTASONE) 10 MG tablet Take 4 tablets by mouth once daily for 5 days, Disp-20 tablet, R-0Print               Shukri Lopez MD  Attending Emergency Physician                     Shukri Lopez MD  02/14/21 [id number]

## 2021-02-14 NOTE — ED TRIAGE NOTES
Mode of arrival (squad #, walk in, police, etc) : Walk In        Chief complaint(s): Arm pain        Arrival Note (brief scenario, treatment PTA, etc). : Pt arrives to ED c/o left arm pain that has been ongoing for the last week. Patient denies any injury or trauma. Patient states that the pain has progressively been getting worse. Patient states that today the pain was much worse and she has numbness and tingling. C= \"Have you ever felt that you should Cut down on your drinking? \"  No  A= \"Have people Annoyed you by criticizing your drinking? \"  No  G= \"Have you ever felt bad or Guilty about your drinking? \"  No  E= \"Have you ever had a drink as an Eye-opener first thing in the morning to steady your nerves or to help a hangover? \"  No      Deferred []      Reason for deferring: N/A    *If yes to two or more: probable alcohol abuse. *

## 2021-02-23 ENCOUNTER — OFFICE VISIT (OUTPATIENT)
Dept: INTERNAL MEDICINE CLINIC | Age: 51
End: 2021-02-23
Payer: COMMERCIAL

## 2021-02-23 ENCOUNTER — HOSPITAL ENCOUNTER (OUTPATIENT)
Dept: GENERAL RADIOLOGY | Facility: CLINIC | Age: 51
Discharge: HOME | End: 2021-02-25
Payer: COMMERCIAL

## 2021-02-23 ENCOUNTER — HOSPITAL ENCOUNTER (OUTPATIENT)
Facility: CLINIC | Age: 51
Discharge: HOME | End: 2021-02-25
Payer: COMMERCIAL

## 2021-02-23 VITALS
OXYGEN SATURATION: 98 % | TEMPERATURE: 97 F | SYSTOLIC BLOOD PRESSURE: 128 MMHG | DIASTOLIC BLOOD PRESSURE: 82 MMHG | HEART RATE: 75 BPM | BODY MASS INDEX: 29.18 KG/M2 | WEIGHT: 170 LBS

## 2021-02-23 DIAGNOSIS — M47.812 CERVICAL SPONDYLOSIS: ICD-10-CM

## 2021-02-23 DIAGNOSIS — G89.29 CHRONIC NECK PAIN: ICD-10-CM

## 2021-02-23 DIAGNOSIS — I20.0 UNSTABLE ANGINA (HCC): ICD-10-CM

## 2021-02-23 DIAGNOSIS — E03.9 HYPOTHYROIDISM, UNSPECIFIED TYPE: Primary | ICD-10-CM

## 2021-02-23 DIAGNOSIS — R63.0 POOR APPETITE: ICD-10-CM

## 2021-02-23 DIAGNOSIS — M79.602 LEFT ARM PAIN: ICD-10-CM

## 2021-02-23 DIAGNOSIS — M54.2 CHRONIC NECK PAIN: ICD-10-CM

## 2021-02-23 DIAGNOSIS — K52.9 CHRONIC DIARRHEA: ICD-10-CM

## 2021-02-23 DIAGNOSIS — E11.9 TYPE 2 DIABETES MELLITUS WITHOUT COMPLICATION, WITHOUT LONG-TERM CURRENT USE OF INSULIN (HCC): ICD-10-CM

## 2021-02-23 PROCEDURE — 72040 X-RAY EXAM NECK SPINE 2-3 VW: CPT

## 2021-02-23 PROCEDURE — 72082 X-RAY EXAM ENTIRE SPI 2/3 VW: CPT

## 2021-02-23 PROCEDURE — 99214 OFFICE O/P EST MOD 30 MIN: CPT | Performed by: NURSE PRACTITIONER

## 2021-02-23 ASSESSMENT — ENCOUNTER SYMPTOMS
ABDOMINAL PAIN: 1
VOMITING: 1
DIARRHEA: 1
COUGH: 0
WHEEZING: 0
SHORTNESS OF BREATH: 0
NAUSEA: 1

## 2021-02-23 ASSESSMENT — PATIENT HEALTH QUESTIONNAIRE - PHQ9
SUM OF ALL RESPONSES TO PHQ QUESTIONS 1-9: 0
SUM OF ALL RESPONSES TO PHQ9 QUESTIONS 1 & 2: 0
1. LITTLE INTEREST OR PLEASURE IN DOING THINGS: 0

## 2021-02-23 NOTE — PROGRESS NOTES
Visit Information    Have you changed or started any medications since your last visit including any over-the-counter medicines, vitamins, or herbal medicines? no   Are you having any side effects from any of your medications? -  no  Have you stopped taking any of your medications? Is so, why? -  no    Have you seen any other physician or provider since your last visit? Yes - Records Obtained  Have you had any other diagnostic tests since your last visit? Yes - Records Obtained  Have you been seen in the emergency room and/or had an admission to a hospital since we last saw you? Yes - Records Obtained  Have you had your routine dental cleaning in the past 6 months? yes -     Have you activated your [employer] account? If not, what are your barriers?  Yes     Patient Care Team:  MARTITA Smyth CNP as PCP - General (Internal Medicine)  MARTITA Smyth CNP as PCP - [facility]  Jose Barton MD as Consulting Physician (Gastroenterology)  Vale Jauregui MD as Consulting Physician (Obstetrics & Gynecology)  Chely Del Angel MD as Consulting Physician (Gastroenterology)    Medical History Review  Past Medical, Family, and Social History reviewed and does contribute to the patient presenting condition    Health Maintenance   Topic Date Due    Pneumococcal 0-64 years Vaccine (1 of 1 - PPSV23) Never done    HIV screen  Never done    Hepatitis B vaccine (1 of 3 - Risk 3-dose series) Never done    DTaP/Tdap/Td vaccine (1 - Tdap) Never done    Diabetic retinal exam  08/15/2018    Flu vaccine (1) Never done    Diabetic foot exam  08/11/2021    A1C test (Diabetic or Prediabetic)  08/11/2021    Diabetic microalbuminuria test  08/11/2021    Lipid screen  08/11/2021    TSH testing  12/28/2021    Hepatitis C screen  Completed    Hepatitis A vaccine  Aged Out    Hib vaccine  Aged Out    Meningococcal (ACWY) vaccine  Aged Out         [address] [place] [address]  Dept: [phone]  Dept Fax: [phone]    Office Progress/Follow Up Note  Date of patient's visit: 2/23/2021   Patient's Name:  Jeanne Favre  YOB: 1970            Patient Care Team:  MARTITA Doty CNP as PCP - General (Internal Medicine)  MARTITA Doty CNP as PCP - [facility] Empaneled Provider  Martha Dunne MD as Consulting Physician (Gastroenterology)  Edie Ceron MD as Consulting Physician (Obstetrics & Gynecology)  Chris Lorenzo MD as Consulting Physician (Gastroenterology)    REASON FOR VISIT: Hypothyroidism and Arm Pain        HISTORY OF PRESENT ILLNESS:      History was obtained from the patient. Jeanne Favre is a 53 y.o. is here for multiple medical complaints, including follow-up of hypothyroid, type 2 diabetes, chronic neck pain. She was recently in the ER for cervical radiculopathy/arm pain, numbness, weakness. She follows with pain management, had imaging ordered through neurosurgeon and has follow up appt in a couple days. DM- diet controlled, last A1c 5.7  Diet-not hungry, eats once a day  Also complains of abd pain, crampy, better with BM, with nausea and vomiting, has been going on for a while. Also has chronic diarrhea, poor appetite, with weight loss.     Patient Active Problem List   Diagnosis    Hyperlipidemia    MVP (mitral valve prolapse)    Anxiety    Hypothyroidism    Allergic rhinitis    Obesity    Abdominal pain    Elevated liver enzymes    GERD (gastroesophageal reflux disease)    Ovarian mass    S/P bilateral oophorectomy & KRUPA 10/21/14    Pain emptying bladder    Urinary urgency    Insomnia    RLS (restless legs syndrome)    Pancreatitis    Eye swelling, left    Osteoarthritis of cervical spine    Degenerative cervical spinal stenosis    Trigger point with tension headache    Arthropathy of cervical facet joint    Atlanto-axial joint sprain, sequela    Cervical radiculopathy    Sprain of atlanto-occipital joint, sequela    Encounter for medication monitoring    Myofascial muscle pain    Colitis    Chest pain    Unstable angina (HCC)       Allergies   Allergen Reactions    Compazine [Prochlorperazine]      Jittery, restless    Sulfa Antibiotics Hives    Adhesive Tape Rash         MEDICATIONS:     Current Outpatient Medications   Medication Sig Dispense Refill    albuterol sulfate  (90 Base) MCG/ACT inhaler INHALE 2 PUFFS INTO THE LUNGS EVERY 4 HOURS AS NEEDED FOR SHORTNESS OF BREATH      esomeprazole (NEXIUM) 40 MG delayed release capsule TAKE 1 CAPSULE BY MOUTH EVERY MORNING BEFORE BREAKFAST 90 capsule 1    tiZANidine (ZANAFLEX) 4 MG tablet TAKE 1 TABLET BY MOUTH EVERY 8 HOURS AS NEEDED FOR SPASMS 90 tablet 1    mirtazapine (REMERON) 15 MG tablet Take 15 mg by mouth daily      rOPINIRole (REQUIP) 2 MG tablet TAKE 1 TABLET BY MOUTH EVERY NIGHT 90 tablet 3    ondansetron (ZOFRAN ODT) 4 MG disintegrating tablet Take 1 tablet by mouth every 8 hours as needed for Nausea or Vomiting 10 tablet 0    sertraline (ZOLOFT) 100 MG tablet Take 100 mg by mouth daily      topiramate (TOPAMAX) 25 MG tablet 25 mg 2 times daily       metoprolol tartrate (LOPRESSOR) 50 MG tablet Take 50 mg by mouth 2 times daily       levothyroxine (SYNTHROID) 175 MCG tablet TAKE 1 TABLET BY MOUTH DAILY 90 tablet 1    [START ON 3/9/2021] oxyCODONE-acetaminophen (PERCOCET)  MG per tablet Take 1 tablet by mouth every 6 hours as needed for Pain for up to 30 days. 120 tablet 0    oxyCODONE (OXYCONTIN) 10 MG extended release tablet Take 1 tablet by mouth every 12 hours for 30 days. 60 each 0    ondansetron (ZOFRAN) 4 MG tablet Take 1 tablet by mouth every 8 hours as needed for Nausea or Vomiting 20 tablet 0    lidocaine (LIDODERM) 5 % Place 1 patch onto the skin daily 12 hours on, 12 hours off. 10 patch 0    methylPREDNISolone (MEDROL, MARIANNE,) 4 MG tablet Take by mouth.  1 kit 0    gabapentin (NEURONTIN) 300 MG capsule Take 1 capsule by mouth 3 times daily for 30 days. 90 capsule 0    clonazePAM (KLONOPIN) 0.5 MG tablet Take 1 tablet by mouth 2 times daily as needed for Anxiety for up to 30 days. 60 tablet 1     No current facility-administered medications for this visit. SOCIAL HISTORY    Reviewed and updated. Gualberto Salazar  reports that she has quit smoking. Her smoking use included cigarettes. She has a 8.25 pack-year smoking history. She has never used smokeless tobacco.    FAMILY HISTORY:    Reviewed and updated. family history includes Breast Cancer in her maternal grandmother; Cancer in her mother; Diabetes in her father; Heart Disease in her father; Other in her father; Stroke in her maternal grandfather. REVIEW OF SYSTEMS:      Review of Systems   Constitutional: Positive for appetite change (1 year ago), fatigue and unexpected weight change. Negative for chills and fever. HENT: Negative for trouble swallowing. Respiratory: Negative for cough, shortness of breath and wheezing. Cardiovascular: Negative for chest pain, palpitations and leg swelling. Gastrointestinal: Positive for abdominal pain, diarrhea, nausea and vomiting (2-3 week). Reports small amount of bright red blood in vomit  Dark stools   Endocrine: Negative for cold intolerance and heat intolerance. Losing hair   Genitourinary: Negative for difficulty urinating, dysuria and hematuria. Musculoskeletal: Positive for arthralgias and neck pain. Negative for joint swelling. Neurological: Positive for weakness and numbness. Psychiatric/Behavioral: The patient is nervous/anxious. PHYSICAL EXAM:      Vitals:    02/23/21 1350   BP: 128/82   Site: Right Upper Arm   Pulse: 75   Temp: 97 °F (36.1 °C)   SpO2: 98%   Weight: 170 lb (77.1 kg)     BP Readings from Last 3 Encounters:   03/04/21 138/89   02/25/21 109/77   02/23/21 128/82        Physical Exam  Constitutional:       General: She is not in acute distress. Appearance: Normal appearance. She is well-developed. HENT:      Head: Normocephalic and atraumatic. Right Ear: External ear normal.      Left Ear: External ear normal.      Nose: Nose normal.   Eyes:      Conjunctiva/sclera: Conjunctivae normal.   Neck:      Musculoskeletal: Neck supple. Decreased range of motion. Pain with movement present. Thyroid: No thyromegaly. Cardiovascular:      Rate and Rhythm: Normal rate and regular rhythm. Pulses: Normal pulses. Heart sounds: Normal heart sounds. No murmur. Pulmonary:      Effort: Pulmonary effort is normal.      Breath sounds: Normal breath sounds. No wheezing. Abdominal:      General: There is no distension. Palpations: Abdomen is soft. There is no mass. Tenderness: There is generalized abdominal tenderness. Lymphadenopathy:      Cervical: No cervical adenopathy. Skin:     Findings: No erythema or rash. Neurological:      Mental Status: She is alert. Mental status is at baseline. Coordination: Coordination normal.      Gait: Gait normal.   Psychiatric:         Mood and Affect: Mood is depressed.          Behavior: Behavior normal.          LABORATORY FINDINGS:    CBC:   Lab Results   Component Value Date    WBC 8.3 03/04/2021    HGB 14.7 03/04/2021     03/04/2021     06/05/2012     BMP:   Lab Results   Component Value Date     03/04/2021    K 3.9 03/04/2021     03/04/2021    CO2 21 03/04/2021    BUN 16 03/04/2021    CREATININE 0.68 03/04/2021    GLUCOSE 100 03/04/2021    GLUCOSE 107 06/03/2019     HEMOGLOBIN A1C:   Lab Results   Component Value Date    LABA1C 5.7 08/11/2020     FASTING LIPID PANEL:   Lab Results   Component Value Date    CHOL 158 01/12/2019    HDL 38 (L) 08/11/2020    LDLCHOLESTEROL 70 08/11/2020    TRIG 133 01/12/2019     Lab Results   Component Value Date    TSH 0.55 12/28/2020         ASSESSMENT AND PLAN:      Visit Diagnoses and Associated Orders     Hypothyroidism, unspecified type    -  Primary    Last TSH within limits, continue synthroid         Unstable angina (HCC)        No chest pain, patient to follow-up with cardiology         Type 2 diabetes mellitus without complication, without long-term current use of insulin (HCC)        last A1C 5.7, diet controlled         Poor appetite        Persistent, weight trending down, refer to GI for further eval    Julita Frank MD, Gastroenterology, Kelsey Pina MD, Gastroenterology, [place] [[id number] [employer]]           Left arm pain        Refer for PT, follow-up with neurosurgeon as scheduled    [address] [employer]]           Chronic neck pain        Refer for PT. Patient follows with pain clinic    [facility]]                  FOLLOW UP AND INSTRUCTIONS:     Return in about 1 month (around 3/23/2021) for routine follow up, or sooner if needed. · Justin Pacheco received counseling on the following healthy behaviors: nutrition, exercise and medication adherence    · Discussed use, benefit, and side effects of prescribed medications. Barriers to medication compliance addressed. All patient questions answered. Pt voiced understanding. MARTITA Lee CNP    3/5/2021, 1:56 PM    Please note that this chart was generated using voice recognition Dragon dictation software. Although every effort was made to ensure the accuracy of this automatedtranscription, some errors in transcription may have occurred.

## 2021-02-25 ENCOUNTER — OFFICE VISIT (OUTPATIENT)
Dept: NEUROSURGERY | Age: 51
End: 2021-02-25
Payer: COMMERCIAL

## 2021-02-25 VITALS
HEART RATE: 73 BPM | WEIGHT: 171 LBS | DIASTOLIC BLOOD PRESSURE: 77 MMHG | SYSTOLIC BLOOD PRESSURE: 109 MMHG | BODY MASS INDEX: 29.35 KG/M2

## 2021-02-25 DIAGNOSIS — R29.898 WEAKNESS OF LEFT UPPER EXTREMITY: ICD-10-CM

## 2021-02-25 DIAGNOSIS — M54.12 CERVICAL RADICULOPATHY: Primary | ICD-10-CM

## 2021-02-25 PROCEDURE — 99214 OFFICE O/P EST MOD 30 MIN: CPT | Performed by: NURSE PRACTITIONER

## 2021-02-25 RX ORDER — GABAPENTIN 300 MG/1
300 CAPSULE ORAL 3 TIMES DAILY
Qty: 90 CAPSULE | Refills: 0 | Status: ON HOLD | OUTPATIENT
Start: 2021-02-25 | End: 2021-03-12 | Stop reason: SDUPTHER

## 2021-02-25 RX ORDER — PREDNISONE 20 MG/1
TABLET ORAL
Qty: 30 TABLET | Refills: 0 | Status: SHIPPED | OUTPATIENT
Start: 2021-02-25 | End: 2021-03-05 | Stop reason: ALTCHOICE

## 2021-02-25 NOTE — PROGRESS NOTES
Skylar Romero  [facility] [address] [address]  Dept: [phone]    Patient:  Jihan Benjamin  YOB: 1970  Date: 10/6/20    The patient is a 53 y.o. female who presents today for consult of the following problems:     Chief Complaint   Patient presents with    Neck Pain     Cervical spondylosis    Results     XR Spine and Cerv-2/23 and CT Cerv-2/13         HPI:     Jihan Benjamin is a 53 y.o. female on whom neurosurgical consultation was requested by Burt Logan CNP for management of neck pain and arm symptoms. Patient has had neck pain for the last 4 years. Pain is 5/10 on average. Primarily located to the posterior aspect of her head, with radiation into her head. Will have occasional radiation from her left side neck, down front of arm and into all fingers of left hand. Does report decreased  strength of left hand at times. Intermittent numbness and tingling of all fingers on left hand. Minimal to no symptoms in right hand. Does not notice any gait instability. No saddle anesthesia. No loss of bowel or bladder function. Has completed several rounds of multimodal physical therapy most recently approximately 1 year ago. Has also been following with pain management, has received numerous rounds of injections, predominantly left-sided facet blocks, reports varying responses. A couple of weeks ago, developed numbness and tingling to left hand, subsequently developed sharp pain radiating down posterior/lateral shooting pain to left arm. Pain is worsened with sitting down unsupported. Lying down somewhat helps, but only when she finds the right position. Can't be on left side. Pain never fully subsided. Pain is progressing. Pain is 10/10, this is worse than any pain that she has had in her neck or arm before.   States that it is interfering with her ability to use her left arm, does feel that she has significant weakness, in both arm and hand. Reports that it is interfering with sleep. Has not found any significant palliating factors. Was evaluated in the emergency department, was treated with 5-day course of prednisone, muscle relaxer. Has not had a significant improvement in symptoms. Did have a CT scan complete at that time. Nocturnal Awakening: Yes  Reason: Left-sided neck/arm pain    MYELOPATHY    Frequently dropping things: No  Difficulty with buttoning clothes, using zipper, putting on watch OR jewelry: No  Changes in handwriting: No  Numbness or tingling: Yes    LIMITATIONS    Pain significantly limiting on a daily basis   Daily pharmacologic pain control include: percocet; tizanidine  Neck : Arm pain: all neck pain    MANAGEMENT     Prior Surgery: No  Prior to 1yr ago: In the last year:    Physical Therapy: Yes   Chiropractic Interventions: No   Injections: Yes  Improvement: Varying    Injections/response:   Repeated C2-T1 facet blocks, reports approximately 60% improvement following on average  Cervical epidural injection denies improvement.     History:     Past Medical History:   Diagnosis Date    Anxiety     CAD (coronary artery disease)     Mitral valve prolapse    Fatty liver     GERD (gastroesophageal reflux disease)     Headache     History of bronchitis     Hyperlipidemia     Hypothyroidism     Kidney stone     LFT elevation     MVP (mitral valve prolapse)     Obesity     [facility]     Umbilical hernia      Past Surgical History:   Procedure Laterality Date    APPENDECTOMY       SECTION      2 pfannenstiel, 1 vertical    CHOLECYSTECTOMY      COLONOSCOPY      Dr Lee Linder  14    COLONOSCOPY  2014    biopsy & sigmoid spasms, pathology negative    COLONOSCOPY N/A 2018    COLONOSCOPY WITH BIOPSY performed by Mickey Valera MD at [facility]      x 5    HYSTERECTOMY, TOTAL ABDOMINAL 2010    MA EXPLORATORY OF ABDOMEN  10/21/2014    Laparotomy-lysis of adhesions, bso     UPPER GASTROINTESTINAL ENDOSCOPY  2/17/2010    mild chronic inactive gastritis     Family History   Problem Relation Age of Onset    Cancer Mother         vaginal    Heart Disease Father     Diabetes Father     Other Father         colon resection for colon polyps    Breast Cancer Maternal Grandmother     Stroke Maternal Grandfather      Current Outpatient Medications on File Prior to Visit   Medication Sig Dispense Refill    albuterol sulfate  (90 Base) MCG/ACT inhaler INHALE 2 PUFFS INTO THE LUNGS EVERY 4 HOURS AS NEEDED FOR SHORTNESS OF BREATH      oxyCODONE-acetaminophen (PERCOCET)  MG per tablet Take 1 tablet by mouth every 6 hours as needed for Pain for up to 30 days. 120 tablet 0    clonazePAM (KLONOPIN) 0.5 MG tablet Take 1 tablet by mouth 2 times daily as needed for Anxiety for up to 30 days. 60 tablet 1    esomeprazole (NEXIUM) 40 MG delayed release capsule TAKE 1 CAPSULE BY MOUTH EVERY MORNING BEFORE BREAKFAST 90 capsule 1    tiZANidine (ZANAFLEX) 4 MG tablet TAKE 1 TABLET BY MOUTH EVERY 8 HOURS AS NEEDED FOR SPASMS 90 tablet 1    levothyroxine (SYNTHROID) 175 MCG tablet TAKE 1 TABLET BY MOUTH DAILY 90 tablet 0    mirtazapine (REMERON) 15 MG tablet Take 15 mg by mouth daily      rOPINIRole (REQUIP) 2 MG tablet TAKE 1 TABLET BY MOUTH EVERY NIGHT 90 tablet 3    ondansetron (ZOFRAN ODT) 4 MG disintegrating tablet Take 1 tablet by mouth every 8 hours as needed for Nausea or Vomiting 10 tablet 0    sertraline (ZOLOFT) 100 MG tablet Take 100 mg by mouth daily      topiramate (TOPAMAX) 25 MG tablet 25 mg 2 times daily       metoprolol tartrate (LOPRESSOR) 50 MG tablet Take 50 mg by mouth 2 times daily        No current facility-administered medications on file prior to visit.       Social History     Tobacco Use    Smoking status: Former Smoker     Packs/day: 0.25     Years: 33.00     Pack equal and reactive to light  Extraocular motion intact  Face and shrug symmetric  Tongue midline  No dysarthria  v1-3 sensation symmetric, masseter tone symmetric  Hearing symmetric and intact    Sensation: Intact  Ring finger split: Negative    Motor  L deltoid 4/5; R deltoid 5/5  L biceps 4/5; R biceps 5/5  L triceps 4/5; R triceps 5/5  L wrist extension 5/5; R wrist extension 5/5  L intrinsics 4/5; R intrinsics 5/5     L iliopsoas 5/5 , R iliopsoas 5/5  L quadriceps 5/5; R quadriceps 5/5  L Dorsiflexion 5/5; R dorsiflexion 5/5  L Plantarflexion 5/5; R plantarflexion 5/5  L EHL 5/5; R EHL 5/5    Reflexes  L Brachioradialis 2+/4; R brachioradialis 2+/4  L Biceps 3+/4; R Biceps 2+/4  L Triceps 2+/4; R Triceps 2+/4  L Patellar 2+/4: R Patellar 2+/4  L Achilles 2+/4; R Achilles 2+/4    hoffmans L: neg  hoffmans R: neg  Clonus L: neg  Clonus R: neg  Babinski L: neg  Babinski R: neg    Spurlings: Yes  Lhermittes: Yes    Tinels at elbow: No  Tinels at wrist: No  Phalens: No  Ok sign: No  Froments: No  Benedictine Hand: No    Atrophy of thenar: No  Atrophy of hypothenar: No    Studies Review:     CT cervical spine 2/13/2021 (images reviewed with patient): FINDINGS:   The cervical spine demonstrates normalmineralization with straightening of   the  cervical lordosis. There is no evidence of fracture or subluxation. There is mild loss of disc height with eburnation of the vertebral endplates   at the V3-8, C4-5, C5-6levels.  There are small marginal osteophytes at   multiple levels. The central canal is grossly patent. The pedicles and   posterior elements are intact. The prevertebral and paravertebral soft   tissues are unremarkable. The atlanto-dens interval and dens are intact. The   visualized lung apices are clear. X-ray cervical spine 2/23/2021 (images reviewed with patient): FINDINGS:   Scoliosis: 12 thoracic and 5 lumbar vertebra are noted, well maintained in   height without acute fracture or subluxation.  Mild levo scoliotic curvature   of 2.4 degrees is noted T5 to bottom of T10.  Mild dextroscoliotic curvature   of 4.9 T11 through L4.       Minimal to mild degenerative and degenerative disc changes are present in the   spine.  No acute fracture or subluxation.       Examination of the cervical spine reveals evidence of mild facet changes. There appears to be calcification of the left carotid vessel.  Mild   dextroscoliotic curvature cervical spine is evident.  Degrees is noted from   T11 through L4.  Sacral base plane un leveling cannot be accurately assessed. Left iliac crest is out of the field of view.       Cervical spine: 1 mm grade 1 anterolisthesis C2 upon C3 is noted on flexion,   not replicated on extension and not seen neutral view.  No acute fracture or   prevertebral soft tissue swelling.         X-ray scoliosis series 2/23/2021 (images reviewed with patient): FINDINGS:   Scoliosis: 12 thoracic and 5 lumbar vertebra are noted, well maintained in   height without acute fracture or subluxation.  Mild levo scoliotic curvature   of 2.4 degrees is noted T5 to bottom of T10.  Mild dextroscoliotic curvature   of 4.9 T11 through L4.       Minimal to mild degenerative and degenerative disc changes are present in the   spine.  No acute fracture or subluxation.       Examination of the cervical spine reveals evidence of mild facet changes. There appears to be calcification of the left carotid vessel.  Mild   dextroscoliotic curvature cervical spine is evident.  Degrees is noted from   T11 through L4.  Sacral base plane un leveling cannot be accurately assessed. Left iliac crest is out of the field of view.       Cervical spine: 1 mm grade 1 anterolisthesis C2 upon C3 is noted on flexion,   not replicated on extension and not seen neutral view.  No acute fracture or   prevertebral soft tissue swelling.         Assessment and Plan:      1. Cervical radiculopathy    2.  Weakness of left upper extremity Plan: Patient presents with approximately 4-year history of occipital headaches, neck pain and intermittent numbness and tingling to left hand. Patient with acute worsening of symptoms approximately 2 weeks ago, developed significant severe, stabbing pain to the left side of her neck radiating down left arm in a C6 distribution. Pain is constant, significantly interfering with her ability to complete daily activities. Seems to be progressing, reports associated weakness to her arm and hand. At this point I recommend that we obtain an updated MRI to evaluate for any critical foraminal stenosis. We will also provide prednisone taper as well as a trial of gabapentin to help manage pain in the interim. We will see the patient back in 2 to 3 weeks after updated imaging. Followup: Return in about 3 weeks (around 3/18/2021), or if symptoms worsen or fail to improve. Prescriptions Ordered:  Orders Placed This Encounter   Medications    predniSONE (DELTASONE) 20 MG tablet     Sig: Take 3 tablets PO daily for first 5 days, then take 2 tablets PO daily for next 5 days, then take 1 tablet PO daily for last 5 days     Dispense:  30 tablet     Refill:  0    gabapentin (NEURONTIN) 300 MG capsule     Sig: Take 1 capsule by mouth 3 times daily for 30 days. Dispense:  90 capsule     Refill:  0        Orders Placed:  Orders Placed This Encounter   Procedures    MRI CERVICAL SPINE WO CONTRAST     Standing Status:   Future     Standing Expiration Date:   2/25/2022     Order Specific Question:   Reason for exam:     Answer:   evaluate for stenosis        Electronically signed by MARTITA Duque CNP on 2/25/2021 at 1:28 PM    Please note that this chart was generated using voice recognition Dragon dictation software. Although every effort was made to ensure the accuracy of this automated transcription, some errors in transcription may have occurred.

## 2021-03-04 ENCOUNTER — HOSPITAL ENCOUNTER (EMERGENCY)
Age: 51
Discharge: HOME | DRG: 175 | End: 2021-03-04
Attending: EMERGENCY MEDICINE
Payer: COMMERCIAL

## 2021-03-04 ENCOUNTER — HOSPITAL ENCOUNTER (OUTPATIENT)
Dept: MRI IMAGING | Facility: CLINIC | Age: 51
Discharge: HOME | DRG: 175 | End: 2021-03-06
Payer: COMMERCIAL

## 2021-03-04 ENCOUNTER — APPOINTMENT (OUTPATIENT)
Dept: CT IMAGING | Age: 51
DRG: 175 | End: 2021-03-04
Payer: COMMERCIAL

## 2021-03-04 VITALS
SYSTOLIC BLOOD PRESSURE: 138 MMHG | DIASTOLIC BLOOD PRESSURE: 89 MMHG | BODY MASS INDEX: 28.17 KG/M2 | RESPIRATION RATE: 12 BRPM | TEMPERATURE: 97.2 F | HEIGHT: 64 IN | OXYGEN SATURATION: 96 % | WEIGHT: 165 LBS | HEART RATE: 73 BPM

## 2021-03-04 DIAGNOSIS — R29.898 WEAKNESS OF LEFT UPPER EXTREMITY: ICD-10-CM

## 2021-03-04 DIAGNOSIS — M54.12 CERVICAL RADICULOPATHY: ICD-10-CM

## 2021-03-04 DIAGNOSIS — R11.2 NON-INTRACTABLE VOMITING WITH NAUSEA, UNSPECIFIED VOMITING TYPE: ICD-10-CM

## 2021-03-04 DIAGNOSIS — R19.7 DIARRHEA, UNSPECIFIED TYPE: ICD-10-CM

## 2021-03-04 DIAGNOSIS — M47.22 OSTEOARTHRITIS OF SPINE WITH RADICULOPATHY, CERVICAL REGION: ICD-10-CM

## 2021-03-04 DIAGNOSIS — M79.602 ARM PAIN, LEFT: Primary | ICD-10-CM

## 2021-03-04 LAB
-: ABNORMAL
ABSOLUTE EOS #: 0.2 K/UL (ref 0–0.4)
ABSOLUTE IMMATURE GRANULOCYTE: NORMAL K/UL (ref 0–0.3)
ABSOLUTE LYMPH #: 3.1 K/UL (ref 1–4.8)
ABSOLUTE MONO #: 0.5 K/UL (ref 0.1–1.3)
ALBUMIN SERPL-MCNC: 4.3 G/DL (ref 3.5–5.2)
ALBUMIN/GLOBULIN RATIO: ABNORMAL (ref 1–2.5)
ALP BLD-CCNC: 107 U/L (ref 35–104)
ALT SERPL-CCNC: 10 U/L (ref 5–33)
AMORPHOUS: ABNORMAL
ANION GAP SERPL CALCULATED.3IONS-SCNC: 11 MMOL/L (ref 9–17)
AST SERPL-CCNC: 12 U/L
BACTERIA: ABNORMAL
BASOPHILS # BLD: 1 % (ref 0–2)
BASOPHILS ABSOLUTE: 0 K/UL (ref 0–0.2)
BILIRUB SERPL-MCNC: 0.21 MG/DL (ref 0.3–1.2)
BILIRUBIN URINE: NEGATIVE
BUN BLDV-MCNC: 16 MG/DL (ref 6–20)
BUN/CREAT BLD: ABNORMAL (ref 9–20)
CALCIUM SERPL-MCNC: 9.1 MG/DL (ref 8.6–10.4)
CASTS UA: ABNORMAL /LPF
CHLORIDE BLD-SCNC: 109 MMOL/L (ref 98–107)
CO2: 21 MMOL/L (ref 20–31)
COLOR: YELLOW
COMMENT UA: ABNORMAL
CREAT SERPL-MCNC: 0.68 MG/DL (ref 0.5–0.9)
CRYSTALS, UA: ABNORMAL /HPF
DIFFERENTIAL TYPE: NORMAL
EOSINOPHILS RELATIVE PERCENT: 3 % (ref 0–4)
EPITHELIAL CELLS UA: ABNORMAL /HPF
GFR AFRICAN AMERICAN: >60 ML/MIN
GFR NON-AFRICAN AMERICAN: >60 ML/MIN
GFR SERPL CREATININE-BSD FRML MDRD: ABNORMAL ML/MIN/{1.73_M2}
GFR SERPL CREATININE-BSD FRML MDRD: ABNORMAL ML/MIN/{1.73_M2}
GLUCOSE BLD-MCNC: 100 MG/DL (ref 70–99)
GLUCOSE URINE: NEGATIVE
HCT VFR BLD CALC: 43.4 % (ref 36–46)
HEMOGLOBIN: 14.7 G/DL (ref 12–16)
IMMATURE GRANULOCYTES: NORMAL %
KETONES, URINE: NEGATIVE
LACTIC ACID: 1.1 MMOL/L (ref 0.5–2.2)
LEUKOCYTE ESTERASE, URINE: NEGATIVE
LIPASE: 50 U/L (ref 13–60)
LYMPHOCYTES # BLD: 38 % (ref 24–44)
MAGNESIUM: 2.2 MG/DL (ref 1.6–2.6)
MCH RBC QN AUTO: 31.6 PG (ref 26–34)
MCHC RBC AUTO-ENTMCNC: 33.9 G/DL (ref 31–37)
MCV RBC AUTO: 93.2 FL (ref 80–100)
MONOCYTES # BLD: 6 % (ref 1–7)
MUCUS: ABNORMAL
NITRITE, URINE: NEGATIVE
NRBC AUTOMATED: NORMAL PER 100 WBC
OTHER OBSERVATIONS UA: ABNORMAL
PDW BLD-RTO: 12.5 % (ref 11.5–14.9)
PH UA: 5.5 (ref 5–8)
PLATELET # BLD: 174 K/UL (ref 150–450)
PLATELET ESTIMATE: NORMAL
PMV BLD AUTO: 8.3 FL (ref 6–12)
POTASSIUM SERPL-SCNC: 3.9 MMOL/L (ref 3.7–5.3)
PROTEIN UA: NEGATIVE
RBC # BLD: 4.66 M/UL (ref 4–5.2)
RBC # BLD: NORMAL 10*6/UL
RBC UA: ABNORMAL /HPF
RENAL EPITHELIAL, UA: ABNORMAL /HPF
SEG NEUTROPHILS: 52 % (ref 36–66)
SEGMENTED NEUTROPHILS ABSOLUTE COUNT: 4.4 K/UL (ref 1.3–9.1)
SODIUM BLD-SCNC: 141 MMOL/L (ref 135–144)
SPECIFIC GRAVITY UA: 1.03 (ref 1–1.03)
TOTAL PROTEIN: 7.3 G/DL (ref 6.4–8.3)
TRICHOMONAS: ABNORMAL
TROPONIN INTERP: NORMAL
TROPONIN T: NORMAL NG/ML
TROPONIN, HIGH SENSITIVITY: <6 NG/L (ref 0–14)
TURBIDITY: CLEAR
URINE HGB: ABNORMAL
UROBILINOGEN, URINE: NORMAL
WBC # BLD: 8.3 K/UL (ref 3.5–11)
WBC # BLD: NORMAL 10*3/UL
WBC UA: ABNORMAL /HPF
YEAST: ABNORMAL

## 2021-03-04 PROCEDURE — 81001 URINALYSIS AUTO W/SCOPE: CPT

## 2021-03-04 PROCEDURE — 99284 EMERGENCY DEPT VISIT MOD MDM: CPT

## 2021-03-04 PROCEDURE — 87086 URINE CULTURE/COLONY COUNT: CPT

## 2021-03-04 PROCEDURE — 84484 ASSAY OF TROPONIN QUANT: CPT

## 2021-03-04 PROCEDURE — 72125 CT NECK SPINE W/O DYE: CPT

## 2021-03-04 PROCEDURE — 93005 ELECTROCARDIOGRAM TRACING: CPT | Performed by: STUDENT IN AN ORGANIZED HEALTH CARE EDUCATION/TRAINING PROGRAM

## 2021-03-04 PROCEDURE — 96374 THER/PROPH/DIAG INJ IV PUSH: CPT

## 2021-03-04 PROCEDURE — 6370000000 HC RX 637 (ALT 250 FOR IP): Performed by: STUDENT IN AN ORGANIZED HEALTH CARE EDUCATION/TRAINING PROGRAM

## 2021-03-04 PROCEDURE — 96375 TX/PRO/DX INJ NEW DRUG ADDON: CPT

## 2021-03-04 PROCEDURE — 6370000000 HC RX 637 (ALT 250 FOR IP): Performed by: EMERGENCY MEDICINE

## 2021-03-04 PROCEDURE — 36415 COLL VENOUS BLD VENIPUNCTURE: CPT

## 2021-03-04 PROCEDURE — 85025 COMPLETE CBC W/AUTO DIFF WBC: CPT

## 2021-03-04 PROCEDURE — 83605 ASSAY OF LACTIC ACID: CPT

## 2021-03-04 PROCEDURE — 83690 ASSAY OF LIPASE: CPT

## 2021-03-04 PROCEDURE — 6360000002 HC RX W HCPCS: Performed by: EMERGENCY MEDICINE

## 2021-03-04 PROCEDURE — 80053 COMPREHEN METABOLIC PANEL: CPT

## 2021-03-04 PROCEDURE — 6360000002 HC RX W HCPCS: Performed by: STUDENT IN AN ORGANIZED HEALTH CARE EDUCATION/TRAINING PROGRAM

## 2021-03-04 PROCEDURE — 83735 ASSAY OF MAGNESIUM: CPT

## 2021-03-04 PROCEDURE — 72141 MRI NECK SPINE W/O DYE: CPT

## 2021-03-04 RX ORDER — LIDOCAINE 4 G/G
1 PATCH TOPICAL ONCE
Status: DISCONTINUED | OUTPATIENT
Start: 2021-03-04 | End: 2021-03-04 | Stop reason: HOSPADM

## 2021-03-04 RX ORDER — LIDOCAINE 50 MG/G
1 PATCH TOPICAL DAILY
Qty: 10 PATCH | Refills: 0 | Status: SHIPPED | OUTPATIENT
Start: 2021-03-04 | End: 2022-02-18 | Stop reason: CLARIF

## 2021-03-04 RX ORDER — ONDANSETRON 2 MG/ML
4 INJECTION INTRAMUSCULAR; INTRAVENOUS ONCE
Status: COMPLETED | OUTPATIENT
Start: 2021-03-04 | End: 2021-03-04

## 2021-03-04 RX ORDER — METHYLPREDNISOLONE 4 MG/1
TABLET ORAL
Qty: 1 KIT | Refills: 0 | Status: ON HOLD | OUTPATIENT
Start: 2021-03-04 | End: 2021-03-12 | Stop reason: HOSPADM

## 2021-03-04 RX ORDER — ONDANSETRON 4 MG/1
4 TABLET, FILM COATED ORAL EVERY 8 HOURS PRN
Qty: 20 TABLET | Refills: 0 | Status: SHIPPED | OUTPATIENT
Start: 2021-03-04 | End: 2021-03-16 | Stop reason: SDUPTHER

## 2021-03-04 RX ORDER — KETOROLAC TROMETHAMINE 30 MG/ML
15 INJECTION, SOLUTION INTRAMUSCULAR; INTRAVENOUS ONCE
Status: COMPLETED | OUTPATIENT
Start: 2021-03-04 | End: 2021-03-04

## 2021-03-04 RX ORDER — CYCLOBENZAPRINE HCL 10 MG
10 TABLET ORAL ONCE
Status: COMPLETED | OUTPATIENT
Start: 2021-03-04 | End: 2021-03-04

## 2021-03-04 RX ADMIN — ONDANSETRON 4 MG: 2 INJECTION INTRAMUSCULAR; INTRAVENOUS at 18:05

## 2021-03-04 RX ADMIN — HYDROMORPHONE HYDROCHLORIDE 1 MG: 1 INJECTION, SOLUTION INTRAMUSCULAR; INTRAVENOUS; SUBCUTANEOUS at 20:09

## 2021-03-04 RX ADMIN — KETOROLAC TROMETHAMINE 15 MG: 30 INJECTION, SOLUTION INTRAMUSCULAR; INTRAVENOUS at 20:10

## 2021-03-04 RX ADMIN — CYCLOBENZAPRINE 10 MG: 10 TABLET, FILM COATED ORAL at 18:59

## 2021-03-04 ASSESSMENT — PAIN SCALES - GENERAL: PAINLEVEL_OUTOF10: 8

## 2021-03-04 ASSESSMENT — ENCOUNTER SYMPTOMS
DIARRHEA: 1
EYE REDNESS: 0
SORE THROAT: 0
ABDOMINAL PAIN: 0
NAUSEA: 1
PHOTOPHOBIA: 0
VOMITING: 1
SHORTNESS OF BREATH: 0

## 2021-03-04 ASSESSMENT — PAIN DESCRIPTION - PAIN TYPE: TYPE: ACUTE PAIN;CHRONIC PAIN

## 2021-03-04 ASSESSMENT — PAIN DESCRIPTION - LOCATION: LOCATION: SHOULDER

## 2021-03-04 NOTE — ED NOTES
Mode of arrival (squad #, walk in, police, etc) : walk in         Chief complaint(s): shoulder/arm pain and nausea/vomiting        Arrival Note (brief scenario, treatment PTA, etc). : patient states she has been dealing with left shoulder/arm pain for the past month. Patient states she has a hx of degenerative disc disease which she sees pain management for. Patient states she was supposed to have injections through pain management, but her insurance rejected the coverage. Patient states the left shoulder/arm pain has worsened recently. Patient states she also began experiencing nausea and vomiting due to the pain about 3 days ago. Patient is alert and oriented x3.         C= \"Have you ever felt that you should Cut down on your drinking? \"  No  A= \"Have people Annoyed you by criticizing your drinking? \"  No  G= \"Have you ever felt bad or Guilty about your drinking? \"  No  E= \"Have you ever had a drink as an Eye-opener first thing in the morning to steady your nerves or to help a hangover? \"  No      Deferred []      Reason for deferring: N/A    *If yes to two or more: probable alcohol abuse. Francisco Meredith RN  03/04/21 [id number]

## 2021-03-05 ENCOUNTER — HOSPITAL ENCOUNTER (OUTPATIENT)
Dept: PAIN MANAGEMENT | Age: 51
Discharge: HOME | End: 2021-03-05
Payer: COMMERCIAL

## 2021-03-05 ENCOUNTER — TELEPHONE (OUTPATIENT)
Dept: INTERNAL MEDICINE CLINIC | Age: 51
End: 2021-03-05

## 2021-03-05 DIAGNOSIS — M54.12 CERVICAL RADICULOPATHY: ICD-10-CM

## 2021-03-05 DIAGNOSIS — Z51.81 ENCOUNTER FOR MEDICATION MONITORING: ICD-10-CM

## 2021-03-05 DIAGNOSIS — M47.812 ARTHROPATHY OF CERVICAL FACET JOINT: ICD-10-CM

## 2021-03-05 DIAGNOSIS — M79.18 MYOFASCIAL MUSCLE PAIN: Primary | ICD-10-CM

## 2021-03-05 DIAGNOSIS — E03.9 HYPOTHYROIDISM, UNSPECIFIED TYPE: ICD-10-CM

## 2021-03-05 DIAGNOSIS — M48.02 DEGENERATIVE CERVICAL SPINAL STENOSIS: ICD-10-CM

## 2021-03-05 LAB
CULTURE: NO GROWTH
EKG ATRIAL RATE: 85 BPM
EKG P AXIS: 42 DEGREES
EKG P-R INTERVAL: 158 MS
EKG Q-T INTERVAL: 418 MS
EKG QRS DURATION: 86 MS
EKG QTC CALCULATION (BAZETT): 497 MS
EKG R AXIS: 46 DEGREES
EKG T AXIS: 53 DEGREES
EKG VENTRICULAR RATE: 85 BPM
Lab: NORMAL
SPECIMEN DESCRIPTION: NORMAL

## 2021-03-05 PROCEDURE — 99213 OFFICE O/P EST LOW 20 MIN: CPT | Performed by: NURSE PRACTITIONER

## 2021-03-05 PROCEDURE — 99213 OFFICE O/P EST LOW 20 MIN: CPT

## 2021-03-05 RX ORDER — LEVOTHYROXINE SODIUM 175 UG/1
175 TABLET ORAL DAILY
Qty: 90 TABLET | Refills: 1 | Status: SHIPPED | OUTPATIENT
Start: 2021-03-05 | End: 2021-08-31

## 2021-03-05 RX ORDER — OXYCODONE AND ACETAMINOPHEN 10; 325 MG/1; MG/1
1 TABLET ORAL EVERY 6 HOURS PRN
Qty: 120 TABLET | Refills: 0 | Status: SHIPPED | OUTPATIENT
Start: 2021-03-09 | End: 2021-04-07 | Stop reason: SDUPTHER

## 2021-03-05 RX ORDER — OXYCODONE HCL 10 MG/1
10 TABLET, FILM COATED, EXTENDED RELEASE ORAL EVERY 12 HOURS
Qty: 60 EACH | Refills: 0 | Status: SHIPPED | OUTPATIENT
Start: 2021-03-05 | End: 2021-04-07 | Stop reason: SDUPTHER

## 2021-03-05 ASSESSMENT — ENCOUNTER SYMPTOMS
GASTROINTESTINAL NEGATIVE: 1
TROUBLE SWALLOWING: 0
RESPIRATORY NEGATIVE: 1

## 2021-03-05 NOTE — TELEPHONE ENCOUNTER
Please see message from [facility] regarding the EKG that patient had done at the ED.      Please advise

## 2021-03-05 NOTE — PROGRESS NOTES
[facility] 89 PROGRESS NOTE      Patient  completed [x]  video visit   []   phone call:      Minutes :       [x]    to  review Medication Agreement    []  Follow up after procedure   []  Discuss treatment options      Location:  Provider:  working from    [x]    home    []   [facility] - KADEEM MONTEJO ,   patient at  home       Chief Complaint:  Neck pain    She c/o neck pain radiating down left arm. Pain has  Increased, she had 2 ED visits. She was given script for  lidoderm patch and  Medrol dose joi. by the ED, She  had MRI Cervical  and CT cervical spine. She has appt in 2 weeks at her neurosurgeon\"s office. She is not sleeping well and not able to be active. She states can hardly function. Cervical facet injection denied by Insurance co. She hadcervical epidural steroid injection C5, C6 on 5-22-18. She obtained 50% relief     Neck Pain   This is a chronic problem. The problem has been gradually worsening. The pain is present in the left side (left arm). Quality: sharp, like glass shards going through arm. The pain is at a severity of 9/10. The pain is severe. Exacerbated by: sitting up without support. Associated symptoms include headaches, numbness and weakness. She has tried heat and neck support (laying down) for the symptoms.        Treatment goals:  Functional status: reduce by 60%       Aberrancy:   Any alcoholic beverages    no        Any illegal drugs   no      Analgesia:      9               Adverse  Effects :none      ADL;s :activity dec reased      Data:    When was thelast UDS:    2-3-2021        Was the UDS appropriate:yes      Record/Diagnostics Review:      As above, I did review the imaging   2/7/2021  7:53 PM - Liu, Tyree Incoming Lab Results From [facility]    Component Value Ref Range & Units Status Collected Lab   Pain Management Drug Panel Interp, Urine Inconsistent   Final 02/03/2021  3:51 PM [facility]   (NOTE)   ________________________________________________________________   DRUGS EXPECTED:   NO DRUGS EXPECTED   ________________________________________________________________   INCONSISTENT with medications provided:   Oxycodone   Noroxycodone   7-Aminoclonazepam   ________________________________________________________________   INTERPRETIVE INFORMATION: Targeted drug profile Interp   Interpretation depends on accuracy and completeness of patient   medication information submitted by client. EXAMINATION:   CT OF THE CERVICAL SPINE WITHOUT CONTRAST 3/4/2021 5:19 pm       TECHNIQUE:   CT of the cervical spine was performed without the administration of   intravenous contrast. Multiplanar reformatted images are provided for review. Dose modulation, iterative reconstruction, and/or weight based adjustment of   the mA/kV was utilized to reduce the radiation dose to as low as reasonably   achievable.       COMPARISON:   February 13, 2021.       HISTORY:   ORDERING SYSTEM PROVIDED HISTORY: radicular pain left arm   TECHNOLOGIST PROVIDED HISTORY:   radicular pain left arm   Decision Support Exception->Emergency Medical Condition (MA)   Is the patient pregnant?->No   Reason for Exam: radicular pain left arm for three weeks. patient states   limited ROM and pain starting in neck.  no injuries   Acuity: Unknown   Type of Exam: Unknown       FINDINGS:   Bone mineralization is normal.  The vertebral bodies and posterior elements   appear intact and appropriately aligned without acute fracture or   subluxation.  Vertebral body stature is maintained throughout. Jarold Marts is   straightening of the normal cervical lordosis.  Mild-to-moderate cervical   degenerative disc disease is present throughout. Jarold Marts is moderate bony   neural foraminal narrowing on the left at C3-C4 and C5-C6.  No significant   uncovertebral joint hypertrophic change or additional bony neural foraminal   narrowing.  No paraspinal soft tissue abnormality.       The visualized lung apices are grossly clear.           Impression Mild to moderate multilevel cervical degenerative disc disease with moderate   bony neural foraminal narrowing on the left at C3-C4 and C5-C6.  No acute   fracture or subluxation.  Straightening of the normal cervical lordosis which   may be related to muscle spasm or position in collar.                       EXAMINATION:   MRI OF THE CERVICAL SPINE WITHOUT CONTRAST 3/4/2021 2:53 pm       TECHNIQUE:   Multiplanar multisequence MRI of the cervical spine was performed without the   administration of intravenous contrast.       COMPARISON:   06/12/2020       HISTORY:   ORDERING SYSTEM PROVIDED HISTORY: Cervical radiculopathy   TECHNOLOGIST PROVIDED HISTORY:   evaluate for stenosis   Is the patient pregnant?->No   Reason for Exam: pt stated left shoulder arm pain weakness numbness x 1 mth   Acuity: Acute   Type of Exam: Initial   Additional signs and symptoms: pt stated left shoulder arm pain weakness   numbness x 1 mth, NKI       FINDINGS:   BONES/ALIGNMENT: There is normal alignment of the spine. The vertebral body   heights are maintained. The bone marrow signal appears unremarkable.  There   is minimal degenerative disc disease with disc space narrowing and   osteophytes at C3-4, C4-5, C5-6 and C6-7.       SPINAL CORD:  No abnormal signal is identified within the spinal cord.       SOFT TISSUES: No paraspinal mass identified.       C2-C3: There is minimum disc protrusion of 2 mm centrally. The thecal sac and   neural foramina are intact.       C3-C4: There is disc protrusion osteophyte toward the left measuring 3-4 mm. The thecal sac is not stenotic.  There is mild narrowing of the left neural   foramen.  The right neural foramen is intact.       C4-C5: There is disc protrusion osteophyte measuring 2-3 mm.  The thecal sac   is mildly stenotic measuring 9.5 mm. Disc and/or osteophytes result in mild   narrowing of the neural foramina bilaterally.  The left neural foramen is   narrowed greater than right.     negative    COLONOSCOPY N/A 2/12/2018    COLONOSCOPY WITH BIOPSY performed by Axel Munoz MD at [facility]      x 5    HYSTERECTOMY, TOTAL ABDOMINAL      2010    IA EXPLORATORY OF ABDOMEN  10/21/2014    Laparotomy-lysis of adhesions, bso     UPPER GASTROINTESTINAL ENDOSCOPY  2/17/2010    mild chronic inactive gastritis       Allergies   Allergen Reactions    Compazine [Prochlorperazine]      Jittery, restless    Sulfa Antibiotics Hives    Adhesive Tape Rash         Current Outpatient Medications:     gabapentin (NEURONTIN) 300 MG capsule, Take 1 capsule by mouth 3 times daily for 30 days. , Disp: 90 capsule, Rfl: 0    oxyCODONE-acetaminophen (PERCOCET)  MG per tablet, Take 1 tablet by mouth every 6 hours as needed for Pain for up to 30 days. , Disp: 120 tablet, Rfl: 0    esomeprazole (NEXIUM) 40 MG delayed release capsule, TAKE 1 CAPSULE BY MOUTH EVERY MORNING BEFORE BREAKFAST, Disp: 90 capsule, Rfl: 1    levothyroxine (SYNTHROID) 175 MCG tablet, TAKE 1 TABLET BY MOUTH DAILY, Disp: 90 tablet, Rfl: 0    mirtazapine (REMERON) 15 MG tablet, Take 15 mg by mouth daily, Disp: , Rfl:     sertraline (ZOLOFT) 100 MG tablet, Take 100 mg by mouth daily, Disp: , Rfl:     topiramate (TOPAMAX) 25 MG tablet, 25 mg 2 times daily , Disp: , Rfl:     metoprolol tartrate (LOPRESSOR) 50 MG tablet, Take 50 mg by mouth 2 times daily , Disp: , Rfl:     ondansetron (ZOFRAN) 4 MG tablet, Take 1 tablet by mouth every 8 hours as needed for Nausea or Vomiting, Disp: 20 tablet, Rfl: 0    lidocaine (LIDODERM) 5 %, Place 1 patch onto the skin daily 12 hours on, 12 hours off., Disp: 10 patch, Rfl: 0    methylPREDNISolone (MEDROL, MARIANNE,) 4 MG tablet, Take by mouth., Disp: 1 kit, Rfl: 0    albuterol sulfate  (90 Base) MCG/ACT inhaler, INHALE 2 PUFFS INTO THE LUNGS EVERY 4 HOURS AS NEEDED FOR SHORTNESS OF BREATH, Disp: , Rfl:     clonazePAM (KLONOPIN) 0.5 MG tablet, Take 1 tablet by mouth 2 times daily as needed for Anxiety for up to 30 days. , Disp: 60 tablet, Rfl: 1    tiZANidine (ZANAFLEX) 4 MG tablet, TAKE 1 TABLET BY MOUTH EVERY 8 HOURS AS NEEDED FOR SPASMS, Disp: 90 tablet, Rfl: 1    rOPINIRole (REQUIP) 2 MG tablet, TAKE 1 TABLET BY MOUTH EVERY NIGHT, Disp: 90 tablet, Rfl: 3    ondansetron (ZOFRAN ODT) 4 MG disintegrating tablet, Take 1 tablet by mouth every 8 hours as needed for Nausea or Vomiting, Disp: 10 tablet, Rfl: 0    Family History   Problem Relation Age of Onset    Cancer Mother         vaginal    Heart Disease Father     Diabetes Father     Other Father         colon resection for colon polyps    Breast Cancer Maternal Grandmother     Stroke Maternal Grandfather        Social History     Socioeconomic History    Marital status:      Spouse name: Not on file    Number of children: Not on file    Years of education: Not on file    Highest education level: Not on file   Occupational History    Occupation: Homemaker   Social Needs    Financial resource strain: Not on file    Food insecurity     Worry: Not on file     Inability: Not on file    Transportation needs     Medical: Not on file     Non-medical: Not on file   Tobacco Use    Smoking status: Former Smoker     Packs/day: 0.25     Years: 33.00     Pack years: 8.25     Types: Cigarettes    Smokeless tobacco: Never Used    Tobacco comment: quit on nicoderm patch   Substance and Sexual Activity    Alcohol use: No     Alcohol/week: 0.0 standard drinks    Drug use: No    Sexual activity: Not Currently   Lifestyle    Physical activity     Days per week: Not on file     Minutes per session: Not on file    Stress: Not on file   Relationships    Social connections     Talks on phone: Not on file     Gets together: Not on file     Attends Temple service: Not on file     Active member of club or organization: Not on file     Attends meetings of clubs or organizations: Not on file     Relationship status: Not on file   Natacha Beth Intimate partner violence     Fear of current or ex partner: Not on file     Emotionally abused: Not on file     Physically abused: Not on file     Forced sexual activity: Not on file   Other Topics Concern    Not on file   Social History Narrative    Not on file         Review of Systems:  Review of Systems   Constitution: Negative. HENT: Negative. Eyes:        Glasses   Cardiovascular: Negative. Respiratory: Negative. Endocrine: Negative. Hematologic/Lymphatic: Negative. Skin: Negative. Musculoskeletal: Positive for joint pain and neck pain. Gastrointestinal: Negative. Nausea and vomiting with pain   Neurological: Positive for headaches, numbness and weakness. Psychiatric/Behavioral: Positive for depression. Managing         Physical Exam:  [facility] 11/01/2010     Physical Exam  Skin:         Neurological:      Mental Status: She is alert and oriented to person, place, and time. Psychiatric:         Behavior: Behavior normal.         Thought Content:  Thought content normal.           Assessment:    Problem List Items Addressed This Visit     Myofascial muscle pain - Primary    Encounter for medication monitoring    Relevant Medications    oxyCODONE-acetaminophen (PERCOCET)  MG per tablet (Start on 3/9/2021)    Degenerative cervical spinal stenosis    Relevant Medications    oxyCODONE-acetaminophen (PERCOCET)  MG per tablet (Start on 3/9/2021)    oxyCODONE (OXYCONTIN) 10 MG extended release tablet    Cervical radiculopathy    Relevant Medications    oxyCODONE-acetaminophen (PERCOCET)  MG per tablet (Start on 3/9/2021)    oxyCODONE (OXYCONTIN) 10 MG extended release tablet    Arthropathy of cervical facet joint    Relevant Medications    oxyCODONE-acetaminophen (PERCOCET)  MG per tablet (Start on 3/9/2021)    oxyCODONE (OXYCONTIN) 10 MG extended release tablet              Treatment Plan:  DISCUSSION: Treatment options discussed withpatient and all questions answered to patient's satisfaction. Possible side effects, risk of tolerance and or dependence and alternative treatments discussed    Obtaining appropriate analgesic effect of treatment   No signs of potential drug abuse or diversion identified    [x] Ill effects of being on chronic pain medications such as sleep disturbances, respiratory depression, hormonal changes, withdrawal symptoms, chronic opioid dependence and tolerance as well as risk of taking opioids with Benzodiazepines and taking opioids along with alcohol,  werediscussed with patient. I had asked the patient to minimize medication use and utilize pain medications only for uncontrolled rest pain or pain with exertional activities. I advised patient not to self-escalate painmedications without consulting with us. At each of patient's future visits we will try to taper pain medications, while adjusting the adjunct medications, and re-evaluating for Physical Therapy to improve spinal andjoint strength. We will continue to have discussions to decrease pain medications as tolerated. Counseled patient on effects their pain medication and /or their medical condition mayhave on their  ability to drive or operate machinery. Instructed not to drive or operate machinery if drowsy     I also discussed with the patient regarding the dangers of combining narcotic pain medication with tranquilizers, alcohol or illegal drugs or taking the medication any way other than prescribed. The dangers were discussed  including respiratory depression and death. Patient was told to tell  all  physicians regarding the medications he is getting from pain clinic. Patient is warned not to take any unprescribed medications over-the-countermedications that can depress breathing . Patient is advised to talk to the pharmacist or physicians if planning to take any over-the-counter medications before  takeing them.  Patient is strongly advised to avoid tranquilizers or relaxants, illegal drugs  or any medications that can depress breathing  Patient is also advised to tell us if there is any changes in their medications from other physicians. 1, she has had 2-3 ED visits for neck and arm pain, she states can hardly function, she had cervical MRI and CT scan yesterday, pain not controlled, sleep is  poor, will add long acting opioid oxycontin 10 mg every 12 hours to better control pain, she has follow up visit with Neurosurgeon    2.  Advised her to call her neurosurgeon\"s office to see if her appt could be moved up and discuss MRI results      TREATMENT OPTIONS:       Medication Agreement Requirements Met  Continue Opioid therapy  Script written for  Percocet, oxycontin  Follow up appointment made

## 2021-03-05 NOTE — TELEPHONE ENCOUNTER
Patient called and stated that she was in [facility] ED on 03- for they did a EKG, pt was discharged before know results and showed up on mychart as abnormal and she would like someone to call her, [phone] thank you

## 2021-03-05 NOTE — TELEPHONE ENCOUNTER
Per ER note:   Sinus Rhythm with occasional unifocal PVCs  normal axis normal R wave progression appropriate precordial T wave axis nonspecific ECG. So she is in a normal rhythm, with occasional PVCs (extra beats.) She should follow up with her cardiologist Dr Zhao Egan.

## 2021-03-05 NOTE — ED PROVIDER NOTES
EMERGENCY DEPARTMENT ENCOUNTER   ATTENDING ATTESTATION     Pt Name: Sherrell Chris  MRN: [id number]  [place] 1970  Date of evaluation: 3/4/21       Sherrell Chris is a 53 y.o. female who presents with Emesis, Shoulder Pain, and Diarrhea      MDM:   Severe left side neck pain radiating to the left shoulder painand  radiating to left arm  Left trapezius tenderness  Hx of severe DJD on neck  Intractable pain  Goes to pain clinic    rx lidoderm, medrol dose pack, zofran  She has pain meds and muscle relaxants at home  Discussed with patient anticipatory guidance, discharge instructions, follow up her pain specialist Dr Alena Bird tomorrow  Vitals:   Vitals:    03/04/21 1634   BP: 138/89   Pulse: 73   Resp: 12   Temp: 97.2 °F (36.2 °C)   TempSrc: Temporal   SpO2: 96%   Weight: 165 lb (74.8 kg)   Height: 5' 4\" (1.626 m)         I personally evaluated and examined the patient in conjunction with the resident and agree with the assessment, treatment plan, and disposition of the patient as recorded by the resident. I performed a history and physical examination of the patient and discussed management with the resident. I reviewed the residents note and agree with the documented findings and plan of care. Any areas of disagreement are noted on the chart. I was personally present for the key portions of any procedures. I have documented in the chart those procedures where I was not present during the key portions. I have personally reviewed all images and agree with the resident's interpretation. I have reviewed the emergency nurses triage note. I agree with the chief complaint, past medical history, past surgical history, allergies, medications, social and family history as documented unless otherwise noted.     Pérez Londono MD  Attending Emergency Physician            Pérez Londono MD  03/04/21 Speedy Mariano

## 2021-03-05 NOTE — ED NOTES
Report given to Lionel Reyes from ED. Report method in person   The following was reviewed with receiving RN:   Current vital signs:  /89   Pulse 73   Temp 97.2 °F (36.2 °C) (Temporal)   Resp 12   Ht 5' 4\" (1.626 m)   Wt 165 lb (74.8 kg)   LMP 11/01/2010   SpO2 96%   BMI 28.32 kg/m²                MEWS Score: 0     Any medication or safety alerts were reviewed. Any pending diagnostics and notifications were also reviewed, as well as any safety concerns or issues, abnormal labs, abnormal imaging, and abnormal assessment findings. Questions were answered.             Antony Callahan, ARDEN  03/04/21 [id number]

## 2021-03-07 ENCOUNTER — APPOINTMENT (OUTPATIENT)
Dept: GENERAL RADIOLOGY | Age: 51
DRG: 175 | End: 2021-03-07
Payer: COMMERCIAL

## 2021-03-07 ENCOUNTER — APPOINTMENT (OUTPATIENT)
Dept: CT IMAGING | Age: 51
DRG: 175 | End: 2021-03-07
Payer: COMMERCIAL

## 2021-03-07 ENCOUNTER — HOSPITAL ENCOUNTER (INPATIENT)
Age: 51
LOS: 5 days | Discharge: HOME | DRG: 175 | End: 2021-03-12
Attending: EMERGENCY MEDICINE | Admitting: INTERNAL MEDICINE
Payer: COMMERCIAL

## 2021-03-07 DIAGNOSIS — I26.99 PULMONARY EMBOLISM ON RIGHT (HCC): ICD-10-CM

## 2021-03-07 DIAGNOSIS — M54.12 CERVICAL RADICULOPATHY: ICD-10-CM

## 2021-03-07 DIAGNOSIS — K92.0 HEMATEMESIS WITH NAUSEA: ICD-10-CM

## 2021-03-07 DIAGNOSIS — R29.898 WEAKNESS OF LEFT UPPER EXTREMITY: ICD-10-CM

## 2021-03-07 DIAGNOSIS — I26.99 ACUTE PULMONARY EMBOLISM, UNSPECIFIED PULMONARY EMBOLISM TYPE, UNSPECIFIED WHETHER ACUTE COR PULMONALE PRESENT (HCC): Primary | ICD-10-CM

## 2021-03-07 DIAGNOSIS — I26.99 OTHER ACUTE PULMONARY EMBOLISM, UNSPECIFIED WHETHER ACUTE COR PULMONALE PRESENT (HCC): ICD-10-CM

## 2021-03-07 DIAGNOSIS — J96.01 ACUTE RESPIRATORY FAILURE WITH HYPOXIA (HCC): ICD-10-CM

## 2021-03-07 LAB
-: ABNORMAL
ABSOLUTE EOS #: 0.2 K/UL (ref 0–0.4)
ABSOLUTE IMMATURE GRANULOCYTE: ABNORMAL K/UL (ref 0–0.3)
ABSOLUTE LYMPH #: 1.7 K/UL (ref 1–4.8)
ABSOLUTE MONO #: 0.8 K/UL (ref 0.1–1.3)
ALBUMIN SERPL-MCNC: 3.9 G/DL (ref 3.5–5.2)
ALBUMIN/GLOBULIN RATIO: ABNORMAL (ref 1–2.5)
ALP BLD-CCNC: 103 U/L (ref 35–104)
ALT SERPL-CCNC: 9 U/L (ref 5–33)
AMORPHOUS: ABNORMAL
ANION GAP SERPL CALCULATED.3IONS-SCNC: 9 MMOL/L (ref 9–17)
ANTI-XA UNFRAC HEPARIN: 0.43 IU/L (ref 0.3–0.7)
ANTI-XA UNFRAC HEPARIN: >1.1 IU/L (ref 0.3–0.7)
AST SERPL-CCNC: 14 U/L
BACTERIA: ABNORMAL
BASOPHILS # BLD: 1 % (ref 0–2)
BASOPHILS ABSOLUTE: 0.1 K/UL (ref 0–0.2)
BILIRUB SERPL-MCNC: 0.34 MG/DL (ref 0.3–1.2)
BILIRUBIN URINE: NEGATIVE
BUN BLDV-MCNC: 20 MG/DL (ref 6–20)
BUN/CREAT BLD: ABNORMAL (ref 9–20)
CALCIUM SERPL-MCNC: 9.1 MG/DL (ref 8.6–10.4)
CASTS UA: ABNORMAL /LPF
CHLORIDE BLD-SCNC: 108 MMOL/L (ref 98–107)
CO2: 22 MMOL/L (ref 20–31)
COLOR: YELLOW
COMMENT UA: ABNORMAL
CREAT SERPL-MCNC: 0.7 MG/DL (ref 0.5–0.9)
CRYSTALS, UA: ABNORMAL /HPF
DIFFERENTIAL TYPE: ABNORMAL
EOSINOPHILS RELATIVE PERCENT: 2 % (ref 0–4)
EPITHELIAL CELLS UA: ABNORMAL /HPF
GFR AFRICAN AMERICAN: >60 ML/MIN
GFR NON-AFRICAN AMERICAN: >60 ML/MIN
GFR SERPL CREATININE-BSD FRML MDRD: ABNORMAL ML/MIN/{1.73_M2}
GFR SERPL CREATININE-BSD FRML MDRD: ABNORMAL ML/MIN/{1.73_M2}
GLUCOSE BLD-MCNC: 113 MG/DL (ref 70–99)
GLUCOSE URINE: NEGATIVE
HCT VFR BLD CALC: 40.7 % (ref 36–46)
HCT VFR BLD CALC: 43.7 % (ref 36–46)
HEMOGLOBIN: 13.7 G/DL (ref 12–16)
HEMOGLOBIN: 14.5 G/DL (ref 12–16)
IMMATURE GRANULOCYTES: ABNORMAL %
INR BLD: 1
INR BLD: 1.1
KETONES, URINE: NEGATIVE
LEUKOCYTE ESTERASE, URINE: NEGATIVE
LIPASE: 23 U/L (ref 13–60)
LYMPHOCYTES # BLD: 20 % (ref 24–44)
MCH RBC QN AUTO: 31.1 PG (ref 26–34)
MCH RBC QN AUTO: 31.4 PG (ref 26–34)
MCHC RBC AUTO-ENTMCNC: 33.2 G/DL (ref 31–37)
MCHC RBC AUTO-ENTMCNC: 33.6 G/DL (ref 31–37)
MCV RBC AUTO: 93.5 FL (ref 80–100)
MCV RBC AUTO: 93.7 FL (ref 80–100)
MONOCYTES # BLD: 9 % (ref 1–7)
MUCUS: ABNORMAL
NITRITE, URINE: NEGATIVE
NRBC AUTOMATED: ABNORMAL PER 100 WBC
NRBC AUTOMATED: ABNORMAL PER 100 WBC
OTHER OBSERVATIONS UA: ABNORMAL
PARTIAL THROMBOPLASTIN TIME: 31.7 SEC (ref 24–36)
PDW BLD-RTO: 12.5 % (ref 11.5–14.9)
PDW BLD-RTO: 12.5 % (ref 11.5–14.9)
PH UA: 5 (ref 5–8)
PLATELET # BLD: 150 K/UL (ref 150–450)
PLATELET # BLD: 169 K/UL (ref 150–450)
PLATELET ESTIMATE: ABNORMAL
PMV BLD AUTO: 7.9 FL (ref 6–12)
PMV BLD AUTO: 7.9 FL (ref 6–12)
POTASSIUM SERPL-SCNC: 3.9 MMOL/L (ref 3.7–5.3)
PROTEIN UA: NEGATIVE
PROTHROMBIN TIME: 13 SEC (ref 11.8–14.6)
PROTHROMBIN TIME: 14.2 SEC (ref 11.8–14.6)
RBC # BLD: 4.36 M/UL (ref 4–5.2)
RBC # BLD: 4.66 M/UL (ref 4–5.2)
RBC # BLD: ABNORMAL 10*6/UL
RBC UA: ABNORMAL /HPF
RENAL EPITHELIAL, UA: ABNORMAL /HPF
SEG NEUTROPHILS: 68 % (ref 36–66)
SEGMENTED NEUTROPHILS ABSOLUTE COUNT: 5.7 K/UL (ref 1.3–9.1)
SODIUM BLD-SCNC: 139 MMOL/L (ref 135–144)
SPECIFIC GRAVITY UA: >1.03 (ref 1–1.03)
TOTAL PROTEIN: 7.1 G/DL (ref 6.4–8.3)
TRICHOMONAS: ABNORMAL
TROPONIN INTERP: NORMAL
TROPONIN INTERP: NORMAL
TROPONIN T: NORMAL NG/ML
TROPONIN T: NORMAL NG/ML
TROPONIN, HIGH SENSITIVITY: <6 NG/L (ref 0–14)
TROPONIN, HIGH SENSITIVITY: <6 NG/L (ref 0–14)
TSH SERPL DL<=0.05 MIU/L-ACNC: 0.69 MIU/L (ref 0.3–5)
TURBIDITY: CLEAR
URINE HGB: ABNORMAL
UROBILINOGEN, URINE: NORMAL
WBC # BLD: 11.7 K/UL (ref 3.5–11)
WBC # BLD: 8.4 K/UL (ref 3.5–11)
WBC # BLD: ABNORMAL 10*3/UL
WBC UA: ABNORMAL /HPF
YEAST: ABNORMAL

## 2021-03-07 PROCEDURE — 71045 X-RAY EXAM CHEST 1 VIEW: CPT

## 2021-03-07 PROCEDURE — 85730 THROMBOPLASTIN TIME PARTIAL: CPT

## 2021-03-07 PROCEDURE — 85027 COMPLETE CBC AUTOMATED: CPT

## 2021-03-07 PROCEDURE — 99285 EMERGENCY DEPT VISIT HI MDM: CPT

## 2021-03-07 PROCEDURE — 86334 IMMUNOFIX E-PHORESIS SERUM: CPT

## 2021-03-07 PROCEDURE — 85025 COMPLETE CBC W/AUTO DIFF WBC: CPT

## 2021-03-07 PROCEDURE — 85610 PROTHROMBIN TIME: CPT

## 2021-03-07 PROCEDURE — 80053 COMPREHEN METABOLIC PANEL: CPT

## 2021-03-07 PROCEDURE — 6370000000 HC RX 637 (ALT 250 FOR IP): Performed by: EMERGENCY MEDICINE

## 2021-03-07 PROCEDURE — 84155 ASSAY OF PROTEIN SERUM: CPT

## 2021-03-07 PROCEDURE — 94761 N-INVAS EAR/PLS OXIMETRY MLT: CPT

## 2021-03-07 PROCEDURE — 71260 CT THORAX DX C+: CPT

## 2021-03-07 PROCEDURE — 6360000002 HC RX W HCPCS: Performed by: STUDENT IN AN ORGANIZED HEALTH CARE EDUCATION/TRAINING PROGRAM

## 2021-03-07 PROCEDURE — 85520 HEPARIN ASSAY: CPT

## 2021-03-07 PROCEDURE — 93005 ELECTROCARDIOGRAM TRACING: CPT | Performed by: EMERGENCY MEDICINE

## 2021-03-07 PROCEDURE — 6370000000 HC RX 637 (ALT 250 FOR IP): Performed by: STUDENT IN AN ORGANIZED HEALTH CARE EDUCATION/TRAINING PROGRAM

## 2021-03-07 PROCEDURE — 2580000003 HC RX 258: Performed by: STUDENT IN AN ORGANIZED HEALTH CARE EDUCATION/TRAINING PROGRAM

## 2021-03-07 PROCEDURE — 81001 URINALYSIS AUTO W/SCOPE: CPT

## 2021-03-07 PROCEDURE — 99223 1ST HOSP IP/OBS HIGH 75: CPT | Performed by: INTERNAL MEDICINE

## 2021-03-07 PROCEDURE — 84165 PROTEIN E-PHORESIS SERUM: CPT

## 2021-03-07 PROCEDURE — 6360000002 HC RX W HCPCS: Performed by: EMERGENCY MEDICINE

## 2021-03-07 PROCEDURE — 2060000000 HC ICU INTERMEDIATE R&B

## 2021-03-07 PROCEDURE — 6360000004 HC RX CONTRAST MEDICATION: Performed by: EMERGENCY MEDICINE

## 2021-03-07 PROCEDURE — 36415 COLL VENOUS BLD VENIPUNCTURE: CPT

## 2021-03-07 PROCEDURE — 84443 ASSAY THYROID STIM HORMONE: CPT

## 2021-03-07 PROCEDURE — 96374 THER/PROPH/DIAG INJ IV PUSH: CPT

## 2021-03-07 PROCEDURE — 83690 ASSAY OF LIPASE: CPT

## 2021-03-07 PROCEDURE — 2580000003 HC RX 258: Performed by: EMERGENCY MEDICINE

## 2021-03-07 PROCEDURE — 96375 TX/PRO/DX INJ NEW DRUG ADDON: CPT

## 2021-03-07 PROCEDURE — 84484 ASSAY OF TROPONIN QUANT: CPT

## 2021-03-07 RX ORDER — LORAZEPAM 0.5 MG/1
0.5 TABLET ORAL EVERY 6 HOURS PRN
Status: DISCONTINUED | OUTPATIENT
Start: 2021-03-07 | End: 2021-03-12 | Stop reason: HOSPADM

## 2021-03-07 RX ORDER — HEPARIN SODIUM 1000 [USP'U]/ML
40 INJECTION, SOLUTION INTRAVENOUS; SUBCUTANEOUS PRN
Status: DISCONTINUED | OUTPATIENT
Start: 2021-03-07 | End: 2021-03-07

## 2021-03-07 RX ORDER — MORPHINE SULFATE 4 MG/ML
4 INJECTION, SOLUTION INTRAMUSCULAR; INTRAVENOUS ONCE
Status: COMPLETED | OUTPATIENT
Start: 2021-03-07 | End: 2021-03-07

## 2021-03-07 RX ORDER — LEVOTHYROXINE SODIUM 175 UG/1
1 TABLET ORAL DAILY
Status: DISCONTINUED | OUTPATIENT
Start: 2021-03-07 | End: 2021-03-07

## 2021-03-07 RX ORDER — ACETAMINOPHEN 650 MG/1
650 SUPPOSITORY RECTAL EVERY 6 HOURS PRN
Status: DISCONTINUED | OUTPATIENT
Start: 2021-03-07 | End: 2021-03-12 | Stop reason: HOSPADM

## 2021-03-07 RX ORDER — HEPARIN SODIUM 10000 [USP'U]/100ML
5-30 INJECTION, SOLUTION INTRAVENOUS CONTINUOUS
Status: DISCONTINUED | OUTPATIENT
Start: 2021-03-07 | End: 2021-03-08

## 2021-03-07 RX ORDER — HEPARIN SODIUM 1000 [USP'U]/ML
80 INJECTION, SOLUTION INTRAVENOUS; SUBCUTANEOUS PRN
Status: DISCONTINUED | OUTPATIENT
Start: 2021-03-07 | End: 2021-03-08 | Stop reason: SDUPTHER

## 2021-03-07 RX ORDER — 0.9 % SODIUM CHLORIDE 0.9 %
80 INTRAVENOUS SOLUTION INTRAVENOUS ONCE
Status: COMPLETED | OUTPATIENT
Start: 2021-03-07 | End: 2021-03-07

## 2021-03-07 RX ORDER — ASPIRIN 81 MG/1
324 TABLET, CHEWABLE ORAL ONCE
Status: COMPLETED | OUTPATIENT
Start: 2021-03-07 | End: 2021-03-07

## 2021-03-07 RX ORDER — ONDANSETRON 2 MG/ML
4 INJECTION INTRAMUSCULAR; INTRAVENOUS EVERY 6 HOURS PRN
Status: DISCONTINUED | OUTPATIENT
Start: 2021-03-07 | End: 2021-03-12 | Stop reason: HOSPADM

## 2021-03-07 RX ORDER — GABAPENTIN 300 MG/1
300 CAPSULE ORAL 3 TIMES DAILY
Status: DISCONTINUED | OUTPATIENT
Start: 2021-03-07 | End: 2021-03-11

## 2021-03-07 RX ORDER — POLYETHYLENE GLYCOL 3350 17 G/17G
17 POWDER, FOR SOLUTION ORAL DAILY PRN
Status: DISCONTINUED | OUTPATIENT
Start: 2021-03-07 | End: 2021-03-12 | Stop reason: HOSPADM

## 2021-03-07 RX ORDER — SODIUM CHLORIDE 0.9 % (FLUSH) 0.9 %
10 SYRINGE (ML) INJECTION PRN
Status: DISCONTINUED | OUTPATIENT
Start: 2021-03-07 | End: 2021-03-12 | Stop reason: HOSPADM

## 2021-03-07 RX ORDER — PROMETHAZINE HYDROCHLORIDE 25 MG/1
12.5 TABLET ORAL EVERY 6 HOURS PRN
Status: DISCONTINUED | OUTPATIENT
Start: 2021-03-07 | End: 2021-03-12 | Stop reason: HOSPADM

## 2021-03-07 RX ORDER — HEPARIN SODIUM 1000 [USP'U]/ML
80 INJECTION, SOLUTION INTRAVENOUS; SUBCUTANEOUS ONCE
Status: COMPLETED | OUTPATIENT
Start: 2021-03-07 | End: 2021-03-07

## 2021-03-07 RX ORDER — MORPHINE SULFATE 4 MG/ML
4 INJECTION, SOLUTION INTRAMUSCULAR; INTRAVENOUS
Status: DISCONTINUED | OUTPATIENT
Start: 2021-03-07 | End: 2021-03-12 | Stop reason: HOSPADM

## 2021-03-07 RX ORDER — ONDANSETRON 2 MG/ML
4 INJECTION INTRAMUSCULAR; INTRAVENOUS ONCE
Status: COMPLETED | OUTPATIENT
Start: 2021-03-07 | End: 2021-03-07

## 2021-03-07 RX ORDER — SODIUM CHLORIDE 0.9 % (FLUSH) 0.9 %
10 SYRINGE (ML) INJECTION EVERY 12 HOURS SCHEDULED
Status: DISCONTINUED | OUTPATIENT
Start: 2021-03-07 | End: 2021-03-12 | Stop reason: HOSPADM

## 2021-03-07 RX ORDER — HEPARIN SODIUM 10000 [USP'U]/100ML
5-30 INJECTION, SOLUTION INTRAVENOUS CONTINUOUS
Status: DISCONTINUED | OUTPATIENT
Start: 2021-03-07 | End: 2021-03-07

## 2021-03-07 RX ORDER — SERTRALINE HYDROCHLORIDE 100 MG/1
100 TABLET, FILM COATED ORAL DAILY
Status: DISCONTINUED | OUTPATIENT
Start: 2021-03-07 | End: 2021-03-12 | Stop reason: HOSPADM

## 2021-03-07 RX ORDER — HEPARIN SODIUM 1000 [USP'U]/ML
80 INJECTION, SOLUTION INTRAVENOUS; SUBCUTANEOUS ONCE
Status: DISCONTINUED | OUTPATIENT
Start: 2021-03-07 | End: 2021-03-07

## 2021-03-07 RX ORDER — MORPHINE SULFATE 2 MG/ML
2 INJECTION, SOLUTION INTRAMUSCULAR; INTRAVENOUS
Status: DISCONTINUED | OUTPATIENT
Start: 2021-03-07 | End: 2021-03-12 | Stop reason: HOSPADM

## 2021-03-07 RX ORDER — POTASSIUM CHLORIDE 7.45 MG/ML
10 INJECTION INTRAVENOUS PRN
Status: DISCONTINUED | OUTPATIENT
Start: 2021-03-07 | End: 2021-03-12 | Stop reason: HOSPADM

## 2021-03-07 RX ORDER — TIZANIDINE 4 MG/1
4 TABLET ORAL EVERY 8 HOURS PRN
Status: DISCONTINUED | OUTPATIENT
Start: 2021-03-07 | End: 2021-03-12 | Stop reason: HOSPADM

## 2021-03-07 RX ORDER — SODIUM CHLORIDE 9 MG/ML
INJECTION, SOLUTION INTRAVENOUS CONTINUOUS
Status: DISCONTINUED | OUTPATIENT
Start: 2021-03-07 | End: 2021-03-09

## 2021-03-07 RX ORDER — HEPARIN SODIUM 1000 [USP'U]/ML
40 INJECTION, SOLUTION INTRAVENOUS; SUBCUTANEOUS PRN
Status: DISCONTINUED | OUTPATIENT
Start: 2021-03-07 | End: 2021-03-08 | Stop reason: SDUPTHER

## 2021-03-07 RX ORDER — ROPINIROLE 1 MG/1
2 TABLET, FILM COATED ORAL NIGHTLY
Status: DISCONTINUED | OUTPATIENT
Start: 2021-03-07 | End: 2021-03-12 | Stop reason: HOSPADM

## 2021-03-07 RX ORDER — MIRTAZAPINE 15 MG/1
15 TABLET, FILM COATED ORAL DAILY
Status: DISCONTINUED | OUTPATIENT
Start: 2021-03-07 | End: 2021-03-12 | Stop reason: HOSPADM

## 2021-03-07 RX ORDER — HEPARIN SODIUM 1000 [USP'U]/ML
80 INJECTION, SOLUTION INTRAVENOUS; SUBCUTANEOUS PRN
Status: DISCONTINUED | OUTPATIENT
Start: 2021-03-07 | End: 2021-03-07

## 2021-03-07 RX ORDER — METOPROLOL TARTRATE 50 MG/1
50 TABLET, FILM COATED ORAL 2 TIMES DAILY
Status: DISCONTINUED | OUTPATIENT
Start: 2021-03-07 | End: 2021-03-12 | Stop reason: HOSPADM

## 2021-03-07 RX ORDER — POTASSIUM CHLORIDE 20 MEQ/1
40 TABLET, EXTENDED RELEASE ORAL PRN
Status: DISCONTINUED | OUTPATIENT
Start: 2021-03-07 | End: 2021-03-12 | Stop reason: HOSPADM

## 2021-03-07 RX ORDER — TOPIRAMATE 25 MG/1
25 TABLET ORAL 2 TIMES DAILY
Status: DISCONTINUED | OUTPATIENT
Start: 2021-03-07 | End: 2021-03-12 | Stop reason: HOSPADM

## 2021-03-07 RX ORDER — ACETAMINOPHEN 325 MG/1
650 TABLET ORAL EVERY 6 HOURS PRN
Status: DISCONTINUED | OUTPATIENT
Start: 2021-03-07 | End: 2021-03-12 | Stop reason: HOSPADM

## 2021-03-07 RX ADMIN — MORPHINE SULFATE 4 MG: 4 INJECTION, SOLUTION INTRAMUSCULAR; INTRAVENOUS at 11:16

## 2021-03-07 RX ADMIN — GABAPENTIN 300 MG: 300 CAPSULE ORAL at 20:32

## 2021-03-07 RX ADMIN — HYDROMORPHONE HYDROCHLORIDE 1 MG: 1 INJECTION, SOLUTION INTRAMUSCULAR; INTRAVENOUS; SUBCUTANEOUS at 18:24

## 2021-03-07 RX ADMIN — ROPINIROLE HYDROCHLORIDE 2 MG: 1 TABLET, FILM COATED ORAL at 20:32

## 2021-03-07 RX ADMIN — SODIUM CHLORIDE: 9 INJECTION, SOLUTION INTRAVENOUS at 16:30

## 2021-03-07 RX ADMIN — HEPARIN SODIUM 5980 UNITS: 1000 INJECTION, SOLUTION INTRAVENOUS; SUBCUTANEOUS at 14:55

## 2021-03-07 RX ADMIN — ONDANSETRON 4 MG: 2 INJECTION INTRAMUSCULAR; INTRAVENOUS at 11:15

## 2021-03-07 RX ADMIN — MORPHINE SULFATE 4 MG: 4 INJECTION, SOLUTION INTRAMUSCULAR; INTRAVENOUS at 15:48

## 2021-03-07 RX ADMIN — Medication 10 ML: at 11:56

## 2021-03-07 RX ADMIN — METOPROLOL TARTRATE 50 MG: 50 TABLET, FILM COATED ORAL at 20:32

## 2021-03-07 RX ADMIN — ASPIRIN 324 MG: 81 TABLET, CHEWABLE ORAL at 11:15

## 2021-03-07 RX ADMIN — HYDROMORPHONE HYDROCHLORIDE 1 MG: 1 INJECTION, SOLUTION INTRAMUSCULAR; INTRAVENOUS; SUBCUTANEOUS at 22:54

## 2021-03-07 RX ADMIN — HEPARIN SODIUM 18 UNITS/KG/HR: 10000 INJECTION, SOLUTION INTRAVENOUS at 14:55

## 2021-03-07 RX ADMIN — TOPIRAMATE 25 MG: 25 TABLET, FILM COATED ORAL at 20:32

## 2021-03-07 RX ADMIN — SODIUM CHLORIDE 80 ML: 9 INJECTION, SOLUTION INTRAVENOUS at 11:56

## 2021-03-07 RX ADMIN — HYDROMORPHONE HYDROCHLORIDE 1 MG: 1 INJECTION, SOLUTION INTRAMUSCULAR; INTRAVENOUS; SUBCUTANEOUS at 20:32

## 2021-03-07 RX ADMIN — IOPAMIDOL 75 ML: 755 INJECTION, SOLUTION INTRAVENOUS at 11:56

## 2021-03-07 RX ADMIN — ONDANSETRON 4 MG: 2 INJECTION INTRAMUSCULAR; INTRAVENOUS at 20:12

## 2021-03-07 ASSESSMENT — ENCOUNTER SYMPTOMS
CONSTIPATION: 0
BACK PAIN: 0
EYE PAIN: 0
COLOR CHANGE: 0
BLOOD IN STOOL: 0
COUGH: 1
DIARRHEA: 0
VOMITING: 0
ABDOMINAL PAIN: 0
SHORTNESS OF BREATH: 1
NAUSEA: 0

## 2021-03-07 ASSESSMENT — PAIN SCALES - GENERAL
PAINLEVEL_OUTOF10: 8
PAINLEVEL_OUTOF10: 10
PAINLEVEL_OUTOF10: 9
PAINLEVEL_OUTOF10: 5

## 2021-03-07 ASSESSMENT — PAIN DESCRIPTION - ORIENTATION: ORIENTATION: LEFT

## 2021-03-07 ASSESSMENT — PAIN DESCRIPTION - FREQUENCY: FREQUENCY: CONTINUOUS

## 2021-03-07 ASSESSMENT — PAIN DESCRIPTION - PAIN TYPE: TYPE: ACUTE PAIN

## 2021-03-07 ASSESSMENT — PAIN DESCRIPTION - ONSET: ONSET: SUDDEN

## 2021-03-07 ASSESSMENT — PAIN DESCRIPTION - DESCRIPTORS: DESCRIPTORS: TIGHTNESS

## 2021-03-07 NOTE — PLAN OF CARE
Problem: Infection:  Goal: Will remain free from infection  Description: Will remain free from infection  Outcome: Ongoing  Note: Patient had no signs or symptoms of infection during this shift. Problem: Pain:  Goal: Patient's pain/discomfort is manageable  Description: Patient's pain/discomfort is manageable  Outcome: Ongoing  Note: Patient given pain medication as ordered. Problem: Skin Integrity:  Goal: Skin integrity will stabilize  Description: Skin integrity will stabilize  Outcome: Ongoing  Note: No new alterations in skin integrity.

## 2021-03-07 NOTE — H&P
[address]      [address] [facility]     HISTORY AND PHYSICAL EXAMINATION            Date:   3/7/2021  Patient name:  Geremias Calle  Date of admission:  3/7/2021 10:31 AM  MRN:   [id number]  Account:  [de-identified]  YOB: 1970  PCP:    MARTITA Lee CNP  Room:   D/D  Code Status:    Prior    Chief Complaint:     Chief Complaint   Patient presents with    Shortness of Breath       History Obtained From:     patient    History of Present Illness: This is a 64-year-old female with a history of hypertension, hypothyroidism status post surgery, migraine, cervical radiculopathy, bilateral salpingo-oophorectomy in 2010 came to the hospital today with complaints of chest pain and shortness of breath. Patient said  that the chest pain started last night when she was about to sleep. Chest pain is substernal, 10/10 in intensity, sharp in nature, radiates to the back, aggravated with cough and deep inspiration, no relieving factor was identified. The chest pain is associated with hemoptysis and shortness of breath. Patient is denying any symptoms of lightheadedness, racing of heart, swelling of the feet, using any OCPs headache, nausea, vomiting, abdominal pain, difficulty urination, increased frequency urgency of urination. Patient is a smoker and smokes half pack of cigarettes since she was 17 and quit 8 months ago. Patient also mentioned that she has a  family history of vaginal cancer and because of that she got BSO-hysterectomy as she was also was found to have ovarian masses. Colonoscopy was done in 2014 biopsy was negative. In the ED patient was found to be vitally stable, not using any oxygen, troponin was negative, CT PE showed posterior right lower lobe filling defect with extension into basilar.   Segmental branches consistent with pulmonary embolism. Of note, patient has a history of cervical radiculopathy and MRI cervical without contrast showed disc and osteophyte resulting in neural foraminal with mild stenosis. Past Medical History:     Past Medical History:   Diagnosis Date    Anxiety     CAD (coronary artery disease)     Mitral valve prolapse    Fatty liver     GERD (gastroesophageal reflux disease)     Headache     History of bronchitis     Hyperlipidemia     Hypothyroidism     Kidney stone     LFT elevation     MVP (mitral valve prolapse)     Obesity     Type 2 diabetes mellitus without complication ([facility] [address] 3/07/9397    Umbilical hernia         Past SurgicalHistory:     Past Surgical History:   Procedure Laterality Date    APPENDECTOMY       SECTION      2 pfannenstiel, 1 vertical    CHOLECYSTECTOMY      COLONOSCOPY      Dr Jose Raul Nayak  14    COLONOSCOPY  2014    biopsy & sigmoid spasms, pathology negative    COLONOSCOPY N/A 2018    COLONOSCOPY WITH BIOPSY performed by Kristina Thurston MD at [facility]      x 5    HYSTERECTOMY, TOTAL ABDOMINAL          [place] EXPLORATORY OF ABDOMEN  10/21/2014    Laparotomy-lysis of adhesions, bso     UPPER GASTROINTESTINAL ENDOSCOPY  2010    mild chronic inactive gastritis        Medications Prior to Admission:        Prior to Admission medications    Medication Sig Start Date End Date Taking? Authorizing Provider   levothyroxine (SYNTHROID) 175 MCG tablet TAKE 1 TABLET BY MOUTH DAILY 3/5/21   MARTITA Doty CNP   oxyCODONE-acetaminophen (PERCOCET)  MG per tablet Take 1 tablet by mouth every 6 hours as needed for Pain for up to 30 days. 3/9/21 4/8/21  MARTITA Bowman CNP   oxyCODONE (OXYCONTIN) 10 MG extended release tablet Take 1 tablet by mouth every 12 hours for 30 days.  3/5/21 4/4/21  MARTITA oBwman CNP   ondansetron (ZOFRAN) 4 MG tablet Take 1 tablet by mouth every 8 hours as needed for Nausea or Vomiting 3/4/21   Pérez Londono MD   lidocaine (LIDODERM) 5 % Place 1 patch onto the skin daily 12 hours on, 12 hours off. 3/4/21   Pérez Londono MD   methylPREDNISolone (MEDROL, MARIANNE,) 4 MG tablet Take by mouth. 3/4/21   Pérez Londono MD   gabapentin (NEURONTIN) 300 MG capsule Take 1 capsule by mouth 3 times daily for 30 days. 2/25/21 3/27/21  [address], APRN - CNP   albuterol sulfate  (90 Base) MCG/ACT inhaler INHALE 2 PUFFS INTO THE LUNGS EVERY 4 HOURS AS NEEDED FOR SHORTNESS OF BREATH 1/30/21   Historical Provider, MD   clonazePAM (KLONOPIN) 0.5 MG tablet Take 1 tablet by mouth 2 times daily as needed for Anxiety for up to 30 days. 12/28/20 2/25/21  Sandy Jones MD   esomeprazole (NEXIUM) 40 MG delayed release capsule TAKE 1 CAPSULE BY MOUTH EVERY MORNING BEFORE BREAKFAST 12/18/20   MARTITA Wilson CNP   tiZANidine (ZANAFLEX) 4 MG tablet TAKE 1 TABLET BY MOUTH EVERY 8 HOURS AS NEEDED FOR SPASMS 12/9/20   Juanito Pace MD   mirtazapine (REMERON) 15 MG tablet Take 15 mg by mouth daily 11/10/20   Historical Provider, MD   rOPINIRole (REQUIP) 2 MG tablet TAKE 1 TABLET BY MOUTH EVERY NIGHT 8/14/20   MARTITA Wilson CNP   ondansetron (ZOFRAN ODT) 4 MG disintegrating tablet Take 1 tablet by mouth every 8 hours as needed for Nausea or Vomiting 3/4/20   Lilliana Anthony MD   sertraline (ZOLOFT) 100 MG tablet Take 100 mg by mouth daily    Historical Provider, MD   topiramate (TOPAMAX) 25 MG tablet 25 mg 2 times daily  2/27/18   Historical Provider, MD   metoprolol tartrate (LOPRESSOR) 50 MG tablet Take 50 mg by mouth 2 times daily  8/21/17   Historical Provider, MD        Allergies:     Compazine [prochlorperazine], Sulfa antibiotics, and Adhesive tape    Social History:     Tobacco:    reports that she has quit smoking. Her smoking use included cigarettes. She has a 8.25 pack-year smoking history.  She has never used smokeless tobacco.  Alcohol:      reports no history of alcohol use.  Drug Use:  reports no history of drug use. Family History:     Family History   Problem Relation Age of Onset   [place] Cancer Mother         vaginal    Heart Disease Father     Diabetes Father     Other Father         colon resection for colon polyps    Breast Cancer Maternal Grandmother     Stroke Maternal Grandfather        Review of Systems:     Positive and Negative as described in HPI. Review of Systems   Constitutional: Negative for chills, diaphoresis and fever. Respiratory: Positive for cough and shortness of breath. Cardiovascular: Positive for chest pain. Negative for palpitations and leg swelling. Gastrointestinal: Negative for abdominal pain, blood in stool, constipation, diarrhea, nausea and vomiting. Genitourinary: Negative for difficulty urinating, frequency and urgency. Musculoskeletal: Positive for arthralgias. Neurological: Negative for speech difficulty, light-headedness and headaches. Physical Exam:   /64   Pulse 72   Temp 98.4 °F (36.9 °C) (Oral)   Resp 21   Ht 5' 4\" (1.626 m)   Wt 165 lb (74.8 kg)   LMP 2010   SpO2 94%   BMI 28.32 kg/m²   Temp (24hrs), Av.4 °F (36.9 °C), Min:98.4 °F (36.9 °C), Max:98.4 °F (36.9 °C)    No results for input(s): POCGLU in the last 72 hours. No intake or output data in the 24 hours ending 21 1447    Physical Exam  Constitutional:       Appearance: Normal appearance. Cardiovascular:      Rate and Rhythm: Normal rate and regular rhythm. Pulses: Normal pulses. Heart sounds: Normal heart sounds. Pulmonary:      Effort: Pulmonary effort is normal.      Breath sounds: Normal breath sounds. Abdominal:      General: Bowel sounds are normal. There is no distension. Palpations: Abdomen is soft. There is no mass. Tenderness: There is no abdominal tenderness. There is no guarding. Musculoskeletal:      Right lower leg: No edema. Left lower leg: No edema.    Neurological:      Mental Status: She is alert.          Investigations:     Laboratory Testing:  Recent Results (from the past 24 hour(s))   CBC Auto Differential    Collection Time: 03/07/21 11:05 AM   Result Value Ref Range    WBC 8.4 3.5 - 11.0 k/uL    RBC 4.36 4.0 - 5.2 m/uL    Hemoglobin 13.7 12.0 - 16.0 g/dL    Hematocrit 40.7 36 - 46 %    MCV 93.5 80 - 100 fL    MCH 31.4 26 - 34 pg    MCHC 33.6 31 - 37 g/dL    RDW 12.5 11.5 - 14.9 %    Platelets 366 890 - 058 k/uL    MPV 7.9 6.0 - 12.0 fL    NRBC Automated NOT REPORTED per 100 WBC    Differential Type NOT REPORTED     Seg Neutrophils 68 (H) 36 - 66 %    Lymphocytes 20 (L) 24 - 44 %    Monocytes 9 (H) 1 - 7 %    Eosinophils % 2 0 - 4 %    Basophils 1 0 - 2 %    Immature Granulocytes NOT REPORTED 0 %    Segs Absolute 5.70 1.3 - 9.1 k/uL    Absolute Lymph # 1.70 1.0 - 4.8 k/uL    Absolute Mono # 0.80 0.1 - 1.3 k/uL    Absolute Eos # 0.20 0.0 - 0.4 k/uL    Basophils Absolute 0.10 0.0 - 0.2 k/uL    Absolute Immature Granulocyte NOT REPORTED 0.00 - 0.30 k/uL    WBC Morphology NOT REPORTED     RBC Morphology NOT REPORTED     Platelet Estimate NOT REPORTED    Comprehensive Metabolic Panel w/ Reflex to MG    Collection Time: 03/07/21 11:05 AM   Result Value Ref Range    Glucose 113 (H) 70 - 99 mg/dL    BUN 20 6 - 20 mg/dL    CREATININE 0.70 0.50 - 0.90 mg/dL    Bun/Cre Ratio NOT REPORTED 9 - 20    Calcium 9.1 8.6 - 10.4 mg/dL    Sodium 139 135 - 144 mmol/L    Potassium 3.9 3.7 - 5.3 mmol/L    Chloride 108 (H) 98 - 107 mmol/L    CO2 22 20 - 31 mmol/L    Anion Gap 9 9 - 17 mmol/L    Alkaline Phosphatase 103 35 - 104 U/L    ALT 9 5 - 33 U/L    AST 14 <32 U/L    Total Bilirubin 0.34 0.3 - 1.2 mg/dL    Total Protein 7.1 6.4 - 8.3 g/dL    Albumin 3.9 3.5 - 5.2 g/dL    Albumin/Globulin Ratio NOT REPORTED 1.0 - 2.5    GFR Non-African American >60 >60 mL/min    GFR African American >60 >60 mL/min    GFR Comment          GFR Staging NOT REPORTED    Lipase    Collection Time: 03/07/21 11:05 AM   Result Value Ref Range    Lipase 23 13 - 60 U/L   Troponin    Collection Time: 03/07/21 11:05 AM   Result Value Ref Range    Troponin, High Sensitivity <6 0 - 14 ng/L    Troponin T NOT REPORTED <0.03 ng/mL    Troponin Interp NOT REPORTED    Protime-INR    Collection Time: 03/07/21 11:05 AM   Result Value Ref Range    Protime 13.0 11.8 - 14.6 sec    INR 1.0    APTT    Collection Time: 03/07/21 11:05 AM   Result Value Ref Range    PTT 31.7 24.0 - 36.0 sec   Troponin    Collection Time: 03/07/21  1:40 PM   Result Value Ref Range    Troponin, High Sensitivity <6 0 - 14 ng/L    Troponin T NOT REPORTED <0.03 ng/mL    Troponin Interp NOT REPORTED        Imaging/Diagnostics:  Xr Cervical Spine (2-3 Views)    Result Date: 2/23/2021  EXAMINATION: TWO XRAY VIEWS SCOLIOSIS SERIES; 2 XRAY VIEWS OF THE CERVICAL SPINE 2/23/2021 12:58 pm COMPARISON: None. HISTORY: ORDERING SYSTEM PROVIDED HISTORY: Cervical spondylosis TECHNOLOGIST PROVIDED HISTORY: scoliosis Reason for Exam: Pt states recheck cervical spondylosis, scoliosis evaluate for instability Acuity: Chronic Type of Exam: Subsequent/Follow-up Additional signs and symptoms: scoliosis FINDINGS: Scoliosis: 12 thoracic and 5 lumbar vertebra are noted, well maintained in height without acute fracture or subluxation. Mild levo scoliotic curvature of 2.4 degrees is noted T5 to bottom of T10. Mild dextroscoliotic curvature of 4.9 T11 through L4. Minimal to mild degenerative and degenerative disc changes are present in the spine. No acute fracture or subluxation. Examination of the cervical spine reveals evidence of mild facet changes. There appears to be calcification of the left carotid vessel. Mild dextroscoliotic curvature cervical spine is evident. Degrees is noted from T11 through L4. Sacral base plane un leveling cannot be accurately assessed. Left iliac crest is out of the field of view.  Cervical spine: 1 mm grade 1 anterolisthesis C2 upon C3 is noted on flexion, not replicated on extension and not seen neutral view. No acute fracture or prevertebral soft tissue swelling. Scoliosis: Mild levo scoliotic curvature thoracic spine. Mild dextroscoliotic curvature lower thoracic and lumbar spine. No acute fracture or subluxation. Degenerative changes. Cervical spine: Minimal grade 1 anterolisthesis C2 upon C3 on flexion, not seen on extension or neutral views. No acute fracture or prevertebral soft tissue swelling. Findings suggest minimal instability. Ct Cervical Spine Wo Contrast    Result Date: 3/4/2021  EXAMINATION: CT OF THE CERVICAL SPINE WITHOUT CONTRAST 3/4/2021 5:19 pm TECHNIQUE: CT of the cervical spine was performed without the administration of intravenous contrast. Multiplanar reformatted images are provided for review. Dose modulation, iterative reconstruction, and/or weight based adjustment of the mA/kV was utilized to reduce the radiation dose to as low as reasonably achievable. COMPARISON: February 13, 2021. HISTORY: ORDERING SYSTEM PROVIDED HISTORY: radicular pain left arm TECHNOLOGIST PROVIDED HISTORY: radicular pain left arm Decision Support Exception->Emergency Medical Condition (MA) Is the patient pregnant?->No Reason for Exam: radicular pain left arm for three weeks. patient states limited ROM and pain starting in neck. no injuries Acuity: Unknown Type of Exam: Unknown FINDINGS: Bone mineralization is normal.  The vertebral bodies and posterior elements appear intact and appropriately aligned without acute fracture or subluxation. Vertebral body stature is maintained throughout. There is straightening of the normal cervical lordosis. Mild-to-moderate cervical degenerative disc disease is present throughout. There is moderate bony neural foraminal narrowing on the left at C3-C4 and C5-C6. No significant uncovertebral joint hypertrophic change or additional bony neural foraminal narrowing. No paraspinal soft tissue abnormality.  The visualized lung apices are grossly clear. Mild to moderate multilevel cervical degenerative disc disease with moderate bony neural foraminal narrowing on the left at C3-C4 and C5-C6. No acute fracture or subluxation. Straightening of the normal cervical lordosis which may be related to muscle spasm or position in collar. Ct Cervical Spine Wo Contrast    Result Date: 2/13/2021  EXAMINATION: CT OF THE CERVICAL SPINE WITHOUT CONTRAST 2/13/2021 5:59 pm TECHNIQUE: CT of the cervical spine was performed without the administration of intravenous contrast. Multiplanar reformatted images are provided for review. Dose modulation, iterative reconstruction, and/or weight based adjustment of the mA/kV was utilized to reduce the radiation dose to as low as reasonably achievable. COMPARISON: 06/25/2018 HISTORY: ORDERING SYSTEM PROVIDED HISTORY: left arm pain TECHNOLOGIST PROVIDED HISTORY: left arm pain Decision Support Exception->Emergency Medical Condition (MA) Is the patient pregnant?->No Reason for Exam: patient states she has been having left arm pain and she is unable to move her left arm Acuity: Unknown Type of Exam: Unknown FINDINGS: The cervical spine demonstrates normalmineralization with straightening of the  cervical lordosis. There is no evidence of fracture or subluxation. There is mild loss of disc height with eburnation of the vertebral endplates at the N7-7, M5-6, C5-6levels. There are small marginal osteophytes at multiple levels. The central canal is grossly patent. The pedicles and posterior elements are intact. The prevertebral and paravertebral soft tissues are unremarkable. The atlanto-dens interval and dens are intact. The visualized lung apices are clear. Mild cervical spondylosis and degenerative disc disease. Evidence of paracervical spasm. No acute bony abnormalities are noted .      Mri Cervical Spine Wo Contrast    Result Date: 3/4/2021  EXAMINATION: MRI OF THE CERVICAL SPINE WITHOUT CONTRAST 3/4/2021 2:53 pm TECHNIQUE: Multiplanar multisequence MRI of the cervical spine was performed without the administration of intravenous contrast. COMPARISON: 06/12/2020 HISTORY: ORDERING SYSTEM PROVIDED HISTORY: Cervical radiculopathy TECHNOLOGIST PROVIDED HISTORY: evaluate for stenosis Is the patient pregnant?->No Reason for Exam: pt stated left shoulder arm pain weakness numbness x 1 mth Acuity: Acute Type of Exam: Initial Additional signs and symptoms: pt stated left shoulder arm pain weakness numbness x 1 mth, NKI FINDINGS: BONES/ALIGNMENT: There is normal alignment of the spine. The vertebral body heights are maintained. The bone marrow signal appears unremarkable. There is minimal degenerative disc disease with disc space narrowing and osteophytes at C3-4, C4-5, C5-6 and C6-7. SPINAL CORD:  No abnormal signal is identified within the spinal cord. SOFT TISSUES: No paraspinal mass identified. C2-C3: There is minimum disc protrusion of 2 mm centrally. The thecal sac and neural foramina are intact. C3-C4: There is disc protrusion osteophyte toward the left measuring 3-4 mm. The thecal sac is not stenotic. There is mild narrowing of the left neural foramen. The right neural foramen is intact. C4-C5: There is disc protrusion osteophyte measuring 2-3 mm. The thecal sac is mildly stenotic measuring 9.5 mm. Disc and/or osteophytes result in mild narrowing of the neural foramina bilaterally. The left neural foramen is narrowed greater than right. C5-C6: There is disc protrusion osteophyte measuring 5-6 mm toward the left narrowing the left neural foramen. The thecal sac is mildly stenotic measuring 9.3 mm. The right neural foramen is intact. C6-C7: Disc and/or osteophytes cause minimal narrowing of the neural foramina bilaterally. The thecal sac is not stenotic. C7-T1:  The thecal sac and neural foramina are intact.      Disc and osteophytes result in narrowing of the neural foramina and mild stenosis of the thecal sac throughout the cervical region as discussed in detail above. Xr Chest Portable    Result Date: 3/7/2021  EXAMINATION: ONE XRAY VIEW OF THE CHEST 3/7/2021 11:26 am COMPARISON: 11/08/2020 HISTORY: ORDERING SYSTEM PROVIDED HISTORY: sob Reason for Exam: Sob coughing up blood today Acuity: Acute Type of Exam: Initial FINDINGS: The lungs are without acute focal process. No effusion or pneumothorax. The cardiomediastinal silhouette is normal.  The osseous structures are intact without acute process. Negative portable chest.     Ct Chest Pulmonary Embolism W Contrast    Result Date: 3/7/2021  EXAMINATION: CTA OF THE CHEST 3/7/2021 11:46 am TECHNIQUE: CTA of the chest was performed after the administration of intravenous contrast.  Multiplanar reformatted images are provided for review. MIP images are provided for review. Dose modulation, iterative reconstruction, and/or weight based adjustment of the mA/kV was utilized to reduce the radiation dose to as low as reasonably achievable. COMPARISON: None. HISTORY: ORDERING SYSTEM PROVIDED HISTORY: r/o pe Reason for Exam: Chest pain and coughing up blood since this morning. Acuity: Acute Type of Exam: Initial FINDINGS: Pulmonary Arteries: Pulmonary arteries are adequately opacified for evaluation. Posterior right lower lobe filling defect with extension into basilar subsegmental branches. Main pulmonary artery is normal in caliber. Mediastinum: No evidence of mediastinal lymphadenopathy. Normal heart size. Normal thoracic aorta. Lungs/pleura: Bilateral lower lobe atelectasis. No focal consolidation or pulmonary edema. No evidence of pleural effusion or pneumothorax. Upper Abdomen: Cholecystectomy Soft Tissues/Bones: No acute bone or soft tissue abnormality. Posterior right lower lobe filling defect with extension into basilar subsegmental branches consistent pulmonary embolism. Bilateral lower lobe atelectasis. Critical results were called by Dr. Leana Berger.  Shiloh Velazquez MD to ARNOLD PÉREZ NANCY on 3/7/2021 at 13:14. Xr Spine Entire (2-3 Views)    Result Date: 2/23/2021  EXAMINATION: TWO XRAY VIEWS SCOLIOSIS SERIES; 2 XRAY VIEWS OF THE CERVICAL SPINE 2/23/2021 12:58 pm COMPARISON: None. HISTORY: ORDERING SYSTEM PROVIDED HISTORY: Cervical spondylosis TECHNOLOGIST PROVIDED HISTORY: scoliosis Reason for Exam: Pt states recheck cervical spondylosis, scoliosis evaluate for instability Acuity: Chronic Type of Exam: Subsequent/Follow-up Additional signs and symptoms: scoliosis FINDINGS: Scoliosis: 12 thoracic and 5 lumbar vertebra are noted, well maintained in height without acute fracture or subluxation. Mild levo scoliotic curvature of 2.4 degrees is noted T5 to bottom of T10. Mild dextroscoliotic curvature of 4.9 T11 through L4. Minimal to mild degenerative and degenerative disc changes are present in the spine. No acute fracture or subluxation. Examination of the cervical spine reveals evidence of mild facet changes. There appears to be calcification of the left carotid vessel. Mild dextroscoliotic curvature cervical spine is evident. Degrees is noted from T11 through L4. Sacral base plane un leveling cannot be accurately assessed. Left iliac crest is out of the field of view. Cervical spine: 1 mm grade 1 anterolisthesis C2 upon C3 is noted on flexion, not replicated on extension and not seen neutral view. No acute fracture or prevertebral soft tissue swelling. Scoliosis: Mild levo scoliotic curvature thoracic spine. Mild dextroscoliotic curvature lower thoracic and lumbar spine. No acute fracture or subluxation. Degenerative changes. Cervical spine: Minimal grade 1 anterolisthesis C2 upon C3 on flexion, not seen on extension or neutral views. No acute fracture or prevertebral soft tissue swelling. Findings suggest minimal instability.        Assessment :      Primary Problem  Pulmonary embolism on right [facility])    Active Hospital Problems    Diagnosis Date Noted    Pulmonary embolism on right ([address] [I26.99] 03/07/2021    RLS (restless legs syndrome) [G25.81] 01/26/2015    GERD (gastroesophageal reflux disease) [K21.9] 04/17/2014    Hyperlipidemia [E78.5]     Hypothyroidism [E03.9]     Anxiety [F41.9]        Plan:     Patient status Admit as inpatient in the  Progressive Unit/Step down    1. Chest pain and SOB 2/2 Pulmonary Embolism:   - VSS, afebrile. - Troponin: Negative  - Chest x-ray unremarkable. - CT PE showed  posterior right lower lobe filling defect with extension into basilar. Segmental branches consistent with pulmonary embolism. - Heparin gtt, APTT every 6 hours. - NS @ 75 ml.  - Dilaudid 1 mg q2h., Ativan for anxiety ordered. 2.  Hypothyroidism s/p surgery:  -Synthroid 175 mcg  -TSH reflex to T4 ordered. 3.  Essential hypertension:  -Lopressor 50 mg twice daily. 4.  Restless leg syndrome:  -Ropinirole 2 mg at night. Dispo: Home      Consultations:   IP CONSULT TO INTERNAL MEDICINE  IP CONSULT TO SOCIAL WORK  IP CONSULT TO PULMONOLOGY    Patient is admitted as inpatient status because of co-morbiditieslisted above, severity of signs and symptoms as outlined, requirement for current medical therapies and most importantly because of direct risk to patient if care not provided in a hospital setting. Cynthia Nguyen MD  3/7/2021  2:47 PM    Copy sent to MARTITA Lozada - CNP  Attending Physician Statement  I have discussed the care of Cleo Arnold and I have examined the patient myselft and taken ros and hpi , including pertinent history and exam findings,  with the resident. I have reviewed the key elements of all parts of the encounter with the resident. I agree with the assessment, plan and orders as documented by the resident.   Pulmonary embolism without infarct right lower lobe clinically no signs of right ventricular strain  Severe pain 10 out of 10  IV Dilaudid for pain control patient received heparin will change to Xarelto 15 twice a day  Echocardiogram rule out ventricular strain  Severe pain requiring IV Dilaudid and at risk of lung infarct requiring inpatient admission    Electronically signed by Tatum Salgado MD

## 2021-03-07 NOTE — ED PROVIDER NOTES
EMERGENCY DEPARTMENT ENCOUNTER    Pt Name: Lloyd Byrnes  MRN: [id number]  [place] 1970  Date of evaluation: 3/7/21  CHIEF COMPLAINT       Chief Complaint   Patient presents with    Shortness of Breath     HISTORY OF PRESENT ILLNESS   67-year-old female presents with complaint of shortness of breath, right-sided chest pain. Patient states pain started last night, states that pain got progressively worse this morning, patient states that pain is worse when she tries to lay flat, states she has improved pain when she is sitting upright, states pain is also worse when she takes a deep breath, admits associated shortness of breath. Patient states this morning she also coughed a couple times and noticed that she had blood in her sputum. Patient denies any recent illness, recent sick contacts, patient denies any significant history of blood clots or family history of blood clots. The history is provided by the patient. REVIEW OF SYSTEMS     Review of Systems   Constitutional: Negative for fever. HENT: Negative for congestion and ear pain. Eyes: Negative for pain. Respiratory: Positive for cough and shortness of breath. Cardiovascular: Positive for chest pain. Negative for palpitations and leg swelling. Gastrointestinal: Negative for abdominal pain. Genitourinary: Negative for dysuria and flank pain. Musculoskeletal: Negative for back pain. Skin: Negative for color change. Neurological: Negative for numbness and headaches. Psychiatric/Behavioral: Negative for confusion. All other systems reviewed and are negative.     PASTMEDICAL HISTORY     Past Medical History:   Diagnosis Date    Anxiety     CAD (coronary artery disease)     Mitral valve prolapse    Fatty liver     GERD (gastroesophageal reflux disease)     Headache     History of bronchitis     Hyperlipidemia     Hypothyroidism     Kidney stone     LFT elevation     MVP (mitral valve prolapse)     Obesity     Type Details   levothyroxine (SYNTHROID) 175 MCG tablet TAKE 1 TABLET BY MOUTH DAILY  Qty: 90 tablet, Refills: 1    Associated Diagnoses: Hypothyroidism, unspecified type      ondansetron (ZOFRAN) 4 MG tablet Take 1 tablet by mouth every 8 hours as needed for Nausea or Vomiting  Qty: 20 tablet, Refills: 0      esomeprazole (NEXIUM) 40 MG delayed release capsule TAKE 1 CAPSULE BY MOUTH EVERY MORNING BEFORE BREAKFAST  Qty: 90 capsule, Refills: 1    Associated Diagnoses: Gastroesophageal reflux disease without esophagitis      tiZANidine (ZANAFLEX) 4 MG tablet TAKE 1 TABLET BY MOUTH EVERY 8 HOURS AS NEEDED FOR SPASMS  Qty: 90 tablet, Refills: 1      mirtazapine (REMERON) 15 MG tablet Take 15 mg by mouth daily      rOPINIRole (REQUIP) 2 MG tablet TAKE 1 TABLET BY MOUTH EVERY NIGHT  Qty: 90 tablet, Refills: 3      ondansetron (ZOFRAN ODT) 4 MG disintegrating tablet Take 1 tablet by mouth every 8 hours as needed for Nausea or Vomiting  Qty: 10 tablet, Refills: 0      sertraline (ZOLOFT) 100 MG tablet Take 100 mg by mouth daily      topiramate (TOPAMAX) 25 MG tablet 25 mg 2 times daily       metoprolol tartrate (LOPRESSOR) 50 MG tablet Take 50 mg by mouth 2 times daily       oxyCODONE-acetaminophen (PERCOCET)  MG per tablet Take 1 tablet by mouth every 6 hours as needed for Pain for up to 30 days. Qty: 120 tablet, Refills: 0    Comments: Reduce doses taken as pain becomes manageable fill 3-9-2021  Associated Diagnoses: Arthropathy of cervical facet joint; Degenerative cervical spinal stenosis; Encounter for medication monitoring; Cervical radiculopathy      oxyCODONE (OXYCONTIN) 10 MG extended release tablet Take 1 tablet by mouth every 12 hours for 30 days.   Qty: 60 each, Refills: 0    Comments: Reduce doses taken as pain becomes manageable  Associated Diagnoses: Degenerative cervical spinal stenosis; Cervical radiculopathy; Arthropathy of cervical facet joint      lidocaine (LIDODERM) 5 % Place 1 patch onto the skin membranes are moist.      Pharynx: Oropharynx is clear. Eyes:      Extraocular Movements: Extraocular movements intact. Conjunctiva/sclera: Conjunctivae normal.   Neck:      Musculoskeletal: Normal range of motion. Cardiovascular:      Rate and Rhythm: Normal rate and regular rhythm. Pulses: Normal pulses. Heart sounds: Normal heart sounds. Pulmonary:      Effort: Pulmonary effort is normal.      Breath sounds: Normal breath sounds. Abdominal:      General: Bowel sounds are normal. There is no distension. Palpations: Abdomen is soft. Tenderness: There is no abdominal tenderness. Musculoskeletal: Normal range of motion. Skin:     General: Skin is warm and dry. Capillary Refill: Capillary refill takes less than 2 seconds. Neurological:      General: No focal deficit present. Mental Status: She is alert. Psychiatric:         Mood and Affect: Mood normal.         MEDICAL DECISION MAKIN-year-old female presents complaint of chest pain shortness of breath and hemoptysis, on initial exam patient was in no acute distress, vitals are stable, given presenting symptoms, concern for PE, will obtain labs and CT    Labs reviewed and unremarkable, CT was reviewed showing a pulmonary embolism in the subsegmental branches, given amount of pain patient is an, will start on heparin and admit    Results discussed with the patient, she is agreeable to admission    Spoke with Dr. Darlene Johnson who accepts admissiont. Patient demonstrates understanding and agreement with the plan, was given the opportunity to ask questions, and these questions were answered to the best of the provided information at this time. VS stable for transfer. This dictation was prepared using [employer] voice recognition software.          CRITICAL CARE:       PROCEDURES:    Procedures    DIAGNOSTIC RESULTS   EKG:All EKG's are interpreted by the Emergency Department Physician who either signs or Co-signs this chart in the absence of a cardiologist.    Normal sinus rhythm rate of 83, normal axis, no significant ST segment elevation or depression, significant T wave changes    RADIOLOGY:All plain film, CT, MRI, and formal ultrasound images (except ED bedside ultrasound) are read by the radiologist, see reports below, unless otherwisenoted in MDM or here. CT CHEST PULMONARY EMBOLISM W CONTRAST   Final Result   Posterior right lower lobe filling defect with extension into basilar   subsegmental branches consistent pulmonary embolism. Bilateral lower lobe   atelectasis. Critical results were called by Dr. [place] Yang Mclaughlin MD to [address]   on 3/7/2021 at 13:14. XR CHEST PORTABLE   Final Result   Negative portable chest.           LABS: All lab results were reviewed by myself, and all abnormals are listed below.   Labs Reviewed   CBC WITH AUTO DIFFERENTIAL - Abnormal; Notable for the following components:       Result Value    Seg Neutrophils 68 (*)     Lymphocytes 20 (*)     Monocytes 9 (*)     All other components within normal limits   COMPREHENSIVE METABOLIC PANEL W/ REFLEX TO MG FOR LOW K - Abnormal; Notable for the following components:    Glucose 113 (*)     Chloride 108 (*)     All other components within normal limits   URINALYSIS - Abnormal; Notable for the following components:    Specific Gravity, UA >1.030 (*)     Urine Hgb TRACE (*)     All other components within normal limits   MICROSCOPIC URINALYSIS - Abnormal; Notable for the following components:    Bacteria, UA FEW (*)     All other components within normal limits   CBC - Abnormal; Notable for the following components:    WBC 11.7 (*)     All other components within normal limits   LIPASE   TROPONIN   PROTIME-INR   APTT   TROPONIN   APTT   PROTIME-INR   HEPARIN LEVEL/ANTI-XA   HEPARIN LEVEL/ANTI-XA   ELECTROPHORESIS PROTEIN, SERUM WITHOUT REFLEX TO IMMUNOFIXATION   TSH WITH REFLEX   HEPARIN LEVEL/ANTI-XA   HEPARIN LEVEL/ANTI-XA EMERGENCY DEPARTMENTCOURSE:         Vitals:    Vitals:    03/07/21 1115 03/07/21 1118 03/07/21 1322 03/07/21 1547   BP:  111/81 120/64 113/88   Pulse: 77 78 72 88   Resp: 19 20 21 20   Temp:  98.4 °F (36.9 °C)  99.5 °F (37.5 °C)   TempSrc:  Oral  Oral   SpO2: 93% 94% 94% 96%   Weight:  165 lb (74.8 kg)     Height:  5' 4\" (1.626 m)         The patient was given the following medications while in the emergency department:  Orders Placed This Encounter   Medications    aspirin chewable tablet 324 mg    morphine sulfate (PF) injection 4 mg    ondansetron (ZOFRAN) injection 4 mg    0.9 % sodium chloride bolus    sodium chloride flush 0.9 % injection 10 mL    iopamidol (ISOVUE-370) 76 % injection 75 mL    heparin (porcine) injection 5,980 Units    heparin (porcine) injection 5,980 Units    heparin (porcine) injection 2,990 Units    heparin 25,000 units in dextrose 5% 250 mL (premix) infusion    gabapentin (NEURONTIN) capsule 300 mg    DISCONTD: levothyroxine (SYNTHROID) tablet 175 mcg    metoprolol tartrate (LOPRESSOR) tablet 50 mg    mirtazapine (REMERON) tablet 15 mg    rOPINIRole (REQUIP) tablet 2 mg    sertraline (ZOLOFT) tablet 100 mg    tiZANidine (ZANAFLEX) tablet 4 mg    topiramate (TOPAMAX) tablet 25 mg    sodium chloride flush 0.9 % injection 10 mL    sodium chloride flush 0.9 % injection 10 mL    OR Linked Order Group     promethazine (PHENERGAN) tablet 12.5 mg     ondansetron (ZOFRAN) injection 4 mg    polyethylene glycol (GLYCOLAX) packet 17 g    OR Linked Order Group     acetaminophen (TYLENOL) tablet 650 mg     acetaminophen (TYLENOL) suppository 650 mg    OR Linked Order Group     potassium chloride (KLOR-CON M) extended release tablet 40 mEq     potassium bicarb-citric acid (EFFER-K) effervescent tablet 40 mEq     potassium chloride 10 mEq/100 mL IVPB (Peripheral Line)    DISCONTD: heparin (porcine) injection 5,980 Units    DISCONTD: heparin (porcine) injection 5,980 Units    DISCONTD: heparin (porcine) injection 2,990 Units    DISCONTD: heparin 25,000 units in dextrose 5% 250 mL (premix) infusion    OR Linked Order Group     morphine (PF) injection 2 mg     morphine sulfate (PF) injection 4 mg    LORazepam (ATIVAN) tablet 0.5 mg    0.9 % sodium chloride infusion    levothyroxine (SYNTHROID) tablet 175 mcg    HYDROmorphone (DILAUDID) injection 1 mg     CONSULTS:  IP CONSULT TO INTERNAL MEDICINE  IP CONSULT TO SOCIAL WORK    FINAL IMPRESSION      1. Acute pulmonary embolism, unspecified pulmonary embolism type, unspecified whether acute cor pulmonale present [facility])          DISPOSITION/PLAN   DISPOSITION Admitted 03/07/2021 02:18:54 PM      PATIENT REFERRED TO:  No follow-up provider specified.   DISCHARGE MEDICATIONS:  Current Discharge Medication List        Teena Lozano DO  Attending Emergency Physician                  Teena Lozano DO  03/07/21 6263

## 2021-03-07 NOTE — ED TRIAGE NOTES
From home with c/o feeling short of breath. States she coughed up blood- picture shows bloody phlegm. States she had to sleep in upright position, hard to take a deep breath. Denies fever, chills. Denies sick contacts.

## 2021-03-07 NOTE — LETTER
[address]  Phone: [phone]    No name on file. March 10, 2021     Patient: Felicitas Dennis   YOB: 1970   Date of Visit: 3/7/2021       To Whom It May Concern: It is my medical opinion that Aubrey Haro, who recently diagnosed with acute respiratory failure seondary to pulmonary embolism needing 3L nasal oxygen. Patient should return work on 03/15/2021. If you have any questions or concerns, please don't hesitate to call. Sincerely,        No name on file.

## 2021-03-07 NOTE — ED NOTES
Admission Dx: acute Pulmonary embolism    Pts Chief Complaints on Arrival: right back pain, coughing up blood     ADL's - self, ambulatory     Pending Diagnostics:  Repeat hPTT due at 2050    Residence PTA: private home    Special Considerations/Circumstances:  Heparin gtt at 18u/kg/hr RAC    Vitals: Current vital signs:  /64   Pulse 72   Temp 98.4 °F (36.9 °C) (Oral)   Resp 21   Ht 5' 4\" (1.626 m)   Wt 165 lb (74.8 kg)   LMP 11/01/2010   SpO2 94%   BMI 28.32 kg/m²                MEWS Score: [facility], RN  03/07/21 2158

## 2021-03-07 NOTE — PROGRESS NOTES
RN called and notified patient's father, [address] [place] [address] of patient's admission. All questions and concerns were answered at this time.

## 2021-03-08 LAB
ABSOLUTE EOS #: 0.1 K/UL (ref 0–0.4)
ABSOLUTE IMMATURE GRANULOCYTE: ABNORMAL K/UL (ref 0–0.3)
ABSOLUTE LYMPH #: 1.3 K/UL (ref 1–4.8)
ABSOLUTE MONO #: 1 K/UL (ref 0.1–1.3)
ALBUMIN (CALCULATED): 4.5 G/DL (ref 3.2–5.2)
ALBUMIN PERCENT: 61 % (ref 45–65)
ALPHA 1 PERCENT: 3 % (ref 3–6)
ALPHA 2 PERCENT: 11 % (ref 6–13)
ALPHA-1-GLOBULIN: 0.2 G/DL (ref 0.1–0.4)
ALPHA-2-GLOBULIN: 0.8 G/DL (ref 0.5–0.9)
ANION GAP SERPL CALCULATED.3IONS-SCNC: 10 MMOL/L (ref 9–17)
ANTI-XA UNFRAC HEPARIN: 0.32 IU/L (ref 0.3–0.7)
BASOPHILS # BLD: 0 % (ref 0–2)
BASOPHILS ABSOLUTE: 0 K/UL (ref 0–0.2)
BETA GLOBULIN: 1.2 G/DL (ref 0.5–1.1)
BETA PERCENT: 16 % (ref 11–19)
BUN BLDV-MCNC: 17 MG/DL (ref 6–20)
BUN/CREAT BLD: ABNORMAL (ref 9–20)
CALCIUM SERPL-MCNC: 8.4 MG/DL (ref 8.6–10.4)
CHLORIDE BLD-SCNC: 108 MMOL/L (ref 98–107)
CO2: 20 MMOL/L (ref 20–31)
CREAT SERPL-MCNC: 0.62 MG/DL (ref 0.5–0.9)
DIFFERENTIAL TYPE: ABNORMAL
EKG ATRIAL RATE: 83 BPM
EKG P AXIS: 44 DEGREES
EKG P-R INTERVAL: 162 MS
EKG Q-T INTERVAL: 384 MS
EKG QRS DURATION: 84 MS
EKG QTC CALCULATION (BAZETT): 451 MS
EKG R AXIS: 26 DEGREES
EKG T AXIS: 42 DEGREES
EKG VENTRICULAR RATE: 83 BPM
EOSINOPHILS RELATIVE PERCENT: 1 % (ref 0–4)
GAMMA GLOBULIN %: 10 % (ref 9–20)
GAMMA GLOBULIN: 0.7 G/DL (ref 0.5–1.5)
GFR AFRICAN AMERICAN: >60 ML/MIN
GFR NON-AFRICAN AMERICAN: >60 ML/MIN
GFR SERPL CREATININE-BSD FRML MDRD: ABNORMAL ML/MIN/{1.73_M2}
GFR SERPL CREATININE-BSD FRML MDRD: ABNORMAL ML/MIN/{1.73_M2}
GLUCOSE BLD-MCNC: 129 MG/DL (ref 70–99)
HCT VFR BLD CALC: 39 % (ref 36–46)
HEMOGLOBIN: 12.9 G/DL (ref 12–16)
IMMATURE GRANULOCYTES: ABNORMAL %
LV EF: 63 %
LVEF MODALITY: NORMAL
LYMPHOCYTES # BLD: 12 % (ref 24–44)
MCH RBC QN AUTO: 31.2 PG (ref 26–34)
MCHC RBC AUTO-ENTMCNC: 33 G/DL (ref 31–37)
MCV RBC AUTO: 94.5 FL (ref 80–100)
MONOCYTES # BLD: 9 % (ref 1–7)
NRBC AUTOMATED: ABNORMAL PER 100 WBC
PATHOLOGIST: ABNORMAL
PATHOLOGIST: NORMAL
PDW BLD-RTO: 12.7 % (ref 11.5–14.9)
PLATELET # BLD: 149 K/UL (ref 150–450)
PLATELET ESTIMATE: ABNORMAL
PMV BLD AUTO: 8.1 FL (ref 6–12)
POTASSIUM SERPL-SCNC: 4 MMOL/L (ref 3.7–5.3)
PROTEIN ELECTROPHORESIS, SERUM: ABNORMAL
RBC # BLD: 4.13 M/UL (ref 4–5.2)
RBC # BLD: ABNORMAL 10*6/UL
SEG NEUTROPHILS: 78 % (ref 36–66)
SEGMENTED NEUTROPHILS ABSOLUTE COUNT: 8.7 K/UL (ref 1.3–9.1)
SERUM IFX INTERP: NORMAL
SODIUM BLD-SCNC: 138 MMOL/L (ref 135–144)
TOTAL PROT. SUM,%: 101 % (ref 98–102)
TOTAL PROT. SUM: 7.4 G/DL (ref 6.3–8.2)
TOTAL PROTEIN: 7.4 G/DL (ref 6.4–8.3)
WBC # BLD: 11.1 K/UL (ref 3.5–11)
WBC # BLD: ABNORMAL 10*3/UL

## 2021-03-08 PROCEDURE — 6370000000 HC RX 637 (ALT 250 FOR IP): Performed by: STUDENT IN AN ORGANIZED HEALTH CARE EDUCATION/TRAINING PROGRAM

## 2021-03-08 PROCEDURE — 94664 DEMO&/EVAL PT USE INHALER: CPT

## 2021-03-08 PROCEDURE — 6360000002 HC RX W HCPCS: Performed by: STUDENT IN AN ORGANIZED HEALTH CARE EDUCATION/TRAINING PROGRAM

## 2021-03-08 PROCEDURE — 80048 BASIC METABOLIC PNL TOTAL CA: CPT

## 2021-03-08 PROCEDURE — 85520 HEPARIN ASSAY: CPT

## 2021-03-08 PROCEDURE — 99232 SBSQ HOSP IP/OBS MODERATE 35: CPT | Performed by: INTERNAL MEDICINE

## 2021-03-08 PROCEDURE — 93970 EXTREMITY STUDY: CPT

## 2021-03-08 PROCEDURE — 85025 COMPLETE CBC W/AUTO DIFF WBC: CPT

## 2021-03-08 PROCEDURE — 2580000003 HC RX 258: Performed by: STUDENT IN AN ORGANIZED HEALTH CARE EDUCATION/TRAINING PROGRAM

## 2021-03-08 PROCEDURE — 86147 CARDIOLIPIN ANTIBODY EA IG: CPT

## 2021-03-08 PROCEDURE — 2060000000 HC ICU INTERMEDIATE R&B

## 2021-03-08 PROCEDURE — 6360000002 HC RX W HCPCS: Performed by: EMERGENCY MEDICINE

## 2021-03-08 PROCEDURE — 36415 COLL VENOUS BLD VENIPUNCTURE: CPT

## 2021-03-08 PROCEDURE — 83036 HEMOGLOBIN GLYCOSYLATED A1C: CPT

## 2021-03-08 PROCEDURE — 93306 TTE W/DOPPLER COMPLETE: CPT

## 2021-03-08 RX ADMIN — HEPARIN SODIUM 15 UNITS/KG/HR: 10000 INJECTION, SOLUTION INTRAVENOUS at 05:18

## 2021-03-08 RX ADMIN — HYDROMORPHONE HYDROCHLORIDE 1 MG: 1 INJECTION, SOLUTION INTRAMUSCULAR; INTRAVENOUS; SUBCUTANEOUS at 18:19

## 2021-03-08 RX ADMIN — GABAPENTIN 300 MG: 300 CAPSULE ORAL at 20:31

## 2021-03-08 RX ADMIN — LORAZEPAM 0.5 MG: 0.5 TABLET ORAL at 09:12

## 2021-03-08 RX ADMIN — TOPIRAMATE 25 MG: 25 TABLET, FILM COATED ORAL at 20:32

## 2021-03-08 RX ADMIN — HYDROMORPHONE HYDROCHLORIDE 1 MG: 1 INJECTION, SOLUTION INTRAMUSCULAR; INTRAVENOUS; SUBCUTANEOUS at 00:56

## 2021-03-08 RX ADMIN — SODIUM CHLORIDE: 9 INJECTION, SOLUTION INTRAVENOUS at 05:01

## 2021-03-08 RX ADMIN — MORPHINE SULFATE 2 MG: 2 INJECTION, SOLUTION INTRAMUSCULAR; INTRAVENOUS at 16:01

## 2021-03-08 RX ADMIN — RIVAROXABAN 15 MG: 15 TABLET, FILM COATED ORAL at 16:03

## 2021-03-08 RX ADMIN — HYDROMORPHONE HYDROCHLORIDE 1 MG: 1 INJECTION, SOLUTION INTRAMUSCULAR; INTRAVENOUS; SUBCUTANEOUS at 20:32

## 2021-03-08 RX ADMIN — METOPROLOL TARTRATE 50 MG: 50 TABLET, FILM COATED ORAL at 09:12

## 2021-03-08 RX ADMIN — GABAPENTIN 300 MG: 300 CAPSULE ORAL at 14:30

## 2021-03-08 RX ADMIN — GABAPENTIN 300 MG: 300 CAPSULE ORAL at 09:12

## 2021-03-08 RX ADMIN — HYDROMORPHONE HYDROCHLORIDE 1 MG: 1 INJECTION, SOLUTION INTRAMUSCULAR; INTRAVENOUS; SUBCUTANEOUS at 05:01

## 2021-03-08 RX ADMIN — HYDROMORPHONE HYDROCHLORIDE 1 MG: 1 INJECTION, SOLUTION INTRAMUSCULAR; INTRAVENOUS; SUBCUTANEOUS at 11:06

## 2021-03-08 RX ADMIN — LEVOTHYROXINE SODIUM 175 MCG: 0.05 TABLET ORAL at 05:08

## 2021-03-08 RX ADMIN — METOPROLOL TARTRATE 50 MG: 50 TABLET, FILM COATED ORAL at 20:32

## 2021-03-08 RX ADMIN — RIVAROXABAN 15 MG: 15 TABLET, FILM COATED ORAL at 09:11

## 2021-03-08 RX ADMIN — HYDROMORPHONE HYDROCHLORIDE 1 MG: 1 INJECTION, SOLUTION INTRAMUSCULAR; INTRAVENOUS; SUBCUTANEOUS at 08:33

## 2021-03-08 RX ADMIN — ONDANSETRON 4 MG: 2 INJECTION INTRAMUSCULAR; INTRAVENOUS at 05:08

## 2021-03-08 RX ADMIN — SERTRALINE 100 MG: 100 TABLET, FILM COATED ORAL at 09:12

## 2021-03-08 RX ADMIN — SODIUM CHLORIDE, PRESERVATIVE FREE 10 ML: 5 INJECTION INTRAVENOUS at 20:32

## 2021-03-08 RX ADMIN — ROPINIROLE HYDROCHLORIDE 2 MG: 1 TABLET, FILM COATED ORAL at 20:31

## 2021-03-08 RX ADMIN — ACETAMINOPHEN 650 MG: 325 TABLET ORAL at 11:18

## 2021-03-08 RX ADMIN — TOPIRAMATE 25 MG: 25 TABLET, FILM COATED ORAL at 09:12

## 2021-03-08 RX ADMIN — HYDROMORPHONE HYDROCHLORIDE 1 MG: 1 INJECTION, SOLUTION INTRAMUSCULAR; INTRAVENOUS; SUBCUTANEOUS at 14:30

## 2021-03-08 ASSESSMENT — ENCOUNTER SYMPTOMS
CONSTIPATION: 0
BLOOD IN STOOL: 0
DIARRHEA: 0
VOMITING: 0
SHORTNESS OF BREATH: 1
ABDOMINAL PAIN: 0
NAUSEA: 1
BACK PAIN: 0

## 2021-03-08 ASSESSMENT — PAIN SCALES - GENERAL
PAINLEVEL_OUTOF10: 8
PAINLEVEL_OUTOF10: 5
PAINLEVEL_OUTOF10: 6
PAINLEVEL_OUTOF10: 8
PAINLEVEL_OUTOF10: 9
PAINLEVEL_OUTOF10: 7
PAINLEVEL_OUTOF10: 6

## 2021-03-08 ASSESSMENT — PAIN DESCRIPTION - PROGRESSION: CLINICAL_PROGRESSION: NOT CHANGED

## 2021-03-08 ASSESSMENT — PAIN DESCRIPTION - ORIENTATION: ORIENTATION: RIGHT

## 2021-03-08 NOTE — PLAN OF CARE
Problem: Safety:  Goal: Free from accidental physical injury  3/8/2021 1834 by Kale Coles RN  Outcome: Met This Shift  3/8/2021 0447 by Leena Yost RN  Outcome: Ongoing  Note: Patient remains free of incidence/ injury. Bed remains in low position. Up with assist.   Goal: Free from intentional harm  Outcome: Met This Shift     Problem: Daily Care:  Goal: Daily care needs are met  Outcome: Met This Shift     Problem: Pain:  Goal: Patient's pain/discomfort is manageable  Outcome: Met This Shift  Goal: Pain level will decrease  Outcome: Met This Shift: Patient received IV pain med per shift  Goal: Control of acute pain  3/8/2021 1834 by Kale Coles RN  Outcome: Met This Shift  3/8/2021 0447 by Leena Yost RN  Outcome: Ongoing  Note: Patient expresses relief following administration of prn pain medication. Problem: Skin Integrity:  Goal: Skin integrity will stabilize  3/8/2021 1834 by Kale Coles RN  Outcome: Met This Shift  3/8/2021 0447 by Leena Yost RN  Outcome: Ongoing  Note: No new occurrence of skin breakdown noted during this shift.

## 2021-03-08 NOTE — CARE COORDINATION
Follow up appointment made for patient with Dr. Meet Vieyra on 3/11/21 at 4pm.  Notified patient of date and time. Patient verbalizes understanding. Appointment entered into discharge navigator.     Electronically signed by Maci Wyatt RN on 3/8/2021 at 10:51 AM

## 2021-03-08 NOTE — PROGRESS NOTES
Home Oxygen Evaluation    Room air SpO2 at Rest = 84%    Room air with exercise/exertion = N/A    SpO2 on prescribed O2 level at  3 LPM  at rest =93%         Pt does qualify for home oxygen at rest of 3LPM at this time    Nocturnal Oximetry with patient on room air recommended if the resting SpO2 is 89% to 95%.

## 2021-03-08 NOTE — PROGRESS NOTES
New Medication Counseling Note    Medication counseling provided to patient and daughter  New medications reviewed: Xarelto,     Handouts provided to patient include: Medication Side Effects brochure and contact card for Pharmacist given to patient. Discussed recently initiated medication therapy with patient/caregiver utilizing teachback method. Reviewed uses and possible side effects of medication and answered all medication-related questions. Patient/caregiver verbalized understanding. Luiz Ritchie. Ph.  3/8/2021  2:26 PM

## 2021-03-08 NOTE — CARE COORDINATION
Writer [employer] on [address] at [phone], spoke to Lynette. Writer Called in Per Dr. Shara Barnes:    Xarelto, PO, 15 MG, Bid, X 21 Days, #42 w/ no refills. Cost of Above med is:     Per Yariel, the Xarelto Starter pack will be covered at 100%. Writer informed Charge Nurse, Paola Perla, & Nurse, Graciela Delgado, who is caring for her, they will discuss w/ the DrLizzie    Per Lynette, they will need to order the starter pack, but it will be ready porfirio. Writer will follow.

## 2021-03-08 NOTE — PLAN OF CARE
Problem: Infection:  Goal: Will remain free from infection  Description: Will remain free from infection  3/8/2021 0447 by Claudetta Sleeper, RN  Outcome: Ongoing  Note: Patient displays no new signs of infection during this shift. Problem: Safety:  Goal: Free from accidental physical injury  Description: Free from accidental physical injury  Outcome: Ongoing  Note: Patient remains free of incidence/ injury. Bed remains in low position. Up with assist.      Problem: Pain:  Goal: Control of acute pain  Description: Control of acute pain  Outcome: Ongoing  Note: Patient expresses relief following administration of prn pain medication. Problem: Skin Integrity:  Goal: Skin integrity will stabilize  Description: Skin integrity will stabilize  3/8/2021 0447 by Claudetta Sleeper, RN  Outcome: Ongoing  Note: No new occurrence of skin breakdown noted during this shift.

## 2021-03-08 NOTE — PLAN OF CARE
Nutrition Problem #1: Predicted inadequate energy intake  Intervention: Food and/or Nutrient Delivery: Modify Current Diet, Start Oral Nutrition Supplement  Nutritional Goals: po intake greater than 75%

## 2021-03-08 NOTE — PROGRESS NOTES
[address] [facility]    OCCUPATIONAL THERAPY MISSED TREATMENT NOTE   INPATIENT   Date: 3/8/21  Patient Name: Socrates Rueda       Room:   MRN: [id number]   Account #: [de-identified]    : 1970  (53 y.o.)  Gender: female                 REASON FOR MISSED TREATMENT:  Patient unable to participate   -   Other - 3/8/21:+ PE, has not been anticoagulated for >24 hours. Will check on pt tomorrow.          Uma Tilley OT

## 2021-03-08 NOTE — PROGRESS NOTES
[id number]  Gross per 24 hour   Intake 1252.75 ml   Output --   Net 1252.75 ml       Labs:  [unfilled]    Lab Results   Component Value Date/Time    SPECIAL NOT REPORTED 03/04/2021 06:57 PM     Lab Results   Component Value Date/Time    CULTURE NO GROWTH 03/04/2021 06:57 PM       [unfilled]    Radiology:    Xr Cervical Spine (2-3 Views)    Result Date: 2/23/2021  EXAMINATION: TWO XRAY VIEWS SCOLIOSIS SERIES; 2 XRAY VIEWS OF THE CERVICAL SPINE 2/23/2021 12:58 pm COMPARISON: None. HISTORY: ORDERING SYSTEM PROVIDED HISTORY: Cervical spondylosis TECHNOLOGIST PROVIDED HISTORY: scoliosis Reason for Exam: Pt states recheck cervical spondylosis, scoliosis evaluate for instability Acuity: Chronic Type of Exam: Subsequent/Follow-up Additional signs and symptoms: scoliosis FINDINGS: Scoliosis: 12 thoracic and 5 lumbar vertebra are noted, well maintained in height without acute fracture or subluxation. Mild levo scoliotic curvature of 2.4 degrees is noted T5 to bottom of T10. Mild dextroscoliotic curvature of 4.9 T11 through L4. Minimal to mild degenerative and degenerative disc changes are present in the spine. No acute fracture or subluxation. Examination of the cervical spine reveals evidence of mild facet changes. There appears to be calcification of the left carotid vessel. Mild dextroscoliotic curvature cervical spine is evident. Degrees is noted from T11 through L4. Sacral base plane un leveling cannot be accurately assessed. Left iliac crest is out of the field of view. Cervical spine: 1 mm grade 1 anterolisthesis C2 upon C3 is noted on flexion, not replicated on extension and not seen neutral view. No acute fracture or prevertebral soft tissue swelling. Scoliosis: Mild levo scoliotic curvature thoracic spine. Mild dextroscoliotic curvature lower thoracic and lumbar spine. No acute fracture or subluxation. Degenerative changes.  Cervical spine: Minimal grade 1 anterolisthesis C2 upon C3 on flexion, not seen on extension or neutral views. No acute fracture or prevertebral soft tissue swelling. Findings suggest minimal instability. Ct Cervical Spine Wo Contrast    Result Date: 3/4/2021  EXAMINATION: CT OF THE CERVICAL SPINE WITHOUT CONTRAST 3/4/2021 5:19 pm TECHNIQUE: CT of the cervical spine was performed without the administration of intravenous contrast. Multiplanar reformatted images are provided for review. Dose modulation, iterative reconstruction, and/or weight based adjustment of the mA/kV was utilized to reduce the radiation dose to as low as reasonably achievable. COMPARISON: February 13, 2021. HISTORY: ORDERING SYSTEM PROVIDED HISTORY: radicular pain left arm TECHNOLOGIST PROVIDED HISTORY: radicular pain left arm Decision Support Exception->Emergency Medical Condition (MA) Is the patient pregnant?->No Reason for Exam: radicular pain left arm for three weeks. patient states limited ROM and pain starting in neck. no injuries Acuity: Unknown Type of Exam: Unknown FINDINGS: Bone mineralization is normal.  The vertebral bodies and posterior elements appear intact and appropriately aligned without acute fracture or subluxation. Vertebral body stature is maintained throughout. There is straightening of the normal cervical lordosis. Mild-to-moderate cervical degenerative disc disease is present throughout. There is moderate bony neural foraminal narrowing on the left at C3-C4 and C5-C6. No significant uncovertebral joint hypertrophic change or additional bony neural foraminal narrowing. No paraspinal soft tissue abnormality. The visualized lung apices are grossly clear. Mild to moderate multilevel cervical degenerative disc disease with moderate bony neural foraminal narrowing on the left at C3-C4 and C5-C6. No acute fracture or subluxation. Straightening of the normal cervical lordosis which may be related to muscle spasm or position in collar.      Ct Cervical Spine Wo Contrast    Result Date: 2/13/2021  EXAMINATION: CT OF THE CERVICAL SPINE WITHOUT CONTRAST 2/13/2021 5:59 pm TECHNIQUE: CT of the cervical spine was performed without the administration of intravenous contrast. Multiplanar reformatted images are provided for review. Dose modulation, iterative reconstruction, and/or weight based adjustment of the mA/kV was utilized to reduce the radiation dose to as low as reasonably achievable. COMPARISON: 06/25/2018 HISTORY: ORDERING SYSTEM PROVIDED HISTORY: left arm pain TECHNOLOGIST PROVIDED HISTORY: left arm pain Decision Support Exception->Emergency Medical Condition (MA) Is the patient pregnant?->No Reason for Exam: patient states she has been having left arm pain and she is unable to move her left arm Acuity: Unknown Type of Exam: Unknown FINDINGS: The cervical spine demonstrates normalmineralization with straightening of the  cervical lordosis. There is no evidence of fracture or subluxation. There is mild loss of disc height with eburnation of the vertebral endplates at the N5-2, O4-7, C5-6levels. There are small marginal osteophytes at multiple levels. The central canal is grossly patent. The pedicles and posterior elements are intact. The prevertebral and paravertebral soft tissues are unremarkable. The atlanto-dens interval and dens are intact. The visualized lung apices are clear. Mild cervical spondylosis and degenerative disc disease. Evidence of paracervical spasm. No acute bony abnormalities are noted .      Mri Cervical Spine Wo Contrast    Result Date: 3/4/2021  EXAMINATION: MRI OF THE CERVICAL SPINE WITHOUT CONTRAST 3/4/2021 2:53 pm TECHNIQUE: Multiplanar multisequence MRI of the cervical spine was performed without the administration of intravenous contrast. COMPARISON: 06/12/2020 HISTORY: ORDERING SYSTEM PROVIDED HISTORY: Cervical radiculopathy TECHNOLOGIST PROVIDED HISTORY: evaluate for stenosis Is the patient pregnant?->No Reason for Exam: pt stated left shoulder arm pain weakness numbness x 1 mth Acuity: Acute Type of Exam: Initial Additional signs and symptoms: pt stated left shoulder arm pain weakness numbness x 1 mth, NKI FINDINGS: BONES/ALIGNMENT: There is normal alignment of the spine. The vertebral body heights are maintained. The bone marrow signal appears unremarkable. There is minimal degenerative disc disease with disc space narrowing and osteophytes at C3-4, C4-5, C5-6 and C6-7. SPINAL CORD:  No abnormal signal is identified within the spinal cord. SOFT TISSUES: No paraspinal mass identified. C2-C3: There is minimum disc protrusion of 2 mm centrally. The thecal sac and neural foramina are intact. C3-C4: There is disc protrusion osteophyte toward the left measuring 3-4 mm. The thecal sac is not stenotic. There is mild narrowing of the left neural foramen. The right neural foramen is intact. C4-C5: There is disc protrusion osteophyte measuring 2-3 mm. The thecal sac is mildly stenotic measuring 9.5 mm. Disc and/or osteophytes result in mild narrowing of the neural foramina bilaterally. The left neural foramen is narrowed greater than right. C5-C6: There is disc protrusion osteophyte measuring 5-6 mm toward the left narrowing the left neural foramen. The thecal sac is mildly stenotic measuring 9.3 mm. The right neural foramen is intact. C6-C7: Disc and/or osteophytes cause minimal narrowing of the neural foramina bilaterally. The thecal sac is not stenotic. C7-T1:  The thecal sac and neural foramina are intact. Disc and osteophytes result in narrowing of the neural foramina and mild stenosis of the thecal sac throughout the cervical region as discussed in detail above.      Xr Chest Portable    Result Date: 3/7/2021  EXAMINATION: ONE XRAY VIEW OF THE CHEST 3/7/2021 11:26 am COMPARISON: 11/08/2020 HISTORY: ORDERING SYSTEM PROVIDED HISTORY: sob Reason for Exam: Sob coughing up blood today Acuity: Acute Type of Exam: Initial FINDINGS: The lungs are without acute focal process. No effusion or pneumothorax. The cardiomediastinal silhouette is normal.  The osseous structures are intact without acute process. Negative portable chest.     Ct Chest Pulmonary Embolism W Contrast    Result Date: 3/7/2021  EXAMINATION: CTA OF THE CHEST 3/7/2021 11:46 am TECHNIQUE: CTA of the chest was performed after the administration of intravenous contrast.  Multiplanar reformatted images are provided for review. MIP images are provided for review. Dose modulation, iterative reconstruction, and/or weight based adjustment of the mA/kV was utilized to reduce the radiation dose to as low as reasonably achievable. COMPARISON: None. HISTORY: ORDERING SYSTEM PROVIDED HISTORY: r/o pe Reason for Exam: Chest pain and coughing up blood since this morning. Acuity: Acute Type of Exam: Initial FINDINGS: Pulmonary Arteries: Pulmonary arteries are adequately opacified for evaluation. Posterior right lower lobe filling defect with extension into basilar subsegmental branches. Main pulmonary artery is normal in caliber. Mediastinum: No evidence of mediastinal lymphadenopathy. Normal heart size. Normal thoracic aorta. Lungs/pleura: Bilateral lower lobe atelectasis. No focal consolidation or pulmonary edema. No evidence of pleural effusion or pneumothorax. Upper Abdomen: Cholecystectomy Soft Tissues/Bones: No acute bone or soft tissue abnormality. Posterior right lower lobe filling defect with extension into basilar subsegmental branches consistent pulmonary embolism. Bilateral lower lobe atelectasis. Critical results were called by Dr. Dona Severe. Enrique Loera MD to [address] on 3/7/2021 at 13:14. Xr Spine Entire (2-3 Views)    Result Date: 2/23/2021  EXAMINATION: TWO XRAY VIEWS SCOLIOSIS SERIES; 2 XRAY VIEWS OF THE CERVICAL SPINE 2/23/2021 12:58 pm COMPARISON: None.  HISTORY: ORDERING SYSTEM PROVIDED HISTORY: Cervical spondylosis TECHNOLOGIST PROVIDED HISTORY: scoliosis Reason for Exam: Pt states recheck cervical spondylosis, scoliosis evaluate for instability Acuity: Chronic Type of Exam: Subsequent/Follow-up Additional signs and symptoms: scoliosis FINDINGS: Scoliosis: 12 thoracic and 5 lumbar vertebra are noted, well maintained in height without acute fracture or subluxation. Mild levo scoliotic curvature of 2.4 degrees is noted T5 to bottom of T10. Mild dextroscoliotic curvature of 4.9 T11 through L4. Minimal to mild degenerative and degenerative disc changes are present in the spine. No acute fracture or subluxation. Examination of the cervical spine reveals evidence of mild facet changes. There appears to be calcification of the left carotid vessel. Mild dextroscoliotic curvature cervical spine is evident. Degrees is noted from T11 through L4. Sacral base plane un leveling cannot be accurately assessed. Left iliac crest is out of the field of view. Cervical spine: 1 mm grade 1 anterolisthesis C2 upon C3 is noted on flexion, not replicated on extension and not seen neutral view. No acute fracture or prevertebral soft tissue swelling. Scoliosis: Mild levo scoliotic curvature thoracic spine. Mild dextroscoliotic curvature lower thoracic and lumbar spine. No acute fracture or subluxation. Degenerative changes. Cervical spine: Minimal grade 1 anterolisthesis C2 upon C3 on flexion, not seen on extension or neutral views. No acute fracture or prevertebral soft tissue swelling. Findings suggest minimal instability. Physical Examination:        Physical Exam  Vitals signs reviewed. Constitutional:       Appearance: Normal appearance. Comments: Patient is very anxious. Cardiovascular:      Rate and Rhythm: Normal rate and regular rhythm. Pulses: Normal pulses. Heart sounds: Normal heart sounds. Pulmonary:      Effort: Pulmonary effort is normal.      Breath sounds: Normal breath sounds. Abdominal:      General: Bowel sounds are normal. There is no distension. Palpations: Abdomen is soft. There is no mass. Tenderness: There is no abdominal tenderness. There is no guarding. Musculoskeletal:      Right lower leg: No edema. Left lower leg: No edema. Neurological:      Mental Status: She is alert. Assessment:        Primary Problem  Pulmonary embolism on right [facility])    Active Hospital Problems    Diagnosis Date Noted    Pulmonary embolism on right [facility]) [I26.99] 03/07/2021    RLS (restless legs syndrome) [G25.81] 01/26/2015    GERD (gastroesophageal reflux disease) [K21.9] 04/17/2014    Hyperlipidemia [E78.5]     Hypothyroidism [E03.9]     Anxiety [F41.9]        Plan:         1. Chest pain and SOB 2/2 Pulmonary Embolism:   - VSS, afebrile. Desaturated and put on 2L [place] oxygen. - Troponin: Negative  - Chest x-ray unremarkable. - CT PE showed  posterior right lower lobe filling defect with extension into basilar. Segmental branches consistent with pulmonary embolism. - Heparin gtt, APTT every 6 hours, dc'd today and started   XARELTO 15 mg BID.   - NS @ 75 ml.  - Dilaudid 1 mg q2h., Ativan for anxiety ordered.        2. Hypothyroidism s/p surgery:  -Synthroid 175 mcg  -TSH: 0.69 (03/08/2021)     3.  Essential hypertension:  -Lopressor 50 mg twice daily.     4.  Restless leg syndrome:  -Ropinirole 2 mg at night.     Dispo: Home, may need oxygen at home as desaturating. Mary Zavala MD  3/8/2021  8:26 AM     Attending Physician Statement  I have discussed the care of Tessa Olsen with the resident team. I have examined the patient myself and taken ros and hpi , including pertinent history and exam findings,  with the resident. I have reviewed the key elements of all parts of the encounter with the resident. I agree with the assessment, plan and orders as documented by the resident.     Principal Problem:    Pulmonary embolism on right [facility])  Active Problems:    Hyperlipidemia    Anxiety    Hypothyroidism    GERD (gastroesophageal

## 2021-03-08 NOTE — PROGRESS NOTES
Comprehensive Nutrition Assessment    Type and Reason for Visit:  Initial, Positive Nutrition Screen(wt loss, poor appetite)    Nutrition Recommendations/Plan: Will add Ensure clear supplements once daily to increase protein intake. Will change diet to 5 carbohydrates per tray due to H/O DM. Nutrition Assessment:  Pt admitted due to SOB and found to have PE. Review of documented wts shows no wt loss. Pt states decreased appetite, N/V/diarrhea x 3 weeks prior to admt. Pt consuming more than 75% of food provided at lunch today. H/O DM and fatty liver noted. Malnutrition Assessment:  Malnutrition Status: At risk for malnutrition (Comment)    Context:  Acute Illness     Findings of the 6 clinical characteristics of malnutrition:  Energy Intake:  1 - 75% or less of estimated energy requirements for 7 or more days  Weight Loss:  No significant weight loss     Body Fat Loss:  No significant body fat loss     Muscle Mass Loss:  No significant muscle mass loss    Fluid Accumulation:  No significant fluid accumulation     Strength:  Not Performed    Estimated Daily Nutrient Needs:  Energy (kcal):  20 kcal/kg= 1800 kcal; Weight Used for Energy Requirements:  Current     Protein (g):  1.4g/kg= 75 g; Weight Used for Protein Requirements:  Ideal          Nutrition Related Findings:  no edema, Labs (3/8) Glu 127 Meds: Remeron, Synthroid, no BM documented since admit      Wounds:  None       Current Nutrition Therapies:    DIET LOW FAT; Anthropometric Measures:  · Height: 5' 4\" (162.6 cm)  · Current Body Weight: 193 lb (87.5 kg)   · Admission Body Weight: 165 lb (74.8 kg)(stated)    · Usual Body Weight: 178 lb (80.7 kg)(12/20)     · Ideal Body Weight: 120 lbs; BMI: 33.1    · BMI Categories: Obese Class 1 (BMI 30.0-34. 9)       Nutrition Diagnosis:   · Predicted inadequate energy intake related to (poor appetite, current medical condition) as evidenced by poor intake prior to admission, nausea, vomiting,

## 2021-03-08 NOTE — PROGRESS NOTES
Physical Therapy    DATE: 3/8/2021    NAME: Adriana Winkler  MRN: [id number]   : 1970      Patient not seen this date for Physical Therapy due to: Other: 3/8/21:+ PE, has not been anticoagulated for >24 hours. Will check on pt tomorrow.        Electronically signed by Sd Ferrera PT on 3/8/2021 at 9:45 AM

## 2021-03-08 NOTE — CARE COORDINATION
CASE MANAGEMENT NOTE:    Admission Date:  3/7/2021 Cele Marie is a 53 y.o.  female    Admitted for : Pulmonary embolism on right [facility]) [I26.99]    Met with:  Patient    PCP:  Mihaela Qureshi                                Insurance:  [address]      Current Residence/ Living Arrangements:  independently at home. Lives w 2 Kids            Current Services PTA:  Yes, [facility]    Is patient agreeable to VNS: No    Freedom of choice provided:  No    List of [address] provided: No    VNS chosen:  No, Denies the need    DME:  none    Home Oxygen: No    Nebulizer: Yes    CPAP/BIPAP: No    Supplier: N/A    Potential Assistance Needed: Yes, Xarelto cost    SNF needed: No    Freedom of choice and list provided: No    Pharmacy:  [facility] on [address]       Does Patient want to use MEDS to BEDS? No    Is patient currently receiving oral anticoagulation therapy? Just Started on Xarelto this admission    Is the Patient an [facility] with Readmission Risk Score greater than 14%? Yes  If yes, pt needs a follow up appointment made within 7 days. Family Members/Caregivers that pt would like involved in their care:    Yes    If yes, list name here:  Lyle Leslie, Daughter, Mo Villa    Transportation Provider:  Patient             Is patient in Isolation/One on One/Altered Mental Status? No  If yes, skip next question. If no, would they like an I-Pad to  use? No  If yes, call [phone]. Discharge Plan:  3/8/21 [address] in 2 story home w/2 Kids. DME - Nebulizer. Denies VNS. On Xarelto, following for cost. Echo,WBC 11.1, PT/OT. Current w/ [facility]. [employer] 22%.  Will follow//KB                Electronically signed by: Roman Gentile RN on 3/8/2021 at 9:33 AM

## 2021-03-08 NOTE — CONSULTS
Consult note dictated:    Right-sided pleurisy  Hemoptysis  Pulmonary infarct  Right lower lobe pulmonary embolism  Tobacco history of tobacco abuse less than a 1 pack/day for 30 years quit 6 to 8 months ago  Weight loss over 100 pounds in a year  Diabetes mellitus/hypothyroidism  Anxiety

## 2021-03-09 ENCOUNTER — ANESTHESIA EVENT (OUTPATIENT)
Dept: ENDOSCOPY | Age: 51
DRG: 175 | End: 2021-03-09
Payer: COMMERCIAL

## 2021-03-09 LAB
ABSOLUTE EOS #: 0.1 K/UL (ref 0–0.4)
ABSOLUTE IMMATURE GRANULOCYTE: ABNORMAL K/UL (ref 0–0.3)
ABSOLUTE LYMPH #: 0.9 K/UL (ref 1–4.8)
ABSOLUTE MONO #: 0.5 K/UL (ref 0.1–1.3)
ABSOLUTE RETIC #: 0.04 M/UL (ref 0.02–0.1)
ANION GAP SERPL CALCULATED.3IONS-SCNC: 7 MMOL/L (ref 9–17)
ANTI-XA UNFRAC HEPARIN: >1.1 IU/L (ref 0.3–0.7)
ANTICARDIOLIPIN IGA ANTIBODY: 1.8 APL (ref 0–14)
ANTICARDIOLIPIN IGG ANTIBODY: 0.6 GPL (ref 0–10)
BASOPHILS # BLD: 0 % (ref 0–2)
BASOPHILS ABSOLUTE: 0 K/UL (ref 0–0.2)
BUN BLDV-MCNC: 17 MG/DL (ref 6–20)
BUN/CREAT BLD: ABNORMAL (ref 9–20)
CALCIUM SERPL-MCNC: 8.7 MG/DL (ref 8.6–10.4)
CARDIOLIPIN AB IGM: 4 MPL (ref 0–10)
CHLORIDE BLD-SCNC: 108 MMOL/L (ref 98–107)
CO2: 24 MMOL/L (ref 20–31)
CREAT SERPL-MCNC: 0.66 MG/DL (ref 0.5–0.9)
DIFFERENTIAL TYPE: ABNORMAL
EOSINOPHILS RELATIVE PERCENT: 1 % (ref 0–4)
ESTIMATED AVERAGE GLUCOSE: 117 MG/DL
GFR AFRICAN AMERICAN: >60 ML/MIN
GFR NON-AFRICAN AMERICAN: >60 ML/MIN
GFR SERPL CREATININE-BSD FRML MDRD: ABNORMAL ML/MIN/{1.73_M2}
GFR SERPL CREATININE-BSD FRML MDRD: ABNORMAL ML/MIN/{1.73_M2}
GLUCOSE BLD-MCNC: 154 MG/DL (ref 70–99)
HBA1C MFR BLD: 5.7 % (ref 4–6)
HCT VFR BLD CALC: 34.4 % (ref 36–46)
HEMOGLOBIN: 11.7 G/DL (ref 12–16)
IMMATURE GRANULOCYTES: ABNORMAL %
IMMATURE RETIC FRACT: NORMAL %
LYMPHOCYTES # BLD: 14 % (ref 24–44)
MCH RBC QN AUTO: 31.9 PG (ref 26–34)
MCHC RBC AUTO-ENTMCNC: 34 G/DL (ref 31–37)
MCV RBC AUTO: 93.8 FL (ref 80–100)
MONOCYTES # BLD: 7 % (ref 1–7)
NRBC AUTOMATED: ABNORMAL PER 100 WBC
PDW BLD-RTO: 12.6 % (ref 11.5–14.9)
PLATELET # BLD: 139 K/UL (ref 150–450)
PLATELET ESTIMATE: ABNORMAL
PMV BLD AUTO: 7.8 FL (ref 6–12)
POTASSIUM SERPL-SCNC: 3.9 MMOL/L (ref 3.7–5.3)
RBC # BLD: 3.67 M/UL (ref 4–5.2)
RBC # BLD: ABNORMAL 10*6/UL
RETIC %: 1 % (ref 0.5–2)
RETIC HEMOGLOBIN: NORMAL PG (ref 28.2–35.7)
SARS-COV-2, RAPID: NOT DETECTED
SEG NEUTROPHILS: 78 % (ref 36–66)
SEGMENTED NEUTROPHILS ABSOLUTE COUNT: 5.1 K/UL (ref 1.3–9.1)
SODIUM BLD-SCNC: 139 MMOL/L (ref 135–144)
SPECIMEN DESCRIPTION: NORMAL
WBC # BLD: 6.6 K/UL (ref 3.5–11)
WBC # BLD: ABNORMAL 10*3/UL

## 2021-03-09 PROCEDURE — 2580000003 HC RX 258: Performed by: STUDENT IN AN ORGANIZED HEALTH CARE EDUCATION/TRAINING PROGRAM

## 2021-03-09 PROCEDURE — 6360000002 HC RX W HCPCS: Performed by: NURSE PRACTITIONER

## 2021-03-09 PROCEDURE — 6370000000 HC RX 637 (ALT 250 FOR IP): Performed by: STUDENT IN AN ORGANIZED HEALTH CARE EDUCATION/TRAINING PROGRAM

## 2021-03-09 PROCEDURE — 97165 OT EVAL LOW COMPLEX 30 MIN: CPT

## 2021-03-09 PROCEDURE — U0002 COVID-19 LAB TEST NON-CDC: HCPCS

## 2021-03-09 PROCEDURE — 6360000002 HC RX W HCPCS: Performed by: STUDENT IN AN ORGANIZED HEALTH CARE EDUCATION/TRAINING PROGRAM

## 2021-03-09 PROCEDURE — C9113 INJ PANTOPRAZOLE SODIUM, VIA: HCPCS | Performed by: NURSE PRACTITIONER

## 2021-03-09 PROCEDURE — 85025 COMPLETE CBC W/AUTO DIFF WBC: CPT

## 2021-03-09 PROCEDURE — 99233 SBSQ HOSP IP/OBS HIGH 50: CPT | Performed by: INTERNAL MEDICINE

## 2021-03-09 PROCEDURE — 36415 COLL VENOUS BLD VENIPUNCTURE: CPT

## 2021-03-09 PROCEDURE — 85520 HEPARIN ASSAY: CPT

## 2021-03-09 PROCEDURE — 85045 AUTOMATED RETICULOCYTE COUNT: CPT

## 2021-03-09 PROCEDURE — 80048 BASIC METABOLIC PNL TOTAL CA: CPT

## 2021-03-09 PROCEDURE — 97162 PT EVAL MOD COMPLEX 30 MIN: CPT

## 2021-03-09 PROCEDURE — 6370000000 HC RX 637 (ALT 250 FOR IP): Performed by: NURSE PRACTITIONER

## 2021-03-09 PROCEDURE — 2580000003 HC RX 258: Performed by: NURSE PRACTITIONER

## 2021-03-09 PROCEDURE — 99222 1ST HOSP IP/OBS MODERATE 55: CPT | Performed by: INTERNAL MEDICINE

## 2021-03-09 PROCEDURE — 2060000000 HC ICU INTERMEDIATE R&B

## 2021-03-09 RX ORDER — SODIUM CHLORIDE 9 MG/ML
10 INJECTION INTRAVENOUS 2 TIMES DAILY
Status: DISCONTINUED | OUTPATIENT
Start: 2021-03-09 | End: 2021-03-12 | Stop reason: HOSPADM

## 2021-03-09 RX ORDER — SUCRALFATE 1 G/1
1 TABLET ORAL EVERY 6 HOURS SCHEDULED
Status: DISCONTINUED | OUTPATIENT
Start: 2021-03-09 | End: 2021-03-12 | Stop reason: HOSPADM

## 2021-03-09 RX ORDER — PANTOPRAZOLE SODIUM 40 MG/10ML
40 INJECTION, POWDER, LYOPHILIZED, FOR SOLUTION INTRAVENOUS DAILY
Status: DISCONTINUED | OUTPATIENT
Start: 2021-03-09 | End: 2021-03-09

## 2021-03-09 RX ORDER — PANTOPRAZOLE SODIUM 40 MG/10ML
40 INJECTION, POWDER, LYOPHILIZED, FOR SOLUTION INTRAVENOUS 2 TIMES DAILY
Status: DISCONTINUED | OUTPATIENT
Start: 2021-03-09 | End: 2021-03-12 | Stop reason: HOSPADM

## 2021-03-09 RX ORDER — PANTOPRAZOLE SODIUM 40 MG/1
40 TABLET, DELAYED RELEASE ORAL
Status: DISCONTINUED | OUTPATIENT
Start: 2021-03-09 | End: 2021-03-09

## 2021-03-09 RX ORDER — SODIUM CHLORIDE 9 MG/ML
10 INJECTION INTRAVENOUS DAILY
Status: DISCONTINUED | OUTPATIENT
Start: 2021-03-09 | End: 2021-03-09

## 2021-03-09 RX ADMIN — TOPIRAMATE 25 MG: 25 TABLET, FILM COATED ORAL at 09:12

## 2021-03-09 RX ADMIN — RIVAROXABAN 15 MG: 15 TABLET, FILM COATED ORAL at 09:12

## 2021-03-09 RX ADMIN — SUCRALFATE 1 G: 1 TABLET ORAL at 13:23

## 2021-03-09 RX ADMIN — GABAPENTIN 300 MG: 300 CAPSULE ORAL at 09:12

## 2021-03-09 RX ADMIN — HYDROMORPHONE HYDROCHLORIDE 1 MG: 1 INJECTION, SOLUTION INTRAMUSCULAR; INTRAVENOUS; SUBCUTANEOUS at 06:38

## 2021-03-09 RX ADMIN — SODIUM CHLORIDE 10 ML: 9 INJECTION, SOLUTION INTRAMUSCULAR; INTRAVENOUS; SUBCUTANEOUS at 21:04

## 2021-03-09 RX ADMIN — PANTOPRAZOLE SODIUM 40 MG: 40 INJECTION, POWDER, FOR SOLUTION INTRAVENOUS at 21:04

## 2021-03-09 RX ADMIN — HYDROMORPHONE HYDROCHLORIDE 1 MG: 1 INJECTION, SOLUTION INTRAMUSCULAR; INTRAVENOUS; SUBCUTANEOUS at 13:25

## 2021-03-09 RX ADMIN — GABAPENTIN 300 MG: 300 CAPSULE ORAL at 21:04

## 2021-03-09 RX ADMIN — SUCRALFATE 1 G: 1 TABLET ORAL at 17:41

## 2021-03-09 RX ADMIN — HYDROMORPHONE HYDROCHLORIDE 1 MG: 1 INJECTION, SOLUTION INTRAMUSCULAR; INTRAVENOUS; SUBCUTANEOUS at 00:25

## 2021-03-09 RX ADMIN — ONDANSETRON 4 MG: 2 INJECTION INTRAMUSCULAR; INTRAVENOUS at 13:25

## 2021-03-09 RX ADMIN — LEVOTHYROXINE SODIUM 175 MCG: 0.05 TABLET ORAL at 06:38

## 2021-03-09 RX ADMIN — SERTRALINE 100 MG: 100 TABLET, FILM COATED ORAL at 09:12

## 2021-03-09 RX ADMIN — HYDROMORPHONE HYDROCHLORIDE 1 MG: 1 INJECTION, SOLUTION INTRAMUSCULAR; INTRAVENOUS; SUBCUTANEOUS at 21:21

## 2021-03-09 RX ADMIN — HYDROMORPHONE HYDROCHLORIDE 1 MG: 1 INJECTION, SOLUTION INTRAMUSCULAR; INTRAVENOUS; SUBCUTANEOUS at 17:41

## 2021-03-09 RX ADMIN — SODIUM CHLORIDE, PRESERVATIVE FREE 10 ML: 5 INJECTION INTRAVENOUS at 09:19

## 2021-03-09 RX ADMIN — ONDANSETRON 4 MG: 2 INJECTION INTRAMUSCULAR; INTRAVENOUS at 21:22

## 2021-03-09 RX ADMIN — SODIUM CHLORIDE, PRESERVATIVE FREE 10 ML: 5 INJECTION INTRAVENOUS at 21:28

## 2021-03-09 RX ADMIN — MIRTAZAPINE 15 MG: 15 TABLET, FILM COATED ORAL at 21:04

## 2021-03-09 RX ADMIN — ONDANSETRON 4 MG: 2 INJECTION INTRAMUSCULAR; INTRAVENOUS at 00:25

## 2021-03-09 RX ADMIN — TOPIRAMATE 25 MG: 25 TABLET, FILM COATED ORAL at 21:20

## 2021-03-09 RX ADMIN — HYDROMORPHONE HYDROCHLORIDE 1 MG: 1 INJECTION, SOLUTION INTRAMUSCULAR; INTRAVENOUS; SUBCUTANEOUS at 09:26

## 2021-03-09 RX ADMIN — METOPROLOL TARTRATE 50 MG: 50 TABLET, FILM COATED ORAL at 21:03

## 2021-03-09 RX ADMIN — ONDANSETRON 4 MG: 2 INJECTION INTRAMUSCULAR; INTRAVENOUS at 06:38

## 2021-03-09 RX ADMIN — ROPINIROLE HYDROCHLORIDE 2 MG: 1 TABLET, FILM COATED ORAL at 21:04

## 2021-03-09 ASSESSMENT — PAIN DESCRIPTION - FREQUENCY: FREQUENCY: CONTINUOUS

## 2021-03-09 ASSESSMENT — PAIN DESCRIPTION - PROGRESSION
CLINICAL_PROGRESSION: NOT CHANGED
CLINICAL_PROGRESSION: GRADUALLY IMPROVING

## 2021-03-09 ASSESSMENT — PAIN DESCRIPTION - DESCRIPTORS
DESCRIPTORS: DISCOMFORT;ACHING
DESCRIPTORS: DISCOMFORT
DESCRIPTORS: BURNING
DESCRIPTORS: DISCOMFORT

## 2021-03-09 ASSESSMENT — PAIN DESCRIPTION - PAIN TYPE
TYPE: ACUTE PAIN

## 2021-03-09 ASSESSMENT — PAIN SCALES - GENERAL
PAINLEVEL_OUTOF10: 7
PAINLEVEL_OUTOF10: 9
PAINLEVEL_OUTOF10: 7
PAINLEVEL_OUTOF10: 4

## 2021-03-09 ASSESSMENT — PAIN DESCRIPTION - ONSET
ONSET: ON-GOING

## 2021-03-09 ASSESSMENT — PAIN DESCRIPTION - ORIENTATION: ORIENTATION: RIGHT

## 2021-03-09 ASSESSMENT — PAIN DESCRIPTION - LOCATION
LOCATION: CHEST
LOCATION: BACK;CHEST
LOCATION: CHEST;BACK;RIB CAGE

## 2021-03-09 ASSESSMENT — ENCOUNTER SYMPTOMS
COUGH: 1
VOMITING: 1
SHORTNESS OF BREATH: 1
SHORTNESS OF BREATH: 0

## 2021-03-09 ASSESSMENT — PAIN - FUNCTIONAL ASSESSMENT
PAIN_FUNCTIONAL_ASSESSMENT: PREVENTS OR INTERFERES SOME ACTIVE ACTIVITIES AND ADLS
PAIN_FUNCTIONAL_ASSESSMENT: PREVENTS OR INTERFERES SOME ACTIVE ACTIVITIES AND ADLS

## 2021-03-09 NOTE — ANESTHESIA PRE PROCEDURE
Department of Anesthesiology  Preprocedure Note       Name:  Amber Dennis   Age:  53 y.o.  :  1970                                          MRN:  [id number]         Date:  3/9/2021      Surgeon: Gela Dunne):  Angie Lloyd MD    Procedure: Procedure(s):  EGD    Medications prior to admission:   Prior to Admission medications    Medication Sig Start Date End Date Taking? Authorizing Provider   rivaroxaban 15 & 20 MG Starter Pack Xarelto 15 mg BID. 3/8/21  Yes Giovanna Dia MD   levothyroxine (SYNTHROID) 175 MCG tablet TAKE 1 TABLET BY MOUTH DAILY 3/5/21  Yes MARTITA Vela CNP   ondansetron (ZOFRAN) 4 MG tablet Take 1 tablet by mouth every 8 hours as needed for Nausea or Vomiting 3/4/21  Yes Uday Cottrell MD   esomeprazole ([address]) 40 MG delayed release capsule TAKE 1 CAPSULE BY MOUTH EVERY MORNING BEFORE BREAKFAST 20  Yes MARTITA Brewer CNP   tiZANidine (ZANAFLEX) 4 MG tablet TAKE 1 TABLET BY MOUTH EVERY 8 HOURS AS NEEDED FOR SPASMS 20  Yes Ashley Arce MD   mirtazapine (REMERON) 15 MG tablet Take 15 mg by mouth daily 11/10/20  Yes Historical Provider, MD   rOPINIRole (REQUIP) 2 MG tablet TAKE 1 TABLET BY MOUTH EVERY NIGHT 20  Yes MARTITA Vela CNP   ondansetron (ZOFRAN ODT) 4 MG disintegrating tablet Take 1 tablet by mouth every 8 hours as needed for Nausea or Vomiting 3/4/20  Yes Roddy Wilder MD   sertraline (ZOLOFT) 100 MG tablet Take 100 mg by mouth daily   Yes Historical Provider, MD   topiramate (TOPAMAX) 25 MG tablet 25 mg 2 times daily  18  Yes Historical Provider, MD   metoprolol tartrate (LOPRESSOR) 50 MG tablet Take 50 mg by mouth 2 times daily  17  Yes Historical Provider, MD   oxyCODONE-acetaminophen (PERCOCET)  MG per tablet Take 1 tablet by mouth every 6 hours as needed for Pain for up to 30 days.  3/9/21 4/8/21  MARTITA Mendosa CNP   oxyCODONE (OXYCONTIN) 10 MG extended release tablet Take 1 tablet by mouth every 12 hours for 30 days. 3/5/21 4/4/21  MARTITA Medrano CNP   lidocaine (LIDODERM) 5 % Place 1 patch onto the skin daily 12 hours on, 12 hours off. 3/4/21   Lauren Crisostomo MD   methylPREDNISolone (MEDROL, MARIANNE,) 4 MG tablet Take by mouth. 3/4/21   Lauren Crisostomo MD   gabapentin (NEURONTIN) 300 MG capsule Take 1 capsule by mouth 3 times daily for 30 days. 2/25/21 3/27/21  MARTITA Stevenson CNP   albuterol sulfate  (90 Base) MCG/ACT inhaler INHALE 2 PUFFS INTO THE LUNGS EVERY 4 HOURS AS NEEDED FOR SHORTNESS OF BREATH 1/30/21   Historical Provider, MD   clonazePAM (KLONOPIN) 0.5 MG tablet Take 1 tablet by mouth 2 times daily as needed for Anxiety for up to 30 days.  12/28/20 2/25/21  Karen Burns MD       Current medications:    Current Facility-Administered Medications   Medication Dose Route Frequency Provider Last Rate Last Admin    sucralfate (CARAFATE) tablet 1 g  1 g Oral 4 times per day Norbert Brittle, APRN - NP   1 g at 03/09/21 1323    pantoprazole (PROTONIX) injection 40 mg  40 mg Intravenous BID Norbert Brittle, APRN - NP        And    sodium chloride (PF) 0.9 % injection 10 mL  10 mL Intravenous BID Norbert Brittle, APRN - NP        rivaroxaban (XARELTO) tablet 15 mg  15 mg Oral BID  Giovanna Rodriguez MD   15 mg at 03/09/21 0912    perflutren lipid microspheres (DEFINITY) injection 2.2 mg  2 mL Intravenous ONCE PRN Sondra Greenwood MD        sodium chloride flush 0.9 % injection 10 mL  10 mL Intravenous PRN Ana Hamilton DO   10 mL at 03/07/21 1156    gabapentin (NEURONTIN) capsule 300 mg  300 mg Oral TID Zully Ibarra MD   Stopped at 03/09/21 1323    metoprolol tartrate (LOPRESSOR) tablet 50 mg  50 mg Oral BID Rana H MD Fred   Stopped at 03/09/21 0912    mirtazapine (REMERON) tablet 15 mg  15 mg Oral Daily Ranmeeta Ibarra MD        rOPINIRole (REQUIP) tablet 2 mg  2 mg Oral Nightly Rana H MD Fred   2 mg at 03/08/21 2031    sertraline (ZOLOFT) tablet 100 mg  100 mg Problem List:    Patient Active Problem List   Diagnosis Code    Hyperlipidemia E78.5    MVP (mitral valve prolapse) I34.1    Anxiety F41.9    Hypothyroidism E03.9    Allergic rhinitis J30.9    Obesity E66.9    Abdominal pain R10.9    Elevated liver enzymes R74.8    GERD (gastroesophageal reflux disease) K21.9    Ovarian mass N83.8    S/P bilateral oophorectomy & KRUPA 10/21/14 Z90.722    Pain emptying bladder R30.9    Urinary urgency R39.15    Insomnia G47.00    RLS (restless legs syndrome) G25.81    Pancreatitis K85.90    Eye swelling, left H57.89    Osteoarthritis of cervical spine M47.812    Degenerative cervical spinal stenosis M48.02    Trigger point with tension headache G44.209    Arthropathy of cervical facet joint M47.812    Atlanto-axial joint sprain, sequela S13. 4XXS    Cervical radiculopathy M54.12    Sprain of atlanto-occipital joint, sequela S13. 4XXS    Encounter for medication monitoring Z51.81    Myofascial muscle pain M79.18    Colitis K52.9    Chest pain R07.9    Unstable angina (HCC) I20.0    Pulmonary embolism on right (HCC) I26.99    Acute pulmonary embolism (HCC) I26.99    Hematemesis with nausea K92.0       Past Medical History:        Diagnosis Date    Anxiety     CAD (coronary artery disease)     Mitral valve prolapse    Fatty liver     GERD (gastroesophageal reflux disease)     Headache     History of bronchitis     Hyperlipidemia     Hypothyroidism     Kidney stone     LFT elevation     MVP (mitral valve prolapse)     Obesity     Type [facility] without complication [facility] 3/81/9306    Umbilical hernia        Past Surgical History:        Procedure Laterality Date    APPENDECTOMY       SECTION      2 pfannenstiel, 1 vertical    CHOLECYSTECTOMY      COLONOSCOPY      Dr Drake Schultz COLONOSCOPY  14    COLONOSCOPY  2014    biopsy & sigmoid spasms, pathology negative    COLONOSCOPY N/A 2018    COLONOSCOPY WITH BIOPSY performed by Oneyda Webb MD at [address]      x 5    HYSTERECTOMY, TOTAL ABDOMINAL      2010    ME EXPLORATORY OF ABDOMEN  10/21/2014    Laparotomy-lysis of adhesions, bso     UPPER GASTROINTESTINAL ENDOSCOPY  2/17/2010    mild chronic inactive gastritis       Social History:    Social History     Tobacco Use    Smoking status: Former Smoker     Packs/day: 0.25     Years: 33.00     Pack years: 8.25     Types: Cigarettes    Smokeless tobacco: Never Used    Tobacco comment: quit on nicoderm patch   Substance Use Topics    Alcohol use: No     Alcohol/week: 0.0 standard drinks                                Counseling given: Not Answered  Comment: quit on nicoderm patch      Vital Signs (Current):   Vitals:    03/09/21 0017 03/09/21 0025 03/09/21 0830 03/09/21 1039   BP:  (!) 104/58 95/63    Pulse:  74 82    Resp:  18 18    Temp:  98 °F (36.7 °C) 98.3 °F (36.8 °C)    TempSrc:  Oral Oral    SpO2:  93% 93% 90%   Weight: 208 lb 12.8 oz (94.7 kg)      Height:                                                  BP Readings from Last 3 Encounters:   03/09/21 95/63   03/04/21 138/89   02/25/21 109/77       NPO Status:                                                                                 BMI:   Wt Readings from Last 3 Encounters:   03/09/21 208 lb 12.8 oz (94.7 kg)   03/04/21 165 lb (74.8 kg)   02/25/21 171 lb (77.6 kg)     Body mass index is 35.84 kg/m².     CBC:   Lab Results   Component Value Date    WBC 6.6 03/09/2021    RBC 3.67 03/09/2021    RBC 4.36 06/03/2019    HGB 11.7 03/09/2021    HCT 34.4 03/09/2021    MCV 93.8 03/09/2021    RDW 12.6 03/09/2021     03/09/2021     06/05/2012       CMP:   Lab Results   Component Value Date     03/09/2021    K 3.9 03/09/2021     03/09/2021    CO2 24 03/09/2021    BUN 17 03/09/2021    CREATININE 0.66 03/09/2021    GFRAA >60 03/09/2021    LABGLOM >60 03/09/2021    GLUCOSE 154 03/09/2021    GLUCOSE 107 06/03/2019 PROT 7.4 03/07/2021    PROT 6.3 06/03/2019    CALCIUM 8.7 03/09/2021    BILITOT 0.34 03/07/2021    ALKPHOS 103 03/07/2021    AST 14 03/07/2021    ALT 9 03/07/2021       POC Tests: No results for input(s): POCGLU, POCNA, POCK, POCCL, POCBUN, POCHEMO, POCHCT in the last 72 hours. Coags:   Lab Results   Component Value Date    PROTIME 14.2 03/07/2021    INR 1.1 03/07/2021    APTT 31.7 03/07/2021       HCG (If Applicable):   Lab Results   Component Value Date    PREGTESTUR NEGATIVE 12/18/2018        ABGs: No results found for: PHART, PO2ART, DCW2DHC, OER1FKB, BEART, K6VMXNYH     Type & Screen (If Applicable):  No results found for: LABABO, LABRH    Drug/Infectious Status (If Applicable):  Lab Results   Component Value Date    HEPCAB NONREACTIVE 02/14/2014       COVID-19 Screening (If Applicable): No results found for: COVID19      Anesthesia Evaluation  Patient summary reviewed and Nursing notes reviewed  Airway: Mallampati: III  TM distance: >3 FB   Neck ROM: full  Mouth opening: > = 3 FB Dental:          Pulmonary:   (+) shortness of breath:  decreased breath sounds,                            ROS comment: Unprovoked PE  pleurisy   Cardiovascular:    (+) angina:, CAD:, hyperlipidemia      ECG reviewed  Rhythm: regular  Rate: normal  Echocardiogram reviewed                  Neuro/Psych:   (+) neuromuscular disease:, headaches:,             GI/Hepatic/Renal:   (+) GERD:, liver disease:, morbid obesity          Endo/Other:    (+) DiabetesType II DM, , hypothyroidism, blood dyscrasia: anticoagulation therapy, arthritis: OA., .                 Abdominal:           Vascular:   + PE. Anesthesia Plan      MAC     ASA 3       Induction: intravenous. MIPS: Prophylactic antiemetics administered. Anesthetic plan and risks discussed with patient. Plan discussed with CRNA.                   Joann Walden MD   3/9/2021

## 2021-03-09 NOTE — PROGRESS NOTES
[address]    PROGRESS NOTE             3/9/2021    9:07 AM    Name:   Lyle Villeda  MRN:     [id number]     Acct:      [de-identified]   Room:   2091/2091-01  IP Day:  2  Admit Date:  3/7/2021 10:31 AM    PCP:  MARTITA Mariscal CNP  Code Status:  Full Code    Subjective:     C/C:   Chief Complaint   Patient presents with    Shortness of Breath     Interval History Status: improved. Patient was seen and examined with bedside. Patient was vitally stable. Patient hemoglobin dropped from 12.9-11.6. Patient had an episode of vomiting with food coming out. Patient initiated noticed nothing like sounds and Gastrografin and showed picture which showed maroon-colored emesis with some food particles. RN was in inquired about the incident and she was not sure. Patient is still complaining about chest pain and shortness of breath. Patient is on 2 L nasal cannula oxygen and maintaining saturation. Home O2 eval was done yesterday and recommended 3 L nasal cannula oxygen at home. Patient was seen by the pulmonologist who recommended anticoagulation for 1 year and oncology referral.    Review of Systems:     Review of Systems   Constitutional: Negative for chills and fever. Respiratory: Positive for cough. Negative for shortness of breath. Cardiovascular: Positive for chest pain. Negative for palpitations and leg swelling. Gastrointestinal: Positive for vomiting. Genitourinary: Negative for difficulty urinating, frequency and urgency. Neurological: Negative for light-headedness and headaches. Medications: Allergies:     Allergies   Allergen Reactions    Compazine [Prochlorperazine]      Jittery, restless    Sulfa Antibiotics Hives    Adhesive Tape Rash       Current Meds:   Scheduled Meds:    rivaroxaban  15 mg Oral BID WC    gabapentin  300 mg Oral TID    metoprolol tartrate  50 mg Oral BID    mirtazapine  15 mg 06:57 PM     Lab Results   Component Value Date/Time    CULTURE NO GROWTH 03/04/2021 06:57 PM       [unfilled]    Radiology:    Echo Complete 2d W Doppler W Color    Result Date: 3/8/2021  [facility] [place] Transthoracic Echocardiography Report (TTE)  Patient Name Nisha Humphries Date of Study               03/08/2021               L   Date of      1970  Gender                      Female  Birth   Age          53 year(s)  Race                           Room Number  2091        Height:                     64 inch, 162.56 cm   Corporate ID [id number]    Weight:                     165 pounds, 74.8 kg  #   Patient Acct [de-identified]   BSA:          1.8 m^2       BMI:      28.32  #                                                              kg/m^2   MR #         [id number]      Sonographer                 Yanique Ro   Accession #  [id number]  Interpreting Physician      Marcelina Carson   Fellow                   Referring Nurse                           Practitioner   Interpreting             Referring Physician         Kwabena Mansfield  Fellow  Additional Comments Technically somewhat difficult study. Type of Study   TTE procedure:2D Echocardiogram, M-Mode, Doppler, Color Doppler. Procedure Date Date: 03/08/2021 Start: 09:39 AM Study Location: [address] [place] [address] Technical Quality: Fair visualization Indications:Pulmonary embolus and Right heart strain. History / Tech. Comments: CAD, HLD, MVP, DM Patient Status: Inpatient Height: 64 inches Weight: 165 pounds BSA: 1.8 m^2 BMI: 28.32 kg/m^2 Rhythm: Within normal limits HR: 88 bpm BP: 108/57 mmHg CONCLUSIONS Summary Technically somewhat difficult study. Left ventricle is normal in size and wall thickness. Global left ventricular systolic function is normal. Estimated LV EF 60-65 %. No obvious wall motion abnormality seen. Both atria are normal in size. Normal right ventricular size and function. Mild mitral regurgitation.  Signature ----------------------------------------------------------------------------  Electronically signed by Yanique Ro(Sonographer) on 2021 10:14  AM ---------------------------------------------------------------------------- ----------------------------------------------------------------------------  Electronically signed by Edita Carlton(Interpreting physician) on  2021 12:19 PM ---------------------------------------------------------------------------- FINDINGS Left Atrium Left atrium is normal in size. Left Ventricle Left ventricle is normal in size and wall thickness. Global left ventricular systolic function is normal. Estimated LV EF 60-65 %. No obvious wall motion abnormality seen. Right Atrium Right atrium is normal in size. Right Ventricle Normal right ventricular size and function. Mitral Valve No obvious valvular abnormality seen. No evidence of mitral valve prolapse. Mild mitral regurgitation. Aortic Valve No obvious valvular abnormality seen. No evidence of aortic insufficiency or stenosis. Tricuspid Valve No obvious valvular abnormality. Trivial tricuspid regurgitation. No pulmonary hypertension. Estimated right ventricular systolic pressure is 93NIGF. Pulmonic Valve Pulmonic valve was not well visualized. No evidence of pulmonic insufficiency or stenosis. Pericardial Effusion No significant pericardial effusion is seen. Pleural Effusion No pleural effusion seen. Miscellaneous Normal aortic root dimension. E/E' average = 11.3.  M-mode / 2D Measurements & Calculations:   LVIDd:4.74 cm(3.7 - 5.6 cm)      Diastolic UFGPWP:334 ml  PCRXH:9.63 cm(2.2 - 4.0 cm)      Systolic UXKRLM:28.1 ml  QGHR:0.04 cm(0.6 - 1.1 cm)       Aortic Root:2.7 cm(2.0 - 3.7 cm)  LVPWd:0.81 cm(0.6 - 1.1 cm)      LA Dimension: 3.2 cm(1.9 - 4.0 cm)  Fractional Shortenin.25 %    LA volume/Index: 48.4 ml /27m^2  Calculated LVEF (%): 81.6 %      LVOT:2 cm   Mitral:                                 Aortic   Valve Area (P1/2-Time): 4.4 cm^2        Peak Velocity: 1.31 m/s  Peak E-Wave: 1.05 m/s                   Mean Velocity: 0.92 m/s  Peak A-Wave: 0.77 m/s                   Peak Gradient: 6.86 mmHg  E/A Ratio: 1.36                         Mean Gradient: 4 mmHg  Peak Gradient: 4.41 mmHg  Deceleration Time: 176 msec  P1/2t: 50 msec                          Area (continuity): 2.65 cm^2                                          AV VTI: 24.5 cm   Tricuspid:                              Pulmonic:   Estimated RVSP: 21 mmHg  Peak TR Velocity: 1.82 m/s  Peak TR Gradient: 13.2496 mmHg  Estimated RA Pressure: 8 mmHg                                          Estimated PASP: 21.25 mmHg  Septal Wall E' velocity:0.08 m/s Lateral Wall E' velocity:0.11 m/s    Xr Cervical Spine (2-3 Views)    Result Date: 2/23/2021  EXAMINATION: TWO XRAY VIEWS SCOLIOSIS SERIES; 2 XRAY VIEWS OF THE CERVICAL SPINE 2/23/2021 12:58 pm COMPARISON: None. HISTORY: ORDERING SYSTEM PROVIDED HISTORY: Cervical spondylosis TECHNOLOGIST PROVIDED HISTORY: scoliosis Reason for Exam: Pt states recheck cervical spondylosis, scoliosis evaluate for instability Acuity: Chronic Type of Exam: Subsequent/Follow-up Additional signs and symptoms: scoliosis FINDINGS: Scoliosis: 12 thoracic and 5 lumbar vertebra are noted, well maintained in height without acute fracture or subluxation. Mild levo scoliotic curvature of 2.4 degrees is noted T5 to bottom of T10. Mild dextroscoliotic curvature of 4.9 T11 through L4. Minimal to mild degenerative and degenerative disc changes are present in the spine. No acute fracture or subluxation. Examination of the cervical spine reveals evidence of mild facet changes. There appears to be calcification of the left carotid vessel. Mild dextroscoliotic curvature cervical spine is evident. Degrees is noted from T11 through L4. Sacral base plane un leveling cannot be accurately assessed. Left iliac crest is out of the field of view.  Cervical spine: 1 mm grade 1 anterolisthesis C2 upon C3 is noted on flexion, not replicated on extension and not seen neutral view. No acute fracture or prevertebral soft tissue swelling. Scoliosis: Mild levo scoliotic curvature thoracic spine. Mild dextroscoliotic curvature lower thoracic and lumbar spine. No acute fracture or subluxation. Degenerative changes. Cervical spine: Minimal grade 1 anterolisthesis C2 upon C3 on flexion, not seen on extension or neutral views. No acute fracture or prevertebral soft tissue swelling. Findings suggest minimal instability. Ct Cervical Spine Wo Contrast    Result Date: 3/4/2021  EXAMINATION: CT OF THE CERVICAL SPINE WITHOUT CONTRAST 3/4/2021 5:19 pm TECHNIQUE: CT of the cervical spine was performed without the administration of intravenous contrast. Multiplanar reformatted images are provided for review. Dose modulation, iterative reconstruction, and/or weight based adjustment of the mA/kV was utilized to reduce the radiation dose to as low as reasonably achievable. COMPARISON: February 13, 2021. HISTORY: ORDERING SYSTEM PROVIDED HISTORY: radicular pain left arm TECHNOLOGIST PROVIDED HISTORY: radicular pain left arm Decision Support Exception->Emergency Medical Condition (MA) Is the patient pregnant?->No Reason for Exam: radicular pain left arm for three weeks. patient states limited ROM and pain starting in neck. no injuries Acuity: Unknown Type of Exam: Unknown FINDINGS: Bone mineralization is normal.  The vertebral bodies and posterior elements appear intact and appropriately aligned without acute fracture or subluxation. Vertebral body stature is maintained throughout. There is straightening of the normal cervical lordosis. Mild-to-moderate cervical degenerative disc disease is present throughout. There is moderate bony neural foraminal narrowing on the left at C3-C4 and C5-C6.   No significant uncovertebral joint hypertrophic change or additional bony neural foraminal in narrowing of the neural foramina and mild stenosis of the thecal sac throughout the cervical region as discussed in detail above. Xr Chest Portable    Result Date: 3/7/2021  EXAMINATION: ONE XRAY VIEW OF THE CHEST 3/7/2021 11:26 am COMPARISON: 11/08/2020 HISTORY: ORDERING SYSTEM PROVIDED HISTORY: sob Reason for Exam: Sob coughing up blood today Acuity: Acute Type of Exam: Initial FINDINGS: The lungs are without acute focal process. No effusion or pneumothorax. The cardiomediastinal silhouette is normal.  The osseous structures are intact without acute process. Negative portable chest.     Ct Chest Pulmonary Embolism W Contrast    Result Date: 3/7/2021  EXAMINATION: CTA OF THE CHEST 3/7/2021 11:46 am TECHNIQUE: CTA of the chest was performed after the administration of intravenous contrast.  Multiplanar reformatted images are provided for review. MIP images are provided for review. Dose modulation, iterative reconstruction, and/or weight based adjustment of the mA/kV was utilized to reduce the radiation dose to as low as reasonably achievable. COMPARISON: None. HISTORY: ORDERING SYSTEM PROVIDED HISTORY: r/o pe Reason for Exam: Chest pain and coughing up blood since this morning. Acuity: Acute Type of Exam: Initial FINDINGS: Pulmonary Arteries: Pulmonary arteries are adequately opacified for evaluation. Posterior right lower lobe filling defect with extension into basilar subsegmental branches. Main pulmonary artery is normal in caliber. Mediastinum: No evidence of mediastinal lymphadenopathy. Normal heart size. Normal thoracic aorta. Lungs/pleura: Bilateral lower lobe atelectasis. No focal consolidation or pulmonary edema. No evidence of pleural effusion or pneumothorax. Upper Abdomen: Cholecystectomy Soft Tissues/Bones: No acute bone or soft tissue abnormality. Posterior right lower lobe filling defect with extension into basilar subsegmental branches consistent pulmonary embolism.   Bilateral lower lobe atelectasis. Critical results were called by Dr. Ramya Willett. Tulio Cummins MD to [address] on 3/7/2021 at 13:14. Vl Lower Extremity Bilateral Venous Duplex    Result Date: 3/8/2021    [facility]  Vascular Lower Extremities DVT Study Procedure   Patient Name   Nisha Humphries Date of Study           03/08/2021                 L   Date of Birth  1970  Gender                  Female   Age            53 year(s)  Race                       Room Number    2091        Height:                 64.17 inch, 163 cm   Corporate ID # [id number]    Weight:                 165 pounds, 74.8 kg   Patient Acct # [de-identified]   BSA:        1.81 m^2    BMI:       28.17 kg/m^2   MR #           [id number]      Sonographer             Matthew Malagon RVT   Accession #    [id number]  Interpreting Physician  Lucio Ramires   Referring                  Referring Physician     Pretty Correa  Nurse  Practitioner  Procedure Type of Study:   Veins: Lower Extremities DVT Study, Venous Scan Lower Bilateral.  Indications for Study:Positive for pulmonary embolus. Patient Status: In Patient. Technical Quality:Adequate visualization. Conclusions   Summary   No evidence of superficial or deep venous thrombosis in both lower  extremities.    Signature   ----------------------------------------------------------------  Electronically signed by Matthew Malagon RVT(Sonographer) on  03/08/2021 03:13 PM  ----------------------------------------------------------------   ----------------------------------------------------------------  Electronically signed by Lucio Ramires(Interpreting physician)  on 03/08/2021 11:40 PM  ----------------------------------------------------------------  Findings:   Right Impression:                    Left Impression:  The common femoral, femoral,         The common femoral, femoral,  popliteal and tibial veins           popliteal and tibial veins  demonstrate normal compressibility   demonstrate normal compressibility  and augmentation. and augmentation. Normal compressibility of the great  Normal compressibility of the great  saphenous vein. saphenous vein. Normal compressibility of the small  Normal compressibility of the small  saphenous vein. saphenous vein. Velocities are measured in cm/s ; Diameters are measured in cm Right Lower Extremities DVT Study Measurements Right 2D Measurements +------------------------------------+----------+---------------+----------+ ! Location                            ! Visualized! Compressibility! Thrombosis! +------------------------------------+----------+---------------+----------+ ! Common Femoral                      !Yes       ! Yes            ! None      ! +------------------------------------+----------+---------------+----------+ ! Prox Femoral                        !Yes       ! Yes            ! None      ! +------------------------------------+----------+---------------+----------+ ! Mid Femoral                         !Yes       ! Yes            ! None      ! +------------------------------------+----------+---------------+----------+ ! Dist Femoral                        !Yes       ! Yes            ! None      ! +------------------------------------+----------+---------------+----------+ ! Deep Femoral                        !Yes       ! Yes            ! None      ! +------------------------------------+----------+---------------+----------+ ! Popliteal                           !Yes       ! Yes            ! None      ! +------------------------------------+----------+---------------+----------+ ! Sapheno Femoral Junction            ! Yes       ! Yes            ! None      ! +------------------------------------+----------+---------------+----------+ ! PTV                                 ! Yes       ! Yes            ! None      ! +------------------------------------+----------+---------------+----------+ ! Peroneal !Yes       !Yes            ! None      ! +------------------------------------+----------+---------------+----------+ ! Gastroc                             ! Yes       ! Yes            ! None      ! +------------------------------------+----------+---------------+----------+ ! GSV Thigh                           ! Yes       ! Yes            ! None      ! +------------------------------------+----------+---------------+----------+ ! GSV Knee                            ! Yes       ! Yes            ! None      ! +------------------------------------+----------+---------------+----------+ ! GSV Ankle                           ! Yes       ! Yes            ! None      ! +------------------------------------+----------+---------------+----------+ ! SSV                                 ! Yes       ! Yes            ! None      ! +------------------------------------+----------+---------------+----------+ Left Lower Extremities DVT Study Measurements Left 2D Measurements +------------------------------------+----------+---------------+----------+ ! Location                            ! Visualized! Compressibility! Thrombosis! +------------------------------------+----------+---------------+----------+ ! Common Femoral                      !Yes       ! Yes            ! None      ! +------------------------------------+----------+---------------+----------+ ! Prox Femoral                        !Yes       ! Yes            ! None      ! +------------------------------------+----------+---------------+----------+ ! Mid Femoral                         !Yes       ! Yes            ! None      ! +------------------------------------+----------+---------------+----------+ ! Dist Femoral                        !Yes       ! Yes            ! None      ! +------------------------------------+----------+---------------+----------+ ! Deep Femoral                        !Yes       ! Yes            ! None      ! 12 thoracic and 5 lumbar vertebra are noted, well maintained in height without acute fracture or subluxation. Mild levo scoliotic curvature of 2.4 degrees is noted T5 to bottom of T10. Mild dextroscoliotic curvature of 4.9 T11 through L4. Minimal to mild degenerative and degenerative disc changes are present in the spine. No acute fracture or subluxation. Examination of the cervical spine reveals evidence of mild facet changes. There appears to be calcification of the left carotid vessel. Mild dextroscoliotic curvature cervical spine is evident. Degrees is noted from T11 through L4. Sacral base plane un leveling cannot be accurately assessed. Left iliac crest is out of the field of view. Cervical spine: 1 mm grade 1 anterolisthesis C2 upon C3 is noted on flexion, not replicated on extension and not seen neutral view. No acute fracture or prevertebral soft tissue swelling. Scoliosis: Mild levo scoliotic curvature thoracic spine. Mild dextroscoliotic curvature lower thoracic and lumbar spine. No acute fracture or subluxation. Degenerative changes. Cervical spine: Minimal grade 1 anterolisthesis C2 upon C3 on flexion, not seen on extension or neutral views. No acute fracture or prevertebral soft tissue swelling. Findings suggest minimal instability. Physical Examination:        Physical Exam  Constitutional:       Appearance: Normal appearance. Cardiovascular:      Rate and Rhythm: Normal rate and regular rhythm. Pulses: Normal pulses. Heart sounds: Normal heart sounds. Pulmonary:      Effort: Pulmonary effort is normal.      Breath sounds: Normal breath sounds. No stridor. No wheezing. Abdominal:      General: Abdomen is flat. Bowel sounds are normal. There is no distension. Palpations: There is no mass. Tenderness: There is no abdominal tenderness. There is no guarding. Musculoskeletal:      Right lower leg: No edema. Left lower leg: No edema.    Neurological: Mental Status: She is alert. Assessment:        Primary Problem  Pulmonary embolism on right [facility])    Active Hospital Problems    Diagnosis Date Noted    Pulmonary embolism on right [facility]) [I26.99] 03/07/2021    RLS (restless legs syndrome) [G25.81] 01/26/2015    GERD (gastroesophageal reflux disease) [K21.9] 04/17/2014    Hyperlipidemia [E78.5]     Hypothyroidism [E03.9]     Anxiety [F41.9]        Plan:        1. Chest pain and SOB 2/2 Pulmonary Embolism:   - VSS, afebrile. Desaturated and put on 2L NC oxygen.   - Troponin: Negative  - Chest x-ray unremarkable. - CT PE showed  posterior right lower lobe filling defect with extension into basilar.  Segmental branches consistent with pulmonary embolism.  - Heparin gtt, APTT every 6 hours, dc'd today and started   XARELTO 15 mg BID.   - Dilaudid 1 mg q2h., Ativan for anxiety ordered. - VL lower extremity bilateral venous Doppler shows no signs of superficial or deep venous thrombosis. - Pulmonology on board, saw the patient and recommended 1 year of anticoagulation with oncology work-up as patient mentioned significant amount of weight loss in the last 1 year. - Antiphospholipid antibodies pending.        2.  Hypothyroidism s/p surgery:  -Synthroid 175 mcg  -TSH: 0.69 (03/08/2021)     3.  Essential hypertension:  -Lopressor 50 mg twice daily.     4.  Restless leg syndrome:  -Ropinirole 2 mg at night.     Dispo: Home, may need oxygen at home as desaturating. Juan M Jonas MD  3/9/2021  9:07 AM     Attending Physician Statement  I have discussed the care of Pranav Jacobs with the resident team. I have examined the patient myself and taken ros and hpi , including pertinent history and exam findings,  with the resident. I have reviewed the key elements of all parts of the encounter with the resident. I agree with the assessment, plan and orders as documented by the resident.     Principal Problem:    Pulmonary embolism on right [facility])  Active Problems:    Hyperlipidemia    Anxiety    Hypothyroidism    GERD (gastroesophageal reflux disease)    RLS (restless legs syndrome)  Resolved Problems:    * No resolved hospital problems. *      Patient on Xarelto for new unprovoked PE. Significant hemoglobin drop since yesterday-associated with dark red emesis overnight-we will consult GI.   Oxygen requirement stable      Electronically signed by Srinivasa Johnson MD

## 2021-03-09 NOTE — PLAN OF CARE
Planning:  Goal: Patients continuum of care needs are met  Description: Patients continuum of care needs are met  Outcome: Ongoing  Note: Ongoing continuum of care       Problem: Nutrition  Goal: Optimal nutrition therapy  Description: Nutrition Problem #1: Predicted inadequate energy intake  Intervention: Food and/or Nutrient Delivery: Modify Current Diet, Start Oral Nutrition Supplement  Nutritional Goals: po intake greater than 75%     Outcome: Ongoing  Note: Adequate nutrition maintained

## 2021-03-09 NOTE — CARE COORDINATION
Writer notified, Geo Garzon, from [facility] [place], that pt. Did Qualify for Home Oxygen. Writer faxed, Home Oxygen Eval & Face Sheet, to [facility] & informed Geo Garzon, that Ling Delacruz is awaiting Orders for Oxygen & Face to Face Notes, from Pulmonary. Geo Garzon, will have Portable tank delivered to the room. Writer will follow.

## 2021-03-09 NOTE — CONSULTS
[address]                 [address] [facility], [address]                                  CONSULTATION    PATIENT NAME: Jayme Daniel                      :        1970  MED REC NO:   [id number]                              ROOM:       2091  ACCOUNT NO:   [de-identified]                           ADMIT DATE: 2021  PROVIDER:     Karen Childs    CONSULT DATE:  2021    REFERRING PROVIDER:  Dr. Mariel Mendiola. REASON FOR CONSULTATION:  Hemoptysis and right-sided chest pain. HISTORY OF PRESENT ILLNESS:  This is a 28-year-old female with past  medical history significant for anxiety, MVP, had fatty liver, elevated  LFTs, and had GERD, migraine headaches, diabetes, hypothyroidism, status  post radiation treatment for her thyroid at age 22. The patient three  days ago apparently complained of _____ right-sided pleurisy with severe  sharp pain 10/10, increased with deep breathing, movement, and cough,  better with rest, and some short of breath and been coughing blood in  the last few days, red bright blood with few small clots. Her  evaluation showing that she has a PE in the right lower lobe, and to me  also there is an infarct in the right lower lobe, as well as bibasilar  atelectasis. REVIEW OF SYSTEMS:  GENERAL:  She denies fever or chills. NEURO:  Noted some headaches. No dizziness or weakness. No loss of  consciousness. EYES:  No redness or watery eyes. ENT:  No nasal congestion or drip. CARDIAC:  As noted, right-sided chest pain. RESPIRATORY:  Increased short of breath as noted not much, and the  hemoptysis, not much wheezing. GI:  Had some nausea. No vomiting. Denies any dysphagia. Had no GI  bleed. GENITOURINARY:  No dysuria or hematuria. MUSCULOSKELETAL:  Chronic neck pain and joint pain. SKIN:  No new rash or ulceration. PSYCH:  Noted some anxiety.     PAST MEDICAL HISTORY:  Significant for MVP, hypothyroidism, diabetes,  anxiety, had fatty liver, kidney stone, migraine headaches, and coronary  artery disease. PAST MEDICAL HISTORY:  Had EGD, colonoscopy in the past.  Last  colonoscopy was in 2018. Had hernia repair, cholecystectomy,  appendectomy,  and had hysterectomy in . FAMILY HISTORY:  Significant for mother with vaginal cancer. There is a  breast cancer in the family, diabetes, heart disease, and stroke. SOCIAL HISTORY:  She noted she smoked less than a pack a day for 30  years, quit 6-8 months ago. Denies any alcohol or drug abuse. ALLERGIES:  Allergic to COMPAZINE, SULFA, and ADHESIVE TAPE. MEDICATIONS:  Her current medications noted. She was on heparin drip  and going to start Xarelto. Rest of her medications noted. PHYSICAL EXAMINATION:  VITAL SIGNS:  Temperature is 97.8, pulse is 90, respiratory rate 18,  blood pressure is 108/57, and saturation 92% on 2 liters. HEENT:  No icterus noted. No JVD or lymphadenopathy appreciated. HEART:  S1 and S2 regular. No gallop. LUNGS:  She had crackles in the right base. No wheezing or distress. ABDOMEN:  Soft. No guarding. Bowel sounds present. EXTREMITIES:  No edema, calf tenderness, or stiffness. SKIN:  No new rash or ulceration noted. NEURO:   She is awake, alert, a little bit anxious. DIAGNOSTIC DATA:  Chest x-ray was unremarkable. CTA chest as noted  above. LABORATORY DATA:  Her blood work has showed potassium 4.0, BUN 17,  creatinine 0.62 and blood sugar is 129. Her WBC is 11.1, hemoglobin  12.9, and platelets 102. ASSESSMENT:  1. Right-sided pleurisy, mostly with the PE.  2.  Hemoptysis. 3.  Pulmonary infarct right lower lobe. 4.  Right lower lobe pulmonary embolism. 5.  Bibasilar atelectasis. 6.  History of tobacco abuse, less than a pack a day for 30 years, quit  6-8 months ago. Denies any underlying lung disease. 7.  Weight loss.   The patient noted she lost over 100 pounds in the last  year and noted that her workup was unremarkable by her primary. 8.  Diabetes mellitus and hypothyroidism. 9.  Anxiety. PLAN OF TREATMENT:  Analgesia, anticoagulation. We will follow up on  the venous Doppler results. With her weight loss and unprovoked  pulmonary embolism, she will need an Oncology evaluation to rule out any  occult cancer. The patient will need anticoagulation for at least a  year given her unprovoked PE unless something else would show up and she  already was on heparin drip and now is to start Xarelto. We will do  home O2 eval in a.m. and as noted discussed at length with the patient  and family at bedside and pharmacy the Oncology evaluation at one point  as an inpatient or as soon as an outpatient. We do appreciate the consultation and we will follow.         Barry Martin    D: 03/08/2021 14:47:09       T: 03/08/2021 14:51:55     [email or web address]  Job#: [id number]     Doc#: [id number]    CC:

## 2021-03-09 NOTE — CARE COORDINATION
ONGOING DISCHARGE PLAN:    Spoke with patient & Patient's Daughter, Vamshi Calderon, regarding discharge plan and they confirms that plan is still to return to home w/ no needs. Denies VNS. Daughter, Vamshi Calderon, states, she can help w/ needs. Pt's Xarelto Starter Pack is 0 Cost.     Pt. Did qualify for Home Oxygen, [facility] is following, They will have portable tank delivered to room today. Pulmonary following, Awaiting Face to Face Notes & DME order for Oxygen. Denies needs. Will continue to follow for additional discharge needs.     Electronically signed by Brittanie Peñaloza RN on 3/9/2021 at 9:45 AM

## 2021-03-09 NOTE — PROGRESS NOTES
Pulmonary Progress Note  [facility]      Patient - Ivan Ryan,  Age - 53 y.o.    - 1970      Room Number - /-01   MRN -  [id number]   Acct # - [de-identified]  Date of Admission -  3/7/2021 10:31 AM    Follow-up: Pleurisy, hemoptysis    Consulting Service/Physician   Consulting - Florentin Purcell MD  Primary Care Physician - MARTITA Murillo - CNP     SUBJECTIVE   Continues to cough blood, on 2 L O2  Right-sided pleurisy the same, some short of breath, no wheezing  Denies any fever or chills  Had 1 episode of hematemesis evaluated by GI and Xarelto was held  planning for EGD tomorrow    OBJECTIVE   VITALS    height is 5' 4\" (1.626 m) and weight is 208 lb 12.8 oz (94.7 kg). Her oral temperature is 98.3 °F (36.8 °C). Her blood pressure is 95/63 and her pulse is 82. Her respiration is 18 and oxygen saturation is 90%. Body mass index is 35.84 kg/m². Temperature Range: Temp: 98.3 °F (36.8 °C) Temp  Av.2 °F (36.8 °C)  Min: 98 °F (36.7 °C)  Max: 98.3 °F (36.8 °C)  BP Range:  Systolic (66ODJ), NWE:349 , Min:95 , ETR:818     Diastolic (10CXA), NSM:06, Min:58, Max:64    Pulse Range: Pulse  Av  Min: 74  Max: 90  Respiration Range: Resp  Av  Min: 18  Max: 18  Current Pulse Ox[de-identified]  SpO2: 90 %  24HR Pulse Ox Range:  SpO2  Av %  Min: 90 %  Max: 93 %  Oxygen Amount and Delivery: O2 Flow Rate (L/min): 2 L/min    Wt Readings from Last 3 Encounters:   21 208 lb 12.8 oz (94.7 kg)   21 165 lb (74.8 kg)   21 171 lb (77.6 kg)       I/O (24 Hours)  No intake or output data in the 24 hours ending 21 1357    EXAM     General Appearance  Awake, alert, oriented, in no acute distress  HEENT - normocephalic, atraumatic.    Neck -no JVD,  trachea midline   Lungs -crepitations right base, no wheezing or distress  Cardiovascular - Heart sounds are normal.  Regular rate and rhythm   Abdomen - Soft, nontender, nondistended, no guarding   Neurologic -appropriate, following commands, little anxious  Skin - No bruising or rash  Extremities - No clubbing, cyanosis, edema    MEDS      sucralfate  1 g Oral 4 times per day    pantoprazole  40 mg Intravenous BID    And    sodium chloride (PF)  10 mL Intravenous BID    rivaroxaban  15 mg Oral BID WC    gabapentin  300 mg Oral TID    metoprolol tartrate  50 mg Oral BID    mirtazapine  15 mg Oral Daily    rOPINIRole  2 mg Oral Nightly    sertraline  100 mg Oral Daily    topiramate  25 mg Oral BID    sodium chloride flush  10 mL Intravenous 2 times per day    levothyroxine  175 mcg Oral Daily       perflutren lipid microspheres, sodium chloride flush, tiZANidine, sodium chloride flush, promethazine **OR** ondansetron, polyethylene glycol, acetaminophen **OR** acetaminophen, potassium chloride **OR** potassium alternative oral replacement **OR** potassium chloride, morphine **OR** morphine, LORazepam, HYDROmorphone    LABS   CBC   Recent Labs     03/09/21  0653   WBC 6.6   HGB 11.7*   HCT 34.4*   MCV 93.8   *     BMP:   Lab Results   Component Value Date     03/09/2021    K 3.9 03/09/2021     03/09/2021    CO2 24 03/09/2021    BUN 17 03/09/2021    LABALBU 3.9 03/07/2021    LABALBU 3.8 06/03/2019    CREATININE 0.66 03/09/2021    CALCIUM 8.7 03/09/2021    GFRAA >60 03/09/2021    LABGLOM >60 03/09/2021     ABGs:No results found for: PHART, PO2ART, RKZ3QIN No results found for: IFIO2, MODE, SETTIDVOL, SETPEEP  Ionized Calcium:  No results found for: IONCA  Magnesium:    Lab Results   Component Value Date    MG 2.2 03/04/2021      Phosphorus:    Lab Results   Component Value Date    PHOS 4.7 12/28/2020        LIVER PROFILE   Recent Labs     03/07/21  1105   AST 14   ALT 9   LIPASE 23   BILITOT 0.34   ALKPHOS 103     INR   Recent Labs     03/07/21  1625   INR 1.1     PTT   Recent Labs     03/07/21  1105   APTT 31.7     BNP No results for input(s): BNP in the last 72 hours.    RADIOLOGY     (See actual reports for details)    ASSESSMENT/PLAN   Principal Problem:    Pulmonary embolism on right [facility])  Active Problems:    Anxiety    Hypothyroidism    GERD (gastroesophageal reflux disease)    RLS (restless legs syndrome)    Acute pulmonary embolism (HCC)    Hematemesis with nausea    1. Right-sided pleurisy, mostly with the PE.  2.  Hemoptysis. 3.  Pulmonary infarct right lower lobe. 4.  Right lower lobe pulmonary embolism. Unprovoked, no history of trauma, fall, travel, medications or family history, negative venous Doppler for DVT  5. Bibasilar atelectasis. 6.  History of tobacco abuse, less than a pack a day for 30 years, quit  6-8 months ago. Denies any underlying lung disease. 7.  Weight loss. The patient noted she lost over 100 pounds in the last  year and noted that her workup was unremarkable by her primary. 8.  Diabetes mellitus and hypothyroidism. 9.  Anxiety.     PLAN OF TREATMENT:    Analgesia,   anticoagulation on hold as noted   will need an Oncology evaluation to rule out any occult cancer.     will need anticoagulation for at least a year given her unprovoked PE when okay with the GI  home O2 eval upon discharge    Electronically signed by Annette Bullard MD on 3/9/2021 at 1:57 PM

## 2021-03-09 NOTE — CONSULTS
[address]                 [address] [facility], [address]                                  CONSULTATION    PATIENT NAME: Newton iDckson                      :        1970  MED REC NO:   [id number]                              ROOM:       209  ACCOUNT NO:   [de-identified]                           ADMIT DATE: 2021  PROVIDER:     Ashlee Fitch    ADDENDUM    CONSULT DATE:  2021    ASSESSMENT:  Unprovoked pulmonary embolism. The patient did not have  any history of trauma or fall. The patient also has noted that she has  tried to be active.   There is no recent travel and no family history of  DVT or PE.        Yael Harding    D: 2021 14:48:45       T: 2021 18:56:49     [email or web address]  Job#: [id number]     Doc#: [id number]    CC:

## 2021-03-09 NOTE — PROGRESS NOTES
Physical Therapy    Facility/Department: [facility] PROGRESSIVE CARE  Initial Assessment    NAME: Myesha Lake  : 1970  MRN: [id number]    Date of Service: 3/9/2021    Discharge Recommendations:  Patient would benefit from continued therapy after discharge   PT Equipment Recommendations  Equipment Needed: Yes  Mobility Devices: Marguarite Speed: Rollator (4 Wheeled)  Other: For energy conservation and safety due to patient being a fall risk and new use of O2. Assessment   Body structures, Functions, Activity limitations: Decreased functional mobility ; Decreased ADL status; Decreased strength;Decreased balance;Decreased endurance; Increased pain  Assessment: Pt requires 1 assist for safety. Did have one LOB with mobility. May benefit from use of device in future and assistance at home initially. Pt would benefit from continued PT, progressing to outpatient when safe for back and neck issues. Treatment Diagnosis: Impaired functional mobility 2* pulmonary embolism  Specific instructions for Next Treatment: trial rollator, HEP, stairs  Prognosis: Good  Decision Making: Medium Complexity  History: 52-year-old female presents with complaint of shortness of breath, right-sided chest pain. Patient states pain started last night, states that pain got progressively worse this morning, patient states that pain is worse when she tries to lay flat, states she has improved pain when she is sitting upright, states pain is also worse when she takes a deep breath, admits associated shortness of breath. Exam: ROM, MMT, bed mobility, transfers, amb, balance  Clinical Presentation: Pt alert, cooperative, pleasant.   Barriers to Learning: none  REQUIRES PT FOLLOW UP: Yes  Activity Tolerance  Activity Tolerance: Patient Tolerated treatment well;Patient limited by endurance       Patient Diagnosis(es): The encounter diagnosis was Acute pulmonary embolism, unspecified pulmonary embolism type, unspecified whether acute cor pulmonale present ([address] has a past medical history of Anxiety, CAD (coronary artery disease), Fatty liver, GERD (gastroesophageal reflux disease), Headache, History of bronchitis, Hyperlipidemia, Hypothyroidism, Kidney stone, LFT elevation, MVP (mitral valve prolapse), Obesity, Type 2 diabetes mellitus without complication ([facility] [address] and Umbilical hernia. has a past surgical history that includes Upper gastrointestinal endoscopy (2010); hernia repair; Cholecystectomy; Appendectomy;  section; Colonoscopy (); Colonoscopy (14); Colonoscopy (2014); pr exploratory of abdomen (10/21/2014); Colonoscopy (N/A, 2018); and Hysterectomy, total abdominal.    Restrictions  Restrictions/Precautions  Restrictions/Precautions: General Precautions, Fall Risk  Required Braces or Orthoses?: No  Implants present? : (pt denies)  Position Activity Restriction  Other position/activity restrictions: up with assistance  Vision/Hearing  Vision: Impaired  Vision Exceptions: Wears glasses at all times  Hearing: Within functional limits     Subjective  General  Chart Reviewed: Yes  Patient assessed for rehabilitation services?: Yes  Additional Pertinent Hx: (-) DVT BLE, + PE, T2DM  Family / Caregiver Present: No  Referring Practitioner: Jordon Black MD  Referral Date : 21  Diagnosis: pulmonary embolism on right  Follows Commands: Within Functional Limits  Subjective  Subjective: Pt up in chair, on room air - O2 sats read 88% - pt reports recently back from bathroom. Encouraged pt to put 2L O2 back on as pt supposed to have on. ARDEN Parker assessment.    Pain Screening  Patient Currently in Pain: Yes  Pain Assessment  Pain Assessment: 0-10  Pain Level: 4  Pain Type: Acute pain  Pain Location: Chest;Back;Rib cage  Pain Orientation: Right  Pain Descriptors: Discomfort  Pain Frequency: Continuous  Pain Onset: On-going  Clinical Progression: Gradually improving  Functional Pain Assessment: Prevents or interferes some active activities and ADLs  Non-Pharmaceutical Pain Intervention(s): Ambulation/Increased Activity; Distraction;Repositioned; Rest  Response to Pain Intervention: Patient Satisfied  Vital Signs  Patient Currently in Pain: Yes  Oxygen Therapy  SpO2: 90 %  O2 Device: Nasal cannula  O2 Flow Rate (L/min): 2 L/min  Patient Observation  Observations: O2 sats 88% on room air, 90% on 2L O2       Orientation  Orientation  Overall Orientation Status: Within Normal Limits  Social/Functional History  Social/Functional History  Lives With: Daughter, Son  Type of Home: House  Home Layout: Two level, Performs ADL's on one level, Able to Live on Main level with bedroom/bathroom(full bathroom on first floor)  Home Access: Stairs to enter without rails  Entrance Stairs - Number of Steps: 5  Bathroom Shower/Tub: Tub/Shower unit, Curtain  Bathroom Toilet: Standard  Bathroom Equipment: Shower chair, Hand-held shower(asking for shower chair from father)  Bathroom Accessibility: Accessible  Home Equipment: (no DME but has access if needed)  ADL Assistance: Independent  Homemaking Assistance: Independent  Homemaking Responsibilities: Yes  Ambulation Assistance: Independent(no DME)  Transfer Assistance: Independent  Active : Yes  Mode of Transportation: SUV  Occupation: Full time employment  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 No therapy prior to admit. Daughter in school 2 days/week and also virtual. Son works full time at [employer].   Cognition        Objective          AROM RLE (degrees)  RLE AROM: WNL  AROM LLE (degrees)  LLE AROM : WNL  AROM RUE (degrees)  RUE General AROM: See OT  AROM LUE (degrees)  LUE General AROM: See OT  Strength RLE  Strength RLE: WNL  Comment: Grossly 4 to 4-/5  Strength LLE  Strength LLE: WNL  Comment: Grossly 4 to 4-/5  Strength RUE  Comment: See OT  Strength LUE  Comment: See OT Sensation  Overall Sensation Status: Impaired(Numbness/tingling in L UE from shoulder and distal)  Bed mobility  Sit to Supine: Stand by assistance  Scooting: Stand by assistance  Comment: HOB elevated. pt back to bed at end of session on 2L O2. Pt advised to use call light when getting up due to recent episode of knee buckling. Transfers  Sit to Stand: Contact guard assistance  Stand to sit: Contact guard assistance  Comment: CGA no device for safety. No dizziness however pt's B UE in high guard  Ambulation  Ambulation?: Yes  Ambulation 1  Surface: level tile  Device: No Device  Other Apparatus: O2(2L O2)  Assistance: Contact guard assistance  Quality of Gait: slow oc, increased lateral sway, small steps, B knee buckling toward end of amb  Gait Deviations: Decreased step length; Slow Oc  Distance: 61'  Comments: O2 sats 92% post amb on 2L O2. 1 episode of knee buckling wtih mod x1 to recover. Pt educated on possible use of device and support at home initially. Stairs/Curb  Stairs?: No     Balance  Posture: Good  Sitting - Static: Good  Sitting - Dynamic: Good  Standing - Static: Good;-  Standing - Dynamic: Fair  Comments: Fall risk, no device used but may benefit from device in future sessions. Plan   Plan  Times per week: 5-7x/week  Specific instructions for Next Treatment: trial rollator, HEP, stairs  Current Treatment Recommendations: Strengthening, Balance Training, Functional Mobility Training, Transfer Training, Endurance Training, Gait Training, Equipment Evaluation, Education, & procurement, Patient/Caregiver Education & Training, Safety Education & Training, Home Exercise Program, Stair training  Safety Devices  Type of devices:  All fall risk precautions in place, Call light within reach, Gait belt, Patient at risk for falls, Left in bed, Nurse notified(ARDEN Esparza Patient)    G-Code       OutComes Score                                                  AM-PAC Score  AM-PAC Inpatient Mobility Raw Score : 12 (03/09/21 1039)  AM-PAC Inpatient T-Scale Score : 35.33 (03/09/21 1039)  Mobility Inpatient CMS 0-100% Score: 68.66 (03/09/21 1039)  Mobility Inpatient CMS G-Code Modifier : CL (03/09/21 1039)          Goals  Short term goals  Time Frame for Short term goals: 4 days  Short term goal 1: Pt to demo IND bed mobility  Short term goal 2: Pt to demo Mod I transfers. Short term goal 3: Pt to amb 150' SBA with device prn. Short term goal 4: Pt to ascend/descend 5 stairs, no HR, CGA. Short term goal 5: Pt to demo good technique for HEP for BLE strengthening and balance program.  Short term goal 6: Pt to tolerate 30 minute PT session with O2 sats maintained. Patient Goals   Patient goals :  To go home       Therapy Time   Individual Concurrent Group Co-treatment   Time In 1036         Time Out 1058         Minutes [address] [place] [address]

## 2021-03-09 NOTE — PROGRESS NOTES
[address]   Occupational Therapy Evaluation  Date: 3/9/21  Patient Name: Myesha Lake       Room:   MRN: [id number]  Account: [de-identified]   : 1970  (53 y.o.) Gender: female     Discharge Recommendations:  Further Occupational Therapy is recommended upon facility discharge. Referring Practitioner: Billy Guerra MD  Diagnosis: Pulmonary embolism on right       Treatment Diagnosis: Impaired self-care status. Past Medical History:  has a past medical history of Anxiety, CAD (coronary artery disease), Fatty liver, GERD (gastroesophageal reflux disease), Headache, History of bronchitis, Hyperlipidemia, Hypothyroidism, Kidney stone, LFT elevation, MVP (mitral valve prolapse), Obesity, Type 2 diabetes mellitus without complication ([address] and Umbilical hernia. Past Surgical History:   has a past surgical history that includes Upper gastrointestinal endoscopy (2010); hernia repair; Cholecystectomy; Appendectomy;  section; Colonoscopy (); Colonoscopy (14); Colonoscopy (2014); pr exploratory of abdomen (10/21/2014); Colonoscopy (N/A, 2018); and Hysterectomy, total abdominal.    Restrictions  Restrictions/Precautions: General Precautions, Fall Risk  Implants present? : (pt denies)  Other position/activity restrictions: up with assistance  Required Braces or Orthoses?: No     Vitals  Temp: 98.3 °F (36.8 °C)  Pulse: 82  Resp: 18  BP: 95/63  Height: 5' 4\" (162.6 cm)  Weight: 208 lb 12.8 oz (94.7 kg)  BMI (Calculated): 35.9  Oxygen Therapy  SpO2: 90 %  Pulse Oximeter Device Mode: Continuous  Pulse Oximeter Device Location: Finger  O2 Device: Nasal cannula  O2 Flow Rate (L/min): 2 L/min  Level of Consciousness: Alert (0)    Subjective  Subjective: \"I think they buckled\" Pt reports feeling her bilateral knees buckle during ambulation.   Overall Orientation Status: Within Normal Limits  Vision  Vision: Impaired  Vision Exceptions: Wears glasses at all times  Hearing  Hearing: Within functional limits  Social/Functional History  Lives With: Daughter, Son  Type of Home: House  Home Layout: Two level, Performs ADL's on one level, Able to Live on Main level with bedroom/bathroom(full bathroom on first floor)  Home Access: Stairs to enter without rails  Entrance Stairs - Number of Steps: 5  Bathroom Shower/Tub: Tub/Shower unit, Curtain  Bathroom Toilet: Standard  Bathroom Equipment: Shower chair, Hand-held shower(asking for shower chair from father)  Bathroom Accessibility: [facility]: (no DME but has access if needed)  ADL Assistance: Independent  Homemaking Assistance: Independent  Homemaking Responsibilities: Yes  Ambulation Assistance: Independent(no DME)  Transfer Assistance: Independent  Active : Yes  Mode of Transportation: [employer]  Occupation: Full time employment  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 No therapy prior to admit. Daughter in school 2 days/week and also virtual. Son works full time at [employer].   Pain Assessment  Pain Assessment: 0-10  Pain Level: 4  Pain Type: Acute pain  Pain Location: Chest, Back, Rib cage  Pain Orientation: Right  Pain Descriptors: Discomfort  Pain Frequency: Continuous  Clinical Progression: Gradually improving  Response to Pain Intervention: Patient Satisfied    Objective  Vision - Basic Assessment  Prior Vision: Wears glasses all the time   Cognition  Overall Cognitive Status: WFL   Perception  Overall Perceptual Status: WFL  Sensation  Overall Sensation Status: Impaired(Numbness/tingling in L UE from shoulder and distal)   ADL  Feeding: Independent  Grooming: Supervision  UE Bathing: Supervision  LE Bathing: Contact guard assistance  UE Dressing: Supervision  LE Dressing: Contact guard assistance  Toileting: Contact guard assistance  Additional Comments: CGA for all self-care for safety due to bilateral knee buckle during ambulation. ADL scores based on skilled observations and clinical reasoning unless otherwise noted. UE Function           LUE Strength  Gross LUE Strength: WFL  L Hand General: 4/5  LUE Strength Comment: Grossly 4/5     LUE Tone: Normotonic     LUE AROM (degrees)  LUE AROM : WFL     Left Hand AROM (degrees)  Left Hand AROM: WFL  RUE Strength  Gross RUE Strength: WFL  R Hand General: 4/5  RUE Strength Comment: Grossly 4/5      RUE Tone: Normotonic     RUE AROM (degrees)  RUE AROM : WFL     Right Hand AROM (degrees)  Right Hand AROM: WFL    Fine Motor Skills  Coordination  Movements Are Fluid And Coordinated: Yes                           Mobility  Sit to Supine: Stand by assistance             Functional Mobility  Functional - Mobility Device: No device  Activity: Other(in room and hallway)  Assist Level: Contact guard assistance  Functional Mobility Comments: Moderate assist for 1 LOB with bilateral knee brayden; primarily CGA  Bed mobility  Sit to Supine: Stand by assistance  Scooting: Stand by assistance  Comment: HOB elevated. pt back to bed at end of session on 2L O2. Pt advised to use call light when getting up due to recent episode of knee buckling. Transfers  Sit to stand: Stand by assistance  Stand to sit: Stand by assistance  Functional Activity Tolerance  Functional Activity Tolerance: Tolerates 10 - 20 min exercise with multiple rests   Assessment  Performance deficits / Impairments: Decreased functional mobility , Decreased ADL status, Decreased strength, Decreased safe awareness, Decreased endurance, Decreased balance, Decreased high-level IADLs  Treatment Diagnosis: Impaired self-care status.   Prognosis: Good  Decision Making: Medium Complexity  REQUIRES OT FOLLOW UP: Yes  Discharge Recommendations: Patient would benefit from continued therapy after discharge  Activity Tolerance: Patient Tolerated treatment well         Functional Outcome Measures  AM-PAC Daily Activity Inpatient   How much help for putting on and taking off regular lower body clothing?: A Little  How much help for Bathing?: A Little  How much help for Toileting?: A Little  How much help for putting on and taking off regular upper body clothing?: A Little  How much help for taking care of personal grooming?: A Little  How much help for eating meals?: A Little  AM-[facility] Inpatient Daily Activity Raw Score: 18  AM-PAC Inpatient ADL T-Scale Score : 38.66  ADL Inpatient CMS 0-100% Score: 46.65  ADL Inpatient CMS G-Code Modifier : CK       Goals  Patient Goals   Patient goals : To feel better  Short term goals  Time Frame for Short term goals: By discharge  Short term goal 1: Pt will verbalize/demonstrate Good understanding of energy conservation/work simplification with regard to self-care and mobility. Short term goal 2: Pt will perform BADLs with Supervision and Good safety. Short term goal 3: Pt will perform functional mobility and transfers during self-care with Supervision and Good safety. Short term goal 4: Pt will actively participate in 15+ minutes of therapeutic exercise/functional activities to promote increased independence with self-care and mobility. Plan  Safety Devices  Safety Devices in place: Yes  Type of devices:  All fall risk precautions in place, Call light within reach, Gait belt, Patient at risk for falls, Left in bed     Plan  Times per week: 2-4  Times per day: Daily  Current Treatment Recommendations: Self-Care / ADL, Home Management Training, Strengthening, Balance Training, Functional Mobility Training, Endurance Training, Safety Education & Training, Patient/Caregiver Education & Training, Equipment Evaluation, Education, & procurement          OT Individual Minutes  Time In: 6811  Time Out: [address]  Minutes: 22    Electronically signed by Ernie Sanchez OT on 3/9/21 at 2:55 PM EST

## 2021-03-09 NOTE — CONSULTS
GI Consult Note:    Name: Leonidas Rainey  MRN: [id number]     Acct: [de-identified]  Room: 2091/2091-01    Admit Date: 3/7/2021  PCP: MARTITA Calderon CNP    Physician Requesting Consult: Татьяна Badillo MD     Reason for Consult:    Hematemesis  Patient on blood thinners  GERD   Abdominal  Pains  Diarrhea with irritable bowel syndrome-like symptoms      Chief Complaint:     Chief Complaint   Patient presents with    Shortness of Breath       History Obtained From:     Patient her family at bedside and EMR    History of Present Illness:      Leonidas Rainey is a  53 y.o.  female who presents with Shortness of Breath    This 41-year-old  female has history significant for multiple chronic medical issues  She has been admitted with shortness of breath has been diagnosed with pulmonary embolism  She was given blood thinners  She started to get some nausea vomiting and threw up roxann blood this morning  She gives history for previous GERD symptoms  History for some abdominal discomfort and pain  She has history for intermittent diarrhea and irritable bowel syndrome-like symptoms that abdominal bloating gas off-and-on cramping  Denies any previous history for hematemesis  Questionably had some dry heaving?   Currently feeling okay  Currently denies nausea vomiting  Patient laboratory work-up charts imaging studies were all reviewed discussed with the family primary care physician  Symptoms:  Onset:  Location:  abdomen  Duration:  hour(s)  Severity:  moderate  Quality:  intermittent      Past Medical History:     Past Medical History:   Diagnosis Date    Anxiety     CAD (coronary artery disease)     Mitral valve prolapse    Fatty liver     GERD (gastroesophageal reflux disease)     Headache     History of bronchitis     Hyperlipidemia     Hypothyroidism     Kidney stone     LFT elevation     MVP (mitral valve prolapse)     Obesity     Type 2 diabetes mellitus without complication ([address] 3/61/3846    Umbilical hernia         Past Surgical History:     Past Surgical History:   Procedure Laterality Date    APPENDECTOMY       SECTION      2 pfannenstiel, 1 vertical    CHOLECYSTECTOMY      COLONOSCOPY      Dr Khoa Erazo  14    COLONOSCOPY  2014    biopsy & sigmoid spasms, pathology negative    COLONOSCOPY N/A 2018    COLONOSCOPY WITH BIOPSY performed by Vijay Trevino [facility] at [facility]      x 5    HYSTERECTOMY, TOTAL ABDOMINAL          MT EXPLORATORY OF ABDOMEN  10/21/2014    Laparotomy-lysis of adhesions, bso     UPPER GASTROINTESTINAL ENDOSCOPY  2010    mild chronic inactive gastritis        Medications Prior to Admission:       Prior to Admission medications    Medication Sig Start Date End Date Taking?  Authorizing Provider   rivaroxaban 15 & 20 MG Starter Pack Xarelto 15 mg BID. 3/8/21  Yes Giovanna Snider MD   levothyroxine (SYNTHROID) 175 MCG tablet TAKE 1 TABLET BY MOUTH DAILY 3/5/21  Yes MARTITA Hunter CNP   ondansetron (ZOFRAN) 4 MG tablet Take 1 tablet by mouth every 8 hours as needed for Nausea or Vomiting 3/4/21  Yes Julita Medina MD   esomeprazole (BigDNA) 40 MG delayed release capsule TAKE 1 CAPSULE BY MOUTH EVERY MORNING BEFORE BREAKFAST 20  Yes MARTITA Rodgers CNP   tiZANidine (ZANAFLEX) 4 MG tablet TAKE 1 TABLET BY MOUTH EVERY 8 HOURS AS NEEDED FOR SPASMS 20  Yes Roark Najjar, MD   mirtazapine (REMERON) 15 MG tablet Take 15 mg by mouth daily 11/10/20  Yes Historical Provider, MD   rOPINIRole (REQUIP) 2 MG tablet TAKE 1 TABLET BY MOUTH EVERY NIGHT 20  Yes MARTITA Hunter CNP   ondansetron (ZOFRAN ODT) 4 MG disintegrating tablet Take 1 tablet by mouth every 8 hours as needed for Nausea or Vomiting 3/4/20  Yes Elliott Myers MD   sertraline (ZOLOFT) 100 MG tablet Take 100 mg by mouth daily   Yes Historical Provider, MD   topiramate (TOPAMAX) 25 MG tablet 25 mg 2 times daily  2/27/18  Yes Historical Provider, MD   metoprolol tartrate (LOPRESSOR) 50 MG tablet Take 50 mg by mouth 2 times daily  8/21/17  Yes Historical Provider, MD   oxyCODONE-acetaminophen (PERCOCET)  MG per tablet Take 1 tablet by mouth every 6 hours as needed for Pain for up to 30 days. 3/9/21 4/8/21  Dia Kayser, APRN - CNP   oxyCODONE (OXYCONTIN) 10 MG extended release tablet Take 1 tablet by mouth every 12 hours for 30 days. 3/5/21 4/4/21  Dia Kayser, APRN - CNP   lidocaine (LIDODERM) 5 % Place 1 patch onto the skin daily 12 hours on, 12 hours off. 3/4/21   Iris De La Cruz MD   methylPREDNISolone (MEDROL, MARIANNE,) 4 MG tablet Take by mouth. 3/4/21   Irsi De La Cruz MD   gabapentin (NEURONTIN) 300 MG capsule Take 1 capsule by mouth 3 times daily for 30 days. 2/25/21 3/27/21  MARTITA Stewart CNP   albuterol sulfate  (90 Base) MCG/ACT inhaler INHALE 2 PUFFS INTO THE LUNGS EVERY 4 HOURS AS NEEDED FOR SHORTNESS OF BREATH 1/30/21   Historical Provider, MD   clonazePAM (KLONOPIN) 0.5 MG tablet Take 1 tablet by mouth 2 times daily as needed for Anxiety for up to 30 days. 12/28/20 2/25/21  Vickie Calderon MD        Allergies:       Compazine [prochlorperazine], Sulfa antibiotics, and Adhesive tape    Social History:     Tobacco:    reports that she has quit smoking. Her smoking use included cigarettes. She has a 8.25 pack-year smoking history. She has never used smokeless tobacco.  Alcohol:      reports no history of alcohol use. Drug Use:  reports no history of drug use.     Family History:     Family History   Problem Relation Age of Onset   Jorge Richi Cancer Mother         vaginal    Heart Disease Father     Diabetes Father     Other Father         colon resection for colon polyps    Breast Cancer Maternal Grandmother     Stroke Maternal Grandfather        Review of Systems:     Positive and Negative as described in HPI    Constitutional:  negative for  fevers, chills, sweats, mild fatigue, and weight loss  HEENT:  negative for vision or hearing changes,   Respiratory: Positive shortness of breath, cough, or congestion  Cardiovascular: Positive chest pain, palpitations  Gastrointestinal: Positive for nausea, vomiting, diarrhea, constipation, abdominal pain  Genitourinary:  negative for frequency, dysuria  Integument:  negative for rash, skin lesions  Musculoskeletal: Positive muscle aches or joint pain  Neurological:  negative for headaches, dizziness, lightheadedness, numbness, pain and tingling extrimities  Behavior/Psych:  negative for depression and positive anxiety    Code Status:  Full Code    Physical Exam:     Vitals:  BP 95/63   Pulse 82   Temp 98.3 °F (36.8 °C) (Oral)   Resp 18   Ht 5' 4\" (1.626 m)   Wt 208 lb 12.8 oz (94.7 kg)   LMP 2010   SpO2 90%   BMI 35.84 kg/m²   Temp (24hrs), Av.2 °F (36.8 °C), Min:98 °F (36.7 °C), Max:98.3 °F (36.8 °C)      General appearance - alert, well appearing, and in no acute distress  Mental status - oriented to person, place, and time with anxious affect  Head - normocephalic and atraumatic  Eyes - pupils equal and reactive, extraocular eye movements intact, conjunctiva clear  Ears - hearing appears to be intact  Nose - no drainage noted  Mouth - mucous membranes moist  Neck - supple, no carotid bruits, thyroid not palpable  Chest -Rales and rhonchi to auscultation, normal effort  Heart - normal rate, regular rhythm, no murmurs  Abdomen - soft, mild upper abdominal tenderness, nondistended, bowel sounds present all four quadrants, no masses, hepatomegaly or splenomegaly.  No hernias  Neurological - normal speech, no focal findings or movement disorder noted, cranial nerves II through XII grossly intact  Extremities - , no pedal edema or calf pain with palpation  Skin - no gross lesions, rashes, or induration noted  Cranial Nerves : grossly intact  Lymph nodes: not done at this time    Data:   CBC:   Lab Results   Component Value Date    WBC 6.6 03/09/2021    RBC 3.67 03/09/2021    RBC 4.36 06/03/2019    HGB 11.7 03/09/2021    HCT 34.4 03/09/2021    MCV 93.8 03/09/2021    MCH 31.9 03/09/2021    MCHC 34.0 03/09/2021    RDW 12.6 03/09/2021     03/09/2021     06/05/2012    MPV 7.8 03/09/2021     CBC with Differential:    Lab Results   Component Value Date    WBC 6.6 03/09/2021    RBC 3.67 03/09/2021    RBC 4.36 06/03/2019    HGB 11.7 03/09/2021    HCT 34.4 03/09/2021     03/09/2021     06/05/2012    MCV 93.8 03/09/2021    MCH 31.9 03/09/2021    MCHC 34.0 03/09/2021    RDW 12.6 03/09/2021    NRBC 0 06/03/2019    LYMPHOPCT 14 03/09/2021    LYMPHOPCT 33.2 06/03/2019    MONOPCT 7 03/09/2021    MONOPCT 6.0 06/03/2019    BASOPCT 0 03/09/2021    BASOPCT 0.9 06/03/2019    MONOSABS 0.50 03/09/2021    MONOSABS 0.5 06/03/2019    LYMPHSABS 0.90 03/09/2021    LYMPHSABS 2.7 06/03/2019    EOSABS 0.10 03/09/2021    EOSABS 0.3 06/03/2019    BASOSABS 0.00 03/09/2021    DIFFTYPE NOT REPORTED 03/09/2021     Hemoglobin/Hematocrit:    Lab Results   Component Value Date    HGB 11.7 03/09/2021    HCT 34.4 03/09/2021     CMP:    Lab Results   Component Value Date     03/09/2021    K 3.9 03/09/2021     03/09/2021    CO2 24 03/09/2021    BUN 17 03/09/2021    CREATININE 0.66 03/09/2021    GFRAA >60 03/09/2021    LABGLOM >60 03/09/2021    GLUCOSE 154 03/09/2021    GLUCOSE 107 06/03/2019    PROT 7.4 03/07/2021    PROT 6.3 06/03/2019    LABALBU 3.9 03/07/2021    LABALBU 3.8 06/03/2019    CALCIUM 8.7 03/09/2021    BILITOT 0.34 03/07/2021    ALKPHOS 103 03/07/2021    AST 14 03/07/2021    ALT 9 03/07/2021     BMP:    Lab Results   Component Value Date     03/09/2021    K 3.9 03/09/2021     03/09/2021    CO2 24 03/09/2021    BUN 17 03/09/2021    LABALBU 3.9 03/07/2021    LABALBU 3.8 06/03/2019    CREATININE 0.66 03/09/2021    CALCIUM 8.7 03/09/2021    GFRAA >60 03/09/2021    LABGLOM >60 03/09/2021    GLUCOSE 154 03/09/2021    GLUCOSE 107 06/03/2019     PT/INR:    Lab Results   Component Value Date    PROTIME 14.2 03/07/2021    INR 1.1 03/07/2021     PTT:    Lab Results   Component Value Date    APTT 31.7 03/07/2021   [APTT}    Assesment:     Primary Problem  Pulmonary embolism on right [facility])    Active Hospital Problems    Diagnosis Date Noted    Pulmonary embolism on right ([address] [I26.99] 03/07/2021    RLS (restless legs syndrome) [G25.81] 01/26/2015    GERD (gastroesophageal reflux disease) [K21.9] 04/17/2014    Hyperlipidemia [E78.5]     Hypothyroidism [E03.9]     Anxiety [F41.9]      Hematemesis  Patient on blood thinners  GERD   Abdominal  Pains  Diarrhea with irritable bowel syndrome-like symptoms  Plan:     1. Hematemesis questionable Lisbeth-Saravia rule out ulcer disease etc.  2. History for GERD  3. Start PPI  4. Hold blood thinners  5. Follow hemoglobin hematocrit  6. As needed antiemetics  7. Start Carafate  8. Clear liquid diet without red Jell-O  9. N.p.o. after midnight  10. We will plan EGD on her tomorrow  The Endoscopic procedure was explained to the patient in detail  The prep and NPO were explained  All the Risks, Benefits, and Alternatives were explained  Risk of Bleeding, Perforation and Cardio Respiratory risks were explained  her questions were answered  The patient and her family has verbalized understanding and agreement to this plan. Discussed with nursing staff on the floor and with primary care physician          Thank you for allowing me to participate in the care of your patient. Please feel free to contact me with any questions or concerns.      Electronically signed by Lisa Dobson MD on 3/9/2021 at 12:28 PM     Copy sent to Dr. Duglas Youssef, APRN - CNP

## 2021-03-09 NOTE — PLAN OF CARE
Problem: Infection:  Goal: Will remain free from infection  Description: Will remain free from infection  Outcome: Ongoing  Note: Patient remains afebrile; WBC count is within normal limits; no signs of erythema, edema, or warmth. Will continue to monitor for signs/symptoms of infection. Problem: Safety:  Goal: Free from accidental physical injury  Description: Free from accidental physical injury  Outcome: Ongoing  Note: Pt ambulates with a steady gait. Safety maintained this shift. Continue to monitor. Goal: Free from intentional harm  Description: Free from intentional harm  Outcome: Ongoing     Problem: Daily Care:  Goal: Daily care needs are met  Description: Daily care needs are met  Outcome: Ongoing  Note: Patient and staff currently meeting all patient's daily care needs. Patient encouraged to participate and complete all ADLs per self. Will continue to monitor for opportunities for patient to meet all ADLs per self. Problem: Pain:  Goal: Patient's pain/discomfort is manageable  Description: Patient's pain/discomfort is manageable  Outcome: Ongoing  Note: No pain at this time. 0/10 pain scale. Goal: Pain level will decrease  Description: Pain level will decrease  Outcome: Ongoing  Goal: Control of acute pain  Description: Control of acute pain  Outcome: Ongoing  Goal: Control of chronic pain  Description: Control of chronic pain  Outcome: Ongoing     Problem: Skin Integrity:  Goal: Skin integrity will stabilize  Description: Skin integrity will stabilize  Outcome: Ongoing  Note: Skin is clean dry and intact.       Problem: Discharge Planning:  Goal: Patients continuum of care needs are met  Description: Patients continuum of care needs are met  Outcome: Ongoing     Problem: Nutrition  Goal: Optimal nutrition therapy  Description: Nutrition Problem #1: Predicted inadequate energy intake  Intervention: Food and/or Nutrient Delivery: Modify Current Diet, Start Oral Nutrition Supplement  Nutritional Goals: po intake greater than 75%     Outcome: Ongoing     Problem: Musculor/Skeletal Functional Status  Goal: Highest potential functional level  Outcome: Ongoing  Goal: Absence of falls  Outcome: Ongoing     Problem: Musculor/Skeletal Functional Status  Goal: Highest potential functional level  Outcome: Ongoing  Goal: Absence of falls  Outcome: Ongoing

## 2021-03-10 ENCOUNTER — ANESTHESIA (OUTPATIENT)
Dept: ENDOSCOPY | Age: 51
DRG: 175 | End: 2021-03-10
Payer: COMMERCIAL

## 2021-03-10 VITALS — DIASTOLIC BLOOD PRESSURE: 92 MMHG | SYSTOLIC BLOOD PRESSURE: 134 MMHG | OXYGEN SATURATION: 96 % | TEMPERATURE: 98.6 F

## 2021-03-10 PROBLEM — J96.01 ACUTE RESPIRATORY FAILURE WITH HYPOXIA (HCC): Status: ACTIVE | Noted: 2021-03-10

## 2021-03-10 LAB
ABSOLUTE EOS #: 0.2 K/UL (ref 0–0.4)
ABSOLUTE IMMATURE GRANULOCYTE: ABNORMAL K/UL (ref 0–0.3)
ABSOLUTE LYMPH #: 1 K/UL (ref 1–4.8)
ABSOLUTE MONO #: 0.4 K/UL (ref 0.1–1.3)
ANION GAP SERPL CALCULATED.3IONS-SCNC: 10 MMOL/L (ref 9–17)
BASOPHILS # BLD: 1 % (ref 0–2)
BASOPHILS ABSOLUTE: 0 K/UL (ref 0–0.2)
BUN BLDV-MCNC: 11 MG/DL (ref 6–20)
BUN/CREAT BLD: ABNORMAL (ref 9–20)
CALCIUM SERPL-MCNC: 8.7 MG/DL (ref 8.6–10.4)
CHLORIDE BLD-SCNC: 109 MMOL/L (ref 98–107)
CO2: 22 MMOL/L (ref 20–31)
CREAT SERPL-MCNC: 0.67 MG/DL (ref 0.5–0.9)
DIFFERENTIAL TYPE: ABNORMAL
EOSINOPHILS RELATIVE PERCENT: 5 % (ref 0–4)
GFR AFRICAN AMERICAN: >60 ML/MIN
GFR NON-AFRICAN AMERICAN: >60 ML/MIN
GFR SERPL CREATININE-BSD FRML MDRD: ABNORMAL ML/MIN/{1.73_M2}
GFR SERPL CREATININE-BSD FRML MDRD: ABNORMAL ML/MIN/{1.73_M2}
GLUCOSE BLD-MCNC: 126 MG/DL (ref 70–99)
HCT VFR BLD CALC: 35.4 % (ref 36–46)
HCT VFR BLD CALC: 36.2 % (ref 36–46)
HEMOGLOBIN: 11.7 G/DL (ref 12–16)
HEMOGLOBIN: 11.9 G/DL (ref 12–16)
IMMATURE GRANULOCYTES: ABNORMAL %
LYMPHOCYTES # BLD: 22 % (ref 24–44)
MCH RBC QN AUTO: 31.2 PG (ref 26–34)
MCHC RBC AUTO-ENTMCNC: 33.1 G/DL (ref 31–37)
MCV RBC AUTO: 94.3 FL (ref 80–100)
MONOCYTES # BLD: 9 % (ref 1–7)
NRBC AUTOMATED: ABNORMAL PER 100 WBC
PDW BLD-RTO: 12.8 % (ref 11.5–14.9)
PLATELET # BLD: 156 K/UL (ref 150–450)
PLATELET ESTIMATE: ABNORMAL
PMV BLD AUTO: 8 FL (ref 6–12)
POTASSIUM SERPL-SCNC: 3.7 MMOL/L (ref 3.7–5.3)
RBC # BLD: 3.75 M/UL (ref 4–5.2)
RBC # BLD: ABNORMAL 10*6/UL
SEG NEUTROPHILS: 63 % (ref 36–66)
SEGMENTED NEUTROPHILS ABSOLUTE COUNT: 2.9 K/UL (ref 1.3–9.1)
SODIUM BLD-SCNC: 141 MMOL/L (ref 135–144)
WBC # BLD: 4.5 K/UL (ref 3.5–11)
WBC # BLD: ABNORMAL 10*3/UL

## 2021-03-10 PROCEDURE — 6370000000 HC RX 637 (ALT 250 FOR IP): Performed by: INTERNAL MEDICINE

## 2021-03-10 PROCEDURE — 3700000000 HC ANESTHESIA ATTENDED CARE: Performed by: INTERNAL MEDICINE

## 2021-03-10 PROCEDURE — 43239 EGD BIOPSY SINGLE/MULTIPLE: CPT | Performed by: INTERNAL MEDICINE

## 2021-03-10 PROCEDURE — 6360000002 HC RX W HCPCS: Performed by: STUDENT IN AN ORGANIZED HEALTH CARE EDUCATION/TRAINING PROGRAM

## 2021-03-10 PROCEDURE — 2060000000 HC ICU INTERMEDIATE R&B

## 2021-03-10 PROCEDURE — 88305 TISSUE EXAM BY PATHOLOGIST: CPT

## 2021-03-10 PROCEDURE — 36415 COLL VENOUS BLD VENIPUNCTURE: CPT

## 2021-03-10 PROCEDURE — 6360000002 HC RX W HCPCS: Performed by: NURSE PRACTITIONER

## 2021-03-10 PROCEDURE — 6360000002 HC RX W HCPCS: Performed by: INTERNAL MEDICINE

## 2021-03-10 PROCEDURE — 7100000000 HC PACU RECOVERY - FIRST 15 MIN: Performed by: INTERNAL MEDICINE

## 2021-03-10 PROCEDURE — 6370000000 HC RX 637 (ALT 250 FOR IP): Performed by: NURSE PRACTITIONER

## 2021-03-10 PROCEDURE — 80048 BASIC METABOLIC PNL TOTAL CA: CPT

## 2021-03-10 PROCEDURE — 0DB68ZX EXCISION OF STOMACH, VIA NATURAL OR ARTIFICIAL OPENING ENDOSCOPIC, DIAGNOSTIC: ICD-10-PCS | Performed by: INTERNAL MEDICINE

## 2021-03-10 PROCEDURE — 85018 HEMOGLOBIN: CPT

## 2021-03-10 PROCEDURE — 7100000001 HC PACU RECOVERY - ADDTL 15 MIN: Performed by: INTERNAL MEDICINE

## 2021-03-10 PROCEDURE — 2580000003 HC RX 258: Performed by: INTERNAL MEDICINE

## 2021-03-10 PROCEDURE — 85014 HEMATOCRIT: CPT

## 2021-03-10 PROCEDURE — 94761 N-INVAS EAR/PLS OXIMETRY MLT: CPT

## 2021-03-10 PROCEDURE — 85025 COMPLETE CBC W/AUTO DIFF WBC: CPT

## 2021-03-10 PROCEDURE — 2580000003 HC RX 258: Performed by: ANESTHESIOLOGY

## 2021-03-10 PROCEDURE — C9113 INJ PANTOPRAZOLE SODIUM, VIA: HCPCS | Performed by: INTERNAL MEDICINE

## 2021-03-10 PROCEDURE — 2500000003 HC RX 250 WO HCPCS: Performed by: NURSE ANESTHETIST, CERTIFIED REGISTERED

## 2021-03-10 PROCEDURE — 2580000003 HC RX 258: Performed by: STUDENT IN AN ORGANIZED HEALTH CARE EDUCATION/TRAINING PROGRAM

## 2021-03-10 PROCEDURE — 2709999900 HC NON-CHARGEABLE SUPPLY: Performed by: INTERNAL MEDICINE

## 2021-03-10 PROCEDURE — 2580000003 HC RX 258: Performed by: NURSE PRACTITIONER

## 2021-03-10 PROCEDURE — 6360000002 HC RX W HCPCS: Performed by: NURSE ANESTHETIST, CERTIFIED REGISTERED

## 2021-03-10 PROCEDURE — C9113 INJ PANTOPRAZOLE SODIUM, VIA: HCPCS | Performed by: NURSE PRACTITIONER

## 2021-03-10 PROCEDURE — 2700000000 HC OXYGEN THERAPY PER DAY

## 2021-03-10 PROCEDURE — 97110 THERAPEUTIC EXERCISES: CPT

## 2021-03-10 PROCEDURE — 3609012400 HC EGD TRANSORAL BIOPSY SINGLE/MULTIPLE: Performed by: INTERNAL MEDICINE

## 2021-03-10 RX ORDER — PROPOFOL 10 MG/ML
INJECTION, EMULSION INTRAVENOUS PRN
Status: DISCONTINUED | OUTPATIENT
Start: 2021-03-10 | End: 2021-03-10 | Stop reason: SDUPTHER

## 2021-03-10 RX ORDER — SODIUM CHLORIDE, SODIUM LACTATE, POTASSIUM CHLORIDE, CALCIUM CHLORIDE 600; 310; 30; 20 MG/100ML; MG/100ML; MG/100ML; MG/100ML
INJECTION, SOLUTION INTRAVENOUS CONTINUOUS
Status: DISCONTINUED | OUTPATIENT
Start: 2021-03-10 | End: 2021-03-10

## 2021-03-10 RX ORDER — LIDOCAINE HYDROCHLORIDE 10 MG/ML
INJECTION, SOLUTION EPIDURAL; INFILTRATION; INTRACAUDAL; PERINEURAL PRN
Status: DISCONTINUED | OUTPATIENT
Start: 2021-03-10 | End: 2021-03-10 | Stop reason: SDUPTHER

## 2021-03-10 RX ADMIN — HYDROMORPHONE HYDROCHLORIDE 1 MG: 1 INJECTION, SOLUTION INTRAMUSCULAR; INTRAVENOUS; SUBCUTANEOUS at 16:11

## 2021-03-10 RX ADMIN — HYDROMORPHONE HYDROCHLORIDE 1 MG: 1 INJECTION, SOLUTION INTRAMUSCULAR; INTRAVENOUS; SUBCUTANEOUS at 03:30

## 2021-03-10 RX ADMIN — PROPOFOL 80 MG: 10 INJECTION, EMULSION INTRAVENOUS at 11:33

## 2021-03-10 RX ADMIN — ONDANSETRON 4 MG: 2 INJECTION INTRAMUSCULAR; INTRAVENOUS at 07:35

## 2021-03-10 RX ADMIN — HYDROMORPHONE HYDROCHLORIDE 1 MG: 1 INJECTION, SOLUTION INTRAMUSCULAR; INTRAVENOUS; SUBCUTANEOUS at 13:12

## 2021-03-10 RX ADMIN — SODIUM CHLORIDE, PRESERVATIVE FREE 10 ML: 5 INJECTION INTRAVENOUS at 07:36

## 2021-03-10 RX ADMIN — ONDANSETRON 4 MG: 2 INJECTION INTRAMUSCULAR; INTRAVENOUS at 20:35

## 2021-03-10 RX ADMIN — TOPIRAMATE 25 MG: 25 TABLET, FILM COATED ORAL at 20:17

## 2021-03-10 RX ADMIN — HYDROMORPHONE HYDROCHLORIDE 1 MG: 1 INJECTION, SOLUTION INTRAMUSCULAR; INTRAVENOUS; SUBCUTANEOUS at 07:35

## 2021-03-10 RX ADMIN — PANTOPRAZOLE SODIUM 40 MG: 40 INJECTION, POWDER, FOR SOLUTION INTRAVENOUS at 20:17

## 2021-03-10 RX ADMIN — GABAPENTIN 300 MG: 300 CAPSULE ORAL at 13:12

## 2021-03-10 RX ADMIN — SODIUM CHLORIDE, PRESERVATIVE FREE 10 ML: 5 INJECTION INTRAVENOUS at 20:43

## 2021-03-10 RX ADMIN — SODIUM CHLORIDE 10 ML: 9 INJECTION, SOLUTION INTRAMUSCULAR; INTRAVENOUS; SUBCUTANEOUS at 20:17

## 2021-03-10 RX ADMIN — PANTOPRAZOLE SODIUM 40 MG: 40 INJECTION, POWDER, FOR SOLUTION INTRAVENOUS at 07:35

## 2021-03-10 RX ADMIN — METOPROLOL TARTRATE 50 MG: 50 TABLET, FILM COATED ORAL at 20:16

## 2021-03-10 RX ADMIN — SUCRALFATE 1 G: 1 TABLET ORAL at 00:19

## 2021-03-10 RX ADMIN — LIDOCAINE HYDROCHLORIDE 50 MG: 10 INJECTION, SOLUTION EPIDURAL; INFILTRATION; INTRACAUDAL; PERINEURAL at 11:33

## 2021-03-10 RX ADMIN — HYDROMORPHONE HYDROCHLORIDE 1 MG: 1 INJECTION, SOLUTION INTRAMUSCULAR; INTRAVENOUS; SUBCUTANEOUS at 20:39

## 2021-03-10 RX ADMIN — PROPOFOL 20 MG: 10 INJECTION, EMULSION INTRAVENOUS at 11:35

## 2021-03-10 RX ADMIN — SUCRALFATE 1 G: 1 TABLET ORAL at 17:42

## 2021-03-10 RX ADMIN — GABAPENTIN 300 MG: 300 CAPSULE ORAL at 20:16

## 2021-03-10 RX ADMIN — MIRTAZAPINE 15 MG: 15 TABLET, FILM COATED ORAL at 20:17

## 2021-03-10 RX ADMIN — SODIUM CHLORIDE, POTASSIUM CHLORIDE, SODIUM LACTATE AND CALCIUM CHLORIDE: 600; 310; 30; 20 INJECTION, SOLUTION INTRAVENOUS at 11:30

## 2021-03-10 RX ADMIN — SODIUM CHLORIDE 10 ML: 9 INJECTION, SOLUTION INTRAMUSCULAR; INTRAVENOUS; SUBCUTANEOUS at 07:35

## 2021-03-10 RX ADMIN — ROPINIROLE HYDROCHLORIDE 2 MG: 1 TABLET, FILM COATED ORAL at 20:16

## 2021-03-10 ASSESSMENT — PULMONARY FUNCTION TESTS
PIF_VALUE: 1

## 2021-03-10 ASSESSMENT — PAIN SCALES - GENERAL
PAINLEVEL_OUTOF10: 8
PAINLEVEL_OUTOF10: 0
PAINLEVEL_OUTOF10: 3
PAINLEVEL_OUTOF10: 3
PAINLEVEL_OUTOF10: 8
PAINLEVEL_OUTOF10: 2

## 2021-03-10 ASSESSMENT — PAIN DESCRIPTION - ORIENTATION: ORIENTATION: RIGHT

## 2021-03-10 ASSESSMENT — ENCOUNTER SYMPTOMS
NAUSEA: 1
VOMITING: 0
SHORTNESS OF BREATH: 1
ABDOMINAL PAIN: 0
BACK PAIN: 0
WHEEZING: 0
BLOOD IN STOOL: 0
CONSTIPATION: 0

## 2021-03-10 ASSESSMENT — PAIN - FUNCTIONAL ASSESSMENT: PAIN_FUNCTIONAL_ASSESSMENT: 0-10

## 2021-03-10 NOTE — PROGRESS NOTES
[address]   OCCUPATIONAL THERAPY MISSED TREATMENT NOTE   INPATIENT   Date: 3/10/21  Patient Name: Ge Brambila       Room: Akanksha Edwards  MRN: [id number]   Account #: [de-identified]    : 1970  (53 y.o.)  Gender: female   Referring Practitioner: Shena Granados MD  Diagnosis: Pulmonary embolism on right             REASON FOR MISSED TREATMENT:  Patient at testing and/or off the floor   -   Testing per RN pt transported for EGT. Will continue to follow.        NITIN Segundo

## 2021-03-10 NOTE — ANESTHESIA PRE-OP
Charts and labs reviewed. Pt seen/exam/consent. Benefits and risks of anesthesia discussed with pt.  She agreed to proceed with MAC

## 2021-03-10 NOTE — OP NOTE
PROCEDURE NOTE    DATE OF PROCEDURE: 3/10/2021     SURGEON: Owen Rowland MD    ASSISTANT: None    PREOPERATIVE DIAGNOSIS: HEMATEMESIS  PATIENT ON BLOOD THINNERS    POSTOPERATIVE DIAGNOSIS: As described below    OPERATION: Upper GI endoscopy with Biopsy    ANESTHESIA: MAC PER ANESTHESIA     ESTIMATED BLOOD LOSS: Less than 50 ml    COMPLICATIONS: None. SPECIMENS:  Was Obtained:     HISTORY: The patient is a 53y.o. year old female with history of above preop diagnosis. I recommended esophagogastroduodenoscopy with possible biopsy and I explained the risk, benefits, expected outcome, and alternatives to the procedure. Risks included but are not limited to bleeding, infection, respiratory distress, hypotension, and perforation of the esophagus, stomach, or duodenum. Patient understands and is in agreement. PROCEDURE: The patient was given IV conscious sedation. The patient's SPO2 remained above 90% throughout the procedure. The gastroscope was inserted orally and advanced under direct vision through the esophagus, through the stomach, through the pylorus, and into the descending duodenum. Findings:    Retropharyngeal area was grossly normal appearing    Esophagus: normal    Stomach:    Fundus: normal    Body: abnormal: MOD LINEAR GASTRITIS    Antrum: abnormal: MOD TO SEVERE LINEAR GASTRITIS NO BLEEDING    Duodenum:     Descending: normal    Bulb: normal    The scope was removed and the patient tolerated the procedure well. Recommendations/Plan:   1. F/U Biopsies  2. PPI  3.  Post sedation patient was stable with stable vital signs and stable O2 saturations    Electronically signed by Owen Rowland MD  on 3/10/2021 at 11:38 AM

## 2021-03-10 NOTE — ANESTHESIA POSTPROCEDURE EVALUATION
POST- ANESTHESIA EVALUATION       Pt Name: Andreea Yu  MRN: [id number]  YOB: 1970  Date of evaluation: 3/10/2021  Time:  1:57 PM      /72   Pulse 76   Temp 99.3 °F (37.4 °C) (Oral)   Resp 16   Ht 5' 4\" (1.626 m)   Wt 201 lb 3.2 oz (91.3 kg)   LMP 11/01/2010   SpO2 93%   BMI 34.54 kg/m²      Consciousness Level  Awake  Cardiopulmonary Status  Stable  Pain Adequately Treated YES  Nausea / Vomiting  NO  Adequate Hydration  YES  Anesthesia Related Complications NONE      Electronically signed by Gideon Vela MD on 3/10/2021 at 1:57 PM       Department of Anesthesiology  Postprocedure Note    Patient: Andreea Yu  MRN: [id number]  YOB: 1970  Date of evaluation: 3/10/2021  Time:  1:57 PM     Procedure Summary     Date: 03/10/21 Room / Location: [address] AND [facility]    Anesthesia Start: 6591 Anesthesia Stop: 7940    Procedure: EGD BIOPSY (N/A Esophagus) Diagnosis: (HEMATEMESIS)    Surgeons: Jaya Piedra MD Responsible Provider: Gideon Vela MD    Anesthesia Type: MAC ASA Status: 3          Anesthesia Type: MAC    Kalia Phase I: Kalia Score: 9    Kalia Phase II:      Last vitals: Reviewed and per EMR flowsheets.        Anesthesia Post Evaluation

## 2021-03-10 NOTE — PROGRESS NOTES
[address]    PROGRESS NOTE             3/10/2021    10:06 AM    Name:   Pranav Jacobs  MRN:     [id number]     Acct:      [de-identified]   Room:   [facility] ENDO Pool/NONE  IP Day:  3  Admit Date:  3/7/2021 10:31 AM    PCP:  MARTITA Steinberg CNP  Code Status:  Full Code    Subjective:     C/C:   Chief Complaint   Patient presents with    Shortness of Breath     Interval History Status: not changed. Pt was seen and examined at the bedside. Patient remained vitally stable. Patient was made n.p.o. after midnight and the EGD was done 11 AM this morning. Patient is still on 3 L nasal cannula oxygen according to the home O2 eval.  Patient is still uncomfortable and complaining of right-sided chest pain secondary to PE. Patient is again put on continuous oximetry and will try to wean her off. Patient lab works are unremarkable. Given concern with possible hematemesis yesterday GI was consulted and they will undergo an EGD this morning. Pulmonology is also on board and they recommend oncology consult, we will put a referral as an outpatient for possible clinical malignancy. Review of Systems:     Review of Systems   Constitutional: Negative for chills and fatigue. Respiratory: Positive for shortness of breath. Negative for wheezing. Hemoptysis. Cardiovascular: Positive for chest pain. Negative for palpitations and leg swelling. Gastrointestinal: Positive for nausea. Negative for abdominal pain, blood in stool, constipation and vomiting. Genitourinary: Negative for difficulty urinating, frequency and urgency. Musculoskeletal: Negative for back pain and neck pain. Neurological: Negative for tremors, light-headedness and headaches. Medications: Allergies:     Allergies   Allergen Reactions    Compazine [Prochlorperazine]      Jittery, restless    Sulfa Antibiotics Hives    Adhesive Tape Rash       Current Meds:   Scheduled Meds:    [MAR Hold] sucralfate  1 g Oral 4 times per day    [MAR Hold] pantoprazole  40 mg Intravenous BID    And    [MAR Hold] sodium chloride (PF)  10 mL Intravenous BID    [Held by provider] rivaroxaban  15 mg Oral BID WC    [MAR Hold] gabapentin  300 mg Oral TID    [MAR Hold] metoprolol tartrate  50 mg Oral BID    [MAR Hold] mirtazapine  15 mg Oral Daily    [MAR Hold] rOPINIRole  2 mg Oral Nightly    [MAR Hold] sertraline  100 mg Oral Daily    [MAR Hold] topiramate  25 mg Oral BID    [MAR Hold] sodium chloride flush  10 mL Intravenous 2 times per day    [MAR Hold] levothyroxine  175 mcg Oral Daily     Continuous Infusions:   PRN Meds: [MAR Hold] perflutren lipid microspheres, [MAR Hold] sodium chloride flush, [MAR Hold] tiZANidine, [MAR Hold] sodium chloride flush, [MAR Hold] promethazine **OR** [MAR Hold] ondansetron, [MAR Hold] polyethylene glycol, [MAR Hold] acetaminophen **OR** [MAR Hold] acetaminophen, [MAR Hold] potassium chloride **OR** [MAR Hold] potassium alternative oral replacement **OR** [MAR Hold] potassium chloride, [MAR Hold] morphine **OR** [MAR Hold] morphine, [MAR Hold] LORazepam, [MAR Hold] HYDROmorphone    Data:     Past Medical History:   has a past medical history of Anxiety, CAD (coronary artery disease), Fatty liver, GERD (gastroesophageal reflux disease), Headache, History of bronchitis, Hyperlipidemia, Hypothyroidism, Kidney stone, LFT elevation, MVP (mitral valve prolapse), Obesity, Type 2 diabetes mellitus without complication ([facility] [address] and Umbilical hernia. Social History:   reports that she has quit smoking. Her smoking use included cigarettes. She has a 8.25 pack-year smoking history. She has never used smokeless tobacco. She reports that she does not drink alcohol or use drugs.      Family History:   Family History   Problem Relation Age of Onset   [facility] Cancer Mother         vaginal    Heart Disease Father     Diabetes Father    [facility] Other Father colon resection for colon polyps    Breast Cancer Maternal Grandmother     Stroke Maternal Grandfather        Vitals:  BP 97/63   Pulse 80   Temp 98.4 °F (36.9 °C) (Oral)   Resp 14   Ht 5' 4\" (1.626 m)   Wt 201 lb 3.2 oz (91.3 kg)   LMP 2010   SpO2 94%   BMI 34.54 kg/m²   Temp (24hrs), Av.5 °F (36.9 °C), Min:98.2 °F (36.8 °C), Max:98.8 °F (37.1 °C)    No results for input(s): POCGLU in the last 72 hours. I/O(24Hr): Intake/Output Summary (Last 24 hours) at 3/10/2021 1006  Last data filed at 3/10/2021 0410  Gross per 24 hour   Intake --   Output 300 ml   Net -300 ml       Labs:  [unfilled]    Lab Results   Component Value Date/Time    SPECIAL NOT REPORTED 2021 06:57 PM     Lab Results   Component Value Date/Time    CULTURE NO GROWTH 2021 06:57 PM       [facility]    Radiology:    Echo Complete 2d W Doppler W Color    Result Date: 3/8/2021  [facility] Transthoracic Echocardiography Report (TTE)  Patient Name Nisha Humphries Date of Study               2021               L   Date of      1970  Gender                      Female  Birth   Age          53 year(s)  Race                           Room Number  2091        Height:                     64 inch, 162.56 cm   Corporate ID [id number]    Weight:                     165 pounds, 74.8 kg  #   Patient Acct [de-identified]   BSA:          1.8 m^2       BMI:      28.32  #                                                              kg/m^2   MR #         [id number]      Sonographer                 Yanique Ro   Accession #  [id number]  Interpreting Physician      Odella Goldberg   Fellow                   Referring Nurse                           Practitioner   Interpreting             Referring Physician         Clayton Ivy  Fellow  Additional Comments Technically somewhat difficult study. Type of Study   TTE procedure:2D Echocardiogram, M-Mode, Doppler, Color Doppler.   Procedure Date Date: 2021 evidence of pulmonic insufficiency or stenosis. Pericardial Effusion No significant pericardial effusion is seen. Pleural Effusion No pleural effusion seen. Miscellaneous Normal aortic root dimension. E/E' average = 11.3. M-mode / 2D Measurements & Calculations:   LVIDd:4.74 cm(3.7 - 5.6 cm)      Diastolic QRVPBO:101 ml  KVVAW:1.92 cm(2.2 - 4.0 cm)      Systolic VMXHII:89.7 ml  LLSJ:1.22 cm(0.6 - 1.1 cm)       Aortic Root:2.7 cm(2.0 - 3.7 cm)  LVPWd:0.81 cm(0.6 - 1.1 cm)      LA Dimension: 3.2 cm(1.9 - 4.0 cm)  Fractional Shortenin.25 %    LA volume/Index: 48.4 ml /27m^2  Calculated LVEF (%): 81.6 %      LVOT:2 cm   Mitral:                                 Aortic   Valve Area (P1/2-Time): 4.4 cm^2        Peak Velocity: 1.31 m/s  Peak E-Wave: 1.05 m/s                   Mean Velocity: 0.92 m/s  Peak A-Wave: 0.77 m/s                   Peak Gradient: 6.86 mmHg  E/A Ratio: 1.36                         Mean Gradient: 4 mmHg  Peak Gradient: 4.41 mmHg  Deceleration Time: 176 msec  P1/2t: 50 msec                          Area (continuity): 2.65 cm^2                                          AV VTI: 24.5 cm   Tricuspid:                              Pulmonic:   Estimated RVSP: 21 mmHg  Peak TR Velocity: 1.82 m/s  Peak TR Gradient: 13.2496 mmHg  Estimated RA Pressure: 8 mmHg                                          Estimated PASP: 21.25 mmHg  Septal Wall E' velocity:0.08 m/s Lateral Wall E' velocity:0.11 m/s    Xr Cervical Spine (2-3 Views)    Result Date: 2021  EXAMINATION: TWO XRAY VIEWS SCOLIOSIS SERIES; 2 XRAY VIEWS OF THE CERVICAL SPINE 2021 12:58 pm COMPARISON: None.  HISTORY: ORDERING SYSTEM PROVIDED HISTORY: Cervical spondylosis TECHNOLOGIST PROVIDED HISTORY: scoliosis Reason for Exam: Pt states recheck cervical spondylosis, scoliosis evaluate for instability Acuity: Chronic Type of Exam: Subsequent/Follow-up Additional signs and symptoms: scoliosis FINDINGS: Scoliosis: 12 thoracic and 5 lumbar vertebra are noted, well maintained in height without acute fracture or subluxation. Mild levo scoliotic curvature of 2.4 degrees is noted T5 to bottom of T10. Mild dextroscoliotic curvature of 4.9 T11 through L4. Minimal to mild degenerative and degenerative disc changes are present in the spine. No acute fracture or subluxation. Examination of the cervical spine reveals evidence of mild facet changes. There appears to be calcification of the left carotid vessel. Mild dextroscoliotic curvature cervical spine is evident. Degrees is noted from T11 through L4. Sacral base plane un leveling cannot be accurately assessed. Left iliac crest is out of the field of view. Cervical spine: 1 mm grade 1 anterolisthesis C2 upon C3 is noted on flexion, not replicated on extension and not seen neutral view. No acute fracture or prevertebral soft tissue swelling. Scoliosis: Mild levo scoliotic curvature thoracic spine. Mild dextroscoliotic curvature lower thoracic and lumbar spine. No acute fracture or subluxation. Degenerative changes. Cervical spine: Minimal grade 1 anterolisthesis C2 upon C3 on flexion, not seen on extension or neutral views. No acute fracture or prevertebral soft tissue swelling. Findings suggest minimal instability. Ct Cervical Spine Wo Contrast    Result Date: 3/4/2021  EXAMINATION: CT OF THE CERVICAL SPINE WITHOUT CONTRAST 3/4/2021 5:19 pm TECHNIQUE: CT of the cervical spine was performed without the administration of intravenous contrast. Multiplanar reformatted images are provided for review. Dose modulation, iterative reconstruction, and/or weight based adjustment of the mA/kV was utilized to reduce the radiation dose to as low as reasonably achievable. COMPARISON: February 13, 2021.  HISTORY: ORDERING SYSTEM PROVIDED HISTORY: radicular pain left arm TECHNOLOGIST PROVIDED HISTORY: radicular pain left arm Decision Support Exception->Emergency Medical Condition (MA) Is the patient pregnant?->No Reason for Exam: radicular pain left arm for three weeks. patient states limited ROM and pain starting in neck. no injuries Acuity: Unknown Type of Exam: Unknown FINDINGS: Bone mineralization is normal.  The vertebral bodies and posterior elements appear intact and appropriately aligned without acute fracture or subluxation. Vertebral body stature is maintained throughout. There is straightening of the normal cervical lordosis. Mild-to-moderate cervical degenerative disc disease is present throughout. There is moderate bony neural foraminal narrowing on the left at C3-C4 and C5-C6. No significant uncovertebral joint hypertrophic change or additional bony neural foraminal narrowing. No paraspinal soft tissue abnormality. The visualized lung apices are grossly clear. Mild to moderate multilevel cervical degenerative disc disease with moderate bony neural foraminal narrowing on the left at C3-C4 and C5-C6. No acute fracture or subluxation. Straightening of the normal cervical lordosis which may be related to muscle spasm or position in collar. Ct Cervical Spine Wo Contrast    Result Date: 2/13/2021  EXAMINATION: CT OF THE CERVICAL SPINE WITHOUT CONTRAST 2/13/2021 5:59 pm TECHNIQUE: CT of the cervical spine was performed without the administration of intravenous contrast. Multiplanar reformatted images are provided for review. Dose modulation, iterative reconstruction, and/or weight based adjustment of the mA/kV was utilized to reduce the radiation dose to as low as reasonably achievable.  COMPARISON: 06/25/2018 HISTORY: ORDERING SYSTEM PROVIDED HISTORY: left arm pain TECHNOLOGIST PROVIDED HISTORY: left arm pain Decision Support Exception->Emergency Medical Condition (MA) Is the patient pregnant?->No Reason for Exam: patient states she has been having left arm pain and she is unable to move her left arm Acuity: Unknown Type of Exam: Unknown FINDINGS: The cervical spine demonstrates normalmineralization with straightening of the  cervical lordosis. There is no evidence of fracture or subluxation. There is mild loss of disc height with eburnation of the vertebral endplates at the O9-8, T5-1, C5-6levels. There are small marginal osteophytes at multiple levels. The central canal is grossly patent. The pedicles and posterior elements are intact. The prevertebral and paravertebral soft tissues are unremarkable. The atlanto-dens interval and dens are intact. The visualized lung apices are clear. Mild cervical spondylosis and degenerative disc disease. Evidence of paracervical spasm. No acute bony abnormalities are noted . Mri Cervical Spine Wo Contrast    Result Date: 3/4/2021  EXAMINATION: MRI OF THE CERVICAL SPINE WITHOUT CONTRAST 3/4/2021 2:53 pm TECHNIQUE: Multiplanar multisequence MRI of the cervical spine was performed without the administration of intravenous contrast. COMPARISON: 06/12/2020 HISTORY: ORDERING SYSTEM PROVIDED HISTORY: Cervical radiculopathy TECHNOLOGIST PROVIDED HISTORY: evaluate for stenosis Is the patient pregnant?->No Reason for Exam: pt stated left shoulder arm pain weakness numbness x 1 mth Acuity: Acute Type of Exam: Initial Additional signs and symptoms: pt stated left shoulder arm pain weakness numbness x 1 mth, NKI FINDINGS: BONES/ALIGNMENT: There is normal alignment of the spine. The vertebral body heights are maintained. The bone marrow signal appears unremarkable. There is minimal degenerative disc disease with disc space narrowing and osteophytes at C3-4, C4-5, C5-6 and C6-7. SPINAL CORD:  No abnormal signal is identified within the spinal cord. SOFT TISSUES: No paraspinal mass identified. C2-C3: There is minimum disc protrusion of 2 mm centrally. The thecal sac and neural foramina are intact. C3-C4: There is disc protrusion osteophyte toward the left measuring 3-4 mm. The thecal sac is not stenotic. There is mild narrowing of the left neural foramen.   The right neural foramen is intact. C4-C5: There is disc protrusion osteophyte measuring 2-3 mm. The thecal sac is mildly stenotic measuring 9.5 mm. Disc and/or osteophytes result in mild narrowing of the neural foramina bilaterally. The left neural foramen is narrowed greater than right. C5-C6: There is disc protrusion osteophyte measuring 5-6 mm toward the left narrowing the left neural foramen. The thecal sac is mildly stenotic measuring 9.3 mm. The right neural foramen is intact. C6-C7: Disc and/or osteophytes cause minimal narrowing of the neural foramina bilaterally. The thecal sac is not stenotic. C7-T1:  The thecal sac and neural foramina are intact. Disc and osteophytes result in narrowing of the neural foramina and mild stenosis of the thecal sac throughout the cervical region as discussed in detail above. Xr Chest Portable    Result Date: 3/7/2021  EXAMINATION: ONE XRAY VIEW OF THE CHEST 3/7/2021 11:26 am COMPARISON: 11/08/2020 HISTORY: ORDERING SYSTEM PROVIDED HISTORY: sob Reason for Exam: Sob coughing up blood today Acuity: Acute Type of Exam: Initial FINDINGS: The lungs are without acute focal process. No effusion or pneumothorax. The cardiomediastinal silhouette is normal.  The osseous structures are intact without acute process. Negative portable chest.     Ct Chest Pulmonary Embolism W Contrast    Result Date: 3/7/2021  EXAMINATION: CTA OF THE CHEST 3/7/2021 11:46 am TECHNIQUE: CTA of the chest was performed after the administration of intravenous contrast.  Multiplanar reformatted images are provided for review. MIP images are provided for review. Dose modulation, iterative reconstruction, and/or weight based adjustment of the mA/kV was utilized to reduce the radiation dose to as low as reasonably achievable. COMPARISON: None. HISTORY: ORDERING SYSTEM PROVIDED HISTORY: r/o pe Reason for Exam: Chest pain and coughing up blood since this morning.  Acuity: Acute Type of Exam: Initial FINDINGS: Pulmonary Arteries: Pulmonary arteries are adequately opacified for evaluation. Posterior right lower lobe filling defect with extension into basilar subsegmental branches. Main pulmonary artery is normal in caliber. Mediastinum: No evidence of mediastinal lymphadenopathy. Normal heart size. Normal thoracic aorta. Lungs/pleura: Bilateral lower lobe atelectasis. No focal consolidation or pulmonary edema. No evidence of pleural effusion or pneumothorax. Upper Abdomen: Cholecystectomy Soft Tissues/Bones: No acute bone or soft tissue abnormality. Posterior right lower lobe filling defect with extension into basilar subsegmental branches consistent pulmonary embolism. Bilateral lower lobe atelectasis. Critical results were called by Dr. Jonny Claudio. Dian Pedro MD to [address] on 3/7/2021 at 13:14. Vl Lower Extremity Bilateral Venous Duplex    Result Date: 3/8/2021    [facility]  Vascular Lower Extremities DVT Study Procedure   Patient Name   Nisha Humphries Date of Study           03/08/2021                 L   Date of Birth  1970  Gender                  Female   Age            53 year(s)  Race                       Room Number    2091        Height:                 64.17 inch, 163 cm   Corporate ID # [id number]    Weight:                 165 pounds, 74.8 kg   Patient Acct # [de-identified]   BSA:        1.81 m^2    BMI:       28.17 kg/m^2   MR #           [id number]      Sonographer             Estiven Cm [facility]   Accession #    [id number]  Interpreting Physician  Lucio Ramires   Referring                  Referring Physician     Sd Hyatt  Nurse  Practitioner  Procedure Type of Study:   Veins: Lower Extremities DVT Study, Venous Scan Lower Bilateral.  Indications for Study:Positive for pulmonary embolus. Patient Status: In Patient. Technical Quality:Adequate visualization. Conclusions   Summary   No evidence of superficial or deep venous thrombosis in both lower  extremities.    Signature ----------------------------------------------------------------  Electronically signed by Mario Ruggiero RVT(Sonographer) on  03/08/2021 03:13 PM  ----------------------------------------------------------------   ----------------------------------------------------------------  Electronically signed by Lucio Ramires(Interpreting physician)  on 03/08/2021 11:40 PM  ----------------------------------------------------------------  Findings:   Right Impression:                    Left Impression:  The common femoral, femoral,         The common femoral, femoral,  popliteal and tibial veins           popliteal and tibial veins  demonstrate normal compressibility   demonstrate normal compressibility  and augmentation. and augmentation. Normal compressibility of the great  Normal compressibility of the great  saphenous vein. saphenous vein. Normal compressibility of the small  Normal compressibility of the small  saphenous vein. saphenous vein. Velocities are measured in cm/s ; Diameters are measured in cm Right Lower Extremities DVT Study Measurements Right 2D Measurements +------------------------------------+----------+---------------+----------+ ! Location                            ! Visualized! Compressibility! Thrombosis! +------------------------------------+----------+---------------+----------+ ! Common Femoral                      !Yes       ! Yes            ! None      ! +------------------------------------+----------+---------------+----------+ ! Prox Femoral                        !Yes       ! Yes            ! None      ! +------------------------------------+----------+---------------+----------+ ! Mid Femoral                         !Yes       ! Yes            ! None      ! +------------------------------------+----------+---------------+----------+ ! Dist Femoral                        !Yes       ! Yes            ! None      ! +------------------------------------+----------+---------------+----------+ ! Deep Femoral                        !Yes       ! Yes            ! None      ! +------------------------------------+----------+---------------+----------+ ! Popliteal                           !Yes       ! Yes            ! None      ! +------------------------------------+----------+---------------+----------+ ! Sapheno Femoral Junction            ! Yes       ! Yes            ! None      ! +------------------------------------+----------+---------------+----------+ ! PTV                                 ! Yes       ! Yes            ! None      ! +------------------------------------+----------+---------------+----------+ ! Peroneal                            !Yes       ! Yes            ! None      ! +------------------------------------+----------+---------------+----------+ ! Gastroc                             ! Yes       ! Yes            ! None      ! +------------------------------------+----------+---------------+----------+ ! GSV Thigh                           ! Yes       ! Yes            ! None      ! +------------------------------------+----------+---------------+----------+ ! GSV Knee                            ! Yes       ! Yes            ! None      ! +------------------------------------+----------+---------------+----------+ ! GSV Ankle                           ! Yes       ! Yes            ! None      ! +------------------------------------+----------+---------------+----------+ ! SSV                                 ! Yes       ! Yes            ! None      ! +------------------------------------+----------+---------------+----------+ Left Lower Extremities DVT Study Measurements Left 2D Measurements +------------------------------------+----------+---------------+----------+ ! Location                            ! Visualized! Compressibility! Thrombosis! +------------------------------------+----------+---------------+----------+ ! Common Femoral !Yes       !Yes            ! None      ! +------------------------------------+----------+---------------+----------+ ! Prox Femoral                        !Yes       ! Yes            ! None      ! +------------------------------------+----------+---------------+----------+ ! Mid Femoral                         !Yes       ! Yes            ! None      ! +------------------------------------+----------+---------------+----------+ ! Dist Femoral                        !Yes       ! Yes            ! None      ! +------------------------------------+----------+---------------+----------+ ! Deep Femoral                        !Yes       ! Yes            ! None      ! +------------------------------------+----------+---------------+----------+ ! Popliteal                           !Yes       ! Yes            ! None      ! +------------------------------------+----------+---------------+----------+ ! Sapheno Femoral Junction            ! Yes       ! Yes            ! None      ! +------------------------------------+----------+---------------+----------+ ! PTV                                 ! Yes       ! Yes            ! None      ! +------------------------------------+----------+---------------+----------+ ! Peroneal                            !Yes       ! Yes            ! None      ! +------------------------------------+----------+---------------+----------+ ! Gastroc                             ! Yes       ! Yes            ! None      ! +------------------------------------+----------+---------------+----------+ ! GSV Thigh                           ! Yes       ! Yes            ! None      ! +------------------------------------+----------+---------------+----------+ ! GSV Knee                            ! Yes       ! Yes            ! None      ! +------------------------------------+----------+---------------+----------+ ! GSV Ankle                           ! Yes       ! Yes            ! None      ! +------------------------------------+----------+---------------+----------+ ! SSV                                 ! Yes       ! Yes            ! None      ! +------------------------------------+----------+---------------+----------+    Xr Spine Entire (2-3 Views)    Result Date: 2/23/2021  EXAMINATION: TWO XRAY VIEWS SCOLIOSIS SERIES; 2 XRAY VIEWS OF THE CERVICAL SPINE 2/23/2021 12:58 pm COMPARISON: None. HISTORY: ORDERING SYSTEM PROVIDED HISTORY: Cervical spondylosis TECHNOLOGIST PROVIDED HISTORY: scoliosis Reason for Exam: Pt states recheck cervical spondylosis, scoliosis evaluate for instability Acuity: Chronic Type of Exam: Subsequent/Follow-up Additional signs and symptoms: scoliosis FINDINGS: Scoliosis: 12 thoracic and 5 lumbar vertebra are noted, well maintained in height without acute fracture or subluxation. Mild levo scoliotic curvature of 2.4 degrees is noted T5 to bottom of T10. Mild dextroscoliotic curvature of 4.9 T11 through L4. Minimal to mild degenerative and degenerative disc changes are present in the spine. No acute fracture or subluxation. Examination of the cervical spine reveals evidence of mild facet changes. There appears to be calcification of the left carotid vessel. Mild dextroscoliotic curvature cervical spine is evident. Degrees is noted from T11 through L4. Sacral base plane un leveling cannot be accurately assessed. Left iliac crest is out of the field of view. Cervical spine: 1 mm grade 1 anterolisthesis C2 upon C3 is noted on flexion, not replicated on extension and not seen neutral view. No acute fracture or prevertebral soft tissue swelling. Scoliosis: Mild levo scoliotic curvature thoracic spine. Mild dextroscoliotic curvature lower thoracic and lumbar spine. No acute fracture or subluxation. Degenerative changes. Cervical spine: Minimal grade 1 anterolisthesis C2 upon C3 on flexion, not seen on extension or neutral views.   No acute fracture or prevertebral soft tissue swelling. Findings suggest minimal instability. Physical Examination:        Physical Exam  Vitals signs reviewed. Constitutional:       Appearance: Normal appearance. Cardiovascular:      Rate and Rhythm: Normal rate and regular rhythm. Pulses: Normal pulses. Heart sounds: Normal heart sounds. Pulmonary:      Effort: Pulmonary effort is normal.      Breath sounds: Normal breath sounds. Abdominal:      General: Bowel sounds are normal. There is no distension. Palpations: Abdomen is soft. There is no mass. Tenderness: There is no abdominal tenderness. There is no guarding. Musculoskeletal:      Right lower leg: No edema. Left lower leg: No edema. Neurological:      Mental Status: She is alert. Assessment:        Primary Problem  Acute respiratory failure with hypoxia [facility])    Active Hospital Problems    Diagnosis Date Noted    Acute respiratory failure with hypoxia ([address] [J96.01] 03/10/2021    Acute pulmonary embolism ([address] [I26.99]     Hematemesis with nausea [K92.0]     Pulmonary embolism on right ([address] [I26.99] 03/07/2021    RLS (restless legs syndrome) [G25.81] 01/26/2015    GERD (gastroesophageal reflux disease) [K21.9] 04/17/2014    Hyperlipidemia [E78.5]     Hypothyroidism [E03.9]     Anxiety [F41.9]        Plan:        1. Chest pain and SOB 2/2 Pulmonary Embolism:   - VSS, afebrile. Desaturated and put on 2L NC oxygen.   - Troponin: Negative  - Chest x-ray unremarkable. - CT PE showed  posterior right lower lobe filling defect with extension into basilar.  Segmental branches consistent with pulmonary embolism.  - Heparin gtt, APTT every 6 hours, dc'd today and started   XARELTO 15 mg BID.    - Dilaudid 1 mg q2h., Ativan for anxiety ordered. - VL lower extremity bilateral venous Doppler shows no signs of superficial or deep venous thrombosis.   - Pulmonology on board, saw the patient and recommended 1 year of anticoagulation with oncology work-up as patient mentioned significant amount of weight loss in the last 1 year.   - Antiphospholipid antibodies pending.     2. R/o hematemesis:   - Hb: 12.9--->11.7 (stable last 2 incidents)  - Pt has an episode of possible medium colored vomitus yesterday.  - GI was consulted yesterday, they put the patient n.p.o. after midnight, held oral anticoagulation.  - FOBT pending.   - Patient will undergo EGD, we will appreciate GI final recommendations.       2.  Hypothyroidism s/p surgery:  -Synthroid 175 mcg  -TSH: 0.69 (03/08/2021)     3.  Essential hypertension:  -Lopressor 50 mg twice daily.     4.  Restless leg syndrome:  -Ropinirole 2 mg at night.     Dispo: Home, may need oxygen at home as desaturating.     Katherine Mathew MD  3/10/2021  10:06 AM

## 2021-03-10 NOTE — CARE COORDINATION
DISCHARGE PLANNING NOTE:    Plan is for this patient to return to home, no needs. Xarelto is covered 100%    Has qualified for home O2 - Will most likely need updated RT note, F2F and order - [facility] is following. [employer] - 22%    Follow up appointment made for patient with Dr. Guillermo Ivey on 3/15 at 4:00 PM.  Notified patient of date and time. Patient verbalizes understanding. Appointment entered into discharge navigator.     Electronically signed by Alysa Guidry RN on 3/10/2021 at 4:10 PM

## 2021-03-10 NOTE — PLAN OF CARE
Problem: Pain:  Goal: Patient's pain/discomfort is manageable  Description: Patient's pain/discomfort is manageable  3/10/2021 1516 by Sissy Bullock RN  Outcome: Ongoing  Note: Patient medicated for pain with dilaudid this shift      Problem: Skin Integrity:  Goal: Skin integrity will stabilize  Description: Skin integrity will stabilize  3/10/2021 1516 by Sissy Bullock RN  Outcome: Ongoing  Note: No new skin breakdown noted this shift, patient turns self in bed

## 2021-03-10 NOTE — PROGRESS NOTES
[address]   Physical Therapy Progress Note    Date: 3/10/21  Patient Name: Sherrell Chris       Room: Akanksha Edwards  MRN: [id number]   Account: [de-identified]   : 1970  (53 y.o.)   Gender: female     Discharge Recommendations   Patient would benefit from continued therapy after discharge  Equipment Needed: No    Referring Practitioner: Leslie Scott MD  Diagnosis: Pulmonary embolism on right  Restrictions/Precautions: General Precautions, Fall Risk  Implants present? : (pt denies)  Other position/activity restrictions: up with assistance   Past Medical History:  has a past medical history of Anxiety, CAD (coronary artery disease), Fatty liver, GERD (gastroesophageal reflux disease), Headache, History of bronchitis, Hyperlipidemia, Hypothyroidism, Kidney stone, LFT elevation, MVP (mitral valve prolapse), Obesity, Type 2 diabetes mellitus without complication ([facility] and Umbilical hernia. Past Surgical History:   has a past surgical history that includes Upper gastrointestinal endoscopy (2010); hernia repair; Cholecystectomy; Appendectomy;  section; Colonoscopy (); Colonoscopy (14); Colonoscopy (2014); pr exploratory of abdomen (10/21/2014); Colonoscopy (N/A, 2018); and Hysterectomy, total abdominal.  Additional Pertinent Hx: (-) DVT BLE, + PE, T2DM    Overall Orientation Status: Within Normal Limits  Restrictions/Precautions  Restrictions/Precautions: General Precautions; Fall Risk  Required Braces or Orthoses?: No  Implants present? : (pt denies)  Position Activity Restriction  Other position/activity restrictions: up with assistance    Subjective: Pt reports just finished taking a shower  Comments: ARDEN murdock with PT tx. RN reports pt going for EGD at 11am. However during tx, mejia Becerra arrived at 1000 to take patient early. Pt is not wearing O2 upon arrival, pt SaO2 90% on room air. Cues for deep breathing.  Improved to 92% while on room Equipment Evaluation, Education, & procurement, Patient/Caregiver Education & Training, Safety Education & Training, Home Exercise Program, Stair training    Patient Education  New Education Provided:  Plan of Care  Learner:patient  Method: demonstration and explanation       Outcome: needs reinforcement     Goals  Short term goals  Time Frame for Short term goals: 4 days  Short term goal 1: Pt to demo IND bed mobility  Short term goal 2: Pt to demo Mod I transfers. Short term goal 3: Pt to amb 150' SBA with device prn. Short term goal 4: Pt to ascend/descend 5 stairs, no HR, CGA. Short term goal 5: Pt to demo good technique for HEP for BLE strengthening and balance program.  Short term goal 6: Pt to tolerate 30 minute PT session with O2 sats maintained.     PT Individual Minutes  Time In: 4448  Time Out: 1000  Minutes: 16    Electronically signed by Luis Manuel Johnston PTA on 3/10/21 at 11:19 AM EST

## 2021-03-10 NOTE — PLAN OF CARE
Problem: Infection:  Goal: Will remain free from infection  Description: Will remain free from infection  3/10/2021 0425 by Vidhya Martines RN  Outcome: Ongoing  3/9/2021 1836 by Frieda Duran RN  Outcome: Ongoing  Note: Patient remains afebrile; WBC count is within normal limits; no signs of erythema, edema, or warmth. Will continue to monitor for signs/symptoms of infection. Problem: Safety:  Goal: Free from accidental physical injury  Description: Free from accidental physical injury  3/10/2021 0425 by Vidhya Martines RN  Outcome: Ongoing  3/9/2021 1836 by Frieda Duran RN  Outcome: Ongoing  Note: Pt ambulates with a steady gait. Safety maintained this shift. Continue to monitor. Goal: Free from intentional harm  Description: Free from intentional harm  3/10/2021 0425 by Vidhya Martines RN  Outcome: Ongoing  3/9/2021 1836 by Frieda Duran RN  Outcome: Ongoing     Problem: Daily Care:  Goal: Daily care needs are met  Description: Daily care needs are met  3/10/2021 0425 by Vidhya Martines RN  Outcome: Ongoing  3/9/2021 1836 by Frieda Duran RN  Outcome: Ongoing  Note: Patient and staff currently meeting all patient's daily care needs. Patient encouraged to participate and complete all ADLs per self. Will continue to monitor for opportunities for patient to meet all ADLs per self. Problem: Pain:  Goal: Patient's pain/discomfort is manageable  Description: Patient's pain/discomfort is manageable  3/10/2021 0425 by Vidhya Martines RN  Outcome: Ongoing  3/9/2021 1836 by Frieda Duran RN  Outcome: Ongoing  Note: No pain at this time. 0/10 pain scale.     Goal: Pain level will decrease  Description: Pain level will decrease  3/10/2021 0425 by Vidhya Martines RN  Outcome: Ongoing  3/9/2021 1836 by Frieda Duran RN  Outcome: Ongoing  Goal: Control of acute pain  Description: Control of acute pain  3/10/2021 0425 by Vidhya Martines RN  Outcome: Ongoing  Note: Assess the location, characteristics, onset, duration, frequency, quality, and severity of pain. Encourage immediate report of pain. Use appropriate pain scale to rate pain. Manage pain using nonpharmacologic/pharmacologic interventions. 3/9/2021 1836 by Scot Magaña RN  Outcome: Ongoing  Goal: Control of chronic pain  Description: Control of chronic pain  3/10/2021 0425 by Param Jewell RN  Outcome: Ongoing  3/9/2021 1836 by Scot Magaña RN  Outcome: Ongoing     Problem: Skin Integrity:  Goal: Skin integrity will stabilize  Description: Skin integrity will stabilize  3/10/2021 0425 by Param Jewell RN  Outcome: Ongoing  Note: Assess the overall condition of the skin. Check on bony prominences such as the sacrum, trochanters, scapulae, elbows, heels, inner and outer malleolus, inner and outer knees, back of head). Reinforce the importance of turning, mobility, and ambulation. 3/9/2021 1836 by Scot Magaña RN  Outcome: Ongoing  Note: Skin is clean dry and intact.       Problem: Discharge Planning:  Goal: Patients continuum of care needs are met  Description: Patients continuum of care needs are met  3/10/2021 0425 by Param Jewell RN  Outcome: Ongoing  3/9/2021 1836 by Scot Magaña RN  Outcome: Ongoing     Problem: Nutrition  Goal: Optimal nutrition therapy  Description: Nutrition Problem #1: Predicted inadequate energy intake  Intervention: Food and/or Nutrient Delivery: Modify Current Diet, Start Oral Nutrition Supplement  Nutritional Goals: po intake greater than 75%     3/10/2021 0425 by Parma Jewell RN  Outcome: Ongoing  3/9/2021 1836 by Scot Magaña RN  Outcome: Ongoing     Problem: Musculor/Skeletal Functional Status  Goal: Highest potential functional level  3/10/2021 0425 by Param Jewell RN  Outcome: Ongoing  3/9/2021 1836 by Scot Magaña RN  Outcome: Ongoing  Goal: Absence of falls  3/10/2021 0425 by Param Jewell RN  Outcome: Ongoing  3/9/2021 1836 by Scot Magaña RN  Outcome: Ongoing     Problem: Musculor/Skeletal Functional Status  Goal: Highest potential functional level  3/10/2021 0425 by Jacinto Anderson RN  Outcome: Ongoing  3/9/2021 1836 by Prasad Keyes RN  Outcome: Ongoing  Goal: Absence of falls  3/10/2021 0425 by Jacinto Anderson RN  Outcome: Ongoing  3/9/2021 1836 by Prasad Keyes RN  Outcome: Ongoing

## 2021-03-10 NOTE — PROGRESS NOTES
distress  Cardiovascular - Heart sounds are normal.  Regular rate and rhythm   Abdomen - Soft, nontender, nondistended, no guarding   Neurologic -appropriate, following commands,   Skin - No bruising or rash  Extremities - No clubbing, cyanosis, edema    MEDS      sucralfate  1 g Oral 4 times per day    pantoprazole  40 mg Intravenous BID    And    sodium chloride (PF)  10 mL Intravenous BID    rivaroxaban  15 mg Oral BID WC    gabapentin  300 mg Oral TID    metoprolol tartrate  50 mg Oral BID    mirtazapine  15 mg Oral Daily    rOPINIRole  2 mg Oral Nightly    sertraline  100 mg Oral Daily    topiramate  25 mg Oral BID    sodium chloride flush  10 mL Intravenous 2 times per day    levothyroxine  175 mcg Oral Daily       perflutren lipid microspheres, sodium chloride flush, tiZANidine, sodium chloride flush, promethazine **OR** ondansetron, polyethylene glycol, acetaminophen **OR** acetaminophen, potassium chloride **OR** potassium alternative oral replacement **OR** potassium chloride, morphine **OR** morphine, LORazepam, HYDROmorphone    LABS   CBC   Recent Labs     03/10/21  0419 03/10/21  1316   WBC 4.5  --    HGB 11.7* 11.9*   HCT 35.4* 36.2   MCV 94.3  --      --      BMP:   Lab Results   Component Value Date     03/10/2021    K 3.7 03/10/2021     03/10/2021    CO2 22 03/10/2021    BUN 11 03/10/2021    LABALBU 3.9 03/07/2021    LABALBU 3.8 06/03/2019    CREATININE 0.67 03/10/2021    CALCIUM 8.7 03/10/2021    GFRAA >60 03/10/2021    LABGLOM >60 03/10/2021     ABGs:No results found for: PHART, PO2ART, [id number] No results found for: IFIO2, MODE, SETTIDVOL, SETPEEP  Ionized Calcium:  No results found for: IONCA  Magnesium:    Lab Results   Component Value Date    MG 2.2 03/04/2021      Phosphorus:    Lab Results   Component Value Date    PHOS 4.7 12/28/2020        LIVER PROFILE   No results for input(s): AST, ALT, LIPASE, BILIDIR, BILITOT, ALKPHOS in the last 72 hours.     Invalid input(s): AMYLASE,  ALB  INR   Recent Labs     03/07/21  1625   INR 1.1     PTT   No results for input(s): APTT in the last 72 hours. BNP No results for input(s): BNP in the last 72 hours. RADIOLOGY     (See actual reports for details)    ASSESSMENT/PLAN   Principal Problem:    Pulmonary embolism on right [facility])  Active Problems:    Anxiety    Hypothyroidism    GERD (gastroesophageal reflux disease)    RLS (restless legs syndrome)    Acute pulmonary embolism (HCC)    Hematemesis with nausea    1. Right-sided pleurisy, mostly with the PE.  2.  Hemoptysis. 3.  Pulmonary infarct right lower lobe. 4.  Right lower lobe pulmonary embolism. Unprovoked, no history of trauma, fall, travel, medications or family history, negative venous Doppler for DVT  5. Bibasilar atelectasis. 6.  History of tobacco abuse, less than a pack a day for 30 years, quit  6-8 months ago. Denies any underlying lung disease. 7.  Weight loss. The patient noted she lost over 100 pounds in the last  year and noted that her workup was unremarkable by her primary. 8.  Diabetes mellitus and hypothyroidism. 9.  Anxiety.     PLAN OF TREATMENT:    Analgesia,   anticoagulation on hold, to resume when okay with GI, would favor heparin drip without bolus first to make sure no further bleeding   will need an Oncology evaluation at one point to rule out any occult cancer. will need anticoagulation for at least a year given her unprovoked PE   home O2 eval upon discharge  Chest x-ray in a.m.   Discussed with staff, patient and family at bedside    Electronically signed by Juan Conklin MD on 3/10/2021 at 2:21 PM

## 2021-03-11 ENCOUNTER — APPOINTMENT (OUTPATIENT)
Dept: GENERAL RADIOLOGY | Age: 51
DRG: 175 | End: 2021-03-11
Payer: COMMERCIAL

## 2021-03-11 LAB
ABSOLUTE EOS #: 0.2 K/UL (ref 0–0.4)
ABSOLUTE IMMATURE GRANULOCYTE: ABNORMAL K/UL (ref 0–0.3)
ABSOLUTE LYMPH #: 1.3 K/UL (ref 1–4.8)
ABSOLUTE MONO #: 0.5 K/UL (ref 0.1–1.3)
ANION GAP SERPL CALCULATED.3IONS-SCNC: 8 MMOL/L (ref 9–17)
ANTI-XA UNFRAC HEPARIN: 0.41 IU/L (ref 0.3–0.7)
ANTI-XA UNFRAC HEPARIN: <0.1 IU/L (ref 0.3–0.7)
BASOPHILS # BLD: 1 % (ref 0–2)
BASOPHILS ABSOLUTE: 0 K/UL (ref 0–0.2)
BUN BLDV-MCNC: 14 MG/DL (ref 6–20)
BUN/CREAT BLD: ABNORMAL (ref 9–20)
CALCIUM SERPL-MCNC: 8.6 MG/DL (ref 8.6–10.4)
CHLORIDE BLD-SCNC: 112 MMOL/L (ref 98–107)
CO2: 24 MMOL/L (ref 20–31)
CREAT SERPL-MCNC: 0.72 MG/DL (ref 0.5–0.9)
DIFFERENTIAL TYPE: ABNORMAL
EOSINOPHILS RELATIVE PERCENT: 4 % (ref 0–4)
GFR AFRICAN AMERICAN: >60 ML/MIN
GFR NON-AFRICAN AMERICAN: >60 ML/MIN
GFR SERPL CREATININE-BSD FRML MDRD: ABNORMAL ML/MIN/{1.73_M2}
GFR SERPL CREATININE-BSD FRML MDRD: ABNORMAL ML/MIN/{1.73_M2}
GLUCOSE BLD-MCNC: 118 MG/DL (ref 70–99)
HCT VFR BLD CALC: 34.4 % (ref 36–46)
HCT VFR BLD CALC: 34.5 % (ref 36–46)
HEMOGLOBIN: 11.5 G/DL (ref 12–16)
HEMOGLOBIN: 11.5 G/DL (ref 12–16)
IMMATURE GRANULOCYTES: ABNORMAL %
INR BLD: 0.9
LYMPHOCYTES # BLD: 26 % (ref 24–44)
MCH RBC QN AUTO: 31.7 PG (ref 26–34)
MCHC RBC AUTO-ENTMCNC: 33.4 G/DL (ref 31–37)
MCV RBC AUTO: 94.9 FL (ref 80–100)
MONOCYTES # BLD: 10 % (ref 1–7)
NRBC AUTOMATED: ABNORMAL PER 100 WBC
PARTIAL THROMBOPLASTIN TIME: 29.7 SEC (ref 24–36)
PDW BLD-RTO: 12.6 % (ref 11.5–14.9)
PLATELET # BLD: 170 K/UL (ref 150–450)
PLATELET ESTIMATE: ABNORMAL
PMV BLD AUTO: 7.6 FL (ref 6–12)
POTASSIUM SERPL-SCNC: 3.6 MMOL/L (ref 3.7–5.3)
PROTHROMBIN TIME: 12.2 SEC (ref 11.8–14.6)
RBC # BLD: 3.63 M/UL (ref 4–5.2)
RBC # BLD: ABNORMAL 10*6/UL
SEG NEUTROPHILS: 59 % (ref 36–66)
SEGMENTED NEUTROPHILS ABSOLUTE COUNT: 3.1 K/UL (ref 1.3–9.1)
SODIUM BLD-SCNC: 144 MMOL/L (ref 135–144)
SURGICAL PATHOLOGY REPORT: NORMAL
WBC # BLD: 5.2 K/UL (ref 3.5–11)
WBC # BLD: ABNORMAL 10*3/UL

## 2021-03-11 PROCEDURE — 97110 THERAPEUTIC EXERCISES: CPT

## 2021-03-11 PROCEDURE — C9113 INJ PANTOPRAZOLE SODIUM, VIA: HCPCS | Performed by: INTERNAL MEDICINE

## 2021-03-11 PROCEDURE — 2580000003 HC RX 258: Performed by: INTERNAL MEDICINE

## 2021-03-11 PROCEDURE — 6360000002 HC RX W HCPCS: Performed by: INTERNAL MEDICINE

## 2021-03-11 PROCEDURE — 94761 N-INVAS EAR/PLS OXIMETRY MLT: CPT

## 2021-03-11 PROCEDURE — 2580000003 HC RX 258: Performed by: STUDENT IN AN ORGANIZED HEALTH CARE EDUCATION/TRAINING PROGRAM

## 2021-03-11 PROCEDURE — 85610 PROTHROMBIN TIME: CPT

## 2021-03-11 PROCEDURE — 97116 GAIT TRAINING THERAPY: CPT

## 2021-03-11 PROCEDURE — APPSS30 APP SPLIT SHARED TIME 16-30 MINUTES: Performed by: NURSE PRACTITIONER

## 2021-03-11 PROCEDURE — 85018 HEMOGLOBIN: CPT

## 2021-03-11 PROCEDURE — 2060000000 HC ICU INTERMEDIATE R&B

## 2021-03-11 PROCEDURE — 80048 BASIC METABOLIC PNL TOTAL CA: CPT

## 2021-03-11 PROCEDURE — 6370000000 HC RX 637 (ALT 250 FOR IP): Performed by: STUDENT IN AN ORGANIZED HEALTH CARE EDUCATION/TRAINING PROGRAM

## 2021-03-11 PROCEDURE — 85520 HEPARIN ASSAY: CPT

## 2021-03-11 PROCEDURE — 6360000002 HC RX W HCPCS: Performed by: STUDENT IN AN ORGANIZED HEALTH CARE EDUCATION/TRAINING PROGRAM

## 2021-03-11 PROCEDURE — 85730 THROMBOPLASTIN TIME PARTIAL: CPT

## 2021-03-11 PROCEDURE — 99232 SBSQ HOSP IP/OBS MODERATE 35: CPT | Performed by: INTERNAL MEDICINE

## 2021-03-11 PROCEDURE — 85014 HEMATOCRIT: CPT

## 2021-03-11 PROCEDURE — 85025 COMPLETE CBC W/AUTO DIFF WBC: CPT

## 2021-03-11 PROCEDURE — 6370000000 HC RX 637 (ALT 250 FOR IP): Performed by: INTERNAL MEDICINE

## 2021-03-11 PROCEDURE — 71046 X-RAY EXAM CHEST 2 VIEWS: CPT

## 2021-03-11 PROCEDURE — 36415 COLL VENOUS BLD VENIPUNCTURE: CPT

## 2021-03-11 RX ORDER — HEPARIN SODIUM 10000 [USP'U]/100ML
18 INJECTION, SOLUTION INTRAVENOUS CONTINUOUS
Status: DISCONTINUED | OUTPATIENT
Start: 2021-03-11 | End: 2021-03-12

## 2021-03-11 RX ORDER — POLYETHYLENE GLYCOL 3350 17 G/17G
17 POWDER, FOR SOLUTION ORAL DAILY
Status: DISCONTINUED | OUTPATIENT
Start: 2021-03-11 | End: 2021-03-12 | Stop reason: HOSPADM

## 2021-03-11 RX ORDER — GABAPENTIN 400 MG/1
400 CAPSULE ORAL 3 TIMES DAILY
Status: DISCONTINUED | OUTPATIENT
Start: 2021-03-11 | End: 2021-03-12 | Stop reason: HOSPADM

## 2021-03-11 RX ORDER — HEPARIN SODIUM 1000 [USP'U]/ML
80 INJECTION, SOLUTION INTRAVENOUS; SUBCUTANEOUS PRN
Status: DISCONTINUED | OUTPATIENT
Start: 2021-03-11 | End: 2021-03-11 | Stop reason: ALTCHOICE

## 2021-03-11 RX ORDER — HEPARIN SODIUM 1000 [USP'U]/ML
40 INJECTION, SOLUTION INTRAVENOUS; SUBCUTANEOUS PRN
Status: DISCONTINUED | OUTPATIENT
Start: 2021-03-11 | End: 2021-03-11 | Stop reason: ALTCHOICE

## 2021-03-11 RX ORDER — SENNA PLUS 8.6 MG/1
1 TABLET ORAL ONCE
Status: COMPLETED | OUTPATIENT
Start: 2021-03-11 | End: 2021-03-11

## 2021-03-11 RX ADMIN — ROPINIROLE HYDROCHLORIDE 2 MG: 1 TABLET, FILM COATED ORAL at 22:24

## 2021-03-11 RX ADMIN — TOPIRAMATE 25 MG: 25 TABLET, FILM COATED ORAL at 10:12

## 2021-03-11 RX ADMIN — POLYETHYLENE GLYCOL 3350 17 G: 17 POWDER, FOR SOLUTION ORAL at 19:33

## 2021-03-11 RX ADMIN — HYDROMORPHONE HYDROCHLORIDE 1 MG: 1 INJECTION, SOLUTION INTRAMUSCULAR; INTRAVENOUS; SUBCUTANEOUS at 14:15

## 2021-03-11 RX ADMIN — SODIUM CHLORIDE 10 ML: 9 INJECTION, SOLUTION INTRAMUSCULAR; INTRAVENOUS; SUBCUTANEOUS at 11:19

## 2021-03-11 RX ADMIN — HYDROMORPHONE HYDROCHLORIDE 1 MG: 1 INJECTION, SOLUTION INTRAMUSCULAR; INTRAVENOUS; SUBCUTANEOUS at 04:28

## 2021-03-11 RX ADMIN — ONDANSETRON 4 MG: 2 INJECTION INTRAMUSCULAR; INTRAVENOUS at 03:16

## 2021-03-11 RX ADMIN — SENNOSIDES 8.6 MG: 8.6 TABLET, FILM COATED ORAL at 10:12

## 2021-03-11 RX ADMIN — SODIUM CHLORIDE, PRESERVATIVE FREE 10 ML: 5 INJECTION INTRAVENOUS at 03:16

## 2021-03-11 RX ADMIN — HYDROMORPHONE HYDROCHLORIDE 1 MG: 1 INJECTION, SOLUTION INTRAMUSCULAR; INTRAVENOUS; SUBCUTANEOUS at 19:33

## 2021-03-11 RX ADMIN — MORPHINE SULFATE 2 MG: 2 INJECTION, SOLUTION INTRAMUSCULAR; INTRAVENOUS at 11:14

## 2021-03-11 RX ADMIN — HYDROMORPHONE HYDROCHLORIDE 1 MG: 1 INJECTION, SOLUTION INTRAMUSCULAR; INTRAVENOUS; SUBCUTANEOUS at 06:48

## 2021-03-11 RX ADMIN — SUCRALFATE 1 G: 1 TABLET ORAL at 14:15

## 2021-03-11 RX ADMIN — SODIUM CHLORIDE 10 ML: 9 INJECTION, SOLUTION INTRAMUSCULAR; INTRAVENOUS; SUBCUTANEOUS at 22:25

## 2021-03-11 RX ADMIN — PANTOPRAZOLE SODIUM 40 MG: 40 INJECTION, POWDER, FOR SOLUTION INTRAVENOUS at 11:17

## 2021-03-11 RX ADMIN — HYDROMORPHONE HYDROCHLORIDE 1 MG: 1 INJECTION, SOLUTION INTRAMUSCULAR; INTRAVENOUS; SUBCUTANEOUS at 00:06

## 2021-03-11 RX ADMIN — TOPIRAMATE 25 MG: 25 TABLET, FILM COATED ORAL at 22:24

## 2021-03-11 RX ADMIN — SUCRALFATE 1 G: 1 TABLET ORAL at 06:13

## 2021-03-11 RX ADMIN — METOPROLOL TARTRATE 50 MG: 50 TABLET, FILM COATED ORAL at 10:12

## 2021-03-11 RX ADMIN — SODIUM CHLORIDE, PRESERVATIVE FREE 10 ML: 5 INJECTION INTRAVENOUS at 11:14

## 2021-03-11 RX ADMIN — GABAPENTIN 400 MG: 400 CAPSULE ORAL at 14:15

## 2021-03-11 RX ADMIN — HYDROMORPHONE HYDROCHLORIDE 1 MG: 1 INJECTION, SOLUTION INTRAMUSCULAR; INTRAVENOUS; SUBCUTANEOUS at 02:19

## 2021-03-11 RX ADMIN — SODIUM CHLORIDE, PRESERVATIVE FREE 10 ML: 5 INJECTION INTRAVENOUS at 00:07

## 2021-03-11 RX ADMIN — SODIUM CHLORIDE, PRESERVATIVE FREE 10 ML: 5 INJECTION INTRAVENOUS at 06:48

## 2021-03-11 RX ADMIN — GABAPENTIN 300 MG: 300 CAPSULE ORAL at 10:11

## 2021-03-11 RX ADMIN — METOPROLOL TARTRATE 50 MG: 50 TABLET, FILM COATED ORAL at 22:24

## 2021-03-11 RX ADMIN — GABAPENTIN 400 MG: 400 CAPSULE ORAL at 22:24

## 2021-03-11 RX ADMIN — LEVOTHYROXINE SODIUM 175 MCG: 0.05 TABLET ORAL at 06:13

## 2021-03-11 RX ADMIN — HYDROMORPHONE HYDROCHLORIDE 1 MG: 1 INJECTION, SOLUTION INTRAMUSCULAR; INTRAVENOUS; SUBCUTANEOUS at 22:42

## 2021-03-11 RX ADMIN — SODIUM CHLORIDE, PRESERVATIVE FREE 10 ML: 5 INJECTION INTRAVENOUS at 22:42

## 2021-03-11 RX ADMIN — SODIUM CHLORIDE, PRESERVATIVE FREE 10 ML: 5 INJECTION INTRAVENOUS at 02:19

## 2021-03-11 RX ADMIN — HEPARIN SODIUM AND DEXTROSE 18 UNITS/KG/HR: 10000; 5 INJECTION INTRAVENOUS at 15:31

## 2021-03-11 RX ADMIN — SERTRALINE 100 MG: 100 TABLET, FILM COATED ORAL at 10:12

## 2021-03-11 RX ADMIN — SODIUM CHLORIDE 3000 MG: 900 INJECTION INTRAVENOUS at 22:22

## 2021-03-11 RX ADMIN — PANTOPRAZOLE SODIUM 40 MG: 40 INJECTION, POWDER, FOR SOLUTION INTRAVENOUS at 22:25

## 2021-03-11 RX ADMIN — SUCRALFATE 1 G: 1 TABLET ORAL at 00:06

## 2021-03-11 RX ADMIN — SUCRALFATE 1 G: 1 TABLET ORAL at 19:33

## 2021-03-11 RX ADMIN — SODIUM CHLORIDE, PRESERVATIVE FREE 10 ML: 5 INJECTION INTRAVENOUS at 04:28

## 2021-03-11 RX ADMIN — SODIUM CHLORIDE 3000 MG: 900 INJECTION INTRAVENOUS at 14:15

## 2021-03-11 RX ADMIN — MIRTAZAPINE 15 MG: 15 TABLET, FILM COATED ORAL at 22:24

## 2021-03-11 ASSESSMENT — PAIN SCALES - GENERAL
PAINLEVEL_OUTOF10: 8
PAINLEVEL_OUTOF10: 5
PAINLEVEL_OUTOF10: 7
PAINLEVEL_OUTOF10: 7
PAINLEVEL_OUTOF10: 0
PAINLEVEL_OUTOF10: 6
PAINLEVEL_OUTOF10: 7
PAINLEVEL_OUTOF10: 4
PAINLEVEL_OUTOF10: 0
PAINLEVEL_OUTOF10: 5
PAINLEVEL_OUTOF10: 8

## 2021-03-11 ASSESSMENT — PAIN DESCRIPTION - FREQUENCY: FREQUENCY: CONTINUOUS

## 2021-03-11 ASSESSMENT — ENCOUNTER SYMPTOMS
NAUSEA: 1
BLOOD IN STOOL: 0
VOMITING: 0
ABDOMINAL DISTENTION: 0
DIARRHEA: 0

## 2021-03-11 ASSESSMENT — PAIN DESCRIPTION - ORIENTATION
ORIENTATION: RIGHT
ORIENTATION: RIGHT

## 2021-03-11 ASSESSMENT — PAIN DESCRIPTION - PAIN TYPE
TYPE: ACUTE PAIN

## 2021-03-11 ASSESSMENT — PAIN DESCRIPTION - LOCATION
LOCATION: BACK

## 2021-03-11 NOTE — PROGRESS NOTES
GI Progress notes    3/11/2021   3:08 PM    Name:  Pranav Jacobs  MRN:    [id number]     Acct:     [de-identified]   Room:  2091/2091-01   Day: 4     Admit Date: 3/7/2021 10:31 AM  PCP: MARTITA Steinberg CNP    Subjective:     C/C:   Chief Complaint   Patient presents with    Shortness of Breath       Interval History: Status: not changed. Patient seen and examined. No acute events overnight. Denies abdominal pain, nausea, vomiting  Tolerating diet well. S/p EGD with mild gastritis. No bleeding seen. ROS:  Constitutional: negative for chills, fevers and sweats  Gastrointestinal: negative for abdominal pain, constipation, diarrhea, nausea and vomiting    Medications: Allergies:    Allergies   Allergen Reactions    Compazine [Prochlorperazine]      Jittery, restless    Sulfa Antibiotics Hives    Adhesive Tape Rash       Current Meds: heparin (porcine) injection 7,100 Units, PRN  heparin (porcine) injection 3,550 Units, PRN  heparin 25,000 units in dextrose 5% 250 mL (premix) infusion, Continuous  polyethylene glycol (GLYCOLAX) packet 17 g, Daily  gabapentin (NEURONTIN) capsule 400 mg, TID  ampicillin-sulbactam (UNASYN) 3000 mg ivpb minibag, Q6H  sucralfate (CARAFATE) tablet 1 g, 4 times per day  pantoprazole (PROTONIX) injection 40 mg, BID    And  sodium chloride (PF) 0.9 % injection 10 mL, BID  [Held by provider] rivaroxaban (XARELTO) tablet 15 mg, BID WC  perflutren lipid microspheres (DEFINITY) injection 2.2 mg, ONCE PRN  sodium chloride flush 0.9 % injection 10 mL, PRN  metoprolol tartrate (LOPRESSOR) tablet 50 mg, BID  mirtazapine (REMERON) tablet 15 mg, Daily  rOPINIRole (REQUIP) tablet 2 mg, Nightly  sertraline (ZOLOFT) tablet 100 mg, Daily  tiZANidine (ZANAFLEX) tablet 4 mg, Q8H PRN  topiramate (TOPAMAX) tablet 25 mg, BID  sodium chloride flush 0.9 % injection 10 mL, 2 times per day  sodium chloride flush 0.9 % injection 10 mL, PRN  promethazine (PHENERGAN) tablet 12.5 mg, Q6H PRN Or  ondansetron (ZOFRAN) injection 4 mg, Q6H PRN  polyethylene glycol (GLYCOLAX) packet 17 g, Daily PRN  acetaminophen (TYLENOL) tablet 650 mg, Q6H PRN    Or  acetaminophen (TYLENOL) suppository 650 mg, Q6H PRN  potassium chloride (KLOR-CON M) extended release tablet 40 mEq, PRN    Or  potassium bicarb-citric acid (EFFER-K) effervescent tablet 40 mEq, PRN    Or  potassium chloride 10 mEq/100 mL IVPB (Peripheral Line), PRN  morphine (PF) injection 2 mg, Q2H PRN    Or  morphine sulfate (PF) injection 4 mg, Q2H PRN  LORazepam (ATIVAN) tablet 0.5 mg, Q6H PRN  levothyroxine (SYNTHROID) tablet 175 mcg, Daily  HYDROmorphone (DILAUDID) injection 1 mg, Q2H PRN        Data:     Code Status:  Full Code    Family History   Problem Relation Age of Onset    Cancer Mother         vaginal    Heart Disease Father     Diabetes Father     Other Father         colon resection for colon polyps    Breast Cancer Maternal Grandmother     Stroke Maternal Grandfather        Social History     Socioeconomic History    Marital status:      Spouse name: Not on file    Number of children: Not on file    Years of education: Not on file    Highest education level: Not on file   Occupational History    Occupation: Homemaker   Social Needs    Financial resource strain: Not on file    Food insecurity     Worry: Not on file     Inability: Not on file    Transportation needs     Medical: Not on file     Non-medical: Not on file   Tobacco Use    Smoking status: Former Smoker     Packs/day: 0.25     Years: 33.00     Pack years: 8.25     Types: Cigarettes    Smokeless tobacco: Never Used    Tobacco comment: quit on nicoderm patch   Substance and Sexual Activity    Alcohol use: No     Alcohol/week: 0.0 standard drinks    Drug use: No    Sexual activity: Not Currently   Lifestyle    Physical activity     Days per week: Not on file     Minutes per session: Not on file    Stress: Not on file   Relationships    Social connections Talks on phone: Not on file     Gets together: Not on file     Attends Buddhism service: Not on file     Active member of club or organization: Not on file     Attends meetings of clubs or organizations: Not on file     Relationship status: Not on file    Intimate partner violence     Fear of current or ex partner: Not on file     Emotionally abused: Not on file     Physically abused: Not on file     Forced sexual activity: Not on file   Other Topics Concern    Not on file   Social History Narrative    Not on file       Vitals:  /70   Pulse 68   Temp 98.4 °F (36.9 °C) (Oral)   Resp 16   Ht 5' 4\" (1.626 m)   Wt 195 lb 12.8 oz (88.8 kg)   LMP 2010   SpO2 93%   BMI 33.61 kg/m²   Temp (24hrs), Av.4 °F (36.9 °C), Min:98.1 °F (36.7 °C), Max:98.6 °F (37 °C)    No results for input(s): POCGLU in the last 72 hours. I/O (24Hr):     Intake/Output Summary (Last 24 hours) at 3/11/2021 1508  Last data filed at 3/11/2021 0522  Gross per 24 hour   Intake 500 ml   Output --   Net 500 ml       Labs:      CBC:   Lab Results   Component Value Date    WBC 5.2 2021    RBC 3.63 2021    RBC 4.36 2019    HGB 11.5 2021    HCT 34.4 2021    MCV 94.9 2021    MCH 31.7 2021    MCHC 33.4 2021    RDW 12.6 2021     2021     2012    MPV 7.6 2021     CBC with Differential:    Lab Results   Component Value Date    WBC 5.2 2021    RBC 3.63 2021    RBC 4.36 2019    HGB 11.5 2021    HCT 34.4 2021     2021     2012    MCV 94.9 2021    MCH 31.7 2021    MCHC 33.4 2021    RDW 12.6 2021    NRBC 0 2019    LYMPHOPCT 26 2021    LYMPHOPCT 33.2 2019    MONOPCT 10 2021    MONOPCT 6.0 2019    BASOPCT 1 2021    BASOPCT 0.9 2019    MONOSABS 0.50 2021    MONOSABS 0.5 2019    LYMPHSABS 1.30 2021    LYMPHSABS 2.7 06/03/2019    EOSABS 0.20 03/11/2021    EOSABS 0.3 06/03/2019    BASOSABS 0.00 03/11/2021    DIFFTYPE NOT REPORTED 03/11/2021     Hemoglobin/Hematocrit:    Lab Results   Component Value Date    HGB 11.5 03/11/2021    HCT 34.4 03/11/2021     CMP:    Lab Results   Component Value Date     03/11/2021    K 3.6 03/11/2021     03/11/2021    CO2 24 03/11/2021    BUN 14 03/11/2021    CREATININE 0.72 03/11/2021    GFRAA >60 03/11/2021    LABGLOM >60 03/11/2021    GLUCOSE 118 03/11/2021    GLUCOSE 107 06/03/2019    PROT 7.4 03/07/2021    PROT 6.3 06/03/2019    LABALBU 3.9 03/07/2021    LABALBU 3.8 06/03/2019    CALCIUM 8.6 03/11/2021    BILITOT 0.34 03/07/2021    ALKPHOS 103 03/07/2021    AST 14 03/07/2021    ALT 9 03/07/2021     BMP:    Lab Results   Component Value Date     03/11/2021    K 3.6 03/11/2021     03/11/2021    CO2 24 03/11/2021    BUN 14 03/11/2021    LABALBU 3.9 03/07/2021    LABALBU 3.8 06/03/2019    CREATININE 0.72 03/11/2021    CALCIUM 8.6 03/11/2021    GFRAA >60 03/11/2021    LABGLOM >60 03/11/2021    GLUCOSE 118 03/11/2021    GLUCOSE 107 06/03/2019     PT/INR:    Lab Results   Component Value Date    PROTIME 12.2 03/11/2021    INR 0.9 03/11/2021     PTT:    Lab Results   Component Value Date    APTT 29.7 03/11/2021   [APTT}    Physical Examination:        General appearance: alert, cooperative and no distress  Mental Status: oriented to person, place and time and normal affect  Abdomen: soft, nontender, nondistended, bowel sounds present    Assessment:        Primary Problem  Acute respiratory failure with hypoxia [facility]     Active Hospital Problems    Diagnosis Date Noted    [facility] [J96.01] 03/10/2021    Acute pulmonary embolism ([facility] [I26.99]     Hematemesis with nausea [K92.0]     Pulmonary embolism on right (HCC) [I26.99] 03/07/2021    RLS (restless legs syndrome) [G25.81] 01/26/2015    GERD (gastroesophageal reflux disease) [K21.9]

## 2021-03-11 NOTE — PROGRESS NOTES
Writer spoke with a nurse, RN explained that she talk to pulmonology who said that once it is clear per GI to start heparin they are good with her. RN contacted GI who the patient should said patient should start anticoagulation. Pulmonology recommended heparin without boluses. We will monitor the patient for a while and if no symptoms and CBC is unremarkable, she will be discharged.      Giovanna Lozano  PGY-1  Family Medicine     3/11/2021  1:05 PM

## 2021-03-11 NOTE — PROGRESS NOTES
Home Oxygen Evaluation    Room air SpO2 at Rest = 94%    Room air with exercise/exertion = 88%    SpO2 on prescribed O2 level at 2   LPM  at rest = 93%     with exercise/exertion =94%    Nocturnal Oximetry with patient on room air recommended if the resting SpO2 is 89% to 95%.  (Requires additional order)

## 2021-03-11 NOTE — PROGRESS NOTES
[address] [facility].    PROGRESS NOTE             3/11/2021    9:06 AM    Name:   Geremias Calle  MRN:     [id number]     Acct:      [de-identified]   Room:   2091/2091-01  IP Day:  4  Admit Date:  3/7/2021 10:31 AM    PCP:  MARTITA Lee CNP  Code Status:  Full Code    Subjective:     C/C:   Chief Complaint   Patient presents with    Shortness of Breath     Interval History Status: not changed. She was seen and examined with bedside. Overnight patient stated that she was unable to sleep because of right posterior side chest pain that radiates down to the right hip. Patient denied any fall or hitting something on that side. The site is nontender to touch. Patient is on 3 L nasal cannula oxygen. Patient underwent an EGD yesterday which showed moderate to severe gastritis. Patient anticoagulation is on hold, once cleared per GI we will resume heparin for couple of hours and then put patient on Xarelto again. Pulmonology is on board, they ordered another x-ray. We appreciate their further recommendations. Review of Systems:     Review of Systems   Constitutional: Negative for chills and fever. Cardiovascular: Positive for chest pain. Negative for palpitations and leg swelling. Gastrointestinal: Positive for nausea. Negative for abdominal distention, blood in stool, diarrhea and vomiting. Genitourinary: Negative for difficulty urinating, frequency and urgency. Musculoskeletal: Negative for joint swelling. Neurological: Negative for dizziness, light-headedness and headaches. Medications: Allergies:     Allergies   Allergen Reactions    Compazine [Prochlorperazine]      Jittery, restless    Sulfa Antibiotics Hives    Adhesive Tape Rash       Current Meds:   Scheduled Meds:    senna  1 tablet Oral Once    polyethylene glycol  17 g Oral Daily    sucralfate  1 g Oral 4 times per day    pantoprazole  40 mg Av.3 °F (36.8 °C), Min:97.6 °F (36.4 °C), Max:99.3 °F (37.4 °C)    No results for input(s): POCGLU in the last 72 hours. I/O(24Hr): Intake/Output Summary (Last 24 hours) at 3/11/2021 0906  Last data filed at 3/11/2021 0522  Gross per 24 hour   Intake 700 ml   Output --   Net 700 ml       Labs:  [unfilled]    Lab Results   Component Value Date/Time    SPECIAL NOT REPORTED 2021 06:57 PM     Lab Results   Component Value Date/Time    CULTURE NO GROWTH 2021 06:57 PM       [facility]    Radiology:    Echo Complete 2d W Doppler W Color    Result Date: 3/8/2021  [address] [facility] Transthoracic Echocardiography Report (TTE)  Patient Name Nisha Humphries Date of Study               2021               L   Date of      1970  Gender                      Female  Birth   Age          53 year(s)  Race                           Room Number  2091        Height:                     64 inch, 162.56 cm   Corporate ID [id number]    Weight:                     165 pounds, 74.8 kg  #   Patient Acct [de-identified]   BSA:          1.8 m^2       BMI:      28.32  #                                                              kg/m^2   MR #         [id number]      Sonographer                 Yanique Ro   Accession #  [id number]  Interpreting Physician      Robin Light   Fellow                   Referring Nurse                           Practitioner   Interpreting             Referring Physician         Ramya Blanchard  Fellow  Additional Comments Technically somewhat difficult study. Type of Study   TTE procedure:2D Echocardiogram, M-Mode, Doppler, Color Doppler. Procedure Date Date: 2021 Start: 09:39 AM Study Location: [facility] Technical Quality: Fair visualization Indications:Pulmonary embolus and Right heart strain. History / Tech.  Comments: CAD, HLD, MVP, DM Patient Status: Inpatient Height: 64 inches Weight: 165 pounds BSA: 1.8 m^2 BMI: 28.32 kg/m^2 Rhythm: Within normal limits HR: 88 bpm BP: 108/57 mmHg CONCLUSIONS Summary Technically somewhat difficult study. Left ventricle is normal in size and wall thickness. Global left ventricular systolic function is normal. Estimated LV EF 60-65 %. No obvious wall motion abnormality seen. Both atria are normal in size. Normal right ventricular size and function. Mild mitral regurgitation. Signature ----------------------------------------------------------------------------  Electronically signed by Yanique Ro(Sonographer) on 03/08/2021 10:14  AM ---------------------------------------------------------------------------- ----------------------------------------------------------------------------  Electronically signed by Agapito Carlton(Interpreting physician) on  03/08/2021 12:19 PM ---------------------------------------------------------------------------- FINDINGS Left Atrium Left atrium is normal in size. Left Ventricle Left ventricle is normal in size and wall thickness. Global left ventricular systolic function is normal. Estimated LV EF 60-65 %. No obvious wall motion abnormality seen. Right Atrium Right atrium is normal in size. Right Ventricle Normal right ventricular size and function. Mitral Valve No obvious valvular abnormality seen. No evidence of mitral valve prolapse. Mild mitral regurgitation. Aortic Valve No obvious valvular abnormality seen. No evidence of aortic insufficiency or stenosis. Tricuspid Valve No obvious valvular abnormality. Trivial tricuspid regurgitation. No pulmonary hypertension. Estimated right ventricular systolic pressure is 34THKE. Pulmonic Valve Pulmonic valve was not well visualized. No evidence of pulmonic insufficiency or stenosis. Pericardial Effusion No significant pericardial effusion is seen. Pleural Effusion No pleural effusion seen. Miscellaneous Normal aortic root dimension. E/E' average = 11.3.  M-mode / 2D Measurements & Calculations:   LVIDd:4.74 cm(3.7 - 5.6 cm) Diastolic NVGQG ml  IPTSN:9.97 cm(2.2 - 4.0 cm)      Systolic BKRTBP:22.3 ml  NUQF:5.11 cm(0.6 - 1.1 cm)       Aortic Root:2.7 cm(2.0 - 3.7 cm)  LVPWd:0.81 cm(0.6 - 1.1 cm)      LA Dimension: 3.2 cm(1.9 - 4.0 cm)  Fractional Shortenin.25 %    LA volume/Index: 48.4 ml /27m^2  Calculated LVEF (%): 81.6 %      LVOT:2 cm   Mitral:                                 Aortic   Valve Area (P1/2-Time): 4.4 cm^2        Peak Velocity: 1.31 m/s  Peak E-Wave: 1.05 m/s                   Mean Velocity: 0.92 m/s  Peak A-Wave: 0.77 m/s                   Peak Gradient: 6.86 mmHg  E/A Ratio: 1.36                         Mean Gradient: 4 mmHg  Peak Gradient: 4.41 mmHg  Deceleration Time: 176 msec  P1/2t: 50 msec                          Area (continuity): 2.65 cm^2                                          AV VTI: 24.5 cm   Tricuspid:                              Pulmonic:   Estimated RVSP: 21 mmHg  Peak TR Velocity: 1.82 m/s  Peak TR Gradient: 13.2496 mmHg  Estimated RA Pressure: 8 mmHg                                          Estimated PASP: 21.25 mmHg  Septal Wall E' velocity:0.08 m/s Lateral Wall E' velocity:0.11 m/s    Xr Cervical Spine (2-3 Views)    Result Date: 2021  EXAMINATION: TWO XRAY VIEWS SCOLIOSIS SERIES; 2 XRAY VIEWS OF THE CERVICAL SPINE 2021 12:58 pm COMPARISON: None. HISTORY: ORDERING SYSTEM PROVIDED HISTORY: Cervical spondylosis TECHNOLOGIST PROVIDED HISTORY: scoliosis Reason for Exam: Pt states recheck cervical spondylosis, scoliosis evaluate for instability Acuity: Chronic Type of Exam: Subsequent/Follow-up Additional signs and symptoms: scoliosis FINDINGS: Scoliosis: 12 thoracic and 5 lumbar vertebra are noted, well maintained in height without acute fracture or subluxation. Mild levo scoliotic curvature of 2.4 degrees is noted T5 to bottom of T10. Mild dextroscoliotic curvature of 4.9 T11 through L4. Minimal to mild degenerative and degenerative disc changes are present in the spine.   No acute fracture or subluxation. Examination of the cervical spine reveals evidence of mild facet changes. There appears to be calcification of the left carotid vessel. Mild dextroscoliotic curvature cervical spine is evident. Degrees is noted from T11 through L4. Sacral base plane un leveling cannot be accurately assessed. Left iliac crest is out of the field of view. Cervical spine: 1 mm grade 1 anterolisthesis C2 upon C3 is noted on flexion, not replicated on extension and not seen neutral view. No acute fracture or prevertebral soft tissue swelling. Scoliosis: Mild levo scoliotic curvature thoracic spine. Mild dextroscoliotic curvature lower thoracic and lumbar spine. No acute fracture or subluxation. Degenerative changes. Cervical spine: Minimal grade 1 anterolisthesis C2 upon C3 on flexion, not seen on extension or neutral views. No acute fracture or prevertebral soft tissue swelling. Findings suggest minimal instability. Ct Cervical Spine Wo Contrast    Result Date: 3/4/2021  EXAMINATION: CT OF THE CERVICAL SPINE WITHOUT CONTRAST 3/4/2021 5:19 pm TECHNIQUE: CT of the cervical spine was performed without the administration of intravenous contrast. Multiplanar reformatted images are provided for review. Dose modulation, iterative reconstruction, and/or weight based adjustment of the mA/kV was utilized to reduce the radiation dose to as low as reasonably achievable. COMPARISON: February 13, 2021. HISTORY: ORDERING SYSTEM PROVIDED HISTORY: radicular pain left arm TECHNOLOGIST PROVIDED HISTORY: radicular pain left arm Decision Support Exception->Emergency Medical Condition (MA) Is the patient pregnant?->No Reason for Exam: radicular pain left arm for three weeks. patient states limited ROM and pain starting in neck.  no injuries Acuity: Unknown Type of Exam: Unknown FINDINGS: Bone mineralization is normal.  The vertebral bodies and posterior elements appear intact and appropriately aligned without acute fracture or subluxation. Vertebral body stature is maintained throughout. There is straightening of the normal cervical lordosis. Mild-to-moderate cervical degenerative disc disease is present throughout. There is moderate bony neural foraminal narrowing on the left at C3-C4 and C5-C6. No significant uncovertebral joint hypertrophic change or additional bony neural foraminal narrowing. No paraspinal soft tissue abnormality. The visualized lung apices are grossly clear. Mild to moderate multilevel cervical degenerative disc disease with moderate bony neural foraminal narrowing on the left at C3-C4 and C5-C6. No acute fracture or subluxation. Straightening of the normal cervical lordosis which may be related to muscle spasm or position in collar. Ct Cervical Spine Wo Contrast    Result Date: 2/13/2021  EXAMINATION: CT OF THE CERVICAL SPINE WITHOUT CONTRAST 2/13/2021 5:59 pm TECHNIQUE: CT of the cervical spine was performed without the administration of intravenous contrast. Multiplanar reformatted images are provided for review. Dose modulation, iterative reconstruction, and/or weight based adjustment of the mA/kV was utilized to reduce the radiation dose to as low as reasonably achievable. COMPARISON: 06/25/2018 HISTORY: ORDERING SYSTEM PROVIDED HISTORY: left arm pain TECHNOLOGIST PROVIDED HISTORY: left arm pain Decision Support Exception->Emergency Medical Condition (MA) Is the patient pregnant?->No Reason for Exam: patient states she has been having left arm pain and she is unable to move her left arm Acuity: Unknown Type of Exam: Unknown FINDINGS: The cervical spine demonstrates normalmineralization with straightening of the  cervical lordosis. There is no evidence of fracture or subluxation. There is mild loss of disc height with eburnation of the vertebral endplates at the P3-7, Q1-4, C5-6levels. There are small marginal osteophytes at multiple levels. The central canal is grossly patent.  The osteophyte measuring 5-6 mm toward the left narrowing the left neural foramen. The thecal sac is mildly stenotic measuring 9.3 mm. The right neural foramen is intact. C6-C7: Disc and/or osteophytes cause minimal narrowing of the neural foramina bilaterally. The thecal sac is not stenotic. C7-T1:  The thecal sac and neural foramina are intact. Disc and osteophytes result in narrowing of the neural foramina and mild stenosis of the thecal sac throughout the cervical region as discussed in detail above. Xr Chest Portable    Result Date: 3/7/2021  EXAMINATION: ONE XRAY VIEW OF THE CHEST 3/7/2021 11:26 am COMPARISON: 11/08/2020 HISTORY: ORDERING SYSTEM PROVIDED HISTORY: sob Reason for Exam: Sob coughing up blood today Acuity: Acute Type of Exam: Initial FINDINGS: The lungs are without acute focal process. No effusion or pneumothorax. The cardiomediastinal silhouette is normal.  The osseous structures are intact without acute process. Negative portable chest.     Ct Chest Pulmonary Embolism W Contrast    Result Date: 3/7/2021  EXAMINATION: CTA OF THE CHEST 3/7/2021 11:46 am TECHNIQUE: CTA of the chest was performed after the administration of intravenous contrast.  Multiplanar reformatted images are provided for review. MIP images are provided for review. Dose modulation, iterative reconstruction, and/or weight based adjustment of the mA/kV was utilized to reduce the radiation dose to as low as reasonably achievable. COMPARISON: None. HISTORY: ORDERING SYSTEM PROVIDED HISTORY: r/o pe Reason for Exam: Chest pain and coughing up blood since this morning. Acuity: Acute Type of Exam: Initial FINDINGS: Pulmonary Arteries: Pulmonary arteries are adequately opacified for evaluation. Posterior right lower lobe filling defect with extension into basilar subsegmental branches. Main pulmonary artery is normal in caliber. Mediastinum: No evidence of mediastinal lymphadenopathy. Normal heart size.  Normal thoracic aorta. Lungs/pleura: Bilateral lower lobe atelectasis. No focal consolidation or pulmonary edema. No evidence of pleural effusion or pneumothorax. Upper Abdomen: Cholecystectomy Soft Tissues/Bones: No acute bone or soft tissue abnormality. Posterior right lower lobe filling defect with extension into basilar subsegmental branches consistent pulmonary embolism. Bilateral lower lobe atelectasis. Critical results were called by Dr. Jonny Claudio. Dian Pedro MD to [address] on 3/7/2021 at 13:14. Vl Lower Extremity Bilateral Venous Duplex    Result Date: 3/8/2021    [facility]  Vascular Lower Extremities DVT Study Procedure   Patient Name   Nisha Humphries Date of Study           03/08/2021                 L   Date of Birth  1970  Gender                  Female   Age            53 year(s)  Race                       Room Number    2091        Height:                 64.17 inch, 163 cm   Corporate ID # [id number]    Weight:                 165 pounds, 74.8 kg   Patient Acct # [de-identified]   BSA:        1.81 m^2    BMI:       28.17 kg/m^2   MR #           [id number]      Sonographer             Estiven Cm RVT   Accession #    [id number]  Interpreting Physician  Lucio Ramires   Referring                  Referring Physician     Sd Hyatt  Nurse  Practitioner  Procedure Type of Study:   Veins: Lower Extremities DVT Study, Venous Scan Lower Bilateral.  Indications for Study:Positive for pulmonary embolus. Patient Status: In Patient. Technical Quality:Adequate visualization. Conclusions   Summary   No evidence of superficial or deep venous thrombosis in both lower  extremities.    Signature   ----------------------------------------------------------------  Electronically signed by Estiven Cm RVT(Sonographer) on  03/08/2021 03:13 PM  ----------------------------------------------------------------   ----------------------------------------------------------------  Electronically signed by Lucio Ramires(Interpreting physician)  on 03/08/2021 11:40 PM  ----------------------------------------------------------------  Findings:   Right Impression:                    Left Impression:  The common femoral, femoral,         The common femoral, femoral,  popliteal and tibial veins           popliteal and tibial veins  demonstrate normal compressibility   demonstrate normal compressibility  and augmentation. and augmentation. Normal compressibility of the great  Normal compressibility of the great  saphenous vein. saphenous vein. Normal compressibility of the small  Normal compressibility of the small  saphenous vein. saphenous vein. Velocities are measured in cm/s ; Diameters are measured in cm Right Lower Extremities DVT Study Measurements Right 2D Measurements +------------------------------------+----------+---------------+----------+ ! Location                            ! Visualized! Compressibility! Thrombosis! +------------------------------------+----------+---------------+----------+ ! Common Femoral                      !Yes       ! Yes            ! None      ! +------------------------------------+----------+---------------+----------+ ! Prox Femoral                        !Yes       ! Yes            ! None      ! +------------------------------------+----------+---------------+----------+ ! Mid Femoral                         !Yes       ! Yes            ! None      ! +------------------------------------+----------+---------------+----------+ ! Dist Femoral                        !Yes       ! Yes            ! None      ! +------------------------------------+----------+---------------+----------+ ! Deep Femoral                        !Yes       ! Yes            ! None      ! +------------------------------------+----------+---------------+----------+ ! Popliteal                           !Yes       ! Yes            ! None      ! +------------------------------------+----------+---------------+----------+ ! Sapheno Femoral Junction            ! Yes       ! Yes            ! None      ! +------------------------------------+----------+---------------+----------+ ! PTV                                 ! Yes       ! Yes            ! None      ! +------------------------------------+----------+---------------+----------+ ! Peroneal                            !Yes       ! Yes            ! None      ! +------------------------------------+----------+---------------+----------+ ! Gastroc                             ! Yes       ! Yes            ! None      ! +------------------------------------+----------+---------------+----------+ ! GSV Thigh                           ! Yes       ! Yes            ! None      ! +------------------------------------+----------+---------------+----------+ ! GSV Knee                            ! Yes       ! Yes            ! None      ! +------------------------------------+----------+---------------+----------+ ! GSV Ankle                           ! Yes       ! Yes            ! None      ! +------------------------------------+----------+---------------+----------+ ! SSV                                 ! Yes       ! Yes            ! None      ! +------------------------------------+----------+---------------+----------+ Left Lower Extremities DVT Study Measurements Left 2D Measurements +------------------------------------+----------+---------------+----------+ ! Location                            ! Visualized! Compressibility! Thrombosis! +------------------------------------+----------+---------------+----------+ ! Common Femoral                      !Yes       ! Yes            ! None      ! +------------------------------------+----------+---------------+----------+ ! Prox Femoral                        !Yes       ! Yes            ! None      ! +------------------------------------+----------+---------------+----------+ ! [place] Femoral !Yes       !Yes            ! None      ! +------------------------------------+----------+---------------+----------+ ! Dist Femoral                        !Yes       ! Yes            ! None      ! +------------------------------------+----------+---------------+----------+ ! Deep Femoral                        !Yes       ! Yes            ! None      ! +------------------------------------+----------+---------------+----------+ ! Popliteal                           !Yes       ! Yes            ! None      ! +------------------------------------+----------+---------------+----------+ ! Sapheno Femoral Junction            ! Yes       ! Yes            ! None      ! +------------------------------------+----------+---------------+----------+ ! PTV                                 ! Yes       ! Yes            ! None      ! +------------------------------------+----------+---------------+----------+ ! Peroneal                            !Yes       ! Yes            ! None      ! +------------------------------------+----------+---------------+----------+ ! Gastroc                             ! Yes       ! Yes            ! None      ! +------------------------------------+----------+---------------+----------+ ! GSV Thigh                           ! Yes       ! Yes            ! None      ! +------------------------------------+----------+---------------+----------+ ! GSV Knee                            ! Yes       ! Yes            ! None      ! +------------------------------------+----------+---------------+----------+ ! GSV Ankle                           ! Yes       ! Yes            ! None      ! +------------------------------------+----------+---------------+----------+ ! SSV                                 ! Yes       ! Yes            ! None      ! +------------------------------------+----------+---------------+----------+    Xr Spine Entire (2-3 Views)    Result Date: 2/23/2021  EXAMINATION: TWO XRAY VIEWS SCOLIOSIS SERIES; 2 XRAY VIEWS OF THE CERVICAL Abdominal:      General: Bowel sounds are normal. There is no distension. Palpations: Abdomen is soft. There is no mass. Tenderness: There is no abdominal tenderness. There is no guarding. Musculoskeletal:      Right lower leg: No edema. Left lower leg: No edema. Assessment:        Primary Problem  Acute respiratory failure with hypoxia [facility])    Active Hospital Problems    Diagnosis Date Noted    Acute respiratory failure with hypoxia ([address] [J96.01] 03/10/2021    Acute pulmonary embolism ([address] [I26.99]     Hematemesis with nausea [K92.0]     Pulmonary embolism on right ([address] [I26.99] 03/07/2021    RLS (restless legs syndrome) [G25.81] 01/26/2015    GERD (gastroesophageal reflux disease) [K21.9] 04/17/2014    Hyperlipidemia [E78.5]     Hypothyroidism [E03.9]     Anxiety [F41.9]        Plan:        1. Chest pain and SOB 2/2 Pulmonary Embolism:   - VSS, afebrile. Desaturated and put on 2L NC oxygen.   - Troponin: Negative  - Chest x-ray unremarkable, chest x-ray repeat this morning. - CT PE showed  posterior right lower lobe filling defect with extension into basilar.  Segmental branches consistent with pulmonary embolism. XARELTO 15 mg BID.  (Held per GI after biopsy, once cleared will resume anticoagulation.)  - Dilaudid 1 mg q2h., Ativan for anxiety ordered. - VL lower extremity bilateral venous Doppler shows no signs of superficial or deep venous thrombosis. - Pulmonology on board, saw the patient and recommended 1 year of anticoagulation with oncology work-up as patient mentioned significant amount of weight loss in the last 1 year. - Antiphospholipid antibodies pending.      2. R/o hematemesis:   - Hb: 12.9--->11.7 (stable last 2 incidents)  - Pt has an episode of possible medium colored vomitus yesterday. - EGD was done which showed moderate gastritis of stomach body, moderate to severe gastritis on antrum without any bleeding. Follow-up biopsies.   Gastroenterology recommended PPI. Once it is clear per GI we will resume patient first on heparin and plan switch to oral Xarelto. - FOBT pending.       3.  Hypothyroidism s/p surgery:  -Synthroid 175 mcg  -TSH: 0.69 (03/08/2021)     3.  Essential hypertension:  -Lopressor 50 mg twice daily.     4.  Restless leg syndrome:  -Ropinirole 2 mg at night.     Dispo: Home, 3L NC oxygen needing on dc. Luz Elena Murcia MD  3/11/2021  9:06 AM     Attending Physician Statement  I have discussed the care of Socrates Rueda with the resident team. I have examined the patient myself and taken ros and hpi , including pertinent history and exam findings,  with the resident. I have reviewed the key elements of all parts of the encounter with the resident. I agree with the assessment, plan and orders as documented by the resident. Principal Problem:    Acute respiratory failure with hypoxia (HCC)  Active Problems:    Hyperlipidemia    Anxiety    Hypothyroidism    GERD (gastroesophageal reflux disease)    RLS (restless legs syndrome)    Pulmonary embolism on right (HCC)    Acute pulmonary embolism (HCC)    Hematemesis with nausea  Resolved Problems:    * No resolved hospital problems. *    EGD - gastritis  Hb stable  Heparin gtt once cleared by GI, if hb stable, will switch to PO Xarelto and discharge later today/tomorrow. Suspected aspiration pneumonia noted on chest x-ray today-will start on Unasyn, will switch to oral on discharge      Full note to follow.       Electronically signed by Graciela Posada MD

## 2021-03-11 NOTE — PROGRESS NOTES
Comprehensive Nutrition Assessment    Type and Reason for Visit:  Reassess    Nutrition Recommendations/Plan: Will change diet to Dental Soft     Nutrition Assessment:  Pt is now s/p EGD. She states she is eating most of the food provided on trays however she is not happy with carbohydrate restriction. She states she's never been told she is diabetic and she wants a General diet.     Malnutrition Assessment:  Malnutrition Status:  No malnutrition    Context:  Acute Illness     Findings of the 6 clinical characteristics of malnutrition:  Energy Intake:  1 - 75% or less of estimated energy requirements for 7 or more days  Weight Loss:  No significant weight loss     Body Fat Loss:  No significant body fat loss     Muscle Mass Loss:  No significant muscle mass loss    Fluid Accumulation:  No significant fluid accumulation     Strength:  Not Performed    Estimated Daily Nutrient Needs:  Energy (kcal):  20 kcal/kg= 1800 kcal; Weight Used for Energy Requirements:  Current     Protein (g):  1.4g/kg= 75 g; Weight Used for Protein Requirements:  Ideal          Nutrition Related Findings:  no edema, Labs (3/11) Glu 118 Meds: Reviewed, BM 3/6      Wounds:  None       Current Nutrition Therapies:    DIET DENTAL SOFT;    Anthropometric Measures:  · Height: 5' 4\" (162.6 cm)  · Current Body Weight: 195 lb (88.5 kg)   · Admission Body Weight: 165 lb (74.8 kg)(stated)    · Usual Body Weight: 178 lb (80.7 kg)(12/20)     Ideal Body Weight: 120 lbs;     Nutrition Diagnosis:   No nutrition diagnosis at this time     Nutrition Interventions:   Food and/or Nutrient Delivery:  Modify Current Diet  Nutrition Education/Counseling:  No recommendation at this time   Coordination of Nutrition Care:  Continue to monitor while inpatient    Goals:  po intake greater than 75%       Nutrition Monitoring and Evaluation:   Food/Nutrient Intake Outcomes:  Food and Nutrient Intake  Physical Signs/Symptoms Outcomes:  Biochemical Data     Discharge Planning:     Too soon to determine     Electronically signed by Robby Caruso RD, LD on 3/11/21 at 11:37 AM EST    Contact: [phone]

## 2021-03-11 NOTE — PROGRESS NOTES
Pulmonary Progress Note  [facility]      Patient - Tuan Dsouza,  Age - 53 y.o.    - 1970      Room Number - /-01   N -  [id number]   [facility] # - [de-identified]  Date of Admission -  3/7/2021 10:31 AM    Follow-up: Pleurisy, hemoptysis    Consulting Service/Physician   Consulting - Nanette Olsen MD  Primary Care Physician - MARTITA Madrid - CNP     SUBJECTIVE   Feeling some better, continues to cough blood but less frequent, on air home O2 eval was 94% on rest on room air dropped down to 88% with activity she did qualify for 2 L with activity  Right-sided pleurisy the same, decreased short of breath, no wheezing  Denies any fever or chills      OBJECTIVE   VITALS    height is 5' 4\" (1.626 m) and weight is 195 lb 12.8 oz (88.8 kg). Her oral temperature is 98.4 °F (36.9 °C). Her blood pressure is 103/70 and her pulse is 68. Her respiration is 16 and oxygen saturation is 93%. Body mass index is 33.61 kg/m². Temperature Range: Temp: 98.4 °F (36.9 °C) Temp  Av.4 °F (36.9 °C)  Min: 98.1 °F (36.7 °C)  Max: 98.6 °F (37 °C)  BP Range:  Systolic (63NGU), UZE:965 , Min:95 , GCP:790     Diastolic (81HXD), VNO:54, Min:54, Max:78    Pulse Range: Pulse  Av.4  Min: 68  Max: 84  Respiration Range: Resp  Av.7  Min: 14  Max: 20  Current Pulse Ox[de-identified]  SpO2: 93 %  24HR Pulse Ox Range:  SpO2  Av %  Min: 89 %  Max: 93 %  Oxygen Amount and Delivery: O2 Flow Rate (L/min): (2 L/min)    Wt Readings from Last 3 Encounters:   21 195 lb 12.8 oz (88.8 kg)   21 165 lb (74.8 kg)   21 171 lb (77.6 kg)       I/O (24 Hours)    Intake/Output Summary (Last 24 hours) at 3/11/2021 0468  Last data filed at 3/11/2021 0522  Gross per 24 hour   Intake 500 ml   Output --   Net 500 ml       EXAM     General Appearance  Awake, alert, oriented, in no acute distress  HEENT - normocephalic, atraumatic.    Neck -no JVD,  trachea midline   Lungs -decreased crepitations, no wheezing or distress  Cardiovascular - Heart sounds are normal.  Regular rate and rhythm   Abdomen - Soft, nontender, nondistended, no guarding   Neurologic -appropriate, following commands,   Extremities - No clubbing, cyanosis, edema    MEDS      polyethylene glycol  17 g Oral Daily    gabapentin  400 mg Oral TID    ampicillin-sulbactam  3,000 mg Intravenous Q6H    sucralfate  1 g Oral 4 times per day    pantoprazole  40 mg Intravenous BID    And    sodium chloride (PF)  10 mL Intravenous BID    [Held by provider] rivaroxaban  15 mg Oral BID WC    metoprolol tartrate  50 mg Oral BID    mirtazapine  15 mg Oral Daily    rOPINIRole  2 mg Oral Nightly    sertraline  100 mg Oral Daily    topiramate  25 mg Oral BID    sodium chloride flush  10 mL Intravenous 2 times per day    levothyroxine  175 mcg Oral Daily      heparin (PORCINE) Infusion 18 Units/kg/hr (03/11/21 1531)     perflutren lipid microspheres, sodium chloride flush, tiZANidine, sodium chloride flush, promethazine **OR** ondansetron, polyethylene glycol, acetaminophen **OR** acetaminophen, potassium chloride **OR** potassium alternative oral replacement **OR** potassium chloride, morphine **OR** morphine, LORazepam, HYDROmorphone    LABS   CBC   Recent Labs     03/11/21  0524   WBC 5.2   HGB 11.5*   HCT 34.4*   MCV 94.9        BMP:   Lab Results   Component Value Date     03/11/2021    K 3.6 03/11/2021     03/11/2021    CO2 24 03/11/2021    BUN 14 03/11/2021    LABALBU 3.9 03/07/2021    LABALBU 3.8 06/03/2019    CREATININE 0.72 03/11/2021    CALCIUM 8.6 03/11/2021    GFRAA >60 03/11/2021    LABGLOM >60 03/11/2021     ABGs:No results found for: PHART, PO2ART, QOR6PGP No results found for: IFIO2, MODE, SETTIDVOL, SETPEEP  Ionized Calcium:  No results found for: IONCA  Magnesium:    Lab Results   Component Value Date    MG 2.2 03/04/2021      Phosphorus:    Lab Results   Component Value Date    PHOS 4.7 12/28/2020        LIVER PROFILE   No results for input(s): AST, ALT, LIPASE, BILIDIR, BILITOT, ALKPHOS in the last 72 hours. Invalid input(s): AMYLASE,  ALB  INR   Recent Labs     03/11/21  0807   INR 0.9     PTT   Recent Labs     03/11/21  0807   APTT 29.7     BNP No results for input(s): BNP in the last 72 hours. RADIOLOGY   Chest x-ray today to me with bibasilar atelectasis  (See actual reports for details)    ASSESSMENT/PLAN   Principal Problem:    Pulmonary embolism on right [facility])  Active Problems:    Anxiety    Hypothyroidism    GERD (gastroesophageal reflux disease)    RLS (restless legs syndrome)    Acute pulmonary embolism (HCC)    Hematemesis with nausea    1. Right-sided pleurisy, mostly with the PE.  2.  Hemoptysis. Slightly better  3. Pulmonary infarct right lower lobe.,    4.  Right lower lobe pulmonary embolism. Unprovoked, no history of trauma, fall, travel, medications or family history, negative venous Doppler for DVT  5. Bibasilar atelectasis. Doubt pneumonia  6. History of tobacco abuse, less than a pack a day for 30 years, quit  6-8 months ago. Denies any underlying lung disease. 7.  Weight loss. The patient noted she lost over 100 pounds in the last  year and noted that her workup was unremarkable by her primary. 8.  Diabetes mellitus and hypothyroidism. 9.  Anxiety.     PLAN OF TREATMENT:    Analgesia,   Low intensity heparin drip to be resumed soon without bolus    will need an Oncology evaluation at one point to rule out any occult cancer.     will need anticoagulation for at least a year given her unprovoked PE   Discussed with staff, patient and family at bedside, hopefully home tomorrow if tolerated the anticoagulation without further bleeding    Electronically signed by Case Ji MD on 3/11/2021 at 5:59 PM

## 2021-03-11 NOTE — PROGRESS NOTES
Physical Therapy  Facility/Department: [facility] PROGRESSIVE CARE  Daily Treatment Note  NAME: Salvador Dougherty  : 1970  MRN: [id number]    Date of Service: 3/11/2021    Discharge Recommendations:  Patient would benefit from continued therapy after discharge   PT Equipment Recommendations  Equipment Needed: Yes  Dulcie Pila: Rollator (4 Wheeled)  Other: For energy conservation and safety due to patient being a fall risk. Assessment   Body structures, Functions, Activity limitations: Decreased functional mobility ; Decreased ADL status; Decreased strength;Decreased balance;Decreased endurance; Increased pain  Treatment Diagnosis: Impaired functional mobility 2* pulmonary embolism  Specific instructions for Next Treatment: trial rollator, HEP, stairs  Prognosis: Good  Barriers to Learning: None known  REQUIRES PT FOLLOW UP: Yes  Activity Tolerance  Activity Tolerance: Patient Tolerated treatment well; Other     Patient Diagnosis(es): The encounter diagnosis was Acute pulmonary embolism, unspecified pulmonary embolism type, unspecified whether acute cor pulmonale present ([facility] [address] has a past medical history of Anxiety, CAD (coronary artery disease), Fatty liver, GERD (gastroesophageal reflux disease), Headache, History of bronchitis, Hyperlipidemia, Hypothyroidism, Kidney stone, LFT elevation, MVP (mitral valve prolapse), Obesity, Type 2 diabetes mellitus without complication ([address] and Umbilical hernia. has a past surgical history that includes Upper gastrointestinal endoscopy (2010); hernia repair; Cholecystectomy; Appendectomy;  section; Colonoscopy (); Colonoscopy (14); Colonoscopy (2014); pr exploratory of abdomen (10/21/2014); Colonoscopy (N/A, 2018); Hysterectomy, total abdominal; and Upper gastrointestinal endoscopy (N/A, 3/10/2021).     Restrictions  Restrictions/Precautions  Restrictions/Precautions: General Precautions, Fall Risk  Required Braces or Orthoses?: No  Implants present? : (pt denies)  Position Activity Restriction  Other position/activity restrictions: up with assistance  Subjective   General  Chart Reviewed: Yes  Additional Pertinent Hx: (-) DVT BLE, + PE, T2DM  Response To Previous Treatment: Patient with no complaints from previous session. Family / Caregiver Present: No  Referring Practitioner: Lori Solis MD  Subjective  Subjective: Patient is cooperative and agreeable for therapy. General Comment  Comments: RN Daisy murdock's Pt for PT. SaO2 93% upon arrival.  Pain Screening  Patient Currently in Pain: Yes  Pain Assessment  Pain Assessment: 0-10  Pain Level: 5  Pain Type: Acute pain  Pain Location: Back  Pain Orientation: Right; Lower; Anterior; Upper  Pain Radiating Towards: front of chest  Pain Descriptors: Aching;Discomfort;Dull  Pain Frequency: Continuous  Vital Signs  Patient Currently in Pain: Yes       Orientation  Orientation  Overall Orientation Status: Within Normal Limits  Cognition      Objective   Bed mobility  Scooting: Independent  Comment: HOB elevated. Independent for bed mobility. Transfers  Sit to Stand: Supervision  Stand to sit: Supervision  Comment: No AD and with rollator trial this date. Pt education of locking/unlocking brakes of rollator with fair return. Ambulation  Ambulation?: Yes  More Ambulation?: Yes  Ambulation 1  Surface: level tile  Device: No Device  Assistance: Supervision  Quality of Gait: good pace  Gait Deviations: Decreased step length; Slow Margaret  Distance: 80 ft x 1  Comments: Cues for deep breathing. SaO2 ~87-92% throughout. Decreased endurance. Cues for pacing and taking rest breaks. Ambulation 2  Surface - 2: level tile  Device 2: Rollator  Assistance 2: Stand by assistance  Quality of Gait 2: steady pace  Gait Deviations: Slow Margaret;Decreased step length  Distance: 120 ft x 1  Comments: Pt required 1 seated rest break. Pt able to lock/unlock rollator brakes and perform transfer safely.   Stairs/Curb  Stairs?: Yes  Stairs  # Steps : 10  Stairs Height: 4\"  Rails: Left ascending  Device: No Device  Assistance: Supervision  Comment: reciprocal step pattern, steady, no LOB     Balance  Posture: Good  Sitting - Static: Good  Sitting - Dynamic: Good  Standing - Static: Good  Standing - Dynamic: Good;-  Comments: Standing no AD, amb no AD, pt not wearing O2 upon arrival, pt encouraged to take deep, improved to 91% from 89%  Other exercises  Other exercises 1: Education on Deep diaphragmatic breathing and pursed lip breathing, using Pulse Oximeter to demonstrate improvements  Other exercises 2: Education on rollator and rollator safety  Other exercises 3: HEP handout provided - demonstration and explanation of exercises x10 reps. All questions answered at this time. Goals  Short term goals  Time Frame for Short term goals: 4 days  Short term goal 1: Pt to demo IND bed mobility  Short term goal 2: Pt to demo Mod I transfers. Short term goal 3: Pt to amb 150' SBA with device prn. Short term goal 4: Pt to ascend/descend 5 stairs, no HR, CGA. Short term goal 5: Pt to demo good technique for HEP for BLE strengthening and balance program.  Short term goal 6: Pt to tolerate 30 minute PT session with O2 sats maintained. Patient Goals   Patient goals :  To go home    Plan    Plan  Times per week: 5-7x/week  Specific instructions for Next Treatment: trial rollator, HEP, stairs  Current Treatment Recommendations: Strengthening, Balance Training, Functional Mobility Training, Transfer Training, Endurance Training, Gait Training, Equipment Evaluation, Education, & procurement, Patient/Caregiver Education & Training, Safety Education & Training, Home Exercise Program, Stair training  Safety Devices  Type of devices: Left in bed, Nurse notified     Therapy Time   Individual Concurrent Group Co-treatment   Time In 1537         Time Out 1613         Minutes [address]

## 2021-03-11 NOTE — PLAN OF CARE
Problem: Infection:  Goal: Will remain free from infection  Description: Will remain free from infection  3/10/2021 2202 by Mario Polanco RN  Outcome: Met This Shift  3/10/2021 1516 by Michelle Long RN  Outcome: Ongoing     Problem: Safety:  Goal: Free from accidental physical injury  Description: Free from accidental physical injury  3/10/2021 2202 by Mario Polanco RN  Outcome: Met This Shift  3/10/2021 1516 by Michelle Long RN  Outcome: Ongoing  Goal: Free from intentional harm  Description: Free from intentional harm  3/10/2021 2202 by Mario Polanco RN  Outcome: Met This Shift  3/10/2021 1516 by Michelle Long RN  Outcome: Ongoing     Problem: Daily Care:  Goal: Daily care needs are met  Description: Daily care needs are met  3/10/2021 2202 by Mario Polanco RN  Outcome: Met This Shift  3/10/2021 1516 by Michelle Long RN  Outcome: Ongoing     Problem: Pain:  Goal: Patient's pain/discomfort is manageable  Description: Patient's pain/discomfort is manageable  3/10/2021 2202 by Mario Polanco RN  Outcome: Met This Shift  3/10/2021 1516 by Michelle Long RN  Outcome: Ongoing  Note: Patient medicated for pain with dilaudid this shift   Goal: Pain level will decrease  Description: Pain level will decrease  3/10/2021 2202 by Mario Polanco RN  Outcome: Met This Shift  3/10/2021 1516 by Michelle Long RN  Outcome: Ongoing  Goal: Control of acute pain  Description: Control of acute pain  3/10/2021 2202 by Mario Polanco RN  Outcome: Met This Shift  3/10/2021 1516 by Michelle Long RN  Outcome: Ongoing  Goal: Control of chronic pain  Description: Control of chronic pain  3/10/2021 2202 by Mario Polanco RN  Outcome: Met This Shift  3/10/2021 1516 by Michelle Long RN  Outcome: Ongoing     Problem: Skin Integrity:  Goal: Skin integrity will stabilize  Description: Skin integrity will stabilize  3/10/2021 2202 by Mario Polanco RN  Outcome: Met This Shift  3/10/2021 1516 by Cinthya Heredia Jaleel San RN  Outcome: Ongoing  Note: No new skin breakdown noted this shift, patient turns self in bed      Problem: Discharge Planning:  Goal: Patients continuum of care needs are met  Description: Patients continuum of care needs are met  3/10/2021 2202 by Laquita Farrell RN  Outcome: Met This Shift  3/10/2021 1516 by Jose Guadalupe Ramirez RN  Outcome: Ongoing     Problem: Nutrition  Goal: Optimal nutrition therapy  Description: Nutrition Problem #1: Predicted inadequate energy intake  Intervention: Food and/or Nutrient Delivery: Modify Current Diet, Start Oral Nutrition Supplement  Nutritional Goals: po intake greater than 75%     3/10/2021 2202 by Laquita Farrell RN  Outcome: Met This Shift  3/10/2021 1516 by Jose Guadalupe Ramirez RN  Outcome: Ongoing     Problem: Musculor/Skeletal Functional Status  Goal: Highest potential functional level  3/10/2021 2202 by Laquita Farrell RN  Outcome: Met This Shift  3/10/2021 1516 by Jose Guadalupe Ramirez RN  Outcome: Ongoing  Goal: Absence of falls  3/10/2021 2202 by Laquita Farrell RN  Outcome: Met This Shift  3/10/2021 1516 by Jose Guadalupe Ramirez RN  Outcome: Ongoing     Problem: Musculor/Skeletal Functional Status  Goal: Highest potential functional level  3/10/2021 2202 by Laquita Farrell RN  Outcome: Met This Shift  3/10/2021 1516 by Jose Guadalupe Ramirez RN  Outcome: Ongoing  Goal: Absence of falls  3/10/2021 2202 by Laquita Farrell RN  Outcome: Met This Shift  3/10/2021 1516 by Jose Guadalupe Ramirez RN  Outcome: Ongoing

## 2021-03-11 NOTE — PROGRESS NOTES
[address]    INPATIENT OCCUPATIONAL THERAPY  PROGRESS NOTE  Date: 3/11/2021  Patient Name: Cele Marie      Room:   MRN: [id number]    : 1970  (53 y.o.) Gender: female     Discharge Recommendations:  Further Occupational Therapy is recommended upon facility discharge. Referring Practitioner: Yanick Cuenca MD  Diagnosis: Pulmonary embolism on right  General  Chart Reviewed: Yes, Orders, Progress Notes  Patient assessed for rehabilitation services?: Yes  Family / Caregiver Present: No  Referring Practitioner: Yanick Cuenca MD  Diagnosis: Pulmonary embolism on right    Restrictions  Restrictions/Precautions: General Precautions, Fall Risk  Implants present? : (pt denies)  Other position/activity restrictions: up with assistance  Required Braces or Orthoses?: No      Subjective   Overall Orientation Status: Within Functional Limits        Objective    Balance  Sitting Balance: Modified independent          ADL  Feeding: Independent  Grooming: Modified independent   UE Bathing: Supervision  LE Bathing: Contact guard assistance  UE Dressing: Modified independent   LE Dressing: Contact guard assistance  Toileting: Contact guard assistance  Additional Comments: CGA for all self-care for safety due to bilateral knee buckling. ADL scores based on skilled observations and clinical reasoning unless otherwise noted. Assessment  Performance deficits / Impairments: Decreased functional mobility ; Decreased ADL status; Decreased strength;Decreased safe awareness;Decreased endurance;Decreased balance;Decreased high-level IADLs  Prognosis: Good  Activity Tolerance: Patient limited by fatigue  Activity Tolerance: Lots of distractions today with mdical staff, family, etc  Type of devices: Left in bed;Call light within reach; Patient at risk for falls    Patient Education:  Patient Education: Instructed in and provided handout about Energy Coservation strateiges with applicationto typical care tasks to maximized safety and independnece in ADL performance  Learner:family and patient  Method: demonstration, explanation and handout       Outcome: verbalized concerns, demonstrated understanding and asked questions     Plan  Safety Devices  Type of devices: Left in bed, Call light within reach, Patient at risk for falls    Goals  Short term goals  Time Frame for Short term goals: By discharge  Short term goal 1: Pt will verbalize/demonstrate Good understanding of energy conservation/work simplification with regard to self-care and mobility. Short term goal 2: Pt will perform BADLs with Supervision and Good safety. Short term goal 3: Pt will perform functional mobility and transfers during self-care with Supervision and Good safety. Short term goal 4: Pt will actively participate in 15+ minutes of therapeutic exercise/functional activities to promote increased independence with self-care and mobility.     OT Individual Minutes  Time In: 1310  Time Out: 6673  Minutes: 25      Electronically signed by Brandy Menchaca OT on 3/11/21 at 3:08 PM EST

## 2021-03-11 NOTE — ANESTHESIA POSTPROCEDURE EVALUATION
Department of Anesthesiology  Postprocedure Note    Patient: Aldo Mccarthy  MRN: [id number]  YOB: 1970  Date of evaluation: 3/11/2021  Time:  1:27 PM     Procedure Summary     Date: 03/10/21 Room / Location: [place] [address] 03 / [facility]    Anesthesia Start: 6732 Anesthesia Stop: 7953    Procedure: EGD BIOPSY (N/A Esophagus) Diagnosis: (HEMATEMESIS)    Surgeons: Claudia Gonzales MD Responsible Provider: Jessica Mathis MD    Anesthesia Type: MAC ASA Status: 3          Anesthesia Type: MAC    Kalia Phase I: Kalia Score: 9    Kalia Phase II:      Last vitals: Reviewed and per EMR flowsheets. Anesthesia Post Evaluation    Comments: POD #1. Patient seen at bedside. No anesthesia complications reported.

## 2021-03-12 VITALS
HEIGHT: 64 IN | OXYGEN SATURATION: 92 % | BODY MASS INDEX: 34.15 KG/M2 | DIASTOLIC BLOOD PRESSURE: 59 MMHG | SYSTOLIC BLOOD PRESSURE: 110 MMHG | WEIGHT: 200 LBS | TEMPERATURE: 98.2 F | HEART RATE: 68 BPM | RESPIRATION RATE: 16 BRPM

## 2021-03-12 LAB
ABSOLUTE EOS #: 0.3 K/UL (ref 0–0.4)
ABSOLUTE IMMATURE GRANULOCYTE: ABNORMAL K/UL (ref 0–0.3)
ABSOLUTE LYMPH #: 1.7 K/UL (ref 1–4.8)
ABSOLUTE MONO #: 0.4 K/UL (ref 0.1–1.3)
ANION GAP SERPL CALCULATED.3IONS-SCNC: 10 MMOL/L (ref 9–17)
ANTI-XA UNFRAC HEPARIN: 0.6 IU/L (ref 0.3–0.7)
BASOPHILS # BLD: 1 % (ref 0–2)
BASOPHILS ABSOLUTE: 0 K/UL (ref 0–0.2)
BUN BLDV-MCNC: 13 MG/DL (ref 6–20)
BUN/CREAT BLD: ABNORMAL (ref 9–20)
CALCIUM SERPL-MCNC: 8.4 MG/DL (ref 8.6–10.4)
CHLORIDE BLD-SCNC: 111 MMOL/L (ref 98–107)
CO2: 24 MMOL/L (ref 20–31)
CREAT SERPL-MCNC: 0.67 MG/DL (ref 0.5–0.9)
DIFFERENTIAL TYPE: ABNORMAL
EOSINOPHILS RELATIVE PERCENT: 5 % (ref 0–4)
GFR AFRICAN AMERICAN: >60 ML/MIN
GFR NON-AFRICAN AMERICAN: >60 ML/MIN
GFR SERPL CREATININE-BSD FRML MDRD: ABNORMAL ML/MIN/{1.73_M2}
GFR SERPL CREATININE-BSD FRML MDRD: ABNORMAL ML/MIN/{1.73_M2}
GLUCOSE BLD-MCNC: 102 MG/DL (ref 70–99)
HCT VFR BLD CALC: 34.8 % (ref 36–46)
HEMOGLOBIN: 11.5 G/DL (ref 12–16)
IMMATURE GRANULOCYTES: ABNORMAL %
LYMPHOCYTES # BLD: 34 % (ref 24–44)
MAGNESIUM: 1.9 MG/DL (ref 1.6–2.6)
MCH RBC QN AUTO: 31.1 PG (ref 26–34)
MCHC RBC AUTO-ENTMCNC: 33.1 G/DL (ref 31–37)
MCV RBC AUTO: 94.1 FL (ref 80–100)
MONOCYTES # BLD: 9 % (ref 1–7)
NRBC AUTOMATED: ABNORMAL PER 100 WBC
PDW BLD-RTO: 12.2 % (ref 11.5–14.9)
PLATELET # BLD: 165 K/UL (ref 150–450)
PLATELET ESTIMATE: ABNORMAL
PMV BLD AUTO: 8 FL (ref 6–12)
POTASSIUM SERPL-SCNC: 3.5 MMOL/L (ref 3.7–5.3)
RBC # BLD: 3.7 M/UL (ref 4–5.2)
RBC # BLD: ABNORMAL 10*6/UL
SEG NEUTROPHILS: 51 % (ref 36–66)
SEGMENTED NEUTROPHILS ABSOLUTE COUNT: 2.7 K/UL (ref 1.3–9.1)
SODIUM BLD-SCNC: 145 MMOL/L (ref 135–144)
WBC # BLD: 5.2 K/UL (ref 3.5–11)
WBC # BLD: ABNORMAL 10*3/UL

## 2021-03-12 PROCEDURE — 83735 ASSAY OF MAGNESIUM: CPT

## 2021-03-12 PROCEDURE — 2580000003 HC RX 258: Performed by: STUDENT IN AN ORGANIZED HEALTH CARE EDUCATION/TRAINING PROGRAM

## 2021-03-12 PROCEDURE — 99239 HOSP IP/OBS DSCHRG MGMT >30: CPT | Performed by: INTERNAL MEDICINE

## 2021-03-12 PROCEDURE — APPSS30 APP SPLIT SHARED TIME 16-30 MINUTES: Performed by: NURSE PRACTITIONER

## 2021-03-12 PROCEDURE — 85025 COMPLETE CBC W/AUTO DIFF WBC: CPT

## 2021-03-12 PROCEDURE — 6360000002 HC RX W HCPCS: Performed by: INTERNAL MEDICINE

## 2021-03-12 PROCEDURE — 6370000000 HC RX 637 (ALT 250 FOR IP): Performed by: STUDENT IN AN ORGANIZED HEALTH CARE EDUCATION/TRAINING PROGRAM

## 2021-03-12 PROCEDURE — C9113 INJ PANTOPRAZOLE SODIUM, VIA: HCPCS | Performed by: INTERNAL MEDICINE

## 2021-03-12 PROCEDURE — 6370000000 HC RX 637 (ALT 250 FOR IP): Performed by: INTERNAL MEDICINE

## 2021-03-12 PROCEDURE — 36415 COLL VENOUS BLD VENIPUNCTURE: CPT

## 2021-03-12 PROCEDURE — 99232 SBSQ HOSP IP/OBS MODERATE 35: CPT | Performed by: INTERNAL MEDICINE

## 2021-03-12 PROCEDURE — 2580000003 HC RX 258: Performed by: INTERNAL MEDICINE

## 2021-03-12 PROCEDURE — 6360000002 HC RX W HCPCS: Performed by: STUDENT IN AN ORGANIZED HEALTH CARE EDUCATION/TRAINING PROGRAM

## 2021-03-12 PROCEDURE — 85520 HEPARIN ASSAY: CPT

## 2021-03-12 PROCEDURE — 80048 BASIC METABOLIC PNL TOTAL CA: CPT

## 2021-03-12 RX ORDER — AMOXICILLIN AND CLAVULANATE POTASSIUM 875; 125 MG/1; MG/1
1 TABLET, FILM COATED ORAL 2 TIMES DAILY
Qty: 12 TABLET | Refills: 0 | Status: SHIPPED | OUTPATIENT
Start: 2021-03-12 | End: 2021-03-18

## 2021-03-12 RX ORDER — GABAPENTIN 300 MG/1
600 CAPSULE ORAL 3 TIMES DAILY
Qty: 180 CAPSULE | Refills: 0 | Status: SHIPPED | OUTPATIENT
Start: 2021-03-12 | End: 2021-04-12 | Stop reason: ALTCHOICE

## 2021-03-12 RX ADMIN — SUCRALFATE 1 G: 1 TABLET ORAL at 11:53

## 2021-03-12 RX ADMIN — SODIUM CHLORIDE 3000 MG: 900 INJECTION INTRAVENOUS at 11:51

## 2021-03-12 RX ADMIN — HYDROMORPHONE HYDROCHLORIDE 1 MG: 1 INJECTION, SOLUTION INTRAMUSCULAR; INTRAVENOUS; SUBCUTANEOUS at 02:52

## 2021-03-12 RX ADMIN — PANTOPRAZOLE SODIUM 40 MG: 40 INJECTION, POWDER, FOR SOLUTION INTRAVENOUS at 09:00

## 2021-03-12 RX ADMIN — SERTRALINE 100 MG: 100 TABLET, FILM COATED ORAL at 09:00

## 2021-03-12 RX ADMIN — MORPHINE SULFATE 2 MG: 2 INJECTION, SOLUTION INTRAMUSCULAR; INTRAVENOUS at 10:30

## 2021-03-12 RX ADMIN — MORPHINE SULFATE 2 MG: 2 INJECTION, SOLUTION INTRAMUSCULAR; INTRAVENOUS at 05:17

## 2021-03-12 RX ADMIN — SODIUM CHLORIDE 10 ML: 9 INJECTION, SOLUTION INTRAMUSCULAR; INTRAVENOUS; SUBCUTANEOUS at 09:00

## 2021-03-12 RX ADMIN — TOPIRAMATE 25 MG: 25 TABLET, FILM COATED ORAL at 09:00

## 2021-03-12 RX ADMIN — SODIUM CHLORIDE 3000 MG: 900 INJECTION INTRAVENOUS at 04:58

## 2021-03-12 RX ADMIN — SUCRALFATE 1 G: 1 TABLET ORAL at 00:11

## 2021-03-12 RX ADMIN — RIVAROXABAN 15 MG: 15 TABLET, FILM COATED ORAL at 12:04

## 2021-03-12 RX ADMIN — LEVOTHYROXINE SODIUM 175 MCG: 0.05 TABLET ORAL at 05:17

## 2021-03-12 RX ADMIN — POLYETHYLENE GLYCOL 3350 17 G: 17 POWDER, FOR SOLUTION ORAL at 09:00

## 2021-03-12 RX ADMIN — GABAPENTIN 400 MG: 400 CAPSULE ORAL at 09:00

## 2021-03-12 RX ADMIN — SUCRALFATE 1 G: 1 TABLET ORAL at 04:58

## 2021-03-12 RX ADMIN — HEPARIN SODIUM AND DEXTROSE 18 UNITS/KG/HR: 10000; 5 INJECTION INTRAVENOUS at 04:57

## 2021-03-12 RX ADMIN — SODIUM CHLORIDE, PRESERVATIVE FREE 10 ML: 5 INJECTION INTRAVENOUS at 09:00

## 2021-03-12 RX ADMIN — METOPROLOL TARTRATE 50 MG: 50 TABLET, FILM COATED ORAL at 08:00

## 2021-03-12 ASSESSMENT — ENCOUNTER SYMPTOMS
BACK PAIN: 0
VOMITING: 0
BLOOD IN STOOL: 0
ANAL BLEEDING: 0
CONSTIPATION: 0
COUGH: 1
SHORTNESS OF BREATH: 1
NAUSEA: 0

## 2021-03-12 ASSESSMENT — PAIN SCALES - GENERAL
PAINLEVEL_OUTOF10: 7
PAINLEVEL_OUTOF10: 8

## 2021-03-12 NOTE — DISCHARGE SUMMARY
[address]    Discharge Summary     Patient ID: Lloyd Byrnes  :  1970   MRN: [id number]     ACCOUNT:  [de-identified]   Patient's PCP: MARTITA Wilhelm CNP  Admit Date: 3/7/2021   Discharge Date: 3/12/2021   Length of Stay: 5  Code Status:  Full Code  Admitting Physician: Zan Ramirez MD  Discharge Physician: Shannan Mendez MD     Active Discharge Diagnoses:       Primary Problem  Acute respiratory failure with hypoxia [facility])      [facility]    Diagnosis Date Noted    Acute respiratory failure with hypoxia ([address] [J96.01] 03/10/2021    Acute pulmonary embolism ([address] [I26.99]     Hematemesis with nausea [K92.0]     Pulmonary embolism on right ([address] [I26.99] 2021    RLS (restless legs syndrome) [G25.81] 2015    GERD (gastroesophageal reflux disease) [K21.9] 2014    Hyperlipidemia [E78.5]     Hypothyroidism [E03.9]     Anxiety [F41.9]        Admission Condition:  serious     Discharged Condition: good    Hospital Stay:       Hospital Course:  Lloyd Byrnes is a 53 y.o. female who was admitted for the management of   Acute respiratory failure with hypoxia ([address] , presented to ER with Shortness of Breath  This is a-year-old female with a history of hypertension, hypothyroidism status post surgery, migraine, cervical radiculopathy came in with complaints of pain, hemoptysis and shortness of breath. Patient said the symptoms started 5 days ago to the point that she was having couple of episode of hemoptysis chest pain and shortness of breath. Patient noted the ED initial cardiac work-up was unremarkable. Patient CT PE showed posterior right lower lobe filling defect secondary to pulmonary embolism. Patient was started on heparin drip and switch to Xarelto 15 mg twice daily. Pulmonology was consulted and they recommended patient should be on anticoagulation for 1 year.   During hospitalization patient is a questionable episode of hematemesis. GI was consulted considering the patient has a history of gastritis and recently started on blood thinner. EGD was done which showed moderate to severe signs of gastritis, biopsy was done which shows chronic in of active gastritis without any signs of intestinal metaplasia. After the questionable episode of hematemesis, repeat x-ray showed right pulmonary infiltrate considering aspiration pneumonia. Considering the patient history pulmonologist recommended oncology as an outpatient to rule out any occult malignancy as patient was saying that she lost around 100 pounds in last 1 year. Today the patient will be discharged she was much better, patient will be discharged on the minimal dose of oxygen and walker with a seat. New Medication:   Xarelto 15 mg BID. Augmentin for aspiration pneumonia for 6 days, has Unasyn for 1 day. Increased dose of gabapentin considering cervical radiculopathy.      Significant therapeutic interventions: CT PE, chest x-ray, EGD    Significant Diagnostic Studies:   Labs / Micro:  CBC:   Lab Results   Component Value Date    WBC 5.2 03/12/2021    RBC 3.70 03/12/2021    RBC 4.36 06/03/2019    HGB 11.5 03/12/2021    HCT 34.8 03/12/2021    MCV 94.1 03/12/2021    MCH 31.1 03/12/2021    MCHC 33.1 03/12/2021    RDW 12.2 03/12/2021     03/12/2021     06/05/2012     BMP:    Lab Results   Component Value Date    GLUCOSE 102 03/12/2021    GLUCOSE 107 06/03/2019     03/12/2021    K 3.5 03/12/2021     03/12/2021    CO2 24 03/12/2021    ANIONGAP 10 03/12/2021    BUN 13 03/12/2021    CREATININE 0.67 03/12/2021    BUNCRER NOT REPORTED 03/12/2021    CALCIUM 8.4 03/12/2021    LABGLOM >60 03/12/2021    GFRAA >60 03/12/2021    GFR      03/12/2021    GFR NOT REPORTED 03/12/2021     HFP:    Lab Results   Component Value Date    PROT 7.4 03/07/2021    PROT 6.3 06/03/2019     CMP:    Lab Results   Component Value Date    GLUCOSE 102 03/12/2021    GLUCOSE 107 06/03/2019     03/12/2021    K 3.5 03/12/2021     03/12/2021    CO2 24 03/12/2021    BUN 13 03/12/2021    CREATININE 0.67 03/12/2021    ANIONGAP 10 03/12/2021    ALKPHOS 103 03/07/2021    ALT 9 03/07/2021    AST 14 03/07/2021    BILITOT 0.34 03/07/2021    LABALBU 3.9 03/07/2021    LABALBU 3.8 06/03/2019    ALBUMIN NOT REPORTED 03/07/2021    LABGLOM >60 03/12/2021    GFRAA >60 03/12/2021    GFR      03/12/2021    GFR NOT REPORTED 03/12/2021    PROT 7.4 03/07/2021    PROT 6.3 06/03/2019    CALCIUM 8.4 03/12/2021     PT/INR:    Lab Results   Component Value Date    PROTIME 12.2 03/11/2021    INR 0.9 03/11/2021     PTT:   Lab Results   Component Value Date    APTT 29.7 03/11/2021     FLP:    Lab Results   Component Value Date    CHOL 158 01/12/2019    CHOL 164 10/26/2016    TRIG 133 01/12/2019    HDL 38 08/11/2020     U/A:    Lab Results   Component Value Date    COLORU YELLOW 03/07/2021    TURBIDITY CLEAR 03/07/2021    SPECGRAV >1.030 03/07/2021    HGBUR TRACE 03/07/2021    PHUR 5.0 03/07/2021    PROTEINU NEGATIVE 03/07/2021    GLUCOSEU NEGATIVE 03/07/2021    GLUCOSEU NEGATIVE 05/18/2012    KETUA NEGATIVE 03/07/2021    BILIRUBINUR NEGATIVE 03/07/2021    BILIRUBINUR NEGATIVE 05/18/2012    UROBILINOGEN Normal 03/07/2021    NITRU NEGATIVE 03/07/2021    LEUKOCYTESUR NEGATIVE 03/07/2021     TSH:    Lab Results   Component Value Date    TSH 0.69 03/07/2021         Radiology:  Echo Complete 2d W Doppler W Color    Result Date: 3/8/2021  [facility] Transthoracic Echocardiography Report (TTE)  Patient Name Shara Betancur Date of Study               03/08/2021               L   Date of      1970  Gender                      Female  Birth   Age          53 year(s)  Race                           Room Number  2091        Height:                     64 inch, 162.56 cm   Corporate ID [id number]    Weight:                     165 pounds, 74.8 kg #   Patient Acct [de-identified]   BSA:          1.8 m^2       BMI:      28.32  #                                                              kg/m^2   MR #         [id number]      Sonographer                 Yanique Ro   Accession #  [id number]  Interpreting Physician      Odella Goldberg   Fellow                   Referring Nurse                           Practitioner   Interpreting             Referring Physician         [facility]  Fellow  Additional Comments Technically somewhat difficult study. Type of Study   TTE procedure:2D Echocardiogram, M-Mode, Doppler, Color Doppler. Procedure Date Date: 03/08/2021 Start: 09:39 AM Study Location: [address] [place] [address] Technical Quality: Fair visualization Indications:Pulmonary embolus and Right heart strain. History / Tech. Comments: CAD, HLD, MVP, DM Patient Status: Inpatient Height: 64 inches Weight: 165 pounds BSA: 1.8 m^2 BMI: 28.32 kg/m^2 Rhythm: Within normal limits HR: 88 bpm BP: 108/57 mmHg CONCLUSIONS Summary Technically somewhat difficult study. Left ventricle is normal in size and wall thickness. Global left ventricular systolic function is normal. Estimated LV EF 60-65 %. No obvious wall motion abnormality seen. Both atria are normal in size. Normal right ventricular size and function. Mild mitral regurgitation. Signature ----------------------------------------------------------------------------  Electronically signed by Yanique Ro(Sonographer) on 03/08/2021 10:14  AM ---------------------------------------------------------------------------- ----------------------------------------------------------------------------  Electronically signed by Kinza BooneInterpreting physician) on  03/08/2021 12:19 PM ---------------------------------------------------------------------------- FINDINGS Left Atrium Left atrium is normal in size. Left Ventricle Left ventricle is normal in size and wall thickness.  Global left ventricular systolic function is normal. Estimated LV EF 60-65 %. No obvious wall motion abnormality seen. Right Atrium Right atrium is normal in size. Right Ventricle Normal right ventricular size and function. Mitral Valve No obvious valvular abnormality seen. No evidence of mitral valve prolapse. Mild mitral regurgitation. Aortic Valve No obvious valvular abnormality seen. No evidence of aortic insufficiency or stenosis. Tricuspid Valve No obvious valvular abnormality. Trivial tricuspid regurgitation. No pulmonary hypertension. Estimated right ventricular systolic pressure is [id number]. Pulmonic Valve Pulmonic valve was not well visualized. No evidence of pulmonic insufficiency or stenosis. Pericardial Effusion No significant pericardial effusion is seen. Pleural Effusion No pleural effusion seen. Miscellaneous Normal aortic root dimension. E/E' average = 11.3.  M-mode / 2D Measurements & Calculations:   LVIDd:4.74 cm(3.7 - 5.6 cm)      Diastolic WBEGGZ:627 ml  ZYIWW:0.14 cm(2.2 - 4.0 cm)      Systolic UXVASH:72.4 ml  RTMJ:7.10 cm(0.6 - 1.1 cm)       Aortic Root:2.7 cm(2.0 - 3.7 cm)  LVPWd:0.81 cm(0.6 - 1.1 cm)      LA Dimension: 3.2 cm(1.9 - 4.0 cm)  Fractional Shortenin.25 %    LA volume/Index: 48.4 ml /27m^2  Calculated LVEF (%): 81.6 %      LVOT:2 cm   Mitral:                                 Aortic   Valve Area (P1/2-Time): 4.4 cm^2        Peak Velocity: 1.31 m/s  Peak E-Wave: 1.05 m/s                   Mean Velocity: 0.92 m/s  Peak A-Wave: 0.77 m/s                   Peak Gradient: 6.86 mmHg  E/A Ratio: 1.36                         Mean Gradient: 4 mmHg  Peak Gradient: 4.41 mmHg  Deceleration Time: 176 msec  P1/2t: 50 msec                          Area (continuity): 2.65 cm^2                                          AV VTI: 24.5 cm   Tricuspid:                              Pulmonic:   Estimated RVSP: 21 mmHg  Peak TR Velocity: 1.82 m/s  Peak TR Gradient: 13.2496 mmHg  Estimated RA Pressure: 8 mmHg Estimated PASP: 21.25 mmHg  Septal Wall E' velocity:0.08 m/s Lateral Wall E' velocity:0.11 m/s    Xr Chest (2 Vw)    Result Date: 3/11/2021  EXAMINATION: TWO XRAY VIEWS OF THE CHEST 3/11/2021 9:20 am COMPARISON: March 7, 2021 HISTORY: ORDERING SYSTEM PROVIDED HISTORY: Hemoptysis, pulmonary embolism TECHNOLOGIST PROVIDED HISTORY: Hemoptysis, pulmonary embolism Reason for Exam: Hemoptysis, pulmonary embolism Acuity: Acute Type of Exam: Subsequent/Follow-up Additional signs and symptoms: Hemoptysis, pulmonary embolism FINDINGS: There is no evidence of pneumothorax. There is right basilar infiltrate and atelectasis and possible tiny effusion. There is platelike atelectasis in left lower lobe. Heart mediastinum are stable. Trachea is midline. Visualized bony thorax shows no acute abnormality. Developing right basilar pneumonia with associated atelectasis and possible small effusion. Left lower lobe platelike atelectasis. Xr Cervical Spine (2-3 Views)    Result Date: 2/23/2021  EXAMINATION: TWO XRAY VIEWS SCOLIOSIS SERIES; 2 XRAY VIEWS OF THE CERVICAL SPINE 2/23/2021 12:58 pm COMPARISON: None. HISTORY: ORDERING SYSTEM PROVIDED HISTORY: Cervical spondylosis TECHNOLOGIST PROVIDED HISTORY: scoliosis Reason for Exam: Pt states recheck cervical spondylosis, scoliosis evaluate for instability Acuity: Chronic Type of Exam: Subsequent/Follow-up Additional signs and symptoms: scoliosis FINDINGS: Scoliosis: 12 thoracic and 5 lumbar vertebra are noted, well maintained in height without acute fracture or subluxation. Mild levo scoliotic curvature of 2.4 degrees is noted T5 to bottom of T10. Mild dextroscoliotic curvature of 4.9 T11 through L4. Minimal to mild degenerative and degenerative disc changes are present in the spine. No acute fracture or subluxation. Examination of the cervical spine reveals evidence of mild facet changes. There appears to be calcification of the left carotid vessel.   Mild dextroscoliotic curvature cervical spine is evident. Degrees is noted from T11 through L4. Sacral base plane un leveling cannot be accurately assessed. Left iliac crest is out of the field of view. Cervical spine: 1 mm grade 1 anterolisthesis C2 upon C3 is noted on flexion, not replicated on extension and not seen neutral view. No acute fracture or prevertebral soft tissue swelling. Scoliosis: Mild levo scoliotic curvature thoracic spine. Mild dextroscoliotic curvature lower thoracic and lumbar spine. No acute fracture or subluxation. Degenerative changes. Cervical spine: Minimal grade 1 anterolisthesis C2 upon C3 on flexion, not seen on extension or neutral views. No acute fracture or prevertebral soft tissue swelling. Findings suggest minimal instability. Ct Cervical Spine Wo Contrast    Result Date: 3/4/2021  EXAMINATION: CT OF THE CERVICAL SPINE WITHOUT CONTRAST 3/4/2021 5:19 pm TECHNIQUE: CT of the cervical spine was performed without the administration of intravenous contrast. Multiplanar reformatted images are provided for review. Dose modulation, iterative reconstruction, and/or weight based adjustment of the mA/kV was utilized to reduce the radiation dose to as low as reasonably achievable. COMPARISON: February 13, 2021. HISTORY: ORDERING SYSTEM PROVIDED HISTORY: radicular pain left arm TECHNOLOGIST PROVIDED HISTORY: radicular pain left arm Decision Support Exception->Emergency Medical Condition (MA) Is the patient pregnant?->No Reason for Exam: radicular pain left arm for three weeks. patient states limited ROM and pain starting in neck. no injuries Acuity: Unknown Type of Exam: Unknown FINDINGS: Bone mineralization is normal.  The vertebral bodies and posterior elements appear intact and appropriately aligned without acute fracture or subluxation. Vertebral body stature is maintained throughout. There is straightening of the normal cervical lordosis.   Mild-to-moderate cervical degenerative disc disease is present throughout. There is moderate bony neural foraminal narrowing on the left at C3-C4 and C5-C6. No significant uncovertebral joint hypertrophic change or additional bony neural foraminal narrowing. No paraspinal soft tissue abnormality. The visualized lung apices are grossly clear. Mild to moderate multilevel cervical degenerative disc disease with moderate bony neural foraminal narrowing on the left at C3-C4 and C5-C6. No acute fracture or subluxation. Straightening of the normal cervical lordosis which may be related to muscle spasm or position in collar. Ct Cervical Spine Wo Contrast    Result Date: 2/13/2021  EXAMINATION: CT OF THE CERVICAL SPINE WITHOUT CONTRAST 2/13/2021 5:59 pm TECHNIQUE: CT of the cervical spine was performed without the administration of intravenous contrast. Multiplanar reformatted images are provided for review. Dose modulation, iterative reconstruction, and/or weight based adjustment of the mA/kV was utilized to reduce the radiation dose to as low as reasonably achievable. COMPARISON: 06/25/2018 HISTORY: ORDERING SYSTEM PROVIDED HISTORY: left arm pain TECHNOLOGIST PROVIDED HISTORY: left arm pain Decision Support Exception->Emergency Medical Condition (MA) Is the patient pregnant?->No Reason for Exam: patient states she has been having left arm pain and she is unable to move her left arm Acuity: Unknown Type of Exam: Unknown FINDINGS: The cervical spine demonstrates normalmineralization with straightening of the  cervical lordosis. There is no evidence of fracture or subluxation. There is mild loss of disc height with eburnation of the vertebral endplates at the A4-7, S5-4, C5-6levels. There are small marginal osteophytes at multiple levels. The central canal is grossly patent. The pedicles and posterior elements are intact. The prevertebral and paravertebral soft tissues are unremarkable. The atlanto-dens interval and dens are intact.  The visualized lung apices are clear.     Mild cervical spondylosis and degenerative disc disease. Evidence of paracervical spasm. No acute bony abnormalities are noted . Mri Cervical Spine Wo Contrast    Result Date: 3/4/2021  EXAMINATION: MRI OF THE CERVICAL SPINE WITHOUT CONTRAST 3/4/2021 2:53 pm TECHNIQUE: Multiplanar multisequence MRI of the cervical spine was performed without the administration of intravenous contrast. COMPARISON: 06/12/2020 HISTORY: ORDERING SYSTEM PROVIDED HISTORY: Cervical radiculopathy TECHNOLOGIST PROVIDED HISTORY: evaluate for stenosis Is the patient pregnant?->No Reason for Exam: pt stated left shoulder arm pain weakness numbness x 1 mth Acuity: Acute Type of Exam: Initial Additional signs and symptoms: pt stated left shoulder arm pain weakness numbness x 1 mth, NKI FINDINGS: BONES/ALIGNMENT: There is normal alignment of the spine. The vertebral body heights are maintained. The bone marrow signal appears unremarkable. There is minimal degenerative disc disease with disc space narrowing and osteophytes at C3-4, C4-5, C5-6 and C6-7. SPINAL CORD:  No abnormal signal is identified within the spinal cord. SOFT TISSUES: No paraspinal mass identified. C2-C3: There is minimum disc protrusion of 2 mm centrally. The thecal sac and neural foramina are intact. C3-C4: There is disc protrusion osteophyte toward the left measuring 3-4 mm. The thecal sac is not stenotic. There is mild narrowing of the left neural foramen. The right neural foramen is intact. C4-C5: There is disc protrusion osteophyte measuring 2-3 mm. The thecal sac is mildly stenotic measuring 9.5 mm. Disc and/or osteophytes result in mild narrowing of the neural foramina bilaterally. The left neural foramen is narrowed greater than right. C5-C6: There is disc protrusion osteophyte measuring 5-6 mm toward the left narrowing the left neural foramen. The thecal sac is mildly stenotic measuring 9.3 mm. The right neural foramen is intact.  C6-C7: Disc and/or osteophytes cause minimal narrowing of the neural foramina bilaterally. The thecal sac is not stenotic. C7-T1:  The thecal sac and neural foramina are intact. Disc and osteophytes result in narrowing of the neural foramina and mild stenosis of the thecal sac throughout the cervical region as discussed in detail above. Xr Chest Portable    Result Date: 3/7/2021  EXAMINATION: ONE XRAY VIEW OF THE CHEST 3/7/2021 11:26 am COMPARISON: 11/08/2020 HISTORY: ORDERING SYSTEM PROVIDED HISTORY: sob Reason for Exam: Sob coughing up blood today Acuity: Acute Type of Exam: Initial FINDINGS: The lungs are without acute focal process. No effusion or pneumothorax. The cardiomediastinal silhouette is normal.  The osseous structures are intact without acute process. Negative portable chest.     Ct Chest Pulmonary Embolism W Contrast    Result Date: 3/7/2021  EXAMINATION: CTA OF THE CHEST 3/7/2021 11:46 am TECHNIQUE: CTA of the chest was performed after the administration of intravenous contrast.  Multiplanar reformatted images are provided for review. MIP images are provided for review. Dose modulation, iterative reconstruction, and/or weight based adjustment of the mA/kV was utilized to reduce the radiation dose to as low as reasonably achievable. COMPARISON: None. HISTORY: ORDERING SYSTEM PROVIDED HISTORY: r/o pe Reason for Exam: Chest pain and coughing up blood since this morning. Acuity: Acute Type of Exam: Initial FINDINGS: Pulmonary Arteries: Pulmonary arteries are adequately opacified for evaluation. Posterior right lower lobe filling defect with extension into basilar subsegmental branches. Main pulmonary artery is normal in caliber. Mediastinum: No evidence of mediastinal lymphadenopathy. Normal heart size. Normal thoracic aorta. Lungs/pleura: Bilateral lower lobe atelectasis. No focal consolidation or pulmonary edema. No evidence of pleural effusion or pneumothorax.  Upper Abdomen: Cholecystectomy Soft Tissues/Bones: No acute bone or soft tissue abnormality. Posterior right lower lobe filling defect with extension into basilar subsegmental branches consistent pulmonary embolism. Bilateral lower lobe atelectasis. Critical results were called by Dr. Rosita Amos. Alvin Reynoso MD to [address] on 3/7/2021 at 13:14. Vl Lower Extremity Bilateral Venous Duplex    Result Date: 3/8/2021    [facility]  Vascular Lower Extremities DVT Study Procedure   Patient Name   Nisha Humphries Date of Study           03/08/2021                 L   Date of Birth  1970  Gender                  Female   Age            53 year(s)  Race                       Room Number    2091        Height:                 64.17 inch, 163 cm   Corporate ID # [id number]    Weight:                 165 pounds, 74.8 kg   Patient Acct # [de-identified]   BSA:        1.81 m^2    BMI:       28.17 kg/m^2   MR #           [id number]      Sonographer             Saurav Anderson RVT   Accession #    [id number]  Interpreting Physician  Lucio Ramires   Referring                  Referring Physician     Brendan Bence  Nurse  Practitioner  Procedure Type of Study:   Veins: Lower Extremities DVT Study, Venous Scan Lower Bilateral.  Indications for Study:Positive for pulmonary embolus. Patient Status: In Patient. Technical Quality:Adequate visualization. Conclusions   Summary   No evidence of superficial or deep venous thrombosis in both lower  extremities.    Signature   ----------------------------------------------------------------  Electronically signed by Saurav Anderson RVT(Sonographer) on  03/08/2021 03:13 PM  ----------------------------------------------------------------   ----------------------------------------------------------------  Electronically signed by Chandu RamiresInterpreting physician)  on 03/08/2021 11:40 PM  ----------------------------------------------------------------  Findings:   Right Impression:                    Left Impression:  The common femoral, femoral,         The common femoral, femoral,  popliteal and tibial veins           popliteal and tibial veins  demonstrate normal compressibility   demonstrate normal compressibility  and augmentation. and augmentation. Normal compressibility of the great  Normal compressibility of the great  saphenous vein. saphenous vein. Normal compressibility of the small  Normal compressibility of the small  saphenous vein. saphenous vein. Velocities are measured in cm/s ; Diameters are measured in cm Right Lower Extremities DVT Study Measurements Right 2D Measurements +------------------------------------+----------+---------------+----------+ ! Location                            ! Visualized! Compressibility! Thrombosis! +------------------------------------+----------+---------------+----------+ ! Common Femoral                      !Yes       ! Yes            ! None      ! +------------------------------------+----------+---------------+----------+ ! Prox Femoral                        !Yes       ! Yes            ! None      ! +------------------------------------+----------+---------------+----------+ ! Mid Femoral                         !Yes       ! Yes            ! None      ! +------------------------------------+----------+---------------+----------+ ! Dist Femoral                        !Yes       ! Yes            ! None      ! +------------------------------------+----------+---------------+----------+ ! Deep Femoral                        !Yes       ! Yes            ! None      ! +------------------------------------+----------+---------------+----------+ ! Popliteal                           !Yes       ! Yes            ! None      ! +------------------------------------+----------+---------------+----------+ ! Sapheno Femoral Junction            ! Yes       ! Yes            ! None      ! +------------------------------------+----------+---------------+----------+ ! PTV                                 ! Yes       ! Yes            ! None      ! +------------------------------------+----------+---------------+----------+ ! Peroneal                            !Yes       ! Yes            ! None      ! +------------------------------------+----------+---------------+----------+ ! Gastroc                             ! Yes       ! Yes            ! None      ! +------------------------------------+----------+---------------+----------+ ! GSV Thigh                           ! Yes       ! Yes            ! None      ! +------------------------------------+----------+---------------+----------+ ! GSV Knee                            ! Yes       ! Yes            ! None      ! +------------------------------------+----------+---------------+----------+ ! GSV Ankle                           ! Yes       ! Yes            ! None      ! +------------------------------------+----------+---------------+----------+ ! SSV                                 ! Yes       ! Yes            ! None      ! +------------------------------------+----------+---------------+----------+ Left Lower Extremities DVT Study Measurements Left 2D Measurements +------------------------------------+----------+---------------+----------+ ! Location                            ! Visualized! Compressibility! Thrombosis! +------------------------------------+----------+---------------+----------+ ! Common Femoral                      !Yes       ! Yes            ! None      ! +------------------------------------+----------+---------------+----------+ ! Prox Femoral                        !Yes       ! Yes            ! None      ! +------------------------------------+----------+---------------+----------+ ! Mid Femoral                         !Yes       ! Yes            ! None      ! +------------------------------------+----------+---------------+----------+ ! Dist Femoral !Yes       !Yes            ! None      ! +------------------------------------+----------+---------------+----------+ ! Deep Femoral                        !Yes       ! Yes            ! None      ! +------------------------------------+----------+---------------+----------+ ! Popliteal                           !Yes       ! Yes            ! None      ! +------------------------------------+----------+---------------+----------+ ! Sapheno Femoral Junction            ! Yes       ! Yes            ! None      ! +------------------------------------+----------+---------------+----------+ ! PTV                                 ! Yes       ! Yes            ! None      ! +------------------------------------+----------+---------------+----------+ ! Peroneal                            !Yes       ! Yes            ! None      ! +------------------------------------+----------+---------------+----------+ ! Gastroc                             ! Yes       ! Yes            ! None      ! +------------------------------------+----------+---------------+----------+ ! GSV Thigh                           ! Yes       ! Yes            ! None      ! +------------------------------------+----------+---------------+----------+ ! GSV Knee                            ! Yes       ! Yes            ! None      ! +------------------------------------+----------+---------------+----------+ ! GSV Ankle                           ! Yes       ! Yes            ! None      ! +------------------------------------+----------+---------------+----------+ ! SSV                                 ! Yes       ! Yes            ! None      ! +------------------------------------+----------+---------------+----------+    Xr Spine Entire (2-3 Views)    Result Date: 2/23/2021  EXAMINATION: TWO XRAY VIEWS SCOLIOSIS SERIES; 2 XRAY VIEWS OF THE CERVICAL SPINE 2/23/2021 12:58 pm COMPARISON: None.  HISTORY: ORDERING SYSTEM PROVIDED HISTORY: Cervical spondylosis TECHNOLOGIST PROVIDED HISTORY: scoliosis Reason for Exam: Pt states recheck cervical spondylosis, scoliosis evaluate for instability Acuity: Chronic Type of Exam: Subsequent/Follow-up Additional signs and symptoms: scoliosis FINDINGS: Scoliosis: 12 thoracic and 5 lumbar vertebra are noted, well maintained in height without acute fracture or subluxation. Mild levo scoliotic curvature of 2.4 degrees is noted T5 to bottom of T10. Mild dextroscoliotic curvature of 4.9 T11 through L4. Minimal to mild degenerative and degenerative disc changes are present in the spine. No acute fracture or subluxation. Examination of the cervical spine reveals evidence of mild facet changes. There appears to be calcification of the left carotid vessel. Mild dextroscoliotic curvature cervical spine is evident. Degrees is noted from T11 through L4. Sacral base plane un leveling cannot be accurately assessed. Left iliac crest is out of the field of view. Cervical spine: 1 mm grade 1 anterolisthesis C2 upon C3 is noted on flexion, not replicated on extension and not seen neutral view. No acute fracture or prevertebral soft tissue swelling. Scoliosis: Mild levo scoliotic curvature thoracic spine. Mild dextroscoliotic curvature lower thoracic and lumbar spine. No acute fracture or subluxation. Degenerative changes. Cervical spine: Minimal grade 1 anterolisthesis C2 upon C3 on flexion, not seen on extension or neutral views. No acute fracture or prevertebral soft tissue swelling. Findings suggest minimal instability. Consultations:    Consults:     Final Specialist Recommendations/Findings:   IP CONSULT TO INTERNAL MEDICINE  IP CONSULT TO SOCIAL WORK  IP CONSULT TO PULMONOLOGY  IP CONSULT TO GI      The patient was seen and examined on day of discharge and this discharge summary is in conjunction with any daily progress note from day of discharge.     Discharge plan:       Disposition: Home    Physician Follow Up:     MARTITA Wilhelm - CNP  [address] [place] [address]  [address] Follow up. No apts available within 7 days. Sander Koo MD  [address] [facility]  [phone]    Go on 3/15/2021  For post hospital follow-up appointment - Time: 4:00 [address]  [address] [facility]  [phone]      Call them when you get home so they can deliver the rest of your Oxygen Equipment. [place] [address]  [phone]    Schedule an appointment as soon as possible for a visit in 1 week  PE, on home oxgen, xarelto 15 mg for 1 year    Kayla Billy, [address] [facility] [address] [facility]  [phone]    Schedule an appointment as soon as possible for a visit in 2 weeks  r/o occult malignancy    Praful Ruiz MD  [facility], [place] [address]  [address] [facility]  [phone]    Schedule an appointment as soon as possible for a visit in 2 weeks         Requiring Further Evaluation/Follow Up POST HOSPITALIZATION/Incidental Findings:     Diet: regular diet    Activity: As tolerated    Instructions to Patient: Patient was discussed the risk and benefit of treatment. Patient was advised to follow-up with an oncologist as an outpatient to rule out any occult malignancy. Patient was also advised follow-up pulmonologist to evaluate and duration, inpatient pulmonology is recommended 1 year of anticoagulation. Patient should also follow-up with GI for further recommendation on recent EGD and biopsy. Patient was encouraged to take anticoagulation and antibiotics for the specified time. Discharge Medications:      Medication List      START taking these medications    amoxicillin-clavulanate 875-125 MG per tablet  Commonly known as: AUGMENTIN  Take 1 tablet by mouth 2 times daily for 6 days     rivaroxaban 15 & 20 MG Starter Pack  Xarelto 15 mg BID.         CHANGE how you take these medications    gabapentin 300 MG capsule  Commonly known as: NEURONTIN  Take 2 Phone: [phone]   · amoxicillin-clavulanate 875-125 MG per tablet  · gabapentin 300 MG capsule  · rivaroxaban 15 & 20 MG Starter Pack         Electronically signed by   Bridgette Malagon MD  3/12/2021  2:15 PM      Thank you MARTITA Salvador - CNP for the opportunity to be involved in this patient's care. Attending Physician Statement  I have discussed the care of Andres Capellan and I have examined the patient myselft and taken ros and hpi , including pertinent history and exam findings,  with the resident. I have reviewed the key elements of all parts of the encounter with the resident. I agree with the assessment, plan and orders as documented by the resident. I spent approx 35 mins in direct patient care as above and discussing discharge with patient, reviewing medications and counseling for discharge .     Electronically signed by Flo Castro MD

## 2021-03-12 NOTE — PROGRESS NOTES
Lyle Villeda was evaluated today and a DME order was entered for a wheeled walker with seat because she requires this to successfully complete daily living tasks of ambulating and grooming. A wheeled walker with seat is necessary due to the patient's unsteady gait, upper body weakness, inability to  and ambulation device, ambulating only short distances by pushing a walker, and the need to sit for a short time before resuming ambulation. These tasks cannot be completed with a lesser ambulation device such as a cane, crutch, or standard walker. The need for this equipment was discussed with the patient and she understands and is in agreement.       John Mcmillan

## 2021-03-12 NOTE — DISCHARGE INSTR - COC
Continuity of Care Form    Patient Name: Amber Dennis   :  1970  MRN:  [id number]    Admit date:  3/7/2021  Discharge date:  ***    Code Status Order: Full Code   Advance Directives:   Advance Care Flowsheet Documentation     Date/Time Healthcare Directive Type of Healthcare Directive Copy in [address] Agent's Name Healthcare Agent's Phone Number    21 1553  No, patient does not have an advance directive for healthcare treatment -- -- -- -- --          Admitting Physician:  Zacarias Nelson MD  PCP: MARTITA Vela CNP    Discharging Nurse: [facility] Unit/Room#: 2091/2091-01  Discharging Unit Phone Number: ***    Emergency Contact:   Extended Emergency Contact Information  Primary Emergency Contact: Shailesh Soler  Address: [address] [place] [address] Phone: [phone]  Work Phone: [phone]  Mobile Phone: [phone]  Relation: Parent  Hearing or visual needs: None  Other needs: None  Preferred language: English   needed? No  Secondary Emergency Contact: Muna Kohler [address] Phone: [phone]  Work Phone: [phone]  Mobile Phone: [phone]  Relation: Child   needed?  No    Past Surgical History:  Past Surgical History:   Procedure Laterality Date    APPENDECTOMY       SECTION      2 pfannenstiel, 1 vertical    CHOLECYSTECTOMY      COLONOSCOPY      Dr Milton Alberts  14    COLONOSCOPY  2014    biopsy & sigmoid spasms, pathology negative    COLONOSCOPY N/A 2018    COLONOSCOPY WITH BIOPSY performed by Jennifer Leal MD at [facility]      x 5    HYSTERECTOMY, TOTAL ABDOMINAL          TN EXPLORATORY OF ABDOMEN  10/21/2014    Laparotomy-lysis of adhesions, bso     UPPER GASTROINTESTINAL ENDOSCOPY  2010    mild chronic inactive gastritis    UPPER GASTROINTESTINAL ENDOSCOPY N/A 3/10/2021    EGD BIOPSY performed by Tawnya Meadows MD at [facility]       Immunization History: There is no immunization history for the selected administration types on file for this patient. Active Problems:  Patient Active Problem List   Diagnosis Code    Hyperlipidemia E78.5    MVP (mitral valve prolapse) I34.1    Anxiety F41.9    Hypothyroidism E03.9    Allergic rhinitis J30.9    Obesity E66.9    Abdominal pain R10.9    Elevated liver enzymes R74.8    GERD (gastroesophageal reflux disease) K21.9    Ovarian mass N83.8    S/P bilateral oophorectomy & KRUPA 10/21/14 Z90.722    Pain emptying bladder R30.9    Urinary urgency R39.15    Insomnia G47.00    RLS (restless legs syndrome) G25.81    Pancreatitis K85.90    Eye swelling, left H57.89    Osteoarthritis of cervical spine M47.812    Degenerative cervical spinal stenosis M48.02    Trigger point with tension headache G44.209    Arthropathy of cervical facet joint M47.812    Atlanto-axial joint sprain, sequela S13. 4XXS    Cervical radiculopathy M54.12    Sprain of atlanto-occipital joint, sequela S13. 4XXS    Encounter for medication monitoring Z51.81    Myofascial muscle pain M79.18    Colitis K52.9    Chest pain R07.9    Unstable angina (HCC) I20.0    Pulmonary embolism on right (HCC) I26.99    Acute pulmonary embolism (HCC) I26.99    Hematemesis with nausea K92.0    Acute respiratory failure with hypoxia (HCC) J96.01       Isolation/Infection:   Isolation          No Isolation        Patient Infection Status     None to display          Nurse Assessment:  Last Vital Signs: BP (!) 110/59   Pulse 68   Temp 98.2 °F (36.8 °C) (Oral)   Resp 16   Ht 5' 4\" (1.626 m)   Wt 200 lb (90.7 kg)   LMP 11/01/2010   SpO2 92%   BMI 34.33 kg/m²     Last documented pain score (0-10 scale): Pain Level: 6  Last Weight:   Wt Readings from Last 1 Encounters:   03/12/21 200 lb (90.7 kg)     Mental Status:  oriented, alert, coherent, logical and thought processes intact    IV Access:  - None    Nursing Mobility/ADLs:  Walking   Independent  Transfer  Independent  Bathing  Independent  Dressing  Independent  Toileting  Independent  Feeding  Independent  Med Admin  Independent  Med Delivery   whole; please make slurry out of the carafate    Wound Care Documentation and Therapy:        Elimination:  Continence:   · Bowel: Yes  · Bladder: Yes  Urinary Catheter: None   Colostomy/Ileostomy/Ileal Conduit: No       Date of Last BM: 3/12/21  No intake or output data in the 24 hours ending 21 1343  No intake/output data recorded. Safety Concerns: At Risk for Falls    Impairments/Disabilities:      None    Nutrition Therapy:  Current Nutrition Therapy:   - Oral Diet:  General    Routes of Feeding: Oral  Liquids: No Restrictions  Daily Fluid Restriction: no  Last Modified Barium Swallow with Video (Video Swallowing Test): not done    Treatments at the Time of Hospital Discharge:   Respiratory Treatments: see MAR  Oxygen Therapy:  is on oxygen at 2-3 L/min per nasal cannula.   Ventilator:    - No ventilator support    Rehab Therapies: {THERAPEUTIC INTERVENTION:4639993972}  Weight Bearing Status/Restrictions: {[facility] Weight Bearin}  Other Medical Equipment (for information only, NOT a DME order):  {EQUIPMENT:383272487}  Other Treatments: ***    Patient's personal belongings (please select all that are sent with patient):  Glasses, Dentures upper, lower,clothes,shoes    RN SIGNATURE:  Nusrat Aguilar    CASE MANAGEMENT/SOCIAL WORK SECTION    Inpatient Status Date: ***    Readmission Risk Assessment Score:  Readmission Risk              Risk of Unplanned Readmission:        24           Discharging to Facility/ Agency   · Name:   · Address:  · Phone:  · Fax:    Dialysis Facility (if applicable)   · Name:  · Address:  · Dialysis Schedule:  · Phone:  · Fax:    / signature: {Teodoragnature:156146040}    PHYSICIAN SECTION    Prognosis: {Prognosis:4462635876}    Condition at Discharge: Guy aMnn Patient Condition:798291272}    Rehab Potential (if transferring to Rehab): {Prognosis:5605887273}    Recommended Labs or Other Treatments After Discharge: ***    Physician Certification: I certify the above information and transfer of Leonidas Rainey  is necessary for the continuing treatment of the diagnosis listed and that she requires {Admit to Appropriate Level of Care:50280} for {GREATER/LESS:799970398} 30 days.      Update Admission H&P: {CHP DME Changes in CPTVZ:960204737}    PHYSICIAN SIGNATURE:  {Esignature:249677178}

## 2021-03-12 NOTE — PROGRESS NOTES
[address]    PROGRESS NOTE             3/12/2021    1:26 PM    Name:   Hilda Jackson  MRN:     [id number]     Acct:      [de-identified]   Room:   2091/2091-01  IP Day:  5  Admit Date:  3/7/2021 10:31 AM    PCP:  MARTITA Salas CNP  Code Status:  Full Code    Subjective:     C/C:   Chief Complaint   Patient presents with    Shortness of Breath     Interval History Status: improved. Patient was seen and examined with bedside. Patient overall is feeling much better. Patient hemoglobin is stable 11.5. Patient was started on heparin drip and resume oral anticoagulation this morning. Patient seen and hemoptysis episodes are decreasing. She still complains of right-sided chest pain which might be coming from the PE on the aspiration pneumonia which is recently diagnosed on chest x-ray. Will be discharged today on oxygen and walker with a seat as she has some difficulty ambulating. Review of Systems:     Review of Systems   Constitutional: Negative for chills and fever. Respiratory: Positive for cough and shortness of breath. Cardiovascular: Positive for chest pain. Negative for palpitations and leg swelling. Right-sided chest pain. Gastrointestinal: Negative for anal bleeding, blood in stool, constipation, nausea and vomiting. Genitourinary: Negative for difficulty urinating, flank pain, hematuria and urgency. Musculoskeletal: Positive for neck pain. Negative for back pain. Neurological: Negative for light-headedness and headaches. Medications: Allergies:     Allergies   Allergen Reactions    Compazine [Prochlorperazine]      Jittery, restless    Sulfa Antibiotics Hives    Adhesive Tape Rash       Current Meds:   Scheduled Meds:    polyethylene glycol  17 g Oral Daily    gabapentin  400 mg Oral TID    ampicillin-sulbactam  3,000 mg Intravenous Q6H    sucralfate  1 g Oral 4 times per day    pantoprazole  40 mg Intravenous BID    And    sodium chloride (PF)  10 mL Intravenous BID    rivaroxaban  15 mg Oral BID WC    metoprolol tartrate  50 mg Oral BID    mirtazapine  15 mg Oral Daily    rOPINIRole  2 mg Oral Nightly    sertraline  100 mg Oral Daily    topiramate  25 mg Oral BID    sodium chloride flush  10 mL Intravenous 2 times per day    levothyroxine  175 mcg Oral Daily     Continuous Infusions:   PRN Meds: perflutren lipid microspheres, sodium chloride flush, tiZANidine, sodium chloride flush, promethazine **OR** ondansetron, polyethylene glycol, acetaminophen **OR** acetaminophen, potassium chloride **OR** potassium alternative oral replacement **OR** potassium chloride, morphine **OR** morphine, LORazepam, HYDROmorphone    Data:     Past Medical History:   has a past medical history of Anxiety, CAD (coronary artery disease), Fatty liver, GERD (gastroesophageal reflux disease), Headache, History of bronchitis, Hyperlipidemia, Hypothyroidism, Kidney stone, LFT elevation, MVP (mitral valve prolapse), Obesity, Type 2 diabetes mellitus without complication ([address] and Umbilical hernia. Social History:   reports that she has quit smoking. Her smoking use included cigarettes. She has a 8.25 pack-year smoking history. She has never used smokeless tobacco. She reports that she does not drink alcohol or use drugs.      Family History:   Family History   Problem Relation Age of Onset   Collette Satchel Cancer Mother         vaginal    Heart Disease Father     Diabetes Father     Other Father         colon resection for colon polyps    Breast Cancer Maternal Grandmother     Stroke Maternal Grandfather        Vitals:  BP (!) 110/59   Pulse 68   Temp 98.2 °F (36.8 °C) (Oral)   Resp 16   Ht 5' 4\" (1.626 m)   Wt 200 lb (90.7 kg)   LMP 2010   SpO2 92%   BMI 34.33 kg/m²   Temp (24hrs), Av.5 °F (36.9 °C), Min:98.2 °F (36.8 °C), Max:99 °F (37.2 °C)    No results for input(s): POCGLU in the last 72 LV EF 60-65 %. No obvious wall motion abnormality seen. Both atria are normal in size. Normal right ventricular size and function. Mild mitral regurgitation. Signature ----------------------------------------------------------------------------  Electronically signed by Yanique Ro(Sonographer) on 03/08/2021 10:14  AM ---------------------------------------------------------------------------- ----------------------------------------------------------------------------  Electronically signed by Sherrell Carlton(Interpreting physician) on  03/08/2021 12:19 PM ---------------------------------------------------------------------------- FINDINGS Left Atrium Left atrium is normal in size. Left Ventricle Left ventricle is normal in size and wall thickness. Global left ventricular systolic function is normal. Estimated LV EF 60-65 %. No obvious wall motion abnormality seen. Right Atrium Right atrium is normal in size. Right Ventricle Normal right ventricular size and function. Mitral Valve No obvious valvular abnormality seen. No evidence of mitral valve prolapse. Mild mitral regurgitation. Aortic Valve No obvious valvular abnormality seen. No evidence of aortic insufficiency or stenosis. Tricuspid Valve No obvious valvular abnormality. Trivial tricuspid regurgitation. No pulmonary hypertension. Estimated right ventricular systolic pressure is 23YHJJ. Pulmonic Valve Pulmonic valve was not well visualized. No evidence of pulmonic insufficiency or stenosis. Pericardial Effusion No significant pericardial effusion is seen. Pleural Effusion No pleural effusion seen. Miscellaneous Normal aortic root dimension. E/E' average = 11.3.  M-mode / 2D Measurements & Calculations:   LVIDd:4.74 cm(3.7 - 5.6 cm)      Diastolic VOHKVE:633 ml  BPECR:1.03 cm(2.2 - 4.0 cm)      Systolic JVRUXA:23.5 ml  WSKR:9.07 cm(0.6 - 1.1 cm)       Aortic Root:2.7 cm(2.0 - 3.7 cm)  LVPWd:0.81 cm(0.6 - 1.1 cm)      LA Dimension: 3.2 cm(1.9 - 4.0 cm) Fractional Shortenin.25 %    LA volume/Index: 48.4 ml /27m^2  Calculated LVEF (%): 81.6 %      LVOT:2 cm   Mitral:                                 Aortic   Valve Area (P1/2-Time): 4.4 cm^2        Peak Velocity: 1.31 m/s  Peak E-Wave: 1.05 m/s                   Mean Velocity: 0.92 m/s  Peak A-Wave: 0.77 m/s                   Peak Gradient: 6.86 mmHg  E/A Ratio: 1.36                         Mean Gradient: 4 mmHg  Peak Gradient: 4.41 mmHg  Deceleration Time: 176 msec  P1/2t: 50 msec                          Area (continuity): 2.65 cm^2                                          AV VTI: 24.5 cm   Tricuspid:                              Pulmonic:   Estimated RVSP: 21 mmHg  Peak TR Velocity: 1.82 m/s  Peak TR Gradient: 13.2496 mmHg  Estimated RA Pressure: 8 mmHg                                          Estimated PASP: 21.25 mmHg  Septal Wall E' velocity:0.08 m/s Lateral Wall E' velocity:0.11 m/s    Xr Chest (2 Vw)    Result Date: 3/11/2021  EXAMINATION: TWO XRAY VIEWS OF THE CHEST 3/11/2021 9:20 am COMPARISON: 2021 HISTORY: ORDERING SYSTEM PROVIDED HISTORY: Hemoptysis, pulmonary embolism TECHNOLOGIST PROVIDED HISTORY: Hemoptysis, pulmonary embolism Reason for Exam: Hemoptysis, pulmonary embolism Acuity: Acute Type of Exam: Subsequent/Follow-up Additional signs and symptoms: Hemoptysis, pulmonary embolism FINDINGS: There is no evidence of pneumothorax. There is right basilar infiltrate and atelectasis and possible tiny effusion. There is platelike atelectasis in left lower lobe. Heart mediastinum are stable. Trachea is midline. Visualized bony thorax shows no acute abnormality. Developing right basilar pneumonia with associated atelectasis and possible small effusion. Left lower lobe platelike atelectasis. Xr Cervical Spine (2-3 Views)    Result Date: 2021  EXAMINATION: TWO XRAY VIEWS SCOLIOSIS SERIES; 2 XRAY VIEWS OF THE CERVICAL SPINE 2021 12:58 pm COMPARISON: None.  HISTORY: ORDERING SYSTEM PROVIDED HISTORY: Cervical spondylosis TECHNOLOGIST PROVIDED HISTORY: scoliosis Reason for Exam: Pt states recheck cervical spondylosis, scoliosis evaluate for instability Acuity: Chronic Type of Exam: Subsequent/Follow-up Additional signs and symptoms: scoliosis FINDINGS: Scoliosis: 12 thoracic and 5 lumbar vertebra are noted, well maintained in height without acute fracture or subluxation. Mild levo scoliotic curvature of 2.4 degrees is noted T5 to bottom of T10. Mild dextroscoliotic curvature of 4.9 T11 through L4. Minimal to mild degenerative and degenerative disc changes are present in the spine. No acute fracture or subluxation. Examination of the cervical spine reveals evidence of mild facet changes. There appears to be calcification of the left carotid vessel. Mild dextroscoliotic curvature cervical spine is evident. Degrees is noted from T11 through L4. Sacral base plane un leveling cannot be accurately assessed. Left iliac crest is out of the field of view. Cervical spine: 1 mm grade 1 anterolisthesis C2 upon C3 is noted on flexion, not replicated on extension and not seen neutral view. No acute fracture or prevertebral soft tissue swelling. Scoliosis: Mild levo scoliotic curvature thoracic spine. Mild dextroscoliotic curvature lower thoracic and lumbar spine. No acute fracture or subluxation. Degenerative changes. Cervical spine: Minimal grade 1 anterolisthesis C2 upon C3 on flexion, not seen on extension or neutral views. No acute fracture or prevertebral soft tissue swelling. Findings suggest minimal instability. Ct Cervical Spine Wo Contrast    Result Date: 3/4/2021  EXAMINATION: CT OF THE CERVICAL SPINE WITHOUT CONTRAST 3/4/2021 5:19 pm TECHNIQUE: CT of the cervical spine was performed without the administration of intravenous contrast. Multiplanar reformatted images are provided for review.  Dose modulation, iterative reconstruction, and/or weight based adjustment of the mA/kV was utilized to reduce the radiation dose to as low as reasonably achievable. COMPARISON: February 13, 2021. HISTORY: ORDERING SYSTEM PROVIDED HISTORY: radicular pain left arm TECHNOLOGIST PROVIDED HISTORY: radicular pain left arm Decision Support Exception->Emergency Medical Condition (MA) Is the patient pregnant?->No Reason for Exam: radicular pain left arm for three weeks. patient states limited ROM and pain starting in neck. no injuries Acuity: Unknown Type of Exam: Unknown FINDINGS: Bone mineralization is normal.  The vertebral bodies and posterior elements appear intact and appropriately aligned without acute fracture or subluxation. Vertebral body stature is maintained throughout. There is straightening of the normal cervical lordosis. Mild-to-moderate cervical degenerative disc disease is present throughout. There is moderate bony neural foraminal narrowing on the left at C3-C4 and C5-C6. No significant uncovertebral joint hypertrophic change or additional bony neural foraminal narrowing. No paraspinal soft tissue abnormality. The visualized lung apices are grossly clear. Mild to moderate multilevel cervical degenerative disc disease with moderate bony neural foraminal narrowing on the left at C3-C4 and C5-C6. No acute fracture or subluxation. Straightening of the normal cervical lordosis which may be related to muscle spasm or position in collar. Ct Cervical Spine Wo Contrast    Result Date: 2/13/2021  EXAMINATION: CT OF THE CERVICAL SPINE WITHOUT CONTRAST 2/13/2021 5:59 pm TECHNIQUE: CT of the cervical spine was performed without the administration of intravenous contrast. Multiplanar reformatted images are provided for review. Dose modulation, iterative reconstruction, and/or weight based adjustment of the mA/kV was utilized to reduce the radiation dose to as low as reasonably achievable.  COMPARISON: 06/25/2018 HISTORY: ORDERING SYSTEM PROVIDED HISTORY: left arm pain TECHNOLOGIST PROVIDED HISTORY: left arm pain Decision Support Exception->Emergency Medical Condition (MA) Is the patient pregnant?->No Reason for Exam: patient states she has been having left arm pain and she is unable to move her left arm Acuity: Unknown Type of Exam: Unknown FINDINGS: The cervical spine demonstrates normalmineralization with straightening of the  cervical lordosis. There is no evidence of fracture or subluxation. There is mild loss of disc height with eburnation of the vertebral endplates at the C6-2, V3-0, C5-6levels. There are small marginal osteophytes at multiple levels. The central canal is grossly patent. The pedicles and posterior elements are intact. The prevertebral and paravertebral soft tissues are unremarkable. The atlanto-dens interval and dens are intact. The visualized lung apices are clear. Mild cervical spondylosis and degenerative disc disease. Evidence of paracervical spasm. No acute bony abnormalities are noted . Mri Cervical Spine Wo Contrast    Result Date: 3/4/2021  EXAMINATION: MRI OF THE CERVICAL SPINE WITHOUT CONTRAST 3/4/2021 2:53 pm TECHNIQUE: Multiplanar multisequence MRI of the cervical spine was performed without the administration of intravenous contrast. COMPARISON: 06/12/2020 HISTORY: ORDERING SYSTEM PROVIDED HISTORY: Cervical radiculopathy TECHNOLOGIST PROVIDED HISTORY: evaluate for stenosis Is the patient pregnant?->No Reason for Exam: pt stated left shoulder arm pain weakness numbness x 1 mth Acuity: Acute Type of Exam: Initial Additional signs and symptoms: pt stated left shoulder arm pain weakness numbness x 1 mth, NKI FINDINGS: BONES/ALIGNMENT: There is normal alignment of the spine. The vertebral body heights are maintained. The bone marrow signal appears unremarkable. There is minimal degenerative disc disease with disc space narrowing and osteophytes at C3-4, C4-5, C5-6 and C6-7. SPINAL CORD:  No abnormal signal is identified within the spinal cord.  SOFT TISSUES: No paraspinal mass identified. C2-C3: There is minimum disc protrusion of 2 mm centrally. The thecal sac and neural foramina are intact. C3-C4: There is disc protrusion osteophyte toward the left measuring 3-4 mm. The thecal sac is not stenotic. There is mild narrowing of the left neural foramen. The right neural foramen is intact. C4-C5: There is disc protrusion osteophyte measuring 2-3 mm. The thecal sac is mildly stenotic measuring 9.5 mm. Disc and/or osteophytes result in mild narrowing of the neural foramina bilaterally. The left neural foramen is narrowed greater than right. C5-C6: There is disc protrusion osteophyte measuring 5-6 mm toward the left narrowing the left neural foramen. The thecal sac is mildly stenotic measuring 9.3 mm. The right neural foramen is intact. C6-C7: Disc and/or osteophytes cause minimal narrowing of the neural foramina bilaterally. The thecal sac is not stenotic. C7-T1:  The thecal sac and neural foramina are intact. Disc and osteophytes result in narrowing of the neural foramina and mild stenosis of the thecal sac throughout the cervical region as discussed in detail above. Xr Chest Portable    Result Date: 3/7/2021  EXAMINATION: ONE XRAY VIEW OF THE CHEST 3/7/2021 11:26 am COMPARISON: 11/08/2020 HISTORY: ORDERING SYSTEM PROVIDED HISTORY: sob Reason for Exam: Sob coughing up blood today Acuity: Acute Type of Exam: Initial FINDINGS: The lungs are without acute focal process. No effusion or pneumothorax. The cardiomediastinal silhouette is normal.  The osseous structures are intact without acute process. Negative portable chest.     Ct Chest Pulmonary Embolism W Contrast    Result Date: 3/7/2021  EXAMINATION: CTA OF THE CHEST 3/7/2021 11:46 am TECHNIQUE: CTA of the chest was performed after the administration of intravenous contrast.  Multiplanar reformatted images are provided for review. MIP images are provided for review.  Dose modulation, iterative reconstruction, and/or weight based adjustment of the mA/kV was utilized to reduce the radiation dose to as low as reasonably achievable. COMPARISON: None. HISTORY: ORDERING SYSTEM PROVIDED HISTORY: r/o pe Reason for Exam: Chest pain and coughing up blood since this morning. Acuity: Acute Type of Exam: Initial FINDINGS: Pulmonary Arteries: Pulmonary arteries are adequately opacified for evaluation. Posterior right lower lobe filling defect with extension into basilar subsegmental branches. Main pulmonary artery is normal in caliber. Mediastinum: No evidence of mediastinal lymphadenopathy. Normal heart size. Normal thoracic aorta. Lungs/pleura: Bilateral lower lobe atelectasis. No focal consolidation or pulmonary edema. No evidence of pleural effusion or pneumothorax. Upper Abdomen: Cholecystectomy Soft Tissues/Bones: No acute bone or soft tissue abnormality. Posterior right lower lobe filling defect with extension into basilar subsegmental branches consistent pulmonary embolism. Bilateral lower lobe atelectasis. Critical results were called by Dr. Lindsey Ibrahim. Ramone Osorio MD to [address] on 3/7/2021 at 13:14.      Vl Lower Extremity Bilateral Venous Duplex    Result Date: 3/8/2021    [facility]  Vascular Lower Extremities DVT Study Procedure   Patient Name   Nisha Humphries Date of Study           03/08/2021                 L   Date of Birth  1970  Gender                  Female   Age            53 year(s)  Race                       Room Number    2091        Height:                 64.17 inch, 163 cm   Corporate ID # [id number]    Weight:                 165 pounds, 74.8 kg   Patient Acct # [de-identified]   BSA:        1.81 m^2    BMI:       28.17 kg/m^2   MR #           [id number]      Sonographer             Bharati Lizama RVT   Accession #    [id number]  Interpreting Physician  Lucio Ramires   Referring                  Referring Physician     Kylie Contreras  Nurse  Practitioner Procedure Type of Study:   Veins: Lower Extremities DVT Study, Venous Scan Lower Bilateral.  Indications for Study:Positive for pulmonary embolus. Patient Status: In Patient. Technical Quality:Adequate visualization. Conclusions   Summary   No evidence of superficial or deep venous thrombosis in both lower  extremities. Signature   ----------------------------------------------------------------  Electronically signed by Donnie Andrade RVT(Sonographer) on  03/08/2021 03:13 PM  ----------------------------------------------------------------   ----------------------------------------------------------------  Electronically signed by Lucio Ramires(Interpreting physician)  on 03/08/2021 11:40 PM  ----------------------------------------------------------------  Findings:   Right Impression:                    Left Impression:  The common femoral, femoral,         The common femoral, femoral,  popliteal and tibial veins           popliteal and tibial veins  demonstrate normal compressibility   demonstrate normal compressibility  and augmentation. and augmentation. Normal compressibility of the great  Normal compressibility of the great  saphenous vein. saphenous vein. Normal compressibility of the small  Normal compressibility of the small  saphenous vein. saphenous vein. Velocities are measured in cm/s ; Diameters are measured in cm Right Lower Extremities DVT Study Measurements Right 2D Measurements +------------------------------------+----------+---------------+----------+ ! Location                            ! Visualized! Compressibility! Thrombosis! +------------------------------------+----------+---------------+----------+ ! Common Femoral                      !Yes       ! Yes            ! None      ! +------------------------------------+----------+---------------+----------+ ! Prox Femoral                        !Yes       ! Yes            ! None DVT Study Measurements Left 2D Measurements +------------------------------------+----------+---------------+----------+ ! Location                            ! Visualized! Compressibility! Thrombosis! +------------------------------------+----------+---------------+----------+ ! Common Femoral                      !Yes       ! Yes            ! None      ! +------------------------------------+----------+---------------+----------+ ! Prox Femoral                        !Yes       ! Yes            ! None      ! +------------------------------------+----------+---------------+----------+ ! Mid Femoral                         !Yes       ! Yes            ! None      ! +------------------------------------+----------+---------------+----------+ ! Dist Femoral                        !Yes       ! Yes            ! None      ! +------------------------------------+----------+---------------+----------+ ! Deep Femoral                        !Yes       ! Yes            ! None      ! +------------------------------------+----------+---------------+----------+ ! Popliteal                           !Yes       ! Yes            ! None      ! +------------------------------------+----------+---------------+----------+ ! Sapheno Femoral Junction            ! Yes       ! Yes            ! None      ! +------------------------------------+----------+---------------+----------+ ! PTV                                 ! Yes       ! Yes            ! None      ! +------------------------------------+----------+---------------+----------+ ! Peroneal                            !Yes       ! Yes            ! None      ! +------------------------------------+----------+---------------+----------+ ! Gastroc                             ! Yes       ! Yes            ! None      ! +------------------------------------+----------+---------------+----------+ ! GSV Gino                           ! Yes       ! Yes            ! None      ! +------------------------------------+----------+---------------+----------+ ! GSV Knee                            ! Yes       ! Yes            ! None      ! +------------------------------------+----------+---------------+----------+ ! GSV Ankle                           ! Yes       ! Yes            ! None      ! +------------------------------------+----------+---------------+----------+ ! SSV                                 ! Yes       ! Yes            ! None      ! +------------------------------------+----------+---------------+----------+    Xr Spine Entire (2-3 Views)    Result Date: 2/23/2021  EXAMINATION: TWO XRAY VIEWS SCOLIOSIS SERIES; 2 XRAY VIEWS OF THE CERVICAL SPINE 2/23/2021 12:58 pm COMPARISON: None. HISTORY: ORDERING SYSTEM PROVIDED HISTORY: Cervical spondylosis TECHNOLOGIST PROVIDED HISTORY: scoliosis Reason for Exam: Pt states recheck cervical spondylosis, scoliosis evaluate for instability Acuity: Chronic Type of Exam: Subsequent/Follow-up Additional signs and symptoms: scoliosis FINDINGS: Scoliosis: 12 thoracic and 5 lumbar vertebra are noted, well maintained in height without acute fracture or subluxation. Mild levo scoliotic curvature of 2.4 degrees is noted T5 to bottom of T10. Mild dextroscoliotic curvature of 4.9 T11 through L4. Minimal to mild degenerative and degenerative disc changes are present in the spine. No acute fracture or subluxation. Examination of the cervical spine reveals evidence of mild facet changes. There appears to be calcification of the left carotid vessel. Mild dextroscoliotic curvature cervical spine is evident. Degrees is noted from T11 through L4. Sacral base plane un leveling cannot be accurately assessed. Left iliac crest is out of the field of view. Cervical spine: 1 mm grade 1 anterolisthesis C2 upon C3 is noted on flexion, not replicated on extension and not seen neutral view. No acute fracture or prevertebral soft tissue swelling.      Scoliosis: Mild levo scoliotic curvature thoracic spine. Mild dextroscoliotic curvature lower thoracic and lumbar spine. No acute fracture or subluxation. Degenerative changes. Cervical spine: Minimal grade 1 anterolisthesis C2 upon C3 on flexion, not seen on extension or neutral views. No acute fracture or prevertebral soft tissue swelling. Findings suggest minimal instability. Physical Examination:        Physical Exam  Vitals signs reviewed. Constitutional:       Appearance: Normal appearance. Cardiovascular:      Rate and Rhythm: Normal rate and regular rhythm. Pulses: Normal pulses. Heart sounds: Normal heart sounds. Pulmonary:      Effort: Pulmonary effort is normal.      Breath sounds: Normal breath sounds. Chest:       Abdominal:      General: Bowel sounds are normal. There is no distension. Palpations: Abdomen is soft. There is no mass. Tenderness: There is no abdominal tenderness. There is no guarding. Musculoskeletal:      Right lower leg: No edema. Left lower leg: No edema. Neurological:      Mental Status: She is alert. Assessment:        Primary Problem  Acute respiratory failure with hypoxia [facility])    Active Hospital Problems    Diagnosis Date Noted    Acute respiratory failure with hypoxia ([address] [J96.01] 03/10/2021    Acute pulmonary embolism ([address] [I26.99]     Hematemesis with nausea [K92.0]     Pulmonary embolism on right ([address] [I26.99] 03/07/2021    RLS (restless legs syndrome) [G25.81] 01/26/2015    GERD (gastroesophageal reflux disease) [K21.9] 04/17/2014    Hyperlipidemia [E78.5]     Hypothyroidism [E03.9]     Anxiety [F41.9]        Plan:        1. Chest pain and SOB 2/2 Pulmonary Embolism and Aspiration pnemonia:   - VSS, afebrile. Desaturated and put on 2L NC oxygen.   - Troponin: Negative  - Chest x-ray showed right pulmonary infiltrates.    - CT PE showed  posterior right lower lobe filling defect with extension into basilar.  Segmental branches consistent with pulmonary embolism. XARELTO 15 mg BID.  (Held per GI after biopsy, once cleared will resume anticoagulation.)  - Dilaudid 1 mg q2h., Ativan for anxiety ordered. - VL lower extremity bilateral venous Doppler shows no signs of superficial or deep venous thrombosis. - Pulmonology on board, saw the patient and recommended 1 year of anticoagulation with oncology work-up as patient mentioned significant amount of weight loss in the last 1 year. - Unasyn for 1 day. Switch to Augmentin for 6 more days. - Antiphospholipid antibodies pending.      2. R/o hematemesis:   - Hb: 12.9--->11.7 (stable last 2 incidents)  - Pt has an episode of possible medium colored vomitus yesterday. - EGD was done which showed moderate gastritis of stomach body, moderate to severe gastritis on antrum without any bleeding. Follow-up biopsies. Gastroenterology recommended PPI. Once it is clear per GI we will resume patient first on heparin and plan switch to oral Xarelto. - Abscess showed chronic atrophic gastritis without any stent metaplasia. - Follow-up with GI as an outpatient.      3.  Hypothyroidism s/p surgery:  -Synthroid 175 mcg  -TSH: 0.69 (03/08/2021)     3.  Essential hypertension:  -Lopressor 50 mg twice daily.     4.  Restless leg syndrome:  -Ropinirole 2 mg at night.     Dispo: Home, 2L [place] oxygen needing on dc.      Elvis Barrera MD  3/12/2021  1:26 PM

## 2021-03-12 NOTE — PROGRESS NOTES
Pt is requesting script for a wheeled/dseat walker and elevated toilet set; these requests were referred to [facility]

## 2021-03-12 NOTE — PLAN OF CARE
Problem: Safety:  Goal: Free from accidental physical injury  Description: Free from accidental physical injury  3/12/2021 0549 by Kalyani Bryan RN  Outcome: Met This Shift     Problem: Safety:  Goal: Free from intentional harm  Description: Free from intentional harm  3/12/2021 0549 by Kalyani Bryan RN  Outcome: Met This Shift     Problem: Infection:  Goal: Will remain free from infection  Description: Will remain free from infection  3/12/2021 0549 by Kalyani Bryan RN  Outcome: Ongoing     Problem: Daily Care:  Goal: Daily care needs are met  Description: Daily care needs are met  3/12/2021 0549 by Kalyani Bryan RN  Outcome: Ongoing     Problem: Pain:  Goal: Patient's pain/discomfort is manageable  Description: Patient's pain/discomfort is manageable  3/12/2021 0549 by Kalyani Bryan RN  Note: Patient given PRN pain medication as prescribed and requested. Patient is able to turn and reposition self independently.       Problem: Skin Integrity:  Goal: Skin integrity will stabilize  Description: Skin integrity will stabilize  3/12/2021 0549 by Kalyani Bryan RN  Outcome: Ongoing     Problem: Discharge Planning:  Goal: Patients continuum of care needs are met  Description: Patients continuum of care needs are met  3/12/2021 0549 by Kalyani Bryan RN  Outcome: Ongoing     Problem: Nutrition  Goal: Optimal nutrition therapy  Description: Nutrition Problem #1: Predicted inadequate energy intake  Intervention: Food and/or Nutrient Delivery: Modify Current Diet, Start Oral Nutrition Supplement  Nutritional Goals: po intake greater than 75%     3/12/2021 0549 by Kalyani Bryan RN  Outcome: Ongoing     Problem: Musculor/Skeletal Functional Status  Goal: Highest potential functional level  3/12/2021 0549 by Kalyani Bryan RN  Outcome: Ongoing     Problem: Musculor/Skeletal Functional Status  Goal: Absence of falls  3/12/2021 0549 by Kalyani Bryan RN  Outcome: Ongoing     Problem: Falls - Risk of:  Goal: Will remain free from falls  Description: Will remain free from falls  3/12/2021 0549 by Dariusz Hightower RN  Outcome: Ongoing  Note: Patient remained free from falls this shift. Bed is locked and in lowest position with call light and bedside table in reach. Patient ambulates to restroom safely by self . Adequate lighting and clear pathway maintained. Hourly rounding performed.       Problem: Falls - Risk of:  Goal: Absence of physical injury  Description: Absence of physical injury  3/12/2021 0549 by Dariusz Hightower RN  Outcome: Ongoing

## 2021-03-12 NOTE — CARE COORDINATION
DISCHARGE PLANNING NOTE:    Prescription for home O2, facesheet, face to face and home O2 eval was faxed to [facility]. I notified Jennifer Echols from [facility] that patient qualified for home O2 and she will deliver a portable tank to patients bedside today. Patients xarelto is 100% covered.      [facility] - 23% - Patient has F/u appt scheduled    Electronically signed by Nehemias Ross RN on 3/12/2021 at 10:52 AM

## 2021-03-12 NOTE — PROGRESS NOTES
GI Progress notes    3/12/2021   10:46 AM    Name:  Pranav Jacobs  MRN:    [id number]     Acct:     [de-identified]   Room:  2091/2091-01   Day: 5     Admit Date: 3/7/2021 10:31 AM  PCP: MARTITA Steinberg CNP    Subjective:     C/C:   Chief Complaint   Patient presents with    Shortness of Breath       Interval History: Status: not changed. Patient seen and examined. No acute events overnight. Hgb stable  No further hematemesis  S/p EGD with mild gastritis  Bowel movements satisfactory; no melena, hematochezia. Currently on heparin drip for PE    ROS:  Constitutional: negative for chills, fevers and sweats  Gastrointestinal: negative for abdominal pain, constipation, diarrhea, nausea and vomiting      Medications: Allergies:    Allergies   Allergen Reactions    Compazine [Prochlorperazine]      Jittery, restless    Sulfa Antibiotics Hives    Adhesive Tape Rash       Current Meds: heparin 25,000 units in dextrose 5% 250 mL (premix) infusion, Continuous  polyethylene glycol (GLYCOLAX) packet 17 g, Daily  gabapentin (NEURONTIN) capsule 400 mg, TID  ampicillin-sulbactam (UNASYN) 3000 mg ivpb minibag, Q6H  sucralfate (CARAFATE) tablet 1 g, 4 times per day  pantoprazole (PROTONIX) injection 40 mg, BID    And  sodium chloride (PF) 0.9 % injection 10 mL, BID  [Held by provider] rivaroxaban (XARELTO) tablet 15 mg, BID WC  perflutren lipid microspheres (DEFINITY) injection 2.2 mg, ONCE PRN  sodium chloride flush 0.9 % injection 10 mL, PRN  metoprolol tartrate (LOPRESSOR) tablet 50 mg, BID  mirtazapine (REMERON) tablet 15 mg, Daily  rOPINIRole (REQUIP) tablet 2 mg, Nightly  sertraline (ZOLOFT) tablet 100 mg, Daily  tiZANidine (ZANAFLEX) tablet 4 mg, Q8H PRN  topiramate (TOPAMAX) tablet 25 mg, BID  sodium chloride flush 0.9 % injection 10 mL, 2 times per day  sodium chloride flush 0.9 % injection 10 mL, PRN  promethazine (PHENERGAN) tablet 12.5 mg, Q6H PRN    Or  ondansetron (ZOFRAN) injection 4 mg, Q6H together: Not on file     Attends Yazidi service: Not on file     Active member of club or organization: Not on file     Attends meetings of clubs or organizations: Not on file     Relationship status: Not on file    Intimate partner violence     Fear of current or ex partner: Not on file     Emotionally abused: Not on file     Physically abused: Not on file     Forced sexual activity: Not on file   Other Topics Concern    Not on file   Social History Narrative    Not on file       Vitals:  /69   Pulse 75   Temp 98.2 °F (36.8 °C) (Oral)   Resp 16   Ht 5' 4\" (1.626 m)   Wt 200 lb (90.7 kg)   LMP 2010   SpO2 92%   BMI 34.33 kg/m²   Temp (24hrs), Av.5 °F (36.9 °C), Min:98.2 °F (36.8 °C), Max:99 °F (37.2 °C)    No results for input(s): POCGLU in the last 72 hours. I/O (24Hr):   No intake or output data in the 24 hours ending 21 1046    Labs:      CBC:   Lab Results   Component Value Date    WBC 5.2 2021    RBC 3.70 2021    RBC 4.36 2019    HGB 11.5 2021    HCT 34.8 2021    MCV 94.1 2021    MCH 31.1 2021    MCHC 33.1 2021    RDW 12.2 2021     2021     2012    MPV 8.0 2021     CBC with Differential:    Lab Results   Component Value Date    WBC 5.2 2021    RBC 3.70 2021    RBC 4.36 2019    HGB 11.5 2021    HCT 34.8 2021     2021     2012    MCV 94.1 2021    MCH 31.1 2021    MCHC 33.1 2021    RDW 12.2 2021    NRBC 0 2019    LYMPHOPCT 34 2021    LYMPHOPCT 33.2 2019    MONOPCT 9 2021    MONOPCT 6.0 2019    BASOPCT 1 2021    BASOPCT 0.9 2019    MONOSABS 0.40 2021    MONOSABS 0.5 2019    LYMPHSABS 1.70 2021    LYMPHSABS 2.7 2019    EOSABS 0.30 2021    EOSABS 0.3 2019    BASOSABS 0.00 2021    DIFFTYPE NOT REPORTED 2021 Hemoglobin/Hematocrit:    Lab Results   Component Value Date    HGB 11.5 03/12/2021    HCT 34.8 03/12/2021     CMP:    Lab Results   Component Value Date     03/11/2021    K 3.6 03/11/2021     03/11/2021    CO2 24 03/11/2021    BUN 14 03/11/2021    CREATININE 0.72 03/11/2021    GFRAA >60 03/11/2021    LABGLOM >60 03/11/2021    GLUCOSE 118 03/11/2021    GLUCOSE 107 06/03/2019    PROT 7.4 03/07/2021    PROT 6.3 06/03/2019    LABALBU 3.9 03/07/2021    LABALBU 3.8 06/03/2019    CALCIUM 8.6 03/11/2021    BILITOT 0.34 03/07/2021    ALKPHOS 103 03/07/2021    AST 14 03/07/2021    ALT 9 03/07/2021     BMP:    Lab Results   Component Value Date     03/11/2021    K 3.6 03/11/2021     03/11/2021    CO2 24 03/11/2021    BUN 14 03/11/2021    LABALBU 3.9 03/07/2021    LABALBU 3.8 06/03/2019    CREATININE 0.72 03/11/2021    CALCIUM 8.6 03/11/2021    GFRAA >60 03/11/2021    LABGLOM >60 03/11/2021    GLUCOSE 118 03/11/2021    GLUCOSE 107 06/03/2019     PT/INR:    Lab Results   Component Value Date    PROTIME 12.2 03/11/2021    INR 0.9 03/11/2021     PTT:    Lab Results   Component Value Date    APTT 29.7 03/11/2021   [APTT}    Physical Examination:        General appearance: alert, cooperative and no distress  Mental Status: oriented to person, place and time and normal affect  Abdomen: soft, nontender, nondistended, bowel sounds present     Assessment:        Primary Problem  Acute respiratory failure with hypoxia [facility])     Active Hospital Problems    Diagnosis Date Noted    Acute respiratory failure with hypoxia ([address] [J96.01] 03/10/2021    Acute pulmonary embolism ([address] [I26.99]     Hematemesis with nausea [K92.0]     Pulmonary embolism on right ([address] [I26.99] 03/07/2021    RLS (restless legs syndrome) [G25.81] 01/26/2015    GERD (gastroesophageal reflux disease) [K21.9] 04/17/2014    Hyperlipidemia [E78.5]     Hypothyroidism [E03.9]     Anxiety [F41.9]      Past Medical History:   Diagnosis Date    Anxiety     CAD (coronary artery disease)     Mitral valve prolapse    Fatty liver     GERD (gastroesophageal reflux disease)     Headache     History of bronchitis     Hyperlipidemia     Hypothyroidism     Kidney stone     LFT elevation     MVP (mitral valve prolapse)     Obesity     Type 2 diabetes mellitus without complication ([address] 3/12/2027    Umbilical hernia         Plan:        1. Hematemesis s/p EGD with mild gastritis  1. Bx revealed chronic inactive gastritis; no H. Pylori, dysplasia, intestinal metaplasia, malignancy  2. Hgb stable  3. No nausea, vomiting  4. Keep on PPI  5. Diet as tolerated  6.  Currently on heparin drip for PE  7. GI signing off     Explained to the patient and d/W Nursing Staff  Will F/U with you  Please call or Page for any issues or change in status  Thanks    Electronically signed by MARTITA Lopez NP on 3/12/2021 at 10:46 AM

## 2021-03-12 NOTE — DISCHARGE INSTR - DIET

## 2021-03-12 NOTE — CARE COORDINATION
DISCHARGE PLANNING NOTE:    Prescription for rollator, facesheet, and face to face notes were sent to [facility] [place] and instructed to deliver to patients home as she is discharging to home today.      Electronically signed by Pop Puente RN on 3/12/2021 at 2:35 PM

## 2021-03-13 ENCOUNTER — CARE COORDINATION (OUTPATIENT)
Dept: CASE MANAGEMENT | Age: 51
End: 2021-03-13

## 2021-03-13 NOTE — CARE COORDINATION
[facility] Transitions Initial Follow Up Call    Call within 2 business days of discharge: Yes    Patient: Sherrell Chris Patient : 1970   MRN: <[id number]>  Reason for Admission: Acute Respiratory Failure with Hypoxia  Discharge Date: 3/12/21 RARS: Readmission Risk Score: 24      Last Discharge [facility]       Complaint Diagnosis Description Type Department Provider    3/7/21 Shortness of Breath Acute pulmonary embolism, unspecified pulmonary embolism type, unspecified whether acute cor pulmonale present ([facility] . .. ED to Hosp-Admission (Discharged) (ADMITTED) Gautam Ospina MD; Mohsen Brambila. .. Spoke with: N/A    Facility: [facility]    Non-face-to-face services provided:  Reviewed encounter information for continuity of care prior to follow up Care Transitions phone call - chart notes, consults, progress notes, test results, med list, appointments, AVS, other information. Care Transitions 24 Hour Call    Do you have all of your prescriptions and are they filled?: Yes  Do you have support at home?: Alone  Are you an active caregiver in your home?: No  Care Transitions Interventions         Follow Up  Future Appointments   Date Time Provider Nisha Santos   3/23/2021 12:45 PM Moiz Wilson   2021  4:30 PM MARTITA Lucas - CNP [address]     Attempted to make contact with patient/caregiver for an initial follow up call post discharge without success. Unable to leave a message regarding intent of call and call back information. Call went to an unidentifiable voice mail. CTN to try again at a later time.      Qi Villeda RN

## 2021-03-15 ENCOUNTER — TELEPHONE (OUTPATIENT)
Dept: INTERNAL MEDICINE CLINIC | Age: 51
End: 2021-03-15

## 2021-03-15 ENCOUNTER — CARE COORDINATION (OUTPATIENT)
Dept: CASE MANAGEMENT | Age: 51
End: 2021-03-15

## 2021-03-15 NOTE — TELEPHONE ENCOUNTER
[facility] Transitions Initial Follow Up Call    Outreach made within 2 business days of discharge: Yes    Patient: Rhea Guaman Patient : 1970   MRN: [id number]  Reason for Admission: There are no discharge diagnoses documented for the most recent discharge.   Discharge Date: 3/12/21       Spoke with: Left message    Discharge department/facility: [address] [place] [address] Interactive Patient Contact:    Scheduled appointment with PCP within 7-14 days    Follow Up  Future Appointments   Date Time Provider Nisha Santos   3/16/2021  2:00 PM [facility]stead [facility]   3/17/2021 11:30 AM Marvin De Santiago APRN - CNP [facility]   3/23/2021 12:45 PM Moiz Rodriguez 28   3/26/2021 12:00 PM Verena Hernandez MD [address] [place] [address] 22   2021  4:30 PM Michael Stephens APRN - CNP [address] [place], [place]

## 2021-03-15 NOTE — CARE COORDINATION
[facility] Transitions Initial Follow Up Call    Call within 2 business days of discharge: Yes    Patient: Adriana Winkler Patient : 1970   MRN: [id number]  Reason for Admission: sob  Discharge Date: 3/12/21 RARS: Readmission Risk Score: 24      Last Discharge [facility]       Complaint Diagnosis Description Type Department Provider    3/7/21 Shortness of Breath Acute pulmonary embolism, unspecified pulmonary embolism type, unspecified whether acute cor pulmonale present ([facility] . .. ED to Hosp-Admission (Discharged) (ADMITTED) Praveen Souza MD; Hoda Barros. ..            # 2nd attempt- unable to reach patient, left vm message with name and contact information, 2 unsuccessful attempts to reach patient,  requested call back//JU    Facility: [address] [place] [address]    Non-face-to-face services provided:      Care Transitions 24 Hour Call    Do you have all of your prescriptions and are they filled?: Yes  Do you have support at home?: Alone  Are you an active caregiver in your home?: No  Care Transitions Interventions         Follow Up  Future Appointments   Date Time Provider Nisha Santos   3/16/2021  2:00 PM Mabel Magallanes MD [address]   3/17/2021 11:30 AM MARTITA Gillespie - CNP [facility]   3/23/2021 12:45 PM Moiz Stein 28   3/26/2021 12:00 PM Kar Rolon MD [address] [place] [address] 22   2021  4:30 PM MARTITA Sigala [facility] [address] Ema Felix RN

## 2021-03-16 ENCOUNTER — OFFICE VISIT (OUTPATIENT)
Dept: INTERNAL MEDICINE CLINIC | Age: 51
End: 2021-03-16
Payer: COMMERCIAL

## 2021-03-16 VITALS
BODY MASS INDEX: 29.19 KG/M2 | HEIGHT: 64 IN | WEIGHT: 171 LBS | RESPIRATION RATE: 16 BRPM | HEART RATE: 68 BPM | SYSTOLIC BLOOD PRESSURE: 118 MMHG | TEMPERATURE: 98.8 F | DIASTOLIC BLOOD PRESSURE: 82 MMHG

## 2021-03-16 DIAGNOSIS — J69.0 ASPIRATION PNEUMONIA OF RIGHT LOWER LOBE, UNSPECIFIED ASPIRATION PNEUMONIA TYPE (HCC): ICD-10-CM

## 2021-03-16 DIAGNOSIS — Z12.31 ENCOUNTER FOR SCREENING MAMMOGRAM FOR MALIGNANT NEOPLASM OF BREAST: Primary | ICD-10-CM

## 2021-03-16 DIAGNOSIS — R00.2 PALPITATIONS: ICD-10-CM

## 2021-03-16 DIAGNOSIS — J96.01 ACUTE RESPIRATORY FAILURE WITH HYPOXIA (HCC): ICD-10-CM

## 2021-03-16 DIAGNOSIS — Z00.00 HEALTHCARE MAINTENANCE: ICD-10-CM

## 2021-03-16 DIAGNOSIS — I26.99 ACUTE PULMONARY EMBOLISM, UNSPECIFIED PULMONARY EMBOLISM TYPE, UNSPECIFIED WHETHER ACUTE COR PULMONALE PRESENT (HCC): ICD-10-CM

## 2021-03-16 PROCEDURE — 99496 TRANSJ CARE MGMT HIGH F2F 7D: CPT | Performed by: INTERNAL MEDICINE

## 2021-03-16 PROCEDURE — 1111F DSCHRG MED/CURRENT MED MERGE: CPT | Performed by: INTERNAL MEDICINE

## 2021-03-16 ASSESSMENT — PATIENT HEALTH QUESTIONNAIRE - PHQ9
SUM OF ALL RESPONSES TO PHQ QUESTIONS 1-9: 0
SUM OF ALL RESPONSES TO PHQ QUESTIONS 1-9: 0

## 2021-03-16 NOTE — TELEPHONE ENCOUNTER
[facility] Transitions Initial Follow Up Call    Outreach made within 2 business days of discharge: Yes    Patient: Elder Aguilar Patient : 1970   MRN: [id number]  Reason for Admission: There are no discharge diagnoses documented for the most recent discharge.   Discharge Date: 3/12/21       Spoke with: appt 3/16/21    Discharge department/facility: [facility]    Follow Up  Future Appointments   Date Time Provider Nisha Santos   3/16/2021  2:00 PM Srinivasa Johnson MD [address]   3/17/2021 11:30 AM MARTITA Gregg CNP [facility]   3/23/2021 12:45 PM Moiz Slade 28   3/26/2021 12:00 PM Geovany Andrade MD [address] [place] [address]   2021  4:30 PM MARTITA Diaz CNP [address] [facility]

## 2021-03-16 NOTE — PROGRESS NOTES
Visit Information    Have you changed or started any medications since your last visit including any over-the-counter medicines, vitamins, or herbal medicines? no   Are you having any side effects from any of your medications? -  no  Have you stopped taking any of your medications? Is so, why? -  no    Have you seen any other physician or provider since your last visit? No  Have you had any other diagnostic tests since your last visit? Yes - Records Obtained  Have you been seen in the emergency room and/or had an admission to a hospital since we last saw you? Yes - Records Obtained  Have you had your routine dental cleaning in the past 6 months? yes -    Have you activated your [employer] account? If not, what are your barriers?  Yes     Patient Care Team:  MARTITA Gutiérrez CNP as PCP - General (Internal Medicine)  MARTITA Gutiérrez CNP as PCP - [facility]  Shad Pisano MD as Consulting Physician (Gastroenterology)  Iver Barthel, MD as Consulting Physician (Obstetrics & Gynecology)  Amy Briseno MD as Consulting Physician (Gastroenterology)    Medical History Review  Past Medical, Family, and Social History reviewed and does contribute to the patient presenting condition    Health Maintenance   Topic Date Due    Pneumococcal 0-64 years Vaccine (1 of 1 - PPSV23) Never done    HIV screen  Never done    Hepatitis B vaccine (1 of 3 - Risk 3-dose series) Never done    DTaP/Tdap/Td vaccine (1 - Tdap) Never done    Diabetic retinal exam  08/15/2018    Flu vaccine (1) Never done    Diabetic foot exam  08/11/2021    Diabetic microalbuminuria test  08/11/2021    Lipid screen  08/11/2021    TSH testing  03/07/2022    A1C test (Diabetic or Prediabetic)  03/08/2022    Hepatitis C screen  Completed    Hepatitis A vaccine  Aged Out    Hib vaccine  Aged Out    Meningococcal (ACWY) vaccine  Aged Out     Chief Complaint   Patient presents with    Follow-Up from Hospital     Pt was in [facility] with a pulmonary embolism. Pt states she is feeling ok and denies any other concerns at this time. Post-Discharge Transitional Care Management Services or Hospital Follow Up      Tessa Olsen   YOB: 1970    Date of Office Visit:  3/16/2021  Date of Hospital Admission: 3/7/21  Date of Hospital Discharge: 3/12/21  Risk of hospital readmission (high >=14%.  Medium >=10%) :Readmission Risk Score: 24      Care management risk score Rising risk (score 2-5) and Complex Care (Scores >=6): 9     Non face to face  following discharge, date last encounter closed (first attempt may have been earlier): 3/16/2021  9:18 AM    Call initiated 2 business days of discharge: Yes    Patient Active Problem List   Diagnosis    Hyperlipidemia    MVP (mitral valve prolapse)    Anxiety    Hypothyroidism    Allergic rhinitis    Obesity    Abdominal pain    Elevated liver enzymes    GERD (gastroesophageal reflux disease)    Ovarian mass    S/P bilateral oophorectomy & KRUPA 10/21/14    Pain emptying bladder    Urinary urgency    Insomnia    RLS (restless legs syndrome)    Pancreatitis    Eye swelling, left    Osteoarthritis of cervical spine    Degenerative cervical spinal stenosis    Trigger point with tension headache    Arthropathy of cervical facet joint    Atlanto-axial joint sprain, sequela    Cervical radiculopathy    Sprain of atlanto-occipital joint, sequela    Encounter for medication monitoring    Myofascial muscle pain    Colitis    Chest pain    Unstable angina ([address]    Pulmonary embolism on right ([address]    Acute pulmonary embolism (HCC)    Hematemesis with nausea    Acute respiratory failure with hypoxia (HCC)       Allergies   Allergen Reactions    Compazine [Prochlorperazine]      Jittery, restless    Sulfa Antibiotics Hives    Adhesive Tape Rash       Medications listed as ordered at the time of discharge from hospital   Parveen Crawford   [place] Medication Instructions CYNDI:    Printed on:03/16/21 3541   Medication Information                      albuterol sulfate  (90 Base) MCG/ACT inhaler  INHALE 2 PUFFS INTO THE LUNGS EVERY 4 HOURS AS NEEDED FOR SHORTNESS OF BREATH             amoxicillin-clavulanate (AUGMENTIN) 875-125 MG per tablet  Take 1 tablet by mouth 2 times daily for 6 days             clonazePAM (KLONOPIN) 0.5 MG tablet  Take 1 tablet by mouth 2 times daily as needed for Anxiety for up to 30 days. esomeprazole (NEXIUM) 40 MG delayed release capsule  TAKE 1 CAPSULE BY MOUTH EVERY MORNING BEFORE BREAKFAST             gabapentin (NEURONTIN) 300 MG capsule  Take 2 capsules by mouth 3 times daily for 30 days. levothyroxine (SYNTHROID) 175 MCG tablet  TAKE 1 TABLET BY MOUTH DAILY             lidocaine (LIDODERM) 5 %  Place 1 patch onto the skin daily 12 hours on, 12 hours off.             metoprolol tartrate (LOPRESSOR) 50 MG tablet  Take 50 mg by mouth 2 times daily              mirtazapine (REMERON) 15 MG tablet  Take 15 mg by mouth daily             ondansetron (ZOFRAN ODT) 4 MG disintegrating tablet  Take 1 tablet by mouth every 8 hours as needed for Nausea or Vomiting             oxyCODONE (OXYCONTIN) 10 MG extended release tablet  Take 1 tablet by mouth every 12 hours for 30 days. oxyCODONE-acetaminophen (PERCOCET)  MG per tablet  Take 1 tablet by mouth every 6 hours as needed for Pain for up to 30 days.              rivaroxaban 15 & 20 MG Starter Pack  Xarelto 15 mg BID.             rOPINIRole (REQUIP) 2 MG tablet  TAKE 1 TABLET BY MOUTH EVERY NIGHT             sertraline (ZOLOFT) 100 MG tablet  Take 100 mg by mouth daily             tiZANidine (ZANAFLEX) 4 MG tablet  TAKE 1 TABLET BY MOUTH EVERY 8 HOURS AS NEEDED FOR SPASMS             topiramate (TOPAMAX) 25 MG tablet  25 mg 2 times daily                    Medications marked \"taking\" at this time  Outpatient Medications Marked as Taking for the 3/16/21 encounter (Office Visit) with Ludwin Colón MD   Medication Sig Dispense Refill    gabapentin (NEURONTIN) 300 MG capsule Take 2 capsules by mouth 3 times daily for 30 days. 180 capsule 0    rivaroxaban 15 & 20 MG Starter Pack Xarelto 15 mg BID. 1 Package 3    levothyroxine (SYNTHROID) 175 MCG tablet TAKE 1 TABLET BY MOUTH DAILY 90 tablet 1    oxyCODONE-acetaminophen (PERCOCET)  MG per tablet Take 1 tablet by mouth every 6 hours as needed for Pain for up to 30 days. 120 tablet 0    oxyCODONE (OXYCONTIN) 10 MG extended release tablet Take 1 tablet by mouth every 12 hours for 30 days. 60 each 0    lidocaine (LIDODERM) 5 % Place 1 patch onto the skin daily 12 hours on, 12 hours off. 10 patch 0    albuterol sulfate  (90 Base) MCG/ACT inhaler INHALE 2 PUFFS INTO THE LUNGS EVERY 4 HOURS AS NEEDED FOR SHORTNESS OF BREATH      esomeprazole (NEXIUM) 40 MG delayed release capsule TAKE 1 CAPSULE BY MOUTH EVERY MORNING BEFORE BREAKFAST 90 capsule 1    tiZANidine (ZANAFLEX) 4 MG tablet TAKE 1 TABLET BY MOUTH EVERY 8 HOURS AS NEEDED FOR SPASMS 90 tablet 1    mirtazapine (REMERON) 15 MG tablet Take 15 mg by mouth daily      rOPINIRole (REQUIP) 2 MG tablet TAKE 1 TABLET BY MOUTH EVERY NIGHT 90 tablet 3    ondansetron (ZOFRAN ODT) 4 MG disintegrating tablet Take 1 tablet by mouth every 8 hours as needed for Nausea or Vomiting 10 tablet 0    sertraline (ZOLOFT) 100 MG tablet Take 100 mg by mouth daily      topiramate (TOPAMAX) 25 MG tablet 25 mg 2 times daily       metoprolol tartrate (LOPRESSOR) 50 MG tablet Take 50 mg by mouth 2 times daily           Medications patient taking as of now reconciled against medications ordered at time of hospital discharge: Yes    Chief Complaint   Patient presents with    Follow-Up from Hospital     Pt was in [address] with a pulmonary embolism. Pt states she is feeling ok and denies any other concerns at this time.        History of Present illness - Follow up of Hospital diagnosis(es): Acute PE   Acute respiratory failure with hypoxia (HCC) [J96.01] 03/10/2021    Acute pulmonary embolism (HCC) [I26.99]      Hematemesis with nausea [K92.0]      Pulmonary embolism on right (HCC) [I26.99] 03/07/2021    RLS (restless legs syndrome) [G25.81] 01/26/2015    GERD (gastroesophageal reflux disease) [K21.9] 04/17/2014    Hyperlipidemia [E78.5]      Hypothyroidism [E03.9]      Anxiety [F41.9]        Inpatient course: Discharge summary reviewed- see chart. shorness of breath improving, now using O2 with mobilization only  Chest pain is intermittent  Getting more palpitations  Has appt for hematology Mar 26     Has been taking xarelto as prescribed  No further vomiting or hematemesis    Completed abx for suspected aspiration pneumonia  Has appt with Pulm as well. Interval history/Current status:     Getting palpitations  Started yesterday  Not associated with any CP, or new SOB  Not related to exertion  Drinks moderate amt of decaf coffee  On klonopin many years for anxieyt  Also zoloft and remeron      Denies any shortness of breath or cough  +ve for palpitations, CP is getting beter  Denies abdominal pain, diarrhea vomiting  Denies any new numbness tremors or weakness. Vitals:    03/16/21 1412   BP: 118/82   Site: Right Upper Arm   Position: Sitting   Cuff Size: Small Adult   Pulse: 68   Resp: 16   Temp: 98.8 °F (37.1 °C)   Weight: 171 lb (77.6 kg)   Height: 5' 4\" (1.626 m)     Body mass index is 29.35 kg/m². Wt Readings from Last 3 Encounters:   03/16/21 171 lb (77.6 kg)   03/12/21 200 lb (90.7 kg)   03/04/21 165 lb (74.8 kg)     BP Readings from Last 3 Encounters:   03/16/21 118/82   03/12/21 (!) 110/59   03/10/21 (!) 134/92        Physical Exam:  General appearance:  alert, cooperative and no distress  Eyes: Anicteric sclera. Pupils are equally round and reactive to light. Extraocular movements are intact.   Lungs:  clear to auscultation bilaterally, normal

## 2021-03-19 ENCOUNTER — TELEPHONE (OUTPATIENT)
Dept: INTERNAL MEDICINE CLINIC | Age: 51
End: 2021-03-19

## 2021-03-19 NOTE — TELEPHONE ENCOUNTER
Cardiologist was concern with labs from hospital discharge and requesting patient follow up with PCP. Please address labs from 3/11/21.

## 2021-03-20 NOTE — TELEPHONE ENCOUNTER
Pt was in hospital at the time of labs - 3/11. I have checked those again, and do not see anything needing additional intervention right now. Did they say what the concern was? I am also not seeing any note/message from cardiology. I did see patient on 3/16.   Rosibel Parry

## 2021-03-23 NOTE — TELEPHONE ENCOUNTER
Patient was unsure what labs, just stated that she needed to contact our office to follow up. I will let patient know there is not a need for additional intervention.

## 2021-03-26 ENCOUNTER — TELEPHONE (OUTPATIENT)
Dept: ONCOLOGY | Age: 51
End: 2021-03-26

## 2021-03-26 ENCOUNTER — HOSPITAL ENCOUNTER (OUTPATIENT)
Age: 51
Discharge: HOME | End: 2021-03-26
Payer: COMMERCIAL

## 2021-03-26 ENCOUNTER — INITIAL CONSULT (OUTPATIENT)
Dept: ONCOLOGY | Age: 51
End: 2021-03-26
Payer: COMMERCIAL

## 2021-03-26 VITALS
BODY MASS INDEX: 30.11 KG/M2 | TEMPERATURE: 98.8 F | WEIGHT: 176.4 LBS | HEART RATE: 80 BPM | HEIGHT: 64 IN | SYSTOLIC BLOOD PRESSURE: 104 MMHG | DIASTOLIC BLOOD PRESSURE: 79 MMHG | RESPIRATION RATE: 16 BRPM

## 2021-03-26 DIAGNOSIS — R10.84 GENERALIZED ABDOMINAL PAIN: ICD-10-CM

## 2021-03-26 DIAGNOSIS — R63.4 WEIGHT LOSS: ICD-10-CM

## 2021-03-26 DIAGNOSIS — R07.1 CHEST PAIN ON BREATHING: ICD-10-CM

## 2021-03-26 DIAGNOSIS — I26.99 PULMONARY EMBOLISM ON RIGHT (HCC): ICD-10-CM

## 2021-03-26 DIAGNOSIS — I26.99 PULMONARY EMBOLISM ON RIGHT (HCC): Primary | ICD-10-CM

## 2021-03-26 DIAGNOSIS — R97.8 OTHER ABNORMAL TUMOR MARKERS: ICD-10-CM

## 2021-03-26 LAB
AFP: 1.9 UG/L
CA 125: 52 U/ML
CA 19-9: 7 U/ML (ref 0–35)
CARCINOEMBRYONIC ANTIGEN: 8.3 NG/ML
HOMOCYSTEINE: 12.4 UMOL/L
LACTATE DEHYDROGENASE: 161 U/L (ref 135–214)

## 2021-03-26 PROCEDURE — 99205 OFFICE O/P NEW HI 60 MIN: CPT | Performed by: INTERNAL MEDICINE

## 2021-03-26 PROCEDURE — 83615 LACTATE (LD) (LDH) ENZYME: CPT

## 2021-03-26 PROCEDURE — 81241 F5 GENE: CPT

## 2021-03-26 PROCEDURE — 83090 ASSAY OF HOMOCYSTEINE: CPT

## 2021-03-26 PROCEDURE — 81240 F2 GENE: CPT

## 2021-03-26 PROCEDURE — 82378 CARCINOEMBRYONIC ANTIGEN: CPT

## 2021-03-26 PROCEDURE — 36415 COLL VENOUS BLD VENIPUNCTURE: CPT

## 2021-03-26 PROCEDURE — 82105 ALPHA-FETOPROTEIN SERUM: CPT

## 2021-03-26 PROCEDURE — 99202 OFFICE O/P NEW SF 15 MIN: CPT | Performed by: INTERNAL MEDICINE

## 2021-03-26 PROCEDURE — 86304 IMMUNOASSAY TUMOR CA 125: CPT

## 2021-03-26 PROCEDURE — 81291 MTHFR GENE: CPT

## 2021-03-26 PROCEDURE — 86301 IMMUNOASSAY TUMOR CA 19-9: CPT

## 2021-03-26 NOTE — PROGRESS NOTES
CURRENT MEDICATIONS:  has a current medication list which includes the following prescription(s): gabapentin, rivaroxaban, levothyroxine, oxycodone-acetaminophen, oxycodone, albuterol sulfate hfa, clonazepam, esomeprazole, tizanidine, mirtazapine, ropinirole, ondansetron, sertraline, topiramate, metoprolol tartrate, and lidocaine. ALLERGIES:  is allergic to compazine [prochlorperazine]; sulfa antibiotics; and adhesive tape. FAMILY HISTORY: Grandmother had breast cancer. Great-grandmother had breast cancer. I believe one of her parents had cancer as well. Possibly pancreatic. Otherwise negative for any hematological or oncological conditions. SOCIAL HISTORY:  reports that she has quit smoking. Her smoking use included cigarettes. She has a 8.25 pack-year smoking history. She has never used smokeless tobacco. She reports that she does not drink alcohol or use drugs. REVIEW OF SYSTEMS:     · General: Positive for weakness and fatigue. Positive for weight loss and decreased appetite. No fever or chills. · Eyes: No blurred vision, eye pain or double vision. · Ears: No hearing problems or drainage. No tinnitus. · Throat: No sore throat, problems with swallowing or dysphagia. · Respiratory: No cough, sputum or hemoptysis. Positive for exertional shortness of breath. No pleuritic chest pain. · Cardiovascular: No chest pain, orthopnea or PND. No lower extremity edema. No palpitation. · Gastrointestinal: No problems with swallowing. No abdominal pain or bloating. No nausea or vomiting. No diarrhea or constipation. No GI bleeding. · Genitourinary: No dysuria, hematuria, frequency or urgency. · Musculoskeletal: No muscle aches or pains. No limitation of movement. No back pain. No gait disturbance, No joint complaints. · Dermatologic: No skin rashes or pruritus. No skin lesions or discolorations. · Psychiatric: No depression, anxiety, or stress or signs of schizophrenia.  No change in mood or affect. · Hematologic: No history of bleeding tendency. No bruises or ecchymosis. No history of clotting problems. · Infectious disease: No fever, chills or frequent infections. · Endocrine: No polydipsia or polyuria. No temperature intolerance. · Neurologic: No headaches or dizziness. No weakness or numbness of the extremities. No changes in balance, coordination,  memory, mentation, behavior. · Allergic/Immunologic: No nasal congestion or hives. No repeated infections. PHYSICAL EXAM:  The patient is not in acute distress. Vital signs: Blood pressure 104/79, pulse 80, temperature 98.8 °F (37.1 °C), temperature source Oral, resp. rate 16, height 5' 3.5\" (1.613 m), weight 176 lb 6.4 oz (80 kg), last menstrual period 11/01/2010, not currently breastfeeding.      General appearance - well appearing, not in pain or distress  Mental status - good mood, alert and oriented  Eyes - pupils equal and reactive, extraocular eye movements intact  Ears - bilateral TM's and external ear canals normal  Nose - normal and patent, no erythema, discharge or polyps  Mouth - mucous membranes moist, pharynx normal without lesions  Neck - supple, no significant adenopathy  Lymphatics - no palpable lymphadenopathy, no hepatosplenomegaly  Chest - clear to auscultation, no wheezes, rales or rhonchi, symmetric air entry  Heart - normal rate, regular rhythm, normal S1, S2, no murmurs, rubs, clicks or gallops  Abdomen - soft, generalized abdominal tenderness, nondistended, no masses or organomegaly  Neurological - alert, oriented, normal speech, no focal findings or movement disorder noted  Musculoskeletal - no joint tenderness, deformity or swelling  Extremities - peripheral pulses normal, no pedal edema, no clubbing or cyanosis  Skin - normal coloration and turgor, no rashes, no suspicious skin lesions noted     Review of Diagnostic data:   Lab Results   Component Value Date    WBC 5.2 03/12/2021    HGB 11.5 (L) 03/12/2021    HCT 34.8 (L) 03/12/2021    MCV 94.1 03/12/2021     03/12/2021       Chemistry        Component Value Date/Time     (H) 03/12/2021 0610    K 3.5 (L) 03/12/2021 0610     (H) 03/12/2021 0610    CO2 24 03/12/2021 0610    BUN 13 03/12/2021 0610    CREATININE 0.67 03/12/2021 0610        Component Value Date/Time    CALCIUM 8.4 (L) 03/12/2021 0610    ALKPHOS 103 03/07/2021 1105    AST 14 03/07/2021 1105    ALT 9 03/07/2021 1105    BILITOT 0.34 03/07/2021 1105            IMPRESSION:   Severe unintentional weight loss  Un triggered pulmonary embolism  Abdominal pain  Recent GI bleeding  Gastritis      PLAN: For more than 60 minutes of face to face discussion, I explained to the patient the nature of these problems with unintentional weight loss and unexplained pulmonary embolism. There were no major risk factors for pulmonary embolism. This is concerning for underlying cause. Obviously differential diagnosis for unprovoked pulmonary embolism will include malignancy. That becomes more concerning with her unintentional weight loss. Work-up is needed to rule out occult malignancy. Patient had endoscopies. She continues to have abdominal pain and tenderness. I will do CT scan of the abdomen and pelvis. I will also do tumor markers with CEA and alpha-fetoprotein and CA 19-9 and CA-125. We will check CBC and LDH. We will also check factor V Leiden gene mutation as well as prothrombin 2 homocysteine level and MTHFR. We will make further recommendations based on the results of this test.  Patient's questions were answered to the best of her satisfaction and she verbalized full understanding and agreement.

## 2021-03-26 NOTE — LETTER
_    Justice Cano MD    3/27/2021     Louisa Curry, APRN - CNP   [address] [address]    Dear Zhane Hilliard:    Thank you for referring Violeta Holcomb, 1970, to me for evaluation. Below are the relevant portions of my assessment and plan of care. Ms. Violeta Holcomb is a very pleasant 53 y.o. female with history of multiple co morbidities as listed. Patient is referred for evaluation of weight loss and possible underlying malignancy. The patient was recently hospitalized because of increasing shortness of breath and respiratory failure. She was found to have pulmonary embolism. She was treated with anticoagulation with Xarelto. Shortly before discharge she had upper GI bleeding. She had endoscopies and she was found to have severe gastritis. After stabilization patient was back on anticoagulation. She has been stable since then. Patient had problems with weight loss. She lost about 100 pounds over the last year. She has generalized weakness and fatigue. She has decreased appetite. She had chronic nausea. Early satiety. She denies any fever or night sweats. She had problem with night sweats in the past.  No enlarged lymph nodes. No unusual headaches or dizziness. No other complaints. Patient quit smoking at the time of hospitalization. Used to smoke 1 pack/day since teenage. Social alcohol. [place] PAST MEDICAL HISTORY: has a past medical history of Anxiety, CAD (coronary artery disease), Fatty liver, GERD (gastroesophageal reflux disease), Headache, History of bronchitis, Hyperlipidemia, Hypothyroidism, Kidney stone, LFT elevation, MVP (mitral valve prolapse), Obesity, Type 2 diabetes mellitus without complication ([facility] [address] and Umbilical hernia. PAST SURGICAL HISTORY: has a past surgical history that includes Upper gastrointestinal endoscopy (2010); hernia repair; Cholecystectomy; Appendectomy;  section; Colonoscopy (); Colonoscopy (14);  Colonoscopy (2014); pr exploratory of abdomen (10/21/2014); Colonoscopy (N/A, 2/12/2018); Hysterectomy, total abdominal; and Upper gastrointestinal endoscopy (N/A, 3/10/2021). CURRENT MEDICATIONS:  has a current medication list which includes the following prescription(s): gabapentin, rivaroxaban, levothyroxine, oxycodone-acetaminophen, oxycodone, albuterol sulfate hfa, clonazepam, esomeprazole, tizanidine, mirtazapine, ropinirole, ondansetron, sertraline, topiramate, metoprolol tartrate, and lidocaine. ALLERGIES:  is allergic to compazine [prochlorperazine]; sulfa antibiotics; and adhesive tape. FAMILY HISTORY: Grandmother had breast cancer. Great-grandmother had breast cancer. I believe one of her parents had cancer as well. Possibly pancreatic. Otherwise negative for any hematological or oncological conditions. SOCIAL HISTORY:  reports that she has quit smoking. Her smoking use included cigarettes. She has a 8.25 pack-year smoking history. She has never used smokeless tobacco. She reports that she does not drink alcohol or use drugs. REVIEW OF SYSTEMS:     · General: Positive for weakness and fatigue. Positive for weight loss and decreased appetite. No fever or chills. · Eyes: No blurred vision, eye pain or double vision. · Ears: No hearing problems or drainage. No tinnitus. · Throat: No sore throat, problems with swallowing or dysphagia. · Respiratory: No cough, sputum or hemoptysis. Positive for exertional shortness of breath. No pleuritic chest pain. · Cardiovascular: No chest pain, orthopnea or PND. No lower extremity edema. No palpitation. · Gastrointestinal: No problems with swallowing. No abdominal pain or bloating. No nausea or vomiting. No diarrhea or constipation. No GI bleeding. · Genitourinary: No dysuria, hematuria, frequency or urgency. · Musculoskeletal: No muscle aches or pains. No limitation of movement. No back pain. No gait disturbance, No joint complaints.   · Dermatologic: No skin rashes or pruritus. No skin lesions or discolorations. · Psychiatric: No depression, anxiety, or stress or signs of schizophrenia. No change in mood or affect. · Hematologic: No history of bleeding tendency. No bruises or ecchymosis. No history of clotting problems. · Infectious disease: No fever, chills or frequent infections. · Endocrine: No polydipsia or polyuria. No temperature intolerance. · Neurologic: No headaches or dizziness. No weakness or numbness of the extremities. No changes in balance, coordination,  memory, mentation, behavior. · Allergic/Immunologic: No nasal congestion or hives. No repeated infections. PHYSICAL EXAM:  The patient is not in acute distress. Vital signs: Blood pressure 104/79, pulse 80, temperature 98.8 °F (37.1 °C), temperature source Oral, resp. rate 16, height 5' 3.5\" (1.613 m), weight 176 lb 6.4 oz (80 kg), last menstrual period 11/01/2010, not currently breastfeeding.      General appearance - well appearing, not in pain or distress  Mental status - good mood, alert and oriented  Eyes - pupils equal and reactive, extraocular eye movements intact  Ears - bilateral TM's and external ear canals normal  Nose - normal and patent, no erythema, discharge or polyps  Mouth - mucous membranes moist, pharynx normal without lesions  Neck - supple, no significant adenopathy  Lymphatics - no palpable lymphadenopathy, no hepatosplenomegaly  Chest - clear to auscultation, no wheezes, rales or rhonchi, symmetric air entry  Heart - normal rate, regular rhythm, normal S1, S2, no murmurs, rubs, clicks or gallops  Abdomen - soft, generalized abdominal tenderness, nondistended, no masses or organomegaly  Neurological - alert, oriented, normal speech, no focal findings or movement disorder noted  Musculoskeletal - no joint tenderness, deformity or swelling  Extremities - peripheral pulses normal, no pedal edema, no clubbing or cyanosis  Skin - normal coloration and turgor, no rashes, no suspicious skin lesions noted     Review of Diagnostic data:   Lab Results   Component Value Date    WBC 5.2 03/12/2021    HGB 11.5 (L) 03/12/2021    HCT 34.8 (L) 03/12/2021    MCV 94.1 03/12/2021     03/12/2021       Chemistry        Component Value Date/Time     (H) 03/12/2021 0610    K 3.5 (L) 03/12/2021 0610     (H) 03/12/2021 0610    CO2 24 03/12/2021 0610    BUN 13 03/12/2021 0610    CREATININE 0.67 03/12/2021 0610        Component Value Date/Time    CALCIUM 8.4 (L) 03/12/2021 0610    ALKPHOS 103 03/07/2021 1105    AST 14 03/07/2021 1105    ALT 9 03/07/2021 1105    BILITOT 0.34 03/07/2021 1105            IMPRESSION:   Severe unintentional weight loss  Un triggered pulmonary embolism  Abdominal pain  Recent GI bleeding  Gastritis      PLAN: For more than 60 minutes of face to face discussion, I explained to the patient the nature of these problems with unintentional weight loss and unexplained pulmonary embolism. There were no major risk factors for pulmonary embolism. This is concerning for underlying cause. Obviously differential diagnosis for unprovoked pulmonary embolism will include malignancy. That becomes more concerning with her unintentional weight loss. Work-up is needed to rule out occult malignancy. Patient had endoscopies. She continues to have abdominal pain and tenderness. I will do CT scan of the abdomen and pelvis. I will also do tumor markers with CEA and alpha-fetoprotein and CA 19-9 and CA-125. We will check CBC and LDH. We will also check factor V Leiden gene mutation as well as prothrombin 2 homocysteine level and MTHFR. We will make further recommendations based on the results of this test.  Patient's questions were answered to the best of her satisfaction and she verbalized full understanding and agreement. If you have questions, please do not hesitate to call me. I look forward to following Lizabeth Hollingsworth along with you.     Sincerely, [address] [place] [address] [facility]                            This note is created with the assistance of a speech recognition program.  While intending to generate a document that actually reflects the content of the visit, the document can still have some errors including those of syntax and sound a like substitutions which may escape proof reading. It such instances, actual meaning can be extrapolated by contextual diversion.

## 2021-03-26 NOTE — TELEPHONE ENCOUNTER
AVS from 3/26/21    Labs and CT scan soon  RV or virtual visit after test results    Labs drawn today    PT will schedule scan first available     RV scheduled 4/12/21 @ 10am    PT was given orders, scheduling instructions, AVS and an appt schedule    Electronically signed by Rich Orellana on 3/26/2021 at 2:07 PM

## 2021-03-30 LAB
MTHFR INTERPRETATION: NORMAL
MTHFR MUTATION A1286C: NORMAL
MTHFR MUTATION C665T: NORMAL
SPECIMEN: NORMAL

## 2021-03-31 ENCOUNTER — HOSPITAL ENCOUNTER (OUTPATIENT)
Dept: CT IMAGING | Age: 51
Discharge: HOME | End: 2021-04-02
Payer: COMMERCIAL

## 2021-03-31 DIAGNOSIS — I26.99 PULMONARY EMBOLISM ON RIGHT (HCC): ICD-10-CM

## 2021-03-31 DIAGNOSIS — R63.4 WEIGHT LOSS: ICD-10-CM

## 2021-03-31 DIAGNOSIS — R10.84 GENERALIZED ABDOMINAL PAIN: ICD-10-CM

## 2021-03-31 PROCEDURE — 2580000003 HC RX 258: Performed by: INTERNAL MEDICINE

## 2021-03-31 PROCEDURE — 6360000004 HC RX CONTRAST MEDICATION: Performed by: INTERNAL MEDICINE

## 2021-03-31 PROCEDURE — 74177 CT ABD & PELVIS W/CONTRAST: CPT

## 2021-03-31 RX ORDER — SODIUM CHLORIDE 0.9 % (FLUSH) 0.9 %
10 SYRINGE (ML) INJECTION PRN
Status: DISCONTINUED | OUTPATIENT
Start: 2021-03-31 | End: 2021-04-03 | Stop reason: HOSPADM

## 2021-03-31 RX ORDER — 0.9 % SODIUM CHLORIDE 0.9 %
50 INTRAVENOUS SOLUTION INTRAVENOUS ONCE
Status: COMPLETED | OUTPATIENT
Start: 2021-03-31 | End: 2021-03-31

## 2021-03-31 RX ADMIN — SODIUM CHLORIDE 50 ML: 9 INJECTION, SOLUTION INTRAVENOUS at 13:05

## 2021-03-31 RX ADMIN — SODIUM CHLORIDE, PRESERVATIVE FREE 10 ML: 5 INJECTION INTRAVENOUS at 13:05

## 2021-03-31 RX ADMIN — IOHEXOL 50 ML: 240 INJECTION, SOLUTION INTRATHECAL; INTRAVASCULAR; INTRAVENOUS; ORAL at 13:05

## 2021-03-31 RX ADMIN — IOPAMIDOL 75 ML: 755 INJECTION, SOLUTION INTRAVENOUS at 13:05

## 2021-04-01 LAB
FACTOR V MUTATION: NEGATIVE
SPECIMEN: NORMAL

## 2021-04-02 LAB
PROTHROMBIN G20210A MUTATION: NEGATIVE
PT PCR SPECIMEN: NORMAL

## 2021-04-07 ENCOUNTER — HOSPITAL ENCOUNTER (OUTPATIENT)
Dept: PAIN MANAGEMENT | Age: 51
Discharge: HOME | End: 2021-04-07
Payer: COMMERCIAL

## 2021-04-07 DIAGNOSIS — M48.02 DEGENERATIVE CERVICAL SPINAL STENOSIS: ICD-10-CM

## 2021-04-07 DIAGNOSIS — Z51.81 ENCOUNTER FOR MEDICATION MONITORING: Primary | ICD-10-CM

## 2021-04-07 DIAGNOSIS — M54.12 CERVICAL RADICULOPATHY: ICD-10-CM

## 2021-04-07 DIAGNOSIS — M47.812 ARTHROPATHY OF CERVICAL FACET JOINT: ICD-10-CM

## 2021-04-07 PROCEDURE — 99213 OFFICE O/P EST LOW 20 MIN: CPT

## 2021-04-07 PROCEDURE — 99213 OFFICE O/P EST LOW 20 MIN: CPT | Performed by: NURSE PRACTITIONER

## 2021-04-07 RX ORDER — MIRTAZAPINE 30 MG/1
TABLET, FILM COATED ORAL
COMMUNITY
Start: 2021-03-30 | End: 2021-04-07

## 2021-04-07 RX ORDER — OXYCODONE AND ACETAMINOPHEN 10; 325 MG/1; MG/1
1 TABLET ORAL EVERY 6 HOURS PRN
Qty: 120 TABLET | Refills: 0 | Status: SHIPPED | OUTPATIENT
Start: 2021-04-12 | End: 2021-05-03 | Stop reason: SDUPTHER

## 2021-04-07 RX ORDER — OXYCODONE HCL 10 MG/1
10 TABLET, FILM COATED, EXTENDED RELEASE ORAL EVERY 12 HOURS
Qty: 36 EACH | Refills: 0 | Status: SHIPPED | OUTPATIENT
Start: 2021-04-24 | End: 2021-05-03 | Stop reason: SDUPTHER

## 2021-04-07 RX ORDER — CLONAZEPAM 1 MG/1
TABLET ORAL
COMMUNITY
Start: 2021-04-05 | End: 2021-04-07

## 2021-04-07 ASSESSMENT — ENCOUNTER SYMPTOMS
SHORTNESS OF BREATH: 1
GASTROINTESTINAL NEGATIVE: 1

## 2021-04-07 NOTE — PROGRESS NOTES
Component Value Ref Range & Units Status Collected Lab   Pain Management Drug Panel Interp, Urine Inconsistent   Final 02/03/2021  3:51 PM [facility]   (NOTE)   ________________________________________________________________   DRUGS EXPECTED:   NO DRUGS EXPECTED   ________________________________________________________________   INCONSISTENT with medications provided:   Oxycodone   Noroxycodone   7-Aminoclonazepam   ________________________________________________________________   INTERPRETIVE INFORMATION: Targeted drug profile Interp   Interpretation depends on accuracy and completeness of patient   medication information submitted by client. ON:   MRI OF THE CERVICAL SPINE WITHOUT CONTRAST 3/4/2021 2:53 pm       TECHNIQUE:   Multiplanar multisequence MRI of the cervical spine was performed without the   administration of intravenous contrast.       COMPARISON:   06/12/2020       HISTORY:   ORDERING SYSTEM PROVIDED HISTORY: Cervical radiculopathy   TECHNOLOGIST PROVIDED HISTORY:   evaluate for stenosis   Is the patient pregnant?->No   Reason for Exam: pt stated left shoulder arm pain weakness numbness x 1 mth   Acuity: Acute   Type of Exam: Initial   Additional signs and symptoms: pt stated left shoulder arm pain weakness   numbness x 1 mth, NKI       FINDINGS:   BONES/ALIGNMENT: There is normal alignment of the spine. The vertebral body   heights are maintained. The bone marrow signal appears unremarkable.  There   is minimal degenerative disc disease with disc space narrowing and   osteophytes at C3-4, C4-5, C5-6 and C6-7.       SPINAL CORD:  No abnormal signal is identified within the spinal cord.       SOFT TISSUES: No paraspinal mass identified.       C2-C3: There is minimum disc protrusion of 2 mm centrally. The thecal sac and   neural foramina are intact.       C3-C4: There is disc protrusion osteophyte toward the left measuring 3-4 mm.    The thecal sac is not stenotic.  There is mild narrowing of the left neural   foramen.  The right neural foramen is intact.       C4-C5: There is disc protrusion osteophyte measuring 2-3 mm.  The thecal sac   is mildly stenotic measuring 9.5 mm. Disc and/or osteophytes result in mild   narrowing of the neural foramina bilaterally. The left neural foramen is   narrowed greater than right.       C5-C6: There is disc protrusion osteophyte measuring 5-6 mm toward the left   narrowing the left neural foramen.  The thecal sac is mildly stenotic   measuring 9.3 mm.  The right neural foramen is intact.       C6-C7: Disc and/or osteophytes cause minimal narrowing of the neural foramina   bilaterally.  The thecal sac is not stenotic.       C7-T1:  The thecal sac and neural foramina are intact.           Impression   Disc and osteophytes result in narrowing of the neural foramina and mild   stenosis of the thecal sac throughout the cervical region as discussed in   detail above. EXAMINATION:   CTA OF THE CHEST 3/7/2021 11:46 am       TECHNIQUE:   CTA of the chest was performed after the administration of intravenous   contrast.  Multiplanar reformatted images are provided for review.  MIP   images are provided for review. Dose modulation, iterative reconstruction,   and/or weight based adjustment of the mA/kV was utilized to reduce the   radiation dose to as low as reasonably achievable.       COMPARISON:   None.       HISTORY:   ORDERING SYSTEM PROVIDED HISTORY: r/o pe   Reason for Exam: Chest pain and coughing up blood since this morning. Acuity: Acute   Type of Exam: Initial       FINDINGS:   Pulmonary Arteries: Pulmonary arteries are adequately opacified for   evaluation.  Posterior right lower lobe filling defect with extension into   basilar subsegmental branches.  Main pulmonary artery is normal in caliber.       Mediastinum: No evidence of mediastinal lymphadenopathy.  Normal heart size.    Normal thoracic aorta.       Lungs/pleura: Bilateral lower lobe atelectasis.  No focal consolidation or   pulmonary edema.  No evidence of pleural effusion or pneumothorax.       Upper Abdomen: Cholecystectomy       Soft Tissues/Bones: No acute bone or soft tissue abnormality.           Impression   Posterior right lower lobe filling defect with extension into basilar   subsegmental branches consistent pulmonary embolism.  Bilateral lower lobe   atelectasis.       Critical results were called by Dr. Paolo Case MD to [address]   on 3/7/2021 at 13:14.                         Pill count: appropriate    fill date : states 20 percocet tabs left so fill date will be 2021 and 35 oxycontin tabs left so fill date will be 2021    Morphine equivalent dose as reported on [place]:  Periodic Controlled Substance Monitoring: Possible medication side effects, risk of tolerance/dependence & alternative treatments discussed., No signs of potential drug abuse or diversion identified. , Assessed functional status., Obtaining appropriate analgesic effect of treatment. Manuel Holt, APRN - CNP)  Chronic Pain > 80 MEDD: Obtained or confirmed \"Medication Contract\" on file. MARTITA Prince - CNP)  Review of[place]ARRS does not show any aberrant prescription behavior. Medication is helping the patient stay active. Patient denies any side effects and reports adequate analgesia. No sign of misuse/abuse.             Past Medical History:   Diagnosis Date    Anxiety     CAD (coronary artery disease)     Mitral valve prolapse    Fatty liver     GERD (gastroesophageal reflux disease)     Headache     History of bronchitis     Hyperlipidemia     Hypothyroidism     Kidney stone     LFT elevation     MVP (mitral valve prolapse)     Obesity     Pulmonary emboli (HCC)     [facility]     Umbilical hernia        Past Surgical History:   Procedure Laterality Date    APPENDECTOMY       SECTION      2 pfannenstiel, 1 vertical    CHOLECYSTECTOMY  COLONOSCOPY  2009    Dr Polanco Come  4/23/14    COLONOSCOPY  04/23/2014    biopsy & sigmoid spasms, pathology negative    COLONOSCOPY N/A 2/12/2018    COLONOSCOPY WITH BIOPSY performed by Rowan Solares MD at [address]      x 5    HYSTERECTOMY, TOTAL ABDOMINAL      2010    AR EXPLORATORY OF ABDOMEN  10/21/2014    Laparotomy-lysis of adhesions, bso     UPPER GASTROINTESTINAL ENDOSCOPY  2/17/2010    mild chronic inactive gastritis    UPPER GASTROINTESTINAL ENDOSCOPY N/A 3/10/2021    EGD BIOPSY performed [facility] Kayla Stephens MD at [facility]       Allergies   Allergen Reactions    Compazine [Prochlorperazine]      Jittery, restless    Sulfa Antibiotics Hives    Adhesive Tape Rash         Current Outpatient Medications:     rivaroxaban 15 & 20 MG Starter Pack, Xarelto 15 mg BID., Disp: 1 Package, Rfl: 3    levothyroxine (SYNTHROID) 175 MCG tablet, TAKE 1 TABLET BY MOUTH DAILY, Disp: 90 tablet, Rfl: 1    oxyCODONE-acetaminophen (PERCOCET)  MG per tablet, Take 1 tablet by mouth every 6 hours as needed for Pain for up to 30 days. , Disp: 120 tablet, Rfl: 0    esomeprazole (NEXIUM) 40 MG delayed release capsule, TAKE 1 CAPSULE BY MOUTH EVERY MORNING BEFORE BREAKFAST, Disp: 90 capsule, Rfl: 1    mirtazapine (REMERON) 15 MG tablet, Take 15 mg by mouth daily, Disp: , Rfl:     rOPINIRole (REQUIP) 2 MG tablet, TAKE 1 TABLET BY MOUTH EVERY NIGHT, Disp: 90 tablet, Rfl: 3    sertraline (ZOLOFT) 100 MG tablet, Take 100 mg by mouth daily, Disp: , Rfl:     topiramate (TOPAMAX) 25 MG tablet, 25 mg 2 times daily , Disp: , Rfl:     metoprolol tartrate (LOPRESSOR) 50 MG tablet, Take 50 mg by mouth 2 times daily , Disp: , Rfl:     gabapentin (NEURONTIN) 300 MG capsule, Take 2 capsules by mouth 3 times daily for 30 days. , Disp: 180 capsule, Rfl: 0    lidocaine (LIDODERM) 5 %, Place 1 patch onto the skin daily 12 hours on, 12 hours off.  (Patient not taking: Reported on 3/26/2021), Disp: 10 chronic pain medications such as sleep disturbances, respiratory depression, hormonal changes, withdrawal symptoms, chronic opioid dependence and tolerance as well as risk of taking opioids with Benzodiazepines and taking opioids along with alcohol,  werediscussed with patient. I had asked the patient to minimize medication use and utilize pain medications only for uncontrolled rest pain or pain with exertional activities. I advised patient not to self-escalate painmedications without consulting with us. At each of patient's future visits we will try to taper pain medications, while adjusting the adjunct medications, and re-evaluating for Physical Therapy to improve spinal andjoint strength. We will continue to have discussions to decrease pain medications as tolerated. Counseled patient on effects their pain medication and /or their medical condition mayhave on their  ability to drive or operate machinery. Instructed not to drive or operate machinery if drowsy     I also discussed with the patient regarding the dangers of combining narcotic pain medication with tranquilizers, alcohol or illegal drugs or taking the medication any way other than prescribed. The dangers were discussed  including respiratory depression and death. Patient was told to tell  all  physicians regarding the medications he is getting from pain clinic. Patient is warned not to take any unprescribed medications over-the-countermedications that can depress breathing . Patient is advised to talk to the pharmacist or physicians if planning to take any over-the-counter medications before  takeing them. Patient is strongly advised to avoid tranquilizers or  relaxants, illegal drugs  or any medications that can depress breathing  Patient is also advised to tell us if there is any changes in their medications from other physicians.       1. Will do partial  Script of  To get on same schedule next refill      TREATMENT OPTIONS:       Medication Agreement Requirements Met  Continue Opioid therapy  Script written for  Percocet, oxycontin  Follow up appointment made

## 2021-04-09 DIAGNOSIS — I26.99 ACUTE PULMONARY EMBOLISM, UNSPECIFIED PULMONARY EMBOLISM TYPE, UNSPECIFIED WHETHER ACUTE COR PULMONALE PRESENT (HCC): Primary | ICD-10-CM

## 2021-04-09 NOTE — TELEPHONE ENCOUNTER
Pt called & stated she was put on Xarelto while in the hosptial.    Pt has completed starter pack of Xarelto and now needs a new rx sent to her pharmacy for Xarelto 20 mg.    Medication pended for approval for # 90 w/ 3 refills to go to [address] [place] [address].

## 2021-04-12 ENCOUNTER — TELEPHONE (OUTPATIENT)
Dept: ONCOLOGY | Age: 51
End: 2021-04-12

## 2021-04-12 ENCOUNTER — OFFICE VISIT (OUTPATIENT)
Dept: ONCOLOGY | Age: 51
End: 2021-04-12
Payer: COMMERCIAL

## 2021-04-12 VITALS
SYSTOLIC BLOOD PRESSURE: 95 MMHG | HEART RATE: 76 BPM | WEIGHT: 177.4 LBS | TEMPERATURE: 98.2 F | BODY MASS INDEX: 30.93 KG/M2 | DIASTOLIC BLOOD PRESSURE: 64 MMHG

## 2021-04-12 DIAGNOSIS — I26.99 PULMONARY EMBOLISM ON RIGHT (HCC): Primary | ICD-10-CM

## 2021-04-12 DIAGNOSIS — R97.1 ELEVATED CANCER ANTIGEN 125 (CA 125): ICD-10-CM

## 2021-04-12 DIAGNOSIS — R97.0 ELEVATED CEA: ICD-10-CM

## 2021-04-12 PROCEDURE — 99211 OFF/OP EST MAY X REQ PHY/QHP: CPT | Performed by: INTERNAL MEDICINE

## 2021-04-12 PROCEDURE — 99214 OFFICE O/P EST MOD 30 MIN: CPT | Performed by: INTERNAL MEDICINE

## 2021-04-12 NOTE — PROGRESS NOTES
_     Chief Complaint   Patient presents with    Follow-up     review status of disease    Discuss Labs    Follow-Up from Hospital     Was in [facility] with Blood Clot needs refill on blood thinner     DIAGNOSIS:        Severe unintentional weight loss  Un triggered pulmonary embolism  Abdominal pain  Recent GI bleeding  Gastritis  Persistent diarrhea  Elevated tumor markers. CURRENT THERAPY:         Work up in progress. BRIEF CASE HISTORY:      Ms. Gareth Gomez is a very pleasant 53 y.o. female with history of multiple co morbidities as listed. Patient is referred for evaluation of weight loss and possible underlying malignancy. The patient was recently hospitalized because of increasing shortness of breath and respiratory failure. She was found to have pulmonary embolism. She was treated with anticoagulation with Xarelto. Shortly before discharge she had upper GI bleeding. She had endoscopies and she was found to have severe gastritis. After stabilization patient was back on anticoagulation. She has been stable since then. Patient had problems with weight loss. She lost about 100 pounds over the last year. She has generalized weakness and fatigue. She has decreased appetite. She had chronic nausea. Early satiety. She denies any fever or night sweats. She had problem with night sweats in the past.  No enlarged lymph nodes. No unusual headaches or dizziness. No other complaints. Patient quit smoking at the time of hospitalization. Used to smoke 1 pack/day since teenage. Social alcohol. .     INTERIM HISTORY:    seen for follow up after ct scans and labs. continues to have weight loss and weakness. She has persistent diarrhea. No GI bleeding.     PAST MEDICAL HISTORY: has a past medical history of Anxiety, CAD (coronary artery disease), Fatty liver, GERD (gastroesophageal reflux disease), Headache, cough, sputum or hemoptysis. Positive for exertional shortness of breath. No pleuritic chest pain. · Cardiovascular: No chest pain, orthopnea or PND. No lower extremity edema. No palpitation. · Gastrointestinal: No problems with swallowing. No abdominal pain or bloating. No nausea or vomiting. No diarrhea or constipation. No GI bleeding. · Genitourinary: No dysuria, hematuria, frequency or urgency. · Musculoskeletal: No muscle aches or pains. No limitation of movement. No back pain. No gait disturbance, No joint complaints. · Dermatologic: No skin rashes or pruritus. No skin lesions or discolorations. · Psychiatric: No depression, anxiety, or stress or signs of schizophrenia. No change in mood or affect. · Hematologic: No history of bleeding tendency. No bruises or ecchymosis. No history of clotting problems. · Infectious disease: No fever, chills or frequent infections. · Endocrine: No polydipsia or polyuria. No temperature intolerance. · Neurologic: No headaches or dizziness. No weakness or numbness of the extremities. No changes in balance, coordination,  memory, mentation, behavior. · Allergic/Immunologic: No nasal congestion or hives. No repeated infections. PHYSICAL EXAM:  The patient is not in acute distress. Vital signs: Blood pressure 95/64, pulse 76, temperature 98.2 °F (36.8 °C), temperature source Oral, weight 177 lb 6.4 oz (80.5 kg), last menstrual period 11/01/2010, not currently breastfeeding.      General appearance - well appearing, not in pain or distress  Mental status - good mood, alert and oriented  Eyes - pupils equal and reactive, extraocular eye movements intact  Ears - bilateral TM's and external ear canals normal  Nose - normal and patent, no erythema, discharge or polyps  Mouth - mucous membranes moist, pharynx normal without lesions  Neck - supple, no significant adenopathy  Lymphatics - no palpable lymphadenopathy, no hepatosplenomegaly  Chest - clear to auscultation, no wheezes, rales or rhonchi, symmetric air entry  Heart - normal rate, regular rhythm, normal S1, S2, no murmurs, rubs, clicks or gallops  Abdomen - soft, generalized abdominal tenderness, nondistended, no masses or organomegaly  Neurological - alert, oriented, normal speech, no focal findings or movement disorder noted  Musculoskeletal - no joint tenderness, deformity or swelling  Extremities - peripheral pulses normal, no pedal edema, no clubbing or cyanosis  Skin - normal coloration and turgor, no rashes, no suspicious skin lesions noted     Review of Diagnostic data:   Lab Results   Component Value Date    WBC 5.2 03/12/2021    HGB 11.5 (L) 03/12/2021    HCT 34.8 (L) 03/12/2021    MCV 94.1 03/12/2021     03/12/2021       Chemistry        Component Value Date/Time     (H) 03/12/2021 0610    K 3.5 (L) 03/12/2021 0610     (H) 03/12/2021 0610    CO2 24 03/12/2021 0610    BUN 13 03/12/2021 0610    CREATININE 0.67 03/12/2021 0610        Component Value Date/Time    CALCIUM 8.4 (L) 03/12/2021 0610    ALKPHOS 103 03/07/2021 1105    AST 14 03/07/2021 1105    ALT 9 03/07/2021 1105    BILITOT 0.34 03/07/2021 1105            IMPRESSION:   Severe unintentional weight loss  Un triggered pulmonary embolism  Abdominal pain  Recent GI bleeding  Gastritis  Persistent diarrhea  Elevated tumor markers. PLAN:I again explained to the patient the nature of these problems with unintentional weight loss and unexplained pulmonary embolism. There were no major risk factors for pulmonary embolism. This is concerning for underlying cause. Obviously differential diagnosis for unprovoked pulmonary embolism will include malignancy. That becomes more concerning with her unintentional weight loss. Work up for hypercoagulability is negative. Further workup for occult malignancy is negative except for elevated CEA and . She had previous history of hysterectomy and BSO.  She has persistent diarrhea. I will refer back to GI for diagnostic colonoscopy. Patient's questions were answered to the best of her satisfaction and she verbalized full understanding and agreement.

## 2021-04-12 NOTE — TELEPHONE ENCOUNTER
AVS from 4/12/21     Dr Mart Lozano for diagnostic colonoscopy  RV 3-4 months with CEA,  before RV    Referral confirmed with Dr. Luis Julio office    RV scheduled 8/2/21 @ 11:30am    PT will have labs drawn one week prior to return visit    PT was given AVS and an appt schedule    Electronically signed by Ronaldo Monahan on 4/12/2021 at 1:34 PM

## 2021-04-15 ENCOUNTER — OFFICE VISIT (OUTPATIENT)
Dept: GASTROENTEROLOGY | Age: 51
End: 2021-04-15
Payer: COMMERCIAL

## 2021-04-15 VITALS
WEIGHT: 177.2 LBS | DIASTOLIC BLOOD PRESSURE: 76 MMHG | HEART RATE: 77 BPM | BODY MASS INDEX: 30.25 KG/M2 | SYSTOLIC BLOOD PRESSURE: 108 MMHG | HEIGHT: 64 IN

## 2021-04-15 DIAGNOSIS — R63.4 WEIGHT LOSS: ICD-10-CM

## 2021-04-15 DIAGNOSIS — R97.0 ELEVATED CEA: Primary | ICD-10-CM

## 2021-04-15 DIAGNOSIS — Z80.0 FAMILY HISTORY OF COLON CANCER: ICD-10-CM

## 2021-04-15 DIAGNOSIS — K29.60 EROSIVE GASTRITIS: ICD-10-CM

## 2021-04-15 DIAGNOSIS — R10.84 GENERALIZED ABDOMINAL PAIN: ICD-10-CM

## 2021-04-15 DIAGNOSIS — K58.2 IRRITABLE BOWEL SYNDROME WITH BOTH CONSTIPATION AND DIARRHEA: ICD-10-CM

## 2021-04-15 DIAGNOSIS — R19.7 DIARRHEA, UNSPECIFIED TYPE: ICD-10-CM

## 2021-04-15 PROCEDURE — 99214 OFFICE O/P EST MOD 30 MIN: CPT | Performed by: INTERNAL MEDICINE

## 2021-04-15 RX ORDER — POLYETHYLENE GLYCOL 3350 17 G/17G
POWDER, FOR SOLUTION ORAL
Qty: 238 G | Refills: 0 | Status: SHIPPED | OUTPATIENT
Start: 2021-04-15 | End: 2021-06-09 | Stop reason: ALTCHOICE

## 2021-04-15 ASSESSMENT — ENCOUNTER SYMPTOMS
RESPIRATORY NEGATIVE: 1
BLOOD IN STOOL: 0
VOMITING: 1
NAUSEA: 1
ABDOMINAL PAIN: 0
ALLERGIC/IMMUNOLOGIC NEGATIVE: 1
TROUBLE SWALLOWING: 0
DIARRHEA: 1
ABDOMINAL DISTENTION: 1
RECTAL PAIN: 0
CONSTIPATION: 0
ANAL BLEEDING: 0

## 2021-04-15 NOTE — PROGRESS NOTES
GI OFFICE FOLLOW UP    Kimberlyn Shaw is a 53 y.o. female evaluated via on 4/15/2021. Consent:  She and/or health care decision maker is aware that that she may receive a bill for this telephone service, depending on her insurance coverage, and has provided verbal consent to proceed: YES      INTERVAL HISTORY:   Jacoby Patten MD  [address]    Chief Complaint   Patient presents with    Follow-up     Patient is a hospital f/u. Patient had elevated CEA levels. Patient notes her oncologist did some tests and wants patients to have a colonoscopy. Patient notes still having diarrhea. Notes nausea and vomiting. 1. Elevated CEA    2. Generalized abdominal pain    3. Irritable bowel syndrome with both constipation and diarrhea    4. Family history of colon cancer    5. Erosive gastritis    6. Diarrhea, unspecified type    7.  Weight loss      This patient evaluated in my office after recent hospitalization  She was admitted with pulmonary embolism was started on blood thinner  Subsequently she had hematemesis so gastroenterology was consulted  I performed upper endoscopy in her she had some gastritis and erosive esophagitis  Patient was also found to have elevated CEA level Ca1 25 level  Patient is to be evaluated by OB/GYN  She has not had a colonoscopy done a long while  She has history for taking narcotic pain medications  Patient has been complaining of some abdominal pains, off and on cramping  Also complains of abdominal bloating and gas  Has off and on nausea without any sig vomiting  Has some alternating constipation and diarrhea  Has no weight loss  Has some anxiety issues  She is taking proton pump inhibitor therapy relatively feeling better with the acid reflux symptoms  Denies any current smoking alcohol abuse marijuana use illicit drug usage      HISTORY OF PRESENT ILLNESS: Lyndon Nassar is a 53 y.o. female with a past history remarkable for , referred for evaluation of   Chief Complaint   Patient presents with    Follow-up     Patient is a hospital f/u. Patient had elevated CEA levels. Patient notes her oncologist did some tests and wants patients to have a colonoscopy. Patient notes still having diarrhea. Notes nausea and vomiting. .    Past Medical,Family, and Social History reviewed and does contribute to the patient presenting condition. Patient's PMH/PSH,SH,PSYCH Hx, MEDs, ALLERGIES, and ROS were all reviewed and updated in the appropriate sections.     PAST MEDICAL HISTORY:  Past Medical History:   Diagnosis Date    Anxiety     CAD (coronary artery disease)     Mitral valve prolapse    Fatty liver     GERD (gastroesophageal reflux disease)     Headache     History of bronchitis     Hyperlipidemia     Hypothyroidism     Kidney stone     LFT elevation     MVP (mitral valve prolapse)     Obesity     Pulmonary emboli (HCC)     Type 2 diabetes mellitus without complication ([facility] [address]     Umbilical hernia        Past Surgical History:   Procedure Laterality Date    APPENDECTOMY       SECTION      2 pfannenstiel, 1 vertical    CHOLECYSTECTOMY      COLONOSCOPY      Dr Chanelle Cummings COLONOSCOPY  14    COLONOSCOPY  2014    biopsy & sigmoid spasms, pathology negative    COLONOSCOPY N/A 2018    COLONOSCOPY WITH BIOPSY performed by Rody Chew MD at [facility]      x 5    HYSTERECTOMY, TOTAL ABDOMINAL          HI EXPLORATORY OF ABDOMEN  10/21/2014    Laparotomy-lysis of adhesions, bso     UPPER GASTROINTESTINAL ENDOSCOPY  2010    mild chronic inactive gastritis    UPPER GASTROINTESTINAL ENDOSCOPY N/A 3/10/2021    EGD BIOPSY performed by Shannan Fournier MD at [address]:    Current Outpatient Medications:     rivaroxaban (XARELTO) 20 MG TABS tablet, Take 1 tablet by mouth daily (with breakfast), Disp: 90 tablet, Rfl: 3    oxyCODONE-acetaminophen (PERCOCET)  MG per tablet, Take 1 tablet by mouth every 6 hours as needed for Pain for up to 30 days. , Disp: 120 tablet, Rfl: 0    [START ON 4/24/2021] oxyCODONE (OXYCONTIN) 10 MG extended release tablet, Take 1 tablet by mouth every 12 hours for 30 days. , Disp: 36 each, Rfl: 0    rivaroxaban 15 & 20 MG Starter Pack, Xarelto 15 mg BID., Disp: 1 Package, Rfl: 3    levothyroxine (SYNTHROID) 175 MCG tablet, TAKE 1 TABLET BY MOUTH DAILY, Disp: 90 tablet, Rfl: 1    lidocaine (LIDODERM) 5 %, Place 1 patch onto the skin daily 12 hours on, 12 hours off., Disp: 10 patch, Rfl: 0    albuterol sulfate  (90 Base) MCG/ACT inhaler, INHALE 2 PUFFS INTO THE LUNGS EVERY 4 HOURS AS NEEDED FOR SHORTNESS OF BREATH, Disp: , Rfl:     clonazePAM (KLONOPIN) 0.5 MG tablet, Take 1 tablet by mouth 2 times daily as needed for Anxiety for up to 30 days. , Disp: 60 tablet, Rfl: 1    esomeprazole (NEXIUM) 40 MG delayed release capsule, TAKE 1 CAPSULE BY MOUTH EVERY MORNING BEFORE BREAKFAST, Disp: 90 capsule, Rfl: 1    tiZANidine (ZANAFLEX) 4 MG tablet, TAKE 1 TABLET BY MOUTH EVERY 8 HOURS AS NEEDED FOR SPASMS, Disp: 90 tablet, Rfl: 1    mirtazapine (REMERON) 15 MG tablet, Take 15 mg by mouth daily, Disp: , Rfl:     rOPINIRole (REQUIP) 2 MG tablet, TAKE 1 TABLET BY MOUTH EVERY NIGHT, Disp: 90 tablet, Rfl: 3    ondansetron (ZOFRAN ODT) 4 MG disintegrating tablet, Take 1 tablet by mouth every 8 hours as needed for Nausea or Vomiting, Disp: 10 tablet, Rfl: 0    sertraline (ZOLOFT) 100 MG tablet, Take 100 mg by mouth daily, Disp: , Rfl:     topiramate (TOPAMAX) 25 MG tablet, 25 mg 2 times daily , Disp: , Rfl:     metoprolol tartrate (LOPRESSOR) 50 MG tablet, Take 50 mg by mouth 2 times daily , Disp: , Rfl:     ALLERGIES:   Allergies   Allergen Reactions    Compazine [Prochlorperazine]      Jittery, restless    Sulfa Antibiotics Hives    Adhesive Tape Rash       FAMILY HISTORY:       Problem Relation Age of Onset   Spencer Bridges Cancer Mother         vaginal    Heart Disease Father     Diabetes Father     Other Father         colon resection for colon polyps    Breast Cancer Maternal Grandmother     Stroke Maternal Grandfather          SOCIAL HISTORY:   Social History     Socioeconomic History    Marital status:      Spouse name: Not on file    Number of children: Not on file    Years of education: Not on file    Highest education level: Not on file   Occupational History    Occupation: Homemaker   Social Needs    Financial resource strain: Not on file    Food insecurity     Worry: Not on file     Inability: Not on file    Transportation needs     Medical: Not on file     Non-medical: Not on file   Tobacco Use    Smoking status: Former Smoker     Packs/day: 0.25     Years: 33.00     Pack years: 8.25     Types: Cigarettes    Smokeless tobacco: Never Used    Tobacco comment: quit on nicoderm patch   Substance and Sexual Activity    Alcohol use: No     Alcohol/week: 0.0 standard drinks    Drug use: No    Sexual activity: Not Currently   Lifestyle    Physical activity     Days per week: Not on file     Minutes per session: Not on file    Stress: Not on file   Relationships    Social connections     Talks on phone: Not on file     Gets together: Not on file     Attends Jainism service: Not on file     Active member of club or organization: Not on file     Attends meetings of clubs or organizations: Not on file     Relationship status: Not on file    Intimate partner violence     Fear of current or ex partner: Not on file     Emotionally abused: Not on file     Physically abused: Not on file     Forced sexual activity: Not on file   Other Topics Concern    Not on file   Social History Narrative    Not on file         REVIEW OF SYSTEMS:         Review of Systems   Constitutional: Positive for appetite change (loss) and fatigue. HENT: Negative for trouble swallowing. Eyes: Positive for visual disturbance. Respiratory: Negative. Cardiovascular: Negative. Gastrointestinal: Positive for abdominal distention, diarrhea, nausea and vomiting. Negative for abdominal pain, anal bleeding, blood in stool, constipation and rectal pain. Endocrine: Negative. Genitourinary: Negative. Musculoskeletal: Negative. Skin: Negative. Allergic/Immunologic: Negative. Neurological: Negative. Psychiatric/Behavioral: Positive for sleep disturbance. PHYSICAL EXAMINATION: Vital signs reviewed per the nursing documentation. /76   Pulse 77   Ht 5' 4\" (1.626 m)   Wt 177 lb 3.2 oz (80.4 kg)   LMP 11/01/2010   BMI 30.42 kg/m²   Body mass index is 30.42 kg/m². Physical Exam  Nursing note reviewed. Constitutional:       Appearance: She is well-developed. Comments: Anxious    HENT:      Head: Normocephalic and atraumatic. Eyes:      Conjunctiva/sclera: Conjunctivae normal.      Pupils: Pupils are equal, round, and reactive to light. Neck:      Musculoskeletal: Normal range of motion and neck supple. Cardiovascular:      Heart sounds: Normal heart sounds. Pulmonary:      Effort: Pulmonary effort is normal.      Breath sounds: Normal breath sounds. Abdominal:      General: Bowel sounds are normal.      Palpations: Abdomen is soft. Comments: NON TENDER, NON DISTENTED  LIVER SPLEEN AND HERNIAS ARE NOT  PALPABLE  BOWEL SOUNDS ARE POSITIVE      Musculoskeletal: Normal range of motion. Skin:     General: Skin is warm. Neurological:      Mental Status: She is alert and oriented to person, place, and time.    Psychiatric:         Behavior: Behavior normal.           LABORATORY DATA: Reviewed  Lab Results   Component Value Date    WBC 5.2 03/12/2021    HGB 11.5 (L) 03/12/2021    HCT 34.8 (L) 03/12/2021    MCV 94.1 03/12/2021     03/12/2021     (H) 03/12/2021    K 3.5 (L) 03/12/2021     (H) 03/12/2021    CO2 24 03/12/2021    BUN 13 03/12/2021    CREATININE 0.67 03/12/2021    LABPROT 7.6 03/07/2013    LABALBU 3.9 03/07/2021    BILITOT 0.34 03/07/2021    ALKPHOS 103 03/07/2021    AST 14 03/07/2021    ALT 9 03/07/2021    INR 0.9 03/11/2021         Lab Results   Component Value Date    RBC 3.70 (L) 03/12/2021    HGB 11.5 (L) 03/12/2021    MCV 94.1 03/12/2021    MCH 31.1 03/12/2021    MCHC 33.1 03/12/2021    RDW 12.2 03/12/2021    MPV 8.0 03/12/2021    BASOPCT 1 03/12/2021    LYMPHSABS 1.70 03/12/2021    MONOSABS 0.40 03/12/2021    NEUTROABS 2.70 03/12/2021    EOSABS 0.30 03/12/2021    BASOSABS 0.00 03/12/2021         DIAGNOSTIC TESTING:     Ct Abdomen Pelvis W Iv Contrast Additional Contrast? Oral    Result Date: 3/31/2021  EXAMINATION: CT OF THE ABDOMEN AND PELVIS WITH CONTRAST 3/31/2021 12:52 pm TECHNIQUE: CT of the abdomen and pelvis was performed with the administration of intravenous contrast. Multiplanar reformatted images are provided for review. Dose modulation, iterative reconstruction, and/or weight based adjustment of the mA/kV was utilized to reduce the radiation dose to as low as reasonably achievable. COMPARISON: CT abdomen and pelvis October 22, 2020. HISTORY: ORDERING SYSTEM PROVIDED HISTORY: Pulmonary embolism on right [facility]) TECHNOLOGIST PROVIDED HISTORY: Reason for Exam: pt c/o upper abd pain and unplanned weight loss, h/o recent PE FINDINGS: There are chronic changes in the lung bases with some scar apparent, this is somewhat more pronounced in the right base. No evidence of threshold enlarged adenopathy. Osseous structures are unremarkable in appearance with minimal spondylitic changes in the vertebral endplates. Some calcifications present in the aorta and vessels to the pelvis. No evidence of aneurysmal dilatation.  There is a small defect in the anterior abdominal wall with bowel closely adjacent suggesting previous surgery in the mid abdomen anterior and unchanged when compared to the previous study. The stomach is unremarkable in appearance. Bowel: Normal in distribution and appearance. Diverticuli are present in the region of the rectosigmoid colon. No evidence of diverticulitis. Prominent lipomatous changes in the region of the ileocecal valve. Liver: No focal defect. Attenuation density is compatible with mild hepatic steatosis. Surgical clips are present in the region the gallbladder fossa. Spleen: Unremarkable in appearance. Pancreas: Normal in appearance. Adrenals: Normal in appearance. Right kidney and left kidney: Normal in appearance. No evidence of renal or ureteral calculus. Pelvic structures: The uterus is surgically absent. A few phleboliths are apparent in the pelvic sidewalls. The urinary bladder is nondistended but appears unremarkable. No evidence of acute intra-abdominal abnormality. Diverticulosis with no evidence of diverticulitis. Hepatic steatosis. Some chronic changes in the lung bases compatible with scar with possible superimposed small atelectatic changes. Assessment  1. Elevated CEA    2. Generalized abdominal pain    3. Irritable bowel syndrome with both constipation and diarrhea    4. Family history of colon cancer    5. Erosive gastritis    6. Diarrhea, unspecified type    7. Weight loss        Plan    Plan Colon     The Endoscopic procedure was explained to the patient in detail  The prep and NPO were explained  All the Risks, Benefits, and Alternatives were explained  Risk of Bleeding, Perforation and Cardio Respiratory risks were explained  her questions were answered  The procedure has been scheduled with the  in the office  Patient was asked to give us a call for any questions  The patient has verbalized understanding and agreement to this plan. Pt seems to have signs and symptoms consistent with GERD, acid indigestion and heartburns. She was discussed  in detail about some possible life style and dietary modifications. She was stressed about the maintenance  of appropriate weight and effect of obesity contributing to reflux symptoms. Routine exercise was streesed. Avoidance of Caffeine, nicotine and chocolate were explained. Pt was asked to avoid spices grease and fried food. Advices were also given about avoidance of any kind of fast foods, soda pops and high energy drinks. Pt was advised to place two small block under the head end of the bed which may help with night time reflux. Was advised not to eat any thin at least 2-3 hrs before going to bed and walk especially after dinner    Pt has verbalized understanding and agreement to this plan. Pt was discussed in detail about the possible side effects of proton pump inhibiter therapy. She was explained about the possibility of calcium and magnesium malabsorption and was advised to start taking calcium supplements with Vit D. Some over the counter regimens were explained to patient. Some dietary advices were also given. She has verbalized understanding and agreement to this. More than half of patient's clinic visit time was spent in counseling about lifestyle and dietary modifications  Patient's  questions were answered in this regard as well  The patient has verbalized understanding and agreement       I communicated with the patient and/or health care decision maker about   Details of this discussion including any medical advice provided:YES      I affirm this is a Patient Initiated Episode with an Established Patient who has not had a related appointment within my department in the past 7 days or scheduled within the next 24 hours. Total Time: minutes: 21-30 minutes    Note: not billable if this call serves to triage the patient into an appointment for the relevant concern      Thank you for allowing me to participate in the care of Ms. Soler. For any further questions please do not hesitate to contact me.     I have reviewed and agree with the ROS entered by the MA/LPN.          Shandra Laura MD, [facility]  Board Certified in Gastroenterology and [address] [place] [address] Gastroenterology  Office #: [phone]

## 2021-04-16 RX ORDER — TIZANIDINE 4 MG/1
TABLET ORAL
Qty: 90 TABLET | Refills: 0 | Status: SHIPPED | OUTPATIENT
Start: 2021-04-16 | End: 2021-05-03 | Stop reason: SDUPTHER

## 2021-04-20 ENCOUNTER — TELEMEDICINE (OUTPATIENT)
Dept: INTERNAL MEDICINE CLINIC | Age: 51
End: 2021-04-20
Payer: COMMERCIAL

## 2021-04-20 ENCOUNTER — HOSPITAL ENCOUNTER (OUTPATIENT)
Age: 51
Setting detail: SPECIMEN
Discharge: HOME | End: 2021-04-20
Payer: COMMERCIAL

## 2021-04-20 ENCOUNTER — NURSE ONLY (OUTPATIENT)
Dept: FAMILY MEDICINE CLINIC | Age: 51
End: 2021-04-20

## 2021-04-20 DIAGNOSIS — I26.99 PULMONARY EMBOLISM, UNSPECIFIED CHRONICITY, UNSPECIFIED PULMONARY EMBOLISM TYPE, UNSPECIFIED WHETHER ACUTE COR PULMONALE PRESENT (HCC): ICD-10-CM

## 2021-04-20 DIAGNOSIS — J96.01 ACUTE RESPIRATORY FAILURE WITH HYPOXIA (HCC): ICD-10-CM

## 2021-04-20 DIAGNOSIS — R05.9 COUGH: ICD-10-CM

## 2021-04-20 DIAGNOSIS — R50.9 FEVER, UNSPECIFIED FEVER CAUSE: ICD-10-CM

## 2021-04-20 DIAGNOSIS — J22 LOWER RESPIRATORY INFECTION: Primary | ICD-10-CM

## 2021-04-20 DIAGNOSIS — Z00.00 HEALTHCARE MAINTENANCE: ICD-10-CM

## 2021-04-20 DIAGNOSIS — Z20.822 ENCOUNTER FOR LABORATORY TESTING FOR COVID-19 VIRUS: Primary | ICD-10-CM

## 2021-04-20 PROCEDURE — 99214 OFFICE O/P EST MOD 30 MIN: CPT | Performed by: INTERNAL MEDICINE

## 2021-04-20 RX ORDER — GUAIFENESIN 600 MG/1
1200 TABLET, EXTENDED RELEASE ORAL 2 TIMES DAILY
Qty: 40 TABLET | Refills: 0 | Status: SHIPPED | OUTPATIENT
Start: 2021-04-20 | End: 2021-04-30

## 2021-04-20 RX ORDER — AMOXICILLIN AND CLAVULANATE POTASSIUM 875; 125 MG/1; MG/1
1 TABLET, FILM COATED ORAL 2 TIMES DAILY
Qty: 14 TABLET | Refills: 0 | Status: SHIPPED | OUTPATIENT
Start: 2021-04-20 | End: 2021-04-27

## 2021-04-20 ASSESSMENT — PATIENT HEALTH QUESTIONNAIRE - PHQ9
2. FEELING DOWN, DEPRESSED OR HOPELESS: 0
SUM OF ALL RESPONSES TO PHQ QUESTIONS 1-9: 0
2. FEELING DOWN, DEPRESSED OR HOPELESS: 0
SUM OF ALL RESPONSES TO PHQ9 QUESTIONS 1 & 2: 0
1. LITTLE INTEREST OR PLEASURE IN DOING THINGS: 0
SUM OF ALL RESPONSES TO PHQ9 QUESTIONS 1 & 2: 0

## 2021-04-20 NOTE — PROGRESS NOTES
Visit Information    Have you changed or started any medications since your last visit including any over-the-counter medicines, vitamins, or herbal medicines? no   Are you having any side effects from any of your medications? -  no  Have you stopped taking any of your medications? Is so, why? -  no    Have you seen any other physician or provider since your last visit? Yes - Records Obtained  Have you had any other diagnostic tests since your last visit? No  Have you been seen in the emergency room and/or had an admission to a hospital since we last saw you? No  Have you had your routine dental cleaning in the past 6 months? yes     Have you activated your [employer] account? If not, what are your barriers?  Yes     Patient Care Team:  MARTITA Yuen CNP as PCP - General (Internal Medicine)  MARTITA Yuen CNP as PCP - [facility]  Shamir Morris MD as Consulting Physician (Gastroenterology)  Mark Sheridan MD as Consulting Physician (Obstetrics & Gynecology)  Herve Cross MD as Consulting Physician (Gastroenterology)    Medical History Review  Past Medical, Family, and Social History reviewed and does contribute to the patient presenting condition    Health Maintenance   Topic Date Due    Pneumococcal 0-64 years Vaccine (1 of 1 - PPSV23) Never done    HIV screen  Never done    COVID-19 Vaccine (1) Never done    Hepatitis B vaccine (1 of 3 - Risk 3-dose series) Never done    DTaP/Tdap/Td vaccine (1 - Tdap) Never done    Diabetic retinal exam  08/15/2018    Diabetic foot exam  08/11/2021    Diabetic microalbuminuria test  08/11/2021    Lipid screen  08/11/2021    Flu vaccine (Season Ended) 09/01/2021    TSH testing  03/07/2022    A1C test (Diabetic or Prediabetic)  03/08/2022    Hepatitis C screen  Completed    Hepatitis A vaccine  Aged Out    Hib vaccine  Aged Out    Meningococcal (ACWY) vaccine  Aged Out     SUBJECTIVE:  Davina Concepcion is a 53 y.o. female patient who  comes for complaints of   Chief Complaint   Patient presents with    Pre-op Exam     Pt is scheduled for may 27th colon cancer and biopsy. Pt is scheduled for [facility] by Dr. Mildred Garrido. Pt also has pre-op test orders. Pt also c/o cough,nasal congestion,sore throat,diarrhea,abdominal pain,no since of taste,chills and no other symptoms. Onet 5 days and tried OTC nyquil and robatussin with no relief. PE    Cough and cold  Duration- 5days  4/15 start of symptoms  Severity- moderate  Sore throat, fever cough initially dry, but now dark brown phlegm, bodyache,   Hurst to breathe, headache  Has nausea, no vomiting as she is taking zofran  Loss of taste  Unable to say re loss of smell as nose congested. Context- recent PE, w/up in progress, due for colonoscopy/biopsy  Associated signs and symptoms-  Modifying factors- robitussin no help    Has not taken any covid vaccine        REVIEW OF SYSTEMS (except Subjective (HPI))  GENERAL:Pos for  fevers / chills  RESPIRATORY: positive for cough, wheezing or shortness of breath  CARDIOVASCULAR: Negative for chest pain or palpitations.   GI: POs for  nausea, denies vomiting, or diarrhea  NEURO: Pos for  Headaches and bodyaches    Past Medical History:   Diagnosis Date    Anxiety     CAD (coronary artery disease)     Mitral valve prolapse    Fatty liver     GERD (gastroesophageal reflux disease)     Headache     History of bronchitis     Hyperlipidemia     Hypothyroidism     Kidney stone     LFT elevation     MVP (mitral valve prolapse)     Obesity     Pulmonary emboli (HCC)     Type [facility] 7/63/5449    Umbilical hernia        SOCIAL HISTORY:  Social History     Socioeconomic History    Marital status:      Spouse name: Not on file    Number of children: Not on file    Years of education: Not on file    Highest education level: Not on file   Occupational History    Occupation: [employer] Financial resource strain: Not on file    Food insecurity     Worry: Not on file     Inability: Not on file    Transportation needs     Medical: Not on file     Non-medical: Not on file   Tobacco Use    Smoking status: Former Smoker     Packs/day: 0.25     Years: 33.00     Pack years: 8.25     Types: Cigarettes    Smokeless tobacco: Never Used    Tobacco comment: quit on nicoderm patch   Substance and Sexual Activity    Alcohol use: No     Alcohol/week: 0.0 standard drinks    Drug use: No    Sexual activity: Not Currently   Lifestyle    Physical activity     Days per week: Not on file     Minutes per session: Not on file    Stress: Not on file   Relationships    Social connections     Talks on phone: Not on file     Gets together: Not on file     Attends Tenriism service: Not on file     Active member of club or organization: Not on file     Attends meetings of clubs or organizations: Not on file     Relationship status: Not on file    Intimate partner violence     Fear of current or ex partner: Not on file     Emotionally abused: Not on file     Physically abused: Not on file     Forced sexual activity: Not on file   Other Topics Concern    Not on file   Social History Narrative    Not on file           CURRENT MEDICATIONS:  Current Outpatient Medications   Medication Sig Dispense Refill    tiZANidine (ZANAFLEX) 4 MG tablet TAKE 1 TABLET BY MOUTH EVERY 8 HOURS AS NEEDED FOR SPASMS 90 tablet 0    magnesium citrate solution Take 296 mLs by mouth once for 1 dose 296 mL 0    bisacodyl (DULCOLAX) 5 MG EC tablet TAKE 4 TABS AT 10 AM THE DAY PRIOR TO COLONOSCOPY 4 tablet 0    polyethylene glycol (GLYCOLAX) 17 GM/SCOOP powder Use as directed by following your patient instructions given by office.  238 g 0    rivaroxaban (XARELTO) 20 MG TABS tablet Take 1 tablet by mouth daily (with breakfast) 90 tablet 3    oxyCODONE-acetaminophen (PERCOCET)  MG per tablet Take 1 tablet by mouth every 6 hours as needed for Pain for up to 30 days. 120 tablet 0    [START ON 4/24/2021] oxyCODONE (OXYCONTIN) 10 MG extended release tablet Take 1 tablet by mouth every 12 hours for 30 days. 36 each 0    rivaroxaban 15 & 20 MG Starter Pack Xarelto 15 mg BID. 1 Package 3    levothyroxine (SYNTHROID) 175 MCG tablet TAKE 1 TABLET BY MOUTH DAILY 90 tablet 1    lidocaine (LIDODERM) 5 % Place 1 patch onto the skin daily 12 hours on, 12 hours off. 10 patch 0    albuterol sulfate  (90 Base) MCG/ACT inhaler INHALE 2 PUFFS INTO THE LUNGS EVERY 4 HOURS AS NEEDED FOR SHORTNESS OF BREATH      clonazePAM (KLONOPIN) 0.5 MG tablet Take 1 tablet by mouth 2 times daily as needed for Anxiety for up to 30 days. 60 tablet 1    esomeprazole (NEXIUM) 40 MG delayed release capsule TAKE 1 CAPSULE BY MOUTH EVERY MORNING BEFORE BREAKFAST 90 capsule 1    mirtazapine (REMERON) 15 MG tablet Take 15 mg by mouth daily      rOPINIRole (REQUIP) 2 MG tablet TAKE 1 TABLET BY MOUTH EVERY NIGHT 90 tablet 3    ondansetron (ZOFRAN ODT) 4 MG disintegrating tablet Take 1 tablet by mouth every 8 hours as needed for Nausea or Vomiting 10 tablet 0    sertraline (ZOLOFT) 100 MG tablet Take 100 mg by mouth daily      topiramate (TOPAMAX) 25 MG tablet 25 mg 2 times daily       metoprolol tartrate (LOPRESSOR) 50 MG tablet Take 50 mg by mouth 2 times daily        No current facility-administered medications for this visit. OBJECTIVE:  There were no vitals filed for this visit. There is no height or weight on file to calculate BMI. Physical examination not done since this was a video visit. Patient appears well  Normal color,  not short of breath during conversation. Not in any pain or distress   No significant exanthematous lesions or discoloration noted on facial skin   Mentation and cognition normal  No facial asymmetry  Alert, oriented to time, place and person.       ASSESSMENT AND PLAN (MEDICAL DECISION MAKING):   Mehrdad Renteria was seen today for pre-op exam.    Diagnoses and all orders for this visit:    Lower respiratory infection  Comments:  suspectd pneumonia, bacterial or viral  high risk due to recent PE  Orders:  -     COVID-19; Future  -     amoxicillin-clavulanate (AUGMENTIN) 875-125 MG per tablet; Take 1 tablet by mouth 2 times daily for 7 days    Pulmonary embolism, unspecified chronicity, unspecified pulmonary embolism type, unspecified whether acute cor pulmonale present ([facility]    Acute respiratory failure with hypoxia ([facility]    Healthcare maintenance  Comments:  eye exam,datp and pneumonia vaccines. Fever, unspecified fever cause  Comments:  99.9  Orders:  -     COVID-19; Future  -     amoxicillin-clavulanate (AUGMENTIN) 875-125 MG per tablet; Take 1 tablet by mouth 2 times daily for 7 days    Cough  -     COVID-19; Future  -     amoxicillin-clavulanate (AUGMENTIN) 875-125 MG per tablet; Take 1 tablet by mouth 2 times daily for 7 days    Other orders  -     guaiFENesin (MUCINEX) 600 MG extended release tablet; Take 2 tablets by mouth 2 times daily for 10 days           Follow up in:  1week     Brunilda Ortiz is a 53 y.o. female being evaluated by a Virtual Visit (video visit) encounter to address concerns as mentioned above. A caregiver was present when appropriate. Due to this being a TeleHealth encounter (During [facility]- public health emergency), evaluation of the following organ systems was limited: Vitals/Constitutional/EENT/Resp/CV/GI//MS/Neuro/Skin/Heme-Lymph-Imm. Pursuant to the emergency declaration under the [address] [place] [address], [address] [place] [address] authority and the [employer] Act, this Virtual Visit was conducted with patient's (and/or legal guardian's) consent, to reduce the patient's risk of exposure to COVID-19 and provide necessary medical care.   The patient (and/or legal guardian) has also been advised to contact this office for worsening conditions or

## 2021-04-21 DIAGNOSIS — J22 LOWER RESPIRATORY INFECTION: ICD-10-CM

## 2021-04-21 DIAGNOSIS — R05.9 COUGH: ICD-10-CM

## 2021-04-21 DIAGNOSIS — R50.9 FEVER, UNSPECIFIED FEVER CAUSE: ICD-10-CM

## 2021-04-21 LAB
SARS-COV-2: NORMAL
SARS-COV-2: NOT DETECTED
SOURCE: NORMAL

## 2021-04-22 ENCOUNTER — TELEPHONE (OUTPATIENT)
Dept: PRIMARY CARE CLINIC | Age: 51
End: 2021-04-22

## 2021-04-22 ENCOUNTER — TELEPHONE (OUTPATIENT)
Dept: INTERNAL MEDICINE CLINIC | Age: 51
End: 2021-04-22

## 2021-04-23 ENCOUNTER — APPOINTMENT (OUTPATIENT)
Dept: CT IMAGING | Age: 51
End: 2021-04-23
Payer: COMMERCIAL

## 2021-04-23 ENCOUNTER — HOSPITAL ENCOUNTER (EMERGENCY)
Age: 51
Discharge: HOME | End: 2021-04-23
Attending: EMERGENCY MEDICINE
Payer: COMMERCIAL

## 2021-04-23 VITALS
DIASTOLIC BLOOD PRESSURE: 72 MMHG | HEART RATE: 73 BPM | RESPIRATION RATE: 16 BRPM | BODY MASS INDEX: 28.85 KG/M2 | OXYGEN SATURATION: 95 % | HEIGHT: 64 IN | TEMPERATURE: 98 F | SYSTOLIC BLOOD PRESSURE: 118 MMHG | WEIGHT: 169 LBS

## 2021-04-23 DIAGNOSIS — R05.9 COUGH: ICD-10-CM

## 2021-04-23 DIAGNOSIS — J40 BRONCHITIS: Primary | ICD-10-CM

## 2021-04-23 LAB
ABSOLUTE EOS #: 0.2 K/UL (ref 0–0.4)
ABSOLUTE IMMATURE GRANULOCYTE: NORMAL K/UL (ref 0–0.3)
ABSOLUTE LYMPH #: 2.8 K/UL (ref 1–4.8)
ABSOLUTE MONO #: 0.5 K/UL (ref 0.1–1.3)
ALBUMIN SERPL-MCNC: 4.1 G/DL (ref 3.5–5.2)
ALBUMIN/GLOBULIN RATIO: ABNORMAL (ref 1–2.5)
ALP BLD-CCNC: 106 U/L (ref 35–104)
ALT SERPL-CCNC: <5 U/L (ref 5–33)
ANION GAP SERPL CALCULATED.3IONS-SCNC: 9 MMOL/L (ref 9–17)
AST SERPL-CCNC: 13 U/L
BASOPHILS # BLD: 1 % (ref 0–2)
BASOPHILS ABSOLUTE: 0.1 K/UL (ref 0–0.2)
BILIRUB SERPL-MCNC: 0.22 MG/DL (ref 0.3–1.2)
BNP INTERPRETATION: NORMAL
BUN BLDV-MCNC: 18 MG/DL (ref 6–20)
BUN/CREAT BLD: ABNORMAL (ref 9–20)
CALCIUM SERPL-MCNC: 9.4 MG/DL (ref 8.6–10.4)
CHLORIDE BLD-SCNC: 110 MMOL/L (ref 98–107)
CO2: 20 MMOL/L (ref 20–31)
CREAT SERPL-MCNC: 0.68 MG/DL (ref 0.5–0.9)
DIFFERENTIAL TYPE: NORMAL
EKG ATRIAL RATE: 73 BPM
EKG P AXIS: 36 DEGREES
EKG P-R INTERVAL: 146 MS
EKG Q-T INTERVAL: 408 MS
EKG QRS DURATION: 74 MS
EKG QTC CALCULATION (BAZETT): 449 MS
EKG R AXIS: 15 DEGREES
EKG T AXIS: 57 DEGREES
EKG VENTRICULAR RATE: 73 BPM
EOSINOPHILS RELATIVE PERCENT: 2 % (ref 0–4)
GFR AFRICAN AMERICAN: >60 ML/MIN
GFR NON-AFRICAN AMERICAN: >60 ML/MIN
GFR SERPL CREATININE-BSD FRML MDRD: ABNORMAL ML/MIN/{1.73_M2}
GFR SERPL CREATININE-BSD FRML MDRD: ABNORMAL ML/MIN/{1.73_M2}
GLUCOSE BLD-MCNC: 112 MG/DL (ref 70–99)
HCT VFR BLD CALC: 42.1 % (ref 36–46)
HEMOGLOBIN: 14.2 G/DL (ref 12–16)
IMMATURE GRANULOCYTES: NORMAL %
INR BLD: 1.6
LYMPHOCYTES # BLD: 32 % (ref 24–44)
MCH RBC QN AUTO: 30.9 PG (ref 26–34)
MCHC RBC AUTO-ENTMCNC: 33.8 G/DL (ref 31–37)
MCV RBC AUTO: 91.5 FL (ref 80–100)
MONOCYTES # BLD: 6 % (ref 1–7)
NRBC AUTOMATED: NORMAL PER 100 WBC
PARTIAL THROMBOPLASTIN TIME: 36.3 SEC (ref 24–36)
PDW BLD-RTO: 12.7 % (ref 11.5–14.9)
PLATELET # BLD: 195 K/UL (ref 150–450)
PLATELET ESTIMATE: NORMAL
PMV BLD AUTO: 8.5 FL (ref 6–12)
POTASSIUM SERPL-SCNC: 4.6 MMOL/L (ref 3.7–5.3)
PRO-BNP: 120 PG/ML
PROTHROMBIN TIME: 18.6 SEC (ref 11.8–14.6)
RBC # BLD: 4.6 M/UL (ref 4–5.2)
RBC # BLD: NORMAL 10*6/UL
SARS-COV-2, RAPID: NOT DETECTED
SEG NEUTROPHILS: 59 % (ref 36–66)
SEGMENTED NEUTROPHILS ABSOLUTE COUNT: 5 K/UL (ref 1.3–9.1)
SODIUM BLD-SCNC: 139 MMOL/L (ref 135–144)
SPECIMEN DESCRIPTION: NORMAL
TOTAL PROTEIN: 7.3 G/DL (ref 6.4–8.3)
TROPONIN INTERP: NORMAL
TROPONIN INTERP: NORMAL
TROPONIN T: NORMAL NG/ML
TROPONIN T: NORMAL NG/ML
TROPONIN, HIGH SENSITIVITY: <6 NG/L (ref 0–14)
TROPONIN, HIGH SENSITIVITY: <6 NG/L (ref 0–14)
WBC # BLD: 8.5 K/UL (ref 3.5–11)
WBC # BLD: NORMAL 10*3/UL

## 2021-04-23 PROCEDURE — 84484 ASSAY OF TROPONIN QUANT: CPT

## 2021-04-23 PROCEDURE — 96375 TX/PRO/DX INJ NEW DRUG ADDON: CPT

## 2021-04-23 PROCEDURE — 96374 THER/PROPH/DIAG INJ IV PUSH: CPT

## 2021-04-23 PROCEDURE — 80053 COMPREHEN METABOLIC PANEL: CPT

## 2021-04-23 PROCEDURE — 36415 COLL VENOUS BLD VENIPUNCTURE: CPT

## 2021-04-23 PROCEDURE — 87635 SARS-COV-2 COVID-19 AMP PRB: CPT

## 2021-04-23 PROCEDURE — 85730 THROMBOPLASTIN TIME PARTIAL: CPT

## 2021-04-23 PROCEDURE — 71260 CT THORAX DX C+: CPT

## 2021-04-23 PROCEDURE — 93005 ELECTROCARDIOGRAM TRACING: CPT | Performed by: EMERGENCY MEDICINE

## 2021-04-23 PROCEDURE — 83880 ASSAY OF NATRIURETIC PEPTIDE: CPT

## 2021-04-23 PROCEDURE — 6360000004 HC RX CONTRAST MEDICATION: Performed by: EMERGENCY MEDICINE

## 2021-04-23 PROCEDURE — 85610 PROTHROMBIN TIME: CPT

## 2021-04-23 PROCEDURE — 99285 EMERGENCY DEPT VISIT HI MDM: CPT

## 2021-04-23 PROCEDURE — 85025 COMPLETE CBC W/AUTO DIFF WBC: CPT

## 2021-04-23 PROCEDURE — 6360000002 HC RX W HCPCS: Performed by: EMERGENCY MEDICINE

## 2021-04-23 PROCEDURE — 2580000003 HC RX 258: Performed by: EMERGENCY MEDICINE

## 2021-04-23 PROCEDURE — 93010 ELECTROCARDIOGRAM REPORT: CPT | Performed by: INTERNAL MEDICINE

## 2021-04-23 RX ORDER — 0.9 % SODIUM CHLORIDE 0.9 %
80 INTRAVENOUS SOLUTION INTRAVENOUS ONCE
Status: COMPLETED | OUTPATIENT
Start: 2021-04-23 | End: 2021-04-23

## 2021-04-23 RX ORDER — ALBUTEROL SULFATE 90 UG/1
AEROSOL, METERED RESPIRATORY (INHALATION)
Qty: 42.5 G | Refills: 3 | Status: SHIPPED | OUTPATIENT
Start: 2021-04-23 | End: 2022-06-16

## 2021-04-23 RX ORDER — PREDNISONE 50 MG/1
50 TABLET ORAL DAILY
Qty: 5 TABLET | Refills: 0 | Status: SHIPPED | OUTPATIENT
Start: 2021-04-23 | End: 2021-04-28

## 2021-04-23 RX ORDER — ONDANSETRON 2 MG/ML
4 INJECTION INTRAMUSCULAR; INTRAVENOUS ONCE
Status: COMPLETED | OUTPATIENT
Start: 2021-04-23 | End: 2021-04-23

## 2021-04-23 RX ORDER — AZITHROMYCIN 250 MG/1
TABLET, FILM COATED ORAL
Qty: 1 PACKET | Refills: 0 | Status: SHIPPED | OUTPATIENT
Start: 2021-04-23 | End: 2021-05-03

## 2021-04-23 RX ORDER — SODIUM CHLORIDE 0.9 % (FLUSH) 0.9 %
10 SYRINGE (ML) INJECTION PRN
Status: DISCONTINUED | OUTPATIENT
Start: 2021-04-23 | End: 2021-04-23 | Stop reason: HOSPADM

## 2021-04-23 RX ORDER — MORPHINE SULFATE 4 MG/ML
4 INJECTION, SOLUTION INTRAMUSCULAR; INTRAVENOUS ONCE
Status: COMPLETED | OUTPATIENT
Start: 2021-04-23 | End: 2021-04-23

## 2021-04-23 RX ADMIN — SODIUM CHLORIDE 80 ML: 9 INJECTION, SOLUTION INTRAVENOUS at 11:26

## 2021-04-23 RX ADMIN — SODIUM CHLORIDE, PRESERVATIVE FREE 10 ML: 5 INJECTION INTRAVENOUS at 11:26

## 2021-04-23 RX ADMIN — Medication 4 MG: at 10:40

## 2021-04-23 RX ADMIN — IOPAMIDOL 75 ML: 755 INJECTION, SOLUTION INTRAVENOUS at 11:26

## 2021-04-23 RX ADMIN — ONDANSETRON 4 MG: 2 INJECTION, SOLUTION INTRAMUSCULAR; INTRAVENOUS at 10:40

## 2021-04-23 ASSESSMENT — PAIN SCALES - GENERAL: PAINLEVEL_OUTOF10: 8

## 2021-04-23 ASSESSMENT — ENCOUNTER SYMPTOMS
SORE THROAT: 0
BLOOD IN STOOL: 0
VOMITING: 0
BACK PAIN: 1
CONSTIPATION: 0
TROUBLE SWALLOWING: 0
SHORTNESS OF BREATH: 1
ABDOMINAL PAIN: 0
COUGH: 1
COLOR CHANGE: 0
DIARRHEA: 0
NAUSEA: 0

## 2021-04-23 NOTE — ED NOTES
Mode of arrival (squad #, walk in, police, etc) : walk in, from home        Arrival Note (brief scenario, treatment PTA, etc). : patient presents to ED with concerns that she may have another PE, she reports she has had pulmonary embolism in the past and is currently on Xarelto. She reports having worsening SOB, cough, runny nose, feeling fatigued, occasionally when blowing nose bringing up blood clots, and has back pain. She states she was tested for covid-19 3 days ago and had a negative result. She is alert and oriented x4, respirations even non-labored.             Chau Martinez RN  04/23/21 1000

## 2021-04-23 NOTE — ED PROVIDER NOTES
[address]  EMERGENCY DEPARTMENT ENCOUNTER    Pt Name: Macy Andres  MRN: [id number]  YOB: 1970  Date of evaluation:4/23/21  PCP: MARTITA Royal CNP    CHIEF COMPLAINT       Chief Complaint   Patient presents with    Shortness of Breath    Cough    Generalized Body Aches       HISTORY OF PRESENT ILLNESS    Macy Andres is a 53 y.o. female who presents with a chief complaint of shortness of breath and right-sided chest and back pain. Patient has a history of PE. She was diagnosed last month. She is on Xarelto. States over the past 3 days her symptoms have been worse. No fevers at home. She reports nausea but no vomiting. No changes in bowel or bladder habits. Describes pain as sharp. Pain is nonradiating. Nothing make symptoms better but inspiration does make her symptoms worse. Symptoms are acute on chronic. Symptoms are moderate. Patient has no other complaints at this time. REVIEW OF SYSTEMS       Review of Systems   Constitutional: Positive for fatigue. Negative for chills and fever. HENT: Negative for congestion, ear pain, sore throat and trouble swallowing. Eyes: Negative for visual disturbance. Respiratory: Positive for cough and shortness of breath. Cardiovascular: Positive for chest pain. Negative for palpitations and leg swelling. Gastrointestinal: Negative for abdominal pain, blood in stool, constipation, diarrhea, nausea and vomiting. Genitourinary: Negative for dysuria and flank pain. Musculoskeletal: Positive for back pain. Negative for arthralgias, myalgias and neck pain. Skin: Negative for color change, rash and wound. Neurological: Negative for dizziness, weakness, light-headedness, numbness and headaches. Psychiatric/Behavioral: Negative for confusion. All other systems reviewed and are negative. Negative in 10 essential Systems except as mentioned above and in the HPI.         PAST MEDICAL HISTORY     Past Medical History: 1 tablet by mouth daily (with breakfast), Disp-90 tablet, R-3Normal      oxyCODONE-acetaminophen (PERCOCET)  MG per tablet Take 1 tablet by mouth every 6 hours as needed for Pain for up to 30 days. , Disp-120 tablet, R-0Normal      oxyCODONE (OXYCONTIN) 10 MG extended release tablet Take 1 tablet by mouth every 12 hours for 30 days. , Disp-36 each, R-0Normal      rivaroxaban 15 & 20 MG Starter Pack Xarelto 15 mg BID., Disp-1 Package, R-3Normal      levothyroxine (SYNTHROID) 175 MCG tablet TAKE 1 TABLET BY MOUTH DAILY, Disp-90 tablet, R-1Normal      lidocaine (LIDODERM) 5 % Place 1 patch onto the skin daily 12 hours on, 12 hours off., Disp-10 patch, R-0Print      clonazePAM (KLONOPIN) 0.5 MG tablet Take 1 tablet by mouth 2 times daily as needed for Anxiety for up to 30 days. , Disp-60 tablet, R-1Normal      esomeprazole (NEXIUM) 40 MG delayed release capsule TAKE 1 CAPSULE BY MOUTH EVERY MORNING BEFORE BREAKFAST, Disp-90 capsule, R-1Normal      mirtazapine (REMERON) 15 MG tablet Take 15 mg by mouth dailyHistorical Med      rOPINIRole (REQUIP) 2 MG tablet TAKE 1 TABLET BY MOUTH EVERY NIGHT, Disp-90 tablet,R-3Normal      ondansetron (ZOFRAN ODT) 4 MG disintegrating tablet Take 1 tablet by mouth every 8 hours as needed for Nausea or Vomiting, Disp-10 tablet, R-0Print      sertraline (ZOLOFT) 100 MG tablet Take 100 mg by mouth dailyHistorical Med      topiramate (TOPAMAX) 25 MG tablet 25 mg 2 times daily Historical Med      metoprolol tartrate (LOPRESSOR) 50 MG tablet Take 50 mg by mouth 2 times daily Historical Med             ALLERGIES     is allergic to compazine [prochlorperazine]; sulfa antibiotics; and adhesive tape. FAMILY HISTORY     She indicated that her mother is . She indicated that her father is alive. She indicated that her maternal grandmother is . She indicated that her maternal grandfather is . She indicated that her paternal grandmother is .  She indicated that her paternal grandfather is . family history includes Breast Cancer in her maternal grandmother; Cancer in her mother; Diabetes in her father; Heart Disease in her father; Other in her father; Stroke in her maternal grandfather. SOCIAL HISTORY      reports that she has quit smoking. Her smoking use included cigarettes. She has a 8.25 pack-year smoking history. She has never used smokeless tobacco. She reports that she does not drink alcohol or use drugs. PHYSICAL EXAM     INITIAL VITALS:  height is 5' 4\" (1.626 m) and weight is 169 lb (76.7 kg). Her oral temperature is 98 °F (36.7 °C). Her blood pressure is 118/72 and her pulse is 73. Her respiration is 16 and oxygen saturation is 95%. Physical Exam  Vitals signs and nursing note reviewed. Constitutional:       General: She is not in acute distress. HENT:      Head: Normocephalic and atraumatic. Eyes:      Conjunctiva/sclera: Conjunctivae normal.      Pupils: Pupils are equal, round, and reactive to light. Neck:      Musculoskeletal: Neck supple. Cardiovascular:      Rate and Rhythm: Normal rate and regular rhythm. Heart sounds: Normal heart sounds. No murmur. Pulmonary:      Effort: Pulmonary effort is normal. No respiratory distress. Breath sounds: Normal breath sounds. Abdominal:      General: Bowel sounds are normal. There is no distension. Palpations: Abdomen is soft. Tenderness: There is no abdominal tenderness. Musculoskeletal:         General: No tenderness. Lymphadenopathy:      Cervical: No cervical adenopathy. Skin:     General: Skin is warm and dry. Findings: No rash. Neurological:      Mental Status: She is alert and oriented to person, place, and time. Psychiatric:         Judgment: Judgment normal.           DIFFERENTIAL DIAGNOSIS/MDM:   45-year-old female with recent history of pulmonary embolism on Xarelto presents with worsening right-sided chest pain and right back pain.   She is afebrile, nontoxic, normal vital signs however she does look uncomfortable. Differential includes ACS, stable present stable angina, worsening pulmonary embolism, dissection, pneumonia, pneumothorax, costochondritis. We will get lab work, cardiac work-up, CT chest.    DIAGNOSTIC RESULTS     EKG: All EKG's are interpreted by the Emergency Department Physician who either signs or Co-signs this chart in the absence of a cardiologist.    EKG Interpretation    Interpreted by me    Rhythm: normal sinus   Rate: normal  Axis: normal  Ectopy: none  Conduction: normal  ST Segments: no acute change  T Waves: no acute change  Q Waves: Nonspecific    Clinical Impression: Nonspecific EKG    RADIOLOGY:   I directly visualized the following  images and reviewed the radiologist interpretations:  CT CHEST PULMONARY EMBOLISM W CONTRAST   Final Result   Motion artifact obscures evaluation of the distal vessels within the lower   lungs bilaterally. No convincing evidence for pulmonary embolism. Dependent bibasilar opacities in the lower lungs are most compatible with   atelectasis/scarring.                  ED BEDSIDE ULTRASOUND:      LABS:  Labs Reviewed   COMPREHENSIVE METABOLIC PANEL - Abnormal; Notable for the following components:       Result Value    Glucose 112 (*)     Chloride 110 (*)     Alkaline Phosphatase 106 (*)     ALT <5 (*)     Total Bilirubin 0.22 (*)     All other components within normal limits   PROTIME-INR - Abnormal; Notable for the following components:    Protime 18.6 (*)     All other components within normal limits   APTT - Abnormal; Notable for the following components:    PTT 36.3 (*)     All other components within normal limits   COVID-19, RAPID   TROPONIN   TROPONIN   CBC WITH AUTO DIFFERENTIAL   BRAIN NATRIURETIC PEPTIDE         EMERGENCY DEPARTMENT COURSE:   Vitals:    Vitals:    04/23/21 1130 04/23/21 1200 04/23/21 1230 04/23/21 1400   BP: 122/79 115/71  118/72   Pulse:  74  73   Resp:  18 16   Temp:       TempSrc:       SpO2: 95% 94% 97% 95%   Weight:       Height:         CT scan is unremarkable. There is no evidence of pulmonary embolism. She does have atelectasis and likely scarring but does not look like she has pneumonia. Clinically I think she probably has a bronchitis. Due to her other risk factors and duration of her symptoms I am going to put her on antibiotics. Will discharge home with prednisone burst as well. I advised her to be compliant with her blood thinners. Advised to return if any symptoms worsen. She is agreeable plan will be discharged home today. #116 - Avoidance of Antibiotic Treatment for Acute Bronchitis/Bronchiolitis    [ ] The patient has acute bronchitis/ bronchiolitis. Antibiotics were prescribed or dispensed because the patient meets one of the following: [MIPS PERFORMANCE EXCEPTION/EXCLUSION]  [ ] Patient has a medical reason for prescribing or dispensing an antibiotic. That reason is [risk factors] (ex. COPD, bacterial infection, acute sinusitis, etc.). CRITICALCARE:      CONSULTS:  None      PROCEDURES:      FINAL IMPRESSION      1. Bronchitis    2.  Cough            DISPOSITION/PLAN   DISPOSITION Decision To Discharge 04/23/2021 01:48:55 PM          PATIENT REFERRED TO:  MARTITA Edwards CNP  [place] [phone]    Schedule an appointment as soon as possible for a visit       [address]  [facility] [address] [phone]    As needed, If symptoms worsen      DISCHARGE MEDICATIONS:  Discharge Medication List as of 4/23/2021  1:50 PM      START taking these medications    Details   azithromycin (ZITHROMAX) 250 MG tablet Take 2 tablets (500 mg) on Day 1, followed by 1 tablet (250 mg) once daily on Days 2 through 5., Disp-1 packet, R-0Print      predniSONE (DELTASONE) 50 MG tablet Take 1 tablet by mouth daily for 5 days, Disp-5 tablet, R-0Print             (Please note that portions ofthis note were completed with a voice recognition program.  Efforts were made to edit the dictations but occasionally words are mis-transcribed.)    Sree Liao DO  Attending Emergency Physician          Sree Liao DO  04/23/21 1550

## 2021-04-23 NOTE — TELEPHONE ENCOUNTER
Spoke with patient, she expressed understanding. She stated that she was in the ED today and had another COVID test performed that was negative.  They put her on Zithromax, advised pt to continue with medication and to follow up with your office is she is not starting to feel better next week

## 2021-04-26 ENCOUNTER — HOSPITAL ENCOUNTER (OUTPATIENT)
Dept: WOMENS IMAGING | Age: 51
Discharge: HOME | End: 2021-04-28
Payer: COMMERCIAL

## 2021-04-26 DIAGNOSIS — Z12.31 ENCOUNTER FOR SCREENING MAMMOGRAM FOR MALIGNANT NEOPLASM OF BREAST: ICD-10-CM

## 2021-04-26 PROCEDURE — 77063 BREAST TOMOSYNTHESIS BI: CPT

## 2021-05-03 ENCOUNTER — HOSPITAL ENCOUNTER (OUTPATIENT)
Dept: PAIN MANAGEMENT | Age: 51
Discharge: HOME | End: 2021-05-03
Payer: COMMERCIAL

## 2021-05-03 DIAGNOSIS — M48.02 DEGENERATIVE CERVICAL SPINAL STENOSIS: ICD-10-CM

## 2021-05-03 DIAGNOSIS — Z51.81 ENCOUNTER FOR MEDICATION MONITORING: ICD-10-CM

## 2021-05-03 DIAGNOSIS — M54.12 CERVICAL RADICULOPATHY: ICD-10-CM

## 2021-05-03 DIAGNOSIS — M47.812 ARTHROPATHY OF CERVICAL FACET JOINT: ICD-10-CM

## 2021-05-03 PROCEDURE — 99212 OFFICE O/P EST SF 10 MIN: CPT | Performed by: NURSE PRACTITIONER

## 2021-05-03 PROCEDURE — 99213 OFFICE O/P EST LOW 20 MIN: CPT

## 2021-05-03 RX ORDER — TIZANIDINE 4 MG/1
TABLET ORAL
Qty: 90 TABLET | Refills: 0 | Status: SHIPPED | OUTPATIENT
Start: 2021-05-03 | End: 2021-06-03

## 2021-05-03 RX ORDER — OXYCODONE AND ACETAMINOPHEN 10; 325 MG/1; MG/1
1 TABLET ORAL EVERY 6 HOURS PRN
Qty: 120 TABLET | Refills: 0 | Status: SHIPPED | OUTPATIENT
Start: 2021-05-12 | End: 2021-06-09 | Stop reason: SDUPTHER

## 2021-05-03 RX ORDER — OXYCODONE HCL 10 MG/1
10 TABLET, FILM COATED, EXTENDED RELEASE ORAL EVERY 12 HOURS
Qty: 60 EACH | Refills: 0 | Status: SHIPPED | OUTPATIENT
Start: 2021-05-12 | End: 2021-06-09 | Stop reason: SDUPTHER

## 2021-05-03 ASSESSMENT — ENCOUNTER SYMPTOMS
COUGH: 0
SHORTNESS OF BREATH: 0
CONSTIPATION: 0

## 2021-05-03 NOTE — PROGRESS NOTES
Patient completed a video visit today to review medication contract. Chief Complaint: neck pain    [facility]  Patient complains of left-sided neck pain that radiates down the left arm to fingers. MRI with Mild multilevel spinal canal stenosis and mild-to-moderate neural foraminal narrowing from C3-4 to C5-6. She has never had neck surgery. She had PT a year ago and continues to do exercises at home with benefit. She would like another cervical facet injection but insurance is denying due to less than 80% relief with first injection.  Discussed a POC to find alternative therapies for the pain such as acupuncture and injections. She will see NS at [facility] for possible surgery. Recent ED visit for SOB - had bronchitis. Neck Pain   This is a chronic problem. The current episode started more than 1 year ago. The problem occurs constantly. The problem has been unchanged. The pain is present in the midline and left side. The quality of the pain is described as aching (sharp). The pain is at a severity of 7/10. The pain is moderate. The symptoms are aggravated by position and twisting. Associated symptoms include headaches. Pertinent negatives include no chest pain or fever. She has tried oral narcotics and bed rest for the symptoms. The treatment provided mild relief. Patient denies any new neurological symptoms. No bowel or bladder incontinence, no weakness, and no falling. Pill count: appropriate due 5/12    Morphine equivalent: 90    Periodic Controlled Substance Monitoring: Possible medication side effects, risk of tolerance/dependence & alternative treatments discussed., No signs of potential drug abuse or diversion identified., Obtaining appropriate analgesic effect of treatment. Jeannette Friday KATARINA AndradeN - CNP)  Chronic Pain > 80 MEDD: Co-prescribed Naloxone.  MARTITA Funes - CNP)      Past Medical History:   Diagnosis Date    Anxiety     CAD (coronary artery disease) Mitral valve prolapse    Fatty liver     GERD (gastroesophageal reflux disease)     Headache     History of bronchitis     Hyperlipidemia     Hypothyroidism     Kidney stone     LFT elevation     MVP (mitral valve prolapse)     Obesity     Pulmonary emboli (HCC)     Type 2 diabetes mellitus without complication ([facility] 75.) [id number]    Umbilical hernia        Past Surgical History:   Procedure Laterality Date    APPENDECTOMY       SECTION      2 pfannenstiel, 1 vertical    CHOLECYSTECTOMY      COLONOSCOPY      Dr Ramsey Gambino  14    COLONOSCOPY  2014    biopsy & sigmoid spasms, pathology negative    COLONOSCOPY N/A 2018    COLONOSCOPY WITH BIOPSY performed by Khai Penn MD at [facility].      x 5    HYSTERECTOMY, TOTAL ABDOMINAL          [place] EXPLORATORY OF ABDOMEN  10/21/2014    Laparotomy-lysis of adhesions, bso     UPPER GASTROINTESTINAL ENDOSCOPY  2010    mild chronic inactive gastritis    UPPER GASTROINTESTINAL ENDOSCOPY N/A 3/10/2021    EGD BIOPSY performed by Roseanne Horton MD at [facility]       Allergies   Allergen Reactions    Compazine [Prochlorperazine]      Jittery, restless    Sulfa Antibiotics Hives    Adhesive Tape Rash         Current Outpatient Medications:     albuterol sulfate  (90 Base) MCG/ACT inhaler, INHALE 2 PUFFS INTO THE LUNGS EVERY 4 HOURS AS NEEDED FOR SHORTNESS OF BREATH, Disp: 42.5 g, Rfl: 3    azithromycin (ZITHROMAX) 250 MG tablet, Take 2 tablets (500 mg) on Day 1, followed by 1 tablet (250 mg) once daily on Days 2 through 5., Disp: 1 packet, Rfl: 0    tiZANidine (ZANAFLEX) 4 MG tablet, TAKE 1 TABLET BY MOUTH EVERY 8 HOURS AS NEEDED FOR SPASMS, Disp: 90 tablet, Rfl: 0    magnesium citrate solution, Take 296 mLs by mouth once for 1 dose, Disp: 296 mL, Rfl: 0    bisacodyl (DULCOLAX) 5 MG EC tablet, TAKE 4 TABS AT 10 AM THE DAY PRIOR TO COLONOSCOPY, Disp: 4 tablet, Rfl: 0    polyethylene glycol (GLYCOLAX) 17 GM/SCOOP powder, Use as directed by following your patient instructions given by office. , Disp: 238 g, Rfl: 0    rivaroxaban (XARELTO) 20 MG TABS tablet, Take 1 tablet by mouth daily (with breakfast), Disp: 90 tablet, Rfl: 3    oxyCODONE-acetaminophen (PERCOCET)  MG per tablet, Take 1 tablet by mouth every 6 hours as needed for Pain for up to 30 days. , Disp: 120 tablet, Rfl: 0    oxyCODONE (OXYCONTIN) 10 MG extended release tablet, Take 1 tablet by mouth every 12 hours for 30 days. , Disp: 36 each, Rfl: 0    rivaroxaban 15 & 20 MG Starter Pack, Xarelto 15 mg BID., Disp: 1 Package, Rfl: 3    levothyroxine (SYNTHROID) 175 MCG tablet, TAKE 1 TABLET BY MOUTH DAILY, Disp: 90 tablet, Rfl: 1    lidocaine (LIDODERM) 5 %, Place 1 patch onto the skin daily 12 hours on, 12 hours off., Disp: 10 patch, Rfl: 0    esomeprazole (NEXIUM) 40 MG delayed release capsule, TAKE 1 CAPSULE BY MOUTH EVERY MORNING BEFORE BREAKFAST, Disp: 90 capsule, Rfl: 1    mirtazapine (REMERON) 15 MG tablet, Take 15 mg by mouth daily, Disp: , Rfl:     rOPINIRole (REQUIP) 2 MG tablet, TAKE 1 TABLET BY MOUTH EVERY NIGHT, Disp: 90 tablet, Rfl: 3    ondansetron (ZOFRAN ODT) 4 MG disintegrating tablet, Take 1 tablet by mouth every 8 hours as needed for Nausea or Vomiting, Disp: 10 tablet, Rfl: 0    sertraline (ZOLOFT) 100 MG tablet, Take 100 mg by mouth daily, Disp: , Rfl:     topiramate (TOPAMAX) 25 MG tablet, 25 mg 2 times daily , Disp: , Rfl:     metoprolol tartrate (LOPRESSOR) 50 MG tablet, Take 50 mg by mouth 2 times daily , Disp: , Rfl:     clonazePAM (KLONOPIN) 0.5 MG tablet, Take 1 tablet by mouth 2 times daily as needed for Anxiety for up to 30 days. , Disp: 60 tablet, Rfl: 1    Family History   Problem Relation Age of Onset   Shelley [facility] Cancer Mother         vaginal    Heart Disease Father     Diabetes Father     Other Father         colon resection for colon polyps    Breast Cancer Maternal Grandmother     Stroke Physical Exam:  [facility] 11/01/2010     Physical Exam  HENT:      Head: Normocephalic. Pulmonary:      Effort: Pulmonary effort is normal.   Neurological:      Mental Status: She is alert. Psychiatric:         Mood and Affect: Mood normal.         Behavior: Behavior normal.         Record/Diagnostics Review:    Last mychal 2/2021 and was appropriate     ABERRANT BEHAVIORS SINCE LAST VISIT  Lost rx/pills:------------------------------------------ no  Taking  medication as prescribed: ----------- yes  Urine Drug Screen ---------------------------------  yes  Recent ER visits: -------------------------------------Yes  Pill count is appropriate: ---------------------------yes   Refills for prescriptions appropriate:---------- yes    Assessment:  Problem List Items Addressed This Visit     Degenerative cervical spinal stenosis    Relevant Medications    oxyCODONE-acetaminophen (PERCOCET)  MG per tablet (Start on 5/12/2021)    oxyCODONE (OXYCONTIN) 10 MG extended release tablet (Start on 5/12/2021)    Arthropathy of cervical facet joint    Relevant Medications    oxyCODONE-acetaminophen (PERCOCET)  MG per tablet (Start on 5/12/2021)    oxyCODONE (OXYCONTIN) 10 MG extended release tablet (Start on 5/12/2021)    Cervical radiculopathy    Relevant Medications    oxyCODONE-acetaminophen (PERCOCET)  MG per tablet (Start on 5/12/2021)    oxyCODONE (OXYCONTIN) 10 MG extended release tablet (Start on 5/12/2021)    tiZANidine (ZANAFLEX) 4 MG tablet    Encounter for medication monitoring    Relevant Medications    oxyCODONE-acetaminophen (PERCOCET)  MG per tablet (Start on 5/12/2021)             Treatment Plan:  Patient relates current medications are helping the pain. Patient reports taking pain medications as prescribed, denies obtaining medications from different sources and denies use of illegal drugs. Patient denies side effects from medications like nausea, vomiting, constipation or drowsiness.  Patient

## 2021-05-05 ENCOUNTER — TELEPHONE (OUTPATIENT)
Dept: INTERNAL MEDICINE CLINIC | Age: 51
End: 2021-05-05

## 2021-05-05 NOTE — TELEPHONE ENCOUNTER
Spoken to patient. Explained that I am waiting for Dr Zacarias Hendricks response to my message. Would need his take on stopping Xarelto for C-scope since her PE was within last 2mth.      Debbie Nguyen

## 2021-05-14 ENCOUNTER — TELEPHONE (OUTPATIENT)
Dept: ONCOLOGY | Age: 51
End: 2021-05-14

## 2021-05-17 ENCOUNTER — TELEPHONE (OUTPATIENT)
Dept: GASTROENTEROLOGY | Age: 51
End: 2021-05-17

## 2021-05-24 ENCOUNTER — HOSPITAL ENCOUNTER (OUTPATIENT)
Dept: LAB | Age: 51
Setting detail: SPECIMEN
Discharge: HOME | End: 2021-05-24
Payer: COMMERCIAL

## 2021-05-24 ENCOUNTER — TELEPHONE (OUTPATIENT)
Dept: GASTROENTEROLOGY | Age: 51
End: 2021-05-24

## 2021-05-24 PROCEDURE — U0005 INFEC AGEN DETEC AMPLI PROBE: HCPCS

## 2021-05-24 PROCEDURE — U0003 INFECTIOUS AGENT DETECTION BY NUCLEIC ACID (DNA OR RNA); SEVERE ACUTE RESPIRATORY SYNDROME CORONAVIRUS 2 (SARS-COV-2) (CORONAVIRUS DISEASE [COVID-19]), AMPLIFIED PROBE TECHNIQUE, MAKING USE OF HIGH THROUGHPUT TECHNOLOGIES AS DESCRIBED BY CMS-2020-01-R: HCPCS

## 2021-05-24 NOTE — TELEPHONE ENCOUNTER
Talked to Mehrdad Myra to ask if she had Covid testing or if she is vaccinated. She states she thought Covid testing is on 05/25/21 and she is not vaccinated.   She is now scheduled 05/24/21 @ 1:50 pm at [facility].  Procedure time on 05/27/21 is changed to 9:30 am with arrival of 8:00 am.

## 2021-05-25 LAB
SARS-COV-2: NORMAL
SARS-COV-2: NOT DETECTED
SOURCE: NORMAL

## 2021-05-26 ENCOUNTER — ANESTHESIA EVENT (OUTPATIENT)
Dept: OPERATING ROOM | Age: 51
End: 2021-05-26
Payer: COMMERCIAL

## 2021-05-27 ENCOUNTER — ANESTHESIA (OUTPATIENT)
Dept: OPERATING ROOM | Age: 51
End: 2021-05-27
Payer: COMMERCIAL

## 2021-05-27 ENCOUNTER — HOSPITAL ENCOUNTER (OUTPATIENT)
Age: 51
Setting detail: OUTPATIENT SURGERY
Discharge: HOME | End: 2021-05-27
Attending: INTERNAL MEDICINE | Admitting: INTERNAL MEDICINE
Payer: COMMERCIAL

## 2021-05-27 VITALS
RESPIRATION RATE: 24 BRPM | OXYGEN SATURATION: 100 % | DIASTOLIC BLOOD PRESSURE: 69 MMHG | SYSTOLIC BLOOD PRESSURE: 119 MMHG

## 2021-05-27 VITALS
BODY MASS INDEX: 29.88 KG/M2 | RESPIRATION RATE: 14 BRPM | WEIGHT: 175 LBS | HEART RATE: 67 BPM | HEIGHT: 64 IN | TEMPERATURE: 98.6 F | OXYGEN SATURATION: 97 % | SYSTOLIC BLOOD PRESSURE: 117 MMHG | DIASTOLIC BLOOD PRESSURE: 70 MMHG

## 2021-05-27 PROCEDURE — 7100000010 HC PHASE II RECOVERY - FIRST 15 MIN: Performed by: INTERNAL MEDICINE

## 2021-05-27 PROCEDURE — 7100000011 HC PHASE II RECOVERY - ADDTL 15 MIN: Performed by: INTERNAL MEDICINE

## 2021-05-27 PROCEDURE — 2580000003 HC RX 258: Performed by: SPECIALIST

## 2021-05-27 PROCEDURE — 3609027000 HC COLONOSCOPY: Performed by: INTERNAL MEDICINE

## 2021-05-27 PROCEDURE — 2580000003 HC RX 258: Performed by: ANESTHESIOLOGY

## 2021-05-27 PROCEDURE — 3700000000 HC ANESTHESIA ATTENDED CARE: Performed by: INTERNAL MEDICINE

## 2021-05-27 PROCEDURE — 2500000003 HC RX 250 WO HCPCS: Performed by: SPECIALIST

## 2021-05-27 PROCEDURE — G0121 COLON CA SCRN NOT HI RSK IND: HCPCS | Performed by: INTERNAL MEDICINE

## 2021-05-27 PROCEDURE — 2709999900 HC NON-CHARGEABLE SUPPLY: Performed by: INTERNAL MEDICINE

## 2021-05-27 PROCEDURE — 3700000001 HC ADD 15 MINUTES (ANESTHESIA): Performed by: INTERNAL MEDICINE

## 2021-05-27 PROCEDURE — 6360000002 HC RX W HCPCS: Performed by: SPECIALIST

## 2021-05-27 RX ORDER — HYDROMORPHONE HYDROCHLORIDE 1 MG/ML
0.25 INJECTION, SOLUTION INTRAMUSCULAR; INTRAVENOUS; SUBCUTANEOUS EVERY 5 MIN PRN
Status: DISCONTINUED | OUTPATIENT
Start: 2021-05-27 | End: 2021-05-27 | Stop reason: HOSPADM

## 2021-05-27 RX ORDER — PROPOFOL 10 MG/ML
INJECTION, EMULSION INTRAVENOUS PRN
Status: DISCONTINUED | OUTPATIENT
Start: 2021-05-27 | End: 2021-05-27 | Stop reason: SDUPTHER

## 2021-05-27 RX ORDER — LIDOCAINE HYDROCHLORIDE 20 MG/ML
INJECTION, SOLUTION INFILTRATION; PERINEURAL PRN
Status: DISCONTINUED | OUTPATIENT
Start: 2021-05-27 | End: 2021-05-27 | Stop reason: SDUPTHER

## 2021-05-27 RX ORDER — SODIUM CHLORIDE, SODIUM LACTATE, POTASSIUM CHLORIDE, CALCIUM CHLORIDE 600; 310; 30; 20 MG/100ML; MG/100ML; MG/100ML; MG/100ML
INJECTION, SOLUTION INTRAVENOUS CONTINUOUS PRN
Status: DISCONTINUED | OUTPATIENT
Start: 2021-05-27 | End: 2021-05-27 | Stop reason: SDUPTHER

## 2021-05-27 RX ORDER — SODIUM CHLORIDE 9 MG/ML
25 INJECTION, SOLUTION INTRAVENOUS PRN
Status: DISCONTINUED | OUTPATIENT
Start: 2021-05-27 | End: 2021-05-27 | Stop reason: HOSPADM

## 2021-05-27 RX ORDER — SODIUM CHLORIDE, SODIUM LACTATE, POTASSIUM CHLORIDE, CALCIUM CHLORIDE 600; 310; 30; 20 MG/100ML; MG/100ML; MG/100ML; MG/100ML
INJECTION, SOLUTION INTRAVENOUS CONTINUOUS
Status: DISCONTINUED | OUTPATIENT
Start: 2021-05-28 | End: 2021-05-27 | Stop reason: HOSPADM

## 2021-05-27 RX ORDER — SODIUM CHLORIDE 0.9 % (FLUSH) 0.9 %
10 SYRINGE (ML) INJECTION PRN
Status: DISCONTINUED | OUTPATIENT
Start: 2021-05-27 | End: 2021-05-27 | Stop reason: HOSPADM

## 2021-05-27 RX ORDER — MEPERIDINE HYDROCHLORIDE 50 MG/ML
12.5 INJECTION INTRAMUSCULAR; INTRAVENOUS; SUBCUTANEOUS EVERY 5 MIN PRN
Status: DISCONTINUED | OUTPATIENT
Start: 2021-05-27 | End: 2021-05-27 | Stop reason: HOSPADM

## 2021-05-27 RX ORDER — HYDROMORPHONE HYDROCHLORIDE 1 MG/ML
0.5 INJECTION, SOLUTION INTRAMUSCULAR; INTRAVENOUS; SUBCUTANEOUS EVERY 5 MIN PRN
Status: DISCONTINUED | OUTPATIENT
Start: 2021-05-27 | End: 2021-05-27 | Stop reason: HOSPADM

## 2021-05-27 RX ORDER — METOCLOPRAMIDE HYDROCHLORIDE 5 MG/ML
10 INJECTION INTRAMUSCULAR; INTRAVENOUS
Status: DISCONTINUED | OUTPATIENT
Start: 2021-05-27 | End: 2021-05-27 | Stop reason: HOSPADM

## 2021-05-27 RX ORDER — HYDROCODONE BITARTRATE AND ACETAMINOPHEN 5; 325 MG/1; MG/1
1 TABLET ORAL
Status: DISCONTINUED | OUTPATIENT
Start: 2021-05-27 | End: 2021-05-27 | Stop reason: HOSPADM

## 2021-05-27 RX ORDER — SODIUM CHLORIDE 9 MG/ML
INJECTION, SOLUTION INTRAVENOUS CONTINUOUS
Status: DISCONTINUED | OUTPATIENT
Start: 2021-05-28 | End: 2021-05-27

## 2021-05-27 RX ORDER — SODIUM CHLORIDE 0.9 % (FLUSH) 0.9 %
10 SYRINGE (ML) INJECTION EVERY 12 HOURS SCHEDULED
Status: DISCONTINUED | OUTPATIENT
Start: 2021-05-27 | End: 2021-05-27 | Stop reason: HOSPADM

## 2021-05-27 RX ORDER — LIDOCAINE HYDROCHLORIDE 10 MG/ML
1 INJECTION, SOLUTION EPIDURAL; INFILTRATION; INTRACAUDAL; PERINEURAL
Status: DISCONTINUED | OUTPATIENT
Start: 2021-05-28 | End: 2021-05-27 | Stop reason: HOSPADM

## 2021-05-27 RX ORDER — HYDRALAZINE HYDROCHLORIDE 20 MG/ML
5 INJECTION INTRAMUSCULAR; INTRAVENOUS EVERY 10 MIN PRN
Status: DISCONTINUED | OUTPATIENT
Start: 2021-05-27 | End: 2021-05-27 | Stop reason: HOSPADM

## 2021-05-27 RX ORDER — DIPHENHYDRAMINE HYDROCHLORIDE 50 MG/ML
12.5 INJECTION INTRAMUSCULAR; INTRAVENOUS
Status: DISCONTINUED | OUTPATIENT
Start: 2021-05-27 | End: 2021-05-27 | Stop reason: HOSPADM

## 2021-05-27 RX ADMIN — PROPOFOL 40 MG: 10 INJECTION, EMULSION INTRAVENOUS at 10:04

## 2021-05-27 RX ADMIN — PROPOFOL 80 MG: 10 INJECTION, EMULSION INTRAVENOUS at 09:58

## 2021-05-27 RX ADMIN — SODIUM CHLORIDE, POTASSIUM CHLORIDE, SODIUM LACTATE AND CALCIUM CHLORIDE: 600; 310; 30; 20 INJECTION, SOLUTION INTRAVENOUS at 08:55

## 2021-05-27 RX ADMIN — LIDOCAINE HYDROCHLORIDE 100 MG: 20 INJECTION, SOLUTION INFILTRATION; PERINEURAL at 10:02

## 2021-05-27 RX ADMIN — PROPOFOL 40 MG: 10 INJECTION, EMULSION INTRAVENOUS at 10:10

## 2021-05-27 RX ADMIN — SODIUM CHLORIDE, POTASSIUM CHLORIDE, SODIUM LACTATE AND CALCIUM CHLORIDE: 600; 310; 30; 20 INJECTION, SOLUTION INTRAVENOUS at 09:54

## 2021-05-27 ASSESSMENT — PULMONARY FUNCTION TESTS
PIF_VALUE: 1
PIF_VALUE: 1
PIF_VALUE: 0
PIF_VALUE: 0
PIF_VALUE: 1
PIF_VALUE: 0
PIF_VALUE: 1
PIF_VALUE: 0
PIF_VALUE: 1

## 2021-05-27 ASSESSMENT — PAIN SCALES - GENERAL
PAINLEVEL_OUTOF10: 0
PAINLEVEL_OUTOF10: 0

## 2021-05-27 NOTE — ANESTHESIA PRE PROCEDURE
for Anxiety for up to 30 days. 12/28/20 4/12/21  Isaiah Dakins, MD   esomeprazole (NEXIUM) 40 MG delayed release capsule TAKE 1 CAPSULE BY MOUTH EVERY MORNING BEFORE BREAKFAST 12/18/20   MARTITA Woods CNP   mirtazapine (REMERON) 15 MG tablet Take 15 mg by mouth daily 11/10/20   Historical Provider, MD   rOPINIRole (REQUIP) 2 MG tablet TAKE 1 TABLET BY MOUTH EVERY NIGHT 8/14/20   MARTITA Woods CNP   ondansetron (ZOFRAN ODT) 4 MG disintegrating tablet Take 1 tablet by mouth every 8 hours as needed for Nausea or Vomiting 3/4/20   Yeimi Lowery MD   sertraline (ZOLOFT) 100 MG tablet Take 100 mg by mouth daily    Historical Provider, MD   topiramate (TOPAMAX) 25 MG tablet 25 mg 2 times daily  2/27/18   Historical Provider, MD   metoprolol tartrate (LOPRESSOR) 50 MG tablet Take 50 mg by mouth 2 times daily  8/21/17   Historical Provider, MD       Current medications:    No current facility-administered medications for this encounter. Allergies: Allergies   Allergen Reactions    Compazine [Prochlorperazine]      Jittery, restless    Sulfa Antibiotics Hives    Adhesive Tape Rash       Problem List:    Patient Active Problem List   Diagnosis Code    Hyperlipidemia E78.5    MVP (mitral valve prolapse) I34.1    Anxiety F41.9    Hypothyroidism E03.9    Allergic rhinitis J30.9    Obesity E66.9    Abdominal pain R10.9    Elevated liver enzymes R74.8    GERD (gastroesophageal reflux disease) K21.9    Ovarian mass N83.8    S/P bilateral oophorectomy & KRUPA 10/21/14 Z90.722    Pain emptying bladder R30.9    Urinary urgency R39.15    Insomnia G47.00    RLS (restless legs syndrome) G25.81    Pancreatitis K85.90    Eye swelling, left H57.89    Osteoarthritis of cervical spine M47.812    Degenerative cervical spinal stenosis M48.02    Trigger point with tension headache G44.209    Arthropathy of cervical facet joint M47.812    Atlanto-axial joint sprain, sequela S13. 4XXS    Cervical radiculopathy M54.12    Sprain of atlanto-occipital joint, sequela S13. 4XXS    Encounter for medication monitoring Z51.81    Myofascial muscle pain M79.18    Colitis K52.9    Chest pain R07.9    Unstable angina (HCC) I20.0    Pulmonary embolism on right (HCC) I26.99    Acute pulmonary embolism (HCC) I26.99    Hematemesis with nausea K92.0    Acute respiratory failure with hypoxia (HCC) J96.01    Family history of colon cancer Z80.0    Irritable bowel syndrome with both constipation and diarrhea K58.2    Elevated CEA R97.0    Diarrhea R19.7    Erosive gastritis K29.60    Weight loss R63.4       Past Medical History:        Diagnosis Date    Anxiety     CAD (coronary artery disease)     Mitral valve prolapse    Fatty liver     GERD (gastroesophageal reflux disease)     Headache     History of bronchitis     Hyperlipidemia     Hypothyroidism     Kidney stone     LFT elevation     MVP (mitral valve prolapse)     Obesity     Pulmonary emboli (HCC)     Type 2 diabetes mellitus without complication ([facility] 2851    Umbilical hernia        Past Surgical History:        Procedure Laterality Date    APPENDECTOMY       SECTION      2 pfannenstiel, 1 vertical    CHOLECYSTECTOMY      COLONOSCOPY      Dr Haley Bullard COLONOSCOPY  14    COLONOSCOPY  2014    biopsy & sigmoid spasms, pathology negative    COLONOSCOPY N/A 2018    COLONOSCOPY WITH BIOPSY performed by Mary Molina MD at [address]      x 5    HYSTERECTOMY, TOTAL ABDOMINAL          [place] EXPLORATORY OF ABDOMEN  10/21/2014    Laparotomy-lysis of adhesions, bso     UPPER GASTROINTESTINAL ENDOSCOPY  2010    mild chronic inactive gastritis    UPPER GASTROINTESTINAL ENDOSCOPY N/A 3/10/2021    EGD BIOPSY performed by Kaushik Belcher MD at [facility]       Social History:    Social History     Tobacco Use    Smoking status: Former Smoker     Packs/day: 0.25     Years: 33.00     Pack years: 8.25     Types: Cigarettes    Smokeless tobacco: Never Used    Tobacco comment: quit on nicoderm patch   Substance Use Topics    Alcohol use: No     Alcohol/week: 0.0 standard drinks                                Counseling given: Not Answered  Comment: quit on nicoderm patch      Vital Signs (Current): There were no vitals filed for this visit. BP Readings from Last 3 Encounters:   04/23/21 118/72   04/15/21 108/76   04/12/21 95/64       NPO Status:                                                                                 BMI:   Wt Readings from Last 3 Encounters:   04/23/21 169 lb (76.7 kg)   04/15/21 177 lb 3.2 oz (80.4 kg)   04/12/21 177 lb 6.4 oz (80.5 kg)     There is no height or weight on file to calculate BMI.    CBC:   Lab Results   Component Value Date    WBC 8.5 04/23/2021    RBC 4.60 04/23/2021    RBC 4.36 06/03/2019    HGB 14.2 04/23/2021    HCT 42.1 04/23/2021    MCV 91.5 04/23/2021    RDW 12.7 04/23/2021     04/23/2021     06/05/2012       CMP:   Lab Results   Component Value Date     04/23/2021    K 4.6 04/23/2021     04/23/2021    CO2 20 04/23/2021    BUN 18 04/23/2021    CREATININE 0.68 04/23/2021    GFRAA >60 04/23/2021    LABGLOM >60 04/23/2021    GLUCOSE 112 04/23/2021    GLUCOSE 107 06/03/2019    PROT 7.3 04/23/2021    PROT 6.3 06/03/2019    CALCIUM 9.4 04/23/2021    BILITOT 0.22 04/23/2021    ALKPHOS 106 04/23/2021    AST 13 04/23/2021    ALT <5 04/23/2021       POC Tests: No results for input(s): POCGLU, POCNA, POCK, POCCL, POCBUN, POCHEMO, POCHCT in the last 72 hours. Coags:   Lab Results   Component Value Date    PROTIME 18.6 04/23/2021    INR 1.6 04/23/2021    APTT 36.3 04/23/2021       HCG (If Applicable):   Lab Results   Component Value Date    PREGTESTUR NEGATIVE 12/18/2018        ABGs: No results found for: PHART, PO2ART, QEW3MNS, IKB2EIU, BEART, X7ZFRSJT     Type & Screen (If Applicable):   No results found for: Andres Duffy    Drug/Infectious Status (If Applicable):  Lab Results   Component Value Date    HEPCAB NONREACTIVE 02/14/2014       COVID-19 Screening (If Applicable):   Lab Results   Component Value Date    COVID19 Not Detected 05/24/2021           Anesthesia Evaluation  Patient summary reviewed and Nursing notes reviewed no history of anesthetic complications:   Airway: Mallampati: I  TM distance: >3 FB   Neck ROM: full  Mouth opening: > = 3 FB Dental:    (+) lower dentures and upper dentures      Pulmonary:normal exam    (+) asthma:                            Cardiovascular:    (+) angina:, CAD:,                   Neuro/Psych:   (+) neuromuscular disease (restless leg syndrome):, headaches:,             GI/Hepatic/Renal:   (+) GERD:, liver disease:,           Endo/Other:    (+) DiabetesType II DM, , hypothyroidism::., .                 Abdominal:           Vascular:   + DVT, PE. Anesthesia Plan      MAC     ASA 3       Induction: intravenous. MIPS: Postoperative opioids intended and Prophylactic antiemetics administered. Anesthetic plan and risks discussed with patient. Plan discussed with CRNA.                   Sakshi Villegas MD   5/27/2021

## 2021-05-27 NOTE — H&P
History and Physical Service   [place] [address]    HISTORY AND PHYSICAL EXAMINATION            Date of Evaluation: 5/27/2021  Patient name:  Pedro Bruno  MRN:   [id number]  YOB: 1970  PCP:    MARTITA Ritchie CNP    History Obtained From:     Patient, Medical records    History of Present Illness: This is Pedro Bruno a 53 y.o. female who presents today for a diagnostic colonoscopy by Dr. Thalia Nelson for elevated CEA, generalized abdominal pain, IBS with diarrhea and/or constipation. The patient completed the bowel prep as directed and now has watery clear stool. Pt's father had a history of colon cancer. Previous colonoscopy was in 2018. The pt complains of intermittent, 5-6/10 upper abdominal pain for the past 2 years. She has nausea, vomiting, and abdominal pain during bowel movements. The pt has alternating constipation and diarrhea. The diarrhea occurs after eating. Pt states she first noticed dark maroon colored stools 5-6 months ago and has unintentionally lost 100 pounds in the past year. Pt states she followed-up with Dr. Kasey Villalobos in 04/2021. Pt denies fever and chills. History of GERD, chronic bloating, LFT elevation, and fatty liver. The pt had hematemesis while hospitalized for a pulmonary emboli in 03/2021. S/p EGD on 03/10/2021 which revealed gastritis. Xarelto was last taken on 05/22/2021. Pt denies history of ulcers, hiatal hernia, and IBS. History of diabetes. Pt denies taking diabetic medications at this time. Myocardial perfusion imaging 12/28/2020  Impression   1. No discrete perfusion abnormality to suggest myocardial   ischemia/infarction. 2. No wall motion abnormality.  Calculated ejection fraction of 68%.    3. Risk stratification: Low risk   Notes concerning risk stratification:       Risk stratification incorporates both clinical history and some testing   results.  Final risk determination is the responsibility of the ordering provider as other patient information and test results may increase or   decrease the risk assessment reported for this examination.       Risk stratification criteria are adapted from \"Noninvasive Risk   Stratification\" criteria from Christa Munoz,   ACC/AATS/AHA/ASE/ASNC/SCAI/SCCT/STS 2017 Appropriate Use Criteria For   Coronary Revascularization in Patients With Stable Ischemic Heart Disease   [facility] Volume 69, Issue 17, May 2017       High risk (>3% annual death or MI) 1. Severe resting LV dysfunction (LVEF   <35%) not readily explained by non coronary causes 2. Resting perfusion   abnormalities greater than 10% of the myocardium in patients without prior   history or evidence of MI3. Stress-induced perfusion abnormalities   encumbering greater than or equal to 10% myocardium or stress segmental   scores indicating multiple vascular territories with abnormalities 4. Stress-induced LV dilatation (TID ratio greater than 1.19 for exercise and   greater than 1.39 for regadenoson)       Intermediate risk (1% to 3% annual death or MI) 1. Mild/moderate resting LV   dysfunction (LVEF 35% to 49%) not readily explained by non coronary causes. 2. Resting perfusion abnormalities in 5%-9.9% of the myocardium in patients   without a history or prior evidence of MI 3. Stress-induced perfusion   abnormality encumbering 5%-9.9% of the myocardium or stress segmental scores   indicating 1 vascular territory with abnormalities but without LV dilation 4. Small wall motion abnormality involving 1-2 segments and only 1 coronary bed.       Low Risk (Less than 1% annual death or MI) 1. Normal or small myocardial   perfusion defect at rest or with stress encumbering less than 5% of the   myocardium. ECHO 03/08/2021  CONCLUSIONS     Summary  Technically somewhat difficult study. Left ventricle is normal in size and wall thickness. Global left ventricular systolic function is normal. Estimated LV EF 60-65  %.   No obvious wall motion abnormality seen. Both atria are normal in size. Normal right ventricular size and function. Mild mitral regurgitation. No radiation information found for this patient   Narrative       [facility]    Vascular Lower Extremities DVT Study Procedure        Patient Name   Quique Spencer [address] Date of Study           03/08/2021                   L        Date of Birth  1970  Gender                  Female        Age            50 year(s)  Race                            Room Number    7682        Height:                 64.17 inch, 163 cm        Corporate ID # [id number]    Weight:                 165 pounds, 74.8 kg        Patient Acct # [de-identified]   BSA:        1.81 m^2    BMI:       28.17 kg/m^2        MR #           [id number]      Sonographer             Jose Luis Price RVT        Accession #    [id number]  Interpreting Physician  Lucio Ramires        Referring                  Referring Physician     Faina Martinez    Practitioner       Procedure   Type of Study:        Veins: Lower Extremities DVT Study, Venous Scan Lower Bilateral.       Indications for Study:Positive for pulmonary embolus.       Patient Status: In Patient.    Technical Quality:Adequate visualization.        Conclusions        Summary        No evidence of superficial or deep venous thrombosis in both lower    extremities.        Signature        ----------------------------------------------------------------    Electronically signed by Jose Luis Price RVT(Sonographer) on    03/08/2021 03:13 PM    ----------------------------------------------------------------        ----------------------------------------------------------------    Electronically signed by Chandrakant Cardoza 03/08/2021 11:40 PM    ----------------------------------------------------------------       Findings:        Right Impression:                    Left Impression:    The common femoral, femoral,         The +------------------------------------+----------+---------------+----------+   ! Dist Femoral                        ! Yes       ! Yes            !None      !   +------------------------------------+----------+---------------+----------+   ! Deep Femoral                        ! Yes       ! Yes            !None      !   +------------------------------------+----------+---------------+----------+   ! Popliteal                           ! Yes       ! Yes            !None      !   +------------------------------------+----------+---------------+----------+   ! Sapheno Femoral Junction            ! Yes       ! Yes            !None      !   +------------------------------------+----------+---------------+----------+   ! PTV                                 ! Yes       ! Yes            !None      !   +------------------------------------+----------+---------------+----------+   ! Peroneal                            ! Yes       ! Yes            !None      !   +------------------------------------+----------+---------------+----------+   ! Gastroc                             ! Yes       ! Yes            !None      !   +------------------------------------+----------+---------------+----------+   ! GSV Thigh                           ! Yes       ! Yes            !None      !   +------------------------------------+----------+---------------+----------+   ! GSV Knee                            ! Yes       ! Yes            !None      !   +------------------------------------+----------+---------------+----------+   ! GSV Ankle                           ! Yes       ! Yes            !None      !   +------------------------------------+----------+---------------+----------+   ! SSV                                 ! Yes       ! Yes            !None      !   +------------------------------------+----------+---------------+----------+          Past Medical History:     Past Medical History:   Diagnosis Date    Anxiety     CAD (coronary artery disease)     Mitral valve prolapse    Fatty liver     GERD (gastroesophageal reflux disease)     Headache     History of bronchitis     Hyperlipidemia     Hypothyroidism     Kidney stone     LFT elevation     MVP (mitral valve prolapse)     Obesity     Pulmonary emboli (HCC)     Type 2 diabetes mellitus without complication ([facility] [address]     Umbilical hernia         Past Surgical History:     Past Surgical History:   Procedure Laterality Date    APPENDECTOMY       SECTION      2 pfannenstiel, 1 vertical    CHOLECYSTECTOMY      COLONOSCOPY      Dr Linda Hemphill  14    COLONOSCOPY  2014    biopsy & sigmoid spasms, pathology negative    COLONOSCOPY N/A 2018    COLONOSCOPY WITH BIOPSY performed by Al Boone MD at [address]      x 5    HYSTERECTOMY, TOTAL ABDOMINAL          NE EXPLORATORY OF ABDOMEN  10/21/2014    Laparotomy-lysis of adhesions, bso     UPPER GASTROINTESTINAL ENDOSCOPY  2010    mild chronic inactive gastritis    UPPER GASTROINTESTINAL ENDOSCOPY N/A 3/10/2021    EGD BIOPSY performed by Armin Graza MD at [facility]        Medications Prior to Admission:     Prior to Admission medications    Medication Sig Start Date End Date Taking? Authorizing Provider   oxyCODONE-acetaminophen (PERCOCET)  MG per tablet Take 1 tablet by mouth every 6 hours as needed for Pain for up to 30 days. 21 Yes MARTITA Gu CNP   oxyCODONE (OXYCONTIN) 10 MG extended release tablet Take 1 tablet by mouth every 12 hours for 30 days.  21 Yes MARTITA Gu CNP   tiZANidine (ZANAFLEX) 4 MG tablet TAKE 1 TABLET BY MOUTH EVERY 8 HOURS AS NEEDED FOR SPASMS 5/3/21  Yes MARTITA Grant CNP   bisacodyl (DULCOLAX) 5 MG EC tablet TAKE 4 TABS AT 10 AM THE DAY PRIOR TO COLONOSCOPY 4/15/21  Yes Armin Garza MD   polyethylene glycol [facility]) 17 GM/SCOOP powder Use as directed by following your patient instructions given by office. 4/15/21  Yes Nguyen Austin MD   levothyroxine (SYNTHROID) 175 MCG tablet TAKE 1 TABLET BY MOUTH DAILY 3/5/21  Yes MARTITA Cordoba CNP   clonazePAM (KLONOPIN) 0.5 MG tablet Take 1 tablet by mouth 2 times daily as needed for Anxiety for up to 30 days. 12/28/20 5/27/21 Yes Lynnette Katz MD   esomeprazole (Kintera) 40 MG delayed release capsule TAKE 1 CAPSULE BY MOUTH EVERY MORNING BEFORE BREAKFAST 12/18/20  Yes MARTITA Cordoba CNP   mirtazapine (REMERON) 15 MG tablet Take 15 mg by mouth daily 11/10/20  Yes Historical Provider, MD   rOPINIRole (REQUIP) 2 MG tablet TAKE 1 TABLET BY MOUTH EVERY NIGHT 8/14/20  Yes MARTITA Cordoba CNP   sertraline (ZOLOFT) 100 MG tablet Take 100 mg by mouth daily   Yes Historical Provider, MD   topiramate (TOPAMAX) 25 MG tablet 25 mg 2 times daily  2/27/18  Yes Historical Provider, MD   metoprolol tartrate (LOPRESSOR) 50 MG tablet Take 50 mg by mouth 2 times daily  8/21/17  Yes Historical Provider, MD   albuterol sulfate  (90 Base) MCG/ACT inhaler INHALE 2 PUFFS INTO THE LUNGS EVERY 4 HOURS AS NEEDED FOR SHORTNESS OF BREATH 4/23/21   MARTITA Cordoba CNP   magnesium citrate solution Take 296 mLs by mouth once for 1 dose 4/15/21 5/3/21  Nguyen Austin MD   rivaroxaban (XARELTO) 20 MG TABS tablet Take 1 tablet by mouth daily (with breakfast) 4/12/21   Chip Goltz, MD   lidocaine (LIDODERM) 5 % Place 1 patch onto the skin daily 12 hours on, 12 hours off. 3/4/21   Jono Escudero MD   ondansetron (ZOFRAN ODT) 4 MG disintegrating tablet Take 1 tablet by mouth every 8 hours as needed for Nausea or Vomiting 3/4/20   Ramos Lamb MD        Allergies:     Compazine [prochlorperazine], Sulfa antibiotics, and Adhesive tape    Social History:     Tobacco:    reports that she has quit smoking. Her smoking use included cigarettes. She has a 8.25 pack-year smoking history.  She has never used smokeless tobacco.  Alcohol:      reports no history of alcohol use. Drug Use:  reports no history of drug use. Family History:     Family History   Problem Relation Age of Onset   Charlene Carpenter Cancer Mother         vaginal    Heart Disease Father     Diabetes Father     Other Father         colon resection for colon polyps    Breast Cancer Maternal Grandmother     Stroke Maternal Grandfather        Review of Systems:     Positive and Negative as described in HPI. CONSTITUTIONAL: Fatigue. Night sweats. Unintentional weight loss. Negative for fevers and chills. HEENT: Pt wears glasses. Negative for hearing changes, rhinorrhea, and throat pain  RESPIRATORY: Negative for shortness of breath, cough, congestion, and wheezing. CARDIOVASCULAR: History of pulmonary emboli. MVP. Negative for chest pain, irregular heart beat, and palpitations. GASTROINTESTINAL: See HPI. GENITOURINARY: Negative for difficulty of urination, burning with urination,and frequency. INTEGUMENT: Easy bruising. Negative for rash and skin lesions. HEMATOLOGIC/LYMPHATIC: Negative for swelling/edema. ALLERGIC/IMMUNOLOGIC: Negative for urticaria and itching. ENDOCRINE: Cold intolerance. Diabetes. Negative for increase in drinking, increase in urination, heat intolerance. MUSCULOSKELETAL: Aching bilateral leg pain for the past 2-3 weeks (Pt has a PCP appointment in the near future). Pt takes Requip. Instructed pt to go the ED if she develops leg redness, leg edema, worsening leg pain, dyspnea, or chest pain. Pt voiced understanding to this instruction. NEUROLOGICAL: Numbness and tingling in the left arm. History of neck issues. Headaches. Negative for dizziness and lightheadedness. BEHAVIOR/PSYCH: Anxiety. Negative for depression. Physical Exam:   /76   Pulse 68   Temp 97.5 °F (36.4 °C) (Temporal)   Resp 20   Ht 5' 4\" (1.626 m)   Wt 175 lb (79.4 kg)   LMP 11/01/2010   SpO2 96%   BMI 30.04 kg/m²   Patient's last menstrual period was 11/01/2010.     No results for input(s): POCGLU in the last 72 hours. General Appearance:  Alert, well appearing, and in no acute distress. Obese. Mental status: Oriented to person, place, and time. Head: Normocephalic and atraumatic. Eye: Pt is wearing glasses. No icterus, redness, pupils equal and reactive, extraocular eye movements intact, and conjunctiva clear. Ear: Hearing grossly intact. Nose: No drainage noted. Mouth: Mucous membranes moist.  Neck: Distant bilateral carotid sounds. Supple. Lungs: Bilateral equal air entry, clear to auscultation, no wheezing, rales or rhonchi, and normal effort. Cardiovascular: Normal rate, regular rhythm, no murmur, gallop, and rub. Abdomen: Epigastric tenderness. Soft, nondistended, and active bowel sounds. Neurologic: Normal speech and cranial nerves II through XII grossly intact. Skin: No gross lesions, rashes, bruising, or bleeding on exposed skin area. Extremities: Mild bilateral calf tenderness. No bilateral calf erythema or edema. Posterior tibial pulses 2+ bilaterally. No pedal edema. Psych: Anxious. Investigations:      Laboratory Testing:  No results found for this or any previous visit (from the past 24 hour(s)). No results for input(s): HGB, HCT, WBC, MCV, PLATELET, NA, K, CL, CO2, BUN, CREATININE, GLUCOSE, INR, PROTIME, APTT, AST, ALT, LABALBU, HCG in the last 720 hours. Recent Labs     05/24/21  2346   COVID19       Not Detected     Diagnosis:      1. Elevated CEA, generalized abdominal pain, IBS with diarrhea and/or constipation    Plans:     1.  Diagnostic colonoscopy      MARTITA Morse CNP  5/27/2021  8:41 AM

## 2021-05-27 NOTE — ANESTHESIA POSTPROCEDURE EVALUATION
POST- ANESTHESIA EVALUATION       Pt Name: Jody Murray  MRN: [id number]  YOB: 1970  Date of evaluation: 5/27/2021  Time:  11:52 AM      /70   Pulse 67   Temp 98.6 °F (37 °C) (Temporal)   Resp 14   Ht 5' 4\" (1.626 m)   Wt 175 lb (79.4 kg)   LMP 11/01/2010   SpO2 97%   BMI 30.04 kg/m²      Consciousness Level  Awake  Cardiopulmonary Status  Stable  Pain Adequately Treated YES  Nausea / Vomiting  NO  Adequate Hydration  YES  Anesthesia Related Complications NONE      Electronically signed by Jahaira Hayes MD on 5/27/2021 at 11:52 AM       Department of Anesthesiology  Postprocedure Note    Patient: Jody Murray  MRN: [id number]  YOB: 1970  Date of evaluation: 5/27/2021  Time:  11:51 AM     Procedure Summary     Date: 05/27/21 Room / Location: [place] [address] / [facility] - INPATIENT    Anesthesia Start: 0972 Anesthesia Stop: 1024    Procedure: COLONOSCOPY DIAGNOSTIC (N/A ) Diagnosis: (DX ELEVATED CEA    GENERALIZED ABD PAIN    IBS WITH DIARRHEA AND /OR CONSTIPATION)    Surgeons: Ryan Andersen MD Responsible Provider: Jahaira Hayes MD    Anesthesia Type: MAC ASA Status: 3          Anesthesia Type: MAC    Kalia Phase I: Kalia Score: 10    Kalia Phase II: Kalia Score: 9    Last vitals: Reviewed and per EMR flowsheets.        Anesthesia Post Evaluation

## 2021-05-30 DIAGNOSIS — K21.9 GASTROESOPHAGEAL REFLUX DISEASE WITHOUT ESOPHAGITIS: ICD-10-CM

## 2021-06-01 RX ORDER — ESOMEPRAZOLE MAGNESIUM 40 MG/1
CAPSULE, DELAYED RELEASE ORAL
Qty: 90 CAPSULE | Refills: 1 | Status: SHIPPED | OUTPATIENT
Start: 2021-06-01 | End: 2021-11-26

## 2021-06-03 RX ORDER — TIZANIDINE 4 MG/1
TABLET ORAL
Qty: 90 TABLET | Refills: 0 | Status: SHIPPED | OUTPATIENT
Start: 2021-06-03 | End: 2021-06-29

## 2021-06-06 ENCOUNTER — APPOINTMENT (OUTPATIENT)
Dept: CT IMAGING | Age: 51
End: 2021-06-06
Payer: COMMERCIAL

## 2021-06-06 ENCOUNTER — HOSPITAL ENCOUNTER (EMERGENCY)
Age: 51
Discharge: HOME | End: 2021-06-06
Attending: EMERGENCY MEDICINE
Payer: COMMERCIAL

## 2021-06-06 VITALS
HEIGHT: 64 IN | HEART RATE: 72 BPM | OXYGEN SATURATION: 94 % | SYSTOLIC BLOOD PRESSURE: 99 MMHG | BODY MASS INDEX: 29.53 KG/M2 | RESPIRATION RATE: 15 BRPM | TEMPERATURE: 98 F | DIASTOLIC BLOOD PRESSURE: 72 MMHG | WEIGHT: 173 LBS

## 2021-06-06 DIAGNOSIS — J44.1 OBSTRUCTIVE CHRONIC BRONCHITIS WITH EXACERBATION (HCC): Primary | ICD-10-CM

## 2021-06-06 LAB
-: ABNORMAL
ABSOLUTE EOS #: 0.15 K/UL (ref 0–0.4)
ABSOLUTE IMMATURE GRANULOCYTE: NORMAL K/UL (ref 0–0.3)
ABSOLUTE LYMPH #: 2.2 K/UL (ref 1–4.8)
ABSOLUTE MONO #: 0.35 K/UL (ref 0.1–1.3)
ALBUMIN SERPL-MCNC: 4.1 G/DL (ref 3.5–5.2)
ALBUMIN/GLOBULIN RATIO: ABNORMAL (ref 1–2.5)
ALP BLD-CCNC: 89 U/L (ref 35–104)
ALT SERPL-CCNC: 6 U/L (ref 5–33)
AMORPHOUS: ABNORMAL
ANION GAP SERPL CALCULATED.3IONS-SCNC: 13 MMOL/L (ref 9–17)
AST SERPL-CCNC: 11 U/L
BACTERIA: ABNORMAL
BASOPHILS # BLD: 2 % (ref 0–2)
BASOPHILS ABSOLUTE: 0.1 K/UL (ref 0–0.2)
BILIRUB SERPL-MCNC: <0.15 MG/DL (ref 0.3–1.2)
BILIRUBIN URINE: NEGATIVE
BUN BLDV-MCNC: 27 MG/DL (ref 6–20)
BUN/CREAT BLD: ABNORMAL (ref 9–20)
CALCIUM SERPL-MCNC: 8.9 MG/DL (ref 8.6–10.4)
CASTS UA: ABNORMAL /LPF
CHLORIDE BLD-SCNC: 105 MMOL/L (ref 98–107)
CO2: 20 MMOL/L (ref 20–31)
COLOR: YELLOW
COMMENT UA: ABNORMAL
CREAT SERPL-MCNC: 0.94 MG/DL (ref 0.5–0.9)
CRYSTALS, UA: ABNORMAL /HPF
DIFFERENTIAL TYPE: NORMAL
EOSINOPHILS RELATIVE PERCENT: 3 % (ref 0–4)
EPITHELIAL CELLS UA: ABNORMAL /HPF
GFR AFRICAN AMERICAN: >60 ML/MIN
GFR NON-AFRICAN AMERICAN: >60 ML/MIN
GFR SERPL CREATININE-BSD FRML MDRD: ABNORMAL ML/MIN/{1.73_M2}
GFR SERPL CREATININE-BSD FRML MDRD: ABNORMAL ML/MIN/{1.73_M2}
GLUCOSE BLD-MCNC: 99 MG/DL (ref 70–99)
GLUCOSE URINE: NEGATIVE
HCG QUALITATIVE: NEGATIVE
HCT VFR BLD CALC: 38.5 % (ref 36–46)
HEMOGLOBIN: 13.2 G/DL (ref 12–16)
IMMATURE GRANULOCYTES: NORMAL %
KETONES, URINE: NEGATIVE
LEUKOCYTE ESTERASE, URINE: NEGATIVE
LIPASE: 56 U/L (ref 13–60)
LYMPHOCYTES # BLD: 44 % (ref 24–44)
MCH RBC QN AUTO: 31 PG (ref 26–34)
MCHC RBC AUTO-ENTMCNC: 34.3 G/DL (ref 31–37)
MCV RBC AUTO: 90.4 FL (ref 80–100)
MONOCYTES # BLD: 7 % (ref 1–7)
MORPHOLOGY: NORMAL
MUCUS: ABNORMAL
NITRITE, URINE: NEGATIVE
NRBC AUTOMATED: NORMAL PER 100 WBC
OTHER OBSERVATIONS UA: ABNORMAL
PDW BLD-RTO: 13.3 % (ref 11.5–14.9)
PH UA: 6 (ref 5–8)
PLATELET # BLD: 160 K/UL (ref 150–450)
PLATELET ESTIMATE: NORMAL
PMV BLD AUTO: 8.4 FL (ref 6–12)
POTASSIUM SERPL-SCNC: 4 MMOL/L (ref 3.7–5.3)
PROTEIN UA: NEGATIVE
RBC # BLD: 4.26 M/UL (ref 4–5.2)
RBC # BLD: NORMAL 10*6/UL
RBC UA: ABNORMAL /HPF
RENAL EPITHELIAL, UA: ABNORMAL /HPF
SEG NEUTROPHILS: 44 % (ref 36–66)
SEGMENTED NEUTROPHILS ABSOLUTE COUNT: 2.2 K/UL (ref 1.3–9.1)
SODIUM BLD-SCNC: 138 MMOL/L (ref 135–144)
SPECIFIC GRAVITY UA: 1.03 (ref 1–1.03)
TOTAL PROTEIN: 6.9 G/DL (ref 6.4–8.3)
TRICHOMONAS: ABNORMAL
TROPONIN INTERP: NORMAL
TROPONIN INTERP: NORMAL
TROPONIN T: NORMAL NG/ML
TROPONIN T: NORMAL NG/ML
TROPONIN, HIGH SENSITIVITY: <6 NG/L (ref 0–14)
TROPONIN, HIGH SENSITIVITY: <6 NG/L (ref 0–14)
TURBIDITY: ABNORMAL
URINE HGB: ABNORMAL
UROBILINOGEN, URINE: NORMAL
WBC # BLD: 5 K/UL (ref 3.5–11)
WBC # BLD: NORMAL 10*3/UL
WBC UA: ABNORMAL /HPF
YEAST: ABNORMAL

## 2021-06-06 PROCEDURE — 85025 COMPLETE CBC W/AUTO DIFF WBC: CPT

## 2021-06-06 PROCEDURE — 81001 URINALYSIS AUTO W/SCOPE: CPT

## 2021-06-06 PROCEDURE — 71260 CT THORAX DX C+: CPT

## 2021-06-06 PROCEDURE — 6370000000 HC RX 637 (ALT 250 FOR IP): Performed by: EMERGENCY MEDICINE

## 2021-06-06 PROCEDURE — 6360000004 HC RX CONTRAST MEDICATION: Performed by: EMERGENCY MEDICINE

## 2021-06-06 PROCEDURE — 80053 COMPREHEN METABOLIC PANEL: CPT

## 2021-06-06 PROCEDURE — 96375 TX/PRO/DX INJ NEW DRUG ADDON: CPT

## 2021-06-06 PROCEDURE — 99285 EMERGENCY DEPT VISIT HI MDM: CPT

## 2021-06-06 PROCEDURE — 93005 ELECTROCARDIOGRAM TRACING: CPT | Performed by: EMERGENCY MEDICINE

## 2021-06-06 PROCEDURE — 36415 COLL VENOUS BLD VENIPUNCTURE: CPT

## 2021-06-06 PROCEDURE — 84703 CHORIONIC GONADOTROPIN ASSAY: CPT

## 2021-06-06 PROCEDURE — 84484 ASSAY OF TROPONIN QUANT: CPT

## 2021-06-06 PROCEDURE — 96374 THER/PROPH/DIAG INJ IV PUSH: CPT

## 2021-06-06 PROCEDURE — 2580000003 HC RX 258: Performed by: EMERGENCY MEDICINE

## 2021-06-06 PROCEDURE — 6360000002 HC RX W HCPCS: Performed by: EMERGENCY MEDICINE

## 2021-06-06 PROCEDURE — 83690 ASSAY OF LIPASE: CPT

## 2021-06-06 RX ORDER — AZITHROMYCIN 250 MG/1
TABLET, FILM COATED ORAL
Qty: 1 PACKET | Refills: 0 | Status: SHIPPED | OUTPATIENT
Start: 2021-06-06 | End: 2021-06-10

## 2021-06-06 RX ORDER — 0.9 % SODIUM CHLORIDE 0.9 %
1000 INTRAVENOUS SOLUTION INTRAVENOUS ONCE
Status: COMPLETED | OUTPATIENT
Start: 2021-06-06 | End: 2021-06-06

## 2021-06-06 RX ORDER — OXYCODONE HYDROCHLORIDE AND ACETAMINOPHEN 5; 325 MG/1; MG/1
2 TABLET ORAL ONCE
Status: COMPLETED | OUTPATIENT
Start: 2021-06-06 | End: 2021-06-06

## 2021-06-06 RX ORDER — ONDANSETRON 2 MG/ML
8 INJECTION INTRAMUSCULAR; INTRAVENOUS ONCE
Status: COMPLETED | OUTPATIENT
Start: 2021-06-06 | End: 2021-06-06

## 2021-06-06 RX ORDER — MORPHINE SULFATE 4 MG/ML
4 INJECTION, SOLUTION INTRAMUSCULAR; INTRAVENOUS ONCE
Status: COMPLETED | OUTPATIENT
Start: 2021-06-06 | End: 2021-06-06

## 2021-06-06 RX ORDER — SODIUM CHLORIDE 0.9 % (FLUSH) 0.9 %
10 SYRINGE (ML) INJECTION ONCE
Status: COMPLETED | OUTPATIENT
Start: 2021-06-06 | End: 2021-06-06

## 2021-06-06 RX ORDER — PREDNISONE 10 MG/1
40 TABLET ORAL DAILY
Qty: 20 TABLET | Refills: 0 | Status: SHIPPED | OUTPATIENT
Start: 2021-06-06 | End: 2021-06-11

## 2021-06-06 RX ORDER — ALBUTEROL SULFATE 90 UG/1
2 AEROSOL, METERED RESPIRATORY (INHALATION) EVERY 6 HOURS PRN
Qty: 1 INHALER | Refills: 3 | Status: SHIPPED | OUTPATIENT
Start: 2021-06-06

## 2021-06-06 RX ORDER — 0.9 % SODIUM CHLORIDE 0.9 %
80 INTRAVENOUS SOLUTION INTRAVENOUS ONCE
Status: COMPLETED | OUTPATIENT
Start: 2021-06-06 | End: 2021-06-06

## 2021-06-06 RX ADMIN — ONDANSETRON 8 MG: 2 INJECTION INTRAMUSCULAR; INTRAVENOUS at 11:18

## 2021-06-06 RX ADMIN — IOPAMIDOL 75 ML: 755 INJECTION, SOLUTION INTRAVENOUS at 12:13

## 2021-06-06 RX ADMIN — MORPHINE SULFATE 4 MG: 4 INJECTION, SOLUTION INTRAMUSCULAR; INTRAVENOUS at 11:21

## 2021-06-06 RX ADMIN — SODIUM CHLORIDE 80 ML: 9 INJECTION, SOLUTION INTRAVENOUS at 12:13

## 2021-06-06 RX ADMIN — SODIUM CHLORIDE, PRESERVATIVE FREE 10 ML: 5 INJECTION INTRAVENOUS at 12:13

## 2021-06-06 RX ADMIN — OXYCODONE HYDROCHLORIDE AND ACETAMINOPHEN 2 TABLET: 5; 325 TABLET ORAL at 14:25

## 2021-06-06 RX ADMIN — SODIUM CHLORIDE 1000 ML: 9 INJECTION, SOLUTION INTRAVENOUS at 11:16

## 2021-06-06 ASSESSMENT — ENCOUNTER SYMPTOMS
SORE THROAT: 0
DIARRHEA: 0
EYE PAIN: 0
NAUSEA: 1
COUGH: 1
VOMITING: 1
BACK PAIN: 1
EYE REDNESS: 0
CHEST TIGHTNESS: 1
SHORTNESS OF BREATH: 1
CONSTIPATION: 0
ABDOMINAL PAIN: 0

## 2021-06-06 ASSESSMENT — PAIN SCALES - GENERAL
PAINLEVEL_OUTOF10: 0
PAINLEVEL_OUTOF10: 8
PAINLEVEL_OUTOF10: 8
PAINLEVEL_OUTOF10: 7

## 2021-06-06 ASSESSMENT — PAIN DESCRIPTION - LOCATION
LOCATION: BACK

## 2021-06-06 ASSESSMENT — PAIN DESCRIPTION - DESCRIPTORS: DESCRIPTORS: STABBING

## 2021-06-06 ASSESSMENT — PAIN DESCRIPTION - PAIN TYPE
TYPE: ACUTE PAIN

## 2021-06-06 NOTE — ED PROVIDER NOTES
[address]  eMERGENCY dEPARTMENT eNCOUnter    Pt Name: Madison Singh  MRN: [id number]  [place] 1970  Date of evaluation: 6/6/21  CHIEF COMPLAINT       Chief Complaint   Patient presents with    Back Pain     lower up to mid back middle shooting out to the sides    Nausea     from back pain    Emesis     from back pain    Leg Pain     bilat. calf/yoana horse    Chest Pain     right side, hard to take deep breath     HISTORY OF PRESENT ILLNESS   HPI  Pain from radiating up from her lower back radiating up her entire back. Worse with pain and movement. She also reports discomfort (\"a weird feeling\") in the front of her chest. She reports all these symptoms are worse with inspiration. She has a mild cough productive of sputum with specks of blood. Bilateral calf pain which feels like muscle spasms. PMH of PE on Xarelto for the last 2 months. Associated with nausea and vomiting. REVIEW OF SYSTEMS     Review of Systems   Constitutional: Positive for appetite change and fatigue. Negative for chills and fever. HENT: Negative for congestion, ear pain and sore throat. Eyes: Negative for pain, redness and visual disturbance. Respiratory: Positive for cough, chest tightness and shortness of breath. Cardiovascular: Positive for chest pain. Negative for palpitations. Gastrointestinal: Positive for nausea and vomiting. Negative for abdominal pain, constipation and diarrhea. Genitourinary: Negative for dysuria and vaginal discharge. Musculoskeletal: Positive for back pain and myalgias. Negative for neck pain. Skin: Negative for rash and wound. Neurological: Negative for seizures, syncope and headaches.      PASTMEDICAL HISTORY     Past Medical History:   Diagnosis Date    Anxiety     CAD (coronary artery disease)     Mitral valve prolapse    Fatty liver     GERD (gastroesophageal reflux disease)     Headache     History of bronchitis     Hyperlipidemia     Hypothyroidism     Kidney stone     LFT elevation     MVP (mitral valve prolapse)     Obesity     Pulmonary emboli (HCC)     Type 2 [facility] mellitus without complication [facility]     Umbilical hernia      SURGICAL HISTORY       Past Surgical History:   Procedure Laterality Date    APPENDECTOMY       SECTION      2 pfannenstiel, 1 vertical    CHOLECYSTECTOMY      COLONOSCOPY      Dr Ramsey Gambino  14    COLONOSCOPY  2014    biopsy & sigmoid spasms, pathology negative    COLONOSCOPY N/A 2018    COLONOSCOPY WITH BIOPSY performed by Khai Penn MD at [facility] N/A 2021    COLONOSCOPY DIAGNOSTIC performed by Roseanne Horton MD at [facility] 84      x 5    HYSTERECTOMY, TOTAL ABDOMINAL          MD EXPLORATORY OF ABDOMEN  10/21/2014    Laparotomy-lysis of adhesions, bso     UPPER GASTROINTESTINAL ENDOSCOPY  2010    mild chronic inactive gastritis    UPPER GASTROINTESTINAL ENDOSCOPY N/A 3/10/2021    EGD BIOPSY performed by Roseanne Horton MD at [address] [place] [address]       Discharge Medication List as of 2021  2:51 PM      CONTINUE these medications which have NOT CHANGED    Details   tiZANidine (ZANAFLEX) 4 MG tablet TAKE 1 TABLET BY MOUTH EVERY 8 HOURS AS NEEDED FOR SPASMS, Disp-90 tablet, R-0Normal      esomeprazole (NEXIUM) 40 MG delayed release capsule TAKE 1 CAPSULE BY MOUTH EVERY MORNING BEFORE BREAKFAST, Disp-90 capsule, R-1Normal      oxyCODONE-acetaminophen (PERCOCET)  MG per tablet Take 1 tablet by mouth every 6 hours as needed for Pain for up to 30 days. , Disp-120 tablet, R-0Normal      oxyCODONE (OXYCONTIN) 10 MG extended release tablet Take 1 tablet by mouth every 12 hours for 30 days. , Disp-60 each, R-0Normal      !! albuterol sulfate  (90 Base) MCG/ACT inhaler INHALE 2 PUFFS INTO THE LUNGS EVERY 4 HOURS AS NEEDED FOR SHORTNESS OF BREATH, Disp-42.5 g, R-3Normal      magnesium citrate solution Take 296 mLs by mouth once for 1 dose, Disp-296 mL, R-0Normal      bisacodyl (DULCOLAX) 5 MG EC tablet TAKE 4 TABS AT 10 AM THE DAY PRIOR TO COLONOSCOPY, Disp-4 tablet, R-0Normal      polyethylene glycol (GLYCOLAX) 17 GM/SCOOP powder Use as directed by following your patient instructions given by office. , Disp-238 g, R-0Normal      rivaroxaban (XARELTO) 20 MG TABS tablet Take 1 tablet by mouth daily (with breakfast), Disp-90 tablet, R-3Normal      levothyroxine (SYNTHROID) 175 MCG tablet TAKE 1 TABLET BY MOUTH DAILY, Disp-90 tablet, R-1Normal      lidocaine (LIDODERM) 5 % Place 1 patch onto the skin daily 12 hours on, 12 hours off., Disp-10 patch, R-0Print      clonazePAM (KLONOPIN) 0.5 MG tablet Take 1 tablet by mouth 2 times daily as needed for Anxiety for up to 30 days. , Disp-60 tablet, R-1Normal      mirtazapine (REMERON) 15 MG tablet Take 15 mg by mouth dailyHistorical Med      rOPINIRole (REQUIP) 2 MG tablet TAKE 1 TABLET BY MOUTH EVERY NIGHT, Disp-90 tablet,R-3Normal      ondansetron (ZOFRAN ODT) 4 MG disintegrating tablet Take 1 tablet by mouth every 8 hours as needed for Nausea or Vomiting, Disp-10 tablet, R-0Print      sertraline (ZOLOFT) 100 MG tablet Take 100 mg by mouth dailyHistorical Med      topiramate (TOPAMAX) 25 MG tablet 25 mg 2 times daily Historical Med      metoprolol tartrate (LOPRESSOR) 50 MG tablet Take 50 mg by mouth 2 times daily Historical Med       !! - Potential duplicate medications found. Please discuss with provider. ALLERGIES     is allergic to compazine [prochlorperazine], sulfa antibiotics, and adhesive tape. FAMILY HISTORY     She indicated that her mother is . She indicated that her father is alive. She indicated that her maternal grandmother is . She indicated that her maternal grandfather is . She indicated that her paternal grandmother is . She indicated that her paternal grandfather is .      SOCIALHISTORY      reports that she has quit smoking. Her smoking use included cigarettes. She has a 8.25 pack-year smoking history. She has never used smokeless tobacco. She reports that she does not drink alcohol and does not use drugs. PHYSICAL EXAM     INITIAL VITALS: BP 99/72   Pulse 72   Temp 98 °F (36.7 °C) (Oral)   Resp 15   Ht 5' 4\" (1.626 m)   Wt 173 lb (78.5 kg)   LMP 11/01/2010   SpO2 94%   BMI 29.70 kg/m²    Physical Exam  Vitals and nursing note reviewed. Constitutional:       Appearance: She is well-developed. Comments: Non-toxic appearing. HENT:      Head: Normocephalic and atraumatic. Right Ear: External ear normal.      Left Ear: External ear normal.   Eyes:      General:         Left eye: No discharge. Conjunctiva/sclera: Conjunctivae normal.      Pupils: Pupils are equal, round, and reactive to light. Neck:      Vascular: No JVD. Trachea: No tracheal deviation. Cardiovascular:      Rate and Rhythm: Normal rate and regular rhythm. Heart sounds: Normal heart sounds. Pulmonary:      Effort: Pulmonary effort is normal. No respiratory distress. Breath sounds: Normal breath sounds. No stridor. Comments: A few scattered expiratory wheezes noted. Abdominal:      General: Bowel sounds are normal.      Palpations: Abdomen is soft. Tenderness: There is no abdominal tenderness. Musculoskeletal:         General: No tenderness or deformity. Normal range of motion. Cervical back: Normal range of motion and neck supple. Comments: No calf tenderness or swelling. Skin:     General: Skin is warm and dry. Neurological:      Mental Status: She is oriented to person, place, and time. Cranial Nerves: No cranial nerve deficit. Coordination: Coordination normal.         MEDICAL DECISION MAKING:   Assessment:  Roxana Guerra is a 53 y.o. female who presents with cough, shortness of breath, back pain and fatigue.        ED Course/MDM:   Patient arrived hemodynamically stable and in no acute distress. Patient's previous imaging and studies reviewed. Labs reviewed and generally reassuring. CT PE obtained with no PE, stable nodules discussed with the patient, ground glass opacity bilaterally. Given productive cough, sick symptoms, history of emphysema we will treat bronchitis with steroids, antibiotics and inhalers. Discussed outpatient follow up of pulmonary nodules and adenopathy, she has been seeing Dr Donavon Garrett and is going to discuss at next appointment. Dc home. Procedures    DIAGNOSTIC RESULTS   EKG: All EKG's are interpreted by the Emergency Department Physician who either signs or Co-signs this chart inthe absence of a cardiologist.      RADIOLOGY:All plain film, CT, MRI, and formal ultrasound images (except ED bedside ultrasound) are read by the radiologist, see reports below, unless otherwise noted in MDM or here. CT CHEST PULMONARY EMBOLISM W CONTRAST   Final Result   No findings to suggest large central pulmonary embolism as discussed above. Stable subcentimeter noncalcified pulmonary nodules measuring up to 5 mm and   shotty mediastinal and hilar lymph nodes. Continued surveillance is   suggested given the underlying emphysema. Dependent ground-glass opacity bilaterally, favoring atelectasis given the   distribution. RECOMMENDATIONS:   Fleischner Society guidelines for follow-up and management of incidentally   detected pulmonary nodules:      Single Solid Nodule:      Nodule size less than 6 mm   In a low-risk patient, no routine follow-up. In a high-risk patient, optional CT at 12 months. Nodule size equals 6-8 mm   In a low-risk patient, CT at 6-12 months, then consider CT at 18-24 months. In a high-risk patient, CT at 6-12 months, then CT at 18-24 months. Nodule size greater than 8 mm         In a low-risk patient, consider CT at 3 months, PET/CT, or tissue sampling.       In a high-risk patient, consider CT at 3 months, PET/CT, or tissue sampling. Multiple Solid Nodules:      Nodule size less than 6 mm   In a low-risk patient, no routine follow-up. In a high-risk patient, optional CT at 12 months. Nodule size equals 6-8 mm   In a low-risk patient, CT at 3-6 months, then consider CT at 18-24 months. In a high-risk patient, CT at 3-6 months, then CT at 18-24 months. Nodule size greater than 8 mm   In a low-risk patient, CT at 3-6 months, then consider CT at 18-24 months. In a high-risk patient, CT at 3-6 months, then CT at 18-24 months. - Low risk patients include individuals with minimal or absent history of   smoking and other known risk factors. - High risk patients include individuals with a history or smoking or known   risk factors. Radiology 2017 http://pubs. rsna.org/doi/full/10.1148/radiol. [email or web address]           LABS: All lab results were reviewed by myself, and all abnormals are listed below.   Labs Reviewed   COMPREHENSIVE METABOLIC PANEL - Abnormal; Notable for the following components:       Result Value    BUN 27 (*)     CREATININE 0.94 (*)     Total Bilirubin <0.15 (*)     All other components within normal limits   URINALYSIS - Abnormal; Notable for the following components:    Turbidity UA CLOUDY (*)     Urine Hgb SMALL (*)     All other components within normal limits   MICROSCOPIC URINALYSIS - Abnormal; Notable for the following components:    Bacteria, UA FEW (*)     Mucus, UA 2+ (*)     All other components within normal limits   CBC WITH AUTO DIFFERENTIAL   LIPASE   TROPONIN   TROPONIN   HCG, SERUM, QUALITATIVE     EMERGENCY DEPARTMENT COURSE:   Vitals:    Vitals:    06/06/21 1300 06/06/21 1315 06/06/21 1345 06/06/21 1400   BP: 102/69   99/72   Pulse: 74 73 76 72   Resp: 14 13 17 15   Temp:       TempSrc:       SpO2: 92% 94% 95% 94%   Weight:       Height:           The patient was given the following medications while in the emergency department:  Orders Placed This Encounter

## 2021-06-07 PROCEDURE — 93010 ELECTROCARDIOGRAM REPORT: CPT | Performed by: INTERNAL MEDICINE

## 2021-06-08 LAB
EKG ATRIAL RATE: 72 BPM
EKG P AXIS: 69 DEGREES
EKG P-R INTERVAL: 170 MS
EKG Q-T INTERVAL: 420 MS
EKG QRS DURATION: 84 MS
EKG QTC CALCULATION (BAZETT): 459 MS
EKG R AXIS: 63 DEGREES
EKG T AXIS: 75 DEGREES
EKG VENTRICULAR RATE: 72 BPM

## 2021-06-09 ENCOUNTER — HOSPITAL ENCOUNTER (OUTPATIENT)
Dept: PAIN MANAGEMENT | Age: 51
Discharge: HOME | End: 2021-06-09
Payer: COMMERCIAL

## 2021-06-09 DIAGNOSIS — M79.18 MYOFASCIAL MUSCLE PAIN: Primary | ICD-10-CM

## 2021-06-09 DIAGNOSIS — M48.02 DEGENERATIVE CERVICAL SPINAL STENOSIS: ICD-10-CM

## 2021-06-09 DIAGNOSIS — M54.12 CERVICAL RADICULOPATHY: ICD-10-CM

## 2021-06-09 DIAGNOSIS — Z51.81 ENCOUNTER FOR MEDICATION MONITORING: ICD-10-CM

## 2021-06-09 DIAGNOSIS — M47.812 ARTHROPATHY OF CERVICAL FACET JOINT: ICD-10-CM

## 2021-06-09 PROCEDURE — 99213 OFFICE O/P EST LOW 20 MIN: CPT

## 2021-06-09 PROCEDURE — 99213 OFFICE O/P EST LOW 20 MIN: CPT | Performed by: NURSE PRACTITIONER

## 2021-06-09 RX ORDER — OXYCODONE HCL 10 MG/1
10 TABLET, FILM COATED, EXTENDED RELEASE ORAL EVERY 12 HOURS
Qty: 58 EACH | Refills: 0 | Status: SHIPPED | OUTPATIENT
Start: 2021-06-11 | End: 2021-07-08 | Stop reason: SDUPTHER

## 2021-06-09 RX ORDER — CLONAZEPAM 1 MG/1
TABLET ORAL
COMMUNITY
Start: 2021-05-17 | End: 2021-07-08

## 2021-06-09 RX ORDER — OXYCODONE AND ACETAMINOPHEN 10; 325 MG/1; MG/1
1 TABLET ORAL EVERY 6 HOURS PRN
Qty: 120 TABLET | Refills: 0 | Status: SHIPPED | OUTPATIENT
Start: 2021-06-11 | End: 2021-07-08 | Stop reason: SDUPTHER

## 2021-06-09 ASSESSMENT — ENCOUNTER SYMPTOMS
BACK PAIN: 1
SHORTNESS OF BREATH: 1
NAUSEA: 1

## 2021-06-09 NOTE — PROGRESS NOTES
[facility] 89 PROGRESS NOTE      Patient  completed [x]  video visit   []   phone call:         Minutes :       [x]    to  review Medication Agreement    []  Follow up after procedure   []  Discuss treatment options      Location:  Provider:  working from    [x]    home    []   [facility] - KADEEM MONTEJO ,   patient at home         Chief Complaint:   Neck pain    She c/o neck painradiating  down left shoulder and arm. She had visit with CNP at Neurosurgeons office. She had  left cervical facet injection in 2019 with 70% relief. She had recent pulmonary embolus and is on xarelto. She has loss about 100 pounds in a year. She still does not much of an appetite. She had colonoscopy. She saw oncology and had testing done and will follow up with Oncologist. Her  was elevated. She will be seeing  her gynecologist.  Her sleep is off and on. She is tired more. Neck Pain   This is a chronic problem. The problem occurs constantly. The problem has been unchanged. The pain is present in the left side (left shoulder and arm). Quality: pounding. The pain is at a severity of 7/10. The pain is moderate. Exacerbated by: turning neck. The pain is same all the time. Associated symptoms include headaches, numbness and weakness. She has tried heat for the symptoms.           Treatment goals:  Functional status: not sure      Aberrancy:   Any alcoholic beverages  no          Any illegal drugs   no      Analgesia:   7                  Adverse  Effects :none      ADL;s :decreased      Data:    When was thelast UDS:     2-3-2021       Was the UDS appropriate:  [x] yes []   no      Record/Diagnostics Review:      As above, I did review the imaging       2/7/2021  7:53 PM - LiuTyree Incoming Lab Results From [facility]    Component Value Ref Range & Units Status Collected Lab   Pain Management Drug Panel Interp, Urine Inconsistent   Final 02/03/2021  3:51 PM [facility]   (NOTE) ________________________________________________________________   DRUGS EXPECTED:   NO DRUGS EXPECTED   ________________________________________________________________   INCONSISTENT with medications provided:   Oxycodone   Noroxycodone   7-Aminoclonazepam   ________________________________________________________________   INTERPRETIVE INFORMATION: Targeted drug profile Interp   Interpretation depends on accuracy and completeness of patient   medication information submitted by client. 3/26/2021  8:25 PM - Liu, Stephypn Incoming Lab Results From [facility]    Component Value Ref Range & Units Status Collected Lab    52High   <38 U/mL Final 03/26/2021  1:24 PM [facility]   Testing Performed By         CTA OF THE CHEST 6/6/2021 12:03 pm       TECHNIQUE:   CTA of the chest was performed after the administration of intravenous   contrast.  Multiplanar reformatted images are provided for review.  MIP   images are provided for review.  Dose modulation, iterative reconstruction,   and/or weight based adjustment of the mA/kV was utilized to reduce the   radiation dose to as low as reasonably achievable.       COMPARISON:   04/23/2021       HISTORY:   ORDERING SYSTEM PROVIDED HISTORY: pleuritic chest pain, hemoptysis   TECHNOLOGIST PROVIDED HISTORY:   pleuritic chest pain, hemoptysis   Decision Support Exception - unselect if not a suspected or confirmed   emergency medical condition->Emergency Medical Condition (MA)   Reason for Exam: pleuritic chest pain, hemoptysis   Additional signs and symptoms: patient c/o chest and back pain; h/o pe       FINDINGS:   Pulmonary Arteries: Within the main, central most right, central most left   pulmonary arteries, no evidence of filling defect to suggest large central   pulmonary embolism.  Optimal peripheral branch evaluation beyond the level of   the gama is somewhat limited by artifact.       Mediastinum: Pulsation artifact is seen within the ascending aorta. Lenetta Cave is   otherwise no gross aortic intraluminal flap.  Mediastinal and hilar lymph   nodes are again demonstrated.  No axillary adenopathy.       Lungs/pleura: Emphysema is present.  Scattered noncalcified pulmonary nodules   measuring up to approximately 5 mm are grossly similar in distribution as   compared to prior.  Dependent ground-glass opacity bilaterally, likely   atelectasis given the distribution, similar to prior.  No pneumothorax.       Upper Abdomen: Status post cholecystectomy.  Thickening of the left adrenal   gland, similar to prior.       Soft Tissues/Bones: Degenerative change of the spine.           Impression   No findings to suggest large central pulmonary embolism as discussed above.       Stable subcentimeter noncalcified pulmonary nodules measuring up to 5 mm and   shotty mediastinal and hilar lymph nodes.  Continued surveillance is   suggested given the underlying emphysema.       Dependent ground-glass opacity bilaterally, favoring atelectasis given the   distribution.       RECOMMENDATIONS:   Fleischner Society guidelines for follow-up and management of incidentally   detected pulmonary nodules:       Single Solid Nodule:       Nodule size less than 6 mm   In a low-risk patient, no routine follow-up. In a high-risk patient, optional CT at 12 months.       Nodule size equals 6-8 mm   In a low-risk patient, CT at 6-12 months, then consider CT at 18-24 months. In a high-risk patient, CT at 6-12 months, then CT at 18-24 months.       Nodule size greater than 8 mm           In a low-risk patient, consider CT at 3 months, PET/CT, or tissue sampling.       In a high-risk patient, consider CT at 3 months, PET/CT, or tissue sampling.       Multiple Solid Nodules:       Nodule size less than 6 mm   In a low-risk patient, no routine follow-up.    In a high-risk patient, optional CT at 12 months.       Nodule size equals 6-8 mm   In a low-risk patient, CT at 3-6 months, then consider CT or additional bony neural foraminal   narrowing.  No paraspinal soft tissue abnormality.       The visualized lung apices are grossly clear.           Impression   Mild to moderate multilevel cervical degenerative disc disease with moderate   bony neural foraminal narrowing on the left at C3-C4 and C5-C6.  No acute   fracture or subluxation.  Straightening of the normal cervical lordosis which   may be related to muscle spasm or position in collar.             EXAMINATION:   MRI OF THE CERVICAL SPINE WITHOUT CONTRAST 3/4/2021 2:53 pm       TECHNIQUE:   Multiplanar multisequence MRI of the cervical spine was performed without the   administration of intravenous contrast.       COMPARISON:   06/12/2020       HISTORY:   ORDERING SYSTEM PROVIDED HISTORY: Cervical radiculopathy   TECHNOLOGIST PROVIDED HISTORY:   evaluate for stenosis   Is the patient pregnant?->No   Reason for Exam: pt stated left shoulder arm pain weakness numbness x 1 mth   Acuity: Acute   Type of Exam: Initial   Additional signs and symptoms: pt stated left shoulder arm pain weakness   numbness x 1 mth, NKI       FINDINGS:   BONES/ALIGNMENT: There is normal alignment of the spine. The vertebral body   heights are maintained. The bone marrow signal appears unremarkable.  There   is minimal degenerative disc disease with disc space narrowing and   osteophytes at C3-4, C4-5, C5-6 and C6-7.       SPINAL CORD:  No abnormal signal is identified within the spinal cord.       SOFT TISSUES: No paraspinal mass identified.       C2-C3: There is minimum disc protrusion of 2 mm centrally. The thecal sac and   neural foramina are intact.       C3-C4: There is disc protrusion osteophyte toward the left measuring 3-4 mm. The thecal sac is not stenotic.  There is mild narrowing of the left neural   foramen.  The right neural foramen is intact.       C4-C5: There is disc protrusion osteophyte measuring 2-3 mm.  The thecal sac   is mildly stenotic measuring 9.5 mm. Disc and/or osteophytes result in mild   narrowing of the neural foramina bilaterally. The left neural foramen is   narrowed greater than right.       C5-C6: There is disc protrusion osteophyte measuring 5-6 mm toward the left   narrowing the left neural foramen.  The thecal sac is mildly stenotic   measuring 9.3 mm.  The right neural foramen is intact.       C6-C7: Disc and/or osteophytes cause minimal narrowing of the neural foramina   bilaterally.  The thecal sac is not stenotic.       C7-T1:  The thecal sac and neural foramina are intact.           Impression   Disc and osteophytes result in narrowing of the neural foramina and mild   stenosis of the thecal sac throughout the cervical region as discussed in   detail above.                 Pill count: appropriate    fill date :2021 [place]: morphine equivalent dose  Periodic Controlled Substance Monitoring: Possible medication side effects, risk of tolerance/dependence & alternative treatments discussed., No signs of potential drug abuse or diversion identified. , Assessed functional status., Obtaining appropriate analgesic effect of treatment. Yandy Palacio, APRN - CNP)  Review of[place]ARRS does not show any aberrant prescription behavior. Medication is helping the patient stay active. Patient denies any side effects and reports adequate analgesia. No sign of misuse/abuse.             Past Medical History:   Diagnosis Date    Anxiety     CAD (coronary artery disease)     Mitral valve prolapse    Fatty liver     GERD (gastroesophageal reflux disease)     Headache     History of bronchitis     Hyperlipidemia     Hypothyroidism     Kidney stone     LFT elevation     MVP (mitral valve prolapse)     Obesity     Osteoarthritis of cervical spine     Pulmonary emboli (HCC)     Type [facility] mellitus [facility]     Umbilical hernia        Past Surgical History:   Procedure Laterality Date    APPENDECTOMY       MG tablet, TAKE 1 TABLET BY MOUTH EVERY NIGHT, Disp: 90 tablet, Rfl: 3    sertraline (ZOLOFT) 100 MG tablet, Take 100 mg by mouth daily, Disp: , Rfl:     topiramate (TOPAMAX) 25 MG tablet, 25 mg 2 times daily , Disp: , Rfl:     metoprolol tartrate (LOPRESSOR) 50 MG tablet, Take 50 mg by mouth 2 times daily , Disp: , Rfl:     clonazePAM (KLONOPIN) 1 MG tablet, TAKE 1/2 TABLET BY MOUTH TWICE DAILY, Disp: , Rfl:     albuterol sulfate HFA (VENTOLIN HFA) 108 (90 Base) MCG/ACT inhaler, Inhale 2 puffs into the lungs every 6 hours as needed for Wheezing, Disp: 1 Inhaler, Rfl: 3    tiZANidine (ZANAFLEX) 4 MG tablet, TAKE 1 TABLET BY MOUTH EVERY 8 HOURS AS NEEDED FOR SPASMS, Disp: 90 tablet, Rfl: 0    albuterol sulfate  (90 Base) MCG/ACT inhaler, INHALE 2 PUFFS INTO THE LUNGS EVERY 4 HOURS AS NEEDED FOR SHORTNESS OF BREATH, Disp: 42.5 g, Rfl: 3    lidocaine (LIDODERM) 5 %, Place 1 patch onto the skin daily 12 hours on, 12 hours off., Disp: 10 patch, Rfl: 0    clonazePAM (KLONOPIN) 0.5 MG tablet, Take 1 tablet by mouth 2 times daily as needed for Anxiety for up to 30 days. , Disp: 60 tablet, Rfl: 1    ondansetron (ZOFRAN ODT) 4 MG disintegrating tablet, Take 1 tablet by mouth every 8 hours as needed for Nausea or Vomiting, Disp: 10 tablet, Rfl: 0    Family History   Problem Relation Age of Onset    Cancer Mother         vaginal    Heart Disease Father     Diabetes Father     Other Father         colon resection for colon polyps    Breast Cancer Maternal Grandmother     Stroke Maternal Grandfather        Social History     Socioeconomic History    Marital status:      Spouse name: Not on file    Number of children: Not on file    Years of education: Not on file    Highest education level: Not on file   Occupational History    Occupation: Homemaker   Tobacco Use    Smoking status: Former Smoker     Packs/day: 0.25     Years: 33.00     Pack years: 8.25     Types: Cigarettes    Smokeless Mental Status: She is alert and oriented to person, place, and time. Psychiatric:         Mood and Affect: Mood normal.         Thought Content: Thought content normal.           Assessment:    Problem List Items Addressed This Visit     Myofascial muscle pain - Primary    Encounter for medication monitoring    Degenerative cervical spinal stenosis    Arthropathy of cervical facet joint              Treatment Plan:  DISCUSSION: Treatment options discussed withpatient and all questions answered to patient's satisfaction. Possible side effects, risk of tolerance and or dependence and alternative treatments discussed    Obtaining appropriate analgesic effect of treatment   No signs of potential drug abuse or diversion identified    [x] Ill effects of being on chronic pain medications such as sleep disturbances, respiratory depression, hormonal changes, withdrawal symptoms, chronic opioid dependence and tolerance as well as risk of taking opioids with Benzodiazepines and taking opioids along with alcohol,  werediscussed with patient. I had asked the patient to minimize medication use and utilize pain medications only for uncontrolled rest pain or pain with exertional activities. I advised patient not to self-escalate painmedications without consulting with us. At each of patient's future visits we will try to taper pain medications, while adjusting the adjunct medications, and re-evaluating for Physical Therapy to improve spinal andjoint strength. We will continue to have discussions to decrease pain medications as tolerated. Counseled patient on effects their pain medication and /or their medical condition mayhave on their  ability to drive or operate machinery.  Instructed not to drive or operate machinery if drowsy     I also discussed with the patient regarding the dangers of combining narcotic pain medication with tranquilizers, alcohol or illegal drugs or taking the medication any way other than prescribed. The dangers were discussed  including respiratory depression and death. Patient was told to tell  all  physicians regarding the medications he is getting from pain clinic. Patient is warned not to take any unprescribed medications over-the-countermedications that can depress breathing . Patient is advised to talk to the pharmacist or physicians if planning to take any over-the-counter medications before  takeing them. Patient is strongly advised to avoid tranquilizers or  relaxants, illegal drugs  or any medications that can depress breathing  Patient is also advised to tell us if there is any changes in their medications from other physicians.     1. Will do script for oxycontin 29 days worth to get on same schedule as percocet      TREATMENT OPTIONS:       Medication Agreement Requirements Met  Continue Opioid therapy  Script written for  Percocet, oxycontin  Follow up appointment made

## 2021-06-10 ENCOUNTER — APPOINTMENT (OUTPATIENT)
Dept: CT IMAGING | Age: 51
End: 2021-06-10
Payer: COMMERCIAL

## 2021-06-10 ENCOUNTER — HOSPITAL ENCOUNTER (EMERGENCY)
Age: 51
Discharge: HOME | End: 2021-06-10
Attending: EMERGENCY MEDICINE
Payer: COMMERCIAL

## 2021-06-10 VITALS
HEART RATE: 76 BPM | HEIGHT: 64 IN | DIASTOLIC BLOOD PRESSURE: 105 MMHG | WEIGHT: 170 LBS | SYSTOLIC BLOOD PRESSURE: 141 MMHG | TEMPERATURE: 99 F | OXYGEN SATURATION: 98 % | BODY MASS INDEX: 29.02 KG/M2 | RESPIRATION RATE: 18 BRPM

## 2021-06-10 DIAGNOSIS — R10.9 RIGHT FLANK PAIN: Primary | ICD-10-CM

## 2021-06-10 LAB
-: ABNORMAL
ABSOLUTE EOS #: 0.1 K/UL (ref 0–0.4)
ABSOLUTE IMMATURE GRANULOCYTE: ABNORMAL K/UL (ref 0–0.3)
ABSOLUTE LYMPH #: 1.5 K/UL (ref 1–4.8)
ABSOLUTE MONO #: 0.4 K/UL (ref 0.1–1.3)
ALBUMIN SERPL-MCNC: 3.9 G/DL (ref 3.5–5.2)
ALBUMIN/GLOBULIN RATIO: ABNORMAL (ref 1–2.5)
ALP BLD-CCNC: 84 U/L (ref 35–104)
ALT SERPL-CCNC: 6 U/L (ref 5–33)
AMORPHOUS: ABNORMAL
ANION GAP SERPL CALCULATED.3IONS-SCNC: 13 MMOL/L (ref 9–17)
AST SERPL-CCNC: 12 U/L
BACTERIA: ABNORMAL
BASOPHILS # BLD: 1 % (ref 0–2)
BASOPHILS ABSOLUTE: 0.1 K/UL (ref 0–0.2)
BILIRUB SERPL-MCNC: 0.22 MG/DL (ref 0.3–1.2)
BILIRUBIN URINE: ABNORMAL
BUN BLDV-MCNC: 19 MG/DL (ref 6–20)
BUN/CREAT BLD: ABNORMAL (ref 9–20)
CALCIUM SERPL-MCNC: 8.7 MG/DL (ref 8.6–10.4)
CASTS UA: ABNORMAL /LPF
CHLORIDE BLD-SCNC: 108 MMOL/L (ref 98–107)
CO2: 17 MMOL/L (ref 20–31)
COLOR: ABNORMAL
COMMENT UA: ABNORMAL
CREAT SERPL-MCNC: 0.86 MG/DL (ref 0.5–0.9)
CRYSTALS, UA: ABNORMAL /HPF
DIFFERENTIAL TYPE: ABNORMAL
EOSINOPHILS RELATIVE PERCENT: 2 % (ref 0–4)
EPITHELIAL CELLS UA: ABNORMAL /HPF
GFR AFRICAN AMERICAN: >60 ML/MIN
GFR NON-AFRICAN AMERICAN: >60 ML/MIN
GFR SERPL CREATININE-BSD FRML MDRD: ABNORMAL ML/MIN/{1.73_M2}
GFR SERPL CREATININE-BSD FRML MDRD: ABNORMAL ML/MIN/{1.73_M2}
GLUCOSE BLD-MCNC: 107 MG/DL (ref 70–99)
GLUCOSE URINE: NEGATIVE
HCT VFR BLD CALC: 41.1 % (ref 36–46)
HEMOGLOBIN: 14 G/DL (ref 12–16)
IMMATURE GRANULOCYTES: ABNORMAL %
KETONES, URINE: ABNORMAL
LEUKOCYTE ESTERASE, URINE: NEGATIVE
LIPASE: 86 U/L (ref 13–60)
LYMPHOCYTES # BLD: 21 % (ref 24–44)
MCH RBC QN AUTO: 30.9 PG (ref 26–34)
MCHC RBC AUTO-ENTMCNC: 33.9 G/DL (ref 31–37)
MCV RBC AUTO: 90.9 FL (ref 80–100)
MONOCYTES # BLD: 5 % (ref 1–7)
MUCUS: ABNORMAL
NITRITE, URINE: NEGATIVE
NRBC AUTOMATED: ABNORMAL PER 100 WBC
OTHER OBSERVATIONS UA: ABNORMAL
PDW BLD-RTO: 13.3 % (ref 11.5–14.9)
PH UA: 6 (ref 5–8)
PLATELET # BLD: 163 K/UL (ref 150–450)
PLATELET ESTIMATE: ABNORMAL
PMV BLD AUTO: 8.4 FL (ref 6–12)
POTASSIUM SERPL-SCNC: 3.6 MMOL/L (ref 3.7–5.3)
PROTEIN UA: NEGATIVE
RBC # BLD: 4.52 M/UL (ref 4–5.2)
RBC # BLD: ABNORMAL 10*6/UL
RBC UA: ABNORMAL /HPF
RENAL EPITHELIAL, UA: ABNORMAL /HPF
SEG NEUTROPHILS: 71 % (ref 36–66)
SEGMENTED NEUTROPHILS ABSOLUTE COUNT: 5.3 K/UL (ref 1.3–9.1)
SODIUM BLD-SCNC: 138 MMOL/L (ref 135–144)
SPECIFIC GRAVITY UA: 1.02 (ref 1–1.03)
TOTAL PROTEIN: 6.6 G/DL (ref 6.4–8.3)
TRICHOMONAS: ABNORMAL
TURBIDITY: CLEAR
URINE HGB: ABNORMAL
UROBILINOGEN, URINE: NORMAL
WBC # BLD: 7.3 K/UL (ref 3.5–11)
WBC # BLD: ABNORMAL 10*3/UL
WBC UA: ABNORMAL /HPF
YEAST: ABNORMAL

## 2021-06-10 PROCEDURE — 96375 TX/PRO/DX INJ NEW DRUG ADDON: CPT

## 2021-06-10 PROCEDURE — 2580000003 HC RX 258: Performed by: EMERGENCY MEDICINE

## 2021-06-10 PROCEDURE — 74176 CT ABD & PELVIS W/O CONTRAST: CPT

## 2021-06-10 PROCEDURE — 85025 COMPLETE CBC W/AUTO DIFF WBC: CPT

## 2021-06-10 PROCEDURE — 36415 COLL VENOUS BLD VENIPUNCTURE: CPT

## 2021-06-10 PROCEDURE — 81001 URINALYSIS AUTO W/SCOPE: CPT

## 2021-06-10 PROCEDURE — 6360000002 HC RX W HCPCS: Performed by: EMERGENCY MEDICINE

## 2021-06-10 PROCEDURE — 80053 COMPREHEN METABOLIC PANEL: CPT

## 2021-06-10 PROCEDURE — 99284 EMERGENCY DEPT VISIT MOD MDM: CPT

## 2021-06-10 PROCEDURE — 83690 ASSAY OF LIPASE: CPT

## 2021-06-10 PROCEDURE — 96374 THER/PROPH/DIAG INJ IV PUSH: CPT

## 2021-06-10 RX ORDER — ONDANSETRON 2 MG/ML
4 INJECTION INTRAMUSCULAR; INTRAVENOUS ONCE
Status: COMPLETED | OUTPATIENT
Start: 2021-06-10 | End: 2021-06-10

## 2021-06-10 RX ORDER — KETOROLAC TROMETHAMINE 30 MG/ML
30 INJECTION, SOLUTION INTRAMUSCULAR; INTRAVENOUS ONCE
Status: COMPLETED | OUTPATIENT
Start: 2021-06-10 | End: 2021-06-10

## 2021-06-10 RX ORDER — 0.9 % SODIUM CHLORIDE 0.9 %
1000 INTRAVENOUS SOLUTION INTRAVENOUS ONCE
Status: COMPLETED | OUTPATIENT
Start: 2021-06-10 | End: 2021-06-10

## 2021-06-10 RX ORDER — ORPHENADRINE CITRATE 30 MG/ML
60 INJECTION INTRAMUSCULAR; INTRAVENOUS ONCE
Status: COMPLETED | OUTPATIENT
Start: 2021-06-10 | End: 2021-06-10

## 2021-06-10 RX ADMIN — ORPHENADRINE CITRATE 60 MG: 60 INJECTION INTRAMUSCULAR; INTRAVENOUS at 15:43

## 2021-06-10 RX ADMIN — ONDANSETRON 4 MG: 2 INJECTION INTRAMUSCULAR; INTRAVENOUS at 14:29

## 2021-06-10 RX ADMIN — SODIUM CHLORIDE 1000 ML: 9 INJECTION, SOLUTION INTRAVENOUS at 14:30

## 2021-06-10 RX ADMIN — KETOROLAC TROMETHAMINE 30 MG: 30 INJECTION, SOLUTION INTRAMUSCULAR; INTRAVENOUS at 14:28

## 2021-06-10 ASSESSMENT — ENCOUNTER SYMPTOMS
RHINORRHEA: 0
SINUS PRESSURE: 0
WHEEZING: 0
BLOOD IN STOOL: 0
TROUBLE SWALLOWING: 0
CHEST TIGHTNESS: 0
FACIAL SWELLING: 0
EYE DISCHARGE: 0
EYE PAIN: 0
SHORTNESS OF BREATH: 0
BACK PAIN: 0
ABDOMINAL PAIN: 0
COUGH: 0
NAUSEA: 0
CONSTIPATION: 0
SORE THROAT: 0
EYE REDNESS: 0
DIARRHEA: 0
COLOR CHANGE: 0
VOMITING: 0

## 2021-06-10 ASSESSMENT — PAIN SCALES - GENERAL: PAINLEVEL_OUTOF10: 10

## 2021-06-10 NOTE — ED PROVIDER NOTES
[address]  eMERGENCY dEPARTMENT eNCOUnter      Pt Name: Madison Singh  MRN: [id number]  [place] 1970  Date of evaluation: 6/10/21      CHIEF COMPLAINT       Chief Complaint   Patient presents with    Flank Pain     right         HISTORY OF PRESENT ILLNESS    Madison Singh is a 53 y.o. female who presents complaining of flank pain. Patient had sudden onset of severe right-sided flank pain that shot up towards her head. Patient states that she is very nauseous with this and hyperventilating because of the pain. Patient denies vomiting or diarrhea. Patient states this started just prior to arrival after urinating. Patient states she did not urinate much but did notice any blood. Patient has a history of kidney stones when she was younger. Patient has no numbness tingling or weakness in the arms or legs. REVIEW OF SYSTEMS       Review of Systems   Constitutional: Negative for activity change, appetite change, chills, diaphoresis and fever. HENT: Negative for congestion, ear pain, facial swelling, nosebleeds, rhinorrhea, sinus pressure, sore throat and trouble swallowing. Eyes: Negative for pain, discharge and redness. Respiratory: Negative for cough, chest tightness, shortness of breath and wheezing. Cardiovascular: Negative for chest pain, palpitations and leg swelling. Gastrointestinal: Negative for abdominal pain, blood in stool, constipation, diarrhea, nausea and vomiting. Genitourinary: Positive for flank pain. Negative for difficulty urinating, dysuria, frequency, genital sores and hematuria. Musculoskeletal: Negative for arthralgias, back pain, gait problem, joint swelling, myalgias and neck pain. Skin: Negative for color change, pallor, rash and wound. Neurological: Negative for dizziness, tremors, seizures, syncope, speech difficulty, weakness, numbness and headaches.    Psychiatric/Behavioral: Negative for confusion, decreased concentration, hallucinations, self-injury, sleep disturbance and suicidal ideas. PAST MEDICAL HISTORY     Past Medical History:   Diagnosis Date    Anxiety     CAD (coronary artery disease)     Mitral valve prolapse    Fatty liver     GERD (gastroesophageal reflux disease)     Headache     History of bronchitis     Hyperlipidemia     Hypothyroidism     Kidney stone     LFT elevation     MVP (mitral valve prolapse)     Obesity     Osteoarthritis of cervical spine     Pulmonary emboli (HCC)     Type 2 diabetes mellitus without complication ([facility] [address]    Umbilical hernia        SURGICAL HISTORY       Past Surgical History:   Procedure Laterality Date    APPENDECTOMY       SECTION      2 pfannenstiel, 1 vertical    CHOLECYSTECTOMY      COLONOSCOPY      Dr Campuzano Median COLONOSCOPY  14    COLONOSCOPY  2014    biopsy & sigmoid spasms, pathology negative    COLONOSCOPY N/A 2018    COLONOSCOPY WITH BIOPSY performed by Malcolm Carter MD at [address] N/A 2021    COLONOSCOPY DIAGNOSTIC performed by Darlene Leal MD at [address]      x 5    HYSTERECTOMY, TOTAL ABDOMINAL          ID EXPLORATORY OF ABDOMEN  10/21/2014    Laparotomy-lysis of adhesions, bso     UPPER GASTROINTESTINAL ENDOSCOPY  2010    mild chronic inactive gastritis    UPPER GASTROINTESTINAL ENDOSCOPY N/A 3/10/2021    EGD BIOPSY performed by Darlene Leal MD at [facility]       Current Discharge Medication List      CONTINUE these medications which have NOT CHANGED    Details   clonazePAM (KLONOPIN) 1 MG tablet TAKE 1/2 TABLET BY MOUTH TWICE DAILY      oxyCODONE-acetaminophen (PERCOCET)  MG per tablet Take 1 tablet by mouth every 6 hours as needed for Pain for up to 30 days. Qty: 120 tablet, Refills: 0    Comments: Reduce doses taken as pain becomes manageable fill 2021  Associated Diagnoses: Degenerative cervical spinal stenosis;  Encounter for medication monitoring; Arthropathy of cervical facet joint; Cervical radiculopathy      oxyCODONE (OXYCONTIN) 10 MG extended release tablet Take 1 tablet by mouth every 12 hours for 29 days. Qty: 58 each, Refills: 0    Comments: Reduce doses taken as pain becomes manageable fill 6- to get on same schedule as percocet  Associated Diagnoses: Degenerative cervical spinal stenosis; Arthropathy of cervical facet joint; Cervical radiculopathy      !! albuterol sulfate HFA (VENTOLIN HFA) 108 (90 Base) MCG/ACT inhaler Inhale 2 puffs into the lungs every 6 hours as needed for Wheezing  Qty: 1 Inhaler, Refills: 3      azithromycin (ZITHROMAX Z-MARIANNE) 250 MG tablet Take 2 tablets (500 mg) on Day 1, and then take 1 tablet (250 mg) on days 2 through 5.   Qty: 1 packet, Refills: 0    Associated Diagnoses: Obstructive chronic bronchitis with exacerbation ([facility])      predniSONE (DELTASONE) 10 MG tablet Take 4 tablets by mouth daily for 5 days  Qty: 20 tablet, Refills: 0      tiZANidine (ZANAFLEX) 4 MG tablet TAKE 1 TABLET BY MOUTH EVERY 8 HOURS AS NEEDED FOR SPASMS  Qty: 90 tablet, Refills: 0      esomeprazole (NEXIUM) 40 MG delayed release capsule TAKE 1 CAPSULE BY MOUTH EVERY MORNING BEFORE BREAKFAST  Qty: 90 capsule, Refills: 1    Associated Diagnoses: Gastroesophageal reflux disease without esophagitis      !! albuterol sulfate  (90 Base) MCG/ACT inhaler INHALE 2 PUFFS INTO THE LUNGS EVERY 4 HOURS AS NEEDED FOR SHORTNESS OF BREATH  Qty: 42.5 g, Refills: 3      rivaroxaban (XARELTO) 20 MG TABS tablet Take 1 tablet by mouth daily (with breakfast)  Qty: 90 tablet, Refills: 3    Associated Diagnoses: Acute pulmonary embolism, unspecified pulmonary embolism type, unspecified whether acute cor pulmonale present ([facility])      levothyroxine (SYNTHROID) 175 MCG tablet TAKE 1 TABLET BY MOUTH DAILY  Qty: 90 tablet, Refills: 1    Associated Diagnoses: Hypothyroidism, unspecified type      lidocaine (LIDODERM) 5 % Place 1 patch onto the skin daily 12 hours on, 12 hours off. Qty: 10 patch, Refills: 0      mirtazapine (REMERON) 15 MG tablet Take 15 mg by mouth daily      rOPINIRole (REQUIP) 2 MG tablet TAKE 1 TABLET BY MOUTH EVERY NIGHT  Qty: 90 tablet, Refills: 3      ondansetron (ZOFRAN ODT) 4 MG disintegrating tablet Take 1 tablet by mouth every 8 hours as needed for Nausea or Vomiting  Qty: 10 tablet, Refills: 0      sertraline (ZOLOFT) 100 MG tablet Take 100 mg by mouth daily      topiramate (TOPAMAX) 25 MG tablet 25 mg 2 times daily       metoprolol tartrate (LOPRESSOR) 50 MG tablet Take 50 mg by mouth 2 times daily        ! ! - Potential duplicate medications found. Please discuss with provider. ALLERGIES     is allergic to compazine [prochlorperazine], sulfa antibiotics, and adhesive tape. SOCIAL HISTORY      reports that she has quit smoking. Her smoking use included cigarettes. She has a 8.25 pack-year smoking history. She has never used smokeless tobacco. She reports that she does not drink alcohol and does not use drugs. PHYSICAL EXAM     INITIAL VITALS: BP (!) 141/105   Pulse 76   Temp 99 °F (37.2 °C) (Oral)   Resp 18   Ht 5' 4\" (1.626 m)   Wt 170 lb (77.1 kg)   LMP 11/01/2010   SpO2 98%   BMI 29.18 kg/m²      Physical Exam  Vitals and nursing note reviewed. Constitutional:       General: She is not in acute distress. Appearance: She is well-developed. She is not diaphoretic. HENT:      Head: Normocephalic and atraumatic. Eyes:      General: No scleral icterus. Right eye: No discharge. Left eye: No discharge. Conjunctiva/sclera: Conjunctivae normal.      Pupils: Pupils are equal, round, and reactive to light. Cardiovascular:      Rate and Rhythm: Normal rate and regular rhythm. Heart sounds: Normal heart sounds. No murmur heard. No friction rub. No gallop. Pulmonary:      Effort: Pulmonary effort is normal. No respiratory distress.       Breath sounds: Normal breath sounds. No wheezing or rales. Chest:      Chest wall: No tenderness. Abdominal:      General: Bowel sounds are normal. There is no distension. Palpations: Abdomen is soft. There is no mass. Tenderness: There is no abdominal tenderness. There is right CVA tenderness. There is no guarding or rebound. Musculoskeletal:         General: No tenderness. Normal range of motion. Skin:     General: Skin is warm and dry. Coloration: Skin is not pale. Findings: No erythema or rash. Neurological:      Mental Status: She is alert and oriented to person, place, and time. Cranial Nerves: No cranial nerve deficit. Sensory: No sensory deficit. Motor: No abnormal muscle tone. Coordination: Coordination normal.      Deep Tendon Reflexes: Reflexes normal.   Psychiatric:         Behavior: Behavior normal.         Thought Content: Thought content normal.         Judgment: Judgment normal.         DIAGNOSTIC RESULTS     RADIOLOGY:All plain film, CT,MRI, and formal ultrasound images (except ED bedside ultrasound) are read by the radiologist and the interpretations are directly viewed by the emergency physician. CT ABDOMEN PELVIS WO CONTRAST Additional Contrast? None    Result Date: 6/10/2021  EXAMINATION: CT OF THE ABDOMEN AND PELVIS WITHOUT CONTRAST 6/10/2021 3:02 pm TECHNIQUE: CT of the abdomen and pelvis was performed without the administration of intravenous contrast. Multiplanar reformatted images are provided for review. Dose modulation, iterative reconstruction, and/or weight based adjustment of the mA/kV was utilized to reduce the radiation dose to as low as reasonably achievable. COMPARISON: None.  HISTORY: ORDERING SYSTEM PROVIDED HISTORY: Right upper quadrant abdominal pain with radiation to the back TECHNOLOGIST PROVIDED HISTORY: Rt flank pain Decision Support Exception - unselect if not a suspected or confirmed emergency medical condition->Emergency Medical Condition (MA) Is the patient pregnant?->No Reason for Exam: right flank pain Acuity: Acute Type of Exam: Initial Additional signs and symptoms: Pt c/o right flank pain today; Relevant Medical/Surgical History: hx hysterectomy FINDINGS: Lower Chest: Mild dependent atelectasis bilaterally. No effusion. Organs: Status post cholecystectomy. Liver, spleen, pancreas, adrenals and kidneys demonstrate no acute abnormality. No nephrolithiasis or collecting system dilatation. GI/Bowel: No abnormal bowel dilatation or wall thickening. Mild sigmoid diverticulosis. Fat attenuation noted within the wall of the cecum ascending colon. Pelvis: Urinary bladder appears unremarkable given the lack of distention. Status post hysterectomy. Peritoneum/Retroperitoneum: No free fluid. No retroperitoneal adenopathy. Abdominal aorta is normal in caliber. Bones/Soft Tissues: No acute abnormality. No aggressive osseous lesion. Status post umbilical hernia repair. No acute intra-abdominal pathology identified. No evidence of nephrolithiasis or obstructive uropathy. Colonic diverticulosis without evidence of acute diverticulitis. Fatty infiltration of the bowel wall involving the cecum and ascending colon may indicate longstanding or microscopic colitis. LABS: All lab results were reviewed by myself, and all abnormals are listed below.   Labs Reviewed   CBC WITH AUTO DIFFERENTIAL - Abnormal; Notable for the following components:       Result Value    Seg Neutrophils 71 (*)     Lymphocytes 21 (*)     All other components within normal limits   COMPREHENSIVE METABOLIC PANEL - Abnormal; Notable for the following components:    Glucose 107 (*)     Potassium 3.6 (*)     Chloride 108 (*)     CO2 17 (*)     Total Bilirubin 0.22 (*)     All other components within normal limits   LIPASE - Abnormal; Notable for the following components:    Lipase 86 (*)     All other components within normal limits   URINE RT REFLEX TO CULTURE - Abnormal; Notable for the following components:    Color, UA DARK YELLOW (*)     Bilirubin Urine   (*)     Value: Presumptive positive. Unable to confirm due to unavailability of reagent. Ketones, Urine TRACE (*)     Urine Hgb LARGE (*)     All other components within normal limits   MICROSCOPIC URINALYSIS - Abnormal; Notable for the following components:    Bacteria, UA FEW (*)     Mucus, UA 2+ (*)     All other components within normal limits         MEDICAL DECISION MAKING:     Patient symptoms sound like they could be from a kidney stone. We will get her something for the pain check some labs and get a CT scan. EMERGENCY DEPARTMENT COURSE:   Vitals:    Vitals:    06/10/21 1421   BP: (!) 141/105   Pulse: 76   Resp: 18   Temp: 99 °F (37.2 °C)   TempSrc: Oral   SpO2: 98%   Weight: 170 lb (77.1 kg)   Height: 5' 4\" (1.626 m)       The patient was given the following medications while in the emergency department:  Orders Placed This Encounter   Medications    0.9 % sodium chloride bolus    ondansetron (ZOFRAN) injection 4 mg    ketorolac (TORADOL) injection 30 mg    orphenadrine (NORFLEX) injection 60 mg       -------------------------  3:56 PM EDT  Patient was updated on the results and plan of care. CONSULTS:  None    PROCEDURES:  None    FINAL IMPRESSION      1.  Right flank pain          DISPOSITION/PLAN   DISPOSITION Decision To Discharge 06/10/2021 03:54:54 PM      PATIENT REFERREDTO:  MARTITA Royal - CNP  [address] [facility]  [phone]    In 1 week      [facility] ED  [place] [address]  [phone]    If symptoms worsen      DISCHARGEMEDICATIONS:  Current Discharge Medication List          (Please note that portions of this note were completed with a voice recognition program.  Efforts were made to edit thedictations but occasionally words are mis-transcribed.)    Tony Henao MD  Attending Emergency Physician                        Tony Henao MD  06/10/21 [address] [place]

## 2021-06-10 NOTE — ED NOTES
Bed: 09  Expected date:   Expected time:   Means of arrival:   Comments:   [address] [facility] , RN  06/10/21 9298

## 2021-06-29 RX ORDER — TIZANIDINE 4 MG/1
TABLET ORAL
Qty: 90 TABLET | Refills: 0 | Status: SHIPPED | OUTPATIENT
Start: 2021-06-29 | End: 2021-08-02

## 2021-07-08 ENCOUNTER — HOSPITAL ENCOUNTER (OUTPATIENT)
Dept: PAIN MANAGEMENT | Age: 51
Discharge: HOME | End: 2021-07-08
Payer: COMMERCIAL

## 2021-07-08 DIAGNOSIS — Z51.81 ENCOUNTER FOR MEDICATION MONITORING: ICD-10-CM

## 2021-07-08 DIAGNOSIS — M47.812 ARTHROPATHY OF CERVICAL FACET JOINT: ICD-10-CM

## 2021-07-08 DIAGNOSIS — S13.4XXS SPRAIN OF ATLANTO-OCCIPITAL JOINT, SEQUELA: ICD-10-CM

## 2021-07-08 DIAGNOSIS — M54.12 CERVICAL RADICULOPATHY: ICD-10-CM

## 2021-07-08 DIAGNOSIS — M48.02 DEGENERATIVE CERVICAL SPINAL STENOSIS: ICD-10-CM

## 2021-07-08 DIAGNOSIS — G44.209 TRIGGER POINT WITH TENSION HEADACHE: Primary | ICD-10-CM

## 2021-07-08 PROCEDURE — 99213 OFFICE O/P EST LOW 20 MIN: CPT

## 2021-07-08 PROCEDURE — 99213 OFFICE O/P EST LOW 20 MIN: CPT | Performed by: NURSE PRACTITIONER

## 2021-07-08 RX ORDER — TRAZODONE HYDROCHLORIDE 100 MG/1
TABLET ORAL
COMMUNITY
Start: 2021-06-15 | End: 2021-07-08

## 2021-07-08 RX ORDER — NALOXONE HYDROCHLORIDE 4 MG/.1ML
1 SPRAY NASAL PRN
Qty: 1 EACH | Refills: 0 | Status: SHIPPED | OUTPATIENT
Start: 2021-07-08 | End: 2022-02-18

## 2021-07-08 RX ORDER — OXYCODONE AND ACETAMINOPHEN 10; 325 MG/1; MG/1
1 TABLET ORAL EVERY 6 HOURS PRN
Qty: 120 TABLET | Refills: 0 | Status: SHIPPED | OUTPATIENT
Start: 2021-07-12 | End: 2021-08-09 | Stop reason: SDUPTHER

## 2021-07-08 RX ORDER — OXYCODONE HCL 10 MG/1
10 TABLET, FILM COATED, EXTENDED RELEASE ORAL EVERY 12 HOURS
Qty: 60 EACH | Refills: 0 | Status: SHIPPED | OUTPATIENT
Start: 2021-07-08 | End: 2021-08-07

## 2021-07-08 ASSESSMENT — ENCOUNTER SYMPTOMS
RESPIRATORY NEGATIVE: 1
NAUSEA: 1

## 2021-07-08 NOTE — PROGRESS NOTES
[address] [address] PROGRESS NOTE      Patient  completed [x]  video visit   []   phone call:         Minutes :       [x]    to  review Medication Agreement    []  Follow up after procedure   []  Discuss treatment options      Location:  Provider:  working from    [x]    home    []   [facility] - KADEEM MONTEJO ,   patient at home       Chief Complaint: neck pain    She c/o neck pain radiating down both arms now, it started on her right side about a week and a half ago. She had left cervical facet injection C2-T1, 11-5- 2019 with no relief but 4-2019 with 70% relief., She states her neck pain is worse. She will discuss with  at next visit about having  Another injection. She saw Neurosurgeon and will not be having surgery at this time. She sleeps about 5-6 hours. She is on xarelto for pulmonary embolism May, 2012. She did see oncology for her significant weight loses, 100 pounds in a year,. Her CEA was elevated. She will follow up again. with her Oncologist.She states she saw Her Gyn a month and a half ago due to elevated  CEA. She states had hysterectomy  In 2010. Frank Veras She had one Ed visit. for back pain  and neck pain, she states pain shot up her spine. and elevated B/P    Neck Pain   This is a chronic problem. The current episode started more than 1 year ago. The problem occurs constantly. The pain is present in the midline (down arms). Quality: sharp. The pain is at a severity of 7/10. The pain is moderate. Exacerbated by: certain movements. The pain is worse during the night. Associated symptoms include headaches, numbness, weakness and weight loss. She has tried muscle relaxants, oral narcotics and heat for the symptoms. Treatment goals:  Functional status: no goal at present    Aberrancy:   Any alcoholic beverages   no         Any illegal drugs   no      Analgesia:         7            Adverse  Effects :      ADL;s :works from home.     Data:    When was thelast UDS:       2-3-2021     Was the UDS appropriate:  [x] yes []   no      Record/Diagnostics Review:      As above, I did review the imaging     2/7/2021  7:53 PM - Liu, Mhpn Incoming Lab Results From [facility]    Component Value Ref Range & Units Status Collected Lab   Pain Management Drug Panel Interp, Urine Inconsistent   Final 02/03/2021  3:51 PM [facility]   (NOTE)   ________________________________________________________________   DRUGS EXPECTED:   NO DRUGS EXPECTED   ________________________________________________________________   INCONSISTENT with medications provided:   Oxycodone   Noroxycodone   7-Aminoclonazepam   ________________________________________________________________   INTERPRETIVE INFORMATION: Targeted drug profile Interp   Interpretation depends on accuracy and completeness of patient   medication information submitted by client.     6-Acetylmorphine, Ur Not Detected   Final 02/03/2021  3:51 PM [facility]   7-Aminoclonazepam, Urine Present   Final 02/03/2021  3:51 PM [facility]   Alpha-OH-Alpraz, Urine Not Detected   Final 02/03/2021  3:51 PM [facility]   Alprazolam, Urine Not Detected   Final 02/03/2021  3:51 PM [facility]   Amphetamines, urine Not Detected   Final 02/03/2021  3:51 PM [facility]   Barbiturates, Ur Not Detected   Final 02/03/2021  3:51 PM [facility]   Benzoylecgonine, Ur Not Detected   Final 02/03/2021  3:51 PM [facility]   Buprenorphine Urine Not Detected   Final 02/03/2021  3:51 PM [facility]   Carisoprodol, Ur Not Detected   Final 02/03/2021  3:51 PM      3/26/2021  8:25 PM - Liu, Tyree Incoming Lab Results From [facility]    Component Value Ref Range & Units Status Collected Lab    52High   <38 U/mL Final 03/26/2021  1:24 PM [address] Performed By    Jennyfer Morales Name Director Address Valid Date Range   [address] MD [address] [facility] [address] 08/30/17 0801-Present   Lab and Collection     - 3/26/2021  Result History     on 3/26/2021   Result Information    Flag: AbnormalAbnormal   Status: Final result (Collected: 3/26/2021 13:24)      3/26/2021  8:42 PM - Liu, Mhpn Incoming Lab Results From [facility]    Component Value Ref Range & Units Status Collected Lab   CEA 8.3High   <3.9 ng/mL Final 03/26/2021  1:24 PM [address] \"ECLIA\" assay is used.  Results obtained with different assay methods cannot be          EXAMINATION:   CT OF THE ABDOMEN AND PELVIS WITHOUT CONTRAST 6/10/2021 3:02 pm       TECHNIQUE:   CT of the abdomen and pelvis was performed without the administration of   intravenous contrast. Multiplanar reformatted images are provided for review. Dose modulation, iterative reconstruction, and/or weight based adjustment of   the mA/kV was utilized to reduce the radiation dose to as low as reasonably   achievable.       COMPARISON:   None.       HISTORY:   ORDERING SYSTEM PROVIDED HISTORY: Right upper quadrant abdominal pain with   radiation to the back   TECHNOLOGIST PROVIDED HISTORY:   Rt flank pain       Decision Support Exception - unselect if not a suspected or confirmed   emergency medical condition->Emergency Medical Condition (MA)   Is the patient pregnant?->No   Reason for Exam: right flank pain   Acuity: Acute   Type of Exam: Initial   Additional signs and symptoms: Pt c/o right flank pain today;   Relevant Medical/Surgical History: hx hysterectomy       FINDINGS:   Lower Chest: Mild dependent atelectasis bilaterally.  No effusion.       Organs: Status post cholecystectomy.  Liver, spleen, pancreas, adrenals and   kidneys demonstrate no acute abnormality.  No nephrolithiasis or collecting   system dilatation.       GI/Bowel: No abnormal bowel dilatation or wall thickening.  Mild sigmoid   diverticulosis.  Fat attenuation noted within the wall of the cecum ascending   colon.       Pelvis: Urinary bladder appears unremarkable given the lack of distention.    Status post hysterectomy.     Peritoneum/Retroperitoneum: No free fluid.  No retroperitoneal adenopathy. Abdominal aorta is normal in caliber.       Bones/Soft Tissues: No acute abnormality.  No aggressive osseous lesion. Status post umbilical hernia repair.           Impression   No acute intra-abdominal pathology identified.  No evidence of   nephrolithiasis or obstructive uropathy.       Colonic diverticulosis without evidence of acute diverticulitis. EXAMINATION:   MRI OF THE CERVICAL SPINE WITHOUT CONTRAST 3/4/2021 2:53 pm       TECHNIQUE:   Multiplanar multisequence MRI of the cervical spine was performed without the   administration of intravenous contrast.       COMPARISON:   06/12/2020       HISTORY:   ORDERING SYSTEM PROVIDED HISTORY: Cervical radiculopathy   TECHNOLOGIST PROVIDED HISTORY:   evaluate for stenosis   Is the patient pregnant?->No   Reason for Exam: pt stated left shoulder arm pain weakness numbness x 1 mth   Acuity: Acute   Type of Exam: Initial   Additional signs and symptoms: pt stated left shoulder arm pain weakness   numbness x 1 mth, NKI       FINDINGS:   BONES/ALIGNMENT: There is normal alignment of the spine. The vertebral body   heights are maintained. The bone marrow signal appears unremarkable.  There   is minimal degenerative disc disease with disc space narrowing and   osteophytes at C3-4, C4-5, C5-6 and C6-7.       SPINAL CORD:  No abnormal signal is identified within the spinal cord.       SOFT TISSUES: No paraspinal mass identified.       C2-C3: There is minimum disc protrusion of 2 mm centrally. The thecal sac and   neural foramina are intact.       C3-C4: There is disc protrusion osteophyte toward the left measuring 3-4 mm. The thecal sac is not stenotic.  There is mild narrowing of the left neural   foramen.  The right neural foramen is intact.       C4-C5: There is disc protrusion osteophyte measuring 2-3 mm.  The thecal sac   is mildly stenotic measuring 9.5 mm.  Disc and/or osteophytes result in mild   narrowing of the neural foramina bilaterally. The left neural foramen is   narrowed greater than right.       C5-C6: There is disc protrusion osteophyte measuring 5-6 mm toward the left   narrowing the left neural foramen.  The thecal sac is mildly stenotic   measuring 9.3 mm.  The right neural foramen is intact.       C6-C7: Disc and/or osteophytes cause minimal narrowing of the neural foramina   bilaterally.  The thecal sac is not stenotic.       C7-T1:  The thecal sac and neural foramina are intact.           Impression   Disc and osteophytes result in narrowing of the neural foramina and mild   stenosis of the thecal sac throughout the cervical region as discussed in   detail above.                     Pill count: appropriate    fill date :2021    Morphine equivalent dose as reported on [place]ARRS:90  Periodic Controlled Substance Monitoring: Possible medication side effects, risk of tolerance/dependence & alternative treatments discussed., No signs of potential drug abuse or diversion identified. , Assessed functional status., Obtaining appropriate analgesic effect of treatment. Jeff Arias, APRN - CNP)  Review ofOARRS does not show any aberrant prescription behavior. Medication is helping the patient stay active. Patient denies any side effects and reports adequate analgesia. No sign of misuse/abuse.             Past Medical History:   Diagnosis Date    Anxiety     CAD (coronary artery disease)     Mitral valve prolapse    Fatty liver     GERD (gastroesophageal reflux disease)     Headache     History of bronchitis     Hyperlipidemia     Hypothyroidism     Kidney stone     LFT elevation     MVP (mitral valve prolapse)     Obesity     Osteoarthritis of cervical spine     Pulmonary emboli (HCC)     Type 2 diabetes mellitus without complication ([facility] [address]     Umbilical hernia        Past Surgical History:   Procedure Laterality Date    APPENDECTOMY       SECTION 2 pfannenstiel, 1 vertical    CHOLECYSTECTOMY      COLONOSCOPY  2009    Dr Scot Arvizu  4/23/14    COLONOSCOPY  04/23/2014    biopsy & sigmoid spasms, pathology negative    COLONOSCOPY N/A 2/12/2018    COLONOSCOPY WITH BIOPSY performed by John Colón MD at [facility] N/A 5/27/2021    COLONOSCOPY DIAGNOSTIC performed by Ignacio Matt MD at [facility] 84      x 5    HYSTERECTOMY, TOTAL ABDOMINAL      2010    [place] EXPLORATORY OF ABDOMEN  10/21/2014    Laparotomy-lysis of adhesions, bso     UPPER GASTROINTESTINAL ENDOSCOPY  2/17/2010    mild chronic inactive gastritis    UPPER GASTROINTESTINAL ENDOSCOPY N/A 3/10/2021    EGD BIOPSY performed by Ignacio Matt MD at [facility] ENDO       Allergies   Allergen Reactions    Compazine [Prochlorperazine]      Jittery, restless    Sulfa Antibiotics Hives    Adhesive Tape Rash         Current Outpatient Medications:     oxyCODONE-acetaminophen (PERCOCET)  MG per tablet, Take 1 tablet by mouth every 6 hours as needed for Pain for up to 30 days. , Disp: 120 tablet, Rfl: 0    oxyCODONE (OXYCONTIN) 10 MG extended release tablet, Take 1 tablet by mouth every 12 hours for 29 days. , Disp: 58 each, Rfl: 0    esomeprazole (NEXIUM) 40 MG delayed release capsule, TAKE 1 CAPSULE BY MOUTH EVERY MORNING BEFORE BREAKFAST, Disp: 90 capsule, Rfl: 1    rivaroxaban (XARELTO) 20 MG TABS tablet, Take 1 tablet by mouth daily (with breakfast), Disp: 90 tablet, Rfl: 3    levothyroxine (SYNTHROID) 175 MCG tablet, TAKE 1 TABLET BY MOUTH DAILY, Disp: 90 tablet, Rfl: 1    mirtazapine (REMERON) 15 MG tablet, Take 15 mg by mouth daily, Disp: , Rfl:     rOPINIRole (REQUIP) 2 MG tablet, TAKE 1 TABLET BY MOUTH EVERY NIGHT, Disp: 90 tablet, Rfl: 3    sertraline (ZOLOFT) 100 MG tablet, Take 100 mg by mouth daily, Disp: , Rfl:     topiramate (TOPAMAX) 25 MG tablet, 25 mg 2 times daily , Disp: , Rfl:     metoprolol tartrate (LOPRESSOR) 50 MG tablet, Take Resource Strain:     Difficulty of Paying Living Expenses:    Food Insecurity:     Worried About Running Out of Food in the Last Year:     [address] in the Last Year:    Transportation Needs:     Lack of Transportation (Medical):  Lack of Transportation (Non-Medical):    Physical Activity:     Days of Exercise per Week:     Minutes of Exercise per Session:    Stress:     Feeling of Stress :    Social Connections:     Frequency of Communication with Friends and Family:     Frequency of Social Gatherings with Friends and Family:     Attends Church Services:     Active Member of Clubs or Organizations:     Attends Club or Organization Meetings:     Marital Status:    Intimate Partner Violence:     Fear of Current or Ex-Partner:     Emotionally Abused:     Physically Abused:     Sexually Abused:          Review of Systems:  Review of Systems   Constitutional: Positive for decreased appetite and weight loss. Weight loss over  100 pounds in a  year   HENT: Negative. Eyes:        Glasses   Cardiovascular: Negative. Respiratory: Negative. Hematologic/Lymphatic: Bruises/bleeds easily. Skin: Negative. Musculoskeletal: Positive for joint pain and neck pain. Gastrointestinal: Positive for nausea. Genitourinary: Negative. Neurological: Positive for headaches, numbness and weakness. Psychiatric/Behavioral: Positive for depression. Goes to [place]         Physical Exam:  [facility] 11/01/2010     Physical Exam  Pulmonary:      Effort: Pulmonary effort is normal.   Skin:         Neurological:      Mental Status: She is alert and oriented to person, place, and time. Psychiatric:         Mood and Affect: Mood normal.         Thought Content:  Thought content normal.           Assessment:      Problem List Items Addressed This Visit     Trigger point with tension headache - Primary    Relevant Medications    oxyCODONE-acetaminophen (PERCOCET)  MG per tablet (Start on 7/12/2021)    oxyCODONE (OXYCONTIN) 10 MG extended release tablet    Sprain of atlanto-occipital joint, sequela    Encounter for medication monitoring    Relevant Medications    oxyCODONE-acetaminophen (PERCOCET)  MG per tablet (Start on 7/12/2021)    Degenerative cervical spinal stenosis    Relevant Medications    oxyCODONE-acetaminophen (PERCOCET)  MG per tablet (Start on 7/12/2021)    oxyCODONE (OXYCONTIN) 10 MG extended release tablet    Cervical radiculopathy    Relevant Medications    oxyCODONE-acetaminophen (PERCOCET)  MG per tablet (Start on 7/12/2021)    oxyCODONE (OXYCONTIN) 10 MG extended release tablet    Arthropathy of cervical facet joint    Relevant Medications    oxyCODONE-acetaminophen (PERCOCET)  MG per tablet (Start on 7/12/2021)    oxyCODONE (OXYCONTIN) 10 MG extended release tablet            Treatment Plan:  DISCUSSION: Treatment options discussed withpatient and all questions answered to patient's satisfaction. Possible side effects, risk of tolerance and or dependence and alternative treatments discussed    Obtaining appropriate analgesic effect of treatment   No signs of potential drug abuse or diversion identified    [x] Ill effects of being on chronic pain medications such as sleep disturbances, respiratory depression, hormonal changes, withdrawal symptoms, chronic opioid dependence and tolerance as well as risk of taking opioids with Benzodiazepines and taking opioids along with alcohol,  werediscussed with patient. I had asked the patient to minimize medication use and utilize pain medications only for uncontrolled rest pain or pain with exertional activities. I advised patient not to self-escalate painmedications without consulting with us. At each of patient's future visits we will try to taper pain medications, while adjusting the adjunct medications, and re-evaluating for Physical Therapy to improve spinal andjoint strength.  We will continue to have discussions to decrease pain medications as tolerated. Counseled patient on effects their pain medication and /or their medical condition mayhave on their  ability to drive or operate machinery. Instructed not to drive or operate machinery if drowsy     I also discussed with the patient regarding the dangers of combining narcotic pain medication with tranquilizers, alcohol or illegal drugs or taking the medication any way other than prescribed. The dangers were discussed  including respiratory depression and death. Patient was told to tell  all  physicians regarding the medications he is getting from pain clinic. Patient is warned not to take any unprescribed medications over-the-countermedications that can depress breathing . Patient is advised to talk to the pharmacist or physicians if planning to take any over-the-counter medications before  takeing them. Patient is strongly advised to avoid tranquilizers or  relaxants, illegal drugs  or any medications that can depress breathing  Patient is also advised to tell us if there is any changes in their medications from other physicians.       1. Will order narcan since her MMEQ is 80          TREATMENT OPTIONS:       Medication Agreement Requirements Met  Continue Opioid therapy  Script written for  Percocet, oxycontin, narcan  Follow up appointment made

## 2021-07-15 ENCOUNTER — TELEPHONE (OUTPATIENT)
Dept: GASTROENTEROLOGY | Age: 51
End: 2021-07-15

## 2021-07-26 ENCOUNTER — TELEPHONE (OUTPATIENT)
Dept: ONCOLOGY | Age: 51
End: 2021-07-26

## 2021-07-26 DIAGNOSIS — N83.8 OVARIAN MASS: Primary | ICD-10-CM

## 2021-07-26 DIAGNOSIS — R97.0 ELEVATED CEA: ICD-10-CM

## 2021-07-26 DIAGNOSIS — C56.9 MALIGNANT NEOPLASM OF OVARY, UNSPECIFIED LATERALITY (HCC): ICD-10-CM

## 2021-07-26 NOTE — TELEPHONE ENCOUNTER
Left message asking patient to get her labs done for her appointment with [address] [place] [address]. Did let her know if she has any questions to call the office.

## 2021-07-30 ENCOUNTER — HOSPITAL ENCOUNTER (OUTPATIENT)
Age: 51
Discharge: HOME | End: 2021-07-30
Payer: COMMERCIAL

## 2021-07-30 DIAGNOSIS — N83.8 OVARIAN MASS: ICD-10-CM

## 2021-07-30 DIAGNOSIS — C56.9 MALIGNANT NEOPLASM OF OVARY, UNSPECIFIED LATERALITY (HCC): ICD-10-CM

## 2021-07-30 DIAGNOSIS — R97.0 ELEVATED CEA: ICD-10-CM

## 2021-07-30 LAB
ABSOLUTE EOS #: 0.19 K/UL (ref 0–0.44)
ABSOLUTE IMMATURE GRANULOCYTE: <0.03 K/UL (ref 0–0.3)
ABSOLUTE LYMPH #: 2.43 K/UL (ref 1.1–3.7)
ABSOLUTE MONO #: 0.46 K/UL (ref 0.1–1.2)
ALBUMIN SERPL-MCNC: 3.8 G/DL (ref 3.5–5.2)
ALBUMIN/GLOBULIN RATIO: 1.3 (ref 1–2.5)
ALP BLD-CCNC: 87 U/L (ref 35–104)
ALT SERPL-CCNC: 7 U/L (ref 5–33)
ANION GAP SERPL CALCULATED.3IONS-SCNC: 12 MMOL/L (ref 9–17)
AST SERPL-CCNC: 12 U/L
BASOPHILS # BLD: 1 % (ref 0–2)
BASOPHILS ABSOLUTE: 0.07 K/UL (ref 0–0.2)
BILIRUB SERPL-MCNC: 0.26 MG/DL (ref 0.3–1.2)
BUN BLDV-MCNC: 16 MG/DL (ref 6–20)
BUN/CREAT BLD: ABNORMAL (ref 9–20)
CA 125: 7 U/ML
CALCIUM SERPL-MCNC: 8.7 MG/DL (ref 8.6–10.4)
CARCINOEMBRYONIC ANTIGEN: 9.3 NG/ML
CHLORIDE BLD-SCNC: 104 MMOL/L (ref 98–107)
CO2: 20 MMOL/L (ref 20–31)
CREAT SERPL-MCNC: 0.82 MG/DL (ref 0.5–0.9)
DIFFERENTIAL TYPE: NORMAL
EOSINOPHILS RELATIVE PERCENT: 2 % (ref 1–4)
GFR AFRICAN AMERICAN: >60 ML/MIN
GFR NON-AFRICAN AMERICAN: >60 ML/MIN
GFR SERPL CREATININE-BSD FRML MDRD: ABNORMAL ML/MIN/{1.73_M2}
GFR SERPL CREATININE-BSD FRML MDRD: ABNORMAL ML/MIN/{1.73_M2}
GLUCOSE BLD-MCNC: 90 MG/DL (ref 70–99)
HCT VFR BLD CALC: 40.7 % (ref 36.3–47.1)
HEMOGLOBIN: 13.2 G/DL (ref 11.9–15.1)
IMMATURE GRANULOCYTES: 0 %
LYMPHOCYTES # BLD: 27 % (ref 24–43)
MCH RBC QN AUTO: 29.7 PG (ref 25.2–33.5)
MCHC RBC AUTO-ENTMCNC: 32.4 G/DL (ref 28.4–34.8)
MCV RBC AUTO: 91.7 FL (ref 82.6–102.9)
MONOCYTES # BLD: 5 % (ref 3–12)
NRBC AUTOMATED: 0 PER 100 WBC
PDW BLD-RTO: 12.6 % (ref 11.8–14.4)
PLATELET # BLD: 183 K/UL (ref 138–453)
PLATELET ESTIMATE: NORMAL
PMV BLD AUTO: 10.6 FL (ref 8.1–13.5)
POTASSIUM SERPL-SCNC: 3.9 MMOL/L (ref 3.7–5.3)
RBC # BLD: 4.44 M/UL (ref 3.95–5.11)
RBC # BLD: NORMAL 10*6/UL
SEG NEUTROPHILS: 65 % (ref 36–65)
SEGMENTED NEUTROPHILS ABSOLUTE COUNT: 5.71 K/UL (ref 1.5–8.1)
SODIUM BLD-SCNC: 136 MMOL/L (ref 135–144)
TOTAL PROTEIN: 6.7 G/DL (ref 6.4–8.3)
WBC # BLD: 8.9 K/UL (ref 3.5–11.3)
WBC # BLD: NORMAL 10*3/UL

## 2021-07-30 PROCEDURE — 86304 IMMUNOASSAY TUMOR CA 125: CPT

## 2021-07-30 PROCEDURE — 85025 COMPLETE CBC W/AUTO DIFF WBC: CPT

## 2021-07-30 PROCEDURE — 36415 COLL VENOUS BLD VENIPUNCTURE: CPT

## 2021-07-30 PROCEDURE — 80053 COMPREHEN METABOLIC PANEL: CPT

## 2021-07-30 PROCEDURE — 82378 CARCINOEMBRYONIC ANTIGEN: CPT

## 2021-08-01 ASSESSMENT — ENCOUNTER SYMPTOMS
NAUSEA: 0
BACK PAIN: 0
PHOTOPHOBIA: 0
CHOKING: 0
ABDOMINAL PAIN: 0
CONSTIPATION: 0
VOMITING: 0
EYE PAIN: 0
ALLERGIC/IMMUNOLOGIC NEGATIVE: 1
COUGH: 0
SINUS PAIN: 0
SHORTNESS OF BREATH: 0

## 2021-08-01 NOTE — PROGRESS NOTES
Brian Dela Cruz is a 53 y.o. female evaluated on 8/9/2021. Modality of virtual service provided -via Video+audio    Consent:  Patient and/or health care decision maker is aware that that patient may receive a bill for this telephone service, depending on one's insurance coverage, and has provided verbal consent to proceed: Yes    Patient identification was verified at the start of the visit: Yes    Chief complaint: Brian Dela Cruz is 53 y.o.,  female, with  with chief complaint of pain involving neck. .    Patient is complaining of pain involving the cervical area. She reports her pain is also not getting worse and that she cannot turn her neck to the left. She had some relief of pain on the right side following the facet joint injections but the pain is slowly returning on the right side. 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 She was evaluated by oncologist as she was losing weight and we found to have high CTA. She had undergone colonoscopy which was negative. She is going to see gynecologist for possible ovarian cancer in the end of August.    Neck Pain   This is a chronic problem. The current episode started more than 1 year ago. The problem occurs constantly. The problem has been gradually worsening. Associated with: Had a recent fall which made her pain worse. The pain is present in the midline, left side and right side (Pain is worse on the left side). The quality of the pain is described as aching (Throbbing in the back of the head). The pain is at a severity of 8/10 (5-10). The pain is severe. The symptoms are aggravated by bending and twisting (Neck movements/turning to the left). The pain is same all the time. Stiffness is present in the morning and all day. Associated symptoms include headaches, numbness, tingling and weakness. Pertinent negatives include no chest pain, fever or photophobia. Associated symptoms comments: Especially in the left hand. Alleviating factors:heat and  foam pillow  Lifestyle changes experienced with pain: Keeps awake at night, Wakes from sleep, Prevents or limits ADLs, Increases w/activity, Increases w/prolonged sitting/standing/walking  Mood changes,irritable  Patient currently unemployed. Physical therapy did not help the pain. Are you under psychological counseling at present: Yes  Goals for treatment include:  Decrease in pain  Enjoy daily and recreational activities, return to previous status. Last procedure was left cervical facet joint injections on 11/05/2019    Patient relates current medications are helping the pain. Patient reports taking pain medications as prescribed, denies obtaining medications from different sources and denies use of illegal drugs. Patient denies side effects from medications like nausea, vomiting, constipation or drowsiness. Patient reports current activities of daily living ar possible due to medications and would like to continue them. ACTIVITY/SOCIAL/EMOTIONAL:  Sleep Pattern: 4 hours per night.  difficulty falling asleep, nightime awakenings and difficulty falling back asleep if awakened  Home Exercises: Neck exercises daily   Activity:unchanged  Emotional Issues: normal.   Currently seeing a Psychiatrist or Psychologist:  No     ADVERSE MEDICATION EFFECTS:   Nausea and vomiting: no   Constipation: no-Undercontrol-: yes  Dizziness/drowsy/sleepy--no  Urinary Retention: no    ABERRANT BEHAVIORS SINCE LAST VISIT  Lost rx/pills:------------------------------------------ no  Taking  medication as prescribed: ----------- yes  Urine Drug Screen ---------------------------------  yes             Date------------------------------------------------- 2/3/2021            results as expected ---------------------yes    Recent ER visits: -------------------------------------No  Pill count is appropriate: ---------------------------yes   Refills for prescriptions appropriate:---------- yes      Past Medical History:   Diagnosis Date    Anxiety     CAD (coronary artery disease)     Mitral valve prolapse    Fatty liver     GERD (gastroesophageal reflux disease)     Headache     History of bronchitis     Hyperlipidemia     Hypothyroidism     Kidney stone     LFT elevation     MVP (mitral valve prolapse)     Obesity     Osteoarthritis of cervical spine     Pulmonary emboli (HCC)     Type 2 diabetes mellitus without complication ([facility] [address] 3/50/1106    Umbilical hernia        Past Surgical History:   Procedure Laterality Date    APPENDECTOMY       SECTION      2 pfannenstiel, 1 vertical    CHOLECYSTECTOMY      COLONOSCOPY      Dr Geovanny Bagley  14    COLONOSCOPY  2014    biopsy & sigmoid spasms, pathology negative    COLONOSCOPY N/A 2018    COLONOSCOPY WITH BIOPSY performed by Sherri Hsu MD at [facility] N/A 2021    COLONOSCOPY DIAGNOSTIC performed by Flor El MD at [address] [place] [address] N      x 5    HYSTERECTOMY, TOTAL ABDOMINAL          DC EXPLORATORY OF ABDOMEN  10/21/2014    Laparotomy-lysis of adhesions, bso     UPPER GASTROINTESTINAL ENDOSCOPY  2010    mild chronic inactive gastritis    UPPER GASTROINTESTINAL ENDOSCOPY N/A 3/10/2021    EGD BIOPSY performed by Flor El MD at [facility]       Family History   Problem Relation Age of Onset    Cancer Mother         vaginal    Heart Disease Father     Diabetes Father     Other Father         colon resection for colon polyps    Breast Cancer Maternal Grandmother     Stroke Maternal Grandfather        Social History     Socioeconomic History    Marital status:      Spouse name: None    Number of children: None    Years of education: None    Highest education level: None   Occupational History    Occupation: Homemaker   Tobacco Use    Smoking status: Former Smoker     Packs/day: 0.25     Years: 33.00     Pack years: 8.25 Types: Cigarettes    Smokeless tobacco: Never Used    Tobacco comment: quit on nicoderm patch   Vaping Use    Vaping Use: Never used   Substance and Sexual Activity    Alcohol use: No     Alcohol/week: 0.0 standard drinks    Drug use: No    Sexual activity: Not Currently   Other Topics Concern    None   Social History Narrative    None     Social Determinants of Health     Financial Resource Strain:     Difficulty of Paying Living Expenses:    Food Insecurity:     Worried About Running Out of Food in the Last Year:     Ran Out of Food in the Last Year:    Transportation Needs:     Lack of Transportation (Medical):  Lack of Transportation (Non-Medical):    Physical Activity:     Days of Exercise per Week:     Minutes of Exercise per Session:    Stress:     Feeling of Stress :    Social Connections:     Frequency of Communication with Friends and Family:     Frequency of Social Gatherings with Friends and Family:     Attends Confucianism Services:     Active Member of Clubs or Organizations:     Attends Club or Organization Meetings:     Marital Status:    Intimate Partner Violence:     Fear of Current or Ex-Partner:     Emotionally Abused:     Physically Abused:     Sexually Abused:         Allergies   Allergen Reactions    Compazine [Prochlorperazine]      Jittery, restless    Sulfa Antibiotics Hives    Adhesive Tape Rash       Current Outpatient Medications on File Prior to Encounter   Medication Sig Dispense Refill    tiZANidine (ZANAFLEX) 4 MG tablet TAKE 1 TABLET BY MOUTH EVERY 8 HOURS AS NEEDED FOR SPASMS 90 tablet 0    rOPINIRole (REQUIP) 2 MG tablet TAKE 1 TABLET BY MOUTH EVERY NIGHT 90 tablet 1    naloxone (NARCAN) 4 MG/0.1ML LIQD nasal spray 1 spray by Nasal route as needed for Opioid Reversal 1 each 0    albuterol sulfate HFA (VENTOLIN HFA) 108 (90 Base) MCG/ACT inhaler Inhale 2 puffs into the lungs every 6 hours as needed for Wheezing 1 Inhaler 3    esomeprazole (NEXIUM) 40 MG delayed release capsule TAKE 1 CAPSULE BY MOUTH EVERY MORNING BEFORE BREAKFAST 90 capsule 1    albuterol sulfate  (90 Base) MCG/ACT inhaler INHALE 2 PUFFS INTO THE LUNGS EVERY 4 HOURS AS NEEDED FOR SHORTNESS OF BREATH 42.5 g 3    rivaroxaban (XARELTO) 20 MG TABS tablet Take 1 tablet by mouth daily (with breakfast) 90 tablet 3    levothyroxine (SYNTHROID) 175 MCG tablet TAKE 1 TABLET BY MOUTH DAILY 90 tablet 1    lidocaine (LIDODERM) 5 % Place 1 patch onto the skin daily 12 hours on, 12 hours off. 10 patch 0    clonazePAM (KLONOPIN) 0.5 MG tablet Take 1 tablet by mouth 2 times daily as needed for Anxiety for up to 30 days. 60 tablet 1    mirtazapine (REMERON) 15 MG tablet Take 15 mg by mouth daily      ondansetron (ZOFRAN ODT) 4 MG disintegrating tablet Take 1 tablet by mouth every 8 hours as needed for Nausea or Vomiting 10 tablet 0    sertraline (ZOLOFT) 100 MG tablet Take 100 mg by mouth daily      topiramate (TOPAMAX) 25 MG tablet 25 mg 2 times daily       metoprolol tartrate (LOPRESSOR) 50 MG tablet Take 50 mg by mouth 2 times daily        No current facility-administered medications on file prior to encounter. Review of Systems   Constitutional: Positive for unexpected weight change. Negative for activity change, appetite change and fever. HENT: Negative for congestion, ear pain, sinus pain and sore throat. Eyes: Negative for photophobia, pain and visual disturbance. Respiratory: Negative for cough, choking and shortness of breath. Cardiovascular: Negative for chest pain and palpitations. History of pulmonary embolism   Gastrointestinal: Negative for abdominal pain, constipation, nausea and vomiting. Endocrine: Negative. Negative for cold intolerance and polyuria. Genitourinary: Negative for dysuria and hematuria. Being worked up for ovarian cancer   Musculoskeletal: Positive for neck pain and neck stiffness. Negative for back pain.    Skin: Negative for pallor and rash. Allergic/Immunologic: Negative. Negative for immunocompromised state. Neurological: Positive for tingling, weakness, numbness and headaches. Hematological: Does not bruise/bleed easily. Psychiatric/Behavioral: Negative for self-injury, sleep disturbance and suicidal ideas. The patient is not nervous/anxious. Physical Exam  Constitutional:       Appearance: Normal appearance. Skin:         Neurological:      Mental Status: She is alert and oriented to person, place, and time. Psychiatric:         Mood and Affect: Mood normal.            DATA:  LAB.:  2/7/2021  7:53 PM - Liu, [facility] Incoming Lab Results From [facility]    Component Value Ref Range & Units Status Collected Lab   Pain Management Drug Panel Interp, Urine Inconsistent   Final 02/03/2021  3:51 PM [facility]   (NOTE)   __________________________________________________________  ________________________________________________________________   [id number] with medications provided:   Oxycodone   Noroxycodone   7-Aminoclonazepam        X-Ray reports:       EXAMINATION:   CT OF THE CERVICAL SPINE WITHOUT CONTRAST 3/4/2021 5:19 pm       TECHNIQUE:   CT of the cervical spine was performed without the administration of   intravenous contrast. Multiplanar reformatted images are provided for review. Dose modulation, iterative reconstruction, and/or weight based adjustment of   the mA/kV was utilized to reduce the radiation dose to as low as reasonably   achievable.       COMPARISON:   February 13, 2021.       HISTORY:   ORDERING SYSTEM PROVIDED HISTORY: radicular pain left arm   TECHNOLOGIST PROVIDED HISTORY:   radicular pain left arm   Decision Support Exception->Emergency Medical Condition (MA)   Is the patient pregnant?->No   Reason for Exam: radicular pain left arm for three weeks. patient states   limited ROM and pain starting in neck.  no injuries   Acuity: Unknown   Type of Exam: Unknown       FINDINGS:   Bone mineralization is mild disc space narrowing at C3-4, C4-5 and C5-C6. SPINAL CORD: The cervical spinal cord is normal in size and signal    intensities. SOFT TISSUES: There is no paraspinal mass identified. C2-C3: There is no disc bulge or protrusion present. There is no significant    spinal canal stenosis or neural foraminal narrowing present. C3-C4: There is a disc bulge and uncovertebral overgrowth resulting in mild    spinal canal stenosis and mild left neural foraminal narrowing. No    significant right neural foraminal narrowing is present. C4-C5: There is a disc bulge and uncovertebral overgrowth resulting in mild    spinal canal stenosis and mild bilateral neural foraminal narrowing. C5-C6: There is a disc bulge and uncovertebral overgrowth resulting in mild    spinal canal stenosis and moderate left neural foraminal narrowing. No    significant right neural foraminal narrowing is present. C6-C7: There is no disc bulge or protrusion present. There is no significant    spinal canal stenosis or neural foraminal narrowing present. C7-T1: There is no disc bulge or protrusion present. There is no significant    spinal canal stenosis or neural foraminal narrowing present. Impression    Mild multilevel spinal canal stenosis and mild-to-moderate neural foraminal    narrowing from C3-4 to C5-6, as described above. The appearance is similar    compared to the previous exam.      MRI CERVICAL SPINE WO CONTRAST  7/18/2018 11:35 AM EDT   SIGNS AND SYMPTOMS: M54.12 Radiculopathy, cervical region I10   TECHNOLOGIST COMMENTS: pt c/o headaches and neck pain hx DDD   QUESTION FOR THE RADIOLOGIST: ,  , ========== , Ordering Provider - Dinora Camacho MD , Rendering Provider - Dinora Camacho MD , ==========   PROTOCOL: The following pulse sequences were utilized when imaging the cervical spine: sagittal T2, sagittal T1, sagittal STIR, axial T2, and axial T2* disc. COMPARISON: Prior cervical spine MRI from outside institution dated February 2017   FINDINGS: The bones of the cervical spine are in anatomic alignment with straightening and loss of the normal lordosis possibly from muscle spasm. There is preservation of vertebral body heights and intervertebral disc spaces revealed mild disc desiccation at C3-4, C4-5 and C5-6 with minimal loss of height. The marrow signals within normal limits. The cord is normal in signal. No epidural or paraspinous fluid collection is appreciated. The visualized paraspinous soft tissues are within normal limits. The prevertebral soft tissues are within normal limits. 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 At C2-C3: There is a normal disc, central canal, and neural foramen. At C3-C4: Minimal central spondylotic disc bulge with eccentric component extending to the subarticular zone on the left side with effacement of the anterior thecal sac but no significant canal stenosis or cord compression. There is mild left neural foramen narrowing. At C4-C5: Small central spondylotic disc bulge effacing the anterior thecal sac but not associated with significant canal stenosis or cord compression. Neural foramina appear unremarkable. At C5-C6: Broad based central disc bulge effacing the anterior thecal sac with left subarticular component leading to moderate left neural foramen narrowing. No significant canal stenosis or cord compression. At C6-C7: There is a normal disc, central canal, and neural foramen. At C7-T1: There is a normal disc, central canal, and neural foramen. 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    Straightening and loss of the normal lordosis likely from muscle spasm with mild cervical spondylosis seen from C3 to C6 level as described above   Electronically signed by:Viet Cayden. Transcribed by: Luis, User Resident: Electronically Signed by: Moon David @ 07/19/2018 09   SPINAL CORD: No abnormal cord signal is seen. SOFT TISSUES: No paraspinal mass identified. C2-C3: Slight central disc contour prominence to indicate a small disc   protrusion with only minimal central spinal stenosis. C3-C4: Left paramedian disc contour prominence and associated uncovertebral   joint hypertrophy. Resulting mild left lateral recess stenosis. Moderate left foraminal   stenosis. C4-C5: Disc bulge. Mild central spinal stenosis. Mild left foraminal   stenosis. Slight flattening of the ventral cord contour. C5-C6: Decreased disc space height. Diffuse disc bulge or broad-based disc   protrusion more prominent on the left. Mild central spinal stenosis. Asymmetric mild left lateral recess stenosis. Mild to moderate left   foraminal stenosis. Minimal flattening of the ventral cord contour. C6-C7: Slight disc bulge. Minimal central spinal stenosis. C7-T1: There is no significant disc protrusion, spinal canal stenosis or   neural foraminal narrowing. Impression   1. Disc bulges or disc protrusions with associated mild central spinal   stenosis as detailed above at C2-3 to C6-7 levels, worst at C4-5 and C5-6.   2. Multilevel left-sided foraminal stenosis as detailed above. Clinical  impression:  1. Arthropathy of cervical facet joint    2. Degenerative cervical spinal stenosis    3. Cervical radiculopathy    4. Sprain of atlanto-occipital joint, sequela    5. Myofascial muscle pain    6. Osteoarthritis of cervical spine, unspecified spinal osteoarthritis complication status    7. Atlanto-axial joint sprain, sequela    8.  Encounter for medication monitoring      Plan of care:  Left cervical facet joint injections  We will continue current pain medications  Current medications are being tolerated without any Adverse side effects. Orders Placed This Encounter   Medications    oxyCODONE-acetaminophen (PERCOCET)  MG per tablet     Sig: Take 1 tablet by mouth every 6 hours as needed for Pain for up to 30 days. Dispense:  120 tablet     Refill:  0     Reduce doses taken as pain becomes manageable     Urine drug screens have been appropriate. No aberrant activity noted. Analgesia is achieved. Activities of daily living are possible because of medications. Safe use of medications explained to patient. PDMP Monitoring:    Last PDMP Adonay Marie as Reviewed [facility]):  Review User Review Instant Review Result   Jose Alfredo Taylor 8/1/2021  4:39 AM Reviewed PDMP [1]     Counselling/Preventive measures for pain  Control:    [x]  Spine strengthening exercises are discussed with patient in detail. [x] Ill effects of being on chronic pain medications such as sleep disturbances, hormonal changes, withdrawal symptoms,  chronic opioid dependence and tolerance were discussed with patient. I had asked the patient to minimize medication use and utilize pain medications only for uncontrolled rest pain or pain with exertional activities. I advised patient not to self escalate pain medications without consulting with us. At each of patient's future visits we will try to taper pain medications, while adjusting the adjunct medications, and re-evaluating for Physical Therapy to improve spinal and joint strength. We will continue to have discussions to decrease pain medications as tolerated. I also discussed with the patient regarding the dangers of combining narcotic pain medication with tranquilizers, alcohol or illegal drugs or taking the medication any other than prescribed. The dangers including the respiratory depression and death. Patient was told to tell  to all  physicians regarding the medications he is getting from pain clinic.  Patient is warned not to take any unprescribed medications over-the-counter medications that can depress breathing . Patient is advised to talk to the pharmacist or physicians if planning to take any over-the-counter medications before  takeing them. Patient is strongly advised to avoid tranquilizers or  Relaxants for any medications that can depress breathing or recreational drugs. Patient is also advised to tell us if there is any changes in his medications from other physicians. We discussed the same at today's visit and have not been to implement it, as the patient's pain is not under control with current medications. The following treatment plan was developed after discussion with patient:    Patient  has axial or localized     cervical facet pain at  left C2-C3, C3-C4, C4-C5, C5-C6, C6-C7 and C7-T1 that is worse with hyperextension of the spine relieved by forward flexion. Palpation showed tenderness over the cervical  facet joints which also correlate well with patients Imaging. Patient failed all conservative treatment plans which included NSAIDS, activity modifications,home exercises, over the counter remedies, ice, heat and Physical / Chiropractic therapies. Patient's symptoms are gradually worsening with current treatment, interfering with sleep and activities of daily living. We discussed Left  cervical  facet joint injections at levels  and re-evaluate symptoms in two weeks at an office visit. Patient agreed to the procedure which will be scheduled as soon as possible. All questions satisfactorily answered by me with the use of a spine model.   Orders Placed This Encounter   Procedures    FLUORO FOR SURGICAL PROCEDURES     Standing Status:   Future     Standing Expiration Date:   8/9/2022    ID INJ DX/THER AGNT PARAVERT FACET JOINT, CERV/THORAC, 1ST LEVEL    Saline lock IV     Standing Status:   Future     Standing Expiration Date:   2/9/2023       Decision Making Process : Patient's health history and referral records thoroughly reviewed before focused physical and/or health care decision maker about plan of care  Details of this discussion including any medical advice provided: Total Time: minutes: 21-30 minutes    I affirm this is a Patient Initiated Episode with an Established Patient who has not had a related appointment within my department in the past 7 days or scheduled within the next 24 hours.     Electronically signed by Amando Gonzalez MD on 8/10/2021 at 4:28 AM

## 2021-08-02 ENCOUNTER — TELEPHONE (OUTPATIENT)
Dept: ONCOLOGY | Age: 51
End: 2021-08-02

## 2021-08-02 ENCOUNTER — OFFICE VISIT (OUTPATIENT)
Dept: ONCOLOGY | Age: 51
End: 2021-08-02
Payer: COMMERCIAL

## 2021-08-02 VITALS
SYSTOLIC BLOOD PRESSURE: 116 MMHG | WEIGHT: 177.9 LBS | BODY MASS INDEX: 30.54 KG/M2 | TEMPERATURE: 98.5 F | DIASTOLIC BLOOD PRESSURE: 78 MMHG | HEART RATE: 73 BPM

## 2021-08-02 DIAGNOSIS — R97.0 ELEVATED CEA: ICD-10-CM

## 2021-08-02 DIAGNOSIS — R63.4 WEIGHT LOSS: ICD-10-CM

## 2021-08-02 DIAGNOSIS — I26.99 PULMONARY EMBOLISM ON RIGHT (HCC): Primary | ICD-10-CM

## 2021-08-02 PROCEDURE — 99214 OFFICE O/P EST MOD 30 MIN: CPT | Performed by: INTERNAL MEDICINE

## 2021-08-02 PROCEDURE — 99211 OFF/OP EST MAY X REQ PHY/QHP: CPT | Performed by: INTERNAL MEDICINE

## 2021-08-02 RX ORDER — TIZANIDINE 4 MG/1
TABLET ORAL
Qty: 90 TABLET | Refills: 0 | Status: SHIPPED | OUTPATIENT
Start: 2021-08-02 | End: 2021-08-30

## 2021-08-02 NOTE — TELEPHONE ENCOUNTER
AVS from 8/2/21     RV 6 months with [facility],  before RV    rv scheduled for 2/7/22 @ 11am  Pt will get labs drawn one week prior to appt    Pt was given AVS and appt schedule

## 2021-08-02 NOTE — PROGRESS NOTES
_     Chief Complaint   Patient presents with    Follow-up     review status of disease    Results     go over labs     DIAGNOSIS:        Severe unintentional weight loss  Un triggered pulmonary embolism  Abdominal pain  Recent GI bleeding  Gastritis  Persistent diarrhea  Elevated tumor markers. CURRENT THERAPY:         Xarelto for PE  GI work-up    BRIEF CASE HISTORY:      Ms. Emmanuel Reece is a very pleasant 53 y.o. female with history of multiple co morbidities as listed. Patient is referred for evaluation of weight loss and possible underlying malignancy. The patient was recently hospitalized because of increasing shortness of breath and respiratory failure. She was found to have pulmonary embolism. She was treated with anticoagulation with Xarelto. Shortly before discharge she had upper GI bleeding. She had endoscopies and she was found to have severe gastritis. After stabilization patient was back on anticoagulation. She has been stable since then. Patient had problems with weight loss. She lost about 100 pounds over the last year. She has generalized weakness and fatigue. She has decreased appetite. She had chronic nausea. Early satiety. She denies any fever or night sweats. She had problem with night sweats in the past.  No enlarged lymph nodes. No unusual headaches or dizziness. No other complaints. Patient quit smoking at the time of hospitalization. Used to smoke 1 pack/day since teenage. Social alcohol. .     INTERIM HISTORY:    seen for follow up after ct scans and labs. She had GI work-up as well. She had endoscopies. Continues to have weight loss and weakness. She has persistent diarrhea. No GI bleeding. She is on Xarelto for pulmonary embolism tolerated well. No chest pain or hemoptysis.     PAST MEDICAL HISTORY: has a past medical history of Anxiety, CAD (coronary artery disease), Fatty liver, GERD (gastroesophageal reflux disease), Headache, History of bronchitis, Hyperlipidemia, Hypothyroidism, Kidney stone, LFT elevation, MVP (mitral valve prolapse), Obesity, Osteoarthritis of cervical spine, Pulmonary emboli (HCC), Type 2 diabetes mellitus without complication ([facility] [address] and Umbilical hernia. PAST SURGICAL HISTORY: has a past surgical history that includes Upper gastrointestinal endoscopy (2010); hernia repair; Cholecystectomy; Appendectomy;  section; Colonoscopy (); Colonoscopy (14); Colonoscopy (2014); pr exploratory of abdomen (10/21/2014); Colonoscopy (N/A, 2018); Hysterectomy, total abdominal; Upper gastrointestinal endoscopy (N/A, 3/10/2021); and Colonoscopy (N/A, 2021). CURRENT MEDICATIONS:  has a current medication list which includes the following prescription(s): ropinirole, oxycodone-acetaminophen, oxycodone, narcan, tizanidine, albuterol sulfate hfa, esomeprazole, albuterol sulfate hfa, rivaroxaban, levothyroxine, lidocaine, clonazepam, mirtazapine, ondansetron, sertraline, topiramate, and metoprolol tartrate. ALLERGIES:  is allergic to compazine [prochlorperazine], sulfa antibiotics, and adhesive tape. FAMILY HISTORY: Grandmother had breast cancer. Great-grandmother had breast cancer. I believe one of her parents had cancer as well. Possibly pancreatic. Otherwise negative for any hematological or oncological conditions. SOCIAL HISTORY:  reports that she has quit smoking. Her smoking use included cigarettes. She has a 8.25 pack-year smoking history. She has never used smokeless tobacco. She reports that she does not drink alcohol and does not use drugs. REVIEW OF SYSTEMS:     · General: Positive for weakness and fatigue. Positive for weight loss and decreased appetite. No fever or chills. · Eyes: No blurred vision, eye pain or double vision. · Ears: No hearing problems or drainage. No tinnitus.    · Throat: No sore throat, problems with swallowing or dysphagia. · Respiratory: No cough, sputum or hemoptysis. Positive for exertional shortness of breath. No pleuritic chest pain. · Cardiovascular: No chest pain, orthopnea or PND. No lower extremity edema. No palpitation. · Gastrointestinal: No problems with swallowing. No abdominal pain or bloating. No nausea or vomiting. No diarrhea or constipation. No GI bleeding. · Genitourinary: No dysuria, hematuria, frequency or urgency. · Musculoskeletal: No muscle aches or pains. No limitation of movement. No back pain. No gait disturbance, No joint complaints. · Dermatologic: No skin rashes or pruritus. No skin lesions or discolorations. · Psychiatric: No depression, anxiety, or stress or signs of schizophrenia. No change in mood or affect. · Hematologic: No history of bleeding tendency. No bruises or ecchymosis. No history of clotting problems. · Infectious disease: No fever, chills or frequent infections. · Endocrine: No polydipsia or polyuria. No temperature intolerance. · Neurologic: No headaches or dizziness. No weakness or numbness of the extremities. No changes in balance, coordination,  memory, mentation, behavior. · Allergic/Immunologic: No nasal congestion or hives. No repeated infections. PHYSICAL EXAM:  The patient is not in acute distress. Vital signs: Blood pressure 116/78, pulse 73, temperature 98.5 °F (36.9 °C), temperature source Oral, weight 177 lb 14.4 oz (80.7 kg), last menstrual period 11/01/2010, not currently breastfeeding.      General appearance - well appearing, not in pain or distress  Mental status - good mood, alert and oriented  Eyes - pupils equal and reactive, extraocular eye movements intact  Ears - bilateral TM's and external ear canals normal  Nose - normal and patent, no erythema, discharge or polyps  Mouth - mucous membranes moist, pharynx normal without lesions  Neck - supple, no significant adenopathy  Lymphatics - no palpable lymphadenopathy, no hepatosplenomegaly  Chest - clear to auscultation, no wheezes, rales or rhonchi, symmetric air entry  Heart - normal rate, regular rhythm, normal S1, S2, no murmurs, rubs, clicks or gallops  Abdomen - soft, generalized abdominal tenderness, nondistended, no masses or organomegaly  Neurological - alert, oriented, normal speech, no focal findings or movement disorder noted  Musculoskeletal - no joint tenderness, deformity or swelling  Extremities - peripheral pulses normal, no pedal edema, no clubbing or cyanosis  Skin - normal coloration and turgor, no rashes, no suspicious skin lesions noted     Review of Diagnostic data:   Lab Results   Component Value Date    WBC 8.9 07/30/2021    HGB 13.2 07/30/2021    HCT 40.7 07/30/2021    MCV 91.7 07/30/2021     07/30/2021       Chemistry        Component Value Date/Time     07/30/2021 1638    K 3.9 07/30/2021 1638     07/30/2021 1638    CO2 20 07/30/2021 1638    BUN 16 07/30/2021 1638    CREATININE 0.82 07/30/2021 1638        Component Value Date/Time    CALCIUM 8.7 07/30/2021 1638    ALKPHOS 87 07/30/2021 1638    AST 12 07/30/2021 1638    ALT 7 07/30/2021 1638    BILITOT 0.26 (L) 07/30/2021 1638            IMPRESSION:   Severe unintentional weight loss  Un triggered pulmonary embolism  Abdominal pain  Recent GI bleeding  Gastritis  Persistent diarrhea  Elevated tumor markers. PLAN:I again explained to the patient the nature of these problems with unintentional weight loss and unexplained pulmonary embolism. There were no major risk factors for pulmonary embolism. This is concerning for underlying cause. Obviously differential diagnosis for unprovoked pulmonary embolism will include malignancy. That becomes more concerning with her unintentional weight loss. Work up for hypercoagulability is negative. Further workup for occult malignancy is negative except for elevated CEA and .  She had previous history of hysterectomy and BSO. She has persistent diarrhea. She continues to have follow-up with gastroenterology. We will see her in 6 months with labs. Sooner for any problems. Patient's questions were answered to the best of her satisfaction and she verbalized full understanding and agreement.

## 2021-08-09 ENCOUNTER — HOSPITAL ENCOUNTER (OUTPATIENT)
Dept: PAIN MANAGEMENT | Age: 51
Discharge: HOME | End: 2021-08-09
Payer: COMMERCIAL

## 2021-08-09 DIAGNOSIS — S13.4XXS ATLANTO-AXIAL JOINT SPRAIN, SEQUELA: ICD-10-CM

## 2021-08-09 DIAGNOSIS — M54.12 CERVICAL RADICULOPATHY: ICD-10-CM

## 2021-08-09 DIAGNOSIS — S13.4XXS SPRAIN OF ATLANTO-OCCIPITAL JOINT, SEQUELA: ICD-10-CM

## 2021-08-09 DIAGNOSIS — M47.812 ARTHROPATHY OF CERVICAL FACET JOINT: Primary | ICD-10-CM

## 2021-08-09 DIAGNOSIS — Z51.81 ENCOUNTER FOR MEDICATION MONITORING: ICD-10-CM

## 2021-08-09 DIAGNOSIS — M47.812 OSTEOARTHRITIS OF CERVICAL SPINE, UNSPECIFIED SPINAL OSTEOARTHRITIS COMPLICATION STATUS: ICD-10-CM

## 2021-08-09 DIAGNOSIS — M48.02 DEGENERATIVE CERVICAL SPINAL STENOSIS: ICD-10-CM

## 2021-08-09 DIAGNOSIS — M79.18 MYOFASCIAL MUSCLE PAIN: ICD-10-CM

## 2021-08-09 PROCEDURE — 99213 OFFICE O/P EST LOW 20 MIN: CPT | Performed by: PAIN MEDICINE

## 2021-08-09 PROCEDURE — 99213 OFFICE O/P EST LOW 20 MIN: CPT

## 2021-08-09 RX ORDER — OXYCODONE AND ACETAMINOPHEN 10; 325 MG/1; MG/1
1 TABLET ORAL EVERY 6 HOURS PRN
Qty: 120 TABLET | Refills: 0 | Status: SHIPPED | OUTPATIENT
Start: 2021-08-11 | End: 2021-09-08 | Stop reason: SDUPTHER

## 2021-08-10 ASSESSMENT — ENCOUNTER SYMPTOMS: SORE THROAT: 0

## 2021-08-11 ENCOUNTER — TELEPHONE (OUTPATIENT)
Dept: PAIN MANAGEMENT | Age: 51
End: 2021-08-11

## 2021-08-17 ENCOUNTER — HOSPITAL ENCOUNTER (OUTPATIENT)
Dept: GENERAL RADIOLOGY | Age: 51
Discharge: HOME | End: 2021-08-19
Payer: COMMERCIAL

## 2021-08-17 ENCOUNTER — HOSPITAL ENCOUNTER (OUTPATIENT)
Dept: PAIN MANAGEMENT | Age: 51
Discharge: HOME | End: 2021-08-17
Payer: COMMERCIAL

## 2021-08-17 VITALS
HEIGHT: 64 IN | OXYGEN SATURATION: 98 % | DIASTOLIC BLOOD PRESSURE: 74 MMHG | BODY MASS INDEX: 30.22 KG/M2 | SYSTOLIC BLOOD PRESSURE: 122 MMHG | HEART RATE: 76 BPM | TEMPERATURE: 98.2 F | RESPIRATION RATE: 20 BRPM | WEIGHT: 177 LBS

## 2021-08-17 DIAGNOSIS — M47.812 ARTHROPATHY OF CERVICAL FACET JOINT: ICD-10-CM

## 2021-08-17 DIAGNOSIS — M47.812 OSTEOARTHRITIS OF CERVICAL SPINE, UNSPECIFIED SPINAL OSTEOARTHRITIS COMPLICATION STATUS: Primary | ICD-10-CM

## 2021-08-17 PROCEDURE — 64490 INJ PARAVERT F JNT C/T 1 LEV: CPT

## 2021-08-17 PROCEDURE — 64492 INJ PARAVERT F JNT C/T 3 LEV: CPT

## 2021-08-17 PROCEDURE — 6360000002 HC RX W HCPCS: Performed by: PAIN MEDICINE

## 2021-08-17 PROCEDURE — 64491 INJ PARAVERT F JNT C/T 2 LEV: CPT

## 2021-08-17 PROCEDURE — 64492 INJ PARAVERT F JNT C/T 3 LEV: CPT | Performed by: PAIN MEDICINE

## 2021-08-17 PROCEDURE — 6360000004 HC RX CONTRAST MEDICATION: Performed by: PAIN MEDICINE

## 2021-08-17 PROCEDURE — 3209999900 FLUORO FOR SURGICAL PROCEDURES

## 2021-08-17 PROCEDURE — 2500000003 HC RX 250 WO HCPCS: Performed by: PAIN MEDICINE

## 2021-08-17 PROCEDURE — 64491 INJ PARAVERT F JNT C/T 2 LEV: CPT | Performed by: PAIN MEDICINE

## 2021-08-17 PROCEDURE — 64490 INJ PARAVERT F JNT C/T 1 LEV: CPT | Performed by: PAIN MEDICINE

## 2021-08-17 RX ORDER — BUPIVACAINE HYDROCHLORIDE 5 MG/ML
INJECTION, SOLUTION EPIDURAL; INTRACAUDAL
Status: COMPLETED | OUTPATIENT
Start: 2021-08-17 | End: 2021-08-17

## 2021-08-17 RX ORDER — TRIAMCINOLONE ACETONIDE 40 MG/ML
INJECTION, SUSPENSION INTRA-ARTICULAR; INTRAMUSCULAR
Status: COMPLETED | OUTPATIENT
Start: 2021-08-17 | End: 2021-08-17

## 2021-08-17 RX ORDER — FENTANYL CITRATE 50 UG/ML
INJECTION, SOLUTION INTRAMUSCULAR; INTRAVENOUS
Status: COMPLETED | OUTPATIENT
Start: 2021-08-17 | End: 2021-08-17

## 2021-08-17 RX ORDER — MIDAZOLAM HYDROCHLORIDE 1 MG/ML
INJECTION INTRAMUSCULAR; INTRAVENOUS
Status: COMPLETED | OUTPATIENT
Start: 2021-08-17 | End: 2021-08-17

## 2021-08-17 RX ADMIN — TRIAMCINOLONE ACETONIDE 80 MG: 40 INJECTION, SUSPENSION INTRA-ARTICULAR; INTRAMUSCULAR at 08:32

## 2021-08-17 RX ADMIN — Medication 50 MCG: at 08:19

## 2021-08-17 RX ADMIN — MIDAZOLAM 2 MG: 1 INJECTION INTRAMUSCULAR; INTRAVENOUS at 08:14

## 2021-08-17 RX ADMIN — BUPIVACAINE HYDROCHLORIDE 12 ML: 5 INJECTION, SOLUTION EPIDURAL; INTRACAUDAL; PERINEURAL at 08:30

## 2021-08-17 RX ADMIN — IOHEXOL 3 ML: 180 INJECTION INTRAVENOUS at 08:31

## 2021-08-17 ASSESSMENT — PAIN DESCRIPTION - PROGRESSION: CLINICAL_PROGRESSION: GRADUALLY WORSENING

## 2021-08-17 ASSESSMENT — PAIN DESCRIPTION - DESCRIPTORS: DESCRIPTORS: ACHING;SHARP

## 2021-08-17 ASSESSMENT — PAIN DESCRIPTION - FREQUENCY: FREQUENCY: CONTINUOUS

## 2021-08-17 ASSESSMENT — PAIN SCALES - GENERAL
PAINLEVEL_OUTOF10: 2
PAINLEVEL_OUTOF10: 7
PAINLEVEL_OUTOF10: 2

## 2021-08-17 ASSESSMENT — PAIN DESCRIPTION - ORIENTATION: ORIENTATION: RIGHT;LEFT

## 2021-08-17 ASSESSMENT — PAIN DESCRIPTION - LOCATION: LOCATION: SHOULDER;NECK

## 2021-08-17 ASSESSMENT — PAIN DESCRIPTION - ONSET: ONSET: ON-GOING

## 2021-08-17 ASSESSMENT — PAIN DESCRIPTION - PAIN TYPE: TYPE: CHRONIC PAIN

## 2021-08-17 NOTE — PROCEDURES
Pre-Procedure Note    Patient Name: Lissy Meadows   YOB: 1970  Room/Bed: Room/bed info not found  Medical Record Number: [id number]  Date: 2021       Indication:    1. Osteoarthritis of cervical spine, unspecified spinal osteoarthritis complication status        Consent: On file. Vital Signs:   Vitals:    21 0829   BP: 115/87   Pulse: 72   Resp: 19   Temp:    SpO2: 95%       Past Medical History:   has a past medical history of Anxiety, CAD (coronary artery disease), Fatty liver, GERD (gastroesophageal reflux disease), Headache, History of bronchitis, Hyperlipidemia, Hypothyroidism, Kidney stone, LFT elevation, MVP (mitral valve prolapse), Obesity, Osteoarthritis of cervical spine, Pulmonary emboli (HCC), Type 2 diabetes mellitus without complication ([facility] [address] and Umbilical hernia. Past Surgical History:   has a past surgical history that includes Upper gastrointestinal endoscopy (2010); hernia repair; Cholecystectomy; Appendectomy;  section; Colonoscopy (); Colonoscopy (14); Colonoscopy (2014); pr exploratory of abdomen (10/21/2014); Colonoscopy (N/A, 2018); Hysterectomy, total abdominal; Upper gastrointestinal endoscopy (N/A, 3/10/2021); and Colonoscopy (N/A, 2021). Pre-Sedation Documentation and Exam:   Vital signs have been reviewed (see flow sheet for vitals). Mallampati Airway Assessment:  normal    ASA Classification:  Class 3 - A patient with severe systemic disease that limits activity but is not incapacitating    Sedation/ Anesthesia Plan:   intravenous sedation  as needed. Medications Planned:   midazolam (Versed) / Fentanyl  Intravenously  as needed. Patient is an appropriate candidate for plan of sedation: yes  Patient's History and Physical examination was reviewed and there is no change. Electronically signed by Gisel Carrington MD on 2021 at 8:43 AM    Preoperative Diagnosis:    1.   Degenerative cervical disc disease. 2.  Cervical spondylosis. 3.  Facet joint arthropathy. Postoperative Diagnosis:   1. Degenerative cervical disc disease. 2.  Cervical spondylosis. 3.  Facet joint arthropathy. Procedure Performed:  Cervical facet joint injections at the levels of C2-3, C3-4, C4-5, C5-6, C6-7, C7-T1 on the Left side with fluoroscopic guidance, with IV sedation    Indication for the Procedure: The patient had a history of chronic cervical pain that is not responding well to the conservative treatment. Patient's pain is mostly axial in nature. Pain is interfering with the activities of daily living. Physical examination revealed facet tenderness and facet loading is positive. We decided to try cervical facet joint injection for diagnostic as well as for therapeutic purposes. The procedure and its risks were discussed with the patient and an informed consent was obtained. .Current Pain Assessment  Pain Assessment  Pain Assessment: 0-10  Pain Level: 7  Patient's Stated Pain Goal: 2 (Decrease weight and increase activity)  Pain Type: Chronic pain  Pain Location: Shoulder, Neck  Pain Orientation: Right, Left  Pain Descriptors: Aching, Sharp  Pain Frequency: Continuous  Pain Onset: On-going  Clinical Progression: Gradually worsening  Effect of Pain on Daily Activities: increase with lifting and movement of head  Non-Pharmaceutical Pain Intervention(s): Rest, Heat applied  RASS Score: Alert and calm  POSS Score (Patient Ctrl Analgesia): 1   Procedure:  After starting an IV, the patient was sedated with 2 mg of Midazolam and 50 mcg of Fentanyl intravenously by the RN under my direct supervision. Patient's vital signs including BP, EKG and SaO2 were monitored by RN and they remained stable during the procedure. A meaningful communication was kept up with the patient throughout   the procedure. The patient is placed in prone position. Skin over the back was prepped and draped in sterile manner.   Under fluoroscopy the facet joints were identified and palpated, and the following joints were found to be tender:  C2-3, C3-4, C4-5, C5-6, C6-7, C7-T1 on Left side. Hence we decided to inject these joints in the following way. Using fluoroscopy the facet joints were identified and by adjusting angle of the fluoroscopy the view of the joint space was optimized. The skin and deep tissues over the joint space were anesthetized with 1mL of 0.5% Marcaine. The #22-gauge, 3-1/2 inch spinal needle was introduced through the skin wheal under fluoroscopic guidance such that the tip of the needle lies in the joint space. This was confirmed by injecting about 0.5 mL of Omnipaque-180 through the needle and observing the spread of the contrast along the joint space. Then after negative aspiration a mixture of 0.5% Marcaine with triamcinolone was injected into the joint space. This was done at the levels of C2-3, C3-4, C4-5, C5-6, C6-7, C7-T1 on the Left side. A total of 80 milligrams of triamcinolone and 6 mL of 0.5% Marcaine was used and was divided in equal amounts among the joints. After removing the needles Band-Aids were placed over the needle insertion sites. Patient's vital signs remained stable and tolerated the procedure well. Patient was discharged home in stable condition and will be followed in the pain clinic in the next few weeks for further planning.   Electronically signed by Magaly Garcia MD on 8/17/2021 at 8:43 AM

## 2021-08-17 NOTE — PROGRESS NOTES
Discharge criteria met. Post procedure dressing dry and intact. Sensory and motor function intact as per pre-procedure. Patient alert and oriented x3  Instructions and follow up reviewed with pt at patient at discharge. Discharged home transported by wheelchair, accompanied by family .   Discharged @900

## 2021-08-18 ENCOUNTER — TELEPHONE (OUTPATIENT)
Dept: PAIN MANAGEMENT | Age: 51
End: 2021-08-18

## 2021-08-19 ENCOUNTER — TELEPHONE (OUTPATIENT)
Dept: INTERNAL MEDICINE CLINIC | Age: 51
End: 2021-08-19

## 2021-08-19 NOTE — TELEPHONE ENCOUNTER
Mailed patient no show letter #1 for the date of 08/19/2021 scheduled with Dr Newton Fofana. Called patient and left message on Voice mail informing of missed appointment.

## 2021-08-19 NOTE — LETTER
[facility] [address]          08/19/2021    Dear Dianna Maxwell: You recently missed a scheduled appointment with Dr Jaci Pittman on 08/19/2021. This is the 1st scheduledappointment that you have missed within the last twelve months. If you miss 2 more appointment, you may be dismissed from the practice. It is your responsibility to arrive to your appointment. Please call our office as soon as possible so that we may reschedule your appointment, because your health and follow-up medical care are important to us. For future appointments that you are unable to keep, please call the office at [phone] at least 24 hours in advance to cancel and reschedule. If a traditional appointment is difficult for you to make, please keep in mind, we offer E-Visits for several diagnoses as well as virtual visits. We also have primary care walk-in clinics available. For more information on these options, please contact our office.    Sincerely,        [address]  [address] [place] [address]

## 2021-08-28 DIAGNOSIS — E03.9 HYPOTHYROIDISM, UNSPECIFIED TYPE: ICD-10-CM

## 2021-08-30 RX ORDER — TIZANIDINE 4 MG/1
TABLET ORAL
Qty: 90 TABLET | Refills: 0 | Status: SHIPPED | OUTPATIENT
Start: 2021-08-30 | End: 2021-09-27

## 2021-08-31 RX ORDER — LEVOTHYROXINE SODIUM 175 UG/1
175 TABLET ORAL DAILY
Qty: 90 TABLET | Refills: 1 | Status: SHIPPED | OUTPATIENT
Start: 2021-08-31 | End: 2022-02-24

## 2021-09-03 ENCOUNTER — APPOINTMENT (OUTPATIENT)
Dept: GENERAL RADIOLOGY | Age: 51
End: 2021-09-03
Payer: MEDICAID

## 2021-09-03 ENCOUNTER — HOSPITAL ENCOUNTER (EMERGENCY)
Age: 51
Discharge: HOME | End: 2021-09-03
Attending: STUDENT IN AN ORGANIZED HEALTH CARE EDUCATION/TRAINING PROGRAM
Payer: MEDICAID

## 2021-09-03 ENCOUNTER — APPOINTMENT (OUTPATIENT)
Dept: CT IMAGING | Age: 51
End: 2021-09-03
Payer: MEDICAID

## 2021-09-03 VITALS
DIASTOLIC BLOOD PRESSURE: 67 MMHG | SYSTOLIC BLOOD PRESSURE: 148 MMHG | OXYGEN SATURATION: 98 % | TEMPERATURE: 97.6 F | HEART RATE: 67 BPM | RESPIRATION RATE: 18 BRPM

## 2021-09-03 DIAGNOSIS — R07.9 CHEST PAIN, UNSPECIFIED TYPE: Primary | ICD-10-CM

## 2021-09-03 LAB
ABSOLUTE EOS #: 0.2 K/UL (ref 0–0.4)
ABSOLUTE IMMATURE GRANULOCYTE: NORMAL K/UL (ref 0–0.3)
ABSOLUTE LYMPH #: 2.4 K/UL (ref 1–4.8)
ABSOLUTE MONO #: 0.4 K/UL (ref 0.1–1.3)
ANION GAP SERPL CALCULATED.3IONS-SCNC: 12 MMOL/L (ref 9–17)
BASOPHILS # BLD: 1 % (ref 0–2)
BASOPHILS ABSOLUTE: 0.1 K/UL (ref 0–0.2)
BUN BLDV-MCNC: 14 MG/DL (ref 6–20)
BUN/CREAT BLD: NORMAL (ref 9–20)
CALCIUM SERPL-MCNC: 9 MG/DL (ref 8.6–10.4)
CHLORIDE BLD-SCNC: 102 MMOL/L (ref 98–107)
CO2: 21 MMOL/L (ref 20–31)
CREAT SERPL-MCNC: 0.74 MG/DL (ref 0.5–0.9)
DIFFERENTIAL TYPE: NORMAL
EOSINOPHILS RELATIVE PERCENT: 2 % (ref 0–4)
GFR AFRICAN AMERICAN: >60 ML/MIN
GFR NON-AFRICAN AMERICAN: >60 ML/MIN
GFR SERPL CREATININE-BSD FRML MDRD: NORMAL ML/MIN/{1.73_M2}
GFR SERPL CREATININE-BSD FRML MDRD: NORMAL ML/MIN/{1.73_M2}
GLUCOSE BLD-MCNC: 95 MG/DL (ref 70–99)
HCT VFR BLD CALC: 42.2 % (ref 36–46)
HEMOGLOBIN: 14.2 G/DL (ref 12–16)
IMMATURE GRANULOCYTES: NORMAL %
LYMPHOCYTES # BLD: 29 % (ref 24–44)
MCH RBC QN AUTO: 30.3 PG (ref 26–34)
MCHC RBC AUTO-ENTMCNC: 33.5 G/DL (ref 31–37)
MCV RBC AUTO: 90.3 FL (ref 80–100)
MONOCYTES # BLD: 5 % (ref 1–7)
NRBC AUTOMATED: NORMAL PER 100 WBC
PDW BLD-RTO: 14.4 % (ref 11.5–14.9)
PLATELET # BLD: 167 K/UL (ref 150–450)
PLATELET ESTIMATE: NORMAL
PMV BLD AUTO: 7.6 FL (ref 6–12)
POTASSIUM SERPL-SCNC: 4.3 MMOL/L (ref 3.7–5.3)
RBC # BLD: 4.67 M/UL (ref 4–5.2)
RBC # BLD: NORMAL 10*6/UL
SARS-COV-2, RAPID: NOT DETECTED
SEG NEUTROPHILS: 63 % (ref 36–66)
SEGMENTED NEUTROPHILS ABSOLUTE COUNT: 5.1 K/UL (ref 1.3–9.1)
SODIUM BLD-SCNC: 135 MMOL/L (ref 135–144)
SPECIMEN DESCRIPTION: NORMAL
TROPONIN INTERP: NORMAL
TROPONIN T: NORMAL NG/ML
TROPONIN, HIGH SENSITIVITY: <6 NG/L (ref 0–14)
WBC # BLD: 8.1 K/UL (ref 3.5–11)
WBC # BLD: NORMAL 10*3/UL

## 2021-09-03 PROCEDURE — 71045 X-RAY EXAM CHEST 1 VIEW: CPT

## 2021-09-03 PROCEDURE — 6370000000 HC RX 637 (ALT 250 FOR IP): Performed by: STUDENT IN AN ORGANIZED HEALTH CARE EDUCATION/TRAINING PROGRAM

## 2021-09-03 PROCEDURE — 99285 EMERGENCY DEPT VISIT HI MDM: CPT

## 2021-09-03 PROCEDURE — 80048 BASIC METABOLIC PNL TOTAL CA: CPT

## 2021-09-03 PROCEDURE — 96374 THER/PROPH/DIAG INJ IV PUSH: CPT

## 2021-09-03 PROCEDURE — 84484 ASSAY OF TROPONIN QUANT: CPT

## 2021-09-03 PROCEDURE — 6360000002 HC RX W HCPCS: Performed by: STUDENT IN AN ORGANIZED HEALTH CARE EDUCATION/TRAINING PROGRAM

## 2021-09-03 PROCEDURE — 87635 SARS-COV-2 COVID-19 AMP PRB: CPT

## 2021-09-03 PROCEDURE — 70450 CT HEAD/BRAIN W/O DYE: CPT

## 2021-09-03 PROCEDURE — 96375 TX/PRO/DX INJ NEW DRUG ADDON: CPT

## 2021-09-03 PROCEDURE — 85025 COMPLETE CBC W/AUTO DIFF WBC: CPT

## 2021-09-03 PROCEDURE — 36415 COLL VENOUS BLD VENIPUNCTURE: CPT

## 2021-09-03 PROCEDURE — 2580000003 HC RX 258: Performed by: STUDENT IN AN ORGANIZED HEALTH CARE EDUCATION/TRAINING PROGRAM

## 2021-09-03 PROCEDURE — 93005 ELECTROCARDIOGRAM TRACING: CPT | Performed by: STUDENT IN AN ORGANIZED HEALTH CARE EDUCATION/TRAINING PROGRAM

## 2021-09-03 RX ORDER — KETOROLAC TROMETHAMINE 30 MG/ML
15 INJECTION, SOLUTION INTRAMUSCULAR; INTRAVENOUS ONCE
Status: COMPLETED | OUTPATIENT
Start: 2021-09-03 | End: 2021-09-03

## 2021-09-03 RX ORDER — ASPIRIN 81 MG/1
324 TABLET, CHEWABLE ORAL ONCE
Status: COMPLETED | OUTPATIENT
Start: 2021-09-03 | End: 2021-09-03

## 2021-09-03 RX ORDER — 0.9 % SODIUM CHLORIDE 0.9 %
1000 INTRAVENOUS SOLUTION INTRAVENOUS ONCE
Status: COMPLETED | OUTPATIENT
Start: 2021-09-03 | End: 2021-09-03

## 2021-09-03 RX ORDER — ONDANSETRON 2 MG/ML
4 INJECTION INTRAMUSCULAR; INTRAVENOUS ONCE
Status: COMPLETED | OUTPATIENT
Start: 2021-09-03 | End: 2021-09-03

## 2021-09-03 RX ADMIN — ASPIRIN 81 MG CHEWABLE TABLET 324 MG: 81 TABLET CHEWABLE at 18:00

## 2021-09-03 RX ADMIN — KETOROLAC TROMETHAMINE 15 MG: 30 INJECTION, SOLUTION INTRAMUSCULAR; INTRAVENOUS at 18:01

## 2021-09-03 RX ADMIN — SODIUM CHLORIDE 1000 ML: 9 INJECTION, SOLUTION INTRAVENOUS at 18:01

## 2021-09-03 RX ADMIN — ONDANSETRON 4 MG: 2 INJECTION, SOLUTION INTRAMUSCULAR; INTRAVENOUS at 18:01

## 2021-09-03 ASSESSMENT — ENCOUNTER SYMPTOMS
WHEEZING: 0
RHINORRHEA: 0
DIARRHEA: 0
CONSTIPATION: 0
VOMITING: 0
SHORTNESS OF BREATH: 0
ABDOMINAL PAIN: 0
EYE PAIN: 0
COUGH: 0
NAUSEA: 1
SORE THROAT: 0
CHEST TIGHTNESS: 1

## 2021-09-03 ASSESSMENT — PAIN SCALES - GENERAL
PAINLEVEL_OUTOF10: 8
PAINLEVEL_OUTOF10: 3

## 2021-09-03 NOTE — ED PROVIDER NOTES
EMERGENCY DEPARTMENT ENCOUNTER   ATTENDING ATTESTATION     Pt Name: Agustina Westbrook  MRN: [id number]  [place] 1970  Date of evaluation: 9/3/21       Agustina Westbrook is a 53 y.o. female who presents with Chest Pain, Headache, and Shortness of Breath      MDM:   79-year-old female presented for evaluation of some intermittent chest discomfort shortness of breath, headaches, nausea. Work up for covid, infection, ACS, uremia, symptomatic anemia, pneumonia, pneumothorax, pleural effusion, ICH    EKG  Sinus rhythm rate of 70 normal axis normal intervals no concerning ST or T wave changes    Vitals:   Vitals:    09/03/21 1604   BP: 100/65   Pulse: 74   Resp: 16   Temp: 97.6 °F (36.4 °C)   TempSrc: Temporal   SpO2: 97%         I personally evaluated and examined the patient in conjunction with the resident and agree with the assessment, treatment plan, and disposition of the patient as recorded by the resident. I performed a history and physical examination of the patient and discussed management with the resident. I reviewed the residents note and agree with the documented findings and plan of care. Any areas of disagreement are noted on the chart. I was personally present for the key portions of any procedures. I have documented in the chart those procedures where I was not present during the key portions. I have personally reviewed all images and agree with the resident's interpretation. I have reviewed the emergency nurses triage note. I agree with the chief complaint, past medical history, past surgical history, allergies, medications, social and family history as documented unless otherwise noted.     Jeffrey Bolaños MD  Attending Emergency Physician            Jeffrey Bolaños MD  09/03/21 2038

## 2021-09-03 NOTE — ED TRIAGE NOTES
Mode of arrival (squad #, walk in, police, etc) : walk in        Chief complaint(s): palpitations, SOB, chest pain        Arrival Note (brief scenario, treatment PTA, etc). : Pt reports feelings of palpitations, and chest tightness since Monday. Pt reports a headache and back ache as well. Pt reports intermittent periods of SOB. Pt is A&Ox4, in no acute distress, respirations even and unlabored, ambulatory with steady gait. C= \"Have you ever felt that you should Cut down on your drinking? \"  No  A= \"Have people Annoyed you by criticizing your drinking? \"  No  G= \"Have you ever felt bad or Guilty about your drinking? \"  No  E= \"Have you ever had a drink as an Eye-opener first thing in the morning to steady your nerves or to help a hangover? \"  No      Deferred []      Reason for deferring: N/A    *If yes to two or more: probable alcohol abuse. *

## 2021-09-03 NOTE — ED PROVIDER NOTES
[address] [facility] ED  Emergency Department Encounter  Emergency Medicine Resident     Pt Name: Dejan Brambila  MRN: [id number]  Alia 1970  Date of evaluation: 9/3/21  PCP:  MARTITA Nur CNP    CHIEF COMPLAINT       Chief Complaint   Patient presents with    Chest Pain    Headache    Shortness of Breath       HISTORY OFPRESENT ILLNESS  (Location/Symptom, Timing/Onset, Context/Setting, Quality, Duration, Modifying [place].)      Dejan Brambila is a 53 y.o. female with past medical history significant for orts of irregular heartbeat, previous PE on Xarelto who presents with chest palpitations which have been ongoing for the past 5 days. Patient states that she noted that her chest palpitations were happening on Monday and they continued to worsen over the past 1 week states that intermittently she becomes dizzy when she bends over denies any shortness of breath. States that the palpitations have caused her some chest tightness located in the mid anterior chest.  Denies any ripping or tearing sensation into the back. Denies any numbness or tingling in her hands or feet. Patient has had intermittent episodes of dizziness when she bends over. Denies any episodes of loss of consciousness or syncope. Patient is on a blood thinner currently. Does have associated headache that has been ongoing since Wednesday. Patient also has associated nausea but denies any vomiting. Denies any abdominal trauma. Denies any head trauma. Patient has been able to ambulate. Patient states that she admittedly feels anxious due to these palpitations. Denies any new home medications. Is not vaccinated against Covid.     PAST MEDICAL / SURGICAL / SOCIAL / FAMILY HISTORY      has a past medical history of Anxiety, CAD (coronary artery disease), Fatty liver, GERD (gastroesophageal reflux disease), Headache, History of bronchitis, Hyperlipidemia, Hypothyroidism, Kidney stone, LFT elevation, MVP (mitral valve prolapse), Obesity, Osteoarthritis of cervical spine, Pulmonary emboli (HCC), Type 2 diabetes mellitus without complication ([facility] [address] and Umbilical hernia. has a past surgical history that includes Upper gastrointestinal endoscopy (2010); hernia repair; Cholecystectomy; Appendectomy;  section; Colonoscopy (); Colonoscopy (14); Colonoscopy (2014); pr exploratory of abdomen (10/21/2014); Colonoscopy (N/A, 2018); Hysterectomy, total abdominal; Upper gastrointestinal endoscopy (N/A, 3/10/2021); and Colonoscopy (N/A, 2021). Social:  reports that she has quit smoking. Her smoking use included cigarettes. She has a 8.25 pack-year smoking history. She has never used smokeless tobacco. She reports that she does not drink alcohol and does not use drugs. Family Hx:   Family History   Problem Relation Age of Onset   Miguel Valentin Cancer Mother         vaginal    Heart Disease Father     Diabetes Father     Other Father         colon resection for colon polyps    Breast Cancer Maternal Grandmother     Stroke Maternal Grandfather         Allergies:  Compazine [prochlorperazine], Sulfa antibiotics, and Adhesive tape    Home Medications:  Prior to Admission medications    Medication Sig Start Date End Date Taking? Authorizing Provider   levothyroxine (SYNTHROID) 175 MCG tablet TAKE 1 TABLET BY MOUTH DAILY 21   MARTITA Douglas CNP   tiZANidine (ZANAFLEX) 4 MG tablet TAKE 1 TABLET BY MOUTH EVERY 8 HOURS AS NEEDED FOR SPASMS 21   MARTITA Roe CNP   traZODone (DESYREL) 100 MG tablet  21   Historical Provider, MD   oxyCODONE-acetaminophen (PERCOCET)  MG per tablet Take 1 tablet by mouth every 6 hours as needed for Pain for up to 30 days.  8/11/21 9/10/21  Rios Welch MD   rOPINIRole (REQUIP) 2 MG tablet TAKE 1 TABLET BY MOUTH EVERY NIGHT 21   MARTITA Douglas CNP   naloxone [facility]) 4 MG/0.1ML LIQD nasal spray 1 spray by Nasal route as needed for Opioid Reversal 7/8/21   MARTITA Rodriguez CNP   albuterol sulfate HFA (VENTOLIN HFA) 108 (90 Base) MCG/ACT inhaler Inhale 2 puffs into the lungs every 6 hours as needed for Wheezing 6/6/21   Laci Fernandez MD   esomeprazole (NEXIUM) 40 MG delayed release capsule TAKE 1 CAPSULE BY MOUTH EVERY MORNING BEFORE BREAKFAST 6/1/21   MARTITA Jean CNP   albuterol sulfate  (90 Base) MCG/ACT inhaler INHALE 2 PUFFS INTO THE LUNGS EVERY 4 HOURS AS NEEDED FOR SHORTNESS OF BREATH 4/23/21   MARTITA Jean CNP   rivaroxaban (XARELTO) 20 MG TABS tablet Take 1 tablet by mouth daily (with breakfast) 4/12/21   Blu Ron MD   lidocaine (LIDODERM) 5 % Place 1 patch onto the skin daily 12 hours on, 12 hours off. 3/4/21   Milvia Bloom MD   clonazePAM (KLONOPIN) 0.5 MG tablet Take 1 tablet by mouth 2 times daily as needed for Anxiety for up to 30 days. 12/28/20 8/2/21  Terra Dickens MD   mirtazapine (REMERON) 15 MG tablet Take 15 mg by mouth daily 11/10/20   Historical Provider, MD   ondansetron (ZOFRAN ODT) 4 MG disintegrating tablet Take 1 tablet by mouth every 8 hours as needed for Nausea or Vomiting 3/4/20   Nat Solis MD   sertraline (ZOLOFT) 100 MG tablet Take 100 mg by mouth daily    Historical Provider, MD   topiramate (TOPAMAX) 25 MG tablet 25 mg 2 times daily  2/27/18   Historical Provider, MD   metoprolol tartrate (LOPRESSOR) 50 MG tablet Take 50 mg by mouth 2 times daily  8/21/17   Historical Provider, MD       REVIEW OFSYSTEMS    (2-9 systems for level 4, 10 or more for level 5)      Review of Systems   Constitutional: Negative for activity change, chills and fever. HENT: Negative for congestion, rhinorrhea and sore throat. Eyes: Negative for pain and visual disturbance. Respiratory: Positive for chest tightness. Negative for cough, shortness of breath and wheezing. Cardiovascular: Positive for chest pain and palpitations. Negative for leg swelling. Gastrointestinal: Positive for nausea. Negative for abdominal pain, constipation, diarrhea and vomiting. Genitourinary: Negative for difficulty urinating and frequency. Musculoskeletal: Negative for arthralgias and myalgias. Skin: Negative for rash and wound. Neurological: Positive for dizziness and headaches. Negative for syncope, weakness and numbness. PHYSICAL EXAM   (up to 7 for level 4, 8 or more forlevel 5)      INITIAL VITALS:   Vitals:    09/03/21 2138   BP: (!) 148/67   Pulse: 67   Resp: 18   Temp:    SpO2: 98%        Physical Exam  Vitals and nursing note reviewed. Constitutional:       General: She is not in acute distress. Appearance: Normal appearance. She is obese. She is not ill-appearing, toxic-appearing or diaphoretic. Comments: Alert, oriented, well kept female   HENT:      Head: Normocephalic and atraumatic. Nose: Nose normal.      Mouth/Throat:      Mouth: Mucous membranes are moist.      Pharynx: Oropharynx is clear. No pharyngeal swelling or oropharyngeal exudate. Eyes:      General:         Right eye: No discharge. Left eye: No discharge. Pupils: Pupils are equal, round, and reactive to light. Cardiovascular:      Rate and Rhythm: Normal rate and regular rhythm. Heart sounds: No murmur heard. No friction rub. No gallop. Comments: Radial pulses intact and palpable at bilateral radial arteries, 2+. Pulmonary:      Effort: Pulmonary effort is normal. No tachypnea, accessory muscle usage or respiratory distress. Breath sounds: Normal breath sounds. No decreased breath sounds, wheezing, rhonchi or rales. Chest:      Chest wall: No mass, tenderness, crepitus or edema. Abdominal:      General: There is no distension. Palpations: Abdomen is soft. Tenderness: There is no abdominal tenderness. There is no guarding or rebound.       Comments: Soft, nondistended, nonperitoneal abdomen, no tenderness to palpation in all 4 quadrants. Musculoskeletal:      Cervical back: Normal range of motion. Right lower leg: No tenderness. No edema. Left lower leg: No tenderness. No edema. Comments: No tenderness to palpation bilateral calves. Dorsiflexion and plantarflexion intact and equal bilaterally. Skin:     General: Skin is warm and dry. Capillary Refill: Capillary refill takes less than 2 seconds. Coloration: Skin is not cyanotic or pale. Neurological:      General: No focal deficit present. Mental Status: She is alert and oriented to person, place, and time. Comments: Speaking in full sentences, no slurring noted, tracking appropriately throughout my examination, GCS 15, alert, oriented, answering all questions appropriately.  strength intact and equal bilateral upper extremities. Dorsiflexion plantarflexion intact and equal bilateral lower extremities. Able to wiggle toes, following commands easily, keenly alert. Psychiatric:         Mood and Affect: Mood is anxious. Thought Content: Thought content normal.         DIFFERENTIAL  DIAGNOSIS       Initial MDM/Plan: 53 y.o. female who presents with 1 week of headache, palpitations, chest tightness. Upon my initial examination patient is resting comfortably cot, no acute distress, speaking full sentences, no respiratory distress GCS 15, alert, oriented, answering all questions appropriately. Vital signs are within normal limits upon arrival.    Differential to include intracranial hemorrhage due to history of headache on blood thinner will obtain CT head to rule out intracranial abnormality. Cardiac work-up including CBC to rule out acute infection versus anemia. BMP to evaluate electrolytes as well as renal function. Troponin x1. EKG less likely ACS although will provide urine 324 mg of aspirin. Chest x-ray. Covid swab due to chest tightness and unvaccinated status.     Symptomatic relief provided with Toradol, Zofran, IV fluids. Upon reassessment as well as discussion with attending physician. Patient did state that she has a headache, is on blood thinners and has had neurologic symptoms including dizziness. Will obtain a CT head rule out intracranial abnormality. Work-up extensively negative including CBC, BMP, troponins all within normal limits, no acute abnormalities noted. EKG nonacute, troponin less than 6. Low suspicion for ACS at this time. Did discuss with patient that she should obtain a cardiologist, provided with information follow-up with cardiology team.      Discussed with patient that her CT head was negative, no acute intracranial abnormalities. Low suspicion for PE as cause of dizziness and lightheadedness as patient is on blood thinner and has had no shortness of breath no syncope and no indications of right heart strain on EKG. Covid negative     Discussed with patient that she should follow-up with cardiologist, she is complaining understanding of this. Patient did feel somewhat better after IV fluids, Toradol and aspirin. Patient discharged in stable condition after remaining vitally stable throughout emergency department stay. DIAGNOSTIC RESULTS / EMERGENCYDEPARTMENT COURSE / MDM     LABS:  Labs Reviewed   COVID-19, RAPID   CBC WITH AUTO DIFFERENTIAL   BASIC METABOLIC PANEL W/ REFLEX TO MG FOR LOW K   TROPONIN         RADIOLOGY:  CT HEAD WO CONTRAST    Result Date: 9/3/2021  EXAMINATION: CT OF THE HEAD WITHOUT CONTRAST  9/3/2021 7:46 pm TECHNIQUE: CT of the head was performed without the administration of intravenous contrast. Dose modulation, iterative reconstruction, and/or weight based adjustment of the mA/kV was utilized to reduce the radiation dose to as low as reasonably achievable.  COMPARISON: 12/27/2020 HISTORY: ORDERING SYSTEM PROVIDED HISTORY: headache, on blood thinner, TECHNOLOGIST PROVIDED HISTORY: headache, on blood thinner, Decision Support Exception - unselect if not a suspected or confirmed emergency medical condition->Emergency Medical Condition (MA) Is the patient pregnant?->No Reason for Exam: headache, on blood thinner Acuity: Unknown Type of Exam: Unknown FINDINGS: BRAIN/VENTRICLES: There is no acute intracranial hemorrhage, mass effect or midline shift. No abnormal extra-axial fluid collection. The gray-white differentiation is maintained without evidence of an acute infarct. There is no evidence of hydrocephalus. ORBITS: The visualized portion of the orbits demonstrate no acute abnormality. SINUSES: The visualized paranasal sinuses and mastoid air cells demonstrate no acute abnormality. SOFT TISSUES/SKULL:  No acute abnormality of the visualized skull or soft tissues. No acute intracranial abnormality. XR CHEST PORTABLE    Result Date: 9/3/2021  EXAMINATION: ONE XRAY VIEW OF THE CHEST 9/3/2021 5:52 pm COMPARISON: March 11, 2021 HISTORY: ORDERING SYSTEM PROVIDED HISTORY: chest pain, chest tightness TECHNOLOGIST PROVIDED HISTORY: chest pain, chest tightness Reason for Exam: Chest pain and tightness Acuity: Acute Type of Exam: Initial FINDINGS: The lungs are without acute focal process. There is no effusion or pneumothorax. The cardiomediastinal silhouette is without acute process. The osseous structures are without acute process. No acute process. EKG  EKG Interpretation    Interpreted by me    Rhythm: normal sinus   Rate: normal  Axis: normal  Ectopy: none  Conduction: normal  ST Segments: no acute change  T Waves: no acute change  Q Waves: none    Clinical Impression: no acute changes and similar when compared to EKG perfromed on 4/23/21    All EKG's are interpreted by the Emergency Department Physicianwho either signs or Co-signs this chart in the absence of a cardiologist.    EMERGENCY DEPARTMENT COURSE:  ED Course as of Sep 03 2214   Fri Sep 03, 2021   1851 Negative   COVID-19, Rapid:    Specimen Description . NASOPHARYNGEAL SWAB   SARS-CoV-2, Rapid Not Detected [MA]   1066 Less than 6, negative   Troponin:    Troponin, High Sensitivity <6   Troponin T NOT REPORTED   Troponin Interp NOT REPORTED [MA]   1851 Within normal limits. No electrolyte abnormality, no renal insufficiency noted. Basic Metabolic Panel w/ Reflex to MG:    Glucose 95   BUN 14   Creatinine 0.74   Bun/Cre Ratio NOT REPORTED   Calcium 9.0   Sodium 135   Potassium 4.3   Chloride 102   CO2 21   Anion Gap 12   GFR Non- >60   GFR  >60   GFR Comment        GFR Staging NOT REPORTED [MA]   1852 Hemoglobin within normal limits, no acute abnormalities. CBC Auto Differential:    WBC 8.1   RBC 4.67   Hemoglobin Quant 14.2   Hematocrit 42.2   MCV 90.3   MCH 30.3   MCHC 33.5   RDW 14.4   Platelet Count 463   MPV 7.6   NRBC Automated NOT REPORTED   Differential Type NOT REPORTED   Seg Neutrophils 63   Lymphocytes 29   Monocytes 5   Eosinophils % 2   Basophils 1   Immature Granulocytes NOT REPORTED   Segs Absolute 5.10   Absolute Lymph # 2.40   Absolute Mono # 0.40   Absolute Eos # 0.20   Basophils Absolute 0.10   Absolute Immature Granulocyte NOT REPORTED   WBC Morpho. .. [MA]   1853 IMPRESSION:  No acute process. XR CHEST PORTABLE [MA]   2036 IMPRESSION:  No acute intracranial abnormality. CT HEAD WO CONTRAST [MA]      ED Course User Index  [MA] Mario Jade DO          PROCEDURES:  None    CONSULTS:  None      FINAL IMPRESSION      1.  Chest pain, unspecified type          DISPOSITION / PLAN     DISPOSITION Decision To Discharge 09/03/2021 08:55:11 PM      PATIENT REFERRED TO:  MARTITA Last CNP  [facility] [id number]  [phone]    Call   To schedule an appointment for post emergency Department follow-up    [facility] ED  [facility] [address] [phone]    As needed, If symptoms worsen    [facility]  [place] [id number]  [phone]  Call   To schedule an appointment for post Emergency Department follow-up      DISCHARGE MEDICATIONS:  Discharge Medication List as of 9/3/2021  9:05 PM          Chandrakant Tao DO  Emergency Medicine Resident    (Please note that portions of this note were completed with a voice recognition program.Efforts were made to edit the dictations but occasionally words are mis-transcribed.)       Chandrakant Tao DO  Resident  09/03/21 [id number]

## 2021-09-04 LAB
EKG ATRIAL RATE: 70 BPM
EKG P AXIS: 61 DEGREES
EKG P-R INTERVAL: 152 MS
EKG Q-T INTERVAL: 416 MS
EKG QRS DURATION: 82 MS
EKG QTC CALCULATION (BAZETT): 449 MS
EKG R AXIS: 80 DEGREES
EKG T AXIS: 44 DEGREES
EKG VENTRICULAR RATE: 70 BPM

## 2021-09-04 PROCEDURE — 93010 ELECTROCARDIOGRAM REPORT: CPT | Performed by: INTERNAL MEDICINE

## 2021-09-07 RX ORDER — CLONAZEPAM 1 MG/1
TABLET ORAL
COMMUNITY
Start: 2021-08-10 | End: 2021-09-08

## 2021-09-08 ENCOUNTER — HOSPITAL ENCOUNTER (OUTPATIENT)
Dept: PAIN MANAGEMENT | Age: 51
Discharge: HOME | End: 2021-09-08
Payer: MEDICAID

## 2021-09-08 DIAGNOSIS — Z51.81 ENCOUNTER FOR MEDICATION MONITORING: ICD-10-CM

## 2021-09-08 DIAGNOSIS — S13.4XXS SPRAIN OF ATLANTO-OCCIPITAL JOINT, SEQUELA: Primary | ICD-10-CM

## 2021-09-08 DIAGNOSIS — M47.812 ARTHROPATHY OF CERVICAL FACET JOINT: ICD-10-CM

## 2021-09-08 DIAGNOSIS — M48.02 DEGENERATIVE CERVICAL SPINAL STENOSIS: ICD-10-CM

## 2021-09-08 DIAGNOSIS — M79.18 MYOFASCIAL MUSCLE PAIN: ICD-10-CM

## 2021-09-08 DIAGNOSIS — M54.12 CERVICAL RADICULOPATHY: ICD-10-CM

## 2021-09-08 PROCEDURE — 99213 OFFICE O/P EST LOW 20 MIN: CPT | Performed by: NURSE PRACTITIONER

## 2021-09-08 PROCEDURE — 99213 OFFICE O/P EST LOW 20 MIN: CPT

## 2021-09-08 RX ORDER — OXYCODONE AND ACETAMINOPHEN 10; 325 MG/1; MG/1
1 TABLET ORAL EVERY 6 HOURS PRN
Qty: 120 TABLET | Refills: 0 | Status: SHIPPED | OUTPATIENT
Start: 2021-09-10 | End: 2021-10-07 | Stop reason: SDUPTHER

## 2021-09-08 ASSESSMENT — ENCOUNTER SYMPTOMS
GASTROINTESTINAL NEGATIVE: 1
RESPIRATORY NEGATIVE: 1

## 2021-09-08 NOTE — PROGRESS NOTES
[address] 89 PROGRESS NOTE      Patient  completed [x]  video visit   []   phone call:         Minutes :       [x]    to  review Medication Agreement    [x]  Follow up after procedure   []  Discuss treatment options      Location:  Provider:  working from    [x]    home    []   [facility] - KADEEM MONTEJO ,   patient at  work         Chief Complaint: neck pain    She had left cervical facet injection with 85% relief  with improved ROM. Her pain is manageable. She has no history of cervical surgery, Her sleep is better, She continues to work. She states has had no further weight loss. She had lost 125 pounds in a year. , she states no further weight loss. She follows with Oncology and will have more testing, She states her CEA was elevated. She saw her gynecologist.Patient states has history of hysterectomy and that her gynecologist is trying to get her old records. Neck Pain   This is a chronic problem. The current episode started more than 1 month ago. The problem occurs intermittently. The problem has been gradually improving. The pain is associated with nothing. The pain is present in the left side (left shoulder and arm). Quality: sharp. The pain is at a severity of 5/10. The pain is moderate. Exacerbated by: laying on left side. The pain is worse during the night. Associated symptoms include headaches, numbness, weakness and weight loss. Associated symptoms comments: Numbness and weakness left side. She has tried heat and oral narcotics for the symptoms.              Treatment goals:   Functional status: keep pain at current level    Aberrancy:   Any alcoholic beverages  no          Any illegal drugs no        Analgesia:    5                 Adverse  Effects :no    ADL;s :employed, neck exercises    Data:    When was thelast UDS:  2-3-2021          Was the UDS appropriate:  [x] yes []   no      Record/Diagnostics Review:      As above, I did review the imaging   2/7/2021  7:53 PM - Liu, Tyree Incoming Lab Results From [facility]    Component Value Ref Range & Units Status Collected Lab   Pain Management Drug Panel Interp, Urine Inconsistent   Final 02/03/2021  3:51 PM [facility]   (NOTE)   ________________________________________________________________   DRUGS EXPECTED:   NO DRUGS EXPECTED   ________________________________________________________________   INCONSISTENT with medications provided:   Oxycodone   Noroxycodone   7-Aminoclonazepam   ________________________________________________________________   INTERPRETIVE INFORMATION: Targeted drug profile Interp   Interpretation depends on accuracy and completeness of patient   medication information submitted by client. 6-Acetylmorphine, Ur Not Detected   Final 02/03/2021  3:51 PM [facility]   7-Aminoclonazepam, Urine Present   Final 02/03/2021  3:51 PM [facility]   Alpha-OH-Alpraz, Urine Not Detected   Final 02/03/2021  3:51 PM        EXAMINATION:   CT OF THE CERVICAL SPINE WITHOUT CONTRAST 3/4/2021 5:19 pm       TECHNIQUE:   CT of the cervical spine was performed without the administration of   intravenous contrast. Multiplanar reformatted images are provided for review. Dose modulation, iterative reconstruction, and/or weight based adjustment of   the mA/kV was utilized to reduce the radiation dose to as low as reasonably   achievable.       COMPARISON:   February 13, 2021.       HISTORY:   ORDERING SYSTEM PROVIDED HISTORY: radicular pain left arm   TECHNOLOGIST PROVIDED HISTORY:   radicular pain left arm   Decision Support Exception->Emergency Medical Condition (MA)   Is the patient pregnant?->No   Reason for Exam: radicular pain left arm for three weeks. patient states   limited ROM and pain starting in neck.  no injuries   Acuity: Unknown   Type of Exam: Unknown       FINDINGS:   Bone mineralization is normal.  The vertebral bodies and posterior elements   appear intact and appropriately aligned without acute fracture or   subluxation.  Vertebral body stature is maintained throughout. Arley Nathanael is   straightening of the normal cervical lordosis.  Mild-to-moderate cervical   degenerative disc disease is present throughout. Arley Nathanael is moderate bony   neural foraminal narrowing on the left at C3-C4 and C5-C6.  No significant   uncovertebral joint hypertrophic change or additional bony neural foraminal   narrowing.  No paraspinal soft tissue abnormality.       The visualized lung apices are grossly clear.           Impression   Mild to moderate multilevel cervical degenerative disc disease with moderate   bony neural foraminal narrowing on the left at C3-C4 and C5-C6.  No acute   fracture or subluxation.  Straightening of the normal cervical lordosis which   may be related to muscle spasm or position in collar.                           Pill count: appropriate    fill date :9-    Morphine equivalent dose as reported on [place]ARRS:  60  Periodic Controlled Substance Monitoring: Possible medication side effects, risk of tolerance/dependence & alternative treatments discussed., No signs of potential drug abuse or diversion identified. , Assessed functional status., Obtaining appropriate analgesic effect of treatment. Adalid Smith, APRN - CNP)  Review ofOARRS does not show any aberrant prescription behavior. Medication is helping the patient stay active. Patient denies any side effects and reports adequate analgesia. No sign of misuse/abuse.             Past Medical History:   Diagnosis Date    Anxiety     CAD (coronary artery disease)     Mitral valve prolapse    Fatty liver     GERD (gastroesophageal reflux disease)     Headache     History of bronchitis     Hyperlipidemia     Hypothyroidism     Kidney stone     LFT elevation     MVP (mitral valve prolapse)     Obesity     Osteoarthritis of cervical spine     Pulmonary emboli (HCC)     Type 2 diabetes mellitus without complication ([facility] [address] 0/91/5915    Umbilical hernia        Past Surgical History:   Procedure Laterality Date    APPENDECTOMY       SECTION      2 pfannenstiel, 1 vertical    CHOLECYSTECTOMY      COLONOSCOPY      Dr Austyn Ocasio  14    COLONOSCOPY  2014    biopsy & sigmoid spasms, pathology negative    COLONOSCOPY N/A 2018    COLONOSCOPY WITH BIOPSY [facility] by Marcin Hernandez [facility] 2021    COLONOSCOPY DIAGNOSTIC performed by Adolfo Cortez MD at [facility]      x 5    HYSTERECTOMY, TOTAL ABDOMINAL          MI EXPLORATORY OF ABDOMEN  10/21/2014    Laparotomy-lysis of adhesions, bso     UPPER GASTROINTESTINAL ENDOSCOPY  2010    mild chronic inactive gastritis    UPPER GASTROINTESTINAL ENDOSCOPY N/A 3/10/2021    EGD BIOPSY [facility]olfo [facility]ez [facility] at [facility]       Allergies   Allergen Reactions    Compazine [Prochlorperazine]      Jittery, restless    Sulfa Antibiotics Hives    Adhesive Tape Rash         Current Outpatient Medications:     levothyroxine (SYNTHROID) 175 MCG tablet, TAKE 1 TABLET BY MOUTH DAILY, Disp: 90 tablet, Rfl: 1    traZODone (DESYREL) 100 MG tablet, , Disp: , Rfl:     oxyCODONE-acetaminophen (PERCOCET)  MG per tablet, Take 1 tablet by mouth every 6 hours as needed for Pain for up to 30 days. , Disp: 120 tablet, Rfl: 0    rOPINIRole (REQUIP) 2 MG tablet, TAKE 1 TABLET BY MOUTH EVERY NIGHT, Disp: 90 tablet, Rfl: 1    esomeprazole (NEXIUM) 40 MG delayed release capsule, TAKE 1 CAPSULE BY MOUTH EVERY MORNING BEFORE BREAKFAST, Disp: 90 capsule, Rfl: 1    rivaroxaban (XARELTO) 20 MG TABS tablet, Take 1 tablet by mouth daily (with breakfast), Disp: 90 tablet, Rfl: 3    sertraline (ZOLOFT) 100 MG tablet, Take 100 mg by mouth daily, Disp: , Rfl:     topiramate (TOPAMAX) 25 MG tablet, 25 mg 2 times daily , Disp: , Rfl:     metoprolol tartrate (LOPRESSOR) 50 MG tablet, Take 50 mg by mouth 2 times daily , Disp: , Rfl:     tiZANidine (ZANAFLEX) 4 MG tablet, TAKE 1 TABLET BY MOUTH EVERY 8 HOURS AS NEEDED FOR SPASMS, Disp: 90 tablet, Rfl: 0    naloxone (NARCAN) 4 MG/0.1ML LIQD nasal spray, 1 spray by Nasal route as needed for Opioid Reversal, Disp: 1 each, Rfl: 0    albuterol sulfate HFA (VENTOLIN HFA) 108 (90 Base) MCG/ACT inhaler, Inhale 2 puffs into the lungs every 6 hours as needed for Wheezing, Disp: 1 Inhaler, Rfl: 3    albuterol sulfate  (90 Base) MCG/ACT inhaler, INHALE 2 PUFFS INTO THE LUNGS EVERY 4 HOURS AS NEEDED FOR SHORTNESS OF BREATH, Disp: 42.5 g, Rfl: 3    lidocaine (LIDODERM) 5 %, Place 1 patch onto the skin daily 12 hours on, 12 hours off., Disp: 10 patch, Rfl: 0    clonazePAM (KLONOPIN) 0.5 MG tablet, Take 1 tablet by mouth 2 times daily as needed for Anxiety for up to 30 days. , Disp: 60 tablet, Rfl: 1    ondansetron (ZOFRAN ODT) 4 MG disintegrating tablet, Take 1 tablet by mouth every 8 hours as needed for Nausea or Vomiting, Disp: 10 tablet, Rfl: 0    Family History   Problem Relation Age of Onset    Cancer Mother         vaginal    Heart Disease Father     Diabetes Father     Other Father         colon resection for colon polyps    Breast Cancer Maternal Grandmother     Stroke Maternal Grandfather        Social History     Socioeconomic History    Marital status:      Spouse name: Not on file    Number of children: Not on file    Years of education: Not on file    Highest education level: Not on file   Occupational History    Occupation: Homemaker   Tobacco Use    Smoking status: Former Smoker     Packs/day: 0.25     Years: 33.00     Pack years: 8.25     Types: Cigarettes    Smokeless tobacco: Never Used    Tobacco comment: quit on nicoderm patch   Vaping Use    Vaping Use: Never used   Substance and Sexual Activity    Alcohol use: No     Alcohol/week: 0.0 standard drinks    Drug use: No    Sexual activity: Not Currently   Other Topics Concern    Not on file   Social History Narrative    Not on file     Social Determinants of Health     Financial Resource Strain:     Difficulty of Paying Living Expenses:    Food Insecurity:     Worried About Running Out of Food in the Last Year:     [address] in the Last Year:    Transportation Needs:     Lack of Transportation (Medical):  Lack of Transportation (Non-Medical):    Physical Activity:     Days of Exercise per Week:     Minutes of Exercise per Session:    Stress:     Feeling of Stress :    Social Connections:     Frequency of Communication with Friends and Family:     Frequency of Social Gatherings with Friends and Family:     Attends Yarsanism Services:     Active Member of Clubs or Organizations:     Attends Club or Organization Meetings:     Marital Status:    Intimate Partner Violence:     Fear of Current or Ex-Partner:     Emotionally Abused:     Physically Abused:     Sexually Abused:          Review of Systems:  Review of Systems   Constitutional: Positive for weight loss. HENT: Negative. Eyes:        Glasses   Cardiovascular: Negative. Respiratory: Negative. Endocrine: Negative. Hematologic/Lymphatic: Bruises/bleeds easily. Skin: Negative. Musculoskeletal: Positive for joint pain and neck pain. Shoulder   Gastrointestinal: Negative. Genitourinary: Negative. Neurological: Positive for headaches, numbness and weakness. Psychiatric/Behavioral: Negative. Physical Exam:  [facility] 11/01/2010     Physical Exam  HENT:      Head: Normocephalic. Pulmonary:      Effort: Pulmonary effort is normal.   Skin:         Neurological:      Mental Status: She is alert and oriented to person, place, and time. Psychiatric:         Mood and Affect: Mood normal.         Thought Content:  Thought content normal.           Assessment:    Problem List Items Addressed This Visit     Sprain of atlanto-occipital joint, sequela - Primary    Myofascial muscle pain    Encounter for medication monitoring    Degenerative cervical spinal stenosis    Cervical radiculopathy    Arthropathy of cervical facet joint            Treatment Plan:  DISCUSSION: Treatment options discussed withpatient and all questions answered to patient's satisfaction. Possible side effects, risk of tolerance and or dependence and alternative treatments discussed    Obtaining appropriate analgesic effect of treatment   No signs of potential drug abuse or diversion identified    [x] Ill effects of being on chronic pain medications such as sleep disturbances, respiratory depression, hormonal changes, withdrawal symptoms, chronic opioid dependence and tolerance as well as risk of taking opioids with Benzodiazepines and taking opioids along with alcohol,  werediscussed with patient. I had asked the patient to minimize medication use and utilize pain medications only for uncontrolled rest pain or pain with exertional activities. I advised patient not to self-escalate painmedications without consulting with us. At each of patient's future visits we will try to taper pain medications, while adjusting the adjunct medications, and re-evaluating for Physical Therapy to improve spinal andjoint strength. We will continue to have discussions to decrease pain medications as tolerated. Counseled patient on effects their pain medication and /or their medical condition mayhave on their  ability to drive or operate machinery. Instructed not to drive or operate machinery if drowsy     I also discussed with the patient regarding the dangers of combining narcotic pain medication with tranquilizers, alcohol or illegal drugs or taking the medication any way other than prescribed. The dangers were discussed  including respiratory depression and death. Patient was told to tell  all  physicians regarding the medications he is getting from pain clinic. Patient is warned not to take any unprescribed medications over-the-countermedications that can depress breathing .  Patient is advised to talk to the pharmacist or physicians if planning to take any over-the-counter medications before  takeing them. Patient is strongly advised to avoid tranquilizers or  relaxants, illegal drugs  or any medications that can depress breathing  Patient is also advised to tell us if there is any changes in their medications from other physicians.             TREATMENT OPTIONS:       Medication Agreement Requirements Met  Continue Opioid therapy  Script written for  percocet  Follow up appointment made

## 2021-09-09 ENCOUNTER — OFFICE VISIT (OUTPATIENT)
Dept: INTERNAL MEDICINE CLINIC | Age: 51
End: 2021-09-09
Payer: MEDICAID

## 2021-09-09 VITALS
WEIGHT: 173 LBS | DIASTOLIC BLOOD PRESSURE: 72 MMHG | SYSTOLIC BLOOD PRESSURE: 128 MMHG | HEIGHT: 64 IN | OXYGEN SATURATION: 96 % | BODY MASS INDEX: 29.53 KG/M2 | HEART RATE: 69 BPM

## 2021-09-09 DIAGNOSIS — E03.9 HYPOTHYROIDISM, UNSPECIFIED TYPE: ICD-10-CM

## 2021-09-09 DIAGNOSIS — R73.03 PREDIABETES: ICD-10-CM

## 2021-09-09 DIAGNOSIS — F41.9 ANXIETY: ICD-10-CM

## 2021-09-09 DIAGNOSIS — I26.99 ACUTE PULMONARY EMBOLISM, UNSPECIFIED PULMONARY EMBOLISM TYPE, UNSPECIFIED WHETHER ACUTE COR PULMONALE PRESENT (HCC): ICD-10-CM

## 2021-09-09 DIAGNOSIS — R00.2 PALPITATIONS: Primary | ICD-10-CM

## 2021-09-09 DIAGNOSIS — I26.99 PULMONARY EMBOLISM ON RIGHT (HCC): ICD-10-CM

## 2021-09-09 DIAGNOSIS — Z13.220 SCREENING FOR HYPERLIPIDEMIA: ICD-10-CM

## 2021-09-09 PROCEDURE — 99214 OFFICE O/P EST MOD 30 MIN: CPT | Performed by: INTERNAL MEDICINE

## 2021-09-09 RX ORDER — SERTRALINE HYDROCHLORIDE 100 MG/1
150 TABLET, FILM COATED ORAL DAILY
Qty: 45 TABLET | Refills: 2 | Status: SHIPPED | OUTPATIENT
Start: 2021-09-09

## 2021-09-09 ASSESSMENT — PATIENT HEALTH QUESTIONNAIRE - PHQ9
2. FEELING DOWN, DEPRESSED OR HOPELESS: 0
SUM OF ALL RESPONSES TO PHQ QUESTIONS 1-9: 0
SUM OF ALL RESPONSES TO PHQ9 QUESTIONS 1 & 2: 0
1. LITTLE INTEREST OR PLEASURE IN DOING THINGS: 0
SUM OF ALL RESPONSES TO PHQ QUESTIONS 1-9: 0
SUM OF ALL RESPONSES TO PHQ QUESTIONS 1-9: 0

## 2021-09-09 NOTE — PROGRESS NOTES
Visit Information    Have you changed or started any medications since your last visit including any over-the-counter medicines, vitamins, or herbal medicines? no   Are you having any side effects from any of your medications? -  no  Have you stopped taking any of your medications? Is so, why? -  no    Have you seen any other physician or provider since your last visit? Yes - Records Obtained  Have you had any other diagnostic tests since your last visit? Yes - Records Obtained  Have you been seen in the emergency room and/or had an admission to a hospital since we last saw you? Yes - Records Obtained  Have you had your routine dental cleaning in the past 6 months? yes -     Have you activated your [employer] account? If not, what are your barriers?  Yes     Patient Care Team:  MARTITA Madsen CNP as PCP - General (Internal Medicine)  MART[facility]  Jonathan Land MD as Consulting Physician (Gastroenterology)  Ida Pak MD as Consulting Physician (Obstetrics & Gynecology)  Zandra Motta MD as Consulting Physician (Gastroenterology)    Medical History Review  Past Medical, Family, and Social History reviewed and does not contribute to the patient presenting condition    Health Maintenance   Topic Date Due    Pneumococcal 0-64 years Vaccine (1 of 2 - PPSV23) Never done    COVID-19 Vaccine (1) Never done    HIV screen  Never done    Hepatitis B vaccine (1 of 3 - Risk 3-dose series) Never done    DTaP/Tdap/Td vaccine (1 - Tdap) Never done    Diabetic retinal exam  08/15/2018    Diabetic foot exam  08/11/2021    Diabetic microalbuminuria test  08/11/2021    Flu vaccine (1) Never done    Lipid screen  08/11/2021    TSH testing  03/07/2022    A1C test (Diabetic or Prediabetic)  03/08/2022    Colon cancer screen colonoscopy  05/27/2022    Hepatitis C screen  Completed    Hepatitis A vaccine  Aged Out    Hib vaccine  Aged Out    Meningococcal (ACWY) vaccine  Aged Out     SUBJECTIVE:  Raina Frye is a 53 y.o. female patient who  comes for complaints of   Chief Complaint   Patient presents with    Palpitations     pt reports that they started about 1 week ago, pretty frequently       Palpitations  Requesting holter    Duration- has had some palpitations over years, but much worse over last week  Intermittent, many times a dday  Last few several minutes to a few hours  Severe  Associated with chest pain, nausea, denies diff breathing  Endorses chest pain right now  Worse with exertion  Was seen in ER last week, tropoinin , EKg normal  Stress test Dec 2020- was normal , follows OP with cardiology Dr Kim Pike  Had heart cath 6-7yr ago, mild CAD at the time  Has prediabtes HbA1c 5.7 Mar 2021    Takes synthroid 175mcg yael  Will rpepeat TSH  Lab Results   Component Value Date    TSH 0.69 03/07/2021     Anxiety  Increase dose of zoloft      REVIEW OF SYSTEMS (except Subjective (HPI))  GENERAL: No fevers / chills  RESPIRATORY: Negative for cough, wheezing or shortness of breath  CARDIOVASCULAR: positve for chest pain and palpitations  GI: no nausea, vomiting, or diarrhea  NEURO: No history of headaches    Past Medical History:   Diagnosis Date    Anxiety     CAD (coronary artery disease)     Mitral valve prolapse    Fatty liver     GERD (gastroesophageal reflux disease)     Headache     History of bronchitis     Hyperlipidemia     Hypothyroidism     Kidney stone     LFT elevation     MVP (mitral valve prolapse)     Obesity     Osteoarthritis of cervical spine     Pulmonary emboli (HCC)     [facility] 3/90[facility]1858    Umbilical hernia        SOCIAL HISTORY:  Social History     Socioeconomic History    Marital status:      Spouse name: Not on file    Number of children: Not on file    Years of education: Not on file    Highest education level: Not on file   Occupational History    Occupation: Homemaker   Tobacco Use    Smoking status: Former Smoker     Packs/day: 0.25     Years: 33.00     Pack years: 8.25     Types: Cigarettes    Smokeless tobacco: Never Used    Tobacco comment: quit on nicoderm patch   Vaping Use    Vaping Use: Never used   Substance and Sexual Activity    Alcohol use: No     Alcohol/week: 0.0 standard drinks    Drug use: No    Sexual activity: Not Currently   Other Topics Concern    Not on file   Social History Narrative    Not on file     Social Determinants of Health     Financial Resource Strain:     Difficulty of Paying Living Expenses:    Food Insecurity:     Worried About Running Out of Food in the Last Year:     Ran Out of Food in the Last Year:    Transportation Needs:     Lack of Transportation (Medical):  Lack of Transportation (Non-Medical):    Physical Activity:     Days of Exercise per Week:     Minutes of Exercise per Session:    Stress:     Feeling of Stress :    Social Connections:     Frequency of Communication with Friends and Family:     Frequency of Social Gatherings with Friends and Family:     Attends [employer] Services:     Active Member of Clubs or Organizations:     Attends Club or Organization Meetings:     Marital Status:    Intimate Partner Violence:     Fear of Current or Ex-Partner:     Emotionally Abused:     Physically Abused:     Sexually Abused:            CURRENT MEDICATIONS:  Current Outpatient Medications   Medication Sig Dispense Refill    [START ON 9/10/2021] oxyCODONE-acetaminophen (PERCOCET)  MG per tablet Take 1 tablet by mouth every 6 hours as needed for Pain for up to 30 days.  120 tablet 0    levothyroxine (SYNTHROID) 175 MCG tablet TAKE 1 TABLET BY MOUTH DAILY 90 tablet 1    tiZANidine (ZANAFLEX) 4 MG tablet TAKE 1 TABLET BY MOUTH EVERY 8 HOURS AS NEEDED FOR SPASMS 90 tablet 0    traZODone (DESYREL) 100 MG tablet       rOPINIRole (REQUIP) 2 MG tablet TAKE 1 TABLET BY MOUTH EVERY NIGHT 90 tablet 1    naloxone (NARCAN) 4 MG/0.1ML LIQD nasal spray 1 spray by Nasal route as needed for Opioid Reversal 1 each 0    albuterol sulfate HFA (VENTOLIN HFA) 108 (90 Base) MCG/ACT inhaler Inhale 2 puffs into the lungs every 6 hours as needed for Wheezing 1 Inhaler 3    esomeprazole (NEXIUM) 40 MG delayed release capsule TAKE 1 CAPSULE BY MOUTH EVERY MORNING BEFORE BREAKFAST 90 capsule 1    albuterol sulfate  (90 Base) MCG/ACT inhaler INHALE 2 PUFFS INTO THE LUNGS EVERY 4 HOURS AS NEEDED FOR SHORTNESS OF BREATH 42.5 g 3    rivaroxaban (XARELTO) 20 MG TABS tablet Take 1 tablet by mouth daily (with breakfast) 90 tablet 3    lidocaine (LIDODERM) 5 % Place 1 patch onto the skin daily 12 hours on, 12 hours off. 10 patch 0    ondansetron (ZOFRAN ODT) 4 MG disintegrating tablet Take 1 tablet by mouth every 8 hours as needed for Nausea or Vomiting 10 tablet 0    sertraline (ZOLOFT) 100 MG tablet Take 100 mg by mouth daily      topiramate (TOPAMAX) 25 MG tablet 25 mg 2 times daily       metoprolol tartrate (LOPRESSOR) 50 MG tablet Take 50 mg by mouth 2 times daily       clonazePAM (KLONOPIN) 0.5 MG tablet Take 1 tablet by mouth 2 times daily as needed for Anxiety for up to 30 days. 60 tablet 1     No current facility-administered medications for this visit. OBJECTIVE:  Vitals:    09/09/21 0908   BP: 128/72   Pulse: 69   SpO2: 96%     Body mass index is 29.7 kg/m². General exam (except above):  General appearance - well appearing, alert, in no acute distress  Head - Atraumatic, normocephalic  Eyes - EOMI, no jaundice or pallor  Lungs - Lungs clear to auscultation. No wheezing, rhonchi, rales  Heart - RRR without murmur, gallop, or rubs. No ectopy  Abdomen - Abdomen soft, non-tender. Bowel sounds normal. No masses, organomegaly  Extremities -No significant edema, or skin discoloration. Good capillary refill. Neuro - Pt Alert, awake and oriented x 3.  No gross focal neurological deficits    ASSESSMENT AND PLAN (MEDICAL DECISION MAKING):   Dayanna Ryder was seen today for palpitations. Diagnoses and all orders for this visit:    Palpitations  -     Holter Monitor 48 Hour; Future  -     CARDIAC STRESS TEST EXERCISE ONLY; Future  -     TSH; Future    Screening for hyperlipidemia  -     Lipid, Fasting; Future    Acute pulmonary embolism, unspecified pulmonary embolism type, unspecified whether acute cor pulmonale present (HCC)  Comments:  on xarelto    Pulmonary embolism on right (HCC)    Prediabetes    Hypothyroidism, unspecified type    Anxiety  -     TSH; Future  -     sertraline (ZOLOFT) 100 MG tablet;  Take 1.5 tablets by mouth daily           Follow up in: 2-3wk      Yudi Landers MD

## 2021-09-23 ENCOUNTER — TELEPHONE (OUTPATIENT)
Dept: INTERNAL MEDICINE CLINIC | Age: 51
End: 2021-09-23

## 2021-09-23 NOTE — TELEPHONE ENCOUNTER
Lm on pts phone regarding her no show appt silvina rodriguez/ Camelia Ospina on 9/23/21. Ltr mailed.

## 2021-09-23 NOTE — LETTER
[facility]         [address] [address]          September 23rd, 2021     Dear Chelsea Chang. Karlene: You recently missed a scheduled appointment with Tanisha Lees CNP on 9/23/21. This is the 1st scheduledappointment that you have missed within the last twelve months. If you miss 2 more appointment, you may be dismissed from the practice. It is your responsibility to arrive to your appointment. Please call our office as soon as possible so that we may reschedule your appointment, because your health and follow-up medical care are important to us. For future appointments that you are unable to keep, please call the office at [phone] at least 24 hours in advance to cancel and reschedule. If a traditional appointment is difficult for you to make, please keep in mind, we offer E-Visits for several diagnoses as well as virtual visits. We also have primary care walk-in clinics available. For more information on these options, please contact our office.    Sincerely,        [address] [place]

## 2021-09-27 RX ORDER — TIZANIDINE 4 MG/1
TABLET ORAL
Qty: 90 TABLET | Refills: 0 | Status: SHIPPED | OUTPATIENT
Start: 2021-09-27 | End: 2021-10-29

## 2021-10-06 RX ORDER — CLONAZEPAM 1 MG/1
TABLET ORAL
COMMUNITY
Start: 2021-09-09 | End: 2021-10-07

## 2021-10-07 ENCOUNTER — HOSPITAL ENCOUNTER (OUTPATIENT)
Dept: PAIN MANAGEMENT | Age: 51
Discharge: HOME | End: 2021-10-07
Payer: MEDICAID

## 2021-10-07 DIAGNOSIS — G44.209 TRIGGER POINT WITH TENSION HEADACHE: ICD-10-CM

## 2021-10-07 DIAGNOSIS — M48.02 DEGENERATIVE CERVICAL SPINAL STENOSIS: Primary | ICD-10-CM

## 2021-10-07 DIAGNOSIS — Z51.81 ENCOUNTER FOR MEDICATION MONITORING: ICD-10-CM

## 2021-10-07 DIAGNOSIS — M54.12 CERVICAL RADICULOPATHY: ICD-10-CM

## 2021-10-07 DIAGNOSIS — M47.812 ARTHROPATHY OF CERVICAL FACET JOINT: ICD-10-CM

## 2021-10-07 DIAGNOSIS — M79.18 MYOFASCIAL MUSCLE PAIN: ICD-10-CM

## 2021-10-07 DIAGNOSIS — S13.4XXS SPRAIN OF ATLANTO-OCCIPITAL JOINT, SEQUELA: ICD-10-CM

## 2021-10-07 PROCEDURE — 99213 OFFICE O/P EST LOW 20 MIN: CPT | Performed by: NURSE PRACTITIONER

## 2021-10-07 PROCEDURE — 99213 OFFICE O/P EST LOW 20 MIN: CPT

## 2021-10-07 RX ORDER — OXYCODONE AND ACETAMINOPHEN 10; 325 MG/1; MG/1
1 TABLET ORAL EVERY 6 HOURS PRN
Qty: 120 TABLET | Refills: 0 | Status: SHIPPED | OUTPATIENT
Start: 2021-10-10 | End: 2021-11-09 | Stop reason: SDUPTHER

## 2021-10-07 ASSESSMENT — ENCOUNTER SYMPTOMS
GASTROINTESTINAL NEGATIVE: 1
RESPIRATORY NEGATIVE: 1

## 2021-10-07 NOTE — PROGRESS NOTES
[facility] 89 PROGRESS NOTE      Patient  completed [x]  video visit   []   phone call:         Minutes :       [x]    to  review Medication Agreement    []  Follow up after procedure   []  Discuss treatment options      Location:  Provider:  working from    []    home    [x]   [facility] - KADEEM MONTEJO ,   patient at home         Chief Complaint:neck pain    She c/o neck painmainly on the left side and radiates down left shoulder and arm, She states numbness has  subsided in left hand after last injection but she still has weakness. She had left lumbar facet injection C2-T1  8/2021 with 85% relief. She states injection is starting to wear off. She has seen neurosurgeon in the past and states  surgery was not recommended. She follows with Oncology ,she had elevated CEA and significant  weight loss  Her weight has been steady. Her sleeps patterns vary. She continues to be employed,    Neck Pain   This is a chronic problem. The current episode started more than 1 year ago. The problem occurs constantly. The problem has been gradually worsening. The pain is present in the left side (left shoulder and arm). Quality: sharp. The pain is at a severity of 5/10. The pain is moderate. Exacerbated by: turning neck  to the left. The pain is same all the time. Associated symptoms include headaches. She has tried heat for the symptoms.          Treatment goals:  Functional status: reduce pain by 50%       Aberrancy:   Any alcoholic beverages    no        Any illegal drugs   no      Analgesia:       5              Adverse  Effects :no    ADL;s :employed  Household tasks    Data:    When was thelast UDS:   2-3-2021         Was the UDS appropriate:  [x] yes []   no      Record/Diagnostics Review:      As above, I did review the imaging       /7/2021  7:53 PM - Liu, Tyree Incoming Lab Results From [facility]    Component Value Ref Range & Units Status Collected Lab   Pain Management Drug Panel Interp, Urine Inconsistent Final 02/03/2021  3:51 PM [facility]   (NOTE)   ________________________________________________________________   DRUGS EXPECTED:   NO DRUGS EXPECTED   ________________________________________________________________   INCONSISTENT with medications provided:   Oxycodone   Noroxycodone   7-Aminoclonazepam   ________________________________________________________________   INTERPRETIVE INFORMATION: Targeted drug profile Interp   Interpretation depends on accuracy and completeness of patient   medication information submitted by client. EXAMINATION:   CT OF THE CERVICAL SPINE WITHOUT CONTRAST 3/4/2021 5:19 pm       TECHNIQUE:   CT of the cervical spine was performed without the administration of   intravenous contrast. Multiplanar reformatted images are provided for review. Dose modulation, iterative reconstruction, and/or weight based adjustment of   the mA/kV was utilized to reduce the radiation dose to as low as reasonably   achievable.       COMPARISON:   February 13, 2021.       HISTORY:   ORDERING SYSTEM PROVIDED HISTORY: radicular pain left arm   TECHNOLOGIST PROVIDED HISTORY:   radicular pain left arm   Decision Support Exception->Emergency Medical Condition (MA)   Is the patient pregnant?->No   Reason for Exam: radicular pain left arm for three weeks. patient states   limited ROM and pain starting in neck. no injuries   Acuity: Unknown   Type of Exam: Unknown       FINDINGS:   Bone mineralization is normal.  The vertebral bodies and posterior elements   appear intact and appropriately aligned without acute fracture or   subluxation.  Vertebral body stature is maintained throughout. Shelda Clock is   straightening of the normal cervical lordosis.  Mild-to-moderate cervical   degenerative disc disease is present throughout. Shelda Clock is moderate bony   neural foraminal narrowing on the left at C3-C4 and C5-C6.  No significant   uncovertebral joint hypertrophic change or additional bony neural foraminal   narrowing.  No paraspinal soft tissue abnormality.       The visualized lung apices are grossly clear.           Impression   Mild to moderate multilevel cervical degenerative disc disease with moderate   bony neural foraminal narrowing on the left at C3-C4 and C5-C6.  No acute   fracture or subluxation.  Straightening of the normal cervical lordosis which   may be related to muscle spasm or position in collar.                       Pill count: appropriate    fill date :  10-    Morphine equivalent dose as reported on [place]:  60  Periodic Controlled Substance Monitoring: Possible medication side effects, risk of tolerance/dependence & alternative treatments discussed., No signs of potential drug abuse or diversion identified. , Assessed functional status., Obtaining appropriate analgesic effect of treatment. Miracle Mouse, APRN - CNP)  Review of[place]ARRS does not show any aberrant prescription behavior. Medication is helping the patient stay active. Patient denies any side effects and reports adequate analgesia. No sign of misuse/abuse.             Past Medical History:   Diagnosis Date    Anxiety     CAD (coronary artery disease)     Mitral valve prolapse    Fatty liver     GERD (gastroesophageal reflux disease)     Headache     History of bronchitis     Hyperlipidemia     Hypothyroidism     Kidney stone     LFT elevation     MVP (mitral valve prolapse)     Obesity     Osteoarthritis of cervical spine     Pulmonary emboli (HCC)     [facility] ([facility] [id number]    Umbilical hernia        Past Surgical History:   Procedure Laterality Date    APPENDECTOMY       SECTION      2 pfannenstiel, 1 vertical    CHOLECYSTECTOMY      COLONOSCOPY      Dr Saima Mehta  14    COLONOSCOPY  2014    biopsy & sigmoid spasms, pathology negative    COLONOSCOPY N/A 2018    COLONOSCOPY WITH BIOPSY performed by Juni Del Valle MD at [address] [place] [address] N/A 5/27/2021    COLONOSCOPY DIAGNOSTIC performed by Ketty [facility] at [facility] 84      x 5    HYSTERECTOMY, TOTAL ABDOMINAL      2010    [place] EXPLORATORY OF ABDOMEN  10/21/2014    Laparotomy-lysis of adhesions, bso     UPPER GASTROINTESTINAL ENDOSCOPY  2/17/2010    mild chronic inactive gastritis    UPPER GASTROINTESTINAL ENDOSCOPY N/A 3/10/2021    EGD BIOPSY performed by Ketty Snyder MD at [facility]       Allergies   Allergen Reactions    Compazine [Prochlorperazine]      Jittery, restless    Sulfa Antibiotics Hives    Adhesive Tape Rash         Current Outpatient Medications:     clonazePAM (KLONOPIN) 1 MG tablet, TAKE 1/2 TABLET BY MOUTH FOUR TIMES DAILY AS NEEDED, Disp: , Rfl:     tiZANidine (ZANAFLEX) 4 MG tablet, TAKE 1 TABLET BY MOUTH EVERY 8 HOURS AS NEEDED FOR SPASMS, Disp: 90 tablet, Rfl: 0    sertraline (ZOLOFT) 100 MG tablet, Take 1.5 tablets by mouth daily, Disp: 45 tablet, Rfl: 2    oxyCODONE-acetaminophen (PERCOCET)  MG per tablet, Take 1 tablet by mouth every 6 hours as needed for Pain for up to 30 days. , Disp: 120 tablet, Rfl: 0    levothyroxine (SYNTHROID) 175 MCG tablet, TAKE 1 TABLET BY MOUTH DAILY, Disp: 90 tablet, Rfl: 1    traZODone (DESYREL) 100 MG tablet, , Disp: , Rfl:     rOPINIRole (REQUIP) 2 MG tablet, TAKE 1 TABLET BY MOUTH EVERY NIGHT, Disp: 90 tablet, Rfl: 1    naloxone (NARCAN) 4 MG/0.1ML LIQD nasal spray, 1 spray by Nasal route as needed for Opioid Reversal, Disp: 1 each, Rfl: 0    albuterol sulfate HFA (VENTOLIN HFA) 108 (90 Base) MCG/ACT inhaler, Inhale 2 puffs into the lungs every 6 hours as needed for Wheezing, Disp: 1 Inhaler, Rfl: 3    esomeprazole (NEXIUM) 40 MG delayed release capsule, TAKE 1 CAPSULE BY MOUTH EVERY MORNING BEFORE BREAKFAST, Disp: 90 capsule, Rfl: 1    albuterol sulfate  (90 Base) MCG/ACT inhaler, INHALE 2 PUFFS INTO THE LUNGS EVERY 4 HOURS AS NEEDED FOR SHORTNESS OF BREATH, Disp: 42.5 g, Rfl: 3    rivaroxaban (XARELTO) 20 MG TABS tablet, Take 1 tablet by mouth daily (with breakfast), Disp: 90 tablet, Rfl: 3    lidocaine (LIDODERM) 5 %, Place 1 patch onto the skin daily 12 hours on, 12 hours off., Disp: 10 patch, Rfl: 0    clonazePAM (KLONOPIN) 0.5 MG tablet, Take 1 tablet by mouth 2 times daily as needed for Anxiety for up to 30 days. , Disp: 60 tablet, Rfl: 1    ondansetron (ZOFRAN ODT) 4 MG disintegrating tablet, Take 1 tablet by mouth every 8 hours as needed for Nausea or Vomiting, Disp: 10 tablet, Rfl: 0    topiramate (TOPAMAX) 25 MG tablet, 25 mg 2 times daily , Disp: , Rfl:     metoprolol tartrate (LOPRESSOR) 50 MG tablet, Take 50 mg by mouth 2 times daily , Disp: , Rfl:     Family History   Problem Relation Age of Onset    Cancer Mother         vaginal    Heart Disease Father     Diabetes Father     Other Father         colon resection for colon polyps    Breast Cancer Maternal Grandmother     Stroke Maternal Grandfather        Social History     Socioeconomic History    Marital status:      Spouse name: Not on file    Number of children: Not on file    Years of education: Not on file    Highest education level: Not on file   Occupational History    Occupation: Homemaker   Tobacco Use    Smoking status: Former Smoker     Packs/day: 0.25     Years: 33.00     Pack years: 8.25     Types: Cigarettes    Smokeless tobacco: Never Used    Tobacco comment: quit on nicoderm patch   Vaping Use    Vaping Use: Never used   Substance and Sexual Activity    Alcohol use: No     Alcohol/week: 0.0 standard drinks    Drug use: No    Sexual activity: Not Currently   Other Topics Concern    Not on file   Social History Narrative    Not on file     Social Determinants of Health     Financial Resource Strain:     Difficulty of Paying Living Expenses:    Food Insecurity:     Worried About Running Out of Food in the Last Year:     Kamille of Food in the Last Year:    Transportation Needs:     Lack of Transportation (Medical):  Lack of Transportation (Non-Medical):    Physical Activity:     Days of Exercise per Week:     Minutes of Exercise per Session:    Stress:     Feeling of Stress :    Social Connections:     Frequency of Communication with Friends and Family:     Frequency of Social Gatherings with Friends and Family:     Attends Zoroastrianism Services:     Active Member of Clubs or Organizations:     Attends Club or Organization Meetings:     Marital Status:    Intimate Partner Violence:     Fear of Current or Ex-Partner:     Emotionally Abused:     Physically Abused:     Sexually Abused:          Review of Systems:  Review of Systems   Constitutional: Negative. HENT: Negative. Eyes:        Glasses   Cardiovascular: Negative. Respiratory: Negative. Endocrine: Negative. Hematologic/Lymphatic: Bruises/bleeds easily. Skin: Negative. Musculoskeletal: Positive for joint pain and neck pain. Gastrointestinal: Negative. Genitourinary: Negative. Neurological: Positive for headaches. Psychiatric/Behavioral: The patient is nervous/anxious. Managing         Physical Exam:  [facility] 11/01/2010     Physical Exam  HENT:      Head: Normocephalic. Pulmonary:      Effort: Pulmonary effort is normal.   Skin:         Neurological:      Mental Status: She is alert and oriented to person, place, and time. Psychiatric:         Mood and Affect: Mood normal.         Thought Content: Thought content normal.           Assessment:      Problem List Items Addressed This Visit     Trigger point with tension headache    Sprain of atlanto-occipital joint, sequela    Myofascial muscle pain    Encounter for medication monitoring    Degenerative cervical spinal stenosis - Primary    Cervical radiculopathy    Arthropathy of cervical facet joint          Treatment Plan:  DISCUSSION: Treatment options discussed withpatient and all questions answered to patient's satisfaction.      Possible side breathing  Patient is also advised to tell us if there is any changes in their medications from other physicians.             TREATMENT OPTIONS:       Medication Agreement Requirements Met  Continue Opioid therapy  Script written for  Percocet  Follow up appointment made

## 2021-10-29 RX ORDER — TIZANIDINE 4 MG/1
TABLET ORAL
Qty: 90 TABLET | Refills: 0 | Status: SHIPPED | OUTPATIENT
Start: 2021-10-29 | End: 2021-11-29

## 2021-11-07 RX ORDER — CLONAZEPAM 1 MG/1
TABLET ORAL
COMMUNITY
Start: 2021-10-11 | End: 2021-11-09

## 2021-11-09 ENCOUNTER — HOSPITAL ENCOUNTER (OUTPATIENT)
Dept: PAIN MANAGEMENT | Age: 51
Discharge: HOME | End: 2021-11-09
Payer: MEDICAID

## 2021-11-09 DIAGNOSIS — Z51.81 ENCOUNTER FOR MEDICATION MONITORING: ICD-10-CM

## 2021-11-09 DIAGNOSIS — M47.812 ARTHROPATHY OF CERVICAL FACET JOINT: Primary | ICD-10-CM

## 2021-11-09 DIAGNOSIS — S13.4XXS SPRAIN OF ATLANTO-OCCIPITAL JOINT, SEQUELA: ICD-10-CM

## 2021-11-09 DIAGNOSIS — M48.02 DEGENERATIVE CERVICAL SPINAL STENOSIS: ICD-10-CM

## 2021-11-09 DIAGNOSIS — M54.12 CERVICAL RADICULOPATHY: ICD-10-CM

## 2021-11-09 DIAGNOSIS — S13.4XXS ATLANTO-AXIAL JOINT SPRAIN, SEQUELA: ICD-10-CM

## 2021-11-09 PROCEDURE — 99213 OFFICE O/P EST LOW 20 MIN: CPT

## 2021-11-09 PROCEDURE — 99213 OFFICE O/P EST LOW 20 MIN: CPT | Performed by: NURSE PRACTITIONER

## 2021-11-09 RX ORDER — OXYCODONE AND ACETAMINOPHEN 10; 325 MG/1; MG/1
1 TABLET ORAL EVERY 6 HOURS PRN
Qty: 120 TABLET | Refills: 0 | Status: SHIPPED | OUTPATIENT
Start: 2021-11-09 | End: 2021-12-09 | Stop reason: SDUPTHER

## 2021-11-09 ASSESSMENT — ENCOUNTER SYMPTOMS
NAUSEA: 1
RESPIRATORY NEGATIVE: 1

## 2021-11-09 NOTE — PROGRESS NOTES
[facility] 89 PROGRESS NOTE      Patient  completed [x]  video visit   []   phone call:         Minutes :       [x]    to  review Medication Agreement    []  Follow up after procedure   []  Discuss treatment options      Location:  Provider:  working from    [x]    home    []   [facility] - KADEEM MONTEJO ,   patient at home         Chief Complaint: neck pain    She c/o neck pain radiating to the back of her head. She c/o numbness left shoulder area and weakness left hand. She reports her pain has increased. it increases to an 8 during the day. She had left cervical facet injection C2-T1 , 8-  With 85% relief. She would  like to try  for another injection. She reports the trazodone helps her sleep. She follows with Oncology , she has an appt in January. She has had no more weight loss. . She has had no more weight loss, her CEA was elevated at 9.3 in 7/2021. She follows with pulmonology for pulmonary embolism and is on xarelto. .      Neck Pain   This is a chronic problem. The current episode started more than 1 year ago. The problem occurs constantly. The problem has been gradually worsening. The pain is present in the left side (back of head). The quality of the pain is described as aching (sharp). The pain is at a severity of 5/10 (goes to 8 during day). The pain is moderate. Exacerbated by: turning head to left. The pain is worse during the day (morning). Stiffness is present all day. Associated symptoms include headaches and numbness. Associated symptoms comments: Numbness left shoulder, weakness left hand. She has tried heat for the symptoms.        Treatment goals:  Functional status: not sure      Aberrancy:   Any alcoholic beverages    no        Any illegal drugs   no      Analgesia:       8            Adverse  Effects :no      ADL;s : working from home sometimes      Data:    When was thelast UDS:    2-2-2021        Was the UDS appropriate:  [] yes []   no      Record/Diagnostics Review:      As above, I did review the imaging             EXAMINATION:   CT OF THE CERVICAL SPINE WITHOUT CONTRAST 3/4/2021 5:19 pm       TECHNIQUE:   CT of the cervical spine was performed without the administration of   intravenous contrast. Multiplanar reformatted images are provided for review. Dose modulation, iterative reconstruction, and/or weight based adjustment of   the mA/kV was utilized to reduce the radiation dose to as low as reasonably   achievable.       COMPARISON:   February 13, 2021.       HISTORY:   ORDERING SYSTEM PROVIDED HISTORY: radicular pain left arm   TECHNOLOGIST PROVIDED HISTORY:   radicular pain left arm   Decision Support Exception->Emergency Medical Condition (MA)   Is the patient pregnant?->No   Reason for Exam: radicular pain left arm for three weeks. patient states   limited ROM and pain starting in neck.  no injuries   Acuity: Unknown   Type of Exam: Unknown       FINDINGS:   Bone mineralization is normal.  The vertebral bodies and posterior elements   appear intact and appropriately aligned without acute fracture or   subluxation.  Vertebral body stature is maintained throughout. Cottie Sayres is   straightening of the normal cervical lordosis.  Mild-to-moderate cervical   degenerative disc disease is present throughout. Cottie Sayres is moderate bony   neural foraminal narrowing on the left at C3-C4 and C5-C6.  No significant   uncovertebral joint hypertrophic change or additional bony neural foraminal   narrowing.  No paraspinal soft tissue abnormality.       The visualized lung apices are grossly clear.           Impression   Mild to moderate multilevel cervical degenerative disc disease with moderate   bony neural foraminal narrowing on the left at C3-C4 and C5-C6.  No acute   fracture or subluxation.  Straightening of the normal cervical lordosis which   may be related to muscle spasm or position in collar.               DRUG SCREEN, PAIN  Order: [id number]   Status: Final result     Visible to patient: Yes (seen)     Next appt: Today at 08:20 AM in Pain Management Igor Teresa  MARYSOL, APRN - CNP)     Dx: Encounter for medication monitoring; Yulia Samuel.     1 Result Note     Ref Range & Units 2/3/21 1551 Resulting Agency Comments   Pain Management Drug Panel Interp, Urine  Inconsistent  [facility] (NOTE)   ________________________________________________________________   DRUGS EXPECTED:   NO DRUGS EXPECTED   ________________________________________________________________   INCONSISTENT with medications provided:   Oxycodone   Noroxycodone   7-Aminoclonazepam   ________________________________________________________________   INTERPRETIVE INFORMATION: Targeted drug profile Interp   Interpretation depends on accuracy and completeness of patient   medication information submitted by client. 6-Acetylmorphine, Ur  Not Detected  [facility]    7-Aminoclonazepam, Urine  Present  [facility]    Alpha-OH-Alpraz, Urine  Not Detected  [facility]    Alprazolam, Urine  Not Detected  [facility]    Amphetamines, urine  Not Detected  [facility]    Barbiturates, Ur  Not Detected  [facility]    Benzoylecgonine, Ur  Not Detected  [facility]    Buprenorphine Urine  Not Detected  [facility]    Carisoprodol, Ur  Not Detected  [facility] (NOTE)   The carisoprodol immunoassay has cross-reactivity to carisoprodol   and meprobamate.     Clonazepam, Urine  Not Detected  [facility]    Codeine, Urine  Not Detected  [facility]    MDA, Ur  Not Detected  [facility]    Diazepam, Urine  Not Detected  [facility]    Ethyl Glucuronide Ur  Not Detected  [facility]    Fentanyl, Ur  Not Detected  [facility]    Hydrocodone, Urine  Not Detected  [facility]    Hydromorphone, Urine  Not Detected  [facility]    Lorazepam, Urine  Not Detected  [facility]    Marijuana Metab, Ur  Not Detected  [facility]    MDEA, AISHA, Ur  Not Detected  [facility]    MDMA, Urine  Not Detected  [facility]    Meperidine Metab, Ur  Not Detected  [facility]    Methadone, Urine  Not Detected  [facility]    Methamphetamine, Urine  Not Detected  [facility]    Methylphenidate  Not Detected  [facility]    Midazolam, Urine  Not Detected  [facility]    Morphine Urine  Not Detected  [facility]    Norbuprenorphine, Urine  Not Detected  [facility]    Nordiazepam, Urine  Not Detected  [facility]    Norfentanyl, Urine  Not Detected  [facility]    NORHYDROCODONE, URINE  Not Detected  [facility]    Noroxycodone, Urine  Present  [facility]    NOROXYMORPHONE, URINE  Not Detected  [facility]    Oxazepam, Urine  Not Detected  [facility]    Oxycodone Urine  Present  [facility]    Oxymorphone, Urine  Not Detected  [facility]    PCP, Urine  Not Detected  [facility]    Phentermine, Ur  Not Detected  [facility]    Propoxyphene, Urine  Not Detected  [facility]    Tapentadol-O-Sulfate, Urine  Not Detected  [facility]    Tapentadol, Urine  Not Detected  [facility]    Temazepam, Urine  Not Detected  [facility]    Tramadol, Urine  Not Detected  [facility]    Zolpidem, Urine  Not Detected  [facility]    Drugs Expected, Ur  NO MEDS  [facility] [address] [facility]    Creatinine, Ur 20.0 - 400.0 mg/dL 204.3  [facility]         EXAMINATION:   MRI OF THE CERVICAL SPINE WITHOUT CONTRAST 3/4/2021 2:53 pm       TECHNIQUE:   Multiplanar multisequence MRI of the cervical spine was performed without the   administration of intravenous contrast.       COMPARISON:   06/12/2020       HISTORY:   ORDERING SYSTEM PROVIDED HISTORY: Cervical radiculopathy   TECHNOLOGIST PROVIDED HISTORY:   evaluate for stenosis   Is the patient pregnant?->No   Reason for Exam: pt stated left shoulder arm pain weakness numbness x 1 mth   Acuity: Acute   Type of Exam: Initial   Additional signs and symptoms: pt stated left shoulder arm pain weakness   numbness x 1 mth, NKI       FINDINGS:   BONES/ALIGNMENT: There is normal alignment of the spine. The vertebral body   heights are maintained.  The bone marrow signal appears unremarkable.  There   is minimal degenerative disc disease with disc space narrowing and   osteophytes at C3-4, C4-5, C5-6 and C6-7.       SPINAL CORD:  No abnormal signal is identified within the spinal cord.       SOFT TISSUES: No paraspinal mass identified.       C2-C3: There is minimum disc protrusion of 2 mm centrally. The thecal sac and   neural foramina are intact.       C3-C4: There is disc protrusion osteophyte toward the left measuring 3-4 mm. The thecal sac is not stenotic.  There is mild narrowing of the left neural   foramen.  The right neural foramen is intact.       C4-C5: There is disc protrusion osteophyte measuring 2-3 mm.  The thecal sac   is mildly stenotic measuring 9.5 mm. Disc and/or osteophytes result in mild   narrowing of the neural foramina bilaterally. The left neural foramen is   narrowed greater than right.       C5-C6: There is disc protrusion osteophyte measuring 5-6 mm toward the left   narrowing the left neural foramen.  The thecal sac is mildly stenotic   measuring 9.3 mm.  The right neural foramen is intact.       C6-C7: Disc and/or osteophytes cause minimal narrowing of the neural foramina   bilaterally.  The thecal sac is not stenotic.       C7-T1:  The thecal sac and neural foramina are intact.           Impression   Disc and osteophytes result in narrowing of the neural foramina and mild   stenosis of the thecal sac throughout the cervical region as discussed in   detail above.         EXAMINATION:   CT OF THE CERVICAL SPINE WITHOUT CONTRAST 3/4/2021 5:19 pm       TECHNIQUE:   CT of the cervical spine was performed without the administration of   intravenous contrast. Multiplanar reformatted images are provided for review. Dose modulation, iterative reconstruction, and/or weight based adjustment of   the mA/kV was utilized to reduce the radiation dose to as low as reasonably   achievable.       COMPARISON:   February 13, 2021.       HISTORY:   ORDERING SYSTEM PROVIDED HISTORY: radicular pain left arm   TECHNOLOGIST PROVIDED HISTORY:   radicular pain left arm   Decision Support Exception->Emergency Medical Condition (MA)   Is the patient pregnant?->No   Reason for Exam: radicular pain left arm for three weeks.  patient states   limited ROM and pain starting in neck. no injuries   Acuity: Unknown   Type of Exam: Unknown       FINDINGS:   Bone mineralization is normal.  The vertebral bodies and posterior elements   appear intact and appropriately aligned without acute fracture or   subluxation.  Vertebral body stature is maintained throughout. Ravindra Cogan is   straightening of the normal cervical lordosis.  Mild-to-moderate cervical   degenerative disc disease is present throughout. Ravindra Cogan is moderate bony   neural foraminal narrowing on the left at C3-C4 and C5-C6.  No significant   uncovertebral joint hypertrophic change or additional bony neural foraminal   narrowing.  No paraspinal soft tissue abnormality.       The visualized lung apices are grossly clear.           Impression   Mild to moderate multilevel cervical degenerative disc disease with moderate   bony neural foraminal narrowing on the left at C3-C4 and C5-C6.  No acute   fracture or subluxation.  Straightening of the normal cervical lordosis which   may be related to muscle spasm or position in collar.                     Pill count: appropriate    fill date :  11-9-2021    Morphine equivalent dose as reported on [place]: 60  Periodic Controlled Substance Monitoring: Possible medication side effects, risk of tolerance/dependence & alternative treatments discussed., No signs of potential drug abuse or diversion identified. , Assessed functional status., Obtaining appropriate analgesic effect of treatment. Kell Hearn, APRN - CNP)  Review of[place] does not show any aberrant prescription behavior. Medication is helping the patient stay active. Patient denies any side effects and reports adequate analgesia. No sign of misuse/abuse.             Past Medical History:   Diagnosis Date    Anxiety     CAD (coronary artery disease)     Mitral valve prolapse    Fatty liver     GERD (gastroesophageal reflux disease)     Headache     History of bronchitis     Hyperlipidemia     Hypothyroidism     Kidney stone     LFT tablet, Rfl: 1    naloxone (NARCAN) 4 MG/0.1ML LIQD nasal spray, 1 spray by Nasal route as needed for Opioid Reversal, Disp: 1 each, Rfl: 0    albuterol sulfate HFA (VENTOLIN HFA) 108 (90 Base) MCG/ACT inhaler, Inhale 2 puffs into the lungs every 6 hours as needed for Wheezing, Disp: 1 Inhaler, Rfl: 3    esomeprazole (NEXIUM) 40 MG delayed release capsule, TAKE 1 CAPSULE BY MOUTH EVERY MORNING BEFORE BREAKFAST, Disp: 90 capsule, Rfl: 1    albuterol sulfate  (90 Base) MCG/ACT inhaler, INHALE 2 PUFFS INTO THE LUNGS EVERY 4 HOURS AS NEEDED FOR SHORTNESS OF BREATH, Disp: 42.5 g, Rfl: 3    rivaroxaban (XARELTO) 20 MG TABS tablet, Take 1 tablet by mouth daily (with breakfast), Disp: 90 tablet, Rfl: 3    lidocaine (LIDODERM) 5 %, Place 1 patch onto the skin daily 12 hours on, 12 hours off., Disp: 10 patch, Rfl: 0    clonazePAM (KLONOPIN) 0.5 MG tablet, Take 1 tablet by mouth 2 times daily as needed for Anxiety for up to 30 days. , Disp: 60 tablet, Rfl: 1    ondansetron (ZOFRAN ODT) 4 MG disintegrating tablet, Take 1 tablet by mouth every 8 hours as needed for Nausea or Vomiting, Disp: 10 tablet, Rfl: 0    topiramate (TOPAMAX) 25 MG tablet, 25 mg 2 times daily , Disp: , Rfl:     metoprolol tartrate (LOPRESSOR) 50 MG tablet, Take 50 mg by mouth 2 times daily , Disp: , Rfl:     Family History   Problem Relation Age of Onset    Cancer Mother         vaginal    Heart Disease Father     Diabetes Father     Other Father         colon resection for colon polyps    Breast Cancer Maternal Grandmother     Stroke Maternal Grandfather        Social History     Socioeconomic History    Marital status:      Spouse name: Not on file    Number of children: Not on file    Years of education: Not on file    Highest education level: Not on file   Occupational History    Occupation: Homemaker   Tobacco Use    Smoking status: Former Smoker     Packs/day: 0.25     Years: 33.00     Pack years: 8.25     Types: Cigarettes    Smokeless tobacco: Never Used    Tobacco comment: quit on nicoderm patch   Vaping Use    Vaping Use: Never used   Substance and Sexual Activity    Alcohol use: No     Alcohol/week: 0.0 standard drinks    Drug use: No    Sexual activity: Not Currently   Other Topics Concern    Not on file   Social History Narrative    Not on file     Social Determinants of Health     Financial Resource Strain:     Difficulty of Paying Living Expenses: Not on file   Food Insecurity:     Worried About Running Out of Food in the Last Year: Not on file    Kamille of Food in the Last Year: Not on file   Transportation Needs:     Lack of Transportation (Medical): Not on file    Lack of Transportation (Non-Medical): Not on file   Physical Activity:     Days of Exercise per Week: Not on file    Minutes of Exercise per Session: Not on file   Stress:     Feeling of Stress : Not on file   Social Connections:     Frequency of Communication with Friends and Family: Not on file    Frequency of Social Gatherings with Friends and Family: Not on file    Attends Jew Services: Not on file    Active Member of [address] [place] [address] or Organizations: Not on file    Attends Club or Organization Meetings: Not on file    Marital Status: Not on file   Intimate Partner Violence:     Fear of Current or Ex-Partner: Not on file    Emotionally Abused: Not on file    Physically Abused: Not on file    Sexually Abused: Not on file   Housing Stability:     Unable to Pay for Housing in the Last Year: Not on file    Number of [place] in the Last Year: Not on file    Unstable Housing in the Last Year: Not on file         Review of Systems:  Review of Systems   Constitutional: Negative. HENT: Negative. Eyes:        Glasses   Cardiovascular: Negative. Respiratory: Negative. Endocrine: Negative. Hematologic/Lymphatic: Bruises/bleeds easily. Skin: Negative. Musculoskeletal: Positive for joint pain and neck pain. Gastrointestinal: Positive for nausea. Genitourinary: Negative. Neurological: Positive for headaches and numbness. Psychiatric/Behavioral: Positive for depression. Managing         Physical Exam:  LMP 11/01/2010     Physical Exam  HENT:      Head: Normocephalic. Pulmonary:      Effort: Pulmonary effort is normal.   Skin:         Neurological:      Mental Status: She is alert and oriented to person, place, and time. Psychiatric:         Mood and Affect: Mood normal.         Thought Content: Thought content normal.           Assessment:      Problem List Items Addressed This Visit     Sprain of atlanto-occipital joint, sequela    Degenerative cervical spinal stenosis    Cervical radiculopathy    Relevant Medications    clonazePAM (KLONOPIN) 1 MG tablet    Atlanto-axial joint sprain, sequela    Arthropathy of cervical facet joint - Primary            Treatment Plan:  DISCUSSION: Treatment options discussed withpatient and all questions answered to patient's satisfaction. Possible side effects, risk of tolerance and or dependence and alternative treatments discussed    Obtaining appropriate analgesic effect of treatment   No signs of potential drug abuse or diversion identified    [x] Ill effects of being on chronic pain medications such as sleep disturbances, respiratory depression, hormonal changes, withdrawal symptoms, chronic opioid dependence and tolerance as well as risk of taking opioids with Benzodiazepines and taking opioids along with alcohol,  werediscussed with patient. I had asked the patient to minimize medication use and utilize pain medications only for uncontrolled rest pain or pain with exertional activities. I advised patient not to self-escalate painmedications without consulting with us. At each of patient's future visits we will try to taper pain medications, while adjusting the adjunct medications, and re-evaluating for Physical Therapy to improve spinal andjoint strength. We will continue to have discussions to decrease pain medications as tolerated. Counseled patient on effects their pain medication and /or their medical condition mayhave on their  ability to drive or operate machinery. Instructed not to drive or operate machinery if drowsy     I also discussed with the patient regarding the dangers of combining narcotic pain medication with tranquilizers, alcohol or illegal drugs or taking the medication any way other than prescribed. The dangers were discussed  including respiratory depression and death. Patient was told to tell  all  physicians regarding the medications he is getting from pain clinic. Patient is warned not to take any unprescribed medications over-the-countermedications that can depress breathing . Patient is advised to talk to the pharmacist or physicians if planning to take any over-the-counter medications before  takeing them. Patient is strongly advised to avoid tranquilizers or  relaxants, illegal drugs  or any medications that can depress breathing  Patient is also advised to tell us if there is any changes in their medications from other physicians. 1.schedule diagnostic/confirmatory median branch nerve block left C2-T1, pain axial, pain increasing she had left cervical facet injection 8/2021 with 85% relief  The patient was counseled about the risks of tati Covid-19 during their procedure period and any recovery window from their procedure. The patient was made aware that tati Covid-19 may worsen their prognosis for recovering from their procedure and lend to a higher morbidity and/or mortality risk. All material risks, benefits, and reasonable alternatives including postponing the procedure were discussed. The patient (Adalberto Villarreal) to proceed with their procedure at this time.     TREATMENT OPTIONS:     Diagnostic/confirmatory cervical MBB  Medication Agreement Requirements Met  Continue Opioid therapy  Script written for  percocet  Follow up appointment made

## 2021-11-27 ENCOUNTER — HOSPITAL ENCOUNTER (EMERGENCY)
Age: 51
Discharge: HOME | End: 2021-11-27
Attending: EMERGENCY MEDICINE
Payer: MEDICAID

## 2021-11-27 ENCOUNTER — APPOINTMENT (OUTPATIENT)
Dept: CT IMAGING | Age: 51
End: 2021-11-27
Payer: MEDICAID

## 2021-11-27 VITALS
DIASTOLIC BLOOD PRESSURE: 64 MMHG | SYSTOLIC BLOOD PRESSURE: 110 MMHG | TEMPERATURE: 98.1 F | HEIGHT: 64 IN | RESPIRATION RATE: 16 BRPM | WEIGHT: 175 LBS | HEART RATE: 70 BPM | BODY MASS INDEX: 29.88 KG/M2 | OXYGEN SATURATION: 96 %

## 2021-11-27 DIAGNOSIS — R11.2 NON-INTRACTABLE VOMITING WITH NAUSEA, UNSPECIFIED VOMITING TYPE: Primary | ICD-10-CM

## 2021-11-27 DIAGNOSIS — R10.84 GENERALIZED ABDOMINAL PAIN: ICD-10-CM

## 2021-11-27 LAB
-: ABNORMAL
ABSOLUTE EOS #: 0.2 K/UL (ref 0–0.4)
ABSOLUTE IMMATURE GRANULOCYTE: NORMAL K/UL (ref 0–0.3)
ABSOLUTE LYMPH #: 2.5 K/UL (ref 1–4.8)
ABSOLUTE MONO #: 0.5 K/UL (ref 0.1–1.3)
ALBUMIN SERPL-MCNC: 4.1 G/DL (ref 3.5–5.2)
ALBUMIN/GLOBULIN RATIO: ABNORMAL (ref 1–2.5)
ALP BLD-CCNC: 103 U/L (ref 35–104)
ALT SERPL-CCNC: 10 U/L (ref 5–33)
AMORPHOUS: ABNORMAL
ANION GAP SERPL CALCULATED.3IONS-SCNC: 11 MMOL/L (ref 9–17)
AST SERPL-CCNC: 13 U/L
BACTERIA: ABNORMAL
BASOPHILS # BLD: 1 % (ref 0–2)
BASOPHILS ABSOLUTE: 0.1 K/UL (ref 0–0.2)
BILIRUB SERPL-MCNC: 0.16 MG/DL (ref 0.3–1.2)
BILIRUBIN URINE: NEGATIVE
BUN BLDV-MCNC: 23 MG/DL (ref 6–20)
BUN/CREAT BLD: ABNORMAL (ref 9–20)
CALCIUM SERPL-MCNC: 8.9 MG/DL (ref 8.6–10.4)
CASTS UA: ABNORMAL /LPF
CHLORIDE BLD-SCNC: 104 MMOL/L (ref 98–107)
CO2: 21 MMOL/L (ref 20–31)
COLOR: YELLOW
COMMENT UA: ABNORMAL
CREAT SERPL-MCNC: 0.87 MG/DL (ref 0.5–0.9)
CRYSTALS, UA: ABNORMAL /HPF
DIFFERENTIAL TYPE: NORMAL
EOSINOPHILS RELATIVE PERCENT: 2 % (ref 0–4)
EPITHELIAL CELLS UA: ABNORMAL /HPF
GFR AFRICAN AMERICAN: >60 ML/MIN
GFR NON-AFRICAN AMERICAN: >60 ML/MIN
GFR SERPL CREATININE-BSD FRML MDRD: ABNORMAL ML/MIN/{1.73_M2}
GFR SERPL CREATININE-BSD FRML MDRD: ABNORMAL ML/MIN/{1.73_M2}
GLUCOSE BLD-MCNC: 101 MG/DL (ref 70–99)
GLUCOSE URINE: NEGATIVE
HCT VFR BLD CALC: 39.7 % (ref 36–46)
HEMOGLOBIN: 13.7 G/DL (ref 12–16)
IMMATURE GRANULOCYTES: NORMAL %
KETONES, URINE: NEGATIVE
LACTIC ACID: 1.2 MMOL/L (ref 0.5–2.2)
LEUKOCYTE ESTERASE, URINE: NEGATIVE
LIPASE: 49 U/L (ref 13–60)
LYMPHOCYTES # BLD: 36 % (ref 24–44)
MCH RBC QN AUTO: 31.8 PG (ref 26–34)
MCHC RBC AUTO-ENTMCNC: 34.5 G/DL (ref 31–37)
MCV RBC AUTO: 92.3 FL (ref 80–100)
MONOCYTES # BLD: 7 % (ref 1–7)
MUCUS: ABNORMAL
NITRITE, URINE: NEGATIVE
NRBC AUTOMATED: NORMAL PER 100 WBC
OTHER OBSERVATIONS UA: ABNORMAL
PDW BLD-RTO: 14.2 % (ref 11.5–14.9)
PH UA: 6 (ref 5–8)
PLATELET # BLD: 173 K/UL (ref 150–450)
PLATELET ESTIMATE: NORMAL
PMV BLD AUTO: 7.9 FL (ref 6–12)
POTASSIUM SERPL-SCNC: 3.8 MMOL/L (ref 3.7–5.3)
PROTEIN UA: NEGATIVE
RBC # BLD: 4.3 M/UL (ref 4–5.2)
RBC # BLD: NORMAL 10*6/UL
RBC UA: ABNORMAL /HPF
RENAL EPITHELIAL, UA: ABNORMAL /HPF
SEG NEUTROPHILS: 54 % (ref 36–66)
SEGMENTED NEUTROPHILS ABSOLUTE COUNT: 3.7 K/UL (ref 1.3–9.1)
SODIUM BLD-SCNC: 136 MMOL/L (ref 135–144)
SPECIFIC GRAVITY UA: 1.02 (ref 1–1.03)
TOTAL PROTEIN: 6.9 G/DL (ref 6.4–8.3)
TRICHOMONAS: ABNORMAL
TROPONIN INTERP: NORMAL
TROPONIN T: NORMAL NG/ML
TROPONIN, HIGH SENSITIVITY: <6 NG/L (ref 0–14)
TURBIDITY: CLEAR
URINE HGB: ABNORMAL
UROBILINOGEN, URINE: NORMAL
WBC # BLD: 6.9 K/UL (ref 3.5–11)
WBC # BLD: NORMAL 10*3/UL
WBC UA: ABNORMAL /HPF
YEAST: ABNORMAL

## 2021-11-27 PROCEDURE — 96374 THER/PROPH/DIAG INJ IV PUSH: CPT

## 2021-11-27 PROCEDURE — 96375 TX/PRO/DX INJ NEW DRUG ADDON: CPT

## 2021-11-27 PROCEDURE — 36415 COLL VENOUS BLD VENIPUNCTURE: CPT

## 2021-11-27 PROCEDURE — 84484 ASSAY OF TROPONIN QUANT: CPT

## 2021-11-27 PROCEDURE — 6360000004 HC RX CONTRAST MEDICATION: Performed by: EMERGENCY MEDICINE

## 2021-11-27 PROCEDURE — 99283 EMERGENCY DEPT VISIT LOW MDM: CPT

## 2021-11-27 PROCEDURE — 74177 CT ABD & PELVIS W/CONTRAST: CPT

## 2021-11-27 PROCEDURE — 93005 ELECTROCARDIOGRAM TRACING: CPT | Performed by: EMERGENCY MEDICINE

## 2021-11-27 PROCEDURE — 83690 ASSAY OF LIPASE: CPT

## 2021-11-27 PROCEDURE — 83605 ASSAY OF LACTIC ACID: CPT

## 2021-11-27 PROCEDURE — 6360000002 HC RX W HCPCS: Performed by: EMERGENCY MEDICINE

## 2021-11-27 PROCEDURE — 85025 COMPLETE CBC W/AUTO DIFF WBC: CPT

## 2021-11-27 PROCEDURE — 81001 URINALYSIS AUTO W/SCOPE: CPT

## 2021-11-27 PROCEDURE — 80053 COMPREHEN METABOLIC PANEL: CPT

## 2021-11-27 PROCEDURE — 2580000003 HC RX 258: Performed by: EMERGENCY MEDICINE

## 2021-11-27 RX ORDER — 0.9 % SODIUM CHLORIDE 0.9 %
80 INTRAVENOUS SOLUTION INTRAVENOUS ONCE
Status: COMPLETED | OUTPATIENT
Start: 2021-11-27 | End: 2021-11-27

## 2021-11-27 RX ORDER — MORPHINE SULFATE 4 MG/ML
4 INJECTION, SOLUTION INTRAMUSCULAR; INTRAVENOUS ONCE
Status: COMPLETED | OUTPATIENT
Start: 2021-11-27 | End: 2021-11-27

## 2021-11-27 RX ORDER — SODIUM CHLORIDE 0.9 % (FLUSH) 0.9 %
10 SYRINGE (ML) INJECTION PRN
Status: DISCONTINUED | OUTPATIENT
Start: 2021-11-27 | End: 2021-11-27 | Stop reason: HOSPADM

## 2021-11-27 RX ORDER — 0.9 % SODIUM CHLORIDE 0.9 %
1000 INTRAVENOUS SOLUTION INTRAVENOUS ONCE
Status: COMPLETED | OUTPATIENT
Start: 2021-11-27 | End: 2021-11-27

## 2021-11-27 RX ORDER — ONDANSETRON 2 MG/ML
4 INJECTION INTRAMUSCULAR; INTRAVENOUS ONCE
Status: COMPLETED | OUTPATIENT
Start: 2021-11-27 | End: 2021-11-27

## 2021-11-27 RX ORDER — ONDANSETRON 4 MG/1
4 TABLET, ORALLY DISINTEGRATING ORAL EVERY 8 HOURS PRN
Qty: 20 TABLET | Refills: 0 | Status: SHIPPED | OUTPATIENT
Start: 2021-11-27

## 2021-11-27 RX ADMIN — SODIUM CHLORIDE 80 ML: 9 INJECTION, SOLUTION INTRAVENOUS at 04:09

## 2021-11-27 RX ADMIN — ONDANSETRON 4 MG: 2 INJECTION INTRAMUSCULAR; INTRAVENOUS at 03:16

## 2021-11-27 RX ADMIN — SODIUM CHLORIDE 1000 ML: 9 INJECTION, SOLUTION INTRAVENOUS at 03:11

## 2021-11-27 RX ADMIN — MORPHINE SULFATE 4 MG: 4 INJECTION, SOLUTION INTRAMUSCULAR; INTRAVENOUS at 03:17

## 2021-11-27 RX ADMIN — IOPAMIDOL 75 ML: 755 INJECTION, SOLUTION INTRAVENOUS at 04:09

## 2021-11-27 RX ADMIN — SODIUM CHLORIDE, PRESERVATIVE FREE 10 ML: 5 INJECTION INTRAVENOUS at 04:09

## 2021-11-27 ASSESSMENT — ENCOUNTER SYMPTOMS
EYE PAIN: 0
COLOR CHANGE: 0
CONSTIPATION: 1
BACK PAIN: 0
SHORTNESS OF BREATH: 0
ABDOMINAL PAIN: 1
VOMITING: 1
NAUSEA: 1

## 2021-11-27 ASSESSMENT — PAIN DESCRIPTION - DESCRIPTORS: DESCRIPTORS: ACHING

## 2021-11-27 ASSESSMENT — PAIN SCALES - GENERAL
PAINLEVEL_OUTOF10: 7
PAINLEVEL_OUTOF10: 7

## 2021-11-27 ASSESSMENT — PAIN DESCRIPTION - ORIENTATION: ORIENTATION: UPPER

## 2021-11-27 ASSESSMENT — PAIN DESCRIPTION - FREQUENCY: FREQUENCY: CONTINUOUS

## 2021-11-27 ASSESSMENT — PAIN DESCRIPTION - LOCATION: LOCATION: ABDOMEN

## 2021-11-27 NOTE — ED NOTES
Mode of arrival (squad #, walk in, police, etc) : Walked into triage        Chief complaint(s): Abdominal Pain, Nausea and Vomiting        Arrival Note (brief scenario, treatment PTA, etc). : Complaining of upper abdominal pain which has been intermittent x 1 week. States the pain is getting progressively worse. Hx of bowel obstruction 2011. A/O X 3.        C= \"Have you ever felt that you should Cut down on your drinking? \"  No  A= \"Have people Annoyed you by criticizing your drinking? \"  No  G= \"Have you ever felt bad or Guilty about your drinking? \"  No  E= \"Have you ever had a drink as an Eye-opener first thing in the morning to steady your nerves or to help a hangover? \"  No      Deferred []      Reason for deferring: N/A    *If yes to two or more: probable alcohol abuse. Med Goncalves RN  11/27/21 2712

## 2021-11-27 NOTE — ED PROVIDER NOTES
EMERGENCY DEPARTMENT ENCOUNTER    Pt Name: Clemencia Cole  MRN: [id number]  [place] 1970  Date of evaluation: 11/27/21  CHIEF COMPLAINT       Chief Complaint   Patient presents with    Abdominal Pain    Nausea    Emesis     HISTORY OF PRESENT ILLNESS   73-year-old female presents for complaint of nausea vomiting abdominal pain. Patient reports been having worsening abdominal pain for about the last week. Patient reports tonight pain got much worse. Describes the pain as a burning sharp pain, located in the center of her abdomen, states that her abdomen feels more \"swollen\" patient reports she also developed nausea with associated vomiting, states that she been constipated and having difficulty with bowel movements, states that she is not passing gas either. Patient denies any associated fevers. Patient reports history of multiple C-sections as well as hysterectomy, reports history of prior bowel obstruction as well. The history is provided by the patient. REVIEW OF SYSTEMS     Review of Systems   Constitutional: Negative for fever. HENT: Negative for congestion and ear pain. Eyes: Negative for pain. Respiratory: Negative for shortness of breath. Cardiovascular: Negative for chest pain, palpitations and leg swelling. Gastrointestinal: Positive for abdominal pain, constipation, nausea and vomiting. Genitourinary: Negative for dysuria and flank pain. Musculoskeletal: Negative for back pain. Skin: Negative for color change. Neurological: Negative for numbness and headaches. Psychiatric/Behavioral: Negative for confusion. All other systems reviewed and are negative.     PASTMEDICAL HISTORY     Past Medical History:   Diagnosis Date    Anxiety     CAD (coronary artery disease)     Mitral valve prolapse    Fatty liver     GERD (gastroesophageal reflux disease)     Headache     History of bronchitis     Hyperlipidemia     Hypothyroidism     Kidney stone     LFT elevation     MVP (mitral valve prolapse)     Obesity     Osteoarthritis of cervical spine     Pulmonary emboli (HCC)     Type 2 diabetes mellitus without complication ([facility] 5213    Umbilical hernia      Past Problem List  Patient Active Problem List   Diagnosis Code    Hyperlipidemia E78.5    MVP (mitral valve prolapse) I34.1    Anxiety F41.9    Hypothyroidism E03.9    Allergic rhinitis J30.9    Obesity E66.9    Abdominal pain R10.9    Elevated liver enzymes R74.8    GERD (gastroesophageal reflux disease) K21.9    Ovarian mass N83.8    S/P bilateral oophorectomy & KRUPA 10/21/14 Z90.722    Pain emptying bladder R30.9    Urinary urgency R39.15    Insomnia G47.00    RLS (restless legs syndrome) G25.81    Pancreatitis K85.90    Eye swelling, left H57.89    Osteoarthritis of cervical spine M47.812    Degenerative cervical spinal stenosis M48.02    Trigger point with tension headache G44.209    Arthropathy of cervical facet joint M47.812    Atlanto-axial joint sprain, sequela S13. 4XXS    Cervical radiculopathy M54.12    Sprain of atlanto-occipital joint, sequela S13. 4XXS    Encounter for medication monitoring Z51.81    Myofascial muscle pain M79.18    Colitis K52.9    Chest pain R07.9    Unstable angina (HCC) I20.0    Pulmonary embolism on right (HCC) I26.99    Acute pulmonary embolism (HCC) I26.99    Hematemesis with nausea K92.0    Acute respiratory failure with hypoxia (HCC) J96.01    Family history of colon cancer Z80.0    Irritable bowel syndrome with both constipation and diarrhea K58.2    Elevated CEA R97.0    Diarrhea R19.7    Erosive gastritis K29.60    Weight loss R63.4    Prediabetes R73.03     SURGICAL HISTORY       Past Surgical History:   Procedure Laterality Date    APPENDECTOMY       SECTION      2 pfannenstiel, 1 vertical    CHOLECYSTECTOMY      COLONOSCOPY      Dr Levi Echols COLONOSCOPY  14    COLONOSCOPY  2014    biopsy & sigmoid spasms, pathology negative    COLONOSCOPY N/A 2/12/2018    COLONOSCOPY WITH BIOPSY performed by Gerri Tong [facility] at [facility] 5/27/2021    COLONOSCOPY DIAGNOSTIC performed by Gisele Mcgill MD at [address]      x 5    HYSTERECTOMY, TOTAL ABDOMINAL      2010    AK EXPLORATORY OF ABDOMEN  10/21/2014    Laparotomy-lysis of adhesions, bso     UPPER GASTROINTESTINAL ENDOSCOPY  2/17/2010    mild chronic inactive gastritis    UPPER GASTROINTESTINAL ENDOSCOPY N/A 3/10/2021    EGD BIOPSY performed by Gisele Mcgill MD at [facility]       Previous Medications    ALBUTEROL SULFATE HFA (VENTOLIN HFA) 108 (90 BASE) MCG/ACT INHALER    Inhale 2 puffs into the lungs every 6 hours as needed for Wheezing    ALBUTEROL SULFATE  (90 BASE) MCG/ACT INHALER    INHALE 2 PUFFS INTO THE LUNGS EVERY 4 HOURS AS NEEDED FOR SHORTNESS OF BREATH    CLONAZEPAM (KLONOPIN) 0.5 MG TABLET    Take 1 tablet by mouth 2 times daily as needed for Anxiety for up to 30 days. ESOMEPRAZOLE (NEXIUM) 40 MG DELAYED RELEASE CAPSULE    TAKE 1 CAPSULE BY MOUTH EVERY MORNING BEFORE BREAKFAST    LEVOTHYROXINE (SYNTHROID) 175 MCG TABLET    TAKE 1 TABLET BY MOUTH DAILY    LIDOCAINE (LIDODERM) 5 %    Place 1 patch onto the skin daily 12 hours on, 12 hours off. METOPROLOL TARTRATE (LOPRESSOR) 50 MG TABLET    Take 50 mg by mouth 2 times daily     NALOXONE (NARCAN) 4 MG/0.1ML LIQD NASAL SPRAY    1 spray by Nasal route as needed for Opioid Reversal    OXYCODONE-ACETAMINOPHEN (PERCOCET)  MG PER TABLET    Take 1 tablet by mouth every 6 hours as needed for Pain for up to 30 days.     RIVAROXABAN (XARELTO) 20 MG TABS TABLET    Take 1 tablet by mouth daily (with breakfast)    ROPINIROLE (REQUIP) 2 MG TABLET    TAKE 1 TABLET BY MOUTH EVERY NIGHT    SERTRALINE (ZOLOFT) 100 MG TABLET    Take 1.5 tablets by mouth daily    TIZANIDINE (ZANAFLEX) 4 MG TABLET    TAKE 1 TABLET BY MOUTH EVERY 8 HOURS AS NEEDED FOR SPASMS    TOPIRAMATE (TOPAMAX) 25 MG TABLET    25 mg 2 times daily     TRAZODONE (DESYREL) 100 MG TABLET         ALLERGIES     is allergic to compazine [prochlorperazine], sulfa antibiotics, and adhesive tape. FAMILY HISTORY     She indicated that her mother is . She indicated that her father is alive. She indicated that her maternal grandmother is . She indicated that her maternal grandfather is . She indicated that her paternal grandmother is . She indicated that her paternal grandfather is . SOCIAL HISTORY       Social History     Tobacco Use    Smoking status: Former Smoker     Packs/day: 0.25     Years: 33.00     Pack years: 8.25     Types: Cigarettes    Smokeless tobacco: Never Used    Tobacco comment: quit on nicoderm patch   Vaping Use    Vaping Use: Never used   Substance Use Topics    Alcohol use: No     Alcohol/week: 0.0 standard drinks    Drug use: No     PHYSICAL EXAM     INITIAL VITALS: /64   Pulse 70   Temp 98.1 °F (36.7 °C) (Oral)   Resp 16   Ht 5' 4\" (1.626 m)   Wt 175 lb (79.4 kg)   LMP 2010   SpO2 96%   BMI 30.04 kg/m²    Physical Exam  Vitals and nursing note reviewed. Constitutional:       General: She is not in acute distress. Appearance: Normal appearance. She is not toxic-appearing. HENT:      Head: Normocephalic and atraumatic. Nose: Nose normal.      Mouth/Throat:      Mouth: Mucous membranes are moist.      Pharynx: Oropharynx is clear. Eyes:      Extraocular Movements: Extraocular movements intact. Conjunctiva/sclera: Conjunctivae normal.   Cardiovascular:      Rate and Rhythm: Normal rate and regular rhythm. Pulses: Normal pulses. Heart sounds: Normal heart sounds. Pulmonary:      Effort: Pulmonary effort is normal.      Breath sounds: Normal breath sounds. Abdominal:      General: Bowel sounds are normal. There is no distension. Palpations: Abdomen is soft. Tenderness: There is generalized abdominal tenderness. Musculoskeletal:         General: Normal range of motion. Cervical back: Normal range of motion. Skin:     General: Skin is warm and dry. Capillary Refill: Capillary refill takes less than 2 seconds. Neurological:      General: No focal deficit present. Mental Status: She is alert. Psychiatric:         Mood and Affect: Mood normal.         MEDICAL DECISION MAKIN-year-old female presents for abdominal pain. On initial exam patient in no acute distress, vitals are stable, on exam patient with generalized abdominal tenderness, given history of prior obstruction, will check labs and CT    Labs are reviewed and unremarkable, CT was negative for acute process    Patient reevaluated reports she is feeling better    Discussed results with patient, discussed need for follow-up with primary care physician and return precautions, patient voiced understanding is comfortable with plan and discharge home    Patient/Guardian was informed of their diagnosis and told to follow up with PCP in 1-3 days. Patient demonstrates understanding and agreement with the plan. They were given the opportunity to ask questions and those questions were answered to the best of our ability with the available information. Patient/Guardian told to return to the ED for any new, worsening, changing or persistent symptoms. This dictation was prepared using [employer] voice recognition software. CRITICAL CARE:       PROCEDURES:    Procedures    DIAGNOSTIC RESULTS   EKG:All EKG's are interpreted by the Emergency Department Physician who either signs or Co-signs this chart in the absence of a cardiologist.        RADIOLOGY:All plain film, CT, MRI, and formal ultrasound images (except ED bedside ultrasound) are read by the radiologist, see reports below, unless otherwisenoted in MDM or here.   CT ABDOMEN PELVIS W IV CONTRAST Additional Contrast? None   Final Result   Possible mild cystitis. LABS: All lab results were reviewed by myself, and all abnormals are listed below. Labs Reviewed   COMPREHENSIVE METABOLIC PANEL W/ REFLEX TO MG FOR LOW K - Abnormal; Notable for the following components:       Result Value    Glucose 101 (*)     BUN 23 (*)     Total Bilirubin 0.16 (*)     All other components within normal limits   URINALYSIS - Abnormal; Notable for the following components:    Urine Hgb TRACE (*)     All other components within normal limits   MICROSCOPIC URINALYSIS - Abnormal; Notable for the following components:    Bacteria, UA FEW (*)     All other components within normal limits   CBC WITH AUTO DIFFERENTIAL   LIPASE   TROPONIN   LACTIC ACID       EMERGENCY DEPARTMENTCOURSE:         Vitals:    Vitals:    11/27/21 0052 11/27/21 0322   BP: 110/82 110/64   Pulse: 76 70   Resp: 16 16   Temp: 98.1 °F (36.7 °C)    TempSrc: Oral    SpO2: 96% 96%   Weight: 175 lb (79.4 kg)    Height: 5' 4\" (1.626 m)        The patient was given the following medications while in the emergency department:  Orders Placed This Encounter   Medications    0.9 % sodium chloride bolus    morphine sulfate (PF) injection 4 mg    ondansetron (ZOFRAN) injection 4 mg    iopamidol (ISOVUE-370) 76 % injection 75 mL    0.9 % sodium chloride bolus    sodium chloride flush 0.9 % injection 10 mL    ondansetron (ZOFRAN ODT) 4 MG disintegrating tablet     Sig: Take 1 tablet by mouth every 8 hours as needed for Nausea or Vomiting     Dispense:  20 tablet     Refill:  0     CONSULTS:  None    FINAL IMPRESSION      1. Non-intractable vomiting with nausea, unspecified vomiting type    2.  Generalized abdominal pain          DISPOSITION/PLAN   DISPOSITION Decision To Discharge 11/27/2021 05:51:42 AM      PATIENT REFERRED TO:  Scott Delay, APRN - CNP  [place] [address]  [phone]    Schedule an appointment as soon as possible for a visit       [facility] ED  [address] [place] [address]  [phone]    As needed, If symptoms worsen    DISCHARGE MEDICATIONS:  New Prescriptions    ONDANSETRON (ZOFRAN ODT) 4 MG DISINTEGRATING TABLET    Take 1 tablet by mouth every 8 hours as needed for Nausea or Vomiting     The care is provided during an unprecedented national emergency due to the novel coronavirus, COVID 19.   Fredy Law DO  Attending Emergency Physician                  Fredy Law DO  11/27/21 8558

## 2021-11-29 LAB
EKG ATRIAL RATE: 67 BPM
EKG P AXIS: 56 DEGREES
EKG P-R INTERVAL: 180 MS
EKG Q-T INTERVAL: 442 MS
EKG QRS DURATION: 90 MS
EKG QTC CALCULATION (BAZETT): 467 MS
EKG R AXIS: 27 DEGREES
EKG T AXIS: 57 DEGREES
EKG VENTRICULAR RATE: 67 BPM

## 2021-11-29 RX ORDER — TIZANIDINE 4 MG/1
TABLET ORAL
Qty: 90 TABLET | Refills: 0 | Status: SHIPPED | OUTPATIENT
Start: 2021-11-29 | End: 2021-12-28

## 2021-12-09 ENCOUNTER — HOSPITAL ENCOUNTER (OUTPATIENT)
Dept: PAIN MANAGEMENT | Age: 51
Discharge: HOME | End: 2021-12-09
Payer: MEDICAID

## 2021-12-09 DIAGNOSIS — M54.12 CERVICAL RADICULOPATHY: ICD-10-CM

## 2021-12-09 DIAGNOSIS — G44.209 TRIGGER POINT WITH TENSION HEADACHE: Primary | ICD-10-CM

## 2021-12-09 DIAGNOSIS — M48.02 DEGENERATIVE CERVICAL SPINAL STENOSIS: ICD-10-CM

## 2021-12-09 DIAGNOSIS — Z51.81 ENCOUNTER FOR MEDICATION MONITORING: ICD-10-CM

## 2021-12-09 DIAGNOSIS — M47.812 ARTHROPATHY OF CERVICAL FACET JOINT: ICD-10-CM

## 2021-12-09 PROCEDURE — 99213 OFFICE O/P EST LOW 20 MIN: CPT

## 2021-12-09 PROCEDURE — 99442 PR PHYS/QHP TELEPHONE EVALUATION 11-20 MIN: CPT | Performed by: NURSE PRACTITIONER

## 2021-12-09 RX ORDER — OXYCODONE AND ACETAMINOPHEN 10; 325 MG/1; MG/1
1 TABLET ORAL EVERY 6 HOURS PRN
Qty: 120 TABLET | Refills: 0 | Status: SHIPPED | OUTPATIENT
Start: 2021-12-09 | End: 2022-01-07 | Stop reason: SDUPTHER

## 2021-12-09 ASSESSMENT — ENCOUNTER SYMPTOMS
COUGH: 1
NAUSEA: 1

## 2021-12-09 NOTE — PROGRESS NOTES
[id number]        Ref Range & Units 2/3/21 1551 Resulting Agency   Pain Management Drug Panel Interp, Urine  Inconsistent  [facility]   Comment: (NOTE)   ________________________________________________________________   DRUGS EXPECTED:   NO DRUGS EXPECTED   ________________________________________________________________   INCONSISTENT with medications provided:   Oxycodone   Noroxycodone   7-Aminoclonazepam   ________________________________________________________________   INTERPRETIVE INFORMATION: Targeted drug profile Interp   Interpretation depends on accuracy and completeness of patient   medication information submitted by client. 6-Acetylmorphine, Ur  Not Detected            EXAMINATION:   MRI OF THE CERVICAL SPINE WITHOUT CONTRAST 3/4/2021 2:53 pm       TECHNIQUE:   Multiplanar multisequence MRI of the cervical spine was performed without the   administration of intravenous contrast.       COMPARISON:   06/12/2020       HISTORY:   ORDERING SYSTEM PROVIDED HISTORY: Cervical radiculopathy   TECHNOLOGIST PROVIDED HISTORY:   evaluate for stenosis   Is the patient pregnant?->No   Reason for Exam: pt stated left shoulder arm pain weakness numbness x 1 mth   Acuity: Acute   Type of Exam: Initial   Additional signs and symptoms: pt stated left shoulder arm pain weakness   numbness x 1 mth, NKI       FINDINGS:   BONES/ALIGNMENT: There is normal alignment of the spine. The vertebral body   heights are maintained. The bone marrow signal appears unremarkable.  There   is minimal degenerative disc disease with disc space narrowing and   osteophytes at C3-4, C4-5, C5-6 and C6-7.       SPINAL CORD:  No abnormal signal is identified within the spinal cord.       SOFT TISSUES: No paraspinal mass identified.       C2-C3: There is minimum disc protrusion of 2 mm centrally. The thecal sac and   neural foramina are intact.       C3-C4: There is disc protrusion osteophyte toward the left measuring 3-4 mm.    The thecal sac is not stenotic.  There is mild narrowing of the left neural   foramen.  The right neural foramen is intact.       C4-C5: There is disc protrusion osteophyte measuring 2-3 mm.  The thecal sac   is mildly stenotic measuring 9.5 mm. Disc and/or osteophytes result in mild   narrowing of the neural foramina bilaterally. The left neural foramen is   narrowed greater than right.       C5-C6: There is disc protrusion osteophyte measuring 5-6 mm toward the left   narrowing the left neural foramen.  The thecal sac is mildly stenotic   measuring 9.3 mm.  The right neural foramen is intact.       C6-C7: Disc and/or osteophytes cause minimal narrowing of the neural foramina   bilaterally.  The thecal sac is not stenotic.       C7-T1:  The thecal sac and neural foramina are intact.           Impression   Disc and osteophytes result in narrowing of the neural foramina and mild   stenosis of the thecal sac throughout the cervical region as discussed in   detail above. Pill count: appropriate    fill date :  12-9-2021    Morphine equivalent dose as reported on [place]:  60  Periodic Controlled Substance Monitoring: Possible medication side effects, risk of tolerance/dependence & alternative treatments discussed., No signs of potential drug abuse or diversion identified. , Assessed functional status., Obtaining appropriate analgesic effect of treatment. Teresa Hairston, APRN - CNP)  Review of[place]ARRS does not show any aberrant prescription behavior. Medication is helping the patient stay active. Patient denies any side effects and reports adequate analgesia. No sign of misuse/abuse.             Past Medical History:   Diagnosis Date    Anxiety     CAD (coronary artery disease)     Mitral valve prolapse    Fatty liver     GERD (gastroesophageal reflux disease)     Headache     History of bronchitis     Hyperlipidemia     Hypothyroidism     Kidney stone     LFT elevation     MVP (mitral valve prolapse)     Obesity     Osteoarthritis of cervical spine     Pulmonary emboli (HCC)     Type 2 diabetes mellitus without complication ([facility] [address] 995    Umbilical hernia        Past Surgical History:   Procedure Laterality Date    APPENDECTOMY       SECTION      2 pfannenstiel, 1 vertical    CHOLECYSTECTOMY      COLONOSCOPY      Dr Henry Weinberg  14    COLONOSCOPY  2014    biopsy & sigmoid spasms, pathology negative    COLONOSCOPY N/A 2018    COLONOSCOPY WITH BIOPSY [facility] 2021    COLONOSCOPY DIAGNOSTIC performed by Noe Don MD at [facility]      x 5    HYSTERECTOMY, TOTAL ABDOMINAL          OR EXPLORATORY OF ABDOMEN  10/21/2014    Laparotomy-lysis of adhesions, bso     UPPER GASTROINTESTINAL ENDOSCOPY  2010    mild chronic inactive gastritis    UPPER GASTROINTESTINAL ENDOSCOPY N/A 3/10/2021    EGD BIOPSY [facility] Noe Don MD [facility]       Allergies   Allergen Reactions    Compazine [Prochlorperazine]      Jittery, restless    Sulfa Antibiotics Hives    Adhesive Tape Rash         Current Outpatient Medications:     clonazePAM (KLONOPIN) 1 MG tablet, TAKE 1/2 TABLET BY MOUTH FOUR TIMES DAILY AS NEEDED, Disp: , Rfl:     tiZANidine (ZANAFLEX) 4 MG tablet, TAKE 1 TABLET BY MOUTH EVERY 8 HOURS AS NEEDED FOR SPASMS, Disp: 90 tablet, Rfl: 0    ondansetron (ZOFRAN ODT) 4 MG disintegrating tablet, Take 1 tablet by mouth every 8 hours as needed for Nausea or Vomiting, Disp: 20 tablet, Rfl: 0    esomeprazole (NEXIUM) 40 MG delayed release capsule, TAKE 1 CAPSULE BY MOUTH EVERY MORNING BEFORE BREAKFAST, Disp: 90 capsule, Rfl: 0    oxyCODONE-acetaminophen (PERCOCET)  MG per tablet, Take 1 tablet by mouth every 6 hours as needed for Pain for up to 30 days. , Disp: 120 tablet, Rfl: 0    sertraline (ZOLOFT) 100 MG tablet, Take 1.5 tablets by mouth daily, Disp: 45 tablet, Rfl: 2   levothyroxine (SYNTHROID) 175 MCG tablet, TAKE 1 TABLET BY MOUTH DAILY, Disp: 90 tablet, Rfl: 1    traZODone (DESYREL) 100 MG tablet, , Disp: , Rfl:     rOPINIRole (REQUIP) 2 MG tablet, TAKE 1 TABLET BY MOUTH EVERY NIGHT, Disp: 90 tablet, Rfl: 1    naloxone (NARCAN) 4 MG/0.1ML LIQD nasal spray, 1 spray by Nasal route as needed for Opioid Reversal, Disp: 1 each, Rfl: 0    albuterol sulfate HFA (VENTOLIN HFA) 108 (90 Base) MCG/ACT inhaler, Inhale 2 puffs into the lungs every 6 hours as needed for Wheezing, Disp: 1 Inhaler, Rfl: 3    albuterol sulfate  (90 Base) MCG/ACT inhaler, INHALE 2 PUFFS INTO THE LUNGS EVERY 4 HOURS AS NEEDED FOR SHORTNESS OF BREATH, Disp: 42.5 g, Rfl: 3    rivaroxaban (XARELTO) 20 MG TABS tablet, Take 1 tablet by mouth daily (with breakfast), Disp: 90 tablet, Rfl: 3    lidocaine (LIDODERM) 5 %, Place 1 patch onto the skin daily 12 hours on, 12 hours off., Disp: 10 patch, Rfl: 0    clonazePAM (KLONOPIN) 0.5 MG tablet, Take 1 tablet by mouth 2 times daily as needed for Anxiety for up to 30 days. , Disp: 60 tablet, Rfl: 1    topiramate (TOPAMAX) 25 MG tablet, 25 mg 2 times daily , Disp: , Rfl:     metoprolol tartrate (LOPRESSOR) 50 MG tablet, Take 50 mg by mouth 2 times daily , Disp: , Rfl:     Family History   Problem Relation Age of Onset    Cancer Mother         vaginal    Heart Disease Father     Diabetes Father     Other Father         colon resection for colon polyps    Breast Cancer Maternal Grandmother     Stroke Maternal Grandfather        Social History     Socioeconomic History    Marital status:      Spouse name: Not on file    Number of children: Not on file    Years of education: Not on file    Highest education level: Not on file   Occupational History    Occupation: Homemaker   Tobacco Use    Smoking status: Former Smoker     Packs/day: 0.25     Years: 33.00     Pack years: 8.25     Types: Cigarettes    Smokeless tobacco: Never Used    Tobacco comment: quit on nicoderm patch   Vaping Use    Vaping Use: Never used   Substance and Sexual Activity    Alcohol use: No     Alcohol/week: 0.0 standard drinks    Drug use: No    Sexual activity: Not Currently   Other Topics Concern    Not on file   Social History Narrative    Not on file     Social Determinants of Health     Financial Resource Strain:     Difficulty of Paying Living Expenses: Not on file   Food Insecurity:     Worried About Running Out of Food in the Last Year: Not on file    Kamille of Food in the Last Year: Not on file   Transportation Needs:     Lack of Transportation (Medical): Not on file    Lack of Transportation (Non-Medical): Not on file   Physical Activity:     Days of Exercise per Week: Not on file    Minutes of Exercise per Session: Not on file   Stress:     Feeling of Stress : Not on file   Social Connections:     Frequency of Communication with Friends and Family: Not on file    Frequency of Social Gatherings with Friends and Family: Not on file    Attends [employer] Services: Not on file    Active Member of [address] [place] [address] or Organizations: Not on file    Attends Club or Organization Meetings: Not on file    Marital Status: Not on file   Intimate Partner Violence:     Fear of Current or Ex-Partner: Not on file    Emotionally Abused: Not on file    Physically Abused: Not on file    Sexually Abused: Not on file   Housing Stability:     Unable to Pay for Housing in the Last Year: Not on file    Number of [place] in the Last Year: Not on file    Unstable Housing in the Last Year: Not on file         Review of Systems:  Review of Systems   Constitutional: Negative. HENT: Negative. Eyes:        Glass   Cardiovascular: Negative. Respiratory: Positive for cough. Endocrine: Negative. Hematologic/Lymphatic: Bruises/bleeds easily. Skin: Negative. Musculoskeletal: Positive for joint pain and neck pain.         Left shoulder   Gastrointestinal: Positive for nausea. Genitourinary: Negative. Neurological: Positive for headaches and numbness. Psychiatric/Behavioral: Positive for depression. Managing         Physical Exam:  LMP 11/01/2010     Physical Exam  Skin:         Neurological:      Mental Status: She is alert and oriented to person, place, and time. Psychiatric:         Mood and Affect: Mood normal.         Thought Content: Thought content normal.           Assessment:    Problem List Items Addressed This Visit     Trigger point with tension headache - Primary    Encounter for medication monitoring    Degenerative cervical spinal stenosis    Cervical radiculopathy    Arthropathy of cervical facet joint            Treatment Plan:  DISCUSSION: Treatment options discussed withpatient and all questions answered to patient's satisfaction. Possible side effects, risk of tolerance and or dependence and alternative treatments discussed    Obtaining appropriate analgesic effect of treatment   No signs of potential drug abuse or diversion identified    [x] Ill effects of being on chronic pain medications such as sleep disturbances, respiratory depression, hormonal changes, withdrawal symptoms, chronic opioid dependence and tolerance as well as risk of taking opioids with Benzodiazepines and taking opioids along with alcohol,  werediscussed with patient. I had asked the patient to minimize medication use and utilize pain medications only for uncontrolled rest pain or pain with exertional activities. I advised patient not to self-escalate painmedications without consulting with us. At each of patient's future visits we will try to taper pain medications, while adjusting the adjunct medications, and re-evaluating for Physical Therapy to improve spinal andjoint strength. We will continue to have discussions to decrease pain medications as tolerated.       Counseled patient on effects their pain medication and /or their medical condition mayhave on their ability to drive or operate machinery. Instructed not to drive or operate machinery if drowsy     I also discussed with the patient regarding the dangers of combining narcotic pain medication with tranquilizers, alcohol or illegal drugs or taking the medication any way other than prescribed. The dangers were discussed  including respiratory depression and death. Patient was told to tell  all  physicians regarding the medications he is getting from pain clinic. Patient is warned not to take any unprescribed medications over-the-countermedications that can depress breathing . Patient is advised to talk to the pharmacist or physicians if planning to take any over-the-counter medications before  takeing them. Patient is strongly advised to avoid tranquilizers or  relaxants, illegal drugs  or any medications that can depress breathing  Patient is also advised to tell us if there is any changes in their medications from other physicians. 1.schedule diagnostic/confirmatory median branch nerve block left C2-T1, pain axial, pain increasing she had left cervical facet injection 8/2021 with 85% relief, interferes with ADL's  The patient was counseled about the risks of tati Covid-19 during their procedure period and any recovery window from their procedure. The patient was made aware that tati Covid-19 may worsen their prognosis for recovering from their procedure and lend to a higher morbidity and/or mortality risk. All material risks, benefits, and reasonable alternatives including postponing the procedure were discussed. The patient (DOES wish) to proceed with their procedure at this time.              TREATMENT OPTIONS:     Diagnostic/confirmatory median branch block, left  Medication Agreement Requirements Met  Continue Opioid therapy  Script written for  percocet  Follow up appointment made

## 2021-12-28 RX ORDER — TIZANIDINE 4 MG/1
TABLET ORAL
Qty: 90 TABLET | Refills: 0 | Status: SHIPPED | OUTPATIENT
Start: 2021-12-28 | End: 2022-02-14

## 2021-12-31 ENCOUNTER — HOSPITAL ENCOUNTER (EMERGENCY)
Age: 51
Discharge: HOME | End: 2022-01-01
Attending: EMERGENCY MEDICINE
Payer: MEDICAID

## 2021-12-31 ENCOUNTER — APPOINTMENT (OUTPATIENT)
Dept: CT IMAGING | Age: 51
End: 2021-12-31
Payer: MEDICAID

## 2021-12-31 DIAGNOSIS — U07.1 COVID: Primary | ICD-10-CM

## 2021-12-31 LAB
ABSOLUTE EOS #: 0.03 K/UL (ref 0–0.4)
ABSOLUTE IMMATURE GRANULOCYTE: ABNORMAL K/UL (ref 0–0.3)
ABSOLUTE LYMPH #: 1.53 K/UL (ref 1–4.8)
ABSOLUTE MONO #: 0.19 K/UL (ref 0.1–1.3)
ANION GAP SERPL CALCULATED.3IONS-SCNC: 10 MMOL/L (ref 9–17)
BASOPHILS # BLD: 0 % (ref 0–2)
BASOPHILS ABSOLUTE: 0 K/UL (ref 0–0.2)
BNP INTERPRETATION: NORMAL
BUN BLDV-MCNC: 13 MG/DL (ref 6–20)
BUN/CREAT BLD: ABNORMAL (ref 9–20)
CALCIUM SERPL-MCNC: 8.6 MG/DL (ref 8.6–10.4)
CHLORIDE BLD-SCNC: 102 MMOL/L (ref 98–107)
CO2: 22 MMOL/L (ref 20–31)
CREAT SERPL-MCNC: 0.84 MG/DL (ref 0.5–0.9)
DIFFERENTIAL TYPE: ABNORMAL
EOSINOPHILS RELATIVE PERCENT: 1 % (ref 0–4)
GFR AFRICAN AMERICAN: >60 ML/MIN
GFR NON-AFRICAN AMERICAN: >60 ML/MIN
GFR SERPL CREATININE-BSD FRML MDRD: ABNORMAL ML/MIN/{1.73_M2}
GFR SERPL CREATININE-BSD FRML MDRD: ABNORMAL ML/MIN/{1.73_M2}
GLUCOSE BLD-MCNC: 109 MG/DL (ref 70–99)
HCT VFR BLD CALC: 39.8 % (ref 36–46)
HEMOGLOBIN: 13.4 G/DL (ref 12–16)
IMMATURE GRANULOCYTES: ABNORMAL %
LYMPHOCYTES # BLD: 57 % (ref 24–44)
MAGNESIUM: 2.1 MG/DL (ref 1.6–2.6)
MCH RBC QN AUTO: 31.3 PG (ref 26–34)
MCHC RBC AUTO-ENTMCNC: 33.8 G/DL (ref 31–37)
MCV RBC AUTO: 92.7 FL (ref 80–100)
MONOCYTES # BLD: 7 % (ref 1–7)
MORPHOLOGY: NORMAL
NRBC AUTOMATED: ABNORMAL PER 100 WBC
PDW BLD-RTO: 13.8 % (ref 11.5–14.9)
PLATELET # BLD: 125 K/UL (ref 150–450)
PLATELET ESTIMATE: ABNORMAL
PMV BLD AUTO: 7.6 FL (ref 6–12)
POTASSIUM SERPL-SCNC: 3.9 MMOL/L (ref 3.7–5.3)
PRO-BNP: 30 PG/ML
RBC # BLD: 4.29 M/UL (ref 4–5.2)
RBC # BLD: ABNORMAL 10*6/UL
SARS-COV-2, RAPID: DETECTED
SEG NEUTROPHILS: 35 % (ref 36–66)
SEGMENTED NEUTROPHILS ABSOLUTE COUNT: 0.95 K/UL (ref 1.3–9.1)
SODIUM BLD-SCNC: 134 MMOL/L (ref 135–144)
SPECIMEN DESCRIPTION: ABNORMAL
TROPONIN INTERP: NORMAL
TROPONIN INTERP: NORMAL
TROPONIN T: NORMAL NG/ML
TROPONIN T: NORMAL NG/ML
TROPONIN, HIGH SENSITIVITY: <6 NG/L (ref 0–14)
TROPONIN, HIGH SENSITIVITY: <6 NG/L (ref 0–14)
WBC # BLD: 2.7 K/UL (ref 3.5–11)
WBC # BLD: ABNORMAL 10*3/UL

## 2021-12-31 PROCEDURE — 96374 THER/PROPH/DIAG INJ IV PUSH: CPT

## 2021-12-31 PROCEDURE — 83735 ASSAY OF MAGNESIUM: CPT

## 2021-12-31 PROCEDURE — 96375 TX/PRO/DX INJ NEW DRUG ADDON: CPT

## 2021-12-31 PROCEDURE — 80048 BASIC METABOLIC PNL TOTAL CA: CPT

## 2021-12-31 PROCEDURE — 93005 ELECTROCARDIOGRAM TRACING: CPT | Performed by: EMERGENCY MEDICINE

## 2021-12-31 PROCEDURE — 71260 CT THORAX DX C+: CPT

## 2021-12-31 PROCEDURE — 87635 SARS-COV-2 COVID-19 AMP PRB: CPT

## 2021-12-31 PROCEDURE — 85025 COMPLETE CBC W/AUTO DIFF WBC: CPT

## 2021-12-31 PROCEDURE — 83880 ASSAY OF NATRIURETIC PEPTIDE: CPT

## 2021-12-31 PROCEDURE — 6360000004 HC RX CONTRAST MEDICATION: Performed by: EMERGENCY MEDICINE

## 2021-12-31 PROCEDURE — 6360000002 HC RX W HCPCS: Performed by: EMERGENCY MEDICINE

## 2021-12-31 PROCEDURE — 96376 TX/PRO/DX INJ SAME DRUG ADON: CPT

## 2021-12-31 PROCEDURE — 2580000003 HC RX 258: Performed by: EMERGENCY MEDICINE

## 2021-12-31 PROCEDURE — 6370000000 HC RX 637 (ALT 250 FOR IP): Performed by: EMERGENCY MEDICINE

## 2021-12-31 PROCEDURE — 99285 EMERGENCY DEPT VISIT HI MDM: CPT

## 2021-12-31 PROCEDURE — 36415 COLL VENOUS BLD VENIPUNCTURE: CPT

## 2021-12-31 PROCEDURE — 84484 ASSAY OF TROPONIN QUANT: CPT

## 2021-12-31 RX ORDER — SODIUM CHLORIDE 0.9 % (FLUSH) 0.9 %
10 SYRINGE (ML) INJECTION PRN
Status: DISCONTINUED | OUTPATIENT
Start: 2021-12-31 | End: 2022-01-01 | Stop reason: HOSPADM

## 2021-12-31 RX ORDER — ACETAMINOPHEN 500 MG
1000 TABLET ORAL ONCE
Status: COMPLETED | OUTPATIENT
Start: 2021-12-31 | End: 2021-12-31

## 2021-12-31 RX ORDER — GUAIFENESIN AND CODEINE PHOSPHATE 100; 10 MG/5ML; MG/5ML
5 SOLUTION ORAL 3 TIMES DAILY PRN
Qty: 50 ML | Refills: 0 | Status: SHIPPED | OUTPATIENT
Start: 2021-12-31 | End: 2022-01-03

## 2021-12-31 RX ORDER — ONDANSETRON 2 MG/ML
4 INJECTION INTRAMUSCULAR; INTRAVENOUS ONCE
Status: COMPLETED | OUTPATIENT
Start: 2021-12-31 | End: 2021-12-31

## 2021-12-31 RX ORDER — MORPHINE SULFATE 4 MG/ML
4 INJECTION, SOLUTION INTRAMUSCULAR; INTRAVENOUS ONCE
Status: COMPLETED | OUTPATIENT
Start: 2021-12-31 | End: 2021-12-31

## 2021-12-31 RX ORDER — 0.9 % SODIUM CHLORIDE 0.9 %
80 INTRAVENOUS SOLUTION INTRAVENOUS ONCE
Status: COMPLETED | OUTPATIENT
Start: 2021-12-31 | End: 2021-12-31

## 2021-12-31 RX ORDER — 0.9 % SODIUM CHLORIDE 0.9 %
1000 INTRAVENOUS SOLUTION INTRAVENOUS ONCE
Status: COMPLETED | OUTPATIENT
Start: 2021-12-31 | End: 2022-01-01

## 2021-12-31 RX ORDER — DEXAMETHASONE 4 MG/1
10 TABLET ORAL ONCE
Status: COMPLETED | OUTPATIENT
Start: 2021-12-31 | End: 2021-12-31

## 2021-12-31 RX ORDER — BENZONATATE 100 MG/1
100 CAPSULE ORAL ONCE
Status: COMPLETED | OUTPATIENT
Start: 2021-12-31 | End: 2021-12-31

## 2021-12-31 RX ADMIN — SODIUM CHLORIDE 80 ML: 9 INJECTION, SOLUTION INTRAVENOUS at 22:19

## 2021-12-31 RX ADMIN — ACETAMINOPHEN 1000 MG: 500 TABLET ORAL at 21:11

## 2021-12-31 RX ADMIN — IOPAMIDOL 75 ML: 755 INJECTION, SOLUTION INTRAVENOUS at 22:19

## 2021-12-31 RX ADMIN — BENZONATATE 100 MG: 100 CAPSULE ORAL at 21:11

## 2021-12-31 RX ADMIN — DEXAMETHASONE 10 MG: 4 TABLET ORAL at 22:33

## 2021-12-31 RX ADMIN — SODIUM CHLORIDE, PRESERVATIVE FREE 10 ML: 5 INJECTION INTRAVENOUS at 22:19

## 2021-12-31 RX ADMIN — MORPHINE SULFATE 4 MG: 4 INJECTION INTRAVENOUS at 22:50

## 2021-12-31 RX ADMIN — SODIUM CHLORIDE 1000 ML: 9 INJECTION, SOLUTION INTRAVENOUS at 21:23

## 2021-12-31 RX ADMIN — ONDANSETRON 4 MG: 2 INJECTION INTRAMUSCULAR; INTRAVENOUS at 21:11

## 2021-12-31 RX ADMIN — MORPHINE SULFATE 4 MG: 4 INJECTION INTRAVENOUS at 21:11

## 2021-12-31 ASSESSMENT — PAIN SCALES - GENERAL: PAINLEVEL_OUTOF10: 8

## 2021-12-31 NOTE — ED TRIAGE NOTES
Patient to emergency department with complaints of fatigue, body aches, cough for 3 days. Pt reports she began coughing up blood last night and today. States she feels the same way she did back when she had \"clots in her lungs\".

## 2022-01-01 VITALS
TEMPERATURE: 97.3 F | HEIGHT: 64 IN | BODY MASS INDEX: 30.05 KG/M2 | OXYGEN SATURATION: 95 % | DIASTOLIC BLOOD PRESSURE: 79 MMHG | WEIGHT: 176 LBS | RESPIRATION RATE: 16 BRPM | SYSTOLIC BLOOD PRESSURE: 124 MMHG | HEART RATE: 78 BPM

## 2022-01-01 NOTE — ED PROVIDER NOTES
EMERGENCY DEPARTMENT ENCOUNTER    Pt Name: Hill Weller  MRN: [id number]  [place] 1970  Date of evaluation: 12/31/21  CHIEF COMPLAINT       Chief Complaint   Patient presents with    Hemoptysis     HISTORY OF PRESENT ILLNESS   HPI    This is a 67-year-old female with a history of pulmonary embolism on Xarelto who comes in today for the last 3 days the patient has had a cough it is worse at night she endorses right-sided chest and back pain as well as nausea and diarrhea the patient states that she had similar right-sided back pain when she was diagnosed with a unprovoked pulmonary embolism she reports compliance with her Xarelto and has not had any change in her Xarelto and has not had any missed medications. She quit smoking about a year ago but has no diagnosis of COPD or asthma. She endorses feeling cold no abdominal pain but nausea and diarrhea. Does not know if she has had Covid. Also has bilateral leg cramping in the calf    REVIEW OF SYSTEMS     Review of Systems   Constitutional: Positive for chills. Negative for fever. HENT: Negative for congestion. Respiratory: Positive for cough and shortness of breath. Cardiovascular: Positive for chest pain. Gastrointestinal: Positive for diarrhea and nausea. Negative for abdominal pain and vomiting. Genitourinary: Negative for dysuria. Musculoskeletal: Positive for back pain. Skin: Negative for rash. Neurological: Negative for headaches. All other systems reviewed and are negative.     PASTMEDICAL HISTORY     Past Medical History:   Diagnosis Date    Anxiety     CAD (coronary artery disease)     Mitral valve prolapse    Fatty liver     GERD (gastroesophageal reflux disease)     Headache     History of bronchitis     Hyperlipidemia     Hypothyroidism     Kidney stone     LFT elevation     MVP (mitral valve prolapse)     Obesity     Osteoarthritis of cervical spine     Pulmonary emboli (HCC)     Type 2 diabetes mellitus without complication ([facility] [address] 5913    Umbilical hernia      SURGICAL HISTORY       Past Surgical History:   Procedure Laterality Date    APPENDECTOMY       SECTION      2 pfannenstiel, 1 vertical    CHOLECYSTECTOMY      COLONOSCOPY      Dr Hazel Alvarez  14    COLONOSCOPY  2014    biopsy & sigmoid spasms, pathology negative    COLONOSCOPY N/A 2018    COLONOSCOPY WITH BIOPSY performed by [facility] Rios [facility] at [facility] 2021    COLONOSCOPY DIAGNOSTIC performed by Navid Steele MD at [address] [place] [address] ([facility])      x 5    HYSTERECTOMY, TOTAL ABDOMINAL          IN EXPLORATORY OF ABDOMEN  10/21/2014    Laparotomy-lysis of adhesions, bso     UPPER GASTROINTESTINAL ENDOSCOPY  2010    mild chronic inactive gastritis    UPPER GASTROINTESTINAL ENDOSCOPY N/A 3/10/2021    EGD BIOPSY performed by Navid Steele MD at [facility]       Previous Medications    ALBUTEROL SULFATE HFA (VENTOLIN HFA) 108 (90 BASE) MCG/ACT INHALER    Inhale 2 puffs into the lungs every 6 hours as needed for Wheezing    ALBUTEROL SULFATE  (90 BASE) MCG/ACT INHALER    INHALE 2 PUFFS INTO THE LUNGS EVERY 4 HOURS AS NEEDED FOR SHORTNESS OF BREATH    CLONAZEPAM (KLONOPIN) 0.5 MG TABLET    Take 1 tablet by mouth 2 times daily as needed for Anxiety for up to 30 days. ESOMEPRAZOLE (NEXIUM) 40 MG DELAYED RELEASE CAPSULE    TAKE 1 CAPSULE BY MOUTH EVERY MORNING BEFORE BREAKFAST    LEVOTHYROXINE (SYNTHROID) 175 MCG TABLET    TAKE 1 TABLET BY MOUTH DAILY    LIDOCAINE (LIDODERM) 5 %    Place 1 patch onto the skin daily 12 hours on, 12 hours off.     METOPROLOL TARTRATE (LOPRESSOR) 50 MG TABLET    Take 50 mg by mouth 2 times daily     NALOXONE (NARCAN) 4 MG/0.1ML LIQD NASAL SPRAY    1 spray by Nasal route as needed for Opioid Reversal    ONDANSETRON (ZOFRAN ODT) 4 MG DISINTEGRATING TABLET    Take 1 tablet by mouth every 8 hours as needed for Nausea or Vomiting    OXYCODONE-ACETAMINOPHEN (PERCOCET)  MG PER TABLET    Take 1 tablet by mouth every 6 hours as needed for Pain for up to 30 days. RIVAROXABAN (XARELTO) 20 MG TABS TABLET    Take 1 tablet by mouth daily (with breakfast)    ROPINIROLE (REQUIP) 2 MG TABLET    TAKE 1 TABLET BY MOUTH EVERY NIGHT    SERTRALINE (ZOLOFT) 100 MG TABLET    Take 1.5 tablets by mouth daily    TIZANIDINE (ZANAFLEX) 4 MG TABLET    TAKE 1 TABLET BY MOUTH EVERY 8 HOURS AS NEEDED FOR SPASMS    TOPIRAMATE (TOPAMAX) 25 MG TABLET    25 mg 2 times daily     TRAZODONE (DESYREL) 100 MG TABLET         ALLERGIES     is allergic to compazine [prochlorperazine], sulfa antibiotics, and adhesive tape. FAMILY HISTORY     She indicated that her mother is . She indicated that her father is alive. She indicated that her maternal grandmother is . She indicated that her maternal grandfather is . She indicated that her paternal grandmother is . She indicated that her paternal grandfather is . SOCIAL HISTORY       Social History     Tobacco Use    Smoking status: Former Smoker     Packs/day: 0.25     Years: 33.00     Pack years: 8.25     Types: Cigarettes    Smokeless tobacco: Never Used    Tobacco comment: quit on nicoderm patch   Vaping Use    Vaping Use: Never used   Substance Use Topics    Alcohol use: No     Alcohol/week: 0.0 standard drinks    Drug use: No     PHYSICAL EXAM     INITIAL VITALS: /79   Pulse 78   Temp 97.3 °F (36.3 °C)   Resp 20   Ht 5' 4\" (1.626 m)   Wt 176 lb (79.8 kg)   LMP 2010   SpO2 95%   BMI 30.21 kg/m²    Physical Exam  Vitals and nursing note reviewed. Constitutional:       General: She is not in acute distress. Appearance: She is well-developed. HENT:      Head: Normocephalic and atraumatic. Eyes:      Conjunctiva/sclera: Conjunctivae normal.   Cardiovascular:      Rate and Rhythm: Normal rate and regular rhythm.       Heart sounds: No murmur heard.  No friction rub. Pulmonary:      Comments: Staccato coughing while in the room wheezing on the left side worse than the right worse with expiration  Abdominal:      General: There is no distension. Palpations: Abdomen is soft. Tenderness: There is no abdominal tenderness. There is no guarding or rebound. Musculoskeletal:      Cervical back: Neck supple. Comments: No unilateral leg swelling negative Homans' sign   Skin:     General: Skin is warm and dry. Capillary Refill: Capillary refill takes less than 2 seconds. Neurological:      Mental Status: She is alert. DIAGNOSTIC RESULTS   RADIOLOGY:All plain film, CT, MRI, and formal ultrasound images (except ED bedside ultrasound) are read by the radiologist, see reports below, unless otherwisenoted in MDM or here. CT CHEST PULMONARY EMBOLISM W CONTRAST   Final Result   1. No acute pulmonary embolism. 2. Dependent edema/atelectasis posterior mid and lower lungs. 3. No dense pulmonary consolidation evident. 4.  Pulmonary sequela typical of that seen with smoking, including COPD. RECOMMENDATIONS:   Unavailable           LABS: All lab results were reviewed by myself, and all abnormals are listed below.   Labs Reviewed   COVID-19, RAPID - Abnormal; Notable for the following components:       Result Value    SARS-CoV-2, Rapid DETECTED (*)     All other components within normal limits   CBC WITH AUTO DIFFERENTIAL - Abnormal; Notable for the following components:    WBC 2.7 (*)     Platelets 001 (*)     Seg Neutrophils 35 (*)     Lymphocytes 57 (*)     Segs Absolute 0.95 (*)     All other components within normal limits   BASIC METABOLIC PANEL - Abnormal; Notable for the following components:    Glucose 109 (*)     Sodium 134 (*)     All other components within normal limits   BRAIN NATRIURETIC PEPTIDE   MAGNESIUM   TROPONIN   TROPONIN       EMERGENCY DEPARTMENTCOURSE:   Differential diagnosis includes ACS pneumonia pneumothorax pulmonary embolism I am concerned about a PE even though the patient is on Xarelto but we will also perform a cardiac work-up as well as a Covid test    11:58 PM EST  Patient has a leukopenia 2.7 she is coronavirus positive. Troponin x2 is negative EKG reveals no acute EKG changes CT scan is negative for pulmonary embolism. The patient's ambulatory pulse ox was 96% she did not desaturate believe that she can be managed at home patient will be discharged       EKG:All EKG's are interpreted by the Emergency Department Physician who either signs or Co-signs this chart in the absence of a cardiologist.  Normal sinus rhythm with a heart rate of 72 bpm borderline QTC of 464ms normal axis no acute ST or T wave change      Vitals:    Vitals:    12/31/21 1723   BP: 124/79   Pulse: 78   Resp: 20   Temp: 97.3 °F (36.3 °C)   SpO2: 95%   Weight: 176 lb (79.8 kg)   Height: 5' 4\" (1.626 m)       The patient was given the following medications while in the emergency department:  Orders Placed This Encounter   Medications    acetaminophen (TYLENOL) tablet 1,000 mg    benzonatate (TESSALON) capsule 100 mg    0.9 % sodium chloride bolus    morphine sulfate (PF) injection 4 mg    ondansetron (ZOFRAN) injection 4 mg    dexamethasone (DECADRON) tablet 10 mg    0.9 % sodium chloride bolus    sodium chloride flush 0.9 % injection 10 mL    iopamidol (ISOVUE-370) 76 % injection 75 mL    morphine sulfate (PF) injection 4 mg    guaiFENesin-codeine (TUSSI-ORGANIDIN NR) 100-10 MG/5ML syrup     Sig: Take 5 mLs by mouth 3 times daily as needed for Cough for up to 3 days.      Dispense:  50 mL     Refill:  0         FINAL IMPRESSION      1. COVID         DISPOSITION/PLAN   DISPOSITION Decision To Discharge 12/31/2021 11:55:59 PM      PATIENT REFERRED TO:  MARTITA Benedict - CNP  [address] [place] [address]  [phone]          [facility]  [facility] [address] [place] [id number] [phone]    If symptoms worsen    DISCHARGE MEDICATIONS:  New Prescriptions    GUAIFENESIN-CODEINE (TUSSI-ORGANIDIN NR) 100-10 MG/5ML SYRUP    Take 5 mLs by mouth 3 times daily as needed for Cough for up to 3 days.      Glendy Barry MD  Attending Emergency Physician    This charting supersedes any ED resident or staff charting and was written using speech recognition software       Glendy Barry MD  12/31/21 8417

## 2022-01-01 NOTE — ED NOTES
Patient pulse ox remained at 96% on room air while ambulating.       Linda, [facility]  12/31/21 [id number]

## 2022-01-01 NOTE — ED NOTES
Report given to Miguel Angel Berg RN from ER. Report method in person   The following was reviewed with receiving RN:   Current vital signs:  /79   Pulse 78   Temp 97.3 °F (36.3 °C)   Resp 20   Ht 5' 4\" (1.626 m)   Wt 176 lb (79.8 kg)   LMP 11/01/2010   SpO2 95%   BMI 30.21 kg/m²                      Any medication or safety alerts were reviewed. Any pending diagnostics and notifications were also reviewed, as well as any safety concerns or issues, abnormal labs, abnormal imaging, and abnormal assessment findings. Questions were answered.             [facility]  12/31/21 [id number]

## 2022-01-03 ENCOUNTER — CARE COORDINATION (OUTPATIENT)
Dept: CARE COORDINATION | Age: 52
End: 2022-01-03

## 2022-01-03 LAB
EKG ATRIAL RATE: 72 BPM
EKG P AXIS: 73 DEGREES
EKG P-R INTERVAL: 160 MS
EKG Q-T INTERVAL: 424 MS
EKG QRS DURATION: 84 MS
EKG QTC CALCULATION (BAZETT): 464 MS
EKG R AXIS: 78 DEGREES
EKG T AXIS: 62 DEGREES
EKG VENTRICULAR RATE: 72 BPM

## 2022-01-03 NOTE — CARE COORDINATION
Patient contacted regarding COVID-19 diagnosis. Discussed COVID-19 related testing which was available at this time. Test results were positive. Patient informed of results, if available? Yes. Ambulatory Care Manager contacted the patient by telephone to perform post discharge assessment. Call within 2 business days of discharge: Yes. Verified name and  with patient as identifiers. Provided introduction to self, and explanation of the CTN/ACM role, and reason for call due to risk factors for infection and/or exposure to COVID-19. Symptoms reviewed with patient who verbalized the following symptoms: fatigue, pain or aching joints, cough, no new symptoms and no worsening symptoms. Due to no new or worsening symptoms encounter was not routed to provider for escalation. Discussed follow-up appointments. If no appointment was previously scheduled, appointment scheduling offered: No and patient encouraged to call for follow up appt. [facility] follow up appointment(s):   Future Appointments   Date Time Provider Nisha Santos   2022 10:00 AM Elif Decant, APRN - CNP [address]   2022 10:45 AM Tasia Bateman MD [address]   2022 11:00 AM Elif Decant, APRN - CNP [address]   2022 11:00 AM Neisha Dill MD URG CANCER [facility]     Non-[facility] follow up appointment(s): n/a    Non-face-to-face services provided:  Obtained and reviewed discharge summary and/or continuity of care documents  Education of patient/family/caregiver/guardian to support self-management-reviewed isolation guidelines; symptom management; pulse ox guidelines  Assessment and support for treatment adherence and medication management-reviewed medications- guaifenesin-codeine cough syrup     Advance Care Planning:   Does patient have an Advance Directive:  not on file. Educated patient about risk for severe COVID-19 due to risk factors according to CDC guidelines.  ACM reviewed discharge instructions, medical action plan and red flag symptoms with the patient who verbalized understanding. Discussed COVID vaccination status: No. Education provided on COVID-19 vaccination as appropriate. Discussed exposure protocols and quarantine with CDC Guidelines. Patient was given an opportunity to verbalize any questions and concerns and agrees to contact ACM or health care provider for questions related to their healthcare. Reviewed and educated patient on any new and changed medications related to discharge diagnosis     Was patient discharged with a pulse oximeter? No and patient has her own pulse oximeter Discussed and confirmed pulse oximeter discharge instructions and when to notify provider or seek emergency care. [facility] provided contact information. No further follow-up call identified based on severity of symptoms and risk factors. Devon Marion states she feels about the same. She still has a cough. Taking the prescribed cough syrup. Her pulse oximeter reading is 94-95%. She still coughs up occasional blood-tinged sputum. Thinks it might be from throat irritation with frequent coughing. Encouraged lots of fluids to help soothe throat and stay hydrated. Encouraged using a humidifier or vaporizer to help with dryness. Encouraged follow up with provider. Instructed to return to the ED for any chest pain, dizziness, increased shortness of breath, pulse ox reading below 88%, or increased amount of blood in sputum.

## 2022-01-05 RX ORDER — CLONAZEPAM 1 MG/1
TABLET ORAL
COMMUNITY
Start: 2021-12-26 | End: 2022-01-07

## 2022-01-06 NOTE — PROGRESS NOTES
[facility] [address] PROGRESS NOTE      Patient  completed [x]  video visit   []   phone call:         Minutes :   [] in person visit to pain clinic    [x]    to  review Medication Agreement    []  Follow up after procedure   []  Discuss treatment options      Location:  Provider:  working from    [x]    home    []   [facility] - KADEEM MONTEJO ,   patient at   [] pain clinic     [x]  home         Chief Complaint: neck pain    She c/o neck pain radiating down left arm. Her pain has not changed. She reports numbness and weakness in left arm. She had left cervical facet injection C2-T1   8/2021   with 85% relief. She  has seen Neurosurgeon in the past  and surgery was not recommended. She reports her sleep varies,. She reports she had one Ed visit and tested positive for covid, She c/o of cough, chest wall pain, and shortness of breath, fatigue and loss of taste and smell. She follows with Oncologist for elevated CEA. She had weight loss over 100 pounds under a year, no more weight loss. Neck Pain   This is a chronic problem. The current episode started more than 1 year ago. The problem has been unchanged. The pain is present in the midline (left arm). Quality: sharp, pounding. The pain is at a severity of 7/10. The pain is moderate. The symptoms are aggravated by coughing. The pain is worse during the night. Stiffness is present all day. Associated symptoms include a fever, headaches, numbness and weakness. Associated symptoms comments: Left hand. She has tried heat for the symptoms. The treatment provided mild relief.    Treatment goals:  Functional status: reduce pain 40%    Aberrancy:   Any alcoholic beverages  no          Any illegal drugs  no       Analgesia:   7                  Adverse  Effects : no    ADL;s :activity decreased    Data:    When was thelast UDS:     2-3-2021       Was the UDS appropriate:  [x] yes []   no      Record/Diagnostics Review:      As above, I did review the imaging     DRUG SCREEN, PAIN  Order: [id number]   Status: Final result     Visible to patient: Yes (seen)     Next appt: 01/07/2022 at 10:00 AM in Pain Management MARTITA Angel     Dx: Encounter for medication monitoring; Sofia Brittle ..     1 Result Note    Component Ref Range & Units 2/3/21 1551 8/11/20 1049 7/15/20 1529 7/9/19 1100 2/25/19 1948 1/12/19 0915 1/16/18 1345   Pain Management Drug Panel Interp, Urine  Inconsistent ARUP    Inconsistent CMARUP    Inconsistent CMARUP    Comment: (NOTE)   ________________________________________________________________   DRUGS EXPECTED:   NO DRUGS EXPECTED   ________________________________________________________________   INCONSISTENT with medications provided:   Oxycodone   Noroxycodone   7-Aminoclonazepam   ________________________________________________________________   INTERPRETIVE INFORMATION: Targeted drug profile Interp   Interpretation depends on accuracy and completeness of patient   medication information submitted by client. EXAMINATION:   MRI OF THE CERVICAL SPINE WITHOUT CONTRAST 3/4/2021 2:53 pm       TECHNIQUE:   Multiplanar multisequence MRI of the cervical spine was performed without the   administration of intravenous contrast.       COMPARISON:   06/12/2020       HISTORY:   ORDERING SYSTEM PROVIDED HISTORY: Cervical radiculopathy   TECHNOLOGIST PROVIDED HISTORY:   evaluate for stenosis   Is the patient pregnant?->No   Reason for Exam: pt stated left shoulder arm pain weakness numbness x 1 mth   Acuity: Acute   Type of Exam: Initial   Additional signs and symptoms: pt stated left shoulder arm pain weakness   numbness x 1 mth, NKI       FINDINGS:   BONES/ALIGNMENT: There is normal alignment of the spine. The vertebral body   heights are maintained.  The bone marrow signal appears unremarkable.  There   is minimal degenerative disc disease with disc space narrowing and   osteophytes at C3-4, C4-5, C5-6 and C6-7.       SPINAL CORD:  No abnormal signal is identified within the spinal cord.       SOFT TISSUES: No paraspinal mass identified.       C2-C3: There is minimum disc protrusion of 2 mm centrally. The thecal sac and   neural foramina are intact.       C3-C4: There is disc protrusion osteophyte toward the left measuring 3-4 mm. The thecal sac is not stenotic.  There is mild narrowing of the left neural   foramen.  The right neural foramen is intact.       C4-C5: There is disc protrusion osteophyte measuring 2-3 mm.  The thecal sac   is mildly stenotic measuring 9.5 mm. Disc and/or osteophytes result in mild   narrowing of the neural foramina bilaterally. The left neural foramen is   narrowed greater than right.       C5-C6: There is disc protrusion osteophyte measuring 5-6 mm toward the left   narrowing the left neural foramen.  The thecal sac is mildly stenotic   measuring 9.3 mm.  The right neural foramen is intact.       C6-C7: Disc and/or osteophytes cause minimal narrowing of the neural foramina   bilaterally.  The thecal sac is not stenotic.       C7-T1:  The thecal sac and neural foramina are intact.           Impression   Disc and osteophytes result in narrowing of the neural foramina and mild   stenosis of the thecal sac throughout the cervical region as discussed in   detail above.                         Pill count: appropriate    fill date :1-8-2022    Morphine equivalent dose as reported on OARRS: 60  Periodic Controlled Substance Monitoring: Possible medication side effects, risk of tolerance/dependence & alternative treatments discussed. ,No signs of potential drug abuse or diversion identified. ,Assessed functional status. ,Obtaining appropriate analgesic effect of treatment. Manuel Holt, APRN - CNP)  Review ofOARRS does not show any aberrant prescription behavior. Medication is helping the patient stay active. Patient denies any side effects and reports adequate analgesia. No sign of misuse/abuse.             Past Medical History:   Diagnosis Date    Anxiety     CAD (coronary artery disease)     Mitral valve prolapse    Fatty liver     GERD (gastroesophageal reflux disease)     Headache     History of bronchitis     Hyperlipidemia     Hypothyroidism     Kidney stone     LFT elevation     MVP (mitral valve prolapse)     Obesity     Osteoarthritis of cervical spine     Pulmonary emboli (HCC)     [facility]     Umbilical hernia        Past Surgical History:   Procedure Laterality Date    APPENDECTOMY       SECTION      2 pfannenstiel, 1 vertical    CHOLECYSTECTOMY      COLONOSCOPY      Dr Rosalba Yi COLONOSCOPY  14    COLONOSCOPY  2014    biopsy & sigmoid spasms, pathology negative    COLONOSCOPY N/A 2018    COLONOSCOPY WITH BIOPSY performed by Vincenzo Blank MD at [address] [place] [address] N/A 2021    COLONOSCOPY DIAGNOSTIC performed by Robert Lopez MD at [facility] 84      x 5    HYSTERECTOMY, TOTAL ABDOMINAL          MI EXPLORATORY OF ABDOMEN  10/21/2014    Laparotomy-lysis of adhesions, bso     UPPER GASTROINTESTINAL ENDOSCOPY  2010    mild chronic inactive gastritis    UPPER GASTROINTESTINAL ENDOSCOPY N/A 3/10/2021    EGD BIOPSY performed by Robert [facility] MD at [facility]       Allergies   Allergen Reactions    Compazine [Prochlorperazine]      Jittery, restless    Sulfa Antibiotics Hives    Adhesive Tape Rash         Current Outpatient Medications:     [START ON 2022] oxyCODONE-acetaminophen (PERCOCET)  MG per tablet, Take 1 tablet by mouth every 6 hours as needed for Pain for up to 30 days. , Disp: 120 tablet, Rfl: 0    esomeprazole (NEXIUM) 40 MG delayed release capsule, TAKE 1 CAPSULE BY MOUTH EVERY MORNING BEFORE BREAKFAST, Disp: 90 capsule, Rfl: 0    sertraline (ZOLOFT) 100 MG tablet, Take 1.5 tablets by mouth daily, Disp: 45 tablet, Rfl: 2    levothyroxine (SYNTHROID) 175 MCG tablet, TAKE 1 TABLET BY MOUTH DAILY, Disp: 90 tablet, Rfl: 1    traZODone (DESYREL) 100 MG tablet, , Disp: , Rfl:     rOPINIRole (REQUIP) 2 MG tablet, TAKE 1 TABLET BY MOUTH EVERY NIGHT, Disp: 90 tablet, Rfl: 1    rivaroxaban (XARELTO) 20 MG TABS tablet, Take 1 tablet by mouth daily (with breakfast), Disp: 90 tablet, Rfl: 3    topiramate (TOPAMAX) 25 MG tablet, 25 mg 2 times daily , Disp: , Rfl:     metoprolol tartrate (LOPRESSOR) 50 MG tablet, Take 50 mg by mouth 2 times daily , Disp: , Rfl:     tiZANidine (ZANAFLEX) 4 MG tablet, TAKE 1 TABLET BY MOUTH EVERY 8 HOURS AS NEEDED FOR SPASMS, Disp: 90 tablet, Rfl: 0    ondansetron (ZOFRAN ODT) 4 MG disintegrating tablet, Take 1 tablet by mouth every 8 hours as needed for Nausea or Vomiting, Disp: 20 tablet, Rfl: 0    naloxone (NARCAN) 4 MG/0.1ML LIQD nasal spray, 1 spray by Nasal route as needed for Opioid Reversal, Disp: 1 each, Rfl: 0    albuterol sulfate HFA (VENTOLIN HFA) 108 (90 Base) MCG/ACT inhaler, Inhale 2 puffs into the lungs every 6 hours as needed for Wheezing, Disp: 1 Inhaler, Rfl: 3    albuterol sulfate  (90 Base) MCG/ACT inhaler, INHALE 2 PUFFS INTO THE LUNGS EVERY 4 HOURS AS NEEDED FOR SHORTNESS OF BREATH, Disp: 42.5 g, Rfl: 3    lidocaine (LIDODERM) 5 %, Place 1 patch onto the skin daily 12 hours on, 12 hours off., Disp: 10 patch, Rfl: 0    clonazePAM (KLONOPIN) 0.5 MG tablet, Take 1 tablet by mouth 2 times daily as needed for Anxiety for up to 30 days. , Disp: 60 tablet, Rfl: 1    Family History   Problem Relation Age of Onset   Vallie Roller Cancer Mother         vaginal    Heart Disease Father     Diabetes Father     Other Father         colon resection for colon polyps    Breast Cancer Maternal Grandmother     Stroke Maternal Grandfather        Social History     Socioeconomic History    Marital status:      Spouse name: Not on file    Number of children: Not on file    Years of education: Not on file    Highest education level: Not on file Occupational History    Occupation: Homemaker   Tobacco Use    Smoking status: Former Smoker     Packs/day: 0.25     Years: 33.00     Pack years: 8.25     Types: Cigarettes    Smokeless tobacco: Never Used    Tobacco comment: quit on nicoderm patch   Vaping Use    Vaping Use: Never used   Substance and Sexual Activity    Alcohol use: No     Alcohol/week: 0.0 standard drinks    Drug use: No    Sexual activity: Not Currently   Other Topics Concern    Not on file   Social History Narrative    Not on file     Social Determinants of Health     Financial Resource Strain:     Difficulty of Paying Living Expenses: Not on file   Food Insecurity:     Worried About Running Out of Food in the Last Year: Not on file    Kamille of Food in the Last Year: Not on file   Transportation Needs:     Lack of Transportation (Medical): Not on file    Lack of Transportation (Non-Medical): Not on file   Physical Activity:     Days of Exercise per Week: Not on file    Minutes of Exercise per Session: Not on file   Stress:     Feeling of Stress : Not on file   Social Connections:     Frequency of Communication with Friends and Family: Not on file    Frequency of Social Gatherings with Friends and Family: Not on file    Attends Adventism Services: Not on file    Active Member of [address] [place] [address] [employer] or Organizations: Not on file    Attends Club or Organization Meetings: Not on file    Marital Status: Not on file   Intimate Partner Violence:     Fear of Current or Ex-Partner: Not on file    Emotionally Abused: Not on file    Physically Abused: Not on file    Sexually Abused: Not on file   Housing Stability:     Unable to Pay for Housing in the Last Year: Not on file    Number of [place] in the Last Year: Not on file    Unstable Housing in the Last Year: Not on file         Review of Systems:  Review of Systems   Constitutional: Positive for decreased appetite, fever and malaise/fatigue.         Loss of taste and smell HENT: Negative. Eyes:        Glasses   Cardiovascular:        Chest wall pain   Respiratory: Positive for cough and shortness of breath. Endocrine: Negative. Hematologic/Lymphatic: Bruises/bleeds easily. Skin: Negative. Musculoskeletal: Positive for joint pain and neck pain. Gastrointestinal: Positive for nausea. Genitourinary: Negative. Neurological: Positive for headaches, numbness and weakness. Psychiatric/Behavioral: Positive for depression. Managing         Physical Exam:  [facility] 11/01/2010     Physical Exam  Constitutional:       Appearance: She is ill-appearing. Skin:         Neurological:      Mental Status: She is alert and oriented to person, place, and time. Psychiatric:         Mood and Affect: Mood normal.         Thought Content: Thought content normal.           Assessment:    Problem List Items Addressed This Visit     Encounter for medication monitoring    Relevant Medications    oxyCODONE-acetaminophen (PERCOCET)  MG per tablet (Start on 1/8/2022)    Degenerative cervical spinal stenosis    Relevant Medications    oxyCODONE-acetaminophen (PERCOCET)  MG per tablet (Start on 1/8/2022)    Cervical radiculopathy - Primary    Relevant Medications    oxyCODONE-acetaminophen (PERCOCET)  MG per tablet (Start on 1/8/2022)    Arthropathy of cervical facet joint    Relevant Medications    oxyCODONE-acetaminophen (PERCOCET)  MG per tablet (Start on 1/8/2022)              Treatment Plan:  DISCUSSION: Treatment options discussed withpatient and all questions answered to patient's satisfaction.      Possible side effects, risk of tolerance and or dependence and alternative treatments discussed    Obtaining appropriate analgesic effect of treatment   No signs of potential drug abuse or diversion identified    [x] Ill effects of being on chronic pain medications such as sleep disturbances, respiratory depression, hormonal changes, withdrawal symptoms, chronic opioid dependence and tolerance as well as risk of taking opioids with Benzodiazepines and taking opioids along with alcohol,  werediscussed with patient. I had asked the patient to minimize medication use and utilize pain medications only for uncontrolled rest pain or pain with exertional activities. I advised patient not to self-escalate painmedications without consulting with us. At each of patient's future visits we will try to taper pain medications, while adjusting the adjunct medications, and re-evaluating for Physical Therapy to improve spinal andjoint strength. We will continue to have discussions to decrease pain medications as tolerated. Counseled patient on effects their pain medication and /or their medical condition mayhave on their  ability to drive or operate machinery. Instructed not to drive or operate machinery if drowsy     I also discussed with the patient regarding the dangers of combining narcotic pain medication with tranquilizers, alcohol or illegal drugs or taking the medication any way other than prescribed. The dangers were discussed  including respiratory depression and death. Patient was told to tell  all  physicians regarding the medications he is getting from pain clinic. Patient is warned not to take any unprescribed medications over-the-countermedications that can depress breathing . Patient is advised to talk to the pharmacist or physicians if planning to take any over-the-counter medications before  takeing them. Patient is strongly advised to avoid tranquilizers or  relaxants, illegal drugs  or any medications that can depress breathing  Patient is also advised to tell us if there is any changes in their medications from other physicians.       1, she was going to have diagnostic confirmatory median branch nerve block on the left C2-T1 but is positive for covid and is still symptomatic      TREATMENT OPTIONS:       Medication Agreement Requirements Met  Continue Opioid therapy  Script written for  percocet  Follow up appointment made

## 2022-01-07 ENCOUNTER — HOSPITAL ENCOUNTER (OUTPATIENT)
Dept: PAIN MANAGEMENT | Age: 52
Discharge: HOME | End: 2022-01-07
Payer: MEDICAID

## 2022-01-07 DIAGNOSIS — M47.812 ARTHROPATHY OF CERVICAL FACET JOINT: ICD-10-CM

## 2022-01-07 DIAGNOSIS — M54.12 CERVICAL RADICULOPATHY: Primary | ICD-10-CM

## 2022-01-07 DIAGNOSIS — Z51.81 ENCOUNTER FOR MEDICATION MONITORING: ICD-10-CM

## 2022-01-07 DIAGNOSIS — M48.02 DEGENERATIVE CERVICAL SPINAL STENOSIS: ICD-10-CM

## 2022-01-07 PROCEDURE — 99213 OFFICE O/P EST LOW 20 MIN: CPT | Performed by: NURSE PRACTITIONER

## 2022-01-07 PROCEDURE — 99213 OFFICE O/P EST LOW 20 MIN: CPT

## 2022-01-07 RX ORDER — OXYCODONE AND ACETAMINOPHEN 10; 325 MG/1; MG/1
1 TABLET ORAL EVERY 6 HOURS PRN
Qty: 120 TABLET | Refills: 0 | Status: SHIPPED | OUTPATIENT
Start: 2022-01-08 | End: 2022-02-02 | Stop reason: SDUPTHER

## 2022-01-07 ASSESSMENT — ENCOUNTER SYMPTOMS
SHORTNESS OF BREATH: 1
COUGH: 1
NAUSEA: 1

## 2022-01-07 NOTE — PRE-PROCEDURE INSTRUCTIONS
[facility]. Pt. outeached to review pre-procehasdure instructions. Pt. Answered phone coughing, and stated she has tested positive for COVID 19 on 12/31/2021. Pt. Stated she will be retested on on 1/9/2022. Pt. Instructed to call [facility] if remains positive, and not feeling well at least 24 hours prior to procedure. Pt. Verbalized understanding. Pt. Instructed to wear a mask on entrance into the hospital and discussed which entrances are open for patients at this time. Pt.. stated she does have a  to take home, and has not had any recent vaccinations. No questions or concerns verbalized.

## 2022-01-13 ENCOUNTER — HOSPITAL ENCOUNTER (OUTPATIENT)
Dept: PAIN MANAGEMENT | Age: 52
Discharge: HOME | End: 2022-01-13
Payer: COMMERCIAL

## 2022-01-13 ENCOUNTER — HOSPITAL ENCOUNTER (OUTPATIENT)
Dept: GENERAL RADIOLOGY | Age: 52
Discharge: HOME | End: 2022-01-15
Payer: COMMERCIAL

## 2022-01-13 VITALS
HEART RATE: 74 BPM | RESPIRATION RATE: 16 BRPM | DIASTOLIC BLOOD PRESSURE: 76 MMHG | WEIGHT: 176 LBS | TEMPERATURE: 98.4 F | SYSTOLIC BLOOD PRESSURE: 114 MMHG | HEIGHT: 64 IN | BODY MASS INDEX: 30.05 KG/M2 | OXYGEN SATURATION: 93 %

## 2022-01-13 DIAGNOSIS — M54.2 CHRONIC NECK PAIN: Chronic | ICD-10-CM

## 2022-01-13 DIAGNOSIS — G89.29 CHRONIC NECK PAIN: Chronic | ICD-10-CM

## 2022-01-13 DIAGNOSIS — M47.812 SPONDYLOSIS OF CERVICAL REGION WITHOUT MYELOPATHY OR RADICULOPATHY: ICD-10-CM

## 2022-01-13 DIAGNOSIS — M48.02 DEGENERATIVE CERVICAL SPINAL STENOSIS: ICD-10-CM

## 2022-01-13 DIAGNOSIS — M47.812 ARTHROPATHY OF CERVICAL FACET JOINT: Primary | ICD-10-CM

## 2022-01-13 DIAGNOSIS — S13.4XXS ATLANTO-AXIAL JOINT SPRAIN, SEQUELA: ICD-10-CM

## 2022-01-13 DIAGNOSIS — M47.812 ARTHROPATHY OF CERVICAL FACET JOINT: ICD-10-CM

## 2022-01-13 PROCEDURE — 64491 INJ PARAVERT F JNT C/T 2 LEV: CPT

## 2022-01-13 PROCEDURE — 6360000002 HC RX W HCPCS: Performed by: ANESTHESIOLOGY

## 2022-01-13 PROCEDURE — 2500000003 HC RX 250 WO HCPCS: Performed by: ANESTHESIOLOGY

## 2022-01-13 PROCEDURE — 64491 INJ PARAVERT F JNT C/T 2 LEV: CPT | Performed by: ANESTHESIOLOGY

## 2022-01-13 PROCEDURE — 6360000004 HC RX CONTRAST MEDICATION: Performed by: ANESTHESIOLOGY

## 2022-01-13 PROCEDURE — 64490 INJ PARAVERT F JNT C/T 1 LEV: CPT

## 2022-01-13 PROCEDURE — 99152 MOD SED SAME PHYS/QHP 5/>YRS: CPT | Performed by: ANESTHESIOLOGY

## 2022-01-13 PROCEDURE — 3209999900 FLUORO FOR SURGICAL PROCEDURES

## 2022-01-13 PROCEDURE — 64490 INJ PARAVERT F JNT C/T 1 LEV: CPT | Performed by: ANESTHESIOLOGY

## 2022-01-13 PROCEDURE — 64492 INJ PARAVERT F JNT C/T 3 LEV: CPT

## 2022-01-13 PROCEDURE — 64492 INJ PARAVERT F JNT C/T 3 LEV: CPT | Performed by: ANESTHESIOLOGY

## 2022-01-13 RX ORDER — FENTANYL CITRATE 50 UG/ML
INJECTION, SOLUTION INTRAMUSCULAR; INTRAVENOUS
Status: COMPLETED | OUTPATIENT
Start: 2022-01-13 | End: 2022-01-13

## 2022-01-13 RX ORDER — BUPIVACAINE HYDROCHLORIDE 5 MG/ML
INJECTION, SOLUTION EPIDURAL; INTRACAUDAL
Status: COMPLETED | OUTPATIENT
Start: 2022-01-13 | End: 2022-01-13

## 2022-01-13 RX ORDER — MIDAZOLAM HYDROCHLORIDE 1 MG/ML
INJECTION INTRAMUSCULAR; INTRAVENOUS
Status: COMPLETED | OUTPATIENT
Start: 2022-01-13 | End: 2022-01-13

## 2022-01-13 RX ADMIN — MIDAZOLAM 1 MG: 1 INJECTION INTRAMUSCULAR; INTRAVENOUS at 12:39

## 2022-01-13 RX ADMIN — Medication 50 MCG: at 12:39

## 2022-01-13 RX ADMIN — IOHEXOL 0.2 ML: 180 INJECTION INTRAVENOUS at 12:44

## 2022-01-13 RX ADMIN — BUPIVACAINE HYDROCHLORIDE 3 ML: 5 INJECTION, SOLUTION EPIDURAL; INTRACAUDAL; PERINEURAL at 12:44

## 2022-01-13 ASSESSMENT — ENCOUNTER SYMPTOMS
GASTROINTESTINAL NEGATIVE: 1
RESPIRATORY NEGATIVE: 1

## 2022-01-13 ASSESSMENT — PAIN DESCRIPTION - PROGRESSION: CLINICAL_PROGRESSION: GRADUALLY WORSENING

## 2022-01-13 ASSESSMENT — PAIN DESCRIPTION - PAIN TYPE: TYPE: CHRONIC PAIN

## 2022-01-13 ASSESSMENT — PAIN DESCRIPTION - ORIENTATION: ORIENTATION: LEFT;RIGHT

## 2022-01-13 ASSESSMENT — PAIN SCALES - GENERAL: PAINLEVEL_OUTOF10: 6

## 2022-01-13 ASSESSMENT — PAIN DESCRIPTION - LOCATION
LOCATION: NECK
LOCATION: NECK

## 2022-01-13 ASSESSMENT — PAIN DESCRIPTION - ONSET: ONSET: ON-GOING

## 2022-01-13 ASSESSMENT — PAIN DESCRIPTION - DESCRIPTORS: DESCRIPTORS: ACHING;SHARP

## 2022-01-13 ASSESSMENT — PAIN DESCRIPTION - FREQUENCY: FREQUENCY: CONTINUOUS

## 2022-01-13 NOTE — H&P
HISTORY AND PHYSICAL             Date: 2022        Patient Name: Macy Andres     YOB: 1970      Age:  49 y.o.     Chief Complaint     Chief Complaint   Patient presents with    Neck Pain      NECK PAIN    History Obtained From   patient    History of Present Illness     Neck pain  Chronic onset more than 1 year ago located over the cervical spine affect both side left more than right  Associated with occipital headache  No dermatomal radiation in arms  Pain extends over the trapezius muscle to the shoulder and in between the shoulder blade  Pain aggravated with routine activity and range of motion on neck  Pain is progressively been worsening affect quality of life  Rates pain average score 6-7 over 10  Has failed conservative measures with NSAIDs and therapy  Imaging at shown cervical spondylosis  No significant cervical surgical pathology    Clinical presentation suggest facet mediated pain  Sheba Barone is here today for left-sided cervical diagnostic medial branch nerve block    She has been off Xarelto for 48 hours      Past Medical History     Past Medical History:   Diagnosis Date    Anxiety     CAD (coronary artery disease)     Mitral valve prolapse    Fatty liver     GERD (gastroesophageal reflux disease)     Headache     History of bronchitis     Hyperlipidemia     Hypothyroidism     Kidney stone     LFT elevation     MVP (mitral valve prolapse)     Obesity     Osteoarthritis of cervical spine     Pulmonary emboli (HCC)     Type 2 diabetes mellitus without complication ([facility] [address]     Umbilical hernia         Past Surgical History     Past Surgical History:   Procedure Laterality Date    APPENDECTOMY       SECTION      2 pfannenstiel, 1 vertical    CHOLECYSTECTOMY      COLONOSCOPY      Dr Idalia Maurice COLONOSCOPY  14    COLONOSCOPY  2014    biopsy & sigmoid spasms, pathology negative    COLONOSCOPY N/A 2018    COLONOSCOPY WITH BIOPSY performed by Terrence Jefferson MD at [facility] 5/27/2021    COLONOSCOPY DIAGNOSTIC performed by Marcin Guevara MD at [address]      x 5    HYSTERECTOMY, TOTAL ABDOMINAL      2010    [place] EXPLORATORY OF ABDOMEN  10/21/2014    Laparotomy-lysis of adhesions, bso     UPPER GASTROINTESTINAL ENDOSCOPY  2/17/2010    mild chronic inactive gastritis    UPPER GASTROINTESTINAL ENDOSCOPY N/A 3/10/2021    EGD BIOPSY performed by Marcin [facility] at [facility]        Medications Prior to Admission     Prior to Admission medications    Medication Sig Start Date End Date Taking? Authorizing Provider   oxyCODONE-acetaminophen (PERCOCET)  MG per tablet Take 1 tablet by mouth every 6 hours as needed for Pain for up to 30 days. 1/8/22 2/7/22 Yes MARTITA Sloan CNP   tiZANidine (ZANAFLEX) 4 MG tablet TAKE 1 TABLET BY MOUTH EVERY 8 HOURS AS NEEDED FOR SPASMS 12/28/21  Yes MARTITA Ambrose CNP   ondansetron (ZOFRAN ODT) 4 MG disintegrating tablet Take 1 tablet by mouth every 8 hours as needed for Nausea or Vomiting 11/27/21  Yes Mike Hamilton DO   esomeprazole (NEXIUM) 40 MG delayed release capsule TAKE 1 CAPSULE BY MOUTH EVERY MORNING BEFORE BREAKFAST 11/26/21  Yes MARTITA Cambpell CNP   sertraline (ZOLOFT) 100 MG tablet Take 1.5 tablets by mouth daily 9/9/21  Yes Felicity Lisa MD   levothyroxine (SYNTHROID) 175 MCG tablet TAKE 1 TABLET BY MOUTH DAILY 8/31/21  Yes MARTITA Campbell CNP   traZODone (DESYREL) 100 MG tablet  8/5/21  Yes Historical Provider, MD   rOPINIRole (REQUIP) 2 MG tablet TAKE 1 TABLET BY MOUTH EVERY NIGHT 7/29/21  Yes MARTITA Ceron CNP   albuterol sulfate HFA (VENTOLIN HFA) 108 (90 Base) MCG/ACT inhaler Inhale 2 puffs into the lungs every 6 hours as needed for Wheezing 6/6/21  Yes Laura Flores MD   clonazePAM (KLONOPIN) 0.5 MG tablet Take 1 tablet by mouth 2 times daily as needed for Anxiety for up to 30 days.  12/28/20 1/13/22 Yes Lucy Slaughter MD   topiramate (TOPAMAX) 25 MG tablet 25 mg 2 times daily  2/27/18  Yes Historical Provider, MD   metoprolol tartrate (LOPRESSOR) 50 MG tablet Take 50 mg by mouth 2 times daily  8/21/17  Yes Historical Provider, MD   naloxone [facility]) 4 MG/0.1ML LIQD nasal spray 1 spray by Nasal route as needed for Opioid Reversal 7/8/21   MARTITA Young - CNP   albuterol sulfate  (90 Base) MCG/ACT inhaler INHALE 2 PUFFS INTO THE LUNGS EVERY 4 HOURS AS NEEDED FOR SHORTNESS OF BREATH 4/23/21   MARTITA Wylie - CNP   rivaroxaban (XARELTO) 20 MG TABS tablet Take 1 tablet by mouth daily (with breakfast) 4/12/21   Jared Harmon MD   lidocaine (LIDODERM) 5 % Place 1 patch onto the skin daily 12 hours on, 12 hours off. 3/4/21   Emanuel Alexander MD        Allergies   Compazine [prochlorperazine], Sulfa antibiotics, and Adhesive tape    Social History     Social History     Tobacco History     Smoking Status  Former Smoker Smoking Frequency  0.25 packs/day for 33 years (8.25 pk yrs) Smoking Tobacco Type  Cigarettes    Smokeless Tobacco Use  Never Used    Tobacco Comment  quit on nicoderm patch          Alcohol History     Alcohol Use Status  No          Drug Use     Drug Use Status  No          Sexual Activity     Sexually Active  Not Currently                Family History     Family History   Problem Relation Age of Onset    Cancer Mother         vaginal    Heart Disease Father     Diabetes Father     Other Father         colon resection for colon polyps    Breast Cancer Maternal Grandmother     Stroke Maternal Grandfather        Review of Systems   Review of Systems   Constitutional: Negative. Respiratory: Negative. Cardiovascular: Negative. Gastrointestinal: Negative. Musculoskeletal: Positive for neck pain. Hematological: Bruises/bleeds easily. Psychiatric/Behavioral: Negative.         Physical Exam   /74   Pulse 78   Temp 98.4 °F (36.9 °C) (Skin)   Resp 16   Ht 5' 4\" (1.626 m)   Wt 176 lb (79.8 kg)   LMP 11/01/2010   SpO2 96%   BMI 30.21 kg/m²     Physical Exam  Vitals and nursing note reviewed. Constitutional:       General: She is not in acute distress. Appearance: She is well-developed. HENT:      Head: Normocephalic and atraumatic. Eyes:      Conjunctiva/sclera: Conjunctivae normal.      Pupils: Pupils are equal, round, and reactive to light. Cardiovascular:      Rate and Rhythm: Normal rate and regular rhythm. Heart sounds: Normal heart sounds. Pulmonary:      Effort: Pulmonary effort is normal.      Breath sounds: Normal breath sounds. Musculoskeletal:      Cervical back: Tenderness present. No rigidity. Skin:     General: Skin is warm and dry. Neurological:      Mental Status: She is alert and oriented to person, place, and time. Psychiatric:         Behavior: Behavior normal.         Thought Content: Thought content normal.         Labs    No results found for this or any previous visit (from the past 24 hour(s)). Imaging/Diagnostics Last 24 Hours   No results found. Assessment      Dx:   Spondylosis without myelopathy  Left cervical facet syndrome  Plan   Patient reported 85% improvement from previous left-sided cervical facet injection  Will proceed with the left-sided confirmatory diagnostic cervical medial branch nerve block  If that provide good short-term relief she will be candidate for radiofrequency ablation    Risk and benefit discussed with patient  Informed consent signed  ASA classification 3  Mallampati classification 3    Consultations Ordered:  None    Electronically signed by Gio Washington MD on 1/13/22 at 12:31 PM EST

## 2022-01-13 NOTE — PROGRESS NOTES
Discharge criteria met. Patient alert and oriented x3  Post procedure dressing dry and intact. Sensory and motor function intact as per pre-procedure. Instructions and follow up reviewed with pt at patient at discharge.   Patient discharged wheelchair @ 1310 with [facility] [place]

## 2022-01-13 NOTE — OP NOTE
Preoperative Diagnosis: Cervical spondylosis and chronic cervicalgia  Postoperative Diagnosis: Cervical spondylosis and chronic cervicalgia    Procedure Performed:  Left Cervical Medical branch nerve block at C2 / C3 / C4 / C5 nerves under fluoroscopy guidance    Procedure: The Patient was seen in the preop area, chart was reviewed, informed consent was obtained. Patient was taken to procedure room and was placed in lateral position with procedure side facing upward. . Vital signs were monitored through out the  Procedure. A time out was completed. The site was prepped and draped in sterile manner. The target point was identified with fluoro guidance. Skin and deep tissues were anesthetized with 1 % lidocaine. A  25-gauge needlele was advanced under fluoroscopy guidance to the targets until a bony contact was made. Position was conformed in AP and lateral views. Then after negative aspiration contrast dye was injected that showed  spread of the contrast over the target area  With no epidural, vascular runoff or intrathecal spread. Finally 0. 5 ml of treatment solution containing 0.5% bupivacaine was injected at each spot. The needle was removed and a Band-Aid was placed over the needle  insertion site. The patient's vital signs remained stable and the patient tolerated the procedure well. Post Procedure pain score in recovery 15 minutes later was 0-1/10    SEDATION NOTE:    ASA CLASSIFICATION  3  MP   CLASSIFICATION  3    Moderate intravenous conscious sedation was supervised by Dr. Dooley [place]  The patient was independently monitored by a Registered Nurse assigned to the Procedure Room  Monitoring included automated blood pressure, continuous EKG, Capnography and continuous pulse oximetry. The detailed Conscious Record is permanently stored in the [place] [id number].      The following is the conscious sedation record;  Start Time:  1230  End times:  1242  Duration:  12 MINUTES  MEDS GIVEN  1 MG VERSED AND 50 MCG FENTANYL

## 2022-01-14 ENCOUNTER — TELEPHONE (OUTPATIENT)
Dept: PAIN MANAGEMENT | Age: 52
End: 2022-01-14

## 2022-01-14 DIAGNOSIS — M47.812 SPONDYLOSIS OF CERVICAL REGION WITHOUT MYELOPATHY OR RADICULOPATHY: ICD-10-CM

## 2022-01-14 NOTE — TELEPHONE ENCOUNTER
[facility]. Attempted to outreach pt. For post-procedure follow up call. No answer received. No VM obtained.

## 2022-01-18 ENCOUNTER — HOSPITAL ENCOUNTER (OUTPATIENT)
Age: 52
Setting detail: SPECIMEN
Discharge: HOME | End: 2022-01-18

## 2022-01-18 ENCOUNTER — OFFICE VISIT (OUTPATIENT)
Dept: INTERNAL MEDICINE CLINIC | Age: 52
End: 2022-01-18
Payer: MEDICAID

## 2022-01-18 VITALS
HEART RATE: 72 BPM | SYSTOLIC BLOOD PRESSURE: 112 MMHG | DIASTOLIC BLOOD PRESSURE: 84 MMHG | HEIGHT: 64 IN | BODY MASS INDEX: 31.28 KG/M2 | WEIGHT: 183.2 LBS | OXYGEN SATURATION: 98 %

## 2022-01-18 DIAGNOSIS — I26.99 PULMONARY EMBOLISM ON RIGHT (HCC): ICD-10-CM

## 2022-01-18 DIAGNOSIS — Z13.220 SCREENING FOR HYPERLIPIDEMIA: ICD-10-CM

## 2022-01-18 DIAGNOSIS — R10.9 ACUTE ABDOMINAL PAIN: Primary | ICD-10-CM

## 2022-01-18 DIAGNOSIS — R05.8 POST-VIRAL COUGH SYNDROME: ICD-10-CM

## 2022-01-18 DIAGNOSIS — R73.03 PREDIABETES: ICD-10-CM

## 2022-01-18 DIAGNOSIS — D69.6 THROMBOCYTOPENIA, UNSPECIFIED (HCC): ICD-10-CM

## 2022-01-18 DIAGNOSIS — M48.02 DEGENERATIVE CERVICAL SPINAL STENOSIS: ICD-10-CM

## 2022-01-18 DIAGNOSIS — J44.1 OBSTRUCTIVE CHRONIC BRONCHITIS WITH EXACERBATION (HCC): ICD-10-CM

## 2022-01-18 DIAGNOSIS — E11.9 TYPE 2 DIABETES MELLITUS WITHOUT COMPLICATION, WITHOUT LONG-TERM CURRENT USE OF INSULIN (HCC): ICD-10-CM

## 2022-01-18 PROCEDURE — 99214 OFFICE O/P EST MOD 30 MIN: CPT | Performed by: INTERNAL MEDICINE

## 2022-01-18 RX ORDER — DEXTROMETHORPHAN POLISTIREX 30 MG/5ML
60 SUSPENSION ORAL 2 TIMES DAILY PRN
Qty: 148 ML | Refills: 0 | Status: SHIPPED | OUTPATIENT
Start: 2022-01-18 | End: 2022-01-28

## 2022-01-18 RX ORDER — SUCRALFATE 1 G/1
1 TABLET ORAL 4 TIMES DAILY
Qty: 120 TABLET | Refills: 3 | Status: SHIPPED | OUTPATIENT
Start: 2022-01-18 | End: 2022-02-18 | Stop reason: ALTCHOICE

## 2022-01-18 SDOH — ECONOMIC STABILITY: FOOD INSECURITY: WITHIN THE PAST 12 MONTHS, THE FOOD YOU BOUGHT JUST DIDN'T LAST AND YOU DIDN'T HAVE MONEY TO GET MORE.: NEVER TRUE

## 2022-01-18 SDOH — ECONOMIC STABILITY: FOOD INSECURITY: WITHIN THE PAST 12 MONTHS, YOU WORRIED THAT YOUR FOOD WOULD RUN OUT BEFORE YOU GOT MONEY TO BUY MORE.: NEVER TRUE

## 2022-01-18 ASSESSMENT — SOCIAL DETERMINANTS OF HEALTH (SDOH): HOW HARD IS IT FOR YOU TO PAY FOR THE VERY BASICS LIKE FOOD, HOUSING, MEDICAL CARE, AND HEATING?: NOT HARD AT ALL

## 2022-01-18 NOTE — PROGRESS NOTES
Visit Information    Have you changed or started any medications since your last visit including any over-the-counter medicines, vitamins, or herbal medicines? no   Are you having any side effects from any of your medications? -  no  Have you stopped taking any of your medications? Is so, why? -  no    Have you seen any other physician or provider since your last visit? Yes - Records Obtained  Have you had any other diagnostic tests since your last visit? Yes - Records Obtained  Have you been seen in the emergency room and/or had an admission to a hospital since we last saw you? Yes - Records Obtained  Have you had your routine dental cleaning in the past 6 months? yes -     Have you activated your [employer] account? If not, what are your barriers?  Yes     Patient Care Team:  MARTITA Daniels CNP as PCP - General (Internal Medicine)  MARTITA Daniels CNP as PCP - [facility] Empaneled Provider  Elias Bowen MD as Consulting Physician (Gastroenterology)  Fausto Collins MD as Consulting Physician (Obstetrics & Gynecology)  Martinez Horton MD as Consulting Physician (Gastroenterology)    Medical History Review  Past Medical, Family, and Social History reviewed and does contribute to the patient presenting condition    Health Maintenance   Topic Date Due    COVID-19 Vaccine (1) Never done    Pneumococcal 0-64 years Vaccine (1 of 2 - PPSV23) Never done    HIV screen  Never done    Hepatitis B vaccine (1 of 3 - Risk 3-dose series) Never done    DTaP/Tdap/Td vaccine (1 - Tdap) Never done    Diabetic retinal exam  08/15/2018    Diabetic foot exam  08/11/2021    Diabetic microalbuminuria test  08/11/2021    Lipid screen  08/11/2021    Flu vaccine (1) Never done    Shingles Vaccine (1 of 2) Never done    TSH testing  03/07/2022    A1C test (Diabetic or Prediabetic)  03/08/2022    Colon cancer screen colonoscopy  05/27/2022    Depression Screen  09/09/2022    Breast cancer screen  04/26/2023    Hepatitis C screen  Completed    Hepatitis A vaccine  Aged Out    Hib vaccine  Aged Out    Meningococcal (ACWY) vaccine  Aged Out     SUBJECTIVE:  Samra Hudson is a 49 y.o. female patient who  comes for complaints of   Chief Complaint   Patient presents with    Abdominal Pain     right side, starts in the front and then follows around the her side, patient no longer has her appendix or gallballder     Shortness of Breath     When she breaths in its like she is breathing is cold air. takes her breath away, albuterol inhaler helped at first and now it is not helping at all        Abdominal pain  Duration- 2days ago  srating in epigastrium radiating to right  7/10, constt, sharp pain  Nausea, initially had deanna ediarrhea, now constopation  Loss of appetite  Worse after food  No fever/chills  No agg/rel factors  Percocet did not help, advil did not help  Denies acid reflux  Has had chlecytectomy- ~10yr ago  Last BM- earlier today, soft      Breathing  problemls since PE in May 2021  On goind cough since then,   Cough on deep breathing  Albuterol does help  Had covid infection in Dec- cough is worse since then  No prev diagnosis of COPD/aSthma    REVIEW OF SYSTEMS (except Subjective (HPI))  GENERAL: No fevers / chills  RESPIRATORY: Negative for cough, wheezing or shortness of breath  CARDIOVASCULAR: Negative for chest pain or palpitations.   GI: some nausea, constipation, no diarrhea  NEURO: No history of headaches    Past Medical History:   Diagnosis Date    Anxiety     CAD (coronary artery disease)     Mitral valve prolapse    Fatty liver     GERD (gastroesophageal reflux disease)     Headache     History of bronchitis     Hyperlipidemia     Hypothyroidism     Kidney stone     LFT elevation     MVP (mitral valve prolapse)     Obesity     Osteoarthritis of cervical spine     Pulmonary emboli (HCC)     Type 2 diabetes mellitus without complication ([facility] [address] 3/63/9951    Umbilical hernia        SOCIAL HISTORY:  Social History     Socioeconomic History    Marital status:      Spouse name: Not on file    Number of children: Not on file    Years of education: Not on file    Highest education level: Not on file   Occupational History    Occupation: Homemaker   Tobacco Use    Smoking status: Former Smoker     Packs/day: 0.25     Years: 33.00     Pack years: 8.25     Types: Cigarettes     Quit date:      Years since quittin.0    Smokeless tobacco: Never Used    Tobacco comment: quit on nicoderm patch   Vaping Use    Vaping Use: Never used   Substance and Sexual Activity    Alcohol use: No     Alcohol/week: 0.0 standard drinks    Drug use: No    Sexual activity: Not Currently   Other Topics Concern    Not on file   Social History Narrative    Not on file     Social Determinants of Health     Financial Resource Strain: Low Risk     Difficulty of Paying Living Expenses: Not hard at all   Food Insecurity: No Food Insecurity    Worried About Running Out of Food in the Last Year: Never true    Kamille of Food in the Last Year: Never true   Transportation Needs:     Lack of Transportation (Medical): Not on file    Lack of Transportation (Non-Medical):  Not on file   Physical Activity:     Days of Exercise per Week: Not on file    Minutes of Exercise per Session: Not on file   Stress:     Feeling of Stress : Not on file   Social Connections:     Frequency of Communication with Friends and Family: Not on file    Frequency of Social Gatherings with Friends and Family: Not on file    Attends Adventism Services: Not on file    Active Member of Clubs or Organizations: Not on file    Attends Club or Organization Meetings: Not on file    Marital Status: Not on file   Intimate Partner Violence:     Fear of Current or Ex-Partner: Not on file    Emotionally Abused: Not on file    Physically Abused: Not on file    Sexually Abused: Not on file   Housing Stability:     Unable to Pay for Housing in the Last Year: Not on file    Number of Places Lived in the Last Year: Not on file    Unstable Housing in the Last Year: Not on file           CURRENT MEDICATIONS:  Current Outpatient Medications   Medication Sig Dispense Refill    oxyCODONE-acetaminophen (PERCOCET)  MG per tablet Take 1 tablet by mouth every 6 hours as needed for Pain for up to 30 days. 120 tablet 0    tiZANidine (ZANAFLEX) 4 MG tablet TAKE 1 TABLET BY MOUTH EVERY 8 HOURS AS NEEDED FOR SPASMS 90 tablet 0    ondansetron (ZOFRAN ODT) 4 MG disintegrating tablet Take 1 tablet by mouth every 8 hours as needed for Nausea or Vomiting 20 tablet 0    esomeprazole (NEXIUM) 40 MG delayed release capsule TAKE 1 CAPSULE BY MOUTH EVERY MORNING BEFORE BREAKFAST 90 capsule 0    sertraline (ZOLOFT) 100 MG tablet Take 1.5 tablets by mouth daily 45 tablet 2    levothyroxine (SYNTHROID) 175 MCG tablet TAKE 1 TABLET BY MOUTH DAILY 90 tablet 1    traZODone (DESYREL) 100 MG tablet       rOPINIRole (REQUIP) 2 MG tablet TAKE 1 TABLET BY MOUTH EVERY NIGHT 90 tablet 1    albuterol sulfate  (90 Base) MCG/ACT inhaler INHALE 2 PUFFS INTO THE LUNGS EVERY 4 HOURS AS NEEDED FOR SHORTNESS OF BREATH 42.5 g 3    rivaroxaban (XARELTO) 20 MG TABS tablet Take 1 tablet by mouth daily (with breakfast) 90 tablet 3    topiramate (TOPAMAX) 25 MG tablet 25 mg 2 times daily       metoprolol tartrate (LOPRESSOR) 50 MG tablet Take 50 mg by mouth 2 times daily       naloxone (NARCAN) 4 MG/0.1ML LIQD nasal spray 1 spray by Nasal route as needed for Opioid Reversal (Patient not taking: Reported on 1/18/2022) 1 each 0    albuterol sulfate HFA (VENTOLIN HFA) 108 (90 Base) MCG/ACT inhaler Inhale 2 puffs into the lungs every 6 hours as needed for Wheezing (Patient not taking: Reported on 1/18/2022) 1 Inhaler 3    lidocaine (LIDODERM) 5 % Place 1 patch onto the skin daily 12 hours on, 12 hours off.  (Patient not taking: Reported on 1/18/2022) 10 patch 0    clonazePAM (KLONOPIN) 0.5 MG tablet Take 1 tablet by mouth 2 times daily as needed for Anxiety for up to 30 days. 60 tablet 1     No current facility-administered medications for this visit. OBJECTIVE:  Vitals:    01/18/22 1350   BP: 112/84   Pulse: 72   SpO2: 98%     Body mass index is 31.45 kg/m². General exam (except above):  General appearance - well appearing, alert, in no acute distress  Head - Atraumatic, normocephalic  Eyes - EOMI, no jaundice or pallor  Lungs - Lungs clear to auscultation. No wheezing, rhonchi, rales  Heart - RRR without murmur, gallop, or rubs. No ectopy  Abdomen - epigastric tenderness, RUQ tenderness. . No masses, organomegaly  Extremities -No significant edema, or skin discoloration. Good capillary refill. Neuro - Pt Alert, awake and oriented x 3. No gross focal neurological deficits    ASSESSMENT AND PLAN (MEDICAL DECISION MAKING):   Ike Ramírez was seen today for abdominal pain and shortness of breath. Diagnoses and all orders for this visit:    Acute abdominal pain  H/o cholecystectomy 10yr ago. -     Lipase; Future  -     CBC; Future  -     Comprehensive Metabolic Panel; Future  -     Urinalysis Reflex to Culture; Future  -     CT ABDOMEN PELVIS WO CONTRAST Additional Contrast? None; Future  - carafate    Pulmonary embolism on right [facility])  Comments:  May 2021, on xarelto     Screening for hyperlipidemia  -     Lipid, Fasting; Future    Prediabetes  -     Hemoglobin A1C; Future    Post-viral cough syndrome  -     dextromethorphan (DELSYM) 30 MG/5ML extended release liquid;  Take 10 mLs by mouth 2 times daily as needed for Cough    Obstructive chronic bronchitis with exacerbation (HCC)    Type 2 diabetes mellitus without complication, without long-term current use of insulin ([facility])  -      DIABETES FOOT EXAM    Degenerative cervical spinal stenosis  Comments:  on regular percocet    Thrombocytopenia, unspecified     repeat CBC    Concern for biliary tract obstruction vs pancreatitis vs gastritis vs renal stones  Will check CT abdomen, lipase   Advised patient to go back to ER if pain worsens      Follow up in: with results.        Leslie Cleary MD

## 2022-01-19 DIAGNOSIS — R10.9 ACUTE ABDOMINAL PAIN: ICD-10-CM

## 2022-01-19 LAB
-: ABNORMAL
AMORPHOUS: ABNORMAL
BACTERIA: ABNORMAL
BILIRUBIN URINE: NEGATIVE
CASTS UA: ABNORMAL /LPF (ref 0–2)
COLOR: YELLOW
COMMENT UA: ABNORMAL
CRYSTALS, UA: ABNORMAL /HPF
EPITHELIAL CELLS UA: ABNORMAL /HPF (ref 0–5)
GLUCOSE URINE: NEGATIVE
KETONES, URINE: NEGATIVE
LEUKOCYTE ESTERASE, URINE: NEGATIVE
MUCUS: ABNORMAL
NITRITE, URINE: NEGATIVE
OTHER OBSERVATIONS UA: ABNORMAL
PH UA: 5.5 (ref 5–8)
PROTEIN UA: NEGATIVE
RBC UA: ABNORMAL /HPF (ref 0–2)
RENAL EPITHELIAL, UA: ABNORMAL /HPF
SPECIFIC GRAVITY UA: 1.03 (ref 1–1.03)
TRICHOMONAS: ABNORMAL
TURBIDITY: ABNORMAL
URINE HGB: ABNORMAL
UROBILINOGEN, URINE: NORMAL
WBC UA: ABNORMAL /HPF (ref 0–5)
YEAST: ABNORMAL

## 2022-01-20 LAB
CREATININE URINE: 286.7 MG/DL (ref 28–217)
MICROALBUMIN/CREAT 24H UR: <12 MG/L
MICROALBUMIN/CREAT UR-RTO: ABNORMAL MCG/MG CREAT

## 2022-01-21 NOTE — RESULT ENCOUNTER NOTE
Urine test results are satisfactory, I had ordered blood work and CT scan for her abdominal pain but I note that she has not done it I called her today but no response and no voicemail could be left-please try calling again later today to check on patient

## 2022-01-25 RX ORDER — ROPINIROLE 2 MG/1
TABLET, FILM COATED ORAL
Qty: 90 TABLET | Refills: 1 | Status: SHIPPED | OUTPATIENT
Start: 2022-01-25 | End: 2022-07-26

## 2022-02-01 ENCOUNTER — HOSPITAL ENCOUNTER (OUTPATIENT)
Age: 52
Discharge: HOME | End: 2022-02-01
Payer: MEDICAID

## 2022-02-01 DIAGNOSIS — Z13.220 SCREENING FOR HYPERLIPIDEMIA: ICD-10-CM

## 2022-02-01 DIAGNOSIS — C56.9 MALIGNANT NEOPLASM OF OVARY, UNSPECIFIED LATERALITY (HCC): ICD-10-CM

## 2022-02-01 DIAGNOSIS — N83.8 OVARIAN MASS: ICD-10-CM

## 2022-02-01 DIAGNOSIS — F41.9 ANXIETY: ICD-10-CM

## 2022-02-01 DIAGNOSIS — R00.2 PALPITATIONS: ICD-10-CM

## 2022-02-01 DIAGNOSIS — R73.03 PREDIABETES: ICD-10-CM

## 2022-02-01 DIAGNOSIS — R10.9 ACUTE ABDOMINAL PAIN: ICD-10-CM

## 2022-02-01 DIAGNOSIS — R97.0 ELEVATED CEA: ICD-10-CM

## 2022-02-01 LAB
ABSOLUTE EOS #: 0.1 K/UL (ref 0–0.4)
ABSOLUTE IMMATURE GRANULOCYTE: NORMAL K/UL (ref 0–0.3)
ABSOLUTE LYMPH #: 1.5 K/UL (ref 1–4.8)
ABSOLUTE MONO #: 0.3 K/UL (ref 0.1–1.2)
ALBUMIN SERPL-MCNC: 4 G/DL (ref 3.5–5.2)
ALBUMIN SERPL-MCNC: 4 G/DL (ref 3.5–5.2)
ALBUMIN/GLOBULIN RATIO: 1.3 (ref 1–2.5)
ALBUMIN/GLOBULIN RATIO: 1.3 (ref 1–2.5)
ALP BLD-CCNC: 114 U/L (ref 35–104)
ALP BLD-CCNC: 114 U/L (ref 35–104)
ALT SERPL-CCNC: 8 U/L (ref 5–33)
ALT SERPL-CCNC: 8 U/L (ref 5–33)
ANION GAP SERPL CALCULATED.3IONS-SCNC: 11 MMOL/L (ref 9–17)
ANION GAP SERPL CALCULATED.3IONS-SCNC: 11 MMOL/L (ref 9–17)
AST SERPL-CCNC: 13 U/L
AST SERPL-CCNC: 13 U/L
BASOPHILS # BLD: 1 % (ref 0–2)
BASOPHILS ABSOLUTE: 0 K/UL (ref 0–0.2)
BILIRUB SERPL-MCNC: 0.17 MG/DL (ref 0.3–1.2)
BILIRUB SERPL-MCNC: 0.17 MG/DL (ref 0.3–1.2)
BUN BLDV-MCNC: 21 MG/DL (ref 6–20)
BUN BLDV-MCNC: 21 MG/DL (ref 6–20)
BUN/CREAT BLD: ABNORMAL (ref 9–20)
BUN/CREAT BLD: ABNORMAL (ref 9–20)
CALCIUM SERPL-MCNC: 9.4 MG/DL (ref 8.6–10.4)
CALCIUM SERPL-MCNC: 9.4 MG/DL (ref 8.6–10.4)
CHLORIDE BLD-SCNC: 106 MMOL/L (ref 98–107)
CHLORIDE BLD-SCNC: 106 MMOL/L (ref 98–107)
CO2: 21 MMOL/L (ref 20–31)
CO2: 21 MMOL/L (ref 20–31)
CREAT SERPL-MCNC: 0.94 MG/DL (ref 0.5–0.9)
CREAT SERPL-MCNC: 0.94 MG/DL (ref 0.5–0.9)
DIFFERENTIAL TYPE: NORMAL
EOSINOPHILS RELATIVE PERCENT: 3 % (ref 1–4)
GFR AFRICAN AMERICAN: >60 ML/MIN
GFR AFRICAN AMERICAN: >60 ML/MIN
GFR NON-AFRICAN AMERICAN: >60 ML/MIN
GFR NON-AFRICAN AMERICAN: >60 ML/MIN
GFR SERPL CREATININE-BSD FRML MDRD: ABNORMAL ML/MIN/{1.73_M2}
GLUCOSE BLD-MCNC: 94 MG/DL (ref 70–99)
GLUCOSE BLD-MCNC: 94 MG/DL (ref 70–99)
HCT VFR BLD CALC: 40.8 % (ref 36–46)
HCT VFR BLD CALC: 40.8 % (ref 36–46)
HEMOGLOBIN: 14 G/DL (ref 12–16)
HEMOGLOBIN: 14 G/DL (ref 12–16)
IMMATURE GRANULOCYTES: NORMAL %
LACTIC ACID, WHOLE BLOOD: NORMAL MMOL/L (ref 0.7–2.1)
LACTIC ACID: 1.5 MMOL/L (ref 0.5–2.2)
LIPASE: 63 U/L (ref 13–60)
LYMPHOCYTES # BLD: 29 % (ref 24–44)
MCH RBC QN AUTO: 31.7 PG (ref 26–34)
MCH RBC QN AUTO: 31.7 PG (ref 26–34)
MCHC RBC AUTO-ENTMCNC: 34.3 G/DL (ref 31–37)
MCHC RBC AUTO-ENTMCNC: 34.3 G/DL (ref 31–37)
MCV RBC AUTO: 92.5 FL (ref 80–100)
MCV RBC AUTO: 92.5 FL (ref 80–100)
MONOCYTES # BLD: 6 % (ref 2–11)
NRBC AUTOMATED: NORMAL PER 100 WBC
NRBC AUTOMATED: NORMAL PER 100 WBC
PDW BLD-RTO: 13.8 % (ref 12.5–15.4)
PDW BLD-RTO: 13.8 % (ref 12.5–15.4)
PLATELET # BLD: 165 K/UL (ref 140–450)
PLATELET # BLD: 165 K/UL (ref 140–450)
PLATELET ESTIMATE: NORMAL
PMV BLD AUTO: 7.5 FL (ref 6–12)
PMV BLD AUTO: 7.5 FL (ref 6–12)
POTASSIUM SERPL-SCNC: 4 MMOL/L (ref 3.7–5.3)
POTASSIUM SERPL-SCNC: 4 MMOL/L (ref 3.7–5.3)
RBC # BLD: 4.41 M/UL (ref 4–5.2)
RBC # BLD: 4.41 M/UL (ref 4–5.2)
RBC # BLD: NORMAL 10*6/UL
SEG NEUTROPHILS: 61 % (ref 36–66)
SEGMENTED NEUTROPHILS ABSOLUTE COUNT: 3.3 K/UL (ref 1.8–7.7)
SODIUM BLD-SCNC: 138 MMOL/L (ref 135–144)
SODIUM BLD-SCNC: 138 MMOL/L (ref 135–144)
TOTAL PROTEIN: 7 G/DL (ref 6.4–8.3)
TOTAL PROTEIN: 7 G/DL (ref 6.4–8.3)
TSH SERPL DL<=0.05 MIU/L-ACNC: 7.49 MIU/L (ref 0.3–5)
WBC # BLD: 5.3 K/UL (ref 3.5–11)
WBC # BLD: 5.3 K/UL (ref 3.5–11)
WBC # BLD: NORMAL 10*3/UL

## 2022-02-01 PROCEDURE — 82378 CARCINOEMBRYONIC ANTIGEN: CPT

## 2022-02-01 PROCEDURE — 83036 HEMOGLOBIN GLYCOSYLATED A1C: CPT

## 2022-02-01 PROCEDURE — 85025 COMPLETE CBC W/AUTO DIFF WBC: CPT

## 2022-02-01 PROCEDURE — 86304 IMMUNOASSAY TUMOR CA 125: CPT

## 2022-02-01 PROCEDURE — 36415 COLL VENOUS BLD VENIPUNCTURE: CPT

## 2022-02-01 PROCEDURE — 83605 ASSAY OF LACTIC ACID: CPT

## 2022-02-01 PROCEDURE — 80061 LIPID PANEL: CPT

## 2022-02-01 PROCEDURE — 85027 COMPLETE CBC AUTOMATED: CPT

## 2022-02-01 PROCEDURE — 84443 ASSAY THYROID STIM HORMONE: CPT

## 2022-02-01 PROCEDURE — 80053 COMPREHEN METABOLIC PANEL: CPT

## 2022-02-01 PROCEDURE — 83690 ASSAY OF LIPASE: CPT

## 2022-02-01 NOTE — PROGRESS NOTES
[facility] 89 PROGRESS NOTE      Patient  completed [x]  video visit   []   phone call:         Minutes :   [] in person visit to pain clinic    [x]    to  review Medication Agreement    []  Follow up after procedure   []  Discuss treatment options      Location:  Provider:  working from    [x]    home    []   [facility] - KADEEM MONTEJO ,   patient at   [] pain clinic     [x]  home         Chief Complaint: neck pain      She c/o neck pain left side  radiating down left arm , pain is starting to radiate to right side of neck, She had  Diagnostic/confirmatory  Left Cervical Medical branch nerve block at C2 / C3 / C4 / C5 nerves on 1-13-22 with 100% relief for a few days. .She c/o headaches occipital area daily, she uses heat and advil. She had left cervical facet injection C2-T1  8/2021 with 85% relief. She  has seen Neurosurgeon in the past  and surgery was not recommended. She had CT cervical spine 3-4-21 which read as : mild to moderate cervical degenerative disc disease with moderate bony neural foraminal narrowing on the left at C3-C4, C5- C6. She follows with Oncologist for elevated CEA. She had weight loss over 100 pounds under a year, no more weight loss. She sees her Oncologist next month. She reports her CEA levels have increased,She reports her sleep varies. Neck Pain   This is a chronic problem. The problem occurs constantly. The problem has been unchanged. Pain location:  left side of neck ,left arm and right side to shoulder. Quality: sharp. The pain is at a severity of 6/10. The pain is moderate. Exacerbated by: movement of neck. The pain is same all the time. Associated symptoms include headaches and weakness. Associated symptoms comments: Weakness left hand. She has tried muscle relaxants and heat for the symptoms. The treatment provided mild relief.        Treatment goals:  Functional status: reduce pain 4      Aberrancy:   Any alcoholic beverages  no          Any illegal drugs no      Analgesia:     6                Adverse  Effects :no      ADL;s :stretching    Data:    When was thelast UDS:  2-3-21          Was the UDS appropriate:  [] yes []   no      Record/Diagnostics Review:      As above, I did review the imaging     DRUG SCREEN, PAIN  Order: [id number]   Status: Final result     Visible to patient: Yes (seen)     Next appt: 02/02/2022 at 11:00 AM in Pain Management Karla GREGG, MARTITA - CNP)     Dx: Encounter for medication monitoring; [facility] Nakita ..     1 Result Note    Component Ref Range & Units 2/3/21 1551 8/11/20 1049 7/15/20 1529 7/9/19 1100 2/25/19 1948 1/12/19 0915 1/16/18 1345   Pain Management Drug Panel Interp, Urine  Inconsistent ARUP    Inconsistent CMARUP    Inconsistent CMARUP    Comment: (NOTE)   ________________________________________________________________   DRUGS EXPECTED:   NO DRUGS EXPECTED   ________________________________________________________________   INCONSISTENT with medications provided:   Oxycodone   Noroxycodone   7-Aminoclonazepam   ________________________________________________________________   INTERPRETIVE INFORMATION: Targeted drug profile Interp   Interpretation depends on accuracy and completeness of patient   medication information submitted by client. EXAMINATION:   CT OF THE CERVICAL SPINE WITHOUT CONTRAST 3/4/2021 5:19 pm       TECHNIQUE:   CT of the cervical spine was performed without the administration of   intravenous contrast. Multiplanar reformatted images are provided for review.    Dose modulation, iterative reconstruction, and/or weight based adjustment of   the mA/kV was utilized to reduce the radiation dose to as low as reasonably   achievable.       COMPARISON:   February 13, 2021.       HISTORY:   ORDERING SYSTEM PROVIDED HISTORY: radicular pain left arm   TECHNOLOGIST PROVIDED HISTORY:   radicular pain left arm   Decision Support Exception->Emergency Medical Condition (MA)   Is the patient pregnant?->No   Reason for Exam: radicular pain left arm for three weeks. patient states   limited ROM and pain starting in neck. no injuries   Acuity: Unknown   Type of Exam: Unknown       FINDINGS:   Bone mineralization is normal.  The vertebral bodies and posterior elements   appear intact and appropriately aligned without acute fracture or   subluxation.  Vertebral body stature is maintained throughout. Satira Cones is   straightening of the normal cervical lordosis.  Mild-to-moderate cervical   degenerative disc disease is present throughout. Satira Cones is moderate bony   neural foraminal narrowing on the left at C3-C4 and C5-C6.  No significant   uncovertebral joint hypertrophic change or additional bony neural foraminal   narrowing.  No paraspinal soft tissue abnormality.       The visualized lung apices are grossly clear.           Impression   Mild to moderate multilevel cervical degenerative disc disease with moderate   bony neural foraminal narrowing on the left at C3-C4 and C5-C6.  No acute   fracture or subluxation.  Straightening of the normal cervical lordosis which   may be related to muscle spasm or position in collar.             EXAMINATION:   MRI OF THE CERVICAL SPINE WITHOUT CONTRAST 3/4/2021 2:53 pm       TECHNIQUE:   Multiplanar multisequence MRI of the cervical spine was performed without the   administration of intravenous contrast.       COMPARISON:   06/12/2020       HISTORY:   ORDERING SYSTEM PROVIDED HISTORY: Cervical radiculopathy   TECHNOLOGIST PROVIDED HISTORY:   evaluate for stenosis   Is the patient pregnant?->No   Reason for Exam: pt stated left shoulder arm pain weakness numbness x 1 mth   Acuity: Acute   Type of Exam: Initial   Additional signs and symptoms: pt stated left shoulder arm pain weakness   numbness x 1 mth, NKI       FINDINGS:   BONES/ALIGNMENT: There is normal alignment of the spine. The vertebral body   heights are maintained.  The bone marrow signal appears unremarkable. Satira Cones   is minimal degenerative disc disease with disc space narrowing and   osteophytes at C3-4, C4-5, C5-6 and C6-7.       SPINAL CORD:  No abnormal signal is identified within the spinal cord.       SOFT TISSUES: No paraspinal mass identified.       C2-C3: There is minimum disc protrusion of 2 mm centrally. The thecal sac and   neural foramina are intact.       C3-C4: There is disc protrusion osteophyte toward the left measuring 3-4 mm. The thecal sac is not stenotic.  There is mild narrowing of the left neural   foramen.  The right neural foramen is intact.       C4-C5: There is disc protrusion osteophyte measuring 2-3 mm.  The thecal sac   is mildly stenotic measuring 9.5 mm. Disc and/or osteophytes result in mild   narrowing of the neural foramina bilaterally. The left neural foramen is   narrowed greater than right.       C5-C6: There is disc protrusion osteophyte measuring 5-6 mm toward the left   narrowing the left neural foramen.  The thecal sac is mildly stenotic   measuring 9.3 mm.  The right neural foramen is intact.       C6-C7: Disc and/or osteophytes cause minimal narrowing of the neural foramina   bilaterally.  The thecal sac is not stenotic.       C7-T1:  The thecal sac and neural foramina are intact.           Impression   Disc and osteophytes result in narrowing of the neural foramina and mild   stenosis of the thecal sac throughout the cervical region as discussed in   detail above.             Contains abnormal data CEA  Order: [id number]   Status: Final result     Visible to patient: Yes (seen)     Next appt: 02/07/2022 at 11:00 AM in Oncology (Jason Torres MD)     Dx: Elevated CEA; Ovarian mass; Malignant. ..     0 Result Notes    Component Ref Range & Units 2/1/22 0948 7/30/21 1638 3/26/21 1324   CEA <3.9 ng/mL 10.0 High   9.3 High  CM  8.3 High  CM    Comment: The Roche \"ECLIA\" assay is used.  Results obtained with different assay methods cannot be   used interchangeably.             Status: Final result     Visible to patient: Yes (seen)     Next appt: 2022 at 11:00 AM in Oncology (Alyson Urrutia MD)     Dx: Elevated CEA; Ovarian mass; Malignant. ..     0 Result Notes    Component Ref Range & Units 22 0948 21 1638 3/26/21 1324    <38 U/mL 7  7  52 High                   Pill count: appropriate    fill date :22    Morphine equivalent dose as reported on OARRS: 60  Periodic Controlled Substance Monitoring: Possible medication side effects, risk of tolerance/dependence & alternative treatments discussed. ,No signs of potential drug abuse or diversion identified. ,Assessed functional status. ,Obtaining appropriate analgesic effect of treatment. Rufino Galvin, APRN - CNP)  Review ofOARRS does not show any aberrant prescription behavior. Medication is helping the patient stay active. Patient denies any side effects and reports adequate analgesia. No sign of misuse/abuse.             Past Medical History:   Diagnosis Date    Anxiety     CAD (coronary artery disease)     Mitral valve prolapse    Fatty liver     GERD (gastroesophageal reflux disease)     Headache     History of bronchitis     Hyperlipidemia     Hypothyroidism     Kidney stone     LFT elevation     MVP (mitral valve prolapse)     Obesity     Osteoarthritis of cervical spine     Pulmonary emboli (HCC)     Type 2 diabetes mellitus without complication ([facility] [address]     Umbilical hernia        Past Surgical History:   Procedure Laterality Date    APPENDECTOMY       SECTION      2 pfannenstiel, 1 vertical    CHOLECYSTECTOMY      COLONOSCOPY      Dr [facility] COLONOSCOPY  14    COLONOSCOPY  2014    biopsy & sigmoid spasms, pathology negative    COLONOSCOPY N/A 2018    COLONOSCOPY WITH BIOPSY performed by Rebecca Gill MD at [address] [place] [address] N/A 2021    COLON[facility] DIAGNOSTIC [facility]    HYSTERECTOMY, TOTAL ABDOMINAL      2010    SD EXPLORATORY OF ABDOMEN  10/21/2014    Laparotomy-lysis of adhesions, bso     UPPER GASTROINTESTINAL ENDOSCOPY  2/17/2010    mild chronic inactive gastritis    UPPER GASTROINTESTINAL ENDOSCOPY N/A 3/10/2021    EGD BIOPSY [facility] Verónica [facility]alcava [facility]       Allergies   Allergen Reactions    Compazine [Prochlorperazine]      Jittery, restless    Sulfa Antibiotics Hives    Adhesive Tape Rash         Current Outpatient Medications:     [START ON 2/7/2022] oxyCODONE-acetaminophen (PERCOCET)  MG per tablet, Take 1 tablet by mouth every 6 hours as needed for Pain for up to 30 days. , Disp: 120 tablet, Rfl: 0    rOPINIRole (REQUIP) 2 MG tablet, TAKE 1 TABLET BY MOUTH EVERY NIGHT, Disp: 90 tablet, Rfl: 1    sucralfate (CARAFATE) 1 GM tablet, Take 1 tablet by mouth 4 times daily, Disp: 120 tablet, Rfl: 3    esomeprazole (NEXIUM) 40 MG delayed release capsule, TAKE 1 CAPSULE BY MOUTH EVERY MORNING BEFORE BREAKFAST, Disp: 90 capsule, Rfl: 0    sertraline (ZOLOFT) 100 MG tablet, Take 1.5 tablets by mouth daily, Disp: 45 tablet, Rfl: 2    levothyroxine (SYNTHROID) 175 MCG tablet, TAKE 1 TABLET BY MOUTH DAILY, Disp: 90 tablet, Rfl: 1    traZODone (DESYREL) 100 MG tablet, , Disp: , Rfl:     rivaroxaban (XARELTO) 20 MG TABS tablet, Take 1 tablet by mouth daily (with breakfast), Disp: 90 tablet, Rfl: 3    topiramate (TOPAMAX) 25 MG tablet, 25 mg 2 times daily , Disp: , Rfl:     metoprolol tartrate (LOPRESSOR) 50 MG tablet, Take 50 mg by mouth 2 times daily , Disp: , Rfl:     tiZANidine (ZANAFLEX) 4 MG tablet, TAKE 1 TABLET BY MOUTH EVERY 8 HOURS AS NEEDED FOR SPASMS, Disp: 90 tablet, Rfl: 0    ondansetron (ZOFRAN ODT) 4 MG disintegrating tablet, Take 1 tablet by mouth every 8 hours as needed for Nausea or Vomiting, Disp: 20 tablet, Rfl: 0    naloxone (NARCAN) 4 MG/0.1ML LIQD nasal spray, 1 spray by Nasal route as needed for Opioid Reversal (Patient not taking: Reported on 2022), Disp: 1 each, Rfl: 0    albuterol sulfate HFA (VENTOLIN HFA) 108 (90 Base) MCG/ACT inhaler, Inhale 2 puffs into the lungs every 6 hours as needed for Wheezing (Patient not taking: Reported on 2022), Disp: 1 Inhaler, Rfl: 3    albuterol sulfate  (90 Base) MCG/ACT inhaler, INHALE 2 PUFFS INTO THE LUNGS EVERY 4 HOURS AS NEEDED FOR SHORTNESS OF BREATH, Disp: 42.5 g, Rfl: 3    lidocaine (LIDODERM) 5 %, Place 1 patch onto the skin daily 12 hours on, 12 hours off. (Patient not taking: Reported on 2022), Disp: 10 patch, Rfl: 0    clonazePAM (KLONOPIN) 0.5 MG tablet, Take 1 tablet by mouth 2 times daily as needed for Anxiety for up to 30 days. , Disp: 60 tablet, Rfl: 1    Family History   Problem Relation Age of Onset   Genevia Nares Cancer Mother         vaginal    Heart Disease Father     Diabetes Father     Other Father         colon resection for colon polyps    Breast Cancer Maternal Grandmother     Stroke Maternal Grandfather        Social History     Socioeconomic History    Marital status:      Spouse name: Not on file    Number of children: Not on file    Years of education: Not on file    Highest education level: Not on file   Occupational History    Occupation: Homemaker   Tobacco Use    Smoking status: Former Smoker     Packs/day: 0.25     Years: 33.00     Pack years: 8.25     Types: Cigarettes     Quit date:      Years since quittin.0    Smokeless tobacco: Never Used    Tobacco comment: quit on nicoderm patch   Vaping Use    Vaping Use: Never used   Substance and Sexual Activity    Alcohol use: No     Alcohol/week: 0.0 standard drinks    Drug use: No    Sexual activity: Not Currently   Other Topics Concern    Not on file   Social History Narrative    Not on file     Social Determinants of Health     Financial Resource Strain: Low Risk     Difficulty of Paying Living Expenses: Not hard at all   Food Insecurity: No Food Insecurity    Worried About Running Out of Food in the Last Year: Never true    Ran Out of Food in the Last Year: Never true   Transportation Needs:     Lack of Transportation (Medical): Not on file    Lack of Transportation (Non-Medical): Not on file   Physical Activity:     Days of Exercise per Week: Not on file    Minutes of Exercise per Session: Not on file   Stress:     Feeling of Stress : Not on file   Social Connections:     Frequency of Communication with Friends and Family: Not on file    Frequency of Social Gatherings with Friends and Family: Not on file    Attends [employer]ist Services: Not on file    Active Member of [address] [place] [address] [employer] or Organizations: Not on file    Attends Club or Organization Meetings: Not on file    Marital Status: Not on file   Intimate Partner Violence:     Fear of Current or Ex-Partner: Not on file    Emotionally Abused: Not on file    Physically Abused: Not on file    Sexually Abused: Not on file   Housing Stability:     Unable to Pay for Housing in the Last Year: Not on file    Number of [place] in the Last Year: Not on file    Unstable Housing in the Last Year: Not on file         Review of Systems:  Review of Systems   Constitutional: Negative. Eyes:        Glasses   Cardiovascular: Negative. Respiratory: Positive for cough. Endocrine: Negative. Hematologic/Lymphatic: Bruises/bleeds easily. Skin: Negative. Musculoskeletal: Positive for joint pain and neck pain. Gastrointestinal: Positive for nausea. Genitourinary: Positive for dysuria. Neurological: Positive for headaches and weakness. Psychiatric/Behavioral: Negative. Physical Exam:  [facility] 11/01/2010     Physical Exam  HENT:      Head: Normocephalic. Pulmonary:      Effort: Pulmonary effort is normal.   Skin:         Neurological:      General: No focal deficit present. Mental Status: She is alert. Psychiatric:         Mood and Affect: Mood normal.         Thought Content:  Thought content normal. Assessment:  Problem List Items Addressed This Visit     Spondylosis of cervical region without myelopathy or radiculopathy    Relevant Medications    oxyCODONE-acetaminophen (PERCOCET)  MG per tablet (Start on 2/7/2022)    Encounter for medication monitoring    Relevant Medications    oxyCODONE-acetaminophen (PERCOCET)  MG per tablet (Start on 2/7/2022)    Degenerative cervical spinal stenosis    Relevant Medications    oxyCODONE-acetaminophen (PERCOCET)  MG per tablet (Start on 2/7/2022)    Chronic neck pain (Chronic)    Relevant Medications    oxyCODONE-acetaminophen (PERCOCET)  MG per tablet (Start on 2/7/2022)    Cervical radiculopathy    Relevant Medications    oxyCODONE-acetaminophen (PERCOCET)  MG per tablet (Start on 2/7/2022)    Arthropathy of cervical facet joint - Primary    Relevant Medications    oxyCODONE-acetaminophen (PERCOCET)  MG per tablet (Start on 2/7/2022)                Treatment Plan:  DISCUSSION: Treatment options discussed withpatient and all questions answered to patient's satisfaction. Possible side effects, risk of tolerance and or dependence and alternative treatments discussed    Obtaining appropriate analgesic effect of treatment   No signs of potential drug abuse or diversion identified    [x] Ill effects of being on chronic pain medications such as sleep disturbances, respiratory depression, hormonal changes, withdrawal symptoms, chronic opioid dependence and tolerance as well as risk of taking opioids with Benzodiazepines and taking opioids along with alcohol,  werediscussed with patient. I had asked the patient to minimize medication use and utilize pain medications only for uncontrolled rest pain or pain with exertional activities. I advised patient not to self-escalate painmedications without consulting with us.   At each of patient's future visits we will try to taper pain medications, while adjusting the adjunct medications, and re-evaluating for Physical Therapy to improve spinal andjoint strength. We will continue to have discussions to decrease pain medications as tolerated. Counseled patient on effects their pain medication and /or their medical condition mayhave on their  ability to drive or operate machinery. Instructed not to drive or operate machinery if drowsy     I also discussed with the patient regarding the dangers of combining narcotic pain medication with tranquilizers, alcohol or illegal drugs or taking the medication any way other than prescribed. The dangers were discussed  including respiratory depression and death. Patient was told to tell  all  physicians regarding the medications he is getting from pain clinic. Patient is warned not to take any unprescribed medications over-the-countermedications that can depress breathing . Patient is advised to talk to the pharmacist or physicians if planning to take any over-the-counter medications before  takeing them. Patient is strongly advised to avoid tranquilizers or  relaxants, illegal drugs  or any medications that can depress breathing  Patient is also advised to tell us if there is any changes in their medications from other physicians. Gareth Rasmussen, was evaluated through a synchronous (real-time) audio-video  encounter. The patient (or guardian if applicable) is aware that this is a billable  service, which includes applicable co-pays. This Virtual Visit was conducted with  patient's (and/or legal guardian's) consent. The visit was conducted pursuant to  the emergency declaration under the [facility], [address] [place] [address] waVA Hospital authority and the [employer] and  [employer] General Act. Patient identification was verified,  and a caregiver was present when appropriate. The patient was located in a  state where the provider was licensed to provide care.     1. Discussed RFA of left cervical median branch nerves.  She wants to think about it, she got 100% relief  From diagnostic/confirmatory cervical median branch nerve block C2-C5    TREATMENT OPTIONS:       Medication Agreement Requirements Met  Continue Opioid therapy  Script written for  percocet  Follow up appointment made

## 2022-02-02 ENCOUNTER — HOSPITAL ENCOUNTER (OUTPATIENT)
Dept: PAIN MANAGEMENT | Age: 52
Discharge: HOME | End: 2022-02-02
Payer: MEDICAID

## 2022-02-02 DIAGNOSIS — M48.02 DEGENERATIVE CERVICAL SPINAL STENOSIS: ICD-10-CM

## 2022-02-02 DIAGNOSIS — G89.29 CHRONIC NECK PAIN: Chronic | ICD-10-CM

## 2022-02-02 DIAGNOSIS — M47.812 SPONDYLOSIS OF CERVICAL REGION WITHOUT MYELOPATHY OR RADICULOPATHY: ICD-10-CM

## 2022-02-02 DIAGNOSIS — M54.12 CERVICAL RADICULOPATHY: ICD-10-CM

## 2022-02-02 DIAGNOSIS — M47.812 ARTHROPATHY OF CERVICAL FACET JOINT: Primary | ICD-10-CM

## 2022-02-02 DIAGNOSIS — Z51.81 ENCOUNTER FOR MEDICATION MONITORING: ICD-10-CM

## 2022-02-02 DIAGNOSIS — M54.2 CHRONIC NECK PAIN: Chronic | ICD-10-CM

## 2022-02-02 LAB
CA 125: 7 U/ML
CARCINOEMBRYONIC ANTIGEN: 10 NG/ML
CHOLESTEROL, FASTING: 257 MG/DL
CHOLESTEROL/HDL RATIO: 6.1
ESTIMATED AVERAGE GLUCOSE: 120 MG/DL
HBA1C MFR BLD: 5.8 % (ref 4–6)
HDLC SERPL-MCNC: 42 MG/DL
LDL CHOLESTEROL: 184 MG/DL (ref 0–130)
TRIGLYCERIDE, FASTING: 155 MG/DL
VLDLC SERPL CALC-MCNC: ABNORMAL MG/DL (ref 1–30)

## 2022-02-02 PROCEDURE — 99213 OFFICE O/P EST LOW 20 MIN: CPT | Performed by: NURSE PRACTITIONER

## 2022-02-02 PROCEDURE — 99213 OFFICE O/P EST LOW 20 MIN: CPT

## 2022-02-02 RX ORDER — OXYCODONE AND ACETAMINOPHEN 10; 325 MG/1; MG/1
1 TABLET ORAL EVERY 6 HOURS PRN
Qty: 120 TABLET | Refills: 0 | Status: SHIPPED | OUTPATIENT
Start: 2022-02-07 | End: 2022-03-08 | Stop reason: SDUPTHER

## 2022-02-02 ASSESSMENT — ENCOUNTER SYMPTOMS
NAUSEA: 1
COUGH: 1

## 2022-02-07 ENCOUNTER — HOSPITAL ENCOUNTER (OUTPATIENT)
Dept: CT IMAGING | Facility: CLINIC | Age: 52
Discharge: HOME | End: 2022-02-09
Payer: MEDICAID

## 2022-02-07 DIAGNOSIS — R10.9 ACUTE ABDOMINAL PAIN: ICD-10-CM

## 2022-02-07 PROCEDURE — 74176 CT ABD & PELVIS W/O CONTRAST: CPT

## 2022-02-14 RX ORDER — TIZANIDINE 4 MG/1
TABLET ORAL
Qty: 90 TABLET | Refills: 0 | Status: ON HOLD | OUTPATIENT
Start: 2022-02-14 | End: 2022-10-13

## 2022-02-18 ENCOUNTER — OFFICE VISIT (OUTPATIENT)
Dept: ONCOLOGY | Age: 52
End: 2022-02-18
Payer: COMMERCIAL

## 2022-02-18 ENCOUNTER — TELEPHONE (OUTPATIENT)
Dept: ONCOLOGY | Age: 52
End: 2022-02-18

## 2022-02-18 VITALS
BODY MASS INDEX: 31.98 KG/M2 | WEIGHT: 186.3 LBS | TEMPERATURE: 98.3 F | DIASTOLIC BLOOD PRESSURE: 79 MMHG | HEART RATE: 73 BPM | SYSTOLIC BLOOD PRESSURE: 113 MMHG | RESPIRATION RATE: 16 BRPM

## 2022-02-18 DIAGNOSIS — J43.2 CENTRILOBULAR EMPHYSEMA (HCC): ICD-10-CM

## 2022-02-18 DIAGNOSIS — I26.99 PULMONARY EMBOLISM ON RIGHT (HCC): Primary | ICD-10-CM

## 2022-02-18 PROCEDURE — 99211 OFF/OP EST MAY X REQ PHY/QHP: CPT | Performed by: INTERNAL MEDICINE

## 2022-02-18 PROCEDURE — 99214 OFFICE O/P EST MOD 30 MIN: CPT | Performed by: INTERNAL MEDICINE

## 2022-02-24 DIAGNOSIS — K21.9 GASTROESOPHAGEAL REFLUX DISEASE WITHOUT ESOPHAGITIS: ICD-10-CM

## 2022-02-24 DIAGNOSIS — E03.9 HYPOTHYROIDISM, UNSPECIFIED TYPE: ICD-10-CM

## 2022-02-24 RX ORDER — LEVOTHYROXINE SODIUM 175 UG/1
175 TABLET ORAL DAILY
Qty: 90 TABLET | Refills: 0 | Status: SHIPPED | OUTPATIENT
Start: 2022-02-24 | End: 2022-03-03 | Stop reason: DRUGHIGH

## 2022-02-24 RX ORDER — ESOMEPRAZOLE MAGNESIUM 40 MG/1
CAPSULE, DELAYED RELEASE ORAL
Qty: 90 CAPSULE | Refills: 0 | Status: SHIPPED | OUTPATIENT
Start: 2022-02-24 | End: 2022-05-25

## 2022-02-24 NOTE — TELEPHONE ENCOUNTER
Please have patient schedule follow up visit to discuss lab results, TSH (has been seeing Dr Shelby Rahman which is ok with me)

## 2022-02-25 RX ORDER — TIZANIDINE 4 MG/1
TABLET ORAL
Qty: 90 TABLET | Refills: 0 | OUTPATIENT
Start: 2022-02-25

## 2022-03-01 RX ORDER — CLONAZEPAM 1 MG/1
TABLET ORAL 2 TIMES DAILY
COMMUNITY
Start: 2022-01-26

## 2022-03-02 NOTE — PROGRESS NOTES
[facility] 89 PROGRESS NOTE      Patient  completed []  video visit   []   phone call:         Minutes :   [] in person visit to pain clinic    []    to  review Medication Agreement    []  Follow up after procedure   []  Discuss treatment options      Location:  Provider:  working from    []    home    []   [facility] - KADEEM MONTEJO ,   patient at   [] pain clinic     []  home         Chief Complaint:    She had  Diagnostic/confirmatory  Left Cervical Medical branch nerve block at C2 / Doreatha Carbine / Cody Fresh / Donovan Puff on 1-13-22 with 100% relief for a few days. She had left cervical facet injection C2-T1  8/2021 with 85% relief. She  has seen Neurosurgeon in the past  and surgery was not recommended. She had CT cervical spine 3-4-21 which read as : mild to moderate cervical degenerative disc disease with moderate bony neural foraminal narrowing on the left at C3-C4, C5- C6 RFA  Pf left cervical median branch nerves was discussed at last visit and she wanted to think about it,     She follows with Oncologist for elevated CEA. She had weight loss over 100 pounds under a year, no more weight loss. Treatment goals:  Functional status:       Aberrancy:   Any alcoholic beverages            Any illegal drugs         Analgesia: Adverse  Effects :      ADL;s :      Data:    When was thelast UDS:            Was the UDS appropriate:  [] yes []   no      Record/Diagnostics Review:      As above, I did review the imaging       DRUG SCREEN, PAIN  Order: [id number]   Status: Final result     Visible to patient: Yes (seen)     Next appt: 03/03/2022 at 10:20 AM in [facility]     Dx: Encounter for medication monitoring; Rovertozelterrie Pyo ..     1 Result Note    Component Ref Range & Units 2/3/21 1551 8/11/20 1049 7/15/20 1529 7/9/19 1100 2/25/19 1948 1/12/19 0915 1/16/18 1345   Pain Management Drug Panel Interp, Urine  Inconsistent ARUP    Inconsistent CMARUP    Inconsistent CMARUP Comment: (NOTE)   ________________________________________________________________   DRUGS EXPECTED:   NO DRUGS EXPECTED   ________________________________________________________________   INCONSISTENT with medications provided:   Oxycodone   Noroxycodone   7-Aminoclonazepam   ________________________________________________________________   INTERPRETIVE INFORMATION: Targeted drug profile Interp   Interpretation depends on accuracy and completeness of patient   medication information submitted by client. EXAMINATION:   MRI OF THE CERVICAL SPINE WITHOUT CONTRAST 3/4/2021 2:53 pm       TECHNIQUE:   Multiplanar multisequence MRI of the cervical spine was performed without the   administration of intravenous contrast.       COMPARISON:   06/12/2020       HISTORY:   ORDERING SYSTEM PROVIDED HISTORY: Cervical radiculopathy   TECHNOLOGIST PROVIDED HISTORY:   evaluate for stenosis   Is the patient pregnant?->No   Reason for Exam: pt stated left shoulder arm pain weakness numbness x 1 mth   Acuity: Acute   Type of Exam: Initial   Additional signs and symptoms: pt stated left shoulder arm pain weakness   numbness x 1 mth, NKI       FINDINGS:   BONES/ALIGNMENT: There is normal alignment of the spine. The vertebral body   heights are maintained. The bone marrow signal appears unremarkable.  There   is minimal degenerative disc disease with disc space narrowing and   osteophytes at C3-4, C4-5, C5-6 and C6-7.       SPINAL CORD:  No abnormal signal is identified within the spinal cord.       SOFT TISSUES: No paraspinal mass identified.       C2-C3: There is minimum disc protrusion of 2 mm centrally. The thecal sac and   neural foramina are intact.       C3-C4: There is disc protrusion osteophyte toward the left measuring 3-4 mm.    The thecal sac is not stenotic.  There is mild narrowing of the left neural   foramen.  The right neural foramen is intact.       C4-C5: There is disc protrusion osteophyte measuring 2-3 mm.  The thecal sac   is mildly stenotic measuring 9.5 mm. Disc and/or osteophytes result in mild   narrowing of the neural foramina bilaterally. The left neural foramen is   narrowed greater than right.       C5-C6: There is disc protrusion osteophyte measuring 5-6 mm toward the left   narrowing the left neural foramen.  The thecal sac is mildly stenotic   measuring 9.3 mm.  The right neural foramen is intact.       C6-C7: Disc and/or osteophytes cause minimal narrowing of the neural foramina   bilaterally.  The thecal sac is not stenotic.       C7-T1:  The thecal sac and neural foramina are intact.           Impression   Disc and osteophytes result in narrowing of the neural foramina and mild   stenosis of the thecal sac throughout the cervical region as discussed in   detail above.                  EXAMINATION:   CT OF THE CERVICAL SPINE WITHOUT CONTRAST 3/4/2021 5:19 pm       TECHNIQUE:   CT of the cervical spine was performed without the administration of   intravenous contrast. Multiplanar reformatted images are provided for review. Dose modulation, iterative reconstruction, and/or weight based adjustment of   the mA/kV was utilized to reduce the radiation dose to as low as reasonably   achievable.       COMPARISON:   February 13, 2021.       HISTORY:   ORDERING SYSTEM PROVIDED HISTORY: radicular pain left arm   TECHNOLOGIST PROVIDED HISTORY:   radicular pain left arm   Decision Support Exception->Emergency Medical Condition (MA)   Is the patient pregnant?->No   Reason for Exam: radicular pain left arm for three weeks. patient states   limited ROM and pain starting in neck. no injuries   Acuity: Unknown   Type of Exam: Unknown       FINDINGS:   Bone mineralization is normal.  The vertebral bodies and posterior elements   appear intact and appropriately aligned without acute fracture or   subluxation.  Vertebral body stature is maintained throughout. Ahmed Ship is   straightening of the normal cervical lordosis.  Mild-to-moderate cervical   degenerative disc disease is present throughout. Ahmed Ship is moderate bony   neural foraminal narrowing on the left at C3-C4 and C5-C6.  No significant   uncovertebral joint hypertrophic change or additional bony neural foraminal   narrowing.  No paraspinal soft tissue abnormality.       The visualized lung apices are grossly clear.           Impression   Mild to moderate multilevel cervical degenerative disc disease with moderate   bony neural foraminal narrowing on the left at C3-C4 and C5-C6.  No acute   fracture or subluxation.  Straightening of the normal cervical lordosis which   may be related to muscle spasm or position in collar.                     Pill count: appropriate    fill date :3-9-22    Morphine equivalent dose as reported on OARRS:60     Review ofOARRS does not show any aberrant prescription behavior. Medication is helping the patient stay active. Patient denies any side effects and reports adequate analgesia. No sign of misuse/abuse.             Past Medical History:   Diagnosis Date    Anxiety     CAD (coronary artery disease)     Mitral valve prolapse    Fatty liver     GERD (gastroesophageal reflux disease)     Headache     History of bronchitis     Hyperlipidemia     Hypothyroidism     Kidney stone     LFT elevation     MVP (mitral valve prolapse)     Obesity     Osteoarthritis of cervical spine     Pulmonary emboli (HCC)     Type 2 diabetes mellitus without complication ([facility] [address]     Umbilical hernia        Past Surgical History:   Procedure Laterality Date    APPENDECTOMY       SECTION      2 pfannenstiel, 1 vertical    CHOLECYSTECTOMY      COLONOSCOPY      Dr Shelia Hearn  14    COLONOSCOPY  2014    biopsy & sigmoid spasms, pathology negative    COLONOSCOPY N/A 2018    COLONOSCOPY WITH BIOPSY performed by Golden Gorman MD at [facility] N/A 2021    COLONOSCOPY DIAGNOSTIC performed by Charisma Snow MD at [facility]      x 5    HYSTERECTOMY, TOTAL ABDOMINAL      2010    GA EXPLORATORY OF ABDOMEN  10/21/2014    Laparotomy-lysis of adhesions, bso     UPPER GASTROINTESTINAL ENDOSCOPY  2/17/2010    mild chronic inactive gastritis    UPPER GASTROINTESTINAL ENDOSCOPY N/A 3/10/2021    EGD BIOPSY [facility]isma [facility]       Allergies   Allergen Reactions    Compazine [Prochlorperazine]      Jittery, restless    Sulfa Antibiotics Hives    Adhesive Tape Rash         Current Outpatient Medications:     clonazePAM (KLONOPIN) 1 MG tablet, TAKE 1/2 TABLET BY MOUTH FOUR TIMES DAILY AS NEEDED, Disp: , Rfl:     levothyroxine (SYNTHROID) 175 MCG tablet, TAKE 1 TABLET BY MOUTH DAILY, Disp: 90 tablet, Rfl: 0    esomeprazole (NEXIUM) 40 MG delayed release capsule, TAKE 1 CAPSULE BY MOUTH EVERY MORNING BEFORE BREAKFAST, Disp: 90 capsule, Rfl: 0    tiZANidine (ZANAFLEX) 4 MG tablet, TAKE 1 TABLET BY MOUTH EVERY 8 HOURS AS NEEDED FOR SPASMS, Disp: 90 tablet, Rfl: 0    oxyCODONE-acetaminophen (PERCOCET)  MG per tablet, Take 1 tablet by mouth every 6 hours as needed for Pain for up to 30 days. , Disp: 120 tablet, Rfl: 0    rOPINIRole (REQUIP) 2 MG tablet, TAKE 1 TABLET BY MOUTH EVERY NIGHT, Disp: 90 tablet, Rfl: 1    ondansetron (ZOFRAN ODT) 4 MG disintegrating tablet, Take 1 tablet by mouth every 8 hours as needed for Nausea or Vomiting, Disp: 20 tablet, Rfl: 0    sertraline (ZOLOFT) 100 MG tablet, Take 1.5 tablets by mouth daily, Disp: 45 tablet, Rfl: 2    traZODone (DESYREL) 100 MG tablet, , Disp: , Rfl:     albuterol sulfate HFA (VENTOLIN HFA) 108 (90 Base) MCG/ACT inhaler, Inhale 2 puffs into the lungs every 6 hours as needed for Wheezing, Disp: 1 Inhaler, Rfl: 3    albuterol sulfate  (90 Base) MCG/ACT inhaler, INHALE 2 PUFFS INTO THE LUNGS EVERY 4 HOURS AS NEEDED FOR SHORTNESS OF BREATH, Disp: 42.5 g, Rfl: 3    rivaroxaban (XARELTO) 20 MG TABS tablet, Take 1 tablet by mouth daily (with breakfast), Disp: 90 tablet, Rfl: 3    clonazePAM (KLONOPIN) 0.5 MG tablet, Take 1 tablet by mouth 2 times daily as needed for Anxiety for up to 30 days. , Disp: 60 tablet, Rfl: 1    topiramate (TOPAMAX) 25 MG tablet, 25 mg 2 times daily , Disp: , Rfl:     metoprolol tartrate (LOPRESSOR) 50 MG tablet, Take 50 mg by mouth 2 times daily , Disp: , Rfl:     Family History   Problem Relation Age of Onset    Cancer Mother         vaginal    Heart Disease Father     Diabetes Father     Other Father         colon resection for colon polyps    Breast Cancer Maternal Grandmother     Stroke Maternal Grandfather        Social History     Socioeconomic History    Marital status:      Spouse name: Not on file    Number of children: Not on file    Years of education: Not on file    Highest education level: Not on file   Occupational History    Occupation: Homemaker   Tobacco Use    Smoking status: Former Smoker     Packs/day: 0.25     Years: 33.00     Pack years: 8.25     Types: Cigarettes     Quit date:      Years since quittin.1    Smokeless tobacco: Never Used    Tobacco comment: quit on nicoderm patch   Vaping Use    Vaping Use: Never used   Substance and Sexual Activity    Alcohol use: No     Alcohol/week: 0.0 standard drinks    Drug use: No    Sexual activity: Not Currently   Other Topics Concern    Not on file   Social History Narrative    Not on file     Social Determinants of Health     Financial Resource Strain: Low Risk     Difficulty of Paying Living Expenses: Not hard at all   Food Insecurity: No Food Insecurity    Worried About Running Out of Food in the Last Year: Never true    Kamille of Food in the Last Year: Never true   Transportation Needs:     Lack of Transportation (Medical): Not on file    Lack of Transportation (Non-Medical):  Not on file   Physical Activity:     Days of Exercise per Week: Not on file    Minutes of Exercise per Session: Not on file   Stress:     Feeling of Stress : Not on file   Social Connections:     Frequency of Communication with Friends and Family: Not on file    Frequency of Social Gatherings with Friends and Family: Not on file    Attends Yazidi Services: Not on file    Active Member of Clubs or Organizations: Not on file    Attends Club or Organization Meetings: Not on file    Marital Status: Not on file   Intimate Partner Violence:     Fear of Current or Ex-Partner: Not on file    Emotionally Abused: Not on file    Physically Abused: Not on file    Sexually Abused: Not on file   Housing Stability:     Unable to Pay for Housing in the Last Year: Not on file    Number of [place] in the Last Year: Not on file    Unstable Housing in the Last Year: Not on file         Review of Systems:  ROS      Physical Exam:  [facility] 11/01/2010     Physical Exam      Assessment:    Problem List Items Addressed This Visit     Sprain of atlanto-occipital joint, sequela    Spondylosis of cervical region without myelopathy or radiculopathy    Encounter for medication monitoring    Degenerative cervical spinal stenosis    Chronic neck pain (Chronic)    Relevant Medications    clonazePAM (KLONOPIN) 1 MG tablet    Cervical radiculopathy    Relevant Medications    clonazePAM (KLONOPIN) 1 MG tablet    Arthropathy of cervical facet joint - Primary            Treatment Plan:  DISCUSSION: Treatment options discussed withpatient and all questions answered to patient's satisfaction.      Possible side effects, risk of tolerance and or dependence and alternative treatments discussed    Obtaining appropriate analgesic effect of treatment   No signs of potential drug abuse or diversion identified    [x] Ill effects of being on chronic pain medications such as sleep disturbances, respiratory depression, hormonal changes, withdrawal symptoms, chronic opioid dependence and tolerance as well as risk of taking opioids with Benzodiazepines and taking opioids along with alcohol,  werediscussed with patient. I had asked the patient to minimize medication use and utilize pain medications only for uncontrolled rest pain or pain with exertional activities. I advised patient not to self-escalate painmedications without consulting with us. At each of patient's future visits we will try to taper pain medications, while adjusting the adjunct medications, and re-evaluating for Physical Therapy to improve spinal andjoint strength. We will continue to have discussions to decrease pain medications as tolerated. Counseled patient on effects their pain medication and /or their medical condition mayhave on their  ability to drive or operate machinery. Instructed not to drive or operate machinery if drowsy     I also discussed with the patient regarding the dangers of combining narcotic pain medication with tranquilizers, alcohol or illegal drugs or taking the medication any way other than prescribed. The dangers were discussed  including respiratory depression and death. Patient was told to tell  all  physicians regarding the medications he is getting from pain clinic. Patient is warned not to take any unprescribed medications over-the-countermedications that can depress breathing . Patient is advised to talk to the pharmacist or physicians if planning to take any over-the-counter medications before  takeing them. Patient is strongly advised to avoid tranquilizers or  relaxants, illegal drugs  or any medications that can depress breathing  Patient is also advised to tell us if there is any changes in their medications from other physicians. Shellie Lyon, was evaluated through a synchronous (real-time) audio-video  encounter. The patient (or guardian if applicable) is aware that this is a billable  service, which includes applicable co-pays. This Virtual Visit was conducted with  patient's (and/or legal guardian's) consent.  The visit was conducted pursuant to  the emergency declaration under the [facility], [address] [place] [address] [facility] authority and the [employer] and  [employer] General Act. Patient identification was verified,  and a caregiver was present when appropriate. The patient was located in a  state where the provider was licensed to provide care.         TREATMENT OPTIONS:       Medication Agreement Requirements Met  Continue Opioid therapy  Script written for    Follow up appointment made

## 2022-03-03 ENCOUNTER — OFFICE VISIT (OUTPATIENT)
Dept: INTERNAL MEDICINE CLINIC | Age: 52
End: 2022-03-03
Payer: MEDICAID

## 2022-03-03 ENCOUNTER — HOSPITAL ENCOUNTER (OUTPATIENT)
Dept: PAIN MANAGEMENT | Age: 52
Discharge: HOME | End: 2022-03-03

## 2022-03-03 VITALS
TEMPERATURE: 98.2 F | HEART RATE: 73 BPM | BODY MASS INDEX: 31.07 KG/M2 | OXYGEN SATURATION: 98 % | DIASTOLIC BLOOD PRESSURE: 72 MMHG | HEIGHT: 64 IN | WEIGHT: 182 LBS | SYSTOLIC BLOOD PRESSURE: 116 MMHG

## 2022-03-03 DIAGNOSIS — Z98.890 HISTORY OF HERNIA REPAIR: ICD-10-CM

## 2022-03-03 DIAGNOSIS — M48.02 DEGENERATIVE CERVICAL SPINAL STENOSIS: ICD-10-CM

## 2022-03-03 DIAGNOSIS — M47.812 SPONDYLOSIS OF CERVICAL REGION WITHOUT MYELOPATHY OR RADICULOPATHY: ICD-10-CM

## 2022-03-03 DIAGNOSIS — M47.812 ARTHROPATHY OF CERVICAL FACET JOINT: Primary | ICD-10-CM

## 2022-03-03 DIAGNOSIS — E03.9 HYPOTHYROIDISM, UNSPECIFIED TYPE: ICD-10-CM

## 2022-03-03 DIAGNOSIS — Z51.81 ENCOUNTER FOR MEDICATION MONITORING: ICD-10-CM

## 2022-03-03 DIAGNOSIS — S13.4XXS SPRAIN OF ATLANTO-OCCIPITAL JOINT, SEQUELA: ICD-10-CM

## 2022-03-03 DIAGNOSIS — G89.29 CHRONIC NECK PAIN: Chronic | ICD-10-CM

## 2022-03-03 DIAGNOSIS — E78.5 HYPERLIPIDEMIA, UNSPECIFIED HYPERLIPIDEMIA TYPE: Primary | ICD-10-CM

## 2022-03-03 DIAGNOSIS — R73.03 PREDIABETES: ICD-10-CM

## 2022-03-03 DIAGNOSIS — M54.2 CHRONIC NECK PAIN: Chronic | ICD-10-CM

## 2022-03-03 DIAGNOSIS — Z87.19 HISTORY OF HERNIA REPAIR: ICD-10-CM

## 2022-03-03 DIAGNOSIS — M54.12 CERVICAL RADICULOPATHY: ICD-10-CM

## 2022-03-03 PROCEDURE — 99214 OFFICE O/P EST MOD 30 MIN: CPT | Performed by: NURSE PRACTITIONER

## 2022-03-03 RX ORDER — ZOLPIDEM TARTRATE 12.5 MG/1
TABLET, FILM COATED, EXTENDED RELEASE ORAL
COMMUNITY
Start: 2022-03-01 | End: 2022-09-01

## 2022-03-03 RX ORDER — ATORVASTATIN CALCIUM 40 MG/1
TABLET, FILM COATED ORAL
COMMUNITY
Start: 2022-02-27

## 2022-03-03 RX ORDER — LEVOTHYROXINE SODIUM 175 UG/1
175 TABLET ORAL DAILY
Qty: 90 TABLET | Refills: 0 | Status: SHIPPED | OUTPATIENT
Start: 2022-03-03 | End: 2022-05-25

## 2022-03-03 RX ORDER — SUCRALFATE 1 G/1
TABLET ORAL
COMMUNITY
Start: 2022-02-28 | End: 2022-04-25

## 2022-03-03 ASSESSMENT — ENCOUNTER SYMPTOMS
COUGH: 1
WHEEZING: 0

## 2022-03-03 ASSESSMENT — PATIENT HEALTH QUESTIONNAIRE - PHQ9
SUM OF ALL RESPONSES TO PHQ QUESTIONS 1-9: 0
SUM OF ALL RESPONSES TO PHQ QUESTIONS 1-9: 0
1. LITTLE INTEREST OR PLEASURE IN DOING THINGS: 0
SUM OF ALL RESPONSES TO PHQ QUESTIONS 1-9: 0
SUM OF ALL RESPONSES TO PHQ QUESTIONS 1-9: 0
2. FEELING DOWN, DEPRESSED OR HOPELESS: 0
SUM OF ALL RESPONSES TO PHQ9 QUESTIONS 1 & 2: 0

## 2022-03-03 NOTE — PROGRESS NOTES
Completed    Hepatitis A vaccine  Aged Out    Hib vaccine  Aged Out    Meningococcal (ACWY) vaccine  Aged Out           [address] [address]  Dept: [phone]  Dept Fax: [phone]    Office Progress/Follow Up Note  Date of patient's visit: 3/3/2022   Patient's Name:  Anne Fuentes  YOB: 1970            Patient Care Team:  MARTITA Hester CNP as PCP - General (Internal Medicine)  [facility]ART[facility]ester [facility]  Tessa Rojas MD as Consulting Physician (Gastroenterology)  David Boyer MD as Consulting Physician (Obstetrics & Gynecology)  Benoit Raya MD as Consulting Physician (Gastroenterology)    REASON FOR VISIT: Follow-up and Discuss Labs        HISTORY OF PRESENT ILLNESS:      History was obtained from the patient. Anne Fuentes is a 49 y.o. is here for Follow-up and Discuss Labs  Patient with multiple medical problems and complaints. Labs reviewed with her, from 2/1/2022. TSH at 7.49, on synthroid. Admits to fatigue, hair loss, weight gain. Hyperlipidemia- , was started on lipitor by cardiology and is tolerating ok.      Prediabetes-A1C 5.8    Patient Active Problem List   Diagnosis    Hyperlipidemia    MVP (mitral valve prolapse)    Anxiety    Hypothyroidism    Allergic rhinitis    Obesity    Abdominal pain    Elevated liver enzymes    GERD (gastroesophageal reflux disease)    Ovarian mass    S/P bilateral oophorectomy & KRUPA 10/21/14    Pain emptying bladder    Urinary urgency    Insomnia    RLS (restless legs syndrome)    Pancreatitis    Eye swelling, left    Spondylosis of cervical region without myelopathy or radiculopathy    Degenerative cervical spinal stenosis    Trigger point with tension headache    Arthropathy of cervical facet joint    Atlanto-axial joint sprain, sequela    Cervical radiculopathy    Sprain of atlanto-occipital joint, sequela    Encounter for medication monitoring    Myofascial muscle pain    Colitis    Chest pain    Unstable angina (HCC)    Pulmonary embolism on right (HCC)    Hematemesis with nausea    Acute respiratory failure with hypoxia (HCC)    Family history of colon cancer    Irritable bowel syndrome with both constipation and diarrhea    Elevated CEA    Diarrhea    Erosive gastritis    Weight loss    Prediabetes    Chronic neck pain    Obstructive chronic bronchitis with exacerbation (HCC)    Thrombocytopenia, unspecified       Allergies   Allergen Reactions    Compazine [Prochlorperazine]      Jittery, restless    Sulfa Antibiotics Hives    Adhesive Tape Rash         MEDICATIONS:     Current Outpatient Medications   Medication Sig Dispense Refill    zolpidem (AMBIEN CR) 12.5 MG extended release tablet TAKE 1 TABLET BY MOUTH AT BEDTIME      atorvastatin (LIPITOR) 40 MG tablet TAKE 1 TABLET BY MOUTH EVERY DAY      sucralfate (CARAFATE) 1 GM tablet TAKE 1 TABLET BY MOUTH FOUR TIMES DAILY      levothyroxine (SYNTHROID) 175 MCG tablet Take 1 tablet by mouth daily Take 1 tab Monday-Saturday, and 1.5 tabs on Sunday 90 tablet 0    clonazePAM (KLONOPIN) 1 MG tablet TAKE 1/2 TABLET BY MOUTH FOUR TIMES DAILY AS NEEDED      esomeprazole (NEXIUM) 40 MG delayed release capsule TAKE 1 CAPSULE BY MOUTH EVERY MORNING BEFORE BREAKFAST 90 capsule 0    tiZANidine (ZANAFLEX) 4 MG tablet TAKE 1 TABLET BY MOUTH EVERY 8 HOURS AS NEEDED FOR SPASMS 90 tablet 0    rOPINIRole (REQUIP) 2 MG tablet TAKE 1 TABLET BY MOUTH EVERY NIGHT 90 tablet 1    ondansetron (ZOFRAN ODT) 4 MG disintegrating tablet Take 1 tablet by mouth every 8 hours as needed for Nausea or Vomiting 20 tablet 0    sertraline (ZOLOFT) 100 MG tablet Take 1.5 tablets by mouth daily 45 tablet 2    traZODone (DESYREL) 100 MG tablet       albuterol sulfate HFA (VENTOLIN HFA) 108 (90 Base) MCG/ACT inhaler Inhale 2 puffs into the lungs every 6 hours as needed for Wheezing 1 Inhaler 3    albuterol sulfate  (90 Base) MCG/ACT inhaler INHALE 2 PUFFS INTO THE LUNGS EVERY 4 HOURS AS NEEDED FOR SHORTNESS OF BREATH 42.5 g 3    rivaroxaban (XARELTO) 20 MG TABS tablet Take 1 tablet by mouth daily (with breakfast) 90 tablet 3    topiramate (TOPAMAX) 25 MG tablet 25 mg 2 times daily       metoprolol tartrate (LOPRESSOR) 50 MG tablet Take 50 mg by mouth 2 times daily       oxyCODONE-acetaminophen (PERCOCET)  MG per tablet Take 1 tablet by mouth every 6 hours as needed for Pain for up to 30 days. 120 tablet 0    methylPREDNISolone (MEDROL DOSEPACK) 4 MG tablet Take by mouth. 1 kit 0    clonazePAM (KLONOPIN) 0.5 MG tablet Take 1 tablet by mouth 2 times daily as needed for Anxiety for up to 30 days. 60 tablet 1     No current facility-administered medications for this visit. SOCIAL HISTORY    Reviewed and updated. Adolfo Mccarthy  reports that she quit smoking about 14 months ago. Her smoking use included cigarettes. She has a 8.25 pack-year smoking history. She has never used smokeless tobacco.    FAMILY HISTORY:    Reviewed and updated. family history includes Breast Cancer in her maternal grandmother; Cancer in her mother; Diabetes in her father; Heart Disease in her father; Other in her father; Stroke in her maternal grandfather. REVIEW OF SYSTEMS:      Review of Systems   Constitutional: Positive for fatigue. Negative for chills and fever. HENT: Negative for trouble swallowing. Respiratory: Positive for cough and shortness of breath (occasional). Negative for wheezing. Cardiovascular: Negative for chest pain, palpitations and leg swelling. Gastrointestinal: Positive for abdominal pain and diarrhea. Wants an opinion on hernia repair due to CT abd results showing crumpled mesh and abd pain. Did not care for Dr Madhav Faulkner   Musculoskeletal: Positive for arthralgias and neck pain. Negative for gait problem. Skin: Negative for color change. Neurological: Negative for headaches. Psychiatric/Behavioral: Positive for sleep disturbance. The patient is nervous/anxious. PHYSICAL EXAM:      Vitals:    03/03/22 1133   BP: 116/72   Site: Right Upper Arm   Position: Sitting   Cuff Size: Large Adult   Pulse: 73   Temp: 98.2 °F (36.8 °C)   TempSrc: Temporal   SpO2: 98%   Weight: 182 lb (82.6 kg)   Height: 5' 4\" (1.626 m)     BP Readings from Last 3 Encounters:   03/08/22 129/79   03/03/22 116/72   02/18/22 113/79        Physical Exam  Constitutional:       General: She is not in acute distress. Appearance: Normal appearance. She is well-developed. HENT:      Head: Normocephalic and atraumatic. Right Ear: External ear normal.      Left Ear: External ear normal.      Nose: Nose normal.   Eyes:      Conjunctiva/sclera: Conjunctivae normal.   Neck:      Thyroid: No thyromegaly. Cardiovascular:      Rate and Rhythm: Normal rate and regular rhythm. Pulses: Normal pulses. Heart sounds: Normal heart sounds. No murmur heard. Pulmonary:      Effort: Pulmonary effort is normal.      Breath sounds: Normal breath sounds. No wheezing. Abdominal:      General: There is no distension. Palpations: Abdomen is soft. There is no mass. Tenderness: There is generalized abdominal tenderness. Musculoskeletal:      Right lower leg: No edema. Left lower leg: No edema. Lymphadenopathy:      Cervical: No cervical adenopathy. Neurological:      Mental Status: She is alert. Mental status is at baseline. Gait: Gait normal.   Psychiatric:         Mood and Affect: Mood is depressed.          Behavior: Behavior normal.          LABORATORY FINDINGS:    CBC:   Lab Results   Component Value Date    WBC 5.3 02/01/2022    HGB 14.0 02/01/2022     02/01/2022     06/05/2012     BMP:   Lab Results   Component Value Date     02/01/2022    K 4.0 02/01/2022     02/01/2022    CO2 21 02/01/2022    BUN 21 02/01/2022 CREATININE 0.94 02/01/2022    GLUCOSE 94 02/01/2022    GLUCOSE 107 06/03/2019     HEMOGLOBIN A1C:   Lab Results   Component Value Date    LABA1C 5.8 02/01/2022     FASTING LIPID PANEL:   Lab Results   Component Value Date    CHOL 158 01/12/2019    HDL 42 02/01/2022    LDLCHOLESTEROL 184 (H) 02/01/2022    TRIG 133 01/12/2019       ASSESSMENT AND PLAN:      Visit Diagnoses and Associated Orders     Hyperlipidemia, unspecified hyperlipidemia type    -  Primary    , started on lipitor by cardiology. Advised on diet, exercise. atorvastatin (LIPITOR) 40 MG tablet [85844]           Hypothyroidism, unspecified type        Will adjust Synthroid dose modestly (take extra 1/2 tab weekly) and repeat TSH    levothyroxine (SYNTHROID) 175 MCG tablet [47460]      TSH [14012 [facility]]   - Future Order         History of hernia repair        Will refer to general surgeon for second opinion    Monica Moreno DO, General Surgery, [place] [facility]]           Prediabetes        Advised on healthy diet, regular exercise, weight loss         ORDERS WITHOUT AN ASSOCIATED DIAGNOSIS    zolpidem (AMBIEN CR) 12.5 MG extended release tablet [[id number]]      sucralfate (CARAFATE) 1 GM tablet [14844]             FOLLOW UP AND INSTRUCTIONS:     Return in about 2 months (around 5/3/2022) for chronic conditions, or sooner if needed. · Yue Paula received counseling on the following healthy behaviors: nutrition, exercise and medication adherence    · Discussed use, benefit, and side effects of prescribed medications. Barriers to medication compliance addressed. All patient questions answered. Pt voiced understanding. MARTITA Daniels CNP    3/12/2022, 8:53 PM    Please note that this chart was generated using voice recognition Dragon dictation software. Although every effort was made to ensure the accuracy of this automatedtranscription, some errors in transcription may have occurred.

## 2022-03-04 ENCOUNTER — TELEPHONE (OUTPATIENT)
Dept: SURGERY | Age: 52
End: 2022-03-04

## 2022-03-07 ENCOUNTER — OFFICE VISIT (OUTPATIENT)
Dept: SURGERY | Age: 52
End: 2022-03-07
Payer: COMMERCIAL

## 2022-03-07 VITALS — RESPIRATION RATE: 12 BRPM | HEIGHT: 64 IN | BODY MASS INDEX: 31.07 KG/M2 | WEIGHT: 182 LBS

## 2022-03-07 DIAGNOSIS — R10.9 ABDOMINAL WALL PAIN: Primary | ICD-10-CM

## 2022-03-07 PROCEDURE — 99203 OFFICE O/P NEW LOW 30 MIN: CPT | Performed by: SURGERY

## 2022-03-07 NOTE — PROGRESS NOTES
[id number] [address] [facility] Surgery   History & Physical  Vitaliy DO Yanet  Pt Name: Trevin Dawson  MRN: [id number]  YOB: 1970  Date of evaluation: 3/7/2022  Primary Care Physician: Ellwood Dakins, APRN - CNP    Chief Complaint: R abdominal wall pain      SUBJECTIVE:    History of Present Illness: This is a 49 y.o.  female who presents for evaluation for the above. Medical records show [address] of epigastric hernia repaired primarily in  by Dr. Edilia Villareal, pt had recurrence in 2016 and this was repaired robotically with Ventralight mesh. Pt reports no issues since then until recently when she had a prolonged period of illness including COVID and had associated coughing. Pt reports tenderness and sensation of bulging in her right abdomen. Denies any history of obstruction, nonsmoker and no other nicotine use. Chart review performed to add information to the HPI: Yes    Past Medical History   has a past medical history of Anxiety, CAD (coronary artery disease), Fatty liver, GERD (gastroesophageal reflux disease), Headache, History of bronchitis, Hyperlipidemia, Hypothyroidism, Kidney stone, LFT elevation, MVP (mitral valve prolapse), Obesity, Osteoarthritis of cervical spine, Pulmonary emboli (HCC), Type 2 diabetes mellitus without complication ([address] and Umbilical hernia. Past Surgical History   has a past surgical history that includes Upper gastrointestinal endoscopy (2010); hernia repair; Cholecystectomy; Appendectomy;  section; Colonoscopy (); Colonoscopy (14); Colonoscopy (2014); pr exploratory of abdomen (10/21/2014); Colonoscopy (N/A, 2018); Hysterectomy, total abdominal; Upper gastrointestinal endoscopy (N/A, 3/10/2021); and Colonoscopy (N/A, 2021). Family History  family history includes Breast Cancer in her maternal grandmother; Cancer in her mother; Diabetes in her father; Heart Disease in her father;  Other in her father; Stroke in her maternal grandfather. Social History  Tobacco use:  reports that she quit smoking about 14 months ago. Her smoking use included cigarettes. She has a 8.25 pack-year smoking history. She has never used smokeless tobacco.  Alcohol use:  reports no history of alcohol use. Drug use:  reports no history of drug use. Medications  Current Medications:   Current Outpatient Medications   Medication Sig Dispense Refill    zolpidem (AMBIEN CR) 12.5 MG extended release tablet TAKE 1 TABLET BY MOUTH AT BEDTIME      atorvastatin (LIPITOR) 40 MG tablet TAKE 1 TABLET BY MOUTH EVERY DAY      sucralfate (CARAFATE) 1 GM tablet TAKE 1 TABLET BY MOUTH FOUR TIMES DAILY      levothyroxine (SYNTHROID) 175 MCG tablet Take 1 tablet by mouth daily Take 1 tab Monday-Saturday, and 1.5 tabs on Sunday 90 tablet 0    clonazePAM (KLONOPIN) 1 MG tablet TAKE 1/2 TABLET BY MOUTH FOUR TIMES DAILY AS NEEDED      esomeprazole (NEXIUM) 40 MG delayed release capsule TAKE 1 CAPSULE BY MOUTH EVERY MORNING BEFORE BREAKFAST 90 capsule 0    tiZANidine (ZANAFLEX) 4 MG tablet TAKE 1 TABLET BY MOUTH EVERY 8 HOURS AS NEEDED FOR SPASMS 90 tablet 0    oxyCODONE-acetaminophen (PERCOCET)  MG per tablet Take 1 tablet by mouth every 6 hours as needed for Pain for up to 30 days.  120 tablet 0    rOPINIRole (REQUIP) 2 MG tablet TAKE 1 TABLET BY MOUTH EVERY NIGHT 90 tablet 1    ondansetron (ZOFRAN ODT) 4 MG disintegrating tablet Take 1 tablet by mouth every 8 hours as needed for Nausea or Vomiting 20 tablet 0    sertraline (ZOLOFT) 100 MG tablet Take 1.5 tablets by mouth daily 45 tablet 2    traZODone (DESYREL) 100 MG tablet       albuterol sulfate HFA (VENTOLIN HFA) 108 (90 Base) MCG/ACT inhaler Inhale 2 puffs into the lungs every 6 hours as needed for Wheezing 1 Inhaler 3    albuterol sulfate  (90 Base) MCG/ACT inhaler INHALE 2 PUFFS INTO THE LUNGS EVERY 4 HOURS AS NEEDED FOR SHORTNESS OF BREATH 42.5 g 3    rivaroxaban (XARELTO) 20 MG TABS tablet Take 1 tablet by mouth daily (with breakfast) 90 tablet 3    clonazePAM (KLONOPIN) 0.5 MG tablet Take 1 tablet by mouth 2 times daily as needed for Anxiety for up to 30 days. 60 tablet 1    topiramate (TOPAMAX) 25 MG tablet 25 mg 2 times daily       metoprolol tartrate (LOPRESSOR) 50 MG tablet Take 50 mg by mouth 2 times daily        No current facility-administered medications for this visit. Home Medications:   Prior to Admission medications    Medication Sig Start Date End Date Taking? Authorizing Provider   zolpidem (AMBIEN CR) 12.5 MG extended release tablet TAKE 1 TABLET BY MOUTH AT BEDTIME 3/1/22   Historical Provider, MD   atorvastatin (LIPITOR) 40 MG tablet TAKE 1 TABLET BY MOUTH EVERY DAY 2/27/22   Historical Provider, MD   sucralfate (CARAFATE) 1 GM tablet TAKE 1 TABLET BY MOUTH FOUR TIMES DAILY 2/28/22   Historical Provider, MD   levothyroxine (SYNTHROID) 175 MCG tablet Take 1 tablet by mouth daily Take 1 tab Monday-Saturday, and 1.5 tabs on Prasad 3/3/22   MARTITA Landeros CNP   clonazePAM (KLONOPIN) 1 MG tablet TAKE 1/2 TABLET BY MOUTH FOUR TIMES DAILY AS NEEDED 1/26/22   Historical Provider, MD   esomeprazole (NEXIUM) 40 MG delayed release capsule TAKE 1 CAPSULE BY MOUTH EVERY MORNING BEFORE BREAKFAST 2/24/22   MARTITA Landeros CNP   tiZANidine (ZANAFLEX) 4 MG tablet TAKE 1 TABLET BY MOUTH EVERY 8 HOURS AS NEEDED FOR SPASMS 2/14/22   MARTITA Quinn CNP   oxyCODONE-acetaminophen (PERCOCET)  MG per tablet Take 1 tablet by mouth every 6 hours as needed for Pain for up to 30 days.  2/7/22 3/9/22  MARTITA Chen CNP   rOPINIRole (REQUIP) 2 MG tablet TAKE 1 TABLET BY MOUTH EVERY NIGHT 1/25/22   MARTITA Daniels CNP   ondansetron (ZOFRAN ODT) 4 MG disintegrating tablet Take 1 tablet by mouth every 8 hours as needed for Nausea or Vomiting 11/27/21   Pablo Hamilton DO   sertraline (ZOLOFT) 100 MG tablet Take 1.5 tablets by mouth daily 9/9/21   Cynthia Bhakta Sancho Loyola MD   traZODone (DESYREL) 100 MG tablet  8/5/21   Historical Provider, MD   albuterol sulfate HFA (VENTOLIN HFA) 108 (90 Base) MCG/ACT inhaler Inhale 2 puffs into the lungs every 6 hours as needed for Wheezing 6/6/21   Erickson Good MD   albuterol sulfate  (90 Base) MCG/ACT inhaler INHALE 2 PUFFS INTO THE LUNGS EVERY 4 HOURS AS NEEDED FOR SHORTNESS OF BREATH 4/23/21   Aleyda Bazan APRN - CNP   rivaroxaban (XARELTO) 20 MG TABS tablet Take 1 tablet by mouth daily (with breakfast) 4/12/21   Demian Dawson MD   clonazePAM (KLONOPIN) 0.5 MG tablet Take 1 tablet by mouth 2 times daily as needed for Anxiety for up to 30 days. 12/28/20 2/18/22  Vale Brorero MD   topiramate (TOPAMAX) 25 MG tablet 25 mg 2 times daily  2/27/18   Historical Provider, MD   metoprolol tartrate (LOPRESSOR) 50 MG tablet Take 50 mg by mouth 2 times daily  8/21/17   Historical Provider, MD       Allergies  Compazine [prochlorperazine], Sulfa antibiotics, and Adhesive tape      Review of Systems:  General: Denies any fever, chills. Eyes: Denies any changes in vision, diplopia or eye pain  Ears, Nose, Mouth: Denies changes in hearing/tinnitus or drainage from ears, no rhinorrhea or bloody nose, no difficulty chewing  Throat: no difficulty swallowing, no throat pain  Respiratory: Denies any shortness of breath or cough. Cardiac: Denies any chest pain, palpitations, claudication or edema. Gastrointestinal: Denies any melena, hematochezia, hematemesis or pyrosis. Genitourinary: Denies any frequency, urgency, hesitancy or incontinence. Musculoskeletal: Abd wall pain  Skin: Denies rashes or lesions  Psychiatric: Denies any recent changes in mood or affect  Hematologic: Denies bruising or bleeding easily.     PHYSICAL EXAMINATION  Vitals:   Vitals:    03/07/22 0857   Resp: 12       General Appearance:  awake, alert, no acute distress, well developed, well nourished   Skin:  Skin color, texture, turgor normal. No rashes or lesions. Head/face:  NCAT, face symmetrical  Eyes:  PERRL, no evidence of conjunctivitis or ptosis bilaterally  Ears:  External ears and canals grossly normal, no evidence of otorrhea. Nose/Sinuses:  Nares normal. Septum midline. Mucosa normal. No external drainage noted. Mouth/Neck:  Mucosa moist.  No external oral lesions. Trachea midline. No visible masses. Lungs:  Normal chest expansion, unlabored breathing without accessory muscle use. No audible rales, rhonchi, or wheezing. Cardiovascular: S1S2. No evidence of JVD. No evidence of pulsatile masses in abdomen  Abdomen:  Midline laparotomy scar consistent with surgical history. Tender to palpation over the right abdomen just off of midline and several cm above the level of the umbilicus. There are no changes to the overlying skin, no evidence of bulging on valsalva, otherwise unremarkable abdominal exam  Musculoskeletal: No evidence of bony/muscular deformities, trauma, atrophy of either left/right upper/lower extremity. No evidence of digital clubbing or cyanosis. Neurologic:  CN 2-12 grossly intact without obvious deficits. Grossly normal sensation in all extremities. Psychiatric: appropriate judgement and insight, appropriate recall of recent and remote memory, no evidence of depression/anxiety/agitation    RADIOLOGY:  The following images and/or reports were personally reviewed with the following significant findings pertinent to the Chief Complaint and/or HPI:     CT ABDOMEN PELVIS WO CONTRAST Additional Contrast? None    Result Date: 2/7/2022  EXAMINATION: CT OF THE ABDOMEN AND PELVIS WITHOUT CONTRAST 2/7/2022 1:25 pm TECHNIQUE: CT of the abdomen and pelvis was performed without the administration of intravenous contrast. Multiplanar reformatted images are provided for review. Dose modulation, iterative reconstruction, and/or weight based adjustment of the mA/kV was utilized to reduce the radiation dose to as low as reasonably achievable. COMPARISON: 2021 HISTORY: ORDERING SYSTEM PROVIDED HISTORY: Acute abdominal pain TECHNOLOGIST PROVIDED HISTORY: Is the patient pregnant?->No Reason for Exam: pt stated abdomen pain x 3 wks Additional signs and symptoms: abdomen pain Relevant Medical/Surgical History: surg- GB, Hyst , Hernia ,  FINDINGS: Lower Chest: Bandlike atelectasis/scarring at the lung bases. Organs: Cholecystectomy. Liver, spleen, pancreas, adrenal glands and kidneys show no significant abnormalities. GI/Bowel: There is limited evaluation due to absence of oral contrast. The stomach shows no focal lesions. Small bowel loops normal in caliber showing no focal abnormalities. Appendectomy. Evaluation of the colon shows no acute process. Pelvis: Urinary bladder unremarkable. Hysterectomy noted. Peritoneum/Retroperitoneum: No free intraperitoneal fluid. No significant lymphadenopathy. Crumpled ventral hernia mesh graft with some dystrophic calcifications. Stable appearance. Bones/Soft Tissues: No acute abnormality of the bones. The superficial soft tissues show no significant abnormalities. 1. No acute infective or inflammatory process. 2. No suspicious masses. 3. Crumpled ventral hernia mesh graft with some dystrophic calcifications centered at level of umbilicus underlying the anterior abdominal appears stable. 4. No bowel obstruction. RECOMMENDATIONS: Unavailable         DIAGNOSES:   Diagnosis Orders   1. Abdominal wall pain         PLAN:  · We had a long discussion regarding etiology and treatment options. I personally reviewed the CT imaging, I agree with the radiologist that there are no clear reasons for the patient's abdominal wall pain, there is no evidence of new or recurrent hernia, the previously placed abdominal wall mesh does show a contracted appearance but is not in the same area as the patient's focal complaint.   Given the appearance of the small bowel it is likely that there are some adhesions to the anterior abdominal wall however the patient's musculoskeletal complaint is inconsistent with this radiographic finding  · We discussed exploratory laparoscopy to investigate however I do not feel that the benefits outweigh the risks, exploratory laparotomy with certainly involve mesh excision with the same risks of bowel perforation and possibly recurrence of abdominal wall hernia, again I do not feel that the benefits outweigh these risks. · It is certainly possible that the patient has developed a chronic abdominal wall injury, I do not have a good surgical treatment to help the patient with this, the patient may benefit from evaluation by physical therapy service or PMNR, will defer further referral work-up to the patient's PCP  · At this time I do not see any emergent or urgent indications for surgery, I explained this to the patient and she expresses understanding of this.   · Patient can follow-up with me as needed      Medical Decision Making: low complexity       Electronically signed by Jose Bishop DO on 3/7/2022 at 9:42 AM

## 2022-03-07 NOTE — PROGRESS NOTES
[address] PROGRESS NOTE      Patient  completed []  video visit   []   phone call:         Minutes :   [x] in person visit to pain clinic    [x]    to  review Medication Agreement    []  Follow up after procedure   []  Discuss treatment options      Location:  Provider:  working from    []    home    [x]   [facility] - KADEEM MONTEJO ,   patient at   [x] pain clinic     []  home         Chief Complaint: neck pain      She c/o neck pain and  pain in the  back of head and left side of neck. The pain radiates down both arms. The pain has gotten worse since her last injection. She has limited ROM of her neck. She had  Diagnostic/confirmatory  Left Cervical Medical branch nerve block at C2 / C3 / C4 / C5 nerves on 1-13-22 with 100% relief for a few days. She had left cervical facet injection C2-T1  8/2021 with 85% relief. She  has seen Neurosurgeon in the past  and surgery was not recommended. She had CT cervical spine 3-4-21 which read as : mild to moderate cervical degenerative disc disease with moderate bony neural foraminal narrowing on the left at C3-C4, C5- C6. RFA  of left cervical median branch nerves was discussed at last visit . She states PT never helped. She states sleep is not good. She follows with Oncologist for elevated CEA. She had weight loss over 100 pounds under a year, no more weight loss. She will be seeing a pulmonologist as she had covid last November and January. She has history of pulmonary embolism a year ago and  is on xarelto. Neck Pain   This is a chronic problem. The problem occurs constantly. The problem has been gradually worsening. The pain is present in the midline (down arms and back of head). Quality: sharp. The pain is at a severity of 7/10. The pain is moderate. Exacerbated by: movement of neck. The pain is same all the time. Stiffness is present all day. Associated symptoms include headaches, numbness and weakness. Associated symptoms comments: Left arm. She has tried neck support, home exercises and heat for the symptoms. Treatment goals:  Functional status: reduce pain    Aberrancy:   Any alcoholic beverages  no          Any illegal drugs  no       Analgesia:   7                  Adverse  Effects :no      ADL;s :stretches    Data:    When was thelast UDS:    2-3-21        Was the UDS appropriate:  [] yes []   no      Record/Diagnostics Review:      As above, I did review the imaging     DRUG SCREEN, PAIN  Order: [id number]   Status: Final result     Visible to patient: Yes (seen)     Next appt: 02/02/2022 at 11:00 AM in Pain Management Jon Ivelisse GREGG, MARTITA - CNP)     Dx: Encounter for medication monitoring; Lydia Mendesid ..     1 Result Note     Component Ref Range & Units 2/3/21 1551 8/11/20 1049 7/15/20 1529 7/9/19 1100 2/25/19 1948 1/12/19 0915 1/16/18 1345   Pain Management Drug Panel Interp, Urine   Inconsistent ARUP      Inconsistent CMARUP      Inconsistent CMARUP    Comment: (NOTE)   ________________________________________________________________   DRUGS EXPECTED:   NO DRUGS EXPECTED   ________________________________________________________________   INCONSISTENT with medications provided:   Oxycodone   Noroxycodone   7-Aminoclonazepam   ________________________________________________________________   INTERPRETIVE INFORMATION: Targeted drug profile Interp   Interpretation depends on accuracy and completeness of patient   medication information submitted by client.                EXAMINATION:   MRI OF THE CERVICAL SPINE WITHOUT CONTRAST 3/4/2021 2:53 pm       TECHNIQUE:   Multiplanar multisequence MRI of the cervical spine was performed without the   administration of intravenous contrast.       COMPARISON:   06/12/2020       HISTORY:   ORDERING SYSTEM PROVIDED HISTORY: Cervical radiculopathy   TECHNOLOGIST PROVIDED HISTORY:   evaluate for stenosis   Is the patient pregnant?->No   Reason for Exam: pt stated left shoulder arm pain weakness numbness x 1 mth Acuity: Acute   Type of Exam: Initial   Additional signs and symptoms: pt stated left shoulder arm pain weakness   numbness x 1 mth, NKI       FINDINGS:   BONES/ALIGNMENT: There is normal alignment of the spine. The vertebral body   heights are maintained. The bone marrow signal appears unremarkable.  There   is minimal degenerative disc disease with disc space narrowing and   osteophytes at C3-4, C4-5, C5-6 and C6-7.       SPINAL CORD:  No abnormal signal is identified within the spinal cord.       SOFT TISSUES: No paraspinal mass identified.       C2-C3: There is minimum disc protrusion of 2 mm centrally. The thecal sac and   neural foramina are intact.       C3-C4: There is disc protrusion osteophyte toward the left measuring 3-4 mm. The thecal sac is not stenotic.  There is mild narrowing of the left neural   foramen.  The right neural foramen is intact.       C4-C5: There is disc protrusion osteophyte measuring 2-3 mm.  The thecal sac   is mildly stenotic measuring 9.5 mm. Disc and/or osteophytes result in mild   narrowing of the neural foramina bilaterally.  The left neural foramen is   narrowed greater than right.       C5-C6: There is disc protrusion osteophyte measuring 5-6 mm toward the left   narrowing the left neural foramen.  The thecal sac is mildly stenotic   measuring 9.3 mm.  The right neural foramen is intact.       C6-C7: Disc and/or osteophytes cause minimal narrowing of the neural foramina   bilaterally.  The thecal sac is not stenotic.       C7-T1:  The thecal sac and neural foramina are intact.           Impression   Disc and osteophytes result in narrowing of the neural foramina and mild   stenosis of the thecal sac throughout the cervical region as discussed in   detail above.           EXAMINATION:   CT OF THE CERVICAL SPINE WITHOUT CONTRAST 3/4/2021 5:19 pm       TECHNIQUE:   CT of the cervical spine was performed without the administration of   intravenous contrast. Multiplanar reformatted images are provided for review. Dose modulation, iterative reconstruction, and/or weight based adjustment of   the mA/kV was utilized to reduce the radiation dose to as low as reasonably   achievable.       COMPARISON:   February 13, 2021.       HISTORY:   ORDERING SYSTEM PROVIDED HISTORY: radicular pain left arm   TECHNOLOGIST PROVIDED HISTORY:   radicular pain left arm   Decision Support Exception->Emergency Medical Condition (MA)   Is the patient pregnant?->No   Reason for Exam: radicular pain left arm for three weeks. patient states   limited ROM and pain starting in neck. no injuries   Acuity: Unknown   Type of Exam: Unknown       FINDINGS:   Bone mineralization is normal.  The vertebral bodies and posterior elements   appear intact and appropriately aligned without acute fracture or   subluxation.  Vertebral body stature is maintained throughout. Eloy Motto is   straightening of the normal cervical lordosis.  Mild-to-moderate cervical   degenerative disc disease is present throughout. Eloy Motto is moderate bony   neural foraminal narrowing on the left at C3-C4 and C5-C6.  No significant   uncovertebral joint hypertrophic change or additional bony neural foraminal   narrowing.  No paraspinal soft tissue abnormality.       The visualized lung apices are grossly clear.           Impression   Mild to moderate multilevel cervical degenerative disc disease with moderate   bony neural foraminal narrowing on the left at C3-C4 and C5-C6.  No acute   fracture or subluxation.  Straightening of the normal cervical lordosis which   may be related to muscle spasm or position in collar.                 Pill count: appropriate    fill date :3-9-22 short 2 percocet, given a warning    Morphine equivalent dose as reported on [place]AR[place]: 60  Periodic Controlled Substance Monitoring: Possible medication side effects, risk of tolerance/dependence & alternative treatments discussed. ,Obtaining appropriate analgesic effect of treatment. ,Assessed functional status. ,No signs of potential drug abuse or diversion identified. Michelle Wong, APRN - CNP)  Review of[place]ARRS does not show any aberrant prescription behavior. Medication is helping the patient stay active. Patient denies any side effects and reports adequate analgesia. No sign of misuse/abuse.             Past Medical History:   Diagnosis Date    Anxiety     CAD (coronary artery disease)     Mitral valve prolapse    Fatty liver     GERD (gastroesophageal reflux disease)     Headache     History of bronchitis     Hyperlipidemia     Hypothyroidism     Kidney stone     LFT elevation     MVP (mitral valve prolapse)     Obesity     Osteoarthritis of cervical spine     Pulmonary emboli (HCC)     Type 2 diabetes mellitus without complication ([facility] [address]     Umbilical hernia        Past Surgical History:   Procedure Laterality Date    APPENDECTOMY       SECTION      2 pfannenstiel, 1 vertical    CHOLECYSTECTOMY      COLONOSCOPY      Dr Decker [facility]  14    COLONOSCOPY  2014    biopsy & sigmoid spasms, pathology negative    COLONOSCOPY N/A 2018    COLONOSCOPY WITH BIOPSY performed by Lisa Javier MD at [address] [place] [address] N/A 2021    COLONOSCOPY DIAGNOSTIC performed by Christiano Gifford MD at [facility] 84      x 5    HYSTERECTOMY, TOTAL ABDOMINAL          NE EXPLORATORY OF ABDOMEN  10/21/2014    Laparotomy-lysis of adhesions, bso     UPPER GASTROINTESTINAL ENDOSCOPY  2010    mild chronic inactive gastritis    UPPER GASTROINTESTINAL ENDOSCOPY N/A 3/10/2021    EGD BIOPSY [facility]ord [facility]       Allergies   Allergen Reactions    Compazine [Prochlorperazine]      Jittery, restless    Sulfa Antibiotics Hives    Adhesive Tape Rash         Current Outpatient Medications:     atorvastatin (LIPITOR) 40 MG tablet, TAKE 1 TABLET BY MOUTH EVERY DAY, Disp: , Rfl:     levothyroxine (SYNTHROID) 175 MCG tablet, Take 1 tablet by mouth daily Take 1 tab Monday-Saturday, and 1.5 tabs on Sunday, Disp: 90 tablet, Rfl: 0    esomeprazole (NEXIUM) 40 MG delayed release capsule, TAKE 1 CAPSULE BY MOUTH EVERY MORNING BEFORE BREAKFAST, Disp: 90 capsule, Rfl: 0    oxyCODONE-acetaminophen (PERCOCET)  MG per tablet, Take 1 tablet by mouth every 6 hours as needed for Pain for up to 30 days. , Disp: 120 tablet, Rfl: 0    rOPINIRole (REQUIP) 2 MG tablet, TAKE 1 TABLET BY MOUTH EVERY NIGHT, Disp: 90 tablet, Rfl: 1    sertraline (ZOLOFT) 100 MG tablet, Take 1.5 tablets by mouth daily, Disp: 45 tablet, Rfl: 2    rivaroxaban (XARELTO) 20 MG TABS tablet, Take 1 tablet by mouth daily (with breakfast), Disp: 90 tablet, Rfl: 3    topiramate (TOPAMAX) 25 MG tablet, 25 mg 2 times daily , Disp: , Rfl:     metoprolol tartrate (LOPRESSOR) 50 MG tablet, Take 50 mg by mouth 2 times daily , Disp: , Rfl:     zolpidem (AMBIEN CR) 12.5 MG extended release tablet, TAKE 1 TABLET BY MOUTH AT BEDTIME, Disp: , Rfl:     sucralfate (CARAFATE) 1 GM tablet, TAKE 1 TABLET BY MOUTH FOUR TIMES DAILY, Disp: , Rfl:     clonazePAM (KLONOPIN) 1 MG tablet, TAKE 1/2 TABLET BY MOUTH FOUR TIMES DAILY AS NEEDED, Disp: , Rfl:     tiZANidine (ZANAFLEX) 4 MG tablet, TAKE 1 TABLET BY MOUTH EVERY 8 HOURS AS NEEDED FOR SPASMS, Disp: 90 tablet, Rfl: 0    ondansetron (ZOFRAN ODT) 4 MG disintegrating tablet, Take 1 tablet by mouth every 8 hours as needed for Nausea or Vomiting, Disp: 20 tablet, Rfl: 0    traZODone (DESYREL) 100 MG tablet, , Disp: , Rfl:     albuterol sulfate HFA (VENTOLIN HFA) 108 (90 Base) MCG/ACT inhaler, Inhale 2 puffs into the lungs every 6 hours as needed for Wheezing, Disp: 1 Inhaler, Rfl: 3    albuterol sulfate  (90 Base) MCG/ACT inhaler, INHALE 2 PUFFS INTO THE LUNGS EVERY 4 HOURS AS NEEDED FOR SHORTNESS OF BREATH, Disp: 42.5 g, Rfl: 3    clonazePAM (KLONOPIN) 0.5 MG tablet, Take 1 tablet by mouth 2 times daily as needed for Anxiety for up to 30 days. , Disp: 60 tablet, Rfl: 1    Family History   Problem Relation Age of Onset   [facility] Cancer Mother         vaginal    Heart Disease Father     Diabetes Father     Other Father         colon resection for colon polyps    Breast Cancer Maternal Grandmother     Stroke Maternal Grandfather        Social History     Socioeconomic History    Marital status:      Spouse name: Not on file    Number of children: Not on file    Years of education: Not on file    Highest education level: Not on file   Occupational History    Occupation: Homemaker   Tobacco Use    Smoking status: Former Smoker     Packs/day: 0.25     Years: 33.00     Pack years: 8.25     Types: Cigarettes     Quit date:      Years since quittin.1    Smokeless tobacco: Never Used    Tobacco comment: quit on nicoderm patch   Vaping Use    Vaping Use: Never used   Substance and Sexual Activity    Alcohol use: No     Alcohol/week: 0.0 standard drinks    Drug use: No    Sexual activity: Not Currently   Other Topics Concern    Not on file   Social History Narrative    Not on file     Social Determinants of Health     Financial Resource Strain: Low Risk     Difficulty of Paying Living Expenses: Not hard at all   Food Insecurity: No Food Insecurity    Worried About Running Out of Food in the Last Year: Never true    Kamille of Food in the Last Year: Never true   Transportation Needs:     Lack of Transportation (Medical): Not on file    Lack of Transportation (Non-Medical):  Not on file   Physical Activity:     Days of Exercise per Week: Not on file    Minutes of Exercise per Session: Not on file   Stress:     Feeling of Stress : Not on file   Social Connections:     Frequency of Communication with Friends and Family: Not on file    Frequency of Social Gatherings with Friends and Family: Not on file    Attends Yarsanism Services: Not on file    Active Member of Clubs or Organizations: Not on file    Attends Club or Organization Meetings: Not on file    Marital Status: Not on file   Intimate Partner Violence:     Fear of Current or Ex-Partner: Not on file    Emotionally Abused: Not on file    Physically Abused: Not on file    Sexually Abused: Not on file   Housing Stability:     Unable to Pay for Housing in the Last Year: Not on file    Number of Donaldo in the Last Year: Not on file    Unstable Housing in the Last Year: Not on file         Review of Systems:  Review of Systems   Constitutional: Negative. Altered taste   HENT: Negative. Eyes:        Glasses   Cardiovascular: Positive for dyspnea on exertion. Respiratory: Positive for cough. Endocrine: Negative. Hematologic/Lymphatic: Bruises/bleeds easily. Musculoskeletal: Positive for joint pain and neck pain. Shoulder area   Gastrointestinal: Positive for nausea and vomiting. Genitourinary: Negative. Neurological: Positive for headaches, numbness and weakness. Psychiatric/Behavioral: Negative. Physical Exam:  /79   Pulse 71   Temp 98 °F (36.7 °C) (Skin)   Resp 16   LMP 11/01/2010   SpO2 97%     Physical Exam  Pulmonary:      Effort: Pulmonary effort is normal.   Skin:            Comments: Tender left cervical paraspinal muscles    Neurological:      Mental Status: She is alert and oriented to person, place, and time. Gait: Gait is intact. Deep Tendon Reflexes:      Reflex Scores:       Tricep reflexes are 2+ on the right side and 2+ on the left side. Bicep reflexes are 2+ on the right side and 2+ on the left side. Brachioradialis reflexes are 2+ on the right side and 2+ on the left side. Psychiatric:         Mood and Affect: Mood normal.         Thought Content:  Thought content normal.           Assessment:    Problem List Items Addressed This Visit     Spondylosis of cervical region without myelopathy or radiculopathy - Primary    Myofascial muscle pain Encounter for medication monitoring    Degenerative cervical spinal stenosis    Cervical radiculopathy    Arthropathy of cervical facet joint              Treatment Plan:  DISCUSSION: Treatment options discussed withpatient and all questions answered to patient's satisfaction. Possible side effects, risk of tolerance and or dependence and alternative treatments discussed    Obtaining appropriate analgesic effect of treatment   No signs of potential drug abuse or diversion identified    [x] Ill effects of being on chronic pain medications such as sleep disturbances, respiratory depression, hormonal changes, withdrawal symptoms, chronic opioid dependence and tolerance as well as risk of taking opioids with Benzodiazepines and taking opioids along with alcohol,  werediscussed with patient. I had asked the patient to minimize medication use and utilize pain medications only for uncontrolled rest pain or pain with exertional activities. I advised patient not to self-escalate painmedications without consulting with us. At each of patient's future visits we will try to taper pain medications, while adjusting the adjunct medications, and re-evaluating for Physical Therapy to improve spinal andjoint strength. We will continue to have discussions to decrease pain medications as tolerated. Counseled patient on effects their pain medication and /or their medical condition mayhave on their  ability to drive or operate machinery. Instructed not to drive or operate machinery if drowsy     I also discussed with the patient regarding the dangers of combining narcotic pain medication with tranquilizers, alcohol or illegal drugs or taking the medication any way other than prescribed. The dangers were discussed  including respiratory depression and death. Patient was told to tell  all  physicians regarding the medications he is getting from pain clinic.  Patient is warned not to take any unprescribed medications over-the-countermedications that can depress breathing . Patient is advised to talk to the pharmacist or physicians if planning to take any over-the-counter medications before  takeing them. Patient is strongly advised to avoid tranquilizers or  relaxants, illegal drugs  or any medications that can depress breathing  Patient is also advised to tell us if there is any changes in their medications from other physicians. previously  Discussed RFA of left cervical median branch nerves at previous visit  TREATMENT OPTIONS:     1. UDT today  2. Med contract x2 signed  3.  Will order medrol dose joi to help with increased neck pain    Medication Agreement Requirements Met  Continue Opioid therapy  Script written for  Percocet, medrol dose joi  Follow up appointment made

## 2022-03-08 ENCOUNTER — HOSPITAL ENCOUNTER (OUTPATIENT)
Dept: PAIN MANAGEMENT | Age: 52
Discharge: HOME | End: 2022-03-08
Payer: COMMERCIAL

## 2022-03-08 VITALS
DIASTOLIC BLOOD PRESSURE: 79 MMHG | SYSTOLIC BLOOD PRESSURE: 129 MMHG | OXYGEN SATURATION: 97 % | RESPIRATION RATE: 16 BRPM | HEART RATE: 71 BPM | TEMPERATURE: 98 F

## 2022-03-08 DIAGNOSIS — M79.18 MYOFASCIAL MUSCLE PAIN: ICD-10-CM

## 2022-03-08 DIAGNOSIS — M54.12 CERVICAL RADICULOPATHY: ICD-10-CM

## 2022-03-08 DIAGNOSIS — M47.812 ARTHROPATHY OF CERVICAL FACET JOINT: ICD-10-CM

## 2022-03-08 DIAGNOSIS — M47.812 SPONDYLOSIS OF CERVICAL REGION WITHOUT MYELOPATHY OR RADICULOPATHY: Primary | ICD-10-CM

## 2022-03-08 DIAGNOSIS — Z51.81 ENCOUNTER FOR MEDICATION MONITORING: ICD-10-CM

## 2022-03-08 DIAGNOSIS — M48.02 DEGENERATIVE CERVICAL SPINAL STENOSIS: ICD-10-CM

## 2022-03-08 PROCEDURE — 80307 DRUG TEST PRSMV CHEM ANLYZR: CPT

## 2022-03-08 PROCEDURE — 99213 OFFICE O/P EST LOW 20 MIN: CPT

## 2022-03-08 PROCEDURE — 99213 OFFICE O/P EST LOW 20 MIN: CPT | Performed by: NURSE PRACTITIONER

## 2022-03-08 RX ORDER — METHYLPREDNISOLONE 4 MG/1
TABLET ORAL
Qty: 1 KIT | Refills: 0 | Status: SHIPPED | OUTPATIENT
Start: 2022-03-08 | End: 2022-03-14

## 2022-03-08 RX ORDER — OXYCODONE AND ACETAMINOPHEN 10; 325 MG/1; MG/1
1 TABLET ORAL EVERY 6 HOURS PRN
Qty: 120 TABLET | Refills: 0 | Status: SHIPPED | OUTPATIENT
Start: 2022-03-09 | End: 2022-04-05 | Stop reason: SDUPTHER

## 2022-03-08 ASSESSMENT — ENCOUNTER SYMPTOMS
VOMITING: 1
NAUSEA: 1
COUGH: 1

## 2022-03-12 ASSESSMENT — ENCOUNTER SYMPTOMS
SHORTNESS OF BREATH: 1
DIARRHEA: 1
TROUBLE SWALLOWING: 0
COLOR CHANGE: 0
ABDOMINAL PAIN: 1

## 2022-03-13 LAB
6-ACETYLMORPHINE, UR: NOT DETECTED
7-AMINOCLONAZEPAM, URINE: PRESENT
ALPHA-OH-ALPRAZ, URINE: NOT DETECTED
ALPHA-OH-MIDAZOLAM, URINE: NOT DETECTED
ALPRAZOLAM, URINE: NOT DETECTED
AMPHETAMINES, URINE: NOT DETECTED
BARBITURATES, URINE: NOT DETECTED
BENZOYLECGONINE, UR: NOT DETECTED
BUPRENORPHINE URINE: NOT DETECTED
CARISOPRODOL, UR: NOT DETECTED
CLONAZEPAM, URINE: NOT DETECTED
CODEINE, URINE: NOT DETECTED
CREATININE URINE: 151.9 MG/DL (ref 20–400)
DIAZEPAM, URINE: NOT DETECTED
DRUGS EXPECTED, UR: NORMAL
EER HI RES INTERP UR: NORMAL
ETHYL GLUCURONIDE UR: NOT DETECTED
FENTANYL URINE: NOT DETECTED
GABAPENTIN: NOT DETECTED
HYDROCODONE, URINE: NOT DETECTED
HYDROMORPHONE, URINE: NOT DETECTED
LORAZEPAM, URINE: NOT DETECTED
MARIJUANA METAB, UR: NOT DETECTED
MDA, UR: NOT DETECTED
MDEA, EVE, UR: NOT DETECTED
MDMA URINE: NOT DETECTED
MEPERIDINE METAB, UR: NOT DETECTED
METHADONE, URINE: NOT DETECTED
METHAMPHETAMINE, URINE: NOT DETECTED
METHYLPHENIDATE: NOT DETECTED
MIDAZOLAM, URINE: NOT DETECTED
MORPHINE URINE: NOT DETECTED
NALOXONE URINE: NOT DETECTED
NORBUPRENORPHINE, URINE: NOT DETECTED
NORDIAZEPAM, URINE: NOT DETECTED
NORFENTANYL, URINE: NOT DETECTED
NORHYDROCODONE, URINE: NOT DETECTED
NOROXYCODONE, URINE: PRESENT
NOROXYMORPHONE, URINE: NOT DETECTED
OXAZEPAM, URINE: NOT DETECTED
OXYCODONE URINE: PRESENT
OXYMORPHONE, URINE: NOT DETECTED
PAIN MANAGEMENT DRUG PANEL INTERP, URINE: NORMAL
PAIN MGT DRUG PANEL, HI RES, UR: NORMAL
PCP,URINE: NOT DETECTED
PHENTERMINE, UR: NOT DETECTED
PREGABALIN: NOT DETECTED
TAPENTADOL, URINE: NOT DETECTED
TAPENTADOL-O-SULFATE, URINE: NOT DETECTED
TEMAZEPAM, URINE: NOT DETECTED
TRAMADOL, URINE: NOT DETECTED
ZOLPIDEM METABOLITE (ZCA), URINE: NOT DETECTED
ZOLPIDEM, URINE: NOT DETECTED

## 2022-03-26 DIAGNOSIS — I26.99 ACUTE PULMONARY EMBOLISM, UNSPECIFIED PULMONARY EMBOLISM TYPE, UNSPECIFIED WHETHER ACUTE COR PULMONALE PRESENT (HCC): ICD-10-CM

## 2022-03-28 RX ORDER — RIVAROXABAN 20 MG/1
TABLET, FILM COATED ORAL
Qty: 90 TABLET | Refills: 3 | Status: SHIPPED | OUTPATIENT
Start: 2022-03-28

## 2022-04-05 ENCOUNTER — HOSPITAL ENCOUNTER (OUTPATIENT)
Dept: PAIN MANAGEMENT | Age: 52
Discharge: HOME | End: 2022-04-05
Payer: COMMERCIAL

## 2022-04-05 VITALS
OXYGEN SATURATION: 95 % | WEIGHT: 178 LBS | HEIGHT: 64 IN | SYSTOLIC BLOOD PRESSURE: 110 MMHG | TEMPERATURE: 97.7 F | DIASTOLIC BLOOD PRESSURE: 70 MMHG | BODY MASS INDEX: 30.39 KG/M2 | HEART RATE: 71 BPM

## 2022-04-05 DIAGNOSIS — M47.812 ARTHROPATHY OF CERVICAL FACET JOINT: ICD-10-CM

## 2022-04-05 DIAGNOSIS — M48.02 DEGENERATIVE CERVICAL SPINAL STENOSIS: ICD-10-CM

## 2022-04-05 DIAGNOSIS — M47.812 SPONDYLOSIS OF CERVICAL REGION WITHOUT MYELOPATHY OR RADICULOPATHY: ICD-10-CM

## 2022-04-05 DIAGNOSIS — M54.12 CERVICAL RADICULOPATHY: ICD-10-CM

## 2022-04-05 DIAGNOSIS — Z51.81 ENCOUNTER FOR MEDICATION MONITORING: ICD-10-CM

## 2022-04-05 DIAGNOSIS — G89.29 CHRONIC NECK PAIN: Primary | Chronic | ICD-10-CM

## 2022-04-05 DIAGNOSIS — M54.2 CHRONIC NECK PAIN: Primary | Chronic | ICD-10-CM

## 2022-04-05 PROCEDURE — 99213 OFFICE O/P EST LOW 20 MIN: CPT

## 2022-04-05 PROCEDURE — 99213 OFFICE O/P EST LOW 20 MIN: CPT | Performed by: NURSE PRACTITIONER

## 2022-04-05 RX ORDER — OXYCODONE AND ACETAMINOPHEN 10; 325 MG/1; MG/1
1 TABLET ORAL EVERY 6 HOURS PRN
Qty: 120 TABLET | Refills: 0 | Status: SHIPPED | OUTPATIENT
Start: 2022-04-08 | End: 2022-05-09 | Stop reason: SDUPTHER

## 2022-04-05 ASSESSMENT — PAIN DESCRIPTION - PROGRESSION: CLINICAL_PROGRESSION: GRADUALLY WORSENING

## 2022-04-05 ASSESSMENT — PAIN DESCRIPTION - FREQUENCY: FREQUENCY: CONTINUOUS

## 2022-04-05 ASSESSMENT — ENCOUNTER SYMPTOMS
SHORTNESS OF BREATH: 0
COUGH: 0
CONSTIPATION: 0
BACK PAIN: 1

## 2022-04-05 ASSESSMENT — PAIN DESCRIPTION - DESCRIPTORS: DESCRIPTORS: STABBING;ACHING

## 2022-04-05 ASSESSMENT — PAIN DESCRIPTION - LOCATION: LOCATION: NECK

## 2022-04-05 ASSESSMENT — PAIN DESCRIPTION - PAIN TYPE: TYPE: CHRONIC PAIN

## 2022-04-05 ASSESSMENT — PAIN SCALES - GENERAL: PAINLEVEL_OUTOF10: 6

## 2022-04-05 NOTE — PROGRESS NOTES
Chief Complaint   Patient presents with    Neck Injury    Medication Refill     Percocet         PMH     Pt c/o chronic neck pain that is located midline and left side of her neck with radiaiont up to occipital area and at time in left shoulder down to fingers. She has limited ROM of her neck. She has seen Neurosurgeon in the past  and surgery was not recommended. She had CT cervical spine 3-4-21 which read as : mild to moderate cervical degenerative disc disease with moderate bony neural foraminal narrowing on the left at C3-C4, C5- C6.   She had confirmatory  Left Cervical Medical branch nerve block at C2 / C3 / C4 / C5 nerves on 1-13-22 with 100% relief for a few days. She would like to proceed with RFA      HPI:   Neck Injury   This is a chronic problem. The problem occurs constantly. The problem has been gradually worsening. The pain is associated with nothing. The quality of the pain is described as aching, burning and stabbing. The pain is at a severity of 6/10. The pain is moderate. The symptoms are aggravated by position, bending and stress. Stiffness is present all day. Associated symptoms include numbness (left arm to fingers at times) and paresis. Pertinent negatives include no chest pain or fever. She has tried heat, home exercises, ice, muscle relaxants, NSAIDs and oral narcotics for the symptoms. The treatment provided mild relief.      Medication Refill: Percocet    Pain score Today:  6  Adverse effects (Constipation / Nausea / Sedation / sexual Dysfunction / others) : none  Mood: fair  Sleep pattern and quality: poor  Activity level: fair    Pill count Today: Percocet 12.5 tabs   Last dose taken 4/5/2022  OARRS report reviewed today: yes  Morphine equivalent: 60  ER/Hospitalizations/PCP visit related to pain since last visit:no   Any legal problems e.g. DUI etc.:No  Satisfied with current management: Yes    Opioid Contract:03/11/2022  Last Urine Dug screen dated:03/08/2022    Lab Results Component Value Date    LABA1C 5.8 2022     Lab Results   Component Value Date     2022       Past Medical History, Past Surgical History, Social History, Allergies and Medications reviewed and updated in EPIC as indicated    Family History reviewed and is noncontributory. Controlled Substance Monitoring:    Acute and Chronic Pain Monitoring:   RX Monitoring 2022   Attestation -   Acute Pain Prescriptions -   Periodic Controlled Substance Monitoring Possible medication side effects, risk of tolerance/dependence & alternative treatments discussed. ;No signs of potential drug abuse or diversion identified. ;Assessed functional status. ;Obtaining appropriate analgesic effect of treatment. Chronic Pain > 50 MEDD -   Chronic Pain > 80 MEDD -             Periodic Controlled Substance Monitoring: Possible medication side effects, risk of tolerance/dependence & alternative treatments discussed. ,No signs of potential drug abuse or diversion identified. ,Assessed functional status. ,Obtaining appropriate analgesic effect of treatment.  Mariposa Gongora, APRN - CNP)      Past Medical History:   Diagnosis Date    Anxiety     CAD (coronary artery disease)     Mitral valve prolapse    Fatty liver     GERD (gastroesophageal reflux disease)     Headache     History of bronchitis     Hyperlipidemia     Hypothyroidism     Kidney stone     LFT elevation     MVP (mitral valve prolapse)     Obesity     Osteoarthritis of cervical spine     Pulmonary emboli (HCC)     Type 2 diabetes mellitus without complication ([facility] [address]     Umbilical hernia        Past Surgical History:   Procedure Laterality Date    APPENDECTOMY       SECTION      2 pfannenstiel, 1 vertical    CHOLECYSTECTOMY      COLONOSCOPY      Dr Kye Lewis COLONOSCOPY  14    COLONOSCOPY  2014    biopsy & sigmoid spasms, pathology negative    COLONOSCOPY N/A 2018    COLONOSCOPY WITH BIOPSY performed by Augustin Kate Jewel Del Angel MD at [facility] 5/27/2021    COLONOSCOPY DIAGNOSTIC performed by Ignacio Matt MD at [address]      x 5    HYSTERECTOMY, TOTAL ABDOMINAL      2010    [place] EXPLORATORY OF ABDOMEN  10/21/2014    Laparotomy-lysis of adhesions, bso     UPPER GASTROINTESTINAL ENDOSCOPY  2/17/2010    mild chronic inactive gastritis    UPPER GASTROINTESTINAL ENDOSCOPY N/A 3/10/2021    EGD BIOPSY performed by Ignacio Matt MD [facility]       Allergies   Allergen Reactions    Compazine [Prochlorperazine]      Jittery, restless    Sulfa Antibiotics Hives    Adhesive Tape Rash         Current Outpatient Medications:     [START ON 4/8/2022] oxyCODONE-acetaminophen (PERCOCET)  MG per tablet, Take 1 tablet by mouth every 6 hours as needed for Pain for up to 30 days. , Disp: 120 tablet, Rfl: 0    XARELTO 20 MG TABS tablet, TAKE 1 TABLET BY MOUTH DAILY WITH BREAKFAST, Disp: 90 tablet, Rfl: 3    zolpidem (AMBIEN CR) 12.5 MG extended release tablet, TAKE 1 TABLET BY MOUTH AT BEDTIME, Disp: , Rfl:     atorvastatin (LIPITOR) 40 MG tablet, TAKE 1 TABLET BY MOUTH EVERY DAY, Disp: , Rfl:     sucralfate (CARAFATE) 1 GM tablet, TAKE 1 TABLET BY MOUTH FOUR TIMES DAILY (Patient not taking: Reported on 4/5/2022), Disp: , Rfl:     levothyroxine (SYNTHROID) 175 MCG tablet, Take 1 tablet by mouth daily Take 1 tab Monday-Saturday, and 1.5 tabs on Sunday, Disp: 90 tablet, Rfl: 0    clonazePAM (KLONOPIN) 1 MG tablet, TAKE 1/2 TABLET BY MOUTH FOUR TIMES DAILY AS NEEDED, Disp: , Rfl:     esomeprazole (NEXIUM) 40 MG delayed release capsule, TAKE 1 CAPSULE BY MOUTH EVERY MORNING BEFORE BREAKFAST, Disp: 90 capsule, Rfl: 0    tiZANidine (ZANAFLEX) 4 MG tablet, TAKE 1 TABLET BY MOUTH EVERY 8 HOURS AS NEEDED FOR SPASMS, Disp: 90 tablet, Rfl: 0    rOPINIRole (REQUIP) 2 MG tablet, TAKE 1 TABLET BY MOUTH EVERY NIGHT, Disp: 90 tablet, Rfl: 1    ondansetron (ZOFRAN ODT) 4 MG disintegrating tablet, Take 1 tablet by mouth every 8 hours as needed for Nausea or Vomiting, Disp: 20 tablet, Rfl: 0    sertraline (ZOLOFT) 100 MG tablet, Take 1.5 tablets by mouth daily, Disp: 45 tablet, Rfl: 2    traZODone (DESYREL) 100 MG tablet, , Disp: , Rfl:     albuterol sulfate HFA (VENTOLIN HFA) 108 (90 Base) MCG/ACT inhaler, Inhale 2 puffs into the lungs every 6 hours as needed for Wheezing, Disp: 1 Inhaler, Rfl: 3    albuterol sulfate  (90 Base) MCG/ACT inhaler, INHALE 2 PUFFS INTO THE LUNGS EVERY 4 HOURS AS NEEDED FOR SHORTNESS OF BREATH, Disp: 42.5 g, Rfl: 3    clonazePAM (KLONOPIN) 0.5 MG tablet, Take 1 tablet by mouth 2 times daily as needed for Anxiety for up to 30 days. , Disp: 60 tablet, Rfl: 1    topiramate (TOPAMAX) 25 MG tablet, 25 mg 2 times daily , Disp: , Rfl:     metoprolol tartrate (LOPRESSOR) 50 MG tablet, Take 50 mg by mouth 2 times daily , Disp: , Rfl:     Family History   Problem Relation Age of Onset    Cancer Mother         vaginal    Heart Disease Father     Diabetes Father     Other Father         colon resection for colon polyps    Breast Cancer Maternal Grandmother     Stroke Maternal Grandfather        Social History     Socioeconomic History    Marital status:      Spouse name: Not on file    Number of children: Not on file    Years of education: Not on file    Highest education level: Not on file   Occupational History    Occupation: Homemaker   Tobacco Use    Smoking status: Former Smoker     Packs/day: 0.25     Years: 33.00     Pack years: 8.25     Types: Cigarettes     Quit date:      Years since quittin.2    Smokeless tobacco: Never Used    Tobacco comment: quit on nicoderm patch   Vaping Use    Vaping Use: Never used   Substance and Sexual Activity    Alcohol use: No     Alcohol/week: 0.0 standard drinks    Drug use: No    Sexual activity: Not Currently   Other Topics Concern    Not on file   Social History Narrative    Not on file     Social Determinants of Health     Financial Resource Strain: Low Risk     Difficulty of Paying Living Expenses: Not hard at all   Food Insecurity: No Food Insecurity    Worried About Running Out of Food in the Last Year: Never true    Kamille of Food in the Last Year: Never true   Transportation Needs:     Lack of Transportation (Medical): Not on file    Lack of Transportation (Non-Medical): Not on file   Physical Activity:     Days of Exercise per Week: Not on file    Minutes of Exercise per Session: Not on file   Stress:     Feeling of Stress : Not on file   Social Connections:     Frequency of Communication with Friends and Family: Not on file    Frequency of Social Gatherings with Friends and Family: Not on file    Attends Oriental orthodox Services: Not on file    Active Member of [address] [place] [address] or Organizations: Not on file    Attends Club or Organization Meetings: Not on file    Marital Status: Not on file   Intimate Partner Violence:     Fear of Current or Ex-Partner: Not on file    Emotionally Abused: Not on file    Physically Abused: Not on file    Sexually Abused: Not on file   Housing Stability:     Unable to Pay for Housing in the Last Year: Not on file    Number of [place] in the Last Year: Not on file    Unstable Housing in the Last Year: Not on file       Review of Systems:  Review of Systems   Constitutional: Negative for chills and fever. Cardiovascular: Negative for chest pain. Respiratory: Negative for cough and shortness of breath. Musculoskeletal: Positive for back pain. Gastrointestinal: Negative for constipation. Neurological: Positive for numbness (left arm to fingers at times). Physical Exam:  /70   Pulse 71   Temp 97.7 °F (36.5 °C) (Temporal)   Ht 5' 4\" (1.626 m)   Wt 178 lb (80.7 kg)   LMP 11/01/2010   SpO2 95%   BMI 30.55 kg/m²     Physical Exam  Cardiovascular:      Rate and Rhythm: Normal rate.    Pulmonary:      Effort: Pulmonary effort is normal. Musculoskeletal:      Cervical back: Decreased range of motion. Skin:     General: Skin is warm and dry. Neurological:      Mental Status: She is alert and oriented to person, place, and time. Assessment:  Problem List Items Addressed This Visit     Chronic neck pain - Primary (Chronic)    Relevant Medications    oxyCODONE-acetaminophen (PERCOCET)  MG per tablet (Start on 4/8/2022)    Other Relevant Orders    Nerve C/T Additional    Nerver C/T Single    Spondylosis of cervical region without myelopathy or radiculopathy    Relevant Medications    oxyCODONE-acetaminophen (PERCOCET)  MG per tablet (Start on 4/8/2022)    Other Relevant Orders    Nerve C/T Additional    Nerver C/T Single    Degenerative cervical spinal stenosis    Relevant Medications    oxyCODONE-acetaminophen (PERCOCET)  MG per tablet (Start on 4/8/2022)    Arthropathy of cervical facet joint    Relevant Medications    oxyCODONE-acetaminophen (PERCOCET)  MG per tablet (Start on 4/8/2022)    Other Relevant Orders    Nerve C/T Additional    Nerver C/T Single    Cervical radiculopathy    Relevant Medications    oxyCODONE-acetaminophen (PERCOCET)  MG per tablet (Start on 4/8/2022)    Encounter for medication monitoring    Relevant Medications    oxyCODONE-acetaminophen (PERCOCET)  MG per tablet (Start on 4/8/2022)             Treatment Plan:  Patient relates current medications are helping the pain. Patient reports taking pain medications as prescribed, denies obtaining medications from different sources and denies use of illegal drugs. Patient denies side effects from medications like nausea, vomiting, constipation or drowsiness. Patient reports current activities of daily living are possible due to medications and would like to continue them.      As always, we encourage daily stretching and strengthening exercises, and recommend minimizing use of pain medications unless patient cannot get through daily activities due to pain. Contract requirements met. Continue opioid therapy. Script written for percocet   Left Cervical RFA at C2 / Cameron Cale / C4 / C5   Follow up appointment made for 4 weeks with MD     I have reviewed the chief complaint and history of present illness (including ROS and [address]) and vital documentation by my staff and I agree with their documentation and have added where applicable.

## 2022-04-06 ENCOUNTER — TELEPHONE (OUTPATIENT)
Dept: INTERNAL MEDICINE CLINIC | Age: 52
End: 2022-04-06

## 2022-04-06 NOTE — TELEPHONE ENCOUNTER
Received a fax from Dr. Annelise Emmanuel office for surgery clearance/ stoppage of eliquis for three days prior for a procedure. Procedure: Left Cervical RFA C2,3,4,5    Last office visit : 3/3/22    Please advise if patient is clear or not.

## 2022-04-07 NOTE — TELEPHONE ENCOUNTER
Please have her schedule with Dr Wilfredo Ferrer for surgical clearance. (She is medically complex and I wanted her to switch to one of the docs, and she has been seeing Dr Wilfredo Ferrer except for last visit in March).

## 2022-04-11 NOTE — TELEPHONE ENCOUNTER
LM for pt to return call to office to schedule appt for clearance  Needs to be scheduled with Capo Javier

## 2022-04-19 ENCOUNTER — HOSPITAL ENCOUNTER (EMERGENCY)
Age: 52
Discharge: HOME | End: 2022-04-20
Attending: EMERGENCY MEDICINE
Payer: COMMERCIAL

## 2022-04-19 ENCOUNTER — APPOINTMENT (OUTPATIENT)
Dept: GENERAL RADIOLOGY | Age: 52
End: 2022-04-19
Payer: COMMERCIAL

## 2022-04-19 VITALS
OXYGEN SATURATION: 98 % | HEART RATE: 77 BPM | TEMPERATURE: 98.4 F | BODY MASS INDEX: 29.88 KG/M2 | SYSTOLIC BLOOD PRESSURE: 111 MMHG | RESPIRATION RATE: 16 BRPM | WEIGHT: 175 LBS | HEIGHT: 64 IN | DIASTOLIC BLOOD PRESSURE: 66 MMHG

## 2022-04-19 DIAGNOSIS — R10.9 ABDOMINAL PAIN, UNSPECIFIED ABDOMINAL LOCATION: Primary | ICD-10-CM
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
## 2022-04-19 LAB
ABSOLUTE EOS #: 0.2 K/UL (ref 0–0.4)
ABSOLUTE LYMPH #: 2.7 K/UL (ref 1–4.8)
ABSOLUTE MONO #: 0.5 K/UL (ref 0.1–1.3)
BACTERIA: NORMAL
BASOPHILS # BLD: 1 % (ref 0–2)
BASOPHILS ABSOLUTE: 0.1 K/UL (ref 0–0.2)
BILIRUBIN URINE: NEGATIVE
CASTS UA: NORMAL /LPF
COLOR: YELLOW
EOSINOPHILS RELATIVE PERCENT: 3 % (ref 0–4)
EPITHELIAL CELLS UA: NORMAL /HPF
GLUCOSE URINE: NEGATIVE
HCT VFR BLD CALC: 40.4 % (ref 36–46)
HEMOGLOBIN: 13.6 G/DL (ref 12–16)
KETONES, URINE: NEGATIVE
LEUKOCYTE ESTERASE, URINE: NEGATIVE
LYMPHOCYTES # BLD: 35 % (ref 24–44)
MCH RBC QN AUTO: 31.1 PG (ref 26–34)
MCHC RBC AUTO-ENTMCNC: 33.6 G/DL (ref 31–37)
MCV RBC AUTO: 92.6 FL (ref 80–100)
MONOCYTES # BLD: 6 % (ref 1–7)
NITRITE, URINE: NEGATIVE
PDW BLD-RTO: 13.5 % (ref 11.5–14.9)
PH UA: 5 (ref 5–8)
PLATELET # BLD: 168 K/UL (ref 150–450)
PMV BLD AUTO: 7.5 FL (ref 6–12)
PROTEIN UA: NEGATIVE
RBC # BLD: 4.37 M/UL (ref 4–5.2)
RBC UA: NORMAL /HPF
SEG NEUTROPHILS: 55 % (ref 36–66)
SEGMENTED NEUTROPHILS ABSOLUTE COUNT: 4.3 K/UL (ref 1.3–9.1)
SPECIFIC GRAVITY UA: 1.01 (ref 1–1.03)
TURBIDITY: CLEAR
URINE HGB: ABNORMAL
UROBILINOGEN, URINE: NORMAL
WBC # BLD: 7.9 K/UL (ref 3.5–11)
WBC UA: NORMAL /HPF

## 2022-04-19 PROCEDURE — 90715 TDAP VACCINE 7 YRS/> IM: CPT | Performed by: EMERGENCY MEDICINE

## 2022-04-19 PROCEDURE — 80048 BASIC METABOLIC PNL TOTAL CA: CPT

## 2022-04-19 PROCEDURE — 6360000002 HC RX W HCPCS: Performed by: EMERGENCY MEDICINE

## 2022-04-19 PROCEDURE — 90471 IMMUNIZATION ADMIN: CPT | Performed by: EMERGENCY MEDICINE

## 2022-04-19 PROCEDURE — 36415 COLL VENOUS BLD VENIPUNCTURE: CPT

## 2022-04-19 PROCEDURE — 83735 ASSAY OF MAGNESIUM: CPT

## 2022-04-19 PROCEDURE — 73130 X-RAY EXAM OF HAND: CPT

## 2022-04-19 PROCEDURE — 85025 COMPLETE CBC W/AUTO DIFF WBC: CPT

## 2022-04-19 PROCEDURE — 80076 HEPATIC FUNCTION PANEL: CPT

## 2022-04-19 PROCEDURE — 99285 EMERGENCY DEPT VISIT HI MDM: CPT

## 2022-04-19 PROCEDURE — 96374 THER/PROPH/DIAG INJ IV PUSH: CPT

## 2022-04-19 PROCEDURE — 83605 ASSAY OF LACTIC ACID: CPT

## 2022-04-19 PROCEDURE — 81001 URINALYSIS AUTO W/SCOPE: CPT

## 2022-04-19 PROCEDURE — 2580000003 HC RX 258: Performed by: EMERGENCY MEDICINE

## 2022-04-19 PROCEDURE — 93005 ELECTROCARDIOGRAM TRACING: CPT | Performed by: EMERGENCY MEDICINE

## 2022-04-19 PROCEDURE — 83690 ASSAY OF LIPASE: CPT

## 2022-04-19 PROCEDURE — 6370000000 HC RX 637 (ALT 250 FOR IP): Performed by: EMERGENCY MEDICINE

## 2022-04-19 RX ORDER — AMOXICILLIN AND CLAVULANATE POTASSIUM 875; 125 MG/1; MG/1
1 TABLET, FILM COATED ORAL ONCE
Status: COMPLETED | OUTPATIENT
Start: 2022-04-19 | End: 2022-04-19

## 2022-04-19 RX ORDER — MORPHINE SULFATE 4 MG/ML
4 INJECTION, SOLUTION INTRAMUSCULAR; INTRAVENOUS ONCE
Status: COMPLETED | OUTPATIENT
Start: 2022-04-19 | End: 2022-04-19

## 2022-04-19 RX ORDER — ONDANSETRON 2 MG/ML
4 INJECTION INTRAMUSCULAR; INTRAVENOUS ONCE
Status: COMPLETED | OUTPATIENT
Start: 2022-04-19 | End: 2022-04-19

## 2022-04-19 RX ORDER — 0.9 % SODIUM CHLORIDE 0.9 %
1000 INTRAVENOUS SOLUTION INTRAVENOUS ONCE
Status: COMPLETED | OUTPATIENT
Start: 2022-04-19 | End: 2022-04-20

## 2022-04-19 RX ADMIN — TETANUS TOXOID, REDUCED DIPHTHERIA TOXOID AND ACELLULAR PERTUSSIS VACCINE, ADSORBED 0.5 ML: 5; 2.5; 8; 8; 2.5 SUSPENSION INTRAMUSCULAR at 23:45

## 2022-04-19 RX ADMIN — AMOXICILLIN AND CLAVULANATE POTASSIUM 1 TABLET: 875; 125 TABLET, FILM COATED ORAL at 23:35

## 2022-04-19 RX ADMIN — MORPHINE SULFATE 4 MG: 4 INJECTION, SOLUTION INTRAMUSCULAR; INTRAVENOUS at 23:35

## 2022-04-19 RX ADMIN — ONDANSETRON 4 MG: 2 INJECTION INTRAMUSCULAR; INTRAVENOUS at 23:34

## 2022-04-19 RX ADMIN — SODIUM CHLORIDE 1000 ML: 9 INJECTION, SOLUTION INTRAVENOUS at 23:34

## 2022-04-19 ASSESSMENT — PAIN DESCRIPTION - LOCATION: LOCATION: ABDOMEN;HAND

## 2022-04-19 ASSESSMENT — ENCOUNTER SYMPTOMS
DIARRHEA: 0
COUGH: 0
BACK PAIN: 0
NAUSEA: 1
SHORTNESS OF BREATH: 0
VOMITING: 1
ABDOMINAL PAIN: 1

## 2022-04-19 ASSESSMENT — PAIN SCALES - GENERAL
PAINLEVEL_OUTOF10: 8
PAINLEVEL_OUTOF10: 8

## 2022-04-20 ENCOUNTER — APPOINTMENT (OUTPATIENT)
Dept: CT IMAGING | Age: 52
End: 2022-04-20
Payer: COMMERCIAL

## 2022-04-20 LAB
ALBUMIN SERPL-MCNC: 3.9 G/DL (ref 3.5–5.2)
ALP BLD-CCNC: 96 U/L (ref 35–104)
ALT SERPL-CCNC: 9 U/L (ref 5–33)
ANION GAP SERPL CALCULATED.3IONS-SCNC: 13 MMOL/L (ref 9–17)
AST SERPL-CCNC: 12 U/L
BILIRUB SERPL-MCNC: 0.17 MG/DL (ref 0.3–1.2)
BILIRUBIN DIRECT: <0.08 MG/DL
BILIRUBIN, INDIRECT: ABNORMAL MG/DL (ref 0–1)
BUN BLDV-MCNC: 15 MG/DL (ref 6–20)
CALCIUM SERPL-MCNC: 8.9 MG/DL (ref 8.6–10.4)
CHLORIDE BLD-SCNC: 106 MMOL/L (ref 98–107)
CO2: 18 MMOL/L (ref 20–31)
CREAT SERPL-MCNC: 0.81 MG/DL (ref 0.5–0.9)
EKG ATRIAL RATE: 72 BPM
EKG P AXIS: 65 DEGREES
EKG P-R INTERVAL: 166 MS
EKG Q-T INTERVAL: 438 MS
EKG QRS DURATION: 84 MS
EKG QTC CALCULATION (BAZETT): 479 MS
EKG R AXIS: 51 DEGREES
EKG T AXIS: 59 DEGREES
EKG VENTRICULAR RATE: 72 BPM
GFR AFRICAN AMERICAN: >60 ML/MIN
GFR NON-AFRICAN AMERICAN: >60 ML/MIN
GFR SERPL CREATININE-BSD FRML MDRD: ABNORMAL ML/MIN/{1.73_M2}
GLUCOSE BLD-MCNC: 101 MG/DL (ref 70–99)
LACTIC ACID, SEPSIS: 1.2 MMOL/L (ref 0.5–1.9)
LIPASE: 36 U/L (ref 13–60)
MAGNESIUM: 1.9 MG/DL (ref 1.6–2.6)
POTASSIUM SERPL-SCNC: 3.9 MMOL/L (ref 3.7–5.3)
SODIUM BLD-SCNC: 137 MMOL/L (ref 135–144)
TOTAL PROTEIN: 6.6 G/DL (ref 6.4–8.3)

## 2022-04-20 PROCEDURE — 2580000003 HC RX 258: Performed by: EMERGENCY MEDICINE

## 2022-04-20 PROCEDURE — 96375 TX/PRO/DX INJ NEW DRUG ADDON: CPT

## 2022-04-20 PROCEDURE — 74177 CT ABD & PELVIS W/CONTRAST: CPT

## 2022-04-20 PROCEDURE — 93010 ELECTROCARDIOGRAM REPORT: CPT | Performed by: INTERNAL MEDICINE

## 2022-04-20 PROCEDURE — 6360000004 HC RX CONTRAST MEDICATION: Performed by: EMERGENCY MEDICINE

## 2022-04-20 RX ORDER — 0.9 % SODIUM CHLORIDE 0.9 %
80 INTRAVENOUS SOLUTION INTRAVENOUS ONCE
Status: COMPLETED | OUTPATIENT
Start: 2022-04-20 | End: 2022-04-20

## 2022-04-20 RX ORDER — IBUPROFEN 600 MG/1
600 TABLET ORAL EVERY 6 HOURS PRN
Qty: 20 TABLET | Refills: 0 | Status: ON HOLD | OUTPATIENT
Start: 2022-04-20 | End: 2022-10-13

## 2022-04-20 RX ORDER — SODIUM CHLORIDE 0.9 % (FLUSH) 0.9 %
10 SYRINGE (ML) INJECTION PRN
Status: DISCONTINUED | OUTPATIENT
Start: 2022-04-20 | End: 2022-04-20 | Stop reason: HOSPADM

## 2022-04-20 RX ORDER — AMOXICILLIN AND CLAVULANATE POTASSIUM 875; 125 MG/1; MG/1
1 TABLET, FILM COATED ORAL 2 TIMES DAILY
Qty: 10 TABLET | Refills: 0 | Status: SHIPPED | OUTPATIENT
Start: 2022-04-20 | End: 2022-04-25

## 2022-04-20 RX ADMIN — SODIUM CHLORIDE 80 ML: 9 INJECTION, SOLUTION INTRAVENOUS at 00:30

## 2022-04-20 RX ADMIN — IOPAMIDOL 75 ML: 755 INJECTION, SOLUTION INTRAVENOUS at 00:30

## 2022-04-20 RX ADMIN — SODIUM CHLORIDE, PRESERVATIVE FREE 10 ML: 5 INJECTION INTRAVENOUS at 00:30

## 2022-04-20 NOTE — ED TRIAGE NOTES
Mode of arrival (squad #, walk in, police, etc) : walk-in        Chief complaint(s): animal bite; abdominal pain        Arrival Note (brief scenario, treatment PTA, etc). : Pt presents to ED s/p a stray dog biting her right hand. Small lacerations noted to the first and second digits. Pt does not know when her last tetanus shot was. Bleeding controlled at this time, pt voices concern for bleeding d/t blood thinner use. Pt also reports a sharp, diffuse abdominal pain x3 days. Pt reports 1 episode of vomiting today and 1 bowel movement today. Pt reports feeling bloated, states the pain is worse after eating because she becomes more bloated. C= \"Have you ever felt that you should Cut down on your drinking? \"  No  A= \"Have people Annoyed you by criticizing your drinking? \"  No  G= \"Have you ever felt bad or Guilty about your drinking? \"  No  E= \"Have you ever had a drink as an Eye-opener first thing in the morning to steady your nerves or to help a hangover? \"  No    Deferred []          *If yes to two or more: probable alcohol abuse. *

## 2022-04-20 NOTE — ED PROVIDER NOTES
EMERGENCY DEPARTMENT ENCOUNTER    Pt Name: eZ Lamb  MRN: [id number]  [place] 1970  Date of evaluation: 4/19/22  CHIEF COMPLAINT       Chief Complaint   Patient presents with    Animal Bite    Abdominal Pain    Bloated     HISTORY OF PRESENT ILLNESS   HPI   This is a 32-year-old female comes in today with 2 complaints. The patient states that she was on her way to the emergency department when she got bit by a dog. This was a stray dog that was trying to attack her cat and so she was trying to have the dog not get her cat at he bit her right hand. This happened just prior to arrival.  She cannot remember the last time she received a tetanus vaccine. She is concerned because she is on blood thinners and every time she moves her hand it starts bleeding again. Patient is complaining of abdominal pain which is the reason why she was coming to [facility]. She says that she has had multiple episodes of vomiting worse after eating. She continues to feel nauseous no diarrhea she has had multiple abdominal surgeries in the past including 3 C-sections bowel obstruction and hernia. No chest pain no fever. REVIEW OF SYSTEMS     Review of Systems   Constitutional: Negative for fever. HENT: Negative for congestion. Respiratory: Negative for cough and shortness of breath. Cardiovascular: Negative for chest pain. Gastrointestinal: Positive for abdominal pain, nausea and vomiting. Negative for diarrhea. Genitourinary: Negative for dysuria. Musculoskeletal: Negative for back pain. Right hand pain   Skin: Negative for rash. Neurological: Negative for headaches. All other systems reviewed and are negative.     PASTMEDICAL HISTORY     Past Medical History:   Diagnosis Date    Anxiety     CAD (coronary artery disease)     Mitral valve prolapse    Fatty liver     GERD (gastroesophageal reflux disease)     Headache     History of bronchitis     Hyperlipidemia     Hypothyroidism  Kidney stone     LFT elevation     MVP (mitral valve prolapse)     Obesity     Osteoarthritis of cervical spine     Pulmonary emboli (HCC)     [facility]     Umbilical hernia      SURGICAL HISTORY       Past Surgical History:   Procedure Laterality Date    APPENDECTOMY       SECTION      2 pfannenstiel, 1 vertical    CHOLECYSTECTOMY      COLONOSCOPY      Dr Patric Dance  14    COLONOSCOPY  2014    biopsy & sigmoid spasms, pathology negative    COLONOSCOPY N/A 2018    COLONOSCOPY WITH BIOPSY performed by Scott Peters MD at [facility] 2021    COLONOSCOPY DIAGNOSTIC performed by Luanna Hamman, MD at [facility]      x 5    HYSTERECTOMY, TOTAL ABDOMINAL          [place] EXPLORATORY OF ABDOMEN  10/21/2014    Laparotomy-lysis of adhesions, bso     UPPER GASTROINTESTINAL ENDOSCOPY  2010    mild chronic inactive gastritis    UPPER GASTROINTESTINAL ENDOSCOPY N/A 3/10/2021    EGD BIOPSY [facility] Luanna Hamman[facility]       Previous Medications    ALBUTEROL SULFATE HFA (VENTOLIN HFA) 108 (90 BASE) MCG/ACT INHALER    Inhale 2 puffs into the lungs every 6 hours as needed for Wheezing    ALBUTEROL SULFATE  (90 BASE) MCG/ACT INHALER    INHALE 2 PUFFS INTO THE LUNGS EVERY 4 HOURS AS NEEDED FOR SHORTNESS OF BREATH    ATORVASTATIN (LIPITOR) 40 MG TABLET    TAKE 1 TABLET BY MOUTH EVERY DAY    CLONAZEPAM (KLONOPIN) 0.5 MG TABLET    Take 1 tablet by mouth 2 times daily as needed for Anxiety for up to 30 days.     CLONAZEPAM (KLONOPIN) 1 MG TABLET    TAKE 1/2 TABLET BY MOUTH FOUR TIMES DAILY AS NEEDED    ESOMEPRAZOLE (NEXIUM) 40 MG DELAYED RELEASE CAPSULE    TAKE 1 CAPSULE BY MOUTH EVERY MORNING BEFORE BREAKFAST    LEVOTHYROXINE (SYNTHROID) 175 MCG TABLET    Take 1 tablet by mouth daily Take 1 tab Monday-Saturday, and 1.5 tabs on  METOPROLOL TARTRATE (LOPRESSOR) 50 MG TABLET    Take 50 mg by mouth 2 times daily     ONDANSETRON (ZOFRAN ODT) 4 MG DISINTEGRATING TABLET    Take 1 tablet by mouth every 8 hours as needed for Nausea or Vomiting    OXYCODONE-ACETAMINOPHEN (PERCOCET)  MG PER TABLET    Take 1 tablet by mouth every 6 hours as needed for Pain for up to 30 days. ROPINIROLE (REQUIP) 2 MG TABLET    TAKE 1 TABLET BY MOUTH EVERY NIGHT    SERTRALINE (ZOLOFT) 100 MG TABLET    Take 1.5 tablets by mouth daily    SUCRALFATE (CARAFATE) 1 GM TABLET    TAKE 1 TABLET BY MOUTH FOUR TIMES DAILY    TIZANIDINE (ZANAFLEX) 4 MG TABLET    TAKE 1 TABLET BY MOUTH EVERY 8 HOURS AS NEEDED FOR SPASMS    TOPIRAMATE (TOPAMAX) 25 MG TABLET    25 mg 2 times daily     TRAZODONE (DESYREL) 100 MG TABLET        XARELTO 20 MG TABS TABLET    TAKE 1 TABLET BY MOUTH DAILY WITH BREAKFAST    ZOLPIDEM (AMBIEN CR) 12.5 MG EXTENDED RELEASE TABLET    TAKE 1 TABLET BY MOUTH AT BEDTIME     ALLERGIES     is allergic to compazine [prochlorperazine], sulfa antibiotics, and adhesive tape. FAMILY HISTORY     She indicated that her mother is . She indicated that her father is alive. She indicated that her maternal grandmother is . She indicated that her maternal grandfather is . She indicated that her paternal grandmother is . She indicated that her paternal grandfather is .      SOCIAL HISTORY       Social History     Tobacco Use    Smoking status: Former Smoker     Packs/day: 0.25     Years: 33.00     Pack years: 8.25     Types: Cigarettes     Quit date:      Years since quittin.2    Smokeless tobacco: Never Used    Tobacco comment: quit on nicoderm patch   Vaping Use    Vaping Use: Never used   Substance Use Topics    Alcohol use: No     Alcohol/week: 0.0 standard drinks    Drug use: No     PHYSICAL EXAM     INITIAL VITALS: /66   Pulse 77   Temp 98.4 °F (36.9 °C) (Oral)   Resp 16   Ht 5' 4\" (1.626 m)   Wt 175 lb cutaneous calcifications versus minimal foreign body identified   right 2nd digit. LABS: All lab results were reviewed by myself, and all abnormals are listed below. Labs Reviewed   BASIC METABOLIC PANEL - Abnormal; Notable for the following components:       Result Value    Glucose 101 (*)     CO2 18 (*)     All other components within normal limits   HEPATIC FUNCTION PANEL - Abnormal; Notable for the following components: Total Bilirubin 0.17 (*)     All other components within normal limits   URINALYSIS - Abnormal; Notable for the following components:    Urine Hgb SMALL (*)     All other components within normal limits   CBC WITH AUTO DIFFERENTIAL   MAGNESIUM   LIPASE   LACTATE, SEPSIS   MICROSCOPIC URINALYSIS   LACTATE, SEPSIS       EMERGENCY DEPARTMENTCOURSE:   In terms of the dog bite there are some puncture wounds but no large lacerations will well washed them out update her tetanus give her Augmentin because this was a stray dog I did recommend giving the patient rabies prophylaxis however she does not want rabies prophylaxis at this time. In terms of the abdominal pain I am concerned that she might have hernia or an obstruction with how tender she is will obtain a CT scan    12:54 AM EDT  There is no elevation in creatinine no electrolyte abnormality no anion gap elevation elevation in LFTs or lipase no leukocytosis no anemia urinalysis is negative for infection CT scan is negative for obstruction or hernia unknown etiology for her abdominal pain could be viral given her vomiting and diarrhea she is tolerating oral intake. In terms of her dog bite the wound was washed out and dressed she will be discharged with Augmentin. EKG: All EKG's are interpreted by the Emergency Department Physician who either signs or Co-signs this chart in the absence of a cardiologist.  Normal sinus rhythm with a heart rate of 72 bpm normal axis no prolonged intervals no acute ST or T wave changes    Vitals: Vitals:    04/19/22 2208   BP: 111/66   Pulse: 77   Resp: 16   Temp: 98.4 °F (36.9 °C)   TempSrc: Oral   SpO2: 98%   Weight: 175 lb (79.4 kg)   Height: 5' 4\" (1.626 m)       The patient was given the following medications while in the emergency department:  Orders Placed This Encounter   Medications    amoxicillin-clavulanate (AUGMENTIN) 875-125 MG per tablet 1 tablet     Order Specific Question:   Antimicrobial Indications     Answer:   Skin and Soft Tissue Infection    Tetanus-Diphth-Acell Pertussis (BOOSTRIX) injection 0.5 mL    morphine sulfate (PF) injection 4 mg    ondansetron (ZOFRAN) injection 4 mg    0.9 % sodium chloride bolus    iopamidol (ISOVUE-370) 76 % injection 75 mL    0.9 % sodium chloride bolus    sodium chloride flush 0.9 % injection 10 mL    amoxicillin-clavulanate (AUGMENTIN) 875-125 MG per tablet     Sig: Take 1 tablet by mouth 2 times daily for 5 days     Dispense:  10 tablet     Refill:  0    ibuprofen (ADVIL;MOTRIN) 600 MG tablet     Sig: Take 1 tablet by mouth every 6 hours as needed for Pain     Dispense:  20 tablet     Refill:  0         FINAL IMPRESSION      1. Abdominal pain, unspecified abdominal location    2.  Dog bite, initial encounter         DISPOSITION/PLAN   DISPOSITION Decision To Discharge 04/20/2022 12:52:36 AM      PATIENT REFERRED TO:  Jessie Curry MD  [address] [place] [address] [address] [phone]    In 3 days  For wound re-check    DISCHARGE MEDICATIONS:  New Prescriptions    AMOXICILLIN-CLAVULANATE (AUGMENTIN) 875-125 MG PER TABLET    Take 1 tablet by mouth 2 times daily for 5 days    IBUPROFEN (ADVIL;MOTRIN) 600 MG TABLET    Take 1 tablet by mouth every 6 hours as needed for Pain     Rafal Reed MD  Attending Emergency Physician    This charting supersedes any ED resident or staff charting and was written using speech recognition software        Rafal Reed MD  04/20/22 1396

## 2022-04-25 RX ORDER — SUCRALFATE 1 G/1
TABLET ORAL
Qty: 360 TABLET | OUTPATIENT
Start: 2022-04-25

## 2022-04-25 RX ORDER — SUCRALFATE 1 G/1
TABLET ORAL
Qty: 120 TABLET | Refills: 0 | Status: SHIPPED | OUTPATIENT
Start: 2022-04-25 | End: 2022-06-24

## 2022-04-30 ENCOUNTER — HOSPITAL ENCOUNTER (EMERGENCY)
Age: 52
Discharge: HOME | End: 2022-04-30
Attending: STUDENT IN AN ORGANIZED HEALTH CARE EDUCATION/TRAINING PROGRAM
Payer: COMMERCIAL

## 2022-04-30 ENCOUNTER — APPOINTMENT (OUTPATIENT)
Dept: CT IMAGING | Age: 52
End: 2022-04-30
Payer: COMMERCIAL

## 2022-04-30 ENCOUNTER — APPOINTMENT (OUTPATIENT)
Dept: GENERAL RADIOLOGY | Age: 52
End: 2022-04-30
Payer: COMMERCIAL

## 2022-04-30 VITALS
RESPIRATION RATE: 18 BRPM | OXYGEN SATURATION: 97 % | SYSTOLIC BLOOD PRESSURE: 123 MMHG | WEIGHT: 175 LBS | HEART RATE: 79 BPM | DIASTOLIC BLOOD PRESSURE: 79 MMHG | TEMPERATURE: 97.8 F | BODY MASS INDEX: 30.04 KG/M2

## 2022-04-30 DIAGNOSIS — L03.213 PERIORBITAL CELLULITIS OF RIGHT EYE: Primary | ICD-10-CM

## 2022-04-30 LAB
ABSOLUTE EOS #: 0.2 K/UL (ref 0–0.4)
ABSOLUTE LYMPH #: 2.3 K/UL (ref 1–4.8)
ABSOLUTE MONO #: 0.5 K/UL (ref 0.1–1.3)
ANION GAP SERPL CALCULATED.3IONS-SCNC: 12 MMOL/L (ref 9–17)
BASOPHILS # BLD: 1 % (ref 0–2)
BASOPHILS ABSOLUTE: 0.1 K/UL (ref 0–0.2)
BUN BLDV-MCNC: 15 MG/DL (ref 6–20)
CALCIUM SERPL-MCNC: 9 MG/DL (ref 8.6–10.4)
CHLORIDE BLD-SCNC: 105 MMOL/L (ref 98–107)
CO2: 20 MMOL/L (ref 20–31)
CREAT SERPL-MCNC: 0.78 MG/DL (ref 0.5–0.9)
EOSINOPHILS RELATIVE PERCENT: 3 % (ref 0–4)
GFR AFRICAN AMERICAN: >60 ML/MIN
GFR NON-AFRICAN AMERICAN: >60 ML/MIN
GFR SERPL CREATININE-BSD FRML MDRD: NORMAL ML/MIN/{1.73_M2}
GLUCOSE BLD-MCNC: 87 MG/DL (ref 70–99)
HCT VFR BLD CALC: 41.9 % (ref 36–46)
HEMOGLOBIN: 14.3 G/DL (ref 12–16)
LYMPHOCYTES # BLD: 31 % (ref 24–44)
MCH RBC QN AUTO: 31.5 PG (ref 26–34)
MCHC RBC AUTO-ENTMCNC: 34.1 G/DL (ref 31–37)
MCV RBC AUTO: 92.3 FL (ref 80–100)
MONOCYTES # BLD: 7 % (ref 1–7)
PDW BLD-RTO: 13.4 % (ref 11.5–14.9)
PLATELET # BLD: 208 K/UL (ref 150–450)
PMV BLD AUTO: 7.5 FL (ref 6–12)
POTASSIUM SERPL-SCNC: 4.4 MMOL/L (ref 3.7–5.3)
RBC # BLD: 4.54 M/UL (ref 4–5.2)
SEG NEUTROPHILS: 58 % (ref 36–66)
SEGMENTED NEUTROPHILS ABSOLUTE COUNT: 4.4 K/UL (ref 1.3–9.1)
SODIUM BLD-SCNC: 137 MMOL/L (ref 135–144)
WBC # BLD: 7.5 K/UL (ref 3.5–11)

## 2022-04-30 PROCEDURE — 85025 COMPLETE CBC W/AUTO DIFF WBC: CPT

## 2022-04-30 PROCEDURE — 99285 EMERGENCY DEPT VISIT HI MDM: CPT

## 2022-04-30 PROCEDURE — 6360000002 HC RX W HCPCS: Performed by: STUDENT IN AN ORGANIZED HEALTH CARE EDUCATION/TRAINING PROGRAM

## 2022-04-30 PROCEDURE — 6360000004 HC RX CONTRAST MEDICATION: Performed by: STUDENT IN AN ORGANIZED HEALTH CARE EDUCATION/TRAINING PROGRAM

## 2022-04-30 PROCEDURE — 2500000003 HC RX 250 WO HCPCS: Performed by: STUDENT IN AN ORGANIZED HEALTH CARE EDUCATION/TRAINING PROGRAM

## 2022-04-30 PROCEDURE — 36415 COLL VENOUS BLD VENIPUNCTURE: CPT

## 2022-04-30 PROCEDURE — 96375 TX/PRO/DX INJ NEW DRUG ADDON: CPT

## 2022-04-30 PROCEDURE — 80048 BASIC METABOLIC PNL TOTAL CA: CPT

## 2022-04-30 PROCEDURE — 2580000003 HC RX 258: Performed by: STUDENT IN AN ORGANIZED HEALTH CARE EDUCATION/TRAINING PROGRAM

## 2022-04-30 PROCEDURE — 96374 THER/PROPH/DIAG INJ IV PUSH: CPT

## 2022-04-30 PROCEDURE — 73130 X-RAY EXAM OF HAND: CPT

## 2022-04-30 PROCEDURE — 6370000000 HC RX 637 (ALT 250 FOR IP): Performed by: STUDENT IN AN ORGANIZED HEALTH CARE EDUCATION/TRAINING PROGRAM

## 2022-04-30 PROCEDURE — 70481 CT ORBIT/EAR/FOSSA W/DYE: CPT

## 2022-04-30 RX ORDER — FENTANYL CITRATE 50 UG/ML
50 INJECTION, SOLUTION INTRAMUSCULAR; INTRAVENOUS ONCE
Status: COMPLETED | OUTPATIENT
Start: 2022-04-30 | End: 2022-04-30

## 2022-04-30 RX ORDER — 0.9 % SODIUM CHLORIDE 0.9 %
80 INTRAVENOUS SOLUTION INTRAVENOUS ONCE
Status: COMPLETED | OUTPATIENT
Start: 2022-04-30 | End: 2022-04-30

## 2022-04-30 RX ORDER — AMOXICILLIN AND CLAVULANATE POTASSIUM 875; 125 MG/1; MG/1
1 TABLET, FILM COATED ORAL ONCE
Status: COMPLETED | OUTPATIENT
Start: 2022-04-30 | End: 2022-04-30

## 2022-04-30 RX ORDER — CLINDAMYCIN HYDROCHLORIDE 300 MG/1
300 CAPSULE ORAL 3 TIMES DAILY
Qty: 21 CAPSULE | Refills: 0 | Status: SHIPPED | OUTPATIENT
Start: 2022-04-30 | End: 2022-05-07

## 2022-04-30 RX ORDER — ONDANSETRON 2 MG/ML
4 INJECTION INTRAMUSCULAR; INTRAVENOUS ONCE
Status: COMPLETED | OUTPATIENT
Start: 2022-04-30 | End: 2022-04-30

## 2022-04-30 RX ORDER — SODIUM CHLORIDE 0.9 % (FLUSH) 0.9 %
10 SYRINGE (ML) INJECTION PRN
Status: DISCONTINUED | OUTPATIENT
Start: 2022-04-30 | End: 2022-04-30 | Stop reason: HOSPADM

## 2022-04-30 RX ORDER — CLINDAMYCIN HYDROCHLORIDE 150 MG/1
300 CAPSULE ORAL ONCE
Status: COMPLETED | OUTPATIENT
Start: 2022-04-30 | End: 2022-04-30

## 2022-04-30 RX ORDER — PROPARACAINE HYDROCHLORIDE 5 MG/ML
2 SOLUTION/ DROPS OPHTHALMIC ONCE
Status: COMPLETED | OUTPATIENT
Start: 2022-04-30 | End: 2022-04-30

## 2022-04-30 RX ORDER — AMOXICILLIN AND CLAVULANATE POTASSIUM 875; 125 MG/1; MG/1
1 TABLET, FILM COATED ORAL 2 TIMES DAILY
Qty: 14 TABLET | Refills: 0 | Status: SHIPPED | OUTPATIENT
Start: 2022-04-30 | End: 2022-05-07

## 2022-04-30 RX ORDER — MORPHINE SULFATE 4 MG/ML
4 INJECTION, SOLUTION INTRAMUSCULAR; INTRAVENOUS ONCE
Status: COMPLETED | OUTPATIENT
Start: 2022-04-30 | End: 2022-04-30

## 2022-04-30 RX ADMIN — FENTANYL CITRATE 50 MCG: 50 INJECTION, SOLUTION INTRAMUSCULAR; INTRAVENOUS at 16:22

## 2022-04-30 RX ADMIN — ONDANSETRON 4 MG: 2 INJECTION INTRAMUSCULAR; INTRAVENOUS at 14:09

## 2022-04-30 RX ADMIN — FLUORESCEIN SODIUM 1 EACH: 0.6 STRIP OPHTHALMIC at 15:30

## 2022-04-30 RX ADMIN — SODIUM CHLORIDE, PRESERVATIVE FREE 10 ML: 5 INJECTION INTRAVENOUS at 14:55

## 2022-04-30 RX ADMIN — PROPARACAINE HYDROCHLORIDE 2 DROP: 5 SOLUTION/ DROPS OPHTHALMIC at 15:30

## 2022-04-30 RX ADMIN — IOPAMIDOL 75 ML: 755 INJECTION, SOLUTION INTRAVENOUS at 14:55

## 2022-04-30 RX ADMIN — AMOXICILLIN AND CLAVULANATE POTASSIUM 1 TABLET: 875; 125 TABLET, FILM COATED ORAL at 16:22

## 2022-04-30 RX ADMIN — CLINDAMYCIN HYDROCHLORIDE 300 MG: 150 CAPSULE ORAL at 16:22

## 2022-04-30 RX ADMIN — MORPHINE SULFATE 4 MG: 4 INJECTION, SOLUTION INTRAMUSCULAR; INTRAVENOUS at 14:09

## 2022-04-30 RX ADMIN — SODIUM CHLORIDE 80 ML: 9 INJECTION, SOLUTION INTRAVENOUS at 14:55

## 2022-04-30 ASSESSMENT — ENCOUNTER SYMPTOMS
SHORTNESS OF BREATH: 0
NAUSEA: 0
COUGH: 0
ABDOMINAL PAIN: 0
VOMITING: 0
PHOTOPHOBIA: 1
EYE DISCHARGE: 1
EYE PAIN: 1
RHINORRHEA: 0

## 2022-04-30 ASSESSMENT — PAIN SCALES - GENERAL
PAINLEVEL_OUTOF10: 6
PAINLEVEL_OUTOF10: 0
PAINLEVEL_OUTOF10: 7

## 2022-04-30 ASSESSMENT — PAIN DESCRIPTION - PAIN TYPE: TYPE: ACUTE PAIN

## 2022-04-30 ASSESSMENT — PAIN DESCRIPTION - DESCRIPTORS: DESCRIPTORS: DISCOMFORT

## 2022-04-30 ASSESSMENT — PAIN - FUNCTIONAL ASSESSMENT: PAIN_FUNCTIONAL_ASSESSMENT: 0-10

## 2022-04-30 ASSESSMENT — PAIN DESCRIPTION - FREQUENCY: FREQUENCY: CONTINUOUS

## 2022-04-30 NOTE — ED PROVIDER NOTES
[facility]  Emergency Department Encounter  Emergency Medicine Physician     Pt Name: Diane Cm  MRN: [id number]  [place] 1970  Date of evaluation: 22  PCP:  Rangel Remy MD    [address] [place] [address]       Chief Complaint   Patient presents with    Eye Drainage       HISTORY OF PRESENT ILLNESS  (Location/Symptom, Timing/Onset, Context/Setting, Quality, Duration, Modifying Factors, Severity.)    Diane Cm is a 49 y.o. female who presents with right eye pain and swelling. Worsening over the past 4 days. Pain described as throbbing and radiating to the head. Patient states she is having some pain with eye movement. Has noted a small amount of thick drainage coming from the right eye as well. No complaints to the left eye. Patient states that she did not have any difficulty with vision, has no history of injury to that right eye recently. Patient states she is concerned because she was bitten by a dog 3 days ago on the right hand. She states that she was given a prescription for Augmentin and her tetanus shot was updated. Right eye pain, worsening over past 4 days. Radiates to the back of the head. No changes in vision but does have some pain with eye movement. Most of the pain is located on the inferior orbit and radiates up to the right temple. PAST MEDICAL / SURGICAL / SOCIAL / FAMILY HISTORY    has a past medical history of Anxiety, CAD (coronary artery disease), Fatty liver, GERD (gastroesophageal reflux disease), Headache, History of bronchitis, Hyperlipidemia, Hypothyroidism, Kidney stone, LFT elevation, MVP (mitral valve prolapse), Obesity, Osteoarthritis of cervical spine, Pulmonary emboli (HCC), Type 2 diabetes mellitus without complication ([address] and Umbilical hernia. has a past surgical history that includes Upper gastrointestinal endoscopy (2010); hernia repair; Cholecystectomy; Appendectomy;  section; Colonoscopy ();  Colonoscopy (14); Colonoscopy (2014); pr exploratory of abdomen (10/21/2014); Colonoscopy (N/A, 2018); Hysterectomy, total abdominal; Upper gastrointestinal endoscopy (N/A, 3/10/2021); and Colonoscopy (N/A, 2021). Social History     Socioeconomic History    Marital status:      Spouse name: Not on file    Number of children: Not on file    Years of education: Not on file    Highest education level: Not on file   Occupational History    Occupation: Homemaker   Tobacco Use    Smoking status: Former Smoker     Packs/day: 0.25     Years: 33.00     Pack years: 8.25     Types: Cigarettes     Quit date:      Years since quittin.3    Smokeless tobacco: Never Used    Tobacco comment: quit on nicoderm patch   Vaping Use    Vaping Use: Never used   Substance and Sexual Activity    Alcohol use: No     Alcohol/week: 0.0 standard drinks    Drug use: No    Sexual activity: Not Currently   Other Topics Concern    Not on file   Social History Narrative    Not on file     Social Determinants of Health     Financial Resource Strain: Low Risk     Difficulty of Paying Living Expenses: Not hard at all   Food Insecurity: No Food Insecurity    Worried About Running Out of Food in the Last Year: Never true    Kamille of Food in the Last Year: Never true   Transportation Needs:     Lack of Transportation (Medical): Not on file    Lack of Transportation (Non-Medical):  Not on file   Physical Activity:     Days of Exercise per Week: Not on file    Minutes of Exercise per Session: Not on file   Stress:     Feeling of Stress : Not on file   Social Connections:     Frequency of Communication with Friends and Family: Not on file    Frequency of Social Gatherings with Friends and Family: Not on file    Attends Mu-ism Services: Not on file    Active Member of Clubs or Organizations: Not on file    Attends Club or Organization Meetings: Not on file    Marital Status: Not on file   Intimate Partner Violence:     Fear of Current or Ex-Partner: Not on file    Emotionally Abused: Not on file    Physically Abused: Not on file    Sexually Abused: Not on file   Housing Stability:     Unable to Pay for Housing in the Last Year: Not on file    Number of Places Lived in the Last Year: Not on file    Unstable Housing in the Last Year: Not on file       Family History   Problem Relation Age of Onset    Cancer Mother         vaginal    Heart Disease Father     Diabetes Father     Other Father         colon resection for colon polyps    Breast Cancer Maternal Grandmother     Stroke Maternal Grandfather        Allergies:    Compazine [prochlorperazine], Sulfa antibiotics, and Adhesive tape    Home Medications:  Prior to Admission medications    Medication Sig Start Date End Date Taking? Authorizing Provider   amoxicillin-clavulanate (AUGMENTIN) 875-125 MG per tablet Take 1 tablet by mouth 2 times daily for 7 days 4/30/22 5/7/22 Yes Rasheed Diamond DO   clindamycin (CLEOCIN) 300 MG capsule Take 1 capsule by mouth 3 times daily for 7 days 4/30/22 5/7/22 Yes Rasheed Diamond DO   sucralfate (CARAFATE) 1 GM tablet TAKE 1 TABLET BY MOUTH FOUR TIMES DAILY 4/25/22   Gilford Meth, MD   ibuprofen (ADVIL;MOTRIN) 600 MG tablet Take 1 tablet by mouth every 6 hours as needed for Pain 4/20/22   Kya Haq MD   oxyCODONE-acetaminophen (PERCOCET)  MG per tablet Take 1 tablet by mouth every 6 hours as needed for Pain for up to 30 days.  4/8/22 5/8/22  MARTITA Lee - CNP   XARELTO 20 MG TABS tablet TAKE 1 TABLET BY MOUTH DAILY WITH BREAKFAST 3/28/22   Joesph Francisco MD   zolpidem (AMBIEN CR) 12.5 MG extended release tablet TAKE 1 TABLET BY MOUTH AT BEDTIME 3/1/22   Historical Provider, MD   atorvastatin (LIPITOR) 40 MG tablet TAKE 1 TABLET BY MOUTH EVERY DAY 2/27/22   Historical Provider, MD   levothyroxine (SYNTHROID) 175 MCG tablet Take 1 tablet by mouth daily Take 1 tab Monday-Saturday, and 1.5 tabs on Prasad 3/3/22   MARTITA Leblanc CNP   clonazePAM (KLONOPIN) 1 MG tablet TAKE 1/2 TABLET BY MOUTH FOUR TIMES DAILY AS NEEDED 1/26/22   Historical Provider, MD   esomeprazole ([address]) 40 MG delayed release capsule TAKE 1 CAPSULE BY MOUTH EVERY MORNING BEFORE BREAKFAST 2/24/22   MARTITA Leblanc CNP   tiZANidine (ZANAFLEX) 4 MG tablet TAKE 1 TABLET BY MOUTH EVERY 8 HOURS AS NEEDED FOR SPASMS 2/14/22   [facility] CNP   rOPINIRole (REQUIP) 2 MG tablet TAKE 1 TABLET BY MOUTH EVERY NIGHT 1/25/22   MARTITA Leblanc CNP   ondansetron (ZOFRAN ODT) 4 MG disintegrating tablet Take 1 tablet by mouth every 8 hours as needed for Nausea or Vomiting 11/27/21   Nancy Hamilton DO   sertraline (ZOLOFT) 100 MG tablet Take 1.5 tablets by mouth daily 9/9/21   El Nagy MD   traZODone (DESYREL) 100 MG tablet  8/5/21   Historical Provider, MD   albuterol sulfate HFA (VENTOLIN HFA) 108 (90 Base) MCG/ACT inhaler Inhale 2 puffs into the lungs every 6 hours as needed for Wheezing 6/6/21   Henrik Gabriel MD   albuterol sulfate  (90 Base) MCG/ACT inhaler INHALE 2 PUFFS INTO THE LUNGS EVERY 4 HOURS AS NEEDED FOR SHORTNESS OF BREATH 4/23/21   MARTITA Leblanc CNP   clonazePAM (KLONOPIN) 0.5 MG tablet Take 1 tablet by mouth 2 times daily as needed for Anxiety for up to 30 days. 12/28/20 2/18/22  Paramjit Anderson MD   topiramate (TOPAMAX) 25 MG tablet 25 mg 2 times daily  2/27/18   Historical Provider, MD   metoprolol tartrate (LOPRESSOR) 50 MG tablet Take 50 mg by mouth 2 times daily  8/21/17   Historical Provider, MD       REVIEW OF SYSTEMS    (2-9 systems for level 4, 10 or more for level 5)    Review of Systems   Constitutional: Negative for chills, fatigue and fever. HENT: Negative for congestion and rhinorrhea. Eyes: Positive for photophobia, pain and discharge. Negative for visual disturbance. Respiratory: Negative for cough and shortness of breath.     Cardiovascular: Negative for chest pain. Gastrointestinal: Negative for abdominal pain, nausea and vomiting. Musculoskeletal: Negative for myalgias and neck pain. Neurological: Positive for headaches. Negative for dizziness, syncope and weakness. All other systems reviewed and are negative. PHYSICAL EXAM   (up to 7 for level 4, 8 or more for level 5)    INITIAL VITALS:   ED Triage Vitals [04/30/22 1257]   BP Temp Temp Source Pulse Resp SpO2 Height Weight   123/79 97.8 °F (36.6 °C) Temporal 79 18 97 % -- 175 lb (79.4 kg)       Physical Exam  Vitals and nursing note reviewed. Constitutional:       General: She is not in acute distress. Appearance: She is well-developed. She is not ill-appearing. Eyes:      Extraocular Movements: Extraocular movements intact. Right eye: Normal extraocular motion and no nystagmus. Left eye: Normal extraocular motion and no nystagmus. Conjunctiva/sclera:      Right eye: Right conjunctiva is injected. No chemosis or exudate. Left eye: Left conjunctiva is not injected. No chemosis or exudate. Pupils: Pupils are equal, round, and reactive to light. Comments: Swelling to the right upper and lower lids. Some mild periorbital swelling and erythema as well. Intact extraocular motion bilateral eyes. Pupils PERRL. Cardiovascular:      Rate and Rhythm: Normal rate and regular rhythm. Pulses:           Radial pulses are 2+ on the right side and 2+ on the left side. Heart sounds: Normal heart sounds. No murmur heard. Pulmonary:      Effort: Pulmonary effort is normal. No respiratory distress. Breath sounds: Normal breath sounds. No decreased breath sounds. Abdominal:      General: Bowel sounds are normal. There is no distension. Palpations: Abdomen is soft. Tenderness: There is no abdominal tenderness. Musculoskeletal:      Cervical back: Normal range of motion and neck supple. No tenderness.    Skin:     General: Skin is warm and dry. Capillary Refill: Capillary refill takes less than 2 seconds. Neurological:      Mental Status: She is alert and oriented to person, place, and time. Psychiatric:         Behavior: Behavior is cooperative.          DIFFERENTIAL  DIAGNOSIS   PLAN (LABS / IMAGING / EKG):  Orders Placed This Encounter   Procedures    CT ORBITS W CONTRAST    XR HAND RIGHT (MIN 3 VIEWS)    CBC with Auto Differential    Basic Metabolic Panel       MEDICATIONS ORDERED:  Orders Placed This Encounter   Medications    morphine sulfate (PF) injection 4 mg    ondansetron (ZOFRAN) injection 4 mg    proparacaine (ALCAINE) 0.5 % ophthalmic solution 2 drop    fluorescein ophthalmic strip 1 each    0.9 % sodium chloride bolus    iopamidol (ISOVUE-370) 76 % injection 75 mL    DISCONTD: sodium chloride flush 0.9 % injection 10 mL    clindamycin (CLEOCIN) capsule 300 mg     Order Specific Question:   Antimicrobial Indications     Answer:   Head and Neck Infection    amoxicillin-clavulanate (AUGMENTIN) 875-125 MG per tablet 1 tablet     Order Specific Question:   Antimicrobial Indications     Answer:   Head and Neck Infection    amoxicillin-clavulanate (AUGMENTIN) 875-125 MG per tablet     Sig: Take 1 tablet by mouth 2 times daily for 7 days     Dispense:  14 tablet     Refill:  0    clindamycin (CLEOCIN) 300 MG capsule     Sig: Take 1 capsule by mouth 3 times daily for 7 days     Dispense:  21 capsule     Refill:  0    fentaNYL (SUBLIMAZE) injection 50 mcg         DIAGNOSTIC RESULTS / EMERGENCYDEPARTMENT COURSE / MDM   LABS:  Labs Reviewed   CBC WITH AUTO DIFFERENTIAL   BASIC METABOLIC PANEL       RADIOLOGY:  XR HAND RIGHT (MIN 3 VIEWS)    Result Date: 4/30/2022  EXAMINATION: THREE XRAY VIEWS OF THE RIGHT HAND 4/30/2022 1:31 pm COMPARISON: 04/19/2022 HISTORY: ORDERING SYSTEM PROVIDED HISTORY: dog bite 3 days ago, pain to index finger and swelling TECHNOLOGIST PROVIDED HISTORY: dog bite 3 days ago, pain to index finger and swelling FINDINGS: Soft tissue swelling of the 2nd digit with evidence of prior soft tissue laceration. No radiopaque foreign body. No acute fracture or dislocation. No soft tissue gas. Soft tissue swelling of the 1st digit unchanged since prior examination consistent with dog bite injury. No radiopaque foreign body. No fracture. CT ORBITS W CONTRAST    Result Date: 4/30/2022  EXAMINATION: CT OF THE ORBIT WITH CONTRAST, 4/30/2022 TECHNIQUE: CT of the orbits was performed with the administration of intravenous contrast. Multiplanar reformatted images are provided for review. Dose modulation, iterative reconstruction, and/or weight based adjustment of the mA/kV was utilized to reduce the radiation dose to as low as reasonably achievable. COMPARISON: No CT orbit comparison available. Correlation with 09/03/2021 CT brain. HISTORY: ORDERING SYSTEM PROVIDED HISTORY:  Concern for orbital cellulitis behind right eye. TECHNOLOGIST PROVIDED HISTORY: Concern for orbital cellulitis behind right eye. Decision Support Exception - unselect if not a suspected or confirmed emergency medical condition->Emergency Medical Condition (MA) Reason for Exam:  Concern for orbital cellulitis behind right eye. Additional signs and symptoms:  Pt states right eye swelling and drainage x3 day. NKI. FINDINGS: ORBITS: Asymmetric right preseptal soft tissue edema. The globes appear intact. The extraocular muscles, optic nerve sheath complexes and lacrimal glands appear unremarkable. No retrobulbar hematoma or mass is seen. BRAIN: The visualized portion of the intracranial contents appear unremarkable. SINUSES: Minimal mucosal thickening of the bilateral maxillary sinus floors. Otherwise the paranasal sinuses appear clear. The mastoid air cells appear clear. BONES: No acute osseous abnormality is seen. Right preseptal soft tissue edema compatible with reported cellulitis.   No convincing evidence of postseptal/orbital) extension. No drainable collections/abscess. RECOMMENDATIONS: If clinical concerns persist, can consider correlation with contrast enhanced MRI orbits. Impression:  Right eye pain worsening over the past 4 days. Periorbital swelling, erythema. Does have some pain with eye movement and an ongoing headache. No vision changes. No blurred vision. Most of the pain is on the inferior aspect of the orbit. Given patient's pain with movement and the headache, I am concerned for possible orbital cellulitis. Will check lab work and obtain a CT orbital scan. We will also need to do a corneal exam as well      EMERGENCY DEPARTMENT COURSE:  ED Course as of 04/30/22 2010   Sat Apr 30, 2022   1554 Fluorescein exam of the right negative for corneal abrasion. We will continue with Augmentin for 1 week and will add clindamycin for 1 week as well. Patient has a sulfa allergy therefore no Bactrim [AP]      ED Course User Index  [AP] Marlin Levine DO       MDM:  CT scan was negative for orbital cellulitis. Patient has been taking Augmentin for the past several days after she sustained a dog bite. She states she has been taking it as prescribed. However due to patient's preorbital cellulitis, will need to extend the Augmentin course and add on clindamycin. Was going to use Bactrim however patient has a sulfa antibiotic allergy. Discussed with patient the need for the absolute adherence to the antibiotic regimen, cleansing of the eyelid, and strict return precautions. Patient voiced understanding of all instructions. Patient safe for discharge.     PROCEDURES:  PROCEDURE NOTE -fluorescein eye exam    PATIENT NAME: Saamntha Pickard  MEDICAL RECORD NO. [id number]  DATE: 4/30/2022      PREOPERATIVE DIAGNOSIS:  eye pain  POSTOPERATIVE DIAGNOSIS:  Same  PROCEDURE PERFORMED:   Slit Lamp Exam  PERFORMING PHYSICIAN: Marlin Levine DO      DISCUSSION:  Samantha Pickard is a 49y.o.-year-old female who requires a slit lamp exam due to eye pain. The history and physical examination were reviewed and confirmed. CONSENT: The patient provided verbal consent for this procedure. PROCEDURE:  The patient was placed in the appropriate position. Anesthesia was obtained using proparacaine drops in the right eye. Fluorescein staining was performed in the right eye and revealed no eye abrasions or increased uptake. The patient tolerated the procedure well. COMPLICATIONS:   None     Mars Hodgson DO  8:11 PM, 4/30/22        CONSULTS:  None    CRITICAL CARE:  There was a high probability of clinically significant/life threatening deterioration in this patient's condition which required my urgent intervention. Total critical care time was 15 minutes. This excludes any time for separately reportable procedures. FINAL IMPRESSION     1. Periorbital cellulitis of right eye          DISPOSITION / PLAN   DISPOSITION Decision To Discharge 04/30/2022 03:51:48 PM      Evaluation and treatment course in the ED, and plan of care upon discharge was discussed in length with the patient. Patient had no further questions prior to being discharged and was instructed to return to the ED for new or worsening symptoms. Any changes to existing medications or new prescriptions were reviewed with patient and they expressed understanding of how to correctly take their medications and the possible side effects.     PATIENT REFERRED TO:  Adam Shaver MD  [address] [place], [place] [id number]  [phone]    Schedule an appointment as soon as possible for a visit in 3 days        DISCHARGE MEDICATIONS:  Discharge Medication List as of 4/30/2022  3:53 PM      START taking these medications    Details   amoxicillin-clavulanate (AUGMENTIN) 875-125 MG per tablet Take 1 tablet by mouth 2 times daily for 7 days, Disp-14 tablet, R-0Print      clindamycin (CLEOCIN) 300 MG capsule Take 1 capsule by mouth 3 times daily for 7 days, Disp-21 capsule, R-0Print             Claudette Blankenship DO  Emergency Medicine Attending    (Please note that portions of this note were completed with a voice recognition program.  Efforts were made to edit the dictations but occasionally words are mis-transcribed.)         Claudette Blankenship DO  04/30/22 2012

## 2022-05-02 ENCOUNTER — HOSPITAL ENCOUNTER (EMERGENCY)
Age: 52
Discharge: HOME | End: 2022-05-02
Attending: EMERGENCY MEDICINE
Payer: COMMERCIAL

## 2022-05-02 ENCOUNTER — APPOINTMENT (OUTPATIENT)
Dept: MRI IMAGING | Age: 52
End: 2022-05-02
Payer: COMMERCIAL

## 2022-05-02 VITALS
TEMPERATURE: 97.9 F | RESPIRATION RATE: 20 BRPM | HEART RATE: 67 BPM | SYSTOLIC BLOOD PRESSURE: 111 MMHG | DIASTOLIC BLOOD PRESSURE: 67 MMHG | HEIGHT: 64 IN | OXYGEN SATURATION: 95 % | BODY MASS INDEX: 29.88 KG/M2 | WEIGHT: 175 LBS

## 2022-05-02 DIAGNOSIS — H10.31 ACUTE BACTERIAL CONJUNCTIVITIS OF RIGHT EYE: ICD-10-CM

## 2022-05-02 DIAGNOSIS — L03.213 PRESEPTAL CELLULITIS OF RIGHT EYE: Primary | ICD-10-CM

## 2022-05-02 LAB
ABSOLUTE EOS #: 0.2 K/UL (ref 0–0.4)
ABSOLUTE LYMPH #: 2.3 K/UL (ref 1–4.8)
ABSOLUTE MONO #: 0.5 K/UL (ref 0.1–1.3)
ANION GAP SERPL CALCULATED.3IONS-SCNC: 11 MMOL/L (ref 9–17)
BASOPHILS # BLD: 1 % (ref 0–2)
BASOPHILS ABSOLUTE: 0.1 K/UL (ref 0–0.2)
BUN BLDV-MCNC: 15 MG/DL (ref 6–20)
CALCIUM SERPL-MCNC: 9.1 MG/DL (ref 8.6–10.4)
CHLORIDE BLD-SCNC: 102 MMOL/L (ref 98–107)
CO2: 24 MMOL/L (ref 20–31)
CREAT SERPL-MCNC: 0.86 MG/DL (ref 0.5–0.9)
EOSINOPHILS RELATIVE PERCENT: 2 % (ref 0–4)
GFR AFRICAN AMERICAN: >60 ML/MIN
GFR NON-AFRICAN AMERICAN: >60 ML/MIN
GFR SERPL CREATININE-BSD FRML MDRD: ABNORMAL ML/MIN/{1.73_M2}
GLUCOSE BLD-MCNC: 102 MG/DL (ref 70–99)
HCT VFR BLD CALC: 40.9 % (ref 36–46)
HEMOGLOBIN: 14.3 G/DL (ref 12–16)
LYMPHOCYTES # BLD: 26 % (ref 24–44)
MCH RBC QN AUTO: 32.1 PG (ref 26–34)
MCHC RBC AUTO-ENTMCNC: 34.9 G/DL (ref 31–37)
MCV RBC AUTO: 92 FL (ref 80–100)
MONOCYTES # BLD: 5 % (ref 1–7)
PDW BLD-RTO: 13.4 % (ref 11.5–14.9)
PLATELET # BLD: 184 K/UL (ref 150–450)
PMV BLD AUTO: 7.6 FL (ref 6–12)
POTASSIUM SERPL-SCNC: 4.3 MMOL/L (ref 3.7–5.3)
RBC # BLD: 4.44 M/UL (ref 4–5.2)
SEG NEUTROPHILS: 66 % (ref 36–66)
SEGMENTED NEUTROPHILS ABSOLUTE COUNT: 5.7 K/UL (ref 1.3–9.1)
SODIUM BLD-SCNC: 137 MMOL/L (ref 135–144)
WBC # BLD: 8.8 K/UL (ref 3.5–11)

## 2022-05-02 PROCEDURE — 85025 COMPLETE CBC W/AUTO DIFF WBC: CPT

## 2022-05-02 PROCEDURE — A9579 GAD-BASE MR CONTRAST NOS,1ML: HCPCS | Performed by: PHYSICIAN ASSISTANT

## 2022-05-02 PROCEDURE — 70543 MRI ORBT/FAC/NCK W/O &W/DYE: CPT

## 2022-05-02 PROCEDURE — 2580000003 HC RX 258: Performed by: PHYSICIAN ASSISTANT

## 2022-05-02 PROCEDURE — 96361 HYDRATE IV INFUSION ADD-ON: CPT

## 2022-05-02 PROCEDURE — 36415 COLL VENOUS BLD VENIPUNCTURE: CPT

## 2022-05-02 PROCEDURE — 96374 THER/PROPH/DIAG INJ IV PUSH: CPT

## 2022-05-02 PROCEDURE — 6370000000 HC RX 637 (ALT 250 FOR IP): Performed by: PHYSICIAN ASSISTANT

## 2022-05-02 PROCEDURE — 6360000002 HC RX W HCPCS: Performed by: PHYSICIAN ASSISTANT

## 2022-05-02 PROCEDURE — 6360000004 HC RX CONTRAST MEDICATION: Performed by: PHYSICIAN ASSISTANT

## 2022-05-02 PROCEDURE — 96375 TX/PRO/DX INJ NEW DRUG ADDON: CPT

## 2022-05-02 PROCEDURE — 2500000003 HC RX 250 WO HCPCS: Performed by: PHYSICIAN ASSISTANT

## 2022-05-02 PROCEDURE — 99285 EMERGENCY DEPT VISIT HI MDM: CPT

## 2022-05-02 PROCEDURE — 80048 BASIC METABOLIC PNL TOTAL CA: CPT

## 2022-05-02 RX ORDER — 0.9 % SODIUM CHLORIDE 0.9 %
1000 INTRAVENOUS SOLUTION INTRAVENOUS ONCE
Status: COMPLETED | OUTPATIENT
Start: 2022-05-02 | End: 2022-05-02

## 2022-05-02 RX ORDER — MORPHINE SULFATE 4 MG/ML
4 INJECTION, SOLUTION INTRAMUSCULAR; INTRAVENOUS ONCE
Status: COMPLETED | OUTPATIENT
Start: 2022-05-02 | End: 2022-05-02

## 2022-05-02 RX ORDER — SODIUM CHLORIDE 0.9 % (FLUSH) 0.9 %
10 SYRINGE (ML) INJECTION PRN
Status: DISCONTINUED | OUTPATIENT
Start: 2022-05-02 | End: 2022-05-02 | Stop reason: HOSPADM

## 2022-05-02 RX ORDER — DIAZEPAM 5 MG/1
5 TABLET ORAL ONCE
Status: COMPLETED | OUTPATIENT
Start: 2022-05-02 | End: 2022-05-02

## 2022-05-02 RX ORDER — PROPARACAINE HYDROCHLORIDE 5 MG/ML
1 SOLUTION/ DROPS OPHTHALMIC ONCE
Status: COMPLETED | OUTPATIENT
Start: 2022-05-02 | End: 2022-05-02

## 2022-05-02 RX ORDER — NALBUPHINE HCL 10 MG/ML
10 AMPUL (ML) INJECTION ONCE
Status: COMPLETED | OUTPATIENT
Start: 2022-05-02 | End: 2022-05-02

## 2022-05-02 RX ORDER — ONDANSETRON 2 MG/ML
4 INJECTION INTRAMUSCULAR; INTRAVENOUS ONCE
Status: COMPLETED | OUTPATIENT
Start: 2022-05-02 | End: 2022-05-02

## 2022-05-02 RX ADMIN — MORPHINE SULFATE 4 MG: 4 INJECTION, SOLUTION INTRAMUSCULAR; INTRAVENOUS at 13:50

## 2022-05-02 RX ADMIN — GADOTERIDOL 20 ML: 279.3 INJECTION, SOLUTION INTRAVENOUS at 18:43

## 2022-05-02 RX ADMIN — FLUORESCEIN SODIUM 1 EACH: 0.6 STRIP OPHTHALMIC at 13:28

## 2022-05-02 RX ADMIN — ONDANSETRON 4 MG: 2 INJECTION INTRAMUSCULAR; INTRAVENOUS at 15:37

## 2022-05-02 RX ADMIN — NALBUPHINE HYDROCHLORIDE 10 MG: 10 INJECTION, SOLUTION INTRAMUSCULAR; INTRAVENOUS; SUBCUTANEOUS at 15:38

## 2022-05-02 RX ADMIN — SODIUM CHLORIDE 1000 ML: 9 INJECTION, SOLUTION INTRAVENOUS at 15:35

## 2022-05-02 RX ADMIN — SODIUM CHLORIDE, PRESERVATIVE FREE 10 ML: 5 INJECTION INTRAVENOUS at 18:43

## 2022-05-02 RX ADMIN — DIAZEPAM 5 MG: 5 TABLET ORAL at 17:06

## 2022-05-02 RX ADMIN — PROPARACAINE HYDROCHLORIDE 1 DROP: 5 SOLUTION/ DROPS OPHTHALMIC at 13:28

## 2022-05-02 ASSESSMENT — PAIN SCALES - GENERAL
PAINLEVEL_OUTOF10: 8
PAINLEVEL_OUTOF10: 5
PAINLEVEL_OUTOF10: 10

## 2022-05-02 ASSESSMENT — LIFESTYLE VARIABLES
HOW MANY STANDARD DRINKS CONTAINING ALCOHOL DO YOU HAVE ON A TYPICAL DAY: PATIENT DECLINED
HOW OFTEN DO YOU HAVE A DRINK CONTAINING ALCOHOL: NEVER

## 2022-05-02 ASSESSMENT — PAIN DESCRIPTION - LOCATION
LOCATION: EYE
LOCATION: EYE

## 2022-05-02 ASSESSMENT — TONOMETRY
IOP_HANDHELD: 1
OD_IOP_MMHG: 10

## 2022-05-02 ASSESSMENT — VISUAL ACUITY: OU: 1

## 2022-05-02 ASSESSMENT — PAIN DESCRIPTION - ORIENTATION: ORIENTATION: RIGHT

## 2022-05-02 NOTE — ED TRIAGE NOTES
Swelling noted for the last 5 days, pt came to ED 2 days ago. Pt states it is getting worse, more swelling, spoke with family medicine, told to come back to ED. Pt states the blurry vision is new and there is increased swelling on R eyelid. no itching on R eye, green and yellow drainage noted per pt. R eyelid reddened and swollen. sclera reddened.

## 2022-05-02 NOTE — ED PROVIDER NOTES
[address]  eMERGENCY dEPARTMENT eNCOUnter      Pt Name: Lasha Wells  MRN: [id number]  [place] 1970  Date of evaluation: 5/2/2022  Provider: Juvenal Obrien PA-C    CHIEF COMPLAINT     No chief complaint on file. HISTORY OF PRESENT ILLNESS  (Location/Symptom, Timing/Onset, Context/Setting, Quality, Duration, Modifying Factors, Severity.)   Lasha Wells is a 49 y.o. female who presents to the emergency department for evaluation of right eye redness, swelling, drainage x 6 days. Pt was evaluated in our ED 2 days ago. CT orbits at that time showed no orbital cellulitis. Pt was started on clindamycin. Pt reports symptoms are worsening. States her eyelid is more red, swollen and painful. Reports \"fuzzy\" vision, green drainage, pain with movement of eye and right sided headache. She reports nausea and dry heaving. Denies fever, chills, emesis. Pt is not diabetic. Wears glasses, no contacts. Does follow with eye doctor at [facility].  No other complaints. Nursing Notes were reviewed. REVIEW OF SYSTEMS    (2-9 systems for level 4, 10 or more for level 5)     Review of Systems   Eye pain, redness, swelling  Blurry vision   Headache  Nausea       Except as noted above the remainder of the review of systems was reviewed and negative.        PAST MEDICAL HISTORY     Past Medical History:   Diagnosis Date    Anxiety     CAD (coronary artery disease)     Mitral valve prolapse    Fatty liver     GERD (gastroesophageal reflux disease)     Headache     History of bronchitis     Hyperlipidemia     Hypothyroidism     Kidney stone     LFT elevation     MVP (mitral valve prolapse)     Obesity     Osteoarthritis of cervical spine     Pulmonary emboli (HCC)     Type 2 diabetes mellitus without complication ([facility] [address] 1/34/8661    Umbilical hernia      None otherwise stated in nurses notes    SURGICAL HISTORY       Past Surgical History:   Procedure Laterality Date    APPENDECTOMY   SECTION      2 pfannenstiel, 1 vertical    CHOLECYSTECTOMY      COLONOSCOPY      Dr Otis Esqueda  14    COLONOSCOPY  2014    biopsy & sigmoid spasms, pathology negative    COLONOSCOPY N/A 2018    COLONOSCOPY WITH BIOPSY performed by Carlos Mirza MD at [address] [facility] N/A 2021    COLONOSCOPY DIAGNOSTIC performed by Surekha Norton MD at [facility]      x 5    HYSTERECTOMY, TOTAL ABDOMINAL          HI EXPLORATORY OF ABDOMEN  10/21/2014    Laparotomy-lysis of adhesions, bso     UPPER GASTROINTESTINAL ENDOSCOPY  2010    mild chronic inactive gastritis    UPPER GASTROINTESTINAL ENDOSCOPY N/A 3/10/2021    EGD BIOPSY performed [facility]kha Norton MD at [facility]     None otherwise stated in nurses notes    [address]       Discharge Medication List as of 2022  7:12 PM      CONTINUE these medications which have NOT CHANGED    Details   amoxicillin-clavulanate (AUGMENTIN) 875-125 MG per tablet Take 1 tablet by mouth 2 times daily for 7 days, Disp-14 tablet, R-0Print      clindamycin (CLEOCIN) 300 MG capsule Take 1 capsule by mouth 3 times daily for 7 days, Disp-21 capsule, R-0Print      sucralfate (CARAFATE) 1 GM tablet TAKE 1 TABLET BY MOUTH FOUR TIMES DAILY, Disp-120 tablet, R-0Normal      ibuprofen (ADVIL;MOTRIN) 600 MG tablet Take 1 tablet by mouth every 6 hours as needed for Pain, Disp-20 tablet, R-0Print      oxyCODONE-acetaminophen (PERCOCET)  MG per tablet Take 1 tablet by mouth every 6 hours as needed for Pain for up to 30 days. , Disp-120 tablet, R-0Normal      XARELTO 20 MG TABS tablet TAKE 1 TABLET BY MOUTH DAILY WITH BREAKFAST, Disp-90 tablet, R-3Normal      zolpidem (AMBIEN CR) 12.5 MG extended release tablet TAKE 1 TABLET BY MOUTH AT BEDTIMEHistorical Med      atorvastatin (LIPITOR) 40 MG tablet TAKE 1 TABLET BY MOUTH EVERY DAYHistorical Med      levothyroxine (SYNTHROID) 175 MCG tablet Take 1 tablet by mouth daily Take 1 tab Monday-Saturday, and 1.5 tabs on , Disp-90 tablet, R-0Normal      clonazePAM (KLONOPIN) 1 MG tablet TAKE 1/2 TABLET BY MOUTH FOUR TIMES DAILY AS NEEDEDHistorical Med      esomeprazole (NEXIUM) 40 MG delayed release capsule TAKE 1 CAPSULE BY MOUTH EVERY MORNING BEFORE BREAKFAST, Disp-90 capsule, R-0Normal      tiZANidine (ZANAFLEX) 4 MG tablet TAKE 1 TABLET BY MOUTH EVERY 8 HOURS AS NEEDED FOR SPASMS, Disp-90 tablet, R-0Normal      rOPINIRole (REQUIP) 2 MG tablet TAKE 1 TABLET BY MOUTH EVERY NIGHT, Disp-90 tablet, R-1Normal      ondansetron (ZOFRAN ODT) 4 MG disintegrating tablet Take 1 tablet by mouth every 8 hours as needed for Nausea or Vomiting, Disp-20 tablet, R-0Print      sertraline (ZOLOFT) 100 MG tablet Take 1.5 tablets by mouth daily, Disp-45 tablet, R-2Normal      traZODone (DESYREL) 100 MG tablet Historical Med      !! albuterol sulfate HFA (VENTOLIN HFA) 108 (90 Base) MCG/ACT inhaler Inhale 2 puffs into the lungs every 6 hours as needed for Wheezing, Disp-1 Inhaler, R-3Print      !! albuterol sulfate  (90 Base) MCG/ACT inhaler INHALE 2 PUFFS INTO THE LUNGS EVERY 4 HOURS AS NEEDED FOR SHORTNESS OF BREATH, Disp-42.5 g, R-3Normal      topiramate (TOPAMAX) 25 MG tablet 25 mg 2 times daily Historical Med      metoprolol tartrate (LOPRESSOR) 50 MG tablet Take 50 mg by mouth 2 times daily Historical Med       !! - Potential duplicate medications found. Please discuss with provider.           ALLERGIES     Compazine [prochlorperazine], Sulfa antibiotics, and Adhesive tape    FAMILY HISTORY           Problem Relation Age of Onset   [facility] Cancer Mother         vaginal    Heart Disease Father     Diabetes Father     Other Father         colon resection for colon polyps    Breast Cancer Maternal Grandmother     Stroke Maternal Grandfather      Family Status   Relation Name Status    Mother      Father  Alive    MGM      MGF      PGM      PGF       None otherwise stated in nurses notes    SOCIAL HISTORY      reports that she quit smoking about 16 months ago. Her smoking use included cigarettes. She has a 8.25 pack-year smoking history. She has never used smokeless tobacco. She reports that she does not drink alcohol and does not use drugs. lives at home with others     PHYSICAL EXAM    (up to 7 for level 4, 8 or more for level 5)     ED Triage Vitals [22 1309]   BP Temp Temp Source Pulse Resp SpO2 Height Weight   122/80 97.9 °F (36.6 °C) Oral 67 20 99 % 5' 4\" (1.626 m) 175 lb (79.4 kg)       Physical Exam  Vitals and nursing note reviewed. Constitutional:       General: She is awake. Appearance: Normal appearance. She is well-developed, well-groomed and normal weight. HENT:      Head: Normocephalic and atraumatic. Right periorbital erythema present. No raccoon eyes, Richards's sign, abrasion, contusion, masses, left periorbital erythema or laceration. Hair is normal.      Jaw: There is normal jaw occlusion. Nose: Nose normal.   Eyes:      General: Vision grossly intact. Gaze aligned appropriately. No allergic shiner, visual field deficit or scleral icterus. Right eye: No foreign body, discharge or hordeolum. Left eye: No foreign body, discharge or hordeolum. Intraocular pressure: Right eye pressure is 10 mmHg. Measurements were taken using a handheld tonometer. Extraocular Movements: Extraocular movements intact. Right eye: Abnormal extraocular motion (painful ) present. Left eye: Normal extraocular motion. Conjunctiva/sclera:      Right eye: Right conjunctiva is injected. Exudate (green) present. No chemosis or hemorrhage. Pupils: Pupils are equal, round, and reactive to light. Right eye: Pupil is round, reactive and not sluggish. No corneal abrasion or fluorescein uptake. Joseph exam negative.       Slit lamp exam:     Right eye: No corneal ulcer, foreign body, hyphema, hypopyon, anterior chamber bulge or photophobia. Comments: No corneal abrasion, ulceration, dendritic lesion on exam.  No leti sign. No proptosis. Pain with EOM. Pain is not alleviated with proparacaine. No consensual photophobia. Right upper eyelid is painful, erythematous, swollen. No induration, fluctuance. No chalazion or stye noted. Cardiovascular:      Rate and Rhythm: Normal rate and regular rhythm. Pulses: Normal pulses. Heart sounds: Normal heart sounds. Pulmonary:      Effort: Pulmonary effort is normal.      Breath sounds: Normal breath sounds. Skin:     Capillary Refill: Capillary refill takes less than 2 seconds. Neurological:      General: No focal deficit present. Mental Status: She is alert and oriented to person, place, and time. Mental status is at baseline. Psychiatric:         Behavior: Behavior is cooperative. DIAGNOSTIC RESULTS     EKG: All EKG's are interpreted by the Emergency Department Physician who either signs or Co-signs this chart in the absence of a cardiologist.        RADIOLOGY:   All plain film, CT, MRI, and formal ultrasound images (except ED bedside ultrasound) are read by the radiologist, see reports below, unless otherwise noted in MDM or here. MRI ORBITS FACE NECK W WO CONTRAST   Final Result   MR findings consistent with right-sided preseptal, periorbital cellulitis. RECOMMENDATIONS:   Unavailable             XR HAND RIGHT (MIN 3 VIEWS)    Result Date: 4/30/2022  EXAMINATION: THREE XRAY VIEWS OF THE RIGHT HAND 4/30/2022 1:31 pm COMPARISON: 04/19/2022 HISTORY: ORDERING SYSTEM PROVIDED HISTORY: dog bite 3 days ago, pain to index finger and swelling TECHNOLOGIST PROVIDED HISTORY: dog bite 3 days ago, pain to index finger and swelling FINDINGS: Soft tissue swelling of the 2nd digit with evidence of prior soft tissue laceration. No radiopaque foreign body. No acute fracture or dislocation. No soft tissue gas.      Soft tissue swelling of the 1st digit unchanged since prior examination consistent with dog bite injury. No radiopaque foreign body. No fracture. XR HAND RIGHT (MIN 3 VIEWS)    Result Date: 4/19/2022  EXAMINATION: THREE XRAY VIEWS OF THE RIGHT HAND 4/19/2022 11:45 pm COMPARISON: None. HISTORY: ORDERING SYSTEM PROVIDED HISTORY: right hand pain TECHNOLOGIST PROVIDED HISTORY: right hand pain Reason for Exam: PT CO pain in right hand after being bitten by a dog earlier today. Small lacerations and puncture wounds to fingers. PT difficulty bending fingers at this time. FINDINGS: No fracture, dislocation, or focal osseous lesion is noted. There is mild soft tissue swelling identified. There is density identified in the 2nd digit radial aspect represent cutaneous calcification versus retained foreign body. Please correlate findings. No fracture or dislocation. There mild soft tissue swelling. Question cutaneous calcifications versus minimal foreign body identified right 2nd digit. CT ABDOMEN PELVIS W IV CONTRAST Additional Contrast? None    Result Date: 4/20/2022  EXAMINATION: CT OF THE ABDOMEN AND PELVIS WITH CONTRAST 4/20/2022 12:21 am TECHNIQUE: CT of the abdomen and pelvis was performed with the administration of intravenous contrast. Multiplanar reformatted images are provided for review. Dose modulation, iterative reconstruction, and/or weight based adjustment of the mA/kV was utilized to reduce the radiation dose to as low as reasonably achievable.  COMPARISON: 02/07/2022 abdomen and pelvis, CT chest on 06/06/2019 HISTORY: ORDERING SYSTEM PROVIDED HISTORY: abdominal pain epigastric possible hernia TECHNOLOGIST PROVIDED HISTORY: abdominal pain epigastric possible hernia Decision Support Exception - unselect if not a suspected or confirmed emergency medical condition->Emergency Medical Condition (MA) Reason for Exam: abdominal pain epigastric possible hernia Additional signs and symptoms: Pt c/o lump in middle of abdomen and sharp, shooting pain for 2 days. . Relevant Medical/Surgical History: H/O cholecystectomy, hernia repair. FINDINGS: Lower Chest: There is a stable 4 mm pulmonary nodule in the right lung on axial image 1. Minimal subsegmental atelectasis. No basilar spiculated lung mass. No further follow-up necessary for the nodule given stability compared to 2019. Organs: No enhancing mass identified in the liver or spleen. Cholecystectomy clips are identified. The pancreas and adrenal glands appear unremarkable. Symmetric enhancement of the kidneys. No nephrolithiasis or obstructive urinary tract calculi. GI/Bowel: Colonic diverticulosis is identified. No acute diverticulitis is seen. The appendix is not seen, though no secondary signs of acute appendicitis are identified. Pelvis: No pelvic mass. The bladder appears unremarkable. Hysterectomy. No free fluid is identified in the pelvis. No bulky pelvic lymphadenopathy is identified. Peritoneum/Retroperitoneum: No aortic aneurysm. No dissection. No retroperitoneal or mesenteric lymphadenopathy is identified. Postoperative changes seen related to ventral hernia repair, with a hernia mesh noted which appear similar to the previous examination. Bones/Soft Tissues: No acute subcutaneous soft tissue abnormality is identified. No acute osseous abnormality. No canal stenosis is identified. 1. No acute inflammatory process seen in the upper abdomen to explain patient's epigastric pain. No hiatal hernia or gastric wall thickening. 2. Cholecystectomy. 3. Postoperative changes are seen related to ventral hernia repair, with no interval change in appearance compared to the previous exam.  No acute bowel herniation. 4. Other incidental findings as above.      CT ORBITS W CONTRAST    Result Date: 4/30/2022  EXAMINATION: CT OF THE ORBIT WITH CONTRAST, 4/30/2022 TECHNIQUE: CT of the orbits was performed with the administration of intravenous contrast. Multiplanar reformatted images are provided for review. Dose modulation, iterative reconstruction, and/or weight based adjustment of the mA/kV was utilized to reduce the radiation dose to as low as reasonably achievable. COMPARISON: No CT orbit comparison available. Correlation with 09/03/2021 CT brain. HISTORY: ORDERING SYSTEM PROVIDED HISTORY:  Concern for orbital cellulitis behind right eye. TECHNOLOGIST PROVIDED HISTORY: Concern for orbital cellulitis behind right eye. Decision Support Exception - unselect if not a suspected or confirmed emergency medical condition->Emergency Medical Condition (MA) Reason for Exam:  Concern for orbital cellulitis behind right eye. Additional signs and symptoms:  Pt states right eye swelling and drainage x3 day. NKI. FINDINGS: ORBITS: Asymmetric right preseptal soft tissue edema. The globes appear intact. The extraocular muscles, optic nerve sheath complexes and lacrimal glands appear unremarkable. No retrobulbar hematoma or mass is seen. BRAIN: The visualized portion of the intracranial contents appear unremarkable. SINUSES: Minimal mucosal thickening of the bilateral maxillary sinus floors. Otherwise the paranasal sinuses appear clear. The mastoid air cells appear clear. BONES: No acute osseous abnormality is seen. Right preseptal soft tissue edema compatible with reported cellulitis. No convincing evidence of postseptal/orbital) extension. No drainable collections/abscess. RECOMMENDATIONS: If clinical concerns persist, can consider correlation with contrast enhanced MRI orbits. LABS:  Labs Reviewed   BASIC METABOLIC PANEL - Abnormal; Notable for the following components:       Result Value    Glucose 102 (*)     All other components within normal limits   CBC WITH AUTO DIFFERENTIAL       All other labs were within normal range or not returned as of this dictation.     EMERGENCY DEPARTMENT COURSE and DIFFERENTIAL DIAGNOSIS/MDM:   Vitals:    Vitals: 05/02/22 1309 05/02/22 1544 05/02/22 1700 05/02/22 1854   BP: 122/80 102/67 103/66 111/67   Pulse: 67      Resp: 20   20   Temp: 97.9 °F (36.6 °C)      TempSrc: Oral      SpO2: 99% 98% 93% 95%   Weight: 175 lb (79.4 kg)      Height: 5' 4\" (1.626 m)            Patient instructed to return to the emergency room if symptoms worsen, return, or any other concern right away which is agreed by the patient    ED MEDS:  Orders Placed This Encounter   Medications    proparacaine (ALCAINE) 0.5 % ophthalmic solution 1 drop    fluorescein ophthalmic strip 1 each    morphine sulfate (PF) injection 4 mg    ondansetron (ZOFRAN) injection 4 mg    nalbuphine (NUBAIN) injection 10 mg    0.9 % sodium chloride bolus    diazePAM (VALIUM) tablet 5 mg    gadoteridol (PROHANCE) injection 20 mL    DISCONTD: sodium chloride flush 0.9 % injection 10 mL    ciprofloxacin HCl (CILOXAN) 0.3 % ophthalmic ointment     Sig: 3 times daily. Dispense:  3.5 g     Refill:  0         CONSULTS:  None    PROCEDURES:  None      FINAL IMPRESSION      1. Preseptal cellulitis of right eye    2. Acute bacterial conjunctivitis of right eye          DISPOSITION/PLAN   DISPOSITION     PATIENT REFERRED TO:  Gilford Meth, MD  [address] [place] [id number]  [address] [facility]  [facility] [address]  [phone]        Louisa Olvera MD  [place] [address]  [phone]    Call         DISCHARGE MEDICATIONS:  Discharge Medication List as of 5/2/2022  7:12 PM      START taking these medications    Details   ciprofloxacin HCl (CILOXAN) 0.3 % ophthalmic ointment 3 times daily. , Disp-3.5 g, R-0, Print               Summation      Patient Course:      Preseptal cellulitis. Started on abx. Symptoms worsening. On exam, redness, swelling and pain to right upper eyelid. conjuctiva is injected. Pt reports green drainage. No palpable abscess.    Will obtain MRI to rule out orbital cellulitis. Normal pressures on exam.  No corneal abrasions, ulceration, dendritic lesion. No rash on skin. Mri pending. Signed out to dr. Akhil Tao. The care is provided during an unprecedented national emergency due to the novel coronavirus, COVID-19. ED Medications administered this visit:    Medications   proparacaine (ALCAINE) 0.5 % ophthalmic solution 1 drop (1 drop Right Eye Given 5/2/22 1328)   fluorescein ophthalmic strip 1 each (1 each Right Eye Given 5/2/22 1328)   morphine sulfate (PF) injection 4 mg (4 mg IntraVENous Given 5/2/22 1350)   ondansetron (ZOFRAN) injection 4 mg (4 mg IntraVENous Given 5/2/22 1537)   nalbuphine (NUBAIN) injection 10 mg (10 mg IntraVENous Given 5/2/22 1538)   0.9 % sodium chloride bolus (0 mLs IntraVENous Stopped 5/2/22 1916)   diazePAM (VALIUM) tablet 5 mg (5 mg Oral Given 5/2/22 1706)   gadoteridol (PROHANCE) injection 20 mL (20 mLs IntraVENous Given 5/2/22 1843)       New Prescriptions from this visit:    Discharge Medication List as of 5/2/2022  7:12 PM      START taking these medications    Details   ciprofloxacin HCl (CILOXAN) 0.3 % ophthalmic ointment 3 times daily. , Disp-3.5 g, R-0, Print             Follow-up:  Keith Robles MD  [address] [place] [id number]  [address] [place] [address]  [address] [facility] [address]  [phone]    Call           Final Impression:   1. Preseptal cellulitis of right eye    2.  Acute bacterial conjunctivitis of right eye               (Please note that portions of this note were completed with a voice recognition program )        Magy Hernandez. Alva Monreal, MARIJA  05/02/22 150 Taj Tim PA-C  05/03/22 1034

## 2022-05-03 NOTE — ED PROVIDER NOTES
[address]  eMERGENCY dEPARTMENT eNCOUnter   Attending Attestation     Pt Name: Eryn Cohn  MRN: [id number]  [place] 1970  Date of evaluation: 5/2/22    History, EXAM, MDM:    Eryn Cohn is a 49 y.o. female who presents with No chief complaint on file. Preseptal cellulitis. Started on abx. Symptoms worsening. MRI shows no orbital cellulitis. Continue current antibiotic regimen. Additing topical abx eye ointment. D/w pt treatment plan, warning precautions for prompt ED return and importance of close OP FU, she verbalizes understanding and agrees with the treatment plan. Vitals:   Vitals:    05/02/22 1309 05/02/22 1544 05/02/22 1700 05/02/22 1854   BP: 122/80 102/67 103/66 111/67   Pulse: 67      Resp: 20   20   Temp: 97.9 °F (36.6 °C)      TempSrc: Oral      SpO2: 99% 98% 93% 95%   Weight: 175 lb (79.4 kg)      Height: 5' 4\" (1.626 m)          I performed a history and physical examination of the patient and discussed management with the APC. I reviewed the APC note and agree with the documented findings and plan of care. Any areas of disagreement are noted on the chart. I was personally present for the key portions of any procedures. I have documented in the chart those procedures where I was not present during the key portions. I have personally reviewed all images and agree with the resident's interpretation. I have reviewed the emergency nurses triage note. I agree with the chief complaint, past medical history, past surgical history, allergies, medications, social and family history as documented unless otherwise noted below. Documentation of the HPI, Physical Exam and Medical Decision Making performed by medical students or scribes is based on my personal performance of the HPI, PE and MDM.  For Phys Assistant/ Nurse Practitioner cases/documentation I have had a face to face evaluation of this patient and have completed at least one if not all key elements of the E/M (history, physical exam, and MDM). Additional findings are as noted.     Colleen Bell MD  Attending Emergency  Physician              Colleen Bell MD  05/02/22 9529       Colleen Bell MD  05/03/22 8438

## 2022-05-09 ENCOUNTER — HOSPITAL ENCOUNTER (OUTPATIENT)
Dept: PAIN MANAGEMENT | Age: 52
Discharge: HOME | End: 2022-05-09
Payer: COMMERCIAL

## 2022-05-09 VITALS
OXYGEN SATURATION: 98 % | TEMPERATURE: 98.7 F | SYSTOLIC BLOOD PRESSURE: 119 MMHG | DIASTOLIC BLOOD PRESSURE: 72 MMHG | HEART RATE: 67 BPM

## 2022-05-09 DIAGNOSIS — Z79.891 CHRONIC USE OF OPIATE FOR THERAPEUTIC PURPOSE: ICD-10-CM

## 2022-05-09 DIAGNOSIS — G89.29 CHRONIC NECK PAIN: Primary | Chronic | ICD-10-CM

## 2022-05-09 DIAGNOSIS — M48.02 DEGENERATIVE CERVICAL SPINAL STENOSIS: ICD-10-CM

## 2022-05-09 DIAGNOSIS — M47.812 ARTHROPATHY OF CERVICAL FACET JOINT: ICD-10-CM

## 2022-05-09 DIAGNOSIS — M47.812 SPONDYLOSIS OF CERVICAL REGION WITHOUT MYELOPATHY OR RADICULOPATHY: ICD-10-CM

## 2022-05-09 DIAGNOSIS — Z51.81 ENCOUNTER FOR MEDICATION MONITORING: ICD-10-CM

## 2022-05-09 DIAGNOSIS — M54.2 CHRONIC NECK PAIN: Primary | Chronic | ICD-10-CM

## 2022-05-09 DIAGNOSIS — M54.12 CERVICAL RADICULOPATHY: ICD-10-CM

## 2022-05-09 PROCEDURE — 99213 OFFICE O/P EST LOW 20 MIN: CPT

## 2022-05-09 PROCEDURE — 99213 OFFICE O/P EST LOW 20 MIN: CPT | Performed by: NURSE PRACTITIONER

## 2022-05-09 RX ORDER — OXYCODONE AND ACETAMINOPHEN 10; 325 MG/1; MG/1
1 TABLET ORAL EVERY 6 HOURS PRN
Qty: 120 TABLET | Refills: 0 | Status: SHIPPED | OUTPATIENT
Start: 2022-05-09 | End: 2022-06-02 | Stop reason: SDUPTHER

## 2022-05-09 ASSESSMENT — ENCOUNTER SYMPTOMS
CONSTIPATION: 0
COUGH: 0
BACK PAIN: 1
SHORTNESS OF BREATH: 0

## 2022-05-09 ASSESSMENT — PAIN SCALES - GENERAL: PAINLEVEL_OUTOF10: 6

## 2022-05-09 NOTE — PROGRESS NOTES
Chief Complaint   Patient presents with    Medication Refill       PMH     Pt c/o chronic neck pain that is located midline and left side of her neck with radiaiont up to occipital area and at time in left shoulder down to fingers. She has limited ROM of her neck. She has seen Neurosurgeon in the past  and surgery was not recommended. She had CT cervical spine 3-4-21 which read as : mild to moderate cervical degenerative disc disease with moderate bony neural foraminal narrowing on the left at C3-C4, C5- C6.   She had confirmatory  Left Cervical Medical branch nerve block at C2 / C3 / C4 / C5 nerves on 1-13-22 with 100% relief for a few days. She would like to proceed with RFA    RFA discussed at last visit but pt needs to see PCP before getting clearance to hold xeralto  Currently on ATB for eye infection and understands no injection until infection clears    Despite of significant amount of opioid use patient continues to complain severe chronic pain issues. I have explained MME to the patient and that the CDC recommends avoiding MME over 90 with goal to be less than 50. Explained to patient that there is a higher risk for hyperalgesia, respiratory depression and accidental overdose with chronic use of high dose opioids. Assured patient we will work with them to slowly titrate to a lower dose while at the same time offering non opioid options and interventions when applicable    Patient was warned of the risk of taking a Benzodiazepine along with an Opioid. Risk of respiratory depression and or death. Patient was advised to talk with the primary care provider who prescribes their benzodiazepine to let them know they are on an opioid for pain and to discuss with them if an alternate medication could be prescribed - pt has decreased clonazepam form 60 tabs to 45 tabs     HPI:     Neck Pain   This is a chronic problem. The current episode started more than 1 year ago. The problem occurs constantly.  The problem has been unchanged. The pain is present in the left side. The quality of the pain is described as shooting. The pain is at a severity of 6/10. The pain is moderate. The symptoms are aggravated by twisting. The pain is same all the time. Stiffness is present all day. Associated symptoms include numbness and weakness. Pertinent negatives include no chest pain, fever or headaches. She has tried heat, muscle relaxants and oral narcotics (physical therapy) for the symptoms. The treatment provided no relief. Patient denies any new neurological symptoms. No bowel or bladder incontinence, no weakness, and no falling. Pill count: appropriate oxycodone/0 due 5/8    Morphine equivalent: 63.5    Controlled Substance Monitoring:    Acute and Chronic Pain Monitoring:   RX Monitoring 5/9/2022   Attestation -   Acute Pain Prescriptions -   Periodic Controlled Substance Monitoring Possible medication side effects, risk of tolerance/dependence & alternative treatments discussed. ;No signs of potential drug abuse or diversion identified. ;Assessed functional status. ;Obtaining appropriate analgesic effect of treatment. Chronic Pain > 50 MEDD -   Chronic Pain > 80 MEDD -         Periodic Controlled Substance Monitoring: Possible medication side effects, risk of tolerance/dependence & alternative treatments discussed. ,No signs of potential drug abuse or diversion identified. ,Assessed functional status. ,Obtaining appropriate analgesic effect of treatment.  MARTITA Gimenez - CNP)      Past Medical History:   Diagnosis Date    Anxiety     CAD (coronary artery disease)     Mitral valve prolapse    Fatty liver     GERD (gastroesophageal reflux disease)     Headache     History of bronchitis     Hyperlipidemia     Hypothyroidism     Kidney stone     LFT elevation     MVP (mitral valve prolapse)     Obesity     Osteoarthritis of cervical spine     Pulmonary emboli (HCC)     Type 2 diabetes mellitus without complication ([facility] [address]     Umbilical hernia        Past Surgical History:   Procedure Laterality Date    APPENDECTOMY       SECTION      2 pfannenstiel, 1 vertical    CHOLECYSTECTOMY      COLONOSCOPY      Dr Anya Garcia  14    COLONOSCOPY  2014    biopsy & sigmoid spasms, pathology negative    COLONOSCOPY N/A 2018    COLONOSCOPY WITH BIOPSY [facility] N/A 2021    COLONOSCOPY DIAGNOSTIC performed by Kita Solomon MD at [facility]      x 5    HYSTERECTOMY, TOTAL ABDOMINAL          MS EXPLORATORY OF ABDOMEN  10/21/2014    Laparotomy-lysis of adhesions, bso     UPPER GASTROINTESTINAL ENDOSCOPY  2010    mild chronic inactive gastritis    UPPER GASTROINTESTINAL ENDOSCOPY N/A 3/10/2021    EGD BIOPSY performed by Kita Solomon MD at [facility]       Allergies   Allergen Reactions    Compazine [Prochlorperazine]      Jittery, restless    Sulfa Antibiotics Hives    Adhesive Tape Rash         Current Outpatient Medications:     ciprofloxacin HCl (CILOXAN) 0.3 % ophthalmic ointment, 3 times daily. , Disp: 3.5 g, Rfl: 0    sucralfate (CARAFATE) 1 GM tablet, TAKE 1 TABLET BY MOUTH FOUR TIMES DAILY, Disp: 120 tablet, Rfl: 0    ibuprofen (ADVIL;MOTRIN) 600 MG tablet, Take 1 tablet by mouth every 6 hours as needed for Pain, Disp: 20 tablet, Rfl: 0    XARELTO 20 MG TABS tablet, TAKE 1 TABLET BY MOUTH DAILY WITH BREAKFAST, Disp: 90 tablet, Rfl: 3    zolpidem (AMBIEN CR) 12.5 MG extended release tablet, TAKE 1 TABLET BY MOUTH AT BEDTIME, Disp: , Rfl:     atorvastatin (LIPITOR) 40 MG tablet, TAKE 1 TABLET BY MOUTH EVERY DAY, Disp: , Rfl:     levothyroxine (SYNTHROID) 175 MCG tablet, Take 1 tablet by mouth daily Take 1 tab Monday-Saturday, and 1.5 tabs on , Disp: 90 tablet, Rfl: 0    clonazePAM (KLONOPIN) 1 MG tablet, TAKE 1/2 TABLET BY MOUTH FOUR TIMES DAILY AS NEEDED, Disp: , Rfl:    esomeprazole (NEXIUM) 40 MG delayed release capsule, TAKE 1 CAPSULE BY MOUTH EVERY MORNING BEFORE BREAKFAST, Disp: 90 capsule, Rfl: 0    tiZANidine (ZANAFLEX) 4 MG tablet, TAKE 1 TABLET BY MOUTH EVERY 8 HOURS AS NEEDED FOR SPASMS, Disp: 90 tablet, Rfl: 0    rOPINIRole (REQUIP) 2 MG tablet, TAKE 1 TABLET BY MOUTH EVERY NIGHT, Disp: 90 tablet, Rfl: 1    ondansetron (ZOFRAN ODT) 4 MG disintegrating tablet, Take 1 tablet by mouth every 8 hours as needed for Nausea or Vomiting, Disp: 20 tablet, Rfl: 0    sertraline (ZOLOFT) 100 MG tablet, Take 1.5 tablets by mouth daily, Disp: 45 tablet, Rfl: 2    traZODone (DESYREL) 100 MG tablet, , Disp: , Rfl:     albuterol sulfate HFA (VENTOLIN HFA) 108 (90 Base) MCG/ACT inhaler, Inhale 2 puffs into the lungs every 6 hours as needed for Wheezing, Disp: 1 Inhaler, Rfl: 3    albuterol sulfate  (90 Base) MCG/ACT inhaler, INHALE 2 PUFFS INTO THE LUNGS EVERY 4 HOURS AS NEEDED FOR SHORTNESS OF BREATH, Disp: 42.5 g, Rfl: 3    clonazePAM (KLONOPIN) 0.5 MG tablet, Take 1 tablet by mouth 2 times daily as needed for Anxiety for up to 30 days. , Disp: 60 tablet, Rfl: 1    topiramate (TOPAMAX) 25 MG tablet, 25 mg 2 times daily , Disp: , Rfl:     metoprolol tartrate (LOPRESSOR) 50 MG tablet, Take 50 mg by mouth 2 times daily , Disp: , Rfl:     Family History   Problem Relation Age of Onset    Cancer Mother         vaginal    Heart Disease Father     Diabetes Father     Other Father         colon resection for colon polyps    Breast Cancer Maternal Grandmother     Stroke Maternal Grandfather        Social History     Socioeconomic History    Marital status:      Spouse name: Not on file    Number of children: Not on file    Years of education: Not on file    Highest education level: Not on file   Occupational History    Occupation: Homemaker   Tobacco Use    Smoking status: Former Smoker     Packs/day: 0.25     Years: 33.00     Pack years: 8.25     Types: Cigarettes     Quit date:      Years since quittin.3    Smokeless tobacco: Never Used    Tobacco comment: quit on nicoderm patch   Vaping Use    Vaping Use: Never used   Substance and Sexual Activity    Alcohol use: No     Alcohol/week: 0.0 standard drinks    Drug use: No    Sexual activity: Not Currently   Other Topics Concern    Not on file   Social History Narrative    Not on file     Social Determinants of Health     Financial Resource Strain: Low Risk     Difficulty of Paying Living Expenses: Not hard at all   Food Insecurity: No Food Insecurity    Worried About Running Out of Food in the Last Year: Never true    Kamille of Food in the Last Year: Never true   Transportation Needs:     Lack of Transportation (Medical): Not on file    Lack of Transportation (Non-Medical): Not on file   Physical Activity:     Days of Exercise per Week: Not on file    Minutes of Exercise per Session: Not on file   Stress:     Feeling of Stress : Not on file   Social Connections:     Frequency of Communication with Friends and Family: Not on file    Frequency of Social Gatherings with Friends and Family: Not on file    Attends Rastafarian Services: Not on file    Active Member of [address] [place] [address] or Organizations: Not on file    Attends Club or Organization Meetings: Not on file    Marital Status: Not on file   Intimate Partner Violence:     Fear of Current or Ex-Partner: Not on file    Emotionally Abused: Not on file    Physically Abused: Not on file    Sexually Abused: Not on file   Housing Stability:     Unable to Pay for Housing in the Last Year: Not on file    Number of [place] in the Last Year: Not on file    Unstable Housing in the Last Year: Not on file       Review of Systems:  Review of Systems   Constitutional: Negative for chills and fever. Cardiovascular: Negative for chest pain. Respiratory: Negative for cough and shortness of breath.     Musculoskeletal: Positive for back pain and neck pain.   Gastrointestinal: Negative for constipation. Neurological: Positive for numbness and weakness. Negative for headaches. Physical Exam:  /72   Pulse 67   Temp 98.7 °F (37.1 °C)   LMP 11/01/2010   SpO2 98%     Physical Exam  Cardiovascular:      Rate and Rhythm: Normal rate. Pulmonary:      Effort: Pulmonary effort is normal.   Musculoskeletal:         General: Normal range of motion. Skin:     General: Skin is warm and dry. Neurological:      Mental Status: She is alert and oriented to person, place, and time. Record/Diagnostics Review:    Last mychal 3/22 and was appropriate     Assessment:  Problem List Items Addressed This Visit     Chronic neck pain - Primary (Chronic)    Chronic use of opiate for therapeutic purpose    Spondylosis of cervical region without myelopathy or radiculopathy    Degenerative cervical spinal stenosis             Treatment Plan:  Patient relates current medications are helping the pain. Patient reports taking pain medications as prescribed, denies obtaining medications from different sources and denies use of illegal drugs. Patient denies side effects from medications like nausea, vomiting, constipation or drowsiness. Patient reports current activities of daily living are possible due to medications and would like to continue them. As always, we encourage daily stretching and strengthening exercises, and recommend minimizing use of pain medications unless patient cannot get through daily activities due to pain. Contract requirements met. Continue opioid therapy.  Script written for oxycodone   Pt is working on weaning off benzos at this time so no change to opioids but we did discuss future plans to titrate  Pt to call PCP for appt   Follow up appointment made for 4 weeks    I have reviewed the chief complaint and history of present illness (including ROS and PFSH) and vital documentation by my staff and I agree with their documentation and have added where applicable.

## 2022-05-25 ENCOUNTER — HOSPITAL ENCOUNTER (EMERGENCY)
Age: 52
Discharge: HOME | End: 2022-05-25
Attending: EMERGENCY MEDICINE
Payer: COMMERCIAL

## 2022-05-25 ENCOUNTER — APPOINTMENT (OUTPATIENT)
Dept: GENERAL RADIOLOGY | Age: 52
End: 2022-05-25
Payer: COMMERCIAL

## 2022-05-25 ENCOUNTER — APPOINTMENT (OUTPATIENT)
Dept: CT IMAGING | Age: 52
End: 2022-05-25
Payer: COMMERCIAL

## 2022-05-25 VITALS
TEMPERATURE: 97.8 F | HEIGHT: 64 IN | SYSTOLIC BLOOD PRESSURE: 105 MMHG | RESPIRATION RATE: 16 BRPM | BODY MASS INDEX: 30.9 KG/M2 | DIASTOLIC BLOOD PRESSURE: 71 MMHG | WEIGHT: 181 LBS | OXYGEN SATURATION: 94 % | HEART RATE: 92 BPM

## 2022-05-25 DIAGNOSIS — M79.605 LEFT LEG PAIN: Primary | ICD-10-CM

## 2022-05-25 DIAGNOSIS — E03.9 HYPOTHYROIDISM, UNSPECIFIED TYPE: ICD-10-CM

## 2022-05-25 DIAGNOSIS — K21.9 GASTROESOPHAGEAL REFLUX DISEASE WITHOUT ESOPHAGITIS: ICD-10-CM

## 2022-05-25 LAB
ABSOLUTE EOS #: 0.1 K/UL (ref 0–0.4)
ABSOLUTE LYMPH #: 1.7 K/UL (ref 1–4.8)
ABSOLUTE MONO #: 0.6 K/UL (ref 0.1–1.3)
ANION GAP SERPL CALCULATED.3IONS-SCNC: 14 MMOL/L (ref 9–17)
BASOPHILS # BLD: 1 % (ref 0–2)
BASOPHILS ABSOLUTE: 0.1 K/UL (ref 0–0.2)
BUN BLDV-MCNC: 19 MG/DL (ref 6–20)
CALCIUM SERPL-MCNC: 9.1 MG/DL (ref 8.6–10.4)
CHLORIDE BLD-SCNC: 103 MMOL/L (ref 98–107)
CO2: 20 MMOL/L (ref 20–31)
CREAT SERPL-MCNC: 0.89 MG/DL (ref 0.5–0.9)
EOSINOPHILS RELATIVE PERCENT: 1 % (ref 0–4)
GFR AFRICAN AMERICAN: >60 ML/MIN
GFR NON-AFRICAN AMERICAN: >60 ML/MIN
GFR SERPL CREATININE-BSD FRML MDRD: ABNORMAL ML/MIN/{1.73_M2}
GLUCOSE BLD-MCNC: 120 MG/DL (ref 70–99)
HCT VFR BLD CALC: 40.3 % (ref 36–46)
HEMOGLOBIN: 13.8 G/DL (ref 12–16)
LYMPHOCYTES # BLD: 22 % (ref 24–44)
MCH RBC QN AUTO: 31.4 PG (ref 26–34)
MCHC RBC AUTO-ENTMCNC: 34.2 G/DL (ref 31–37)
MCV RBC AUTO: 91.7 FL (ref 80–100)
MONOCYTES # BLD: 8 % (ref 1–7)
PDW BLD-RTO: 13.1 % (ref 11.5–14.9)
PLATELET # BLD: 175 K/UL (ref 150–450)
PMV BLD AUTO: 7.5 FL (ref 6–12)
POTASSIUM SERPL-SCNC: 3.9 MMOL/L (ref 3.7–5.3)
RBC # BLD: 4.39 M/UL (ref 4–5.2)
SEG NEUTROPHILS: 68 % (ref 36–66)
SEGMENTED NEUTROPHILS ABSOLUTE COUNT: 5.5 K/UL (ref 1.3–9.1)
SODIUM BLD-SCNC: 137 MMOL/L (ref 135–144)
TROPONIN, HIGH SENSITIVITY: <6 NG/L (ref 0–14)
WBC # BLD: 8 K/UL (ref 3.5–11)

## 2022-05-25 PROCEDURE — 6360000002 HC RX W HCPCS: Performed by: STUDENT IN AN ORGANIZED HEALTH CARE EDUCATION/TRAINING PROGRAM

## 2022-05-25 PROCEDURE — 71045 X-RAY EXAM CHEST 1 VIEW: CPT

## 2022-05-25 PROCEDURE — 36415 COLL VENOUS BLD VENIPUNCTURE: CPT

## 2022-05-25 PROCEDURE — 93005 ELECTROCARDIOGRAM TRACING: CPT | Performed by: STUDENT IN AN ORGANIZED HEALTH CARE EDUCATION/TRAINING PROGRAM

## 2022-05-25 PROCEDURE — 71260 CT THORAX DX C+: CPT

## 2022-05-25 PROCEDURE — 84484 ASSAY OF TROPONIN QUANT: CPT

## 2022-05-25 PROCEDURE — 6360000002 HC RX W HCPCS: Performed by: EMERGENCY MEDICINE

## 2022-05-25 PROCEDURE — 96375 TX/PRO/DX INJ NEW DRUG ADDON: CPT

## 2022-05-25 PROCEDURE — 85025 COMPLETE CBC W/AUTO DIFF WBC: CPT

## 2022-05-25 PROCEDURE — 6360000004 HC RX CONTRAST MEDICATION: Performed by: EMERGENCY MEDICINE

## 2022-05-25 PROCEDURE — 96374 THER/PROPH/DIAG INJ IV PUSH: CPT

## 2022-05-25 PROCEDURE — 99285 EMERGENCY DEPT VISIT HI MDM: CPT

## 2022-05-25 PROCEDURE — 80048 BASIC METABOLIC PNL TOTAL CA: CPT

## 2022-05-25 PROCEDURE — 6370000000 HC RX 637 (ALT 250 FOR IP): Performed by: STUDENT IN AN ORGANIZED HEALTH CARE EDUCATION/TRAINING PROGRAM

## 2022-05-25 PROCEDURE — 2580000003 HC RX 258: Performed by: EMERGENCY MEDICINE

## 2022-05-25 RX ORDER — 0.9 % SODIUM CHLORIDE 0.9 %
80 INTRAVENOUS SOLUTION INTRAVENOUS ONCE
Status: COMPLETED | OUTPATIENT
Start: 2022-05-25 | End: 2022-05-25

## 2022-05-25 RX ORDER — LEVOTHYROXINE SODIUM 175 UG/1
TABLET ORAL
Qty: 90 TABLET | Refills: 0 | Status: SHIPPED | OUTPATIENT
Start: 2022-05-25 | End: 2022-06-03 | Stop reason: SDUPTHER

## 2022-05-25 RX ORDER — HYDROCODONE BITARTRATE AND ACETAMINOPHEN 5; 325 MG/1; MG/1
1 TABLET ORAL ONCE
Status: COMPLETED | OUTPATIENT
Start: 2022-05-25 | End: 2022-05-25

## 2022-05-25 RX ORDER — ONDANSETRON 2 MG/ML
4 INJECTION INTRAMUSCULAR; INTRAVENOUS ONCE
Status: COMPLETED | OUTPATIENT
Start: 2022-05-25 | End: 2022-05-25

## 2022-05-25 RX ORDER — ESOMEPRAZOLE MAGNESIUM 40 MG/1
CAPSULE, DELAYED RELEASE ORAL
Qty: 90 CAPSULE | Refills: 0 | Status: SHIPPED | OUTPATIENT
Start: 2022-05-25 | End: 2022-08-18

## 2022-05-25 RX ORDER — HYDROCODONE BITARTRATE AND ACETAMINOPHEN 5; 325 MG/1; MG/1
1 TABLET ORAL EVERY 6 HOURS PRN
Qty: 12 TABLET | Refills: 0 | Status: SHIPPED | OUTPATIENT
Start: 2022-05-25 | End: 2022-05-28

## 2022-05-25 RX ORDER — SODIUM CHLORIDE 0.9 % (FLUSH) 0.9 %
10 SYRINGE (ML) INJECTION PRN
Status: DISCONTINUED | OUTPATIENT
Start: 2022-05-25 | End: 2022-05-25 | Stop reason: HOSPADM

## 2022-05-25 RX ADMIN — SODIUM CHLORIDE 80 ML: 9 INJECTION, SOLUTION INTRAVENOUS at 20:12

## 2022-05-25 RX ADMIN — HYDROCODONE BITARTRATE AND ACETAMINOPHEN 1 TABLET: 5; 325 TABLET ORAL at 21:03

## 2022-05-25 RX ADMIN — HYDROMORPHONE HYDROCHLORIDE 1 MG: 1 INJECTION, SOLUTION INTRAMUSCULAR; INTRAVENOUS; SUBCUTANEOUS at 19:42

## 2022-05-25 RX ADMIN — SODIUM CHLORIDE, PRESERVATIVE FREE 10 ML: 5 INJECTION INTRAVENOUS at 20:12

## 2022-05-25 RX ADMIN — ONDANSETRON 4 MG: 2 INJECTION INTRAMUSCULAR; INTRAVENOUS at 19:26

## 2022-05-25 RX ADMIN — IOPAMIDOL 75 ML: 755 INJECTION, SOLUTION INTRAVENOUS at 20:12

## 2022-05-25 ASSESSMENT — PAIN DESCRIPTION - DESCRIPTORS: DESCRIPTORS: ACHING;TENDER;THROBBING

## 2022-05-25 ASSESSMENT — PAIN SCALES - GENERAL
PAINLEVEL_OUTOF10: 10
PAINLEVEL_OUTOF10: 10

## 2022-05-25 ASSESSMENT — PAIN DESCRIPTION - LOCATION: LOCATION: LEG

## 2022-05-25 ASSESSMENT — PAIN - FUNCTIONAL ASSESSMENT: PAIN_FUNCTIONAL_ASSESSMENT: 0-10

## 2022-05-25 ASSESSMENT — PAIN DESCRIPTION - ORIENTATION: ORIENTATION: LEFT

## 2022-05-25 NOTE — ED PROVIDER NOTES
[facility] ED  Emergency Department Encounter  Emergency Medicine Resident     Pt Name: Angie Manzano  MRN: [id number]  [place] 1970  Date of evaluation: 22  PCP:  Foster Couch MD    [address] [place] [address]       Chief Complaint   Patient presents with    Leg Pain     left     Leg Swelling     left       HISTORY OFPRESENT ILLNESS  (Location/Symptom, Timing/Onset, Context/Setting, Quality, Duration, Modifying Factors,Severity.)      Angie Manzano is a 49 y.o. female who presents with left leg pain and swelling. Patient states this started yesterday and worsened into today. Patient denies any associated injury with this no abnormal activity. Patient is worried that she has a blood clot. She has been on Xarelto and has been compliant for the past year after being diagnosed with pulmonary embolism of unknown cause. She has had some vague chest tightness and nausea with the symptoms. Patient denies vomiting, shortness of breath abdominal pain, or other symptoms. PAST MEDICAL / SURGICAL / SOCIAL / FAMILY HISTORY      has a past medical history of Anxiety, CAD (coronary artery disease), Fatty liver, GERD (gastroesophageal reflux disease), Headache, History of bronchitis, Hyperlipidemia, Hypothyroidism, Kidney stone, LFT elevation, MVP (mitral valve prolapse), Obesity, Osteoarthritis of cervical spine, Pulmonary emboli (HCC), Type 2 diabetes mellitus without complication ([address] and Umbilical hernia. has a past surgical history that includes Upper gastrointestinal endoscopy (2010); hernia repair; Cholecystectomy; Appendectomy;  section; Colonoscopy (); Colonoscopy (14); Colonoscopy (2014); pr exploratory of abdomen (10/21/2014); Colonoscopy (N/A, 2018); Hysterectomy, total abdominal; Upper gastrointestinal endoscopy (N/A, 3/10/2021); and Colonoscopy (N/A, 2021).      Social History     Socioeconomic History    Marital status:      Spouse name: Not on file    Number of children: Not on file    Years of education: Not on file    Highest education level: Not on file   Occupational History    Occupation: Homemaker   Tobacco Use    Smoking status: Former Smoker     Packs/day: 0.25     Years: 33.00     Pack years: 8.25     Types: Cigarettes     Quit date:      Years since quittin.3    Smokeless tobacco: Never Used    Tobacco comment: quit on nicoderm patch   Vaping Use    Vaping Use: Never used   Substance and Sexual Activity    Alcohol use: No     Alcohol/week: 0.0 standard drinks    Drug use: No    Sexual activity: Not Currently   Other Topics Concern    Not on file   Social History Narrative    Not on file     Social Determinants of Health     Financial Resource Strain: Low Risk     Difficulty of Paying Living Expenses: Not hard at all   Food Insecurity: No Food Insecurity    Worried About Running Out of Food in the Last Year: Never true    Kamille of Food in the Last Year: Never true   Transportation Needs:     Lack of Transportation (Medical): Not on file    Lack of Transportation (Non-Medical):  Not on file   Physical Activity:     Days of Exercise per Week: Not on file    Minutes of Exercise per Session: Not on file   Stress:     Feeling of Stress : Not on file   Social Connections:     Frequency of Communication with Friends and Family: Not on file    Frequency of Social Gatherings with Friends and Family: Not on file    Attends Protestant Services: Not on file    Active Member of Clubs or Organizations: Not on file    Attends Club or Organization Meetings: Not on file    Marital Status: Not on file   Intimate Partner Violence:     Fear of Current or Ex-Partner: Not on file    Emotionally Abused: Not on file    Physically Abused: Not on file    Sexually Abused: Not on file   Housing Stability:     Unable to Pay for Housing in the Last Year: Not on file    Number of [place] in the Last Year: Not on file  Unstable Housing in the Last Year: Not on file       Family History   Problem Relation Age of Onset    Cancer Mother         vaginal    Heart Disease Father     Diabetes Father     Other Father         colon resection for colon polyps    Breast Cancer Maternal Grandmother     Stroke Maternal Grandfather         Allergies:  Compazine [prochlorperazine], Sulfa antibiotics, and Adhesive tape    Home Medications:  Prior to Admission medications    Medication Sig Start Date End Date Taking? Authorizing Provider   HYDROcodone-acetaminophen (NORCO) 5-325 MG per tablet Take 1 tablet by mouth every 6 hours as needed for Pain for up to 3 days. Intended supply: 3 days. Take lowest dose possible to manage pain 5/25/22 5/28/22 Yes Aura Atwood DO   esomeprazole (NEXIUM) 40 MG delayed release capsule TAKE 1 CAPSULE BY MOUTH EVERY MORNING BEFORE BREAKFAST 5/25/22   Edwin Correa MD   levothyroxine (SYNTHROID) 175 MCG tablet TAKE 1 TABLET BY MOUTH DAILY 5/25/22   Edwin Correa MD   oxyCODONE-acetaminophen (PERCOCET)  MG per tablet Take 1 tablet by mouth every 6 hours as needed for Pain for up to 30 days.  5/9/22 6/8/22  MARTITA Capone - CNP   sucralfate (CARAFATE) 1 GM tablet TAKE 1 TABLET BY MOUTH FOUR TIMES DAILY 4/25/22   Edwin Correa MD   ibuprofen (ADVIL;MOTRIN) 600 MG tablet Take 1 tablet by mouth every 6 hours as needed for Pain 4/20/22   Abel Peacock MD   XARELTO 20 MG TABS tablet TAKE 1 TABLET BY MOUTH DAILY WITH BREAKFAST 3/28/22   Jason Alfaro MD   zolpidem (AMBIEN CR) 12.5 MG extended release tablet TAKE 1 TABLET BY MOUTH AT BEDTIME 3/1/22   Historical Provider, MD   atorvastatin (LIPITOR) 40 MG tablet TAKE 1 TABLET BY MOUTH EVERY DAY 2/27/22   Historical Provider, MD   clonazePAM (KLONOPIN) 1 MG tablet TAKE 1/2 TABLET BY MOUTH FOUR TIMES DAILY AS NEEDED 1/26/22   Historical Provider, MD   tiZANidine (ZANAFLEX) 4 MG tablet TAKE 1 TABLET BY MOUTH EVERY 8 HOURS AS NEEDED FOR SPASMS 2/14/22   Segundo Andrade, APRN - CNP   rOPINIRole (REQUIP) 2 MG tablet TAKE 1 TABLET BY MOUTH EVERY NIGHT 1/25/22   [facility] APRN - CNP   ondansetron (ZOFRAN ODT) 4 MG disintegrating tablet Take 1 tablet by mouth every 8 hours as needed for Nausea or Vomiting 11/27/21   Sindhu Hamilton DO   sertraline (ZOLOFT) 100 MG tablet Take 1.5 tablets by mouth daily 9/9/21   Tomasa De Jesus MD   traZODone (DESYREL) 100 MG tablet  8/5/21   Historical Provider, MD   albuterol sulfate HFA (VENTOLIN HFA) 108 (90 Base) MCG/ACT inhaler Inhale 2 puffs into the lungs every 6 hours as needed for Wheezing 6/6/21   Ru Sears MD   albuterol sulfate  (90 Base) MCG/ACT inhaler INHALE 2 PUFFS INTO THE LUNGS EVERY 4 HOURS AS NEEDED FOR SHORTNESS OF BREATH 4/23/21   [facility] APRN - CNP   clonazePAM (KLONOPIN) 0.5 MG tablet Take 1 tablet by mouth 2 times daily as needed for Anxiety for up to 30 days. 12/28/20 2/18/22  Jane Gu MD   topiramate (TOPAMAX) 25 MG tablet 25 mg 2 times daily  2/27/18   Historical Provider, MD   metoprolol tartrate (LOPRESSOR) 50 MG tablet Take 50 mg by mouth 2 times daily  8/21/17   Historical Provider, MD       REVIEW OFSYSTEMS    (2-9 systems for level 4, 10 or more for level 5)      Review of Systems   Constitutional: Negative for chills and fever. HENT: Negative for congestion and rhinorrhea. Eyes: Negative for visual disturbance. Respiratory: Negative for cough and shortness of breath. Cardiovascular: Negative for chest pain. Gastrointestinal: Negative for abdominal pain, diarrhea, nausea and vomiting. Genitourinary: Negative for dysuria. Musculoskeletal: Positive for arthralgias and joint swelling. Negative for back pain and neck pain. Skin: Negative for rash. Neurological: Negative for weakness, numbness and headaches.        PHYSICAL EXAM   (up to 7 for level 4, 8 or more forlevel 5)      INITIAL VITALS:   ED Triage Vitals [05/25/22 1734]   BP Temp Temp Source Heart Rate Resp SpO2 Height Weight   105/71 97.8 °F (36.6 °C) Oral 92 16 94 % 5' 4\" (1.626 m) 181 lb (82.1 kg)       Physical Exam  Constitutional:       General: She is not in acute distress. Appearance: Normal appearance. She is normal weight. She is not ill-appearing, toxic-appearing or diaphoretic. HENT:      Head: Normocephalic and atraumatic. Nose: Nose normal.      Mouth/Throat:      Mouth: Mucous membranes are moist.      Pharynx: Oropharynx is clear. No oropharyngeal exudate or posterior oropharyngeal erythema. Eyes:      Extraocular Movements: Extraocular movements intact. Pupils: Pupils are equal, round, and reactive to light. Cardiovascular:      Rate and Rhythm: Normal rate and regular rhythm. Heart sounds: Normal heart sounds. No murmur heard. Pulmonary:      Effort: Pulmonary effort is normal. No respiratory distress. Breath sounds: Normal breath sounds. No wheezing, rhonchi or rales. Abdominal:      General: There is no distension. Palpations: Abdomen is soft. Tenderness: There is no abdominal tenderness. There is no guarding or rebound. Musculoskeletal:         General: Normal range of motion. Cervical back: Normal range of motion and neck supple. Comments: Swelling and tenderness of the posterior left knee, with tenderness extending to the medial and lateral sides of the left knee, and into the distal thigh. No obvious injury. No bruising or discoloration. No significant calf tenderness bilaterally. Range of motion limited due to severity of pain. Skin:     General: Skin is warm and dry. Neurological:      General: No focal deficit present. Mental Status: She is alert and oriented to person, place, and time. Motor: No weakness.    Psychiatric:         Mood and Affect: Mood normal.         DIFFERENTIAL  DIAGNOSIS     PLAN (LABS / IMAGING / EKG):  Orders Placed This Encounter   Procedures    XR CHEST PORTABLE    CT CHEST PULMONARY EMBOLISM W CONTRAST    US DUP LOWER EXTREMITY LEFT CONRADO    CBC with Auto Differential    Basic Metabolic Panel w/ Reflex to MG    Troponin    EKG 12 Lead       MEDICATIONS ORDERED:  Orders Placed This Encounter   Medications    ondansetron (ZOFRAN) injection 4 mg    HYDROmorphone (DILAUDID) injection 1 mg    0.9 % sodium chloride bolus    DISCONTD: sodium chloride flush 0.9 % injection 10 mL    iopamidol (ISOVUE-370) 76 % injection 75 mL    HYDROcodone-acetaminophen (NORCO) 5-325 MG per tablet     Sig: Take 1 tablet by mouth every 6 hours as needed for Pain for up to 3 days. Intended supply: 3 days. Take lowest dose possible to manage pain     Dispense:  12 tablet     Refill:  0    HYDROcodone-acetaminophen (NORCO) 5-325 MG per tablet 1 tablet       Initial MDM/Plan/ED COURSE:    49 y.o. female who presents with left leg pain and swelling that began yesterday and worsened into today. History of pulmonary embolism, has been on Xarelto for the past year. On exam, patient has very limited ROM of the left knee due to pain and swelling. It does appear somewhat more firm in the posterior knee, with some swelling involved. No skin changes. Chance of DVT on Xarelto seems low, but patient also has vague shortness of breath and chest tightness at times. Vascular ultrasound not currently available this evening. We will plan to discharge with ultrasound study to be done as soon as possible. With her other vague symptoms, will obtain chest pain work-up with CBC, CMP, troponin, chest x-ray and EKG. Will consider CT PE study as well although again, failure of Xarelto would be rare. ED Course as of 05/26/22 0945   Wed May 25, 2022   1950 Troponin, High Sensitivity: <6 [JS]   1951 XR CHEST PORTABLE  IMPRESSION:  No acute process.  [JS]   2004 Patient made to add on CT pulmonary embolism study given patient's unknown cause of previous PE, possible DVT, and inability to do DVT study this evening. Some nonspecific changes on her EKG as well. [JS]   2029 CT CHEST PULMONARY EMBOLISM W CONTRAST  IMPRESSION:  No evidence of pulmonary embolism.     Interstitial pulmonary edema.     Mild dependent atelectasis bilaterally.     Moderate centrilobular emphysematous changes.     Dilated pulmonary trunk suggesting pulmonary hypertension [JS]      ED Course User Index  [JS] Sharon Mcdonald DO      Patient updated on findings. We discussed very close follow-up with cardiology and pulmonology as CT chest findings appear to be new. Patient was given crutches, she will obtain ultrasound of the left lower extremity in the morning by report. She also has a cardiologist she can follow-up with. Discharged home in stable condition. DIAGNOSTIC RESULTS / EMERGENCYDEPARTMENT COURSE / MDM     LABS:  Labs Reviewed   CBC WITH AUTO DIFFERENTIAL - Abnormal; Notable for the following components:       Result Value    Seg Neutrophils 68 (*)     Lymphocytes 22 (*)     Monocytes 8 (*)     All other components within normal limits   BASIC METABOLIC PANEL W/ REFLEX TO MG FOR LOW K - Abnormal; Notable for the following components:    Glucose 120 (*)     All other components within normal limits   TROPONIN           XR CHEST PORTABLE    Result Date: 5/25/2022  EXAMINATION: ONE XRAY VIEW OF THE CHEST 5/25/2022 7:03 pm COMPARISON: December 3, 2021 HISTORY: ORDERING SYSTEM PROVIDED HISTORY: chest tightness TECHNOLOGIST PROVIDED HISTORY: chest tightness Reason for Exam: chest tightness FINDINGS: The lungs are without acute focal process. There is no effusion or pneumothorax. The cardiomediastinal silhouette is without acute process. The osseous structures are without acute process. No acute process.      CT CHEST PULMONARY EMBOLISM W CONTRAST    Result Date: 5/25/2022  EXAMINATION: CTA OF THE CHEST 5/25/2022 7:58 pm TECHNIQUE: CTA of the chest was performed after the administration of intravenous contrast. Multiplanar reformatted images are provided for review. MIP images are provided for review. Automated exposure control, iterative reconstruction, and/or weight based adjustment of the mA/kV was utilized to reduce the radiation dose to as low as reasonably achievable. COMPARISON: 12/31/2021 HISTORY: ORDERING SYSTEM PROVIDED HISTORY: PE TECHNOLOGIST PROVIDED HISTORY: PE Decision Support Exception - unselect if not a suspected or confirmed emergency medical condition->Emergency Medical Condition (MA) Reason for Exam: hx of PE and DVT. known DVT in left leg FINDINGS: Pulmonary Arteries: Pulmonary arteries are adequately opacified for evaluation. No evidence of intraluminal filling defect to suggest pulmonary embolism. Main pulmonary artery is 30 mm in caliber. Mediastinum: No significantly enlarged mediastinal lymph nodes. Mildly prominent AP window lymph node measuring 7 mm in short axis appears unchanged. The heart and pericardium demonstrate no acute abnormality. There is no acute abnormality of the thoracic aorta. Lungs/pleura: Mild dependent atelectasis bilaterally. Interlobular septal thickening most pronounced at the lung bases. No effusion or pneumothorax. Moderate centrilobular emphysematous changes. Upper Abdomen: Status post cholecystectomy. No acute abnormality identified. Soft Tissues/Bones: No acute bone or soft tissue abnormality. No evidence of pulmonary embolism. Interstitial pulmonary edema. Mild dependent atelectasis bilaterally. Moderate centrilobular emphysematous changes. Dilated pulmonary trunk suggesting pulmonary hypertension. EKG  EKG demonstrates normal sinus rhythm with a rate of 88 bpm.  , QRS 92, QTc 438. No acute ST or T wave changes. Possible left atrial enlargement. Nonspecific EKG.     All EKG's are interpreted by the Emergency Department Physicianwho either signs or Co-signs this chart in the absence of a cardiologist.      PROCEDURES:  None    CONSULTS:  None    CRITICAL CARE:  Please see attending note    FINAL IMPRESSION      1. Left leg pain          DISPOSITION / PLAN     DISPOSITION Decision To Discharge 05/25/2022 08:35:59 PM      PATIENT REFERRED TO:  Ray Gutierres MD  [address], [address]  [address] [facility] [address]  [phone]    In 1 week  for pulmonology follow up    Benita Woo, [address]  [address] [facility] [address]  [phone]    In 1 week  for cardiology follow up    [facility] ED  [address]  [phone]    If symptoms worsen    Adam Shaver MD  [address] [place] [address]  [phone]    Schedule an appointment as soon as possible for a visit in 1 day        DISCHARGE MEDICATIONS:  Discharge Medication List as of 5/25/2022  8:55 PM      START taking these medications    Details   HYDROcodone-acetaminophen (NORCO) 5-325 MG per tablet Take 1 tablet by mouth every 6 hours as needed for Pain for up to 3 days. Intended supply: 3 days.  Take lowest dose possible to manage pain, Disp-12 tablet, R-0Print             Karan Jimenez DO  Emergency Medicine Resident    (Please note that portions of this note were completed with a voice recognition program.Efforts were made to edit the dictations but occasionally words are mis-transcribed.)       Karan Jimenez DO  Resident  05/26/22 6781

## 2022-05-25 NOTE — ED TRIAGE NOTES
Pt arrives POV with c/o left leg pain/swelling. Pt says pain/swelling started yesterday and has gotten worse today, pt has hx of blood clots and is on xarelto.

## 2022-05-26 LAB
EKG ATRIAL RATE: 88 BPM
EKG P AXIS: 58 DEGREES
EKG P-R INTERVAL: 156 MS
EKG Q-T INTERVAL: 362 MS
EKG QRS DURATION: 72 MS
EKG QTC CALCULATION (BAZETT): 438 MS
EKG R AXIS: 19 DEGREES
EKG T AXIS: 63 DEGREES
EKG VENTRICULAR RATE: 88 BPM

## 2022-05-26 PROCEDURE — 93010 ELECTROCARDIOGRAM REPORT: CPT | Performed by: INTERNAL MEDICINE

## 2022-05-26 ASSESSMENT — ENCOUNTER SYMPTOMS
COUGH: 0
NAUSEA: 0
ABDOMINAL PAIN: 0
VOMITING: 0
SHORTNESS OF BREATH: 0
BACK PAIN: 0
RHINORRHEA: 0
DIARRHEA: 0

## 2022-05-26 NOTE — ED NOTES
Instructed pt on how to use crutches.       Pb Correa [facility]  05/25/22 [id number]

## 2022-05-26 NOTE — ED PROVIDER NOTES
EMERGENCY DEPARTMENT ENCOUNTER   ATTENDING ATTESTATION     Pt Name: Jazmine Calvert  MRN: [id number]  [place] 1970  Date of evaluation: 5/25/22       Jazmine Calvert is a 49 y.o. female who presents with Leg Pain (left ) and Leg Swelling (left)      MDM:   Pain behind left knee  Denies injury  Pt 2+ left foot  Do not suspect arterial blockage  Do not suspect fx  She states the pain is causing her to clench her teeth and she is having chest discomfort from teeth clenching  Checking ct chest for PE  She is on xarelto, will setup US left leg for DVT tomorrow  Do not suspect ami or acs or AD  Pain controlled  Do not suspect compartment syndrome    Vitals:   Vitals:    05/25/22 1734   BP: 105/71   Pulse: 92   Resp: 16   Temp: 97.8 °F (36.6 °C)   TempSrc: Oral   SpO2: 94%   Weight: 181 lb (82.1 kg)   Height: 5' 4\" (1.626 m)         I personally saw and examined the patient. I have reviewed and agree with the resident's findings, including all diagnostic interpretations and treatment plan as written. I was present for the key portions of any procedures performed and the inclusive time noted for any critical care statement. The care is provided during an unprecedented national emergency due to the novel coronavirus, COVID 19.   Milvia Bloom MD  Attending Emergency Physician           Milvia Bloom MD  05/25/22 2005

## 2022-05-28 ENCOUNTER — APPOINTMENT (OUTPATIENT)
Dept: GENERAL RADIOLOGY | Age: 52
End: 2022-05-28
Payer: COMMERCIAL

## 2022-05-28 ENCOUNTER — HOSPITAL ENCOUNTER (EMERGENCY)
Age: 52
Discharge: HOME | End: 2022-05-28
Attending: EMERGENCY MEDICINE
Payer: COMMERCIAL

## 2022-05-28 VITALS
RESPIRATION RATE: 18 BRPM | WEIGHT: 185 LBS | OXYGEN SATURATION: 95 % | TEMPERATURE: 98.4 F | BODY MASS INDEX: 31.58 KG/M2 | HEART RATE: 86 BPM | DIASTOLIC BLOOD PRESSURE: 76 MMHG | SYSTOLIC BLOOD PRESSURE: 115 MMHG | HEIGHT: 64 IN

## 2022-05-28 DIAGNOSIS — M25.462 EFFUSION OF LEFT KNEE: Primary | ICD-10-CM

## 2022-05-28 LAB
ABSOLUTE EOS #: 0.1 K/UL (ref 0–0.4)
ABSOLUTE LYMPH #: 2 K/UL (ref 1–4.8)
ABSOLUTE MONO #: 0.5 K/UL (ref 0.1–1.3)
ANION GAP SERPL CALCULATED.3IONS-SCNC: 12 MMOL/L (ref 9–17)
BASOPHILS # BLD: 1 % (ref 0–2)
BASOPHILS ABSOLUTE: 0 K/UL (ref 0–0.2)
BUN BLDV-MCNC: 17 MG/DL (ref 6–20)
C-REACTIVE PROTEIN: 18.2 MG/L (ref 0–5)
CALCIUM SERPL-MCNC: 9 MG/DL (ref 8.6–10.4)
CHLORIDE BLD-SCNC: 105 MMOL/L (ref 98–107)
CO2: 20 MMOL/L (ref 20–31)
CREAT SERPL-MCNC: 0.78 MG/DL (ref 0.5–0.9)
EOSINOPHILS RELATIVE PERCENT: 2 % (ref 0–4)
GFR AFRICAN AMERICAN: >60 ML/MIN
GFR NON-AFRICAN AMERICAN: >60 ML/MIN
GFR SERPL CREATININE-BSD FRML MDRD: ABNORMAL ML/MIN/{1.73_M2}
GLUCOSE BLD-MCNC: 103 MG/DL (ref 70–99)
HCT VFR BLD CALC: 38 % (ref 36–46)
HEMOGLOBIN: 12.9 G/DL (ref 12–16)
LYMPHOCYTES # BLD: 27 % (ref 24–44)
MCH RBC QN AUTO: 31.2 PG (ref 26–34)
MCHC RBC AUTO-ENTMCNC: 33.9 G/DL (ref 31–37)
MCV RBC AUTO: 92.1 FL (ref 80–100)
MONOCYTES # BLD: 7 % (ref 1–7)
PDW BLD-RTO: 13.1 % (ref 11.5–14.9)
PLATELET # BLD: 202 K/UL (ref 150–450)
PMV BLD AUTO: 7.6 FL (ref 6–12)
POTASSIUM SERPL-SCNC: 4 MMOL/L (ref 3.7–5.3)
RBC # BLD: 4.13 M/UL (ref 4–5.2)
SEDIMENTATION RATE, ERYTHROCYTE: 35 MM/HR (ref 0–30)
SEG NEUTROPHILS: 63 % (ref 36–66)
SEGMENTED NEUTROPHILS ABSOLUTE COUNT: 4.7 K/UL (ref 1.3–9.1)
SODIUM BLD-SCNC: 137 MMOL/L (ref 135–144)
WBC # BLD: 7.4 K/UL (ref 3.5–11)

## 2022-05-28 PROCEDURE — 99284 EMERGENCY DEPT VISIT MOD MDM: CPT

## 2022-05-28 PROCEDURE — 85652 RBC SED RATE AUTOMATED: CPT

## 2022-05-28 PROCEDURE — 36415 COLL VENOUS BLD VENIPUNCTURE: CPT

## 2022-05-28 PROCEDURE — 86140 C-REACTIVE PROTEIN: CPT

## 2022-05-28 PROCEDURE — 6370000000 HC RX 637 (ALT 250 FOR IP): Performed by: STUDENT IN AN ORGANIZED HEALTH CARE EDUCATION/TRAINING PROGRAM

## 2022-05-28 PROCEDURE — 85025 COMPLETE CBC W/AUTO DIFF WBC: CPT

## 2022-05-28 PROCEDURE — 73562 X-RAY EXAM OF KNEE 3: CPT

## 2022-05-28 PROCEDURE — 80048 BASIC METABOLIC PNL TOTAL CA: CPT

## 2022-05-28 RX ORDER — ACETAMINOPHEN 500 MG
1000 TABLET ORAL ONCE
Status: COMPLETED | OUTPATIENT
Start: 2022-05-28 | End: 2022-05-28

## 2022-05-28 RX ORDER — CYCLOBENZAPRINE HCL 10 MG
10 TABLET ORAL 2 TIMES DAILY PRN
Qty: 10 TABLET | Refills: 0 | Status: SHIPPED | OUTPATIENT
Start: 2022-05-28 | End: 2022-05-28

## 2022-05-28 RX ORDER — OXYCODONE HYDROCHLORIDE 5 MG/1
5 TABLET ORAL ONCE
Status: COMPLETED | OUTPATIENT
Start: 2022-05-28 | End: 2022-05-28

## 2022-05-28 RX ADMIN — ACETAMINOPHEN 1000 MG: 500 TABLET ORAL at 20:29

## 2022-05-28 RX ADMIN — OXYCODONE HYDROCHLORIDE 5 MG: 5 TABLET ORAL at 20:29

## 2022-05-28 ASSESSMENT — ENCOUNTER SYMPTOMS
COUGH: 0
DIARRHEA: 0
VOMITING: 0
BACK PAIN: 0
NAUSEA: 0
ABDOMINAL PAIN: 0
RHINORRHEA: 0
SHORTNESS OF BREATH: 0

## 2022-05-28 ASSESSMENT — PAIN DESCRIPTION - LOCATION: LOCATION: LEG

## 2022-05-28 ASSESSMENT — PAIN SCALES - GENERAL
PAINLEVEL_OUTOF10: 9
PAINLEVEL_OUTOF10: 9

## 2022-05-28 ASSESSMENT — PAIN - FUNCTIONAL ASSESSMENT: PAIN_FUNCTIONAL_ASSESSMENT: 0-10

## 2022-05-28 ASSESSMENT — PAIN DESCRIPTION - ORIENTATION: ORIENTATION: LEFT

## 2022-05-29 NOTE — ED PROVIDER NOTES
[address] ED  eMERGENCY dEPARTMENT eNCOUnter   Attending Attestation     Pt Name: Shekhar Christiansen  MRN: [id number]  [place] 1970  Date of evaluation: 5/28/22    History, EXAM, MDM:    Shekhar Christiansen is a 49 y.o. female who presents with Leg Pain (L)  L knee pain for 4 days. Xr shows degenerative changes but no fractures. The knee is not hot, there is no erythema, there  Is a mild effusion. DVT is unlikely as there is no leg edema, erythema, and she is on anticoagulants. Doubt septic arthritis given physicial exam.  Pt declines an arthrocentesis. Discussed symptomatic treatment. D/w pt treatment plan, warning precautions for prompt ED return and importance of close OP FU, she verbalizes understanding and agrees with the treatment plan. Vitals:   Vitals:    05/28/22 2004   BP: 115/76   Pulse: 86   Resp: 18   Temp: 98.4 °F (36.9 °C)   TempSrc: Oral   SpO2: 95%   Weight: 185 lb (83.9 kg)   Height: 5' 4\" (1.626 m)     I performed a history and physical examination of the patient and discussed management with the resident. I reviewed the residents note and agree with the documented findings and plan of care. Any areas of disagreement are noted on the chart. I was personally present for the key portions of any procedures. I have documented in the chart those procedures where I was not present during the key portions. I have personally reviewed all images and agree with the resident's interpretation. I have reviewed the emergency nurses triage note. I agree with the chief complaint, past medical history, past surgical history, allergies, medications, social and family history as documented unless otherwise noted below. Documentation of the HPI, Physical Exam and Medical Decision Making performed by medical students or scribes is based on my personal performance of the HPI, PE and MDM.  For Phys Assistant/ Nurse Practitioner cases/documentation I have had a face to face evaluation of this patient and have completed at least one if not all key elements of the E/M (history, physical exam, and MDM). Additional findings are as noted.     Carter Lloyd MD  Attending Emergency  Physician                Carter Lloyd MD  05/28/22 5009

## 2022-05-29 NOTE — ED PROVIDER NOTES
[facility] ED  Emergency Department Encounter  Emergency Medicine Resident     Pt Name: Tari Fonseca  MRN: [id number]  [place] 1970  Date of evaluation: 22   PCP:  Price Serrano MD    [address] [place] [address]       Chief Complaint   Patient presents with    Leg Pain     L       HISTORY Purnima  (Location/Symptom, Timing/Onset, Context/Setting, Quality, Duration, Modifying Factors,Severity.)      Tari Fonseca is a 49 y.o. female who presents with left leg pain. This is located mostly behind the knee but has moved down. She was recently evaluated at this facility for leg pain but also had some vague chest tightness symptoms. She underwent a work-up including labs and CT PE study was negative for PE but did show signs of possibly pulmonary hypertension. She has made appointments for all the follow-up that was recommended, was unable to get in for the ultrasound until this upcoming week. She states the pain has continued to worsen and she is having difficulty bearing weight, bending the knee at all. It feels like it is cramping constantly. She feels that the skin is discolored as well. She again denies any trauma to the area. No instrumentation or injections. No weakness or numbness. No history of gout. PAST MEDICAL / SURGICAL / SOCIAL / FAMILY HISTORY      has a past medical history of Anxiety, CAD (coronary artery disease), Fatty liver, GERD (gastroesophageal reflux disease), Headache, History of bronchitis, Hyperlipidemia, Hypothyroidism, Kidney stone, LFT elevation, MVP (mitral valve prolapse), Obesity, Osteoarthritis of cervical spine, Pulmonary emboli (HCC), Type 2 diabetes mellitus without complication ([address] and Umbilical hernia. has a past surgical history that includes Upper gastrointestinal endoscopy (2010); hernia repair; Cholecystectomy; Appendectomy;  section; Colonoscopy (); Colonoscopy (14);  Colonoscopy (2014); pr exploratory of abdomen (10/21/2014); Colonoscopy (N/A, 2018); Hysterectomy, total abdominal; Upper gastrointestinal endoscopy (N/A, 3/10/2021); and Colonoscopy (N/A, 2021). Social History     Socioeconomic History    Marital status:      Spouse name: Not on file    Number of children: Not on file    Years of education: Not on file    Highest education level: Not on file   Occupational History    Occupation: Homemaker   Tobacco Use    Smoking status: Former Smoker     Packs/day: 0.25     Years: 33.00     Pack years: 8.25     Types: Cigarettes     Quit date:      Years since quittin.4    Smokeless tobacco: Never Used    Tobacco comment: quit on nicoderm patch   Vaping Use    Vaping Use: Never used   Substance and Sexual Activity    Alcohol use: No     Alcohol/week: 0.0 standard drinks    Drug use: No    Sexual activity: Not Currently   Other Topics Concern    Not on file   Social History Narrative    Not on file     Social Determinants of Health     Financial Resource Strain: Low Risk     Difficulty of Paying Living Expenses: Not hard at all   Food Insecurity: No Food Insecurity    Worried About Running Out of Food in the Last Year: Never true    Kamille of Food in the Last Year: Never true   Transportation Needs:     Lack of Transportation (Medical): Not on file    Lack of Transportation (Non-Medical):  Not on file   Physical Activity:     Days of Exercise per Week: Not on file    Minutes of Exercise per Session: Not on file   Stress:     Feeling of Stress : Not on file   Social Connections:     Frequency of Communication with Friends and Family: Not on file    Frequency of Social Gatherings with Friends and Family: Not on file    Attends Buddhism Services: Not on file    Active Member of Clubs or Organizations: Not on file    Attends Club or Organization Meetings: Not on file    Marital Status: Not on file   Intimate Partner Violence:     Fear of Current or Ex-Partner: Not on file    Emotionally Abused: Not on file    Physically Abused: Not on file    Sexually Abused: Not on file   Housing Stability:     Unable to Pay for Housing in the Last Year: Not on file    Number of Places Lived in the Last Year: Not on file    Unstable Housing in the Last Year: Not on file       Family History   Problem Relation Age of Onset    Cancer Mother         vaginal    Heart Disease Father     Diabetes Father     Other Father         colon resection for colon polyps    Breast Cancer Maternal Grandmother     Stroke Maternal Grandfather         Allergies:  Compazine [prochlorperazine], Sulfa antibiotics, and Adhesive tape    Home Medications:  Prior to Admission medications    Medication Sig Start Date End Date Taking? Authorizing Provider   esomeprazole (NEXIUM) 40 MG delayed release capsule TAKE 1 CAPSULE BY MOUTH EVERY MORNING BEFORE BREAKFAST 5/25/22   Louise Hodgson MD   levothyroxine (SYNTHROID) 175 MCG tablet TAKE 1 TABLET BY MOUTH DAILY 5/25/22   Louise Hodgson MD   oxyCODONE-acetaminophen (PERCOCET)  MG per tablet Take 1 tablet by mouth every 6 hours as needed for Pain for up to 30 days.  5/9/22 6/8/22  Bridget iLve APRN - CNP   sucralfate (CARAFATE) 1 GM tablet TAKE 1 TABLET BY MOUTH FOUR TIMES DAILY 4/25/22   Louise Hodgson MD   ibuprofen (ADVIL;MOTRIN) 600 MG tablet Take 1 tablet by mouth every 6 hours as needed for Pain 4/20/22   Erna Bah MD   XARELTO 20 MG TABS tablet TAKE 1 TABLET BY MOUTH DAILY WITH BREAKFAST 3/28/22   Anthony Duran MD   zolpidem (AMBIEN CR) 12.5 MG extended release tablet TAKE 1 TABLET BY MOUTH AT BEDTIME 3/1/22   Historical Provider, MD   atorvastatin (LIPITOR) 40 MG tablet TAKE 1 TABLET BY MOUTH EVERY DAY 2/27/22   Historical Provider, MD   clonazePAM (KLONOPIN) 1 MG tablet TAKE 1/2 TABLET BY MOUTH FOUR TIMES DAILY AS NEEDED 1/26/22   Historical Provider, MD   tiZANidine (ZANAFLEX) 4 MG tablet TAKE 1 TABLET BY MOUTH EVERY 8 HOURS AS NEEDED FOR SPASMS 2/14/22   MARTITA De La O CNP   rOPINIRole (REQUIP) 2 MG tablet TAKE 1 TABLET BY MOUTH EVERY NIGHT 1/25/22   MARTITA Cruz CNP   ondansetron (ZOFRAN ODT) 4 MG disintegrating tablet Take 1 tablet by mouth every 8 hours as needed for Nausea or Vomiting 11/27/21   Saud Hamilton DO   sertraline (ZOLOFT) 100 MG tablet Take 1.5 tablets by mouth daily 9/9/21   Lisa Carvalho MD   traZODone (DESYREL) 100 MG tablet  8/5/21   Historical Provider, MD   albuterol sulfate HFA (VENTOLIN HFA) 108 (90 Base) MCG/ACT inhaler Inhale 2 puffs into the lungs every 6 hours as needed for Wheezing 6/6/21   James Moore MD   albuterol sulfate  (90 Base) MCG/ACT inhaler INHALE 2 PUFFS INTO THE LUNGS EVERY 4 HOURS AS NEEDED FOR SHORTNESS OF BREATH 4/23/21   MARTITA Cruz CNP   clonazePAM (KLONOPIN) 0.5 MG tablet Take 1 tablet by mouth 2 times daily as needed for Anxiety for up to 30 days. 12/28/20 2/18/22  Evelyn Haynes MD   topiramate (TOPAMAX) 25 MG tablet 25 mg 2 times daily  2/27/18   Historical Provider, MD   metoprolol tartrate (LOPRESSOR) 50 MG tablet Take 50 mg by mouth 2 times daily  8/21/17   Historical Provider, MD       REVIEW OFSYSTEMS    (2-9 systems for level 4, 10 or more for level 5)      Review of Systems   Constitutional: Negative for chills and fever. HENT: Negative for congestion and rhinorrhea. Eyes: Negative for visual disturbance. Respiratory: Negative for cough and shortness of breath. Cardiovascular: Negative for chest pain. Gastrointestinal: Negative for abdominal pain, diarrhea, nausea and vomiting. Genitourinary: Negative for dysuria. Musculoskeletal: Positive for arthralgias, joint swelling and myalgias. Negative for back pain and neck pain. Skin: Negative for rash. Neurological: Negative for weakness, numbness and headaches.        PHYSICAL EXAM   (up to 7 for level 4, 8 or more forlevel 5)      INITIAL VITALS: Vitals:    05/28/22 2004   BP: 115/76   Pulse: 86   Resp: 18   Temp: 98.4 °F (36.9 °C)   TempSrc: Oral   SpO2: 95%   Weight: 185 lb (83.9 kg)   Height: 5' 4\" (1.626 m)         Physical Exam  Constitutional:       General: She is not in acute distress. Appearance: Normal appearance. She is normal weight. She is not ill-appearing, toxic-appearing or diaphoretic. HENT:      Head: Normocephalic and atraumatic. Nose: Nose normal.      Mouth/Throat:      Mouth: Mucous membranes are moist.      Pharynx: Oropharynx is clear. No oropharyngeal exudate or posterior oropharyngeal erythema. Eyes:      Extraocular Movements: Extraocular movements intact. Pupils: Pupils are equal, round, and reactive to light. Cardiovascular:      Rate and Rhythm: Normal rate and regular rhythm. Heart sounds: Normal heart sounds. No murmur heard. Pulmonary:      Effort: Pulmonary effort is normal. No respiratory distress. Breath sounds: Normal breath sounds. No wheezing, rhonchi or rales. Abdominal:      General: There is no distension. Palpations: Abdomen is soft. Tenderness: There is no abdominal tenderness. There is no guarding or rebound. Musculoskeletal:      Cervical back: Normal range of motion and neck supple. Comments: Patient has limited range of motion of the left knee due to pain in the posterior knee and upper calf posteriorly. No obvious deformity. Swelling is mostly posterior knee, she has had some suprapatellar swelling as well. Bedside ultrasound performed which shows small amount of suprapatellar fluid, difficult to obtain good views of the posterior knee. Skin:     General: Skin is warm and dry. Neurological:      General: No focal deficit present. Mental Status: She is alert and oriented to person, place, and time. Motor: No weakness.    Psychiatric:         Mood and Affect: Mood normal.         DIFFERENTIAL  DIAGNOSIS     PLAN ([facility] / Florin Peacock / EKG):  Orders Placed This Encounter   Procedures    XR KNEE LEFT (3 VIEWS)    CBC with Auto Differential    Basic Metabolic Panel w/ Reflex to MG    C-Reactive Protein    Sedimentation Rate       MEDICATIONS ORDERED:  Orders Placed This Encounter   Medications    oxyCODONE (ROXICODONE) immediate release tablet 5 mg    acetaminophen (TYLENOL) tablet 1,000 mg    DISCONTD: cyclobenzaprine (FLEXERIL) 10 mg tablet     Sig: Take 1 tablet by mouth 2 times daily as needed for Muscle spasms     Dispense:  10 tablet     Refill:  0       DDX: Gout, septic arthritis, effusion, bursitis, baker's cyst, DVT, other    Initial MDM/Plan: 49 y.o. female who presents with worsening left leg pain. She was evaluated at this facility a few days ago for the same problem, returns today due to continued and worsening pain. Patient has difficulty bearing weight. She does have ultrasound scheduled this week, and continues to take her Xarelto as prescribed. Patient appears uncomfortable on exam and has limited range of motion of the left knee. There is a small suprapatellar effusion, but may not be amenable to perform arthrocentesis. No erythema or warmth to suggest infected joint. Will obtain repeat labs with inflammatory markers. X-ray ordered. Will discuss disposition after work-up. Analgesics provided. DIAGNOSTIC RESULTS / EMERGENCYDEPARTMENT COURSE / MDM     LABS:  Labs Reviewed   BASIC METABOLIC PANEL W/ REFLEX TO MG FOR LOW K - Abnormal; Notable for the following components:       Result Value    Glucose 103 (*)     All other components within normal limits   C-REACTIVE PROTEIN - Abnormal; Notable for the following components:    CRP 18.2 (*)     All other components within normal limits   SEDIMENTATION RATE - Abnormal; Notable for the following components:    Sed Rate 35 (*)     All other components within normal limits   CBC WITH AUTO DIFFERENTIAL         :  No results found.       EKG      All EKG's are interpreted by the Emergency Department Physicianwho either signs or Co-signs this chart in the absence of a cardiologist.    DEPARTMENT COURSE:  ED Course as of 05/31/22 1110   Sat May 28, 2022   2149 XR KNEE LEFT (3 VIEWS)  IMPRESSION:  Mild  osteoarthritis of the knee.     No acute bony abnormalities are noted [JS]      ED Course User Index  [JS] Sharon Mcdonald DO      Long discussion had with the patient regarding work-up and next steps. We discussed possible arthrocentesis, but with the risks involved and small amount of fluid present, the benefits may not outweigh the risks. The joint is not warm or red to suggest a septic joint. Joint decision making ultimately landed on discharge with close follow-up. She does have crutches at home to use in the meantime. We discussed pain management and elevating the leg is much as possible. This could be Baker's cyst or other inflamed bursitis. Patient expressed understanding, discharged in stable condition. PROCEDURES:  None    CONSULTS:  None    CRITICAL CARE:  Please see attending note    FINAL IMPRESSION      1.  Effusion of left knee          DISPOSITION / PLAN     DISPOSITION        PATIENT REFERRED TO:  Sara Fish MD  [address] [place] [place] [address] [address] [place] [phone]    Schedule an appointment as soon as possible for a visit in 3 days      [facility] ED  [facility] [address] [place] [address] [id number] [phone]    If symptoms worsen      DISCHARGE MEDICATIONS:  Discharge Medication List as of 5/28/2022 10:52 PM      START taking these medications    Details   cyclobenzaprine (FLEXERIL) 10 mg tablet Take 1 tablet by mouth 2 times daily as needed for Muscle spasms, Disp-10 tablet, R-0Normal             Sharon Mcdonald DO  Emergency Medicine Resident    (Please note that portions of this note were completed with a voice recognition program.Efforts were made to edit the dictations but occasionally words are mis-transcribed.)         Sharon Mcdonald DO  Resident  05/31/22 1111

## 2022-06-01 ENCOUNTER — HOSPITAL ENCOUNTER (OUTPATIENT)
Dept: VASCULAR LAB | Age: 52
Discharge: HOME | End: 2022-06-01
Payer: COMMERCIAL

## 2022-06-01 DIAGNOSIS — M79.605 LEFT LEG PAIN: ICD-10-CM

## 2022-06-01 PROCEDURE — 93971 EXTREMITY STUDY: CPT

## 2022-06-02 ENCOUNTER — HOSPITAL ENCOUNTER (OUTPATIENT)
Dept: PAIN MANAGEMENT | Age: 52
Discharge: HOME | End: 2022-06-02
Payer: COMMERCIAL

## 2022-06-02 VITALS
OXYGEN SATURATION: 94 % | SYSTOLIC BLOOD PRESSURE: 113 MMHG | HEIGHT: 64 IN | DIASTOLIC BLOOD PRESSURE: 60 MMHG | HEART RATE: 67 BPM | WEIGHT: 187 LBS | BODY MASS INDEX: 31.92 KG/M2

## 2022-06-02 DIAGNOSIS — M47.812 SPONDYLOSIS OF CERVICAL REGION WITHOUT MYELOPATHY OR RADICULOPATHY: ICD-10-CM

## 2022-06-02 DIAGNOSIS — Z79.891 CHRONIC USE OF OPIATE FOR THERAPEUTIC PURPOSE: Primary | ICD-10-CM

## 2022-06-02 DIAGNOSIS — M54.12 CERVICAL RADICULOPATHY: ICD-10-CM

## 2022-06-02 DIAGNOSIS — Z79.899 CHRONIC PRESCRIPTION BENZODIAZEPINE USE: Chronic | ICD-10-CM

## 2022-06-02 DIAGNOSIS — Z51.81 ENCOUNTER FOR MEDICATION MONITORING: ICD-10-CM

## 2022-06-02 DIAGNOSIS — M48.02 DEGENERATIVE CERVICAL SPINAL STENOSIS: ICD-10-CM

## 2022-06-02 DIAGNOSIS — M47.812 ARTHROPATHY OF CERVICAL FACET JOINT: ICD-10-CM

## 2022-06-02 PROCEDURE — 99214 OFFICE O/P EST MOD 30 MIN: CPT | Performed by: ANESTHESIOLOGY

## 2022-06-02 PROCEDURE — 99213 OFFICE O/P EST LOW 20 MIN: CPT

## 2022-06-02 RX ORDER — OXYCODONE AND ACETAMINOPHEN 10; 325 MG/1; MG/1
1 TABLET ORAL EVERY 8 HOURS PRN
Qty: 90 TABLET | Refills: 0 | Status: SHIPPED | OUTPATIENT
Start: 2022-06-08 | End: 2022-07-06 | Stop reason: SDUPTHER

## 2022-06-02 ASSESSMENT — ENCOUNTER SYMPTOMS
COUGH: 0
BACK PAIN: 0
DIARRHEA: 0
VOMITING: 0
NAUSEA: 0
CONSTIPATION: 0
SHORTNESS OF BREATH: 1
WHEEZING: 0

## 2022-06-02 NOTE — PROGRESS NOTES
The patient is a 49 y. o. Non- / non  female. Chief Complaint   Patient presents with    Medication Refill    Knee Pain        Knee Pain   Associated symptoms include numbness.        81-year-old woman history of chronic neck pain onset many years ago located over the cervical spine predominantly on the left side associated occipital headaches  Diagnosed with facet mediated pain  Failed conservative measures  Had excellent short-term relief with the 2 diagnostic cervical medial branch nerve block  Pending scheduling for cervical RFA  Ongoing infection for which she has been on antibiotic    Will need clearance from PCP to hold her blood thinner Xarelto for 4 days prior to her radiofrequency ablation    For chronic neck pain she has been on chronic opioid therapy  Also on chronic benzodiazepine therapy for anxiety issues along with Ambien    For risk of respiratory depression with combination of sedatives such as benzodiazepines and opioids discussed with patient in previous visits  She has been gradually weaning herself off Klonopin    Discussed with patient that we would want to reduce the opioid daily dose to less than 50  He is currently prescribed 10 mg oxycodone 4 times a day  Agreeable to reduce it to 3 times a day which will reduce to morphine equaling to 45 mg    Will give her 3 more months to get completely off Klonopin and Ambien before we will start further weaning off opioids    Patient to call once she is ready to schedule for cervical RFA      Medication Refill: Percocet     Pain score Today:  7  Adverse effects (Constipation / Nausea / Sedation / sexual Dysfunction / others) : no  Mood: poor  Sleep pattern and quality: poor  Activity level: poor    Pill count Today:Percocet # 23  Last dose taken  06/01/2022  OARRS report reviewed today: yes  ER/Hospitalizations/PCP visit related to pain since last visit:no   Any legal problems e.g. DUI etc.:No  Satisfied with current management: Yes    Opioid Contract:2022  Last Urine Dug screen dated:  2022    Lab Results   Component Value Date    LABA1C 5.8 2022     Lab Results   Component Value Date     2022       Past Medical History, Past Surgical History, Social History, Allergies and Medications reviewed and updated in EPIC as indicated    Family History reviewed and is noncontributory.         Past Medical History:   Diagnosis Date    Anxiety     CAD (coronary artery disease)     Mitral valve prolapse    Fatty liver     GERD (gastroesophageal reflux disease)     Headache     History of bronchitis     Hyperlipidemia     Hypothyroidism     Kidney stone     LFT elevation     MVP (mitral valve prolapse)     Obesity     Osteoarthritis of cervical spine     Pulmonary emboli (HCC)     Type 2 diabetes mellitus without complication ([facility] [address]     Umbilical hernia         Past Surgical History:   Procedure Laterality Date    APPENDECTOMY       SECTION      2 pfannenstiel, 1 vertical    CHOLECYSTECTOMY      COLONOSCOPY      Dr Amado Aw  14    COLONOSCOPY  2014    biopsy & sigmoid spasms, pathology negative    COLONOSCOPY N/A 2018    COLONOSCOPY WITH BIOPSY performed by Donovan Millan MD at [address] [place] [address] N/A 2021    COLONOSCOPY DIAGNOSTIC performed by Mehrdad Grady MD at [facility]      x 5    HYSTERECTOMY, TOTAL ABDOMINAL          [place] EXPLORATORY OF ABDOMEN  10/21/2014    Laparotomy-lysis of adhesions, bso     UPPER GASTROINTESTINAL ENDOSCOPY  2010    mild chronic inactive gastritis    UPPER GASTROINTESTINAL ENDOSCOPY N/A 3/10/2021    EGD BIOPSY performed by Mehrdad Grady MD at [address] [place] [address] Marital status:      Spouse name: Not on file    Number of children: Not on file    Years of education: Not on file    Highest education level: Not on file   Occupational History    Occupation: Homemaker   Tobacco Use    Smoking status: Former Smoker     Packs/day: 0.25     Years: 33.00     Pack years: 8.25     Types: Cigarettes     Quit date:      Years since quittin.4    Smokeless tobacco: Never Used    Tobacco comment: quit on nicoderm patch   Vaping Use    Vaping Use: Never used   Substance and Sexual Activity    Alcohol use: No     Alcohol/week: 0.0 standard drinks    Drug use: No    Sexual activity: Not Currently   Other Topics Concern    Not on file   Social History Narrative    Not on file     Social Determinants of Health     Financial Resource Strain: Low Risk     Difficulty of Paying Living Expenses: Not hard at all   Food Insecurity: No Food Insecurity    Worried About Running Out of Food in the Last Year: Never true    Kamille of Food in the Last Year: Never true   Transportation Needs:     Lack of Transportation (Medical): Not on file    Lack of Transportation (Non-Medical):  Not on file   Physical Activity:     Days of Exercise per Week: Not on file    Minutes of Exercise per Session: Not on file   Stress:     Feeling of Stress : Not on file   Social Connections:     Frequency of Communication with Friends and Family: Not on file    Frequency of Social Gatherings with Friends and Family: Not on file    Attends Confucianist Services: Not on file    Active Member of [address] [place] [address] or Organizations: Not on file    Attends Club or Organization Meetings: Not on file    Marital Status: Not on file   Intimate Partner Violence:     Fear of Current or Ex-Partner: Not on file    Emotionally Abused: Not on file    Physically Abused: Not on file    Sexually Abused: Not on file   Housing Stability:     Unable to Pay for Housing in the Last Year: Not on file    Number of [place] in the Last Year: Not on file    Unstable Housing in the Last Year: Not on file       Family History   Problem Relation Age of Onset    Cancer Mother         vaginal    Heart Disease Father     Diabetes Father     Other Father         colon resection for colon polyps    Breast Cancer Maternal Grandmother     Stroke Maternal Grandfather        Allergies   Allergen Reactions    Compazine [Prochlorperazine]      Jittery, restless    Sulfa Antibiotics Hives    Adhesive Tape Rash       Vitals:    06/02/22 1338   BP: 113/60   Pulse: 67   SpO2: 94%       Current Outpatient Medications   Medication Sig Dispense Refill    [START ON 6/8/2022] oxyCODONE-acetaminophen (PERCOCET)  MG per tablet Take 1 tablet by mouth every 8 hours as needed for Pain for up to 30 days.  90 tablet 0    esomeprazole (NEXIUM) 40 MG delayed release capsule TAKE 1 CAPSULE BY MOUTH EVERY MORNING BEFORE BREAKFAST 90 capsule 0    levothyroxine (SYNTHROID) 175 MCG tablet TAKE 1 TABLET BY MOUTH DAILY 90 tablet 0    sucralfate (CARAFATE) 1 GM tablet TAKE 1 TABLET BY MOUTH FOUR TIMES DAILY 120 tablet 0    ibuprofen (ADVIL;MOTRIN) 600 MG tablet Take 1 tablet by mouth every 6 hours as needed for Pain 20 tablet 0    XARELTO 20 MG TABS tablet TAKE 1 TABLET BY MOUTH DAILY WITH BREAKFAST 90 tablet 3    zolpidem (AMBIEN CR) 12.5 MG extended release tablet TAKE 1 TABLET BY MOUTH AT BEDTIME      atorvastatin (LIPITOR) 40 MG tablet TAKE 1 TABLET BY MOUTH EVERY DAY      clonazePAM (KLONOPIN) 1 MG tablet TAKE 1/2 TABLET BY MOUTH FOUR TIMES DAILY AS NEEDED      tiZANidine (ZANAFLEX) 4 MG tablet TAKE 1 TABLET BY MOUTH EVERY 8 HOURS AS NEEDED FOR SPASMS 90 tablet 0    rOPINIRole (REQUIP) 2 MG tablet TAKE 1 TABLET BY MOUTH EVERY NIGHT 90 tablet 1    ondansetron (ZOFRAN ODT) 4 MG disintegrating tablet Take 1 tablet by mouth every 8 hours as needed for Nausea or Vomiting 20 tablet 0    sertraline (ZOLOFT) 100 MG tablet Take 1.5 tablets by mouth daily 45 tablet 2    traZODone (DESYREL) 100 MG tablet       albuterol sulfate HFA (VENTOLIN HFA) 108 (90 Base) MCG/ACT inhaler Inhale 2 puffs into the lungs every 6 hours as needed for Wheezing 1 Inhaler 3    albuterol sulfate  (90 Base) MCG/ACT inhaler INHALE 2 PUFFS INTO THE LUNGS EVERY 4 HOURS AS NEEDED FOR SHORTNESS OF BREATH 42.5 g 3    clonazePAM (KLONOPIN) 0.5 MG tablet Take 1 tablet by mouth 2 times daily as needed for Anxiety for up to 30 days. 60 tablet 1    topiramate (TOPAMAX) 25 MG tablet 25 mg 2 times daily       metoprolol tartrate (LOPRESSOR) 50 MG tablet Take 50 mg by mouth 2 times daily        No current facility-administered medications for this encounter. Review of Systems   Constitutional: Negative for chills, fatigue and fever. Respiratory: Positive for shortness of breath. Negative for cough and wheezing. Gastrointestinal: Negative for constipation, diarrhea, nausea and vomiting. Musculoskeletal: Positive for neck pain and neck stiffness. Negative for back pain and gait problem. Neurological: Positive for weakness, numbness and headaches. Negative for dizziness and tremors. Objective:  General Appearance: In no acute distress, comfortable and well-appearing. Vital signs: (most recent): Blood pressure 113/60, pulse 67, height 5' 4\" (1.626 m), weight 187 lb (84.8 kg), last menstrual period 11/01/2010, SpO2 94 %, not currently breastfeeding. Vital signs are normal.  No fever. Output: Producing urine and producing stool. Lungs:  Normal effort. She is not in respiratory distress. Heart: Normal rate. Neurological: Patient is alert and oriented to person, place and time.       Assessment & Plan       49year-old woman history of chronic neck pain onset many years ago located over the cervical spine predominantly on the left side associated occipital headaches  Diagnosed with facet mediated pain  Failed conservative measures  Had excellent short-term relief with the 2 diagnostic cervical medial branch nerve block  Pending scheduling for cervical RFA  Ongoing infection for which she has been on antibiotic    Will need clearance from PCP to hold her blood thinner Xarelto for 4 days prior to her radiofrequency ablation    For chronic neck pain she has been on chronic opioid therapy  Also on chronic benzodiazepine therapy for anxiety issues along with Ambien    For risk of respiratory depression with combination of sedatives such as benzodiazepines and opioids discussed with patient in previous visits  She has been gradually weaning herself off Klonopin    Discussed with patient that we would want to reduce the opioid daily dose to less than 50  He is currently prescribed 10 mg oxycodone 4 times a day  Agreeable to reduce it to 3 times a day which will reduce to morphine equaling to 45 mg    Will give her 3 more months to get completely off Klonopin and Ambien before we will start further weaning off opioids    Patient to call once she is ready to schedule for cervical RFA    1. Chronic use of opiate for therapeutic purpose    2. Spondylosis of cervical region without myelopathy or radiculopathy    3. Chronic prescription benzodiazepine use    4. Degenerative cervical spinal stenosis    5. Arthropathy of cervical facet joint    6. Encounter for medication monitoring    7. Cervical radiculopathy        No orders of the defined types were placed in this encounter. Orders Placed This Encounter   Medications    oxyCODONE-acetaminophen (PERCOCET)  MG per tablet     Sig: Take 1 tablet by mouth every 8 hours as needed for Pain for up to 30 days.      Dispense:  90 tablet     Refill:  0     Reduce doses taken as pain becomes manageable            Electronically signed by Rufina Garibay MD on 6/2/2022 at 2:43 PM

## 2022-06-02 NOTE — PROGRESS NOTES
The patient is a 49 y. o. Non- / non  female. Chief Complaint   Patient presents with    Back Pain    Medication Refill        HPI      Medication Refill: Percocet     Pain score Today:  7  Adverse effects (Constipation / Nausea / Sedation / sexual Dysfunction / others) : no  Mood: poor  Sleep pattern and quality: poor  Activity level: poor    Pill count Today:Percocet # 23  Last dose taken  2022  OARRS report reviewed today: yes  ER/Hospitalizations/PCP visit related to pain since last visit:no   Any legal problems e.g. DUI etc.:No  Satisfied with current management: Yes    Opioid Contract:2022  Last Urine Dug screen dated:  2022    Lab Results   Component Value Date    LABA1C 5.8 2022     Lab Results   Component Value Date     2022       Past Medical History, Past Surgical History, Social History, Allergies and Medications reviewed and updated in EPIC as indicated    Family History reviewed and is noncontributory.         Past Medical History:   Diagnosis Date    Anxiety     CAD (coronary artery disease)     Mitral valve prolapse    Fatty liver     GERD (gastroesophageal reflux disease)     Headache     History of bronchitis     Hyperlipidemia     Hypothyroidism     Kidney stone     LFT elevation     MVP (mitral valve prolapse)     Obesity     Osteoarthritis of cervical spine     Pulmonary emboli (HCC)     [facility]     Umbilical hernia         Past Surgical History:   Procedure Laterality Date    APPENDECTOMY       SECTION      2 pfannenstiel, 1 vertical    CHOLECYSTECTOMY      COLONOSCOPY      Dr Pio Lopez  14    COLONOSCOPY  2014    biopsy & sigmoid spasms, pathology negative    COLONOSCOPY N/A 2018    COLONOSCOPY WITH BIOPSY performed by Iona Rosa MD at [address] [place] [address] N/A 2021    COLONOSCOPY DIAGNOSTIC performed by Nadine Harry Tigre Galeano MD at [facility]      x 5    HYSTERECTOMY, TOTAL ABDOMINAL          [place] EXPLORATORY OF ABDOMEN  10/21/2014    Laparotomy-lysis of adhesions, bso     UPPER GASTROINTESTINAL ENDOSCOPY  2010    mild chronic inactive gastritis    UPPER GASTROINTESTINAL ENDOSCOPY N/A 3/10/2021    EGD BIOPSY performed by Adolfo Cortez MD at [address] [place] [address] History     Socioeconomic History    Marital status:      Spouse name: Not on file    Number of children: Not on file    Years of education: Not on file    Highest education level: Not on file   Occupational History    Occupation: Homemaker   Tobacco Use    Smoking status: Former Smoker     Packs/day: 0.25     Years: 33.00     Pack years: 8.25     Types: Cigarettes     Quit date:      Years since quittin.4    Smokeless tobacco: Never Used    Tobacco comment: quit on nicoderm patch   Vaping Use    Vaping Use: Never used   Substance and Sexual Activity    Alcohol use: No     Alcohol/week: 0.0 standard drinks    Drug use: No    Sexual activity: Not Currently   Other Topics Concern    Not on file   Social History Narrative    Not on file     Social Determinants of Health     Financial Resource Strain: Low Risk     Difficulty of Paying Living Expenses: Not hard at all   Food Insecurity: No Food Insecurity    Worried About [facility] in the Last Year: Never true    [address] [address] in the Last Year: Never true   Transportation Needs:     Lack of Transportation (Medical): Not on file    Lack of Transportation (Non-Medical):  Not on file   Physical Activity:     Days of Exercise per Week: Not on file    Minutes of Exercise per Session: Not on file   Stress:     Feeling of Stress : Not on file   Social Connections:     Frequency of Communication with Friends and Family: Not on file    Frequency of Social Gatherings with Friends and Family: Not on file    Attends Jewish Services: Not on file   Albert Koch Member of Clubs or Organizations: Not on file    Attends Club or Organization Meetings: Not on file    Marital Status: Not on file   Intimate Partner Violence:     Fear of Current or Ex-Partner: Not on file    Emotionally Abused: Not on file    Physically Abused: Not on file    Sexually Abused: Not on file   Housing Stability:     Unable to Pay for Housing in the Last Year: Not on file    Number of [place] in the Last Year: Not on file    Unstable Housing in the Last Year: Not on file       Family History   Problem Relation Age of Onset    Cancer Mother         vaginal    Heart Disease Father     Diabetes Father     Other Father         colon resection for colon polyps    Breast Cancer Maternal Grandmother     Stroke Maternal Grandfather        Allergies   Allergen Reactions    Compazine [Prochlorperazine]      Jittery, restless    Sulfa Antibiotics Hives    Adhesive Tape Rash       There were no vitals filed for this visit. Current Outpatient Medications   Medication Sig Dispense Refill    esomeprazole (NEXIUM) 40 MG delayed release capsule TAKE 1 CAPSULE BY MOUTH EVERY MORNING BEFORE BREAKFAST 90 capsule 0    levothyroxine (SYNTHROID) 175 MCG tablet TAKE 1 TABLET BY MOUTH DAILY 90 tablet 0    oxyCODONE-acetaminophen (PERCOCET)  MG per tablet Take 1 tablet by mouth every 6 hours as needed for Pain for up to 30 days.  120 tablet 0    sucralfate (CARAFATE) 1 GM tablet TAKE 1 TABLET BY MOUTH FOUR TIMES DAILY 120 tablet 0    ibuprofen (ADVIL;MOTRIN) 600 MG tablet Take 1 tablet by mouth every 6 hours as needed for Pain 20 tablet 0    XARELTO 20 MG TABS tablet TAKE 1 TABLET BY MOUTH DAILY WITH BREAKFAST 90 tablet 3    zolpidem (AMBIEN CR) 12.5 MG extended release tablet TAKE 1 TABLET BY MOUTH AT BEDTIME      atorvastatin (LIPITOR) 40 MG tablet TAKE 1 TABLET BY MOUTH EVERY DAY      clonazePAM (KLONOPIN) 1 MG tablet TAKE 1/2 TABLET BY MOUTH FOUR TIMES DAILY AS NEEDED      tiZANidine (ZANAFLEX) 4 MG tablet TAKE 1 TABLET BY MOUTH EVERY 8 HOURS AS NEEDED FOR SPASMS 90 tablet 0    rOPINIRole (REQUIP) 2 MG tablet TAKE 1 TABLET BY MOUTH EVERY NIGHT 90 tablet 1    ondansetron (ZOFRAN ODT) 4 MG disintegrating tablet Take 1 tablet by mouth every 8 hours as needed for Nausea or Vomiting 20 tablet 0    sertraline (ZOLOFT) 100 MG tablet Take 1.5 tablets by mouth daily 45 tablet 2    traZODone (DESYREL) 100 MG tablet       albuterol sulfate HFA (VENTOLIN HFA) 108 (90 Base) MCG/ACT inhaler Inhale 2 puffs into the lungs every 6 hours as needed for Wheezing 1 Inhaler 3    albuterol sulfate  (90 Base) MCG/ACT inhaler INHALE 2 PUFFS INTO THE LUNGS EVERY 4 HOURS AS NEEDED FOR SHORTNESS OF BREATH 42.5 g 3    clonazePAM (KLONOPIN) 0.5 MG tablet Take 1 tablet by mouth 2 times daily as needed for Anxiety for up to 30 days. 60 tablet 1    topiramate (TOPAMAX) 25 MG tablet 25 mg 2 times daily       metoprolol tartrate (LOPRESSOR) 50 MG tablet Take 50 mg by mouth 2 times daily        No current facility-administered medications for this encounter. Review of Systems   Constitutional: Negative for chills, fatigue and fever. Respiratory: Positive for shortness of breath. Negative for cough and wheezing. Gastrointestinal: Negative for constipation, diarrhea, nausea and vomiting. Musculoskeletal: Positive for neck pain and neck stiffness. Negative for back pain and gait problem. Neurological: Positive for weakness, numbness and headaches. Negative for dizziness and tremors. Objective:  Vital signs: (most recent): Last menstrual period 11/01/2010, not currently breastfeeding. No fever. Assessment & Plan  1. Spondylosis of cervical region without myelopathy or radiculopathy        No orders of the defined types were placed in this encounter. No orders of the defined types were placed in this encounter.            Electronically signed by Lan Reeves MA on 6/2/2022 at 1:33 PM

## 2022-06-03 ENCOUNTER — OFFICE VISIT (OUTPATIENT)
Dept: INTERNAL MEDICINE CLINIC | Age: 52
End: 2022-06-03
Payer: COMMERCIAL

## 2022-06-03 VITALS
HEIGHT: 64 IN | BODY MASS INDEX: 33.8 KG/M2 | WEIGHT: 198 LBS | DIASTOLIC BLOOD PRESSURE: 74 MMHG | SYSTOLIC BLOOD PRESSURE: 122 MMHG | OXYGEN SATURATION: 97 % | HEART RATE: 74 BPM

## 2022-06-03 DIAGNOSIS — K21.9 GASTROESOPHAGEAL REFLUX DISEASE, UNSPECIFIED WHETHER ESOPHAGITIS PRESENT: ICD-10-CM

## 2022-06-03 DIAGNOSIS — Z09 HOSPITAL DISCHARGE FOLLOW-UP: ICD-10-CM

## 2022-06-03 DIAGNOSIS — I50.33 ACUTE ON CHRONIC DIASTOLIC CHF (CONGESTIVE HEART FAILURE) (HCC): Primary | ICD-10-CM

## 2022-06-03 DIAGNOSIS — I26.99 PULMONARY EMBOLISM ON RIGHT (HCC): ICD-10-CM

## 2022-06-03 DIAGNOSIS — Z12.11 COLON CANCER SCREENING: ICD-10-CM

## 2022-06-03 DIAGNOSIS — J44.1 OBSTRUCTIVE CHRONIC BRONCHITIS WITH EXACERBATION (HCC): ICD-10-CM

## 2022-06-03 DIAGNOSIS — E03.9 HYPOTHYROIDISM, UNSPECIFIED TYPE: ICD-10-CM

## 2022-06-03 PROCEDURE — 1111F DSCHRG MED/CURRENT MED MERGE: CPT | Performed by: INTERNAL MEDICINE

## 2022-06-03 PROCEDURE — 99214 OFFICE O/P EST MOD 30 MIN: CPT | Performed by: INTERNAL MEDICINE

## 2022-06-03 RX ORDER — FUROSEMIDE 20 MG/1
20 TABLET ORAL DAILY
Qty: 90 TABLET | Refills: 1 | Status: ON HOLD | OUTPATIENT
Start: 2022-06-03 | End: 2022-10-13

## 2022-06-03 RX ORDER — LEVOTHYROXINE SODIUM 0.2 MG/1
TABLET ORAL
Qty: 90 TABLET | Refills: 3 | Status: SHIPPED | OUTPATIENT
Start: 2022-06-03

## 2022-06-03 RX ORDER — FUROSEMIDE 20 MG/1
20 TABLET ORAL DAILY
Qty: 60 TABLET | Refills: 3 | Status: SHIPPED | OUTPATIENT
Start: 2022-06-03 | End: 2022-06-03

## 2022-06-03 ASSESSMENT — ENCOUNTER SYMPTOMS
ABDOMINAL DISTENTION: 0
COLOR CHANGE: 0
DIARRHEA: 0
BLOOD IN STOOL: 0
COUGH: 0
TROUBLE SWALLOWING: 0
EYE PAIN: 0
EYE DISCHARGE: 0
SHORTNESS OF BREATH: 0
WHEEZING: 0

## 2022-06-03 NOTE — PROGRESS NOTES
[address] [address]  Dept: [phone]  Dept Fax: [phone]    iMchael Anderson is a 49 y.o. female who presents today for her medical conditions/complaintsas noted below. Michael Anderson is c/o of   Chief Complaint   Patient presents with    Leg Pain     5/28/22 STCZ LEG PAIN     Thyroid Problem         HPI:     Patient here for follow-up after leg swelling at [facility] patient had a DVT scan which was negative patient is on chronic anticoagulation with history of unprovoked PE on Eliquis  Patient has history of for COPD quit smoking more than a year ago using albuterol inhaler  She also has hypothyroidism taking Synthroid 175 mcg denies any cold intolerance but positive for weight gain lately  With diet and exercise she has lost 200 pounds in last few years  CT scan of the chest was done no PE but shows interstitial pulmonary edema mild  Echocardiogram 2 years ago had a normal ejection fraction      Hemoglobin A1C (%)   Date Value   02/01/2022 5.8   03/08/2021 5.7   08/11/2020 5.7             ( goal A1Cis < 7)   Microalb/Crt.  Ratio (mcg/mg creat)   Date Value   01/18/2022 Can not be calculated     LDL Cholesterol (mg/dL)   Date Value   02/01/2022 184 (H)   08/11/2020 70   01/12/2019 84     LDL Calculated (mg/dL)   Date Value   10/26/2016 91       (goal LDL is <100)   AST (U/L)   Date Value   04/19/2022 12     ALT (U/L)   Date Value   04/19/2022 9     BUN (mg/dL)   Date Value   05/28/2022 17     BP Readings from Last 3 Encounters:   06/03/22 122/74   06/02/22 113/60   05/28/22 115/76          (goal 120/80)    Past Medical History:   Diagnosis Date    Anxiety     CAD (coronary artery disease)     Mitral valve prolapse    Fatty liver     GERD (gastroesophageal reflux disease)     Headache     History of bronchitis     Hyperlipidemia     Hypothyroidism     Kidney stone     LFT elevation     MVP (mitral valve prolapse)     Obesity     Osteoarthritis of cervical spine     Pulmonary emboli (HCC)     Type 2 diabetes mellitus without complication ([facility] [address]     Umbilical hernia       Past Surgical History:   Procedure Laterality Date    APPENDECTOMY       SECTION      2 pfannenstiel, 1 vertical    CHOLECYSTECTOMY      COLONOSCOPY      Dr Patric Dance  14    COLONOSCOPY  2014    biopsy & sigmoid spasms, pathology negative    COLONOSCOPY N/A 2018    COLONOSCOPY WITH BIOPSY performed by Scott Peters MD at [place] [address] N/A 2021    COLONOSCOPY DIAGNOSTIC performed by Luanna Hamman, MD at [address] [place] [address] N      x 5    HYSTERECTOMY, TOTAL ABDOMINAL          [facility] EXPLORATORY OF ABDOMEN  10/21/2014    Laparotomy-lysis of adhesions, bso     UPPER GASTROINTESTINAL ENDOSCOPY  2010    mild chronic inactive gastritis    UPPER GASTROINTESTINAL ENDOSCOPY N/A 3/10/2021    EGD BIOPSY [facility] Luanna Hamman, MD [facility]       Family History   Problem Relation Age of Onset    Cancer Mother         vaginal    Heart Disease Father     Diabetes Father     Other Father         colon resection for colon polyps    Breast Cancer Maternal Grandmother     Stroke Maternal Grandfather        Social History     Tobacco Use    Smoking status: Former Smoker     Packs/day: 0.25     Years: 33.00     Pack years: 8.25     Types: Cigarettes     Quit date:      Years since quittin.4    Smokeless tobacco: Never Used    Tobacco comment: quit on nicoderm patch   Substance Use Topics    Alcohol use: No     Alcohol/week: 0.0 standard drinks      Current Outpatient Medications   Medication Sig Dispense Refill    furosemide (LASIX) 20 MG tablet Take 1 tablet by mouth daily 60 tablet 3    levothyroxine (SYNTHROID) 200 MCG tablet TAKE 1 TABLET BY MOUTH DAILY 90 tablet 3    [START ON 2022] oxyCODONE-acetaminophen (PERCOCET)  MG per tablet Take 1 tablet by mouth every 8 hours as needed for Pain for up to 30 days. 90 tablet 0    esomeprazole (NEXIUM) 40 MG delayed release capsule TAKE 1 CAPSULE BY MOUTH EVERY MORNING BEFORE BREAKFAST 90 capsule 0    sucralfate (CARAFATE) 1 GM tablet TAKE 1 TABLET BY MOUTH FOUR TIMES DAILY 120 tablet 0    ibuprofen (ADVIL;MOTRIN) 600 MG tablet Take 1 tablet by mouth every 6 hours as needed for Pain 20 tablet 0    XARELTO 20 MG TABS tablet TAKE 1 TABLET BY MOUTH DAILY WITH BREAKFAST 90 tablet 3    zolpidem (AMBIEN CR) 12.5 MG extended release tablet TAKE 1 TABLET BY MOUTH AT BEDTIME      atorvastatin (LIPITOR) 40 MG tablet TAKE 1 TABLET BY MOUTH EVERY DAY      clonazePAM (KLONOPIN) 1 MG tablet TAKE 1/2 TABLET BY MOUTH FOUR TIMES DAILY AS NEEDED      tiZANidine (ZANAFLEX) 4 MG tablet TAKE 1 TABLET BY MOUTH EVERY 8 HOURS AS NEEDED FOR SPASMS 90 tablet 0    rOPINIRole (REQUIP) 2 MG tablet TAKE 1 TABLET BY MOUTH EVERY NIGHT 90 tablet 1    ondansetron (ZOFRAN ODT) 4 MG disintegrating tablet Take 1 tablet by mouth every 8 hours as needed for Nausea or Vomiting 20 tablet 0    sertraline (ZOLOFT) 100 MG tablet Take 1.5 tablets by mouth daily 45 tablet 2    traZODone (DESYREL) 100 MG tablet       albuterol sulfate HFA (VENTOLIN HFA) 108 (90 Base) MCG/ACT inhaler Inhale 2 puffs into the lungs every 6 hours as needed for Wheezing 1 Inhaler 3    albuterol sulfate  (90 Base) MCG/ACT inhaler INHALE 2 PUFFS INTO THE LUNGS EVERY 4 HOURS AS NEEDED FOR SHORTNESS OF BREATH 42.5 g 3    topiramate (TOPAMAX) 25 MG tablet 25 mg 2 times daily       metoprolol tartrate (LOPRESSOR) 50 MG tablet Take 50 mg by mouth 2 times daily       clonazePAM (KLONOPIN) 0.5 MG tablet Take 1 tablet by mouth 2 times daily as needed for Anxiety for up to 30 days. 60 tablet 1     No current facility-administered medications for this visit.      Allergies   Allergen Reactions    Compazine [Prochlorperazine]      Jittery, restless    Sulfa Antibiotics Hives    Adhesive Tape Rash       Health Maintenance   Topic Date Due    COVID-19 Vaccine (1) Never done    Pneumococcal 0-64 years Vaccine (1 - PCV) Never done    HIV screen  Never done    Hepatitis B vaccine (1 of 3 - Risk 3-dose series) Never done    Diabetic retinal exam  08/15/2018    Shingles vaccine (1 of 2) Never done    Colorectal Cancer Screen  05/27/2022    Flu vaccine (Season Ended) 09/01/2022    Diabetic foot exam  01/18/2023    Diabetic microalbuminuria test  01/18/2023    A1C test (Diabetic or Prediabetic)  02/01/2023    Lipids  02/01/2023    Depression Screen  03/03/2023    Breast cancer screen  04/26/2023    DTaP/Tdap/Td vaccine (2 - Td or Tdap) 04/19/2032    Hepatitis C screen  Completed    Hepatitis A vaccine  Aged Out    Hib vaccine  Aged Out    Meningococcal (ACWY) vaccine  Aged Out       Subjective:     Review of Systems   Constitutional: Negative for appetite change, diaphoresis and fatigue. HENT: Negative for ear discharge and trouble swallowing. Eyes: Negative for pain and discharge. Respiratory: Negative for cough, shortness of breath and wheezing. Cardiovascular: Positive for leg swelling. Negative for chest pain and palpitations. Gastrointestinal: Negative for abdominal distention, blood in stool and diarrhea. Endocrine: Negative for polydipsia and polyphagia. Genitourinary: Negative for difficulty urinating and frequency. Musculoskeletal: Negative for gait problem, myalgias and neck pain. Skin: Negative for color change and rash. Allergic/Immunologic: Negative for environmental allergies and food allergies. Neurological: Negative for dizziness and headaches. Hematological: Negative for adenopathy. Does not bruise/bleed easily. Psychiatric/Behavioral: Negative for behavioral problems and sleep disturbance. Objective:     Physical Exam  Constitutional:       Appearance: She is well-developed. She is not diaphoretic.    HENT:      Head: Normocephalic and atraumatic. Eyes:      General:         Right eye: No discharge. Left eye: No discharge. Extraocular Movements:      Right eye: Normal extraocular motion. Left eye: Normal extraocular motion. Conjunctiva/sclera: Conjunctivae normal.      Right eye: Right conjunctiva is not injected. Left eye: Left conjunctiva is not injected. Neck:      Thyroid: No thyroid mass or thyromegaly. Vascular: No JVD. Cardiovascular:      Rate and Rhythm: Normal rate and regular rhythm. Heart sounds: No murmur heard. No friction rub. Pulmonary:      Effort: Pulmonary effort is normal. No tachypnea, bradypnea, accessory muscle usage or respiratory distress. Breath sounds: Normal breath sounds. No wheezing or rales. Abdominal:      General: Bowel sounds are normal. There is no distension. Palpations: Abdomen is soft. Tenderness: There is no abdominal tenderness. There is no rebound. Musculoskeletal:         General: No tenderness. Normal range of motion. Cervical back: Normal range of motion and neck supple. No edema or erythema. Lymphadenopathy:      Head:      Right side of head: No submental or submandibular adenopathy. Left side of head: No submental or submandibular adenopathy. Cervical: No cervical adenopathy. Skin:     General: Skin is warm. Coloration: Skin is not pale. Findings: No bruising, ecchymosis or rash. Neurological:      Mental Status: She is alert and oriented to person, place, and time. Cranial Nerves: No cranial nerve deficit. Sensory: No sensory deficit. Motor: No atrophy or abnormal muscle tone. Coordination: Coordination normal.   Psychiatric:         Mood and Affect: Mood is not anxious. Affect is not angry. Speech: Speech is not slurred. Behavior: Behavior normal. Behavior is not aggressive. Thought Content: Thought content does not include homicidal ideation.          Cognition and Memory: Memory is not impaired. /74   Pulse 74   Ht 5' 4\" (1.626 m)   Wt 198 lb (89.8 kg)   LMP 11/01/2010   SpO2 97%   BMI 33.99 kg/m²     Assessment:       Diagnosis Orders   1. Acute on chronic diastolic [facility]     2. Hospital discharge follow-up  OR DISCHARGE MEDS RECONCILED W/ CURRENT OUTPATIENT MED LIST   3. Pulmonary embolism on right ([facility] [address]     4. Gastroesophageal reflux disease, unspecified whether esophagitis present     5. Obstructive chronic bronchitis with exacerbation ([facility]     6. Hypothyroidism, unspecified type  levothyroxine (SYNTHROID) 200 MCG tablet    TSH With Reflex Ft4    Will adjust Synthroid dose modestly (take extra 1/2 tab weekly) and repeat TSH   7. Colon cancer screening  Kahlil Marroquin MD, GastroenterologyElisa             Plan:      Return in about 3 months (around 9/3/2022).     Orders Placed This Encounter   Procedures    TSH With Reflex Ft4     Standing Status:   Future     Standing Expiration Date:   9/3/2022   Kahlil Marroquin MD, GastroenterologyElisa     Referral Priority:   Routine     Referral Type:   Eval and Treat     Referral Reason:   Specialty Services Required     Referred to Provider:   Wilman Dale MD     Requested Specialty:   Gastroenterology     Number of Visits Requested:   1    OR DISCHARGE MEDS RECONCILED W/ CURRENT OUTPATIENT MED LIST     Orders Placed This Encounter   Medications    furosemide (LASIX) 20 MG tablet     Sig: Take 1 tablet by mouth daily     Dispense:  60 tablet     Refill:  3    levothyroxine (SYNTHROID) 200 MCG tablet     Sig: TAKE 1 TABLET BY MOUTH DAILY     Dispense:  90 tablet     Refill:  3    Patient here for follow-up after leg swelling at [facility] patient had a DVT scan which was negative patient is on chronic anticoagulation with history of unprovoked PE on Eliquis  Patient has history of for COPD quit smoking more than a year ago using albuterol inhaler  She also has hypothyroidism taking Synthroid 175 mcg denies any cold intolerance but positive for weight gain lately  With diet and exercise she has lost 200 pounds in last few years  CT scan of the chest was done no PE but shows interstitial pulmonary edema mild  Echocardiogram 2 years ago had a normal ejection fraction    Discharge diagnosis is nonspecific leg edema abdomen CT was negative and February this year  Diastolic congestive heart failure low-dose Lasix advised for diet exercise low-salt and weight loss  Hypothyroidism TSH is more than 7 and has gained over 15 pounds we will increase Synthroid to 200 mcg retest TSH before next visit  stres test negative no cp         Patient given educational materials - see patient instructions. Discussed use, benefit, and side effects of prescribed medications. All patientquestions answered. Pt voiced understanding. Reviewed health maintenance. Instructedto continue current medications, diet and exercise. Patient agreed with treatmentplan. Follow up as directed. Please note that this chart was generated using voice recognition Dragon dictation software. Although every effort was made to ensure the accuracy of this automated transcription, some errors in transcription may have occurred.      Electronically signed by Walter Ann MD on 6/3/2022 at 2:02 PM

## 2022-06-03 NOTE — PROGRESS NOTES
Visit Information    Have you changed or started any medications since your last visit including any over-the-counter medicines, vitamins, or herbal medicines? no   Are you having any side effects from any of your medications? -  no  Have you stopped taking any of your medications? Is so, why? -  no    Have you seen any other physician or provider since your last visit? Yes - Records Obtained  Have you had any other diagnostic tests since your last visit? Yes - Records Obtained  Have you been seen in the emergency room and/or had an admission to a hospital since we last saw you? Yes - Records Obtained  Have you had your routine dental cleaning in the past 6 months? no    Have you activated your [facility] account? If not, what are your barriers?  Yes     Patient Care Team:  Pedro Strong MD as PCP - General (Internal Medicine)  Pedro Strong MD as PCP - [facility] Provider  Onel Dior MD as Consulting Physician (Gastroenterology)  Jeremie Worrell MD as Consulting Physician (Obstetrics & Gynecology)  Brandon Sanon MD as Consulting Physician (Gastroenterology)    Medical History Review  Past Medical, Family, and Social History reviewed and does contribute to the patient presenting condition    Health Maintenance   Topic Date Due    COVID-19 Vaccine (1) Never done    Pneumococcal 0-64 years Vaccine (1 - PCV) Never done    HIV screen  Never done    Hepatitis B vaccine (1 of 3 - Risk 3-dose series) Never done    Diabetic retinal exam  08/15/2018    Shingles vaccine (1 of 2) Never done    Colorectal Cancer Screen  05/27/2022    Flu vaccine (Season Ended) 09/01/2022    Diabetic foot exam  01/18/2023    Diabetic microalbuminuria test  01/18/2023    A1C test (Diabetic or Prediabetic)  02/01/2023    Lipids  02/01/2023    Depression Screen  03/03/2023    Breast cancer screen  04/26/2023    DTaP/Tdap/Td vaccine (2 - Td or Tdap) 04/19/2032    Hepatitis C screen  Completed    Hepatitis A vaccine Aged Out    Hib vaccine  Aged Out    Meningococcal (ACWY) vaccine  Aged Out

## 2022-06-08 ENCOUNTER — TELEPHONE (OUTPATIENT)
Dept: GASTROENTEROLOGY | Age: 52
End: 2022-06-08

## 2022-06-08 NOTE — TELEPHONE ENCOUNTER
Referral on file for colon screen. Pt is est with Dr Shea Holcomb and had last colon with him on 5/2021, no polyps, 1-2 yr recall with better prep noted. 1st attempt; called and LVM for patient regarding colon screen referral.    2nd attempt; mailed colon screen letter to patient's home address.     Pt is on Xarelto and will need office visit scheduled first.

## 2022-06-16 RX ORDER — ALBUTEROL SULFATE 90 UG/1
AEROSOL, METERED RESPIRATORY (INHALATION)
Qty: 42.5 G | Refills: 3 | Status: SHIPPED | OUTPATIENT
Start: 2022-06-16

## 2022-06-17 NOTE — TELEPHONE ENCOUNTER
Writer returned call to office, writer asked pt if she was on xarelto and pt states she is, pt was advised that she needs office appt first, pt was agreeable and office appt was scheduled for 7/18.

## 2022-06-24 RX ORDER — SUCRALFATE 1 G/1
TABLET ORAL
Qty: 360 TABLET | OUTPATIENT
Start: 2022-06-24

## 2022-06-24 RX ORDER — SUCRALFATE 1 G/1
TABLET ORAL
Qty: 120 TABLET | Refills: 0 | Status: SHIPPED | OUTPATIENT
Start: 2022-06-24 | End: 2022-07-26

## 2022-07-05 ENCOUNTER — TELEPHONE (OUTPATIENT)
Dept: PAIN MANAGEMENT | Age: 52
End: 2022-07-05

## 2022-07-06 DIAGNOSIS — Z51.81 ENCOUNTER FOR MEDICATION MONITORING: ICD-10-CM

## 2022-07-06 DIAGNOSIS — M54.12 CERVICAL RADICULOPATHY: ICD-10-CM

## 2022-07-06 DIAGNOSIS — M47.812 ARTHROPATHY OF CERVICAL FACET JOINT: ICD-10-CM

## 2022-07-06 DIAGNOSIS — M48.02 DEGENERATIVE CERVICAL SPINAL STENOSIS: ICD-10-CM

## 2022-07-06 RX ORDER — OXYCODONE AND ACETAMINOPHEN 10; 325 MG/1; MG/1
1 TABLET ORAL EVERY 8 HOURS PRN
Qty: 12 TABLET | Refills: 0 | Status: SHIPPED | OUTPATIENT
Start: 2022-07-08 | End: 2022-07-12

## 2022-07-12 ENCOUNTER — HOSPITAL ENCOUNTER (OUTPATIENT)
Dept: PAIN MANAGEMENT | Age: 52
Discharge: HOME | End: 2022-07-12
Payer: COMMERCIAL

## 2022-07-12 VITALS
HEART RATE: 72 BPM | SYSTOLIC BLOOD PRESSURE: 123 MMHG | TEMPERATURE: 97.9 F | OXYGEN SATURATION: 96 % | DIASTOLIC BLOOD PRESSURE: 74 MMHG

## 2022-07-12 DIAGNOSIS — M48.02 DEGENERATIVE CERVICAL SPINAL STENOSIS: ICD-10-CM

## 2022-07-12 DIAGNOSIS — M54.12 CERVICAL RADICULOPATHY: ICD-10-CM

## 2022-07-12 DIAGNOSIS — Z51.81 ENCOUNTER FOR MEDICATION MONITORING: ICD-10-CM

## 2022-07-12 DIAGNOSIS — M47.812 SPONDYLOSIS OF CERVICAL REGION WITHOUT MYELOPATHY OR RADICULOPATHY: ICD-10-CM

## 2022-07-12 DIAGNOSIS — M47.812 ARTHROPATHY OF CERVICAL FACET JOINT: ICD-10-CM

## 2022-07-12 PROCEDURE — 99213 OFFICE O/P EST LOW 20 MIN: CPT

## 2022-07-12 PROCEDURE — 99213 OFFICE O/P EST LOW 20 MIN: CPT | Performed by: NURSE PRACTITIONER

## 2022-07-12 RX ORDER — OXYCODONE AND ACETAMINOPHEN 10; 325 MG/1; MG/1
1 TABLET ORAL EVERY 8 HOURS PRN
Qty: 90 TABLET | Refills: 0 | Status: SHIPPED | OUTPATIENT
Start: 2022-07-12 | End: 2022-08-10 | Stop reason: SDUPTHER

## 2022-07-12 ASSESSMENT — ENCOUNTER SYMPTOMS
SHORTNESS OF BREATH: 0
COUGH: 0
CONSTIPATION: 0

## 2022-07-12 NOTE — PROGRESS NOTES
Chief Complaint   Patient presents with    Neck Pain    Medication Refill       PMH  Pt c/o chronic neck pain that is located midline and left side of her neck with radiaiont up to occipital area and at time in left shoulder down to fingers. She has limited ROM of her neck. She has seen Neurosurgeon in the past  and surgery was not recommended. She had CT cervical spine 3-4-21 which read as : mild to moderate cervical degenerative disc disease with moderate bony neural foraminal narrowing on the left at C3-C4, C5- C6.   She had confirmatory  Left Cervical Medical branch nerve block at C2 / C3 / C4 / C5 nerves on 1-13-22 with 100% relief for a few days. She would like to proceed with RFA     RFA discussed at last visit but pt needs to see PCP before getting clearance to hold xarelto. Was also on ATB for eye infection last visit and understands no injection until infection clears. We have not received clearance to hold xarelto. She states she needs to complete colonoscopy due to abnormal labs. Per Dr. Rocio Martin plan of care, pt must be completed off Klonopin and Ambien by September to remain on current dose of opiates. She just refilled Klonopin and Ambien with no change in dose. She states she is working with her provider to taper these medication. Neck Pain   This is a chronic problem. The current episode started more than 1 year ago. The problem occurs daily. The problem has been gradually worsening. The pain is associated with nothing. The pain is present in the left side. The quality of the pain is described as stabbing. The pain is at a severity of 6/10. The symptoms are aggravated by twisting and bending (lifting). The pain is same all the time. Stiffness is present all day. Associated symptoms include headaches and numbness. Pertinent negatives include no chest pain, fever, tingling or weakness. She has tried heat and oral narcotics (Neck Pillow) for the symptoms.  The treatment provided mild relief. Patient denies any new neurological symptoms. No bowel or bladder incontinence, no weakness, and no falling. Pill count: appropriate Oxycodone #1 was due     Morphine equivalent: 45    Controlled Substance Monitoring:    Acute and Chronic Pain Monitoring:   RX Monitoring 2022   Attestation -   Acute Pain Prescriptions -   Periodic Controlled Substance Monitoring Possible medication side effects, risk of tolerance/dependence & alternative treatments discussed. ;No signs of potential drug abuse or diversion identified.;Obtaining appropriate analgesic effect of treatment.    Chronic Pain > 50 MEDD -   Chronic Pain > 80 MEDD -         Past Medical History:   Diagnosis Date    Anxiety     CAD (coronary artery disease)     Mitral valve prolapse    Fatty liver     GERD (gastroesophageal reflux disease)     Headache     History of bronchitis     Hyperlipidemia     Hypothyroidism     Kidney stone     LFT elevation     MVP (mitral valve prolapse)     Obesity     Osteoarthritis of cervical spine     Pulmonary emboli (HCC)     Type 2 diabetes mellitus without complication ([facility] [address]     Umbilical hernia        Past Surgical History:   Procedure Laterality Date    APPENDECTOMY       SECTION      2 pfannenstiel, 1 vertical    CHOLECYSTECTOMY      COLONOSCOPY      Dr Phuc Carbajal  14    COLONOSCOPY  2014    biopsy & sigmoid spasms, pathology negative    COLONOSCOPY N/A 2018    COLONOSCOPY WITH BIOPSY performed by Veena Miranda MD at [address] [place] [address] N/A 2021    COLONOSCOPY DIAGNOSTIC performed by Ketty Snyder MD at [facility]      x 5    NV EXPLORATORY OF ABDOMEN  10/21/2014    Laparotomy-lysis of adhesions, bso     TOTAL ABDOMINAL HYSTERECTOMY          UPPER GASTROINTESTINAL ENDOSCOPY  2010    mild chronic inactive gastritis    UPPER GASTROINTESTINAL ENDOSCOPY N/A 3/10/2021    EGD BIOPSY performed by Karleen Rubinstein, MD at [facility] ENDO       Allergies   Allergen Reactions    Compazine [Prochlorperazine]      Jittery, restless    Sulfa Antibiotics Hives    Adhesive Tape Rash         Current Outpatient Medications:     oxyCODONE-acetaminophen (PERCOCET)  MG per tablet, Take 1 tablet by mouth every 8 hours as needed for Pain for up to 4 days. , Disp: 12 tablet, Rfl: 0    sucralfate (CARAFATE) 1 GM tablet, TAKE 1 TABLET BY MOUTH FOUR TIMES DAILY, Disp: 120 tablet, Rfl: 0    albuterol sulfate HFA (PROVENTIL;VENTOLIN;PROAIR) 108 (90 Base) MCG/ACT inhaler, INHALE 2 PUFFS INTO THE LUNGS EVERY 4 HOURS AS NEEDED FOR SHORTNESS OF BREATH, Disp: 42.5 g, Rfl: 3    levothyroxine (SYNTHROID) 200 MCG tablet, TAKE 1 TABLET BY MOUTH DAILY, Disp: 90 tablet, Rfl: 3    furosemide (LASIX) 20 MG tablet, TAKE 1 TABLET BY MOUTH DAILY, Disp: 90 tablet, Rfl: 1    esomeprazole (NEXIUM) 40 MG delayed release capsule, TAKE 1 CAPSULE BY MOUTH EVERY MORNING BEFORE BREAKFAST, Disp: 90 capsule, Rfl: 0    ibuprofen (ADVIL;MOTRIN) 600 MG tablet, Take 1 tablet by mouth every 6 hours as needed for Pain, Disp: 20 tablet, Rfl: 0    XARELTO 20 MG TABS tablet, TAKE 1 TABLET BY MOUTH DAILY WITH BREAKFAST, Disp: 90 tablet, Rfl: 3    zolpidem (AMBIEN CR) 12.5 MG extended release tablet, TAKE 1 TABLET BY MOUTH AT BEDTIME, Disp: , Rfl:     atorvastatin (LIPITOR) 40 MG tablet, TAKE 1 TABLET BY MOUTH EVERY DAY, Disp: , Rfl:     clonazePAM (KLONOPIN) 1 MG tablet, TAKE 1/2 TABLET BY MOUTH FOUR TIMES DAILY AS NEEDED, Disp: , Rfl:     tiZANidine (ZANAFLEX) 4 MG tablet, TAKE 1 TABLET BY MOUTH EVERY 8 HOURS AS NEEDED FOR SPASMS, Disp: 90 tablet, Rfl: 0    rOPINIRole (REQUIP) 2 MG tablet, TAKE 1 TABLET BY MOUTH EVERY NIGHT, Disp: 90 tablet, Rfl: 1    ondansetron (ZOFRAN ODT) 4 MG disintegrating tablet, Take 1 tablet by mouth every 8 hours as needed for Nausea or Vomiting, Disp: 20 tablet, Rfl: 0    sertraline (ZOLOFT) 100 MG tablet, Take 1.5 tablets by mouth daily, Disp: 45 tablet, Rfl: 2    traZODone (DESYREL) 100 MG tablet, , Disp: , Rfl:     albuterol sulfate HFA (VENTOLIN HFA) 108 (90 Base) MCG/ACT inhaler, Inhale 2 puffs into the lungs every 6 hours as needed for Wheezing, Disp: 1 Inhaler, Rfl: 3    clonazePAM (KLONOPIN) 0.5 MG tablet, Take 1 tablet by mouth 2 times daily as needed for Anxiety for up to 30 days. , Disp: 60 tablet, Rfl: 1    topiramate (TOPAMAX) 25 MG tablet, 25 mg 2 times daily , Disp: , Rfl:     metoprolol tartrate (LOPRESSOR) 50 MG tablet, Take 50 mg by mouth 2 times daily , Disp: , Rfl:     Family History   Problem Relation Age of Onset    Cancer Mother         vaginal    Heart Disease Father     Diabetes Father     Other Father         colon resection for colon polyps    Breast Cancer Maternal Grandmother     Stroke Maternal Grandfather        Social History     Socioeconomic History    Marital status:      Spouse name: Not on file    Number of children: Not on file    Years of education: Not on file    Highest education level: Not on file   Occupational History    Occupation: Homemaker   Tobacco Use    Smoking status: Former Smoker     Packs/day: 0.25     Years: 33.00     Pack years: 8.25     Types: Cigarettes     Quit date:      Years since quittin.5    Smokeless tobacco: Never Used    Tobacco comment: quit on nicoderm patch   Vaping Use    Vaping Use: Never used   Substance and Sexual Activity    Alcohol use: No     Alcohol/week: 0.0 standard drinks    Drug use: No    Sexual activity: Not Currently   Other Topics Concern    Not on file   Social History Narrative    Not on file     Social Determinants of Health     Financial Resource Strain: Low Risk     Difficulty of Paying Living Expenses: Not hard at all   Food Insecurity: No Food Insecurity    Worried About Running Out of Food in the Last Year: Never true    Kamille of Food in the Last Year: Never true   Transportation Needs:  Lack of Transportation (Medical): Not on file    Lack of Transportation (Non-Medical): Not on file   Physical Activity:     Days of Exercise per Week: Not on file    Minutes of Exercise per Session: Not on file   Stress:     Feeling of Stress : Not on file   Social Connections:     Frequency of Communication with Friends and Family: Not on file    Frequency of Social Gatherings with Friends and Family: Not on file    Attends Taoist Services: Not on file    Active Member of [address] [place] [address] or Organizations: Not on file    Attends Club or Organization Meetings: Not on file    Marital Status: Not on file   Intimate Partner Violence:     Fear of Current or Ex-Partner: Not on file    Emotionally Abused: Not on file    Physically Abused: Not on file    Sexually Abused: Not on file   Housing Stability:     Unable to Pay for Housing in the Last Year: Not on file    Number of [place] in the Last Year: Not on file    Unstable Housing in the Last Year: Not on file       Review of Systems:  Review of Systems   Constitutional: Negative for chills and fever. Cardiovascular: Negative for chest pain and palpitations. Respiratory: Negative for cough and shortness of breath. Musculoskeletal: Positive for neck pain. Gastrointestinal: Negative for constipation. Neurological: Positive for headaches and numbness. Negative for disturbances in coordination, loss of balance, tingling and weakness. Physical Exam:  /74   Pulse 72   Temp 97.9 °F (36.6 °C) (Temporal)   LMP 11/01/2010   SpO2 96%     Physical Exam  HENT:      Head: Normocephalic. Pulmonary:      Effort: Pulmonary effort is normal.   Musculoskeletal:         General: Normal range of motion. Cervical back: Normal range of motion. Tenderness present. Skin:     General: Skin is warm and dry. Neurological:      Mental Status: She is alert and oriented to person, place, and time.          Record/Diagnostics Review:    Last mychal 3/2022 and was appropriate     Assessment:  Problem List Items Addressed This Visit     Spondylosis of cervical region without myelopathy or radiculopathy    Relevant Medications    oxyCODONE-acetaminophen (PERCOCET)  MG per tablet    Degenerative cervical spinal stenosis    Relevant Medications    oxyCODONE-acetaminophen (PERCOCET)  MG per tablet    Arthropathy of cervical facet joint    Relevant Medications    oxyCODONE-acetaminophen (PERCOCET)  MG per tablet    Cervical radiculopathy    Relevant Medications    oxyCODONE-acetaminophen (PERCOCET)  MG per tablet      Other Visit Diagnoses     Encounter for medication monitoring        Relevant Medications    oxyCODONE-acetaminophen (PERCOCET)  MG per tablet             Treatment Plan:  Patient relates current medications are helping the pain. Patient reports taking pain medications as prescribed, denies obtaining medications from different sources and denies use of illegal drugs. Patient denies side effects from medications like nausea, vomiting, constipation or drowsiness. Patient reports current activities of daily living are possible due to medications and would like to continue them. As always, we encourage daily stretching and strengthening exercises, and recommend minimizing use of pain medications unless patient cannot get through daily activities due to pain. Contract requirements met. Continue opioid therapy. Script written for percocet  Awaiting clearance for cervical RFA - pt had abnormal CRP and sed rate with recent labs. Will be having a colonoscopy and follow up with PCP. Follow up appointment made for 4 weeks    I have reviewed the chief complaint and history of present illness (including ROS and PFSH) and vital documentation by my staff and I agree with their documentation and have added where applicable.

## 2022-07-19 ENCOUNTER — OFFICE VISIT (OUTPATIENT)
Dept: GASTROENTEROLOGY | Age: 52
End: 2022-07-19
Payer: COMMERCIAL

## 2022-07-19 VITALS
BODY MASS INDEX: 35.02 KG/M2 | TEMPERATURE: 97.9 F | DIASTOLIC BLOOD PRESSURE: 87 MMHG | SYSTOLIC BLOOD PRESSURE: 121 MMHG | WEIGHT: 204 LBS

## 2022-07-19 DIAGNOSIS — E66.01 SEVERE OBESITY (BMI 35.0-39.9) WITH COMORBIDITY (HCC): ICD-10-CM

## 2022-07-19 DIAGNOSIS — R97.0 ELEVATED CEA: ICD-10-CM

## 2022-07-19 DIAGNOSIS — K58.2 IRRITABLE BOWEL SYNDROME WITH BOTH CONSTIPATION AND DIARRHEA: Primary | ICD-10-CM

## 2022-07-19 DIAGNOSIS — F11.90 NARCOTIC DRUG USE: ICD-10-CM

## 2022-07-19 DIAGNOSIS — R13.14 PHARYNGOESOPHAGEAL DYSPHAGIA: ICD-10-CM

## 2022-07-19 DIAGNOSIS — R10.13 ABDOMINAL PAIN, EPIGASTRIC: ICD-10-CM

## 2022-07-19 PROCEDURE — 99214 OFFICE O/P EST MOD 30 MIN: CPT | Performed by: INTERNAL MEDICINE

## 2022-07-19 ASSESSMENT — ENCOUNTER SYMPTOMS
DIARRHEA: 1
NAUSEA: 1
WHEEZING: 0
COUGH: 0
RECTAL PAIN: 0
BLOOD IN STOOL: 0
CONSTIPATION: 1
TROUBLE SWALLOWING: 0
CHOKING: 0
VOMITING: 1
SHORTNESS OF BREATH: 0
ABDOMINAL PAIN: 1
VOICE CHANGE: 0
ANAL BLEEDING: 0
ABDOMINAL DISTENTION: 1
SORE THROAT: 0

## 2022-07-19 NOTE — PROGRESS NOTES
GI CLINIC FOLLOW UP    NTERVAL HISTORY:   No referring provider defined for this encounter. Chief Complaint   Patient presents with    Colonoscopy     Pt is here today to screen for colonoscopy, pt last seen on 04/15/21 for IBS, erosive gastritis & abd pain. 1. Irritable bowel syndrome with both constipation and diarrhea    2. Severe obesity (BMI 35.0-39. 9) with comorbidity ([address]    3. Abdominal pain, epigastric    4. Narcotic drug use    5. Pharyngoesophageal dysphagia    6. Elevated CEA      This patient evaluated my office after May 2021  She has history significant for colonoscopy done in the past which was done with relatively poor prep no lumen obstructing lesion was noted  Patient had mild elevation of the CEA level also had elevated  has been followed by OB/GYN  Minimal elevation of the CEA levels have been noted  Patient has history significant for chronic constipation issues irritable bowel syndrome-like issues had multiple abdominal surgeries done has also been taking narcotic pain medications    Has acid reflux indigestion she has been complaining of significant upper abdominal discomfort and pain has been on proton pump inhibitor therapy    She also complained of some trouble swallowing food and throat irritation    Has issues with overweight    Previous records were reviewed with her    HISTORY OF PRESENT ILLNESS: Jazmín Garg is a 49 y.o. female with a past history remarkable for , referred for evaluation of   Chief Complaint   Patient presents with    Colonoscopy     Pt is here today to screen for colonoscopy, pt last seen on 04/15/21 for IBS, erosive gastritis & abd pain. .    Past Medical,Family, and Social History reviewed and does contribute to the patient presenting condition. Patient's PMH/PSH,SH,PSYCH Hx, MEDs, ALLERGIES, and ROS were all reviewed and updated in the appropriate sections.     PAST MEDICAL HISTORY:  Past Medical History:   Diagnosis Date Anxiety     CAD (coronary artery disease)     Mitral valve prolapse    Fatty liver     GERD (gastroesophageal reflux disease)     Headache     History of bronchitis     Hyperlipidemia     Hypothyroidism     Kidney stone     LFT elevation     MVP (mitral valve prolapse)     Obesity     Osteoarthritis of cervical spine     Pulmonary emboli (HCC)     Type 2 diabetes mellitus without complication ([facility]     Umbilical hernia        Past Surgical History:   Procedure Laterality Date    APPENDECTOMY       SECTION      2 pfannenstiel, 1 vertical    CHOLECYSTECTOMY      COLONOSCOPY      Dr Donal Prado    COLONOSCOPY  15    COLONOSCOPY  2014    biopsy & sigmoid spasms, pathology negative    COLONOSCOPY N/A 2018    COLONOSCOPY WITH BIOPSY performed by Verner Nunnery, MD at [address] [place] [address] N/A 2021    COLONOSCOPY DIAGNOSTIC performed by Carlita Cordero MD at [address] [place] [address]      x 5    HYSTERECTOMY, TOTAL ABDOMINAL (CERVIX REMOVED)          AL EXPLORATORY OF ABDOMEN  10/21/2014    Laparotomy-lysis of adhesions, bso     UPPER GASTROINTESTINAL ENDOSCOPY  2010    mild chronic inactive gastritis    UPPER GASTROINTESTINAL ENDOSCOPY N/A 3/10/2021    EGD BIOPSY performed by Carlita Cordero MD at [address]:    Current Outpatient Medications:     oxyCODONE-acetaminophen (PERCOCET)  MG per tablet, Take 1 tablet by mouth every 8 hours as needed for Pain for up to 30 days. , Disp: 90 tablet, Rfl: 0    sucralfate (CARAFATE) 1 GM tablet, TAKE 1 TABLET BY MOUTH FOUR TIMES DAILY, Disp: 120 tablet, Rfl: 0    albuterol sulfate HFA (PROVENTIL;VENTOLIN;PROAIR) 108 (90 Base) MCG/ACT inhaler, INHALE 2 PUFFS INTO THE LUNGS EVERY 4 HOURS AS NEEDED FOR SHORTNESS OF BREATH, Disp: 42.5 g, Rfl: 3    levothyroxine (SYNTHROID) 200 MCG tablet, TAKE 1 TABLET BY MOUTH DAILY, Disp: 90 tablet, Rfl: 3    furosemide (LASIX) 20 MG tablet, TAKE 1 TABLET BY MOUTH DAILY, Disp: 90 tablet, Rfl: 1    esomeprazole (NEXIUM) 40 MG delayed release capsule, TAKE 1 CAPSULE BY MOUTH EVERY MORNING BEFORE BREAKFAST, Disp: 90 capsule, Rfl: 0    ibuprofen (ADVIL;MOTRIN) 600 MG tablet, Take 1 tablet by mouth every 6 hours as needed for Pain, Disp: 20 tablet, Rfl: 0    XARELTO 20 MG TABS tablet, TAKE 1 TABLET BY MOUTH DAILY WITH BREAKFAST, Disp: 90 tablet, Rfl: 3    zolpidem (AMBIEN CR) 12.5 MG extended release tablet, TAKE 1 TABLET BY MOUTH AT BEDTIME, Disp: , Rfl:     atorvastatin (LIPITOR) 40 MG tablet, TAKE 1 TABLET BY MOUTH EVERY DAY, Disp: , Rfl:     clonazePAM (KLONOPIN) 1 MG tablet, TAKE 1/2 TABLET BY MOUTH FOUR TIMES DAILY AS NEEDED, Disp: , Rfl:     tiZANidine (ZANAFLEX) 4 MG tablet, TAKE 1 TABLET BY MOUTH EVERY 8 HOURS AS NEEDED FOR SPASMS, Disp: 90 tablet, Rfl: 0    rOPINIRole (REQUIP) 2 MG tablet, TAKE 1 TABLET BY MOUTH EVERY NIGHT, Disp: 90 tablet, Rfl: 1    ondansetron (ZOFRAN ODT) 4 MG disintegrating tablet, Take 1 tablet by mouth every 8 hours as needed for Nausea or Vomiting, Disp: 20 tablet, Rfl: 0    sertraline (ZOLOFT) 100 MG tablet, Take 1.5 tablets by mouth daily, Disp: 45 tablet, Rfl: 2    traZODone (DESYREL) 100 MG tablet, , Disp: , Rfl:     albuterol sulfate HFA (VENTOLIN HFA) 108 (90 Base) MCG/ACT inhaler, Inhale 2 puffs into the lungs every 6 hours as needed for Wheezing, Disp: 1 Inhaler, Rfl: 3    topiramate (TOPAMAX) 25 MG tablet, 25 mg 2 times daily , Disp: , Rfl:     metoprolol tartrate (LOPRESSOR) 50 MG tablet, Take 50 mg by mouth 2 times daily , Disp: , Rfl:     clonazePAM (KLONOPIN) 0.5 MG tablet, Take 1 tablet by mouth 2 times daily as needed for Anxiety for up to 30 days. , Disp: 60 tablet, Rfl: 1    ALLERGIES:   Allergies   Allergen Reactions    Compazine [Prochlorperazine]      Jittery, restless    Sulfa Antibiotics Hives    Adhesive Tape Rash       FAMILY HISTORY:       Problem Relation Age of Onset    Cancer Mother         vaginal    Heart Disease Father Diabetes Father     Other Father         colon resection for colon polyps    Breast Cancer Maternal Grandmother     Stroke Maternal Grandfather          SOCIAL HISTORY:   Social History     Socioeconomic History    Marital status:      Spouse name: Not on file    Number of children: Not on file    Years of education: Not on file    Highest education level: Not on file   Occupational History    Occupation: Homemaker   Tobacco Use    Smoking status: Former     Packs/day: 0.25     Years: 33.00     Pack years: 8.25     Types: Cigarettes     Quit date:      Years since quittin.5    Smokeless tobacco: Never    Tobacco comments:     quit on nicoderm patch   Vaping Use    Vaping Use: Never used   Substance and Sexual Activity    Alcohol use: No     Alcohol/week: 0.0 standard drinks    Drug use: No    Sexual activity: Not Currently   Other Topics Concern    Not on file   Social History Narrative    Not on file     Social Determinants of Health     Financial Resource Strain: Low Risk     Difficulty of Paying Living Expenses: Not hard at all   Food Insecurity: No Food Insecurity    Worried About Running Out of Food in the Last Year: Never true    Ran Out of Food in the Last Year: Never true   Transportation Needs: Not on file   Physical Activity: Not on file   Stress: Not on file   Social Connections: Not on file   Intimate Partner Violence: Not on file   Housing Stability: Not on file         REVIEW OF SYSTEMS:         Review of Systems   Constitutional:  Negative for appetite change, fatigue and unexpected weight change. HENT:  Negative for sore throat, trouble swallowing and voice change. Respiratory:  Negative for cough, choking, shortness of breath and wheezing. Cardiovascular:  Negative for chest pain, palpitations and leg swelling. Gastrointestinal:  Positive for abdominal distention, abdominal pain, constipation, diarrhea, nausea and vomiting.  Negative for anal bleeding, blood in stool and rectal pain. Neurological:  Positive for headaches. Negative for dizziness, weakness, light-headedness and numbness. Hematological:  Does not bruise/bleed easily. Psychiatric/Behavioral:  Negative for confusion and sleep disturbance. The patient is not nervous/anxious. PHYSICAL EXAMINATION: Vital signs reviewed per the nursing documentation. /87   Temp 97.9 °F (36.6 °C)   Wt 204 lb (92.5 kg)   LMP 11/01/2010   BMI 35.02 kg/m²   Body mass index is 35.02 kg/m². Physical Exam  Nursing note reviewed. Constitutional:       Appearance: She is well-developed. Comments: Anxious    HENT:      Head: Normocephalic and atraumatic. Eyes:      Conjunctiva/sclera: Conjunctivae normal.      Pupils: Pupils are equal, round, and reactive to light. Cardiovascular:      Heart sounds: Normal heart sounds. Pulmonary:      Effort: Pulmonary effort is normal.      Breath sounds: Normal breath sounds. Abdominal:      General: Bowel sounds are normal.      Palpations: Abdomen is soft. Tenderness: There is abdominal tenderness. Comments: Scars    Musculoskeletal:         General: Normal range of motion. Cervical back: Normal range of motion and neck supple. Skin:     General: Skin is warm. Neurological:      Mental Status: She is alert and oriented to person, place, and time.    Psychiatric:         Behavior: Behavior normal.         LABORATORY DATA: Reviewed  Lab Results   Component Value Date    WBC 7.4 05/28/2022    HGB 12.9 05/28/2022    HCT 38.0 05/28/2022    MCV 92.1 05/28/2022     05/28/2022     05/28/2022    K 4.0 05/28/2022     05/28/2022    CO2 20 05/28/2022    BUN 17 05/28/2022    CREATININE 0.78 05/28/2022    LABPROT 7.6 03/07/2013    LABALBU 3.9 04/19/2022    BILITOT 0.17 (L) 04/19/2022    ALKPHOS 96 04/19/2022    AST 12 04/19/2022    ALT 9 04/19/2022    INR 1.6 04/23/2021         Lab Results   Component Value Date    RBC 4.13 05/28/2022    HGB 12.9 05/28/2022    MCV 92.1 05/28/2022    MCH 31.2 05/28/2022    MCHC 33.9 05/28/2022    RDW 13.1 05/28/2022    MPV 7.6 05/28/2022    BASOPCT 1 05/28/2022    LYMPHSABS 2.00 05/28/2022    MONOSABS 0.50 05/28/2022    NEUTROABS 4.70 05/28/2022    EOSABS 0.10 05/28/2022    BASOSABS 0.00 05/28/2022         DIAGNOSTIC TESTING:     No results found. Assessment  1. Irritable bowel syndrome with both constipation and diarrhea    2. Severe obesity (BMI 35.0-39. 9) with comorbidity ([address]    3. Abdominal pain, epigastric    4. Narcotic drug use    5. Pharyngoesophageal dysphagia    6. Elevated CEA        Plan    Plan EGD and Colonoscopy with two day prep    The Endoscopic procedure was explained to the patient in detail  The prep and NPO were explained  All the Risks, Benefits, and Alternatives were explained  Risk of Bleeding, Perforation and Cardio Respiratory risks were explained  her questions were answered  The procedure has been scheduled with the  in the office  Patient was asked to give us a call for any questions  The patient has verbalized understanding and agreement to this plan. Pt was advised in detail about some life style and dietary modifications. She was advised about avoidance of caffeine, nicotine and chocolate. Pt was also told to stay away from any kind of fast foods, soda pops. She was also advised to avoid lots of spices, grease and fried food etc.     Instructions were also given about trying to arrange the timing, quality and quantity of food. Instructions were given about using ample amount of fiber including dietary and supplemental fiber either metamucil, bennafiber or citrucell etc.  Pt was advised about drinking ample amount of water without any colors or chemicals. Stress was given about regular exercise. Pt has verbalized understanding and agreement to these modifications. Pt seems to have signs and symptoms consistent with GERD, acid indigestion and heartburns.  She was discussed  in detail about some possible life style and dietary modifications. She was stressed about the maintenance  of appropriate weight and effect of obesity contributing to reflux symptoms. Routine exercise was streesed. Avoidance of Caffeine, nicotine and chocolate were explained. Pt was asked to avoid spices grease and fried food. Advices were also given about avoidance of any kind of fast foods, soda pops and high energy drinks. Pt was advised to place two small block under the head end of the bed which may help with night time reflux. Was advised not to eat any thin at least 2-3 hrs before going to bed and walk especially after dinner    Pt has verbalized understanding and agreement to this plan. The patient was instructed to start taking some OTC Probiotics products   These are available over the counter at the Pharmacy stores and Grocery stores  He was explained about the beneficial effects they have in the GI track  They will help to establish the good bacterial teresa and will help with the digestion and bowel movements  The patient has verbalized understanding and agreement to this plan    Thank you for allowing me to participate in the care of Ms. Soler. For any further questions please do not hesitate to contact me. I have reviewed and agree with the ROS entered by the MA/Nurse.          Dulce Maria Elias MD, [facility]  Board Certified in Gastroenterology and [address] [place] [address] Gastroenterology  Office #: [phone]

## 2022-07-20 RX ORDER — POLYETHYLENE GLYCOL 3350, SODIUM SULFATE ANHYDROUS, SODIUM BICARBONATE, SODIUM CHLORIDE, POTASSIUM CHLORIDE 236; 22.74; 6.74; 5.86; 2.97 G/4L; G/4L; G/4L; G/4L; G/4L
4 POWDER, FOR SOLUTION ORAL ONCE
Qty: 4000 ML | Refills: 0 | Status: SHIPPED | OUTPATIENT
Start: 2022-07-20 | End: 2022-07-20

## 2022-07-25 ENCOUNTER — TELEPHONE (OUTPATIENT)
Dept: INFUSION THERAPY | Age: 52
End: 2022-07-25

## 2022-07-26 RX ORDER — SUCRALFATE 1 G/1
TABLET ORAL
Qty: 120 TABLET | Refills: 0 | Status: SHIPPED | OUTPATIENT
Start: 2022-07-26 | End: 2022-08-18

## 2022-07-26 RX ORDER — ROPINIROLE 2 MG/1
TABLET, FILM COATED ORAL
Qty: 90 TABLET | Refills: 1 | Status: SHIPPED | OUTPATIENT
Start: 2022-07-26

## 2022-07-26 RX ORDER — SUCRALFATE 1 G/1
TABLET ORAL
Qty: 360 TABLET | OUTPATIENT
Start: 2022-07-26

## 2022-08-10 ENCOUNTER — HOSPITAL ENCOUNTER (OUTPATIENT)
Dept: PAIN MANAGEMENT | Age: 52
Discharge: HOME | End: 2022-08-10
Payer: COMMERCIAL

## 2022-08-10 VITALS
OXYGEN SATURATION: 96 % | RESPIRATION RATE: 20 BRPM | SYSTOLIC BLOOD PRESSURE: 109 MMHG | TEMPERATURE: 97.3 F | HEART RATE: 71 BPM | DIASTOLIC BLOOD PRESSURE: 70 MMHG

## 2022-08-10 DIAGNOSIS — Z51.81 ENCOUNTER FOR MEDICATION MONITORING: ICD-10-CM

## 2022-08-10 DIAGNOSIS — M54.12 CERVICAL RADICULOPATHY: ICD-10-CM

## 2022-08-10 DIAGNOSIS — G89.29 CHRONIC NECK PAIN: Primary | Chronic | ICD-10-CM

## 2022-08-10 DIAGNOSIS — M54.2 CHRONIC NECK PAIN: Primary | Chronic | ICD-10-CM

## 2022-08-10 DIAGNOSIS — M48.02 DEGENERATIVE CERVICAL SPINAL STENOSIS: ICD-10-CM

## 2022-08-10 DIAGNOSIS — M47.812 ARTHROPATHY OF CERVICAL FACET JOINT: ICD-10-CM

## 2022-08-10 PROCEDURE — 99213 OFFICE O/P EST LOW 20 MIN: CPT | Performed by: NURSE PRACTITIONER

## 2022-08-10 PROCEDURE — 99213 OFFICE O/P EST LOW 20 MIN: CPT

## 2022-08-10 RX ORDER — CYCLOBENZAPRINE HCL 10 MG
TABLET ORAL
COMMUNITY
Start: 2022-05-29 | End: 2022-08-10

## 2022-08-10 RX ORDER — POLYETHYLENE GLYCOL 3350, SODIUM SULFATE ANHYDROUS, SODIUM BICARBONATE, SODIUM CHLORIDE, POTASSIUM CHLORIDE 236; 22.74; 6.74; 5.86; 2.97 G/4L; G/4L; G/4L; G/4L; G/4L
POWDER, FOR SOLUTION ORAL
COMMUNITY
Start: 2022-07-20

## 2022-08-10 RX ORDER — OXYCODONE AND ACETAMINOPHEN 10; 325 MG/1; MG/1
1 TABLET ORAL EVERY 8 HOURS PRN
Qty: 90 TABLET | Refills: 0 | Status: SHIPPED | OUTPATIENT
Start: 2022-08-11 | End: 2022-09-09 | Stop reason: SDUPTHER

## 2022-08-10 ASSESSMENT — ENCOUNTER SYMPTOMS
BACK PAIN: 1
CONSTIPATION: 0
COUGH: 0
SHORTNESS OF BREATH: 0

## 2022-08-10 ASSESSMENT — PAIN SCALES - GENERAL: PAINLEVEL_OUTOF10: 7

## 2022-08-10 NOTE — PROGRESS NOTES
for the symptoms. The treatment provided no relief. Patient denies any new neurological symptoms. No bowel or bladder incontinence, no weakness, and no falling. Pill count: Oxycodone 2     Morphine equivalent: 45    Controlled Substance Monitoring:    Acute and Chronic Pain Monitoring:   RX Monitoring 8/10/2022   Attestation -   Acute Pain Prescriptions -   Periodic Controlled Substance Monitoring Possible medication side effects, risk of tolerance/dependence & alternative treatments discussed. ;No signs of potential drug abuse or diversion identified. ;Assessed functional status. ;Obtaining appropriate analgesic effect of treatment. Chronic Pain > 50 MEDD -   Chronic Pain > 80 MEDD -          Periodic Controlled Substance Monitoring: Possible medication side effects, risk of tolerance/dependence & alternative treatments discussed., No signs of potential drug abuse or diversion identified. , Assessed functional status., Obtaining appropriate analgesic effect of treatment.  Harinder Guerrero, APRN - CNP)      Past Medical History:   Diagnosis Date    Anxiety     CAD (coronary artery disease)     Mitral valve prolapse    Fatty liver     GERD (gastroesophageal reflux disease)     Headache     History of bronchitis     Hyperlipidemia     Hypothyroidism     Kidney stone     LFT elevation     MVP (mitral valve prolapse)     Obesity     Osteoarthritis of cervical spine     Pulmonary emboli (HCC)     Type 2 diabetes mellitus without complication ([facility]     Umbilical hernia        Past Surgical History:   Procedure Laterality Date    APPENDECTOMY       SECTION      2 pfannenstiel, 1 vertical    CHOLECYSTECTOMY      COLONOSCOPY      Dr Antelmo Mireles    COLONOSCOPY  14    COLONOSCOPY  2014    biopsy & sigmoid spasms, pathology negative    COLONOSCOPY N/A 2018    COLONOSCOPY WITH BIOPSY performed by Juni Del Valle MD at [address] [place] [address] N/A 2021    COLONOSCOPY DIAGNOSTIC performed by Kyle Chacko MD at [address] [place] [address] ([facility])      x 5    HYSTERECTOMY, TOTAL ABDOMINAL (CERVIX REMOVED)      2010    [place] EXPLORATORY OF ABDOMEN  10/21/2014    Laparotomy-lysis of adhesions, bso     UPPER GASTROINTESTINAL ENDOSCOPY  2/17/2010    mild chronic inactive gastritis    UPPER GASTROINTESTINAL ENDOSCOPY N/A 3/10/2021    EGD BIOPSY performed by [facility] Chacko MD at [facility]       Allergies   Allergen Reactions    Compazine [Prochlorperazine]      Jittery, restless    Sulfa Antibiotics Hives    Adhesive Tape Rash         Current Outpatient Medications:     [START ON 8/11/2022] oxyCODONE-acetaminophen (PERCOCET)  MG per tablet, Take 1 tablet by mouth every 8 hours as needed for Pain for up to 30 days. , Disp: 90 tablet, Rfl: 0    PEG 3350-KCl-NaBcb-NaCl-NaSulf (PEG-3350/ELECTROLYTES) 236 g SOLR, , Disp: , Rfl:     rOPINIRole (REQUIP) 2 MG tablet, TAKE 1 TABLET BY MOUTH EVERY NIGHT, Disp: 90 tablet, Rfl: 1    sucralfate (CARAFATE) 1 GM tablet, TAKE 1 TABLET BY MOUTH FOUR TIMES DAILY, Disp: 120 tablet, Rfl: 0    albuterol sulfate HFA (PROVENTIL;VENTOLIN;PROAIR) 108 (90 Base) MCG/ACT inhaler, INHALE 2 PUFFS INTO THE LUNGS EVERY 4 HOURS AS NEEDED FOR SHORTNESS OF BREATH, Disp: 42.5 g, Rfl: 3    levothyroxine (SYNTHROID) 200 MCG tablet, TAKE 1 TABLET BY MOUTH DAILY, Disp: 90 tablet, Rfl: 3    furosemide (LASIX) 20 MG tablet, TAKE 1 TABLET BY MOUTH DAILY, Disp: 90 tablet, Rfl: 1    esomeprazole (NEXIUM) 40 MG delayed release capsule, TAKE 1 CAPSULE BY MOUTH EVERY MORNING BEFORE BREAKFAST, Disp: 90 capsule, Rfl: 0    ibuprofen (ADVIL;MOTRIN) 600 MG tablet, Take 1 tablet by mouth every 6 hours as needed for Pain, Disp: 20 tablet, Rfl: 0    XARELTO 20 MG TABS tablet, TAKE 1 TABLET BY MOUTH DAILY WITH BREAKFAST, Disp: 90 tablet, Rfl: 3    zolpidem (AMBIEN CR) 12.5 MG extended release tablet, TAKE 1 TABLET BY MOUTH AT BEDTIME, Disp: , Rfl:     atorvastatin (LIPITOR) 40 MG tablet, TAKE 1 TABLET BY MOUTH EVERY DAY, Disp: , Rfl:     clonazePAM (KLONOPIN) 1 MG tablet, TAKE 1/2 TABLET BY MOUTH FOUR TIMES DAILY AS NEEDED, Disp: , Rfl:     tiZANidine (ZANAFLEX) 4 MG tablet, TAKE 1 TABLET BY MOUTH EVERY 8 HOURS AS NEEDED FOR SPASMS, Disp: 90 tablet, Rfl: 0    ondansetron (ZOFRAN ODT) 4 MG disintegrating tablet, Take 1 tablet by mouth every 8 hours as needed for Nausea or Vomiting, Disp: 20 tablet, Rfl: 0    sertraline (ZOLOFT) 100 MG tablet, Take 1.5 tablets by mouth daily, Disp: 45 tablet, Rfl: 2    traZODone (DESYREL) 100 MG tablet, , Disp: , Rfl:     albuterol sulfate HFA (VENTOLIN HFA) 108 (90 Base) MCG/ACT inhaler, Inhale 2 puffs into the lungs every 6 hours as needed for Wheezing, Disp: 1 Inhaler, Rfl: 3    clonazePAM (KLONOPIN) 0.5 MG tablet, Take 1 tablet by mouth 2 times daily as needed for Anxiety for up to 30 days. , Disp: 60 tablet, Rfl: 1    topiramate (TOPAMAX) 25 MG tablet, 25 mg 2 times daily , Disp: , Rfl:     metoprolol tartrate (LOPRESSOR) 50 MG tablet, Take 50 mg by mouth 2 times daily , Disp: , Rfl:     Family History   Problem Relation Age of Onset    Cancer Mother         vaginal    Heart Disease Father     Diabetes Father     Other Father         colon resection for colon polyps    Breast Cancer Maternal Grandmother     Stroke Maternal Grandfather        Social History     Socioeconomic History    Marital status:      Spouse name: Not on file    Number of children: Not on file    Years of education: Not on file    Highest education level: Not on file   Occupational History    Occupation: Homemaker   Tobacco Use    Smoking status: Former     Packs/day: 0.25     Years: 33.00     Pack years: 8.25     Types: Cigarettes     Quit date:      Years since quittin.6    Smokeless tobacco: Never    Tobacco comments:     quit on nicoderm patch   Vaping Use    Vaping Use: Never used   Substance and Sexual Activity    Alcohol use: No     Alcohol/week: 0.0 standard drinks    Drug use: No    Sexual activity: Not Currently   Other Topics Concern    Not on file   Social History Narrative    Not on file     Social Determinants of Health     Financial Resource Strain: Low Risk     Difficulty of Paying Living Expenses: Not hard at all   Food Insecurity: No Food Insecurity    Worried About Running Out of Food in the Last Year: Never true    Ran Out of Food in the Last Year: Never true   Transportation Needs: Not on file   Physical Activity: Not on file   Stress: Not on file   Social Connections: Not on file   Intimate Partner Violence: Not on file   Housing Stability: Not on file       Review of Systems:  Review of Systems   Constitutional: Negative for chills and fever. Cardiovascular:  Negative for chest pain. Respiratory:  Negative for cough and shortness of breath. Musculoskeletal:  Positive for back pain, myalgias and neck pain. Gastrointestinal:  Negative for constipation. Neurological:  Positive for headaches, numbness, tingling and weakness. Psychiatric/Behavioral:  Positive for depression. The patient has insomnia and is nervous/anxious. Physical Exam:  /70   Pulse 71   Temp 97.3 °F (36.3 °C)   Resp 20   LMP 11/01/2010   SpO2 96%     Physical Exam  Cardiovascular:      Rate and Rhythm: Normal rate. Pulmonary:      Effort: Pulmonary effort is normal.   Musculoskeletal:         General: Normal range of motion. Skin:     General: Skin is warm and dry. Neurological:      Mental Status: She is alert and oriented to person, place, and time.        Record/Diagnostics Review:    Last mychal  3/22 and was appropriate     Assessment:  Problem List Items Addressed This Visit       Encounter for medication monitoring    Relevant Medications    oxyCODONE-acetaminophen (PERCOCET)  MG per tablet (Start on 8/11/2022)    Chronic neck pain - Primary (Chronic)    Relevant Medications    oxyCODONE-acetaminophen (PERCOCET)  MG per tablet (Start on 8/11/2022)    Degenerative cervical spinal stenosis    Relevant Medications    oxyCODONE-acetaminophen (PERCOCET)  MG per tablet (Start on 8/11/2022)    Arthropathy of cervical facet joint    Relevant Medications    oxyCODONE-acetaminophen (PERCOCET)  MG per tablet (Start on 8/11/2022)    Cervical radiculopathy    Relevant Medications    oxyCODONE-acetaminophen (PERCOCET)  MG per tablet (Start on 8/11/2022)          Treatment Plan:  Patient relates current medications are helping the pain. Patient reports taking pain medications as prescribed, denies obtaining medications from different sources and denies use of illegal drugs. Patient denies side effects from medications like nausea, vomiting, constipation or drowsiness. Patient reports current activities of daily living are possible due to medications and would like to continue them. As always, we encourage daily stretching and strengthening exercises, and recommend minimizing use of pain medications unless patient cannot get through daily activities due to pain. Contract requirements met. Continue opioid therapy. Script written for percocet  Will have office reach out to PCP again to hold xeralto  Follow up appointment made for 4 weeks    I have reviewed the chief complaint and history of present illness (including ROS and PFSH) and vital documentation by my staff and I agree with their documentation and have added where applicable.

## 2022-08-10 NOTE — PROGRESS NOTES
Chief Complaint   Patient presents with    Medication Refill    Neck Pain         [facility]       HPI:     Neck Pain   This is a chronic problem. The current episode started more than 1 year ago. The problem occurs constantly. The problem has been gradually worsening. The pain is associated with nothing. The pain is present in the anterior neck, left side, midline and right side. The quality of the pain is described as stabbing and shooting. The pain is at a severity of 7/10. The pain is moderate. The symptoms are aggravated by coughing, sneezing, bending, position and twisting. The pain is Same all the time. Stiffness is present All day. Associated symptoms include headaches, numbness, tingling and weakness. She has tried bed rest, muscle relaxants, ice, home exercises, heat, neck support, acetaminophen, chiropractic manipulation and oral narcotics for the symptoms. Patient denies any new neurological symptoms. No bowel or bladder incontinence, no weakness, and no falling.     Pill count: Oxycodone 2    Morphine equivalent:            Past Medical History:   Diagnosis Date    Anxiety     CAD (coronary artery disease)     Mitral valve prolapse    Fatty liver     GERD (gastroesophageal reflux disease)     Headache     History of bronchitis     Hyperlipidemia     Hypothyroidism     Kidney stone     LFT elevation     MVP (mitral valve prolapse)     Obesity     Osteoarthritis of cervical spine     Pulmonary emboli (HCC)     Type 2 diabetes mellitus without complication ([facility] 9606    Umbilical hernia        Past Surgical History:   Procedure Laterality Date    APPENDECTOMY       SECTION      2 pfannenstiel, 1 vertical    CHOLECYSTECTOMY      COLONOSCOPY      Dr Melquiades Garza    COLONOSCOPY  14    COLONOSCOPY  2014    biopsy & sigmoid spasms, pathology negative    COLONOSCOPY N/A 2018    COLONOSCOPY WITH BIOPSY performed by Kirsty Sarmiento MD at [place] [address] N/A 2021 COLONOSCOPY DIAGNOSTIC performed by Juan Costa MD at [address] [place] [address]      x 5    HYSTERECTOMY, TOTAL ABDOMINAL (CERVIX REMOVED)      2010    NM EXPLORATORY OF ABDOMEN  10/21/2014    Laparotomy-lysis of adhesions, bso     UPPER GASTROINTESTINAL ENDOSCOPY  2/17/2010    mild chronic inactive gastritis    UPPER GASTROINTESTINAL ENDOSCOPY N/A 3/10/2021    EGD BIOPSY performed by Juan Costa MD at [facility]       Allergies   Allergen Reactions    Compazine [Prochlorperazine]      Jittery, restless    Sulfa Antibiotics Hives    Adhesive Tape Rash         Current Outpatient Medications:     rOPINIRole (REQUIP) 2 MG tablet, TAKE 1 TABLET BY MOUTH EVERY NIGHT, Disp: 90 tablet, Rfl: 1    sucralfate (CARAFATE) 1 GM tablet, TAKE 1 TABLET BY MOUTH FOUR TIMES DAILY, Disp: 120 tablet, Rfl: 0    oxyCODONE-acetaminophen (PERCOCET)  MG per tablet, Take 1 tablet by mouth every 8 hours as needed for Pain for up to 30 days. , Disp: 90 tablet, Rfl: 0    albuterol sulfate HFA (PROVENTIL;VENTOLIN;PROAIR) 108 (90 Base) MCG/ACT inhaler, INHALE 2 PUFFS INTO THE LUNGS EVERY 4 HOURS AS NEEDED FOR SHORTNESS OF BREATH, Disp: 42.5 g, Rfl: 3    levothyroxine (SYNTHROID) 200 MCG tablet, TAKE 1 TABLET BY MOUTH DAILY, Disp: 90 tablet, Rfl: 3    furosemide (LASIX) 20 MG tablet, TAKE 1 TABLET BY MOUTH DAILY, Disp: 90 tablet, Rfl: 1    esomeprazole (NEXIUM) 40 MG delayed release capsule, TAKE 1 CAPSULE BY MOUTH EVERY MORNING BEFORE BREAKFAST, Disp: 90 capsule, Rfl: 0    ibuprofen (ADVIL;MOTRIN) 600 MG tablet, Take 1 tablet by mouth every 6 hours as needed for Pain, Disp: 20 tablet, Rfl: 0    XARELTO 20 MG TABS tablet, TAKE 1 TABLET BY MOUTH DAILY WITH BREAKFAST, Disp: 90 tablet, Rfl: 3    zolpidem (AMBIEN CR) 12.5 MG extended release tablet, TAKE 1 TABLET BY MOUTH AT BEDTIME, Disp: , Rfl:     atorvastatin (LIPITOR) 40 MG tablet, TAKE 1 TABLET BY MOUTH EVERY DAY, Disp: , Rfl:     clonazePAM (KLONOPIN) 1 MG tablet, TAKE 1/2 TABLET BY MOUTH FOUR TIMES DAILY AS NEEDED, Disp: , Rfl:     tiZANidine (ZANAFLEX) 4 MG tablet, TAKE 1 TABLET BY MOUTH EVERY 8 HOURS AS NEEDED FOR SPASMS, Disp: 90 tablet, Rfl: 0    ondansetron (ZOFRAN ODT) 4 MG disintegrating tablet, Take 1 tablet by mouth every 8 hours as needed for Nausea or Vomiting, Disp: 20 tablet, Rfl: 0    sertraline (ZOLOFT) 100 MG tablet, Take 1.5 tablets by mouth daily, Disp: 45 tablet, Rfl: 2    traZODone (DESYREL) 100 MG tablet, , Disp: , Rfl:     albuterol sulfate HFA (VENTOLIN HFA) 108 (90 Base) MCG/ACT inhaler, Inhale 2 puffs into the lungs every 6 hours as needed for Wheezing, Disp: 1 Inhaler, Rfl: 3    clonazePAM (KLONOPIN) 0.5 MG tablet, Take 1 tablet by mouth 2 times daily as needed for Anxiety for up to 30 days. , Disp: 60 tablet, Rfl: 1    topiramate (TOPAMAX) 25 MG tablet, 25 mg 2 times daily , Disp: , Rfl:     metoprolol tartrate (LOPRESSOR) 50 MG tablet, Take 50 mg by mouth 2 times daily , Disp: , Rfl:     Family History   Problem Relation Age of Onset    Cancer Mother         vaginal    Heart Disease Father     Diabetes Father     Other Father         colon resection for colon polyps    Breast Cancer Maternal Grandmother     Stroke Maternal Grandfather        Social History     Socioeconomic History    Marital status:      Spouse name: Not on file    Number of children: Not on file    Years of education: Not on file    Highest education level: Not on file   Occupational History    Occupation: Homemaker   Tobacco Use    Smoking status: Former     Packs/day: 0.25     Years: 33.00     Pack years: 8.25     Types: Cigarettes     Quit date:      Years since quittin.6    Smokeless tobacco: Never    Tobacco comments:     quit on nicoderm patch   Vaping Use    Vaping Use: Never used   Substance and Sexual Activity    Alcohol use: No     Alcohol/week: 0.0 standard drinks    Drug use: No    Sexual activity: Not Currently   Other Topics Concern    Not on file   Social History

## 2022-08-16 ENCOUNTER — HOSPITAL ENCOUNTER (OUTPATIENT)
Age: 52
Discharge: HOME | End: 2022-08-16
Payer: COMMERCIAL

## 2022-08-16 ENCOUNTER — TELEPHONE (OUTPATIENT)
Dept: GASTROENTEROLOGY | Age: 52
End: 2022-08-16

## 2022-08-16 DIAGNOSIS — R97.0 ELEVATED CEA: ICD-10-CM

## 2022-08-16 DIAGNOSIS — Z79.899 ENCOUNTER FOR LONG-TERM (CURRENT) USE OF HIGH-RISK MEDICATION: ICD-10-CM

## 2022-08-16 DIAGNOSIS — C56.9 MALIGNANT NEOPLASM OF OVARY, UNSPECIFIED LATERALITY (HCC): ICD-10-CM

## 2022-08-16 DIAGNOSIS — C56.9 MALIGNANT NEOPLASM OF OVARY, UNSPECIFIED LATERALITY (HCC): Primary | ICD-10-CM

## 2022-08-16 DIAGNOSIS — E03.9 HYPOTHYROIDISM, UNSPECIFIED TYPE: ICD-10-CM

## 2022-08-16 LAB
ABSOLUTE EOS #: 0.2 K/UL (ref 0–0.4)
ABSOLUTE LYMPH #: 2.5 K/UL (ref 1–4.8)
ABSOLUTE MONO #: 0.4 K/UL (ref 0.1–1.3)
ALBUMIN SERPL-MCNC: 4.4 G/DL (ref 3.5–5.2)
ALP BLD-CCNC: 98 U/L (ref 35–104)
ALT SERPL-CCNC: 11 U/L (ref 5–33)
ANION GAP SERPL CALCULATED.3IONS-SCNC: 12 MMOL/L (ref 9–17)
AST SERPL-CCNC: 15 U/L
BASOPHILS # BLD: 1 % (ref 0–2)
BASOPHILS ABSOLUTE: 0.1 K/UL (ref 0–0.2)
BILIRUB SERPL-MCNC: 0.24 MG/DL (ref 0.3–1.2)
BUN BLDV-MCNC: 15 MG/DL (ref 6–20)
CA 125: 7 U/ML
CALCIUM SERPL-MCNC: 9.7 MG/DL (ref 8.6–10.4)
CARCINOEMBRYONIC ANTIGEN: 10 NG/ML
CHLORIDE BLD-SCNC: 103 MMOL/L (ref 98–107)
CO2: 22 MMOL/L (ref 20–31)
CREAT SERPL-MCNC: 0.96 MG/DL (ref 0.5–0.9)
EOSINOPHILS RELATIVE PERCENT: 2 % (ref 0–4)
GFR AFRICAN AMERICAN: >60 ML/MIN
GFR NON-AFRICAN AMERICAN: >60 ML/MIN
GFR SERPL CREATININE-BSD FRML MDRD: ABNORMAL ML/MIN/{1.73_M2}
GLUCOSE BLD-MCNC: 107 MG/DL (ref 70–99)
HCT VFR BLD CALC: 41.1 % (ref 36–46)
HEMOGLOBIN: 13.7 G/DL (ref 12–16)
LYMPHOCYTES # BLD: 35 % (ref 24–44)
MCH RBC QN AUTO: 30 PG (ref 26–34)
MCHC RBC AUTO-ENTMCNC: 33.5 G/DL (ref 31–37)
MCV RBC AUTO: 89.5 FL (ref 80–100)
MONOCYTES # BLD: 6 % (ref 1–7)
PDW BLD-RTO: 14 % (ref 11.5–14.9)
PLATELET # BLD: 186 K/UL (ref 150–450)
PMV BLD AUTO: 7.8 FL (ref 6–12)
POTASSIUM SERPL-SCNC: 4.3 MMOL/L (ref 3.7–5.3)
RBC # BLD: 4.59 M/UL (ref 4–5.2)
SEG NEUTROPHILS: 56 % (ref 36–66)
SEGMENTED NEUTROPHILS ABSOLUTE COUNT: 4 K/UL (ref 1.3–9.1)
SODIUM BLD-SCNC: 137 MMOL/L (ref 135–144)
TOTAL PROTEIN: 7.1 G/DL (ref 6.4–8.3)
TSH SERPL DL<=0.05 MIU/L-ACNC: 4.77 UIU/ML (ref 0.3–5)
WBC # BLD: 7.1 K/UL (ref 3.5–11)

## 2022-08-16 PROCEDURE — 36415 COLL VENOUS BLD VENIPUNCTURE: CPT

## 2022-08-16 PROCEDURE — 82378 CARCINOEMBRYONIC ANTIGEN: CPT

## 2022-08-16 PROCEDURE — 85025 COMPLETE CBC W/AUTO DIFF WBC: CPT

## 2022-08-16 PROCEDURE — 80053 COMPREHEN METABOLIC PANEL: CPT

## 2022-08-16 PROCEDURE — 86304 IMMUNOASSAY TUMOR CA 125: CPT

## 2022-08-16 PROCEDURE — 84443 ASSAY THYROID STIM HORMONE: CPT

## 2022-08-16 NOTE — TELEPHONE ENCOUNTER
Received clearance from Dr Brodie Rodriguez  to hold the Xarelto for 3 days. This would be as of 10/10/22. Called patient and informed. Writer thanked and call ended.

## 2022-08-18 DIAGNOSIS — E03.9 HYPOTHYROIDISM, UNSPECIFIED TYPE: ICD-10-CM

## 2022-08-18 DIAGNOSIS — K21.9 GASTROESOPHAGEAL REFLUX DISEASE WITHOUT ESOPHAGITIS: ICD-10-CM

## 2022-08-18 RX ORDER — ESOMEPRAZOLE MAGNESIUM 40 MG/1
CAPSULE, DELAYED RELEASE ORAL
Qty: 90 CAPSULE | Refills: 0 | Status: SHIPPED | OUTPATIENT
Start: 2022-08-18

## 2022-08-18 RX ORDER — LEVOTHYROXINE SODIUM 175 UG/1
TABLET ORAL
Qty: 90 TABLET | Refills: 0 | OUTPATIENT
Start: 2022-08-18

## 2022-08-18 RX ORDER — SUCRALFATE 1 G/1
TABLET ORAL
Qty: 120 TABLET | Refills: 0 | Status: SHIPPED | OUTPATIENT
Start: 2022-08-18 | End: 2022-09-19

## 2022-08-19 RX ORDER — SUCRALFATE 1 G/1
TABLET ORAL
Qty: 360 TABLET | OUTPATIENT
Start: 2022-08-19

## 2022-08-24 ENCOUNTER — TELEPHONE (OUTPATIENT)
Dept: PAIN MANAGEMENT | Age: 52
End: 2022-08-24

## 2022-09-01 RX ORDER — ZOLPIDEM TARTRATE 10 MG/1
1 TABLET ORAL NIGHTLY
COMMUNITY
Start: 2022-08-25

## 2022-09-06 ENCOUNTER — TELEPHONE (OUTPATIENT)
Dept: INTERNAL MEDICINE CLINIC | Age: 52
End: 2022-09-06

## 2022-09-08 ENCOUNTER — TELEPHONE (OUTPATIENT)
Dept: PAIN MANAGEMENT | Age: 52
End: 2022-09-08

## 2022-09-08 NOTE — TELEPHONE ENCOUNTER
Pt calls the office stating she will be out of medication before next appt.  Will route a message to NP

## 2022-09-09 DIAGNOSIS — M48.02 DEGENERATIVE CERVICAL SPINAL STENOSIS: ICD-10-CM

## 2022-09-09 DIAGNOSIS — M47.812 ARTHROPATHY OF CERVICAL FACET JOINT: ICD-10-CM

## 2022-09-09 DIAGNOSIS — Z51.81 ENCOUNTER FOR MEDICATION MONITORING: ICD-10-CM

## 2022-09-09 DIAGNOSIS — M54.12 CERVICAL RADICULOPATHY: ICD-10-CM

## 2022-09-09 RX ORDER — OXYCODONE AND ACETAMINOPHEN 10; 325 MG/1; MG/1
1 TABLET ORAL EVERY 8 HOURS PRN
Qty: 90 TABLET | Refills: 0 | Status: SHIPPED | OUTPATIENT
Start: 2022-09-10 | End: 2022-10-10 | Stop reason: SDUPTHER

## 2022-09-19 RX ORDER — SUCRALFATE 1 G/1
TABLET ORAL
Qty: 120 TABLET | Refills: 0 | Status: ON HOLD | OUTPATIENT
Start: 2022-09-19 | End: 2022-10-13

## 2022-09-19 RX ORDER — SUCRALFATE 1 G/1
TABLET ORAL
Qty: 360 TABLET | OUTPATIENT
Start: 2022-09-19

## 2022-10-10 ENCOUNTER — HOSPITAL ENCOUNTER (OUTPATIENT)
Dept: PAIN MANAGEMENT | Age: 52
Discharge: HOME | End: 2022-10-10
Payer: COMMERCIAL

## 2022-10-10 VITALS
SYSTOLIC BLOOD PRESSURE: 117 MMHG | WEIGHT: 204 LBS | DIASTOLIC BLOOD PRESSURE: 77 MMHG | HEIGHT: 64 IN | HEART RATE: 84 BPM | BODY MASS INDEX: 34.83 KG/M2 | OXYGEN SATURATION: 92 % | RESPIRATION RATE: 18 BRPM

## 2022-10-10 DIAGNOSIS — M47.812 SPONDYLOSIS OF CERVICAL REGION WITHOUT MYELOPATHY OR RADICULOPATHY: Primary | ICD-10-CM

## 2022-10-10 DIAGNOSIS — Z79.899 CHRONIC PRESCRIPTION BENZODIAZEPINE USE: Chronic | ICD-10-CM

## 2022-10-10 DIAGNOSIS — M54.12 CERVICAL RADICULOPATHY: ICD-10-CM

## 2022-10-10 DIAGNOSIS — M54.2 CERVICALGIA: ICD-10-CM

## 2022-10-10 DIAGNOSIS — M48.02 DEGENERATIVE CERVICAL SPINAL STENOSIS: ICD-10-CM

## 2022-10-10 DIAGNOSIS — M47.812 ARTHROPATHY OF CERVICAL FACET JOINT: ICD-10-CM

## 2022-10-10 DIAGNOSIS — Z51.81 ENCOUNTER FOR MEDICATION MONITORING: ICD-10-CM

## 2022-10-10 PROCEDURE — 99214 OFFICE O/P EST MOD 30 MIN: CPT | Performed by: NURSE PRACTITIONER

## 2022-10-10 PROCEDURE — 99213 OFFICE O/P EST LOW 20 MIN: CPT

## 2022-10-10 RX ORDER — OXYCODONE AND ACETAMINOPHEN 10; 325 MG/1; MG/1
1 TABLET ORAL EVERY 8 HOURS PRN
Qty: 90 TABLET | Refills: 0 | Status: SHIPPED | OUTPATIENT
Start: 2022-10-10 | End: 2022-11-09

## 2022-10-10 ASSESSMENT — ENCOUNTER SYMPTOMS
CONSTIPATION: 0
SHORTNESS OF BREATH: 0
BACK PAIN: 1
COUGH: 0

## 2022-10-10 NOTE — PROGRESS NOTES
Chief Complaint   Patient presents with    Neck Pain    Medication Refill       PMH  Pt c/o chronic neck pain that is located midline and left side of her neck with radiaiont up to occipital area and at time in left shoulder down to fingers. She has limited ROM of her neck. She has seen Neurosurgeon in the past  and surgery was not recommended. She had CT cervical spine 3-4-21 which read as : mild to moderate cervical degenerative disc disease with moderate bony neural foraminal narrowing on the left at C3-C4, C5- C6. She had confirmatory  Left Cervical Medical branch nerve block at C2 / C3 / C4 / C5 nerves on 1-13-22 with 100% relief for a few days. She would like to proceed with RFA. Clearance received to hold Xarelto - scanned into media. Per Dr. Marcellus Hand plan of care, pt must be completely off Klonopin and Ambien by September to remain on current dose of opiates. She states she is working with her provider at [facility] to taper these medications. No Klonopin since 8/31 - Ambien was refilled 9/23 - she states she is taking this every other night - she is still trying to taper off - there is not refill on the Ambien. Neck Pain   This is a chronic problem. The current episode started more than 1 year ago. The problem occurs constantly. The problem has been gradually worsening. The pain is associated with nothing. The pain is present in the midline and left side. The quality of the pain is described as shooting and stabbing. The pain is at a severity of 6/10. The pain is moderate. The symptoms are aggravated by bending, position and twisting. The pain is Same all the time. Stiffness is present All day. Associated symptoms include headaches, numbness, tingling and weakness. Pertinent negatives include no chest pain, fever or syncope. She has tried bed rest, ice, heat, home exercises and NSAIDs for the symptoms. The treatment provided moderate relief.       Patient denies any new neurological symptoms. No bowel or bladder incontinence, no weakness, and no falling. Pill count: oxycodone 0 due 10/10    Morphine equivalent: 45    Controlled Substance Monitoring:    Acute and Chronic Pain Monitoring:   RX Monitoring 10/10/2022   Attestation -   Acute Pain Prescriptions -   Periodic Controlled Substance Monitoring Possible medication side effects, risk of tolerance/dependence & alternative treatments discussed. ;No signs of potential drug abuse or diversion identified.;Obtaining appropriate analgesic effect of treatment.    Chronic Pain > 50 MEDD -   Chronic Pain > 80 MEDD -            Past Medical History:   Diagnosis Date    Anxiety     CAD (coronary artery disease)     Mitral valve prolapse    Fatty liver     GERD (gastroesophageal reflux disease)     Headache     History of bronchitis     Hyperlipidemia     Hypothyroidism     Kidney stone     LFT elevation     MVP (mitral valve prolapse)     Obesity     Osteoarthritis of cervical spine     Pulmonary emboli (HCC)     Type 2 diabetes mellitus without complication [facility]     Umbilical hernia        Past Surgical History:   Procedure Laterality Date    APPENDECTOMY       SECTION      2 pfannenstiel, 1 vertical    CHOLECYSTECTOMY      COLONOSCOPY      Dr Adriana Yang    COLONOSCOPY  14    COLONOSCOPY  2014    biopsy & sigmoid spasms, pathology negative    COLONOSCOPY N/A 2018    COLONOSCOPY WITH BIOPSY performed by Jodie Montana MD at [place] [address] N/A 2021    COLONOSCOPY DIAGNOSTIC performed by Rosa Fine MD at [address]      x 5    HYSTERECTOMY, TOTAL ABDOMINAL (CERVIX REMOVED)          CA EXPLORATORY OF ABDOMEN  10/21/2014    Laparotomy-lysis of adhesions, bso     UPPER GASTROINTESTINAL ENDOSCOPY  2010    mild chronic inactive gastritis    UPPER GASTROINTESTINAL ENDOSCOPY N/A 3/10/2021    EGD BIOPSY performed by Rosa Fine MD at [facility] ENDO       Allergies   Allergen Reactions Compazine [Prochlorperazine]      Jittery, restless    Sulfa Antibiotics Hives    Adhesive Tape Rash         Current Outpatient Medications:     sucralfate (CARAFATE) 1 GM tablet, TAKE 1 TABLET BY MOUTH FOUR TIMES DAILY, Disp: 120 tablet, Rfl: 0    oxyCODONE-acetaminophen (PERCOCET)  MG per tablet, Take 1 tablet by mouth every 8 hours as needed for Pain for up to 30 days. , Disp: 90 tablet, Rfl: 0    zolpidem (AMBIEN) 10 MG tablet, Take 1 tablet by mouth at bedtime. , Disp: , Rfl:     esomeprazole (NEXIUM) 40 MG delayed release capsule, TAKE 1 CAPSULE BY MOUTH EVERY MORNING BEFORE BREAKFAST, Disp: 90 capsule, Rfl: 0    PEG 3350-KCl-NaBcb-NaCl-NaSulf (PEG-3350/ELECTROLYTES) 236 g SOLR, , Disp: , Rfl:     rOPINIRole (REQUIP) 2 MG tablet, TAKE 1 TABLET BY MOUTH EVERY NIGHT, Disp: 90 tablet, Rfl: 1    albuterol sulfate HFA (PROVENTIL;VENTOLIN;PROAIR) 108 (90 Base) MCG/ACT inhaler, INHALE 2 PUFFS INTO THE LUNGS EVERY 4 HOURS AS NEEDED FOR SHORTNESS OF BREATH, Disp: 42.5 g, Rfl: 3    levothyroxine (SYNTHROID) 200 MCG tablet, TAKE 1 TABLET BY MOUTH DAILY, Disp: 90 tablet, Rfl: 3    furosemide (LASIX) 20 MG tablet, TAKE 1 TABLET BY MOUTH DAILY, Disp: 90 tablet, Rfl: 1    ibuprofen (ADVIL;MOTRIN) 600 MG tablet, Take 1 tablet by mouth every 6 hours as needed for Pain, Disp: 20 tablet, Rfl: 0    XARELTO 20 MG TABS tablet, TAKE 1 TABLET BY MOUTH DAILY WITH BREAKFAST, Disp: 90 tablet, Rfl: 3    atorvastatin (LIPITOR) 40 MG tablet, TAKE 1 TABLET BY MOUTH EVERY DAY, Disp: , Rfl:     clonazePAM (KLONOPIN) 1 MG tablet, TAKE 1/2 TABLET BY MOUTH FOUR TIMES DAILY AS NEEDED, Disp: , Rfl:     tiZANidine (ZANAFLEX) 4 MG tablet, TAKE 1 TABLET BY MOUTH EVERY 8 HOURS AS NEEDED FOR SPASMS, Disp: 90 tablet, Rfl: 0    ondansetron (ZOFRAN ODT) 4 MG disintegrating tablet, Take 1 tablet by mouth every 8 hours as needed for Nausea or Vomiting, Disp: 20 tablet, Rfl: 0    sertraline (ZOLOFT) 100 MG tablet, Take 1.5 tablets by mouth daily, Disp: 45 tablet, Rfl: 2    traZODone (DESYREL) 100 MG tablet, , Disp: , Rfl:     albuterol sulfate HFA (VENTOLIN HFA) 108 (90 Base) MCG/ACT inhaler, Inhale 2 puffs into the lungs every 6 hours as needed for Wheezing, Disp: 1 Inhaler, Rfl: 3    clonazePAM (KLONOPIN) 0.5 MG tablet, Take 1 tablet by mouth 2 times daily as needed for Anxiety for up to 30 days. , Disp: 60 tablet, Rfl: 1    topiramate (TOPAMAX) 25 MG tablet, 25 mg 2 times daily , Disp: , Rfl:     metoprolol tartrate (LOPRESSOR) 50 MG tablet, Take 50 mg by mouth 2 times daily , Disp: , Rfl:     Family History   Problem Relation Age of Onset    Cancer Mother         vaginal    Heart Disease Father     Diabetes Father     Other Father         colon resection for colon polyps    Breast Cancer Maternal Grandmother     Stroke Maternal Grandfather        Social History     Socioeconomic History    Marital status:      Spouse name: Not on file    Number of children: Not on file    Years of education: Not on file    Highest education level: Not on file   Occupational History    Occupation: Homemaker   Tobacco Use    Smoking status: Former     Packs/day: 0.25     Years: 33.00     Pack years: 8.25     Types: Cigarettes     Quit date:      Years since quittin.7    Smokeless tobacco: Never    Tobacco comments:     quit on nicoderm patch   Vaping Use    Vaping Use: Never used   Substance and Sexual Activity    Alcohol use: No     Alcohol/week: 0.0 standard drinks    Drug use: No    Sexual activity: Not Currently   Other Topics Concern    Not on file   Social History Narrative    Not on file     Social Determinants of Health     Financial Resource Strain: Low Risk     Difficulty of Paying Living Expenses: Not hard at all   Food Insecurity: No Food Insecurity    Worried About Running Out of Food in the Last Year: Never true    Ran Out of Food in the Last Year: Never true   Transportation Needs: Not on file   Physical Activity: Not on file   Stress: Not on file Social Connections: Not on file   Intimate Partner Violence: Not on file   Housing Stability: Not on file       Review of Systems:  Review of Systems   Constitutional: Negative for chills and fever. Cardiovascular:  Negative for chest pain, palpitations and syncope. Respiratory:  Negative for cough and shortness of breath. Musculoskeletal:  Positive for back pain and neck pain. Gastrointestinal:  Negative for constipation. Neurological:  Positive for headaches, numbness, tingling and weakness. Negative for disturbances in coordination and loss of balance. Physical Exam:  Ht 5' 4\" (1.626 m)   Wt 204 lb (92.5 kg)   LMP 11/01/2010   BMI 35.02 kg/m²     Physical Exam  HENT:      Head: Normocephalic. Pulmonary:      Effort: Pulmonary effort is normal.   Musculoskeletal:         General: Normal range of motion. Cervical back: Normal range of motion. Tenderness present. Skin:     General: Skin is warm and dry. Neurological:      Mental Status: She is alert and oriented to person, place, and time.        Record/Diagnostics Review:    Last mychal 3/2022 and was appropriate     Assessment:  Problem List Items Addressed This Visit       Chronic prescription benzodiazepine use (Chronic)    Encounter for medication monitoring    Relevant Medications    oxyCODONE-acetaminophen (PERCOCET)  MG per tablet    Spondylosis of cervical region without myelopathy or radiculopathy - Primary    Relevant Medications    oxyCODONE-acetaminophen (PERCOCET)  MG per tablet    Degenerative cervical spinal stenosis    Relevant Medications    oxyCODONE-acetaminophen (PERCOCET)  MG per tablet    Arthropathy of cervical facet joint    Relevant Medications    oxyCODONE-acetaminophen (PERCOCET)  MG per tablet    Cervical radiculopathy    Relevant Medications    oxyCODONE-acetaminophen (PERCOCET)  MG per tablet     Other Visit Diagnoses       Cervicalgia                   Treatment Plan:  Patient relates current medications are helping the pain. Patient reports taking pain medications as prescribed, denies obtaining medications from different sources and denies use of illegal drugs. Patient denies side effects from medications like nausea, vomiting, constipation or drowsiness. Patient reports current activities of daily living are possible due to medications and would like to continue them. As always, we encourage daily stretching and strengthening exercises, and recommend minimizing use of pain medications unless patient cannot get through daily activities due to pain. Contract requirements met. Continue opioid therapy. Script written for percocet  She would like to schedule cervical RFA - left C2 / C3 / C4 / C5  Discussed Ambien - she refilled on 9/23 and states this will be the last prescription  Follow up appointment made for 4 weeks    I have reviewed the chief complaint and history of present illness (including ROS and [address]) and vital documentation by my staff and I agree with their documentation and have added where applicable.

## 2022-10-11 ENCOUNTER — TELEPHONE (OUTPATIENT)
Dept: PAIN MANAGEMENT | Age: 52
End: 2022-10-11

## 2022-10-13 ENCOUNTER — ANESTHESIA (OUTPATIENT)
Dept: OPERATING ROOM | Age: 52
End: 2022-10-13
Payer: COMMERCIAL

## 2022-10-13 ENCOUNTER — ANESTHESIA EVENT (OUTPATIENT)
Dept: OPERATING ROOM | Age: 52
End: 2022-10-13
Payer: COMMERCIAL

## 2022-10-13 ENCOUNTER — HOSPITAL ENCOUNTER (OUTPATIENT)
Age: 52
Setting detail: OUTPATIENT SURGERY
Discharge: HOME | End: 2022-10-13
Attending: INTERNAL MEDICINE | Admitting: INTERNAL MEDICINE
Payer: COMMERCIAL

## 2022-10-13 VITALS
HEART RATE: 75 BPM | TEMPERATURE: 97.2 F | HEIGHT: 64 IN | WEIGHT: 202 LBS | BODY MASS INDEX: 34.49 KG/M2 | RESPIRATION RATE: 19 BRPM | DIASTOLIC BLOOD PRESSURE: 95 MMHG | SYSTOLIC BLOOD PRESSURE: 120 MMHG | OXYGEN SATURATION: 93 %

## 2022-10-13 DIAGNOSIS — K21.9 GASTROESOPHAGEAL REFLUX DISEASE, UNSPECIFIED WHETHER ESOPHAGITIS PRESENT: ICD-10-CM

## 2022-10-13 DIAGNOSIS — R10.9 ABDOMINAL PAIN, UNSPECIFIED ABDOMINAL LOCATION: ICD-10-CM

## 2022-10-13 DIAGNOSIS — K58.2 IRRITABLE BOWEL SYNDROME WITH BOTH CONSTIPATION AND DIARRHEA: ICD-10-CM

## 2022-10-13 PROCEDURE — 43239 EGD BIOPSY SINGLE/MULTIPLE: CPT | Performed by: INTERNAL MEDICINE

## 2022-10-13 PROCEDURE — 2580000003 HC RX 258: Performed by: ANESTHESIOLOGY

## 2022-10-13 PROCEDURE — 7100000011 HC PHASE II RECOVERY - ADDTL 15 MIN: Performed by: INTERNAL MEDICINE

## 2022-10-13 PROCEDURE — 2500000003 HC RX 250 WO HCPCS: Performed by: SPECIALIST

## 2022-10-13 PROCEDURE — 3609017100 HC EGD: Performed by: INTERNAL MEDICINE

## 2022-10-13 PROCEDURE — 2709999900 HC NON-CHARGEABLE SUPPLY: Performed by: INTERNAL MEDICINE

## 2022-10-13 PROCEDURE — 7100000010 HC PHASE II RECOVERY - FIRST 15 MIN: Performed by: INTERNAL MEDICINE

## 2022-10-13 PROCEDURE — 88305 TISSUE EXAM BY PATHOLOGIST: CPT

## 2022-10-13 PROCEDURE — 3700000001 HC ADD 15 MINUTES (ANESTHESIA): Performed by: INTERNAL MEDICINE

## 2022-10-13 PROCEDURE — 3609027000 HC COLONOSCOPY: Performed by: INTERNAL MEDICINE

## 2022-10-13 PROCEDURE — 3700000000 HC ANESTHESIA ATTENDED CARE: Performed by: INTERNAL MEDICINE

## 2022-10-13 PROCEDURE — 6360000002 HC RX W HCPCS: Performed by: SPECIALIST

## 2022-10-13 PROCEDURE — 45380 COLONOSCOPY AND BIOPSY: CPT | Performed by: INTERNAL MEDICINE

## 2022-10-13 RX ORDER — LIDOCAINE HYDROCHLORIDE 10 MG/ML
1 INJECTION, SOLUTION EPIDURAL; INFILTRATION; INTRACAUDAL; PERINEURAL
Status: DISCONTINUED | OUTPATIENT
Start: 2022-10-14 | End: 2022-10-13 | Stop reason: HOSPADM

## 2022-10-13 RX ORDER — LIDOCAINE HYDROCHLORIDE 20 MG/ML
INJECTION, SOLUTION EPIDURAL; INFILTRATION; INTRACAUDAL; PERINEURAL PRN
Status: DISCONTINUED | OUTPATIENT
Start: 2022-10-13 | End: 2022-10-13 | Stop reason: SDUPTHER

## 2022-10-13 RX ORDER — SODIUM CHLORIDE 0.9 % (FLUSH) 0.9 %
5-40 SYRINGE (ML) INJECTION PRN
Status: DISCONTINUED | OUTPATIENT
Start: 2022-10-13 | End: 2022-10-13 | Stop reason: HOSPADM

## 2022-10-13 RX ORDER — SODIUM CHLORIDE, SODIUM LACTATE, POTASSIUM CHLORIDE, CALCIUM CHLORIDE 600; 310; 30; 20 MG/100ML; MG/100ML; MG/100ML; MG/100ML
INJECTION, SOLUTION INTRAVENOUS CONTINUOUS
Status: DISCONTINUED | OUTPATIENT
Start: 2022-10-14 | End: 2022-10-13 | Stop reason: HOSPADM

## 2022-10-13 RX ORDER — PROPOFOL 10 MG/ML
INJECTION, EMULSION INTRAVENOUS PRN
Status: DISCONTINUED | OUTPATIENT
Start: 2022-10-13 | End: 2022-10-13 | Stop reason: SDUPTHER

## 2022-10-13 RX ORDER — SODIUM CHLORIDE 0.9 % (FLUSH) 0.9 %
5-40 SYRINGE (ML) INJECTION EVERY 12 HOURS SCHEDULED
Status: DISCONTINUED | OUTPATIENT
Start: 2022-10-13 | End: 2022-10-13 | Stop reason: HOSPADM

## 2022-10-13 RX ORDER — SODIUM CHLORIDE 9 MG/ML
INJECTION, SOLUTION INTRAVENOUS PRN
Status: DISCONTINUED | OUTPATIENT
Start: 2022-10-13 | End: 2022-10-13 | Stop reason: HOSPADM

## 2022-10-13 RX ADMIN — PROPOFOL 50 MG: 10 INJECTION, EMULSION INTRAVENOUS at 09:46

## 2022-10-13 RX ADMIN — PROPOFOL 50 MG: 10 INJECTION, EMULSION INTRAVENOUS at 09:49

## 2022-10-13 RX ADMIN — PROPOFOL 50 MG: 10 INJECTION, EMULSION INTRAVENOUS at 10:04

## 2022-10-13 RX ADMIN — LIDOCAINE HYDROCHLORIDE 100 MG: 20 INJECTION, SOLUTION EPIDURAL; INFILTRATION; INTRACAUDAL; PERINEURAL at 09:44

## 2022-10-13 RX ADMIN — PROPOFOL 50 MG: 10 INJECTION, EMULSION INTRAVENOUS at 09:52

## 2022-10-13 RX ADMIN — PROPOFOL 50 MG: 10 INJECTION, EMULSION INTRAVENOUS at 09:56

## 2022-10-13 RX ADMIN — SODIUM CHLORIDE, POTASSIUM CHLORIDE, SODIUM LACTATE AND CALCIUM CHLORIDE: 600; 310; 30; 20 INJECTION, SOLUTION INTRAVENOUS at 08:42

## 2022-10-13 RX ADMIN — PROPOFOL 50 MG: 10 INJECTION, EMULSION INTRAVENOUS at 10:09

## 2022-10-13 RX ADMIN — PROPOFOL 50 MG: 10 INJECTION, EMULSION INTRAVENOUS at 09:58

## 2022-10-13 RX ADMIN — PROPOFOL 100 MG: 10 INJECTION, EMULSION INTRAVENOUS at 09:44

## 2022-10-13 RX ADMIN — PROPOFOL 50 MG: 10 INJECTION, EMULSION INTRAVENOUS at 10:01

## 2022-10-13 RX ADMIN — PROPOFOL 50 MG: 10 INJECTION, EMULSION INTRAVENOUS at 10:12

## 2022-10-13 RX ADMIN — PROPOFOL 50 MG: 10 INJECTION, EMULSION INTRAVENOUS at 10:06

## 2022-10-13 ASSESSMENT — PAIN DESCRIPTION - DESCRIPTORS
DESCRIPTORS: ACHING
DESCRIPTORS: SHARP;STABBING

## 2022-10-13 ASSESSMENT — PAIN - FUNCTIONAL ASSESSMENT: PAIN_FUNCTIONAL_ASSESSMENT: 0-10

## 2022-10-13 NOTE — OP NOTE
PROCEDURE NOTE    DATE OF PROCEDURE: 10/13/2022    SURGEON: Yaz Chinchilla MD    ASSISTANT: None    PREOPERATIVE DIAGNOSIS: SCREENING  CONSTIPATION   MILD ELEVATION OF CEA    POSTOPERATIVE DIAGNOSIS: as described below    OPERATION: Total colonoscopy     ANESTHESIA: MAC PER ANESTHESIA     ESTIMATED BLOOD LOSS: less than 50     COMPLICATIONS: None. SPECIMENS:  Was Obtained:     HISTORY: The patient is a 47y.o. year old female with history of above preop diagnosis. I recommended colonoscopy with possible biopsy or polypectomy and I explained the risk, benefits, expected outcome, and alternatives to the procedure. Risks included but are not limited to bleeding, infection, respiratory distress, hypotension, and perforation of the colon and possibility of missing a lesion. The patient understands and is in agreement. PROCEDURE: The patient was given IV conscious sedation. The patient's SPO2 remained above 90% throughout the procedure. The colonoscope was inserted per rectum and advanced under direct vision to the cecum without difficulty. The prep was fair TO GOOD    RETAINED PASTY STOOLS  HARD STOOL BALLS  NO GROSS PATHOLOGY WAS NOTED    Findings:  Terminal ileum: normal    Cecum/Ascending colon: normal    Transverse colon: normal    Descending/Sigmoid colon: abnormal: DIVERTICULSOIS  TORTIOUS SIGMOID  RETAINED STOOLS    Rectum/Anus: examined in normal and retroflexed positions and was abnormal: 3 MM POLYP WAS REMOVED    Withdrawal Time was (minutes): 14    The colon was decompressed and the scope was removed. The patient tolerated the procedure well. Recommendations/Plan:   Lifestyle and dietary modifications as discussed  F/U Biopsies  F/U In Office in 3-4 weeks  Discussed with the family  Colonoscopy Recall :3 year WITH BETTER PREP  POST SEDATION PATIENT WAS STABLE WITH STABLE VITAL SIGNS AND OXYGEN SATURATIONS AND WAS DISCHARGED HOME WITH RIDE IN A STABLE CONDITION.     Electronically signed by Brad Chairez MD  on 10/13/2022 at 10:21 AM

## 2022-10-13 NOTE — ANESTHESIA PRE PROCEDURE
Department of Anesthesiology  Preprocedure Note       Name:  Ivett Park   Age:  47 y.o.  :  1970                                          MRN:  [id number]         Date:  10/13/2022      Surgeon: Nuvia Bowens):  Luis Antonio Weinberg MD    Procedure: Procedure(s):  COLONOSCOPY DIAGNOSTIC  EGD ESOPHAGOGASTRODUODENOSCOPY    Medications prior to admission:   Prior to Admission medications    Medication Sig Start Date End Date Taking? Authorizing Provider   oxyCODONE-acetaminophen (PERCOCET)  MG per tablet Take 1 tablet by mouth every 8 hours as needed for Pain for up to 30 days. 10/10/22 11/9/22  MARTITA Marie - CNP   zolpidem (AMBIEN) 10 MG tablet Take 1 tablet by mouth at bedtime. 22   Historical Provider, MD   esomeprazole (NEXIUM) 40 MG delayed release capsule TAKE 1 CAPSULE BY MOUTH EVERY MORNING BEFORE BREAKFAST 22   Ginny Garcia MD   PEG 9965-BIg-IaPdt-NaCl-NaSulf (PEG-3350/ELECTROLYTES) 236 g SOLR  22   Historical Provider, MD   rOPINIRole (REQUIP) 2 MG tablet TAKE 1 TABLET BY MOUTH EVERY NIGHT 22   Ginny Garcia MD   albuterol sulfate HFA (PROVENTIL;VENTOLIN;PROAIR) 108 (90 Base) MCG/ACT inhaler INHALE 2 PUFFS INTO THE LUNGS EVERY 4 HOURS AS NEEDED FOR SHORTNESS OF BREATH 22   Ginny Garcia MD   levothyroxine (SYNTHROID) 200 MCG tablet TAKE 1 TABLET BY MOUTH DAILY 6/3/22   Beth Saunders MD   XARELTO 20 MG TABS tablet TAKE 1 TABLET BY MOUTH DAILY WITH BREAKFAST 3/28/22   Onesimo Nyhan, MD   atorvastatin (LIPITOR) 40 MG tablet TAKE 1 TABLET BY MOUTH EVERY DAY 22   Historical Provider, MD   clonazePAM (KLONOPIN) 1 MG tablet 2 times daily.  22   Historical Provider, MD   ondansetron (ZOFRAN ODT) 4 MG disintegrating tablet Take 1 tablet by mouth every 8 hours as needed for Nausea or Vomiting 21   Je Hamilton DO   sertraline (ZOLOFT) 100 MG tablet Take 1.5 tablets by mouth daily 21   Ginny Garcia MD   albuterol sulfate HFA (VENTOLIN HFA) 108 (90 Base) MCG/ACT inhaler Inhale 2 puffs into the lungs every 6 hours as needed for Wheezing 6/6/21   Aranza Zambrano MD   clonazePAM (KLONOPIN) 0.5 MG tablet Take 1 tablet by mouth 2 times daily as needed for Anxiety for up to 30 days. 12/28/20 2/18/22  Angela Mcgrath MD   topiramate (TOPAMAX) 25 MG tablet 25 mg 2 times daily  2/27/18   Historical Provider, MD   metoprolol tartrate (LOPRESSOR) 50 MG tablet Take 50 mg by mouth 2 times daily  8/21/17   Historical Provider, MD       Current medications:    No current facility-administered medications for this visit. No current outpatient medications on file. Facility-Administered Medications Ordered in Other Visits   Medication Dose Route Frequency Provider Last Rate Last Admin    [START ON 10/14/2022] lidocaine PF 1 % injection 1 mL  1 mL IntraDERmal Once PRN MD Hailey Aguiar [facility] ON 10/14/2022] lactated ringers infusion   IntraVENous Continuous Davis Harvey MD 50 mL/hr at 10/13/22 0842 New Bag at 10/13/22 0842    sodium chloride flush 0.9 % injection 5-40 mL  5-40 mL IntraVENous 2 times per day Davis Harvey MD        sodium chloride flush 0.9 % injection 5-40 mL  5-40 mL IntraVENous PRN Davis Harvey MD        0.9 % sodium chloride infusion   IntraVENous PRN Davis Harvey MD           Allergies:     Allergies   Allergen Reactions    Compazine [Prochlorperazine]      Jittery, restless    Sulfa Antibiotics Hives    Adhesive Tape Rash       Problem List:    Patient Active Problem List   Diagnosis Code    Hyperlipidemia E78.5    MVP (mitral valve prolapse) I34.1    Anxiety F41.9    Hypothyroidism E03.9    Allergic rhinitis J30.9    Obesity E66.9    Abdominal pain R10.9    Elevated liver enzymes R74.8    GERD (gastroesophageal reflux disease) K21.9    Ovarian mass N83.8    S/P bilateral oophorectomy & KRUPA 10/21/14 Z90.722    Pain emptying bladder R30.9    Urinary urgency R39.15    Insomnia G47.00    RLS (restless legs syndrome) G25.81    Pancreatitis K85.90    Eye swelling, left H57.89    Spondylosis of cervical region without myelopathy or radiculopathy M47.812    Degenerative cervical spinal stenosis M48.02    Trigger point with tension headache G44.209    Arthropathy of cervical facet joint M47.812    Atlanto-axial joint sprain, sequela S13. 4XXS    Cervical radiculopathy M54.12    Sprain of atlanto-occipital joint, sequela S13. 4XXS    Myofascial muscle pain M79.18    Colitis K52.9    Chest pain R07.9    Unstable angina ([facility]) I20.0    Pulmonary embolism on right ([facility]) I26.99    Hematemesis with nausea K92.0    Acute respiratory failure with hypoxia ([facility]) J96.01    Family history of colon cancer Z80.0    Irritable bowel syndrome with both constipation and diarrhea K58.2    Elevated CEA R97.0    Diarrhea R19.7    Erosive gastritis K29.60    Weight loss R63.4    Prediabetes R73.03    Chronic neck pain M54.2, G89.29    Obstructive chronic bronchitis with exacerbation ([facility]) J44.1    Thrombocytopenia, unspecified D69.6    Chronic use of opiate for therapeutic purpose Z79.891    Chronic prescription benzodiazepine use Z79.899    Acute on chronic diastolic CHF (congestive heart failure) ([facility]) I50.33    Encounter for medication monitoring Z51.81    Severe obesity (BMI 35.0-39. 9) with comorbidity ([facility] E66.01    Abdominal pain, epigastric R10.13    Narcotic drug use F11.90    Pharyngoesophageal dysphagia R13.14       Past Medical History:        Diagnosis Date    Anxiety     CAD (coronary artery disease)     Mitral valve prolapse    Fatty liver     GERD (gastroesophageal reflux disease)     Headache     History of bronchitis     Hyperlipidemia     Hypothyroidism     Kidney stone     LFT elevation     MVP (mitral valve prolapse)     Obesity     Osteoarthritis of cervical spine     Pulmonary emboli (HCC)     Umbilical hernia        Past Surgical History:        Procedure Laterality Date    APPENDECTOMY       SECTION      2 pfannenstiel, 1 vertical    CHOLECYSTECTOMY      COLONOSCOPY  2009    Dr Martínez Duty  14    COLONOSCOPY  2014    biopsy & sigmoid spasms, pathology negative    COLONOSCOPY N/A 2018    COLONOSCOPY WITH BIOPSY performed by José Luis Lopez MD at [facility] 2021    COLONOSCOPY DIAGNOSTIC performed by Agapito Delacruz MD at [address]      x 5    HYSTERECTOMY, TOTAL ABDOMINAL (CERVIX REMOVED)          [place] EXPLORATORY OF ABDOMEN  10/21/2014    Laparotomy-lysis of adhesions, bso     UPPER GASTROINTESTINAL ENDOSCOPY  2010    mild chronic inactive gastritis    UPPER GASTROINTESTINAL ENDOSCOPY N/A 3/10/2021    EGD BIOPSY performed by Agapito Delacruz MD at [facility]       Social History:    Social History     Tobacco Use    Smoking status: Former     Packs/day: 0.25     Years: 33.00     Pack years: 8.25     Types: Cigarettes     Quit date:      Years since quittin.7    Smokeless tobacco: Never    Tobacco comments:     quit on nicoderm patch   Substance Use Topics    Alcohol use: No     Alcohol/week: 0.0 standard drinks                                Counseling given: Not Answered  Tobacco comments: quit on nicoderm patch      Vital Signs (Current): There were no vitals filed for this visit.                                            BP Readings from Last 3 Encounters:   10/13/22 106/73   10/10/22 117/77   08/10/22 109/70       NPO Status:                                                                                 BMI:   Wt Readings from Last 3 Encounters:   10/13/22 202 lb (91.6 kg)   10/10/22 204 lb (92.5 kg)   22 204 lb (92.5 kg)     There is no height or weight on file to calculate BMI.    CBC:   Lab Results   Component Value Date/Time    WBC 7.1 2022 01:47 PM    RBC 4.59 2022 01:47 PM    RBC 4.36 2019 05:41 PM    HGB 13.7 2022 01:47 PM    HCT 41.1 2022 01:47 PM    MCV 89.5 08/16/2022 01:47 PM    RDW 14.0 08/16/2022 01:47 PM     08/16/2022 01:47 PM     06/05/2012 05:08 AM       CMP:   Lab Results   Component Value Date/Time     08/16/2022 01:47 PM    K 4.3 08/16/2022 01:47 PM     08/16/2022 01:47 PM    CO2 22 08/16/2022 01:47 PM    BUN 15 08/16/2022 01:47 PM    CREATININE 0.96 08/16/2022 01:47 PM    GFRAA >60 08/16/2022 01:47 PM    LABGLOM >60 08/16/2022 01:47 PM    GLUCOSE 107 08/16/2022 01:47 PM    GLUCOSE 107 06/03/2019 05:41 PM    PROT 7.1 08/16/2022 01:47 PM    PROT 6.3 06/03/2019 05:41 PM    CALCIUM 9.7 08/16/2022 01:47 PM    BILITOT 0.24 08/16/2022 01:47 PM    ALKPHOS 98 08/16/2022 01:47 PM    AST 15 08/16/2022 01:47 PM    ALT 11 08/16/2022 01:47 PM       POC Tests: No results for input(s): POCGLU, POCNA, POCK, POCCL, POCBUN, POCHEMO, POCHCT in the last 72 hours.     Coags:   Lab Results   Component Value Date/Time    PROTIME 18.6 04/23/2021 10:00 AM    INR 1.6 04/23/2021 10:00 AM    APTT 36.3 04/23/2021 10:00 AM       HCG (If Applicable):   Lab Results   Component Value Date    PREGTESTUR NEGATIVE 12/18/2018        ABGs: No results found for: PHART, PO2ART, FTG3OJQ, YRA4ZZY, BEART, I5OXIKEL     Type & Screen (If Applicable):  No results found for: LABABO, LABRH    Drug/Infectious Status (If Applicable):  Lab Results   Component Value Date/Time    HEPCAB NONREACTIVE 02/14/2014 05:36 AM       COVID-19 Screening (If Applicable):   Lab Results   Component Value Date/Time    COVID19 DETECTED 12/31/2021 09:28 PM    COVID19 Not Detected 05/24/2021 11:46 PM           Anesthesia Evaluation  Patient summary reviewed and Nursing notes reviewed no history of anesthetic complications:   Airway: Mallampati: I  TM distance: >3 FB   Neck ROM: full  Mouth opening: > = 3 FB   Dental:    (+) lower dentures and upper dentures      Pulmonary:normal exam        (-) COPD and asthma                           Cardiovascular:  Exercise tolerance: good (>4 METS),   (+) valvular problems/murmurs: MVP, CAD:, hyperlipidemia    (-) CABG/stent and  angina    ECG reviewed               Beta Blocker:  Dose within 24 Hrs         Neuro/Psych:   (+) headaches:,              ROS comment: RLS, cervical spondylosis with spinal stenosis GI/Hepatic/Renal:   (+) GERD:, liver disease:,          ROS comment: IBS. Endo/Other:    (+) hypothyroidism, blood dyscrasia: anticoagulation therapy, arthritis:., .    (-) diabetes mellitus               Abdominal:             Vascular:   + DVT, PE (04/2021). Other Findings:             Anesthesia Plan      MAC     ASA 3       Induction: intravenous. MIPS: Postoperative opioids intended and Prophylactic antiemetics administered. Anesthetic plan and risks discussed with patient. Plan discussed with CRNA.     Attending anesthesiologist reviewed and agrees with Farhan Valentin MD   10/13/2022

## 2022-10-13 NOTE — H&P
History and Physical Service   [facility]    HISTORY AND PHYSICAL EXAMINATION            Date of Evaluation: 10/13/2022  Patient name:  Bret Mary  MRN:   [id number]  YOB: 1970  PCP:    Mayelin Valera MD    History Obtained From:     Patient, medical records    History of Present Illness: This is Bret Mary a 47 y.o. female who presents today for a COLONOSCOPY DIAGNOSTIC, EGD ESOPHAGOGASTRODUODENOSCOPY by Clemencia Olmedo MD FOR GASTROESOPHAGEAL REFLUX DISEASE, UNSPECIFIED WHETHER ESOPHAGITIS PRESENT [K21.9] IRRITABLE BOWEL SYNDROME WITH BOTH CONSTIPATION AND DIARRHEA [K58.2]  ABDOMINAL PAIN, UNSPECIFIED ABDOMINAL LOCATION [R10.9]. Patient was seen in Dr Hussein Bolivar office 5/2021 for chronic constipation with irritable bowel type issues Patient had multiple abdominal surgeries. More recently she c/o some indigestion with upper abdominal pain and intermittent difficulty swallowing liquids. Currently on PPI with some relief. Patient advised diagnostic testing and arrived for her procedures. She followed the bowel prep until watery yellow Last colonoscopy the prep was poor according to Dr Tanner Essex notes No FH colon cancer. Today denies bowel changes, bloody tarry stools, n/v or unintentional weight loss. Denies fever, chills, cough, congestion, wheezing, open sores or wounds.       Hx PE 2021 on longterm anticoagulant therapy Last Xarelto 10/9/22 today denies increased difficulty breathing, palpitations, SÁNCHEZ, or chest pain   Past Medical History:     Past Medical History:   Diagnosis Date    Anxiety     CAD (coronary artery disease)     Mitral valve prolapse    Fatty liver     GERD (gastroesophageal reflux disease)     Headache     History of bronchitis     Hyperlipidemia     Hypothyroidism     Kidney stone     LFT elevation     MVP (mitral valve prolapse)     Obesity     Osteoarthritis of cervical spine     Pulmonary emboli (HCC)     Type 2 [facility]     Umbilical hernia         Past Surgical History:     Past Surgical History:   Procedure Laterality Date    APPENDECTOMY       SECTION      2 pfannenstiel, 1 vertical    CHOLECYSTECTOMY      COLONOSCOPY      Dr Eve Cordon  15    COLONOSCOPY  2014    biopsy & sigmoid spasms, pathology negative    COLONOSCOPY N/A 2018    COLONOSCOPY WITH BIOPSY performed by Karine Gottlieb MD at [address] [place] [address] N/A 2021    COLONOSCOPY DIAGNOSTIC performed by Leonardo Sylvester MD at [facility]      x 5    HYSTERECTOMY, TOTAL ABDOMINAL (CERVIX REMOVED)          [place] EXPLORATORY OF ABDOMEN  10/21/2014    Laparotomy-lysis of adhesions, bso     UPPER GASTROINTESTINAL ENDOSCOPY  2010    mild chronic inactive gastritis    UPPER GASTROINTESTINAL ENDOSCOPY N/A 3/10/2021    EGD BIOPSY performed by Leonardo Sylvester MD at [address] [place] [address]        Medications Prior to Admission:     Prior to Admission medications    Medication Sig Start Date End Date Taking? Authorizing Provider   oxyCODONE-acetaminophen (PERCOCET)  MG per tablet Take 1 tablet by mouth every 8 hours as needed for Pain for up to 30 days. 10/10/22 11/9/22  Luh Andrade, APRN - CNP   sucralfate (CARAFATE) 1 GM tablet TAKE 1 TABLET BY MOUTH FOUR TIMES DAILY 22   Nano Henao MD   zolpidem (AMBIEN) 10 MG tablet Take 1 tablet by mouth at bedtime.  22   Historical Provider, MD   esomeprazole (NEXIUM) 40 MG delayed release capsule TAKE 1 CAPSULE BY MOUTH EVERY MORNING BEFORE BREAKFAST 22   Nano Henao MD   PEG 2011-AKu-QcIvn-NaCl-NaSulf (PEG-3350/ELECTROLYTES) 236 g SOLR  22   Historical Provider, MD   rOPINIRole (REQUIP) 2 MG tablet TAKE 1 TABLET BY MOUTH EVERY NIGHT 22   Nano Henao MD   albuterol sulfate HFA (PROVENTIL;VENTOLIN;PROAIR) 108 (90 Base) MCG/ACT inhaler INHALE 2 PUFFS INTO THE LUNGS EVERY 4 HOURS AS NEEDED FOR SHORTNESS OF BREATH 22   Susy Clear Fran Zavala MD   levothyroxine (SYNTHROID) 200 MCG tablet TAKE 1 TABLET BY MOUTH DAILY 6/3/22   Oscar Xavier MD   furosemide (LASIX) 20 MG tablet TAKE 1 TABLET BY MOUTH DAILY 6/3/22   Oscar Xavier MD   ibuprofen (ADVIL;MOTRIN) 600 MG tablet Take 1 tablet by mouth every 6 hours as needed for Pain 4/20/22   Naga Ambrosio MD   XARELTO 20 MG TABS tablet TAKE 1 TABLET BY MOUTH DAILY WITH BREAKFAST 3/28/22   Mimi Agustin MD   atorvastatin (LIPITOR) 40 MG tablet TAKE 1 TABLET BY MOUTH EVERY DAY 2/27/22   Historical Provider, MD   clonazePAM (KLONOPIN) 1 MG tablet TAKE 1/2 TABLET BY MOUTH FOUR TIMES DAILY AS NEEDED 1/26/22   Historical Provider, MD   tiZANidine (ZANAFLEX) 4 MG tablet TAKE 1 TABLET BY MOUTH EVERY 8 HOURS AS NEEDED FOR SPASMS 2/14/22   MARTITA Lam CNP   ondansetron (ZOFRAN ODT) 4 MG disintegrating tablet Take 1 tablet by mouth every 8 hours as needed for Nausea or Vomiting 11/27/21   Elle Hamilton DO   sertraline (ZOLOFT) 100 MG tablet Take 1.5 tablets by mouth daily 9/9/21   Robert Bean MD   traZODone (DESYREL) 100 MG tablet  8/5/21   Historical Provider, MD   albuterol sulfate HFA (VENTOLIN HFA) 108 (90 Base) MCG/ACT inhaler Inhale 2 puffs into the lungs every 6 hours as needed for Wheezing 6/6/21   True Roberts MD   clonazePAM (KLONOPIN) 0.5 MG tablet Take 1 tablet by mouth 2 times daily as needed for Anxiety for up to 30 days. 12/28/20 2/18/22  Elissa Snow MD   topiramate (TOPAMAX) 25 MG tablet 25 mg 2 times daily  2/27/18   Historical Provider, MD   metoprolol tartrate (LOPRESSOR) 50 MG tablet Take 50 mg by mouth 2 times daily  8/21/17   Historical Provider, MD        Allergies:     Compazine [prochlorperazine], Sulfa antibiotics, and Adhesive tape    Social History:     Tobacco:    reports that she quit smoking about 21 months ago. Her smoking use included cigarettes. She has a 8.25 pack-year smoking history.  She has never used smokeless tobacco.  Alcohol: reports no history of alcohol use. Drug Use:  reports no history of drug use. Family History:     Family History   Problem Relation Age of Onset    Cancer Mother         vaginal    Heart Disease Father     Diabetes Father     Other Father         colon resection for colon polyps    Breast Cancer Maternal Grandmother     Stroke Maternal Grandfather        Review of Systems:     Positive and Negative as described in HPI. CONSTITUTIONAL:  negative for fevers, chills, sweats, fatigue, weight loss  HEENT: glasses  Full dentures  negative for vision, hearing changes, runny nose, throat pain  RESPIRATORY:  negative for shortness of breath, cough, congestion, wheezing. CARDIOVASCULAR: Hx MVP and Past PE  on blood thinner  negative for chest pain, palpitations. GASTROINTESTINAL: See HPI   GENITOURINARY:  negative for difficulty of urination, burning with urination, frequency   INTEGUMENT:  negative for rash, skin lesions, easy bruising   HEMATOLOGIC/LYMPHATIC:  negative for swelling/edema   ALLERGIC/IMMUNOLOGIC:  negative for urticaria , itching  ENDOCRINE: borderlin DM Not on medication PCP does A1C's  negative increase in drinking, increase in urination, hot or cold intolerance  MUSCULOSKELETAL:  negative joint pains, muscle aches, swelling of joints  NEUROLOGICAL:  negative for headaches, dizziness, lightheadedness, numbness, pain, tingling extremities  BEHAVIOR/PSYCH:  negative for depression, anxiety    Physical Exam:   /73   Pulse 78   Temp 98.7 °F (37.1 °C) (Temporal)   Resp 16   LMP 11/01/2010   SpO2 93%   Patient's last menstrual period was 11/01/2010. [id number]  No results for input(s): POCGLU in the last 72 hours. General Appearance: obese female,  alert, well appearing, and in no acute distress  Mental status: oriented to person, place, and time with normal affect  Head:  normocephalic, atraumatic.   Eye: no icterus, redness, pupils equal and reactive, extraocular eye movements intact, conjunctiva clear  Ear: normal external ear, no discharge, hearing intact  Nose:  no drainage noted  Mouth: mucous membranes moist edentulous    Neck: supple, no carotid bruits, thyroid not palpable  Lungs: Bilateral equal air entry, clear to ausculation, no wheezing, rales or rhonchi, normal effort  Cardiovascular: HR 78 normal rate, regular rhythm, no murmur, gallop, rub. Abdomen: tenderness across  upper abdomen with light patpation Soft, round, nondistended, normal bowel sounds  Neurologic: There are no new focal motor or sensory deficits, normal muscle tone and bulk, no abnormal sensation, normal speech, cranial nerves II through XII grossly intact  Skin: No gross lesions, rashes, bruising or bleeding on exposed skin area  Extremities:  peripheral pulses palpable, no pedal edema or calf pain with palpation  Psych: normal affect     Investigations:      Laboratory Testing:  No results found for this or any previous visit (from the past 24 hour(s)). No results for input(s): HGB, HCT, WBC, MCV, PLATELET, NA, K, CL, CO2, BUN, CREATININE, GLUCOSE, INR, PROTIME, APTT, AST, ALT, LABALBU, HCG in the last 720 hours. No results for input(s): COVID19 in the last 720 hours. Imaging/Diagnostics:    No results found.       Diagnosis:      Gastroesophageal reflux disease, unspecified whether esophagitis present [K21.9]  Irritable bowel syndrome with both constipation and diarrhea [K58.2]  Abdominal pain, unspecified abdominal location [R10.9]    Plans:     1. COLONOSCOPY DIAGNOSTIC, EGD ESOPHAGOGASTRODUODENOSCOPY      [facility], APRN - CNP  10/13/2022  8:24 AM

## 2022-10-13 NOTE — OP NOTE
PROCEDURE NOTE    DATE OF PROCEDURE: 10/13/2022     SURGEON: Yaz Chinchilla MD    ASSISTANT: None    PREOPERATIVE DIAGNOSIS: GERD  ABD  PAINS    POSTOPERATIVE DIAGNOSIS: As described below    OPERATION: Upper GI endoscopy with Biopsy    ANESTHESIA: MAC PER ANESTHESIA     ESTIMATED BLOOD LOSS: Less than 50 ml    COMPLICATIONS: None. SPECIMENS:  Was Obtained:     HISTORY: The patient is a 47y.o. year old female with history of above preop diagnosis. I recommended esophagogastroduodenoscopy with possible biopsy and I explained the risk, benefits, expected outcome, and alternatives to the procedure. Risks included but are not limited to bleeding, infection, respiratory distress, hypotension, and perforation of the esophagus, stomach, or duodenum. Patient understands and is in agreement. PROCEDURE: The patient was given IV conscious sedation. The patient's SPO2 remained above 90% throughout the procedure. The gastroscope was inserted orally and advanced under direct vision through the esophagus, through the stomach, through the pylorus, and into the descending duodenum. Findings:    Retropharyngeal area was grossly normal appearing    Esophagus: normal    Stomach:    Fundus: normal    Body: normal    Antrum: abnormal: MOD GASTRITIS WAS BIOPSIED    Duodenum:     Descending: normal    Bulb: normal    The scope was removed and the patient tolerated the procedure well.      Recommendations/Plan:   F/U Biopsies  F/U In Office in 3-4 weeks  Discussed with the family  Post sedation patient was stable with stable vital signs and stable O2 saturations    Electronically signed by Yaz Chinchilla MD  on 10/13/2022 at 9:51 AM

## 2022-10-13 NOTE — ANESTHESIA POSTPROCEDURE EVALUATION
Department of Anesthesiology  Postprocedure Note    Patient: Teodora Chin  MRN: [id number]  YOB: 1970  Date of evaluation: 10/13/2022      Procedure Summary     Date: 10/13/22 Room / Location: [place] [address]    Anesthesia Start: 5273 Anesthesia Stop: 0876    Procedures:       COLONOSCOPY DIAGNOSTIC (Rectum)      EGD ESOPHAGOGASTRODUODENOSCOPY (Esophagus) Diagnosis:       Gastroesophageal reflux disease, unspecified whether esophagitis present      Irritable bowel syndrome with both constipation and diarrhea      Abdominal pain, unspecified abdominal location      (Gastroesophageal reflux disease, unspecified whether esophagitis present [K21.9])      (Irritable bowel syndrome with both constipation and diarrhea [K58.2])      (Abdominal pain, unspecified abdominal location [R10.9])    Surgeons: Paul Dietrich MD Responsible Provider: Kimberly Hughes MD    Anesthesia Type: MAC ASA Status: 3          Anesthesia Type: No value filed. Kalia Phase I:      Kalia Phase II: Kalia Score: 10      Anesthesia Post Evaluation    Patient location during evaluation: PACU  Patient participation: complete - patient participated  Level of consciousness: awake  Airway patency: patent  Nausea & Vomiting: no nausea and no vomiting  Complications: no  Cardiovascular status: hemodynamically stable  Respiratory status: acceptable  Hydration status: stable  There was medical reason for not using a multimodal analgesia pain management approach.

## 2022-10-14 LAB — SURGICAL PATHOLOGY REPORT: NORMAL

## 2022-10-18 RX ORDER — SUCRALFATE 1 G/1
TABLET ORAL
Qty: 120 TABLET | Refills: 0 | Status: SHIPPED | OUTPATIENT
Start: 2022-10-18

## 2022-10-18 RX ORDER — SUCRALFATE 1 G/1
TABLET ORAL
Qty: 360 TABLET | OUTPATIENT
Start: 2022-10-18

## 2022-10-23 ENCOUNTER — HOSPITAL ENCOUNTER (EMERGENCY)
Age: 52
Discharge: HOME | End: 2022-10-24
Attending: EMERGENCY MEDICINE
Payer: COMMERCIAL

## 2022-10-23 DIAGNOSIS — R10.84 GENERALIZED ABDOMINAL PAIN: Primary | ICD-10-CM

## 2022-10-23 LAB
ABSOLUTE EOS #: 0.2 K/UL (ref 0–0.4)
ABSOLUTE LYMPH #: 2.8 K/UL (ref 1–4.8)
ABSOLUTE MONO #: 0.5 K/UL (ref 0.1–1.3)
BASOPHILS # BLD: 1 % (ref 0–2)
BASOPHILS ABSOLUTE: 0.1 K/UL (ref 0–0.2)
EOSINOPHILS RELATIVE PERCENT: 3 % (ref 0–4)
HCT VFR BLD CALC: 37.1 % (ref 36–46)
HEMOGLOBIN: 12.5 G/DL (ref 12–16)
LYMPHOCYTES # BLD: 39 % (ref 24–44)
MCH RBC QN AUTO: 30.3 PG (ref 26–34)
MCHC RBC AUTO-ENTMCNC: 33.7 G/DL (ref 31–37)
MCV RBC AUTO: 89.8 FL (ref 80–100)
MONOCYTES # BLD: 7 % (ref 1–7)
PDW BLD-RTO: 14.3 % (ref 11.5–14.9)
PLATELET # BLD: 158 K/UL (ref 150–450)
PMV BLD AUTO: 7.6 FL (ref 6–12)
RBC # BLD: 4.14 M/UL (ref 4–5.2)
SEG NEUTROPHILS: 50 % (ref 36–66)
SEGMENTED NEUTROPHILS ABSOLUTE COUNT: 3.6 K/UL (ref 1.3–9.1)
WBC # BLD: 7.2 K/UL (ref 3.5–11)

## 2022-10-23 PROCEDURE — 85025 COMPLETE CBC W/AUTO DIFF WBC: CPT

## 2022-10-23 PROCEDURE — 99285 EMERGENCY DEPT VISIT HI MDM: CPT

## 2022-10-23 PROCEDURE — 96374 THER/PROPH/DIAG INJ IV PUSH: CPT

## 2022-10-23 PROCEDURE — 83690 ASSAY OF LIPASE: CPT

## 2022-10-23 PROCEDURE — 36415 COLL VENOUS BLD VENIPUNCTURE: CPT

## 2022-10-23 PROCEDURE — 80076 HEPATIC FUNCTION PANEL: CPT

## 2022-10-23 PROCEDURE — 83605 ASSAY OF LACTIC ACID: CPT

## 2022-10-23 PROCEDURE — 83735 ASSAY OF MAGNESIUM: CPT

## 2022-10-23 PROCEDURE — 80048 BASIC METABOLIC PNL TOTAL CA: CPT

## 2022-10-23 PROCEDURE — 6360000002 HC RX W HCPCS: Performed by: STUDENT IN AN ORGANIZED HEALTH CARE EDUCATION/TRAINING PROGRAM

## 2022-10-23 RX ORDER — DIPHENHYDRAMINE HYDROCHLORIDE 50 MG/ML
25 INJECTION INTRAMUSCULAR; INTRAVENOUS ONCE
Status: DISCONTINUED | OUTPATIENT
Start: 2022-10-24 | End: 2022-10-24 | Stop reason: HOSPADM

## 2022-10-23 RX ORDER — MORPHINE SULFATE 4 MG/ML
4 INJECTION, SOLUTION INTRAMUSCULAR; INTRAVENOUS ONCE
Status: COMPLETED | OUTPATIENT
Start: 2022-10-23 | End: 2022-10-23

## 2022-10-23 RX ORDER — ONDANSETRON 2 MG/ML
4 INJECTION INTRAMUSCULAR; INTRAVENOUS ONCE
Status: COMPLETED | OUTPATIENT
Start: 2022-10-23 | End: 2022-10-23

## 2022-10-23 RX ADMIN — ONDANSETRON 4 MG: 2 INJECTION INTRAMUSCULAR; INTRAVENOUS at 23:44

## 2022-10-23 RX ADMIN — MORPHINE SULFATE 4 MG: 4 INJECTION, SOLUTION INTRAMUSCULAR; INTRAVENOUS at 23:44

## 2022-10-23 ASSESSMENT — PAIN SCALES - GENERAL
PAINLEVEL_OUTOF10: 7
PAINLEVEL_OUTOF10: 8

## 2022-10-23 ASSESSMENT — PAIN - FUNCTIONAL ASSESSMENT: PAIN_FUNCTIONAL_ASSESSMENT: 0-10

## 2022-10-23 ASSESSMENT — PAIN DESCRIPTION - LOCATION: LOCATION: ABDOMEN

## 2022-10-23 ASSESSMENT — PAIN DESCRIPTION - DESCRIPTORS: DESCRIPTORS: ACHING

## 2022-10-23 ASSESSMENT — PAIN DESCRIPTION - FREQUENCY: FREQUENCY: CONTINUOUS

## 2022-10-24 ENCOUNTER — APPOINTMENT (OUTPATIENT)
Dept: CT IMAGING | Age: 52
End: 2022-10-24
Payer: COMMERCIAL

## 2022-10-24 VITALS
RESPIRATION RATE: 16 BRPM | TEMPERATURE: 98.4 F | DIASTOLIC BLOOD PRESSURE: 60 MMHG | SYSTOLIC BLOOD PRESSURE: 108 MMHG | OXYGEN SATURATION: 94 % | HEART RATE: 75 BPM | HEIGHT: 64 IN | BODY MASS INDEX: 31.58 KG/M2 | WEIGHT: 185 LBS

## 2022-10-24 LAB
ALBUMIN SERPL-MCNC: 3.9 G/DL (ref 3.5–5.2)
ALP BLD-CCNC: 113 U/L (ref 35–104)
ALT SERPL-CCNC: 10 U/L (ref 5–33)
ANION GAP SERPL CALCULATED.3IONS-SCNC: 13 MMOL/L (ref 9–17)
AST SERPL-CCNC: 14 U/L
BILIRUB SERPL-MCNC: <0.2 MG/DL (ref 0.3–1.2)
BILIRUBIN DIRECT: <0.1 MG/DL
BILIRUBIN, INDIRECT: ABNORMAL MG/DL (ref 0–1)
BUN BLDV-MCNC: 17 MG/DL (ref 6–20)
CALCIUM SERPL-MCNC: 8.8 MG/DL (ref 8.6–10.4)
CHLORIDE BLD-SCNC: 103 MMOL/L (ref 98–107)
CO2: 18 MMOL/L (ref 20–31)
CREAT SERPL-MCNC: 0.81 MG/DL (ref 0.5–0.9)
GFR SERPL CREATININE-BSD FRML MDRD: >60 ML/MIN/1.73M2
GLUCOSE BLD-MCNC: 113 MG/DL (ref 70–99)
LACTIC ACID: 1.3 MMOL/L (ref 0.5–2.2)
LIPASE: 51 U/L (ref 13–60)
MAGNESIUM: 1.9 MG/DL (ref 1.6–2.6)
POTASSIUM SERPL-SCNC: 4.1 MMOL/L (ref 3.7–5.3)
SODIUM BLD-SCNC: 134 MMOL/L (ref 135–144)
TOTAL PROTEIN: 6.3 G/DL (ref 6.4–8.3)

## 2022-10-24 PROCEDURE — 74177 CT ABD & PELVIS W/CONTRAST: CPT

## 2022-10-24 PROCEDURE — 2580000003 HC RX 258: Performed by: STUDENT IN AN ORGANIZED HEALTH CARE EDUCATION/TRAINING PROGRAM

## 2022-10-24 PROCEDURE — 6360000004 HC RX CONTRAST MEDICATION: Performed by: STUDENT IN AN ORGANIZED HEALTH CARE EDUCATION/TRAINING PROGRAM

## 2022-10-24 RX ORDER — SODIUM CHLORIDE 0.9 % (FLUSH) 0.9 %
10 SYRINGE (ML) INJECTION PRN
Status: DISCONTINUED | OUTPATIENT
Start: 2022-10-24 | End: 2022-10-24 | Stop reason: HOSPADM

## 2022-10-24 RX ORDER — MAGNESIUM CITRATE
150 SOLUTION, ORAL ORAL ONCE
Qty: 150 ML | Refills: 0 | Status: SHIPPED | OUTPATIENT
Start: 2022-10-24 | End: 2022-10-24

## 2022-10-24 RX ORDER — 0.9 % SODIUM CHLORIDE 0.9 %
100 INTRAVENOUS SOLUTION INTRAVENOUS ONCE
Status: COMPLETED | OUTPATIENT
Start: 2022-10-24 | End: 2022-10-24

## 2022-10-24 RX ADMIN — IOPAMIDOL 75 ML: 755 INJECTION, SOLUTION INTRAVENOUS at 00:28

## 2022-10-24 RX ADMIN — SODIUM CHLORIDE, PRESERVATIVE FREE 10 ML: 5 INJECTION INTRAVENOUS at 00:29

## 2022-10-24 RX ADMIN — SODIUM CHLORIDE 100 ML: 9 INJECTION, SOLUTION INTRAVENOUS at 00:29

## 2022-10-24 ASSESSMENT — ENCOUNTER SYMPTOMS
PHOTOPHOBIA: 0
DIARRHEA: 1
VOMITING: 0
COLOR CHANGE: 0
NAUSEA: 1
VOICE CHANGE: 0
BACK PAIN: 0
SHORTNESS OF BREATH: 0
CHEST TIGHTNESS: 0
APNEA: 0
EYE DISCHARGE: 0
ABDOMINAL PAIN: 1
TROUBLE SWALLOWING: 0
CONSTIPATION: 0
ABDOMINAL DISTENTION: 1

## 2022-10-24 NOTE — ED PROVIDER NOTES
[address]  eMERGENCY dEPARTMENT eNCOUnter   Attending Attestation     Pt Name: Oswaldo Muñiz  MRN: [id number]  [place] 1970  Date of evaluation: 10/23/22       Oswaldo Muñiz is a 47 y.o. female who presents with Abdominal Pain      History:   Patient has been having diffuse abdominal pain since before she had a colonoscopy done last week. Patient states they did take some gross out but did not know the results yet. Patient is felt distended but no fevers. Exam: Vitals:   Vitals:    10/23/22 2209   BP: 108/60   Pulse: 75   Resp: 16   Temp: 98.4 °F (36.9 °C)   TempSrc: Oral   SpO2: 95%   Weight: 185 lb (83.9 kg)   Height: 5' 4\" (1.626 m)     Abdomen is diffusely tender but no peritoneal signs. I performed a history and physical examination of the patient and discussed management with the resident. I reviewed the residents note and agree with the documented findings and plan of care. Any areas of disagreement are noted on the chart. I was personally present for the key portions of any procedures. I have documented in the chart those procedures where I was not present during the key portions. I have personally reviewed all images and agree with the resident's interpretation. I have reviewed the emergency nurses triage note. I agree with the chief complaint, past medical history, past surgical history, allergies, medications, social and family history as documented unless otherwise noted below. Documentation of the HPI, Physical Exam and Medical Decision Making performed by medical students or scribes is based on my personal performance of the HPI, PE and MDM. I personally evaluated and examined the patient in conjunction with the APC and agree with the assessment, treatment plan, and disposition of the patient as recorded by the APC. Additional findings are as noted.     Nyasia Torre MD  Attending Emergency  Physician             Tiana Millan MD  10/23/22 7437

## 2022-10-24 NOTE — ED PROVIDER NOTES
[address]  Emergency Department Encounter  Emergency Medicine Resident     Pt Name: Juan Francisco Souza  TKL:[id number]  [place] 1970  Date of evaluation: 10/23/22  PCP:  Gavi Han MD    [address] [place] [address]       Chief Complaint   Patient presents with    Abdominal Pain       HISTORY OF PRESENT ILLNESS  (Location/Symptom, Timing/Onset, Context/Setting, Quality, Duration, ModifyingFactors, Severity.)      Juan Francisco Souza is a 47 y.o. female presents to the emergency department for evaluation of abdominal pain. Patient states that all of her pain began when she had a colonoscopy and endoscopy which was completed back on 10/13. Patient states that since that time her pain has been slowly increasing. Describes that it hurts on the bilateral flanks, bilateral lower quadrants as well as in the center. States that she has significant difficulty with passing flatus as well as having stools. Patient states that she is having frequent diarrhea. Patient states that eating causes her to feel nauseous as well as cause pain throughout her entire abdomen. States that she feels very bloated and tender. Patient denies changes in mentation, vision, mental status or burning or irritation with urination. PAST MEDICAL / SURGICAL / SOCIAL /FAMILY HISTORY      has a past medical history of Anxiety, CAD (coronary artery disease), Fatty liver, GERD (gastroesophageal reflux disease), Headache, History of bronchitis, Hyperlipidemia, Hypothyroidism, Kidney stone, LFT elevation, MVP (mitral valve prolapse), Obesity, Osteoarthritis of cervical spine, Pulmonary emboli ([address] and Umbilical hernia. No other pertinent PMH on review with patient/guardian. has a past surgical history that includes Upper gastrointestinal endoscopy (2010); hernia repair; Cholecystectomy; Appendectomy;  section; Colonoscopy (); Colonoscopy (14); Colonoscopy (2014); pr exploratory of abdomen (10/21/2014);  Colonoscopy (N/A, 2018); Hysterectomy, total abdominal; Upper gastrointestinal endoscopy (N/A, 3/10/2021); Colonoscopy (N/A, 2021); Colonoscopy (N/A, 10/13/2022); and Upper gastrointestinal endoscopy (N/A, 10/13/2022). No other pertinent PSH on review with patient/guardian. Social History     Socioeconomic History    Marital status:      Spouse name: Not on file    Number of children: Not on file    Years of education: Not on file    Highest education level: Not on file   Occupational History    Occupation: Homemaker   Tobacco Use    Smoking status: Former     Packs/day: 0.25     Years: 33.00     Pack years: 8.25     Types: Cigarettes     Quit date:      Years since quittin.8    Smokeless tobacco: Never    Tobacco comments:     quit on nicoderm patch   Vaping Use    Vaping Use: Never used   Substance and Sexual Activity    Alcohol use: No     Alcohol/week: 0.0 standard drinks    Drug use: No    Sexual activity: Not Currently   Other Topics Concern    Not on file   Social History Narrative    Not on file     Social Determinants of Health     Financial Resource Strain: Low Risk     Difficulty of Paying Living Expenses: Not hard at all   Food Insecurity: No Food Insecurity    Worried About Running Out of Food in the Last Year: Never true    Ran Out of Food in the Last Year: Never true   Transportation Needs: Not on file   Physical Activity: Not on file   Stress: Not on file   Social Connections: Not on file   Intimate Partner Violence: Not on file   Housing Stability: Not on file       I counseled the patient against using tobacco products. Family History   Problem Relation Age of Onset    Cancer Mother         vaginal    Heart Disease Father     Diabetes Father     Other Father         colon resection for colon polyps    Breast Cancer Maternal Grandmother     Stroke Maternal Grandfather      No other pertinent FamHx on review with patient/guardian.     Allergies:  Compazine [prochlorperazine], Sulfa antibiotics, and Adhesive tape    Home Medications:  Prior to Admission medications    Medication Sig Start Date End Date Taking? Authorizing Provider   magnesium citrate (CITROMA) SOLN Take 150 mLs by mouth once for 1 dose 10/24/22 10/24/22 Yes Odilia Simmons MD   sucralfate (CARAFATE) 1 GM tablet TAKE 1 TABLET BY MOUTH FOUR TIMES DAILY 10/18/22   Jayne Do MD   oxyCODONE-acetaminophen (PERCOCET)  MG per tablet Take 1 tablet by mouth every 8 hours as needed for Pain for up to 30 days. 10/10/22 11/9/22  MARTITA Lozada - CNP   zolpidem (AMBIEN) 10 MG tablet Take 1 tablet by mouth at bedtime. 8/25/22   Historical Provider, MD   esomeprazole (NEXIUM) 40 MG delayed release capsule TAKE 1 CAPSULE BY MOUTH EVERY MORNING BEFORE BREAKFAST 8/18/22   Jayne Do MD   PEG 1234-[id number]-NaCl-NaSulf (PEG-3350/ELECTROLYTES) 236 g SOLR  7/20/22   Historical Provider, MD   rOPINIRole (REQUIP) 2 MG tablet TAKE 1 TABLET BY MOUTH EVERY NIGHT 7/26/22   Jayne Do MD   albuterol sulfate HFA (PROVENTIL;VENTOLIN;PROAIR) 108 (90 Base) MCG/ACT inhaler INHALE 2 PUFFS INTO THE LUNGS EVERY 4 HOURS AS NEEDED FOR SHORTNESS OF BREATH 6/16/22   Jayne Do MD   levothyroxine (SYNTHROID) 200 MCG tablet TAKE 1 TABLET BY MOUTH DAILY 6/3/22   Libby Bullock MD   XARELTO 20 MG TABS tablet TAKE 1 TABLET BY MOUTH DAILY WITH BREAKFAST 3/28/22   Edward Sanchez MD   atorvastatin (LIPITOR) 40 MG tablet TAKE 1 TABLET BY MOUTH EVERY DAY 2/27/22   Historical Provider, MD   clonazePAM (KLONOPIN) 1 MG tablet 2 times daily.  1/26/22   Historical Provider, MD   ondansetron (ZOFRAN ODT) 4 MG disintegrating tablet Take 1 tablet by mouth every 8 hours as needed for Nausea or Vomiting 11/27/21   Ifeanyi Hamilton DO   sertraline (ZOLOFT) 100 MG tablet Take 1.5 tablets by mouth daily 9/9/21   Jayne Do MD   albuterol sulfate HFA (VENTOLIN HFA) 108 (90 Base) MCG/ACT inhaler Inhale 2 puffs into the lungs every 6 hours as needed for Wheezing 6/6/21   Opal Allen MD   clonazePAM (KLONOPIN) 0.5 MG tablet Take 1 tablet by mouth 2 times daily as needed for Anxiety for up to 30 days. 12/28/20 2/18/22  Bren Villasenor MD   topiramate (TOPAMAX) 25 MG tablet 25 mg 2 times daily  2/27/18   Historical Provider, MD   metoprolol tartrate (LOPRESSOR) 50 MG tablet Take 50 mg by mouth 2 times daily  8/21/17   Historical Provider, MD       REVIEW OF SYSTEMS    (2-9 systems for level 4, 10 ormore for level 5)      Review of Systems   Constitutional:  Positive for appetite change. Negative for activity change, diaphoresis, fatigue and fever. HENT:  Negative for congestion, tinnitus, trouble swallowing and voice change. Eyes:  Negative for photophobia, discharge and visual disturbance. Respiratory:  Negative for apnea, chest tightness and shortness of breath. Cardiovascular:  Negative for chest pain and palpitations. Gastrointestinal:  Positive for abdominal distention, abdominal pain, diarrhea and nausea. Negative for constipation and vomiting. Endocrine: Negative for cold intolerance and heat intolerance. Genitourinary:  Negative for difficulty urinating, dysuria and urgency. Musculoskeletal:  Negative for arthralgias, back pain and myalgias. Skin:  Negative for color change, pallor, rash and wound. Allergic/Immunologic: Negative for immunocompromised state. Neurological:  Negative for dizziness, facial asymmetry and headaches. Psychiatric/Behavioral:  Negative for agitation, behavioral problems and confusion. PHYSICAL EXAM   (up to 7 for level 4, 8 or more for level 5)      INITIAL VITALS:   /60   Pulse 75   Temp 98.4 °F (36.9 °C) (Oral)   Resp 16   Ht 5' 4\" (1.626 m)   Wt 185 lb (83.9 kg)   LMP 11/01/2010   SpO2 94%   BMI 31.76 kg/m²     Physical Exam  Constitutional:       General: She is not in acute distress. Appearance: She is well-developed. She is not toxic-appearing.    HENT:      Head: Normocephalic and atraumatic. Mouth/Throat:      Mouth: Mucous membranes are moist.      Pharynx: Oropharynx is clear. Eyes:      Extraocular Movements: Extraocular movements intact. Pupils: Pupils are equal, round, and reactive to light. Cardiovascular:      Rate and Rhythm: Normal rate and regular rhythm. Heart sounds: Normal heart sounds. Pulmonary:      Effort: Pulmonary effort is normal.      Breath sounds: Normal breath sounds. Abdominal:      General: Abdomen is protuberant. Bowel sounds are normal. There is no distension. Palpations: Abdomen is soft. There is no shifting dullness, fluid wave, hepatomegaly or splenomegaly. Tenderness: There is generalized abdominal tenderness and tenderness in the right upper quadrant, right lower quadrant, epigastric area, left upper quadrant and left lower quadrant. Hernia: No hernia is present. There is no hernia in the umbilical area or ventral area. Musculoskeletal:         General: Normal range of motion. Skin:     General: Skin is warm and dry. Capillary Refill: Capillary refill takes less than 2 seconds. Neurological:      General: No focal deficit present. Mental Status: She is alert. Mental status is at baseline. Psychiatric:         Mood and Affect: Mood normal.         Thought Content:  Thought content normal.         Judgment: Judgment normal.       DIFFERENTIAL  DIAGNOSIS     DDX: Colitis, ileus, bowel perforation, enteritis, diverticulitis, pancreatitis, hepatitis    PLAN (LABS / IMAGING / EKG):  Orders Placed This Encounter   Procedures    CT ABDOMEN PELVIS W IV CONTRAST Additional Contrast? None    BMP    CBC with Auto Differential    Hepatic Function Panel    Lactic Acid    Lipase    Magnesium       MEDICATIONS ORDERED:  Orders Placed This Encounter   Medications    morphine sulfate (PF) injection 4 mg    ondansetron (ZOFRAN) injection 4 mg    diphenhydrAMINE (BENADRYL) injection 25 mg    0.9 % sodium chloride bolus    iopamidol (ISOVUE-370) 76 % injection 75 mL    sodium chloride flush 0.9 % injection 10 mL    magnesium citrate (CITROMA) SOLN     Sig: Take 150 mLs by mouth once for 1 dose     Dispense:  150 mL     Refill:  0           DIAGNOSTIC RESULTS / EMERGENCY DEPARTMENT COURSE / MDM     LABS:  Results for orders placed or performed during the hospital encounter of 10/23/22   BMP   Result Value Ref Range    Glucose 113 (H) 70 - 99 mg/dL    BUN 17 6 - 20 mg/dL    Creatinine 0.81 0.50 - 0.90 mg/dL    Est, Glom Filt Rate >60 >60 mL/min/1.73m2    Calcium 8.8 8.6 - 10.4 mg/dL    Sodium 134 (L) 135 - 144 mmol/L    Potassium 4.1 3.7 - 5.3 mmol/L    Chloride 103 98 - 107 mmol/L    CO2 18 (L) 20 - 31 mmol/L    Anion Gap 13 9 - 17 mmol/L   CBC with Auto Differential   Result Value Ref Range    WBC 7.2 3.5 - 11.0 k/uL    RBC 4.14 4.0 - 5.2 m/uL    Hemoglobin 12.5 12.0 - 16.0 g/dL    Hematocrit 37.1 36 - 46 %    MCV 89.8 80 - 100 fL    MCH 30.3 26 - 34 pg    MCHC 33.7 31 - 37 g/dL    RDW 14.3 11.5 - 14.9 %    Platelets 196 711 - 118 k/uL    MPV 7.6 6.0 - 12.0 fL    Seg Neutrophils 50 36 - 66 %    Lymphocytes 39 24 - 44 %    Monocytes 7 1 - 7 %    Eosinophils % 3 0 - 4 %    Basophils 1 0 - 2 %    Segs Absolute 3.60 1.3 - 9.1 k/uL    Absolute Lymph # 2.80 1.0 - 4.8 k/uL    Absolute Mono # 0.50 0.1 - 1.3 k/uL    Absolute Eos # 0.20 0.0 - 0.4 k/uL    Basophils Absolute 0.10 0.0 - 0.2 k/uL   Hepatic Function Panel   Result Value Ref Range    Albumin 3.9 3.5 - 5.2 g/dL    Alkaline Phosphatase 113 (H) 35 - 104 U/L    ALT 10 5 - 33 U/L    AST 14 <32 U/L    Total Bilirubin <0.2 (L) 0.3 - 1.2 mg/dL    Bilirubin, Direct <0.1 <0.31 mg/dL    Bilirubin, Indirect Can not be calculated 0.00 - 1.00 mg/dL    Total Protein 6.3 (L) 6.4 - 8.3 g/dL   Lactic Acid   Result Value Ref Range    Lactic Acid 1.3 0.5 - 2.2 mmol/L   Lipase   Result Value Ref Range    Lipase 51 13 - 60 U/L   Magnesium   Result Value Ref Range    Magnesium 1.9 1.6 - 2.6 mg/dL         IMPRESSION/MDM/ED COURSE:  47 y.o. female presented with significant generalized abdominal pain. Patient's pain hurts especially in the areas of the colon on palpation and worse in the sigmoid area. Per the reading of the operation note. Patient did have a torturous sigmoid colon with diverticulosis. Given the recent instrumentation of her colon, possible perforation. Belly is soft and does not appear peritoneal however we will get a CT of the abdomen pelvis evaluating for either enteritis, colitis and ileus versus abdominal free air. Will pain control with morphine and Zofran for nausea control. We will also draw a lipase, lactic acid and hepatic function panel for evaluation. ED Course as of 10/24/22 0100   Mon Oct 24, 2022   4830 Patient was reevaluated bedside. Patient comfortable with the work-up as well as comfortable with the findings. We will discharge the patient with a single dose of mag citrate and have her follow-up with her GI specialist for reevaluation. Patient will comfortable with this. [ES]      ED Course User Index  [ES] Clyde Gottlieb MD       Patient/Guardian requesting discharge. Patient/Guardian was given written and verbal instructions prior to discharge. Patient/Guardian understood and agreed. Patient/Guardian had no further questions. RADIOLOGY:  CT ABDOMEN PELVIS W IV CONTRAST Additional Contrast? None   Final Result   1. No ileus, obstruction, or acute inflammatory bowel process is identified. 2. Diverticulosis. 3. Appendectomy, hysterectomy and cholecystectomy. 4. Bibasilar atelectasis as well as patchy mosaic attenuation which can be   seen with small airways disease. EKG  None    All EKG's are interpreted by the Emergency Department Physician who either signs or Co-signs this chart in the absence of a cardiologist.      PROCEDURES:  None    CONSULTS:  None        FINAL IMPRESSION      1.  Generalized abdominal pain DISPOSITION / PLAN       DISPOSITION Decision To Discharge 10/24/2022 01:00:12 AM        PATIENT REFERREDTO:  Kalyani Delgado MD  [facility] [address] [facility]  [phone]    Schedule an appointment as soon as possible for a visit       [facility] ED  Critical access hospital [phone]  Go to   As needed    DISCHARGE MEDICATIONS:  New Prescriptions    MAGNESIUM CITRATE (CITROMA) SOLN    Take 150 mLs by mouth once for 1 dose       Stanley Deal MD  PGY 3  Resident Physician Emergency Medicine  10/24/22 1:00 AM        (Please note that portions of this note were completed with a voice recognition program.Efforts were made to edit the dictations but occasionally words are mis-transcribed.)        Stanley Deal MD  Resident  10/24/22 [id number]

## 2022-10-28 ENCOUNTER — HOSPITAL ENCOUNTER (EMERGENCY)
Age: 52
Discharge: HOME | End: 2022-10-28
Attending: EMERGENCY MEDICINE
Payer: COMMERCIAL

## 2022-10-28 ENCOUNTER — APPOINTMENT (OUTPATIENT)
Dept: CT IMAGING | Age: 52
End: 2022-10-28
Payer: COMMERCIAL

## 2022-10-28 VITALS
BODY MASS INDEX: 31.58 KG/M2 | HEIGHT: 64 IN | WEIGHT: 185 LBS | HEART RATE: 80 BPM | SYSTOLIC BLOOD PRESSURE: 140 MMHG | TEMPERATURE: 99.2 F | RESPIRATION RATE: 20 BRPM | OXYGEN SATURATION: 97 % | DIASTOLIC BLOOD PRESSURE: 71 MMHG

## 2022-10-28 DIAGNOSIS — U07.1 COVID-19: Primary | ICD-10-CM

## 2022-10-28 DIAGNOSIS — N30.01 ACUTE CYSTITIS WITH HEMATURIA: ICD-10-CM

## 2022-10-28 LAB
ABSOLUTE EOS #: 0.1 K/UL (ref 0–0.4)
ABSOLUTE LYMPH #: 1.6 K/UL (ref 1–4.8)
ABSOLUTE MONO #: 0.4 K/UL (ref 0.1–1.3)
ALBUMIN SERPL-MCNC: 4.3 G/DL (ref 3.5–5.2)
ALP BLD-CCNC: 94 U/L (ref 35–104)
ALT SERPL-CCNC: 17 U/L (ref 5–33)
ANION GAP SERPL CALCULATED.3IONS-SCNC: 12 MMOL/L (ref 9–17)
AST SERPL-CCNC: 21 U/L
BACTERIA: NORMAL
BASOPHILS # BLD: 1 % (ref 0–2)
BASOPHILS ABSOLUTE: 0 K/UL (ref 0–0.2)
BILIRUB SERPL-MCNC: 0.4 MG/DL (ref 0.3–1.2)
BILIRUBIN URINE: NEGATIVE
BUN BLDV-MCNC: 13 MG/DL (ref 6–20)
CALCIUM SERPL-MCNC: 9 MG/DL (ref 8.6–10.4)
CASTS UA: NORMAL /LPF
CHLORIDE BLD-SCNC: 99 MMOL/L (ref 98–107)
CO2: 22 MMOL/L (ref 20–31)
COLOR: YELLOW
CREAT SERPL-MCNC: 0.84 MG/DL (ref 0.5–0.9)
EOSINOPHILS RELATIVE PERCENT: 2 % (ref 0–4)
EPITHELIAL CELLS UA: NORMAL /HPF
GFR SERPL CREATININE-BSD FRML MDRD: >60 ML/MIN/1.73M2
GLUCOSE BLD-MCNC: 96 MG/DL (ref 70–99)
GLUCOSE URINE: NEGATIVE
HCT VFR BLD CALC: 41.1 % (ref 36–46)
HEMOGLOBIN: 13.8 G/DL (ref 12–16)
INFLUENZA A: NOT DETECTED
INFLUENZA B: NOT DETECTED
INR BLD: 1.3
KETONES, URINE: NEGATIVE
LEUKOCYTE ESTERASE, URINE: ABNORMAL
LIPASE: 30 U/L (ref 13–60)
LYMPHOCYTES # BLD: 28 % (ref 24–44)
MAGNESIUM: 2 MG/DL (ref 1.6–2.6)
MCH RBC QN AUTO: 30.3 PG (ref 26–34)
MCHC RBC AUTO-ENTMCNC: 33.6 G/DL (ref 31–37)
MCV RBC AUTO: 90.2 FL (ref 80–100)
MONOCYTES # BLD: 8 % (ref 1–7)
NITRITE, URINE: NEGATIVE
PARTIAL THROMBOPLASTIN TIME: 36.1 SEC (ref 24–36)
PDW BLD-RTO: 14.2 % (ref 11.5–14.9)
PH UA: 5.5 (ref 5–8)
PLATELET # BLD: 150 K/UL (ref 150–450)
PMV BLD AUTO: 7.6 FL (ref 6–12)
POTASSIUM SERPL-SCNC: 4.2 MMOL/L (ref 3.7–5.3)
PRO-BNP: 30 PG/ML
PROTEIN UA: NEGATIVE
PROTHROMBIN TIME: 15.8 SEC (ref 11.8–14.6)
RBC # BLD: 4.55 M/UL (ref 4–5.2)
RBC UA: NORMAL /HPF
SARS-COV-2 RNA, RT PCR: DETECTED
SEG NEUTROPHILS: 61 % (ref 36–66)
SEGMENTED NEUTROPHILS ABSOLUTE COUNT: 3.4 K/UL (ref 1.3–9.1)
SODIUM BLD-SCNC: 133 MMOL/L (ref 135–144)
SOURCE: ABNORMAL
SPECIFIC GRAVITY UA: 1.02 (ref 1–1.03)
SPECIMEN DESCRIPTION: ABNORMAL
TOTAL PROTEIN: 7.3 G/DL (ref 6.4–8.3)
TROPONIN, HIGH SENSITIVITY: <6 NG/L (ref 0–14)
TROPONIN, HIGH SENSITIVITY: <6 NG/L (ref 0–14)
TURBIDITY: CLEAR
URINE HGB: ABNORMAL
UROBILINOGEN, URINE: NORMAL
WBC # BLD: 5.6 K/UL (ref 3.5–11)
WBC UA: NORMAL /HPF

## 2022-10-28 PROCEDURE — 83735 ASSAY OF MAGNESIUM: CPT

## 2022-10-28 PROCEDURE — 96374 THER/PROPH/DIAG INJ IV PUSH: CPT

## 2022-10-28 PROCEDURE — 6360000004 HC RX CONTRAST MEDICATION: Performed by: STUDENT IN AN ORGANIZED HEALTH CARE EDUCATION/TRAINING PROGRAM

## 2022-10-28 PROCEDURE — 6370000000 HC RX 637 (ALT 250 FOR IP): Performed by: EMERGENCY MEDICINE

## 2022-10-28 PROCEDURE — 85730 THROMBOPLASTIN TIME PARTIAL: CPT

## 2022-10-28 PROCEDURE — 81001 URINALYSIS AUTO W/SCOPE: CPT

## 2022-10-28 PROCEDURE — 83690 ASSAY OF LIPASE: CPT

## 2022-10-28 PROCEDURE — 84484 ASSAY OF TROPONIN QUANT: CPT

## 2022-10-28 PROCEDURE — 6360000002 HC RX W HCPCS: Performed by: STUDENT IN AN ORGANIZED HEALTH CARE EDUCATION/TRAINING PROGRAM

## 2022-10-28 PROCEDURE — 83880 ASSAY OF NATRIURETIC PEPTIDE: CPT

## 2022-10-28 PROCEDURE — 99285 EMERGENCY DEPT VISIT HI MDM: CPT

## 2022-10-28 PROCEDURE — 85610 PROTHROMBIN TIME: CPT

## 2022-10-28 PROCEDURE — 93005 ELECTROCARDIOGRAM TRACING: CPT | Performed by: STUDENT IN AN ORGANIZED HEALTH CARE EDUCATION/TRAINING PROGRAM

## 2022-10-28 PROCEDURE — 87636 SARSCOV2 & INF A&B AMP PRB: CPT

## 2022-10-28 PROCEDURE — 85025 COMPLETE CBC W/AUTO DIFF WBC: CPT

## 2022-10-28 PROCEDURE — 36415 COLL VENOUS BLD VENIPUNCTURE: CPT

## 2022-10-28 PROCEDURE — 2580000003 HC RX 258: Performed by: STUDENT IN AN ORGANIZED HEALTH CARE EDUCATION/TRAINING PROGRAM

## 2022-10-28 PROCEDURE — 80053 COMPREHEN METABOLIC PANEL: CPT

## 2022-10-28 PROCEDURE — 71260 CT THORAX DX C+: CPT | Performed by: STUDENT IN AN ORGANIZED HEALTH CARE EDUCATION/TRAINING PROGRAM

## 2022-10-28 RX ORDER — ACETAMINOPHEN 500 MG
1000 TABLET ORAL ONCE
Status: COMPLETED | OUTPATIENT
Start: 2022-10-28 | End: 2022-10-28

## 2022-10-28 RX ORDER — 0.9 % SODIUM CHLORIDE 0.9 %
100 INTRAVENOUS SOLUTION INTRAVENOUS ONCE
Status: COMPLETED | OUTPATIENT
Start: 2022-10-28 | End: 2022-10-28

## 2022-10-28 RX ORDER — CEPHALEXIN 500 MG/1
500 CAPSULE ORAL 2 TIMES DAILY
Qty: 14 CAPSULE | Refills: 0 | Status: SHIPPED | OUTPATIENT
Start: 2022-10-28 | End: 2022-11-04

## 2022-10-28 RX ORDER — SODIUM CHLORIDE 0.9 % (FLUSH) 0.9 %
10 SYRINGE (ML) INJECTION AS NEEDED
Status: DISCONTINUED | OUTPATIENT
Start: 2022-10-28 | End: 2022-10-29 | Stop reason: HOSPADM

## 2022-10-28 RX ORDER — KETOROLAC TROMETHAMINE 30 MG/ML
30 INJECTION, SOLUTION INTRAMUSCULAR; INTRAVENOUS ONCE
Status: COMPLETED | OUTPATIENT
Start: 2022-10-28 | End: 2022-10-28

## 2022-10-28 RX ADMIN — ACETAMINOPHEN 1000 MG: 500 TABLET, FILM COATED ORAL at 20:57

## 2022-10-28 RX ADMIN — SODIUM CHLORIDE 100 ML: 9 INJECTION, SOLUTION INTRAVENOUS at 19:17

## 2022-10-28 RX ADMIN — KETOROLAC TROMETHAMINE 30 MG: 30 INJECTION, SOLUTION INTRAMUSCULAR at 18:17

## 2022-10-28 RX ADMIN — SODIUM CHLORIDE, PRESERVATIVE FREE 10 ML: 5 INJECTION INTRAVENOUS at 19:21

## 2022-10-28 RX ADMIN — IOPAMIDOL 75 ML: 755 INJECTION, SOLUTION INTRAVENOUS at 19:21

## 2022-10-28 ASSESSMENT — ENCOUNTER SYMPTOMS
COUGH: 1
PHOTOPHOBIA: 0
ABDOMINAL PAIN: 1
VOMITING: 0
NAUSEA: 0
SHORTNESS OF BREATH: 1

## 2022-10-28 ASSESSMENT — PAIN DESCRIPTION - DESCRIPTORS
DESCRIPTORS: ACHING

## 2022-10-28 ASSESSMENT — PAIN DESCRIPTION - LOCATION
LOCATION: GENERALIZED

## 2022-10-28 ASSESSMENT — LIFESTYLE VARIABLES
HOW OFTEN DO YOU HAVE A DRINK CONTAINING ALCOHOL: NEVER
HOW OFTEN DO YOU HAVE A DRINK CONTAINING ALCOHOL: NEVER

## 2022-10-28 ASSESSMENT — PAIN SCALES - GENERAL
PAINLEVEL_OUTOF10: 9
PAINLEVEL_OUTOF10: 6
PAINLEVEL_OUTOF10: 9

## 2022-10-28 ASSESSMENT — PAIN - FUNCTIONAL ASSESSMENT: PAIN_FUNCTIONAL_ASSESSMENT: 0-10

## 2022-10-28 NOTE — ED PROVIDER NOTES
[address] ED  Emergency Department Encounter  Emergency Medicine Resident     Pt Name: Lashanda Bledsoe  MRN: [id number]  [place] 1970  Date of evaluation: 10/28/22  PCP:  Emily Ignacio MD    [address] [place] [address]       Chief Complaint   Patient presents with    Chest Pain    Shortness of Breath    Concern For COVID-19       HISTORY OFPRESENT ILLNESS  (Location/Symptom, Timing/Onset, Context/Setting, Quality, Duration, Modifying Factors,Severity.)      Lashanda Bledsoe is a 47 y. o.yo female with history of pulmonary embolism, has been compliant with her medications since the diagnosis last year after she got COVID. Patient on Xarelto. She is presenting today with chest pain/pressure with shortness of breath and generalized body aches. Patient reports that she did a home COVID test yesterday and it was positive and since then she has been having worsening symptoms with myalgia. She states that today she did started having bilateral chest pain over the lower lobe region with hemoptysis. Patient also complained of abdominal pain but no nausea or vomiting. Reports being COVID vaccinated. PAST MEDICAL / SURGICAL / SOCIAL / FAMILY HISTORY      has a past medical history of Anxiety, CAD (coronary artery disease), Fatty liver, GERD (gastroesophageal reflux disease), Headache, History of bronchitis, Hyperlipidemia, Hypothyroidism, Kidney stone, LFT elevation, MVP (mitral valve prolapse), Obesity, Osteoarthritis of cervical spine, Pulmonary emboli ([address] and Umbilical hernia. has a past surgical history that includes Upper gastrointestinal endoscopy (2010); hernia repair; Cholecystectomy; Appendectomy;  section; Colonoscopy (); Colonoscopy (14); Colonoscopy (2014); pr exploratory of abdomen (10/21/2014); Colonoscopy (N/A, 2018); Hysterectomy, total abdominal; Upper gastrointestinal endoscopy (N/A, 3/10/2021); Colonoscopy (N/A, 2021);  Colonoscopy (N/A, 10/13/2022); and Upper gastrointestinal endoscopy (N/A, 10/13/2022). Social History     Socioeconomic History    Marital status:      Spouse name: Not on file    Number of children: Not on file    Years of education: Not on file    Highest education level: Not on file   Occupational History    Occupation: Homemaker   Tobacco Use    Smoking status: Former     Packs/day: 0.25     Years: 33.00     Pack years: 8.25     Types: Cigarettes     Quit date:      Years since quittin.8    Smokeless tobacco: Never    Tobacco comments:     quit on nicoderm patch   Vaping Use    Vaping Use: Never used   Substance and Sexual Activity    Alcohol use: No     Alcohol/week: 0.0 standard drinks    Drug use: No    Sexual activity: Not Currently   Other Topics Concern    Not on file   Social History Narrative    Not on file     Social Determinants of Health     Financial Resource Strain: Low Risk     Difficulty of Paying Living Expenses: Not hard at all   Food Insecurity: No Food Insecurity    Worried About Running Out of Food in the Last Year: Never true    Ran Out of Food in the Last Year: Never true   Transportation Needs: Not on file   Physical Activity: Not on file   Stress: Not on file   Social Connections: Not on file   Intimate Partner Violence: Not on file   Housing Stability: Not on file       Family History   Problem Relation Age of Onset    Cancer Mother         vaginal    Heart Disease Father     Diabetes Father     Other Father         colon resection for colon polyps    Breast Cancer Maternal Grandmother     Stroke Maternal Grandfather         Allergies:  Compazine [prochlorperazine], Sulfa antibiotics, and Adhesive tape    Home Medications:  Prior to Admission medications    Medication Sig Start Date End Date Taking?  Authorizing Provider   cephALEXin (KEFLEX) 500 MG capsule Take 1 capsule by mouth 2 times daily for 7 days 10/28/22 11/4/22 Yes Reed Light MD   sucralfate (CARAFATE) 1 GM tablet TAKE 1 TABLET BY MOUTH FOUR TIMES DAILY 10/18/22   Balbina Maldonado MD   oxyCODONE-acetaminophen (PERCOCET)  MG per tablet Take 1 tablet by mouth every 8 hours as needed for Pain for up to 30 days. 10/10/22 11/9/22  MARTITA Khan - CNP   zolpidem (AMBIEN) 10 MG tablet Take 1 tablet by mouth at bedtime. 8/25/22   Historical Provider, MD   esomeprazole (NEXIUM) 40 MG delayed release capsule TAKE 1 CAPSULE BY MOUTH EVERY MORNING BEFORE BREAKFAST 8/18/22   Balbina Maldonado MD   PEG 6699-YVk-XrRqb-NaCl-NaSulf (PEG-3350/ELECTROLYTES) 236 g SOLR  7/20/22   Historical Provider, MD   rOPINIRole (REQUIP) 2 MG tablet TAKE 1 TABLET BY MOUTH EVERY NIGHT 7/26/22   Balbina Maldonado MD   albuterol sulfate HFA (PROVENTIL;VENTOLIN;PROAIR) 108 (90 Base) MCG/ACT inhaler INHALE 2 PUFFS INTO THE LUNGS EVERY 4 HOURS AS NEEDED FOR SHORTNESS OF BREATH 6/16/22   Balbina Maldonado MD   levothyroxine (SYNTHROID) 200 MCG tablet TAKE 1 TABLET BY MOUTH DAILY 6/3/22   Dev Venegas MD   XARELTO 20 MG TABS tablet TAKE 1 TABLET BY MOUTH DAILY WITH BREAKFAST 3/28/22   Lacey Agosto MD   atorvastatin (LIPITOR) 40 MG tablet TAKE 1 TABLET BY MOUTH EVERY DAY 2/27/22   Historical Provider, MD   clonazePAM (KLONOPIN) 1 MG tablet 2 times daily. 1/26/22   Historical Provider, MD   ondansetron (ZOFRAN ODT) 4 MG disintegrating tablet Take 1 tablet by mouth every 8 hours as needed for Nausea or Vomiting 11/27/21   Kirill Hamilton DO   sertraline (ZOLOFT) 100 MG tablet Take 1.5 tablets by mouth daily 9/9/21   Balbina Maldonado MD   albuterol sulfate HFA (VENTOLIN HFA) 108 (90 Base) MCG/ACT inhaler Inhale 2 puffs into the lungs every 6 hours as needed for Wheezing 6/6/21   Kendra Vargas MD   clonazePAM (KLONOPIN) 0.5 MG tablet Take 1 tablet by mouth 2 times daily as needed for Anxiety for up to 30 days.  12/28/20 2/18/22  Mitali Betts MD   topiramate (TOPAMAX) 25 MG tablet 25 mg 2 times daily  2/27/18   Historical Provider, MD   metoprolol tartrate (LOPRESSOR) 50 MG tablet Take 50 mg by mouth 2 times daily  8/21/17   Historical Provider, MD       REVIEW OFSYSTEMS    (2-9 systems for level 4, 10 or more for level 5)      Review of Systems   Constitutional:  Positive for chills and fatigue. HENT:  Negative for congestion and drooling. Eyes:  Negative for photophobia and visual disturbance. Respiratory:  Positive for cough and shortness of breath. Cardiovascular:  Positive for chest pain. Gastrointestinal:  Positive for abdominal pain. Negative for nausea and vomiting. Endocrine: Negative for polyphagia and polyuria. Genitourinary:  Negative for flank pain and frequency. Musculoskeletal:  Positive for myalgias. Skin:  Negative for rash and wound. Neurological:  Negative for light-headedness and headaches. Psychiatric/Behavioral:  Negative for behavioral problems and confusion. PHYSICAL EXAM   (up to 7 for level 4, 8 or more forlevel 5)      INITIAL VITALS:   ED Triage Vitals [10/28/22 1720]   BP Temp Temp Source Heart Rate Resp SpO2 Height Weight   112/80 99.2 °F (37.3 °C) Oral 79 18 99 % 5' 4\" (1.626 m) 185 lb (83.9 kg)       Physical Exam  Constitutional:       Appearance: She is obese. HENT:      Head: Normocephalic and atraumatic. Nose: Nose normal.      Mouth/Throat:      Mouth: Mucous membranes are dry. Eyes:      Pupils: Pupils are equal, round, and reactive to light. Cardiovascular:      Rate and Rhythm: Normal rate. Pulses: Normal pulses. Pulmonary:      Effort: Pulmonary effort is normal. No respiratory distress. Abdominal:      General: There is no distension. Palpations: Abdomen is soft. Musculoskeletal:         General: No swelling. Normal range of motion. Cervical back: Normal range of motion. No rigidity. Skin:     General: Skin is warm. Coloration: Skin is not jaundiced. Neurological:      General: No focal deficit present. Mental Status: She is alert.    Psychiatric: Mood and Affect: Mood normal.       DIFFERENTIAL  DIAGNOSIS     PLAN (LABS / [facility] / EKG):  Orders Placed This Encounter   Procedures    COVID-19 & Influenza Combo    CT CHEST PULMONARY EMBOLISM W CONTRAST    Comprehensive Metabolic Panel    Troponin    Lipase    Magnesium    Brain Natriuretic Peptide    CBC with Auto Differential    Protime-INR    APTT    Urinalysis with Reflex to Culture    Microscopic Urinalysis    Troponin    Cardiac Monitor    Pulse Oximetry    EKG 12 Lead    Saline lock IV       MEDICATIONS ORDERED:  Orders Placed This Encounter   Medications    ketorolac (TORADOL) injection 30 mg    0.9 % sodium chloride bolus    iopamidol (ISOVUE-370) 76 % injection 75 mL    sodium chloride flush 0.9 % injection 10 mL    acetaminophen (TYLENOL) tablet 1,000 mg    cephALEXin (KEFLEX) 500 MG capsule     Sig: Take 1 capsule by mouth 2 times daily for 7 days     Dispense:  14 capsule     Refill:  0         Initial MDM/Plan: 47 y.o. female who presents with complaints of chest pain and pressure with history of pulmonary embolism. -Differential is Acacian failure of Xarelto and does pulmonary embolism versus COVID versus flu versus ACS versus CHF. Will obtain labs of CBC, metabolic panel, troponin, BNP in addition to COVID swab and flu swab  Patient vitals are reviewed, she is hemodynamically stable, at this moment will not get a chest x-ray however get a CT pulmonary study rule out pulmonary embolism.   Dispo is pending on labs and imaging    DIAGNOSTIC RESULTS / EMERGENCYDEPARTMENT COURSE / MDM     LABS:  Labs Reviewed   COVID-19 & INFLUENZA COMBO - Abnormal; Notable for the following components:       Result Value    SARS-CoV-2 RNA, RT PCR DETECTED (*)     All other components within normal limits   COMPREHENSIVE METABOLIC PANEL - Abnormal; Notable for the following components:    Sodium 133 (*)     All other components within normal limits   CBC WITH AUTO DIFFERENTIAL - Abnormal; Notable for the following components:    Monocytes 8 (*)     All other components within normal limits   PROTIME-INR - Abnormal; Notable for the following components:    Protime 15.8 (*)     All other components within normal limits   APTT - Abnormal; Notable for the following components:    PTT 36.1 (*)     All other components within normal limits   URINALYSIS WITH REFLEX TO CULTURE - Abnormal; Notable for the following components:    Urine Hgb LARGE (*)     Leukocyte Esterase, Urine SMALL (*)     All other components within normal limits   TROPONIN   LIPASE   MAGNESIUM   BRAIN NATRIURETIC PEPTIDE   MICROSCOPIC URINALYSIS   TROPONIN   TROPONIN         RADIOLOGY:  CT CHEST PULMONARY EMBOLISM W CONTRAST    Result Date: 10/28/2022  EXAMINATION: CTA OF THE CHEST 10/28/2022 7:00 pm TECHNIQUE: CTA of the chest was performed after the administration of intravenous contrast.  Multiplanar reformatted images are provided for review. MIP images are provided for review. Automated exposure control, iterative reconstruction, and/or weight based adjustment of the mA/kV was utilized to reduce the radiation dose to as low as reasonably achievable. COMPARISON: May 25, 2022 CT chest HISTORY: ORDERING SYSTEM PROVIDED HISTORY: hx of PE, on xarelto, concern for medication failure TECHNOLOGIST PROVIDED HISTORY: hx of PE, on xarelto, concern for medication failure Decision Support Exception - unselect if not a suspected or confirmed emergency medical condition->Emergency Medical Condition (MA) Reason for Exam: hx of PE, on xarelto, concern for medication failure Additional signs and symptoms: pt coughing up dark red blood Relevant Medical/Surgical History: hx of mitrial valve prolapse, PE FINDINGS: Pulmonary Arteries: Pulmonary arteries are not adequately opacified for evaluation. No evidence of major central intraluminal filling defect to suggest pulmonary embolism. Smaller peripheral filling defects cannot be excluded.   Main pulmonary artery is normal in caliber. Mediastinum: No evidence of mediastinal lymphadenopathy. The heart and pericardium demonstrate no acute abnormality. There is no acute abnormality of the thoracic aorta. Lungs/pleura: The lungs are without acute process. No focal consolidation or pulmonary edema. No evidence of pleural effusion or pneumothorax. 3 mm nodule right middle lobe image number 83. No further follow-up required. Upper Abdomen: Limited images of the upper abdomen are unremarkable. Soft Tissues/Bones: Mild levoconvex scoliosis. Suboptimal opacification of the pulmonary arteries. No major central embolus noted. Small peripheral emboli cannot be excluded. EKG  EKG Interpretation    Interpreted by me    Rhythm: normal sinus   Rate: normal  Axis: normal  Ectopy: none  Conduction: normal  ST Segments: no acute change  T Waves: no acute change  Q Waves: none    Clinical Impression: no acute changes and normal EKG    All EKG's are interpreted by the Emergency Department Physicianwho either signs or Co-signs this chart in the absence of a cardiologist.    EMERGENCY DEPARTMENT COURSE:  ED Course as of 10/28/22 2333   Fri Oct 28, 2022   1902 SARS-CoV-2 RNA, RT PCR(!): DETECTED [AN]   2057 Urine Hgb(!): LARGE [AN]   2057 Leukocyte Esterase, Urine(!): SMALL [AN]   2057 RBC, UA: TOO NUMEROUS TO COUNT [AN]      ED Course User Index  [AN] Mere Thao MD          PROCEDURES:  None    CONSULTS:  None    CRITICAL CARE:      FINAL IMPRESSION      1. COVID-19    2.  Acute cystitis with hematuria          DISPOSITION / PLAN     DISPOSITION Decision To Discharge 10/28/2022 10:13:30 PM      PATIENT REFERRED TO:  [facility] ED  [address] [facility]  [phone]    If symptoms worsen    MD Chema Mays [address] [facility]  [phone]      As needed    DISCHARGE MEDICATIONS:  Discharge Medication List as of 10/28/2022 10:14 PM        START taking these medications    Details   cephALEXin (KEFLEX) 500 MG capsule Take 1 capsule by mouth 2 times daily for 7 days, Disp-14 capsule, R-0Print             Brant Varner MD  Emergency Medicine Resident    (Please note that portions of this note were completed with a voice recognition program.Efforts were made to edit the dictations but occasionally words are mis-transcribed.)        Brant Varner MD  Resident  10/28/22 3082

## 2022-10-28 NOTE — ED TRIAGE NOTES
Mode of arrival (squad #, walk in, police, etc) : walk-in        Chief complaint(s): Chest pain, SOB, fatigue, COVID +        Arrival Note (brief scenario, treatment PTA, etc). : Pt states that she took a covid test yesterday and it came back positive. Pt c/o of chest pain, SOB, and fatigue        C= \"Have you ever felt that you should Cut down on your drinking? \"  No  A= \"Have people Annoyed you by criticizing your drinking? \"  No  G= \"Have you ever felt bad or Guilty about your drinking? \"  No  E= \"Have you ever had a drink as an Eye-opener first thing in the morning to steady your nerves or to help a hangover? \"  No      Deferred []      Reason for deferring: N/A    *If yes to two or more: probable alcohol abuse. *

## 2022-10-28 NOTE — ED PROVIDER NOTES
EMERGENCY DEPARTMENT ENCOUNTER   ATTENDING ATTESTATION     Pt Name: Eusebia Lewis  MRN: [id number]  [place] 1970  Date of evaluation: 10/28/22       Eusebia Lewis is a 47 y.o. female who presents with Chest Pain, Shortness of Breath, and Concern For COVID-19  Previous PE while on Xarelto in the context of covid infection. 2 days of cough, chest pain and hemoptysis, body aches. Positive home covid test. Chest pain feels like cramping and aching in lower chest with coughing and burning in her throat. Also with some suprapubic cramping and urinary freq. MDM:   Covid positive. No increased work of breathing or new O2 req. CT PE with no large central PE, already anticoagulated. Ua consistent with UTI. Troponin neg and EKG NSR with no acute ST segment elevation. Patient reports history of LHC years ago at [facility] with \"narrowing but not blockages of arteries. \" Cardiologist is Dr Kody Nguyen. Given her chest pain and unclear CAD I did recommend admission for cardiac monitoring and cardiology consult but patient prefers to go home. I think this is reasonable given ekg & neg trops, her chest pain is likely explained by her covid, she wouldn't be a candidate for stress test anyways. DC home with abx for UTI and PCP f/up to discuss if she needs a stress test.     Not a candidate for paxlovid due to xarelto    Vitals:   Vitals:    10/28/22 1720   BP: 112/80   Pulse: 79   Resp: 18   Temp: 99.2 °F (37.3 °C)   TempSrc: Oral   SpO2: 99%   Weight: 185 lb (83.9 kg)   Height: 5' 4\" (1.626 m)         I personally saw and examined the patient. I have reviewed and agree with the resident's findings, including all diagnostic interpretations and treatment plan as written. I was present for the key portions of any procedures performed and the inclusive time noted for any critical care statement. The care is provided during an unprecedented national emergency due to the novel coronavirus, COVID 19.   Rosa Pereira, MD  Attending Emergency Physician            Home Calvo MD  10/28/22 7313

## 2022-10-29 NOTE — DISCHARGE INSTRUCTIONS
Because of the UTI, you will be started on the Keflex. Please make you take the medication as prescribed. You also were found to be COVID-positive. Please continue take your Xarelto. If at anytime you have worsening shortness of breath, please return to emergency room as soon as possible. CT CHEST PULMONARY EMBOLISM W CONTRAST   Final Result   Suboptimal opacification of the pulmonary arteries. No major central embolus   noted. Small peripheral emboli cannot be excluded.

## 2022-10-31 LAB
EKG ATRIAL RATE: 83 BPM
EKG P AXIS: 58 DEGREES
EKG P-R INTERVAL: 148 MS
EKG Q-T INTERVAL: 382 MS
EKG QRS DURATION: 72 MS
EKG QTC CALCULATION (BAZETT): 448 MS
EKG R AXIS: 24 DEGREES
EKG T AXIS: 46 DEGREES
EKG VENTRICULAR RATE: 83 BPM

## 2022-10-31 PROCEDURE — 93010 ELECTROCARDIOGRAM REPORT: CPT | Performed by: INTERNAL MEDICINE

## 2022-11-01 DIAGNOSIS — K58.2 IRRITABLE BOWEL SYNDROME WITH BOTH CONSTIPATION AND DIARRHEA: ICD-10-CM

## 2022-11-01 DIAGNOSIS — R13.14 PHARYNGOESOPHAGEAL DYSPHAGIA: ICD-10-CM

## 2022-11-01 DIAGNOSIS — R10.13 ABDOMINAL PAIN, EPIGASTRIC: ICD-10-CM

## 2022-11-01 DIAGNOSIS — E66.01 SEVERE OBESITY (BMI 35.0-39.9) WITH COMORBIDITY (HCC): ICD-10-CM

## 2022-11-01 DIAGNOSIS — F11.90 NARCOTIC DRUG USE: ICD-10-CM

## 2022-11-01 DIAGNOSIS — R97.0 ELEVATED CEA: ICD-10-CM

## 2022-11-07 NOTE — TELEPHONE ENCOUNTER
called left message to call office. past due a1c. Cheek Interpolation Flap Text: A decision was made to reconstruct the defect utilizing an interpolation axial flap and a staged reconstruction.  A telfa template was made of the defect.  This telfa template was then used to outline the Cheek Interpolation flap.  The donor area for the pedicle flap was then injected with anesthesia.  The flap was excised through the skin and subcutaneous tissue down to the layer of the underlying musculature.  The interpolation flap was carefully excised within this deep plane to maintain its blood supply.  The edges of the donor site were undermined.   The donor site was closed in a primary fashion.  The pedicle was then rotated into position and sutured.  Once the tube was sutured into place, adequate blood supply was confirmed with blanching and refill.  The pedicle was then wrapped with xeroform gauze and dressed appropriately with a telfa and gauze bandage to ensure continued blood supply and protect the attached pedicle.

## 2022-11-09 ENCOUNTER — TELEPHONE (OUTPATIENT)
Dept: PAIN MANAGEMENT | Age: 52
End: 2022-11-09

## 2022-11-16 DIAGNOSIS — K21.9 GASTROESOPHAGEAL REFLUX DISEASE WITHOUT ESOPHAGITIS: ICD-10-CM

## 2022-11-16 RX ORDER — ESOMEPRAZOLE MAGNESIUM 40 MG/1
CAPSULE, DELAYED RELEASE ORAL
Qty: 90 CAPSULE | Refills: 0 | Status: SHIPPED | OUTPATIENT
Start: 2022-11-16

## 2022-11-16 RX ORDER — SUCRALFATE 1 G/1
TABLET ORAL
Qty: 120 TABLET | Refills: 0 | Status: SHIPPED | OUTPATIENT
Start: 2022-11-16

## 2022-12-12 ENCOUNTER — HOSPITAL ENCOUNTER (OUTPATIENT)
Dept: PAIN MANAGEMENT | Age: 52
Discharge: HOME | End: 2022-12-12
Payer: COMMERCIAL

## 2022-12-12 VITALS
OXYGEN SATURATION: 97 % | RESPIRATION RATE: 18 BRPM | DIASTOLIC BLOOD PRESSURE: 80 MMHG | SYSTOLIC BLOOD PRESSURE: 120 MMHG | WEIGHT: 185 LBS | BODY MASS INDEX: 31.58 KG/M2 | HEART RATE: 79 BPM | HEIGHT: 64 IN

## 2022-12-12 DIAGNOSIS — Z79.891 CHRONIC USE OF OPIATE FOR THERAPEUTIC PURPOSE: ICD-10-CM

## 2022-12-12 DIAGNOSIS — M54.12 CERVICAL RADICULOPATHY: ICD-10-CM

## 2022-12-12 DIAGNOSIS — M47.812 ARTHROPATHY OF CERVICAL FACET JOINT: ICD-10-CM

## 2022-12-12 DIAGNOSIS — M47.812 SPONDYLOSIS OF CERVICAL REGION WITHOUT MYELOPATHY OR RADICULOPATHY: Primary | ICD-10-CM

## 2022-12-12 PROCEDURE — 99213 OFFICE O/P EST LOW 20 MIN: CPT

## 2022-12-12 PROCEDURE — 99213 OFFICE O/P EST LOW 20 MIN: CPT | Performed by: NURSE PRACTITIONER

## 2022-12-12 ASSESSMENT — ENCOUNTER SYMPTOMS
COUGH: 0
CONSTIPATION: 0
BACK PAIN: 1
SHORTNESS OF BREATH: 0

## 2022-12-12 NOTE — PROGRESS NOTES
Chief Complaint   Patient presents with    Neck Pain       PMH   Pt c/o chronic neck pain that is located midline and left side of her neck with radiaiont up to occipital area and at time in left shoulder down to fingers. She has limited ROM of her neck. She has seen Neurosurgeon in the past  and surgery was not recommended. She had CT cervical spine 3-4-21 which read as : mild to moderate cervical degenerative disc disease with moderate bony neural foraminal narrowing on the left at C3-C4, C5- C6. She had confirmatory  Left Cervical Medical branch nerve block at C2 / C3 / C4 / C5 nerves on 1-13-22 with 100% relief for a few days. She would like to proceed with RFA. Clearance received to hold Xarelto - scanned into media. She had to cancel procedure due to having Covid. Per Dr. Michelle Starr plan of care, pt must be completely off Klonopin and Ambien by September to remain on current dose of opiates. She was working with her provider at [facility] to taper these medications but has been unsuccessful at discontinuing these. Did have a conversation with pt today that we will no longer be able to write prescriptions for her opiates. She verbalizes understanding. Neck Pain   This is a chronic problem. The current episode started more than 1 year ago. The problem occurs constantly. The problem has been gradually worsening. The pain is associated with nothing. The pain is present in the left side and midline. The quality of the pain is described as shooting and stabbing. The pain is at a severity of 5/10. The pain is mild. The symptoms are aggravated by bending, position and twisting. The pain is Same all the time. Stiffness is present All day. Associated symptoms include headaches, numbness, tingling and weakness. Pertinent negatives include no chest pain or fever. She has tried bed rest, ice, home exercises, heat and neck support for the symptoms. Patient denies any new neurological symptoms.  No bowel or bladder incontinence, no weakness, and no falling.       Past Medical History:   Diagnosis Date    Anxiety     CAD (coronary artery disease)     Mitral valve prolapse    Fatty liver     GERD (gastroesophageal reflux disease)     Headache     History of bronchitis     Hyperlipidemia     Hypothyroidism     Kidney stone     LFT elevation     MVP (mitral valve prolapse)     Obesity     Osteoarthritis of cervical spine     Pulmonary emboli ([address]     Umbilical hernia        Past Surgical History:   Procedure Laterality Date    APPENDECTOMY       SECTION      2 pfannenstiel, 1 vertical    CHOLECYSTECTOMY      COLONOSCOPY      Dr Jyoti [place]    COLONOSCOPY  14    COLONOSCOPY  2014    biopsy & sigmoid spasms, pathology negative    COLONOSCOPY N/A 2018    COLONOSCOPY WITH BIOPSY performed by Dragan Sher MD at [address] N/A 2021    COLONOSCOPY DIAGNOSTIC performed by Cinda Rodgers MD at [address] N/A 10/13/2022    COLONOSCOPY DIAGNOSTIC performed by Cinda Rodgers MD at [address] [facility]      x 5    HYSTERECTOMY, TOTAL ABDOMINAL (CERVIX REMOVED)          HI EXPLORATORY OF ABDOMEN  10/21/2014    Laparotomy-lysis of adhesions, bso     UPPER GASTROINTESTINAL ENDOSCOPY  2010    mild chronic inactive gastritis    UPPER GASTROINTESTINAL ENDOSCOPY N/A 3/10/2021    EGD BIOPSY performed by Cinda Rodgers MD at [address] N/A 10/13/2022    EGD ESOPHAGOGASTRODUODENOSCOPY performed by Cinda Rodgers MD at [address] [facility]   Allergen Reactions    Compazine [Prochlorperazine]      Jittery, restless    Sulfa Antibiotics Hives    Adhesive Tape Rash         Current Outpatient Medications:     esomeprazole (NEXIUM) 40 MG delayed release capsule, TAKE 1 CAPSULE BY MOUTH EVERY MORNING BEFORE BREAKFAST, Disp: 90 capsule, Rfl: 0    sucralfate (CARAFATE) 1 GM tablet, TAKE 1 TABLET BY MOUTH FOUR TIMES DAILY, Disp: 120 tablet, Rfl: 0 zolpidem (AMBIEN) 10 MG tablet, Take 1 tablet by mouth at bedtime. , Disp: , Rfl:     PEG 3350-KCl-NaBcb-NaCl-NaSulf (PEG-3350/ELECTROLYTES) 236 g SOLR, , Disp: , Rfl:     rOPINIRole (REQUIP) 2 MG tablet, TAKE 1 TABLET BY MOUTH EVERY NIGHT, Disp: 90 tablet, Rfl: 1    albuterol sulfate HFA (PROVENTIL;VENTOLIN;PROAIR) 108 (90 Base) MCG/ACT inhaler, INHALE 2 PUFFS INTO THE LUNGS EVERY 4 HOURS AS NEEDED FOR SHORTNESS OF BREATH, Disp: 42.5 g, Rfl: 3    levothyroxine (SYNTHROID) 200 MCG tablet, TAKE 1 TABLET BY MOUTH DAILY, Disp: 90 tablet, Rfl: 3    XARELTO 20 MG TABS tablet, TAKE 1 TABLET BY MOUTH DAILY WITH BREAKFAST, Disp: 90 tablet, Rfl: 3    atorvastatin (LIPITOR) 40 MG tablet, TAKE 1 TABLET BY MOUTH EVERY DAY, Disp: , Rfl:     clonazePAM (KLONOPIN) 1 MG tablet, 2 times daily. , Disp: , Rfl:     ondansetron (ZOFRAN ODT) 4 MG disintegrating tablet, Take 1 tablet by mouth every 8 hours as needed for Nausea or Vomiting, Disp: 20 tablet, Rfl: 0    sertraline (ZOLOFT) 100 MG tablet, Take 1.5 tablets by mouth daily, Disp: 45 tablet, Rfl: 2    albuterol sulfate HFA (VENTOLIN HFA) 108 (90 Base) MCG/ACT inhaler, Inhale 2 puffs into the lungs every 6 hours as needed for Wheezing, Disp: 1 Inhaler, Rfl: 3    clonazePAM (KLONOPIN) 0.5 MG tablet, Take 1 tablet by mouth 2 times daily as needed for Anxiety for up to 30 days. , Disp: 60 tablet, Rfl: 1    topiramate (TOPAMAX) 25 MG tablet, 25 mg 2 times daily , Disp: , Rfl:     metoprolol tartrate (LOPRESSOR) 50 MG tablet, Take 50 mg by mouth 2 times daily , Disp: , Rfl:     Family History   Problem Relation Age of Onset    Cancer Mother         vaginal    Heart Disease Father     Diabetes Father     Other Father         colon resection for colon polyps    Breast Cancer Maternal Grandmother     Stroke Maternal Grandfather        Social History     Socioeconomic History    Marital status:      Spouse name: Not on file    Number of children: Not on file    Years of education: Not on file    Highest education level: Not on file   Occupational History    Occupation: Homemaker   Tobacco Use    Smoking status: Former     Packs/day: 0.25     Years: 33.00     Pack years: 8.25     Types: Cigarettes     Quit date:      Years since quittin.9    Smokeless tobacco: Never    Tobacco comments:     quit on nicoderm patch   Vaping Use    Vaping Use: Never used   Substance and Sexual Activity    Alcohol use: No     Alcohol/week: 0.0 standard drinks    Drug use: No    Sexual activity: Not Currently   Other Topics Concern    Not on file   Social History Narrative    Not on file     Social Determinants of Health     Financial Resource Strain: Low Risk     Difficulty of Paying Living Expenses: Not hard at all   Food Insecurity: No Food Insecurity    Worried About Running Out of Food in the Last Year: Never true    Ran Out of Food in the Last Year: Never true   Transportation Needs: Not on file   Physical Activity: Not on file   Stress: Not on file   Social Connections: Not on file   Intimate Partner Violence: Not on file   Housing Stability: Not on file       Review of Systems:  Review of Systems   Constitutional: Negative for chills and fever. Cardiovascular:  Negative for chest pain and palpitations. Respiratory:  Negative for cough and shortness of breath. Musculoskeletal:  Positive for back pain and neck pain. Gastrointestinal:  Negative for constipation. Neurological:  Positive for headaches, numbness, tingling and weakness. Negative for disturbances in coordination and loss of balance. Physical Exam:  Ht 5' 4\" (1.626 m)   Wt 185 lb (83.9 kg)   LMP 2010   BMI 31.76 kg/m²     Physical Exam  HENT:      Head: Normocephalic. Pulmonary:      Effort: Pulmonary effort is normal.   Musculoskeletal:         General: Normal range of motion. Cervical back: Normal range of motion. Tenderness present. Lumbar back: Tenderness present. Skin:     General: Skin is warm and dry. Neurological:      Mental Status: She is alert and oriented to person, place, and time. Record/Diagnostics Review:    Last mychal 3/2022 and was appropriate     Assessment:  Problem List Items Addressed This Visit       Chronic use of opiate for therapeutic purpose    Spondylosis of cervical region without myelopathy or radiculopathy - Primary    Arthropathy of cervical facet joint    Cervical radiculopathy          Treatment Plan:  Patient would like to reschedule cervical RFA  Per Dr. Roland Ruiz plan of care, pt must be completely off Klonopin and Ambien by September to remain on current dose of opiates. She was working with her provider at [facility] to taper these medications but has been unsuccessful at discontinuing these. Did have a conversation with pt today that we will no longer be able to write prescriptions for her opiates. She verbalizes understanding. Follow up after procedure    I have reviewed the chief complaint and history of present illness (including ROS and PFSH) and vital documentation by my staff and I agree with their documentation and have added where applicable.

## 2023-01-01 ENCOUNTER — APPOINTMENT (OUTPATIENT)
Dept: CT IMAGING | Age: 53
DRG: 020 | End: 2023-01-01
Payer: COMMERCIAL

## 2023-01-01 ENCOUNTER — APPOINTMENT (OUTPATIENT)
Dept: GENERAL RADIOLOGY | Age: 53
DRG: 020 | End: 2023-01-01
Payer: COMMERCIAL

## 2023-01-01 ENCOUNTER — TELEPHONE (OUTPATIENT)
Dept: GASTROENTEROLOGY | Age: 53
End: 2023-01-01

## 2023-01-01 ENCOUNTER — APPOINTMENT (OUTPATIENT)
Dept: INTERVENTIONAL RADIOLOGY/VASCULAR | Age: 53
DRG: 020 | End: 2023-01-01
Payer: COMMERCIAL

## 2023-01-01 ENCOUNTER — APPOINTMENT (OUTPATIENT)
Dept: VASCULAR LAB | Age: 53
DRG: 020 | End: 2023-01-01
Payer: COMMERCIAL

## 2023-01-01 ENCOUNTER — APPOINTMENT (OUTPATIENT)
Dept: ULTRASOUND IMAGING | Age: 53
DRG: 020 | End: 2023-01-01
Payer: COMMERCIAL

## 2023-01-01 ENCOUNTER — HOSPITAL ENCOUNTER (INPATIENT)
Dept: VASCULAR LAB | Age: 53
Discharge: HOME | DRG: 020 | End: 2023-09-07
Payer: COMMERCIAL

## 2023-01-01 ENCOUNTER — APPOINTMENT (OUTPATIENT)
Age: 53
DRG: 020 | End: 2023-01-01
Payer: COMMERCIAL

## 2023-01-01 ENCOUNTER — APPOINTMENT (OUTPATIENT)
Dept: MRI IMAGING | Age: 53
DRG: 020 | End: 2023-01-01
Payer: COMMERCIAL

## 2023-01-01 ENCOUNTER — HOSPITAL ENCOUNTER (INPATIENT)
Age: 53
LOS: 16 days | DRG: 020 | End: 2023-09-11
Attending: EMERGENCY MEDICINE | Admitting: PSYCHIATRY & NEUROLOGY
Payer: COMMERCIAL

## 2023-01-01 VITALS
BODY MASS INDEX: 35 KG/M2 | OXYGEN SATURATION: 84 % | WEIGHT: 205 LBS | TEMPERATURE: 100.7 F | DIASTOLIC BLOOD PRESSURE: 77 MMHG | SYSTOLIC BLOOD PRESSURE: 144 MMHG | HEIGHT: 64 IN

## 2023-01-01 DIAGNOSIS — I26.99 PULMONARY EMBOLISM ON RIGHT (HCC): ICD-10-CM

## 2023-01-01 DIAGNOSIS — I60.4: ICD-10-CM

## 2023-01-01 DIAGNOSIS — R50.9 FEVER, UNSPECIFIED FEVER CAUSE: ICD-10-CM

## 2023-01-01 DIAGNOSIS — I60.9 SAH (SUBARACHNOID HEMORRHAGE) (HCC): Primary | ICD-10-CM

## 2023-01-01 DIAGNOSIS — R10.13 ABDOMINAL PAIN, EPIGASTRIC: ICD-10-CM

## 2023-01-01 DIAGNOSIS — I60.8 ANEURYSMAL SUBARACHNOID HEMORRHAGE (HCC): ICD-10-CM

## 2023-01-01 DIAGNOSIS — I48.0 PAROXYSMAL ATRIAL FIBRILLATION (HCC): ICD-10-CM

## 2023-01-01 LAB
ABO + RH BLD: NORMAL
ABO/RH: NORMAL
ABO/RH: NORMAL
ACT BLD: 122 SEC (ref 79–149)
ALBUMIN SERPL-MCNC: 2.7 G/DL (ref 3.5–5.2)
ALBUMIN SERPL-MCNC: 3.1 G/DL (ref 3.5–5.2)
ALBUMIN SERPL-MCNC: 3.4 G/DL (ref 3.5–5.2)
ALBUMIN/GLOB SERPL: 0.9 {RATIO} (ref 1–2.5)
ALBUMIN/GLOB SERPL: 0.9 {RATIO} (ref 1–2.5)
ALBUMIN/GLOB SERPL: 1.4 {RATIO} (ref 1–2.5)
ALLEN TEST: ABNORMAL
ALLEN TEST: NORMAL
ALLEN TEST: POSITIVE
ALP SERPL-CCNC: 118 U/L (ref 35–104)
ALP SERPL-CCNC: 70 U/L (ref 35–104)
ALP SERPL-CCNC: 89 U/L (ref 35–104)
ALT SERPL-CCNC: 18 U/L (ref 5–33)
ALT SERPL-CCNC: 22 U/L (ref 5–33)
ALT SERPL-CCNC: 33 U/L (ref 5–33)
AMYLASE SERPL-CCNC: 132 U/L (ref 28–100)
AMYLASE SERPL-CCNC: 163 U/L (ref 28–100)
ANION GAP SERPL CALCULATED.3IONS-SCNC: 10 MMOL/L (ref 9–17)
ANION GAP SERPL CALCULATED.3IONS-SCNC: 10 MMOL/L (ref 9–17)
ANION GAP SERPL CALCULATED.3IONS-SCNC: 11 MMOL/L (ref 9–17)
ANION GAP SERPL CALCULATED.3IONS-SCNC: 12 MMOL/L (ref 9–17)
ANION GAP SERPL CALCULATED.3IONS-SCNC: 13 MMOL/L (ref 9–17)
ANION GAP SERPL CALCULATED.3IONS-SCNC: 14 MMOL/L (ref 9–17)
ANION GAP SERPL CALCULATED.3IONS-SCNC: 14 MMOL/L (ref 9–17)
ANION GAP SERPL CALCULATED.3IONS-SCNC: 16 MMOL/L (ref 9–17)
ANION GAP SERPL CALCULATED.3IONS-SCNC: 17 MMOL/L (ref 9–17)
ANION GAP SERPL CALCULATED.3IONS-SCNC: 17 MMOL/L (ref 9–17)
ANION GAP SERPL CALCULATED.3IONS-SCNC: 8 MMOL/L (ref 9–17)
ANION GAP SERPL CALCULATED.3IONS-SCNC: 9 MMOL/L (ref 9–17)
ANION GAP SERPL CALCULATED.3IONS-SCNC: 9 MMOL/L (ref 9–17)
ANTI-XA UNFRAC HEPARIN: 0.12 IU/L
ANTI-XA UNFRAC HEPARIN: 0.18 IU/L
ANTI-XA UNFRAC HEPARIN: 0.2 IU/L
ANTI-XA UNFRAC HEPARIN: 0.21 IU/L
ANTI-XA UNFRAC HEPARIN: 0.29 IU/L
ANTI-XA UNFRAC HEPARIN: 0.37 IU/L
ANTI-XA UNFRAC HEPARIN: 0.4 IU/L
ANTI-XA UNFRAC HEPARIN: 0.43 IU/L
ANTI-XA UNFRAC HEPARIN: 0.46 IU/L
ANTI-XA UNFRAC HEPARIN: <0.1 IU/L
ANTI-XA UNFRAC HEPARIN: <0.1 IU/L
ANTIBODY SCREEN: NEGATIVE
ANTIBODY SCREEN: NEGATIVE
APPEARANCE CSF: ABNORMAL
ARM BAND NUMBER: NORMAL
AST SERPL-CCNC: 28 U/L
AST SERPL-CCNC: 29 U/L
AST SERPL-CCNC: 47 U/L
BASOPHILS # BLD: 0 K/UL (ref 0–0.2)
BASOPHILS # BLD: 0.03 K/UL (ref 0–0.2)
BASOPHILS # BLD: 0.03 K/UL (ref 0–0.2)
BASOPHILS # BLD: 0.04 K/UL (ref 0–0.2)
BASOPHILS # BLD: 0.04 K/UL (ref 0–0.2)
BASOPHILS # BLD: 0.05 K/UL (ref 0–0.2)
BASOPHILS # BLD: 0.05 K/UL (ref 0–0.2)
BASOPHILS # BLD: 0.06 K/UL (ref 0–0.2)
BASOPHILS # BLD: 0.07 K/UL (ref 0–0.2)
BASOPHILS # BLD: 0.08 K/UL (ref 0–0.2)
BASOPHILS # BLD: 0.11 K/UL (ref 0–0.2)
BASOPHILS # BLD: 0.18 K/UL (ref 0–0.2)
BASOPHILS # BLD: <0.03 K/UL (ref 0–0.2)
BASOPHILS NFR BLD: 0 % (ref 0–2)
BASOPHILS NFR BLD: 1 % (ref 0–2)
BILIRUB DIRECT SERPL-MCNC: 0.1 MG/DL
BILIRUB DIRECT SERPL-MCNC: <0.1 MG/DL
BILIRUB INDIRECT SERPL-MCNC: 0.2 MG/DL (ref 0–1)
BILIRUB INDIRECT SERPL-MCNC: ABNORMAL MG/DL (ref 0–1)
BILIRUB SERPL-MCNC: 0.2 MG/DL (ref 0.3–1.2)
BILIRUB SERPL-MCNC: 0.2 MG/DL (ref 0.3–1.2)
BILIRUB SERPL-MCNC: 0.3 MG/DL (ref 0.3–1.2)
BILIRUB UR QL STRIP: NEGATIVE
BLOOD BANK BLOOD PRODUCT EXPIRATION DATE: NORMAL
BLOOD BANK DISPENSE STATUS: NORMAL
BLOOD BANK ISBT PRODUCT BLOOD TYPE: 9500
BLOOD BANK PRODUCT CODE: NORMAL
BLOOD BANK SAMPLE EXPIRATION: NORMAL
BLOOD BANK UNIT TYPE AND RH: NORMAL
BLOOD GROUP ANTIBODIES SERPL: NEGATIVE
BODY TEMPERATURE: 37
BPU ID: NORMAL
BUN BLD-MCNC: 11 MG/DL (ref 8–26)
BUN BLD-MCNC: 11 MG/DL (ref 8–26)
BUN BLD-MCNC: 12 MG/DL (ref 8–26)
BUN BLD-MCNC: 6 MG/DL (ref 8–26)
BUN SERPL-MCNC: 10 MG/DL (ref 6–20)
BUN SERPL-MCNC: 11 MG/DL (ref 6–20)
BUN SERPL-MCNC: 11 MG/DL (ref 6–20)
BUN SERPL-MCNC: 12 MG/DL (ref 6–20)
BUN SERPL-MCNC: 13 MG/DL (ref 6–20)
BUN SERPL-MCNC: 14 MG/DL (ref 6–20)
BUN SERPL-MCNC: 15 MG/DL (ref 6–20)
BUN SERPL-MCNC: 16 MG/DL (ref 6–20)
BUN SERPL-MCNC: 18 MG/DL (ref 6–20)
BUN SERPL-MCNC: 18 MG/DL (ref 6–20)
BUN SERPL-MCNC: 7 MG/DL (ref 6–20)
BUN SERPL-MCNC: 8 MG/DL (ref 6–20)
BUN SERPL-MCNC: 8 MG/DL (ref 6–20)
BUN SERPL-MCNC: 9 MG/DL (ref 6–20)
C DIFF GDH + TOXINS A+B STL QL IA.RAPID: NEGATIVE
CA-I BLD-SCNC: 1.05 MMOL/L (ref 1.13–1.33)
CA-I BLD-SCNC: 1.1 MMOL/L (ref 1.13–1.33)
CA-I BLD-SCNC: 1.11 MMOL/L (ref 1.13–1.33)
CA-I BLD-SCNC: 1.13 MMOL/L (ref 1.15–1.33)
CA-I BLD-SCNC: 1.13 MMOL/L (ref 1.15–1.33)
CA-I BLD-SCNC: 1.15 MMOL/L (ref 1.13–1.33)
CA-I BLD-SCNC: 1.16 MMOL/L (ref 1.15–1.33)
CA-I BLD-SCNC: 1.19 MMOL/L (ref 1.15–1.33)
CALCIUM SERPL-MCNC: 5.4 MG/DL (ref 8.6–10.4)
CALCIUM SERPL-MCNC: 7.3 MG/DL (ref 8.6–10.4)
CALCIUM SERPL-MCNC: 7.4 MG/DL (ref 8.6–10.4)
CALCIUM SERPL-MCNC: 7.5 MG/DL (ref 8.6–10.4)
CALCIUM SERPL-MCNC: 7.5 MG/DL (ref 8.6–10.4)
CALCIUM SERPL-MCNC: 7.8 MG/DL (ref 8.6–10.4)
CALCIUM SERPL-MCNC: 7.8 MG/DL (ref 8.6–10.4)
CALCIUM SERPL-MCNC: 7.9 MG/DL (ref 8.6–10.4)
CALCIUM SERPL-MCNC: 8 MG/DL (ref 8.6–10.4)
CALCIUM SERPL-MCNC: 8.1 MG/DL (ref 8.6–10.4)
CALCIUM SERPL-MCNC: 8.2 MG/DL (ref 8.6–10.4)
CALCIUM SERPL-MCNC: 8.2 MG/DL (ref 8.6–10.4)
CALCIUM SERPL-MCNC: 8.4 MG/DL (ref 8.6–10.4)
CALCIUM SERPL-MCNC: 8.4 MG/DL (ref 8.6–10.4)
CALCIUM SERPL-MCNC: 9.3 MG/DL (ref 8.6–10.4)
CASTS #/AREA URNS LPF: ABNORMAL /LPF (ref 0–8)
CHLORIDE BLD-SCNC: 106 MMOL/L (ref 98–107)
CHLORIDE BLD-SCNC: 109 MMOL/L (ref 98–107)
CHLORIDE BLD-SCNC: 109 MMOL/L (ref 98–107)
CHLORIDE BLD-SCNC: 111 MMOL/L (ref 98–107)
CHLORIDE SERPL-SCNC: 101 MMOL/L (ref 98–107)
CHLORIDE SERPL-SCNC: 102 MMOL/L (ref 98–107)
CHLORIDE SERPL-SCNC: 102 MMOL/L (ref 98–107)
CHLORIDE SERPL-SCNC: 103 MMOL/L (ref 98–107)
CHLORIDE SERPL-SCNC: 103 MMOL/L (ref 98–107)
CHLORIDE SERPL-SCNC: 104 MMOL/L (ref 98–107)
CHLORIDE SERPL-SCNC: 106 MMOL/L (ref 98–107)
CHLORIDE SERPL-SCNC: 107 MMOL/L (ref 98–107)
CHLORIDE SERPL-SCNC: 109 MMOL/L (ref 98–107)
CHLORIDE SERPL-SCNC: 113 MMOL/L (ref 98–107)
CHLORIDE SERPL-SCNC: 113 MMOL/L (ref 98–107)
CHLORIDE SERPL-SCNC: 114 MMOL/L (ref 98–107)
CHLORIDE SERPL-SCNC: 120 MMOL/L (ref 98–107)
CHLORIDE SERPL-SCNC: 99 MMOL/L (ref 98–107)
CHOLEST SERPL-MCNC: 129 MG/DL
CHOLESTEROL/HDL RATIO: 3.9
CK SERPL-CCNC: 66 U/L (ref 26–192)
CLARITY UR: CLEAR
CO2 BLD CALC-SCNC: 17 MMOL/L (ref 22–30)
CO2 BLD CALC-SCNC: 17 MMOL/L (ref 22–30)
CO2 BLD CALC-SCNC: 19 MMOL/L (ref 22–30)
CO2 BLD CALC-SCNC: 20 MMOL/L (ref 22–30)
CO2 SERPL-SCNC: 14 MMOL/L (ref 20–31)
CO2 SERPL-SCNC: 15 MMOL/L (ref 20–31)
CO2 SERPL-SCNC: 16 MMOL/L (ref 20–31)
CO2 SERPL-SCNC: 17 MMOL/L (ref 20–31)
CO2 SERPL-SCNC: 17 MMOL/L (ref 20–31)
CO2 SERPL-SCNC: 19 MMOL/L (ref 20–31)
CO2 SERPL-SCNC: 19 MMOL/L (ref 20–31)
CO2 SERPL-SCNC: 20 MMOL/L (ref 20–31)
CO2 SERPL-SCNC: 21 MMOL/L (ref 20–31)
CO2 SERPL-SCNC: 21 MMOL/L (ref 20–31)
CO2 SERPL-SCNC: 22 MMOL/L (ref 20–31)
CO2 SERPL-SCNC: 22 MMOL/L (ref 20–31)
CO2 SERPL-SCNC: 24 MMOL/L (ref 20–31)
COHGB MFR BLD: 2.7 % (ref 0–5)
COLOR UR: YELLOW
COMMENT: ABNORMAL
COMPONENT: NORMAL
CREAT SERPL-MCNC: 0.2 MG/DL (ref 0.5–0.9)
CREAT SERPL-MCNC: 0.3 MG/DL (ref 0.5–0.9)
CREAT SERPL-MCNC: 0.3 MG/DL (ref 0.5–0.9)
CREAT SERPL-MCNC: 0.4 MG/DL (ref 0.5–0.9)
CREAT SERPL-MCNC: 0.5 MG/DL (ref 0.5–0.9)
CREAT SERPL-MCNC: 0.6 MG/DL (ref 0.5–0.9)
CREAT SERPL-MCNC: 0.6 MG/DL (ref 0.5–0.9)
CREAT SERPL-MCNC: 0.7 MG/DL (ref 0.5–0.9)
CREAT SERPL-MCNC: 0.9 MG/DL (ref 0.5–0.9)
CREAT SERPL-MCNC: 0.9 MG/DL (ref 0.5–0.9)
CRITICAL ACTION: NORMAL
CRITICAL NOTIFICATION DATE/TIME: NORMAL
CRITICAL NOTIFICATION: NORMAL
CRITICAL VALUE READ BACK: YES
CROSSMATCH RESULT: NORMAL
CRP SERPL HS-MCNC: 109.3 MG/L (ref 0–5)
DATE, STOOL #1: ABNORMAL
ECHO AO ASC DIAM: 2.9 CM
ECHO AO ASC DIAM: 2.9 CM
ECHO AO ASCENDING AORTA INDEX: 1.46 CM/M2
ECHO AO ASCENDING AORTA INDEX: 1.46 CM/M2
ECHO AO ROOT DIAM: 2.8 CM
ECHO AO ROOT DIAM: 2.8 CM
ECHO AO ROOT INDEX: 1.41 CM/M2
ECHO AO ROOT INDEX: 1.41 CM/M2
ECHO AV AREA PEAK VELOCITY: 2.8 CM2
ECHO AV AREA PEAK VELOCITY: 3.2 CM2
ECHO AV AREA VTI: 2.9 CM2
ECHO AV AREA VTI: 3.2 CM2
ECHO AV AREA/BSA PEAK VELOCITY: 1.4 CM2/M2
ECHO AV AREA/BSA PEAK VELOCITY: 1.6 CM2/M2
ECHO AV AREA/BSA VTI: 1.5 CM2/M2
ECHO AV AREA/BSA VTI: 1.6 CM2/M2
ECHO AV MEAN GRADIENT: 6 MMHG
ECHO AV MEAN GRADIENT: 8 MMHG
ECHO AV MEAN VELOCITY: 1.2 M/S
ECHO AV MEAN VELOCITY: 1.3 M/S
ECHO AV PEAK GRADIENT: 10 MMHG
ECHO AV PEAK GRADIENT: 13 MMHG
ECHO AV PEAK VELOCITY: 1.6 M/S
ECHO AV PEAK VELOCITY: 1.8 M/S
ECHO AV VELOCITY RATIO: 0.89
ECHO AV VELOCITY RATIO: 1
ECHO AV VTI: 26.9 CM
ECHO AV VTI: 37 CM
ECHO BSA: 2.05 M2
ECHO LA AREA 2C: 19.9 CM2
ECHO LA AREA 4C: 20.3 CM2
ECHO LA DIAMETER INDEX: 1.62 CM/M2
ECHO LA DIAMETER INDEX: 1.77 CM/M2
ECHO LA DIAMETER: 3.2 CM
ECHO LA DIAMETER: 3.5 CM
ECHO LA MAJOR AXIS: 6.2 CM
ECHO LA MINOR AXIS: 5.3 CM
ECHO LA TO AORTIC ROOT RATIO: 1.14
ECHO LA TO AORTIC ROOT RATIO: 1.25
ECHO LA VOL 2C: 61 ML (ref 22–52)
ECHO LA VOL 4C: 51 ML (ref 22–52)
ECHO LA VOL BP: 60 ML (ref 22–52)
ECHO LA VOL/BSA BIPLANE: 30 ML/M2 (ref 16–34)
ECHO LA VOLUME INDEX A2C: 31 ML/M2 (ref 16–34)
ECHO LA VOLUME INDEX A4C: 26 ML/M2 (ref 16–34)
ECHO LV E' LATERAL VELOCITY: 7 CM/S
ECHO LV E' LATERAL VELOCITY: 9 CM/S
ECHO LV E' SEPTAL VELOCITY: 7 CM/S
ECHO LV E' SEPTAL VELOCITY: 8 CM/S
ECHO LV EDV 3D: 154 ML
ECHO LV EDV INDEX 3D: 78 ML/M2
ECHO LV EJECTION FRACTION 3D: 59 %
ECHO LV ESV 3D: 64 ML
ECHO LV ESV INDEX 3D: 32 ML/M2
ECHO LV FRACTIONAL SHORTENING: 32 % (ref 28–44)
ECHO LV FRACTIONAL SHORTENING: 44 % (ref 28–44)
ECHO LV INTERNAL DIMENSION DIASTOLE INDEX: 1.92 CM/M2
ECHO LV INTERNAL DIMENSION DIASTOLE INDEX: 2.17 CM/M2
ECHO LV INTERNAL DIMENSION DIASTOLIC: 3.8 CM (ref 3.9–5.3)
ECHO LV INTERNAL DIMENSION DIASTOLIC: 4.3 CM (ref 3.9–5.3)
ECHO LV INTERNAL DIMENSION SYSTOLIC INDEX: 1.21 CM/M2
ECHO LV INTERNAL DIMENSION SYSTOLIC INDEX: 1.31 CM/M2
ECHO LV INTERNAL DIMENSION SYSTOLIC: 2.4 CM
ECHO LV INTERNAL DIMENSION SYSTOLIC: 2.6 CM
ECHO LV IVSD: 1 CM (ref 0.6–0.9)
ECHO LV IVSD: 1.4 CM (ref 0.6–0.9)
ECHO LV MASS 2D: 142.5 G (ref 67–162)
ECHO LV MASS 2D: 153.2 G (ref 67–162)
ECHO LV MASS 3D INDEX: 127.8 G/M2
ECHO LV MASS 3D: 253 G
ECHO LV MASS INDEX 2D: 72 G/M2 (ref 43–95)
ECHO LV MASS INDEX 2D: 77.4 G/M2 (ref 43–95)
ECHO LV POSTERIOR WALL DIASTOLIC: 1 CM (ref 0.6–0.9)
ECHO LV POSTERIOR WALL DIASTOLIC: 1 CM (ref 0.6–0.9)
ECHO LV RELATIVE WALL THICKNESS RATIO: 0.47
ECHO LV RELATIVE WALL THICKNESS RATIO: 0.53
ECHO LVOT AREA: 3.1 CM2
ECHO LVOT AREA: 3.1 CM2
ECHO LVOT AV VTI INDEX: 0.93
ECHO LVOT AV VTI INDEX: 1.01
ECHO LVOT DIAM: 2 CM
ECHO LVOT DIAM: 2 CM
ECHO LVOT MEAN GRADIENT: 5 MMHG
ECHO LVOT MEAN GRADIENT: 6 MMHG
ECHO LVOT PEAK GRADIENT: 11 MMHG
ECHO LVOT PEAK GRADIENT: 11 MMHG
ECHO LVOT PEAK VELOCITY: 1.6 M/S
ECHO LVOT PEAK VELOCITY: 1.6 M/S
ECHO LVOT STROKE VOLUME INDEX: 43 ML/M2
ECHO LVOT STROKE VOLUME INDEX: 54.4 ML/M2
ECHO LVOT SV: 107.7 ML
ECHO LVOT SV: 85.1 ML
ECHO LVOT VTI: 27.1 CM
ECHO LVOT VTI: 34.3 CM
ECHO MV A VELOCITY: 1.12 M/S
ECHO MV A VELOCITY: 1.26 M/S
ECHO MV AREA VTI: 2.6 CM2
ECHO MV AREA VTI: 2.8 CM2
ECHO MV E DECELERATION TIME (DT): 196 MS
ECHO MV E DECELERATION TIME (DT): 248 MS
ECHO MV E VELOCITY: 1.16 M/S
ECHO MV E VELOCITY: 1.35 M/S
ECHO MV E/A RATIO: 1.04
ECHO MV E/A RATIO: 1.07
ECHO MV E/E' LATERAL: 15
ECHO MV E/E' LATERAL: 16.57
ECHO MV E/E' RATIO (AVERAGED): 15.54
ECHO MV E/E' RATIO (AVERAGED): 17.14
ECHO MV E/E' SEPTAL: 14.5
ECHO MV E/E' SEPTAL: 19.29
ECHO MV LVOT VTI INDEX: 1.13
ECHO MV LVOT VTI INDEX: 1.21
ECHO MV MAX VELOCITY: 1.3 M/S
ECHO MV MAX VELOCITY: 1.6 M/S
ECHO MV MEAN GRADIENT: 4 MMHG
ECHO MV MEAN GRADIENT: 4 MMHG
ECHO MV MEAN VELOCITY: 0.9 M/S
ECHO MV MEAN VELOCITY: 1 M/S
ECHO MV PEAK GRADIENT: 10 MMHG
ECHO MV PEAK GRADIENT: 7 MMHG
ECHO MV VTI: 30.7 CM
ECHO MV VTI: 41.6 CM
ECHO PV MAX VELOCITY: 1.2 M/S
ECHO PV MAX VELOCITY: 1.2 M/S
ECHO PV PEAK GRADIENT: 6 MMHG
ECHO PV PEAK GRADIENT: 6 MMHG
ECHO RV FREE WALL PEAK S': 14 CM/S
ECHO RV FREE WALL PEAK S': 22 CM/S
ECHO RV INTERNAL DIMENSION: 3.3 CM
ECHO RV TAPSE: 1.9 CM (ref 1.7–?)
EGFR, POC: >60 ML/MIN/1.73M2
EKG ATRIAL RATE: 131 BPM
EKG ATRIAL RATE: 89 BPM
EKG ATRIAL RATE: 99 BPM
EKG P AXIS: 40 DEGREES
EKG P AXIS: 45 DEGREES
EKG P AXIS: 65 DEGREES
EKG P-R INTERVAL: 104 MS
EKG P-R INTERVAL: 112 MS
EKG P-R INTERVAL: 170 MS
EKG Q-T INTERVAL: 402 MS
EKG Q-T INTERVAL: 406 MS
EKG Q-T INTERVAL: 428 MS
EKG QRS DURATION: 74 MS
EKG QRS DURATION: 78 MS
EKG QRS DURATION: 86 MS
EKG QTC CALCULATION (BAZETT): 520 MS
EKG QTC CALCULATION (BAZETT): 521 MS
EKG QTC CALCULATION (BAZETT): 593 MS
EKG R AXIS: 16 DEGREES
EKG R AXIS: 24 DEGREES
EKG R AXIS: 7 DEGREES
EKG T AXIS: 28 DEGREES
EKG T AXIS: 39 DEGREES
EKG T AXIS: 56 DEGREES
EKG VENTRICULAR RATE: 131 BPM
EKG VENTRICULAR RATE: 89 BPM
EKG VENTRICULAR RATE: 99 BPM
EOSINOPHIL # BLD: 0 K/UL (ref 0–0.4)
EOSINOPHIL # BLD: 0 K/UL (ref 0–0.4)
EOSINOPHIL # BLD: 0.07 K/UL (ref 0–0.44)
EOSINOPHIL # BLD: 0.08 K/UL (ref 0–0.44)
EOSINOPHIL # BLD: 0.1 K/UL (ref 0–0.44)
EOSINOPHIL # BLD: 0.12 K/UL (ref 0–0.44)
EOSINOPHIL # BLD: 0.13 K/UL (ref 0–0.44)
EOSINOPHIL # BLD: 0.14 K/UL (ref 0–0.44)
EOSINOPHIL # BLD: 0.14 K/UL (ref 0–0.44)
EOSINOPHIL # BLD: 0.16 K/UL (ref 0–0.44)
EOSINOPHIL # BLD: 0.18 K/UL (ref 0–0.44)
EOSINOPHIL # BLD: 0.2 K/UL (ref 0–0.44)
EOSINOPHIL # BLD: 0.27 K/UL (ref 0–0.4)
EOSINOPHIL # BLD: <0.03 K/UL (ref 0–0.44)
EOSINOPHILS RELATIVE PERCENT: 0 % (ref 1–4)
EOSINOPHILS RELATIVE PERCENT: 1 % (ref 1–4)
EOSINOPHILS RELATIVE PERCENT: 2 % (ref 1–4)
EOSINOPHILS RELATIVE PERCENT: 3 % (ref 1–4)
EPI CELLS #/AREA URNS HPF: ABNORMAL /HPF (ref 0–5)
EPI CELLS #/AREA URNS HPF: ABNORMAL /HPF (ref 0–5)
ERYTHROCYTE [DISTWIDTH] IN BLOOD BY AUTOMATED COUNT: 14.1 % (ref 11.8–14.4)
ERYTHROCYTE [DISTWIDTH] IN BLOOD BY AUTOMATED COUNT: 14.2 % (ref 11.8–14.4)
ERYTHROCYTE [DISTWIDTH] IN BLOOD BY AUTOMATED COUNT: 15.1 % (ref 11.8–14.4)
ERYTHROCYTE [DISTWIDTH] IN BLOOD BY AUTOMATED COUNT: 15.4 % (ref 11.8–14.4)
ERYTHROCYTE [DISTWIDTH] IN BLOOD BY AUTOMATED COUNT: 15.6 % (ref 11.8–14.4)
ERYTHROCYTE [DISTWIDTH] IN BLOOD BY AUTOMATED COUNT: 15.8 % (ref 11.8–14.4)
ERYTHROCYTE [DISTWIDTH] IN BLOOD BY AUTOMATED COUNT: 16 % (ref 11.8–14.4)
ERYTHROCYTE [DISTWIDTH] IN BLOOD BY AUTOMATED COUNT: 16.1 % (ref 11.8–14.4)
ERYTHROCYTE [DISTWIDTH] IN BLOOD BY AUTOMATED COUNT: 16.3 % (ref 11.8–14.4)
ERYTHROCYTE [DISTWIDTH] IN BLOOD BY AUTOMATED COUNT: 16.4 % (ref 11.8–14.4)
ERYTHROCYTE [DISTWIDTH] IN BLOOD BY AUTOMATED COUNT: 16.4 % (ref 11.8–14.4)
EST. AVERAGE GLUCOSE BLD GHB EST-MCNC: 146 MG/DL
FIBRINOGEN PPP-MCNC: 358 MG/DL (ref 203–521)
FIO2 ON VENT: ABNORMAL %
FIO2: 100
FIO2: 40
FIO2: 40
FIO2: 50
FIO2: 60
FIO2: 65
FIO2: 80
FIO2: 90
FIO2: 90
GFR SERPL CREATININE-BSD FRML MDRD: >60 ML/MIN/1.73M2
GGT SERPL-CCNC: 144 U/L (ref 5–36)
GLUCOSE BLD-MCNC: 110 MG/DL (ref 65–105)
GLUCOSE BLD-MCNC: 116 MG/DL (ref 65–105)
GLUCOSE BLD-MCNC: 123 MG/DL (ref 65–105)
GLUCOSE BLD-MCNC: 124 MG/DL (ref 74–100)
GLUCOSE BLD-MCNC: 129 MG/DL (ref 65–105)
GLUCOSE BLD-MCNC: 132 MG/DL (ref 74–100)
GLUCOSE BLD-MCNC: 135 MG/DL (ref 65–105)
GLUCOSE BLD-MCNC: 136 MG/DL (ref 65–105)
GLUCOSE BLD-MCNC: 136 MG/DL (ref 65–105)
GLUCOSE BLD-MCNC: 137 MG/DL (ref 65–105)
GLUCOSE BLD-MCNC: 137 MG/DL (ref 65–105)
GLUCOSE BLD-MCNC: 137 MG/DL (ref 74–100)
GLUCOSE BLD-MCNC: 140 MG/DL (ref 65–105)
GLUCOSE BLD-MCNC: 141 MG/DL (ref 65–105)
GLUCOSE BLD-MCNC: 141 MG/DL (ref 65–105)
GLUCOSE BLD-MCNC: 142 MG/DL (ref 74–100)
GLUCOSE BLD-MCNC: 143 MG/DL (ref 65–105)
GLUCOSE BLD-MCNC: 145 MG/DL (ref 65–105)
GLUCOSE BLD-MCNC: 146 MG/DL (ref 65–105)
GLUCOSE BLD-MCNC: 147 MG/DL (ref 65–105)
GLUCOSE BLD-MCNC: 148 MG/DL (ref 65–105)
GLUCOSE BLD-MCNC: 149 MG/DL (ref 74–100)
GLUCOSE BLD-MCNC: 151 MG/DL (ref 65–105)
GLUCOSE BLD-MCNC: 153 MG/DL (ref 74–100)
GLUCOSE BLD-MCNC: 154 MG/DL (ref 65–105)
GLUCOSE BLD-MCNC: 155 MG/DL (ref 74–100)
GLUCOSE BLD-MCNC: 156 MG/DL (ref 65–105)
GLUCOSE BLD-MCNC: 160 MG/DL (ref 65–105)
GLUCOSE BLD-MCNC: 165 MG/DL (ref 65–105)
GLUCOSE BLD-MCNC: 167 MG/DL (ref 65–105)
GLUCOSE BLD-MCNC: 172 MG/DL (ref 74–100)
GLUCOSE BLD-MCNC: 173 MG/DL (ref 74–100)
GLUCOSE BLD-MCNC: 174 MG/DL (ref 74–100)
GLUCOSE BLD-MCNC: 177 MG/DL (ref 65–105)
GLUCOSE BLD-MCNC: 178 MG/DL (ref 65–105)
GLUCOSE BLD-MCNC: 179 MG/DL (ref 74–100)
GLUCOSE BLD-MCNC: 181 MG/DL (ref 65–105)
GLUCOSE BLD-MCNC: 187 MG/DL (ref 65–105)
GLUCOSE BLD-MCNC: 187 MG/DL (ref 65–105)
GLUCOSE BLD-MCNC: 188 MG/DL (ref 74–100)
GLUCOSE BLD-MCNC: 190 MG/DL (ref 65–105)
GLUCOSE BLD-MCNC: 196 MG/DL (ref 74–100)
GLUCOSE BLD-MCNC: 199 MG/DL (ref 65–105)
GLUCOSE BLD-MCNC: 210 MG/DL (ref 74–100)
GLUCOSE BLD-MCNC: 211 MG/DL (ref 65–105)
GLUCOSE BLD-MCNC: 214 MG/DL (ref 74–100)
GLUCOSE BLD-MCNC: 235 MG/DL (ref 74–100)
GLUCOSE BLD-MCNC: 251 MG/DL (ref 74–100)
GLUCOSE CSF-MCNC: 90 MG/DL (ref 40–70)
GLUCOSE SERPL-MCNC: 122 MG/DL (ref 70–99)
GLUCOSE SERPL-MCNC: 135 MG/DL (ref 70–99)
GLUCOSE SERPL-MCNC: 140 MG/DL (ref 70–99)
GLUCOSE SERPL-MCNC: 141 MG/DL (ref 70–99)
GLUCOSE SERPL-MCNC: 145 MG/DL (ref 70–99)
GLUCOSE SERPL-MCNC: 145 MG/DL (ref 70–99)
GLUCOSE SERPL-MCNC: 146 MG/DL (ref 70–99)
GLUCOSE SERPL-MCNC: 149 MG/DL (ref 70–99)
GLUCOSE SERPL-MCNC: 155 MG/DL (ref 70–99)
GLUCOSE SERPL-MCNC: 162 MG/DL (ref 70–99)
GLUCOSE SERPL-MCNC: 165 MG/DL (ref 70–99)
GLUCOSE SERPL-MCNC: 175 MG/DL (ref 70–99)
GLUCOSE SERPL-MCNC: 179 MG/DL (ref 70–99)
GLUCOSE SERPL-MCNC: 187 MG/DL (ref 70–99)
GLUCOSE SERPL-MCNC: 190 MG/DL (ref 70–99)
GLUCOSE SERPL-MCNC: 191 MG/DL (ref 70–99)
GLUCOSE SERPL-MCNC: 203 MG/DL (ref 70–99)
GLUCOSE SERPL-MCNC: 216 MG/DL (ref 70–99)
GLUCOSE SERPL-MCNC: 97 MG/DL (ref 70–99)
GLUCOSE UR STRIP-MCNC: ABNORMAL MG/DL
GLUCOSE UR STRIP-MCNC: ABNORMAL MG/DL
GLUCOSE UR STRIP-MCNC: NEGATIVE MG/DL
GLUCOSE UR STRIP-MCNC: NEGATIVE MG/DL
HBA1C MFR BLD: 6.7 % (ref 4–6)
HCO3 VENOUS: 17.2 MMOL/L (ref 22–29)
HCO3 VENOUS: 22.3 MMOL/L (ref 24–30)
HCT VFR BLD AUTO: 18.7 % (ref 36.3–47.1)
HCT VFR BLD AUTO: 22.2 % (ref 36.3–47.1)
HCT VFR BLD AUTO: 23 % (ref 36.3–47.1)
HCT VFR BLD AUTO: 23.4 % (ref 36.3–47.1)
HCT VFR BLD AUTO: 24 % (ref 36.3–47.1)
HCT VFR BLD AUTO: 24.2 % (ref 36.3–47.1)
HCT VFR BLD AUTO: 24.3 % (ref 36.3–47.1)
HCT VFR BLD AUTO: 24.3 % (ref 36.3–47.1)
HCT VFR BLD AUTO: 24.5 % (ref 36.3–47.1)
HCT VFR BLD AUTO: 24.8 % (ref 36.3–47.1)
HCT VFR BLD AUTO: 25.1 % (ref 36.3–47.1)
HCT VFR BLD AUTO: 25.3 % (ref 36.3–47.1)
HCT VFR BLD AUTO: 25.4 % (ref 36.3–47.1)
HCT VFR BLD AUTO: 25.4 % (ref 36.3–47.1)
HCT VFR BLD AUTO: 25.6 % (ref 36.3–47.1)
HCT VFR BLD AUTO: 25.6 % (ref 36.3–47.1)
HCT VFR BLD AUTO: 25.7 % (ref 36.3–47.1)
HCT VFR BLD AUTO: 25.8 % (ref 36.3–47.1)
HCT VFR BLD AUTO: 26.4 % (ref 36.3–47.1)
HCT VFR BLD AUTO: 26.8 % (ref 36.3–47.1)
HCT VFR BLD AUTO: 27.1 % (ref 36.3–47.1)
HCT VFR BLD AUTO: 27.5 % (ref 36.3–47.1)
HCT VFR BLD AUTO: 27.7 % (ref 36.3–47.1)
HCT VFR BLD AUTO: 29.7 % (ref 36.3–47.1)
HCT VFR BLD AUTO: 30 % (ref 36–46)
HCT VFR BLD AUTO: 32.3 % (ref 36.3–47.1)
HCT VFR BLD AUTO: 34 % (ref 36–46)
HCT VFR BLD AUTO: 36.5 % (ref 36.3–47.1)
HCT VFR BLD AUTO: 37 % (ref 36.3–47.1)
HCT VFR BLD AUTO: 41 % (ref 36–46)
HCT VFR BLD AUTO: 43.1 % (ref 36.3–47.1)
HCT VFR BLD AUTO: 46 % (ref 36–46)
HDLC SERPL-MCNC: 33 MG/DL
HEMOCCULT SP1 STL QL: POSITIVE
HGB BLD-MCNC: 10.5 G/DL (ref 11.9–15.1)
HGB BLD-MCNC: 12.2 G/DL (ref 11.9–15.1)
HGB BLD-MCNC: 12.3 G/DL (ref 11.9–15.1)
HGB BLD-MCNC: 14 G/DL (ref 11.9–15.1)
HGB BLD-MCNC: 5.8 G/DL (ref 11.9–15.1)
HGB BLD-MCNC: 7.1 G/DL (ref 11.9–15.1)
HGB BLD-MCNC: 7.3 G/DL (ref 11.9–15.1)
HGB BLD-MCNC: 7.4 G/DL (ref 11.9–15.1)
HGB BLD-MCNC: 7.6 G/DL (ref 11.9–15.1)
HGB BLD-MCNC: 7.6 G/DL (ref 11.9–15.1)
HGB BLD-MCNC: 7.7 G/DL (ref 11.9–15.1)
HGB BLD-MCNC: 7.8 G/DL (ref 11.9–15.1)
HGB BLD-MCNC: 7.9 G/DL (ref 11.9–15.1)
HGB BLD-MCNC: 8.1 G/DL (ref 11.9–15.1)
HGB BLD-MCNC: 8.1 G/DL (ref 11.9–15.1)
HGB BLD-MCNC: 8.2 G/DL (ref 11.9–15.1)
HGB BLD-MCNC: 8.4 G/DL (ref 11.9–15.1)
HGB BLD-MCNC: 8.7 G/DL (ref 11.9–15.1)
HGB BLD-MCNC: 8.8 G/DL (ref 11.9–15.1)
HGB BLD-MCNC: 8.9 G/DL (ref 11.9–15.1)
HGB BLD-MCNC: 8.9 G/DL (ref 11.9–15.1)
HGB BLD-MCNC: 9.7 G/DL (ref 11.9–15.1)
HGB UR QL STRIP.AUTO: ABNORMAL
HGB UR QL STRIP.AUTO: NEGATIVE
IMM GRANULOCYTES # BLD AUTO: 0 K/UL (ref 0–0.3)
IMM GRANULOCYTES # BLD AUTO: 0 K/UL (ref 0–0.3)
IMM GRANULOCYTES # BLD AUTO: 0.04 K/UL (ref 0–0.3)
IMM GRANULOCYTES # BLD AUTO: 0.05 K/UL (ref 0–0.3)
IMM GRANULOCYTES # BLD AUTO: 0.06 K/UL (ref 0–0.3)
IMM GRANULOCYTES # BLD AUTO: 0.06 K/UL (ref 0–0.3)
IMM GRANULOCYTES # BLD AUTO: 0.07 K/UL (ref 0–0.3)
IMM GRANULOCYTES # BLD AUTO: 0.08 K/UL (ref 0–0.3)
IMM GRANULOCYTES # BLD AUTO: 0.08 K/UL (ref 0–0.3)
IMM GRANULOCYTES # BLD AUTO: 0.09 K/UL (ref 0–0.3)
IMM GRANULOCYTES # BLD AUTO: 0.09 K/UL (ref 0–0.3)
IMM GRANULOCYTES # BLD AUTO: 0.1 K/UL (ref 0–0.3)
IMM GRANULOCYTES # BLD AUTO: 0.1 K/UL (ref 0–0.3)
IMM GRANULOCYTES # BLD AUTO: 0.12 K/UL (ref 0–0.3)
IMM GRANULOCYTES # BLD AUTO: 0.13 K/UL (ref 0–0.3)
IMM GRANULOCYTES # BLD AUTO: 0.18 K/UL (ref 0–0.3)
IMM GRANULOCYTES # BLD AUTO: 0.23 K/UL (ref 0–0.3)
IMM GRANULOCYTES NFR BLD: 0 %
IMM GRANULOCYTES NFR BLD: 0 %
IMM GRANULOCYTES NFR BLD: 1 %
IMM GRANULOCYTES NFR BLD: 2 %
IMM GRANULOCYTES NFR BLD: 2 %
INR PPP: 1
INR PPP: 1.1
INR PPP: 1.1
KETONES UR STRIP-MCNC: ABNORMAL MG/DL
KETONES UR STRIP-MCNC: NEGATIVE MG/DL
LACTIC ACID, WHOLE BLOOD: 1.4 MMOL/L (ref 0.7–2.1)
LACTIC ACID, WHOLE BLOOD: 1.5 MMOL/L (ref 0.7–2.1)
LACTIC ACID, WHOLE BLOOD: 2.1 MMOL/L (ref 0.7–2.1)
LACTIC ACID, WHOLE BLOOD: 2.8 MMOL/L (ref 0.7–2.1)
LACTIC ACID, WHOLE BLOOD: 3.1 MMOL/L (ref 0.7–2.1)
LACTIC ACID, WHOLE BLOOD: 4.3 MMOL/L (ref 0.7–2.1)
LDLC SERPL CALC-MCNC: 67 MG/DL (ref 0–130)
LEUKOCYTE ESTERASE UR QL STRIP: ABNORMAL
LEUKOCYTE ESTERASE UR QL STRIP: NEGATIVE
LIPASE SERPL-CCNC: 54 U/L (ref 13–60)
LIPASE SERPL-CCNC: 65 U/L (ref 13–60)
LYMPHOCYTES NFR BLD: 0.5 K/UL (ref 1–4.8)
LYMPHOCYTES NFR BLD: 0.77 K/UL (ref 1.1–3.7)
LYMPHOCYTES NFR BLD: 0.83 K/UL (ref 1.1–3.7)
LYMPHOCYTES NFR BLD: 0.84 K/UL (ref 1.1–3.7)
LYMPHOCYTES NFR BLD: 0.87 K/UL (ref 1.1–3.7)
LYMPHOCYTES NFR BLD: 0.87 K/UL (ref 1.1–3.7)
LYMPHOCYTES NFR BLD: 0.88 K/UL (ref 1.1–3.7)
LYMPHOCYTES NFR BLD: 0.98 K/UL (ref 1.1–3.7)
LYMPHOCYTES NFR BLD: 1.02 K/UL (ref 1.1–3.7)
LYMPHOCYTES NFR BLD: 1.03 K/UL (ref 1.1–3.7)
LYMPHOCYTES NFR BLD: 1.06 K/UL (ref 1.1–3.7)
LYMPHOCYTES NFR BLD: 1.07 K/UL (ref 1.1–3.7)
LYMPHOCYTES NFR BLD: 1.21 K/UL (ref 1.1–3.7)
LYMPHOCYTES NFR BLD: 1.21 K/UL (ref 1.1–3.7)
LYMPHOCYTES NFR BLD: 1.29 K/UL (ref 1.1–3.7)
LYMPHOCYTES NFR BLD: 3.19 K/UL (ref 1–4.8)
LYMPHOCYTES NFR BLD: 5.72 K/UL (ref 1–4.8)
LYMPHOCYTES NFR CSF: 15 %
LYMPHOCYTES RELATIVE PERCENT: 10 % (ref 24–43)
LYMPHOCYTES RELATIVE PERCENT: 10 % (ref 24–43)
LYMPHOCYTES RELATIVE PERCENT: 12 % (ref 24–43)
LYMPHOCYTES RELATIVE PERCENT: 13 % (ref 24–43)
LYMPHOCYTES RELATIVE PERCENT: 15 % (ref 24–43)
LYMPHOCYTES RELATIVE PERCENT: 18 % (ref 24–44)
LYMPHOCYTES RELATIVE PERCENT: 19 % (ref 24–43)
LYMPHOCYTES RELATIVE PERCENT: 20 % (ref 24–43)
LYMPHOCYTES RELATIVE PERCENT: 43 % (ref 24–44)
LYMPHOCYTES RELATIVE PERCENT: 7 % (ref 24–43)
LYMPHOCYTES RELATIVE PERCENT: 7 % (ref 24–44)
LYMPHOCYTES RELATIVE PERCENT: 8 % (ref 24–43)
LYMPHOCYTES RELATIVE PERCENT: 9 % (ref 24–43)
Lab: 117 CM/S
Lab: 97.8 CM/S
MAGNESIUM SERPL-MCNC: 1.4 MG/DL (ref 1.6–2.6)
MAGNESIUM SERPL-MCNC: 1.4 MG/DL (ref 1.6–2.6)
MAGNESIUM SERPL-MCNC: 1.5 MG/DL (ref 1.6–2.6)
MAGNESIUM SERPL-MCNC: 1.8 MG/DL (ref 1.6–2.6)
MAGNESIUM SERPL-MCNC: 2 MG/DL (ref 1.6–2.6)
MAGNESIUM SERPL-MCNC: 2.1 MG/DL (ref 1.6–2.6)
MAGNESIUM SERPL-MCNC: 2.2 MG/DL (ref 1.6–2.6)
MAGNESIUM SERPL-MCNC: 2.3 MG/DL (ref 1.6–2.6)
MAGNESIUM SERPL-MCNC: 2.6 MG/DL (ref 1.6–2.6)
MCH RBC QN AUTO: 28.6 PG (ref 25.2–33.5)
MCH RBC QN AUTO: 28.6 PG (ref 25.2–33.5)
MCH RBC QN AUTO: 28.8 PG (ref 25.2–33.5)
MCH RBC QN AUTO: 28.9 PG (ref 25.2–33.5)
MCH RBC QN AUTO: 29.2 PG (ref 25.2–33.5)
MCH RBC QN AUTO: 29.3 PG (ref 25.2–33.5)
MCH RBC QN AUTO: 29.4 PG (ref 25.2–33.5)
MCH RBC QN AUTO: 29.4 PG (ref 25.2–33.5)
MCH RBC QN AUTO: 29.5 PG (ref 25.2–33.5)
MCH RBC QN AUTO: 29.6 PG (ref 25.2–33.5)
MCH RBC QN AUTO: 29.6 PG (ref 25.2–33.5)
MCH RBC QN AUTO: 29.7 PG (ref 25.2–33.5)
MCH RBC QN AUTO: 29.9 PG (ref 25.2–33.5)
MCHC RBC AUTO-ENTMCNC: 30.6 G/DL (ref 28.4–34.8)
MCHC RBC AUTO-ENTMCNC: 30.6 G/DL (ref 28.4–34.8)
MCHC RBC AUTO-ENTMCNC: 31 G/DL (ref 28.4–34.8)
MCHC RBC AUTO-ENTMCNC: 31.1 G/DL (ref 28.4–34.8)
MCHC RBC AUTO-ENTMCNC: 31.2 G/DL (ref 28.4–34.8)
MCHC RBC AUTO-ENTMCNC: 31.5 G/DL (ref 28.4–34.8)
MCHC RBC AUTO-ENTMCNC: 32 G/DL (ref 28.4–34.8)
MCHC RBC AUTO-ENTMCNC: 32.4 G/DL (ref 28.4–34.8)
MCHC RBC AUTO-ENTMCNC: 32.5 G/DL (ref 28.4–34.8)
MCHC RBC AUTO-ENTMCNC: 32.7 G/DL (ref 28.4–34.8)
MCHC RBC AUTO-ENTMCNC: 32.8 G/DL (ref 28.4–34.8)
MCHC RBC AUTO-ENTMCNC: 32.9 G/DL (ref 28.4–34.8)
MCHC RBC AUTO-ENTMCNC: 33.1 G/DL (ref 28.4–34.8)
MCHC RBC AUTO-ENTMCNC: 33.2 G/DL (ref 28.4–34.8)
MCV RBC AUTO: 88 FL (ref 82.6–102.9)
MCV RBC AUTO: 89.1 FL (ref 82.6–102.9)
MCV RBC AUTO: 89.2 FL (ref 82.6–102.9)
MCV RBC AUTO: 89.2 FL (ref 82.6–102.9)
MCV RBC AUTO: 90 FL (ref 82.6–102.9)
MCV RBC AUTO: 90.5 FL (ref 82.6–102.9)
MCV RBC AUTO: 90.9 FL (ref 82.6–102.9)
MCV RBC AUTO: 91.1 FL (ref 82.6–102.9)
MCV RBC AUTO: 92.7 FL (ref 82.6–102.9)
MCV RBC AUTO: 92.8 FL (ref 82.6–102.9)
MCV RBC AUTO: 92.9 FL (ref 82.6–102.9)
MCV RBC AUTO: 93.5 FL (ref 82.6–102.9)
MCV RBC AUTO: 94.2 FL (ref 82.6–102.9)
MCV RBC AUTO: 94.4 FL (ref 82.6–102.9)
MCV RBC AUTO: 94.6 FL (ref 82.6–102.9)
MICROORGANISM SPEC CULT: ABNORMAL
MICROORGANISM SPEC CULT: ABNORMAL
MICROORGANISM SPEC CULT: NORMAL
MICROORGANISM/AGENT SPEC: ABNORMAL
MICROORGANISM/AGENT SPEC: NORMAL
MODE: 60
MODE: ABNORMAL
MONOCYTES NFR BLD: 0.37 K/UL (ref 0.1–1.2)
MONOCYTES NFR BLD: 0.43 K/UL (ref 0.1–0.8)
MONOCYTES NFR BLD: 0.46 K/UL (ref 0.1–1.2)
MONOCYTES NFR BLD: 0.47 K/UL (ref 0.1–1.2)
MONOCYTES NFR BLD: 0.51 K/UL (ref 0.1–1.2)
MONOCYTES NFR BLD: 0.53 K/UL (ref 0.1–0.8)
MONOCYTES NFR BLD: 0.61 K/UL (ref 0.1–1.2)
MONOCYTES NFR BLD: 0.7 K/UL (ref 0.1–1.2)
MONOCYTES NFR BLD: 0.72 K/UL (ref 0.1–1.2)
MONOCYTES NFR BLD: 0.73 K/UL (ref 0.1–1.2)
MONOCYTES NFR BLD: 0.74 K/UL (ref 0.1–1.2)
MONOCYTES NFR BLD: 0.75 K/UL (ref 0.1–1.2)
MONOCYTES NFR BLD: 0.76 K/UL (ref 0.1–1.2)
MONOCYTES NFR BLD: 0.88 K/UL (ref 0.1–1.2)
MONOCYTES NFR BLD: 0.89 K/UL (ref 0.1–0.8)
MONOCYTES NFR BLD: 1.22 K/UL (ref 0.1–1.2)
MONOCYTES NFR BLD: 1.24 K/UL (ref 0.1–1.2)
MONOCYTES NFR BLD: 10 % (ref 3–12)
MONOCYTES NFR BLD: 10 % (ref 3–12)
MONOCYTES NFR BLD: 11 % (ref 3–12)
MONOCYTES NFR BLD: 12 % (ref 3–12)
MONOCYTES NFR BLD: 4 % (ref 1–7)
MONOCYTES NFR BLD: 4 % (ref 3–12)
MONOCYTES NFR BLD: 5 % (ref 1–7)
MONOCYTES NFR BLD: 5 % (ref 3–12)
MONOCYTES NFR BLD: 6 % (ref 1–7)
MONOCYTES NFR BLD: 7 % (ref 3–12)
MONOCYTES NFR BLD: 9 % (ref 3–12)
MORPHOLOGY: ABNORMAL
MORPHOLOGY: ABNORMAL
MORPHOLOGY: NORMAL
MORPHOLOGY: NORMAL
MRSA, DNA, NASAL: NEGATIVE
MYOGLOBIN SERPL-MCNC: 33 NG/ML (ref 25–58)
NEGATIVE BASE EXCESS, ART: 0.5 MMOL/L (ref 0–2)
NEGATIVE BASE EXCESS, ART: 0.6 MMOL/L (ref 0–2)
NEGATIVE BASE EXCESS, ART: 1.8 MMOL/L (ref 0–2)
NEGATIVE BASE EXCESS, ART: 10.2 MMOL/L (ref 0–2)
NEGATIVE BASE EXCESS, ART: 10.5 MMOL/L (ref 0–2)
NEGATIVE BASE EXCESS, ART: 2.3 MMOL/L (ref 0–2)
NEGATIVE BASE EXCESS, ART: 2.6 MMOL/L (ref 0–2)
NEGATIVE BASE EXCESS, ART: 2.9 MMOL/L (ref 0–2)
NEGATIVE BASE EXCESS, ART: 3.4 MMOL/L (ref 0–2)
NEGATIVE BASE EXCESS, ART: 4.1 MMOL/L (ref 0–2)
NEGATIVE BASE EXCESS, ART: 4.6 MMOL/L (ref 0–2)
NEGATIVE BASE EXCESS, ART: 5.3 MMOL/L (ref 0–2)
NEGATIVE BASE EXCESS, ART: 6.2 MMOL/L (ref 0–2)
NEGATIVE BASE EXCESS, ART: 7.9 MMOL/L (ref 0–2)
NEGATIVE BASE EXCESS, ART: 9.8 MMOL/L (ref 0–2)
NEGATIVE BASE EXCESS, VEN: 0.4 MMOL/L (ref 0–2)
NEGATIVE BASE EXCESS, VEN: 6.4 MMOL/L (ref 0–2)
NEUTROPHILS NFR BLD: 51 % (ref 36–66)
NEUTROPHILS NFR BLD: 65 % (ref 36–65)
NEUTROPHILS NFR BLD: 66 % (ref 36–65)
NEUTROPHILS NFR BLD: 71 % (ref 36–65)
NEUTROPHILS NFR BLD: 72 % (ref 36–65)
NEUTROPHILS NFR BLD: 73 % (ref 36–65)
NEUTROPHILS NFR BLD: 74 % (ref 36–65)
NEUTROPHILS NFR BLD: 75 % (ref 36–66)
NEUTROPHILS NFR BLD: 79 % (ref 36–65)
NEUTROPHILS NFR BLD: 80 % (ref 36–65)
NEUTROPHILS NFR BLD: 82 % (ref 36–65)
NEUTROPHILS NFR BLD: 82 % (ref 36–65)
NEUTROPHILS NFR BLD: 83 % (ref 36–65)
NEUTROPHILS NFR BLD: 87 % (ref 36–66)
NEUTROPHILS NFR BLD: 88 % (ref 36–65)
NEUTROPHILS NFR CSF: 9 %
NEUTS SEG NFR BLD: 10.53 K/UL (ref 1.5–8.1)
NEUTS SEG NFR BLD: 10.55 K/UL (ref 1.5–8.1)
NEUTS SEG NFR BLD: 10.85 K/UL (ref 1.5–8.1)
NEUTS SEG NFR BLD: 11.61 K/UL (ref 1.5–8.1)
NEUTS SEG NFR BLD: 13.26 K/UL (ref 1.8–7.7)
NEUTS SEG NFR BLD: 3.72 K/UL (ref 1.5–8.1)
NEUTS SEG NFR BLD: 4.01 K/UL (ref 1.5–8.1)
NEUTS SEG NFR BLD: 4.39 K/UL (ref 1.5–8.1)
NEUTS SEG NFR BLD: 5.01 K/UL (ref 1.5–8.1)
NEUTS SEG NFR BLD: 5.57 K/UL (ref 1.5–8.1)
NEUTS SEG NFR BLD: 5.86 K/UL (ref 1.5–8.1)
NEUTS SEG NFR BLD: 6.15 K/UL (ref 1.5–8.1)
NEUTS SEG NFR BLD: 6.27 K/UL (ref 1.8–7.7)
NEUTS SEG NFR BLD: 6.65 K/UL (ref 1.5–8.1)
NEUTS SEG NFR BLD: 6.78 K/UL (ref 1.8–7.7)
NEUTS SEG NFR BLD: 7.71 K/UL (ref 1.5–8.1)
NEUTS SEG NFR BLD: 8.18 K/UL (ref 1.5–8.1)
NITRITE UR QL STRIP: NEGATIVE
NRBC BLD-RTO: 0 PER 100 WBC
NRBC BLD-RTO: 0.2 PER 100 WBC
NRBC BLD-RTO: 0.3 PER 100 WBC
NRBC BLD-RTO: 0.5 PER 100 WBC
NRBC BLD-RTO: 0.7 PER 100 WBC
NRBC BLD-RTO: 1.9 PER 100 WBC
NUC CELL # FLD MANUAL: 23 CELLS/UL
O2 DELIVERY DEVICE: ABNORMAL
O2 SAT, VEN: 89.8 % (ref 60–85)
O2 SAT, VEN: 99.2 % (ref 60–85)
PARTIAL THROMBOPLASTIN TIME: 23.3 SEC (ref 23–36.5)
PARTIAL THROMBOPLASTIN TIME: 25.6 SEC (ref 23–36.5)
PARTIAL THROMBOPLASTIN TIME: 26.1 SEC (ref 23–36.5)
PATIENT TEMP: 37.2
PATIENT TEMP: 37.3
PATIENT TEMP: 37.5
PATIENT TEMP: 37.6
PATIENT TEMP: 37.7
PCO2, VEN: 29.1 MM HG (ref 41–51)
PCO2, VEN: 30 MM HG (ref 39–55)
PH UR STRIP: 5.5 [PH] (ref 5–8)
PH UR STRIP: 6.5 [PH] (ref 5–8)
PH VENOUS: 7.38 (ref 7.32–7.43)
PH VENOUS: 7.48 (ref 7.32–7.42)
PLATELET # BLD AUTO: 115 K/UL (ref 138–453)
PLATELET # BLD AUTO: 119 K/UL (ref 138–453)
PLATELET # BLD AUTO: 127 K/UL (ref 138–453)
PLATELET # BLD AUTO: 129 K/UL (ref 138–453)
PLATELET # BLD AUTO: 130 K/UL (ref 138–453)
PLATELET # BLD AUTO: 148 K/UL (ref 138–453)
PLATELET # BLD AUTO: 178 K/UL (ref 138–453)
PLATELET # BLD AUTO: 198 K/UL (ref 138–453)
PLATELET # BLD AUTO: 205 K/UL (ref 138–453)
PLATELET # BLD AUTO: 221 K/UL (ref 138–453)
PLATELET # BLD AUTO: 248 K/UL (ref 138–453)
PLATELET # BLD AUTO: 250 K/UL (ref 138–453)
PLATELET # BLD AUTO: 261 K/UL (ref 138–453)
PLATELET # BLD AUTO: 340 K/UL (ref 138–453)
PLATELET # BLD AUTO: 366 K/UL (ref 138–453)
PLATELET # BLD AUTO: 415 K/UL (ref 138–453)
PLATELET # BLD AUTO: ABNORMAL K/UL (ref 138–453)
PLATELET, FLUORESCENCE: 166 K/UL (ref 138–453)
PLATELETS.RETICULATED NFR BLD AUTO: 2.5 % (ref 1.1–10.3)
PMV BLD AUTO: 10 FL (ref 8.1–13.5)
PMV BLD AUTO: 10 FL (ref 8.1–13.5)
PMV BLD AUTO: 10.1 FL (ref 8.1–13.5)
PMV BLD AUTO: 9.5 FL (ref 8.1–13.5)
PMV BLD AUTO: 9.7 FL (ref 8.1–13.5)
PMV BLD AUTO: 9.8 FL (ref 8.1–13.5)
PMV BLD AUTO: 9.9 FL (ref 8.1–13.5)
PO2, VEN: 125 MM HG (ref 30–50)
PO2, VEN: 58.2 MM HG (ref 30–50)
POC ANION GAP: 11 MMOL/L (ref 7–16)
POC ANION GAP: 12 MMOL/L (ref 7–16)
POC ANION GAP: 13 MMOL/L (ref 7–16)
POC ANION GAP: 9 MMOL/L (ref 7–16)
POC CREATININE: 0.4 MG/DL (ref 0.51–1.19)
POC CREATININE: 0.4 MG/DL (ref 0.51–1.19)
POC CREATININE: 0.6 MG/DL (ref 0.51–1.19)
POC CREATININE: 0.8 MG/DL (ref 0.51–1.19)
POC HCO3: 16.3 MMOL/L (ref 21–28)
POC HCO3: 16.6 MMOL/L (ref 21–28)
POC HCO3: 17.6 MMOL/L (ref 21–28)
POC HCO3: 17.7 MMOL/L (ref 21–28)
POC HCO3: 18 MMOL/L (ref 21–28)
POC HCO3: 18.6 MMOL/L (ref 21–28)
POC HCO3: 18.9 MMOL/L (ref 21–28)
POC HCO3: 19.2 MMOL/L (ref 21–28)
POC HCO3: 20 MMOL/L (ref 21–28)
POC HCO3: 20 MMOL/L (ref 21–28)
POC HCO3: 20.2 MMOL/L (ref 21–28)
POC HCO3: 20.3 MMOL/L (ref 21–28)
POC HCO3: 21.1 MMOL/L (ref 21–28)
POC HCO3: 22.5 MMOL/L (ref 21–28)
POC HCO3: 22.6 MMOL/L (ref 21–28)
POC HCO3: 23.3 MMOL/L (ref 21–28)
POC HCO3: 23.5 MMOL/L (ref 21–28)
POC HCO3: 23.6 MMOL/L (ref 21–28)
POC HCO3: 23.7 MMOL/L (ref 21–28)
POC HCO3: 24.5 MMOL/L (ref 21–28)
POC HCO3: 24.8 MMOL/L (ref 21–28)
POC HEMOGLOBIN (CALC): 10.1 G/DL (ref 12–16)
POC HEMOGLOBIN (CALC): 11.6 G/DL (ref 12–16)
POC HEMOGLOBIN (CALC): 13.9 G/DL (ref 12–16)
POC HEMOGLOBIN (CALC): 15.7 G/DL (ref 12–16)
POC LACTIC ACID: 0.4 MMOL/L (ref 0.56–1.39)
POC LACTIC ACID: 0.6 MMOL/L (ref 0.56–1.39)
POC LACTIC ACID: 0.7 MMOL/L (ref 0.56–1.39)
POC LACTIC ACID: 0.8 MMOL/L (ref 0.56–1.39)
POC LACTIC ACID: 0.9 MMOL/L (ref 0.56–1.39)
POC LACTIC ACID: 0.9 MMOL/L (ref 0.56–1.39)
POC LACTIC ACID: 1.2 MMOL/L (ref 0.56–1.39)
POC LACTIC ACID: 1.3 MMOL/L (ref 0.56–1.39)
POC LACTIC ACID: 2.6 MMOL/L (ref 0.56–1.39)
POC LACTIC ACID: 2.8 MMOL/L (ref 0.56–1.39)
POC LACTIC ACID: 3.5 MMOL/L (ref 0.56–1.39)
POC O2 SATURATION: 91.1 % (ref 94–98)
POC O2 SATURATION: 92.7 % (ref 94–98)
POC O2 SATURATION: 93.1 % (ref 94–98)
POC O2 SATURATION: 94.1 % (ref 94–98)
POC O2 SATURATION: 94.2 % (ref 94–98)
POC O2 SATURATION: 94.3 % (ref 94–98)
POC O2 SATURATION: 94.4 % (ref 94–98)
POC O2 SATURATION: 94.8 % (ref 94–98)
POC O2 SATURATION: 94.9 % (ref 94–98)
POC O2 SATURATION: 95 % (ref 94–98)
POC O2 SATURATION: 95.4 % (ref 94–98)
POC O2 SATURATION: 95.5 % (ref 94–98)
POC O2 SATURATION: 95.6 % (ref 94–98)
POC O2 SATURATION: 95.6 % (ref 94–98)
POC O2 SATURATION: 95.8 % (ref 94–98)
POC O2 SATURATION: 96.1 % (ref 94–98)
POC O2 SATURATION: 96.6 % (ref 94–98)
POC O2 SATURATION: 97.1 % (ref 94–98)
POC O2 SATURATION: 98.2 % (ref 94–98)
POC O2 SATURATION: 98.4 % (ref 94–98)
POC O2 SATURATION: 99 % (ref 94–98)
POC PCO2 TEMP: 26.7 MM HG
POC PCO2 TEMP: 28.2 MM HG
POC PCO2 TEMP: 29.3 MM HG
POC PCO2 TEMP: 36.2 MM HG
POC PCO2: 22.7 MM HG (ref 35–48)
POC PCO2: 25.5 MM HG (ref 35–48)
POC PCO2: 25.6 MM HG (ref 35–48)
POC PCO2: 25.9 MM HG (ref 35–48)
POC PCO2: 26 MM HG (ref 35–48)
POC PCO2: 27.1 MM HG (ref 35–48)
POC PCO2: 27.2 MM HG (ref 35–48)
POC PCO2: 27.7 MM HG (ref 35–48)
POC PCO2: 27.8 MM HG (ref 35–48)
POC PCO2: 28.3 MM HG (ref 35–48)
POC PCO2: 28.6 MM HG (ref 35–48)
POC PCO2: 29.9 MM HG (ref 35–48)
POC PCO2: 30.2 MM HG (ref 35–48)
POC PCO2: 31.4 MM HG (ref 35–48)
POC PCO2: 31.7 MM HG (ref 35–48)
POC PCO2: 35.3 MM HG (ref 35–48)
POC PCO2: 35.6 MM HG (ref 35–48)
POC PCO2: 36.2 MM HG (ref 35–48)
POC PCO2: 40.2 MM HG (ref 35–48)
POC PCO2: 45.5 MM HG (ref 35–48)
POC PCO2: 49 MM HG (ref 35–48)
POC PH TEMP: 7.43
POC PH TEMP: 7.44
POC PH TEMP: 7.46
POC PH TEMP: 7.5
POC PH: 7.2 (ref 7.35–7.45)
POC PH: 7.22 (ref 7.35–7.45)
POC PH: 7.22 (ref 7.35–7.45)
POC PH: 7.27 (ref 7.35–7.45)
POC PH: 7.41 (ref 7.35–7.45)
POC PH: 7.42 (ref 7.35–7.45)
POC PH: 7.43 (ref 7.35–7.45)
POC PH: 7.45 (ref 7.35–7.45)
POC PH: 7.46 (ref 7.35–7.45)
POC PH: 7.47 (ref 7.35–7.45)
POC PH: 7.48 (ref 7.35–7.45)
POC PH: 7.48 (ref 7.35–7.45)
POC PH: 7.5 (ref 7.35–7.45)
POC PH: 7.51 (ref 7.35–7.45)
POC PH: 7.52 (ref 7.35–7.45)
POC PH: 7.61 (ref 7.35–7.45)
POC PO2 TEMP: 65.7 MM HG
POC PO2 TEMP: 67.9 MM HG
POC PO2 TEMP: 77.7 MM HG
POC PO2 TEMP: 90 MM HG
POC PO2: 128.8 MM HG (ref 83–108)
POC PO2: 159.3 MM HG (ref 83–108)
POC PO2: 56.6 MM HG (ref 83–108)
POC PO2: 59.1 MM HG (ref 83–108)
POC PO2: 61 MM HG (ref 83–108)
POC PO2: 63.5 MM HG (ref 83–108)
POC PO2: 64.7 MM HG (ref 83–108)
POC PO2: 65.5 MM HG (ref 83–108)
POC PO2: 65.7 MM HG (ref 83–108)
POC PO2: 68.2 MM HG (ref 83–108)
POC PO2: 69.7 MM HG (ref 83–108)
POC PO2: 70.1 MM HG (ref 83–108)
POC PO2: 70.6 MM HG (ref 83–108)
POC PO2: 72.5 MM HG (ref 83–108)
POC PO2: 73.2 MM HG (ref 83–108)
POC PO2: 76.1 MM HG (ref 83–108)
POC PO2: 76.8 MM HG (ref 83–108)
POC PO2: 80.6 MM HG (ref 83–108)
POC PO2: 86.6 MM HG (ref 83–108)
POC PO2: 97.3 MM HG (ref 83–108)
POC PO2: 99.8 MM HG (ref 83–108)
POSITIVE BASE EXCESS, ART: 0.4 MMOL/L (ref 0–3)
POSITIVE BASE EXCESS, ART: 0.6 MMOL/L (ref 0–3)
POSITIVE BASE EXCESS, ART: 0.6 MMOL/L (ref 0–3)
POSITIVE BASE EXCESS, ART: 0.9 MMOL/L (ref 0–3)
POSITIVE BASE EXCESS, ART: 1.7 MMOL/L (ref 0–3)
POSITIVE BASE EXCESS, ART: 1.8 MMOL/L (ref 0–3)
POTASSIUM BLD-SCNC: 2.6 MMOL/L (ref 3.5–4.5)
POTASSIUM BLD-SCNC: 3.5 MMOL/L (ref 3.5–4.5)
POTASSIUM BLD-SCNC: 3.6 MMOL/L (ref 3.5–4.5)
POTASSIUM BLD-SCNC: 3.9 MMOL/L (ref 3.5–4.5)
POTASSIUM SERPL-SCNC: 2.6 MMOL/L (ref 3.7–5.3)
POTASSIUM SERPL-SCNC: 2.7 MMOL/L (ref 3.7–5.3)
POTASSIUM SERPL-SCNC: 3.3 MMOL/L (ref 3.7–5.3)
POTASSIUM SERPL-SCNC: 3.4 MMOL/L (ref 3.7–5.3)
POTASSIUM SERPL-SCNC: 3.5 MMOL/L (ref 3.7–5.3)
POTASSIUM SERPL-SCNC: 3.5 MMOL/L (ref 3.7–5.3)
POTASSIUM SERPL-SCNC: 3.6 MMOL/L (ref 3.7–5.3)
POTASSIUM SERPL-SCNC: 3.7 MMOL/L (ref 3.7–5.3)
POTASSIUM SERPL-SCNC: 3.8 MMOL/L (ref 3.7–5.3)
POTASSIUM SERPL-SCNC: 3.9 MMOL/L (ref 3.7–5.3)
POTASSIUM SERPL-SCNC: 4 MMOL/L (ref 3.7–5.3)
POTASSIUM SERPL-SCNC: 4.1 MMOL/L (ref 3.7–5.3)
POTASSIUM SERPL-SCNC: 4.2 MMOL/L (ref 3.7–5.3)
POTASSIUM SERPL-SCNC: 4.2 MMOL/L (ref 3.7–5.3)
PROCALCITONIN SERPL-MCNC: 0.11 NG/ML
PROCALCITONIN SERPL-MCNC: 0.12 NG/ML
PROCALCITONIN SERPL-MCNC: 0.19 NG/ML
PROT CSF-MCNC: 27.7 MG/DL (ref 15–45)
PROT SERPL-MCNC: 5.7 G/DL (ref 6.4–8.3)
PROT SERPL-MCNC: 5.8 G/DL (ref 6.4–8.3)
PROT SERPL-MCNC: 6.7 G/DL (ref 6.4–8.3)
PROT UR STRIP-MCNC: ABNORMAL MG/DL
PROT UR STRIP-MCNC: ABNORMAL MG/DL
PROT UR STRIP-MCNC: NEGATIVE MG/DL
PROT UR STRIP-MCNC: NEGATIVE MG/DL
PROTHROMBIN TIME: 13.3 SEC (ref 11.7–14.9)
PROTHROMBIN TIME: 13.7 SEC (ref 11.7–14.9)
PROTHROMBIN TIME: 13.8 SEC (ref 11.7–14.9)
RBC # BLD AUTO: 1.98 M/UL (ref 3.95–5.11)
RBC # BLD AUTO: 2.57 M/UL (ref 3.95–5.11)
RBC # BLD AUTO: 2.57 M/UL (ref 3.95–5.11)
RBC # BLD AUTO: 2.64 M/UL (ref 3.95–5.11)
RBC # BLD AUTO: 2.67 M/UL (ref 3.95–5.11)
RBC # BLD AUTO: 2.73 M/UL (ref 3.95–5.11)
RBC # BLD AUTO: 2.76 M/UL (ref 3.95–5.11)
RBC # BLD AUTO: 2.76 M/UL (ref 3.95–5.11)
RBC # BLD AUTO: 2.79 M/UL (ref 3.95–5.11)
RBC # BLD AUTO: 2.85 M/UL (ref 3.95–5.11)
RBC # BLD AUTO: 2.87 M/UL (ref 3.95–5.11)
RBC # BLD AUTO: 3.01 M/UL (ref 3.95–5.11)
RBC # BLD AUTO: 3.04 M/UL (ref 3.95–5.11)
RBC # BLD AUTO: 3.3 M/UL (ref 3.95–5.11)
RBC # BLD AUTO: 3.59 M/UL (ref 3.95–5.11)
RBC # BLD AUTO: 4.15 M/UL (ref 3.95–5.11)
RBC # BLD AUTO: 4.9 M/UL (ref 3.95–5.11)
RBC # BLD: ABNORMAL 10*6/UL
RBC # FLD MANUAL: ABNORMAL CELLS/UL
RBC #/AREA URNS HPF: ABNORMAL /HPF (ref 0–4)
REASON FOR REJECTION: NORMAL
SAMPLE SITE: ABNORMAL
SAMPLE SITE: NORMAL
SERVICE CMNT-IMP: NORMAL
SODIUM BLD-SCNC: 136 MMOL/L (ref 138–146)
SODIUM BLD-SCNC: 136 MMOL/L (ref 138–146)
SODIUM BLD-SCNC: 138 MMOL/L (ref 138–146)
SODIUM BLD-SCNC: 139 MMOL/L (ref 138–146)
SODIUM SERPL-SCNC: 132 MMOL/L (ref 135–144)
SODIUM SERPL-SCNC: 132 MMOL/L (ref 135–144)
SODIUM SERPL-SCNC: 133 MMOL/L (ref 135–144)
SODIUM SERPL-SCNC: 133 MMOL/L (ref 135–144)
SODIUM SERPL-SCNC: 134 MMOL/L (ref 135–144)
SODIUM SERPL-SCNC: 135 MMOL/L (ref 135–144)
SODIUM SERPL-SCNC: 137 MMOL/L (ref 135–144)
SODIUM SERPL-SCNC: 139 MMOL/L (ref 135–144)
SODIUM SERPL-SCNC: 141 MMOL/L (ref 135–144)
SODIUM SERPL-SCNC: 141 MMOL/L (ref 135–144)
SODIUM SERPL-SCNC: 142 MMOL/L (ref 135–144)
SODIUM SERPL-SCNC: 142 MMOL/L (ref 135–144)
SODIUM SERPL-SCNC: 143 MMOL/L (ref 135–144)
SODIUM SERPL-SCNC: 143 MMOL/L (ref 135–144)
SODIUM SERPL-SCNC: 144 MMOL/L (ref 135–144)
SP GR UR STRIP: 1.01 (ref 1–1.03)
SP GR UR STRIP: 1.02 (ref 1–1.03)
SP GR UR STRIP: 1.04 (ref 1–1.03)
SP GR UR STRIP: 1.04 (ref 1–1.03)
SPECIMEN DESCRIPTION: ABNORMAL
SPECIMEN DESCRIPTION: NORMAL
SPECIMEN SOURCE: NORMAL
SPECIMEN VOL CSF: 3 ML
TIME, STOOL #1: ABNORMAL
TRANSFUSION STATUS: NORMAL
TRIGL SERPL-MCNC: 134 MG/DL
TRIGL SERPL-MCNC: 145 MG/DL
TRIGL SERPL-MCNC: 69 MG/DL
TROPONIN I SERPL HS-MCNC: 101 NG/L (ref 0–14)
TROPONIN I SERPL HS-MCNC: 108 NG/L (ref 0–14)
TROPONIN I SERPL HS-MCNC: 124 NG/L (ref 0–14)
TROPONIN I SERPL HS-MCNC: 134 NG/L (ref 0–14)
TROPONIN I SERPL HS-MCNC: 62 NG/L (ref 0–14)
TROPONIN I SERPL HS-MCNC: 67 NG/L (ref 0–14)
TROPONIN I SERPL HS-MCNC: 9 NG/L (ref 0–14)
TSH SERPL DL<=0.05 MIU/L-ACNC: 0.8 UIU/ML (ref 0.3–5)
TUBE # CSF: ABNORMAL
UNIDENT CELLS NFR FLD: NORMAL %
UNIT DIVISION: 0
UNIT DIVISION: 0
UNIT DIVISION: NORMAL
UNIT ISSUE DATE/TIME: NORMAL
UROBILINOGEN UR STRIP-ACNC: NORMAL EU/DL (ref 0–1)
VAS BASILAR MEAN VEL MANUAL: 31 CM/S
VAS BASILAR MEAN VEL MANUAL: 32 CM/S
VAS BASILAR MEAN VEL MANUAL: 40 CM/S
VAS BASILAR MEAN VEL MANUAL: 41.2 CM/S
VAS BASILAR MEAN VEL MANUAL: 41.2 CM/S
VAS BASILAR MEAN VEL MANUAL: 41.6 CM/S
VAS BASILAR MEAN VEL MANUAL: 46.2 CM/S
VAS BASILAR MEAN VEL MANUAL: 58.9 CM/S
VAS BASILAR MEAN VEL MANUAL: 84.3 CM/S
VAS LEFT ACA MEAN VEL MANUAL: 23.5 CM/S
VAS LEFT ACA MEAN VEL MANUAL: 38.5 CM/S
VAS LEFT ACA MEAN VEL MANUAL: 55.8 CM/S
VAS LEFT ACA MEAN VEL MANUAL: 59.3 CM/S
VAS LEFT DIST MCA MEAN VEL MANUAL: 102 CM/S
VAS LEFT DIST MCA MEAN VEL MANUAL: 107 CM/S
VAS LEFT DIST MCA MEAN VEL MANUAL: 128 CM/S
VAS LEFT DIST MCA MEAN VEL MANUAL: 132 CM/S
VAS LEFT DIST MCA MEAN VEL MANUAL: 136 CM/S
VAS LEFT DIST MCA MEAN VEL MANUAL: 213 CM/S
VAS LEFT DIST MCA MEAN VEL MANUAL: 56.6 CM/S
VAS LEFT DIST MCA MEAN VEL MANUAL: 62.8 CM/S
VAS LEFT DIST MCA MEAN VEL MANUAL: 68.5 CM/S
VAS LEFT DISTAL ICA MEAN VEL MANUAL: 24.3 CM/S
VAS LEFT DISTAL ICA MEAN VEL MANUAL: 26.9 CM/S
VAS LEFT DISTAL ICA MEAN VEL MANUAL: 28.5 CM/S
VAS LEFT DISTAL ICA MEAN VEL MANUAL: 30 CM/S
VAS LEFT DISTAL ICA MEAN VEL MANUAL: 33.1 CM/S
VAS LEFT DISTAL ICA MEAN VEL MANUAL: 34.3 CM/S
VAS LEFT DISTAL ICA MEAN VEL MANUAL: 39.7 CM/S
VAS LEFT DISTAL ICA MEAN VEL MANUAL: 53.9 CM/S
VAS LEFT DISTAL ICA MEAN VEL MANUAL: 54.3 CM/S
VAS LEFT LINDEGAARD RATIO MANUAL: 1.16
VAS LEFT LINDEGAARD RATIO MANUAL: 2.15
VAS LEFT LINDEGAARD RATIO MANUAL: 2.32
VAS LEFT LINDEGAARD RATIO MANUAL: 4.42
VAS LEFT LINDEGAARD RATIO MANUAL: 4.68
VAS LEFT LINDEGAARD RATIO MANUAL: 4.99
VAS LEFT LINDEGAARD RATIO MANUAL: 5.21
VAS LEFT LINDEGAARD RATIO MANUAL: 5.37
VAS LEFT LINDEGAARD RATIO MANUAL: 6.61
VAS LEFT MCA BIFURCATION MEAN VEL MANUAL: 50 CM/S
VAS LEFT MCA BIFURCATION MEAN VEL MANUAL: 60.1 CM/S
VAS LEFT MCA MEAN VEL MANUAL: 122 CM/S
VAS LEFT MCA MEAN VEL MANUAL: 150 CM/S
VAS LEFT MCA MEAN VEL MANUAL: 151 CM/S
VAS LEFT MCA MEAN VEL MANUAL: 93.2 CM/S
VAS LEFT MID ACA MEAN VEL MANUAL: 38.9 CM/S
VAS LEFT MID ACA MEAN VEL MANUAL: 57.4 CM/S
VAS LEFT MID ACA MEAN VEL MANUAL: 60.4 CM/S
VAS LEFT MID ACA MEAN VEL MANUAL: 60.8 CM/S
VAS LEFT MID ACA MEAN VEL MANUAL: 68.5 CM/S
VAS LEFT MID ACA MEAN VEL MANUAL: 74.7 CM/S
VAS LEFT MID ACA MEAN VEL MANUAL: 83.9 CM/S
VAS LEFT MID ACA MEAN VEL MANUAL: 90.1 CM/S
VAS LEFT MID ACA MEAN VEL MANUAL: 92.8 CM/S
VAS LEFT MID MCA MEAN VEL MANUAL: 107 CM/S
VAS LEFT MID MCA MEAN VEL MANUAL: 131 CM/S
VAS LEFT MID MCA MEAN VEL MANUAL: 141 CM/S
VAS LEFT MID MCA MEAN VEL MANUAL: 149 CM/S
VAS LEFT MID MCA MEAN VEL MANUAL: 155 CM/S
VAS LEFT MID MCA MEAN VEL MANUAL: 158 CM/S
VAS LEFT MID MCA MEAN VEL MANUAL: 61.2 CM/S
VAS LEFT MID MCA MEAN VEL MANUAL: 62.4 CM/S
VAS LEFT MID MCA MEAN VEL MANUAL: 71.2 CM/S
VAS LEFT PCA MEAN VEL MANUAL: 14.6 CM/S
VAS LEFT PCA MEAN VEL MANUAL: 18.1 CM/S
VAS LEFT PCA MEAN VEL MANUAL: 24.3 CM/S
VAS LEFT PCA MEAN VEL MANUAL: 27.7 CM/S
VAS LEFT PCA MEAN VEL MANUAL: 34.6 CM/S
VAS LEFT PCA MEAN VEL MANUAL: 45 CM/S
VAS LEFT PCA MEAN VEL MANUAL: 50 CM/S
VAS LEFT PCA MEAN VEL MANUAL: 53.1 CM/S
VAS LEFT PROX ACA MEAN VEL MANUAL: 113 CM/S
VAS LEFT PROX ACA MEAN VEL MANUAL: 24.3 CM/S
VAS LEFT PROX ACA MEAN VEL MANUAL: 32.3 CM/S
VAS LEFT PROX ACA MEAN VEL MANUAL: 43.5 CM/S
VAS LEFT PROX ACA MEAN VEL MANUAL: 55.4 CM/S
VAS LEFT PROX ACA MEAN VEL MANUAL: 62.8 CM/S
VAS LEFT PROX ACA MEAN VEL MANUAL: 67.8 CM/S
VAS LEFT PROX ACA MEAN VEL MANUAL: 70.5 CM/S
VAS LEFT PROX ACA MEAN VEL MANUAL: 88.2 CM/S
VAS LEFT PROX MCA MEAN VEL MANUAL: 114 CM/S
VAS LEFT PROX MCA MEAN VEL MANUAL: 125 CM/S
VAS LEFT PROX MCA MEAN VEL MANUAL: 136 CM/S
VAS LEFT PROX MCA MEAN VEL MANUAL: 145 CM/S
VAS LEFT PROX MCA MEAN VEL MANUAL: 145 CM/S
VAS LEFT PROX MCA MEAN VEL MANUAL: 159 CM/S
VAS LEFT PROX MCA MEAN VEL MANUAL: 66.2 CM/S
VAS LEFT PROX MCA MEAN VEL MANUAL: 68.9 CM/S
VAS LEFT PROX MCA MEAN VEL MANUAL: 70.1 CM/S
VAS LEFT SIPHON MEAN VEL MANUAL: 117 CM/S
VAS LEFT SIPHON MEAN VEL MANUAL: 32 CM/S
VAS LEFT SIPHON MEAN VEL MANUAL: 39.7 CM/S
VAS LEFT SIPHON MEAN VEL MANUAL: 47.4 CM/S
VAS LEFT SIPHON MEAN VEL MANUAL: 50 CM/S
VAS LEFT SIPHON MEAN VEL MANUAL: 52.7 CM/S
VAS LEFT SIPHON MEAN VEL MANUAL: 53.5 CM/S
VAS LEFT SIPHON MEAN VEL MANUAL: 55.8 CM/S
VAS LEFT SIPHON MEAN VEL MANUAL: 60.8 CM/S
VAS LEFT TERMINAL ICA MEAN VEL MANUAL: 23.5 CM/S
VAS LEFT TERMINAL ICA MEAN VEL MANUAL: 37 CM/S
VAS LEFT TERMINAL ICA MEAN VEL MANUAL: 38.5 CM/S
VAS LEFT TERMINAL ICA MEAN VEL MANUAL: 45.4 CM/S
VAS LEFT TERMINAL ICA MEAN VEL MANUAL: 46.6 CM/S
VAS LEFT TERMINAL ICA MEAN VEL MANUAL: 57.8 CM/S
VAS LEFT TERMINAL ICA MEAN VEL MANUAL: 58.1 CM/S
VAS LEFT TERMINAL ICA MEAN VEL MANUAL: 60.8 CM/S
VAS LEFT TERMINAL ICA MEAN VEL MANUAL: 65.5 CM/S
VAS LEFT VERTEBRAL MEAN VEL MANUAL: 21.9 CM/S
VAS LEFT VERTEBRAL MEAN VEL MANUAL: 25 CM/S
VAS LEFT VERTEBRAL MEAN VEL MANUAL: 25.4 CM/S
VAS LEFT VERTEBRAL MEAN VEL MANUAL: 26.2 CM/S
VAS LEFT VERTEBRAL MEAN VEL MANUAL: 26.9 CM/S
VAS LEFT VERTEBRAL MEAN VEL MANUAL: 29.3 CM/S
VAS LEFT VERTEBRAL MEAN VEL MANUAL: 30.8 CM/S
VAS LEFT VERTEBRAL MEAN VEL MANUAL: 31.6 CM/S
VAS LEFT VERTEBRAL MEAN VEL MANUAL: 32 CM/S
VAS RIGHT ACA MEAN VEL MANUAL: 101 CM/S
VAS RIGHT ACA MEAN VEL MANUAL: 55.8 CM/S
VAS RIGHT ACA MEAN VEL MANUAL: 55.8 CM/S
VAS RIGHT ACA MEAN VEL MANUAL: 63.1 CM/S
VAS RIGHT DIST MCA MEAN VEL MANUAL: 105 CM/S
VAS RIGHT DIST MCA MEAN VEL MANUAL: 132 CM/S
VAS RIGHT DIST MCA MEAN VEL MANUAL: 139 CM/S
VAS RIGHT DIST MCA MEAN VEL MANUAL: 45.8 CM/S
VAS RIGHT DIST MCA MEAN VEL MANUAL: 53.1 CM/S
VAS RIGHT DIST MCA MEAN VEL MANUAL: 57.8 CM/S
VAS RIGHT DIST MCA MEAN VEL MANUAL: 66.6 CM/S
VAS RIGHT DIST MCA MEAN VEL MANUAL: 72.4 CM/S
VAS RIGHT DIST MCA MEAN VEL MANUAL: 92.4 CM/S
VAS RIGHT DISTAL ICA MEAN VEL MANUAL: 25.8 CM/S
VAS RIGHT DISTAL ICA MEAN VEL MANUAL: 28.9 CM/S
VAS RIGHT DISTAL ICA MEAN VEL MANUAL: 30.8 CM/S
VAS RIGHT DISTAL ICA MEAN VEL MANUAL: 31.2 CM/S
VAS RIGHT DISTAL ICA MEAN VEL MANUAL: 36.2 CM/S
VAS RIGHT DISTAL ICA MEAN VEL MANUAL: 38.9 CM/S
VAS RIGHT DISTAL ICA MEAN VEL MANUAL: 51.6 CM/S
VAS RIGHT DISTAL ICA MEAN VEL MANUAL: 53.9 CM/S
VAS RIGHT DISTAL ICA MEAN VEL MANUAL: 54.7 CM/S
VAS RIGHT LINDEGAARD RATIO MANUAL: 1
VAS RIGHT LINDEGAARD RATIO MANUAL: 1.9
VAS RIGHT LINDEGAARD RATIO MANUAL: 2.4
VAS RIGHT LINDEGAARD RATIO MANUAL: 2.5
VAS RIGHT LINDEGAARD RATIO MANUAL: 2.6
VAS RIGHT LINDEGAARD RATIO MANUAL: 2.6
VAS RIGHT LINDEGAARD RATIO MANUAL: 3.5
VAS RIGHT LINDEGAARD RATIO MANUAL: 3.7
VAS RIGHT MCA BIFURCATION MEAN VEL MANUAL: 54.3 CM/S
VAS RIGHT MCA BIFURCATION MEAN VEL MANUAL: 95.1 CM/S
VAS RIGHT MCA MEAN VEL MANUAL: 108 CM/S
VAS RIGHT MCA MEAN VEL MANUAL: 55.1 CM/S
VAS RIGHT MCA MEAN VEL MANUAL: 75.5 CM/S
VAS RIGHT MCA MEAN VEL MANUAL: 82 CM/S
VAS RIGHT MID ACA MEAN VEL MANUAL: 114 CM/S
VAS RIGHT MID ACA MEAN VEL MANUAL: 131 CM/S
VAS RIGHT MID ACA MEAN VEL MANUAL: 42.7 CM/S
VAS RIGHT MID ACA MEAN VEL MANUAL: 59.3 CM/S
VAS RIGHT MID ACA MEAN VEL MANUAL: 60.4 CM/S
VAS RIGHT MID ACA MEAN VEL MANUAL: 62 CM/S
VAS RIGHT MID ACA MEAN VEL MANUAL: 74.7 CM/S
VAS RIGHT MID ACA MEAN VEL MANUAL: 90.5 CM/S
VAS RIGHT MID ACA MEAN VEL MANUAL: 95.1 CM/S
VAS RIGHT MID MCA MEAN VEL MANUAL: 108 CM/S
VAS RIGHT MID MCA MEAN VEL MANUAL: 127 CM/S
VAS RIGHT MID MCA MEAN VEL MANUAL: 142 CM/S
VAS RIGHT MID MCA MEAN VEL MANUAL: 54.3 CM/S
VAS RIGHT MID MCA MEAN VEL MANUAL: 57.8 CM/S
VAS RIGHT MID MCA MEAN VEL MANUAL: 63.1 CM/S
VAS RIGHT MID MCA MEAN VEL MANUAL: 70.8 CM/S
VAS RIGHT MID MCA MEAN VEL MANUAL: 72.4 CM/S
VAS RIGHT MID MCA MEAN VEL MANUAL: 80.5 CM/S
VAS RIGHT PCA MEAN VEL MANUAL: 23.5 CM/S
VAS RIGHT PCA MEAN VEL MANUAL: 24.6 CM/S
VAS RIGHT PCA MEAN VEL MANUAL: 29.6 CM/S
VAS RIGHT PCA MEAN VEL MANUAL: 30.8 CM/S
VAS RIGHT PCA MEAN VEL MANUAL: 32.3 CM/S
VAS RIGHT PCA MEAN VEL MANUAL: 42.4 CM/S
VAS RIGHT PCA MEAN VEL MANUAL: 45.4 CM/S
VAS RIGHT PCA MEAN VEL MANUAL: 50 CM/S
VAS RIGHT PCA MEAN VEL MANUAL: 56.2 CM/S
VAS RIGHT PROX ACA MEAN VEL MANUAL: 100 CM/S
VAS RIGHT PROX ACA MEAN VEL MANUAL: 111 CM/S
VAS RIGHT PROX ACA MEAN VEL MANUAL: 124 CM/S
VAS RIGHT PROX ACA MEAN VEL MANUAL: 39.3 CM/S
VAS RIGHT PROX ACA MEAN VEL MANUAL: 43.5 CM/S
VAS RIGHT PROX ACA MEAN VEL MANUAL: 51.2 CM/S
VAS RIGHT PROX ACA MEAN VEL MANUAL: 62.4 CM/S
VAS RIGHT PROX ACA MEAN VEL MANUAL: 68.5 CM/S
VAS RIGHT PROX ACA MEAN VEL MANUAL: 92.4 CM/S
VAS RIGHT PROX MCA MEAN VEL MANUAL: 105 CM/S
VAS RIGHT PROX MCA MEAN VEL MANUAL: 135 CM/S
VAS RIGHT PROX MCA MEAN VEL MANUAL: 142 CM/S
VAS RIGHT PROX MCA MEAN VEL MANUAL: 56.2 CM/S
VAS RIGHT PROX MCA MEAN VEL MANUAL: 59.3 CM/S
VAS RIGHT PROX MCA MEAN VEL MANUAL: 65.1 CM/S
VAS RIGHT PROX MCA MEAN VEL MANUAL: 66.2 CM/S
VAS RIGHT PROX MCA MEAN VEL MANUAL: 70.5 CM/S
VAS RIGHT PROX MCA MEAN VEL MANUAL: 79.3 CM/S
VAS RIGHT SIPHON MEAN VEL MANUAL: 117 CM/S
VAS RIGHT SIPHON MEAN VEL MANUAL: 37.7 CM/S
VAS RIGHT SIPHON MEAN VEL MANUAL: 59.3 CM/S
VAS RIGHT SIPHON MEAN VEL MANUAL: 60.8 CM/S
VAS RIGHT SIPHON MEAN VEL MANUAL: 66.2 CM/S
VAS RIGHT SIPHON MEAN VEL MANUAL: 67 CM/S
VAS RIGHT SIPHON MEAN VEL MANUAL: 67 CM/S
VAS RIGHT SIPHON MEAN VEL MANUAL: 74.7 CM/S
VAS RIGHT SIPHON MEAN VEL MANUAL: 94.3 CM/S
VAS RIGHT TERMINAL ICA MEAN VEL MANUAL: 30.8 CM/S
VAS RIGHT TERMINAL ICA MEAN VEL MANUAL: 31.2 CM/S
VAS RIGHT TERMINAL ICA MEAN VEL MANUAL: 42.7 CM/S
VAS RIGHT TERMINAL ICA MEAN VEL MANUAL: 43.1 CM/S
VAS RIGHT TERMINAL ICA MEAN VEL MANUAL: 44.7 CM/S
VAS RIGHT TERMINAL ICA MEAN VEL MANUAL: 45.4 CM/S
VAS RIGHT TERMINAL ICA MEAN VEL MANUAL: 51.2 CM/S
VAS RIGHT TERMINAL ICA MEAN VEL MANUAL: 74.3 CM/S
VAS RIGHT TERMINAL ICA MEAN VEL MANUAL: 80.5 CM/S
VAS RIGHT VERTEBRAL MEAN VEL MANUAL: 15 CM/S
VAS RIGHT VERTEBRAL MEAN VEL MANUAL: 21.2 CM/S
VAS RIGHT VERTEBRAL MEAN VEL MANUAL: 24.6 CM/S
VAS RIGHT VERTEBRAL MEAN VEL MANUAL: 25 CM/S
VAS RIGHT VERTEBRAL MEAN VEL MANUAL: 29.3 CM/S
VAS RIGHT VERTEBRAL MEAN VEL MANUAL: 31.6 CM/S
VAS RIGHT VERTEBRAL MEAN VEL MANUAL: 35.8 CM/S
VAS RIGHT VERTEBRAL MEAN VEL MANUAL: 38.5 CM/S
VAS RIGHT VERTEBRAL MEAN VEL MANUAL: 39.7 CM/S
WBC #/AREA URNS HPF: ABNORMAL /HPF (ref 0–5)
WBC #/AREA URNS HPF: ABNORMAL /HPF (ref 0–5)
WBC OTHER # BLD: 12 K/UL (ref 3.5–11.3)
WBC OTHER # BLD: 12.8 K/UL (ref 3.5–11.3)
WBC OTHER # BLD: 13.1 K/UL (ref 3.5–11.3)
WBC OTHER # BLD: 13.3 K/UL (ref 3.5–11.3)
WBC OTHER # BLD: 14.4 K/UL (ref 3.5–11.3)
WBC OTHER # BLD: 17.7 K/UL (ref 3.5–11.3)
WBC OTHER # BLD: 5.1 K/UL (ref 3.5–11.3)
WBC OTHER # BLD: 6.2 K/UL (ref 3.5–11.3)
WBC OTHER # BLD: 6.7 K/UL (ref 3.5–11.3)
WBC OTHER # BLD: 7.1 K/UL (ref 3.5–11.3)
WBC OTHER # BLD: 7.1 K/UL (ref 3.5–11.3)
WBC OTHER # BLD: 7.2 K/UL (ref 3.5–11.3)
WBC OTHER # BLD: 8 K/UL (ref 3.5–11.3)
WBC OTHER # BLD: 8.2 K/UL (ref 3.5–11.3)
WBC OTHER # BLD: 8.3 K/UL (ref 3.5–11.3)
WBC OTHER # BLD: 9.4 K/UL (ref 3.5–11.3)
WBC OTHER # BLD: 9.9 K/UL (ref 3.5–11.3)
XANTHOCHROMIA CSF QL: PRESENT
YEAST URNS QL MICRO: ABNORMAL
ZZ NTE CLEAN UP: ORDERED TEST: NORMAL

## 2023-01-01 PROCEDURE — 3700000000 HC ANESTHESIA ATTENDED CARE

## 2023-01-01 PROCEDURE — 94761 N-INVAS EAR/PLS OXIMETRY MLT: CPT

## 2023-01-01 PROCEDURE — 6360000004 HC RX CONTRAST MEDICATION: Performed by: STUDENT IN AN ORGANIZED HEALTH CARE EDUCATION/TRAINING PROGRAM

## 2023-01-01 PROCEDURE — 2580000003 HC RX 258: Performed by: NURSE PRACTITIONER

## 2023-01-01 PROCEDURE — 99232 SBSQ HOSP IP/OBS MODERATE 35: CPT | Performed by: PSYCHIATRY & NEUROLOGY

## 2023-01-01 PROCEDURE — 87205 SMEAR GRAM STAIN: CPT

## 2023-01-01 PROCEDURE — 71045 X-RAY EXAM CHEST 1 VIEW: CPT

## 2023-01-01 PROCEDURE — 70450 CT HEAD/BRAIN W/O DYE: CPT

## 2023-01-01 PROCEDURE — 6370000000 HC RX 637 (ALT 250 FOR IP)

## 2023-01-01 PROCEDURE — A4216 STERILE WATER/SALINE, 10 ML: HCPCS | Performed by: PSYCHIATRY & NEUROLOGY

## 2023-01-01 PROCEDURE — 2580000003 HC RX 258: Performed by: INTERNAL MEDICINE

## 2023-01-01 PROCEDURE — 94640 AIRWAY INHALATION TREATMENT: CPT

## 2023-01-01 PROCEDURE — 6370000000 HC RX 637 (ALT 250 FOR IP): Performed by: PSYCHIATRY & NEUROLOGY

## 2023-01-01 PROCEDURE — 3700000001 HC ADD 15 MINUTES (ANESTHESIA)

## 2023-01-01 PROCEDURE — 93886 INTRACRANIAL COMPLETE STUDY: CPT | Performed by: PSYCHIATRY & NEUROLOGY

## 2023-01-01 PROCEDURE — 94003 VENT MGMT INPAT SUBQ DAY: CPT

## 2023-01-01 PROCEDURE — 85025 COMPLETE CBC W/AUTO DIFF WBC: CPT

## 2023-01-01 PROCEDURE — 75894 X-RAYS TRANSCATH THERAPY: CPT

## 2023-01-01 PROCEDURE — 6360000002 HC RX W HCPCS

## 2023-01-01 PROCEDURE — 83735 ASSAY OF MAGNESIUM: CPT

## 2023-01-01 PROCEDURE — 85018 HEMOGLOBIN: CPT

## 2023-01-01 PROCEDURE — 3E063GC INTRODUCTION OF OTHER THERAPEUTIC SUBSTANCE INTO CENTRAL ARTERY, PERCUTANEOUS APPROACH: ICD-10-PCS | Performed by: PSYCHIATRY & NEUROLOGY

## 2023-01-01 PROCEDURE — 94667 MNPJ CHEST WALL 1ST: CPT

## 2023-01-01 PROCEDURE — 94669 MECHANICAL CHEST WALL OSCILL: CPT

## 2023-01-01 PROCEDURE — 95711 VEEG 2-12 HR UNMONITORED: CPT

## 2023-01-01 PROCEDURE — P9040 RBC LEUKOREDUCED IRRADIATED: HCPCS

## 2023-01-01 PROCEDURE — 82270 OCCULT BLOOD FECES: CPT

## 2023-01-01 PROCEDURE — 85520 HEPARIN ASSAY: CPT

## 2023-01-01 PROCEDURE — 6370000000 HC RX 637 (ALT 250 FOR IP): Performed by: NURSE PRACTITIONER

## 2023-01-01 PROCEDURE — 99291 CRITICAL CARE FIRST HOUR: CPT | Performed by: STUDENT IN AN ORGANIZED HEALTH CARE EDUCATION/TRAINING PROGRAM

## 2023-01-01 PROCEDURE — 82553 CREATINE MB FRACTION: CPT

## 2023-01-01 PROCEDURE — C9113 INJ PANTOPRAZOLE SODIUM, VIA: HCPCS | Performed by: NURSE PRACTITIONER

## 2023-01-01 PROCEDURE — 82947 ASSAY GLUCOSE BLOOD QUANT: CPT

## 2023-01-01 PROCEDURE — 80076 HEPATIC FUNCTION PANEL: CPT

## 2023-01-01 PROCEDURE — 2580000003 HC RX 258

## 2023-01-01 PROCEDURE — 82550 ASSAY OF CK (CPK): CPT

## 2023-01-01 PROCEDURE — 36620 INSERTION CATHETER ARTERY: CPT

## 2023-01-01 PROCEDURE — 61624 TCAT PERM OCCLS/EMBOLJ CNS: CPT

## 2023-01-01 PROCEDURE — 6360000002 HC RX W HCPCS: Performed by: NURSE PRACTITIONER

## 2023-01-01 PROCEDURE — 93886 INTRACRANIAL COMPLETE STUDY: CPT

## 2023-01-01 PROCEDURE — 80048 BASIC METABOLIC PNL TOTAL CA: CPT

## 2023-01-01 PROCEDURE — 85014 HEMATOCRIT: CPT

## 2023-01-01 PROCEDURE — 2580000003 HC RX 258: Performed by: STUDENT IN AN ORGANIZED HEALTH CARE EDUCATION/TRAINING PROGRAM

## 2023-01-01 PROCEDURE — 82803 BLOOD GASES ANY COMBINATION: CPT

## 2023-01-01 PROCEDURE — 86920 COMPATIBILITY TEST SPIN: CPT

## 2023-01-01 PROCEDURE — 70498 CT ANGIOGRAPHY NECK: CPT

## 2023-01-01 PROCEDURE — 84145 PROCALCITONIN (PCT): CPT

## 2023-01-01 PROCEDURE — 2000000000 HC ICU R&B

## 2023-01-01 PROCEDURE — 93970 EXTREMITY STUDY: CPT

## 2023-01-01 PROCEDURE — 36226 PLACE CATH VERTEBRAL ART: CPT

## 2023-01-01 PROCEDURE — 86850 RBC ANTIBODY SCREEN: CPT

## 2023-01-01 PROCEDURE — 6370000000 HC RX 637 (ALT 250 FOR IP): Performed by: INTERNAL MEDICINE

## 2023-01-01 PROCEDURE — 6360000002 HC RX W HCPCS: Performed by: STUDENT IN AN ORGANIZED HEALTH CARE EDUCATION/TRAINING PROGRAM

## 2023-01-01 PROCEDURE — 87106 FUNGI IDENTIFICATION YEAST: CPT

## 2023-01-01 PROCEDURE — 75898 FOLLOW-UP ANGIOGRAPHY: CPT

## 2023-01-01 PROCEDURE — 81001 URINALYSIS AUTO W/SCOPE: CPT

## 2023-01-01 PROCEDURE — 85730 THROMBOPLASTIN TIME PARTIAL: CPT

## 2023-01-01 PROCEDURE — 99291 CRITICAL CARE FIRST HOUR: CPT | Performed by: PSYCHIATRY & NEUROLOGY

## 2023-01-01 PROCEDURE — 82330 ASSAY OF CALCIUM: CPT

## 2023-01-01 PROCEDURE — 36228 PLACE CATH INTRACRANIAL ART: CPT

## 2023-01-01 PROCEDURE — 70496 CT ANGIOGRAPHY HEAD: CPT

## 2023-01-01 PROCEDURE — 86901 BLOOD TYPING SEROLOGIC RH(D): CPT

## 2023-01-01 PROCEDURE — 93306 TTE W/DOPPLER COMPLETE: CPT

## 2023-01-01 PROCEDURE — 37799 UNLISTED PX VASCULAR SURGERY: CPT

## 2023-01-01 PROCEDURE — 2580000003 HC RX 258: Performed by: PSYCHIATRY & NEUROLOGY

## 2023-01-01 PROCEDURE — 99221 1ST HOSP IP/OBS SF/LOW 40: CPT | Performed by: SURGERY

## 2023-01-01 PROCEDURE — 6360000004 HC RX CONTRAST MEDICATION: Performed by: PSYCHIATRY & NEUROLOGY

## 2023-01-01 PROCEDURE — 2700000000 HC OXYGEN THERAPY PER DAY

## 2023-01-01 PROCEDURE — A4216 STERILE WATER/SALINE, 10 ML: HCPCS

## 2023-01-01 PROCEDURE — 6360000002 HC RX W HCPCS: Performed by: PSYCHIATRY & NEUROLOGY

## 2023-01-01 PROCEDURE — 36415 COLL VENOUS BLD VENIPUNCTURE: CPT

## 2023-01-01 PROCEDURE — B41FYZZ FLUOROSCOPY OF RIGHT LOWER EXTREMITY ARTERIES USING OTHER CONTRAST: ICD-10-PCS | Performed by: PSYCHIATRY & NEUROLOGY

## 2023-01-01 PROCEDURE — 82945 GLUCOSE OTHER FLUID: CPT

## 2023-01-01 PROCEDURE — 83874 ASSAY OF MYOGLOBIN: CPT

## 2023-01-01 PROCEDURE — 83605 ASSAY OF LACTIC ACID: CPT

## 2023-01-01 PROCEDURE — C9113 INJ PANTOPRAZOLE SODIUM, VIA: HCPCS | Performed by: PSYCHIATRY & NEUROLOGY

## 2023-01-01 PROCEDURE — 93306 TTE W/DOPPLER COMPLETE: CPT | Performed by: INTERNAL MEDICINE

## 2023-01-01 PROCEDURE — B31FYZZ FLUOROSCOPY OF LEFT VERTEBRAL ARTERY USING OTHER CONTRAST: ICD-10-PCS | Performed by: PSYCHIATRY & NEUROLOGY

## 2023-01-01 PROCEDURE — 74018 RADEX ABDOMEN 1 VIEW: CPT

## 2023-01-01 PROCEDURE — B318YZZ FLUOROSCOPY OF BILATERAL INTERNAL CAROTID ARTERIES USING OTHER CONTRAST: ICD-10-PCS | Performed by: PSYCHIATRY & NEUROLOGY

## 2023-01-01 PROCEDURE — 2500000003 HC RX 250 WO HCPCS

## 2023-01-01 PROCEDURE — 87077 CULTURE AEROBIC IDENTIFY: CPT

## 2023-01-01 PROCEDURE — 89220 SPUTUM SPECIMEN COLLECTION: CPT

## 2023-01-01 PROCEDURE — 2500000003 HC RX 250 WO HCPCS: Performed by: NURSE PRACTITIONER

## 2023-01-01 PROCEDURE — 36224 PLACE CATH CAROTD ART: CPT

## 2023-01-01 PROCEDURE — 6360000004 HC RX CONTRAST MEDICATION: Performed by: NURSE PRACTITIONER

## 2023-01-01 PROCEDURE — 87040 BLOOD CULTURE FOR BACTERIA: CPT

## 2023-01-01 PROCEDURE — B315YZZ FLUOROSCOPY OF BILATERAL COMMON CAROTID ARTERIES USING OTHER CONTRAST: ICD-10-PCS | Performed by: PSYCHIATRY & NEUROLOGY

## 2023-01-01 PROCEDURE — 96374 THER/PROPH/DIAG INJ IV PUSH: CPT

## 2023-01-01 PROCEDURE — C1894 INTRO/SHEATH, NON-LASER: HCPCS

## 2023-01-01 PROCEDURE — 85610 PROTHROMBIN TIME: CPT

## 2023-01-01 PROCEDURE — 99233 SBSQ HOSP IP/OBS HIGH 50: CPT | Performed by: PSYCHIATRY & NEUROLOGY

## 2023-01-01 PROCEDURE — C1769 GUIDE WIRE: HCPCS

## 2023-01-01 PROCEDURE — P9016 RBC LEUKOCYTES REDUCED: HCPCS

## 2023-01-01 PROCEDURE — 89051 BODY FLUID CELL COUNT: CPT

## 2023-01-01 PROCEDURE — 94660 CPAP INITIATION&MGMT: CPT

## 2023-01-01 PROCEDURE — 86900 BLOOD TYPING SEROLOGIC ABO: CPT

## 2023-01-01 PROCEDURE — 61650 EVASC PRLNG ADMN RX AGNT 1ST: CPT

## 2023-01-01 PROCEDURE — 85347 COAGULATION TIME ACTIVATED: CPT

## 2023-01-01 PROCEDURE — 82805 BLOOD GASES W/O2 SATURATION: CPT

## 2023-01-01 PROCEDURE — APPSS30 APP SPLIT SHARED TIME 16-30 MINUTES: Performed by: PHYSICIAN ASSISTANT

## 2023-01-01 PROCEDURE — 6370000000 HC RX 637 (ALT 250 FOR IP): Performed by: STUDENT IN AN ORGANIZED HEALTH CARE EDUCATION/TRAINING PROGRAM

## 2023-01-01 PROCEDURE — 85384 FIBRINOGEN ACTIVITY: CPT

## 2023-01-01 PROCEDURE — 6360000002 HC RX W HCPCS: Performed by: INTERNAL MEDICINE

## 2023-01-01 PROCEDURE — 36430 TRANSFUSION BLD/BLD COMPNT: CPT

## 2023-01-01 PROCEDURE — 2060000000 HC ICU INTERMEDIATE R&B

## 2023-01-01 PROCEDURE — 36226 PLACE CATH VERTEBRAL ART: CPT | Performed by: PSYCHIATRY & NEUROLOGY

## 2023-01-01 PROCEDURE — 99231 SBSQ HOSP IP/OBS SF/LOW 25: CPT | Performed by: PSYCHIATRY & NEUROLOGY

## 2023-01-01 PROCEDURE — 84484 ASSAY OF TROPONIN QUANT: CPT

## 2023-01-01 PROCEDURE — 83036 HEMOGLOBIN GLYCOSYLATED A1C: CPT

## 2023-01-01 PROCEDURE — 99232 SBSQ HOSP IP/OBS MODERATE 35: CPT | Performed by: INTERNAL MEDICINE

## 2023-01-01 PROCEDURE — C9113 INJ PANTOPRAZOLE SODIUM, VIA: HCPCS

## 2023-01-01 PROCEDURE — 93010 ELECTROCARDIOGRAM REPORT: CPT | Performed by: INTERNAL MEDICINE

## 2023-01-01 PROCEDURE — APPSS15 APP SPLIT SHARED TIME 0-15 MINUTES: Performed by: NURSE PRACTITIONER

## 2023-01-01 PROCEDURE — 87015 SPECIMEN INFECT AGNT CONCNTJ: CPT

## 2023-01-01 PROCEDURE — 99231 SBSQ HOSP IP/OBS SF/LOW 25: CPT

## 2023-01-01 PROCEDURE — 84520 ASSAY OF UREA NITROGEN: CPT

## 2023-01-01 PROCEDURE — 84443 ASSAY THYROID STIM HORMONE: CPT

## 2023-01-01 PROCEDURE — 03CG3Z7 EXTIRPATION OF MATTER FROM INTRACRANIAL ARTERY USING STENT RETRIEVER, PERCUTANEOUS APPROACH: ICD-10-PCS | Performed by: PSYCHIATRY & NEUROLOGY

## 2023-01-01 PROCEDURE — 36224 PLACE CATH CAROTD ART: CPT | Performed by: PSYCHIATRY & NEUROLOGY

## 2023-01-01 PROCEDURE — 93005 ELECTROCARDIOGRAM TRACING: CPT | Performed by: STUDENT IN AN ORGANIZED HEALTH CARE EDUCATION/TRAINING PROGRAM

## 2023-01-01 PROCEDURE — 61645 PERQ ART M-THROMBECT &/NFS: CPT

## 2023-01-01 PROCEDURE — 84132 ASSAY OF SERUM POTASSIUM: CPT

## 2023-01-01 PROCEDURE — 80061 LIPID PANEL: CPT

## 2023-01-01 PROCEDURE — 87070 CULTURE OTHR SPECIMN AEROBIC: CPT

## 2023-01-01 PROCEDURE — 93970 EXTREMITY STUDY: CPT | Performed by: SURGERY

## 2023-01-01 PROCEDURE — 009630Z DRAINAGE OF CEREBRAL VENTRICLE WITH DRAINAGE DEVICE, PERCUTANEOUS APPROACH: ICD-10-PCS | Performed by: NEUROLOGICAL SURGERY

## 2023-01-01 PROCEDURE — 82150 ASSAY OF AMYLASE: CPT

## 2023-01-01 PROCEDURE — 99233 SBSQ HOSP IP/OBS HIGH 50: CPT | Performed by: INTERNAL MEDICINE

## 2023-01-01 PROCEDURE — 96375 TX/PRO/DX INJ NEW DRUG ADDON: CPT

## 2023-01-01 PROCEDURE — 36600 WITHDRAWAL OF ARTERIAL BLOOD: CPT

## 2023-01-01 PROCEDURE — 03HY32Z INSERTION OF MONITORING DEVICE INTO UPPER ARTERY, PERCUTANEOUS APPROACH: ICD-10-PCS | Performed by: STUDENT IN AN ORGANIZED HEALTH CARE EDUCATION/TRAINING PROGRAM

## 2023-01-01 PROCEDURE — 93970 EXTREMITY STUDY: CPT | Performed by: STUDENT IN AN ORGANIZED HEALTH CARE EDUCATION/TRAINING PROGRAM

## 2023-01-01 PROCEDURE — 76705 ECHO EXAM OF ABDOMEN: CPT

## 2023-01-01 PROCEDURE — 80051 ELECTROLYTE PANEL: CPT

## 2023-01-01 PROCEDURE — 94002 VENT MGMT INPAT INIT DAY: CPT

## 2023-01-01 PROCEDURE — 84157 ASSAY OF PROTEIN OTHER: CPT

## 2023-01-01 PROCEDURE — 82565 ASSAY OF CREATININE: CPT

## 2023-01-01 PROCEDURE — 99222 1ST HOSP IP/OBS MODERATE 55: CPT | Performed by: PSYCHIATRY & NEUROLOGY

## 2023-01-01 PROCEDURE — 76937 US GUIDE VASCULAR ACCESS: CPT | Performed by: PSYCHIATRY & NEUROLOGY

## 2023-01-01 PROCEDURE — 82977 ASSAY OF GGT: CPT

## 2023-01-01 PROCEDURE — 83690 ASSAY OF LIPASE: CPT

## 2023-01-01 PROCEDURE — 61650 EVASC PRLNG ADMN RX AGNT 1ST: CPT | Performed by: PSYCHIATRY & NEUROLOGY

## 2023-01-01 PROCEDURE — 84478 ASSAY OF TRIGLYCERIDES: CPT

## 2023-01-01 PROCEDURE — 31720 CLEARANCE OF AIRWAYS: CPT

## 2023-01-01 PROCEDURE — 93005 ELECTROCARDIOGRAM TRACING: CPT

## 2023-01-01 PROCEDURE — 70551 MRI BRAIN STEM W/O DYE: CPT

## 2023-01-01 PROCEDURE — 03VG3HZ RESTRICTION OF INTRACRANIAL ARTERY WITH INTRALUMINAL DEVICE, FLOW DIVERTER, PERCUTANEOUS APPROACH: ICD-10-PCS | Performed by: PSYCHIATRY & NEUROLOGY

## 2023-01-01 PROCEDURE — 61651 EVASC PRLNG ADMN RX AGNT ADD: CPT

## 2023-01-01 PROCEDURE — 94668 MNPJ CHEST WALL SBSQ: CPT

## 2023-01-01 PROCEDURE — 87641 MR-STAPH DNA AMP PROBE: CPT

## 2023-01-01 PROCEDURE — 87449 NOS EACH ORGANISM AG IA: CPT

## 2023-01-01 PROCEDURE — 95720 EEG PHY/QHP EA INCR W/VEEG: CPT | Performed by: PSYCHIATRY & NEUROLOGY

## 2023-01-01 PROCEDURE — 93005 ELECTROCARDIOGRAM TRACING: CPT | Performed by: PSYCHIATRY & NEUROLOGY

## 2023-01-01 PROCEDURE — 86140 C-REACTIVE PROTEIN: CPT

## 2023-01-01 PROCEDURE — 85055 RETICULATED PLATELET ASSAY: CPT

## 2023-01-01 PROCEDURE — 99222 1ST HOSP IP/OBS MODERATE 55: CPT

## 2023-01-01 PROCEDURE — 99222 1ST HOSP IP/OBS MODERATE 55: CPT | Performed by: INTERNAL MEDICINE

## 2023-01-01 PROCEDURE — 95700 EEG CONT REC W/VID EEG TECH: CPT

## 2023-01-01 PROCEDURE — 61651 EVASC PRLNG ADMN RX AGNT ADD: CPT | Performed by: PSYCHIATRY & NEUROLOGY

## 2023-01-01 PROCEDURE — 99239 HOSP IP/OBS DSCHRG MGMT >30: CPT | Performed by: PSYCHIATRY & NEUROLOGY

## 2023-01-01 PROCEDURE — 81003 URINALYSIS AUTO W/O SCOPE: CPT

## 2023-01-01 PROCEDURE — 5A1955Z RESPIRATORY VENTILATION, GREATER THAN 96 CONSECUTIVE HOURS: ICD-10-PCS | Performed by: STUDENT IN AN ORGANIZED HEALTH CARE EDUCATION/TRAINING PROGRAM

## 2023-01-01 PROCEDURE — 99285 EMERGENCY DEPT VISIT HI MDM: CPT

## 2023-01-01 PROCEDURE — 02HV33Z INSERTION OF INFUSION DEVICE INTO SUPERIOR VENA CAVA, PERCUTANEOUS APPROACH: ICD-10-PCS | Performed by: PSYCHIATRY & NEUROLOGY

## 2023-01-01 PROCEDURE — 99232 SBSQ HOSP IP/OBS MODERATE 35: CPT | Performed by: STUDENT IN AN ORGANIZED HEALTH CARE EDUCATION/TRAINING PROGRAM

## 2023-01-01 PROCEDURE — 87324 CLOSTRIDIUM AG IA: CPT

## 2023-01-01 PROCEDURE — 80053 COMPREHEN METABOLIC PANEL: CPT

## 2023-01-01 PROCEDURE — 71275 CT ANGIOGRAPHY CHEST: CPT

## 2023-01-01 RX ORDER — ATORVASTATIN CALCIUM 40 MG/1
40 TABLET, FILM COATED ORAL DAILY
Status: DISCONTINUED | OUTPATIENT
Start: 2023-01-01 | End: 2023-01-01

## 2023-01-01 RX ORDER — HEPARIN SODIUM 10000 [USP'U]/100ML
5-30 INJECTION, SOLUTION INTRAVENOUS CONTINUOUS
Status: DISCONTINUED | OUTPATIENT
Start: 2023-01-01 | End: 2023-01-01

## 2023-01-01 RX ORDER — POTASSIUM CHLORIDE 7.45 MG/ML
10 INJECTION INTRAVENOUS
Status: COMPLETED | OUTPATIENT
Start: 2023-01-01 | End: 2023-01-01

## 2023-01-01 RX ORDER — SODIUM CHLORIDE 9 MG/ML
INJECTION, SOLUTION INTRAVENOUS CONTINUOUS
Status: DISCONTINUED | OUTPATIENT
Start: 2023-01-01 | End: 2023-01-01

## 2023-01-01 RX ORDER — BUSPIRONE HYDROCHLORIDE 10 MG/1
10 TABLET ORAL 3 TIMES DAILY
Status: DISCONTINUED | OUTPATIENT
Start: 2023-01-01 | End: 2023-01-01

## 2023-01-01 RX ORDER — LANSOPRAZOLE
30 KIT
Status: DISCONTINUED | OUTPATIENT
Start: 2023-01-01 | End: 2023-01-01

## 2023-01-01 RX ORDER — PROPOFOL 10 MG/ML
5-50 INJECTION, EMULSION INTRAVENOUS CONTINUOUS
Status: DISCONTINUED | OUTPATIENT
Start: 2023-01-01 | End: 2023-01-01

## 2023-01-01 RX ORDER — PHENYLEPHRINE HCL IN 0.9% NACL 50MG/250ML
10-300 PLASTIC BAG, INJECTION (ML) INTRAVENOUS CONTINUOUS
Status: DISCONTINUED | OUTPATIENT
Start: 2023-01-01 | End: 2023-01-01

## 2023-01-01 RX ORDER — SODIUM CHLORIDE 0.9 % (FLUSH) 0.9 %
5-40 SYRINGE (ML) INJECTION EVERY 12 HOURS SCHEDULED
Status: DISCONTINUED | OUTPATIENT
Start: 2023-01-01 | End: 2023-01-01

## 2023-01-01 RX ORDER — MORPHINE SULFATE 2 MG/ML
INJECTION, SOLUTION INTRAMUSCULAR; INTRAVENOUS
Status: COMPLETED
Start: 2023-01-01 | End: 2023-01-01

## 2023-01-01 RX ORDER — MORPHINE SULFATE 2 MG/ML
2 INJECTION, SOLUTION INTRAMUSCULAR; INTRAVENOUS
Status: DISCONTINUED | OUTPATIENT
Start: 2023-01-01 | End: 2023-01-01

## 2023-01-01 RX ORDER — CALCIUM GLUCONATE 20 MG/ML
2000 INJECTION, SOLUTION INTRAVENOUS ONCE
Status: COMPLETED | OUTPATIENT
Start: 2023-01-01 | End: 2023-01-01

## 2023-01-01 RX ORDER — FLUDROCORTISONE ACETATE 0.1 MG/1
0.1 TABLET ORAL 2 TIMES DAILY
Status: DISCONTINUED | OUTPATIENT
Start: 2023-01-01 | End: 2023-01-01

## 2023-01-01 RX ORDER — ASPIRIN 81 MG/1
81 TABLET, CHEWABLE ORAL DAILY
Status: DISCONTINUED | OUTPATIENT
Start: 2023-01-01 | End: 2023-01-01

## 2023-01-01 RX ORDER — NIMODIPINE 30 MG/1
60 CAPSULE, LIQUID FILLED ORAL
Status: DISCONTINUED | OUTPATIENT
Start: 2023-01-01 | End: 2023-01-01

## 2023-01-01 RX ORDER — ONDANSETRON 2 MG/ML
INJECTION INTRAMUSCULAR; INTRAVENOUS
Status: COMPLETED
Start: 2023-01-01 | End: 2023-01-01

## 2023-01-01 RX ORDER — DEXTROSE MONOHYDRATE 100 MG/ML
INJECTION, SOLUTION INTRAVENOUS CONTINUOUS PRN
Status: DISCONTINUED | OUTPATIENT
Start: 2023-01-01 | End: 2023-01-01

## 2023-01-01 RX ORDER — ONDANSETRON 2 MG/ML
4 INJECTION INTRAMUSCULAR; INTRAVENOUS EVERY 6 HOURS PRN
Status: DISCONTINUED | OUTPATIENT
Start: 2023-01-01 | End: 2023-01-01

## 2023-01-01 RX ORDER — ASPIRIN 300 MG/1
SUPPOSITORY RECTAL
Status: COMPLETED | OUTPATIENT
Start: 2023-01-01 | End: 2023-01-01

## 2023-01-01 RX ORDER — SENNOSIDES 8.8 MG/5ML
5 LIQUID ORAL NIGHTLY
Status: DISCONTINUED | OUTPATIENT
Start: 2023-01-01 | End: 2023-01-01

## 2023-01-01 RX ORDER — IODIXANOL 320 MG/ML
250 INJECTION, SOLUTION INTRAVASCULAR
Status: COMPLETED | OUTPATIENT
Start: 2023-01-01 | End: 2023-01-01

## 2023-01-01 RX ORDER — NOREPINEPHRINE BITARTRATE 0.06 MG/ML
1-100 INJECTION, SOLUTION INTRAVENOUS CONTINUOUS
Status: DISCONTINUED | OUTPATIENT
Start: 2023-01-01 | End: 2023-01-01

## 2023-01-01 RX ORDER — ACETAMINOPHEN 325 MG/1
650 TABLET ORAL EVERY 6 HOURS PRN
Status: DISCONTINUED | OUTPATIENT
Start: 2023-01-01 | End: 2023-01-01 | Stop reason: HOSPADM

## 2023-01-01 RX ORDER — ONDANSETRON 4 MG/1
4 TABLET, ORALLY DISINTEGRATING ORAL EVERY 8 HOURS PRN
Status: DISCONTINUED | OUTPATIENT
Start: 2023-01-01 | End: 2023-01-01

## 2023-01-01 RX ORDER — GLYCOPYRROLATE 0.2 MG/ML
0.2 INJECTION INTRAMUSCULAR; INTRAVENOUS EVERY 4 HOURS PRN
Status: DISCONTINUED | OUTPATIENT
Start: 2023-01-01 | End: 2023-01-01 | Stop reason: HOSPADM

## 2023-01-01 RX ORDER — LANSOPRAZOLE 30 MG/1
30 TABLET, ORALLY DISINTEGRATING, DELAYED RELEASE ORAL
Status: DISCONTINUED | OUTPATIENT
Start: 2023-01-01 | End: 2023-01-01

## 2023-01-01 RX ORDER — SODIUM CHLORIDE 0.9 % (FLUSH) 0.9 %
5-40 SYRINGE (ML) INJECTION PRN
Status: DISCONTINUED | OUTPATIENT
Start: 2023-01-01 | End: 2023-01-01

## 2023-01-01 RX ORDER — MANNITOL 20 G/100ML
0.5 INJECTION, SOLUTION INTRAVENOUS ONCE
Status: COMPLETED | OUTPATIENT
Start: 2023-01-01 | End: 2023-01-01

## 2023-01-01 RX ORDER — NICARDIPINE HYDROCHLORIDE 0.1 MG/ML
2.5-15 INJECTION INTRAVENOUS CONTINUOUS
Status: DISCONTINUED | OUTPATIENT
Start: 2023-01-01 | End: 2023-01-01

## 2023-01-01 RX ORDER — LORAZEPAM 2 MG/ML
1 INJECTION INTRAMUSCULAR
Status: DISCONTINUED | OUTPATIENT
Start: 2023-01-01 | End: 2023-01-01

## 2023-01-01 RX ORDER — FENTANYL CITRATE 50 UG/ML
50 INJECTION, SOLUTION INTRAMUSCULAR; INTRAVENOUS ONCE
Status: COMPLETED | OUTPATIENT
Start: 2023-01-01 | End: 2023-01-01

## 2023-01-01 RX ORDER — BISACODYL 10 MG
10 SUPPOSITORY, RECTAL RECTAL DAILY
Status: DISPENSED | OUTPATIENT
Start: 2023-01-01 | End: 2023-01-01

## 2023-01-01 RX ORDER — 0.9 % SODIUM CHLORIDE 0.9 %
500 INTRAVENOUS SOLUTION INTRAVENOUS ONCE
Status: COMPLETED | OUTPATIENT
Start: 2023-01-01 | End: 2023-01-01

## 2023-01-01 RX ORDER — LORAZEPAM 2 MG/ML
2 INJECTION INTRAMUSCULAR
Status: DISCONTINUED | OUTPATIENT
Start: 2023-01-01 | End: 2023-01-01 | Stop reason: HOSPADM

## 2023-01-01 RX ORDER — SODIUM CHLORIDE FOR INHALATION 3 %
4 VIAL, NEBULIZER (ML) INHALATION PRN
Status: DISCONTINUED | OUTPATIENT
Start: 2023-01-01 | End: 2023-01-01

## 2023-01-01 RX ORDER — FENTANYL CITRATE 50 UG/ML
50 INJECTION, SOLUTION INTRAMUSCULAR; INTRAVENOUS ONCE
Status: CANCELLED | OUTPATIENT
Start: 2023-01-01

## 2023-01-01 RX ORDER — BUSPIRONE HYDROCHLORIDE 10 MG/1
10 TABLET ORAL 3 TIMES DAILY
Status: CANCELLED | OUTPATIENT
Start: 2023-01-01

## 2023-01-01 RX ORDER — IPRATROPIUM BROMIDE AND ALBUTEROL SULFATE 2.5; .5 MG/3ML; MG/3ML
1 SOLUTION RESPIRATORY (INHALATION)
Status: DISCONTINUED | OUTPATIENT
Start: 2023-01-01 | End: 2023-01-01

## 2023-01-01 RX ORDER — MORPHINE SULFATE 4 MG/ML
4 INJECTION, SOLUTION INTRAMUSCULAR; INTRAVENOUS ONCE
Status: COMPLETED | OUTPATIENT
Start: 2023-01-01 | End: 2023-01-01

## 2023-01-01 RX ORDER — CLONIDINE HYDROCHLORIDE 0.1 MG/1
0.1 TABLET ORAL 3 TIMES DAILY
Status: DISCONTINUED | OUTPATIENT
Start: 2023-01-01 | End: 2023-01-01

## 2023-01-01 RX ORDER — ACETAMINOPHEN 325 MG/1
650 TABLET ORAL EVERY 8 HOURS
Status: DISCONTINUED | OUTPATIENT
Start: 2023-01-01 | End: 2023-01-01

## 2023-01-01 RX ORDER — PROPOFOL 10 MG/ML
INJECTION, EMULSION INTRAVENOUS
Status: COMPLETED
Start: 2023-01-01 | End: 2023-01-01

## 2023-01-01 RX ORDER — MILRINONE LACTATE 0.2 MG/ML
0.5 INJECTION, SOLUTION INTRAVENOUS CONTINUOUS
Status: DISCONTINUED | OUTPATIENT
Start: 2023-01-01 | End: 2023-01-01

## 2023-01-01 RX ORDER — OXYCODONE HYDROCHLORIDE 5 MG/1
10 TABLET ORAL EVERY 4 HOURS
Status: DISCONTINUED | OUTPATIENT
Start: 2023-01-01 | End: 2023-01-01

## 2023-01-01 RX ORDER — SODIUM CHLORIDE 9 MG/ML
INJECTION, SOLUTION INTRAVENOUS PRN
OUTPATIENT
Start: 2023-01-01

## 2023-01-01 RX ORDER — MAGNESIUM SULFATE 1 G/100ML
1000 INJECTION INTRAVENOUS PRN
Status: DISCONTINUED | OUTPATIENT
Start: 2023-01-01 | End: 2023-01-01

## 2023-01-01 RX ORDER — ACETAMINOPHEN 325 MG/1
650 TABLET ORAL EVERY 4 HOURS PRN
Status: DISCONTINUED | OUTPATIENT
Start: 2023-01-01 | End: 2023-01-01

## 2023-01-01 RX ORDER — ALBUTEROL SULFATE 2.5 MG/3ML
2.5 SOLUTION RESPIRATORY (INHALATION) EVERY 4 HOURS PRN
Status: DISCONTINUED | OUTPATIENT
Start: 2023-01-01 | End: 2023-01-01 | Stop reason: HOSPADM

## 2023-01-01 RX ORDER — HYDRALAZINE HYDROCHLORIDE 20 MG/ML
INJECTION INTRAMUSCULAR; INTRAVENOUS
Status: COMPLETED
Start: 2023-01-01 | End: 2023-01-01

## 2023-01-01 RX ORDER — MORPHINE SULFATE 4 MG/ML
4 INJECTION, SOLUTION INTRAMUSCULAR; INTRAVENOUS
Status: DISCONTINUED | OUTPATIENT
Start: 2023-01-01 | End: 2023-01-01

## 2023-01-01 RX ORDER — SODIUM CHLORIDE 9 MG/ML
INJECTION, SOLUTION INTRAVENOUS PRN
Status: DISCONTINUED | OUTPATIENT
Start: 2023-01-01 | End: 2023-01-01

## 2023-01-01 RX ORDER — 0.9 % SODIUM CHLORIDE 0.9 %
1000 INTRAVENOUS SOLUTION INTRAVENOUS ONCE
Status: COMPLETED | OUTPATIENT
Start: 2023-01-01 | End: 2023-01-01

## 2023-01-01 RX ORDER — POTASSIUM CHLORIDE 29.8 MG/ML
20 INJECTION INTRAVENOUS
Status: COMPLETED | OUTPATIENT
Start: 2023-01-01 | End: 2023-01-01

## 2023-01-01 RX ORDER — HYDRALAZINE HYDROCHLORIDE 20 MG/ML
10 INJECTION INTRAMUSCULAR; INTRAVENOUS
Status: DISCONTINUED | OUTPATIENT
Start: 2023-01-01 | End: 2023-01-01

## 2023-01-01 RX ORDER — FUROSEMIDE 10 MG/ML
20 INJECTION INTRAMUSCULAR; INTRAVENOUS ONCE
Status: DISCONTINUED | OUTPATIENT
Start: 2023-01-01 | End: 2023-01-01

## 2023-01-01 RX ORDER — LEVOTHYROXINE SODIUM 0.1 MG/1
200 TABLET ORAL DAILY
Status: DISCONTINUED | OUTPATIENT
Start: 2023-01-01 | End: 2023-01-01

## 2023-01-01 RX ORDER — ACETAMINOPHEN 325 MG/1
650 TABLET ORAL EVERY 6 HOURS PRN
Status: DISCONTINUED | OUTPATIENT
Start: 2023-01-01 | End: 2023-01-01

## 2023-01-01 RX ORDER — SODIUM CHLORIDE 0.9 % (FLUSH) 0.9 %
5-40 SYRINGE (ML) INJECTION PRN
OUTPATIENT
Start: 2023-01-01

## 2023-01-01 RX ORDER — ENOXAPARIN SODIUM 100 MG/ML
40 INJECTION SUBCUTANEOUS DAILY
Status: DISCONTINUED | OUTPATIENT
Start: 2023-01-01 | End: 2023-01-01

## 2023-01-01 RX ORDER — 0.9 % SODIUM CHLORIDE 0.9 %
500 INTRAVENOUS SOLUTION INTRAVENOUS ONCE
Status: DISCONTINUED | OUTPATIENT
Start: 2023-01-01 | End: 2023-01-01

## 2023-01-01 RX ORDER — SODIUM CHLORIDE 9 MG/ML
INJECTION, SOLUTION INTRAVENOUS PRN
Status: DISCONTINUED | OUTPATIENT
Start: 2023-01-01 | End: 2023-01-01 | Stop reason: HOSPADM

## 2023-01-01 RX ORDER — HYDRALAZINE HYDROCHLORIDE 20 MG/ML
10 INJECTION INTRAMUSCULAR; INTRAVENOUS
OUTPATIENT
Start: 2023-01-01

## 2023-01-01 RX ORDER — IODIXANOL 270 MG/ML
100 INJECTION, SOLUTION INTRAVASCULAR
Status: COMPLETED | OUTPATIENT
Start: 2023-01-01 | End: 2023-01-01

## 2023-01-01 RX ORDER — ONDANSETRON 2 MG/ML
4 INJECTION INTRAMUSCULAR; INTRAVENOUS
OUTPATIENT
Start: 2023-01-01 | End: 2023-01-01

## 2023-01-01 RX ORDER — ONDANSETRON 4 MG/1
4 TABLET, ORALLY DISINTEGRATING ORAL EVERY 8 HOURS PRN
Status: CANCELLED | OUTPATIENT
Start: 2023-01-01

## 2023-01-01 RX ORDER — MAGNESIUM SULFATE IN WATER 40 MG/ML
2000 INJECTION, SOLUTION INTRAVENOUS ONCE
Status: COMPLETED | OUTPATIENT
Start: 2023-01-01 | End: 2023-01-01

## 2023-01-01 RX ORDER — EPINEPHRINE IN SOD CHLOR,ISO 1 MG/10 ML
SYRINGE (ML) INTRAVENOUS
Status: DISPENSED
Start: 2023-01-01 | End: 2023-01-01

## 2023-01-01 RX ORDER — FUROSEMIDE 10 MG/ML
20 INJECTION INTRAMUSCULAR; INTRAVENOUS ONCE
Status: COMPLETED | OUTPATIENT
Start: 2023-01-01 | End: 2023-01-01

## 2023-01-01 RX ORDER — FENTANYL CITRATE 50 UG/ML
100 INJECTION, SOLUTION INTRAMUSCULAR; INTRAVENOUS ONCE
Status: COMPLETED | OUTPATIENT
Start: 2023-01-01 | End: 2023-01-01

## 2023-01-01 RX ORDER — ACETAMINOPHEN 325 MG/1
650 TABLET ORAL EVERY 8 HOURS
Status: COMPLETED | OUTPATIENT
Start: 2023-01-01 | End: 2023-01-01

## 2023-01-01 RX ORDER — MILRINONE LACTATE 1 MG/ML
100 INJECTION, SOLUTION INTRAVENOUS ONCE
Status: COMPLETED | OUTPATIENT
Start: 2023-01-01 | End: 2023-01-01

## 2023-01-01 RX ORDER — EPINEPHRINE 0.1 MG/ML
1 INJECTION INTRAVENOUS ONCE
Status: COMPLETED | OUTPATIENT
Start: 2023-01-01 | End: 2023-01-01

## 2023-01-01 RX ORDER — MILRINONE LACTATE 0.2 MG/ML
0.75 INJECTION, SOLUTION INTRAVENOUS CONTINUOUS
Status: DISCONTINUED | OUTPATIENT
Start: 2023-01-01 | End: 2023-01-01

## 2023-01-01 RX ORDER — FENTANYL CITRATE 50 UG/ML
25 INJECTION, SOLUTION INTRAMUSCULAR; INTRAVENOUS
Status: DISCONTINUED | OUTPATIENT
Start: 2023-01-01 | End: 2023-01-01

## 2023-01-01 RX ORDER — CALCIUM GLUCONATE 20 MG/ML
1000 INJECTION, SOLUTION INTRAVENOUS ONCE
Status: COMPLETED | OUTPATIENT
Start: 2023-01-01 | End: 2023-01-01

## 2023-01-01 RX ORDER — IODIXANOL 270 MG/ML
150 INJECTION, SOLUTION INTRAVASCULAR
Status: COMPLETED | OUTPATIENT
Start: 2023-01-01 | End: 2023-01-01

## 2023-01-01 RX ORDER — ONDANSETRON 2 MG/ML
4 INJECTION INTRAMUSCULAR; INTRAVENOUS EVERY 6 HOURS PRN
Status: CANCELLED | OUTPATIENT
Start: 2023-01-01

## 2023-01-01 RX ORDER — FENTANYL CITRATE 50 UG/ML
50 INJECTION, SOLUTION INTRAMUSCULAR; INTRAVENOUS ONCE
Status: DISCONTINUED | OUTPATIENT
Start: 2023-01-01 | End: 2023-01-01

## 2023-01-01 RX ORDER — ALBUTEROL SULFATE 90 UG/1
1 AEROSOL, METERED RESPIRATORY (INHALATION) EVERY 4 HOURS PRN
Status: DISCONTINUED | OUTPATIENT
Start: 2023-01-01 | End: 2023-01-01

## 2023-01-01 RX ORDER — SODIUM CHLORIDE 0.9 % (FLUSH) 0.9 %
5-40 SYRINGE (ML) INJECTION EVERY 12 HOURS SCHEDULED
OUTPATIENT
Start: 2023-01-01

## 2023-01-01 RX ORDER — 3% SODIUM CHLORIDE 3 G/100ML
250 INJECTION, SOLUTION INTRAVENOUS ONCE
Status: COMPLETED | OUTPATIENT
Start: 2023-01-01 | End: 2023-01-01

## 2023-01-01 RX ORDER — MAGNESIUM SULFATE HEPTAHYDRATE 40 MG/ML
4000 INJECTION, SOLUTION INTRAVENOUS ONCE
Status: COMPLETED | OUTPATIENT
Start: 2023-01-01 | End: 2023-01-01

## 2023-01-01 RX ORDER — INSULIN LISPRO 100 [IU]/ML
0-4 INJECTION, SOLUTION INTRAVENOUS; SUBCUTANEOUS EVERY 6 HOURS
Status: DISCONTINUED | OUTPATIENT
Start: 2023-01-01 | End: 2023-01-01

## 2023-01-01 RX ORDER — ACETAMINOPHEN 325 MG/1
650 TABLET ORAL EVERY 4 HOURS PRN
Status: DISCONTINUED | OUTPATIENT
Start: 2023-01-01 | End: 2023-01-01 | Stop reason: SDUPTHER

## 2023-01-01 RX ORDER — INSULIN LISPRO 100 [IU]/ML
0-4 INJECTION, SOLUTION INTRAVENOUS; SUBCUTANEOUS
Status: DISCONTINUED | OUTPATIENT
Start: 2023-01-01 | End: 2023-01-01

## 2023-01-01 RX ORDER — CHLORHEXIDINE GLUCONATE 0.12 MG/ML
15 RINSE ORAL 2 TIMES DAILY
Status: DISCONTINUED | OUTPATIENT
Start: 2023-01-01 | End: 2023-01-01

## 2023-01-01 RX ORDER — LABETALOL HYDROCHLORIDE 5 MG/ML
10 INJECTION, SOLUTION INTRAVENOUS
Status: DISCONTINUED | OUTPATIENT
Start: 2023-01-01 | End: 2023-01-01

## 2023-01-01 RX ORDER — MORPHINE SULFATE 2 MG/ML
2 INJECTION, SOLUTION INTRAMUSCULAR; INTRAVENOUS ONCE
Status: COMPLETED | OUTPATIENT
Start: 2023-01-01 | End: 2023-01-01

## 2023-01-01 RX ORDER — INSULIN LISPRO 100 [IU]/ML
0-4 INJECTION, SOLUTION INTRAVENOUS; SUBCUTANEOUS NIGHTLY
Status: DISCONTINUED | OUTPATIENT
Start: 2023-01-01 | End: 2023-01-01

## 2023-01-01 RX ORDER — FENTANYL CITRATE 50 UG/ML
25 INJECTION, SOLUTION INTRAMUSCULAR; INTRAVENOUS ONCE
Status: COMPLETED | OUTPATIENT
Start: 2023-01-01 | End: 2023-01-01

## 2023-01-01 RX ORDER — VANCOMYCIN HYDROCHLORIDE 50 MG/ML
125 KIT ORAL EVERY 6 HOURS SCHEDULED
Status: DISCONTINUED | OUTPATIENT
Start: 2023-01-01 | End: 2023-01-01

## 2023-01-01 RX ORDER — SODIUM CHLORIDE 9 MG/ML
50 INJECTION, SOLUTION INTRAVENOUS ONCE
Status: COMPLETED | OUTPATIENT
Start: 2023-01-01 | End: 2023-01-01

## 2023-01-01 RX ADMIN — MORPHINE SULFATE 4 MG: 4 INJECTION INTRAVENOUS at 13:52

## 2023-01-01 RX ADMIN — SENNOSIDES 8.8 MG: 8.8 LIQUID ORAL at 21:46

## 2023-01-01 RX ADMIN — ACETAMINOPHEN 650 MG: 325 TABLET ORAL at 11:15

## 2023-01-01 RX ADMIN — CHLORHEXIDINE GLUCONATE 15 ML: 1.2 RINSE ORAL at 21:46

## 2023-01-01 RX ADMIN — AMPICILLIN AND SULBACTAM 3000 MG: 2; 1 INJECTION, POWDER, FOR SOLUTION INTRAVENOUS at 01:35

## 2023-01-01 RX ADMIN — Medication 60 MG: at 04:29

## 2023-01-01 RX ADMIN — IPRATROPIUM BROMIDE AND ALBUTEROL SULFATE 1 DOSE: 2.5; .5 SOLUTION RESPIRATORY (INHALATION) at 11:26

## 2023-01-01 RX ADMIN — SODIUM CHLORIDE, PRESERVATIVE FREE 10 ML: 5 INJECTION INTRAVENOUS at 09:00

## 2023-01-01 RX ADMIN — ACETAMINOPHEN 650 MG: 325 TABLET ORAL at 16:15

## 2023-01-01 RX ADMIN — AMPICILLIN AND SULBACTAM 3000 MG: 2; 1 INJECTION, POWDER, FOR SOLUTION INTRAVENOUS at 02:28

## 2023-01-01 RX ADMIN — AMPICILLIN SODIUM AND SULBACTAM SODIUM 3000 MG: 2; 1 INJECTION, POWDER, FOR SOLUTION INTRAMUSCULAR; INTRAVENOUS at 21:19

## 2023-01-01 RX ADMIN — ACETAMINOPHEN 650 MG: 325 TABLET ORAL at 18:34

## 2023-01-01 RX ADMIN — ENOXAPARIN SODIUM 40 MG: 100 INJECTION SUBCUTANEOUS at 08:35

## 2023-01-01 RX ADMIN — ATORVASTATIN CALCIUM 40 MG: 40 TABLET, FILM COATED ORAL at 20:15

## 2023-01-01 RX ADMIN — HYDROMORPHONE HYDROCHLORIDE 1 MG: 1 INJECTION, SOLUTION INTRAMUSCULAR; INTRAVENOUS; SUBCUTANEOUS at 16:08

## 2023-01-01 RX ADMIN — FENTANYL CITRATE 50 MCG: 50 INJECTION, SOLUTION INTRAMUSCULAR; INTRAVENOUS at 06:47

## 2023-01-01 RX ADMIN — FLUDROCORTISONE ACETATE 0.1 MG: 0.1 TABLET ORAL at 08:13

## 2023-01-01 RX ADMIN — SODIUM CHLORIDE, PRESERVATIVE FREE 10 ML: 5 INJECTION INTRAVENOUS at 08:47

## 2023-01-01 RX ADMIN — MANNITOL 46.96 G: 20 INJECTION, SOLUTION INTRAVENOUS at 01:59

## 2023-01-01 RX ADMIN — PROPOFOL 25 MCG/KG/MIN: 10 INJECTION, EMULSION INTRAVENOUS at 04:52

## 2023-01-01 RX ADMIN — MORPHINE SULFATE 2 MG: 2 INJECTION, SOLUTION INTRAMUSCULAR; INTRAVENOUS at 16:18

## 2023-01-01 RX ADMIN — LABETALOL HYDROCHLORIDE 10 MG: 5 INJECTION, SOLUTION INTRAVENOUS at 01:52

## 2023-01-01 RX ADMIN — Medication 60 MG: at 20:15

## 2023-01-01 RX ADMIN — FENTANYL CITRATE 25 MCG: 50 INJECTION INTRAMUSCULAR; INTRAVENOUS at 23:50

## 2023-01-01 RX ADMIN — TICAGRELOR 90 MG: 90 TABLET ORAL at 20:45

## 2023-01-01 RX ADMIN — IPRATROPIUM BROMIDE AND ALBUTEROL SULFATE 1 DOSE: 2.5; .5 SOLUTION RESPIRATORY (INHALATION) at 19:47

## 2023-01-01 RX ADMIN — Medication 60 MG: at 00:32

## 2023-01-01 RX ADMIN — SODIUM CHLORIDE, PRESERVATIVE FREE 5 ML: 5 INJECTION INTRAVENOUS at 08:16

## 2023-01-01 RX ADMIN — INSULIN LISPRO 1 UNITS: 100 INJECTION, SOLUTION INTRAVENOUS; SUBCUTANEOUS at 04:52

## 2023-01-01 RX ADMIN — CHLORHEXIDINE GLUCONATE 15 ML: 1.2 RINSE ORAL at 08:10

## 2023-01-01 RX ADMIN — BUSPIRONE HYDROCHLORIDE 10 MG: 10 TABLET ORAL at 08:13

## 2023-01-01 RX ADMIN — OXYCODONE HYDROCHLORIDE 10 MG: 5 TABLET ORAL at 08:42

## 2023-01-01 RX ADMIN — ATORVASTATIN CALCIUM 40 MG: 40 TABLET, FILM COATED ORAL at 21:46

## 2023-01-01 RX ADMIN — GLYCOPYRROLATE 0.2 MG: 0.2 INJECTION INTRAMUSCULAR; INTRAVENOUS at 01:26

## 2023-01-01 RX ADMIN — HEPARIN SODIUM 16 UNITS/KG/HR: 10000 INJECTION, SOLUTION INTRAVENOUS at 11:02

## 2023-01-01 RX ADMIN — ALBUTEROL SULFATE 2.5 MG: 2.5 SOLUTION RESPIRATORY (INHALATION) at 03:19

## 2023-01-01 RX ADMIN — PROPOFOL 50 MCG/KG/MIN: 10 INJECTION, EMULSION INTRAVENOUS at 07:28

## 2023-01-01 RX ADMIN — PROPOFOL 10 MCG/KG/MIN: 10 INJECTION, EMULSION INTRAVENOUS at 06:16

## 2023-01-01 RX ADMIN — MILRINONE LACTATE 0.5 MCG/KG/MIN: 0.2 INJECTION, SOLUTION INTRAVENOUS at 20:58

## 2023-01-01 RX ADMIN — OXYCODONE HYDROCHLORIDE 10 MG: 5 TABLET ORAL at 14:09

## 2023-01-01 RX ADMIN — SODIUM CHLORIDE 1000 ML: 9 INJECTION, SOLUTION INTRAVENOUS at 10:17

## 2023-01-01 RX ADMIN — LORAZEPAM 1 MG: 2 INJECTION INTRAMUSCULAR; INTRAVENOUS at 23:58

## 2023-01-01 RX ADMIN — FENTANYL CITRATE 100 MCG: 50 INJECTION, SOLUTION INTRAMUSCULAR; INTRAVENOUS at 16:23

## 2023-01-01 RX ADMIN — FENTANYL CITRATE 25 MCG: 50 INJECTION INTRAMUSCULAR; INTRAVENOUS at 10:12

## 2023-01-01 RX ADMIN — ATORVASTATIN CALCIUM 40 MG: 40 TABLET, FILM COATED ORAL at 20:20

## 2023-01-01 RX ADMIN — AMPICILLIN AND SULBACTAM 3000 MG: 2; 1 INJECTION, POWDER, FOR SOLUTION INTRAVENOUS at 01:58

## 2023-01-01 RX ADMIN — SODIUM CHLORIDE, PRESERVATIVE FREE 10 ML: 5 INJECTION INTRAVENOUS at 08:45

## 2023-01-01 RX ADMIN — SODIUM CHLORIDE, PRESERVATIVE FREE 10 ML: 5 INJECTION INTRAVENOUS at 09:39

## 2023-01-01 RX ADMIN — DOCUSATE SODIUM LIQUID 100 MG: 100 LIQUID ORAL at 08:12

## 2023-01-01 RX ADMIN — Medication 60 MG: at 08:34

## 2023-01-01 RX ADMIN — IPRATROPIUM BROMIDE AND ALBUTEROL SULFATE 1 DOSE: 2.5; .5 SOLUTION RESPIRATORY (INHALATION) at 20:15

## 2023-01-01 RX ADMIN — MORPHINE SULFATE 4 MG: 4 INJECTION INTRAVENOUS at 12:44

## 2023-01-01 RX ADMIN — TICAGRELOR 90 MG: 90 TABLET ORAL at 20:15

## 2023-01-01 RX ADMIN — OXYCODONE HYDROCHLORIDE 10 MG: 5 TABLET ORAL at 02:50

## 2023-01-01 RX ADMIN — Medication 60 MG: at 04:35

## 2023-01-01 RX ADMIN — CHLORHEXIDINE GLUCONATE 15 ML: 1.2 RINSE ORAL at 08:18

## 2023-01-01 RX ADMIN — OXYCODONE HYDROCHLORIDE 10 MG: 5 TABLET ORAL at 21:08

## 2023-01-01 RX ADMIN — MORPHINE SULFATE 4 MG: 4 INJECTION INTRAVENOUS at 23:21

## 2023-01-01 RX ADMIN — Medication 30 MCG/MIN: at 05:57

## 2023-01-01 RX ADMIN — METOPROLOL TARTRATE 25 MG: 25 TABLET, FILM COATED ORAL at 20:00

## 2023-01-01 RX ADMIN — POTASSIUM CHLORIDE 20 MEQ: 29.8 INJECTION, SOLUTION INTRAVENOUS at 08:04

## 2023-01-01 RX ADMIN — FENTANYL CITRATE 25 MCG: 50 INJECTION INTRAMUSCULAR; INTRAVENOUS at 18:28

## 2023-01-01 RX ADMIN — SODIUM CHLORIDE: 9 INJECTION, SOLUTION INTRAVENOUS at 06:03

## 2023-01-01 RX ADMIN — PROPOFOL 20 MCG/KG/MIN: 10 INJECTION, EMULSION INTRAVENOUS at 15:37

## 2023-01-01 RX ADMIN — Medication 60 MG: at 08:10

## 2023-01-01 RX ADMIN — AMPICILLIN SODIUM AND SULBACTAM SODIUM 3000 MG: 2; 1 INJECTION, POWDER, FOR SOLUTION INTRAMUSCULAR; INTRAVENOUS at 14:17

## 2023-01-01 RX ADMIN — PROPOFOL 45 MCG/KG/MIN: 10 INJECTION, EMULSION INTRAVENOUS at 08:02

## 2023-01-01 RX ADMIN — HEPARIN SODIUM 12 UNITS/KG/HR: 10000 INJECTION, SOLUTION INTRAVENOUS at 23:06

## 2023-01-01 RX ADMIN — PROPOFOL 20 MCG/KG/MIN: 10 INJECTION, EMULSION INTRAVENOUS at 12:50

## 2023-01-01 RX ADMIN — Medication 60 MG: at 04:32

## 2023-01-01 RX ADMIN — TICAGRELOR 90 MG: 90 TABLET ORAL at 07:40

## 2023-01-01 RX ADMIN — ASPIRIN 81 MG 81 MG: 81 TABLET ORAL at 07:55

## 2023-01-01 RX ADMIN — OXYCODONE HYDROCHLORIDE 10 MG: 5 TABLET ORAL at 17:53

## 2023-01-01 RX ADMIN — MILRINONE LACTATE 0.5 MCG/KG/MIN: 0.2 INJECTION, SOLUTION INTRAVENOUS at 12:49

## 2023-01-01 RX ADMIN — LEVOTHYROXINE SODIUM 200 MCG: 100 TABLET ORAL at 08:18

## 2023-01-01 RX ADMIN — Medication 280 MCG/MIN: at 05:43

## 2023-01-01 RX ADMIN — HYDRALAZINE HYDROCHLORIDE 10 MG: 20 INJECTION, SOLUTION INTRAMUSCULAR; INTRAVENOUS at 16:08

## 2023-01-01 RX ADMIN — PROPOFOL 45 MCG/KG/MIN: 10 INJECTION, EMULSION INTRAVENOUS at 03:02

## 2023-01-01 RX ADMIN — Medication 60 MG: at 20:31

## 2023-01-01 RX ADMIN — MILRINONE LACTATE 0.5 MCG/KG/MIN: 0.2 INJECTION, SOLUTION INTRAVENOUS at 22:56

## 2023-01-01 RX ADMIN — ACETAMINOPHEN 650 MG: 325 TABLET ORAL at 11:00

## 2023-01-01 RX ADMIN — PHENYLEPHRINE HYDROCHLORIDE 220 MCG/MIN: 10 INJECTION INTRAVENOUS at 11:03

## 2023-01-01 RX ADMIN — Medication 280 MCG/MIN: at 03:44

## 2023-01-01 RX ADMIN — Medication 60 MG: at 16:11

## 2023-01-01 RX ADMIN — LORAZEPAM 1 MG: 2 INJECTION INTRAMUSCULAR; INTRAVENOUS at 05:20

## 2023-01-01 RX ADMIN — LORAZEPAM 1 MG: 2 INJECTION INTRAMUSCULAR; INTRAVENOUS at 05:07

## 2023-01-01 RX ADMIN — SODIUM CHLORIDE, PRESERVATIVE FREE 10 ML: 5 INJECTION INTRAVENOUS at 08:18

## 2023-01-01 RX ADMIN — Medication 35 MCG/MIN: at 10:16

## 2023-01-01 RX ADMIN — MORPHINE SULFATE 2 MG: 2 INJECTION, SOLUTION INTRAMUSCULAR; INTRAVENOUS at 09:17

## 2023-01-01 RX ADMIN — Medication 60 MG: at 07:55

## 2023-01-01 RX ADMIN — ASPIRIN 81 MG 81 MG: 81 TABLET ORAL at 15:10

## 2023-01-01 RX ADMIN — SENNOSIDES 8.8 MG: 8.8 LIQUID ORAL at 20:47

## 2023-01-01 RX ADMIN — IPRATROPIUM BROMIDE AND ALBUTEROL SULFATE 1 DOSE: 2.5; .5 SOLUTION RESPIRATORY (INHALATION) at 20:00

## 2023-01-01 RX ADMIN — PROPOFOL 40 MCG/KG/MIN: 10 INJECTION, EMULSION INTRAVENOUS at 21:58

## 2023-01-01 RX ADMIN — SODIUM CHLORIDE, PRESERVATIVE FREE 10 ML: 5 INJECTION INTRAVENOUS at 20:20

## 2023-01-01 RX ADMIN — ATORVASTATIN CALCIUM 40 MG: 40 TABLET, FILM COATED ORAL at 20:00

## 2023-01-01 RX ADMIN — ENOXAPARIN SODIUM 40 MG: 100 INJECTION SUBCUTANEOUS at 08:42

## 2023-01-01 RX ADMIN — ACETAMINOPHEN 650 MG: 325 TABLET ORAL at 11:47

## 2023-01-01 RX ADMIN — Medication 300 MCG/MIN: at 10:15

## 2023-01-01 RX ADMIN — BUSPIRONE HYDROCHLORIDE 10 MG: 10 TABLET ORAL at 20:00

## 2023-01-01 RX ADMIN — OXYCODONE HYDROCHLORIDE 10 MG: 5 TABLET ORAL at 04:47

## 2023-01-01 RX ADMIN — Medication 60 MG: at 13:25

## 2023-01-01 RX ADMIN — METOPROLOL TARTRATE 25 MG: 25 TABLET, FILM COATED ORAL at 20:15

## 2023-01-01 RX ADMIN — CHLORHEXIDINE GLUCONATE 15 ML: 1.2 RINSE ORAL at 20:49

## 2023-01-01 RX ADMIN — LEVOTHYROXINE SODIUM 200 MCG: 100 TABLET ORAL at 09:22

## 2023-01-01 RX ADMIN — PROPOFOL 50 MCG/KG/MIN: 10 INJECTION, EMULSION INTRAVENOUS at 16:05

## 2023-01-01 RX ADMIN — FLUDROCORTISONE ACETATE 0.1 MG: 0.1 TABLET ORAL at 12:12

## 2023-01-01 RX ADMIN — FUROSEMIDE 20 MG: 10 INJECTION, SOLUTION INTRAMUSCULAR; INTRAVENOUS at 10:59

## 2023-01-01 RX ADMIN — CHLORHEXIDINE GLUCONATE 15 ML: 1.2 RINSE ORAL at 09:09

## 2023-01-01 RX ADMIN — VANCOMYCIN HYDROCHLORIDE 125 MG: 1 INJECTION, POWDER, LYOPHILIZED, FOR SOLUTION INTRAVENOUS at 15:40

## 2023-01-01 RX ADMIN — ACETAMINOPHEN 650 MG: 325 TABLET ORAL at 00:41

## 2023-01-01 RX ADMIN — SODIUM CHLORIDE 500 ML: 0.9 INJECTION, SOLUTION INTRAVENOUS at 16:20

## 2023-01-01 RX ADMIN — Medication 60 MG: at 16:00

## 2023-01-01 RX ADMIN — POTASSIUM CHLORIDE 10 MEQ: 10 INJECTION, SOLUTION INTRAVENOUS at 07:42

## 2023-01-01 RX ADMIN — Medication 25 MCG/HR: at 01:59

## 2023-01-01 RX ADMIN — IPRATROPIUM BROMIDE AND ALBUTEROL SULFATE 1 DOSE: 2.5; .5 SOLUTION RESPIRATORY (INHALATION) at 15:45

## 2023-01-01 RX ADMIN — FENTANYL CITRATE 25 MCG: 50 INJECTION INTRAMUSCULAR; INTRAVENOUS at 13:44

## 2023-01-01 RX ADMIN — TICAGRELOR 90 MG: 90 TABLET ORAL at 09:27

## 2023-01-01 RX ADMIN — IPRATROPIUM BROMIDE AND ALBUTEROL SULFATE 1 DOSE: 2.5; .5 SOLUTION RESPIRATORY (INHALATION) at 07:52

## 2023-01-01 RX ADMIN — ACETAMINOPHEN 650 MG: 325 TABLET ORAL at 02:45

## 2023-01-01 RX ADMIN — BUSPIRONE HYDROCHLORIDE 10 MG: 10 TABLET ORAL at 07:55

## 2023-01-01 RX ADMIN — AMPICILLIN AND SULBACTAM 3000 MG: 2; 1 INJECTION, POWDER, FOR SOLUTION INTRAVENOUS at 20:30

## 2023-01-01 RX ADMIN — IPRATROPIUM BROMIDE AND ALBUTEROL SULFATE 1 DOSE: 2.5; .5 SOLUTION RESPIRATORY (INHALATION) at 15:41

## 2023-01-01 RX ADMIN — Medication 60 MG: at 12:15

## 2023-01-01 RX ADMIN — METOPROLOL TARTRATE 25 MG: 25 TABLET, FILM COATED ORAL at 08:08

## 2023-01-01 RX ADMIN — MORPHINE SULFATE 4 MG: 4 INJECTION INTRAVENOUS at 21:09

## 2023-01-01 RX ADMIN — PROPOFOL 30 MCG/KG/MIN: 10 INJECTION, EMULSION INTRAVENOUS at 15:04

## 2023-01-01 RX ADMIN — LORAZEPAM 2 MG: 2 INJECTION INTRAMUSCULAR; INTRAVENOUS at 18:30

## 2023-01-01 RX ADMIN — ASPIRIN 81 MG 81 MG: 81 TABLET ORAL at 09:32

## 2023-01-01 RX ADMIN — Medication 220 MCG/MIN: at 07:24

## 2023-01-01 RX ADMIN — POTASSIUM CHLORIDE 10 MEQ: 10 INJECTION, SOLUTION INTRAVENOUS at 10:19

## 2023-01-01 RX ADMIN — IOPAMIDOL 175 ML: 755 INJECTION, SOLUTION INTRAVENOUS at 18:50

## 2023-01-01 RX ADMIN — METOPROLOL TARTRATE 25 MG: 25 TABLET, FILM COATED ORAL at 09:27

## 2023-01-01 RX ADMIN — IPRATROPIUM BROMIDE AND ALBUTEROL SULFATE 1 DOSE: 2.5; .5 SOLUTION RESPIRATORY (INHALATION) at 07:32

## 2023-01-01 RX ADMIN — TICAGRELOR 90 MG: 90 TABLET ORAL at 21:25

## 2023-01-01 RX ADMIN — AMPICILLIN AND SULBACTAM 3000 MG: 2; 1 INJECTION, POWDER, FOR SOLUTION INTRAVENOUS at 20:32

## 2023-01-01 RX ADMIN — BUSPIRONE HYDROCHLORIDE 10 MG: 10 TABLET ORAL at 13:49

## 2023-01-01 RX ADMIN — OXYCODONE HYDROCHLORIDE 10 MG: 5 TABLET ORAL at 13:58

## 2023-01-01 RX ADMIN — NIMODIPINE 60 MG: 30 CAPSULE, LIQUID FILLED ORAL at 09:22

## 2023-01-01 RX ADMIN — LANSOPRAZOLE 30 MG: 30 TABLET, ORALLY DISINTEGRATING ORAL at 07:40

## 2023-01-01 RX ADMIN — ACETAMINOPHEN 650 MG: 325 TABLET ORAL at 20:04

## 2023-01-01 RX ADMIN — PROPOFOL 45 MCG/KG/MIN: 10 INJECTION, EMULSION INTRAVENOUS at 07:43

## 2023-01-01 RX ADMIN — IPRATROPIUM BROMIDE AND ALBUTEROL SULFATE 1 DOSE: 2.5; .5 SOLUTION RESPIRATORY (INHALATION) at 21:30

## 2023-01-01 RX ADMIN — Medication 60 MG: at 06:20

## 2023-01-01 RX ADMIN — CHLORHEXIDINE GLUCONATE 15 ML: 1.2 RINSE ORAL at 09:00

## 2023-01-01 RX ADMIN — IPRATROPIUM BROMIDE AND ALBUTEROL SULFATE 1 DOSE: 2.5; .5 SOLUTION RESPIRATORY (INHALATION) at 07:34

## 2023-01-01 RX ADMIN — Medication 290 MCG/MIN: at 20:25

## 2023-01-01 RX ADMIN — Medication 60 MG: at 12:53

## 2023-01-01 RX ADMIN — FENTANYL CITRATE 25 MCG: 50 INJECTION INTRAMUSCULAR; INTRAVENOUS at 09:18

## 2023-01-01 RX ADMIN — OXYCODONE HYDROCHLORIDE 10 MG: 5 TABLET ORAL at 18:45

## 2023-01-01 RX ADMIN — PROPOFOL 45 MCG/KG/MIN: 10 INJECTION, EMULSION INTRAVENOUS at 20:55

## 2023-01-01 RX ADMIN — Medication 500 ML: at 16:15

## 2023-01-01 RX ADMIN — CLONIDINE HYDROCHLORIDE 0.1 MG: 0.1 TABLET ORAL at 10:07

## 2023-01-01 RX ADMIN — LORAZEPAM 1 MG: 2 INJECTION INTRAMUSCULAR; INTRAVENOUS at 12:40

## 2023-01-01 RX ADMIN — Medication 60 MG: at 05:40

## 2023-01-01 RX ADMIN — SENNOSIDES 8.8 MG: 8.8 LIQUID ORAL at 20:20

## 2023-01-01 RX ADMIN — Medication 0.5 MG: at 13:23

## 2023-01-01 RX ADMIN — POTASSIUM BICARBONATE 40 MEQ: 782 TABLET, EFFERVESCENT ORAL at 08:40

## 2023-01-01 RX ADMIN — LORAZEPAM 1 MG: 2 INJECTION INTRAMUSCULAR; INTRAVENOUS at 15:43

## 2023-01-01 RX ADMIN — ACETAMINOPHEN 650 MG: 325 TABLET ORAL at 06:20

## 2023-01-01 RX ADMIN — ENOXAPARIN SODIUM 40 MG: 100 INJECTION SUBCUTANEOUS at 09:27

## 2023-01-01 RX ADMIN — ASPIRIN 81 MG 81 MG: 81 TABLET ORAL at 09:09

## 2023-01-01 RX ADMIN — IPRATROPIUM BROMIDE AND ALBUTEROL SULFATE 1 DOSE: 2.5; .5 SOLUTION RESPIRATORY (INHALATION) at 15:40

## 2023-01-01 RX ADMIN — HYDRALAZINE HYDROCHLORIDE 10 MG: 20 INJECTION INTRAMUSCULAR; INTRAVENOUS at 20:55

## 2023-01-01 RX ADMIN — ACETAMINOPHEN 650 MG: 325 TABLET ORAL at 12:01

## 2023-01-01 RX ADMIN — MORPHINE SULFATE 2 MG: 2 INJECTION, SOLUTION INTRAMUSCULAR; INTRAVENOUS at 11:44

## 2023-01-01 RX ADMIN — MORPHINE SULFATE 4 MG: 4 INJECTION INTRAVENOUS at 21:01

## 2023-01-01 RX ADMIN — IPRATROPIUM BROMIDE AND ALBUTEROL SULFATE 1 DOSE: 2.5; .5 SOLUTION RESPIRATORY (INHALATION) at 11:44

## 2023-01-01 RX ADMIN — BUSPIRONE HYDROCHLORIDE 10 MG: 10 TABLET ORAL at 21:08

## 2023-01-01 RX ADMIN — ATORVASTATIN CALCIUM 40 MG: 40 TABLET, FILM COATED ORAL at 20:33

## 2023-01-01 RX ADMIN — IPRATROPIUM BROMIDE AND ALBUTEROL SULFATE 1 DOSE: 2.5; .5 SOLUTION RESPIRATORY (INHALATION) at 11:31

## 2023-01-01 RX ADMIN — Medication 50 MCG/HR: at 13:13

## 2023-01-01 RX ADMIN — IPRATROPIUM BROMIDE AND ALBUTEROL SULFATE 1 DOSE: 2.5; .5 SOLUTION RESPIRATORY (INHALATION) at 19:32

## 2023-01-01 RX ADMIN — IPRATROPIUM BROMIDE AND ALBUTEROL SULFATE 1 DOSE: 2.5; .5 SOLUTION RESPIRATORY (INHALATION) at 15:01

## 2023-01-01 RX ADMIN — AMPICILLIN AND SULBACTAM 3000 MG: 2; 1 INJECTION, POWDER, FOR SOLUTION INTRAVENOUS at 08:32

## 2023-01-01 RX ADMIN — MORPHINE SULFATE 4 MG: 4 INJECTION INTRAVENOUS at 11:25

## 2023-01-01 RX ADMIN — IPRATROPIUM BROMIDE AND ALBUTEROL SULFATE 1 DOSE: 2.5; .5 SOLUTION RESPIRATORY (INHALATION) at 19:39

## 2023-01-01 RX ADMIN — Medication 60 MG: at 20:00

## 2023-01-01 RX ADMIN — TICAGRELOR 90 MG: 90 TABLET ORAL at 12:37

## 2023-01-01 RX ADMIN — ASPIRIN 81 MG 81 MG: 81 TABLET ORAL at 08:16

## 2023-01-01 RX ADMIN — OXYCODONE HYDROCHLORIDE 10 MG: 5 TABLET ORAL at 00:41

## 2023-01-01 RX ADMIN — POTASSIUM CHLORIDE 20 MEQ: 29.8 INJECTION, SOLUTION INTRAVENOUS at 09:26

## 2023-01-01 RX ADMIN — IPRATROPIUM BROMIDE AND ALBUTEROL SULFATE 1 DOSE: 2.5; .5 SOLUTION RESPIRATORY (INHALATION) at 08:11

## 2023-01-01 RX ADMIN — FENTANYL CITRATE 50 MCG: 50 INJECTION, SOLUTION INTRAMUSCULAR; INTRAVENOUS at 19:33

## 2023-01-01 RX ADMIN — HYDRALAZINE HYDROCHLORIDE 10 MG: 20 INJECTION, SOLUTION INTRAMUSCULAR; INTRAVENOUS at 11:58

## 2023-01-01 RX ADMIN — Medication 60 MG: at 04:39

## 2023-01-01 RX ADMIN — FENTANYL CITRATE 25 MCG: 50 INJECTION INTRAMUSCULAR; INTRAVENOUS at 10:52

## 2023-01-01 RX ADMIN — Medication 60 MG: at 07:40

## 2023-01-01 RX ADMIN — IPRATROPIUM BROMIDE AND ALBUTEROL SULFATE 1 DOSE: 2.5; .5 SOLUTION RESPIRATORY (INHALATION) at 20:10

## 2023-01-01 RX ADMIN — SODIUM CHLORIDE, PRESERVATIVE FREE 10 ML: 5 INJECTION INTRAVENOUS at 21:06

## 2023-01-01 RX ADMIN — IPRATROPIUM BROMIDE AND ALBUTEROL SULFATE 1 DOSE: 2.5; .5 SOLUTION RESPIRATORY (INHALATION) at 07:55

## 2023-01-01 RX ADMIN — Medication 60 MG: at 16:12

## 2023-01-01 RX ADMIN — MILRINONE LACTATE 0.5 MCG/KG/MIN: 0.2 INJECTION, SOLUTION INTRAVENOUS at 20:09

## 2023-01-01 RX ADMIN — Medication 60 MG: at 00:40

## 2023-01-01 RX ADMIN — AMPICILLIN AND SULBACTAM 3000 MG: 2; 1 INJECTION, POWDER, FOR SOLUTION INTRAVENOUS at 15:19

## 2023-01-01 RX ADMIN — METOPROLOL TARTRATE 25 MG: 25 TABLET, FILM COATED ORAL at 20:01

## 2023-01-01 RX ADMIN — ACETAMINOPHEN 650 MG: 325 TABLET ORAL at 12:03

## 2023-01-01 RX ADMIN — MILRINONE LACTATE 0.5 MCG/KG/MIN: 0.2 INJECTION, SOLUTION INTRAVENOUS at 17:57

## 2023-01-01 RX ADMIN — SODIUM CHLORIDE: 9 INJECTION, SOLUTION INTRAVENOUS at 05:53

## 2023-01-01 RX ADMIN — CHLORHEXIDINE GLUCONATE 15 ML: 1.2 RINSE ORAL at 21:21

## 2023-01-01 RX ADMIN — FENTANYL CITRATE 25 MCG: 50 INJECTION INTRAMUSCULAR; INTRAVENOUS at 20:54

## 2023-01-01 RX ADMIN — AMPICILLIN AND SULBACTAM 3000 MG: 2; 1 INJECTION, POWDER, FOR SOLUTION INTRAVENOUS at 19:54

## 2023-01-01 RX ADMIN — ENOXAPARIN SODIUM 40 MG: 100 INJECTION SUBCUTANEOUS at 10:04

## 2023-01-01 RX ADMIN — TICAGRELOR 90 MG: 90 TABLET ORAL at 08:12

## 2023-01-01 RX ADMIN — LANSOPRAZOLE 30 MG: 30 TABLET, ORALLY DISINTEGRATING ORAL at 08:35

## 2023-01-01 RX ADMIN — AMPICILLIN SODIUM AND SULBACTAM SODIUM 3000 MG: 2; 1 INJECTION, POWDER, FOR SOLUTION INTRAMUSCULAR; INTRAVENOUS at 10:01

## 2023-01-01 RX ADMIN — IPRATROPIUM BROMIDE AND ALBUTEROL SULFATE 1 DOSE: 2.5; .5 SOLUTION RESPIRATORY (INHALATION) at 11:52

## 2023-01-01 RX ADMIN — ACETAMINOPHEN 650 MG: 325 TABLET ORAL at 16:13

## 2023-01-01 RX ADMIN — CHLORHEXIDINE GLUCONATE 15 ML: 1.2 RINSE ORAL at 02:36

## 2023-01-01 RX ADMIN — LEVOTHYROXINE SODIUM 200 MCG: 100 TABLET ORAL at 08:42

## 2023-01-01 RX ADMIN — Medication 60 MG: at 12:37

## 2023-01-01 RX ADMIN — Medication 60 MG: at 20:04

## 2023-01-01 RX ADMIN — FLUDROCORTISONE ACETATE 0.1 MG: 0.1 TABLET ORAL at 20:00

## 2023-01-01 RX ADMIN — SODIUM CHLORIDE 500 ML: 0.9 INJECTION, SOLUTION INTRAVENOUS at 16:33

## 2023-01-01 RX ADMIN — TICAGRELOR 90 MG: 90 TABLET ORAL at 08:16

## 2023-01-01 RX ADMIN — Medication 60 MG: at 00:15

## 2023-01-01 RX ADMIN — Medication 60 MG: at 23:39

## 2023-01-01 RX ADMIN — IOPAMIDOL 75 ML: 755 INJECTION, SOLUTION INTRAVENOUS at 15:42

## 2023-01-01 RX ADMIN — DOCUSATE SODIUM LIQUID 100 MG: 100 LIQUID ORAL at 08:35

## 2023-01-01 RX ADMIN — POTASSIUM BICARBONATE 40 MEQ: 782 TABLET, EFFERVESCENT ORAL at 08:13

## 2023-01-01 RX ADMIN — OXYCODONE HYDROCHLORIDE 10 MG: 5 TABLET ORAL at 07:55

## 2023-01-01 RX ADMIN — ASPIRIN 81 MG 81 MG: 81 TABLET ORAL at 08:34

## 2023-01-01 RX ADMIN — IPRATROPIUM BROMIDE AND ALBUTEROL SULFATE 1 DOSE: 2.5; .5 SOLUTION RESPIRATORY (INHALATION) at 19:24

## 2023-01-01 RX ADMIN — Medication 60 MG: at 04:11

## 2023-01-01 RX ADMIN — IPRATROPIUM BROMIDE AND ALBUTEROL SULFATE 1 DOSE: 2.5; .5 SOLUTION RESPIRATORY (INHALATION) at 19:34

## 2023-01-01 RX ADMIN — Medication 60 MG: at 12:03

## 2023-01-01 RX ADMIN — IPRATROPIUM BROMIDE AND ALBUTEROL SULFATE 1 DOSE: 2.5; .5 SOLUTION RESPIRATORY (INHALATION) at 19:42

## 2023-01-01 RX ADMIN — AMPICILLIN AND SULBACTAM 3000 MG: 2; 1 INJECTION, POWDER, FOR SOLUTION INTRAVENOUS at 08:26

## 2023-01-01 RX ADMIN — LEVOTHYROXINE SODIUM 200 MCG: 100 TABLET ORAL at 09:26

## 2023-01-01 RX ADMIN — HYDRALAZINE HYDROCHLORIDE 10 MG: 20 INJECTION, SOLUTION INTRAMUSCULAR; INTRAVENOUS at 18:46

## 2023-01-01 RX ADMIN — Medication 60 MG: at 08:17

## 2023-01-01 RX ADMIN — IPRATROPIUM BROMIDE AND ALBUTEROL SULFATE 1 DOSE: 2.5; .5 SOLUTION RESPIRATORY (INHALATION) at 11:57

## 2023-01-01 RX ADMIN — LEVOTHYROXINE SODIUM 200 MCG: 100 TABLET ORAL at 09:31

## 2023-01-01 RX ADMIN — HYDRALAZINE HYDROCHLORIDE 10 MG: 20 INJECTION, SOLUTION INTRAMUSCULAR; INTRAVENOUS at 10:59

## 2023-01-01 RX ADMIN — PHENYLEPHRINE HYDROCHLORIDE 300 MCG/MIN: 10 INJECTION INTRAVENOUS at 19:14

## 2023-01-01 RX ADMIN — IPRATROPIUM BROMIDE AND ALBUTEROL SULFATE 1 DOSE: 2.5; .5 SOLUTION RESPIRATORY (INHALATION) at 13:57

## 2023-01-01 RX ADMIN — LORAZEPAM 2 MG: 2 INJECTION INTRAMUSCULAR; INTRAVENOUS at 09:18

## 2023-01-01 RX ADMIN — METOPROLOL TARTRATE 25 MG: 25 TABLET, FILM COATED ORAL at 08:16

## 2023-01-01 RX ADMIN — PANTOPRAZOLE SODIUM 8 MG/HR: 40 INJECTION, POWDER, FOR SOLUTION INTRAVENOUS at 22:25

## 2023-01-01 RX ADMIN — IPRATROPIUM BROMIDE AND ALBUTEROL SULFATE 1 DOSE: 2.5; .5 SOLUTION RESPIRATORY (INHALATION) at 07:35

## 2023-01-01 RX ADMIN — ACETAMINOPHEN 650 MG: 325 TABLET ORAL at 12:14

## 2023-01-01 RX ADMIN — IPRATROPIUM BROMIDE AND ALBUTEROL SULFATE 1 DOSE: 2.5; .5 SOLUTION RESPIRATORY (INHALATION) at 07:41

## 2023-01-01 RX ADMIN — SODIUM CHLORIDE, PRESERVATIVE FREE 10 ML: 5 INJECTION INTRAVENOUS at 09:40

## 2023-01-01 RX ADMIN — PANTOPRAZOLE SODIUM 40 MG: 40 INJECTION, POWDER, FOR SOLUTION INTRAVENOUS at 08:29

## 2023-01-01 RX ADMIN — LANSOPRAZOLE 30 MG: 30 TABLET, ORALLY DISINTEGRATING ORAL at 09:09

## 2023-01-01 RX ADMIN — Medication 60 MG: at 12:33

## 2023-01-01 RX ADMIN — ASPIRIN 81 MG 81 MG: 81 TABLET ORAL at 12:37

## 2023-01-01 RX ADMIN — CALCIUM GLUCONATE 2000 MG: 20 INJECTION, SOLUTION INTRAVENOUS at 12:42

## 2023-01-01 RX ADMIN — FENTANYL CITRATE 25 MCG: 50 INJECTION INTRAMUSCULAR; INTRAVENOUS at 06:40

## 2023-01-01 RX ADMIN — OXYCODONE HYDROCHLORIDE 10 MG: 5 TABLET ORAL at 09:42

## 2023-01-01 RX ADMIN — PANTOPRAZOLE SODIUM 40 MG: 40 INJECTION, POWDER, FOR SOLUTION INTRAVENOUS at 09:22

## 2023-01-01 RX ADMIN — LORAZEPAM 1 MG: 2 INJECTION INTRAMUSCULAR; INTRAVENOUS at 12:42

## 2023-01-01 RX ADMIN — MORPHINE SULFATE 4 MG: 4 INJECTION INTRAVENOUS at 10:49

## 2023-01-01 RX ADMIN — SODIUM CHLORIDE, PRESERVATIVE FREE 5 ML: 5 INJECTION INTRAVENOUS at 08:50

## 2023-01-01 RX ADMIN — AMPICILLIN AND SULBACTAM 3000 MG: 2; 1 INJECTION, POWDER, FOR SOLUTION INTRAVENOUS at 09:29

## 2023-01-01 RX ADMIN — VANCOMYCIN HYDROCHLORIDE 125 MG: 1 INJECTION, POWDER, LYOPHILIZED, FOR SOLUTION INTRAVENOUS at 08:10

## 2023-01-01 RX ADMIN — CHLORHEXIDINE GLUCONATE 15 ML: 1.2 RINSE ORAL at 07:41

## 2023-01-01 RX ADMIN — FENTANYL CITRATE 25 MCG: 50 INJECTION INTRAMUSCULAR; INTRAVENOUS at 00:52

## 2023-01-01 RX ADMIN — ONDANSETRON 4 MG: 2 INJECTION INTRAMUSCULAR; INTRAVENOUS at 18:37

## 2023-01-01 RX ADMIN — LORAZEPAM 1 MG: 2 INJECTION INTRAMUSCULAR; INTRAVENOUS at 10:40

## 2023-01-01 RX ADMIN — ACETAMINOPHEN 650 MG: 325 TABLET ORAL at 06:48

## 2023-01-01 RX ADMIN — VANCOMYCIN HYDROCHLORIDE 125 MG: 1 INJECTION, POWDER, LYOPHILIZED, FOR SOLUTION INTRAVENOUS at 12:56

## 2023-01-01 RX ADMIN — IPRATROPIUM BROMIDE AND ALBUTEROL SULFATE 1 DOSE: 2.5; .5 SOLUTION RESPIRATORY (INHALATION) at 11:36

## 2023-01-01 RX ADMIN — ASPIRIN 81 MG 81 MG: 81 TABLET ORAL at 08:42

## 2023-01-01 RX ADMIN — Medication 60 MG: at 08:29

## 2023-01-01 RX ADMIN — AMPICILLIN SODIUM AND SULBACTAM SODIUM 3000 MG: 2; 1 INJECTION, POWDER, FOR SOLUTION INTRAMUSCULAR; INTRAVENOUS at 20:23

## 2023-01-01 RX ADMIN — MORPHINE SULFATE 4 MG: 4 INJECTION INTRAVENOUS at 15:42

## 2023-01-01 RX ADMIN — Medication 60 MG: at 08:12

## 2023-01-01 RX ADMIN — AMPICILLIN AND SULBACTAM 3000 MG: 2; 1 INJECTION, POWDER, FOR SOLUTION INTRAVENOUS at 08:59

## 2023-01-01 RX ADMIN — DOCUSATE SODIUM LIQUID 100 MG: 100 LIQUID ORAL at 08:18

## 2023-01-01 RX ADMIN — ACETAMINOPHEN 650 MG: 325 TABLET ORAL at 09:09

## 2023-01-01 RX ADMIN — FENTANYL CITRATE 25 MCG: 50 INJECTION INTRAMUSCULAR; INTRAVENOUS at 00:54

## 2023-01-01 RX ADMIN — GLYCOPYRROLATE 0.2 MG: 0.2 INJECTION INTRAMUSCULAR; INTRAVENOUS at 10:40

## 2023-01-01 RX ADMIN — GLYCOPYRROLATE 0.2 MG: 0.2 INJECTION INTRAMUSCULAR; INTRAVENOUS at 13:51

## 2023-01-01 RX ADMIN — SODIUM CHLORIDE, PRESERVATIVE FREE 5 ML: 5 INJECTION INTRAVENOUS at 20:55

## 2023-01-01 RX ADMIN — AMPICILLIN SODIUM AND SULBACTAM SODIUM 3000 MG: 2; 1 INJECTION, POWDER, FOR SOLUTION INTRAMUSCULAR; INTRAVENOUS at 02:21

## 2023-01-01 RX ADMIN — SODIUM CHLORIDE, PRESERVATIVE FREE 10 ML: 5 INJECTION INTRAVENOUS at 08:46

## 2023-01-01 RX ADMIN — ENOXAPARIN SODIUM 40 MG: 100 INJECTION SUBCUTANEOUS at 07:41

## 2023-01-01 RX ADMIN — Medication 60 MG: at 08:07

## 2023-01-01 RX ADMIN — CHLORHEXIDINE GLUCONATE 15 ML: 1.2 RINSE ORAL at 08:35

## 2023-01-01 RX ADMIN — ATORVASTATIN CALCIUM 40 MG: 40 TABLET, FILM COATED ORAL at 21:47

## 2023-01-01 RX ADMIN — TICAGRELOR 90 MG: 90 TABLET ORAL at 20:47

## 2023-01-01 RX ADMIN — IPRATROPIUM BROMIDE AND ALBUTEROL SULFATE 1 DOSE: 2.5; .5 SOLUTION RESPIRATORY (INHALATION) at 12:00

## 2023-01-01 RX ADMIN — PROPOFOL 45 MCG/KG/MIN: 10 INJECTION, EMULSION INTRAVENOUS at 18:39

## 2023-01-01 RX ADMIN — METOPROLOL TARTRATE 25 MG: 25 TABLET, FILM COATED ORAL at 08:34

## 2023-01-01 RX ADMIN — GLYCOPYRROLATE 0.2 MG: 0.2 INJECTION INTRAMUSCULAR; INTRAVENOUS at 21:01

## 2023-01-01 RX ADMIN — DOCUSATE SODIUM LIQUID 100 MG: 100 LIQUID ORAL at 15:49

## 2023-01-01 RX ADMIN — POTASSIUM CHLORIDE 10 MEQ: 10 INJECTION, SOLUTION INTRAVENOUS at 08:59

## 2023-01-01 RX ADMIN — CLONIDINE HYDROCHLORIDE 0.1 MG: 0.1 TABLET ORAL at 08:28

## 2023-01-01 RX ADMIN — PROPOFOL 20 MCG/KG/MIN: 10 INJECTION, EMULSION INTRAVENOUS at 19:42

## 2023-01-01 RX ADMIN — GLYCOPYRROLATE 0.2 MG: 0.2 INJECTION INTRAMUSCULAR; INTRAVENOUS at 15:43

## 2023-01-01 RX ADMIN — PROPOFOL 45 MCG/KG/MIN: 10 INJECTION, EMULSION INTRAVENOUS at 22:58

## 2023-01-01 RX ADMIN — PROPOFOL 40 MCG/KG/MIN: 10 INJECTION, EMULSION INTRAVENOUS at 12:01

## 2023-01-01 RX ADMIN — SODIUM CHLORIDE, PRESERVATIVE FREE 10 ML: 5 INJECTION INTRAVENOUS at 10:21

## 2023-01-01 RX ADMIN — Medication 60 MG: at 12:06

## 2023-01-01 RX ADMIN — CHLORHEXIDINE GLUCONATE 15 ML: 1.2 RINSE ORAL at 09:38

## 2023-01-01 RX ADMIN — PROPOFOL 45 MCG/KG/MIN: 10 INJECTION, EMULSION INTRAVENOUS at 16:11

## 2023-01-01 RX ADMIN — IPRATROPIUM BROMIDE AND ALBUTEROL SULFATE 1 DOSE: 2.5; .5 SOLUTION RESPIRATORY (INHALATION) at 11:32

## 2023-01-01 RX ADMIN — IPRATROPIUM BROMIDE AND ALBUTEROL SULFATE 1 DOSE: 2.5; .5 SOLUTION RESPIRATORY (INHALATION) at 18:12

## 2023-01-01 RX ADMIN — Medication 60 MG: at 12:12

## 2023-01-01 RX ADMIN — LEVOTHYROXINE SODIUM 200 MCG: 100 TABLET ORAL at 07:40

## 2023-01-01 RX ADMIN — METOPROLOL TARTRATE 25 MG: 25 TABLET, FILM COATED ORAL at 09:09

## 2023-01-01 RX ADMIN — CHLORHEXIDINE GLUCONATE 15 ML: 1.2 RINSE ORAL at 20:20

## 2023-01-01 RX ADMIN — Medication 300 MCG/MIN: at 12:19

## 2023-01-01 RX ADMIN — ACETAMINOPHEN 650 MG: 325 TABLET ORAL at 18:45

## 2023-01-01 RX ADMIN — AMPICILLIN AND SULBACTAM 3000 MG: 2; 1 INJECTION, POWDER, FOR SOLUTION INTRAVENOUS at 14:45

## 2023-01-01 RX ADMIN — ATORVASTATIN CALCIUM 40 MG: 40 TABLET, FILM COATED ORAL at 20:47

## 2023-01-01 RX ADMIN — MAGNESIUM SULFATE HEPTAHYDRATE 4000 MG: 40 INJECTION, SOLUTION INTRAVENOUS at 08:25

## 2023-01-01 RX ADMIN — Medication 60 MG: at 01:00

## 2023-01-01 RX ADMIN — HYDROMORPHONE HYDROCHLORIDE 1 MG: 1 INJECTION, SOLUTION INTRAMUSCULAR; INTRAVENOUS; SUBCUTANEOUS at 14:48

## 2023-01-01 RX ADMIN — VANCOMYCIN HYDROCHLORIDE 125 MG: 1 INJECTION, POWDER, LYOPHILIZED, FOR SOLUTION INTRAVENOUS at 18:01

## 2023-01-01 RX ADMIN — OXYCODONE HYDROCHLORIDE 10 MG: 5 TABLET ORAL at 06:30

## 2023-01-01 RX ADMIN — MORPHINE SULFATE 4 MG: 4 INJECTION INTRAVENOUS at 12:40

## 2023-01-01 RX ADMIN — Medication 60 MG: at 16:30

## 2023-01-01 RX ADMIN — FENTANYL CITRATE 50 MCG: 50 INJECTION, SOLUTION INTRAMUSCULAR; INTRAVENOUS at 11:20

## 2023-01-01 RX ADMIN — Medication 60 MG: at 04:15

## 2023-01-01 RX ADMIN — ALBUTEROL SULFATE 2.5 MG: 2.5 SOLUTION RESPIRATORY (INHALATION) at 12:26

## 2023-01-01 RX ADMIN — PROPOFOL 20 MCG/KG/MIN: 10 INJECTION, EMULSION INTRAVENOUS at 12:14

## 2023-01-01 RX ADMIN — Medication 60 MG: at 03:56

## 2023-01-01 RX ADMIN — OXYCODONE HYDROCHLORIDE 10 MG: 5 TABLET ORAL at 02:11

## 2023-01-01 RX ADMIN — MORPHINE SULFATE 4 MG: 4 INJECTION INTRAVENOUS at 22:17

## 2023-01-01 RX ADMIN — AMPICILLIN SODIUM AND SULBACTAM SODIUM 3000 MG: 2; 1 INJECTION, POWDER, FOR SOLUTION INTRAMUSCULAR; INTRAVENOUS at 07:57

## 2023-01-01 RX ADMIN — MILRINONE LACTATE 9300 MCG: 1 INJECTION, SOLUTION INTRAVENOUS at 17:05

## 2023-01-01 RX ADMIN — LANSOPRAZOLE 30 MG: 30 TABLET, ORALLY DISINTEGRATING ORAL at 07:55

## 2023-01-01 RX ADMIN — LABETALOL HYDROCHLORIDE 10 MG: 5 INJECTION, SOLUTION INTRAVENOUS at 02:26

## 2023-01-01 RX ADMIN — LABETALOL HYDROCHLORIDE 10 MG: 5 INJECTION, SOLUTION INTRAVENOUS at 15:15

## 2023-01-01 RX ADMIN — MORPHINE SULFATE 4 MG: 4 INJECTION INTRAVENOUS at 00:21

## 2023-01-01 RX ADMIN — PROTHROMBIN, COAGULATION FACTOR VII HUMAN, COAGULATION FACTOR IX HUMAN, COAGULATION FACTOR X HUMAN, PROTEIN C, PROTEIN S HUMAN, AND WATER 4500 UNITS: KIT at 19:38

## 2023-01-01 RX ADMIN — ASPIRIN 81 MG 81 MG: 81 TABLET ORAL at 08:13

## 2023-01-01 RX ADMIN — Medication 60 MG: at 00:00

## 2023-01-01 RX ADMIN — OXYCODONE HYDROCHLORIDE 10 MG: 5 TABLET ORAL at 05:40

## 2023-01-01 RX ADMIN — GLYCOPYRROLATE 0.2 MG: 0.2 INJECTION INTRAMUSCULAR; INTRAVENOUS at 05:07

## 2023-01-01 RX ADMIN — Medication 60 MG: at 08:35

## 2023-01-01 RX ADMIN — VANCOMYCIN HYDROCHLORIDE 125 MG: 1 INJECTION, POWDER, LYOPHILIZED, FOR SOLUTION INTRAVENOUS at 00:40

## 2023-01-01 RX ADMIN — NIMODIPINE 60 MG: 30 CAPSULE, LIQUID FILLED ORAL at 06:09

## 2023-01-01 RX ADMIN — Medication 60 MG: at 00:14

## 2023-01-01 RX ADMIN — SODIUM CHLORIDE, PRESERVATIVE FREE 10 ML: 5 INJECTION INTRAVENOUS at 08:26

## 2023-01-01 RX ADMIN — MORPHINE SULFATE 2 MG: 2 INJECTION, SOLUTION INTRAMUSCULAR; INTRAVENOUS at 08:14

## 2023-01-01 RX ADMIN — MANNITOL 46.96 G: 20 INJECTION, SOLUTION INTRAVENOUS at 01:23

## 2023-01-01 RX ADMIN — AMPICILLIN SODIUM AND SULBACTAM SODIUM 3000 MG: 2; 1 INJECTION, POWDER, FOR SOLUTION INTRAMUSCULAR; INTRAVENOUS at 14:02

## 2023-01-01 RX ADMIN — Medication 60 MG: at 04:47

## 2023-01-01 RX ADMIN — OXYCODONE HYDROCHLORIDE 10 MG: 5 TABLET ORAL at 09:32

## 2023-01-01 RX ADMIN — SENNOSIDES 8.8 MG: 8.8 LIQUID ORAL at 20:15

## 2023-01-01 RX ADMIN — LABETALOL HYDROCHLORIDE 10 MG: 5 INJECTION, SOLUTION INTRAVENOUS at 11:29

## 2023-01-01 RX ADMIN — CALCIUM GLUCONATE 1000 MG: 20 INJECTION, SOLUTION INTRAVENOUS at 08:40

## 2023-01-01 RX ADMIN — LANSOPRAZOLE 30 MG: 30 TABLET, ORALLY DISINTEGRATING ORAL at 08:08

## 2023-01-01 RX ADMIN — ACETAMINOPHEN 650 MG: 325 TABLET ORAL at 20:30

## 2023-01-01 RX ADMIN — GLYCOPYRROLATE 0.2 MG: 0.2 INJECTION INTRAMUSCULAR; INTRAVENOUS at 09:18

## 2023-01-01 RX ADMIN — MILRINONE LACTATE IN DEXTROSE 0.75 MCG/KG/MIN: 200 INJECTION, SOLUTION INTRAVENOUS at 17:23

## 2023-01-01 RX ADMIN — IODIXANOL 88 ML: 270 INJECTION, SOLUTION INTRAVASCULAR at 21:17

## 2023-01-01 RX ADMIN — OXYCODONE HYDROCHLORIDE 10 MG: 5 TABLET ORAL at 12:03

## 2023-01-01 RX ADMIN — Medication 60 MG: at 17:22

## 2023-01-01 RX ADMIN — PANTOPRAZOLE SODIUM 8 MG/HR: 40 INJECTION, POWDER, FOR SOLUTION INTRAVENOUS at 06:24

## 2023-01-01 RX ADMIN — EPINEPHRINE 1 MG: 0.1 INJECTION INTRAVENOUS at 21:43

## 2023-01-01 RX ADMIN — TICAGRELOR 90 MG: 90 TABLET ORAL at 21:20

## 2023-01-01 RX ADMIN — MORPHINE SULFATE 2 MG: 2 INJECTION, SOLUTION INTRAMUSCULAR; INTRAVENOUS at 01:26

## 2023-01-01 RX ADMIN — METOPROLOL TARTRATE 25 MG: 25 TABLET, FILM COATED ORAL at 20:47

## 2023-01-01 RX ADMIN — METOPROLOL TARTRATE 25 MG: 25 TABLET, FILM COATED ORAL at 07:40

## 2023-01-01 RX ADMIN — TICAGRELOR 90 MG: 90 TABLET ORAL at 21:46

## 2023-01-01 RX ADMIN — CALCIUM GLUCONATE 2000 MG: 20 INJECTION, SOLUTION INTRAVENOUS at 09:10

## 2023-01-01 RX ADMIN — TICAGRELOR 180 MG: 90 TABLET ORAL at 21:34

## 2023-01-01 RX ADMIN — SODIUM CHLORIDE, PRESERVATIVE FREE 10 ML: 5 INJECTION INTRAVENOUS at 21:46

## 2023-01-01 RX ADMIN — CHLORHEXIDINE GLUCONATE 15 ML: 1.2 RINSE ORAL at 09:22

## 2023-01-01 RX ADMIN — CHLORHEXIDINE GLUCONATE 15 ML: 1.2 RINSE ORAL at 21:47

## 2023-01-01 RX ADMIN — METHYLPREDNISOLONE SODIUM SUCCINATE 60 MG: 125 INJECTION, POWDER, FOR SOLUTION INTRAMUSCULAR; INTRAVENOUS at 14:16

## 2023-01-01 RX ADMIN — Medication 60 MG: at 16:15

## 2023-01-01 RX ADMIN — LORAZEPAM 1 MG: 2 INJECTION INTRAMUSCULAR; INTRAVENOUS at 02:00

## 2023-01-01 RX ADMIN — ACETAMINOPHEN 650 MG: 325 TABLET ORAL at 18:25

## 2023-01-01 RX ADMIN — SODIUM CHLORIDE, PRESERVATIVE FREE 10 ML: 5 INJECTION INTRAVENOUS at 08:27

## 2023-01-01 RX ADMIN — METOPROLOL TARTRATE 25 MG: 25 TABLET, FILM COATED ORAL at 21:19

## 2023-01-01 RX ADMIN — TICAGRELOR 90 MG: 90 TABLET ORAL at 08:34

## 2023-01-01 RX ADMIN — PROPOFOL 20 MCG/KG/MIN: 10 INJECTION, EMULSION INTRAVENOUS at 06:28

## 2023-01-01 RX ADMIN — METOPROLOL TARTRATE 25 MG: 25 TABLET, FILM COATED ORAL at 08:42

## 2023-01-01 RX ADMIN — MEROPENEM 1000 MG: 1 INJECTION, POWDER, FOR SOLUTION INTRAVENOUS at 20:13

## 2023-01-01 RX ADMIN — PROPOFOL 40 MCG/KG/MIN: 10 INJECTION, EMULSION INTRAVENOUS at 07:21

## 2023-01-01 RX ADMIN — ACETAMINOPHEN 650 MG: 325 TABLET ORAL at 07:54

## 2023-01-01 RX ADMIN — MORPHINE SULFATE 4 MG: 4 INJECTION INTRAVENOUS at 03:02

## 2023-01-01 RX ADMIN — PROPOFOL 45 MCG/KG/MIN: 10 INJECTION, EMULSION INTRAVENOUS at 11:36

## 2023-01-01 RX ADMIN — IPRATROPIUM BROMIDE AND ALBUTEROL SULFATE 1 DOSE: 2.5; .5 SOLUTION RESPIRATORY (INHALATION) at 15:42

## 2023-01-01 RX ADMIN — MILRINONE LACTATE 0.5 MCG/KG/MIN: 0.2 INJECTION, SOLUTION INTRAVENOUS at 05:40

## 2023-01-01 RX ADMIN — SODIUM CHLORIDE, PRESERVATIVE FREE 10 ML: 5 INJECTION INTRAVENOUS at 08:29

## 2023-01-01 RX ADMIN — PROPOFOL 45 MCG/KG/MIN: 10 INJECTION, EMULSION INTRAVENOUS at 00:20

## 2023-01-01 RX ADMIN — Medication 60 MG: at 17:51

## 2023-01-01 RX ADMIN — SODIUM CHLORIDE: 9 INJECTION, SOLUTION INTRAVENOUS at 12:12

## 2023-01-01 RX ADMIN — SODIUM CHLORIDE 500 ML: 9 INJECTION, SOLUTION INTRAVENOUS at 11:02

## 2023-01-01 RX ADMIN — LANSOPRAZOLE 30 MG: 30 TABLET, ORALLY DISINTEGRATING ORAL at 08:42

## 2023-01-01 RX ADMIN — SODIUM CHLORIDE, PRESERVATIVE FREE 10 ML: 5 INJECTION INTRAVENOUS at 10:20

## 2023-01-01 RX ADMIN — AMPICILLIN SODIUM AND SULBACTAM SODIUM 3000 MG: 2; 1 INJECTION, POWDER, FOR SOLUTION INTRAMUSCULAR; INTRAVENOUS at 13:43

## 2023-01-01 RX ADMIN — OXYCODONE HYDROCHLORIDE 10 MG: 5 TABLET ORAL at 01:28

## 2023-01-01 RX ADMIN — ASPIRIN 81 MG 81 MG: 81 TABLET ORAL at 08:10

## 2023-01-01 RX ADMIN — AMPICILLIN AND SULBACTAM 3000 MG: 2; 1 INJECTION, POWDER, FOR SOLUTION INTRAVENOUS at 14:22

## 2023-01-01 RX ADMIN — LORAZEPAM 2 MG: 2 INJECTION INTRAMUSCULAR; INTRAVENOUS at 13:23

## 2023-01-01 RX ADMIN — GLYCOPYRROLATE 0.2 MG: 0.2 INJECTION INTRAMUSCULAR; INTRAVENOUS at 18:52

## 2023-01-01 RX ADMIN — FENTANYL CITRATE 25 MCG: 50 INJECTION INTRAMUSCULAR; INTRAVENOUS at 16:17

## 2023-01-01 RX ADMIN — MORPHINE SULFATE 4 MG: 4 INJECTION INTRAVENOUS at 04:21

## 2023-01-01 RX ADMIN — PROPOFOL 45 MCG/KG/MIN: 10 INJECTION, EMULSION INTRAVENOUS at 07:52

## 2023-01-01 RX ADMIN — IPRATROPIUM BROMIDE AND ALBUTEROL SULFATE 1 DOSE: 2.5; .5 SOLUTION RESPIRATORY (INHALATION) at 16:10

## 2023-01-01 RX ADMIN — CALCIUM GLUCONATE 1000 MG: 20 INJECTION, SOLUTION INTRAVENOUS at 05:57

## 2023-01-01 RX ADMIN — POTASSIUM CHLORIDE 10 MEQ: 10 INJECTION, SOLUTION INTRAVENOUS at 06:07

## 2023-01-01 RX ADMIN — Medication 60 MG: at 20:10

## 2023-01-01 RX ADMIN — AMPICILLIN SODIUM AND SULBACTAM SODIUM 3000 MG: 2; 1 INJECTION, POWDER, FOR SOLUTION INTRAMUSCULAR; INTRAVENOUS at 20:16

## 2023-01-01 RX ADMIN — OXYCODONE HYDROCHLORIDE 10 MG: 5 TABLET ORAL at 18:24

## 2023-01-01 RX ADMIN — FENTANYL CITRATE 25 MCG: 50 INJECTION, SOLUTION INTRAMUSCULAR; INTRAVENOUS at 00:19

## 2023-01-01 RX ADMIN — LABETALOL HYDROCHLORIDE 10 MG: 5 INJECTION, SOLUTION INTRAVENOUS at 06:00

## 2023-01-01 RX ADMIN — PROPOFOL 40 MCG/KG/MIN: 10 INJECTION, EMULSION INTRAVENOUS at 02:32

## 2023-01-01 RX ADMIN — Medication 60 MG: at 15:41

## 2023-01-01 RX ADMIN — Medication 290 MCG/MIN: at 23:23

## 2023-01-01 RX ADMIN — Medication 60 MG: at 15:49

## 2023-01-01 RX ADMIN — LORAZEPAM 1 MG: 2 INJECTION INTRAMUSCULAR; INTRAVENOUS at 21:50

## 2023-01-01 RX ADMIN — PROPOFOL 45 MCG/KG/MIN: 10 INJECTION, EMULSION INTRAVENOUS at 15:49

## 2023-01-01 RX ADMIN — Medication 5 MCG/MIN: at 19:00

## 2023-01-01 RX ADMIN — Medication 100 MCG/HR: at 17:55

## 2023-01-01 RX ADMIN — LABETALOL HYDROCHLORIDE 10 MG: 5 INJECTION, SOLUTION INTRAVENOUS at 23:40

## 2023-01-01 RX ADMIN — LORAZEPAM 1 MG: 2 INJECTION INTRAMUSCULAR; INTRAVENOUS at 08:30

## 2023-01-01 RX ADMIN — Medication 60 MG: at 23:52

## 2023-01-01 RX ADMIN — BUSPIRONE HYDROCHLORIDE 10 MG: 10 TABLET ORAL at 09:31

## 2023-01-01 RX ADMIN — SENNOSIDES 8.8 MG: 8.8 LIQUID ORAL at 19:59

## 2023-01-01 RX ADMIN — LEVOTHYROXINE SODIUM 200 MCG: 100 TABLET ORAL at 08:35

## 2023-01-01 RX ADMIN — AMPICILLIN AND SULBACTAM 3000 MG: 2; 1 INJECTION, POWDER, FOR SOLUTION INTRAVENOUS at 08:48

## 2023-01-01 RX ADMIN — ASPIRIN 81 MG 81 MG: 81 TABLET ORAL at 07:40

## 2023-01-01 RX ADMIN — AMPICILLIN AND SULBACTAM 3000 MG: 2; 1 INJECTION, POWDER, FOR SOLUTION INTRAVENOUS at 01:40

## 2023-01-01 RX ADMIN — MORPHINE SULFATE 2 MG: 2 INJECTION, SOLUTION INTRAMUSCULAR; INTRAVENOUS at 20:05

## 2023-01-01 RX ADMIN — AMPICILLIN AND SULBACTAM 3000 MG: 2; 1 INJECTION, POWDER, FOR SOLUTION INTRAVENOUS at 08:33

## 2023-01-01 RX ADMIN — Medication 75 MCG/HR: at 00:21

## 2023-01-01 RX ADMIN — ATORVASTATIN CALCIUM 40 MG: 40 TABLET, FILM COATED ORAL at 20:31

## 2023-01-01 RX ADMIN — FUROSEMIDE 20 MG: 10 INJECTION, SOLUTION INTRAMUSCULAR; INTRAVENOUS at 10:04

## 2023-01-01 RX ADMIN — LEVOTHYROXINE SODIUM 200 MCG: 100 TABLET ORAL at 08:29

## 2023-01-01 RX ADMIN — METOPROLOL TARTRATE 25 MG: 25 TABLET, FILM COATED ORAL at 09:32

## 2023-01-01 RX ADMIN — MORPHINE SULFATE 2 MG: 2 INJECTION, SOLUTION INTRAMUSCULAR; INTRAVENOUS at 10:17

## 2023-01-01 RX ADMIN — MORPHINE SULFATE 4 MG: 4 INJECTION INTRAVENOUS at 07:19

## 2023-01-01 RX ADMIN — Medication 50 MCG/HR: at 03:20

## 2023-01-01 RX ADMIN — AMPICILLIN SODIUM AND SULBACTAM SODIUM 3000 MG: 2; 1 INJECTION, POWDER, FOR SOLUTION INTRAMUSCULAR; INTRAVENOUS at 08:58

## 2023-01-01 RX ADMIN — TICAGRELOR 90 MG: 90 TABLET ORAL at 09:09

## 2023-01-01 RX ADMIN — Medication 60 MG: at 09:31

## 2023-01-01 RX ADMIN — AMPICILLIN AND SULBACTAM 3000 MG: 2; 1 INJECTION, POWDER, FOR SOLUTION INTRAVENOUS at 13:58

## 2023-01-01 RX ADMIN — LORAZEPAM 1 MG: 2 INJECTION INTRAMUSCULAR; INTRAVENOUS at 10:36

## 2023-01-01 RX ADMIN — NIMODIPINE 60 MG: 30 CAPSULE, LIQUID FILLED ORAL at 14:35

## 2023-01-01 RX ADMIN — IPRATROPIUM BROMIDE AND ALBUTEROL SULFATE 1 DOSE: 2.5; .5 SOLUTION RESPIRATORY (INHALATION) at 11:24

## 2023-01-01 RX ADMIN — OXYCODONE HYDROCHLORIDE 10 MG: 5 TABLET ORAL at 20:12

## 2023-01-01 RX ADMIN — SODIUM CHLORIDE, PRESERVATIVE FREE 5 ML: 5 INJECTION INTRAVENOUS at 08:51

## 2023-01-01 RX ADMIN — ALBUTEROL SULFATE 2.5 MG: 2.5 SOLUTION RESPIRATORY (INHALATION) at 00:11

## 2023-01-01 RX ADMIN — BUSPIRONE HYDROCHLORIDE 10 MG: 10 TABLET ORAL at 14:15

## 2023-01-01 RX ADMIN — CHLORHEXIDINE GLUCONATE 15 ML: 1.2 RINSE ORAL at 20:32

## 2023-01-01 RX ADMIN — FENTANYL CITRATE 25 MCG: 50 INJECTION INTRAMUSCULAR; INTRAVENOUS at 10:57

## 2023-01-01 RX ADMIN — MORPHINE SULFATE 2 MG: 2 INJECTION, SOLUTION INTRAMUSCULAR; INTRAVENOUS at 18:52

## 2023-01-01 RX ADMIN — SODIUM CHLORIDE, PRESERVATIVE FREE 10 ML: 5 INJECTION INTRAVENOUS at 22:23

## 2023-01-01 RX ADMIN — LEVOTHYROXINE SODIUM 200 MCG: 100 TABLET ORAL at 09:09

## 2023-01-01 RX ADMIN — ACETAMINOPHEN 650 MG: 325 TABLET ORAL at 08:35

## 2023-01-01 RX ADMIN — IPRATROPIUM BROMIDE AND ALBUTEROL SULFATE 1 DOSE: 2.5; .5 SOLUTION RESPIRATORY (INHALATION) at 08:10

## 2023-01-01 RX ADMIN — METOPROLOL TARTRATE 25 MG: 25 TABLET, FILM COATED ORAL at 09:22

## 2023-01-01 RX ADMIN — Medication 60 MG: at 16:31

## 2023-01-01 RX ADMIN — Medication 60 MG: at 20:20

## 2023-01-01 RX ADMIN — IODIXANOL 89 ML: 270 INJECTION, SOLUTION INTRAVASCULAR at 16:40

## 2023-01-01 RX ADMIN — ASPIRIN 600 MG: 300 SUPPOSITORY RECTAL at 22:49

## 2023-01-01 RX ADMIN — HEPARIN SODIUM 12 UNITS/KG/HR: 10000 INJECTION, SOLUTION INTRAVENOUS at 16:55

## 2023-01-01 RX ADMIN — Medication 60 MG: at 11:47

## 2023-01-01 RX ADMIN — Medication 60 MG: at 12:02

## 2023-01-01 RX ADMIN — CLONIDINE HYDROCHLORIDE 0.1 MG: 0.1 TABLET ORAL at 13:57

## 2023-01-01 RX ADMIN — SODIUM CHLORIDE 1000 ML: 9 INJECTION, SOLUTION INTRAVENOUS at 18:44

## 2023-01-01 RX ADMIN — HYDROMORPHONE HYDROCHLORIDE 0.5 MG: 1 INJECTION, SOLUTION INTRAMUSCULAR; INTRAVENOUS; SUBCUTANEOUS at 13:23

## 2023-01-01 RX ADMIN — MORPHINE SULFATE 4 MG: 4 INJECTION INTRAVENOUS at 06:31

## 2023-01-01 RX ADMIN — OXYCODONE HYDROCHLORIDE 10 MG: 5 TABLET ORAL at 05:37

## 2023-01-01 RX ADMIN — MORPHINE SULFATE 2 MG: 2 INJECTION, SOLUTION INTRAMUSCULAR; INTRAVENOUS at 05:29

## 2023-01-01 RX ADMIN — SODIUM CHLORIDE: 9 INJECTION, SOLUTION INTRAVENOUS at 18:04

## 2023-01-01 RX ADMIN — IPRATROPIUM BROMIDE AND ALBUTEROL SULFATE 1 DOSE: 2.5; .5 SOLUTION RESPIRATORY (INHALATION) at 15:19

## 2023-01-01 RX ADMIN — Medication 300 MCG/MIN: at 21:36

## 2023-01-01 RX ADMIN — IPRATROPIUM BROMIDE AND ALBUTEROL SULFATE 1 DOSE: 2.5; .5 SOLUTION RESPIRATORY (INHALATION) at 15:35

## 2023-01-01 RX ADMIN — OXYCODONE HYDROCHLORIDE 10 MG: 5 TABLET ORAL at 22:32

## 2023-01-01 RX ADMIN — PROPOFOL 25 MCG/KG/MIN: 10 INJECTION, EMULSION INTRAVENOUS at 08:46

## 2023-01-01 RX ADMIN — PANTOPRAZOLE SODIUM 8 MG/HR: 40 INJECTION, POWDER, FOR SOLUTION INTRAVENOUS at 04:49

## 2023-01-01 RX ADMIN — MORPHINE SULFATE 2 MG: 2 INJECTION, SOLUTION INTRAMUSCULAR; INTRAVENOUS at 16:21

## 2023-01-01 RX ADMIN — AMPICILLIN SODIUM AND SULBACTAM SODIUM 3000 MG: 2; 1 INJECTION, POWDER, FOR SOLUTION INTRAMUSCULAR; INTRAVENOUS at 04:16

## 2023-01-01 RX ADMIN — IODIXANOL 142 ML: 320 INJECTION, SOLUTION INTRAVASCULAR at 00:46

## 2023-01-01 RX ADMIN — TICAGRELOR 90 MG: 90 TABLET ORAL at 08:35

## 2023-01-01 RX ADMIN — PANTOPRAZOLE SODIUM 40 MG: 40 INJECTION, POWDER, FOR SOLUTION INTRAVENOUS at 22:31

## 2023-01-01 RX ADMIN — MEROPENEM 1000 MG: 1 INJECTION, POWDER, FOR SOLUTION INTRAVENOUS at 12:26

## 2023-01-01 RX ADMIN — CLONIDINE HYDROCHLORIDE 0.1 MG: 0.1 TABLET ORAL at 22:31

## 2023-01-01 RX ADMIN — IPRATROPIUM BROMIDE AND ALBUTEROL SULFATE 1 DOSE: 2.5; .5 SOLUTION RESPIRATORY (INHALATION) at 08:59

## 2023-01-01 RX ADMIN — MAGNESIUM SULFATE HEPTAHYDRATE 2000 MG: 40 INJECTION, SOLUTION INTRAVENOUS at 08:22

## 2023-01-01 RX ADMIN — Medication 60 MG: at 20:32

## 2023-01-01 RX ADMIN — GLYCOPYRROLATE 0.2 MG: 0.2 INJECTION INTRAMUSCULAR; INTRAVENOUS at 01:00

## 2023-01-01 RX ADMIN — ATORVASTATIN CALCIUM 40 MG: 40 TABLET, FILM COATED ORAL at 21:19

## 2023-01-01 RX ADMIN — POTASSIUM CHLORIDE 20 MEQ: 29.8 INJECTION, SOLUTION INTRAVENOUS at 10:35

## 2023-01-01 RX ADMIN — POTASSIUM BICARBONATE 40 MEQ: 782 TABLET, EFFERVESCENT ORAL at 08:53

## 2023-01-01 RX ADMIN — METOPROLOL TARTRATE 25 MG: 25 TABLET, FILM COATED ORAL at 08:35

## 2023-01-01 RX ADMIN — BUSPIRONE HYDROCHLORIDE 10 MG: 10 TABLET ORAL at 14:10

## 2023-01-01 RX ADMIN — OXYCODONE HYDROCHLORIDE 10 MG: 5 TABLET ORAL at 20:32

## 2023-01-01 RX ADMIN — CHLORHEXIDINE GLUCONATE 15 ML: 1.2 RINSE ORAL at 02:19

## 2023-01-01 RX ADMIN — DOCUSATE SODIUM LIQUID 100 MG: 100 LIQUID ORAL at 09:09

## 2023-01-01 RX ADMIN — CHLORHEXIDINE GLUCONATE 15 ML: 1.2 RINSE ORAL at 20:00

## 2023-01-01 RX ADMIN — SODIUM CHLORIDE, PRESERVATIVE FREE 10 ML: 5 INJECTION INTRAVENOUS at 20:47

## 2023-01-01 RX ADMIN — Medication 40 MCG/MIN: at 08:39

## 2023-01-01 RX ADMIN — Medication 300 MCG/MIN: at 15:39

## 2023-01-01 RX ADMIN — AMPICILLIN AND SULBACTAM 3000 MG: 2; 1 INJECTION, POWDER, FOR SOLUTION INTRAVENOUS at 21:47

## 2023-01-01 RX ADMIN — AMPICILLIN AND SULBACTAM 3000 MG: 2; 1 INJECTION, POWDER, FOR SOLUTION INTRAVENOUS at 04:28

## 2023-01-01 RX ADMIN — HYDRALAZINE HYDROCHLORIDE 10 MG: 20 INJECTION, SOLUTION INTRAMUSCULAR; INTRAVENOUS at 00:30

## 2023-01-01 RX ADMIN — METOPROLOL TARTRATE 25 MG: 25 TABLET, FILM COATED ORAL at 20:34

## 2023-01-01 RX ADMIN — FENTANYL CITRATE 50 MCG: 50 INJECTION, SOLUTION INTRAMUSCULAR; INTRAVENOUS at 11:43

## 2023-01-01 RX ADMIN — Medication 60 MG: at 12:57

## 2023-01-01 RX ADMIN — AMPICILLIN AND SULBACTAM 3000 MG: 2; 1 INJECTION, POWDER, FOR SOLUTION INTRAVENOUS at 20:15

## 2023-01-01 RX ADMIN — AMPICILLIN AND SULBACTAM 3000 MG: 2; 1 INJECTION, POWDER, FOR SOLUTION INTRAVENOUS at 19:59

## 2023-01-01 RX ADMIN — ACETAMINOPHEN 650 MG: 325 TABLET ORAL at 00:45

## 2023-01-01 RX ADMIN — SODIUM CHLORIDE, PRESERVATIVE FREE 10 ML: 5 INJECTION INTRAVENOUS at 08:04

## 2023-01-01 RX ADMIN — Medication 60 MG: at 23:17

## 2023-01-01 RX ADMIN — MEROPENEM 1000 MG: 1 INJECTION, POWDER, FOR SOLUTION INTRAVENOUS at 04:43

## 2023-01-01 RX ADMIN — AMPICILLIN SODIUM AND SULBACTAM SODIUM 3000 MG: 2; 1 INJECTION, POWDER, FOR SOLUTION INTRAMUSCULAR; INTRAVENOUS at 03:55

## 2023-01-01 RX ADMIN — Medication 60 MG: at 04:09

## 2023-01-01 RX ADMIN — SODIUM CHLORIDE 50 ML: 0.9 INJECTION, SOLUTION INTRAVENOUS at 20:19

## 2023-01-01 RX ADMIN — SODIUM CHLORIDE, PRESERVATIVE FREE 10 ML: 5 INJECTION INTRAVENOUS at 09:38

## 2023-01-01 RX ADMIN — IOPAMIDOL 90 ML: 755 INJECTION, SOLUTION INTRAVENOUS at 10:24

## 2023-01-01 RX ADMIN — LANSOPRAZOLE 30 MG: 30 TABLET, ORALLY DISINTEGRATING ORAL at 09:31

## 2023-01-01 RX ADMIN — Medication 60 MG: at 00:45

## 2023-01-01 RX ADMIN — Medication 60 MG: at 19:59

## 2023-01-01 RX ADMIN — DOCUSATE SODIUM LIQUID 100 MG: 100 LIQUID ORAL at 07:41

## 2023-01-01 RX ADMIN — PANTOPRAZOLE SODIUM 8 MG/HR: 40 INJECTION, POWDER, FOR SOLUTION INTRAVENOUS at 12:14

## 2023-01-01 RX ADMIN — GLYCOPYRROLATE 0.2 MG: 0.2 INJECTION INTRAMUSCULAR; INTRAVENOUS at 05:29

## 2023-01-01 RX ADMIN — PHENYLEPHRINE HYDROCHLORIDE 220 MCG/MIN: 10 INJECTION INTRAVENOUS at 16:02

## 2023-01-01 RX ADMIN — MORPHINE SULFATE 2 MG: 2 INJECTION, SOLUTION INTRAMUSCULAR; INTRAVENOUS at 05:08

## 2023-01-01 RX ADMIN — TICAGRELOR 90 MG: 90 TABLET ORAL at 08:28

## 2023-01-01 RX ADMIN — HYDROMORPHONE HYDROCHLORIDE 1 MG: 1 INJECTION, SOLUTION INTRAMUSCULAR; INTRAVENOUS; SUBCUTANEOUS at 14:13

## 2023-01-01 RX ADMIN — METOPROLOL TARTRATE 25 MG: 25 TABLET, FILM COATED ORAL at 21:46

## 2023-01-01 RX ADMIN — LEVOTHYROXINE SODIUM 200 MCG: 100 TABLET ORAL at 07:55

## 2023-01-01 RX ADMIN — Medication 60 MG: at 20:34

## 2023-01-01 RX ADMIN — ACETAMINOPHEN 650 MG: 325 TABLET ORAL at 23:17

## 2023-01-01 RX ADMIN — HYDRALAZINE HYDROCHLORIDE 10 MG: 20 INJECTION, SOLUTION INTRAMUSCULAR; INTRAVENOUS at 13:58

## 2023-01-01 RX ADMIN — IPRATROPIUM BROMIDE AND ALBUTEROL SULFATE 1 DOSE: 2.5; .5 SOLUTION RESPIRATORY (INHALATION) at 08:37

## 2023-01-01 RX ADMIN — HEPARIN SODIUM 16 UNITS/KG/HR: 10000 INJECTION, SOLUTION INTRAVENOUS at 17:25

## 2023-01-01 RX ADMIN — Medication 30 MCG/MIN: at 10:39

## 2023-01-01 RX ADMIN — MAGNESIUM SULFATE HEPTAHYDRATE 2000 MG: 40 INJECTION, SOLUTION INTRAVENOUS at 22:34

## 2023-01-01 RX ADMIN — AMPICILLIN AND SULBACTAM 3000 MG: 2; 1 INJECTION, POWDER, FOR SOLUTION INTRAVENOUS at 14:33

## 2023-01-01 RX ADMIN — ACETAMINOPHEN 650 MG: 325 TABLET ORAL at 05:28

## 2023-01-01 RX ADMIN — ACETAMINOPHEN 650 MG: 325 TABLET ORAL at 00:14

## 2023-01-01 RX ADMIN — Medication 290 MCG/MIN: at 00:59

## 2023-01-01 RX ADMIN — LANSOPRAZOLE 30 MG: 30 TABLET, ORALLY DISINTEGRATING ORAL at 09:49

## 2023-01-01 RX ADMIN — PROPOFOL 25 MCG/KG/MIN: 10 INJECTION, EMULSION INTRAVENOUS at 21:40

## 2023-01-01 RX ADMIN — PROPOFOL 20 MCG/KG/MIN: 10 INJECTION, EMULSION INTRAVENOUS at 06:08

## 2023-01-01 RX ADMIN — TICAGRELOR 90 MG: 90 TABLET ORAL at 20:32

## 2023-01-01 RX ADMIN — Medication 60 MG: at 12:01

## 2023-01-01 RX ADMIN — CHLORHEXIDINE GLUCONATE 15 ML: 1.2 RINSE ORAL at 20:47

## 2023-01-01 RX ADMIN — MORPHINE SULFATE 4 MG: 4 INJECTION INTRAVENOUS at 18:30

## 2023-01-01 RX ADMIN — ACETAMINOPHEN 650 MG: 325 TABLET ORAL at 17:22

## 2023-01-01 RX ADMIN — Medication 60 MG: at 20:48

## 2023-01-01 RX ADMIN — CHLORHEXIDINE GLUCONATE 15 ML: 1.2 RINSE ORAL at 09:06

## 2023-01-01 RX ADMIN — CHLORHEXIDINE GLUCONATE 15 ML: 1.2 RINSE ORAL at 07:30

## 2023-01-01 RX ADMIN — LABETALOL HYDROCHLORIDE 10 MG: 5 INJECTION, SOLUTION INTRAVENOUS at 18:36

## 2023-01-01 RX ADMIN — METOPROLOL TARTRATE 25 MG: 25 TABLET, FILM COATED ORAL at 20:20

## 2023-01-01 RX ADMIN — IPRATROPIUM BROMIDE AND ALBUTEROL SULFATE 1 DOSE: 2.5; .5 SOLUTION RESPIRATORY (INHALATION) at 19:46

## 2023-01-01 RX ADMIN — Medication 60 MG: at 15:37

## 2023-01-01 RX ADMIN — ACETAMINOPHEN 650 MG: 325 TABLET ORAL at 09:45

## 2023-01-01 RX ADMIN — AMPICILLIN AND SULBACTAM 3000 MG: 2; 1 INJECTION, POWDER, FOR SOLUTION INTRAVENOUS at 13:48

## 2023-01-01 RX ADMIN — LEVOTHYROXINE SODIUM 200 MCG: 100 TABLET ORAL at 08:33

## 2023-01-01 RX ADMIN — SODIUM CHLORIDE: 9 INJECTION, SOLUTION INTRAVENOUS at 16:59

## 2023-01-01 RX ADMIN — CHLORHEXIDINE GLUCONATE 15 ML: 1.2 RINSE ORAL at 20:34

## 2023-01-01 RX ADMIN — OXYCODONE HYDROCHLORIDE 10 MG: 5 TABLET ORAL at 18:01

## 2023-01-01 RX ADMIN — MILRINONE LACTATE 0.5 MCG/KG/MIN: 0.2 INJECTION, SOLUTION INTRAVENOUS at 10:42

## 2023-01-01 RX ADMIN — Medication 50 MCG/HR: at 17:05

## 2023-01-01 RX ADMIN — MORPHINE SULFATE 4 MG: 4 INJECTION INTRAVENOUS at 01:00

## 2023-01-01 RX ADMIN — TICAGRELOR 90 MG: 90 TABLET ORAL at 21:38

## 2023-01-01 RX ADMIN — MORPHINE SULFATE 4 MG: 4 INJECTION INTRAVENOUS at 02:00

## 2023-01-01 RX ADMIN — METOPROLOL TARTRATE 25 MG: 25 TABLET, FILM COATED ORAL at 08:13

## 2023-01-01 RX ADMIN — LEVOTHYROXINE SODIUM 200 MCG: 100 TABLET ORAL at 08:12

## 2023-01-01 RX ADMIN — METOPROLOL TARTRATE 25 MG: 25 TABLET, FILM COATED ORAL at 07:55

## 2023-01-01 RX ADMIN — MORPHINE SULFATE 2 MG: 2 INJECTION, SOLUTION INTRAMUSCULAR; INTRAVENOUS at 09:18

## 2023-01-01 RX ADMIN — ACETAMINOPHEN 650 MG: 325 TABLET ORAL at 13:25

## 2023-01-01 RX ADMIN — IPRATROPIUM BROMIDE AND ALBUTEROL SULFATE 1 DOSE: 2.5; .5 SOLUTION RESPIRATORY (INHALATION) at 07:48

## 2023-01-01 RX ADMIN — PANTOPRAZOLE SODIUM 40 MG: 40 INJECTION, POWDER, FOR SOLUTION INTRAVENOUS at 08:35

## 2023-01-01 RX ADMIN — OXYCODONE HYDROCHLORIDE 10 MG: 5 TABLET ORAL at 12:12

## 2023-01-01 RX ADMIN — IPRATROPIUM BROMIDE AND ALBUTEROL SULFATE 1 DOSE: 2.5; .5 SOLUTION RESPIRATORY (INHALATION) at 10:23

## 2023-01-01 RX ADMIN — IPRATROPIUM BROMIDE AND ALBUTEROL SULFATE 1 DOSE: 2.5; .5 SOLUTION RESPIRATORY (INHALATION) at 15:26

## 2023-01-01 RX ADMIN — DOCUSATE SODIUM LIQUID 100 MG: 100 LIQUID ORAL at 08:33

## 2023-01-01 RX ADMIN — POTASSIUM BICARBONATE 40 MEQ: 782 TABLET, EFFERVESCENT ORAL at 21:46

## 2023-01-01 RX ADMIN — CALCIUM GLUCONATE 2000 MG: 20 INJECTION, SOLUTION INTRAVENOUS at 10:29

## 2023-01-01 RX ADMIN — ASPIRIN 81 MG 81 MG: 81 TABLET ORAL at 08:35

## 2023-01-01 RX ADMIN — IPRATROPIUM BROMIDE AND ALBUTEROL SULFATE 1 DOSE: 2.5; .5 SOLUTION RESPIRATORY (INHALATION) at 20:04

## 2023-01-01 RX ADMIN — Medication 60 MG: at 16:10

## 2023-01-01 RX ADMIN — IPRATROPIUM BROMIDE AND ALBUTEROL SULFATE 1 DOSE: 2.5; .5 SOLUTION RESPIRATORY (INHALATION) at 11:00

## 2023-01-01 RX ADMIN — HYDRALAZINE HYDROCHLORIDE 10 MG: 20 INJECTION, SOLUTION INTRAMUSCULAR; INTRAVENOUS at 20:55

## 2023-01-01 RX ADMIN — Medication 60 MG: at 09:08

## 2023-01-01 RX ADMIN — BISACODYL 10 MG: 10 SUPPOSITORY RECTAL at 10:04

## 2023-01-01 RX ADMIN — LEVOTHYROXINE SODIUM 200 MCG: 100 TABLET ORAL at 08:10

## 2023-01-01 RX ADMIN — ASPIRIN 81 MG 81 MG: 81 TABLET ORAL at 09:27

## 2023-01-01 RX ADMIN — LABETALOL HYDROCHLORIDE 10 MG: 5 INJECTION, SOLUTION INTRAVENOUS at 14:36

## 2023-01-01 RX ADMIN — CHLORHEXIDINE GLUCONATE 15 ML: 1.2 RINSE ORAL at 20:35

## 2023-01-01 RX ADMIN — Medication 60 MG: at 08:28

## 2023-01-01 RX ADMIN — BUSPIRONE HYDROCHLORIDE 10 MG: 10 TABLET ORAL at 20:31

## 2023-01-01 RX ADMIN — SODIUM CHLORIDE, PRESERVATIVE FREE 10 ML: 5 INJECTION INTRAVENOUS at 21:07

## 2023-01-01 RX ADMIN — METOPROLOL TARTRATE 25 MG: 25 TABLET, FILM COATED ORAL at 08:29

## 2023-01-01 RX ADMIN — METOPROLOL TARTRATE 25 MG: 25 TABLET, FILM COATED ORAL at 20:31

## 2023-01-01 RX ADMIN — MEROPENEM 1000 MG: 1 INJECTION, POWDER, FOR SOLUTION INTRAVENOUS at 12:56

## 2023-01-01 RX ADMIN — Medication 35 MCG/MIN: at 00:00

## 2023-01-01 RX ADMIN — CHLORHEXIDINE GLUCONATE 15 ML: 1.2 RINSE ORAL at 09:27

## 2023-01-01 ASSESSMENT — PULMONARY FUNCTION TESTS
PIF_VALUE: 11
PIF_VALUE: 19
PIF_VALUE: 12
PIF_VALUE: 12
PIF_VALUE: 17
PIF_VALUE: 6
PIF_VALUE: 21
PIF_VALUE: 11
PIF_VALUE: 12
PIF_VALUE: 21
PIF_VALUE: 13
PIF_VALUE: 11
PIF_VALUE: 13
PIF_VALUE: 6
PIF_VALUE: 12
PIF_VALUE: 13
PIF_VALUE: 12
PIF_VALUE: 5
PIF_VALUE: 11
PIF_VALUE: 17
PIF_VALUE: 11
PIF_VALUE: 11
PIF_VALUE: 22
PIF_VALUE: 21
PIF_VALUE: 11
PIF_VALUE: 1
PIF_VALUE: 13
PIF_VALUE: 16
PIF_VALUE: 11
PIF_VALUE: 13
PIF_VALUE: 21
PIF_VALUE: 6
PIF_VALUE: 12
PIF_VALUE: 13
PIF_VALUE: 21
PIF_VALUE: 12
PIF_VALUE: 12
PIF_VALUE: 15
PIF_VALUE: 13
PIF_VALUE: 13
PIF_VALUE: 21
PIF_VALUE: 20
PIF_VALUE: 14
PIF_VALUE: 11
PIF_VALUE: 16
PIF_VALUE: 5
PIF_VALUE: 20
PIF_VALUE: 26
PIF_VALUE: 13
PIF_VALUE: 18
PIF_VALUE: 21
PIF_VALUE: 11
PIF_VALUE: 20
PIF_VALUE: 12
PIF_VALUE: 12
PIF_VALUE: 17
PIF_VALUE: 14
PIF_VALUE: 6
PIF_VALUE: 11
PIF_VALUE: 13
PIF_VALUE: 12
PIF_VALUE: 13
PIF_VALUE: 16
PIF_VALUE: 12
PIF_VALUE: 13
PIF_VALUE: 11
PIF_VALUE: 14
PIF_VALUE: 15
PIF_VALUE: 13
PIF_VALUE: 19
PIF_VALUE: 13
PIF_VALUE: 20
PIF_VALUE: 12
PIF_VALUE: 11
PIF_VALUE: 12
PIF_VALUE: 12
PIF_VALUE: 14
PIF_VALUE: 13
PIF_VALUE: 18
PIF_VALUE: 13
PIF_VALUE: 21
PIF_VALUE: 12
PIF_VALUE: 12
PIF_VALUE: 14
PIF_VALUE: 11
PIF_VALUE: 13
PIF_VALUE: 14
PIF_VALUE: 21
PIF_VALUE: 12
PIF_VALUE: 12
PIF_VALUE: 19
PIF_VALUE: 18
PIF_VALUE: 14
PIF_VALUE: 14
PIF_VALUE: 12
PIF_VALUE: 5
PIF_VALUE: 20
PIF_VALUE: 12
PIF_VALUE: 11
PIF_VALUE: 11
PIF_VALUE: 17
PIF_VALUE: 6
PIF_VALUE: 13
PIF_VALUE: 11
PIF_VALUE: 14
PIF_VALUE: 18
PIF_VALUE: 5
PIF_VALUE: 14
PIF_VALUE: 20
PIF_VALUE: 14
PIF_VALUE: 12
PIF_VALUE: 17
PIF_VALUE: 12
PIF_VALUE: 8
PIF_VALUE: 11
PIF_VALUE: 11
PIF_VALUE: 16
PIF_VALUE: 12
PIF_VALUE: 12
PIF_VALUE: 6
PIF_VALUE: 19
PIF_VALUE: 18
PIF_VALUE: 17
PIF_VALUE: 11
PIF_VALUE: 14
PIF_VALUE: 11
PIF_VALUE: 24
PIF_VALUE: 13
PIF_VALUE: 20
PIF_VALUE: 14
PIF_VALUE: 11
PIF_VALUE: 11
PIF_VALUE: 17
PIF_VALUE: 14
PIF_VALUE: 13
PIF_VALUE: 12
PIF_VALUE: 14
PIF_VALUE: 6
PIF_VALUE: 9
PIF_VALUE: 14
PIF_VALUE: 11
PIF_VALUE: 13
PIF_VALUE: 20
PIF_VALUE: 14
PIF_VALUE: 21
PIF_VALUE: 16
PIF_VALUE: 5
PIF_VALUE: 5
PIF_VALUE: 12
PIF_VALUE: 6
PIF_VALUE: 13
PIF_VALUE: 18
PIF_VALUE: 11
PIF_VALUE: 11

## 2023-01-01 ASSESSMENT — PAIN DESCRIPTION - LOCATION
LOCATION: HEAD
LOCATION: HEAD

## 2023-01-01 ASSESSMENT — ENCOUNTER SYMPTOMS
BACK PAIN: 0
RHINORRHEA: 0
ABDOMINAL PAIN: 0
DIARRHEA: 0
COLOR CHANGE: 0
NAUSEA: 1
SHORTNESS OF BREATH: 1
BLOOD IN STOOL: 0
VOMITING: 1

## 2023-01-01 ASSESSMENT — PAIN SCALES - GENERAL
PAINLEVEL_OUTOF10: 0
PAINLEVEL_OUTOF10: 10
PAINLEVEL_OUTOF10: 0
PAINLEVEL_OUTOF10: 10
PAINLEVEL_OUTOF10: 0
PAINLEVEL_OUTOF10: 10
PAINLEVEL_OUTOF10: 10
PAINLEVEL_OUTOF10: 0
PAINLEVEL_OUTOF10: 0

## 2023-01-09 RX ORDER — FUROSEMIDE 20 MG/1
20 TABLET ORAL DAILY
Qty: 90 TABLET | Refills: 1 | Status: SHIPPED | OUTPATIENT
Start: 2023-01-09

## 2023-01-09 NOTE — TELEPHONE ENCOUNTER
Jaye GUZMAN NP has authorized the change to [employer] for pt. I have submitted the PA to [employer] through covermymeds.    Waiting for a decision   LOV- 6/3/22  No upcoming appointments

## 2023-01-10 ENCOUNTER — TELEPHONE (OUTPATIENT)
Dept: PAIN MANAGEMENT | Age: 53
End: 2023-01-10

## 2023-01-10 NOTE — TELEPHONE ENCOUNTER
I called patient and LM regarding missed procedure yesterday and office visit today. I asked her to please call and reschedule.

## 2023-01-17 RX ORDER — ROPINIROLE 2 MG/1
TABLET, FILM COATED ORAL
Qty: 90 TABLET | Refills: 1 | Status: SHIPPED | OUTPATIENT
Start: 2023-01-17

## 2023-01-17 RX ORDER — SUCRALFATE 1 G/1
TABLET ORAL
Qty: 120 TABLET | Refills: 0 | Status: SHIPPED | OUTPATIENT
Start: 2023-01-17

## 2023-01-18 ENCOUNTER — APPOINTMENT (OUTPATIENT)
Dept: CT IMAGING | Age: 53
End: 2023-01-18
Payer: COMMERCIAL

## 2023-01-18 ENCOUNTER — HOSPITAL ENCOUNTER (EMERGENCY)
Age: 53
Discharge: HOME | End: 2023-01-18
Attending: EMERGENCY MEDICINE
Payer: COMMERCIAL

## 2023-01-18 ENCOUNTER — APPOINTMENT (OUTPATIENT)
Dept: GENERAL RADIOLOGY | Age: 53
End: 2023-01-18
Payer: COMMERCIAL

## 2023-01-18 VITALS
WEIGHT: 180 LBS | HEART RATE: 70 BPM | TEMPERATURE: 98.4 F | BODY MASS INDEX: 30.73 KG/M2 | SYSTOLIC BLOOD PRESSURE: 121 MMHG | DIASTOLIC BLOOD PRESSURE: 71 MMHG | RESPIRATION RATE: 20 BRPM | HEIGHT: 64 IN | OXYGEN SATURATION: 96 %

## 2023-01-18 DIAGNOSIS — R07.9 CHEST PAIN, UNSPECIFIED TYPE: ICD-10-CM

## 2023-01-18 DIAGNOSIS — M79.605 LEFT LEG PAIN: Primary | ICD-10-CM

## 2023-01-18 LAB
ABSOLUTE EOS #: 0.2 K/UL (ref 0–0.4)
ABSOLUTE LYMPH #: 2.9 K/UL (ref 1–4.8)
ABSOLUTE MONO #: 0.5 K/UL (ref 0.1–1.3)
ALBUMIN SERPL-MCNC: 3.9 G/DL (ref 3.5–5.2)
ALP BLD-CCNC: 92 U/L (ref 35–104)
ALT SERPL-CCNC: 13 U/L (ref 5–33)
ANION GAP SERPL CALCULATED.3IONS-SCNC: 9 MMOL/L (ref 9–17)
AST SERPL-CCNC: 16 U/L
BASOPHILS # BLD: 1 % (ref 0–2)
BASOPHILS ABSOLUTE: 0.1 K/UL (ref 0–0.2)
BILIRUB SERPL-MCNC: 0.3 MG/DL (ref 0.3–1.2)
BUN BLDV-MCNC: 15 MG/DL (ref 6–20)
CALCIUM SERPL-MCNC: 9 MG/DL (ref 8.6–10.4)
CHLORIDE BLD-SCNC: 104 MMOL/L (ref 98–107)
CO2: 22 MMOL/L (ref 20–31)
CREAT SERPL-MCNC: 0.88 MG/DL (ref 0.5–0.9)
EOSINOPHILS RELATIVE PERCENT: 3 % (ref 0–4)
GFR SERPL CREATININE-BSD FRML MDRD: >60 ML/MIN/1.73M2
GLUCOSE BLD-MCNC: 91 MG/DL (ref 70–99)
HCT VFR BLD CALC: 37.9 % (ref 36–46)
HEMOGLOBIN: 12.5 G/DL (ref 12–16)
INFLUENZA A: NOT DETECTED
INFLUENZA B: NOT DETECTED
INR BLD: 1.5
LIPASE: 38 U/L (ref 13–60)
LYMPHOCYTES # BLD: 36 % (ref 24–44)
MCH RBC QN AUTO: 29.7 PG (ref 26–34)
MCHC RBC AUTO-ENTMCNC: 33 G/DL (ref 31–37)
MCV RBC AUTO: 89.8 FL (ref 80–100)
MONOCYTES # BLD: 7 % (ref 1–7)
PARTIAL THROMBOPLASTIN TIME: 40.6 SEC (ref 24–36)
PDW BLD-RTO: 14.1 % (ref 11.5–14.9)
PLATELET # BLD: 187 K/UL (ref 150–450)
PMV BLD AUTO: 8 FL (ref 6–12)
POTASSIUM SERPL-SCNC: 4.2 MMOL/L (ref 3.7–5.3)
PRO-BNP: 122 PG/ML
PROTHROMBIN TIME: 17.8 SEC (ref 11.8–14.6)
RBC # BLD: 4.22 M/UL (ref 4–5.2)
SARS-COV-2 RNA, RT PCR: NOT DETECTED
SEG NEUTROPHILS: 53 % (ref 36–66)
SEGMENTED NEUTROPHILS ABSOLUTE COUNT: 4.2 K/UL (ref 1.3–9.1)
SODIUM BLD-SCNC: 135 MMOL/L (ref 135–144)
SOURCE: NORMAL
SPECIMEN DESCRIPTION: NORMAL
TOTAL PROTEIN: 7 G/DL (ref 6.4–8.3)
TROPONIN, HIGH SENSITIVITY: 7 NG/L (ref 0–14)
TROPONIN, HIGH SENSITIVITY: <6 NG/L (ref 0–14)
WBC # BLD: 7.9 K/UL (ref 3.5–11)

## 2023-01-18 PROCEDURE — 83690 ASSAY OF LIPASE: CPT

## 2023-01-18 PROCEDURE — 71045 X-RAY EXAM CHEST 1 VIEW: CPT

## 2023-01-18 PROCEDURE — 84484 ASSAY OF TROPONIN QUANT: CPT

## 2023-01-18 PROCEDURE — 80053 COMPREHEN METABOLIC PANEL: CPT

## 2023-01-18 PROCEDURE — 83880 ASSAY OF NATRIURETIC PEPTIDE: CPT

## 2023-01-18 PROCEDURE — 85025 COMPLETE CBC W/AUTO DIFF WBC: CPT

## 2023-01-18 PROCEDURE — 85610 PROTHROMBIN TIME: CPT

## 2023-01-18 PROCEDURE — 85730 THROMBOPLASTIN TIME PARTIAL: CPT

## 2023-01-18 PROCEDURE — 2580000003 HC RX 258: Performed by: STUDENT IN AN ORGANIZED HEALTH CARE EDUCATION/TRAINING PROGRAM

## 2023-01-18 PROCEDURE — 71260 CT THORAX DX C+: CPT | Performed by: STUDENT IN AN ORGANIZED HEALTH CARE EDUCATION/TRAINING PROGRAM

## 2023-01-18 PROCEDURE — 96374 THER/PROPH/DIAG INJ IV PUSH: CPT

## 2023-01-18 PROCEDURE — 87636 SARSCOV2 & INF A&B AMP PRB: CPT

## 2023-01-18 PROCEDURE — 36415 COLL VENOUS BLD VENIPUNCTURE: CPT

## 2023-01-18 PROCEDURE — 75635 CT ANGIO ABDOMINAL ARTERIES: CPT

## 2023-01-18 PROCEDURE — 99285 EMERGENCY DEPT VISIT HI MDM: CPT

## 2023-01-18 PROCEDURE — 93005 ELECTROCARDIOGRAM TRACING: CPT | Performed by: STUDENT IN AN ORGANIZED HEALTH CARE EDUCATION/TRAINING PROGRAM

## 2023-01-18 PROCEDURE — 6360000004 HC RX CONTRAST MEDICATION: Performed by: STUDENT IN AN ORGANIZED HEALTH CARE EDUCATION/TRAINING PROGRAM

## 2023-01-18 PROCEDURE — 6360000002 HC RX W HCPCS: Performed by: STUDENT IN AN ORGANIZED HEALTH CARE EDUCATION/TRAINING PROGRAM

## 2023-01-18 RX ORDER — SODIUM CHLORIDE 0.9 % (FLUSH) 0.9 %
10 SYRINGE (ML) INJECTION PRN
Status: DISCONTINUED | OUTPATIENT
Start: 2023-01-18 | End: 2023-01-19 | Stop reason: HOSPADM

## 2023-01-18 RX ORDER — SUCRALFATE 1 G/1
TABLET ORAL
Qty: 360 TABLET | OUTPATIENT
Start: 2023-01-18

## 2023-01-18 RX ORDER — 0.9 % SODIUM CHLORIDE 0.9 %
100 INTRAVENOUS SOLUTION INTRAVENOUS ONCE
Status: COMPLETED | OUTPATIENT
Start: 2023-01-18 | End: 2023-01-18

## 2023-01-18 RX ORDER — FENTANYL CITRATE 0.05 MG/ML
50 INJECTION, SOLUTION INTRAMUSCULAR; INTRAVENOUS ONCE
Status: COMPLETED | OUTPATIENT
Start: 2023-01-18 | End: 2023-01-18

## 2023-01-18 RX ADMIN — SODIUM CHLORIDE 100 ML: 9 INJECTION, SOLUTION INTRAVENOUS at 20:06

## 2023-01-18 RX ADMIN — IOPAMIDOL 100 ML: 755 INJECTION, SOLUTION INTRAVENOUS at 20:04

## 2023-01-18 RX ADMIN — IOPAMIDOL 75 ML: 755 INJECTION, SOLUTION INTRAVENOUS at 20:07

## 2023-01-18 RX ADMIN — SODIUM CHLORIDE, PRESERVATIVE FREE 10 ML: 5 INJECTION INTRAVENOUS at 20:07

## 2023-01-18 RX ADMIN — FENTANYL CITRATE 50 MCG: 0.05 INJECTION, SOLUTION INTRAMUSCULAR; INTRAVENOUS at 18:56

## 2023-01-18 ASSESSMENT — PAIN DESCRIPTION - DESCRIPTORS: DESCRIPTORS: ACHING;HEAVINESS

## 2023-01-18 ASSESSMENT — ENCOUNTER SYMPTOMS
BACK PAIN: 1
NAUSEA: 0
DIARRHEA: 0
ABDOMINAL PAIN: 0
COUGH: 1
VOMITING: 0
SHORTNESS OF BREATH: 0
RHINORRHEA: 0
COLOR CHANGE: 1
CONSTIPATION: 0

## 2023-01-18 ASSESSMENT — PAIN DESCRIPTION - LOCATION
LOCATION: LEG;ARM
LOCATION: KNEE;LEG

## 2023-01-18 ASSESSMENT — PAIN - FUNCTIONAL ASSESSMENT: PAIN_FUNCTIONAL_ASSESSMENT: 0-10

## 2023-01-18 ASSESSMENT — PAIN SCALES - GENERAL
PAINLEVEL_OUTOF10: 8
PAINLEVEL_OUTOF10: 7

## 2023-01-18 ASSESSMENT — PAIN DESCRIPTION - ORIENTATION: ORIENTATION: LEFT

## 2023-01-18 ASSESSMENT — LIFESTYLE VARIABLES
HOW MANY STANDARD DRINKS CONTAINING ALCOHOL DO YOU HAVE ON A TYPICAL DAY: PATIENT DOES NOT DRINK
HOW OFTEN DO YOU HAVE A DRINK CONTAINING ALCOHOL: NEVER

## 2023-01-18 ASSESSMENT — HEART SCORE: ECG: 0

## 2023-01-18 NOTE — ED PROVIDER NOTES
[address]  Emergency Department Encounter  EmergencyMedicine Resident     Pt Steffen Otoole  MRN: [id number]  [place] 1970  Date of evaluation: 23  PCP:  Keith Robles MD    This patient was evaluated in the Emergency Department for symptoms described in the history of present illness. CHIEF COMPLAINT       Chief Complaint   Patient presents with    Leg Swelling     Calf tenderness     Cough    Shortness of Breath    Rib Pain       HISTORY OF PRESENT ILLNESS  (Location/Symptom, Timing/Onset, Context/Setting, Quality, Duration, Modifying Factors, Severity.)      Kimberly Colvin is a 47 y.o. female who presents with leg pain and chest pain. Patient states that over the past 2 to 3 days she has been developing worsening leg pain and swelling. Describes the leg pain is lateral left calf and thigh pain. Denies trauma. Does have a history of DVT. On blood thinners. Patient also describing discoloration to the areas of pain over the lateral left calf and thigh. States it is very tender to touch. Also endorsing chest pain. Right-sided with radiation to the right upper extremity. States it is tender to palpation over her right lateral bicep. States that it feels similar to when she had a PE in the past.  Also endorsing pain just inferior to right breast.  Denies trauma. No fever/chills. No recent illnesses. No recent changes in medication. PAST MEDICAL / SURGICAL / SOCIAL / FAMILY HISTORY      has a past medical history of Anxiety, CAD (coronary artery disease), Fatty liver, GERD (gastroesophageal reflux disease), Headache, History of bronchitis, Hyperlipidemia, Hypothyroidism, Kidney stone, LFT elevation, MVP (mitral valve prolapse), Obesity, Osteoarthritis of cervical spine, Pulmonary emboli ([address] and Umbilical hernia. has a past surgical history that includes Upper gastrointestinal endoscopy (2010); hernia repair; Cholecystectomy; Appendectomy;   section; Colonoscopy (); Colonoscopy (14); Colonoscopy (2014); pr exploratory laparotomy celiotomy w/wo biopsy spx (10/21/2014); Colonoscopy (N/A, 2018); Hysterectomy, total abdominal; Upper gastrointestinal endoscopy (N/A, 3/10/2021); Colonoscopy (N/A, 2021); Colonoscopy (N/A, 10/13/2022); and Upper gastrointestinal endoscopy (N/A, 10/13/2022).     Social History     Socioeconomic History    Marital status:      Spouse name: Not on file    Number of children: Not on file    Years of education: Not on file    Highest education level: Not on file   Occupational History    Occupation: Homemaker   Tobacco Use    Smoking status: Former     Packs/day: 0.25     Years: 33.00     Pack years: 8.25     Types: Cigarettes     Quit date:      Years since quittin.0    Smokeless tobacco: Never    Tobacco comments:     quit on nicoderm patch   Vaping Use    Vaping Use: Never used   Substance and Sexual Activity    Alcohol use: No     Alcohol/week: 0.0 standard drinks    Drug use: No    Sexual activity: Not Currently   Other Topics Concern    Not on file   Social History Narrative    Not on file     Social Determinants of Health     Financial Resource Strain: Low Risk     Difficulty of Paying Living Expenses: Not hard at all   Food Insecurity: No Food Insecurity    Worried About Running Out of Food in the Last Year: Never true    Ran Out of Food in the Last Year: Never true   Transportation Needs: Not on file   Physical Activity: Not on file   Stress: Not on file   Social Connections: Not on file   Intimate Partner Violence: Not on file   Housing Stability: Not on file       Family History   Problem Relation Age of Onset    Cancer Mother         vaginal    Heart Disease Father     Diabetes Father     Other Father         colon resection for colon polyps    Breast Cancer Maternal Grandmother     Stroke Maternal Grandfather        Allergies:    Compazine [prochlorperazine], Sulfa antibiotics, and Adhesive tape    Home Medications:  Prior to Admission medications    Medication Sig Start Date End Date Taking? Authorizing Provider   rOPINIRole (REQUIP) 2 MG tablet TAKE 1 TABLET BY MOUTH EVERY NIGHT 1/17/23   Pérez Dalal MD   sucralfate (CARAFATE) 1 GM tablet TAKE 1 TABLET BY MOUTH FOUR TIMES DAILY 1/17/23   Pérez Dalal MD   furosemide (LASIX) 20 MG tablet TAKE 1 TABLET BY MOUTH DAILY 1/9/23   Pérez Dalal MD   esomeprazole (NEXIUM) 40 MG delayed release capsule TAKE 1 CAPSULE BY MOUTH EVERY MORNING BEFORE BREAKFAST 11/16/22   Pérez Dalal MD   zolpidem (AMBIEN) 10 MG tablet Take 1 tablet by mouth at bedtime. 8/25/22   Historical Provider, MD   PEG 3350-KCl-NaBcb-NaCl-NaSulf (PEG-3350/ELECTROLYTES) 236 g SOLR  7/20/22   Historical Provider, MD   albuterol sulfate HFA (PROVENTIL;VENTOLIN;PROAIR) 108 (90 Base) MCG/ACT inhaler INHALE 2 PUFFS INTO THE LUNGS EVERY 4 HOURS AS NEEDED FOR SHORTNESS OF BREATH 6/16/22   Pérez Dalal MD   levothyroxine (SYNTHROID) 200 MCG tablet TAKE 1 TABLET BY MOUTH DAILY 6/3/22   Alberto Ciara Merino MD   XARELTO 20 MG TABS tablet TAKE 1 TABLET BY MOUTH DAILY WITH BREAKFAST 3/28/22   Lizabeth Lay MD   atorvastatin (LIPITOR) 40 MG tablet TAKE 1 TABLET BY MOUTH EVERY DAY 2/27/22   Historical Provider, MD   clonazePAM (KLONOPIN) 1 MG tablet 2 times daily. 1/26/22   Historical Provider, MD   ondansetron (ZOFRAN ODT) 4 MG disintegrating tablet Take 1 tablet by mouth every 8 hours as needed for Nausea or Vomiting 11/27/21   Wann Counter Alfonso,    sertraline (ZOLOFT) 100 MG tablet Take 1.5 tablets by mouth daily 9/9/21   Pérez Dalal MD   albuterol sulfate HFA (VENTOLIN HFA) 108 (90 Base) MCG/ACT inhaler Inhale 2 puffs into the lungs every 6 hours as needed for Wheezing 6/6/21   Rose Marie Ozuna MD   clonazePAM (KLONOPIN) 0.5 MG tablet Take 1 tablet by mouth 2 times daily as needed for Anxiety for up to 30 days.  12/28/20 2/18/22  Dhruv Hutchinson MD   topiramate (TOPAMAX) 25 MG tablet 25 mg 2 times daily  2/27/18   Historical Provider, MD   metoprolol tartrate (LOPRESSOR) 50 MG tablet Take 50 mg by mouth 2 times daily  8/21/17   Historical Provider, MD       REVIEW OF SYSTEMS    (2-9 systems for level 4, 10 or more for level 5)    Review of Systems   Constitutional:  Negative for chills and fever. HENT:  Negative for congestion and rhinorrhea. Eyes:  Negative for visual disturbance. Respiratory:  Positive for cough. Negative for shortness of breath. Cardiovascular:  Positive for chest pain and leg swelling. Gastrointestinal:  Negative for abdominal pain, constipation, diarrhea, nausea and vomiting. Genitourinary:  Negative for difficulty urinating, dysuria, hematuria, vaginal bleeding and vaginal discharge. Musculoskeletal:  Positive for back pain. Skin:  Positive for color change. Negative for rash and wound. Neurological:  Negative for weakness, numbness and headaches. PHYSICAL EXAM   (up to 7 for level 4, 8 or more for level 5)    INITIAL VITALS:   /71   Pulse 70   Temp 98.4 °F (36.9 °C) (Oral)   Resp 20   Ht 5' 4\" (1.626 m)   Wt 180 lb (81.6 kg)   LMP 11/01/2010   SpO2 96%   BMI 30.90 kg/m²   I have reviewed the triage vital signs. Const: Well nourished, well developed, appears stated age, no acute distress, nontoxic  Eyes: PERRL, no conjunctival injection  HENT: NCAT, Neck supple without meningismus   CV: RRR, Warm, well-perfused extremities. +2/4 radial and DP pulses bilaterally. RESP: CTAB, Unlabored respiratory effort  GI: soft, non-tender, non-distended, no masses  MSK: No gross deformities appreciated. Tender to palpation over right lateral bicep, no ecchymosis, erythema, discharge or bleeding in this area. Skin: Warm, dry. No rashes. Red/purple discoloration to the lateral aspect of left thigh and left calf. Exquisitely tender to palpation over these areas. No erythema, discharge or bleeding in this area.   Neuro: Alert and oriented x4, GCS 15, CNs II-XII grossly intact. Sensation and motor function of extremities grossly intact. Psych: Appropriate mood and affect. DIFFERENTIAL  DIAGNOSIS   DDX:  DVT, PE, ACS/MI, hematoma, viral illness, pneumonia, pneumothorax pancreatitis, hepatitis    Initial MDM:  50-year-old female history of DVT/PE on Xarelto presents emergency department with left leg pain, discoloration as well as chest pain with radiation to right arm. Afebrile. Vital signs stable. Discoloration to lateral aspect of left thigh and left calf with tenderness to palpation. We will get CT PE, runoff studies for bilateral lower extremities. Will test for COVID. We will get cardiac work-up. Will get EKG. Will treat pain and give fluids. Will reevaluate. PLAN (LABS / IMAGING / EKG):  Orders Placed This Encounter   Procedures    COVID-19 & Influenza Combo    CT CHEST PULMONARY EMBOLISM W CONTRAST    CTA ABDOMINAL AORTA W BILAT RUNOFF W CONTRAST    XR CHEST PORTABLE    CBC with Auto Differential    Troponin    Brain Natriuretic Peptide    Comprehensive Metabolic Panel w/ Reflex to MG    Lipase    Troponin    Protime-INR    APTT    EKG 12 Lead       MEDICATIONS ORDERED:  Orders Placed This Encounter   Medications    fentaNYL (SUBLIMAZE) injection 50 mcg    iopamidol (ISOVUE-370) 76 % injection 75 mL    0.9 % sodium chloride bolus    sodium chloride flush 0.9 % injection 10 mL    iopamidol (ISOVUE-370) 76 % injection 100 mL         DIAGNOSTIC RESULTS / EMERGENCY DEPARTMENT COURSE / MDM   LAB RESULTS:  Results for orders placed or performed during the hospital encounter of 01/18/23   COVID-19 & Influenza Combo    Specimen: Nasopharyngeal Swab   Result Value Ref Range    Specimen Description . NASOPHARYNGEAL SWAB     Source . NASOPHARYNGEAL SWAB     SARS-CoV-2 RNA, RT PCR Not Detected Not Detected    INFLUENZA A Not Detected Not Detected    INFLUENZA B Not Detected Not Detected   CBC with Auto Differential   Result Value Ref Range    WBC 7.9 3.5 - 11.0 k/uL    RBC 4.22 4.0 - 5.2 m/uL    Hemoglobin 12.5 12.0 - 16.0 g/dL    Hematocrit 37.9 36 - 46 %    MCV 89.8 80 - 100 fL    MCH 29.7 26 - 34 pg    MCHC 33.0 31 - 37 g/dL    RDW 14.1 11.5 - 14.9 %    Platelets 954 084 - 282 k/uL    MPV 8.0 6.0 - 12.0 fL    Seg Neutrophils 53 36 - 66 %    Lymphocytes 36 24 - 44 %    Monocytes 7 1 - 7 %    Eosinophils % 3 0 - 4 %    Basophils 1 0 - 2 %    Segs Absolute 4.20 1.3 - 9.1 k/uL    Absolute Lymph # 2.90 1.0 - 4.8 k/uL    Absolute Mono # 0.50 0.1 - 1.3 k/uL    Absolute Eos # 0.20 0.0 - 0.4 k/uL    Basophils Absolute 0.10 0.0 - 0.2 k/uL   Troponin   Result Value Ref Range    Troponin, High Sensitivity 7 0 - 14 ng/L   Brain Natriuretic Peptide   Result Value Ref Range    Pro- <300 pg/mL   Comprehensive Metabolic Panel w/ Reflex to MG   Result Value Ref Range    Glucose 91 70 - 99 mg/dL    BUN 15 6 - 20 mg/dL    Creatinine 0.88 0.50 - 0.90 mg/dL    Est, Glom Filt Rate >60 >60 mL/min/1.73m2    Calcium 9.0 8.6 - 10.4 mg/dL    Sodium 135 135 - 144 mmol/L    Potassium 4.2 3.7 - 5.3 mmol/L    Chloride 104 98 - 107 mmol/L    CO2 22 20 - 31 mmol/L    Anion Gap 9 9 - 17 mmol/L    Alkaline Phosphatase 92 35 - 104 U/L    ALT 13 5 - 33 U/L    AST 16 <32 U/L    Total Bilirubin 0.3 0.3 - 1.2 mg/dL    Total Protein 7.0 6.4 - 8.3 g/dL    Albumin 3.9 3.5 - 5.2 g/dL   Lipase   Result Value Ref Range    Lipase 38 13 - 60 U/L   Troponin   Result Value Ref Range    Troponin, High Sensitivity <6 0 - 14 ng/L   Protime-INR   Result Value Ref Range    Protime 17.8 (H) 11.8 - 14.6 sec    INR 1.5    APTT   Result Value Ref Range    PTT 40.6 (H) 24.0 - 36.0 sec       Lab works unremarkable    RADIOLOGY:  XR CHEST PORTABLE    Result Date: 1/18/2023  EXAMINATION: ONE XRAY VIEW OF THE CHEST 1/18/2023 6:11 pm COMPARISON: CT chest October 28, 2022 HISTORY: ORDERING SYSTEM PROVIDED HISTORY: chest pain TECHNOLOGIST PROVIDED HISTORY: chest pain Reason for Exam: CP with SOB and bilateral leg swelling X several days FINDINGS: The lungs are without acute focal process. There is no effusion or pneumothorax. The cardiomediastinal silhouette is without acute process. The osseous structures are without acute process. No acute process. CT CHEST PULMONARY EMBOLISM W CONTRAST    Result Date: 1/18/2023  EXAMINATION: CTA OF THE CHEST 1/18/2023 7:22 pm TECHNIQUE: CTA of the chest was performed after the administration of intravenous contrast.  Multiplanar reformatted images are provided for review. MIP images are provided for review. Automated exposure control, iterative reconstruction, and/or weight based adjustment of the mA/kV was utilized to reduce the radiation dose to as low as reasonably achievable. COMPARISON: Chest x-ray January 18, 2023. CT chest October 28, 2022 HISTORY: ORDERING SYSTEM PROVIDED HISTORY: chest pain, SOB, history of PE TECHNOLOGIST PROVIDED HISTORY: chest pain, SOB, history of PE Decision Support Exception - unselect if not a suspected or confirmed emergency medical condition->Emergency Medical Condition (MA) Reason for Exam: chest pain, SOB, history of PE Additional signs and symptoms: hx previous pe , pt on blood thinners FINDINGS: Pulmonary Arteries: Pulmonary arteries are adequately opacified for evaluation. No evidence of intraluminal filling defect to suggest pulmonary embolism. Main pulmonary artery is normal in caliber. Mediastinum: No evidence of mediastinal lymphadenopathy. The heart and pericardium demonstrate no acute abnormality. There is no acute abnormality of the thoracic aorta. Lungs/pleura: Bibasilar subsegmental atelectasis. Upper Abdomen: Limited images of the upper abdomen are unremarkable. Soft Tissues/Bones: No acute bone or soft tissue abnormality. No evidence of pulmonary embolism or acute pulmonary abnormality.      CTA ABDOMINAL AORTA W BILAT RUNOFF W CONTRAST    Result Date: 1/18/2023  EXAMINATION: CTA OF THE AORTA WITH LOWER EXTREMITY RUNOFF 1/18/2023 7:24 pm TECHNIQUE: CTA of the pelvis and bilateral lower extremities was performed after the administration of intravenous contrast.   Multiplanar reformatted images are provided for review. MIP images are provided for review. Automated exposure control, iterative reconstruction, and/or weight based adjustment of the mA/kV was utilized to reduce the radiation dose to as low as reasonably achievable. COMPARISON: None. HISTORY: ORDERING SYSTEM PROVIDED HISTORY: chest pain, SOB, history of DVT/PE, discoloration and pain out of proportion on lateral aspect of left thigh and left calf TECHNOLOGIST PROVIDED HISTORY: chest pain, SOB, history of DVT/PE, discoloration and pain out of proportion on lateral aspect of left thigh and left calf Decision Support Exception - unselect if not a suspected or confirmed emergency medical condition->Emergency Medical Condition (MA) Reason for Exam: chest pain, SOB, history of DVT/PE, discoloration and pain out of proportion on lateral aspect of. .. Additional signs and symptoms: pt c/o sob, pain lt lethigh and calf x 1 week, hx previous pe FINDINGS: Nonvascular Lower Chest:   Clear Organs: Gallbladder surgically absent. The liver, spleen, pancreas, and adrenals appear normal. Kidneys appear normal. The bladder appears normal. GI/Bowel: The stomach,small bowel, and colon appear normal. Appendix not identified. Pelvis: Normal Peritoneum/Retroperitoneum: The abdominal aorta and iliac arteries are normal in caliber. There is no pathologic adenopathy. Bones/Soft Tissues: Normal VASCULAR Aorta, celiac trunk, SMA, renal arteries and bilateral iliac arteries appear normal. Bilateral common femoral arteries, superficial femoral arteries, deep femoral arteries, popliteal arteries, and trifurcations normal bilaterally.      No hemodynamically significant stenosis        EKG  Rhythm: normal sinus   Rate: normal  Axis: normal  Ectopy: none  Conduction: normal  ST Segments: no acute change  T Waves: no acute change  Q Waves: none    EKG  Impression: non-specific EKG     All EKG's are interpreted by the Emergency Department Physician who either signs or Co-signs this chart in the absence of a cardiologist.    Heart score  History: 0  EC  Patient Age: 2  Risk Factors: 1  Troponin: 0  Heart Score Total: 2    [facility]/EMERGENCY DEPARTMENT COURSE:  Upon reevaluation patient resting comfortably, no acute distress. States that her symptoms are mildly improved. Unremarkable work-up at this time. Heart score 2. Encouraged patient to follow-up with primary care physician. Given return precautions. Patient discharged home with patient's daughter. Patient and patient's daughter understand and agree with the plan. CRITICAL CARE:  Please see Attending Note    FINAL IMPRESSION      1. Left leg pain    2.  Chest pain, unspecified type          DISPOSITION / PLAN     DISPOSITION Decision To Discharge 2023 10:32:34 PM    PATIENT REFERRED TO:  Yudi Landers MD  [address] [place] [address]  [phone]    Schedule an appointment as soon as possible for a visit   As needed    [facility] ED  [place] [address]  [phone]  Go to   If symptoms worsen    DISCHARGE MEDICATIONS:  Discharge Medication List as of 2023 10:44 PM          Maylin Vela DO  Emergency Medicine Resident, PGY 2    (Please note that portions of this note were completed with a voice recognition program.  Efforts were made to edit the dictations but occasionally words are mis-transcribed.)       Maylin Vela DO  Resident  [phone]

## 2023-01-18 NOTE — ED NOTES
Mode of arrival (squad #, walk in, police, etc) : Walk in        Chief complaint(s):         Arrival Note (brief scenario, treatment PTA, etc). : Pt reports to the ED complaining of left calf/knee pain associated with discoloration and a cold sensation. Pt is also experiencing heart palpitations, cough, and SOB. Pt has a history of PE that she was placed on xarelto for. Pt is A&Ox4 with vital signs WDL        C= \"Have you ever felt that you should Cut down on your drinking? \"  No  A= \"Have people Annoyed you by criticizing your drinking? \"  No  G= \"Have you ever felt bad or Guilty about your drinking? \"  No  E= \"Have you ever had a drink as an Eye-opener first thing in the morning to steady your nerves or to help a hangover? \"  No      Deferred []      Reason for deferring: N/A    *If yes to two or more: probable alcohol abuse. Yanet Merino RN  01/18/23 3548

## 2023-01-18 NOTE — ED PROVIDER NOTES
[address] ED  eMERGENCY dEPARTMENT eNCOUnter   Attending Attestation     Pt Name: Patel Figueroa  MRN: [id number]  [place] 1970  Date of evaluation: 1/18/23       Patel Figueroa is a 47 y.o. female who presents with Leg Swelling (Calf tenderness ), Cough, Shortness of Breath, and Rib Pain      History:   Patient has a history of PE and is on Xarelto. Patient states that she has had a mostly nonproductive cough for a little while complaining of some chest pain mild shortness of breath and pain mostly to the lateral left thigh but also some in the lateral right arm. Patient has been taking her medication like she supposed to. No fevers. Exam: Vitals:   Vitals:    01/18/23 1734   BP: 126/80   Pulse: 71   Resp: 18   Temp: 98.4 °F (36.9 °C)   TempSrc: Oral   SpO2: 97%   Weight: 180 lb (81.6 kg)   Height: 5' 4\" (1.626 m)     Heart regular rate rhythm no murmurs. Lungs clear to auscultation bilaterally. Left lateral thigh shows some tenderness to palpation no crepitus mild erythema no ecchymosis. I performed a history and physical examination of the patient and discussed management with the resident. I reviewed the residents note and agree with the documented findings and plan of care. Any areas of disagreement are noted on the chart. I was personally present for the key portions of any procedures. I have documented in the chart those procedures where I was not present during the key portions. I have personally reviewed all images and agree with the resident's interpretation. I have reviewed the emergency nurses triage note. I agree with the chief complaint, past medical history, past surgical history, allergies, medications, social and family history as documented unless otherwise noted below. Documentation of the HPI, Physical Exam and Medical Decision Making performed by medical students or scribes is based on my personal performance of the HPI, PE and MDM.  I personally evaluated and examined the patient in conjunction with the APC and agree with the assessment, treatment plan, and disposition of the patient as recorded by the APC. Additional findings are as noted.     Shanika Mckinnon MD  Attending Emergency  Physician             Parks Duverney, MD  01/18/23 0968

## 2023-01-19 LAB
EKG ATRIAL RATE: 71 BPM
EKG P AXIS: 46 DEGREES
EKG P-R INTERVAL: 168 MS
EKG Q-T INTERVAL: 422 MS
EKG QRS DURATION: 78 MS
EKG QTC CALCULATION (BAZETT): 458 MS
EKG R AXIS: 18 DEGREES
EKG T AXIS: 45 DEGREES
EKG VENTRICULAR RATE: 71 BPM

## 2023-01-19 PROCEDURE — 93010 ELECTROCARDIOGRAM REPORT: CPT | Performed by: INTERNAL MEDICINE

## 2023-01-19 NOTE — DISCHARGE INSTRUCTIONS
You were evaluated in the emergency department for left leg pain as well as chest pain and right arm pain. Your lab work-up was unremarkable. CT of your chest to evaluate for blood clot was negative. CT of your abdomen, pelvis, both of your legs did not reveal any acute abnormalities. This may be due to bruising on your leg. Please follow-up with your primary care physician this week. Please return to the emergency department for any worsening symptoms including but not limited to worsening pain, nausea/vomiting, inability eat or drink, shortness of breath, back pain, dizziness, change in vision or hearing, inability to stand or walk, numbness/tingling, or any other questions or concerns.

## 2023-01-19 NOTE — ED NOTES
NURSE REPORT:    Verbal report given to Kim Iqbal RN on Kimberly Colvin     Report consisted of patient's Situation, Background, Assessment and   Recommendations(SBAR). Information from the following report(s) Nurse Handoff Report was reviewed with the receiving nurse. [place] Assessment: Presents to emergency department  because of falls (Syncope, seizure, or loss of consciousness): No, Age > 79: No, Altered Mental Status, Intoxication with alcohol or substance confusion (Disorientation, impaired judgment, poor safety awaremess, or inability to follow instructions): No, Impaired Mobility: Ambulates or transfers with assistive devices or assistance; Unable to ambulate or transer.: No, Nursing Judgement: No  Lines:   Peripheral IV 01/18/23 Left Forearm (Active)        Opportunity for questions and clarification was provided.                Evelin Cervantes RN  01/18/23 2172

## 2023-01-23 ENCOUNTER — TELEPHONE (OUTPATIENT)
Dept: GASTROENTEROLOGY | Age: 53
End: 2023-01-23

## 2023-02-14 DIAGNOSIS — K21.9 GASTROESOPHAGEAL REFLUX DISEASE WITHOUT ESOPHAGITIS: ICD-10-CM

## 2023-02-14 RX ORDER — ESOMEPRAZOLE MAGNESIUM 40 MG/1
CAPSULE, DELAYED RELEASE ORAL
Qty: 90 CAPSULE | Refills: 0 | Status: SHIPPED | OUTPATIENT
Start: 2023-02-14

## 2023-03-07 ENCOUNTER — OFFICE VISIT (OUTPATIENT)
Dept: INTERNAL MEDICINE CLINIC | Age: 53
End: 2023-03-07

## 2023-03-07 VITALS
WEIGHT: 210 LBS | SYSTOLIC BLOOD PRESSURE: 102 MMHG | DIASTOLIC BLOOD PRESSURE: 76 MMHG | BODY MASS INDEX: 35.85 KG/M2 | HEART RATE: 82 BPM | HEIGHT: 64 IN | OXYGEN SATURATION: 94 %

## 2023-03-07 DIAGNOSIS — M79.605 LEFT LEG PAIN: ICD-10-CM

## 2023-03-07 DIAGNOSIS — M54.2 CHRONIC NECK PAIN: Primary | ICD-10-CM

## 2023-03-07 DIAGNOSIS — R73.03 PREDIABETES: ICD-10-CM

## 2023-03-07 DIAGNOSIS — G89.29 CHRONIC NECK PAIN: Primary | ICD-10-CM

## 2023-03-07 DIAGNOSIS — E78.5 HYPERLIPIDEMIA, UNSPECIFIED HYPERLIPIDEMIA TYPE: ICD-10-CM

## 2023-03-07 DIAGNOSIS — D69.6 THROMBOCYTOPENIA (HCC): ICD-10-CM

## 2023-03-07 DIAGNOSIS — M54.9 CHRONIC BACK PAIN, UNSPECIFIED BACK LOCATION, UNSPECIFIED BACK PAIN LATERALITY: ICD-10-CM

## 2023-03-07 DIAGNOSIS — G89.29 CHRONIC BACK PAIN, UNSPECIFIED BACK LOCATION, UNSPECIFIED BACK PAIN LATERALITY: ICD-10-CM

## 2023-03-07 SDOH — ECONOMIC STABILITY: INCOME INSECURITY: HOW HARD IS IT FOR YOU TO PAY FOR THE VERY BASICS LIKE FOOD, HOUSING, MEDICAL CARE, AND HEATING?: NOT HARD AT ALL

## 2023-03-07 SDOH — ECONOMIC STABILITY: FOOD INSECURITY: WITHIN THE PAST 12 MONTHS, YOU WORRIED THAT YOUR FOOD WOULD RUN OUT BEFORE YOU GOT MONEY TO BUY MORE.: NEVER TRUE

## 2023-03-07 SDOH — ECONOMIC STABILITY: FOOD INSECURITY: WITHIN THE PAST 12 MONTHS, THE FOOD YOU BOUGHT JUST DIDN'T LAST AND YOU DIDN'T HAVE MONEY TO GET MORE.: NEVER TRUE

## 2023-03-07 SDOH — ECONOMIC STABILITY: HOUSING INSECURITY
IN THE LAST 12 MONTHS, WAS THERE A TIME WHEN YOU DID NOT HAVE A STEADY PLACE TO SLEEP OR SLEPT IN A SHELTER (INCLUDING NOW)?: NO

## 2023-03-07 ASSESSMENT — ENCOUNTER SYMPTOMS
WHEEZING: 0
COUGH: 0
SHORTNESS OF BREATH: 0

## 2023-03-07 ASSESSMENT — PATIENT HEALTH QUESTIONNAIRE - PHQ9
SUM OF ALL RESPONSES TO PHQ QUESTIONS 1-9: 0
SUM OF ALL RESPONSES TO PHQ9 QUESTIONS 1 & 2: 0
SUM OF ALL RESPONSES TO PHQ QUESTIONS 1-9: 0
1. LITTLE INTEREST OR PLEASURE IN DOING THINGS: 0
2. FEELING DOWN, DEPRESSED OR HOPELESS: 0
SUM OF ALL RESPONSES TO PHQ QUESTIONS 1-9: 0
SUM OF ALL RESPONSES TO PHQ QUESTIONS 1-9: 0

## 2023-03-07 NOTE — PROGRESS NOTES
[address] [address]  Dept: [phone]  Dept Fax: [phone]    Office Progress/Follow Up Note  Date of patient's visit: 3/7/2023   Patient's Name:  Ivett Park  YOB: 1970            Patient Care Team:  Ginny Garcia MD as PCP - General (Internal Medicine)  [facility]  Edmond Membreno MD as Consulting Physician (Obstetrics & Gynecology)  Luis Antonio Weinberg MD as Consulting Physician (Gastroenterology)    REASON FOR VISIT: Neck Pain (Digenerative disc disease) and Leg Pain (Left leg pain , some swelling. Started over the winter stabbing/throbbing combo with numbness occasionally.)        HISTORY OF PRESENT ILLNESS:      History was obtained from the patient. Ivett Park is a 47 y.o. is here for  Neck Pain (Digenerative disc disease) and Leg Pain (Left leg pain , some swelling. Started over the winter stabbing/throbbing combo with numbness occasionally.)    Hasn't been seen in the office since June 2022. She states pain clinic provider told her she needed to stop Klonopin and Ambien to remain on Percocet, [facility] She is requesting referral to new pain provider. Neck Pain   This is a chronic problem. The current episode started more than 1 year ago. The problem occurs constantly. The problem has been gradually worsening. The quality of the pain is described as aching. The pain is severe. The symptoms are aggravated by twisting. Associated symptoms include headaches and leg pain. Pertinent negatives include no chest pain or fever. She has tried muscle relaxants and acetaminophen (was following at pain clinic but does not like new provider) for the symptoms. The treatment provided no relief. Leg Pain   There was no injury mechanism. The pain is present in the left leg. The quality of the pain is described as stabbing. The pain is severe.  Treatments tried: was seen in ER in January for this. Patient Active Problem List   Diagnosis    Hyperlipidemia    MVP (mitral valve prolapse)    Anxiety    Hypothyroidism    Allergic rhinitis    Obesity    Abdominal pain    Elevated liver enzymes    GERD (gastroesophageal reflux disease)    Ovarian mass    S/P bilateral oophorectomy & KURPA 10/21/14    Pain emptying bladder    Urinary urgency    Insomnia    RLS (restless legs syndrome)    Pancreatitis    Eye swelling, left    Spondylosis of cervical region without myelopathy or radiculopathy    Degenerative cervical spinal stenosis    Trigger point with tension headache    Arthropathy of cervical facet joint    Atlanto-axial joint sprain, sequela    Cervical radiculopathy    Sprain of atlanto-occipital joint, sequela    Myofascial muscle pain    Colitis    Chest pain    Unstable angina ([address]    Pulmonary embolism on right ([address]    Hematemesis with nausea    Acute respiratory failure with hypoxia (HCC)    Family history of colon cancer    Irritable bowel syndrome with both constipation and diarrhea    Elevated CEA    Diarrhea    Erosive gastritis    Weight loss    Prediabetes    Chronic neck pain    Obstructive chronic bronchitis with exacerbation (HCC)    Thrombocytopenia, unspecified    Chronic use of opiate for therapeutic purpose    Chronic prescription benzodiazepine use    Acute on chronic diastolic CHF (congestive heart failure) (HCC)    Severe obesity (BMI 35.0-39. 9) with comorbidity (HCC)    Abdominal pain, epigastric    Narcotic drug use    Pharyngoesophageal dysphagia       Allergies   Allergen Reactions    Compazine [Prochlorperazine]      Jittery, restless    Sulfa Antibiotics Hives    Adhesive Tape Rash         MEDICATIONS:     Current Outpatient Medications   Medication Sig Dispense Refill    esomeprazole (NEXIUM) 40 MG delayed release capsule TAKE 1 CAPSULE BY MOUTH EVERY MORNING BEFORE BREAKFAST 90 capsule 0    rOPINIRole (REQUIP) 2 MG tablet TAKE 1 TABLET BY MOUTH EVERY NIGHT 90 tablet 1    zolpidem (AMBIEN) 10 MG tablet Take 1 tablet by mouth at bedtime. albuterol sulfate HFA (PROVENTIL;VENTOLIN;PROAIR) 108 (90 Base) MCG/ACT inhaler INHALE 2 PUFFS INTO THE LUNGS EVERY 4 HOURS AS NEEDED FOR SHORTNESS OF BREATH 42.5 g 3    levothyroxine (SYNTHROID) 200 MCG tablet TAKE 1 TABLET BY MOUTH DAILY 90 tablet 3    atorvastatin (LIPITOR) 40 MG tablet TAKE 1 TABLET BY MOUTH EVERY DAY      ondansetron (ZOFRAN ODT) 4 MG disintegrating tablet Take 1 tablet by mouth every 8 hours as needed for Nausea or Vomiting 20 tablet 0    sertraline (ZOLOFT) 100 MG tablet Take 1.5 tablets by mouth daily 45 tablet 2    albuterol sulfate HFA (VENTOLIN HFA) 108 (90 Base) MCG/ACT inhaler Inhale 2 puffs into the lungs every 6 hours as needed for Wheezing 1 Inhaler 3    topiramate (TOPAMAX) 25 MG tablet 25 mg 2 times daily       metoprolol tartrate (LOPRESSOR) 50 MG tablet Take 50 mg by mouth 2 times daily       XARELTO 20 MG TABS tablet TAKE 1 TABLET BY MOUTH DAILY WITH BREAKFAST 90 tablet 3    clonazePAM (KLONOPIN) 0.5 MG tablet Take 1 tablet by mouth 2 times daily as needed for Anxiety for up to 30 days. 60 tablet 1     No current facility-administered medications for this visit. SOCIAL HISTORY    Reviewed and updated. Saira Monroe  reports that she quit smoking about 1 years ago. Her smoking use included cigarettes. She has a 5.00 pack-year smoking history. She has never used smokeless tobacco.    FAMILY HISTORY:    Reviewed and updated. family history includes Breast Cancer in her maternal grandmother; Cancer in her mother; Diabetes in her father; Heart Disease in her father; Other in her father; Stroke in her maternal grandfather. REVIEW OF SYSTEMS:      Review of Systems   Constitutional:  Positive for fatigue. Negative for chills and fever. Respiratory:  Negative for cough, shortness of breath and wheezing. Cardiovascular:  Negative for chest pain, palpitations and leg swelling. Gastrointestinal:  Negative for abdominal pain and constipation. Musculoskeletal:  Positive for arthralgias, back pain, gait problem and neck pain. Left leg pain from hip to calf, lateral edge, 10/10 at its worst,   Worse with standing for long times, a lot of walking. Shooting pain, feels cold. Thinks it looks bruised sometimes, gets darker. +numbness from hip to knee  Heating pad, rice pack, pain meds did help it. Skin:  Positive for color change. Neurological:  Positive for headaches. Psychiatric/Behavioral:  Positive for sleep disturbance. The patient is nervous/anxious. PHYSICAL EXAM:      Vitals:    03/07/23 1256   BP: 102/76   Site: Left Upper Arm   Position: Sitting   Cuff Size: Large Adult   Pulse: 82   SpO2: 94%   Weight: 210 lb (95.3 kg)   Height: 5' 4\" (1.626 m)     BP Readings from Last 3 Encounters:   03/07/23 102/76   01/18/23 121/71   12/12/22 120/80        Physical Exam  Constitutional:       General: She is not in acute distress. Appearance: Normal appearance. She is well-developed. HENT:      Head: Normocephalic and atraumatic. Right Ear: External ear normal.      Left Ear: External ear normal.      Nose: Nose normal.   Eyes:      Conjunctiva/sclera: Conjunctivae normal.   Neck:      Thyroid: No thyromegaly. Cardiovascular:      Rate and Rhythm: Normal rate and regular rhythm. Pulses: Normal pulses. Heart sounds: Normal heart sounds. No murmur heard. Pulmonary:      Effort: Pulmonary effort is normal.      Breath sounds: Normal breath sounds. No wheezing. Abdominal:      General: There is no distension. Palpations: Abdomen is soft. There is no mass. Musculoskeletal:      Cervical back: Tenderness present. Decreased range of motion. Thoracic back: No tenderness. Lumbar back: Tenderness present. Decreased range of motion. Right lower leg: No edema. Left lower leg: No edema.    Lymphadenopathy:      Cervical: No cervical adenopathy. Skin:     Comments: Discoloration to left thigh   Neurological:      Mental Status: She is alert. Motor: No weakness. Psychiatric:         Mood and Affect: Mood is depressed. Behavior: Behavior normal.        LABORATORY FINDINGS:    CBC:   Lab Results   Component Value Date/Time    WBC 7.9 01/18/2023 06:27 PM    HGB 12.5 01/18/2023 06:27 PM     01/18/2023 06:27 PM     06/05/2012 05:08 AM     BMP:   Lab Results   Component Value Date/Time     01/18/2023 06:47 PM    K 4.2 01/18/2023 06:47 PM     01/18/2023 06:47 PM    CO2 22 01/18/2023 06:47 PM    BUN 15 01/18/2023 06:47 PM    CREATININE 0.88 01/18/2023 06:47 PM    GLUCOSE 91 01/18/2023 06:47 PM    GLUCOSE 107 06/03/2019 05:41 PM     HEMOGLOBIN A1C:   Lab Results   Component Value Date/Time    LABA1C 5.8 02/01/2022 09:55 AM     FASTING LIPID PANEL:   Lab Results   Component Value Date    CHOL 158 01/12/2019    HDL 42 02/01/2022    LDLCHOLESTEROL 184 (H) 02/01/2022    TRIG 133 01/12/2019       ASSESSMENT AND PLAN:      Visit Diagnoses and Associated Orders       Chronic neck pain    -  Primary    External Referral To Pain Clinic [WXY100 Custom]           Left leg pain        Persistent, consider vascular referral but will have her f/u in 1 week with PCP         Chronic back pain, unspecified back location, unspecified back pain laterality        External Referral To Pain Clinic [YUL235 Custom]           Hyperlipidemia, unspecified hyperlipidemia type        Lipid, Fasting [[id number] Custom]   - Future Order         Thrombocytopenia ([address]        Platelets 760 wl on 7/82/71         Prediabetes        Hemoglobin A1C [[id number] Custom]   - Future Order                  FOLLOW UP AND INSTRUCTIONS:     Return in about 1 week (around 3/14/2023). Patient to see PCP. David Hughes received counseling on the following healthy behaviors: medication adherence, importance of medical follow up. All patient questions answered.   Pt voiced gisella. MARTITA Mora - CNP    3/17/2023, 6:15 PM    Please note that this chart was generated using voice recognition Dragon dictation software. Although every effort was made to ensure the accuracy of this automatedtranscription, some errors in transcription may have occurred.

## 2023-03-14 ENCOUNTER — TELEPHONE (OUTPATIENT)
Dept: INTERNAL MEDICINE CLINIC | Age: 53
End: 2023-03-14

## 2023-03-16 DIAGNOSIS — I26.99 ACUTE PULMONARY EMBOLISM, UNSPECIFIED PULMONARY EMBOLISM TYPE, UNSPECIFIED WHETHER ACUTE COR PULMONALE PRESENT (HCC): ICD-10-CM

## 2023-03-16 RX ORDER — RIVAROXABAN 20 MG/1
TABLET, FILM COATED ORAL
Qty: 90 TABLET | Refills: 3 | OUTPATIENT
Start: 2023-03-16

## 2023-03-17 RX ORDER — RIVAROXABAN 20 MG/1
TABLET, FILM COATED ORAL
Qty: 90 TABLET | Refills: 3 | Status: SHIPPED | OUTPATIENT
Start: 2023-03-17

## 2023-03-17 ASSESSMENT — ENCOUNTER SYMPTOMS
BACK PAIN: 1
ABDOMINAL PAIN: 0
COLOR CHANGE: 1
CONSTIPATION: 0

## 2023-03-27 ENCOUNTER — OFFICE VISIT (OUTPATIENT)
Dept: INTERNAL MEDICINE CLINIC | Age: 53
End: 2023-03-27
Payer: COMMERCIAL

## 2023-03-27 VITALS
HEIGHT: 64 IN | WEIGHT: 210 LBS | OXYGEN SATURATION: 96 % | HEART RATE: 81 BPM | DIASTOLIC BLOOD PRESSURE: 72 MMHG | SYSTOLIC BLOOD PRESSURE: 132 MMHG | BODY MASS INDEX: 35.85 KG/M2

## 2023-03-27 DIAGNOSIS — I50.33 ACUTE ON CHRONIC DIASTOLIC CHF (CONGESTIVE HEART FAILURE) (HCC): ICD-10-CM

## 2023-03-27 DIAGNOSIS — I26.99 PULMONARY EMBOLISM ON RIGHT (HCC): ICD-10-CM

## 2023-03-27 DIAGNOSIS — I20.0 UNSTABLE ANGINA (HCC): ICD-10-CM

## 2023-03-27 DIAGNOSIS — J44.1 OBSTRUCTIVE CHRONIC BRONCHITIS WITH EXACERBATION (HCC): ICD-10-CM

## 2023-03-27 DIAGNOSIS — J96.01 ACUTE RESPIRATORY FAILURE WITH HYPOXIA (HCC): ICD-10-CM

## 2023-03-27 DIAGNOSIS — M79.652 LEFT THIGH PAIN: Primary | ICD-10-CM

## 2023-03-27 DIAGNOSIS — M79.2 NEUROPATHIC PAIN: ICD-10-CM

## 2023-03-27 DIAGNOSIS — M70.62 TROCHANTERIC BURSITIS OF LEFT HIP: ICD-10-CM

## 2023-03-27 DIAGNOSIS — E66.01 SEVERE OBESITY (BMI 35.0-39.9) WITH COMORBIDITY (HCC): ICD-10-CM

## 2023-03-27 DIAGNOSIS — R73.03 PREDIABETES: ICD-10-CM

## 2023-03-27 PROCEDURE — 99214 OFFICE O/P EST MOD 30 MIN: CPT | Performed by: INTERNAL MEDICINE

## 2023-03-27 RX ORDER — PREDNISONE 20 MG/1
40 TABLET ORAL DAILY
Qty: 20 TABLET | Refills: 0 | Status: SHIPPED | OUTPATIENT
Start: 2023-03-27 | End: 2023-04-06

## 2023-03-27 NOTE — PROGRESS NOTES
on nicoderm patch   Vaping Use    Vaping Use: Never used   Substance and Sexual Activity    Alcohol use: No    Drug use: No    Sexual activity: Not Currently   Other Topics Concern    Not on file   Social History Narrative    Not on file     Social Determinants of Health     Financial Resource Strain: Low Risk     Difficulty of Paying Living Expenses: Not hard at all   Food Insecurity: No Food Insecurity    Worried About [address] in the Last Year: Never true    [address] in the Last Year: Never true   Transportation Needs: Unknown    Lack of Transportation (Medical): Not on file    Lack of Transportation (Non-Medical): No   Physical Activity: Not on file   Stress: Not on file   Social Connections: Not on file   Intimate Partner Violence: Not on file   Housing Stability: Unknown    Unable to Pay for Housing in the Last Year: Not on file    Number of Places Lived in the Last Year: Not on file    Unstable Housing in the Last Year: No           CURRENT MEDICATIONS:  Current Outpatient Medications   Medication Sig Dispense Refill    XARELTO 20 MG TABS tablet TAKE 1 TABLET BY MOUTH DAILY WITH BREAKFAST 90 tablet 3    esomeprazole (NEXIUM) 40 MG delayed release capsule TAKE 1 CAPSULE BY MOUTH EVERY MORNING BEFORE BREAKFAST 90 capsule 0    rOPINIRole (REQUIP) 2 MG tablet TAKE 1 TABLET BY MOUTH EVERY NIGHT 90 tablet 1    zolpidem (AMBIEN) 10 MG tablet Take 1 tablet by mouth at bedtime.       albuterol sulfate HFA (PROVENTIL;VENTOLIN;PROAIR) 108 (90 Base) MCG/ACT inhaler INHALE 2 PUFFS INTO THE LUNGS EVERY 4 HOURS AS NEEDED FOR SHORTNESS OF BREATH 42.5 g 3    levothyroxine (SYNTHROID) 200 MCG tablet TAKE 1 TABLET BY MOUTH DAILY 90 tablet 3    atorvastatin (LIPITOR) 40 MG tablet TAKE 1 TABLET BY MOUTH EVERY DAY      ondansetron (ZOFRAN ODT) 4 MG disintegrating tablet Take 1 tablet by mouth every 8 hours as needed for Nausea or Vomiting 20 tablet 0    sertraline (ZOLOFT) 100 MG tablet Take 1.5 tablets by mouth

## 2023-03-27 NOTE — PATIENT INSTRUCTIONS
your medicine. Ask your doctor if you can take an over-the-counter pain medicine, such as acetaminophen (Tylenol), ibuprofen (Advil, Motrin), or naproxen (Aleve). Be safe with medicines. Read and follow all instructions on the label. To prevent stiffness, gently move the hip joint as much as you can without pain every day. As the pain gets better, keep doing range-of-motion exercises. Ask your doctor for exercises that will make the muscles around the hip joint stronger. Do these as directed. You can slowly return to the activity that caused the pain, but do it with less effort until you can do it without pain or swelling. Be sure to warm up before and stretch after you do the activity. When should you call for help? Call your doctor now or seek immediate medical care if:    You have a fever. You have increased swelling or redness in your hip. You cannot use your hip, or the pain in your hip gets worse. Watch closely for changes in your health, and be sure to contact your doctor if:    You have pain for 2 weeks or longer despite home treatment. Where can you learn more? Go to http://www.woods.com/ and enter I156 to learn more about \"Hip Bursitis: Care Instructions. \"  Current as of: November 9, 2022               Content Version: 13.6  © 2006-2023 [employer]. Care instructions adapted under license by [facility] ([facility]). If you have questions about a medical condition or this instruction, always ask your healthcare professional. [place] [address] any warranty or liability for your use of this information.

## 2023-04-06 DIAGNOSIS — R97.0 ELEVATED CEA: Primary | ICD-10-CM

## 2023-04-27 PROCEDURE — 99285 EMERGENCY DEPT VISIT HI MDM: CPT

## 2023-04-27 ASSESSMENT — PAIN SCALES - GENERAL: PAINLEVEL_OUTOF10: 7

## 2023-04-27 ASSESSMENT — LIFESTYLE VARIABLES
HOW OFTEN DO YOU HAVE A DRINK CONTAINING ALCOHOL: NEVER
HOW MANY STANDARD DRINKS CONTAINING ALCOHOL DO YOU HAVE ON A TYPICAL DAY: PATIENT DOES NOT DRINK

## 2023-04-27 ASSESSMENT — PAIN DESCRIPTION - PAIN TYPE: TYPE: ACUTE PAIN

## 2023-04-27 ASSESSMENT — PAIN DESCRIPTION - ORIENTATION: ORIENTATION: RIGHT;LEFT

## 2023-04-27 ASSESSMENT — PAIN - FUNCTIONAL ASSESSMENT: PAIN_FUNCTIONAL_ASSESSMENT: 0-10

## 2023-04-27 ASSESSMENT — PAIN DESCRIPTION - LOCATION: LOCATION: ABDOMEN

## 2023-04-27 ASSESSMENT — PAIN DESCRIPTION - DESCRIPTORS: DESCRIPTORS: PRESSURE

## 2023-04-28 ENCOUNTER — APPOINTMENT (OUTPATIENT)
Dept: CT IMAGING | Age: 53
End: 2023-04-28
Payer: COMMERCIAL

## 2023-04-28 ENCOUNTER — HOSPITAL ENCOUNTER (EMERGENCY)
Age: 53
Discharge: HOME | End: 2023-04-28
Attending: EMERGENCY MEDICINE
Payer: COMMERCIAL

## 2023-04-28 VITALS
HEART RATE: 83 BPM | HEIGHT: 64 IN | TEMPERATURE: 98.3 F | BODY MASS INDEX: 33.46 KG/M2 | OXYGEN SATURATION: 97 % | DIASTOLIC BLOOD PRESSURE: 72 MMHG | RESPIRATION RATE: 16 BRPM | WEIGHT: 196 LBS | SYSTOLIC BLOOD PRESSURE: 103 MMHG

## 2023-04-28 DIAGNOSIS — R19.7 DIARRHEA, UNSPECIFIED TYPE: ICD-10-CM

## 2023-04-28 DIAGNOSIS — R10.12 LEFT UPPER QUADRANT ABDOMINAL PAIN: Primary | ICD-10-CM

## 2023-04-28 LAB
ABSOLUTE EOS #: 0.2 K/UL (ref 0–0.4)
ABSOLUTE LYMPH #: 2.9 K/UL (ref 1–4.8)
ABSOLUTE MONO #: 0.6 K/UL (ref 0.1–1.3)
ALBUMIN SERPL-MCNC: 4 G/DL (ref 3.5–5.2)
ALP SERPL-CCNC: 96 U/L (ref 35–104)
ALT SERPL-CCNC: 11 U/L (ref 5–33)
ANION GAP SERPL CALCULATED.3IONS-SCNC: 14 MMOL/L (ref 9–17)
AST SERPL-CCNC: 12 U/L
BACTERIA: NORMAL
BASOPHILS # BLD: 1 % (ref 0–2)
BASOPHILS ABSOLUTE: 0.1 K/UL (ref 0–0.2)
BILIRUB SERPL-MCNC: 0.2 MG/DL (ref 0.3–1.2)
BILIRUBIN URINE: NEGATIVE
BUN SERPL-MCNC: 16 MG/DL (ref 6–20)
CALCIUM SERPL-MCNC: 8.8 MG/DL (ref 8.6–10.4)
CASTS UA: NORMAL /LPF
CHLORIDE SERPL-SCNC: 101 MMOL/L (ref 98–107)
CO2 SERPL-SCNC: 20 MMOL/L (ref 20–31)
COLOR: YELLOW
CREAT SERPL-MCNC: 1.19 MG/DL (ref 0.5–0.9)
EKG ATRIAL RATE: 76 BPM
EKG P AXIS: 42 DEGREES
EKG P-R INTERVAL: 174 MS
EKG Q-T INTERVAL: 428 MS
EKG QRS DURATION: 72 MS
EKG QTC CALCULATION (BAZETT): 481 MS
EKG R AXIS: 18 DEGREES
EKG T AXIS: 49 DEGREES
EKG VENTRICULAR RATE: 76 BPM
EOSINOPHILS RELATIVE PERCENT: 2 % (ref 0–4)
EPITHELIAL CELLS UA: NORMAL /HPF
GFR SERPL CREATININE-BSD FRML MDRD: 55 ML/MIN/1.73M2
GLUCOSE SERPL-MCNC: 117 MG/DL (ref 70–99)
GLUCOSE UR STRIP.AUTO-MCNC: NEGATIVE MG/DL
HCT VFR BLD AUTO: 38 % (ref 36–46)
HGB BLD-MCNC: 13 G/DL (ref 12–16)
KETONES UR STRIP.AUTO-MCNC: NEGATIVE MG/DL
LACTATE PLASV-SCNC: 1.1 MMOL/L (ref 0.5–2.2)
LEUKOCYTE ESTERASE UR QL STRIP.AUTO: ABNORMAL
LIPASE SERPL-CCNC: 61 U/L (ref 13–60)
LYMPHOCYTES # BLD: 40 % (ref 24–44)
MCH RBC QN AUTO: 29.9 PG (ref 26–34)
MCHC RBC AUTO-ENTMCNC: 34.3 G/DL (ref 31–37)
MCV RBC AUTO: 87.4 FL (ref 80–100)
MONOCYTES # BLD: 8 % (ref 1–7)
NITRITE UR QL STRIP.AUTO: NEGATIVE
PDW BLD-RTO: 13.7 % (ref 11.5–14.9)
PLATELET # BLD AUTO: 180 K/UL (ref 150–450)
PMV BLD AUTO: 8.1 FL (ref 6–12)
POTASSIUM SERPL-SCNC: 4.1 MMOL/L (ref 3.7–5.3)
PROT SERPL-MCNC: 6.9 G/DL (ref 6.4–8.3)
PROT UR STRIP.AUTO-MCNC: 6 MG/DL (ref 5–8)
PROT UR STRIP.AUTO-MCNC: NEGATIVE MG/DL
RBC # BLD: 4.35 M/UL (ref 4–5.2)
RBC CLUMPS #/AREA URNS AUTO: NORMAL /HPF
SEG NEUTROPHILS: 49 % (ref 36–66)
SEGMENTED NEUTROPHILS ABSOLUTE COUNT: 3.7 K/UL (ref 1.3–9.1)
SODIUM SERPL-SCNC: 135 MMOL/L (ref 135–144)
SPECIFIC GRAVITY UA: 1.01 (ref 1–1.03)
TROPONIN I SERPL DL<=0.01 NG/ML-MCNC: <6 NG/L (ref 0–14)
TSH SERPL-ACNC: 2.07 UIU/ML (ref 0.3–5)
TURBIDITY: CLEAR
URINE HGB: ABNORMAL
UROBILINOGEN, URINE: NORMAL
WBC # BLD AUTO: 7.4 K/UL (ref 3.5–11)
WBC UA: NORMAL /HPF

## 2023-04-28 PROCEDURE — 36415 COLL VENOUS BLD VENIPUNCTURE: CPT

## 2023-04-28 PROCEDURE — 2580000003 HC RX 258: Performed by: EMERGENCY MEDICINE

## 2023-04-28 PROCEDURE — 93005 ELECTROCARDIOGRAM TRACING: CPT | Performed by: EMERGENCY MEDICINE

## 2023-04-28 PROCEDURE — 96375 TX/PRO/DX INJ NEW DRUG ADDON: CPT

## 2023-04-28 PROCEDURE — 6360000002 HC RX W HCPCS: Performed by: EMERGENCY MEDICINE

## 2023-04-28 PROCEDURE — 96376 TX/PRO/DX INJ SAME DRUG ADON: CPT

## 2023-04-28 PROCEDURE — 80053 COMPREHEN METABOLIC PANEL: CPT

## 2023-04-28 PROCEDURE — 74177 CT ABD & PELVIS W/CONTRAST: CPT

## 2023-04-28 PROCEDURE — 96374 THER/PROPH/DIAG INJ IV PUSH: CPT

## 2023-04-28 PROCEDURE — 83690 ASSAY OF LIPASE: CPT

## 2023-04-28 PROCEDURE — 85025 COMPLETE CBC W/AUTO DIFF WBC: CPT

## 2023-04-28 PROCEDURE — 93010 ELECTROCARDIOGRAM REPORT: CPT | Performed by: INTERNAL MEDICINE

## 2023-04-28 PROCEDURE — 83605 ASSAY OF LACTIC ACID: CPT

## 2023-04-28 PROCEDURE — 6360000004 HC RX CONTRAST MEDICATION: Performed by: EMERGENCY MEDICINE

## 2023-04-28 PROCEDURE — 84484 ASSAY OF TROPONIN QUANT: CPT

## 2023-04-28 PROCEDURE — 84443 ASSAY THYROID STIM HORMONE: CPT

## 2023-04-28 PROCEDURE — 81001 URINALYSIS AUTO W/SCOPE: CPT

## 2023-04-28 RX ORDER — MORPHINE SULFATE 10 MG/ML
6 INJECTION, SOLUTION INTRAMUSCULAR; INTRAVENOUS ONCE
Status: COMPLETED | OUTPATIENT
Start: 2023-04-28 | End: 2023-04-28

## 2023-04-28 RX ORDER — 0.9 % SODIUM CHLORIDE 0.9 %
1000 INTRAVENOUS SOLUTION INTRAVENOUS ONCE
Status: COMPLETED | OUTPATIENT
Start: 2023-04-28 | End: 2023-04-28

## 2023-04-28 RX ORDER — SODIUM CHLORIDE 0.9 % (FLUSH) 0.9 %
10 SYRINGE (ML) INJECTION PRN
Status: COMPLETED | OUTPATIENT
Start: 2023-04-28 | End: 2023-04-28

## 2023-04-28 RX ORDER — 0.9 % SODIUM CHLORIDE 0.9 %
100 INTRAVENOUS SOLUTION INTRAVENOUS ONCE
Status: COMPLETED | OUTPATIENT
Start: 2023-04-28 | End: 2023-04-28

## 2023-04-28 RX ORDER — DICYCLOMINE HCL 20 MG
20 TABLET ORAL 4 TIMES DAILY PRN
Qty: 10 TABLET | Refills: 0 | Status: SHIPPED | OUTPATIENT
Start: 2023-04-28

## 2023-04-28 RX ORDER — ONDANSETRON 2 MG/ML
4 INJECTION INTRAMUSCULAR; INTRAVENOUS ONCE
Status: COMPLETED | OUTPATIENT
Start: 2023-04-28 | End: 2023-04-28

## 2023-04-28 RX ADMIN — ONDANSETRON 4 MG: 2 INJECTION INTRAMUSCULAR; INTRAVENOUS at 00:35

## 2023-04-28 RX ADMIN — MORPHINE SULFATE 6 MG: 10 INJECTION, SOLUTION INTRAMUSCULAR; INTRAVENOUS at 00:36

## 2023-04-28 RX ADMIN — SODIUM CHLORIDE, PRESERVATIVE FREE 10 ML: 5 INJECTION INTRAVENOUS at 02:12

## 2023-04-28 RX ADMIN — MORPHINE SULFATE 6 MG: 10 INJECTION, SOLUTION INTRAMUSCULAR; INTRAVENOUS at 03:05

## 2023-04-28 RX ADMIN — SODIUM CHLORIDE 100 ML: 9 INJECTION, SOLUTION INTRAVENOUS at 02:12

## 2023-04-28 RX ADMIN — SODIUM CHLORIDE 1000 ML: 9 INJECTION, SOLUTION INTRAVENOUS at 00:34

## 2023-04-28 RX ADMIN — IOPAMIDOL 75 ML: 755 INJECTION, SOLUTION INTRAVENOUS at 02:13

## 2023-04-28 ASSESSMENT — ENCOUNTER SYMPTOMS
NAUSEA: 1
ABDOMINAL PAIN: 1
VOMITING: 1
DIARRHEA: 1

## 2023-04-28 NOTE — ED PROVIDER NOTES
[address] [facility]  EMERGENCY DEPARTMENT ENCOUNTER      Pt Name: Rebekah Lisa  MRN: [id number]  [place] 1970  Date of evaluation: 23      CHIEF COMPLAINT       Chief Complaint   Patient presents with    Abdominal Pain    Back Pain    Diarrhea     Started monday     HISTORY OF PRESENT ILLNESS   HPI 47 y.o. female presents with c/o. Patient reports that the symptoms started on Monday. She reports that the abdominal pain is in the epigastric left upper quadrant and left lower abdominal area. Pain also radiates up to her ribs into her left shoulder. She reports that she has a hard time passing gas. She reports a history of bowel obstructions. Reports that she has been eating over the last 3 days but has been drinking water and eating ice. Oliver Hidden REVIEW OF SYSTEMS       Review of Systems   Constitutional:  Positive for appetite change. Negative for fever. HENT:  Negative for congestion. Cardiovascular:  Negative for chest pain. Gastrointestinal:  Positive for abdominal pain, diarrhea, nausea and vomiting. Genitourinary:  Negative for dysuria. Skin:  Negative for rash. Neurological:  Negative for headaches.      PAST MEDICAL HISTORY     Past Medical History:   Diagnosis Date    Anxiety     Atrial fibrillation (HCC)     CAD (coronary artery disease)     Mitral valve prolapse    Fatty liver     GERD (gastroesophageal reflux disease)     Headache     History of bronchitis     Hyperlipidemia     Hypothyroidism     Kidney stone     LFT elevation     MVP (mitral valve prolapse)     Obesity     Osteoarthritis of cervical spine     Pulmonary emboli (HCC)     Restless legs syndrome     Umbilical hernia        SURGICAL HISTORY       Past Surgical History:   Procedure Laterality Date    APPENDECTOMY       SECTION      2 pfannenstiel, 1 vertical    CHOLECYSTECTOMY      COLONOSCOPY      Dr Aislinn Reese    COLONOSCOPY  15    COLONOSCOPY  2014    biopsy & sigmoid spasms, pathology negative

## 2023-04-28 NOTE — ED NOTES
The following labs labeled with pt sticker and tubed to lab:     [] COVID-19 swab    [] Rapid  [] PCR  [] Flu swab  [] Peds Viral Panel     [x] Urine Sample  [] Pelvic Cultures  [] Blood Cultures         Azael Betts RN  04/28/23 [id number]

## 2023-04-28 NOTE — ED TRIAGE NOTES
Mode of arrival (squad #, walk in, police, etc) : Walk in        Chief complaint(s): Abdominal pain, Back pain, diarrhea        Arrival Note (brief scenario, treatment PTA, etc). : Patient to ED complaining of bilateral abdominal pain that irradiates from below the ribs to upper back. Patient states she has been having diarrhea since Monday, and the texture has changed a more \"mucus, thicker\" consistency since. Patient alert and oriented. Reports episodes of emesis, is unable to ingest PO for the past 3 days, only tolerates ice.        C= \"Have you ever felt that you should Cut down on your drinking? \"  No  A= \"Have people Annoyed you by criticizing your drinking? \"  No  G= \"Have you ever felt bad or Guilty about your drinking? \"  No  E= \"Have you ever had a drink as an Eye-opener first thing in the morning to steady your nerves or to help a hangover? \"  No      Deferred []      Reason for deferring: N/A    *If yes to two or more: probable alcohol abuse. *

## 2023-05-05 ENCOUNTER — OFFICE VISIT (OUTPATIENT)
Dept: NEUROSURGERY | Age: 53
End: 2023-05-05
Payer: COMMERCIAL

## 2023-05-05 VITALS
BODY MASS INDEX: 33.46 KG/M2 | SYSTOLIC BLOOD PRESSURE: 106 MMHG | HEIGHT: 64 IN | HEART RATE: 73 BPM | DIASTOLIC BLOOD PRESSURE: 76 MMHG | OXYGEN SATURATION: 93 % | WEIGHT: 196 LBS

## 2023-05-05 DIAGNOSIS — R20.2 NUMBNESS AND TINGLING IN LEFT HAND: ICD-10-CM

## 2023-05-05 DIAGNOSIS — M47.22 CERVICAL SPONDYLOSIS WITH RADICULOPATHY: Primary | ICD-10-CM

## 2023-05-05 DIAGNOSIS — M47.27 OTHER SPONDYLOSIS WITH RADICULOPATHY, LUMBOSACRAL REGION: ICD-10-CM

## 2023-05-05 DIAGNOSIS — R20.0 NUMBNESS AND TINGLING IN LEFT HAND: ICD-10-CM

## 2023-05-05 PROCEDURE — 99214 OFFICE O/P EST MOD 30 MIN: CPT | Performed by: NURSE PRACTITIONER

## 2023-05-05 RX ORDER — OMEPRAZOLE 20 MG/1
CAPSULE, DELAYED RELEASE ORAL
COMMUNITY
Start: 2023-04-30

## 2023-05-05 RX ORDER — SUCRALFATE ORAL 1 G/10ML
1000 SUSPENSION ORAL 4 TIMES DAILY
COMMUNITY
Start: 2023-04-29

## 2023-05-05 NOTE — PROGRESS NOTES
nausea and abdominal pain. Endocrine: Negative for cold intolerance and heat intolerance. Genitourinary: Negative for frequency and flank pain. Musculoskeletal: Negative for myalgias and joint swelling. Skin: Negative for rash and wound. Allergic/Immunologic: Negative for environmental allergies and food allergies. Hematological: Negative for adenopathy. Does not bruise/bleed easily. Psychiatric/Behavioral: Negative for self-injury. The patient is not nervous/anxious. Physical Exam:      /76   Pulse 73   Ht 5' 4\" (1.626 m)   Wt 196 lb (88.9 kg)   LMP 11/01/2010   SpO2 93%   BMI 33.64 kg/m²   Estimated body mass index is 33.64 kg/m² as calculated from the following:    Height as of this encounter: 5' 4\" (1.626 m). Weight as of this encounter: 196 lb (88.9 kg). General:  Ciera Bianchi is a 47y.o. year old female who appears her stated age. HEENT: Normocephalic atraumatic. Neck supple. Chest: regular rate; pulses equal  Abdomen: Soft nontender nondistended.   Ext: DP and PT pulses 2+, good cap refill  Neuro    Mentation  Appropriate affect  Registration intact  Orientation intact  Judgement intact to situation    Cranial Nerves:   Pupils equal and reactive to light  Extraocular motion intact  Face and shrug symmetric  Tongue midline  No dysarthria  v1-3 sensation symmetric, masseter tone symmetric  Hearing symmetric    Sensation: Diminished left hand    Motor  L deltoid 5/5; R deltoid 5/5  L biceps 5/5; R biceps 5/5  L triceps 5/5; R triceps 5/5  L wrist extension 5/5; R wrist extension 5/5  L intrinsics 5/5; R intrinsics 5/5     L iliopsoas 5/5 , R iliopsoas 5/5  L quadriceps 5/5; R quadriceps 5/5  L Dorsiflexion 5/5; R dorsiflexion 5/5  L Plantarflexion 5/5; R plantarflexion 5/5  L EHL 5/5; R EHL 5/5    Reflexes  L Brachioradialis 2+/4; R brachioradialis 2+/4  L Biceps 2+/4; R Biceps 2+/4  L Triceps 2+/4; R Triceps 2+/4  L Patellar 2+/4: R Patellar 2+/4  L Achilles 2+/4; R

## 2023-05-11 DIAGNOSIS — K21.9 GASTROESOPHAGEAL REFLUX DISEASE WITHOUT ESOPHAGITIS: ICD-10-CM

## 2023-05-12 RX ORDER — ESOMEPRAZOLE MAGNESIUM 40 MG/1
CAPSULE, DELAYED RELEASE ORAL
Qty: 90 CAPSULE | Refills: 0 | Status: SHIPPED | OUTPATIENT
Start: 2023-05-12

## 2023-05-17 ENCOUNTER — HOSPITAL ENCOUNTER (OUTPATIENT)
Dept: PAIN MANAGEMENT | Age: 53
Discharge: HOME | End: 2023-05-17
Payer: COMMERCIAL

## 2023-05-17 VITALS
HEIGHT: 64 IN | TEMPERATURE: 97.3 F | OXYGEN SATURATION: 94 % | SYSTOLIC BLOOD PRESSURE: 104 MMHG | DIASTOLIC BLOOD PRESSURE: 75 MMHG | BODY MASS INDEX: 33.46 KG/M2 | HEART RATE: 74 BPM | WEIGHT: 196 LBS | RESPIRATION RATE: 20 BRPM

## 2023-05-17 DIAGNOSIS — M50.30 DEGENERATIVE DISC DISEASE, CERVICAL: ICD-10-CM

## 2023-05-17 DIAGNOSIS — Z79.01 LONG TERM (CURRENT) USE OF ANTICOAGULANTS: ICD-10-CM

## 2023-05-17 DIAGNOSIS — M47.812 CERVICAL SPONDYLOSIS: Primary | ICD-10-CM

## 2023-05-17 PROCEDURE — 99214 OFFICE O/P EST MOD 30 MIN: CPT | Performed by: STUDENT IN AN ORGANIZED HEALTH CARE EDUCATION/TRAINING PROGRAM

## 2023-05-17 PROCEDURE — 99213 OFFICE O/P EST LOW 20 MIN: CPT

## 2023-05-17 NOTE — PROGRESS NOTES
underwent a previous left cervical C2, C3, C4, C5 medial branch blocks on 1/13/2022 with 80% treatment pain and function. Therefore, I will plan for left cervical C2, C3, C4, C5 medial branch radiofrequency ablation. Neurologically, it appears the patient has full strength and normal sensation. There is no evidence of radiculopathy or myelopathy on examination. There are no red flags in the patient's history. The patient has failed conservative measures including outpatient physical therapy, greater than 3 medications for pain relief, a self-directed therapy program, as well as activity modification all within the last 6 weeks over 3 months. The patient's pain has been causing worsening quality of life and function. At today's visit, patient requesting to be put back on opioids. I discussed with the patient that she is still on benzodiazepines and this was discussed with her at previous visits to get off these medications to consider the possibility of restarting opioid medications. The patient has been noncompliant with his request, therefore medication management will not be part of the treatment plan. Patient understood. PLAN:  Medications: For nonopioid therapy, the following medications were prescribed:    -Continue medication prescribed by primary care physician    Opioid therapy:  -Non-opioid care plan    Interventions:  -Plan for left C2, C3, C4, C5 medial branch radiofrequency ablation  -Hold Xarelto 3 days  -Moderate sedation will be utilized as patient has a history of severe anxiety which will cause the patient to move during the procedure which will not allow for a safe and effective procedure to be performed. We discussed the risk of moderate sedation including neurological events, cardiopulmonary events and even death. The patient still consented to proceed with moderate sedation to allow for a safe and effective procedure.     Imaging:  -Reviewed cervical MRI with patient in

## 2023-05-18 ENCOUNTER — TELEPHONE (OUTPATIENT)
Dept: INTERNAL MEDICINE CLINIC | Age: 53
End: 2023-05-18

## 2023-06-06 ENCOUNTER — OFFICE VISIT (OUTPATIENT)
Dept: INTERNAL MEDICINE CLINIC | Age: 53
End: 2023-06-06
Payer: COMMERCIAL

## 2023-06-06 VITALS
DIASTOLIC BLOOD PRESSURE: 76 MMHG | HEIGHT: 64 IN | WEIGHT: 211 LBS | OXYGEN SATURATION: 96 % | HEART RATE: 76 BPM | BODY MASS INDEX: 36.02 KG/M2 | SYSTOLIC BLOOD PRESSURE: 126 MMHG

## 2023-06-06 DIAGNOSIS — Z12.31 ENCOUNTER FOR SCREENING MAMMOGRAM FOR BREAST CANCER: ICD-10-CM

## 2023-06-06 DIAGNOSIS — R73.03 PREDIABETES: ICD-10-CM

## 2023-06-06 DIAGNOSIS — E78.5 HYPERLIPIDEMIA, UNSPECIFIED HYPERLIPIDEMIA TYPE: ICD-10-CM

## 2023-06-06 DIAGNOSIS — E03.9 HYPOTHYROIDISM, UNSPECIFIED TYPE: ICD-10-CM

## 2023-06-06 DIAGNOSIS — Z01.818 PRE-OP EVALUATION: Primary | ICD-10-CM

## 2023-06-06 DIAGNOSIS — E11.9 TYPE 2 DIABETES MELLITUS WITHOUT COMPLICATION, WITHOUT LONG-TERM CURRENT USE OF INSULIN (HCC): ICD-10-CM

## 2023-06-06 DIAGNOSIS — I50.33 ACUTE ON CHRONIC DIASTOLIC CHF (CONGESTIVE HEART FAILURE) (HCC): ICD-10-CM

## 2023-06-06 DIAGNOSIS — I26.99 PULMONARY EMBOLISM ON RIGHT (HCC): ICD-10-CM

## 2023-06-06 DIAGNOSIS — F41.9 ANXIETY: ICD-10-CM

## 2023-06-06 PROCEDURE — 99214 OFFICE O/P EST MOD 30 MIN: CPT | Performed by: INTERNAL MEDICINE

## 2023-06-06 RX ORDER — ISOSORBIDE MONONITRATE 30 MG/1
TABLET, EXTENDED RELEASE ORAL
COMMUNITY
Start: 2023-05-11

## 2023-06-06 RX ORDER — SERTRALINE HYDROCHLORIDE 100 MG/1
100 TABLET, FILM COATED ORAL DAILY
Qty: 90 TABLET | Refills: 1 | Status: SHIPPED | OUTPATIENT
Start: 2023-06-06

## 2023-06-07 ENCOUNTER — HOSPITAL ENCOUNTER (OUTPATIENT)
Dept: MRI IMAGING | Age: 53
Discharge: HOME | End: 2023-06-09
Payer: COMMERCIAL

## 2023-06-07 DIAGNOSIS — R20.0 NUMBNESS AND TINGLING IN LEFT HAND: ICD-10-CM

## 2023-06-07 DIAGNOSIS — M47.22 CERVICAL SPONDYLOSIS WITH RADICULOPATHY: ICD-10-CM

## 2023-06-07 DIAGNOSIS — R20.2 NUMBNESS AND TINGLING IN LEFT HAND: ICD-10-CM

## 2023-06-07 DIAGNOSIS — M47.27 OTHER SPONDYLOSIS WITH RADICULOPATHY, LUMBOSACRAL REGION: ICD-10-CM

## 2023-06-07 PROCEDURE — 72148 MRI LUMBAR SPINE W/O DYE: CPT

## 2023-06-07 PROCEDURE — 72141 MRI NECK SPINE W/O DYE: CPT

## 2023-06-09 ENCOUNTER — TELEPHONE (OUTPATIENT)
Dept: PAIN MANAGEMENT | Age: 53
End: 2023-06-09

## 2023-06-14 ENCOUNTER — TELEPHONE (OUTPATIENT)
Dept: INTERNAL MEDICINE CLINIC | Age: 53
End: 2023-06-14

## 2023-06-14 DIAGNOSIS — E78.5 HYPERLIPIDEMIA, UNSPECIFIED HYPERLIPIDEMIA TYPE: ICD-10-CM

## 2023-06-14 DIAGNOSIS — M79.2 NEUROPATHIC PAIN: Primary | ICD-10-CM

## 2023-06-29 ENCOUNTER — HOSPITAL ENCOUNTER (EMERGENCY)
Age: 53
Discharge: HOME | End: 2023-06-29
Attending: EMERGENCY MEDICINE
Payer: COMMERCIAL

## 2023-06-29 VITALS
HEART RATE: 71 BPM | DIASTOLIC BLOOD PRESSURE: 85 MMHG | TEMPERATURE: 98.1 F | RESPIRATION RATE: 14 BRPM | BODY MASS INDEX: 33.13 KG/M2 | WEIGHT: 193 LBS | OXYGEN SATURATION: 95 % | SYSTOLIC BLOOD PRESSURE: 128 MMHG

## 2023-06-29 DIAGNOSIS — M54.2 NECK PAIN: Primary | ICD-10-CM

## 2023-06-29 PROCEDURE — 96372 THER/PROPH/DIAG INJ SC/IM: CPT

## 2023-06-29 PROCEDURE — 99284 EMERGENCY DEPT VISIT MOD MDM: CPT

## 2023-06-29 PROCEDURE — 6360000002 HC RX W HCPCS: Performed by: EMERGENCY MEDICINE

## 2023-06-29 RX ORDER — MORPHINE SULFATE 4 MG/ML
4 INJECTION, SOLUTION INTRAMUSCULAR; INTRAVENOUS ONCE
Status: DISCONTINUED | OUTPATIENT
Start: 2023-06-29 | End: 2023-06-29

## 2023-06-29 RX ORDER — MORPHINE SULFATE 4 MG/ML
4 INJECTION, SOLUTION INTRAMUSCULAR; INTRAVENOUS ONCE
Status: COMPLETED | OUTPATIENT
Start: 2023-06-29 | End: 2023-06-29

## 2023-06-29 RX ORDER — DEXAMETHASONE SODIUM PHOSPHATE 10 MG/ML
6 INJECTION, SOLUTION INTRAMUSCULAR; INTRAVENOUS ONCE
Status: COMPLETED | OUTPATIENT
Start: 2023-06-29 | End: 2023-06-29

## 2023-06-29 RX ADMIN — MORPHINE SULFATE 4 MG: 4 INJECTION, SOLUTION INTRAMUSCULAR; INTRAVENOUS at 11:01

## 2023-06-29 RX ADMIN — DEXAMETHASONE SODIUM PHOSPHATE 6 MG: 10 INJECTION, SOLUTION INTRAMUSCULAR; INTRAVENOUS at 10:47

## 2023-06-29 ASSESSMENT — ENCOUNTER SYMPTOMS
SHORTNESS OF BREATH: 0
EYE PAIN: 0
SORE THROAT: 0
BACK PAIN: 0
EYE REDNESS: 0
ABDOMINAL PAIN: 0
VOMITING: 0
CHEST TIGHTNESS: 0
NAUSEA: 0
DIARRHEA: 0
CONSTIPATION: 0
COUGH: 0

## 2023-06-29 ASSESSMENT — PAIN DESCRIPTION - LOCATION: LOCATION: NECK;ARM;LEG

## 2023-06-29 ASSESSMENT — PAIN DESCRIPTION - PAIN TYPE: TYPE: ACUTE PAIN

## 2023-06-29 ASSESSMENT — PAIN DESCRIPTION - FREQUENCY: FREQUENCY: CONTINUOUS

## 2023-06-29 ASSESSMENT — PAIN DESCRIPTION - DESCRIPTORS: DESCRIPTORS: THROBBING

## 2023-06-29 ASSESSMENT — PAIN SCALES - GENERAL
PAINLEVEL_OUTOF10: 7
PAINLEVEL_OUTOF10: 7

## 2023-06-30 ENCOUNTER — CARE COORDINATION (OUTPATIENT)
Dept: CARE COORDINATION | Age: 53
End: 2023-06-30

## 2023-07-05 ENCOUNTER — OFFICE VISIT (OUTPATIENT)
Dept: INTERNAL MEDICINE CLINIC | Age: 53
End: 2023-07-05

## 2023-07-05 ENCOUNTER — TELEPHONE (OUTPATIENT)
Dept: INTERNAL MEDICINE CLINIC | Age: 53
End: 2023-07-05

## 2023-07-05 VITALS
HEART RATE: 74 BPM | OXYGEN SATURATION: 97 % | BODY MASS INDEX: 36.37 KG/M2 | SYSTOLIC BLOOD PRESSURE: 124 MMHG | WEIGHT: 213 LBS | DIASTOLIC BLOOD PRESSURE: 76 MMHG | HEIGHT: 64 IN

## 2023-07-05 DIAGNOSIS — M79.2 NEUROPATHIC PAIN: Primary | ICD-10-CM

## 2023-07-05 DIAGNOSIS — I26.99 PULMONARY EMBOLISM ON RIGHT (HCC): ICD-10-CM

## 2023-07-05 DIAGNOSIS — E11.9 TYPE 2 DIABETES MELLITUS WITHOUT COMPLICATION, WITHOUT LONG-TERM CURRENT USE OF INSULIN (HCC): ICD-10-CM

## 2023-07-05 DIAGNOSIS — J44.1 OBSTRUCTIVE CHRONIC BRONCHITIS WITH EXACERBATION (HCC): ICD-10-CM

## 2023-07-05 RX ORDER — ENOXAPARIN SODIUM 100 MG/ML
1 INJECTION SUBCUTANEOUS 2 TIMES DAILY
Qty: 7.2 ML | Refills: 0 | Status: SHIPPED | OUTPATIENT
Start: 2023-07-05 | End: 2023-07-07

## 2023-07-05 RX ORDER — GABAPENTIN 300 MG/1
300 CAPSULE ORAL 3 TIMES DAILY
Qty: 30 CAPSULE | Refills: 1 | Status: SHIPPED | OUTPATIENT
Start: 2023-07-05 | End: 2023-08-04

## 2023-07-05 RX ORDER — PREDNISONE 20 MG/1
40 TABLET ORAL DAILY
Qty: 20 TABLET | Refills: 0 | Status: SHIPPED | OUTPATIENT
Start: 2023-07-05 | End: 2023-07-15

## 2023-07-05 NOTE — TELEPHONE ENCOUNTER
Pharmacy needs clarification on Lovenox. What is the exact dose the patient should be taking. Quantity dispensed and directions.

## 2023-07-05 NOTE — PATIENT INSTRUCTIONS
Take last dose of xarelto on the evening of 7/21  Then go to twice daily dosing as prescribed of lovenox  Skip lovenox on the evening of 7/24 for the procedure on 7/25  Last dose of lovenox will be morning of 7/24  Ask your surgeon as to when you can resume the xarelto at the earliest

## 2023-07-05 NOTE — PROGRESS NOTES
([address])  -     Hemoglobin A1c;  Future  -     Microalbumin, Ur; Future    Pulmonary embolism on right ([address])  -     [address] - MO Duplex Extrem Venous, Bilat  -     US DUP UPPER EXTREMITY RIGHT VENOUS; Future           Follow up in: Onel Ascencio MD

## 2023-07-07 RX ORDER — ENOXAPARIN SODIUM 100 MG/ML
1 INJECTION SUBCUTANEOUS 2 TIMES DAILY
Qty: 5 ML | Refills: 0 | Status: SHIPPED | OUTPATIENT
Start: 2023-07-22

## 2023-07-25 ENCOUNTER — HOSPITAL ENCOUNTER (OUTPATIENT)
Dept: PAIN MANAGEMENT | Facility: CLINIC | Age: 53
Discharge: HOME | End: 2023-07-25
Payer: COMMERCIAL

## 2023-07-25 VITALS
RESPIRATION RATE: 18 BRPM | HEART RATE: 75 BPM | SYSTOLIC BLOOD PRESSURE: 110 MMHG | WEIGHT: 213 LBS | HEIGHT: 64 IN | OXYGEN SATURATION: 98 % | TEMPERATURE: 97.7 F | BODY MASS INDEX: 36.37 KG/M2 | DIASTOLIC BLOOD PRESSURE: 69 MMHG

## 2023-07-25 DIAGNOSIS — R52 PAIN MANAGEMENT: ICD-10-CM

## 2023-07-25 PROCEDURE — 64633 DESTROY CERV/THOR FACET JNT: CPT

## 2023-07-25 PROCEDURE — 6360000002 HC RX W HCPCS: Performed by: STUDENT IN AN ORGANIZED HEALTH CARE EDUCATION/TRAINING PROGRAM

## 2023-07-25 PROCEDURE — 64633 DESTROY CERV/THOR FACET JNT: CPT | Performed by: STUDENT IN AN ORGANIZED HEALTH CARE EDUCATION/TRAINING PROGRAM

## 2023-07-25 PROCEDURE — 64636 DESTROY L/S FACET JNT ADDL: CPT

## 2023-07-25 PROCEDURE — 99152 MOD SED SAME PHYS/QHP 5/>YRS: CPT | Performed by: STUDENT IN AN ORGANIZED HEALTH CARE EDUCATION/TRAINING PROGRAM

## 2023-07-25 PROCEDURE — 2500000003 HC RX 250 WO HCPCS: Performed by: STUDENT IN AN ORGANIZED HEALTH CARE EDUCATION/TRAINING PROGRAM

## 2023-07-25 PROCEDURE — 64634 DESTROY C/TH FACET JNT ADDL: CPT | Performed by: STUDENT IN AN ORGANIZED HEALTH CARE EDUCATION/TRAINING PROGRAM

## 2023-07-25 PROCEDURE — 64635 DESTROY LUMB/SAC FACET JNT: CPT

## 2023-07-25 PROCEDURE — 64634 DESTROY C/TH FACET JNT ADDL: CPT

## 2023-07-25 RX ORDER — LIDOCAINE HYDROCHLORIDE 10 MG/ML
INJECTION, SOLUTION EPIDURAL; INFILTRATION; INTRACAUDAL; PERINEURAL
Status: COMPLETED | OUTPATIENT
Start: 2023-07-25 | End: 2023-07-25

## 2023-07-25 RX ORDER — DEXAMETHASONE SODIUM PHOSPHATE 10 MG/ML
INJECTION, SOLUTION INTRAMUSCULAR; INTRAVENOUS
Status: COMPLETED | OUTPATIENT
Start: 2023-07-25 | End: 2023-07-25

## 2023-07-25 RX ORDER — MIDAZOLAM HYDROCHLORIDE 2 MG/2ML
INJECTION, SOLUTION INTRAMUSCULAR; INTRAVENOUS
Status: COMPLETED | OUTPATIENT
Start: 2023-07-25 | End: 2023-07-25

## 2023-07-25 RX ORDER — LIDOCAINE HYDROCHLORIDE 20 MG/ML
INJECTION, SOLUTION EPIDURAL; INFILTRATION; INTRACAUDAL; PERINEURAL
Status: COMPLETED | OUTPATIENT
Start: 2023-07-25 | End: 2023-07-25

## 2023-07-25 RX ADMIN — DEXAMETHASONE SODIUM PHOSPHATE 10 MG: 10 INJECTION, SOLUTION INTRAMUSCULAR; INTRAVENOUS at 14:50

## 2023-07-25 RX ADMIN — LIDOCAINE HYDROCHLORIDE 5 ML: 20 INJECTION, SOLUTION EPIDURAL; INFILTRATION; INTRACAUDAL; PERINEURAL at 14:48

## 2023-07-25 RX ADMIN — LIDOCAINE HYDROCHLORIDE 5 ML: 10 INJECTION, SOLUTION EPIDURAL; INFILTRATION; INTRACAUDAL at 14:41

## 2023-07-25 RX ADMIN — MIDAZOLAM HYDROCHLORIDE 2 MG: 1 INJECTION, SOLUTION INTRAMUSCULAR; INTRAVENOUS at 14:40

## 2023-07-25 RX ADMIN — LIDOCAINE HYDROCHLORIDE 2 ML: 10 INJECTION, SOLUTION EPIDURAL; INFILTRATION; INTRACAUDAL at 14:50

## 2023-07-25 ASSESSMENT — PAIN SCALES - GENERAL: PAINLEVEL_OUTOF10: 7

## 2023-07-25 ASSESSMENT — PAIN DESCRIPTION - LOCATION: LOCATION: HEAD

## 2023-07-25 ASSESSMENT — PAIN DESCRIPTION - DESCRIPTORS
DESCRIPTORS: STABBING
DESCRIPTORS: STABBING

## 2023-07-25 ASSESSMENT — PAIN - FUNCTIONAL ASSESSMENT: PAIN_FUNCTIONAL_ASSESSMENT: 0-10

## 2023-07-25 NOTE — H&P
Update History & Physical    The patient's History and Physical of May 17, 2023 was reviewed with the patient and I examined the patient. There was no change. The surgical site was confirmed by the patient and me. Plan: The risks, benefits, expected outcome, and alternative to the recommended procedure have been discussed with the patient. Patient understands and wants to proceed with the procedure. Electronically signed by Thee Whelan DO on 7/25/2023 at 2:33 PM    Chronic Pain Clinic Note     Encounter Date: 7/25/2023     SUBJECTIVE:  No chief complaint on file. History of Present Illness:   Jefry Narayan is a 47 y.o. female who presents with Neck Pain     Medication Refill: n/a    Current Complaints of Pain:   Location:  Cervical Neck, Lumbar Back    Radiation: neck- left arm pinky and 4th finger are numb, right upper arm   Severity: mild   Pain Numerical Score - 5   Average: 5-10     Highest: 10  Lowest: 4/5  Character/Quality: Complains of pain that is aching, sharp, and throbbing  Timing: Intermittent  Associated symptoms: weakness-left side of the body   Numbness: left 4th and 5th digits   Weakness: left side of the body   Exacerbating factors: Neck-turning, lifting BACK- prolong standing and walking    Alleviating factors: heating pads, advil   Length of time pain has been present: Started on 8 years   Inciting event/injury: no  Bowel/Bladder incontinence: no   Falls: no   Physical Therapy: yes, 2 years ago, suppose to start again soon     History of Interventions:   Surgery: No previous lumbar/cervical surgeries  Injections: Cervical MBB    Imaging:    3/4/2021 MRI Cervical Spine     FINDINGS:  BONES/ALIGNMENT: There is normal alignment of the spine. The vertebral body  heights are maintained. The bone marrow signal appears unremarkable. There  is minimal degenerative disc disease with disc space narrowing and  osteophytes at C3-4, C4-5, C5-6 and C6-7.      SPINAL CORD:  No abnormal signal

## 2023-07-25 NOTE — OP NOTE
PROCEDURE PERFORMED: Left Cervical Medial Branch Radiofrequency Ablation using Fluoroscopy    PREOPERATIVE DIAGNOSIS: Cervical spondylosis    INDICATIONS: Chronic neck pain    The patient's history and physical exam were reviewed. The risk, benefits, and alternatives of the procedure were discussed and all questions were answered to the patient's satisfaction. The patient agreed to proceed and written informed consent was obtained. POSTOPERATIVE DIAGNOSIS: Same    PHYSICIAN:  Dr. Shira Guzman DO    ANESTHESIA:  LOCAL    ASSISTANT:  NONE    PATHOLOGY:  NONE    ESTIMATED BLOOD LOSS:  N/A    IMPLANTS:  NONE    PROCEDURE DESCRIPTION: Left cervical medial branch radiofrequency ablation using fluoroscopy    The patient was placed on the operative bed in prone position. The area was prepped with  Chlorhexidine. The area was then draped in a sterile fashion. Targeted levels: Left cervical C2, C3, C4, C5 medial branch radiofrequency ablation    An AP  fluoroscopic image was used to identify the cervical landmarks. The skin and soft tissues were anesthetized with lidocaine 1%. At each cervical medial branch, a 20-gauge, 100 mm curved with 5 mm active tip probe was inserted under AP fluoroscopic projections until bone was contacted. A lateral fluoroscopic view was then obtained, and then the needle tip was advanced to contact the centroid of the lateral articular pillar at each respective level. Aspiration of each needle was negative for blood, CSF and paresthesia prior to injection. Motor stimulation at 2 Hz and 1.2 V was negative. Then, after negative aspiration, 1 mL lidocaine 2% was injected at each level prior to [facility] seconds. Once the lesion was complete, injectate solution containing dexamethasone 10 mg and 2 mL lidocaine 1% was injected at each site with a total of 1 mL. The probes were then removed. The needle sites were dressed appropriately.   The

## 2023-08-03 ENCOUNTER — HOSPITAL ENCOUNTER (EMERGENCY)
Age: 53
Discharge: HOME | End: 2023-08-03
Attending: EMERGENCY MEDICINE
Payer: COMMERCIAL

## 2023-08-03 VITALS
OXYGEN SATURATION: 93 % | SYSTOLIC BLOOD PRESSURE: 125 MMHG | WEIGHT: 203 LBS | HEART RATE: 91 BPM | DIASTOLIC BLOOD PRESSURE: 81 MMHG | RESPIRATION RATE: 31 BRPM | BODY MASS INDEX: 34.84 KG/M2 | TEMPERATURE: 97.9 F

## 2023-08-03 DIAGNOSIS — M54.2 NECK PAIN: Primary | ICD-10-CM

## 2023-08-03 LAB
ANION GAP SERPL CALCULATED.3IONS-SCNC: 12 MMOL/L (ref 9–17)
APPEARANCE CSF: CLEAR
BASOPHILS # BLD: 0.1 K/UL (ref 0–0.2)
BASOPHILS NFR BLD: 1 % (ref 0–2)
BUN SERPL-MCNC: 16 MG/DL (ref 6–20)
CALCIUM SERPL-MCNC: 9.4 MG/DL (ref 8.6–10.4)
CHLORIDE SERPL-SCNC: 105 MMOL/L (ref 98–107)
CO2 SERPL-SCNC: 19 MMOL/L (ref 20–31)
COLOR CSF: COLORLESS
CREAT SERPL-MCNC: 1 MG/DL (ref 0.5–0.9)
EOSINOPHIL # BLD: 0.1 K/UL (ref 0–0.4)
EOSINOPHILS RELATIVE PERCENT: 2 % (ref 0–4)
ERYTHROCYTE [DISTWIDTH] IN BLOOD BY AUTOMATED COUNT: 14.7 % (ref 11.5–14.9)
GFR SERPL CREATININE-BSD FRML MDRD: >60 ML/MIN/1.73M2
GLUCOSE CSF-MCNC: 74 MG/DL (ref 40–70)
GLUCOSE SERPL-MCNC: 99 MG/DL (ref 70–99)
HCT VFR BLD AUTO: 44 % (ref 36–46)
HGB BLD-MCNC: 14.6 G/DL (ref 12–16)
LYMPHOCYTES NFR BLD: 2.6 K/UL (ref 1–4.8)
LYMPHOCYTES RELATIVE PERCENT: 27 % (ref 24–44)
MCH RBC QN AUTO: 28.6 PG (ref 26–34)
MCHC RBC AUTO-ENTMCNC: 33.1 G/DL (ref 31–37)
MCV RBC AUTO: 86.2 FL (ref 80–100)
MONOCYTES NFR BLD: 0.6 K/UL (ref 0.1–1.3)
MONOCYTES NFR BLD: 6 % (ref 1–7)
NEUTROPHILS NFR BLD: 64 % (ref 36–66)
NEUTS SEG NFR BLD: 6.1 K/UL (ref 1.3–9.1)
NUC CELL # FLD MANUAL: 1 CELLS/UL
PLATELET # BLD AUTO: 213 K/UL (ref 150–450)
PMV BLD AUTO: 8.1 FL (ref 6–12)
POTASSIUM SERPL-SCNC: 4.5 MMOL/L (ref 3.7–5.3)
PROT CSF-MCNC: 31 MG/DL (ref 15–45)
RBC # BLD AUTO: 5.11 M/UL (ref 4–5.2)
RBC # FLD MANUAL: 0 CELLS/UL
SODIUM SERPL-SCNC: 136 MMOL/L (ref 135–144)
SPECIMEN VOL CSF: 8 ML
TUBE # CSF: 3
WBC OTHER # BLD: 9.5 K/UL (ref 3.5–11)
XANTHOCHROMIA CSF QL: NORMAL

## 2023-08-03 PROCEDURE — 87205 SMEAR GRAM STAIN: CPT

## 2023-08-03 PROCEDURE — 62270 DX LMBR SPI PNXR: CPT

## 2023-08-03 PROCEDURE — 6360000002 HC RX W HCPCS: Performed by: EMERGENCY MEDICINE

## 2023-08-03 PROCEDURE — 96375 TX/PRO/DX INJ NEW DRUG ADDON: CPT

## 2023-08-03 PROCEDURE — 82945 GLUCOSE OTHER FLUID: CPT

## 2023-08-03 PROCEDURE — 89050 BODY FLUID CELL COUNT: CPT

## 2023-08-03 PROCEDURE — 85025 COMPLETE CBC W/AUTO DIFF WBC: CPT

## 2023-08-03 PROCEDURE — 99284 EMERGENCY DEPT VISIT MOD MDM: CPT

## 2023-08-03 PROCEDURE — 87070 CULTURE OTHR SPECIMN AEROBIC: CPT

## 2023-08-03 PROCEDURE — 80048 BASIC METABOLIC PNL TOTAL CA: CPT

## 2023-08-03 PROCEDURE — 2580000003 HC RX 258: Performed by: EMERGENCY MEDICINE

## 2023-08-03 PROCEDURE — 36415 COLL VENOUS BLD VENIPUNCTURE: CPT

## 2023-08-03 PROCEDURE — 87015 SPECIMEN INFECT AGNT CONCNTJ: CPT

## 2023-08-03 PROCEDURE — 84157 ASSAY OF PROTEIN OTHER: CPT

## 2023-08-03 PROCEDURE — 96374 THER/PROPH/DIAG INJ IV PUSH: CPT

## 2023-08-03 RX ORDER — ONDANSETRON 2 MG/ML
4 INJECTION INTRAMUSCULAR; INTRAVENOUS ONCE
Status: COMPLETED | OUTPATIENT
Start: 2023-08-03 | End: 2023-08-03

## 2023-08-03 RX ORDER — FENTANYL CITRATE 0.05 MG/ML
50 INJECTION, SOLUTION INTRAMUSCULAR; INTRAVENOUS ONCE
Status: COMPLETED | OUTPATIENT
Start: 2023-08-03 | End: 2023-08-03

## 2023-08-03 RX ORDER — CYCLOBENZAPRINE HCL 10 MG
10 TABLET ORAL 3 TIMES DAILY PRN
Qty: 21 TABLET | Refills: 0 | Status: SHIPPED | OUTPATIENT
Start: 2023-08-03 | End: 2023-08-13

## 2023-08-03 RX ORDER — 0.9 % SODIUM CHLORIDE 0.9 %
1000 INTRAVENOUS SOLUTION INTRAVENOUS ONCE
Status: COMPLETED | OUTPATIENT
Start: 2023-08-03 | End: 2023-08-03

## 2023-08-03 RX ADMIN — ONDANSETRON 4 MG: 2 INJECTION INTRAMUSCULAR; INTRAVENOUS at 12:18

## 2023-08-03 RX ADMIN — SODIUM CHLORIDE 1000 ML: 9 INJECTION, SOLUTION INTRAVENOUS at 12:17

## 2023-08-03 RX ADMIN — FENTANYL CITRATE 50 MCG: 0.05 INJECTION, SOLUTION INTRAMUSCULAR; INTRAVENOUS at 12:17

## 2023-08-03 ASSESSMENT — PAIN DESCRIPTION - LOCATION: LOCATION: NECK

## 2023-08-03 ASSESSMENT — ENCOUNTER SYMPTOMS
COUGH: 0
BLOOD IN STOOL: 0
SINUS PRESSURE: 0
DIARRHEA: 1
SORE THROAT: 0
EYE REDNESS: 0
CONSTIPATION: 0
ABDOMINAL PAIN: 0
BACK PAIN: 0
TROUBLE SWALLOWING: 0
RHINORRHEA: 0
FACIAL SWELLING: 0
EYE DISCHARGE: 0
EYE PAIN: 0
CHEST TIGHTNESS: 0
NAUSEA: 1
VOMITING: 1
SHORTNESS OF BREATH: 0
WHEEZING: 0
COLOR CHANGE: 0

## 2023-08-03 ASSESSMENT — PAIN DESCRIPTION - ORIENTATION: ORIENTATION: LEFT;RIGHT;ANTERIOR;POSTERIOR

## 2023-08-03 ASSESSMENT — PAIN SCALES - GENERAL: PAINLEVEL_OUTOF10: 8

## 2023-08-03 ASSESSMENT — PAIN DESCRIPTION - DESCRIPTORS: DESCRIPTORS: STABBING;ACHING

## 2023-08-03 NOTE — ED PROVIDER NOTES
[address] [facility] ED  eMERGENCY dEPARTMENT eNCOUnter      Pt Name: Wilfrido Ya  MRN: [id number]  [address] [facility] [address] 1970  Date of evaluation: 8/3/23      CHIEF COMPLAINT       Chief Complaint   Patient presents with    Post-op Problem    Neck Pain    Emesis         HISTORY OF PRESENT ILLNESS    Wilfrido Ya is a 47 y.o. female who presents complaining of neck pain. Patient states that she had chronic cervical issues and is 9 days ago had radiofrequency ablation to help with this. Patient states she is just been steadily getting worse pain and now she cannot move her head at all because of the pain in her neck. Patient states it goes down into her shoulders. Patient states she is also been feeling warm but also having chills and stating that she is having vomiting and diarrhea. Patient's brother was just recently hospitalized for meningitis. REVIEW OF SYSTEMS       Review of Systems   Constitutional:  Positive for chills and fever. Negative for activity change, appetite change and diaphoresis. HENT:  Negative for congestion, ear pain, facial swelling, nosebleeds, rhinorrhea, sinus pressure, sore throat and trouble swallowing. Eyes:  Negative for pain, discharge and redness. Respiratory:  Negative for cough, chest tightness, shortness of breath and wheezing. Cardiovascular:  Negative for chest pain, palpitations and leg swelling. Gastrointestinal:  Positive for diarrhea, nausea and vomiting. Negative for abdominal pain, blood in stool and constipation. Genitourinary:  Negative for difficulty urinating, dysuria, flank pain, frequency, genital sores and hematuria. Musculoskeletal:  Positive for neck pain. Negative for arthralgias, back pain, gait problem, joint swelling and myalgias. Skin:  Negative for color change, pallor, rash and wound. Neurological:  Negative for dizziness, tremors, seizures, syncope, speech difficulty, weakness, numbness and headaches.    Psychiatric/Behavioral:  Negative Diagnosis:  Viral illness, postprocedural pain, meningitis    Diagnoses Considered but Do Not Suspect:  None    Pertinent Comorbid Conditions:  Recent procedure, exposure to meningitis    2)  Data Reviewed  Decision Rules/Scores/MIPS utilized:  None    External Documents Reviewed:  None    Imaging that is independently reviewed and interpreted by me as:  None    See more data below for the lab and radiology tests and orders. 3)  Treatment and Disposition      \"ED Course\" Notes From Epic Narrator:  ED Course as of 08/03/23 1422   Thu Aug 03, 2023   1251 Patient was updated on results. Because of her exposure I still recommend that we do the lumbar puncture which she has consented for. [JL]   1420 Patient was updated on LP results and we will discharge home as a musculoskeletal issue. [JL]      ED Course User Index  [JL] Alejandro Reid MD         PROCEDURES:  Lumbar Puncture Procedure Note    Indication: Suspected meningitis    Consent: The patient was counseled regarding the procedure, it's indications, risks, potential complications and alternatives and any questions were answered. Consent was obtained. Procedure: The patient was placed in the sitting, supported by a pillow, position and the appropriate landmarks were identified. The area was prepped and draped in the usual sterile fashion. Anesthesia was obtained using 3 cc of 1% Lidocaine without epinephrine. A spinal needle was inserted at the L2- L3 level with the stylet in place until spinal fluid was returned. Opening pressure was not measured. At this point 8.0 cc of clear cerebral spinal fluid was obtained and sent for appropriate testing. The stylet was then replaced and the needle was withdrawn. A sterile dressing was placed over the site and the patient was placed in the supine position. The patient tolerated the procedure well.     Complications: None          DATA FOR LAB AND RADIOLOGY TESTS ORDERED BELOW ARE REVIEWED BY THE ED

## 2023-08-03 NOTE — ED NOTES
Mode of arrival (squad #, walk in, police, etc) : walk in         Chief complaint(s): emesis, diarrhea, neck pain         Arrival Note (brief scenario, treatment PTA, etc). : Pt states having neck surgery 2 weeks ago. Pt also states being exposed to bacterial meningitis. C= \"Have you ever felt that you should Cut down on your drinking? \"  No  A= \"Have people Annoyed you by criticizing your drinking? \"  No  G= \"Have you ever felt bad or Guilty about your drinking? \"  No  E= \"Have you ever had a drink as an Eye-opener first thing in the morning to steady your nerves or to help a hangover? \"  No      Deferred []      Reason for deferring: N/A    *If yes to two or more: probable alcohol abuse. Taisha Lopez RN  08/03/23 3431

## 2023-08-03 NOTE — ED NOTES
Lumbar puncture site checked with band aid noted as dry and intact. Patient able to sit and stand without difficulty. Discharge instructions reviewed with patient, noting all directions and education by provider. Patient verbalizes understanding of all information reviewed, gathered personal items, and transferred under own power off unit to lobby without incident.       Brigid Martin RN  08/03/23 8215

## 2023-08-03 NOTE — ED NOTES
Spinal puncture completed with specimens labeled and walked down to lab. Patient positioned flat on her back and informed to remain flat on her back for an hour to prevent post spinal tap headaches. Patient verbalized understanding. Band-aid appears clear, dry, and intact.       Prachi Romero RN  08/03/23 3949

## 2023-08-06 LAB
MICROORGANISM SPEC CULT: NORMAL
MICROORGANISM/AGENT SPEC: NORMAL
SPECIMEN DESCRIPTION: NORMAL

## 2023-08-07 DIAGNOSIS — K21.9 GASTROESOPHAGEAL REFLUX DISEASE WITHOUT ESOPHAGITIS: ICD-10-CM

## 2023-08-07 RX ORDER — ESOMEPRAZOLE MAGNESIUM 40 MG/1
40 CAPSULE, DELAYED RELEASE ORAL
Qty: 90 CAPSULE | Refills: 0 | Status: SHIPPED | OUTPATIENT
Start: 2023-08-07

## 2023-08-07 RX ORDER — ROPINIROLE 2 MG/1
2 TABLET, FILM COATED ORAL NIGHTLY
Qty: 90 TABLET | Refills: 1 | Status: SHIPPED | OUTPATIENT
Start: 2023-08-07

## 2023-08-14 ENCOUNTER — OFFICE VISIT (OUTPATIENT)
Dept: INTERNAL MEDICINE CLINIC | Age: 53
End: 2023-08-14
Payer: COMMERCIAL

## 2023-08-14 VITALS
BODY MASS INDEX: 36.19 KG/M2 | HEART RATE: 78 BPM | WEIGHT: 212 LBS | OXYGEN SATURATION: 97 % | SYSTOLIC BLOOD PRESSURE: 124 MMHG | DIASTOLIC BLOOD PRESSURE: 68 MMHG | HEIGHT: 64 IN

## 2023-08-14 DIAGNOSIS — H00.014 HORDEOLUM EXTERNUM OF LEFT UPPER EYELID: ICD-10-CM

## 2023-08-14 DIAGNOSIS — M79.2 NEUROPATHIC PAIN: Primary | ICD-10-CM

## 2023-08-14 DIAGNOSIS — E11.9 TYPE 2 DIABETES MELLITUS WITHOUT COMPLICATION, WITHOUT LONG-TERM CURRENT USE OF INSULIN (HCC): ICD-10-CM

## 2023-08-14 PROCEDURE — 99214 OFFICE O/P EST MOD 30 MIN: CPT | Performed by: INTERNAL MEDICINE

## 2023-08-14 RX ORDER — BACITRACIN ZINC AND POLYMYXIN B SULFATE 500; 10000 [USP'U]/G; [USP'U]/G
OINTMENT OPHTHALMIC 2 TIMES DAILY
Qty: 3.5 G | Refills: 0 | Status: SHIPPED | OUTPATIENT
Start: 2023-08-14 | End: 2023-08-24

## 2023-08-14 RX ORDER — GABAPENTIN 300 MG/1
300 CAPSULE ORAL 3 TIMES DAILY
Qty: 90 CAPSULE | Refills: 2 | Status: SHIPPED | OUTPATIENT
Start: 2023-08-14 | End: 2023-11-12

## 2023-08-14 NOTE — PROGRESS NOTES
for 90 days. Type 2 diabetes mellitus without complication, without long-term current use of insulin ([facility]ly [facility])    Hordeolum externum of left upper eyelid  -     bacitracin-polymyxin b (POLYSPORIN) [id number] UNIT/GM ophthalmic ointment; Place into the left eye 2 times daily for 10 days Every 12 hours.          Follow up in: Poonam Agrawal MD

## 2023-08-21 ENCOUNTER — HOSPITAL ENCOUNTER (INPATIENT)
Age: 53
LOS: 2 days | Discharge: HOME | DRG: 392 | End: 2023-08-23
Attending: EMERGENCY MEDICINE | Admitting: INTERNAL MEDICINE
Payer: COMMERCIAL

## 2023-08-21 ENCOUNTER — APPOINTMENT (OUTPATIENT)
Dept: CT IMAGING | Age: 53
DRG: 392 | End: 2023-08-21
Payer: COMMERCIAL

## 2023-08-21 DIAGNOSIS — K62.5 RECTAL BLEEDING: Primary | ICD-10-CM

## 2023-08-21 PROBLEM — R19.7 BLOODY DIARRHEA: Status: ACTIVE | Noted: 2023-08-21

## 2023-08-21 LAB
ABO + RH BLD: NORMAL
ALBUMIN SERPL-MCNC: 4.1 G/DL (ref 3.5–5.2)
ALP SERPL-CCNC: 106 U/L (ref 35–104)
ALT SERPL-CCNC: 17 U/L (ref 5–33)
ANION GAP SERPL CALCULATED.3IONS-SCNC: 11 MMOL/L (ref 9–17)
ARM BAND NUMBER: NORMAL
AST SERPL-CCNC: 20 U/L
BASOPHILS # BLD: 0.1 K/UL (ref 0–0.2)
BASOPHILS NFR BLD: 1 % (ref 0–2)
BILIRUB SERPL-MCNC: 0.3 MG/DL (ref 0.3–1.2)
BLOOD BANK SAMPLE EXPIRATION: NORMAL
BLOOD GROUP ANTIBODIES SERPL: NEGATIVE
BUN SERPL-MCNC: 14 MG/DL (ref 6–20)
CALCIUM SERPL-MCNC: 9.1 MG/DL (ref 8.6–10.4)
CANCER AG125 SERPL-ACNC: 8 U/ML
CANCER AG19-9 SERPL IA-ACNC: 10 U/ML (ref 0–35)
CHLORIDE SERPL-SCNC: 105 MMOL/L (ref 98–107)
CO2 SERPL-SCNC: 20 MMOL/L (ref 20–31)
CREAT SERPL-MCNC: 0.9 MG/DL (ref 0.5–0.9)
EOSINOPHIL # BLD: 0.2 K/UL (ref 0–0.4)
EOSINOPHILS RELATIVE PERCENT: 3 % (ref 0–4)
ERYTHROCYTE [DISTWIDTH] IN BLOOD BY AUTOMATED COUNT: 15.1 % (ref 11.5–14.9)
GFR SERPL CREATININE-BSD FRML MDRD: >60 ML/MIN/1.73M2
GLUCOSE SERPL-MCNC: 100 MG/DL (ref 70–99)
HCT VFR BLD AUTO: 40.1 % (ref 36–46)
HGB BLD-MCNC: 13.3 G/DL (ref 12–16)
INR PPP: 1.2
LIPASE SERPL-CCNC: 101 U/L (ref 13–60)
LYMPHOCYTES NFR BLD: 2.3 K/UL (ref 1–4.8)
LYMPHOCYTES RELATIVE PERCENT: 31 % (ref 24–44)
MCH RBC QN AUTO: 28.6 PG (ref 26–34)
MCHC RBC AUTO-ENTMCNC: 33.2 G/DL (ref 31–37)
MCV RBC AUTO: 86 FL (ref 80–100)
MONOCYTES NFR BLD: 0.5 K/UL (ref 0.1–1.3)
MONOCYTES NFR BLD: 7 % (ref 1–7)
NEUTROPHILS NFR BLD: 58 % (ref 36–66)
NEUTS SEG NFR BLD: 4.2 K/UL (ref 1.3–9.1)
PARTIAL THROMBOPLASTIN TIME: 33.5 SEC (ref 24–36)
PLATELET # BLD AUTO: 160 K/UL (ref 150–450)
PMV BLD AUTO: 7.7 FL (ref 6–12)
POTASSIUM SERPL-SCNC: 3.9 MMOL/L (ref 3.7–5.3)
PROT SERPL-MCNC: 7.2 G/DL (ref 6.4–8.3)
PROTHROMBIN TIME: 15.5 SEC (ref 11.8–14.6)
RBC # BLD AUTO: 4.66 M/UL (ref 4–5.2)
SODIUM SERPL-SCNC: 136 MMOL/L (ref 135–144)
WBC OTHER # BLD: 7.2 K/UL (ref 3.5–11)

## 2023-08-21 PROCEDURE — 74174 CTA ABD&PLVS W/CONTRAST: CPT

## 2023-08-21 PROCEDURE — 99223 1ST HOSP IP/OBS HIGH 75: CPT | Performed by: INTERNAL MEDICINE

## 2023-08-21 PROCEDURE — 86900 BLOOD TYPING SEROLOGIC ABO: CPT

## 2023-08-21 PROCEDURE — 99285 EMERGENCY DEPT VISIT HI MDM: CPT

## 2023-08-21 PROCEDURE — 2580000003 HC RX 258: Performed by: EMERGENCY MEDICINE

## 2023-08-21 PROCEDURE — 86304 IMMUNOASSAY TUMOR CA 125: CPT

## 2023-08-21 PROCEDURE — 86301 IMMUNOASSAY TUMOR CA 19-9: CPT

## 2023-08-21 PROCEDURE — 96374 THER/PROPH/DIAG INJ IV PUSH: CPT

## 2023-08-21 PROCEDURE — 85730 THROMBOPLASTIN TIME PARTIAL: CPT

## 2023-08-21 PROCEDURE — 86901 BLOOD TYPING SEROLOGIC RH(D): CPT

## 2023-08-21 PROCEDURE — A4216 STERILE WATER/SALINE, 10 ML: HCPCS | Performed by: EMERGENCY MEDICINE

## 2023-08-21 PROCEDURE — 86850 RBC ANTIBODY SCREEN: CPT

## 2023-08-21 PROCEDURE — 2060000000 HC ICU INTERMEDIATE R&B

## 2023-08-21 PROCEDURE — 2500000003 HC RX 250 WO HCPCS: Performed by: EMERGENCY MEDICINE

## 2023-08-21 PROCEDURE — 6360000004 HC RX CONTRAST MEDICATION: Performed by: EMERGENCY MEDICINE

## 2023-08-21 PROCEDURE — 6360000002 HC RX W HCPCS: Performed by: INTERNAL MEDICINE

## 2023-08-21 PROCEDURE — 83690 ASSAY OF LIPASE: CPT

## 2023-08-21 PROCEDURE — 85025 COMPLETE CBC W/AUTO DIFF WBC: CPT

## 2023-08-21 PROCEDURE — 85610 PROTHROMBIN TIME: CPT

## 2023-08-21 PROCEDURE — 2580000003 HC RX 258: Performed by: INTERNAL MEDICINE

## 2023-08-21 PROCEDURE — 80053 COMPREHEN METABOLIC PANEL: CPT

## 2023-08-21 PROCEDURE — 36415 COLL VENOUS BLD VENIPUNCTURE: CPT

## 2023-08-21 PROCEDURE — 6370000000 HC RX 637 (ALT 250 FOR IP): Performed by: INTERNAL MEDICINE

## 2023-08-21 RX ORDER — ACETAMINOPHEN 650 MG/1
650 SUPPOSITORY RECTAL EVERY 6 HOURS PRN
Status: DISCONTINUED | OUTPATIENT
Start: 2023-08-21 | End: 2023-08-23 | Stop reason: HOSPADM

## 2023-08-21 RX ORDER — ONDANSETRON 4 MG/1
4 TABLET, ORALLY DISINTEGRATING ORAL EVERY 8 HOURS PRN
Status: DISCONTINUED | OUTPATIENT
Start: 2023-08-21 | End: 2023-08-23 | Stop reason: HOSPADM

## 2023-08-21 RX ORDER — METOPROLOL TARTRATE 50 MG/1
50 TABLET, FILM COATED ORAL 2 TIMES DAILY
Status: DISCONTINUED | OUTPATIENT
Start: 2023-08-21 | End: 2023-08-23 | Stop reason: HOSPADM

## 2023-08-21 RX ORDER — SODIUM CHLORIDE 0.9 % (FLUSH) 0.9 %
5-40 SYRINGE (ML) INJECTION EVERY 12 HOURS SCHEDULED
Status: DISCONTINUED | OUTPATIENT
Start: 2023-08-21 | End: 2023-08-23 | Stop reason: HOSPADM

## 2023-08-21 RX ORDER — 0.9 % SODIUM CHLORIDE 0.9 %
1000 INTRAVENOUS SOLUTION INTRAVENOUS ONCE
Status: COMPLETED | OUTPATIENT
Start: 2023-08-21 | End: 2023-08-21

## 2023-08-21 RX ORDER — TOPIRAMATE 25 MG/1
25 TABLET ORAL 2 TIMES DAILY
Status: DISCONTINUED | OUTPATIENT
Start: 2023-08-21 | End: 2023-08-23 | Stop reason: HOSPADM

## 2023-08-21 RX ORDER — SODIUM CHLORIDE 0.9 % (FLUSH) 0.9 %
5-40 SYRINGE (ML) INJECTION PRN
Status: DISCONTINUED | OUTPATIENT
Start: 2023-08-21 | End: 2023-08-23 | Stop reason: HOSPADM

## 2023-08-21 RX ORDER — ACETAMINOPHEN 325 MG/1
650 TABLET ORAL EVERY 6 HOURS PRN
Status: DISCONTINUED | OUTPATIENT
Start: 2023-08-21 | End: 2023-08-23 | Stop reason: HOSPADM

## 2023-08-21 RX ORDER — SODIUM CHLORIDE 0.9 % (FLUSH) 0.9 %
10 SYRINGE (ML) INJECTION PRN
Status: DISCONTINUED | OUTPATIENT
Start: 2023-08-21 | End: 2023-08-23 | Stop reason: HOSPADM

## 2023-08-21 RX ORDER — METRONIDAZOLE 500 MG/1
500 TABLET ORAL EVERY 8 HOURS
Status: DISCONTINUED | OUTPATIENT
Start: 2023-08-21 | End: 2023-08-23 | Stop reason: HOSPADM

## 2023-08-21 RX ORDER — ATORVASTATIN CALCIUM 40 MG/1
40 TABLET, FILM COATED ORAL DAILY
Status: DISCONTINUED | OUTPATIENT
Start: 2023-08-22 | End: 2023-08-23 | Stop reason: HOSPADM

## 2023-08-21 RX ORDER — ALBUTEROL SULFATE 90 UG/1
1 AEROSOL, METERED RESPIRATORY (INHALATION) EVERY 4 HOURS PRN
Status: DISCONTINUED | OUTPATIENT
Start: 2023-08-21 | End: 2023-08-23 | Stop reason: HOSPADM

## 2023-08-21 RX ORDER — ONDANSETRON 2 MG/ML
4 INJECTION INTRAMUSCULAR; INTRAVENOUS EVERY 6 HOURS PRN
Status: DISCONTINUED | OUTPATIENT
Start: 2023-08-21 | End: 2023-08-23 | Stop reason: HOSPADM

## 2023-08-21 RX ORDER — IBUPROFEN 200 MG
400 TABLET ORAL EVERY 6 HOURS PRN
Status: ON HOLD | COMMUNITY
End: 2023-08-23 | Stop reason: HOSPADM

## 2023-08-21 RX ORDER — CIPROFLOXACIN 2 MG/ML
400 INJECTION, SOLUTION INTRAVENOUS EVERY 12 HOURS
Status: DISCONTINUED | OUTPATIENT
Start: 2023-08-21 | End: 2023-08-23

## 2023-08-21 RX ORDER — DICYCLOMINE HYDROCHLORIDE 10 MG/ML
20 INJECTION INTRAMUSCULAR ONCE
Status: DISCONTINUED | OUTPATIENT
Start: 2023-08-21 | End: 2023-08-23 | Stop reason: HOSPADM

## 2023-08-21 RX ORDER — SODIUM CHLORIDE 9 MG/ML
INJECTION, SOLUTION INTRAVENOUS PRN
Status: DISCONTINUED | OUTPATIENT
Start: 2023-08-21 | End: 2023-08-23 | Stop reason: HOSPADM

## 2023-08-21 RX ORDER — ROPINIROLE 1 MG/1
2 TABLET, FILM COATED ORAL NIGHTLY
Status: DISCONTINUED | OUTPATIENT
Start: 2023-08-21 | End: 2023-08-23 | Stop reason: HOSPADM

## 2023-08-21 RX ORDER — POLYETHYLENE GLYCOL 3350 17 G/17G
17 POWDER, FOR SOLUTION ORAL DAILY PRN
Status: DISCONTINUED | OUTPATIENT
Start: 2023-08-21 | End: 2023-08-23 | Stop reason: HOSPADM

## 2023-08-21 RX ORDER — SERTRALINE HYDROCHLORIDE 100 MG/1
100 TABLET, FILM COATED ORAL DAILY
Status: DISCONTINUED | OUTPATIENT
Start: 2023-08-22 | End: 2023-08-23 | Stop reason: HOSPADM

## 2023-08-21 RX ORDER — SUCRALFATE 1 G/1
1 TABLET ORAL
Status: DISCONTINUED | OUTPATIENT
Start: 2023-08-21 | End: 2023-08-23 | Stop reason: HOSPADM

## 2023-08-21 RX ORDER — GABAPENTIN 300 MG/1
300 CAPSULE ORAL 3 TIMES DAILY
Status: DISCONTINUED | OUTPATIENT
Start: 2023-08-21 | End: 2023-08-23 | Stop reason: HOSPADM

## 2023-08-21 RX ORDER — 0.9 % SODIUM CHLORIDE 0.9 %
100 INTRAVENOUS SOLUTION INTRAVENOUS ONCE
Status: COMPLETED | OUTPATIENT
Start: 2023-08-21 | End: 2023-08-21

## 2023-08-21 RX ORDER — PANTOPRAZOLE SODIUM 40 MG/1
40 TABLET, DELAYED RELEASE ORAL
Status: DISCONTINUED | OUTPATIENT
Start: 2023-08-22 | End: 2023-08-23 | Stop reason: HOSPADM

## 2023-08-21 RX ORDER — LEVOTHYROXINE SODIUM 0.1 MG/1
200 TABLET ORAL DAILY
Status: DISCONTINUED | OUTPATIENT
Start: 2023-08-22 | End: 2023-08-23 | Stop reason: HOSPADM

## 2023-08-21 RX ADMIN — IOPAMIDOL 100 ML: 755 INJECTION, SOLUTION INTRAVENOUS at 16:43

## 2023-08-21 RX ADMIN — SODIUM CHLORIDE 100 ML: 9 INJECTION, SOLUTION INTRAVENOUS at 16:43

## 2023-08-21 RX ADMIN — FAMOTIDINE 20 MG: 10 INJECTION INTRAVENOUS at 15:50

## 2023-08-21 RX ADMIN — SUCRALFATE 1 G: 1 TABLET ORAL at 22:37

## 2023-08-21 RX ADMIN — SODIUM CHLORIDE 1000 ML: 9 INJECTION, SOLUTION INTRAVENOUS at 15:53

## 2023-08-21 RX ADMIN — METOPROLOL TARTRATE 50 MG: 50 TABLET, FILM COATED ORAL at 22:29

## 2023-08-21 RX ADMIN — GABAPENTIN 300 MG: 300 CAPSULE ORAL at 22:29

## 2023-08-21 RX ADMIN — CIPROFLOXACIN 400 MG: 400 INJECTION, SOLUTION INTRAVENOUS at 23:02

## 2023-08-21 RX ADMIN — METRONIDAZOLE 500 MG: 500 TABLET ORAL at 22:34

## 2023-08-21 RX ADMIN — SODIUM CHLORIDE, PRESERVATIVE FREE 10 ML: 5 INJECTION INTRAVENOUS at 16:43

## 2023-08-21 RX ADMIN — ROPINIROLE HYDROCHLORIDE 2 MG: 1 TABLET, FILM COATED ORAL at 22:29

## 2023-08-21 RX ADMIN — SODIUM CHLORIDE, PRESERVATIVE FREE 10 ML: 5 INJECTION INTRAVENOUS at 22:57

## 2023-08-21 RX ADMIN — TOPIRAMATE 25 MG: 25 TABLET, FILM COATED ORAL at 22:30

## 2023-08-21 ASSESSMENT — ENCOUNTER SYMPTOMS
ABDOMINAL PAIN: 1
NAUSEA: 0
SHORTNESS OF BREATH: 0
VOMITING: 0
COUGH: 0
BLOOD IN STOOL: 1
DIARRHEA: 1

## 2023-08-21 NOTE — H&P
[facility] Internal Medicine  Rosa Pfeiffer MD; Pj Benites MD; Jhon Crespo MD; MD James Doss MD; Shubham Guzmán MD; Josefina Trujillo MD      HISTORY AND PHYSICAL EXAMINATION            Date:   8/21/2023  Patient name:  Ernie Brower  MRN:   [id number]  Account:  [de-identified]  YOB: 1970  PCP:    James Alfredo MD  Code Status:    Prior    Chief Complaint:     Chief Complaint   Patient presents with    Abdominal Pain    Diarrhea    Rectal Bleeding         History Obtained From:     Patient, EMR, nursing staff    HPI     This patient is a 47 y.o. Non- / non  femalewho presents with  Multiple episodes of bloody diarrhea. Patient reports diarrhea started 3 days ago after she had ice cream from [facility]. Initially she had 1-2 episodes of diarrhea per day, started having blood with stool 2 days ago and today symptoms have progressed and she reports she has had 5-10 episodes with blood only. Associated with sharp severe upper abdominal pain, constant and nausea. Denies vomiting. Reports feeling feverish and chills generally fatigued and sleeping a lot on the first day of her illness-flulike symptoms. No history of hemorrhoids or previous GI issues no history of inflammatory bowel disease  Patient is on Xarelto for unprovoked PE  Last colonoscopy October 2022-diverticulosis, polypectomy  Medical history relevant for  coronary artery disease, A-fib, GERD, hypothyroid    Review of Systems:     Denies any shortness of breath or cough  Denies chest pain or palpitations  Positive for abdominal pain nausea no vomiting, positive for bloody diarrhea  Denies any new numbness tremors or weakness. A 10 point review of systems was performed and and negative except as mentioned in HPI  Positive and Negative as described in HPI.       Past Medical History:     Past Medical History:   Diagnosis Date    Anxiety     Atrial fibrillation ([address])     CAD (coronary artery

## 2023-08-22 PROBLEM — K62.5 RECTAL BLEEDING: Status: ACTIVE | Noted: 2023-08-22

## 2023-08-22 LAB
ANION GAP SERPL CALCULATED.3IONS-SCNC: 9 MMOL/L (ref 9–17)
BASOPHILS # BLD: 0 K/UL (ref 0–0.2)
BASOPHILS NFR BLD: 1 % (ref 0–2)
BUN SERPL-MCNC: 10 MG/DL (ref 6–20)
CALCIUM SERPL-MCNC: 8.3 MG/DL (ref 8.6–10.4)
CHLORIDE SERPL-SCNC: 112 MMOL/L (ref 98–107)
CO2 SERPL-SCNC: 20 MMOL/L (ref 20–31)
CREAT SERPL-MCNC: 0.7 MG/DL (ref 0.5–0.9)
EOSINOPHIL # BLD: 0.2 K/UL (ref 0–0.4)
EOSINOPHILS RELATIVE PERCENT: 4 % (ref 0–4)
ERYTHROCYTE [DISTWIDTH] IN BLOOD BY AUTOMATED COUNT: 15.3 % (ref 11.5–14.9)
GFR SERPL CREATININE-BSD FRML MDRD: >60 ML/MIN/1.73M2
GLUCOSE SERPL-MCNC: 105 MG/DL (ref 70–99)
HCT VFR BLD AUTO: 35.5 % (ref 36–46)
HCT VFR BLD AUTO: 36.1 % (ref 36–46)
HCT VFR BLD AUTO: 36.4 % (ref 36–46)
HCT VFR BLD AUTO: 37.2 % (ref 36–46)
HGB BLD-MCNC: 11.9 G/DL (ref 12–16)
HGB BLD-MCNC: 12 G/DL (ref 12–16)
HGB BLD-MCNC: 12 G/DL (ref 12–16)
HGB BLD-MCNC: 12.5 G/DL (ref 12–16)
LYMPHOCYTES NFR BLD: 1.8 K/UL (ref 1–4.8)
LYMPHOCYTES RELATIVE PERCENT: 36 % (ref 24–44)
MCH RBC QN AUTO: 28.5 PG (ref 26–34)
MCHC RBC AUTO-ENTMCNC: 33.6 G/DL (ref 31–37)
MCV RBC AUTO: 84.9 FL (ref 80–100)
MONOCYTES NFR BLD: 0.4 K/UL (ref 0.1–1.3)
MONOCYTES NFR BLD: 8 % (ref 1–7)
NEUTROPHILS NFR BLD: 51 % (ref 36–66)
NEUTS SEG NFR BLD: 2.6 K/UL (ref 1.3–9.1)
PLATELET # BLD AUTO: 133 K/UL (ref 150–450)
PMV BLD AUTO: 8.1 FL (ref 6–12)
POTASSIUM SERPL-SCNC: 4.1 MMOL/L (ref 3.7–5.3)
RBC # BLD AUTO: 4.38 M/UL (ref 4–5.2)
SODIUM SERPL-SCNC: 141 MMOL/L (ref 135–144)
WBC OTHER # BLD: 5 K/UL (ref 3.5–11)

## 2023-08-22 PROCEDURE — 2060000000 HC ICU INTERMEDIATE R&B

## 2023-08-22 PROCEDURE — 6370000000 HC RX 637 (ALT 250 FOR IP): Performed by: INTERNAL MEDICINE

## 2023-08-22 PROCEDURE — 6360000002 HC RX W HCPCS: Performed by: INTERNAL MEDICINE

## 2023-08-22 PROCEDURE — 85014 HEMATOCRIT: CPT

## 2023-08-22 PROCEDURE — 99233 SBSQ HOSP IP/OBS HIGH 50: CPT | Performed by: INTERNAL MEDICINE

## 2023-08-22 PROCEDURE — 85025 COMPLETE CBC W/AUTO DIFF WBC: CPT

## 2023-08-22 PROCEDURE — 85018 HEMOGLOBIN: CPT

## 2023-08-22 PROCEDURE — 36415 COLL VENOUS BLD VENIPUNCTURE: CPT

## 2023-08-22 PROCEDURE — 99223 1ST HOSP IP/OBS HIGH 75: CPT | Performed by: INTERNAL MEDICINE

## 2023-08-22 PROCEDURE — 80048 BASIC METABOLIC PNL TOTAL CA: CPT

## 2023-08-22 PROCEDURE — 2580000003 HC RX 258: Performed by: INTERNAL MEDICINE

## 2023-08-22 RX ORDER — MORPHINE SULFATE 2 MG/ML
2 INJECTION, SOLUTION INTRAMUSCULAR; INTRAVENOUS EVERY 4 HOURS PRN
Status: DISCONTINUED | OUTPATIENT
Start: 2023-08-22 | End: 2023-08-23 | Stop reason: HOSPADM

## 2023-08-22 RX ORDER — SODIUM CHLORIDE 9 MG/ML
INJECTION, SOLUTION INTRAVENOUS CONTINUOUS
Status: ACTIVE | OUTPATIENT
Start: 2023-08-22 | End: 2023-08-22

## 2023-08-22 RX ADMIN — CIPROFLOXACIN 400 MG: 400 INJECTION, SOLUTION INTRAVENOUS at 21:21

## 2023-08-22 RX ADMIN — MORPHINE SULFATE 2 MG: 2 INJECTION, SOLUTION INTRAMUSCULAR; INTRAVENOUS at 21:01

## 2023-08-22 RX ADMIN — ROPINIROLE HYDROCHLORIDE 2 MG: 1 TABLET, FILM COATED ORAL at 21:00

## 2023-08-22 RX ADMIN — CIPROFLOXACIN 400 MG: 400 INJECTION, SOLUTION INTRAVENOUS at 08:40

## 2023-08-22 RX ADMIN — GABAPENTIN 300 MG: 300 CAPSULE ORAL at 21:00

## 2023-08-22 RX ADMIN — MORPHINE SULFATE 2 MG: 2 INJECTION, SOLUTION INTRAMUSCULAR; INTRAVENOUS at 14:54

## 2023-08-22 RX ADMIN — METRONIDAZOLE 500 MG: 500 TABLET ORAL at 22:35

## 2023-08-22 RX ADMIN — SODIUM CHLORIDE, PRESERVATIVE FREE 10 ML: 5 INJECTION INTRAVENOUS at 08:35

## 2023-08-22 RX ADMIN — GABAPENTIN 300 MG: 300 CAPSULE ORAL at 13:32

## 2023-08-22 RX ADMIN — METRONIDAZOLE 500 MG: 500 TABLET ORAL at 13:32

## 2023-08-22 RX ADMIN — LEVOTHYROXINE SODIUM 200 MCG: 0.1 TABLET ORAL at 06:31

## 2023-08-22 RX ADMIN — SERTRALINE 100 MG: 100 TABLET, FILM COATED ORAL at 08:28

## 2023-08-22 RX ADMIN — SUCRALFATE 1 G: 1 TABLET ORAL at 13:31

## 2023-08-22 RX ADMIN — SUCRALFATE 1 G: 1 TABLET ORAL at 06:31

## 2023-08-22 RX ADMIN — TOPIRAMATE 25 MG: 25 TABLET, FILM COATED ORAL at 08:29

## 2023-08-22 RX ADMIN — SUCRALFATE 1 G: 1 TABLET ORAL at 17:20

## 2023-08-22 RX ADMIN — ATORVASTATIN CALCIUM 40 MG: 40 TABLET, FILM COATED ORAL at 08:29

## 2023-08-22 RX ADMIN — METOPROLOL TARTRATE 50 MG: 50 TABLET, FILM COATED ORAL at 08:29

## 2023-08-22 RX ADMIN — SODIUM CHLORIDE, PRESERVATIVE FREE 10 ML: 5 INJECTION INTRAVENOUS at 08:30

## 2023-08-22 RX ADMIN — SODIUM CHLORIDE: 9 INJECTION, SOLUTION INTRAVENOUS at 10:33

## 2023-08-22 RX ADMIN — TOPIRAMATE 25 MG: 25 TABLET, FILM COATED ORAL at 21:01

## 2023-08-22 RX ADMIN — PANTOPRAZOLE SODIUM 40 MG: 40 TABLET, DELAYED RELEASE ORAL at 06:31

## 2023-08-22 RX ADMIN — METRONIDAZOLE 500 MG: 500 TABLET ORAL at 06:31

## 2023-08-22 RX ADMIN — SUCRALFATE 1 G: 1 TABLET ORAL at 21:00

## 2023-08-22 RX ADMIN — MORPHINE SULFATE 2 MG: 2 INJECTION, SOLUTION INTRAMUSCULAR; INTRAVENOUS at 10:34

## 2023-08-22 RX ADMIN — GABAPENTIN 300 MG: 300 CAPSULE ORAL at 08:28

## 2023-08-22 ASSESSMENT — PAIN DESCRIPTION - DESCRIPTORS
DESCRIPTORS: ACHING
DESCRIPTORS: SHARP

## 2023-08-22 ASSESSMENT — PAIN SCALES - GENERAL
PAINLEVEL_OUTOF10: 7
PAINLEVEL_OUTOF10: 6
PAINLEVEL_OUTOF10: 8
PAINLEVEL_OUTOF10: 8
PAINLEVEL_OUTOF10: 4

## 2023-08-22 ASSESSMENT — PAIN DESCRIPTION - ORIENTATION
ORIENTATION: UPPER;MID
ORIENTATION: MID;UPPER
ORIENTATION: UPPER;RIGHT;LEFT
ORIENTATION: MID

## 2023-08-22 ASSESSMENT — PAIN DESCRIPTION - LOCATION
LOCATION: ABDOMEN

## 2023-08-22 NOTE — CARE COORDINATION
Case Management Assessment  Initial Evaluation    Date/Time of Evaluation: 8/22/2023 12:46 PM  Assessment Completed by: Glory Koyanagi, RN    If patient is discharged prior to next notation, then this note serves as note for discharge by case management. Patient Name: Светлана Martinez                   YOB: 1970  Diagnosis: Rectal bleeding [K62.5]  Bloody diarrhea [R19.7]                   Date / Time: 8/21/2023  1:39 PM    Patient Admission Status: Inpatient   Readmission Risk (Low < 19, Mod (19-27), High > 27): Readmission Risk Score: 12.4    Current PCP: Miller Avendano MD  PCP verified by CM? Yes    Chart Reviewed: Yes      History Provided by: Patient  Patient Orientation: Alert and Oriented    Patient Cognition: Alert    Hospitalization in the last 30 days (Readmission):  No    If yes, Readmission Assessment in CM Navigator will be completed. Advance Directives:      Code Status: Full Code   Patient's Primary Decision Maker is: Legal Next of Kin      Discharge Planning:    Patient lives with: Children, Family Members Type of Home: House  Primary Care Giver: Self  Patient Support Systems include: Children, Family Members   Current Financial resources: Medicare  Current community resources: None  Current services prior to admission: None            Current DME:              Type of Home Care services:  None    ADLS  Prior functional level: Independent in ADLs/IADLs  Current functional level: Independent in ADLs/IADLs    PT AM-PAC:   /24  OT AM-PAC:   /24    Family can provide assistance at DC: Yes  Would you like Case Management to discuss the discharge plan with any other family members/significant others, and if so, who?  Yes  Plans to Return to Present Housing: Yes  Other Identified Issues/Barriers to RETURNING to current housing: no  Potential Assistance needed at discharge: N/A            Potential DME:    Patient expects to discharge to: [address] [place] [address] for transportation at discharge:

## 2023-08-22 NOTE — PLAN OF CARE
Problem: Discharge Planning  Goal: Discharge to home or other facility with appropriate resources  8/22/2023 1400 by Francesca Major RN  Outcome: Progressing  Dc plan is to go home with no needs     Problem: Pain  Goal: Verbalizes/displays adequate comfort level or baseline comfort level  8/22/2023 1400 by Francesca Major RN  Outcome: Progressing  Pt medicated with pain medication prn. Assessed all pain characteristics including level, type, location, frequency, and onset. Non-pharmacologic interventions offered to pt as well. Pt states pain is tolerable at this time. Will continue to monitor. Problem: Safety - Adult  Goal: Free from fall injury  8/22/2023 1400 by Francesca Major RN  Outcome: Progressing  Pt assessed as a fall risk this shift. Remains free from falls and accidental injury at this time. Fall precautions in place, including falling star sign and fall risk band on pt. Floor free from obstacles, and bed is locked and in lowest position. Adequate lighting provided. Pt encouraged to call before getting OOB for any need. Bed alarm activated. Will continue to monitor needs during hourly rounding, and reinforce education on use of call light. Problem: Skin/Tissue Integrity  Goal: Absence of new skin breakdown  Description: 1. Monitor for areas of redness and/or skin breakdown  2. Assess vascular access sites hourly  3. Every 4-6 hours minimum:  Change oxygen saturation probe site  4. Every 4-6 hours:  If on nasal continuous positive airway pressure, respiratory therapy assess nares and determine need for appliance change or resting period. 8/22/2023 1400 by Francesca Major RN  Outcome: Progressing  Skin assessment complete. Waffle mattress in place. Coccyx reddened. Sensicare applied PRN. Turned and repositioned every two hours. Area kept free from moisture. Proper nourishment and fluids encouraged, as appropriate. Will continue to monitor for additional needs and changes in skin breakdown.

## 2023-08-22 NOTE — CONSULTS
membranes moist  Neck - supple, no carotid bruits, thyroid not palpable  Chest - clear to auscultation, normal effort  Heart - normal rate, regular rhythm, no murmurs  Abdomen - soft, upper abdominal tenderness, nondistended, bowel sounds present all four quadrants, no masses, hepatomegaly or splenomegaly.  No hernias  Neurological - normal speech, no focal findings or movement disorder noted, cranial nerves II through XII grossly intact  Extremities - peripheral pulses palpable, no pedal edema or calf pain with palpation  Skin - no gross lesions, rashes, or induration noted  Cranial Nerves : grossly intact  Lymph nodes: not done at this time    Data:   CBC:   Lab Results   Component Value Date/Time    WBC 5.0 08/22/2023 05:50 AM    RBC 4.38 08/22/2023 05:50 AM    RBC 4.36 06/03/2019 05:41 PM    HGB 12.0 08/22/2023 12:54 PM    HCT 36.4 08/22/2023 12:54 PM    MCV 84.9 08/22/2023 05:50 AM    MCH 28.5 08/22/2023 05:50 AM    MCHC 33.6 08/22/2023 05:50 AM    RDW 15.3 08/22/2023 05:50 AM     08/22/2023 05:50 AM     06/05/2012 05:08 AM    MPV 8.1 08/22/2023 05:50 AM     CBC with Differential:    Lab Results   Component Value Date/Time    WBC 5.0 08/22/2023 05:50 AM    RBC 4.38 08/22/2023 05:50 AM    RBC 4.36 06/03/2019 05:41 PM    HGB 12.0 08/22/2023 12:54 PM    HCT 36.4 08/22/2023 12:54 PM     08/22/2023 05:50 AM     06/05/2012 05:08 AM    MCV 84.9 08/22/2023 05:50 AM    MCH 28.5 08/22/2023 05:50 AM    MCHC 33.6 08/22/2023 05:50 AM    RDW 15.3 08/22/2023 05:50 AM    NRBC 0 06/03/2019 05:41 PM    LYMPHOPCT 36 08/22/2023 05:50 AM    MONOPCT 8 08/22/2023 05:50 AM    BASOPCT 1 08/22/2023 05:50 AM    MONOSABS 0.40 08/22/2023 05:50 AM    LYMPHSABS 1.80 08/22/2023 05:50 AM    EOSABS 0.20 08/22/2023 05:50 AM    BASOSABS 0.00 08/22/2023 05:50 AM    DIFFTYPE NOT REPORTED 02/01/2022 09:48 AM     Hemoglobin/Hematocrit:    Lab Results   Component Value Date/Time    HGB 12.0 08/22/2023 12:54 PM    HCT 36.4

## 2023-08-23 VITALS
OXYGEN SATURATION: 93 % | TEMPERATURE: 98 F | WEIGHT: 207 LBS | RESPIRATION RATE: 16 BRPM | HEART RATE: 67 BPM | BODY MASS INDEX: 35.53 KG/M2 | SYSTOLIC BLOOD PRESSURE: 110 MMHG | DIASTOLIC BLOOD PRESSURE: 81 MMHG

## 2023-08-23 LAB
ABSOLUTE BANDS #: 0.08 K/UL (ref 0–1)
ANION GAP SERPL CALCULATED.3IONS-SCNC: 8 MMOL/L (ref 9–17)
BANDS: 2 % (ref 0–10)
BASOPHILS # BLD: 0 K/UL (ref 0–0.2)
BASOPHILS NFR BLD: 0 % (ref 0–2)
BUN SERPL-MCNC: 10 MG/DL (ref 6–20)
CALCIUM SERPL-MCNC: 8.1 MG/DL (ref 8.6–10.4)
CHLORIDE SERPL-SCNC: 115 MMOL/L (ref 98–107)
CO2 SERPL-SCNC: 20 MMOL/L (ref 20–31)
CREAT SERPL-MCNC: 0.8 MG/DL (ref 0.5–0.9)
EOSINOPHIL # BLD: 0.12 K/UL (ref 0–0.4)
EOSINOPHILS RELATIVE PERCENT: 3 % (ref 0–4)
ERYTHROCYTE [DISTWIDTH] IN BLOOD BY AUTOMATED COUNT: 15.4 % (ref 11.5–14.9)
GFR SERPL CREATININE-BSD FRML MDRD: >60 ML/MIN/1.73M2
GLUCOSE SERPL-MCNC: 114 MG/DL (ref 70–99)
HCT VFR BLD AUTO: 34 % (ref 36–46)
HCT VFR BLD AUTO: 34.4 % (ref 36–46)
HCT VFR BLD AUTO: NORMAL % (ref 36.3–47.1)
HGB BLD-MCNC: 11.3 G/DL (ref 12–16)
HGB BLD-MCNC: 11.4 G/DL (ref 12–16)
HGB BLD-MCNC: NORMAL G/DL (ref 11.9–15.1)
LYMPHOCYTES NFR BLD: 1.48 K/UL (ref 1–4.8)
LYMPHOCYTES RELATIVE PERCENT: 38 % (ref 24–44)
MCH RBC QN AUTO: 29 PG (ref 26–34)
MCHC RBC AUTO-ENTMCNC: 33.1 G/DL (ref 31–37)
MCV RBC AUTO: 87.5 FL (ref 80–100)
MONOCYTES NFR BLD: 0.47 K/UL (ref 0.1–1.3)
MONOCYTES NFR BLD: 12 % (ref 1–7)
MORPHOLOGY: ABNORMAL
NEUTROPHILS NFR BLD: 45 % (ref 36–66)
NEUTS SEG NFR BLD: 1.75 K/UL (ref 1.3–9.1)
PLATELET # BLD AUTO: 130 K/UL (ref 150–450)
PMV BLD AUTO: 7.6 FL (ref 6–12)
POTASSIUM SERPL-SCNC: 4.1 MMOL/L (ref 3.7–5.3)
RBC # BLD AUTO: 3.89 M/UL (ref 4–5.2)
SODIUM SERPL-SCNC: 143 MMOL/L (ref 135–144)
WBC OTHER # BLD: 3.9 K/UL (ref 3.5–11)

## 2023-08-23 PROCEDURE — APPSS30 APP SPLIT SHARED TIME 16-30 MINUTES: Performed by: NURSE PRACTITIONER

## 2023-08-23 PROCEDURE — 85025 COMPLETE CBC W/AUTO DIFF WBC: CPT

## 2023-08-23 PROCEDURE — 85018 HEMOGLOBIN: CPT

## 2023-08-23 PROCEDURE — 36415 COLL VENOUS BLD VENIPUNCTURE: CPT

## 2023-08-23 PROCEDURE — 99239 HOSP IP/OBS DSCHRG MGMT >30: CPT | Performed by: INTERNAL MEDICINE

## 2023-08-23 PROCEDURE — 99231 SBSQ HOSP IP/OBS SF/LOW 25: CPT | Performed by: INTERNAL MEDICINE

## 2023-08-23 PROCEDURE — 6360000002 HC RX W HCPCS: Performed by: INTERNAL MEDICINE

## 2023-08-23 PROCEDURE — 85014 HEMATOCRIT: CPT

## 2023-08-23 PROCEDURE — 80048 BASIC METABOLIC PNL TOTAL CA: CPT

## 2023-08-23 PROCEDURE — 6370000000 HC RX 637 (ALT 250 FOR IP): Performed by: INTERNAL MEDICINE

## 2023-08-23 RX ORDER — DICYCLOMINE HYDROCHLORIDE 10 MG/1
10 CAPSULE ORAL 3 TIMES DAILY PRN
Qty: 30 CAPSULE | Refills: 0 | Status: ON HOLD | OUTPATIENT
Start: 2023-08-23

## 2023-08-23 RX ORDER — DICYCLOMINE HYDROCHLORIDE 10 MG/1
10 CAPSULE ORAL 3 TIMES DAILY PRN
Status: DISCONTINUED | OUTPATIENT
Start: 2023-08-23 | End: 2023-08-23 | Stop reason: HOSPADM

## 2023-08-23 RX ORDER — CIPROFLOXACIN 500 MG/1
500 TABLET, FILM COATED ORAL EVERY 12 HOURS SCHEDULED
Status: DISCONTINUED | OUTPATIENT
Start: 2023-08-23 | End: 2023-08-23 | Stop reason: HOSPADM

## 2023-08-23 RX ORDER — CIPROFLOXACIN 500 MG/1
500 TABLET, FILM COATED ORAL 2 TIMES DAILY
Qty: 6 TABLET | Refills: 0 | Status: ON HOLD | OUTPATIENT
Start: 2023-08-23 | End: 2023-08-26

## 2023-08-23 RX ADMIN — METOPROLOL TARTRATE 50 MG: 50 TABLET, FILM COATED ORAL at 09:37

## 2023-08-23 RX ADMIN — LEVOTHYROXINE SODIUM 200 MCG: 0.1 TABLET ORAL at 06:25

## 2023-08-23 RX ADMIN — TOPIRAMATE 25 MG: 25 TABLET, FILM COATED ORAL at 09:37

## 2023-08-23 RX ADMIN — GABAPENTIN 300 MG: 300 CAPSULE ORAL at 13:43

## 2023-08-23 RX ADMIN — METRONIDAZOLE 500 MG: 500 TABLET ORAL at 06:20

## 2023-08-23 RX ADMIN — ATORVASTATIN CALCIUM 40 MG: 40 TABLET, FILM COATED ORAL at 09:37

## 2023-08-23 RX ADMIN — MORPHINE SULFATE 2 MG: 2 INJECTION, SOLUTION INTRAMUSCULAR; INTRAVENOUS at 06:20

## 2023-08-23 RX ADMIN — GABAPENTIN 300 MG: 300 CAPSULE ORAL at 09:37

## 2023-08-23 RX ADMIN — SUCRALFATE 1 G: 1 TABLET ORAL at 06:19

## 2023-08-23 RX ADMIN — PANTOPRAZOLE SODIUM 40 MG: 40 TABLET, DELAYED RELEASE ORAL at 06:19

## 2023-08-23 RX ADMIN — SERTRALINE 100 MG: 100 TABLET, FILM COATED ORAL at 09:37

## 2023-08-23 RX ADMIN — CIPROFLOXACIN 400 MG: 400 INJECTION, SOLUTION INTRAVENOUS at 09:40

## 2023-08-23 RX ADMIN — SUCRALFATE 1 G: 1 TABLET ORAL at 09:37

## 2023-08-23 RX ADMIN — ONDANSETRON 4 MG: 4 TABLET, ORALLY DISINTEGRATING ORAL at 06:19

## 2023-08-23 NOTE — DISCHARGE SUMMARY
[address] [facility] Internal Medicine    Discharge Summary     Patient ID: Maryln Sicard  :  1970   MRN: [id number]     ACCOUNT:  [de-identified]   Patient's PCP: Fern Myles MD  Admit Date: 2023   Discharge Date: 23 ***  Length of Stay: 2  Code Status:  Full Code  Admitting Physician: Fern Myles MD  Discharge Physician: Fern Myles MD     Active Discharge Diagnoses:     Primary Problem  Bloody diarrhea      Hospital Problems  Active Hospital Problems    Diagnosis Date Noted    Rectal bleeding [K62.5] 2023    Bloody diarrhea [R19.7] 2023    Gastroenteritis [K52.9] 2017       Admission Condition:  fair     Discharged Condition: fair    Hospital Stay:     Hospital Course:  Maryln Sicard is a 47 y.o. female who was admitted for the management of Bloody diarrhea , presented to ER with Abdominal Pain, Diarrhea, and Rectal Bleeding          Significant therapeutic interventions: As above    Significant Diagnostic Studies:   Labs / Micro:    Lab Results   Component Value Date    WBC 3.9 2023    HGB 11.3 (L) 2023    HCT 34.0 (L) 2023    MCV 87.5 2023     (L) 2023          Lab Results   Component Value Date/Time     2023 05:54 AM    K 4.1 2023 05:54 AM     2023 05:54 AM    CO2 20 2023 05:54 AM    BUN 10 2023 05:54 AM    CREATININE 0.8 2023 05:54 AM    GLUCOSE 114 2023 05:54 AM    GLUCOSE 107 2019 05:41 PM    CALCIUM 8.1 2023 05:54 AM          Radiology:    CTA ABDOMEN PELVIS W CONTRAST    Result Date: 2023  EXAMINATION: CTA OF THE ABDOMEN AND PELVIS WITH CONTRAST 2023 4:28 pm: TECHNIQUE: CTA of the abdomen and pelvis was performed with the administration of intravenous contrast. Multiplanar reformatted images are provided for review. MIP images are provided for review.  Automated exposure control, iterative reconstruction, and/or weight based

## 2023-08-23 NOTE — PLAN OF CARE
Problem: Discharge Planning  Goal: Discharge to home or other facility with appropriate resources  8/23/2023 0101 by Buell Gottron, RN  Outcome: Progressing  8/23/2023 0101 by Buell Gottron, RN  Outcome: Progressing  8/22/2023 1400 by Shiva Ceron RN  Outcome: Progressing  8/22/2023 1359 by Shiva Ceron RN  Outcome: Progressing  8/22/2023 1359 by Shiva Ceron RN  Outcome: Progressing     Problem: Chronic Conditions and Co-morbidities  Goal: Patient's chronic conditions and co-morbidity symptoms are monitored and maintained or improved  8/23/2023 0101 by Buell Gottron, RN  Outcome: Progressing  8/23/2023 0101 by Buell Gottron, RN  Outcome: Progressing     Problem: Pain  Goal: Verbalizes/displays adequate comfort level or baseline comfort level  8/23/2023 0101 by Buell Gottron, RN  Outcome: Progressing  8/23/2023 0101 by Buell Gottron, RN  Outcome: Progressing  8/22/2023 1400 by Shiva Ceron RN  Outcome: Progressing  8/22/2023 1359 by Shiva Ceron RN  Outcome: Progressing  8/22/2023 1359 by Shiva Ceron RN  Outcome: Progressing     Problem: Safety - Adult  Goal: Free from fall injury  8/23/2023 0101 by Buell Gottron, RN  Outcome: Progressing  8/23/2023 0101 by Buell Gottron, RN  Outcome: Progressing  8/22/2023 1400 by Shiva Ceron RN  Outcome: Progressing  8/22/2023 1359 by Shiva Ceron RN  Outcome: Progressing  8/22/2023 1359 by Shiva Ceron RN  Outcome: Progressing     Problem: Skin/Tissue Integrity  Goal: Absence of new skin breakdown  Description: 1. Monitor for areas of redness and/or skin breakdown  2. Assess vascular access sites hourly  3. Every 4-6 hours minimum:  Change oxygen saturation probe site  4. Every 4-6 hours:  If on nasal continuous positive airway pressure, respiratory therapy assess nares and determine need for appliance change or resting period.   8/23/2023 0101 by Buell Gottron, RN  Outcome: Progressing  8/23/2023 0101 by Buell Gottron, RN  Outcome: Progressing  8/22/2023 1400 by Nesha Meadows

## 2023-08-23 NOTE — PROGRESS NOTES
[address] [facility] - [place], [place]   OCCUPATIONAL THERAPY MISSED TREATMENT NOTE   INPATIENT   Date: 23  Patient Name: Dilan Garcia       Room: 348/-  MRN: [id number]   Account #: [de-identified]    : 1970  (47 y.o.)  Gender: female      REASON FOR MISSED TREATMENT:  other    -   patient verbalizes independence in self care tasks and denies needs for self care. RN in agreement.      1109    Electronically signed by ACACIA Espinoza on 23 at 11:25 AM EDT
GI notified patient has discharge order. Wait to d/c patient until they round. GI NP also notified per patient she is still having BRBPR. No new orders.
Matt Leggett (GI) notified of consult.
[facility] Internal Medicine  Federica Oliveira MD; Nathalie Elias MD; Caroll Duverney, MD; MD Keesha Lopez MD; Bryan Crowe MD; Marie Mckeon MD      HISTORY AND PHYSICAL EXAMINATION            Date:   8/22/2023  Patient name:  Ruth Aldrich  MRN:   [id number]  Account:  [de-identified]  YOB: 1970  PCP:    Keesha Flores MD  Code Status:    Full Code    Chief Complaint:     Chief Complaint   Patient presents with    Abdominal Pain    Diarrhea    Rectal Bleeding         History Obtained From:     Patient, EMR, nursing staff    HPI     This patient is a 47 y.o. Non- / non  femalewho presents with  Multiple episodes of bloody diarrhea. Patient reports diarrhea started 3 days ago after she had ice cream from [facility]. Initially she had 1-2 episodes of diarrhea per day, started having blood with stool 2 days ago and today symptoms have progressed and she reports she has had 5-10 episodes with blood only. Associated with sharp severe upper abdominal pain, constant and nausea. Denies vomiting. Reports feeling feverish and chills generally fatigued and sleeping a lot on the first day of her illness-flulike symptoms. No history of hemorrhoids or previous GI issues no history of inflammatory bowel disease  Patient is on Xarelto for unprovoked PE  Last colonoscopy October 2022-diverticulosis, polypectomy  Medical history relevant for  coronary artery disease, A-fib, GERD, hypothyroid    Review of Systems:     Denies any shortness of breath or cough  Denies chest pain or palpitations  Positive for abdominal pain nausea no vomiting, positive for bloody diarrhea  Denies any new numbness tremors or weakness. A 10 point review of systems was performed and and negative except as mentioned in HPI  Positive and Negative as described in HPI.       Past Medical History:     Past Medical History:   Diagnosis Date    Anxiety     Atrial fibrillation ([address])     CAD (coronary
Pharmacy Medication History Note      List of current medications patient is taking is complete. Source of information: patient, Tangela dispense report, OAS    Changes made to medication list:  Medications flagged for removal (include reason, ex. noncompliance):  Polysporin ophthalmic - patient reports she completed this course last night  Enoxaparin - patient reports she has completed this medication. She is taking Xarelto instead. Isosorbide mononitrate - patient reports she was instructed to stop this medication. Sucralfate - patient reports she no longer takes this medication    Medications removed (include reason, ex. therapy complete or physician discontinued):  Duplicate albuterol inhaler  Ondansetron - 7 day supply from 2021    Medications added/doses adjusted:  Ibuprofen 400 mg every 6 hours as needed added    Other notes (ex. Recent course of antibiotics, Coumadin dosing):  Per [facility], last fill of zolpidem was on 7/26/23 with quantity 30 for 30. Per [facility], last fill of clonazepam was on 7/29/23 with quantity 60 for 30. Per [facility], last fill of gabapentin was on 8/14/23 with quantity 90 for 30. The patient reports completing her ophthalmic ointment last night. The patient reports that she typically only takes her nightly dose of gabapentin, skipping the morning and afternoon doses. Denies use of other OTC or herbal medications.       Allergies clarified    Medication list provided to the patient: no   Medication education provided to the patient: none      Electronically signed by ARTUR Griffith [facility] on 8/21/2023 at 7:50 PM
Physical Therapy        Physical Therapy Cancel Note      DATE: 2023    NAME: Wilfrido Ya  MRN: [id number]   : 1970      Patient not seen this date for Physical Therapy due to:    Patient independent with functional mobility. Will defer PT evaluation at this time. Please reorder PT if future needs arise. RN aware and agrees.        Electronically signed by Jeremy Spaulding PT on 2023 at 12:59 PM
Pt arrived to unit. Bed in lowest position, wheels locked and call light within reach.
RN talked to Dr. Villavicencio and let her know GI is willing to d/c , RN to proceed with discharge. Patient Iv taken out, discharge went over with patient and all questions answered. Patient refusing to go by wheelchair out to parking lot, Patient's dad here to pick her up.
CO2 20 08/23/2023 05:54 AM    BUN 10 08/23/2023 05:54 AM    CREATININE 0.8 08/23/2023 05:54 AM    GFRAA >60 08/16/2022 01:47 PM    LABGLOM >60 08/23/2023 05:54 AM    GLUCOSE 114 08/23/2023 05:54 AM    GLUCOSE 107 06/03/2019 05:41 PM    PROT 7.2 08/21/2023 02:41 PM    PROT 6.3 06/03/2019 05:41 PM    LABALBU 4.1 08/21/2023 02:41 PM    LABALBU 3.8 06/03/2019 05:41 PM    CALCIUM 8.1 08/23/2023 05:54 AM    BILITOT 0.3 08/21/2023 02:41 PM    ALKPHOS 106 08/21/2023 02:41 PM    AST 20 08/21/2023 02:41 PM    ALT 17 08/21/2023 02:41 PM     BMP:    Lab Results   Component Value Date/Time     08/23/2023 05:54 AM    K 4.1 08/23/2023 05:54 AM     08/23/2023 05:54 AM    CO2 20 08/23/2023 05:54 AM    BUN 10 08/23/2023 05:54 AM    LABALBU 4.1 08/21/2023 02:41 PM    LABALBU 3.8 06/03/2019 05:41 PM    CREATININE 0.8 08/23/2023 05:54 AM    CALCIUM 8.1 08/23/2023 05:54 AM    GFRAA >60 08/16/2022 01:47 PM    LABGLOM >60 08/23/2023 05:54 AM    GLUCOSE 114 08/23/2023 05:54 AM    GLUCOSE 107 06/03/2019 05:41 PM     PT/INR:    Lab Results   Component Value Date/Time    PROTIME 15.5 08/21/2023 02:41 PM    INR 1.2 08/21/2023 02:41 PM     PTT:    Lab Results   Component Value Date/Time    APTT 33.5 08/21/2023 02:41 PM   [APTT}    Physical Examination:        General appearance: alert, cooperative and no distress  Mental Status: oriented to person, place and time and normal affect  Lungs: clear to auscultation bilaterally, normal effort  Heart: regular rate and rhythm, no murmur,  Abdomen: soft, nontender, nondistended, bowel sounds present all four quadrants, no masses, hepatomegaly or splenomegaly  Extremities: no edema, redness or tenderness in the calves  Skin: no gross lesions, rashes, or induration    Assessment:        Primary Problem  Bloody diarrhea     Active Hospital Problems    Diagnosis Date Noted    Rectal bleeding [K62.5] 08/22/2023    Bloody diarrhea [R19.7] 08/21/2023    Gastroenteritis [K52.9] 12/03/2017     Past

## 2023-08-24 ENCOUNTER — CARE COORDINATION (OUTPATIENT)
Dept: CASE MANAGEMENT | Age: 53
End: 2023-08-24

## 2023-08-24 NOTE — CARE COORDINATION
Care Transitions Outreach Attempt # 1    Call within 2 business days of discharge: Yes   Attempted to reach patient for transitions of care follow up. Unable to reach patient. Patient: Maryln Sicard Patient : 1970 MRN: [id number]    Last Discharge [address]       Date Complaint Diagnosis Description Type Department Provider    23 Abdominal Pain; Diarrhea; Rectal Bleeding Rectal bleeding ED to Hosp-Admission (Discharged) (ADMITTED) Beni Edwards MD; [facility]... Was this an external facility discharge? No Discharge Facility: [facility]    Noted following upcoming appointments from discharge chart review:   [facility] follow up appointment(s):   Future Appointments   Date Time Provider [address]   2023 11:30 AM STC EMG  [facility]   2023 11:30 AM Alessandra Easton MD [address] [facility]   10/6/2023  1:45 PM Fern Myles MD [address] [place] [address] [address] [facility] ,[address] [place] [address]    Care Transitions Nurse  Cell

## 2023-08-25 ENCOUNTER — CARE COORDINATION (OUTPATIENT)
Dept: CASE MANAGEMENT | Age: 53
End: 2023-08-25

## 2023-08-25 NOTE — CARE COORDINATION
Care Transitions Outreach Attempt #2    Call within 2 business days of discharge: Yes   Attempt #2 to reach patient for transitions of care follow up. Unable to reach patient. Left message requesting call back. Patient: Shira Pod Patient : 1970 MRN: [id number]    Last Discharge [address] [facility] [facility]       Date Complaint Diagnosis Description Type Department Provider    23 Abdominal Pain; Diarrhea; Rectal Bleeding Rectal bleeding ED to Hosp-Admission (Discharged) (ADMITTED) Gareth Decker MD; [facility]... Was this an external facility discharge? No Discharge Facility: [facility]    Noted following upcoming appointments from discharge chart review:   [facility] follow up appointment(s):   Future Appointments   Date Time Provider [address]   2023 11:30 AM STC EMG  [facility]   2023 11:30 AM Margi Miller MD [address] [facility]   10/6/2023  1:45 PM Miguel Giraldo MD [address], RN BSN [facility]  Care Transitions Nurse  [phone]   [email or web address]

## 2023-08-26 ENCOUNTER — ANESTHESIA (OUTPATIENT)
Dept: INTERVENTIONAL RADIOLOGY/VASCULAR | Age: 53
End: 2023-08-26
Payer: COMMERCIAL

## 2023-08-26 ENCOUNTER — ANESTHESIA EVENT (OUTPATIENT)
Dept: INTERVENTIONAL RADIOLOGY/VASCULAR | Age: 53
End: 2023-08-26
Payer: COMMERCIAL

## 2023-08-26 PROBLEM — I60.8 ANEURYSMAL SUBARACHNOID HEMORRHAGE (HCC): Status: ACTIVE | Noted: 2023-08-26

## 2023-08-26 PROCEDURE — 2580000003 HC RX 258: Performed by: NURSE ANESTHETIST, CERTIFIED REGISTERED

## 2023-08-26 PROCEDURE — 2580000003 HC RX 258: Performed by: STUDENT IN AN ORGANIZED HEALTH CARE EDUCATION/TRAINING PROGRAM

## 2023-08-26 PROCEDURE — 6360000002 HC RX W HCPCS: Performed by: NURSE ANESTHETIST, CERTIFIED REGISTERED

## 2023-08-26 PROCEDURE — 2500000003 HC RX 250 WO HCPCS: Performed by: PSYCHIATRY & NEUROLOGY

## 2023-08-26 PROCEDURE — 2500000003 HC RX 250 WO HCPCS: Performed by: NURSE ANESTHETIST, CERTIFIED REGISTERED

## 2023-08-26 RX ORDER — SUCCINYLCHOLINE CHLORIDE 20 MG/ML
INJECTION INTRAMUSCULAR; INTRAVENOUS PRN
Status: DISCONTINUED | OUTPATIENT
Start: 2023-08-26 | End: 2023-08-27 | Stop reason: SDUPTHER

## 2023-08-26 RX ORDER — PHENYLEPHRINE HCL IN 0.9% NACL 1 MG/10 ML
SYRINGE (ML) INTRAVENOUS PRN
Status: DISCONTINUED | OUTPATIENT
Start: 2023-08-26 | End: 2023-08-27 | Stop reason: SDUPTHER

## 2023-08-26 RX ORDER — ROCURONIUM BROMIDE 10 MG/ML
INJECTION, SOLUTION INTRAVENOUS PRN
Status: DISCONTINUED | OUTPATIENT
Start: 2023-08-26 | End: 2023-08-27 | Stop reason: SDUPTHER

## 2023-08-26 RX ORDER — PROPOFOL 10 MG/ML
INJECTION, EMULSION INTRAVENOUS PRN
Status: DISCONTINUED | OUTPATIENT
Start: 2023-08-26 | End: 2023-08-27 | Stop reason: SDUPTHER

## 2023-08-26 RX ORDER — FENTANYL CITRATE 50 UG/ML
INJECTION, SOLUTION INTRAMUSCULAR; INTRAVENOUS PRN
Status: DISCONTINUED | OUTPATIENT
Start: 2023-08-26 | End: 2023-08-27 | Stop reason: SDUPTHER

## 2023-08-26 RX ORDER — SODIUM CHLORIDE, SODIUM LACTATE, POTASSIUM CHLORIDE, CALCIUM CHLORIDE 600; 310; 30; 20 MG/100ML; MG/100ML; MG/100ML; MG/100ML
INJECTION, SOLUTION INTRAVENOUS CONTINUOUS PRN
Status: DISCONTINUED | OUTPATIENT
Start: 2023-08-26 | End: 2023-08-27 | Stop reason: SDUPTHER

## 2023-08-26 RX ORDER — CEFAZOLIN SODIUM 1 G/3ML
INJECTION, POWDER, FOR SOLUTION INTRAMUSCULAR; INTRAVENOUS PRN
Status: DISCONTINUED | OUTPATIENT
Start: 2023-08-26 | End: 2023-08-27 | Stop reason: SDUPTHER

## 2023-08-26 RX ORDER — MIDAZOLAM HYDROCHLORIDE 1 MG/ML
INJECTION INTRAMUSCULAR; INTRAVENOUS PRN
Status: DISCONTINUED | OUTPATIENT
Start: 2023-08-26 | End: 2023-08-27 | Stop reason: SDUPTHER

## 2023-08-26 RX ADMIN — ROCURONIUM BROMIDE 50 MG: 10 INJECTION, SOLUTION INTRAVENOUS at 21:05

## 2023-08-26 RX ADMIN — Medication 100 MG: at 21:01

## 2023-08-26 RX ADMIN — ANDEXANET ALFA 400 MG: 200 INJECTION, POWDER, LYOPHILIZED, FOR SOLUTION INTRAVENOUS at 23:03

## 2023-08-26 RX ADMIN — CEFAZOLIN 2 G: 1 INJECTION, POWDER, FOR SOLUTION INTRAMUSCULAR; INTRAVENOUS at 21:50

## 2023-08-26 RX ADMIN — PROPOFOL 100 MG: 10 INJECTION, EMULSION INTRAVENOUS at 21:01

## 2023-08-26 RX ADMIN — Medication 50 MCG: at 23:36

## 2023-08-26 RX ADMIN — SODIUM CHLORIDE, POTASSIUM CHLORIDE, SODIUM LACTATE AND CALCIUM CHLORIDE: 600; 310; 30; 20 INJECTION, SOLUTION INTRAVENOUS at 20:54

## 2023-08-26 RX ADMIN — FENTANYL CITRATE 100 MCG: 50 INJECTION, SOLUTION INTRAMUSCULAR; INTRAVENOUS at 21:11

## 2023-08-26 RX ADMIN — ROCURONIUM BROMIDE 50 MG: 10 INJECTION, SOLUTION INTRAVENOUS at 23:23

## 2023-08-26 RX ADMIN — MIDAZOLAM 2 MG: 1 INJECTION INTRAMUSCULAR; INTRAVENOUS at 21:11

## 2023-08-26 RX ADMIN — SODIUM CHLORIDE 250 ML: 3 INJECTION, SOLUTION INTRAVENOUS at 23:00

## 2023-08-26 RX ADMIN — ROCURONIUM BROMIDE 50 MG: 10 INJECTION, SOLUTION INTRAVENOUS at 22:15

## 2023-08-26 ASSESSMENT — LIFESTYLE VARIABLES: SMOKING_STATUS: 0

## 2023-08-26 NOTE — H&P
Neuro ICU History & Physical    Patient Name: Lindsey Vasquez  Patient : 1970  Room/Bed:   Code Status: Full  Allergies: Allergies   Allergen Reactions    Compazine [Prochlorperazine]      Jittery, restless    Sulfa Antibiotics Hives    Adhesive Tape Rash       CHIEF COMPLAINT       Chief Complaint   Patient presents with    Altered Mental Status       HPI    History Obtained From: Patient's daughter, patient, electronic medical record    The patient is a 47 y.o. female with a history of atrial fibrillation, pulmonary emboli (on Xarelto), hypertension, type 2 diabetes mellitus, CAD, hyperlipidemia, who presented to the emergency department with altered mental status. Patient was reportedly found unresponsive at home by family. Patient's daughter at bedside states that she had seen the patient at her baseline baseline and she went to her room for no more than 15 minutes and upon returning to the patient she found her to be unresponsive and reportedly not breathing. Patient's daughter states that she called emergency services and was prompted to initiate CPR and the patient. Upon EMS arrival patient had a pulse and lethargic but arousable. On arrival to the emergency department patient remains slightly lethargic and confused however was communicative and able to voice concerns appropriately. 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 Neuro critical care was then consulted for aneurysmal subarachnoid hemorrhage and admission to the neuro ICU. On examination in the emergency department patient remains mildly lethargic however is easily arousable, oriented to person and place, with no focal neurological deficits noted.   Patient states that she is amnesic to the event however states that she remembers

## 2023-08-26 NOTE — ED NOTES
Pt to room 17 via EMS with c/o AMS. Per EMS, pt was found unresponsive by family. Family initiated CPR on patient. When EMS arrived, pt had a pulse, but had agonal respirations. Pt was recently seen for a GI bleed and was sent home from facility. Pt reports that she is nauseous and has a posterior headache. Upon arrival, pt is slow to answer questions, but answers them appropriately and states that she feels nauseous. Pt reports that she is taking blood thinners. Pt was given 4mg zofran by EMS. Pt placed on continuous cardiac monitor, bp and pulse ox. EKG completed, IV established, blood work drawn. RR even and unlabored.  Will continue plan of care     Philly Merchant RN  08/26/23 0198

## 2023-08-27 ENCOUNTER — ANESTHESIA EVENT (OUTPATIENT)
Dept: INTERVENTIONAL RADIOLOGY/VASCULAR | Age: 53
End: 2023-08-27
Payer: COMMERCIAL

## 2023-08-27 ENCOUNTER — ANESTHESIA (OUTPATIENT)
Dept: INTERVENTIONAL RADIOLOGY/VASCULAR | Age: 53
End: 2023-08-27
Payer: COMMERCIAL

## 2023-08-27 PROBLEM — I60.9 SAH (SUBARACHNOID HEMORRHAGE) (HCC): Status: ACTIVE | Noted: 2023-08-27

## 2023-08-27 PROCEDURE — C9460 INJECTION, CANGRELOR: HCPCS | Performed by: PSYCHIATRY & NEUROLOGY

## 2023-08-27 PROCEDURE — 2500000003 HC RX 250 WO HCPCS: Performed by: NURSE ANESTHETIST, CERTIFIED REGISTERED

## 2023-08-27 PROCEDURE — 6360000002 HC RX W HCPCS: Performed by: NURSE ANESTHETIST, CERTIFIED REGISTERED

## 2023-08-27 PROCEDURE — 2580000003 HC RX 258: Performed by: PSYCHIATRY & NEUROLOGY

## 2023-08-27 PROCEDURE — 6360000002 HC RX W HCPCS: Performed by: PSYCHIATRY & NEUROLOGY

## 2023-08-27 RX ORDER — HEPARIN SODIUM 1000 [USP'U]/ML
INJECTION, SOLUTION INTRAVENOUS; SUBCUTANEOUS PRN
Status: DISCONTINUED | OUTPATIENT
Start: 2023-08-27 | End: 2023-08-27 | Stop reason: SDUPTHER

## 2023-08-27 RX ORDER — PHENYLEPHRINE HCL IN 0.9% NACL 1 MG/10 ML
SYRINGE (ML) INTRAVENOUS PRN
Status: DISCONTINUED | OUTPATIENT
Start: 2023-08-27 | End: 2023-08-27 | Stop reason: SDUPTHER

## 2023-08-27 RX ORDER — ROCURONIUM BROMIDE 10 MG/ML
INJECTION, SOLUTION INTRAVENOUS PRN
Status: DISCONTINUED | OUTPATIENT
Start: 2023-08-27 | End: 2023-08-27 | Stop reason: SDUPTHER

## 2023-08-27 RX ORDER — CEFAZOLIN SODIUM 1 G/3ML
INJECTION, POWDER, FOR SOLUTION INTRAMUSCULAR; INTRAVENOUS PRN
Status: DISCONTINUED | OUTPATIENT
Start: 2023-08-27 | End: 2023-08-27 | Stop reason: SDUPTHER

## 2023-08-27 RX ORDER — FENTANYL CITRATE 50 UG/ML
INJECTION, SOLUTION INTRAMUSCULAR; INTRAVENOUS PRN
Status: DISCONTINUED | OUTPATIENT
Start: 2023-08-27 | End: 2023-08-27 | Stop reason: SDUPTHER

## 2023-08-27 RX ORDER — NICARDIPINE HYDROCHLORIDE 0.1 MG/ML
INJECTION INTRAVENOUS CONTINUOUS PRN
Status: DISCONTINUED | OUTPATIENT
Start: 2023-08-27 | End: 2023-08-27 | Stop reason: SDUPTHER

## 2023-08-27 RX ADMIN — ROCURONIUM BROMIDE 50 MG: 10 INJECTION, SOLUTION INTRAVENOUS at 19:00

## 2023-08-27 RX ADMIN — FENTANYL CITRATE 100 MCG: 50 INJECTION, SOLUTION INTRAMUSCULAR; INTRAVENOUS at 00:29

## 2023-08-27 RX ADMIN — ROCURONIUM BROMIDE 30 MG: 10 INJECTION, SOLUTION INTRAVENOUS at 20:53

## 2023-08-27 RX ADMIN — FENTANYL CITRATE 100 MCG: 50 INJECTION, SOLUTION INTRAMUSCULAR; INTRAVENOUS at 00:31

## 2023-08-27 RX ADMIN — Medication 100 MCG: at 20:35

## 2023-08-27 RX ADMIN — HEPARIN SODIUM 2000 UNITS: 1000 INJECTION INTRAVENOUS; SUBCUTANEOUS at 19:23

## 2023-08-27 RX ADMIN — CANGRELOR 1410 MCG/MIN: 50 INJECTION, POWDER, LYOPHILIZED, FOR SOLUTION INTRAVENOUS at 19:46

## 2023-08-27 RX ADMIN — CEFAZOLIN 2 G: 1 INJECTION, POWDER, FOR SOLUTION INTRAMUSCULAR; INTRAVENOUS at 19:12

## 2023-08-27 RX ADMIN — MIDAZOLAM 2 MG: 1 INJECTION INTRAMUSCULAR; INTRAVENOUS at 00:27

## 2023-08-27 RX ADMIN — PROPOFOL 50 MCG/KG/MIN: 10 INJECTION, EMULSION INTRAVENOUS at 00:25

## 2023-08-27 RX ADMIN — Medication 100 MCG: at 20:44

## 2023-08-27 RX ADMIN — ROCURONIUM BROMIDE 20 MG: 10 INJECTION, SOLUTION INTRAVENOUS at 20:07

## 2023-08-27 RX ADMIN — FENTANYL CITRATE 100 MCG: 50 INJECTION, SOLUTION INTRAMUSCULAR; INTRAVENOUS at 19:32

## 2023-08-27 RX ADMIN — NICARDIPINE HYDROCHLORIDE 5 MG/HR: 0.1 INJECTION INTRAVENOUS at 00:29

## 2023-08-27 RX ADMIN — Medication 50 MCG: at 00:36

## 2023-08-27 ASSESSMENT — COPD QUESTIONNAIRES: CAT_SEVERITY: NO INTERVAL CHANGE

## 2023-08-27 NOTE — SEDATION DOCUMENTATION
Pre procedure assessment:    Pt sedated, breathing with vent. Even rise/ fall of chest.  Minimal withdraw from noxious stimuli. Face symmetric. Pupils 3 mm PERRLA. Good pulses felt x 4 extremities. Pedal pulses palpable + 1 bilaterally. Pt in no obvious discomfort or distress.

## 2023-08-27 NOTE — SIGNIFICANT EVENT
Upon return to the neuro ICU from endovascular mention patient's ICPs were noted to be 40-42 with EVD open at 20mmhg, patient is intubated and sedated, right pupil is fixed and nonreactive on pupillometer NPI 0.  EVD dropped to 10 mmHg with some improvement in ICP 29-32. Attending neurosurgeon and neuro ICU attending notified of change. Patient given mannitol 1 g/kg and order placed for stat CT head. Discussed the need for emergent repeat imaging with CT department however due to multiple trauma priority they are unable to scan the patient at this time. Nursing staff remains in contact with CT department to complete as soon as possible. Following administration of mannitol and EVD adjustments improvement of ICP, currently 14-16 at 0250, plan remains to obtain CT head when possible.

## 2023-08-27 NOTE — FLOWSHEET NOTE
Pt transported to neuro icu 120 with CRNA and SARA. Vss, groin site clean dry and intact. Pedal pulses palpable. Pt remains intubated and unresponsive. Next check at 0100.     Post Procedure Transfer from IR Table  [x] Tubes and Lines intact     Post Procedure Neuro-Checks/NIHSS/Vitals  [x] Completed post procedure  [x] Completed bedside handoff  [x] Frequency ordered  [x] Verbal communication of frequency      Post Procedure Puncture Site Checks  [x] Completed post procedure  [x] Completed bedside handoff  [x] Frequency ordered  [x] Verbal communication of frequency    Post Procedure Pulse  Checks  [x] Completed post procedure  [x] Completed bedside handoff  [x] Frequency ordered  [x] Verbal communication of frequency    Order Set  [x] Post Neuro-Endo Procedure  [] Stroke  [] t-PA     B/P control  [x] Verbal Communication   [x] Order in Care Path    Medication Review  [x] Given during procedure  [x] Current drips/meds/fluids    [x] Physician Notified of All changes in Assessment    Family  [x] Location Post Procedure; Dr Sia Wilhelm updated family and then were escorted to neuro ICU waiting area

## 2023-08-27 NOTE — ANESTHESIA PRE PROCEDURE
Department of Anesthesiology  Preprocedure Note       Name:  Nata Rubio   Age:  47 y.o.  :  1970                                          MRN:  [id number]         Date:  2023      Surgeon: * No surgeons listed *    Procedure: * No procedures listed *    Medications prior to admission:   Prior to Admission medications    Medication Sig Start Date End Date Taking? Authorizing Provider   dicyclomine (BENTYL) 10 MG capsule Take 1 capsule by mouth 3 times daily as needed (abdominal cramps) 23   Demian Rodarte MD   gabapentin (NEURONTIN) 300 MG capsule Take 1 capsule by mouth 3 times daily for 90 days. 23  Demian Rodarte MD   esomeprazole (NEXIUM) 40 MG delayed release capsule Take 1 capsule by mouth every morning (before breakfast) 23   Demian Rodarte MD   rOPINIRole (REQUIP) 2 MG tablet Take 1 tablet by mouth nightly 23   Demian Rodarte MD   levothyroxine (SYNTHROID) 200 MCG tablet TAKE 1 TABLET BY MOUTH DAILY 23   Natali Ball MD   sertraline (ZOLOFT) 100 MG tablet Take 1 tablet by mouth daily 23   Demian Rodarte MD   sucralfate (CARAFATE) 1 GM/10ML suspension Take 10 mLs by mouth 4 times daily 23   Historical Provider, MD   XARELTO 20 MG TABS tablet TAKE 1 TABLET BY MOUTH DAILY WITH BREAKFAST 3/17/23   Demian Rodarte MD   zolpidem (AMBIEN) 10 MG tablet Take 1 tablet by mouth at bedtime. Indications: last filled 23 for 30 days 22   Historical Provider, MD   albuterol sulfate HFA (PROVENTIL;VENTOLIN;PROAIR) 108 (90 Base) MCG/ACT inhaler INHALE 2 PUFFS INTO THE LUNGS EVERY 4 HOURS AS NEEDED FOR SHORTNESS OF BREATH 22   Demian Rodarte MD   atorvastatin (LIPITOR) 40 MG tablet Take 1 tablet by mouth daily 22   Historical Provider, MD   clonazePAM (KLONOPIN) 0.5 MG tablet Take 1 tablet by mouth 2 times daily as needed for Anxiety for up to 30 days.  20  Rosie Sorto MD   topiramate (TOPAMAX) 25 MG tablet Take 1 tablet by mouth 2

## 2023-08-27 NOTE — PROCEDURES
Indications: The patient is a 47year old woman who came in with a sudden, severe headache. Imaging showed subarachnoid hemorrhage mostly in the posterior fossa. 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 There is a small amount of blood in the fourth ventricle with early hydrocephalus. The likely endovascular treatment of the aneurysm involves placement of a stent, which will likely necessitate anti-platelet therapy. As a result, it was felt to be prudent to place an EVD prior to stenting to reduce risk of placement. She was initially awake, however, she became abruptly somnolent on arrival to the endovascular suite. She was urgently intubated by the anesthesia service. I was able to briefly discuss the nature of the procedure with her in the ICU prior to her transferring down to the IR suite, and she provided her consent. Procedure: The hair of the right frontal scalp was shaved with electric clippers. A small incision was planned at Kocher's point, and the scalp was prepped and draped in the usual sterile fashion. Local anesthetic with epinephrine was infiltrated along the planned incision, and the planned tunneling tract of the EVD. A 15 blade was used to incise the scalp down to the skull. A small, self-retaining retractor was placed, and a twist drill was used to make a tonya hole at [facility]. The dura was then punctured with the EVD trochar. A Bactiseal EVD catheter was passed to a depth of about 7cm at the outer table with brisk return of CSF on the first pass. The catheter was then tunneled out through a separate stab incision. The incision was closed with skin staples, and the catheter was secured to the scalp including a relief loop using skin staples. The catheter was then connected to the [employer] Inspect[employer] system. The opening pressure was about 22-23mmHg. The EVD was left open at 25mmHg. The area was dressed with gauze and a Tegaderm.   The endovascular

## 2023-08-27 NOTE — SEDATION DOCUMENTATION
Pt arrives to neuro IR and quickly deteriorates. GCS 3., unresponsive. Anesthesiology, Dr Breonna Cardoso, CRNA, Neurosurgery, RNs  and [employer]Techs at bedside. 21:00 time out, pt being intubated  NS prepping for EVD placement by Dr Jf Mann  2108 art line placed. Zimmerman catheter placed with > 400cc clear yellow urine return  Pedal pulses marked and palpable   2120 evd placed; VSS   2212 Rt femoral artery puncture  2216 Rt femoral artery access. 2220 EVD clamped per Dr Lana Hammer.    Andexxa and 3% ordered per physicians orders; continue to follow through and monitor

## 2023-08-27 NOTE — SEDATION DOCUMENTATION
Anesthesia:  GA  patient was intubated prior to procudure    Attending; Dr. Estephania Vail time  [address];  N/A  TPA; bolus   infusion  N/A    Wheel in lab time:  1843  Groin puncture:  1911 access obtained in left femoral artery   TICI Baseline: 0    1915 ICP 16  1923 heparin 2000 units given IVP by CRNA per Dr. Eli Leach order (see MAR)    1st run  Deployment time: 1923 microcatheter at clot in basilar artery  1st run Retrieval time: 1924 manual aspiration with 20 ml syringe ( 4 times) and manual aspiration with 10 ml syringe (2 times),  no clot obtained   1st pass TICI score: 2a    1937 ICP 16    2nd pass deployment: 1936  embotap being deployed into basilar artery, penumbra suction on , 1940 penumbra suction off. 1941 penumbra suction on,  1942 penumbra suction off   2nd pass retrieval: 1942 embotrap removed, clot obtained   2nd pass TICI score:  2a+     1946 Kengral bolus given IV per CRNA per Dr. Eli Leach order (see MAR)    3rd pass deployment time: 1956 stent treiver at clot in basilar artery, penumbra suction on   3rd pass retrieval time: 1959 stent triever removed, clot obtained   3rd pass TICI score: 2a ++     2004 kengreal gtt started by CRNA per Dr. Eli Leach order (see MAR)  2010 ICP 14   CSF with 11 ml output between 4362-6955    4th pass deployment: 2015 embotrap is deployed into clot in basilar artery, penumbra suction has been on since 2011, 2017 suction off , manual aspiration with 20 ml syringe (2 times), manual suction with  60ml syringe as embotrap removed   4th pass retrieval time: 2022 clot obtained with embotrap  4th pass TICI score: 2b+    2033 nitro 200 mcg given IA   2039 ICP 16    5th pass deployment:2041 trevo being deployed into left PCA  distal P2  5th pass retrieval time: 2044 manual suction with 60 ml syringe while trevo being removed, clot obtained in trevo   5th pass TICI score: 2c    Closure time: decision made to suture sheath in place which is completed

## 2023-08-28 NOTE — SIGNIFICANT EVENT
Called to the bedside by nursing due to acute onset tachypnea and tachycardia. Patient is on Propofol 20mcg, Fentanyl 25mcg/hr. Nursing states this randomly started, she was not being stimulated or on a sedation holiday. RR noted to be in the 30's with HR in the 130's. SBP stable in the 110-120's. Given a 20mg Propofol bolus, infusion increased to 30mcg/kg/min without improvement. Possible sympathetic storming? Given Morphine 2mg IV once without much improvement. 500cc NS bolus started along with 50mcg Fentanyl with some improvement in HR and RR to the 120's and 20's. Fentanyl infusion increased to 50mcg/hr. EKG sinus tachycardia, short VT.       MARTITA Arana - CNP'  Neuro Critical Care  08/28/23 4:33 PM

## 2023-08-28 NOTE — ANESTHESIA POSTPROCEDURE EVALUATION
Department of Anesthesiology  Postprocedure Note    Patient: Roro Resendez  MRN: [id number]  YOB: 1970  Date of evaluation: 8/28/2023      Procedure Summary     Date: 08/27/23 Room / Location: [facility] Special Procedures    Anesthesia Start: 1843 Anesthesia Stop: 2131    Procedure: IR ANGIOGRAM CAROTID CEREBRAL BILATERAL Diagnosis: (thrombectomy)    Scheduled Providers:  Responsible Provider: Violeta Dubois MD    Anesthesia Type: general, MAC ASA Status: 4 - Emergent          Anesthesia Type: No value filed.     Kalia Phase I:      Kalia Phase II:        Anesthesia Post Evaluation    Patient location during evaluation: ICU  Patient participation: complete - patient cannot participate  Level of consciousness: sedated and ventilated  Pain score: 0  Airway patency: patent  Nausea & Vomiting: no nausea and no vomiting  Complications: no  Cardiovascular status: hemodynamically stable  Respiratory status: acceptable, intubated and ventilator  Hydration status: stable  Pain management: adequate

## 2023-08-28 NOTE — FLOWSHEET NOTE
Arrive in ICU, updated [address] ICU RN     Post Procedure Transfer from IR Table  [x] Tubes and Lines intact     Post Procedure Neuro-Checks/NIHSS/Vitals  [x] Completed post procedure  [x] Completed bedside handoff  [x] Frequency ordered  [x] Verbal communication of frequency      Post Procedure Puncture Site Checks  [x] Completed post procedure  [x] Completed bedside handoff  [x] Frequency ordered  [x] Verbal communication of frequency    Post Procedure Pulse  Checks  [x] Completed post procedure  [x] Completed bedside handoff  [x] Frequency ordered  [x] Verbal communication of frequency    Order Set  [x] Post Neuro-Endo Procedure  [] Stroke  [] t-PA     B/P control  [x] Verbal Communication   [x] Order in Care Path    Medication Review  [x] Given during procedure  [x] Current drips/meds/fluids    [x] Physician Notified of All changes in Assessment    Family  [x] Location Post Procedure updated on procedure by Dr. Glover [place] in the ICU waiting room

## 2023-08-28 NOTE — SEDATION DOCUMENTATION
Transferred over to bed from IR table, all lines and tubes remain in place, connected to transport monitor, CRNA connected patient to transport vent  Left femoral sheath site without drainage or hematoma noted, left pedal pulse palpable

## 2023-08-29 PROBLEM — I48.0 PAROXYSMAL ATRIAL FIBRILLATION (HCC): Status: ACTIVE | Noted: 2023-01-01

## 2023-08-29 NOTE — ACP (ADVANCE CARE PLANNING)
Advance Care Planning     Advance Care Planning (ACP) Physician/NP/PA Conversation    Date of Conversation: 8/26/2023  Conducted with:  Healthcare Decision Maker: Named in Advance Directive or Healthcare Power of  (name) Alexis Waller - Patients father     Healthcare Decision Maker:    Primary Decision Maker: Alexis Waller - Parent - [phone]    Secondary Decision Maker: Nida Kim - Child - [phone]    Supplemental (Other) Decision Maker: Aide Lisa - Other Relative - [phone]  Click here to complete Healthcare Decision Makers including selection of the Healthcare Decision Maker Relationship (ie \"Primary\"). Care Preferences:    Hospitalization: \"If your health worsens and it becomes clear that your chance of recovery is unlikely, what would be your preference regarding hospitalization? \"  The patient would prefer hospitalization. Ventilation: \"If you were unable to breath on your own and your chance of recovery was unlikely, what would be your preference about the use of a ventilator (breathing machine) if it was available to you? \"  The patient would desire the use of a ventilator. Resuscitation: \"In the event your heart stopped as a result of an underlying serious health condition, would you want attempts made to restart your heart, or would you prefer a natural death? \"  Yes, attempt to resuscitate.         Conversation Outcomes / Follow-Up Plan:  ACP complete - no further action today  Reviewed DNR/DNI and patient elects Full Code (Attempt Resuscitation)    Length of Voluntary ACP Conversation in minutes:  <16 minutes (Non-Billable)    Johanna Hoffman, APRN - CNP

## 2023-08-29 NOTE — CONSENT
I have discussed with the daughter the rationale for blood component transfusion; its benefits in treating or preventing fatigue, organ damage, or death; and its risk which includes mild transfusion reactions, rare risk of blood borne infection, or more serious but rare reactions. I have discussed the alternatives to transfusion, including the risk and consequences of not receiving transfusion. The daughter had an opportunity to ask questions and had agreed to proceed with transfusion of blood components.   Electronically signed by Jacob Hubbard MD on 8/28/2023 at 9:55 PM

## 2023-08-29 NOTE — PROCEDURES
Neurointerventional Sheath Pull Note:    The right groin was exposed, prepped and draped in standard sterile technique. Sedated with propofol. O Unasyn. Sheath was pulled from the R common femoral artery. Hemostasis was achieved with a 8 Slovenian AngioSeal device followed by 15 min of manual compression. Groin site was clean, dry and intact with no hematoma at site after hemostasis achieved. Distal right pedal pulse was unchanged and intact after groin seal.     - R leg flat for 3 hours.     - Vascular and groin checks q 15 x 4 then q 30 minutes x 4 then q 1 hour x 3.    - Advance activity as tolerated after 3 hours     Prabhu Love MD  Endovascular Neurosurgery Fellow     Stroke, [address] [facility]  [facility]

## 2023-08-29 NOTE — SIGNIFICANT EVENT
Patient noted to have coffee ground emesis through NG tube. HH repeated with Hemoglobin level of 7.1.    2 packed RBC are ordered. Will give one and evlauate the need for the other unit once [facility] is back post transfusion      Discussed with NEV surgery at bedside. Only give brilinta at AM, after informing Dr Héctor Rosario or neuroendovascular surgery fellow with Hemoglobin level      Care of patient discussed with Dr. Page Menjivar who was continously following. ARDEN Shelley input is appreciated.       Electronically signed by Josefina Dennis MD on 8/28/2023 at 10:00 PM

## 2023-08-29 NOTE — ACP (ADVANCE CARE PLANNING)
Advance Care Planning     Advance Care Planning Inpatient Note  [facility] Department    Today's Date: 8/29/2023  Unit: [facility] 1B NEURO ICU    Received request from family to review existing documentation. The family produced two HPOA packets, one was signed and notarized, the other unsigned, and one Living Will, also unsigned. This note is to document the completed HPOA documentation dated 03/11/2016    Goals of ACP Conversation:  Review and document existing HPOA    Health Care Decision Makers:       Primary Decision Maker: Camelia Moreno - Parent - [phone]    Secondary Decision Maker: Heidi Crocker - Child - [phone]    Supplemental (Other) Decision Maker: Mirella Quigley - Other Relative - [phone]  Summary:      Advance Care Planning Documents (Patient Wishes):       Assessment:   reviewed presented documentation with patient's family, provided ACP information/education, and documented appropriately. The family believes there is a more recent HPOA and will provide it if found.       Interventions:  Requested patient/family to submit existing document for our records: [id number] [facility] of /Advance Directive Appointment of [address]  Provided education on documents for clarity and greater understanding  Discussed and provided education on state decision maker hierarchy    Care Preferences Communicated:   No    Outcomes/Plan:  Reviewed and documented existing HPOA    Electronically signed by Arash Cifuentes Chaplain Resident on 8/29/2023 at 1:37 AM

## 2023-09-01 NOTE — PROCEDURES
Insert Arterial Line  Date/Time:  09/01/23, 10:14 AM  Performed by: Arley Pool RCP    Patient identity confirmed: arm band and provided demographic data   Time out: Immediately prior to procedure a \"time out\" was called to verify the correct patient, procedure, equipment, support staff. Preparation: Patient was prepped and draped in the usual sterile fashion.     Location:right radial    Clint's test normal: yes  Needle gauge: 20     Number of attempts: 1  Post-procedure: transparent dressing applied and line secured    Patient tolerance: well

## 2023-09-02 PROBLEM — I60.4: Status: ACTIVE | Noted: 2023-01-01

## 2023-09-03 NOTE — ED NOTES
Patient is not able to complete PHQ-9 at this time. SW following.      [address] [facility]  09/03/23 1824

## 2023-09-05 NOTE — SIGNIFICANT EVENT
TCDs reviewed with severe vasospasm left MCA (213 velocity) and mild to moderate right MCA (140s) noted. Neurological examination unchanged from this morning. Sedation remains off. MAP goal increased to 100-120. Will start Milrinone bolus 100mcg/kg followed by infusion 0.75 mcg/kg/min. Strict I&Os needed. Continue daily TCDs. Neuroendovascular updated on TCD findings. RN updated on plan of care. Attempted to call patient's father, Jessica Fine at both his mobile cell ([phone]) and home phone number ([phone]) to update and obtain consent for central line; no answer and not able to leave a voicemail due to voicemail box being full. Will attempt to call back later.      Ling El, MARTITA - CNP  9/5/2023  3:17 PM

## 2023-09-05 NOTE — PROCEDURES
Central Venous Catheter Insertion Procedure Note    Procedure: Insertion of Central Venous Catheter    Indications:  vascular access, elevated ICP, vasopressor requirement, severe vasospasm    Procedure Details   Informed consent was obtained for the procedure, including sedation. Risks of lung collapse, hemorrhage, arrhythmia, and adverse drug reaction were discussed. Time out completed. Under sterile conditions the skin above the right subclavian vein was prepped with chloraprep and covered with a sterile drape. 2 cc 1% lidocaine local anesthesia was injected into the skin and subcutaneous tissues. A 22-gauge needle was used to identify the vein. An 18-gauge needle was then inserted into the subclavian vein vein. A guide wire was then passed easily through the catheter. There were occasional PVCs and catheter was retracted until resolution. A 7.0 Turkish triple-lumen was then inserted into the vessel over the guide wire. The catheter was sutured into place. Findings: There were no changes to vital signs. Patient did tolerate procedure well. Chest xray ordered and pending.        MARTITA Wilson CNP  9/5/2023  4:54 PM

## 2023-09-06 ENCOUNTER — ANESTHESIA EVENT (OUTPATIENT)
Dept: INTERVENTIONAL RADIOLOGY/VASCULAR | Age: 53
End: 2023-09-06
Payer: COMMERCIAL

## 2023-09-06 ENCOUNTER — ANESTHESIA (OUTPATIENT)
Dept: INTERVENTIONAL RADIOLOGY/VASCULAR | Age: 53
End: 2023-09-06
Payer: COMMERCIAL

## 2023-09-06 PROBLEM — R50.9 FEVER: Status: ACTIVE | Noted: 2023-09-06

## 2023-09-06 PROCEDURE — 2580000003 HC RX 258: Performed by: NURSE ANESTHETIST, CERTIFIED REGISTERED

## 2023-09-06 PROCEDURE — 2500000003 HC RX 250 WO HCPCS: Performed by: NURSE ANESTHETIST, CERTIFIED REGISTERED

## 2023-09-06 PROCEDURE — 6360000002 HC RX W HCPCS

## 2023-09-06 PROCEDURE — 2500000003 HC RX 250 WO HCPCS

## 2023-09-06 RX ORDER — SODIUM CHLORIDE, SODIUM LACTATE, POTASSIUM CHLORIDE, CALCIUM CHLORIDE 600; 310; 30; 20 MG/100ML; MG/100ML; MG/100ML; MG/100ML
INJECTION, SOLUTION INTRAVENOUS CONTINUOUS PRN
Status: DISCONTINUED | OUTPATIENT
Start: 2023-09-06 | End: 2023-09-06 | Stop reason: SDUPTHER

## 2023-09-06 RX ORDER — FENTANYL CITRATE 50 UG/ML
INJECTION, SOLUTION INTRAMUSCULAR; INTRAVENOUS PRN
Status: DISCONTINUED | OUTPATIENT
Start: 2023-09-06 | End: 2023-09-06 | Stop reason: SDUPTHER

## 2023-09-06 RX ORDER — CEFAZOLIN SODIUM 1 G/3ML
INJECTION, POWDER, FOR SOLUTION INTRAMUSCULAR; INTRAVENOUS PRN
Status: DISCONTINUED | OUTPATIENT
Start: 2023-09-06 | End: 2023-09-06 | Stop reason: SDUPTHER

## 2023-09-06 RX ORDER — HEPARIN SODIUM 1000 [USP'U]/ML
INJECTION, SOLUTION INTRAVENOUS; SUBCUTANEOUS PRN
Status: DISCONTINUED | OUTPATIENT
Start: 2023-09-06 | End: 2023-09-06 | Stop reason: SDUPTHER

## 2023-09-06 RX ORDER — PHENYLEPHRINE HCL IN 0.9% NACL 1 MG/10 ML
SYRINGE (ML) INTRAVENOUS PRN
Status: DISCONTINUED | OUTPATIENT
Start: 2023-09-06 | End: 2023-09-06 | Stop reason: SDUPTHER

## 2023-09-06 RX ORDER — ROCURONIUM BROMIDE 10 MG/ML
INJECTION, SOLUTION INTRAVENOUS PRN
Status: DISCONTINUED | OUTPATIENT
Start: 2023-09-06 | End: 2023-09-06 | Stop reason: SDUPTHER

## 2023-09-06 RX ORDER — EPHEDRINE SULFATE/0.9% NACL/PF 50 MG/5 ML
SYRINGE (ML) INTRAVENOUS PRN
Status: DISCONTINUED | OUTPATIENT
Start: 2023-09-06 | End: 2023-09-06 | Stop reason: SDUPTHER

## 2023-09-06 RX ADMIN — FENTANYL CITRATE 100 MCG: 50 INJECTION, SOLUTION INTRAMUSCULAR; INTRAVENOUS at 14:16

## 2023-09-06 RX ADMIN — ROCURONIUM BROMIDE 30 MG: 10 INJECTION, SOLUTION INTRAVENOUS at 15:33

## 2023-09-06 RX ADMIN — ROCURONIUM BROMIDE 30 MG: 10 INJECTION, SOLUTION INTRAVENOUS at 16:30

## 2023-09-06 RX ADMIN — Medication 10 MG: at 15:35

## 2023-09-06 RX ADMIN — CEFAZOLIN 2 G: 1 INJECTION, POWDER, FOR SOLUTION INTRAMUSCULAR; INTRAVENOUS at 14:11

## 2023-09-06 RX ADMIN — Medication 10 MG: at 15:57

## 2023-09-06 RX ADMIN — Medication 100 MCG: at 14:48

## 2023-09-06 RX ADMIN — Medication 10 MG: at 15:04

## 2023-09-06 RX ADMIN — HEPARIN SODIUM 2000 UNITS: 1000 INJECTION INTRAVENOUS; SUBCUTANEOUS at 14:17

## 2023-09-06 RX ADMIN — Medication 10 MG: at 15:37

## 2023-09-06 RX ADMIN — SODIUM CHLORIDE, POTASSIUM CHLORIDE, SODIUM LACTATE AND CALCIUM CHLORIDE: 600; 310; 30; 20 INJECTION, SOLUTION INTRAVENOUS at 15:48

## 2023-09-06 RX ADMIN — Medication 10 MG: at 15:50

## 2023-09-06 RX ADMIN — ROCURONIUM BROMIDE 50 MG: 10 INJECTION, SOLUTION INTRAVENOUS at 13:54

## 2023-09-06 RX ADMIN — ROCURONIUM BROMIDE 20 MG: 10 INJECTION, SOLUTION INTRAVENOUS at 15:20

## 2023-09-06 RX ADMIN — SODIUM CHLORIDE, POTASSIUM CHLORIDE, SODIUM LACTATE AND CALCIUM CHLORIDE: 600; 310; 30; 20 INJECTION, SOLUTION INTRAVENOUS at 13:33

## 2023-09-06 RX ADMIN — ROCURONIUM BROMIDE 20 MG: 10 INJECTION, SOLUTION INTRAVENOUS at 16:03

## 2023-09-06 RX ADMIN — Medication 200 MCG: at 15:10

## 2023-09-06 RX ADMIN — Medication 200 MCG: at 14:54

## 2023-09-06 ASSESSMENT — COPD QUESTIONNAIRES: CAT_SEVERITY: NO INTERVAL CHANGE

## 2023-09-06 NOTE — BRIEF OP NOTE
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 Neurological exam performed and unchanged from initial H&P or consult  MODIFIED ANDRY SCORE: 5 - Severe disability:  bedridden, incontinent and requiring constant nursing care and attention. CT head ASPECT score: 10    Anesthesia: General Anesthesia  Complications: none    Intra-Operative EXAM:  Patient sedated with unchanged limited neurological exam    EBL: < less than 100       Cc            Specimens: Were not Obtained  Contrast:     Visipaque 270 low osmolar 88 Cc             Fluoro: 42.2 min    Findings:  Please see dictated Radiology note for further details  Basilar artery in-stent occlusion without antegrade flow intracranially. TICI score 0  Balloon guide catheter (8F x 95cm embo guard 087) along with Mary flow plus 6F select catheter advanced over Rebar 18 were used to face the the above mentioned lesion. Manual aspiration with 20cc syringe (x4) and 10 ml syring (x2)was used to treat the lesion. No clot obtained. TICI score 2a  2nd pass was achieved by advancing Rebar 18 over Synchro  x 215cm. Embo trap 5 x 37mm was deployed with penumbra suction off. Clot obtained. TICI 2a.    Cangrelor bolus
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 Neurological exam performed and unchanged from initial H&P or consult  MODIFIED ANDRY SCORE: 5 - Severe disability:  bedridden, incontinent and requiring constant nursing care and attention. Anesthesia: General Anesthesia  Complications: none    Intra-Operative EXAM:  Neurological exam performed and unchanged from initial H&P or consult    EBL: < less than 50       Cc            Specimens: Were not Obtained  Contrast:     Visipaque 270 low osmolar 89 Cc             Fluoro: 21.9 min    Findings:  Please see dictated Radiology note for further details  There is severe diffuse vasospasm in the right MCA M2 segment, left ISABELA A1 segment and and left MCA M1 segment with decreased normal antegrade flow and decreased capillary fill  There is the moderate diffuse vasospasm in the right MCA M1 segment, bilateral PCAs with decreased normal antegrade flow and decreased parenchymal blush. The above diffuse moderate to severe vasospasm was treated with 10mg verapamil over 10mins in the right ICA, 10mg verapamil over 10mins twice in the left ICA and 10mg verapamil over 10mins in the left [place]. A total of 40mg of intra-arterial verapamil given.   Post verapamil, there is significant improvement in the vasospastic
months after discharge.         MD Ken Zuñiga MD   Pager: [phone]  Stroke, [address] [facility]  [address]  Electronically signed 8/27/2023 at 12:43 AM

## 2023-09-06 NOTE — SEDATION DOCUMENTATION
10 mg Verapamil placed in sterile med cup on sterile procedure table in sterile fashion per Dr. Emerita Yost

## 2023-09-06 NOTE — SEDATION DOCUMENTATION
3rd Injection - Verapamil 10 mg injection Lt vertebral artery slowly over 5 minutes by Dr. Anita Ewing

## 2023-09-06 NOTE — SEDATION DOCUMENTATION
Closure Time:    Vascade deployed  All instrumentation removed  Manual pressure held at puncture site

## 2023-09-06 NOTE — SEDATION DOCUMENTATION
1st injection - Verapamil 10 mg being injected slow IA - GIRMA by Dr. Kimberly Winchester over 5 minutes

## 2023-09-06 NOTE — TELEPHONE ENCOUNTER
----- Message from Christiano Romo, [address] sent at 9/6/2023  8:40 AM EDT -----  Regarding: FW: follow up  Frantz Brooks and Dr. Linda Ivy both consulted while patient was in the hospital, and needs to be scheduled with one of them  ----- Message -----  From: MARTITA Gayle - FELICE  Sent: 8/30/2023   4:01 PM EDT  To: Christiano Romo MA  Subject: follow up                                        1 month follow up for anemia. Pt intubated with ICH.      Carmen Lopez

## 2023-09-06 NOTE — SEDATION DOCUMENTATION
Pre procedure assessment:    Pt intubated. Even rise/ fall of chest.  Skin pink, warm, dry, cap refill < 3 sec. Pupils 3 mm PERRLA. Pt follows NO commands. Best response is minimal withdraw to noxious stimuli. EVD intact with appropriate waveform / dressing CD&I. Zimmerman already placed, draining clear yellow urine. FMS in place with liquid brown stool noted in bag.   Pt in no obvious distress

## 2023-09-06 NOTE — CONSULTS
Comprehensive Nutrition Assessment    Type and Reason for Visit:  Consult (TF Ordering & Mgt)    Nutrition Recommendations/Plan:   Start TF: Suggest Vital High Protein at goal rate 50 mL/hr x 22 hrs (hold for levothyroxine administration. Provides 1100 kcal (plus propofol kcal), 96 g PRO, 920 mL free water. Monitor for TF tolerance     Malnutrition Assessment:  Malnutrition Status:  No malnutrition (08/27/23 1232)    Context:  Acute Illness     Findings of the 6 clinical characteristics of malnutrition:  Energy Intake:  No significant decrease in energy intake  Weight Loss:  No significant weight loss     Body Fat Loss:  No significant body fat loss     Muscle Mass Loss:  No significant muscle mass loss    Fluid Accumulation:  No significant fluid accumulation     Strength:  Not Performed    Nutrition Assessment:    46 yo F adm SAH. PMH significant for afib, HTN, T2DM, CAD, HLD. Pt is intubated, propofol 11.3 mL/hr at visit (298 kcal). Pt family in room reports NKFA, no noticeable wt change pta. No BM noted. No edema noted. Nutrition Related Findings:    labs/meds reviewed Wound Type: None       Current Nutrition Intake & Therapies:    Average Meal Intake: NPO  Average Supplements Intake: NPO  Diet NPO  Current Tube Feeding (TF) Orders:  Feeding Route: Orogastric  Formula: Peptide Based High Protein  Schedule: Continuous  Feeding Regimen: 50 mL/hr x 22 hrs. (hold for levothyroxine)  Additives/Modulars: None  Water Flushes: 30 mL Q4H  Current TF & Flush Orders Provides: 1100 mary (plus propofol kcal), 96 g PRO, 920 mL free water. Goal TF & Flush Orders Provides:      Anthropometric Measures:  Height: 5' 4\" (162.6 cm)  Ideal Body Weight (IBW): 120 lbs (55 kg)       Current Body Weight: 207 lb (93.9 kg) (stated), 172.5 % IBW.     Current BMI (kg/m2): 35.5  Usual Body Weight: 210 lb (95.3 kg) (3/27/23)  % Weight Change (Calculated): -1.4  Weight Adjustment For: No Adjustment                 BMI Categories: Obese
Department of Neurology/Telestroke/Stroke  Resident Consult Note  Stroke Alert @6:17 PM  Arrival at bedside @6:20 PM    Reason for Consult:  ED Stroke Alert  Requesting Physician:  Dr. Jose Hadley  Endovascular Neurosurgeon:   Renée Dunne MD    Stroke Team:  Marcin Tavarez DO      History Obtained From:  patient, electronic medical record    CHIEF COMPLAINT:       Severe headache, found unresponsive    HISTORY OF PRESENT ILLNESS:       The patient is a 47 y.o. female with PMH of A-fib, PE (on Xarelto), HTN, T2DM, HLD, anxiety, CAD, GERD, who presents after being found unresponsive at home by family. Family initially started CPR and called EMS, upon EMS arrival patient was noted to have a pulse. It is unclear if the patient ever lost her pulse. Upon EMS arrival patient was noted to have an SBP in the 200s and was lethargic. She was subsequently brought in to [place] ED for evaluation. Upon stroke evaluation patient was lethargic but arousable, did not appear to have any focal deficits. Initial NIH 3. She was persistently complaining of severe headache and nausea. 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 Patient was given [facility] to reverse Xarelto. Stated that she had been compliant with her medication. Neurological exam remained stable.     Modified Rai: 4  Colón and [place]: 2    LKW: Unknown  NIHSS: 3  Modified [place] Scale: 0  SBP: 127, in the 200s upon EMS arrival per report  Glucose: 174  CT head without contrast: Large subarachnoid hemorrhage  TNK: Not a candidate  Endovascular: No LVO       PAST MEDICAL HISTORY :       Past Medical History:        Diagnosis Date    Anxiety     Atrial fibrillation (HCC)     CAD (coronary artery disease)     Mitral valve prolapse    Fatty liver     GERD (gastroesophageal reflux disease)     Headache     History of bronchitis
[facility]) Gastroenterology  Consultation Note     . Chief Complaint:  Unresponsiveness    Reason for consult:    Acute anemia  Coffee-ground OG output  Recent admission for bloody stools/gastritis 1    History of present illness: This is a 47 y.o. female with PMH including A-fib, PE-on Xarelto, HTN, DM type II, CAD, hyperlipidemia who was brought to the ER after being found unresponsive by daughter. Daughter started CPR until EMS arrived. In the ED patient had CT head that shows large subarachnoid hemorrhage,   CTA head and neck shows 2 mm broad-based outpouching from the mid basilar artery likely representing aneurysm which has ruptured. Patient care, taken for emergent diagnostic angiogram, had EVD placed with embolization. Postoperatively, had coffee-ground OG output. Upon admission, patient's hemoglobin was 14, postoperatively 8.8 which slowly declined to 7.1 for which she received 1 unit of PRBCs and is 8.1 g/dL this a.m. BMP shows hypokalemia with K of 3.3, normal BUN and creatinine. Ionized calcium 1. 11. LFTs unremarkable  No abdominal imaging completed at this time    Patient was recently at [facility] and seen for diarrhea, rectal bleeding, nausea, abdominal pain, gastroenteritis and history of elevated CEA-felt to be related to viral gastroenteritis supportive care was given. Previous GI history:   2022 colonoscopy showed diverticulosis, tortuous sigmoid and retained stools, polypectomy completed. Endoscopy completed at same time was normal except for moderate gastritis.   Biopsies were negative for H. pylori    Past Medical/Social/Family History:  Past Medical History:   Diagnosis Date    Anxiety     Atrial fibrillation (HCC)     CAD (coronary artery disease)     Mitral valve prolapse    Fatty liver     GERD (gastroesophageal reflux disease)     Headache     History of bronchitis     Hyperlipidemia     Hypothyroidism     Kidney stone     LFT elevation     MVP (mitral valve
Nutrition Note    Consulted for TF Ordering & Mgt. Will re-start Vital High Protein at goal rate 50 mL/hr x 22 hrs (hold for levothyroxine administration. Provides 1100 kcal (plus propofol kcal), 96 g PRO, 920 mL free water. See full nutrition note dated 8/27/23 for full assessment.      Electronically signed by Patrick Oreilly MS, RD, LD on 8/28/23 at 10:21 AM EDT    Contact:   Floor Mobile: [phone]  Desk Phone: [phone]  RD Weekend Mobile: [phone]
Additionally I explained that as care continues we will work to develop goals of care to ensure we are following patients/family wishes. Education/support to patient  Discharge planning/helping to coordinate care  Communications with primary service  Caregiver support/education  Principle Problem/Diagnosis:  Aneurysmal subarachnoid hemorrhage ([address])    Additional Assessments:     1- Symptom management/ pain control     Pain Assessment:  Intubated, sedated. Does not appear to be in any distress. Anxiety:  none                          Dyspnea:   Currently on ventilator                           Fatigue:  none    Other: We feel the patient symptoms are being controlled. her current regimen is reviewed by myself and discussed with the staff. 2- Goals of care evaluation   The patient goals of care are improve or maintain function/quality of life   Goals of care discussed with:    [] Patient independently    [] Patient and Family    [x] Family or Healthcare DPOA independently    [] Unable to discuss with patient, family/DPOA not present    3- Code Status  Full Code    4- Other recommendations   - We will continue to provide comfort and support to the patient and the family  Please call with any palliative questions or concerns. Palliative Care Team is available via perfect serve or via phone. Palliative Care will continue to follow Ms. Soler's care as needed. History of Present Illness     The patient is a 47 y.o. Non- / non  female who presents with Altered Mental Status      Referred to Palliative Care by   [x] Physician   [] Nursing  [] Family Request   [] Other:       She was admitted to the Neuro Critical Care service for [facility] (subarachnoid hemorrhage) ([address]) [I60.9]  Aneurysmal subarachnoid hemorrhage ([address]) [I60.8].  Her hospital course has been associated with Aneurysmal subarachnoid hemorrhage ([address]), stent placement, stent occlusion, thrombectomy, and ischemic
patient had a pulse. Patient was hypertensive in the 200s for EMS. Upon arrival in the ED patient was noted to be having a severe headache and was lethargic and nauseous. CT head remarkable for large subarachnoid hemorrhage. PLAN:     CT head and CTA head and neck reviewed  [facility] administered for Xarelto reversal  Plan to take for basilar aneurysm treatment    Case discussed with Dr. Alexa Cain and Dr. Kiley Dutta attending.     Coby Cowden, MD  Neurology Resident PGY-4  8/26/2023  8:11 PM
document can still have some errors including those of syntax and sound a like substitutions which may escape proof reading. It such instances, actual meaningcan be extrapolated by contextual diversion. Pawan [facility] MD  Office: [phone]  Perfect serve / office [phone]         I have discussed the care of the patient, including pertinent history and exam findings,  with the resident. I have seen and examined the patient and the key elements of all parts of the encounter have been performed by me. I agree with the assessment, plan and orders as documented by the resident.     Joy Neumann, Infectious Diseases
bladder    Urinary urgency    Insomnia    RLS (restless legs syndrome)    Pancreatitis    Eye swelling, left    Cervical spondylosis    Degenerative cervical spinal stenosis    Trigger point with tension headache    Arthropathy of cervical facet joint    Atlanto-axial joint sprain, sequela    Cervical radiculopathy    Sprain of atlanto-occipital joint, sequela    Myofascial muscle pain    Gastroenteritis    Chest pain    Unstable angina ([address])    Pulmonary embolism on right ([address])    Hematemesis with nausea    Acute respiratory failure with hypoxia (HCC)    Family history of colon cancer    Irritable bowel syndrome with both constipation and diarrhea    Elevated CEA    Diarrhea    Erosive gastritis    Weight loss    Prediabetes    Chronic neck pain    Obstructive chronic bronchitis with exacerbation (HCC)    Thrombocytopenia, unspecified    Chronic use of opiate for therapeutic purpose    Chronic prescription benzodiazepine use    Acute on chronic diastolic CHF (congestive heart failure) ([facility])    Severe obesity (BMI 35.0-39. 9) with comorbidity ([facility])    Abdominal pain, epigastric    Narcotic drug use    Pharyngoesophageal dysphagia    Degenerative disc disease, cervical    Long term (current) use of anticoagulants    Type 2 diabetes mellitus without complication, without long-term current use of insulin (HCC)    Bloody diarrhea    Rectal bleeding    Aneurysmal subarachnoid hemorrhage ([facility])         A/P:  This is a 47 y.o. female with subarachnoid hemorrhage, likely secondary to ruptured aneurysm  Patient care will be discussed with attending, will reevaluated patient along with attending.      - Plan for EVD placement prior to neuro endovascular intervention   - Keep EVD open at 25 mmHg  - HOB: 30 degrees   - Obtain CT head in AM   - Neuro checks per protocol  - Hold all antiplatelets and anticoagulants  - We recommend SBP < 140      Additional recommendations may follow    Please contact neurosurgery with any changes
expected position. Patchy bibasilar airspace disease, greater on the right, which appears increased when compared to the previous exam.  Multifocal pneumonia and pulmonary edema both considered. XR CHEST PORTABLE    Result Date: 8/28/2023  Stable mild bibasilar atelectasis. XR CHEST PORTABLE    Result Date: 8/27/2023  Stable chest when compared to the prior exam     XR CHEST PORTABLE    Result Date: 8/27/2023  Endotracheal tube and nasogastric tube placement. Increasing left basilar opacification and increased vascular congestion/edema. XR CHEST PORTABLE    Result Date: 8/26/2023  New mild right basal opacities that are predominantly linear. Atelectasis is favored over pneumonia. Elsewhere, there are no acute findings. CTA ABDOMEN PELVIS W CONTRAST    Result Date: 8/24/2023  1. No active gastrointestinal bleeding identified. 2. Mild sigmoid diverticulosis. 3. Otherwise no acute abnormality in the abdomen or pelvis. XR ABDOMEN FOR NG/OG/NE TUBE PLACEMENT    Result Date: 9/2/2023  1. The NG tube tip and side port are noted in the gastric body. XR ABDOMEN FOR NG/OG/NE TUBE PLACEMENT    Result Date: 8/29/2023  Adequate position of nasogastric tube with tip in the stomach antrum. Nonobstructive bowel gas pattern. XR ABDOMEN FOR NG/OG/NE TUBE PLACEMENT    Result Date: 8/27/2023  NG tube coursing in the stomach with the tip over the distal body/antrum. CTA HEAD NECK W CONTRAST    Result Date: 8/27/2023  1. Large amount of acute subarachnoid hemorrhage, likely due to aneurysm rupture. 2. 2 mm broad-based outpouching from the mid basilar artery likely representing the aneurysm which has ruptured. Critical results were called by Dr. Hollie Thomas MD to Jocelyne Zamorano on 8/26/2023 at 19:11. CTA HEAD W CONTRAST    Result Date: 8/29/2023  Severe narrowing involving the distal V4 portion of the right vertebral artery. Patent basilar stent. Diminutive right posterior cerebral artery.
MD Nate on 9/6/2023 at 12:44 PM

## 2023-09-07 PROBLEM — I67.848 CEREBRAL VASOSPASM: Status: ACTIVE | Noted: 2023-09-07

## 2023-09-07 PROBLEM — R57.9 SHOCK (HCC): Status: ACTIVE | Noted: 2023-09-07

## 2023-09-07 PROBLEM — D72.825 BANDEMIA: Status: ACTIVE | Noted: 2023-01-01

## 2023-09-07 NOTE — SIGNIFICANT EVENT
Code status discussion with family    Patient's father, Angel Salas [employer] [facility]) and daughter Alfred Herrera at bedside with multiple other family members. We had a discussion regarding patient's CODE STATUS and how to proceed forward if patient were to go into cardio/respiratory arrest.  We discussed potential options including proceeding forward with CPR if it were needed. Patient's father, daughter and other family members in agreement that if the patient were to arrest, they would not like the patient to have CPR done. Would like to continue other all other treatment measures as of now. Patient's CODE STATUS changed to DNR-CCA. This was witnessed by nursing and documentation was signed by patient's POA. Paperwork included in her chart.         Rocio Cross,   Emergency Medicine PGY-2

## 2023-09-07 NOTE — SIGNIFICANT EVENT
Called to bedside by nursing due to patient becoming hypotensive. Initial blood pressure 84 systolic when I arrived at bedside. At maximum dose of phenylephrine and norepinephrine. Blood pressure dropped to low 63R systolic. Dose of epi given. Patient had run of Vtach with pulse for 20-30 seconds. Code patches applied. Patients blood pressure increased. Now in sinus tachycardia. Now slowly weaning down on levophed as patient's blood pressure tolerates. Patient's neurologic exam remains unchanged at this time. PERRLA, movement to pain in left upper extremity, bilateral LE. Stat labs ordered as well as 2g IV magnesium. Family called by nursing who arrived shortly after at bedside, discussed prior events. Pending discussion of code status.      Nanda Russo,   Emergency Medicine PGY-2

## 2023-09-08 PROBLEM — Z71.89 GOALS OF CARE, COUNSELING/DISCUSSION: Status: ACTIVE | Noted: 2023-09-08

## 2023-09-08 NOTE — PROCEDURES
Referring Noni Fair MD  Date: 9/8/2023  Start Time: 9/7/2023 @ [id number]  End Time: 9/8/2023 @ 197 [id number]    Indication  Patient with encephalopathy, EEG done to rule out subclinical seizures. Introduction  This continuous video-EEG was acquired using a [employer] workstation at 256 samples/s. Electrodes were placed according to the International 10-20 system. Automated spike and seizure detection algorithms were applied. Video was recorded during this study. Description  The background consistent of 7-8 Hz posterior dominant rhythm that is reactive and mostly symmetric. Continuous right hemispheric focal slowing or interhemispheric asymmetry was noted. Normal sleep structures were  observed. There were few right anterior temporal sharp discharges. Events  No events reported. Impression  Abnormal continuous vEEG recording, the slowing mentioned above suggests mild non specific encephalopathy. The interhemispheric asymmetry suggest focal lesion/dysfunction over right hemisphere. The presence of right anterior temporal sharp discharges increases patient risk for focal seizures. Edwina Fernandez MD  Epilepsy Board Certified. Neurology Board Certified.     Electronically Signed No

## 2023-09-11 NOTE — CARE COORDINATION
08/27/23 1007   Readmission Assessment   Number of Days since last admission? 1-7 days   Previous Disposition Home with Family   Who is being Eri Denson  (Father Penny Felix. Pt does not have a caregiver.)   What was the patient's/caregiver's perception as to why they think they needed to return back to the hospital? Other (Comment)  (None)   Did you visit your Primary Care Physician after you left the hospital, before you returned this time? No   Why weren't you able to visit your PCP? Did not have an appointment   Did you see a specialist, such as Cardiac, Pulmonary, Orthopedic Physician, etc. after you left the hospital? No   Who advised the patient to return to the hospital? Other (Comment)  (daughter)   Does the patient report anything that got in the way of taking their medications? No   In our efforts to provide the best possible care to you and others like you, can you think of anything that we could have done to help you after you left the hospital the first time, so that you might not have needed to return so soon?  Other (Comment)  (None)
Pt remains intubated at this time. SW will continue to follow and complete PHQ-9 depression screen once pt is able to participate.
SW cont to follow to complete PHQ-9 screen once pt able to participate
Social work following to complete a PHQ 9 assessment when pt is able to participate.
Social work is following to complete a PHQ 9 assessment when pt is able to participate.
Social work is following to complete a PHQ 9 assessment when pt is able to participate.
Transitional Planing  Patient remains intubated, has EVD. Referrals to [facility] and [facility]. PT/OT to eval when appropriate. Will continue to monitor needs based on clinical progress.
Transitional Planning  Call from DAVEY at [facility], referral received and will follow. 1000 am  Spoke with Barb from [facility], able to accept, will follow.
Transitional Planning  Family meeting completed. Plan to terminally extubate.
Transitional Planning  Palliative care following, family meeting scheduled for 11 am tomorrow, to discuss goals of care. Remains intubated.   Will continue to follow
Transitional Planning  Patient remains intubated and sedated. Has EVD. Scheduled for CTA and MRI today. Will continue to monitor for additional needs. PT/OT to eval when appropriate.
Transitional Planning  Patient remains intubated. Goals of care discussion with family. Palliative following. Follow needs based on goals of care and clinical progress. Referrals to [facility] and [facility] and are both following.
Transitional Planning  Spoke with pts father regarding hospice choice. He would like to speak with his family and then will provide a choice. Looking for inpatient hospice. 1200 pm Spoke with father, Yessy Amador, regarding hospice choice, he states that he spoke with his grand dtr and she does not want patient going to hospice, grand dtr had a bad experience with hospice. Discussed with father, patient is actively dying and hospital normally refers to hospice. 18 pm Spoke with father, states that he is going to be leaving for awhile, his son is in the [facility].  Cell number is [phone].      145 pm Attempted to call Tahsa Farmer, [phone], no answer.
Transitional planning:  ICP's in 40's Had Mannitol. Angio-had 1 stent. Head CT showed stent clotted off. Then had thrombectomy. EVD. Right side extending. Pt. Breathing abdominally. Not opening eyes. MRI today.
Transitional planning: Plan for trach/PEG once Brilinta has been held x5 days-hep gtt started. Palliative meeting planned with family, time TBD, possibly tomorrow afternoon.
Unable yo preform CM assessment and RAC at this time d/t pt sedated and intubated. No family present. Will attempt later as time allows. 0458 Attempted to call pt's daughter Zane Searsleroy- no answer, no ability to leave .
Patient and/or Patient Representative Agree with the Discharge Plan?  (P) Yes    Sumit Castillo RN  Case Management Department  Ph: [phone]

## 2023-09-11 NOTE — DISCHARGE SUMMARY
femoral artery access 2. Intravenous moderate sedation for 226 minutes 3. Selective right common carotid artery (CCA) angiogram 4. Selective right internal carotid artery (ICA) cerebral angiogram x5 5. Transcranial arterial infusion of verapamil  in the right ICA 6. Selective left common carotid artery (CCA) angiogram 7. Selective left internal carotid artery (ICA) cerebral angiogram x2 8. Transcranial arterial infusion of verapamil  in the left ICA x2 9. Selective left vertebral artery (VA) cerebral angiogram 10. Transcranial arterial infusion of verapamil  in the left VA 11. Continuous intraarterial nicardipine infusion throughout the case Neurointerventionalist: Kalen Honeycutt MD [place]: Roman Snell MD Contrast: 89 cc of Visipaque-270. Fluoroscopy time: 21.9 minutes Access: Right common femoral artery. Comparison: Cerebral angiogram on the 8/26/23 and 8/27/23 Consent: After explaining the risks and benefits to the patient and the patient's family, including but not limited to stroke, coma, death, vessel injury, dissection, tear, occlusion, and X-ray dye allergic type reaction, a signed consent form was obtained. Indication and Clinical History: Jefry Narayan is a 47 y.o. female ?female?with PMH of A-fib, PE (on Xarelto), HTN, T2DM, HLD, anxiety, CAD, GERD,?who presents?after being found unresponsive at home by family. ? Family initially started CPR and called EMS, upon EMS arrival patient was noted to have a pulse. ? Initial NIH 3. ?She was persistently complaining of severe headache and nausea. ? 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 ? ? 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 On the 8/27/23, patient was taken emergently for thrombectomy of the stent thrombosis.  TCD on the 9/5/2023

## 2023-09-12 ENCOUNTER — TELEPHONE (OUTPATIENT)
Dept: INTERNAL MEDICINE CLINIC | Age: 53
End: 2023-09-12

## 2023-09-12 LAB
MICROORGANISM SPEC CULT: NORMAL
SERVICE CMNT-IMP: NORMAL
SPECIMEN DESCRIPTION: NORMAL

## 2023-09-12 NOTE — TELEPHONE ENCOUNTER
Patient passed away 9/11/2023 @ 4:27pm at [facility].    Will you sign the death certificate.

## (undated) DEVICE — DEFENDO AIR WATER SUCTION AND BIOPSY VALVE KIT FOR  OLYMPUS: Brand: DEFENDO AIR/WATER/SUCTION AND BIOPSY VALVE

## (undated) DEVICE — 60 ML SYRINGE LUER-LOCK TIP: Brand: MONOJECT

## (undated) DEVICE — NO USE 18 MONTHS GOWN ISOL XL YEL LF

## (undated) DEVICE — GAUZE,SPONGE,4"X4",16PLY,STRL,LF,10/TRAY: Brand: MEDLINE

## (undated) DEVICE — JELLY,LUBE,STERILE,FLIP TOP,TUBE,2-OZ: Brand: MEDLINE

## (undated) DEVICE — GOWN,AURORA,NONREINFORCED,LARGE: Brand: MEDLINE

## (undated) DEVICE — TRAP SURG QUAD PARABOLA SLOT DSGN SFTY SCRN TRAPEASE

## (undated) DEVICE — CO2 CANNULA,SUPERSOFT, ADLT,7'O2,7'CO2: Brand: MEDLINE

## (undated) DEVICE — BITEBLOCK 54FR W/ DENT RIM BLOX

## (undated) DEVICE — Device: Brand: DEFENDO VALVE AND CONNECTOR KIT

## (undated) DEVICE — MEDICINE CUP, GRADUATED, STER: Brand: MEDLINE

## (undated) DEVICE — BASIN EMSIS 700ML GRAPHITE PLAS KID SHP GRAD

## (undated) DEVICE — FORCEPS BX L240CM JAW DIA2.8MM L CAP W/ NDL MIC MESH TOOTH

## (undated) DEVICE — TUBING, SUCTION, 1/4" X 12', STRAIGHT: Brand: MEDLINE

## (undated) DEVICE — YANKAUER,FLEXIBLE HANDLE,REGLR CAPACITY: Brand: MEDLINE INDUSTRIES, INC.

## (undated) DEVICE — GLOVE ORANGE PI 8   MSG9080

## (undated) DEVICE — SYRINGE MED 50ML LUERLOCK TIP

## (undated) DEVICE — ELECTRODE PT RET AD L9FT HI MOIST COND ADH HYDRGEL CORDED

## (undated) DEVICE — FORCEPS BX L240CM WRK CHN 2.8MM STD CAP W/ NDL MIC MESH

## (undated) DEVICE — ADAPTER TBNG LUER STUB 15 GA INTMED

## (undated) DEVICE — ENDO KIT W/SYRINGE: Brand: MEDLINE INDUSTRIES, INC.

## (undated) DEVICE — GLOVE SURG 8 11.7IN BEAD CUF LIGHT BRN SENSICARE LTX FREE

## (undated) DEVICE — SNARE ENDOSCP M L240CM LOOP W27MM SHTH DIA2.4MM OVL FLX

## (undated) DEVICE — CUP MED 1OZ CLR POLYPR FEED GRAD W/O LID

## (undated) DEVICE — BLOCK BITE 60FR RUBBER ADLT DENTAL